# Patient Record
Sex: MALE | Race: WHITE | NOT HISPANIC OR LATINO
[De-identification: names, ages, dates, MRNs, and addresses within clinical notes are randomized per-mention and may not be internally consistent; named-entity substitution may affect disease eponyms.]

---

## 2017-03-16 ENCOUNTER — APPOINTMENT (OUTPATIENT)
Dept: CARDIOLOGY | Facility: CLINIC | Age: 77
End: 2017-03-16

## 2017-06-06 ENCOUNTER — OUTPATIENT (OUTPATIENT)
Dept: OUTPATIENT SERVICES | Facility: HOSPITAL | Age: 77
LOS: 1 days | Discharge: HOME | End: 2017-06-06

## 2017-06-08 ENCOUNTER — APPOINTMENT (OUTPATIENT)
Dept: CARDIOLOGY | Facility: CLINIC | Age: 77
End: 2017-06-08

## 2017-06-08 VITALS
SYSTOLIC BLOOD PRESSURE: 136 MMHG | HEIGHT: 74 IN | DIASTOLIC BLOOD PRESSURE: 60 MMHG | BODY MASS INDEX: 31.06 KG/M2 | WEIGHT: 242 LBS | HEART RATE: 65 BPM

## 2017-06-08 DIAGNOSIS — I11.9 HYPERTENSIVE HEART DISEASE W/OUT HEART FAILURE: ICD-10-CM

## 2017-06-28 DIAGNOSIS — E07.9 DISORDER OF THYROID, UNSPECIFIED: ICD-10-CM

## 2017-06-28 DIAGNOSIS — E78.5 HYPERLIPIDEMIA, UNSPECIFIED: ICD-10-CM

## 2017-06-28 DIAGNOSIS — I10 ESSENTIAL (PRIMARY) HYPERTENSION: ICD-10-CM

## 2017-06-28 DIAGNOSIS — E11.65 TYPE 2 DIABETES MELLITUS WITH HYPERGLYCEMIA: ICD-10-CM

## 2017-10-19 ENCOUNTER — APPOINTMENT (OUTPATIENT)
Dept: CARDIOLOGY | Facility: CLINIC | Age: 77
End: 2017-10-19

## 2017-10-19 VITALS
WEIGHT: 242 LBS | SYSTOLIC BLOOD PRESSURE: 142 MMHG | BODY MASS INDEX: 31.06 KG/M2 | HEART RATE: 67 BPM | HEIGHT: 74 IN | DIASTOLIC BLOOD PRESSURE: 60 MMHG

## 2018-02-22 ENCOUNTER — APPOINTMENT (OUTPATIENT)
Dept: CARDIOLOGY | Facility: CLINIC | Age: 78
End: 2018-02-22

## 2018-02-22 VITALS
HEIGHT: 74 IN | HEART RATE: 65 BPM | DIASTOLIC BLOOD PRESSURE: 78 MMHG | WEIGHT: 247 LBS | BODY MASS INDEX: 31.7 KG/M2 | SYSTOLIC BLOOD PRESSURE: 140 MMHG

## 2018-02-22 DIAGNOSIS — I20.9 ANGINA PECTORIS, UNSPECIFIED: ICD-10-CM

## 2018-02-22 DIAGNOSIS — I50.30 UNSPECIFIED DIASTOLIC (CONGESTIVE) HEART FAILURE: ICD-10-CM

## 2018-03-06 ENCOUNTER — APPOINTMENT (OUTPATIENT)
Dept: CARDIOLOGY | Facility: CLINIC | Age: 78
End: 2018-03-06

## 2018-03-14 ENCOUNTER — OUTPATIENT (OUTPATIENT)
Dept: OUTPATIENT SERVICES | Facility: HOSPITAL | Age: 78
LOS: 1 days | Discharge: HOME | End: 2018-03-14

## 2018-03-14 DIAGNOSIS — E11.9 TYPE 2 DIABETES MELLITUS WITHOUT COMPLICATIONS: ICD-10-CM

## 2018-03-14 DIAGNOSIS — E03.9 HYPOTHYROIDISM, UNSPECIFIED: ICD-10-CM

## 2018-07-25 ENCOUNTER — APPOINTMENT (OUTPATIENT)
Dept: CARDIOLOGY | Facility: CLINIC | Age: 78
End: 2018-07-25

## 2018-07-25 VITALS
HEIGHT: 74 IN | HEART RATE: 63 BPM | BODY MASS INDEX: 31.06 KG/M2 | DIASTOLIC BLOOD PRESSURE: 62 MMHG | SYSTOLIC BLOOD PRESSURE: 160 MMHG | WEIGHT: 242 LBS

## 2018-07-25 DIAGNOSIS — E78.5 HYPERLIPIDEMIA, UNSPECIFIED: ICD-10-CM

## 2018-07-25 DIAGNOSIS — I25.10 ATHEROSCLEROTIC HEART DISEASE OF NATIVE CORONARY ARTERY W/OUT ANGINA PECTORIS: ICD-10-CM

## 2018-12-19 ENCOUNTER — APPOINTMENT (OUTPATIENT)
Dept: CARDIOLOGY | Facility: CLINIC | Age: 78
End: 2018-12-19

## 2018-12-31 ENCOUNTER — EMERGENCY (EMERGENCY)
Facility: HOSPITAL | Age: 78
LOS: 0 days | Discharge: HOME | End: 2018-12-31
Attending: STUDENT IN AN ORGANIZED HEALTH CARE EDUCATION/TRAINING PROGRAM | Admitting: STUDENT IN AN ORGANIZED HEALTH CARE EDUCATION/TRAINING PROGRAM
Payer: MEDICARE

## 2018-12-31 ENCOUNTER — INBOUND DOCUMENT (OUTPATIENT)
Age: 78
End: 2018-12-31

## 2018-12-31 VITALS
DIASTOLIC BLOOD PRESSURE: 74 MMHG | RESPIRATION RATE: 18 BRPM | SYSTOLIC BLOOD PRESSURE: 178 MMHG | OXYGEN SATURATION: 95 % | TEMPERATURE: 97 F | HEART RATE: 87 BPM

## 2018-12-31 VITALS
OXYGEN SATURATION: 97 % | HEART RATE: 73 BPM | RESPIRATION RATE: 18 BRPM | TEMPERATURE: 97 F | DIASTOLIC BLOOD PRESSURE: 96 MMHG | WEIGHT: 251.99 LBS | SYSTOLIC BLOOD PRESSURE: 213 MMHG

## 2018-12-31 DIAGNOSIS — Z95.1 PRESENCE OF AORTOCORONARY BYPASS GRAFT: ICD-10-CM

## 2018-12-31 DIAGNOSIS — E11.9 TYPE 2 DIABETES MELLITUS WITHOUT COMPLICATIONS: ICD-10-CM

## 2018-12-31 DIAGNOSIS — Z95.1 PRESENCE OF AORTOCORONARY BYPASS GRAFT: Chronic | ICD-10-CM

## 2018-12-31 DIAGNOSIS — L03.115 CELLULITIS OF RIGHT LOWER LIMB: ICD-10-CM

## 2018-12-31 DIAGNOSIS — I10 ESSENTIAL (PRIMARY) HYPERTENSION: ICD-10-CM

## 2018-12-31 DIAGNOSIS — M79.89 OTHER SPECIFIED SOFT TISSUE DISORDERS: ICD-10-CM

## 2018-12-31 DIAGNOSIS — E03.9 HYPOTHYROIDISM, UNSPECIFIED: ICD-10-CM

## 2018-12-31 DIAGNOSIS — Z79.899 OTHER LONG TERM (CURRENT) DRUG THERAPY: ICD-10-CM

## 2018-12-31 DIAGNOSIS — I25.10 ATHEROSCLEROTIC HEART DISEASE OF NATIVE CORONARY ARTERY WITHOUT ANGINA PECTORIS: ICD-10-CM

## 2018-12-31 DIAGNOSIS — E78.00 PURE HYPERCHOLESTEROLEMIA, UNSPECIFIED: ICD-10-CM

## 2018-12-31 DIAGNOSIS — Z79.82 LONG TERM (CURRENT) USE OF ASPIRIN: ICD-10-CM

## 2018-12-31 LAB
ANION GAP SERPL CALC-SCNC: 12 MMOL/L — SIGNIFICANT CHANGE UP (ref 7–14)
BASOPHILS # BLD AUTO: 0.03 K/UL — SIGNIFICANT CHANGE UP (ref 0–0.2)
BASOPHILS NFR BLD AUTO: 0.4 % — SIGNIFICANT CHANGE UP (ref 0–1)
BUN SERPL-MCNC: 27 MG/DL — HIGH (ref 10–20)
CALCIUM SERPL-MCNC: 9.3 MG/DL — SIGNIFICANT CHANGE UP (ref 8.5–10.1)
CHLORIDE SERPL-SCNC: 103 MMOL/L — SIGNIFICANT CHANGE UP (ref 98–110)
CO2 SERPL-SCNC: 29 MMOL/L — SIGNIFICANT CHANGE UP (ref 17–32)
CREAT SERPL-MCNC: 1.3 MG/DL — SIGNIFICANT CHANGE UP (ref 0.7–1.5)
EOSINOPHIL # BLD AUTO: 0.33 K/UL — SIGNIFICANT CHANGE UP (ref 0–0.7)
EOSINOPHIL NFR BLD AUTO: 4.7 % — SIGNIFICANT CHANGE UP (ref 0–8)
GLUCOSE SERPL-MCNC: 163 MG/DL — HIGH (ref 70–99)
HCT VFR BLD CALC: 39.9 % — LOW (ref 42–52)
HGB BLD-MCNC: 12.8 G/DL — LOW (ref 14–18)
IMM GRANULOCYTES NFR BLD AUTO: 0.3 % — SIGNIFICANT CHANGE UP (ref 0.1–0.3)
LACTATE SERPL-SCNC: 1.3 MMOL/L — SIGNIFICANT CHANGE UP (ref 0.5–2.2)
LYMPHOCYTES # BLD AUTO: 0.77 K/UL — LOW (ref 1.2–3.4)
LYMPHOCYTES # BLD AUTO: 10.9 % — LOW (ref 20.5–51.1)
MCHC RBC-ENTMCNC: 28.3 PG — SIGNIFICANT CHANGE UP (ref 27–31)
MCHC RBC-ENTMCNC: 32.1 G/DL — SIGNIFICANT CHANGE UP (ref 32–37)
MCV RBC AUTO: 88.3 FL — SIGNIFICANT CHANGE UP (ref 80–94)
MONOCYTES # BLD AUTO: 0.6 K/UL — SIGNIFICANT CHANGE UP (ref 0.1–0.6)
MONOCYTES NFR BLD AUTO: 8.5 % — SIGNIFICANT CHANGE UP (ref 1.7–9.3)
NEUTROPHILS # BLD AUTO: 5.34 K/UL — SIGNIFICANT CHANGE UP (ref 1.4–6.5)
NEUTROPHILS NFR BLD AUTO: 75.2 % — SIGNIFICANT CHANGE UP (ref 42.2–75.2)
NRBC # BLD: 0 /100 WBCS — SIGNIFICANT CHANGE UP (ref 0–0)
PLATELET # BLD AUTO: 225 K/UL — SIGNIFICANT CHANGE UP (ref 130–400)
POTASSIUM SERPL-MCNC: 4.5 MMOL/L — SIGNIFICANT CHANGE UP (ref 3.5–5)
POTASSIUM SERPL-SCNC: 4.5 MMOL/L — SIGNIFICANT CHANGE UP (ref 3.5–5)
RBC # BLD: 4.52 M/UL — LOW (ref 4.7–6.1)
RBC # FLD: 14.2 % — SIGNIFICANT CHANGE UP (ref 11.5–14.5)
SODIUM SERPL-SCNC: 144 MMOL/L — SIGNIFICANT CHANGE UP (ref 135–146)
WBC # BLD: 7.09 K/UL — SIGNIFICANT CHANGE UP (ref 4.8–10.8)
WBC # FLD AUTO: 7.09 K/UL — SIGNIFICANT CHANGE UP (ref 4.8–10.8)

## 2018-12-31 PROCEDURE — 93970 EXTREMITY STUDY: CPT | Mod: 26

## 2018-12-31 RX ORDER — AMPICILLIN SODIUM AND SULBACTAM SODIUM 250; 125 MG/ML; MG/ML
3 INJECTION, POWDER, FOR SUSPENSION INTRAMUSCULAR; INTRAVENOUS ONCE
Qty: 0 | Refills: 0 | Status: COMPLETED | OUTPATIENT
Start: 2018-12-31 | End: 2018-12-31

## 2018-12-31 RX ORDER — CEPHALEXIN 500 MG
1 CAPSULE ORAL
Qty: 28 | Refills: 0 | OUTPATIENT
Start: 2018-12-31 | End: 2019-01-06

## 2018-12-31 RX ORDER — AZTREONAM 2 G
1 VIAL (EA) INJECTION
Qty: 14 | Refills: 0
Start: 2018-12-31 | End: 2019-01-06

## 2018-12-31 RX ORDER — AZTREONAM 2 G
1 VIAL (EA) INJECTION
Qty: 14 | Refills: 0 | OUTPATIENT
Start: 2018-12-31 | End: 2019-01-06

## 2018-12-31 RX ORDER — ACETAMINOPHEN 500 MG
975 TABLET ORAL ONCE
Qty: 0 | Refills: 0 | Status: COMPLETED | OUTPATIENT
Start: 2018-12-31 | End: 2018-12-31

## 2018-12-31 RX ORDER — CEPHALEXIN 500 MG
1 CAPSULE ORAL
Qty: 28 | Refills: 0
Start: 2018-12-31 | End: 2019-01-06

## 2018-12-31 RX ADMIN — AMPICILLIN SODIUM AND SULBACTAM SODIUM 200 GRAM(S): 250; 125 INJECTION, POWDER, FOR SUSPENSION INTRAMUSCULAR; INTRAVENOUS at 08:04

## 2018-12-31 RX ADMIN — Medication 975 MILLIGRAM(S): at 08:04

## 2018-12-31 RX ADMIN — Medication 1 TABLET(S): at 08:04

## 2018-12-31 NOTE — ED ADULT NURSE NOTE - OBJECTIVE STATEMENT
Partiernt c/o swelling of BLLE with redness x 4 days. Patient denies Fevers chills n/v CP SOB and any recent long travel. No open wounds noted to yhe lower extremities.

## 2018-12-31 NOTE — ED PROVIDER NOTE - PHYSICAL EXAMINATION
CONSTITUTIONAL: NAD  SKIN: Warm dry  HEAD: NCAT  EYES: NL inspection  ENT: MMM  NECK: Supple; non tender.  CARD: RRR  RESP: CTAB  ABD: S/NT no R/G  EXT: + erythema to R mid ant-medial tibia; + some swelling and mild ttp; pulses NL b/l  NEURO: Grossly unremarkable  PSYCH: Cooperative, appropriate.

## 2018-12-31 NOTE — ED ADULT NURSE NOTE - NSIMPLEMENTINTERV_GEN_ALL_ED
Implemented All Universal Safety Interventions:  [place] to call system. Call bell, personal items and telephone within reach. Instruct patient to call for assistance. Room bathroom lighting operational. Non-slip footwear when patient is off stretcher. Physically safe environment: no spills, clutter or unnecessary equipment. Stretcher in lowest position, wheels locked, appropriate side rails in place.

## 2018-12-31 NOTE — ED PROVIDER NOTE - OBJECTIVE STATEMENT
77 y/o M pmh DM, hnt, CAD s/p CABG, p/w R leg erythema and swelling x4d. saw PMD taking Zpack as rx'ed x 3d w/ marginal improvement. No fever, trauma, weakness or numbness. no hx VTE. No cp sob. Gradual onset, no clear relieving or exacerbating factors.  Sx are moderate.

## 2018-12-31 NOTE — ED PROVIDER NOTE - CARE PROVIDER_API CALL
Tariq Rivera), Vascular Surgery  [address] [place] [address]  Phone: [phone]  Fax: [phone]

## 2018-12-31 NOTE — ED PROVIDER NOTE - MEDICAL DECISION MAKING DETAILS
Pt presented /w RLE cellulitis. labs reassuring. DVT LE US neg. abx coverage broadened. Discussed all available results.  Pt and/ or family understand results, plan of care, outpt follow up as discussed, and signs and symptoms for ED return.  Pt/ family is comfortable with discharge.  Included number for vascular surgeon by pt request.

## 2018-12-31 NOTE — ED PROVIDER NOTE - NS ED ROS FT
Constitutional:  See HPI  Eyes:  No visual changes  ENMT: No neck pain or stiffness  Cardiac:  No chest pain  Respiratory:  No cough or respiratory distress.   GI:  No nausea, vomiting, diarrhea or abdominal pain.  :  No dysuria, frequency or burning.  MS:  See HPI  Neuro:  No headache   Skin:  No skin rash  Except as documented in the HPI,  all other systems are negative

## 2018-12-31 NOTE — ED ADULT TRIAGE NOTE - CHIEF COMPLAINT QUOTE
pt states that he developed a rash and swelling on his right leg that has gotten worse despite oral antibiotics

## 2018-12-31 NOTE — ED PROVIDER NOTE - NSFOLLOWUPINSTRUCTIONS_ED_ALL_ED_FT
Cellulitis    Cellulitis is a skin infection caused by bacteria. This condition occurs most often in the arms and lower legs but can occur anywhere over the body. Symptoms include redness, swelling, warm skin, tenderness, and chills/fever. If you were prescribed an antibiotic medicine, take it as told by your health care provider. Do not stop taking the antibiotic even if you start to feel better.    Take antibiotics as prescribed.  If you develop a fever, worsening pain or spreading redness despite taking antibiotics, return to the ER.  Otherwise, follow up with your doctor as planned this Thursday.    The number for a vascular surgeon was placed in the chart, by your request.  Speak to you pmd before making an appointment.    SEEK IMMEDIATE MEDICAL CARE IF YOU HAVE THE FOLLOWING SYMPTOMS: worsening fever, red streaks coming from affected area, vomiting or diarrhea, or dizziness/lightheadedness.

## 2019-01-23 ENCOUNTER — OUTPATIENT (OUTPATIENT)
Dept: OUTPATIENT SERVICES | Facility: HOSPITAL | Age: 79
LOS: 1 days | Discharge: HOME | End: 2019-01-23

## 2019-01-23 DIAGNOSIS — E78.5 HYPERLIPIDEMIA, UNSPECIFIED: ICD-10-CM

## 2019-01-23 DIAGNOSIS — E06.3 AUTOIMMUNE THYROIDITIS: ICD-10-CM

## 2019-01-23 DIAGNOSIS — Z95.1 PRESENCE OF AORTOCORONARY BYPASS GRAFT: Chronic | ICD-10-CM

## 2019-01-23 DIAGNOSIS — E11.65 TYPE 2 DIABETES MELLITUS WITH HYPERGLYCEMIA: ICD-10-CM

## 2019-01-23 PROBLEM — E78.00 PURE HYPERCHOLESTEROLEMIA, UNSPECIFIED: Chronic | Status: ACTIVE | Noted: 2018-12-31

## 2019-01-23 PROBLEM — I10 ESSENTIAL (PRIMARY) HYPERTENSION: Chronic | Status: ACTIVE | Noted: 2018-12-31

## 2019-01-23 PROBLEM — E03.9 HYPOTHYROIDISM, UNSPECIFIED: Chronic | Status: ACTIVE | Noted: 2018-12-31

## 2019-01-23 PROBLEM — E11.9 TYPE 2 DIABETES MELLITUS WITHOUT COMPLICATIONS: Chronic | Status: ACTIVE | Noted: 2018-12-31

## 2019-03-11 ENCOUNTER — APPOINTMENT (OUTPATIENT)
Dept: VASCULAR SURGERY | Facility: CLINIC | Age: 79
End: 2019-03-11
Payer: MEDICARE

## 2019-03-11 VITALS
WEIGHT: 250 LBS | HEIGHT: 73 IN | BODY MASS INDEX: 33.13 KG/M2 | DIASTOLIC BLOOD PRESSURE: 60 MMHG | SYSTOLIC BLOOD PRESSURE: 140 MMHG

## 2019-03-11 PROCEDURE — 99203 OFFICE O/P NEW LOW 30 MIN: CPT

## 2019-03-11 PROCEDURE — 93925 LOWER EXTREMITY STUDY: CPT

## 2019-03-11 NOTE — HISTORY OF PRESENT ILLNESS
[FreeTextEntry1] : The patient is a 79-year-old gentleman who was recently diagnosed with an abdominal aortic aneurysm on abdominal ultrasound. The patient's aorta measured 3.9 cm x 4.3 cm. Right common iliac artery 1.8 cm left common iliac artery 1.5 cm. The patient has also informed me of a recent history of cellulitis which he was treated with antibiotics. He also has some weeping edema and blisters to us tibial region bilaterally. He had a venous duplex performed in the hospital which was negative for DVT.

## 2019-03-11 NOTE — CONSULT LETTER
[Dear  ___] : Dear  [unfilled], [Courtesy Letter:] : I had the pleasure of seeing your patient, [unfilled], in my office today. [Please see my note below.] : Please see my note below. [FreeTextEntry2] : Dear Dr. Borden

## 2019-03-11 NOTE — PHYSICAL EXAM
[Normal Breath Sounds] : Normal breath sounds [Normal Heart Sounds] : normal heart sounds [Abdominal Aorta] : Normal abdominal aorta [2+] : left 2+ [Ankle Swelling (On Exam)] : present [Ankle Swelling Bilaterally] : bilaterally  [] : bilaterally [Ankle Swelling On The Left] : moderate [JVD] : no jugular venous distention  [Carotid Bruits] : no carotid bruits [Varicose Veins Of Lower Extremities] : not present [FreeTextEntry1] : bilateral stasis skin changes with weeping edema and blisters

## 2019-03-11 NOTE — ASSESSMENT
[FreeTextEntry1] : The patient is a 80 yo male with a 4.3 cm AAA and a 1.8 cm right and 1.5 left MAR aneurysm. The patient also as thoracic aortic ectasia. He also has cellulitis of his lower extremity with venous stasis ulcer. I preformed an arterial duplex for decrease pulses and the patient has significant PVD. Right popliteal artery occlusion and left SFA stenosis PSV 383cm/s. I will schedule him for 3/22/19 for a left leg angiogram. I recommend antibiotic and local wound care with ACE wrap compression. The patient will benefit from unna boots once cellulitis has resolved.

## 2019-03-12 ENCOUNTER — FORM ENCOUNTER (OUTPATIENT)
Age: 79
End: 2019-03-12

## 2019-03-13 ENCOUNTER — OUTPATIENT (OUTPATIENT)
Dept: OUTPATIENT SERVICES | Facility: HOSPITAL | Age: 79
LOS: 1 days | Discharge: HOME | End: 2019-03-13

## 2019-03-13 VITALS
SYSTOLIC BLOOD PRESSURE: 160 MMHG | HEART RATE: 72 BPM | OXYGEN SATURATION: 100 % | TEMPERATURE: 97 F | WEIGHT: 255.3 LBS | RESPIRATION RATE: 20 BRPM | HEIGHT: 73 IN | DIASTOLIC BLOOD PRESSURE: 80 MMHG

## 2019-03-13 DIAGNOSIS — Z90.49 ACQUIRED ABSENCE OF OTHER SPECIFIED PARTS OF DIGESTIVE TRACT: Chronic | ICD-10-CM

## 2019-03-13 DIAGNOSIS — I73.9 PERIPHERAL VASCULAR DISEASE, UNSPECIFIED: ICD-10-CM

## 2019-03-13 DIAGNOSIS — Z95.1 PRESENCE OF AORTOCORONARY BYPASS GRAFT: Chronic | ICD-10-CM

## 2019-03-13 DIAGNOSIS — Z01.818 ENCOUNTER FOR OTHER PREPROCEDURAL EXAMINATION: ICD-10-CM

## 2019-03-13 DIAGNOSIS — Z98.890 OTHER SPECIFIED POSTPROCEDURAL STATES: Chronic | ICD-10-CM

## 2019-03-13 LAB
ALBUMIN SERPL ELPH-MCNC: 3.5 G/DL — SIGNIFICANT CHANGE UP (ref 3.5–5.2)
ALP SERPL-CCNC: 48 U/L — SIGNIFICANT CHANGE UP (ref 30–115)
ALT FLD-CCNC: 31 U/L — SIGNIFICANT CHANGE UP (ref 0–41)
ANION GAP SERPL CALC-SCNC: 12 MMOL/L — SIGNIFICANT CHANGE UP (ref 7–14)
APTT BLD: 36.6 SEC — SIGNIFICANT CHANGE UP (ref 27–39.2)
AST SERPL-CCNC: 51 U/L — HIGH (ref 0–41)
BASOPHILS # BLD AUTO: 0.04 K/UL — SIGNIFICANT CHANGE UP (ref 0–0.2)
BASOPHILS NFR BLD AUTO: 0.5 % — SIGNIFICANT CHANGE UP (ref 0–1)
BILIRUB SERPL-MCNC: 0.3 MG/DL — SIGNIFICANT CHANGE UP (ref 0.2–1.2)
BUN SERPL-MCNC: 25 MG/DL — HIGH (ref 10–20)
CALCIUM SERPL-MCNC: 9.1 MG/DL — SIGNIFICANT CHANGE UP (ref 8.5–10.1)
CHLORIDE SERPL-SCNC: 99 MMOL/L — SIGNIFICANT CHANGE UP (ref 98–110)
CO2 SERPL-SCNC: 28 MMOL/L — SIGNIFICANT CHANGE UP (ref 17–32)
CREAT SERPL-MCNC: 1.3 MG/DL — SIGNIFICANT CHANGE UP (ref 0.7–1.5)
EOSINOPHIL # BLD AUTO: 0.11 K/UL — SIGNIFICANT CHANGE UP (ref 0–0.7)
EOSINOPHIL NFR BLD AUTO: 1.4 % — SIGNIFICANT CHANGE UP (ref 0–8)
GLUCOSE SERPL-MCNC: 86 MG/DL — SIGNIFICANT CHANGE UP (ref 70–99)
HCT VFR BLD CALC: 39.5 % — LOW (ref 42–52)
HGB BLD-MCNC: 12.4 G/DL — LOW (ref 14–18)
IMM GRANULOCYTES NFR BLD AUTO: 0.4 % — HIGH (ref 0.1–0.3)
INR BLD: 1.01 RATIO — SIGNIFICANT CHANGE UP (ref 0.65–1.3)
LYMPHOCYTES # BLD AUTO: 0.84 K/UL — LOW (ref 1.2–3.4)
LYMPHOCYTES # BLD AUTO: 10.4 % — LOW (ref 20.5–51.1)
MCHC RBC-ENTMCNC: 27.5 PG — SIGNIFICANT CHANGE UP (ref 27–31)
MCHC RBC-ENTMCNC: 31.4 G/DL — LOW (ref 32–37)
MCV RBC AUTO: 87.6 FL — SIGNIFICANT CHANGE UP (ref 80–94)
MONOCYTES # BLD AUTO: 0.69 K/UL — HIGH (ref 0.1–0.6)
MONOCYTES NFR BLD AUTO: 8.5 % — SIGNIFICANT CHANGE UP (ref 1.7–9.3)
NEUTROPHILS # BLD AUTO: 6.39 K/UL — SIGNIFICANT CHANGE UP (ref 1.4–6.5)
NEUTROPHILS NFR BLD AUTO: 78.8 % — HIGH (ref 42.2–75.2)
NRBC # BLD: 0 /100 WBCS — SIGNIFICANT CHANGE UP (ref 0–0)
PLATELET # BLD AUTO: 290 K/UL — SIGNIFICANT CHANGE UP (ref 130–400)
POTASSIUM SERPL-MCNC: 4.4 MMOL/L — SIGNIFICANT CHANGE UP (ref 3.5–5)
POTASSIUM SERPL-SCNC: 4.4 MMOL/L — SIGNIFICANT CHANGE UP (ref 3.5–5)
PROT SERPL-MCNC: 6.1 G/DL — SIGNIFICANT CHANGE UP (ref 6–8)
PROTHROM AB SERPL-ACNC: 11.6 SEC — SIGNIFICANT CHANGE UP (ref 9.95–12.87)
RBC # BLD: 4.51 M/UL — LOW (ref 4.7–6.1)
RBC # FLD: 15.6 % — HIGH (ref 11.5–14.5)
SODIUM SERPL-SCNC: 139 MMOL/L — SIGNIFICANT CHANGE UP (ref 135–146)
WBC # BLD: 8.1 K/UL — SIGNIFICANT CHANGE UP (ref 4.8–10.8)
WBC # FLD AUTO: 8.1 K/UL — SIGNIFICANT CHANGE UP (ref 4.8–10.8)

## 2019-03-13 RX ORDER — ASPIRIN/CALCIUM CARB/MAGNESIUM 324 MG
0 TABLET ORAL
Qty: 0 | Refills: 0 | COMMUNITY

## 2019-03-13 RX ORDER — INSULIN ASPART 100 [IU]/ML
0 INJECTION, SOLUTION SUBCUTANEOUS
Qty: 0 | Refills: 0 | COMMUNITY

## 2019-03-13 RX ORDER — VALSARTAN 80 MG/1
0 TABLET ORAL
Qty: 0 | Refills: 0 | COMMUNITY

## 2019-03-13 RX ORDER — FENOFIBRATE,MICRONIZED 130 MG
0 CAPSULE ORAL
Qty: 0 | Refills: 0 | COMMUNITY

## 2019-03-13 RX ORDER — LEVOTHYROXINE SODIUM 125 MCG
0 TABLET ORAL
Qty: 0 | Refills: 0 | COMMUNITY

## 2019-03-13 RX ORDER — INSULIN DEGLUDEC 100 U/ML
0 INJECTION, SOLUTION SUBCUTANEOUS
Qty: 0 | Refills: 0 | COMMUNITY

## 2019-03-13 RX ORDER — ATORVASTATIN CALCIUM 80 MG/1
0 TABLET, FILM COATED ORAL
Qty: 0 | Refills: 0 | COMMUNITY

## 2019-03-13 RX ORDER — EZETIMIBE 10 MG/1
0 TABLET ORAL
Qty: 0 | Refills: 0 | COMMUNITY

## 2019-03-13 NOTE — H&P PST ADULT - NSICDXPASTMEDICALHX_GEN_ALL_CORE_FT
PAST MEDICAL HISTORY:  DM (diabetes mellitus)     High cholesterol     HTN (hypertension)     Hypothyroid     Pneumonia

## 2019-03-13 NOTE — H&P PST ADULT - HISTORY OF PRESENT ILLNESS
78 y/o male scheduled for lle angiogram  pt with h/o pvd  pt reports h/o cabg 2001 reports that the vessels were harvested from b/l lower extremities now with decreased circulation  reports no c/o cp,sob,palpitations,cough or dysuria  1-2 fos without sob

## 2019-03-13 NOTE — H&P PST ADULT - NSICDXPASTSURGICALHX_GEN_ALL_CORE_FT
PAST SURGICAL HISTORY:  H/O coronary artery bypass surgery 2001    History of appendectomy     S/P inguinal hernia repair

## 2019-03-14 ENCOUNTER — APPOINTMENT (OUTPATIENT)
Dept: CARDIOLOGY | Facility: CLINIC | Age: 79
End: 2019-03-14

## 2019-03-14 VITALS
SYSTOLIC BLOOD PRESSURE: 154 MMHG | WEIGHT: 256 LBS | BODY MASS INDEX: 33.93 KG/M2 | DIASTOLIC BLOOD PRESSURE: 86 MMHG | HEIGHT: 73 IN | HEART RATE: 68 BPM

## 2019-03-14 DIAGNOSIS — I20.9 ANGINA PECTORIS, UNSPECIFIED: ICD-10-CM

## 2019-03-14 DIAGNOSIS — I25.10 ATHEROSCLEROTIC HEART DISEASE OF NATIVE CORONARY ARTERY W/OUT ANGINA PECTORIS: ICD-10-CM

## 2019-03-14 NOTE — ADDENDUM
[FreeTextEntry1] : This note was documented by Navjot Lindsey on 11/10/16 acting as scribe for Yoandy Borden M.D..\par \par I, Yoandy Borden M.D, certify that all of the documentation, medical decision making, and assessment within this note are true and correct.

## 2019-03-14 NOTE — HISTORY OF PRESENT ILLNESS
[FreeTextEntry1] : ANDRIA TAVAREZ is being seen for a clinic follow-up of chest pain, coronary artery disease, bypass graft and dyspnea.  \par Andria is a 76-year-old male with long-standing history of heart disease.He underwent coronary artery bypass surgery at [facility] approximately 20 years ago. \par He suffers from angina, class II with a New York Heart Association classification.\par  His last cardiac catheterization was in May of 2014. The bypass graft to the left anterior descending and right coronary artery was patent but the bypass graft to the circumflex system was occluded\par  \par  following his cardiac catheterization, medical management was recommended. \par \par Today patient reports he is doing well with no new complaints.\par  He denies any SOB, chest pain, claudication, palpitations, lightheadedness, or syncope. H\par e does note occasional leg cramps when he lays down to go to sleep. \par cardiac status is stable\par \par denies exertional chest pain or sob\par no symptoms of chf or edema\par class 1 according to the nyha classification\par \par no tia, cva or severe pvd\par no arrythmias, pre-syncope or syncope.

## 2019-03-14 NOTE — REASON FOR VISIT
[Follow-Up - Clinic] : a clinic follow-up of [Coronary Artery Disease] : coronary artery disease [FreeTextEntry2] : Also  the patient is pre-op for vascular surgery [FreeTextEntry1] : bypass graft

## 2019-03-14 NOTE — DISCUSSION/SUMMARY
[FreeTextEntry1] : patient is stable to undergo vascular surgery\par  moderate risk for surgery \par no further cardiaC WORK UP NEEDED

## 2019-03-14 NOTE — PHYSICAL EXAM
[General Appearance - Well Developed] : well developed [Normal Appearance] : normal appearance [Well Groomed] : well groomed [General Appearance - Well Nourished] : well nourished [No Deformities] : no deformities [General Appearance - In No Acute Distress] : no acute distress [Normal Conjunctiva] : the conjunctiva exhibited no abnormalities [Eyelids - No Xanthelasma] : the eyelids demonstrated no xanthelasmas [Normal Oral Mucosa] : normal oral mucosa [No Oral Pallor] : no oral pallor [No Oral Cyanosis] : no oral cyanosis [Normal Jugular Venous A Waves Present] : normal jugular venous A waves present [Normal Jugular Venous V Waves Present] : normal jugular venous V waves present [No Jugular Venous Wang A Waves] : no jugular venous wang A waves [Respiration, Rhythm And Depth] : normal respiratory rhythm and effort [Exaggerated Use Of Accessory Muscles For Inspiration] : no accessory muscle use [Auscultation Breath Sounds / Voice Sounds] : lungs were clear to auscultation bilaterally [Heart Rate And Rhythm] : heart rate and rhythm were normal [Heart Sounds] : normal S1 and S2 [Murmurs] : no murmurs present [Abdomen Soft] : soft [Abdomen Tenderness] : non-tender [Abdomen Mass (___ Cm)] : no abdominal mass palpated [Abnormal Walk] : normal gait [Gait - Sufficient For Exercise Testing] : the gait was sufficient for exercise testing [Nail Clubbing] : no clubbing of the fingernails [Cyanosis, Localized] : no localized cyanosis [Petechial Hemorrhages (___cm)] : no petechial hemorrhages [Skin Color & Pigmentation] : normal skin color and pigmentation [] : no rash [No Venous Stasis] : no venous stasis [Skin Lesions] : no skin lesions [No Skin Ulcers] : no skin ulcer [No Xanthoma] : no  xanthoma was observed [Oriented To Time, Place, And Person] : oriented to person, place, and time [FreeTextEntry1] : no pedal edema

## 2019-03-18 ENCOUNTER — APPOINTMENT (OUTPATIENT)
Dept: VASCULAR SURGERY | Facility: CLINIC | Age: 79
End: 2019-03-18
Payer: MEDICARE

## 2019-03-18 DIAGNOSIS — L97.909 NON-PRESSURE CHRONIC ULCER OF UNSPECIFIED PART OF UNSPECIFIED LOWER LEG WITH UNSPECIFIED SEVERITY: ICD-10-CM

## 2019-03-18 DIAGNOSIS — L03.115 CELLULITIS OF RIGHT LOWER LIMB: ICD-10-CM

## 2019-03-18 PROCEDURE — 99212 OFFICE O/P EST SF 10 MIN: CPT

## 2019-03-18 NOTE — ASSESSMENT
[FreeTextEntry1] : The patient is a 79-year-old gentleman with a history of peripheral vascular disease and venous insufficiency. The patient had bilateral lower extremity cellulitis last week. He was treated with oral antibiotics. His cellulitis has resolved his swelling is under control. I recommend local wound care and Ace wrap compression daily. And I will see him in the hospital on Friday for a left lower extremity injury and possible endovascular revascularization. Subsequently the patient will also require a right lower extremity endovascular revascularization.

## 2019-03-18 NOTE — HISTORY OF PRESENT ILLNESS
[FreeTextEntry1] : The patient is a 71-year-old gentleman who was seen in my office last week with bilateral lower extremity cellulitis. He was given oral antibiotics and recommended local wound care with Neosporin and an Ace wrap compression. He scheduled for a left lower extremity angiogram for SFA stenosis. Today he presents for followup of his lower extremity cellulitis. His cellulitis has resolved his leg swelling is greatly improved.

## 2019-03-18 NOTE — REASON FOR VISIT
[Follow-Up: _____] : a [unfilled] follow-up visit [FreeTextEntry1] : bilateral lower extremity cellulitis and PVD.

## 2019-03-22 ENCOUNTER — APPOINTMENT (OUTPATIENT)
Dept: VASCULAR SURGERY | Facility: HOSPITAL | Age: 79
End: 2019-03-22
Payer: MEDICARE

## 2019-03-22 ENCOUNTER — OUTPATIENT (OUTPATIENT)
Dept: OUTPATIENT SERVICES | Facility: HOSPITAL | Age: 79
LOS: 1 days | Discharge: HOME | End: 2019-03-22

## 2019-03-22 VITALS — HEART RATE: 59 BPM | DIASTOLIC BLOOD PRESSURE: 78 MMHG | SYSTOLIC BLOOD PRESSURE: 139 MMHG | RESPIRATION RATE: 16 BRPM

## 2019-03-22 VITALS
HEIGHT: 73 IN | RESPIRATION RATE: 20 BRPM | DIASTOLIC BLOOD PRESSURE: 87 MMHG | HEART RATE: 61 BPM | TEMPERATURE: 98 F | WEIGHT: 248.02 LBS | SYSTOLIC BLOOD PRESSURE: 180 MMHG

## 2019-03-22 DIAGNOSIS — Z98.890 OTHER SPECIFIED POSTPROCEDURAL STATES: Chronic | ICD-10-CM

## 2019-03-22 DIAGNOSIS — Z95.1 PRESENCE OF AORTOCORONARY BYPASS GRAFT: Chronic | ICD-10-CM

## 2019-03-22 DIAGNOSIS — Z90.49 ACQUIRED ABSENCE OF OTHER SPECIFIED PARTS OF DIGESTIVE TRACT: Chronic | ICD-10-CM

## 2019-03-22 LAB
BLD GP AB SCN SERPL QL: SIGNIFICANT CHANGE UP
GLUCOSE BLDC GLUCOMTR-MCNC: 89 MG/DL — SIGNIFICANT CHANGE UP (ref 70–99)
GLUCOSE BLDC GLUCOMTR-MCNC: 99 MG/DL — SIGNIFICANT CHANGE UP (ref 70–99)
TYPE + AB SCN PNL BLD: SIGNIFICANT CHANGE UP

## 2019-03-22 PROCEDURE — 75710 ARTERY X-RAYS ARM/LEG: CPT | Mod: 26,59

## 2019-03-22 PROCEDURE — 37226: CPT | Mod: LT

## 2019-03-22 PROCEDURE — 36247 INS CATH ABD/L-EXT ART 3RD: CPT | Mod: 59,LT

## 2019-03-22 PROCEDURE — 76937 US GUIDE VASCULAR ACCESS: CPT | Mod: 26

## 2019-03-22 RX ORDER — ONDANSETRON 8 MG/1
4 TABLET, FILM COATED ORAL ONCE
Qty: 0 | Refills: 0 | Status: COMPLETED | OUTPATIENT
Start: 2019-03-22 | End: 2019-03-22

## 2019-03-22 RX ORDER — OXYCODONE AND ACETAMINOPHEN 5; 325 MG/1; MG/1
1 TABLET ORAL EVERY 4 HOURS
Qty: 0 | Refills: 0 | Status: DISCONTINUED | OUTPATIENT
Start: 2019-03-22 | End: 2019-03-22

## 2019-03-22 RX ORDER — HYDROMORPHONE HYDROCHLORIDE 2 MG/ML
1 INJECTION INTRAMUSCULAR; INTRAVENOUS; SUBCUTANEOUS
Qty: 0 | Refills: 0 | Status: DISCONTINUED | OUTPATIENT
Start: 2019-03-22 | End: 2019-03-22

## 2019-03-22 RX ORDER — HYDRALAZINE HCL 50 MG
10 TABLET ORAL ONCE
Qty: 0 | Refills: 0 | Status: COMPLETED | OUTPATIENT
Start: 2019-03-22 | End: 2019-03-22

## 2019-03-22 RX ORDER — ASPIRIN/CALCIUM CARB/MAGNESIUM 324 MG
325 TABLET ORAL ONCE
Qty: 0 | Refills: 0 | Status: COMPLETED | OUTPATIENT
Start: 2019-03-22 | End: 2019-03-22

## 2019-03-22 RX ORDER — SODIUM CHLORIDE 9 MG/ML
1000 INJECTION, SOLUTION INTRAVENOUS
Qty: 0 | Refills: 0 | Status: DISCONTINUED | OUTPATIENT
Start: 2019-03-22 | End: 2019-04-06

## 2019-03-22 RX ORDER — HYDROMORPHONE HYDROCHLORIDE 2 MG/ML
0.5 INJECTION INTRAMUSCULAR; INTRAVENOUS; SUBCUTANEOUS
Qty: 0 | Refills: 0 | Status: DISCONTINUED | OUTPATIENT
Start: 2019-03-22 | End: 2019-03-22

## 2019-03-22 RX ADMIN — ONDANSETRON 4 MILLIGRAM(S): 8 TABLET, FILM COATED ORAL at 15:15

## 2019-03-22 RX ADMIN — Medication 325 MILLIGRAM(S): at 11:44

## 2019-03-22 RX ADMIN — HYDROMORPHONE HYDROCHLORIDE 0.5 MILLIGRAM(S): 2 INJECTION INTRAMUSCULAR; INTRAVENOUS; SUBCUTANEOUS at 11:14

## 2019-03-22 RX ADMIN — HYDROMORPHONE HYDROCHLORIDE 0.5 MILLIGRAM(S): 2 INJECTION INTRAMUSCULAR; INTRAVENOUS; SUBCUTANEOUS at 11:46

## 2019-03-22 RX ADMIN — Medication 10 MILLIGRAM(S): at 12:19

## 2019-03-22 RX ADMIN — HYDROMORPHONE HYDROCHLORIDE 0.5 MILLIGRAM(S): 2 INJECTION INTRAMUSCULAR; INTRAVENOUS; SUBCUTANEOUS at 13:18

## 2019-03-22 RX ADMIN — HYDROMORPHONE HYDROCHLORIDE 0.5 MILLIGRAM(S): 2 INJECTION INTRAMUSCULAR; INTRAVENOUS; SUBCUTANEOUS at 11:47

## 2019-03-22 RX ADMIN — SODIUM CHLORIDE 100 MILLILITER(S): 9 INJECTION, SOLUTION INTRAVENOUS at 11:14

## 2019-03-22 RX ADMIN — ONDANSETRON 4 MILLIGRAM(S): 8 TABLET, FILM COATED ORAL at 12:17

## 2019-03-22 NOTE — BRIEF OPERATIVE NOTE - NSICDXBRIEFPROCEDURE_GEN_ALL_CORE_FT
PROCEDURES:  Angiogram selective 22-Mar-2019 11:08:34 Left leg angiogram; SFA angioplasty and stenting Laura Chung

## 2019-03-22 NOTE — BRIEF OPERATIVE NOTE - OPERATION/FINDINGS
Aneurysmal infrarenal aorta and bilateral common iliac arteries. Arteriomegaly. Heavy calcifications in the mid-distal SFA and popliteal artery with associated stenoses. Three vessel tibial runoff to foot.

## 2019-03-22 NOTE — ASU DISCHARGE PLAN (ADULT/PEDIATRIC) - CALL YOUR DOCTOR IF YOU HAVE ANY OF THE FOLLOWING:
Numbness, tingling, color or temperature change to extremity/Swelling that gets worse/Wound/Surgical Site with redness, or foul smelling discharge or pus/Bleeding that does not stop/Pain not relieved by Medications

## 2019-03-22 NOTE — CHART NOTE - NSCHARTNOTEFT_GEN_A_CORE
PACU RN informed me that patient has  systolic. Preop BP was > 200 systolic. Pt is alert and otherwise stable. HR is 59. Ordered hydralazine 10mg IVP once. Will continue to monitor.

## 2019-03-22 NOTE — ASU DISCHARGE PLAN (ADULT/PEDIATRIC) - CARE PROVIDER_API CALL
Tariq Humphries)  Surgery; Vascular Surgery  [address] [place] [address]  Phone: [phone]  Fax: [phone]  Follow Up Time:

## 2019-03-25 PROBLEM — L97.909 LEG ULCER: Status: ACTIVE | Noted: 2019-03-25

## 2019-03-25 PROBLEM — J18.9 PNEUMONIA, UNSPECIFIED ORGANISM: Chronic | Status: ACTIVE | Noted: 2019-03-13

## 2019-03-25 RX ORDER — BISMUTH TRIBROMOPH/PETROLATUM 5"X9"
BANDAGE TOPICAL DAILY
Qty: 30 | Refills: 0 | Status: ACTIVE | COMMUNITY
Start: 2019-03-25 | End: 1900-01-01

## 2019-03-29 DIAGNOSIS — E11.51 TYPE 2 DIABETES MELLITUS WITH DIABETIC PERIPHERAL ANGIOPATHY WITHOUT GANGRENE: ICD-10-CM

## 2019-03-29 DIAGNOSIS — Z80.0 FAMILY HISTORY OF MALIGNANT NEOPLASM OF DIGESTIVE ORGANS: ICD-10-CM

## 2019-03-29 DIAGNOSIS — E03.9 HYPOTHYROIDISM, UNSPECIFIED: ICD-10-CM

## 2019-03-29 DIAGNOSIS — I10 ESSENTIAL (PRIMARY) HYPERTENSION: ICD-10-CM

## 2019-03-29 DIAGNOSIS — Z95.1 PRESENCE OF AORTOCORONARY BYPASS GRAFT: ICD-10-CM

## 2019-03-29 DIAGNOSIS — Z87.891 PERSONAL HISTORY OF NICOTINE DEPENDENCE: ICD-10-CM

## 2019-03-29 DIAGNOSIS — E78.00 PURE HYPERCHOLESTEROLEMIA, UNSPECIFIED: ICD-10-CM

## 2019-03-29 DIAGNOSIS — Z79.82 LONG TERM (CURRENT) USE OF ASPIRIN: ICD-10-CM

## 2019-04-01 ENCOUNTER — APPOINTMENT (OUTPATIENT)
Dept: VASCULAR SURGERY | Facility: CLINIC | Age: 79
End: 2019-04-01
Payer: MEDICARE

## 2019-04-01 DIAGNOSIS — L03.116 CELLULITIS OF LEFT LOWER LIMB: ICD-10-CM

## 2019-04-01 PROCEDURE — 99213 OFFICE O/P EST LOW 20 MIN: CPT

## 2019-04-01 RX ORDER — SILVER SULFADIAZINE 10 MG/G
1 CREAM TOPICAL DAILY
Qty: 1 | Refills: 1 | Status: ACTIVE | COMMUNITY
Start: 2019-04-01 | End: 1900-01-01

## 2019-04-15 ENCOUNTER — APPOINTMENT (OUTPATIENT)
Dept: VASCULAR SURGERY | Facility: CLINIC | Age: 79
End: 2019-04-15
Payer: MEDICARE

## 2019-04-15 PROCEDURE — 93926 LOWER EXTREMITY STUDY: CPT

## 2019-04-15 PROCEDURE — 29580 STRAPPING UNNA BOOT: CPT | Mod: 50

## 2019-04-22 ENCOUNTER — APPOINTMENT (OUTPATIENT)
Dept: VASCULAR SURGERY | Facility: CLINIC | Age: 79
End: 2019-04-22
Payer: MEDICARE

## 2019-04-22 DIAGNOSIS — I83.019 VARICOSE VEINS OF RIGHT LOWER EXTREMITY WITH ULCER OF UNSPECIFIED SITE: ICD-10-CM

## 2019-04-22 DIAGNOSIS — L97.919 VARICOSE VEINS OF RIGHT LOWER EXTREMITY WITH ULCER OF UNSPECIFIED SITE: ICD-10-CM

## 2019-04-22 PROCEDURE — 29580 STRAPPING UNNA BOOT: CPT | Mod: 50

## 2019-04-22 NOTE — DATA REVIEWED
[FreeTextEntry1] : I performed an arterial duplex which was medically necessary to evaluate for patency of the left SFA angioplasty site. It showed patent SFA with calcification and left CFA with flow disturbance.\par

## 2019-04-22 NOTE — HISTORY OF PRESENT ILLNESS
[FreeTextEntry1] : 80 y/o male with h/o AAA ( 3.9 x 4.3 cm on ultrasound in February 2019), PVD with left lower extremity ulceration, underwent left SFA angioplasty and stent on 3/22/19, presents for followup, reports redness to around the ulceration on the left lower extremity with some pain, has been applying ace bandage for compression. He still was weeping edema and redness, denies any leg pain, fever or chills, had unna boot placed to lower extremities bilaterally.\par He has severe disease of the right SFA, but is without any ulcerations or other symptoms currently.

## 2019-04-22 NOTE — ASSESSMENT
[FreeTextEntry1] : 80 y/o male with h/o AAA ( 3.9 x 4.3 cm on ultrasound in February 2019), PVD with left lower extremity ulceration, underwent left SFA angioplasty and stent on 3/22/19, presents for followup, reports redness and weepign to bilateral lower legs despite using ace bandage for compression. He denies any fever, chills or leg pain, just completed Keflex. I performed an arterial duplex which was medically necessary to evaluate for patency of the left SFA angioplasty site. It showed patent SFA with calcification and left CFA with flow disturbance.\par He has severe disease of the right SFA, but is without any ulcerations or other symptoms currently. \par He currently has an easily palpable DP pulse on the left lower extremity since the procedure. I am applying unna boot to lower extremities bilaterally to improve the edema and to treat the superficial wounds. He will follow up in one weeks time.

## 2019-04-22 NOTE — HISTORY OF PRESENT ILLNESS
[FreeTextEntry1] : 78 y/o male with h/o AAA ( 3.9 x 4.3 cm on ultrasound in February 2019), PVD with left lower extremity ulceration, underwent left SFA angioplasty and stent on 3/22/19, presents for followup, reports redness to around the ulceration on the left lower extremity with some pain, has been applying Neosporin to the wound.\par He has severe disease of the right SFA, but is without any ulcerations or other symptoms currently.

## 2019-04-22 NOTE — ASSESSMENT
[FreeTextEntry1] : 78 y/o male with h/o AAA ( 3.9 x 4.3 cm on ultrasound in February 2019), PVD with left lower extremity ulceration, underwent left SFA angioplasty and stent on 3/22/19, presents for followup, reports redness to around the ulceration on the left lower extremity with some pain, has been applying Neosporin to the wound.\par He has severe disease of the right SFA, but is without any ulcerations or other symptoms currently. \par He currently has an easily palpable DP pulse on the left lower extremity since the procedure. I am prescribing him Keflex for left leg cellulitis and advised him on daily wound care with Silvadene, Adaptic and ace bandage. I will see him back in 2 weeks and will obtain a left lower extremity arterial duplex at the time.

## 2019-04-22 NOTE — ASSESSMENT
[FreeTextEntry1] : 80 y/o male with h/o AAA ( 3.9 x 4.3 cm on ultrasound in February 2019), PVD with left lower extremity ulceration, underwent left SFA angioplasty and stent on 3/22/19, presents for followup, reports redness and weepign to bilateral lower legs despite using ace bandage for compression. He denies any fever, chills or leg pain, just completed Keflex. I performed an arterial duplex which was medically necessary to evaluate for patency of the left SFA angioplasty site. It showed patent SFA with calcification and left CFA with flow disturbance.\par He has severe disease of the right SFA, but is without any ulcerations or other symptoms currently. \par He currently has an easily palpable DP pulse on the left lower extremity since the procedure. I recommend one more week of unna boot to lower extremities bilaterally to improve the edema and to treat the superficial wounds. He will follow up in one weeks time.

## 2019-04-22 NOTE — HISTORY OF PRESENT ILLNESS
[FreeTextEntry1] : 78 y/o male with h/o AAA ( 3.9 x 4.3 cm on ultrasound in February 2019), PVD with left lower extremity ulceration, underwent left SFA angioplasty and stent on 3/22/19, presents for followup, reports redness to around the ulceration on the left lower extremity with some pain, has been applying ace bandage for compression. He still was weeping edema and redness, denies any leg pain, fever or chills.\par He has severe disease of the right SFA, but is without any ulcerations or other symptoms currently.

## 2019-04-29 ENCOUNTER — APPOINTMENT (OUTPATIENT)
Dept: VASCULAR SURGERY | Facility: CLINIC | Age: 79
End: 2019-04-29
Payer: MEDICARE

## 2019-04-29 DIAGNOSIS — I83.029 VARICOSE VEINS OF LEFT LOWER EXTREMITY WITH ULCER OF UNSPECIFIED SITE: ICD-10-CM

## 2019-04-29 DIAGNOSIS — L97.929 VARICOSE VEINS OF LEFT LOWER EXTREMITY WITH ULCER OF UNSPECIFIED SITE: ICD-10-CM

## 2019-04-29 PROCEDURE — 99212 OFFICE O/P EST SF 10 MIN: CPT

## 2019-04-29 NOTE — ASSESSMENT
[FreeTextEntry1] : 78 y/o male with h/o AAA ( 3.9 x 4.3 cm on ultrasound in February 2019), PVD with left lower extremity ulceration, underwent left SFA angioplasty and stent on 3/22/19, presents for followup, reports redness and weeping to bilateral lower legs despite using ace bandage for compression. He denies any fever, chills or leg pain, completed Keflex. He currently has an easily palpable DP pulse on the left lower extremity since the procedure. \par The leg swelling and wounds are now healed with unna boot therapy. He was advised on use of emollient daily and ace bandage for compression.\par He has diffuse scaly eczematous rash to hands, lower extremities and face for which he has an appointment for Dermatology consultation.\par He will follow up in three months time or sooner if he develops any new wounds.

## 2019-06-15 ENCOUNTER — OUTPATIENT (OUTPATIENT)
Dept: OUTPATIENT SERVICES | Facility: HOSPITAL | Age: 79
LOS: 1 days | Discharge: HOME | End: 2019-06-15

## 2019-06-15 DIAGNOSIS — Z90.49 ACQUIRED ABSENCE OF OTHER SPECIFIED PARTS OF DIGESTIVE TRACT: Chronic | ICD-10-CM

## 2019-06-15 DIAGNOSIS — Z95.1 PRESENCE OF AORTOCORONARY BYPASS GRAFT: Chronic | ICD-10-CM

## 2019-06-15 DIAGNOSIS — Z98.890 OTHER SPECIFIED POSTPROCEDURAL STATES: Chronic | ICD-10-CM

## 2019-06-17 DIAGNOSIS — I73.9 PERIPHERAL VASCULAR DISEASE, UNSPECIFIED: ICD-10-CM

## 2019-06-26 ENCOUNTER — APPOINTMENT (OUTPATIENT)
Dept: CARDIOLOGY | Facility: CLINIC | Age: 79
End: 2019-06-26

## 2019-07-29 ENCOUNTER — APPOINTMENT (OUTPATIENT)
Dept: VASCULAR SURGERY | Facility: CLINIC | Age: 79
End: 2019-07-29

## 2019-08-26 ENCOUNTER — APPOINTMENT (OUTPATIENT)
Dept: VASCULAR SURGERY | Facility: CLINIC | Age: 79
End: 2019-08-26
Payer: MEDICARE

## 2019-08-26 VITALS
DIASTOLIC BLOOD PRESSURE: 86 MMHG | WEIGHT: 228 LBS | SYSTOLIC BLOOD PRESSURE: 142 MMHG | BODY MASS INDEX: 30.22 KG/M2 | HEIGHT: 73 IN

## 2019-08-26 PROCEDURE — 93926 LOWER EXTREMITY STUDY: CPT

## 2019-08-26 PROCEDURE — 99213 OFFICE O/P EST LOW 20 MIN: CPT

## 2019-08-26 NOTE — ASSESSMENT
[FreeTextEntry1] : 80 y/o male with h/o AAA ( 3.9 x 4.3 cm on ultrasound in February 2019), PVD with left lower extremity ulceration, underwent left SFA angioplasty and stent on 3/22/19, The SFA stent is patent  however unable to visualize AAA on duplex.. He had a history of ESRF and sepsis in which he is off HD now. His legs are over all improved. He still has some edema and superficial wounds. I recommend he continue with Silvadene and ace wrap compression daily.\par He will follow up in 6 months time or sooner if he develops any new wounds. he will need an aortic duplex and arterial duplex at that time.

## 2019-08-26 NOTE — HISTORY OF PRESENT ILLNESS
[FreeTextEntry1] : 80 y/o male with h/o AAA ( 3.9 x 4.3 cm on ultrasound in February 2019), PVD with left lower extremity ulceration, underwent left SFA angioplasty and stent on 3/22/19, presents for followup, reports redness to around the ulceration on the left lower extremity with some pain, has been applying ace bandage for compression.\par He has severe disease of the right SFA, but is without any ulcerations or other symptoms currently.\par The patient had informed me of a history of temporary renal failure in which he was on hemodialysis for 3 months secondary to sepsis/pneumonia.

## 2019-09-09 ENCOUNTER — OUTPATIENT (OUTPATIENT)
Dept: OUTPATIENT SERVICES | Facility: HOSPITAL | Age: 79
LOS: 1 days | Discharge: HOME | End: 2019-09-09
Payer: MEDICARE

## 2019-09-09 DIAGNOSIS — Z98.890 OTHER SPECIFIED POSTPROCEDURAL STATES: Chronic | ICD-10-CM

## 2019-09-09 DIAGNOSIS — J84.112 IDIOPATHIC PULMONARY FIBROSIS: ICD-10-CM

## 2019-09-09 DIAGNOSIS — Z95.1 PRESENCE OF AORTOCORONARY BYPASS GRAFT: Chronic | ICD-10-CM

## 2019-09-09 DIAGNOSIS — Z90.49 ACQUIRED ABSENCE OF OTHER SPECIFIED PARTS OF DIGESTIVE TRACT: Chronic | ICD-10-CM

## 2019-09-09 PROCEDURE — 71250 CT THORAX DX C-: CPT | Mod: 26

## 2019-09-16 ENCOUNTER — APPOINTMENT (OUTPATIENT)
Dept: VASCULAR SURGERY | Facility: CLINIC | Age: 79
End: 2019-09-16
Payer: MEDICARE

## 2019-09-16 VITALS — DIASTOLIC BLOOD PRESSURE: 74 MMHG | SYSTOLIC BLOOD PRESSURE: 138 MMHG

## 2019-09-16 PROCEDURE — 93880 EXTRACRANIAL BILAT STUDY: CPT

## 2019-09-16 PROCEDURE — 99213 OFFICE O/P EST LOW 20 MIN: CPT

## 2019-09-16 NOTE — HISTORY OF PRESENT ILLNESS
[FreeTextEntry1] : 78 y/o male with h/o AAA ( 3.9 x 4.3 cm on ultrasound in February 2019), PVD with left lower extremity ulceration, underwent left SFA angioplasty and stent on 3/22/19, presents for followup, reports redness to around the ulceration on the left lower extremity with some pain, has been applying ace bandage for compression.\par He has severe disease of the right SFA, but is without any ulcerations or other symptoms currently.\par He had informed me of a history of temporary renal failure in which he was on hemodialysis for 3 months secondary to sepsis/pneumonia. He was diagnosed with atrial fibrillation recently. Today, he was sent in for evaluation of carotid artery stenosis. Denies any h/o CVA or TIA.

## 2019-09-16 NOTE — DATA REVIEWED
[FreeTextEntry1] : I performed a carotid duplex which was medically necessary to evaluate for high-grade carotid stenosis. It showed > 70% right ICA stenosis with PSV of 290, EDV of 89 and ICA CCA ratio of 5.8, and 50-69% left ICA stenosis ( 230/59, 3.7).\par

## 2019-09-16 NOTE — ASSESSMENT
[FreeTextEntry1] : 80 y/o male with h/o AAA ( 3.9 x 4.3 cm on ultrasound in February 2019), PVD with left lower extremity ulceration, underwent left SFA angioplasty and stent on 3/22/19, The SFA stent is patent  however unable to visualize AAA on duplex. He had a history of ESRF and sepsis in which he is off HD now. His legs are over all improved. He was sent in by Cardiologist for evaluation of carotid artery stenosis. He was recently diagnosed with atrial fibrillation.\par I performed a carotid duplex which was medically necessary to evaluate for high-grade carotid stenosis. It showed > 70% right ICA stenosis with PSV of 290, EDV of 89 and ICA CCA ratio of 5.8, and 50-69% left ICA stenosis ( 230/59, 3.7). He cannot undergo a CT angiogram and he has renal disease and just came off HD recently.\par I have offered him right carotid endarterectomy for  stroke prevention and discussed the surgical procedure with Mr. Wolf and his wife. He will require medical and cardiac clearance prior to surgery. He is scheduled for right carotid endarterectomy on 10/15/19.\par

## 2019-09-16 NOTE — CONSULT LETTER
[Dear  ___] : Dear  [unfilled], [Courtesy Letter:] : I had the pleasure of seeing your patient, [unfilled], in my office today. [Please see my note below.] : Please see my note below. [FreeTextEntry2] : Dear Dr. Mode Witt,\par Dear Dr. Masoud Daniel,

## 2019-09-18 ENCOUNTER — APPOINTMENT (OUTPATIENT)
Dept: CARDIOLOGY | Facility: CLINIC | Age: 79
End: 2019-09-18

## 2019-09-30 ENCOUNTER — FORM ENCOUNTER (OUTPATIENT)
Age: 79
End: 2019-09-30

## 2019-10-01 ENCOUNTER — OUTPATIENT (OUTPATIENT)
Dept: OUTPATIENT SERVICES | Facility: HOSPITAL | Age: 79
LOS: 1 days | Discharge: HOME | End: 2019-10-01
Payer: MEDICARE

## 2019-10-01 VITALS
HEART RATE: 66 BPM | WEIGHT: 235.01 LBS | TEMPERATURE: 96 F | OXYGEN SATURATION: 94 % | RESPIRATION RATE: 20 BRPM | SYSTOLIC BLOOD PRESSURE: 157 MMHG | HEIGHT: 74 IN | DIASTOLIC BLOOD PRESSURE: 69 MMHG

## 2019-10-01 DIAGNOSIS — Z95.1 PRESENCE OF AORTOCORONARY BYPASS GRAFT: Chronic | ICD-10-CM

## 2019-10-01 DIAGNOSIS — Z01.818 ENCOUNTER FOR OTHER PREPROCEDURAL EXAMINATION: ICD-10-CM

## 2019-10-01 DIAGNOSIS — I65.23 OCCLUSION AND STENOSIS OF BILATERAL CAROTID ARTERIES: ICD-10-CM

## 2019-10-01 DIAGNOSIS — Z90.49 ACQUIRED ABSENCE OF OTHER SPECIFIED PARTS OF DIGESTIVE TRACT: Chronic | ICD-10-CM

## 2019-10-01 DIAGNOSIS — Z98.890 OTHER SPECIFIED POSTPROCEDURAL STATES: Chronic | ICD-10-CM

## 2019-10-01 LAB
ALBUMIN SERPL ELPH-MCNC: 3.2 G/DL — LOW (ref 3.5–5.2)
ALP SERPL-CCNC: 66 U/L — SIGNIFICANT CHANGE UP (ref 30–115)
ALT FLD-CCNC: 25 U/L — SIGNIFICANT CHANGE UP (ref 0–41)
ANION GAP SERPL CALC-SCNC: 14 MMOL/L — SIGNIFICANT CHANGE UP (ref 7–14)
APTT BLD: 33.8 SEC — SIGNIFICANT CHANGE UP (ref 27–39.2)
AST SERPL-CCNC: 23 U/L — SIGNIFICANT CHANGE UP (ref 0–41)
BASOPHILS # BLD AUTO: 0.03 K/UL — SIGNIFICANT CHANGE UP (ref 0–0.2)
BASOPHILS NFR BLD AUTO: 0.3 % — SIGNIFICANT CHANGE UP (ref 0–1)
BILIRUB SERPL-MCNC: 0.4 MG/DL — SIGNIFICANT CHANGE UP (ref 0.2–1.2)
BLD GP AB SCN SERPL QL: SIGNIFICANT CHANGE UP
BUN SERPL-MCNC: 85 MG/DL — CRITICAL HIGH (ref 10–20)
CALCIUM SERPL-MCNC: 8.5 MG/DL — SIGNIFICANT CHANGE UP (ref 8.5–10.1)
CHLORIDE SERPL-SCNC: 95 MMOL/L — LOW (ref 98–110)
CO2 SERPL-SCNC: 28 MMOL/L — SIGNIFICANT CHANGE UP (ref 17–32)
CREAT SERPL-MCNC: 1.8 MG/DL — HIGH (ref 0.7–1.5)
EOSINOPHIL # BLD AUTO: 0.11 K/UL — SIGNIFICANT CHANGE UP (ref 0–0.7)
EOSINOPHIL NFR BLD AUTO: 1 % — SIGNIFICANT CHANGE UP (ref 0–8)
ESTIMATED AVERAGE GLUCOSE: 197 MG/DL — HIGH (ref 68–114)
GLUCOSE SERPL-MCNC: 120 MG/DL — HIGH (ref 70–99)
HBA1C BLD-MCNC: 8.5 % — HIGH (ref 4–5.6)
HCT VFR BLD CALC: 27.4 % — LOW (ref 42–52)
HGB BLD-MCNC: 8.9 G/DL — LOW (ref 14–18)
IMM GRANULOCYTES NFR BLD AUTO: 0.4 % — HIGH (ref 0.1–0.3)
INR BLD: 1.14 RATIO — SIGNIFICANT CHANGE UP (ref 0.65–1.3)
LYMPHOCYTES # BLD AUTO: 0.45 K/UL — LOW (ref 1.2–3.4)
LYMPHOCYTES # BLD AUTO: 4.2 % — LOW (ref 20.5–51.1)
MCHC RBC-ENTMCNC: 30.9 PG — SIGNIFICANT CHANGE UP (ref 27–31)
MCHC RBC-ENTMCNC: 32.5 G/DL — SIGNIFICANT CHANGE UP (ref 32–37)
MCV RBC AUTO: 95.1 FL — HIGH (ref 80–94)
MONOCYTES # BLD AUTO: 0.56 K/UL — SIGNIFICANT CHANGE UP (ref 0.1–0.6)
MONOCYTES NFR BLD AUTO: 5.2 % — SIGNIFICANT CHANGE UP (ref 1.7–9.3)
NEUTROPHILS # BLD AUTO: 9.63 K/UL — HIGH (ref 1.4–6.5)
NEUTROPHILS NFR BLD AUTO: 88.9 % — HIGH (ref 42.2–75.2)
NRBC # BLD: 0 /100 WBCS — SIGNIFICANT CHANGE UP (ref 0–0)
PLATELET # BLD AUTO: 212 K/UL — SIGNIFICANT CHANGE UP (ref 130–400)
POTASSIUM SERPL-MCNC: 4.4 MMOL/L — SIGNIFICANT CHANGE UP (ref 3.5–5)
POTASSIUM SERPL-SCNC: 4.4 MMOL/L — SIGNIFICANT CHANGE UP (ref 3.5–5)
PROT SERPL-MCNC: 4.9 G/DL — LOW (ref 6–8)
PROTHROM AB SERPL-ACNC: 13.1 SEC — HIGH (ref 9.95–12.87)
RBC # BLD: 2.88 M/UL — LOW (ref 4.7–6.1)
RBC # FLD: 14.9 % — HIGH (ref 11.5–14.5)
SODIUM SERPL-SCNC: 137 MMOL/L — SIGNIFICANT CHANGE UP (ref 135–146)
WBC # BLD: 10.82 K/UL — HIGH (ref 4.8–10.8)
WBC # FLD AUTO: 10.82 K/UL — HIGH (ref 4.8–10.8)

## 2019-10-01 PROCEDURE — 93010 ELECTROCARDIOGRAM REPORT: CPT

## 2019-10-01 PROCEDURE — 71046 X-RAY EXAM CHEST 2 VIEWS: CPT | Mod: 26

## 2019-10-01 RX ORDER — VALSARTAN 80 MG/1
0 TABLET ORAL
Qty: 0 | Refills: 0 | DISCHARGE

## 2019-10-01 NOTE — H&P PST ADULT - NSICDXPASTMEDICALHX_GEN_ALL_CORE_FT
PAST MEDICAL HISTORY:  AAA (abdominal aortic aneurysm) < 4 cm    CHF (congestive heart failure)     Chronic kidney disease (CKD) last dialysis end of 7/19    DM (diabetes mellitus)     High cholesterol     HTN (hypertension)     Hypothyroid     Pneumonia     PVD (peripheral vascular disease)

## 2019-10-01 NOTE — H&P PST ADULT - PRIMARY CARE PROVIDER
dr chayito almodovar- 2 weeks ago, dr herrera- 2 weeks ago dr rizzo next week, dr roxana vega tomorrow, dr izzy burgos

## 2019-12-19 NOTE — PACU DISCHARGE NOTE - NS MD DISCHARGE NOTE DISCHARGE
Call to pt-states that she does not take Praluent due to the cost. PA not needed.  Pt currently takes Atorvastatin 20mg po w/ Zetia 10mg po QD  States that she does have some muscle aches-not severe but- wondering if Livalo may be option for her.     All above to be reivewd with AB     Home

## 2019-12-31 PROBLEM — I73.9 PERIPHERAL VASCULAR DISEASE, UNSPECIFIED: Chronic | Status: ACTIVE | Noted: 2019-10-01

## 2019-12-31 PROBLEM — N18.9 CHRONIC KIDNEY DISEASE, UNSPECIFIED: Chronic | Status: ACTIVE | Noted: 2019-10-01

## 2019-12-31 PROBLEM — I50.9 HEART FAILURE, UNSPECIFIED: Chronic | Status: ACTIVE | Noted: 2019-10-01

## 2019-12-31 PROBLEM — I71.4 ABDOMINAL AORTIC ANEURYSM, WITHOUT RUPTURE: Chronic | Status: ACTIVE | Noted: 2019-10-01

## 2020-01-03 ENCOUNTER — OUTPATIENT (OUTPATIENT)
Dept: OUTPATIENT SERVICES | Facility: HOSPITAL | Age: 80
LOS: 1 days | Discharge: HOME | End: 2020-01-03
Payer: MEDICARE

## 2020-01-03 DIAGNOSIS — Z98.890 OTHER SPECIFIED POSTPROCEDURAL STATES: Chronic | ICD-10-CM

## 2020-01-03 DIAGNOSIS — Z90.49 ACQUIRED ABSENCE OF OTHER SPECIFIED PARTS OF DIGESTIVE TRACT: Chronic | ICD-10-CM

## 2020-01-03 DIAGNOSIS — Z95.1 PRESENCE OF AORTOCORONARY BYPASS GRAFT: Chronic | ICD-10-CM

## 2020-01-03 DIAGNOSIS — R06.00 DYSPNEA, UNSPECIFIED: ICD-10-CM

## 2020-01-03 PROCEDURE — 71250 CT THORAX DX C-: CPT | Mod: 26

## 2020-01-28 ENCOUNTER — OUTPATIENT (OUTPATIENT)
Dept: OUTPATIENT SERVICES | Facility: HOSPITAL | Age: 80
LOS: 1 days | Discharge: HOME | End: 2020-01-28
Payer: MEDICARE

## 2020-01-28 VITALS — HEIGHT: 66.93 IN | WEIGHT: 220.46 LBS

## 2020-01-28 DIAGNOSIS — Z99.2 DEPENDENCE ON RENAL DIALYSIS: ICD-10-CM

## 2020-01-28 DIAGNOSIS — Z79.01 LONG TERM (CURRENT) USE OF ANTICOAGULANTS: ICD-10-CM

## 2020-01-28 DIAGNOSIS — Z79.4 LONG TERM (CURRENT) USE OF INSULIN: ICD-10-CM

## 2020-01-28 DIAGNOSIS — Z90.49 ACQUIRED ABSENCE OF OTHER SPECIFIED PARTS OF DIGESTIVE TRACT: Chronic | ICD-10-CM

## 2020-01-28 DIAGNOSIS — J44.9 CHRONIC OBSTRUCTIVE PULMONARY DISEASE, UNSPECIFIED: ICD-10-CM

## 2020-01-28 DIAGNOSIS — I71.4 ABDOMINAL AORTIC ANEURYSM, WITHOUT RUPTURE: ICD-10-CM

## 2020-01-28 DIAGNOSIS — N18.6 END STAGE RENAL DISEASE: ICD-10-CM

## 2020-01-28 DIAGNOSIS — I50.9 HEART FAILURE, UNSPECIFIED: ICD-10-CM

## 2020-01-28 DIAGNOSIS — I12.0 HYPERTENSIVE CHRONIC KIDNEY DISEASE WITH STAGE 5 CHRONIC KIDNEY DISEASE OR END STAGE RENAL DISEASE: ICD-10-CM

## 2020-01-28 DIAGNOSIS — Z95.1 PRESENCE OF AORTOCORONARY BYPASS GRAFT: ICD-10-CM

## 2020-01-28 DIAGNOSIS — I25.10 ATHEROSCLEROTIC HEART DISEASE OF NATIVE CORONARY ARTERY WITHOUT ANGINA PECTORIS: ICD-10-CM

## 2020-01-28 DIAGNOSIS — E11.9 TYPE 2 DIABETES MELLITUS WITHOUT COMPLICATIONS: ICD-10-CM

## 2020-01-28 DIAGNOSIS — E03.9 HYPOTHYROIDISM, UNSPECIFIED: ICD-10-CM

## 2020-01-28 DIAGNOSIS — Z98.890 OTHER SPECIFIED POSTPROCEDURAL STATES: Chronic | ICD-10-CM

## 2020-01-28 DIAGNOSIS — Z95.1 PRESENCE OF AORTOCORONARY BYPASS GRAFT: Chronic | ICD-10-CM

## 2020-01-28 DIAGNOSIS — Z87.891 PERSONAL HISTORY OF NICOTINE DEPENDENCE: ICD-10-CM

## 2020-01-28 PROCEDURE — 93010 ELECTROCARDIOGRAM REPORT: CPT | Mod: 76

## 2020-01-28 RX ORDER — INSULIN ASPART 100 [IU]/ML
0 INJECTION, SOLUTION SUBCUTANEOUS
Qty: 0 | Refills: 0 | DISCHARGE

## 2020-01-28 RX ORDER — EZETIMIBE 10 MG/1
0 TABLET ORAL
Qty: 0 | Refills: 0 | DISCHARGE

## 2020-01-28 RX ORDER — INSULIN DEGLUDEC 100 U/ML
0 INJECTION, SOLUTION SUBCUTANEOUS
Qty: 0 | Refills: 0 | DISCHARGE

## 2020-01-28 RX ORDER — ASPIRIN/CALCIUM CARB/MAGNESIUM 324 MG
0 TABLET ORAL
Qty: 0 | Refills: 0 | DISCHARGE

## 2020-01-28 RX ORDER — AMLODIPINE BESYLATE 2.5 MG/1
0 TABLET ORAL
Qty: 0 | Refills: 0 | DISCHARGE

## 2020-01-28 RX ORDER — CANAGLIFLOZIN 100 MG/1
1 TABLET, FILM COATED ORAL
Qty: 0 | Refills: 0 | DISCHARGE

## 2020-01-28 RX ORDER — FENOFIBRATE,MICRONIZED 130 MG
0 CAPSULE ORAL
Qty: 0 | Refills: 0 | DISCHARGE

## 2020-01-28 NOTE — CHART NOTE - NSCHARTNOTEFT_GEN_A_CORE
NISA Procedure Note:     After risks, benefits and alternatives of the procedure were explained, consent was signed and placed in the medical record.  Procedural timeout was taken.  Sedation was administered by anesthesia.  NISA probe inserted without complication and NISA performed.  Patient tolerated the procedure well without complication.  Post-procedure vital signs were stable.    NISA findings discussed with patient.       NISA findings:  MV: normal leaflet , mild to mod MR , 2 jets  AV: trileaflets, trace AI  TV: mild TR  PV: mild PI  LA: no thrombus  ANJU: no thrombus  RA: normal  IAS: intact  LV: normal  RV: normal  Aorta:  + atheroma in desc/arch    See full report for findings.    Post procedure EKG :  NSR    Recommendations:  no ANJU thrombus , successful CV using 200J .  there is mild to moderate MR  c/w same medical therapy , including eliquis , metoprolol 25mg bid and statin NISA Procedure Note:     After risks, benefits and alternatives of the procedure were explained, consent was signed and placed in the medical record.  Procedural timeout was taken.  Sedation was administered by anesthesia.  NISA probe inserted without complication and NISA performed.  Patient tolerated the procedure well without complication.  Post-procedure vital signs were stable.    NISA findings discussed with patient.       NISA findings:  MV: normal leaflet , mild to mod MR , 2 jets  AV: trileaflets, trace AI  TV: mild TR  PV: mild PI  LA: no thrombus, mild dilatation  ANJU: no thrombus  RA: normal  IAS: intact  LV: mild decreased sys function  RV: normal  Aorta:  + atheroma in desc/arch    See full report for findings.    Post procedure EKG :  NSR    Recommendations:  no ANJU thrombus , successful CV using 200J . rhythm converted to sinus.  there is mild to moderate MR. + atheroma in Aorta  c/w same medical therapy , including eliquis , metoprolol 25mg bid and statin

## 2020-01-28 NOTE — H&P CARDIOLOGY - PMH
AAA (abdominal aortic aneurysm)  < 4 cm  CHF (congestive heart failure)    Chronic kidney disease (CKD)  last dialysis end of 7/19  DM (diabetes mellitus)    High cholesterol    HTN (hypertension)    Hypothyroid    Pneumonia    PVD (peripheral vascular disease)

## 2020-01-28 NOTE — PRE-ANESTHESIA EVALUATION ADULT - NSANTHADDINFOFT_GEN_ALL_CORE
81 y/o WM evaluated for NISA / DC cardioversion.  ASA 4.  Plan IV sedation / GA backup.  Plan, benefits, foreseeable risks, viable alternatives discussed with patient and all his pertinent questions answered and he understands and elects to proceed.

## 2020-01-28 NOTE — H&P CARDIOLOGY - HISTORY OF PRESENT ILLNESS
80 yrs old with Hx of CAD s/p CABG , CKD . Afib on eliquis is here for NISA and CV.  pt has been having MENDEZ.  he took his eliquis this am.

## 2020-01-28 NOTE — CHART NOTE - NSCHARTNOTESELECT_GEN_ALL_CORE
Patient Transferred to: [facility]  Handoff Report Given to: unit RN   Event Note/brief NISA report

## 2020-01-29 DIAGNOSIS — I48.91 UNSPECIFIED ATRIAL FIBRILLATION: ICD-10-CM

## 2020-02-10 ENCOUNTER — APPOINTMENT (OUTPATIENT)
Dept: VASCULAR SURGERY | Facility: CLINIC | Age: 80
End: 2020-02-10

## 2020-07-13 ENCOUNTER — APPOINTMENT (OUTPATIENT)
Dept: VASCULAR SURGERY | Facility: CLINIC | Age: 80
End: 2020-07-13
Payer: MEDICARE

## 2020-07-13 PROCEDURE — 99213 OFFICE O/P EST LOW 20 MIN: CPT

## 2020-07-13 PROCEDURE — 93926 LOWER EXTREMITY STUDY: CPT

## 2020-07-13 PROCEDURE — 93880 EXTRACRANIAL BILAT STUDY: CPT

## 2020-07-13 PROCEDURE — 93978 VASCULAR STUDY: CPT

## 2020-07-13 RX ORDER — TELMISARTAN 80 MG/1
80 TABLET ORAL
Qty: 90 | Refills: 0 | Status: ACTIVE | COMMUNITY
Start: 2020-01-14

## 2020-07-13 RX ORDER — PREDNISONE 2.5 MG/1
2.5 TABLET ORAL
Qty: 30 | Refills: 0 | Status: ACTIVE | COMMUNITY
Start: 2020-06-26

## 2020-07-13 RX ORDER — PEN NEEDLE, DIABETIC 29 G X1/2"
31G X 8 MM NEEDLE, DISPOSABLE MISCELLANEOUS
Qty: 400 | Refills: 0 | Status: ACTIVE | COMMUNITY
Start: 2020-02-04

## 2020-07-13 RX ORDER — DEXTROSE 4 G
4-6 TABLET,CHEWABLE ORAL
Qty: 100 | Refills: 0 | Status: ACTIVE | COMMUNITY
Start: 2020-06-02

## 2020-07-13 RX ORDER — APIXABAN 2.5 MG/1
2.5 TABLET, FILM COATED ORAL
Qty: 180 | Refills: 0 | Status: ACTIVE | COMMUNITY
Start: 2020-06-13

## 2020-07-13 RX ORDER — TACROLIMUS 1 MG/1
1 CAPSULE ORAL
Qty: 270 | Refills: 0 | Status: ACTIVE | COMMUNITY
Start: 2020-05-14

## 2020-07-13 RX ORDER — ISOSORBIDE MONONITRATE 30 MG/1
30 TABLET, EXTENDED RELEASE ORAL
Qty: 90 | Refills: 0 | Status: ACTIVE | COMMUNITY
Start: 2020-06-03

## 2020-07-13 RX ORDER — BETAMETHASONE DIPROPIONATE 0.5 MG/G
0.05 OINTMENT, AUGMENTED TOPICAL
Qty: 60 | Refills: 0 | Status: ACTIVE | COMMUNITY
Start: 2020-05-18

## 2020-07-13 RX ORDER — PREDNISONE 5 MG/1
5 TABLET ORAL
Qty: 270 | Refills: 0 | Status: ACTIVE | COMMUNITY
Start: 2020-01-14

## 2020-07-13 RX ORDER — ALBUTEROL SULFATE 90 UG/1
108 (90 BASE) AEROSOL, METERED RESPIRATORY (INHALATION)
Qty: 26 | Refills: 0 | Status: ACTIVE | COMMUNITY
Start: 2020-07-12

## 2020-07-13 RX ORDER — METOLAZONE 2.5 MG/1
2.5 TABLET ORAL
Qty: 30 | Refills: 0 | Status: ACTIVE | COMMUNITY
Start: 2020-04-01

## 2020-07-13 RX ORDER — PENTOXIFYLLINE 400 MG/1
400 TABLET, EXTENDED RELEASE ORAL
Qty: 180 | Refills: 0 | Status: ACTIVE | COMMUNITY
Start: 2020-06-01

## 2020-07-13 NOTE — DATA REVIEWED
[FreeTextEntry1] : I performed a carotid duplex which was medically necessary to evaluate for high-grade carotid stenosis. It showed Right ICA 56\par Stenosis with an ICA CCA ratio of 4 on the right. Left ICA 50-69% stenosis.\par \par  Aortic duplex shows a 4.4 cm infrarenal abdominal aortic aneurysm in the right common iliac artery measuring 1.8 cm\par \par  Arterial duplex on the left shows the left facial femoral arteries patent with no evidence of hemodynamically significant disease.\par

## 2020-07-13 NOTE — HISTORY OF PRESENT ILLNESS
[FreeTextEntry1] : 80 y/o male with h/o AAA ( 3.9 x 4.3 cm on ultrasound in February 2019), asymptomatic carotid stenosis and  PVD with left lower extremity ulceration, underwent left SFA angioplasty and stent on 3/22/19, for left leg ulver which has healed. \par He has severe disease of the right SFA, but is without any ulcerations or other symptoms currently.\par He had informed me of a history of temporary renal failure in which he was on hemodialysis for 3 months secondary to sepsis/pneumonia. He was diagnosed with atrial fibrillation recently. Today he presents for follow up of his AAA, PVD and carotid stenosis. The patient states he has been having some difficulty breathing and is following up with pulmonary Dr. Rodrigez.

## 2020-07-22 ENCOUNTER — OUTPATIENT (OUTPATIENT)
Dept: OUTPATIENT SERVICES | Facility: HOSPITAL | Age: 80
LOS: 1 days | Discharge: HOME | End: 2020-07-22
Payer: MEDICARE

## 2020-07-22 ENCOUNTER — RESULT REVIEW (OUTPATIENT)
Age: 80
End: 2020-07-22

## 2020-07-22 DIAGNOSIS — Z95.1 PRESENCE OF AORTOCORONARY BYPASS GRAFT: Chronic | ICD-10-CM

## 2020-07-22 DIAGNOSIS — Z90.49 ACQUIRED ABSENCE OF OTHER SPECIFIED PARTS OF DIGESTIVE TRACT: Chronic | ICD-10-CM

## 2020-07-22 DIAGNOSIS — Z98.890 OTHER SPECIFIED POSTPROCEDURAL STATES: Chronic | ICD-10-CM

## 2020-07-22 DIAGNOSIS — R06.00 DYSPNEA, UNSPECIFIED: ICD-10-CM

## 2020-07-22 PROCEDURE — 71250 CT THORAX DX C-: CPT | Mod: 26

## 2021-01-01 ENCOUNTER — TRANSCRIPTION ENCOUNTER (OUTPATIENT)
Age: 81
End: 2021-01-01

## 2021-01-01 ENCOUNTER — INPATIENT (INPATIENT)
Facility: HOSPITAL | Age: 81
LOS: 5 days | Discharge: HOME HEALTH | End: 2021-10-26
Attending: INTERNAL MEDICINE | Admitting: INTERNAL MEDICINE
Payer: MEDICARE

## 2021-01-01 ENCOUNTER — APPOINTMENT (OUTPATIENT)
Age: 81
End: 2021-01-01
Payer: MEDICARE

## 2021-01-01 ENCOUNTER — APPOINTMENT (OUTPATIENT)
Dept: CARE COORDINATION | Facility: HOME HEALTH | Age: 81
End: 2021-01-01
Payer: MEDICARE

## 2021-01-01 ENCOUNTER — NON-APPOINTMENT (OUTPATIENT)
Age: 81
End: 2021-01-01

## 2021-01-01 VITALS
OXYGEN SATURATION: 91 % | SYSTOLIC BLOOD PRESSURE: 132 MMHG | BODY MASS INDEX: 26.24 KG/M2 | RESPIRATION RATE: 14 BRPM | HEART RATE: 62 BPM | WEIGHT: 198 LBS | HEIGHT: 73 IN | DIASTOLIC BLOOD PRESSURE: 64 MMHG

## 2021-01-01 VITALS
HEIGHT: 73 IN | HEART RATE: 70 BPM | SYSTOLIC BLOOD PRESSURE: 108 MMHG | DIASTOLIC BLOOD PRESSURE: 70 MMHG | WEIGHT: 203 LBS | OXYGEN SATURATION: 90 % | BODY MASS INDEX: 26.9 KG/M2 | RESPIRATION RATE: 14 BRPM

## 2021-01-01 VITALS
TEMPERATURE: 97 F | DIASTOLIC BLOOD PRESSURE: 63 MMHG | SYSTOLIC BLOOD PRESSURE: 121 MMHG | HEART RATE: 82 BPM | RESPIRATION RATE: 18 BRPM

## 2021-01-01 VITALS
SYSTOLIC BLOOD PRESSURE: 120 MMHG | OXYGEN SATURATION: 92 % | WEIGHT: 218 LBS | RESPIRATION RATE: 16 BRPM | DIASTOLIC BLOOD PRESSURE: 70 MMHG | HEART RATE: 66 BPM | BODY MASS INDEX: 28.76 KG/M2

## 2021-01-01 VITALS
SYSTOLIC BLOOD PRESSURE: 186 MMHG | DIASTOLIC BLOOD PRESSURE: 87 MMHG | TEMPERATURE: 99 F | HEIGHT: 74 IN | HEART RATE: 89 BPM | RESPIRATION RATE: 20 BRPM | WEIGHT: 203.05 LBS | OXYGEN SATURATION: 85 %

## 2021-01-01 DIAGNOSIS — E11.22 TYPE 2 DIABETES MELLITUS WITH DIABETIC CHRONIC KIDNEY DISEASE: ICD-10-CM

## 2021-01-01 DIAGNOSIS — I11.0 HYPERTENSIVE HEART DISEASE WITH HEART FAILURE: ICD-10-CM

## 2021-01-01 DIAGNOSIS — J44.9 CHRONIC OBSTRUCTIVE PULMONARY DISEASE, UNSPECIFIED: ICD-10-CM

## 2021-01-01 DIAGNOSIS — J18.9 PNEUMONIA, UNSPECIFIED ORGANISM: ICD-10-CM

## 2021-01-01 DIAGNOSIS — Z88.1 ALLERGY STATUS TO OTHER ANTIBIOTIC AGENTS STATUS: ICD-10-CM

## 2021-01-01 DIAGNOSIS — J96.01 ACUTE RESPIRATORY FAILURE WITH HYPOXIA: ICD-10-CM

## 2021-01-01 DIAGNOSIS — K76.1 CHRONIC PASSIVE CONGESTION OF LIVER: ICD-10-CM

## 2021-01-01 DIAGNOSIS — I25.10 ATHEROSCLEROTIC HEART DISEASE OF NATIVE CORONARY ARTERY W/OUT ANGINA PECTORIS: ICD-10-CM

## 2021-01-01 DIAGNOSIS — E11.65 TYPE 2 DIABETES MELLITUS WITH HYPERGLYCEMIA: ICD-10-CM

## 2021-01-01 DIAGNOSIS — R41.82 ALTERED MENTAL STATUS, UNSPECIFIED: ICD-10-CM

## 2021-01-01 DIAGNOSIS — N05.A: ICD-10-CM

## 2021-01-01 DIAGNOSIS — Z95.1 PRESENCE OF AORTOCORONARY BYPASS GRAFT: ICD-10-CM

## 2021-01-01 DIAGNOSIS — I48.0 PAROXYSMAL ATRIAL FIBRILLATION: ICD-10-CM

## 2021-01-01 DIAGNOSIS — R74.01 ELEVATION OF LEVELS OF LIVER TRANSAMINASE LEVELS: ICD-10-CM

## 2021-01-01 DIAGNOSIS — I50.33 ACUTE ON CHRONIC DIASTOLIC (CONGESTIVE) HEART FAILURE: ICD-10-CM

## 2021-01-01 DIAGNOSIS — Z79.4 LONG TERM (CURRENT) USE OF INSULIN: ICD-10-CM

## 2021-01-01 DIAGNOSIS — N17.9 ACUTE KIDNEY FAILURE, UNSPECIFIED: ICD-10-CM

## 2021-01-01 DIAGNOSIS — N18.30 CHRONIC KIDNEY DISEASE, STAGE 3 UNSPECIFIED: ICD-10-CM

## 2021-01-01 DIAGNOSIS — I71.4 ABDOMINAL AORTIC ANEURYSM, WITHOUT RUPTURE: ICD-10-CM

## 2021-01-01 DIAGNOSIS — D64.9 ANEMIA, UNSPECIFIED: ICD-10-CM

## 2021-01-01 DIAGNOSIS — Z90.49 ACQUIRED ABSENCE OF OTHER SPECIFIED PARTS OF DIGESTIVE TRACT: Chronic | ICD-10-CM

## 2021-01-01 DIAGNOSIS — K72.00 ACUTE AND SUBACUTE HEPATIC FAILURE WITHOUT COMA: ICD-10-CM

## 2021-01-01 DIAGNOSIS — E03.9 HYPOTHYROIDISM, UNSPECIFIED: ICD-10-CM

## 2021-01-01 DIAGNOSIS — J44.0 CHRONIC OBSTRUCTIVE PULMONARY DISEASE WITH (ACUTE) LOWER RESPIRATORY INFECTION: ICD-10-CM

## 2021-01-01 DIAGNOSIS — R60.9 EDEMA, UNSPECIFIED: ICD-10-CM

## 2021-01-01 DIAGNOSIS — I10 ESSENTIAL (PRIMARY) HYPERTENSION: ICD-10-CM

## 2021-01-01 DIAGNOSIS — E87.6 HYPOKALEMIA: ICD-10-CM

## 2021-01-01 DIAGNOSIS — E11.51 TYPE 2 DIABETES MELLITUS WITH DIABETIC PERIPHERAL ANGIOPATHY WITHOUT GANGRENE: ICD-10-CM

## 2021-01-01 DIAGNOSIS — Z88.8 ALLERGY STATUS TO OTHER DRUGS, MEDICAMENTS AND BIOLOGICAL SUBSTANCES STATUS: ICD-10-CM

## 2021-01-01 DIAGNOSIS — Z79.890 HORMONE REPLACEMENT THERAPY: ICD-10-CM

## 2021-01-01 DIAGNOSIS — I65.21 OCCLUSION AND STENOSIS OF RIGHT CAROTID ARTERY: ICD-10-CM

## 2021-01-01 DIAGNOSIS — G93.41 METABOLIC ENCEPHALOPATHY: ICD-10-CM

## 2021-01-01 DIAGNOSIS — Z98.890 OTHER SPECIFIED POSTPROCEDURAL STATES: Chronic | ICD-10-CM

## 2021-01-01 DIAGNOSIS — I25.10 ATHEROSCLEROTIC HEART DISEASE OF NATIVE CORONARY ARTERY WITHOUT ANGINA PECTORIS: ICD-10-CM

## 2021-01-01 DIAGNOSIS — Z95.1 PRESENCE OF AORTOCORONARY BYPASS GRAFT: Chronic | ICD-10-CM

## 2021-01-01 DIAGNOSIS — I27.20 PULMONARY HYPERTENSION, UNSPECIFIED: ICD-10-CM

## 2021-01-01 DIAGNOSIS — M48.56XA COLLAPSED VERTEBRA, NOT ELSEWHERE CLASSIFIED, LUMBAR REGION, INITIAL ENCOUNTER FOR FRACTURE: ICD-10-CM

## 2021-01-01 DIAGNOSIS — R06.02 SHORTNESS OF BREATH: ICD-10-CM

## 2021-01-01 LAB
A1C WITH ESTIMATED AVERAGE GLUCOSE RESULT: 7.5 % — HIGH (ref 4–5.6)
ALBUMIN SERPL ELPH-MCNC: 3.7 G/DL — SIGNIFICANT CHANGE UP (ref 3.5–5.2)
ALBUMIN SERPL ELPH-MCNC: 3.8 G/DL — SIGNIFICANT CHANGE UP (ref 3.5–5.2)
ALBUMIN SERPL ELPH-MCNC: 3.9 G/DL — SIGNIFICANT CHANGE UP (ref 3.5–5.2)
ALBUMIN SERPL ELPH-MCNC: 4 G/DL — SIGNIFICANT CHANGE UP (ref 3.5–5.2)
ALBUMIN SERPL ELPH-MCNC: 4.1 G/DL — SIGNIFICANT CHANGE UP (ref 3.5–5.2)
ALBUMIN SERPL ELPH-MCNC: 4.2 G/DL — SIGNIFICANT CHANGE UP (ref 3.5–5.2)
ALP SERPL-CCNC: 142 U/L — HIGH (ref 30–115)
ALP SERPL-CCNC: 145 U/L — HIGH (ref 30–115)
ALP SERPL-CCNC: 151 U/L — HIGH (ref 30–115)
ALP SERPL-CCNC: 156 U/L — HIGH (ref 30–115)
ALP SERPL-CCNC: 173 U/L — HIGH (ref 30–115)
ALP SERPL-CCNC: 95 U/L — SIGNIFICANT CHANGE UP (ref 30–115)
ALT FLD-CCNC: 11 U/L — SIGNIFICANT CHANGE UP (ref 0–41)
ALT FLD-CCNC: 194 U/L — HIGH (ref 0–41)
ALT FLD-CCNC: 206 U/L — HIGH (ref 0–41)
ALT FLD-CCNC: 229 U/L — HIGH (ref 0–41)
ALT FLD-CCNC: 258 U/L — HIGH (ref 0–41)
ALT FLD-CCNC: 337 U/L — HIGH (ref 0–41)
ANION GAP SERPL CALC-SCNC: 14 MMOL/L — SIGNIFICANT CHANGE UP (ref 7–14)
ANION GAP SERPL CALC-SCNC: 16 MMOL/L — HIGH (ref 7–14)
ANION GAP SERPL CALC-SCNC: 17 MMOL/L — HIGH (ref 7–14)
ANION GAP SERPL CALC-SCNC: 18 MMOL/L — HIGH (ref 7–14)
ANION GAP SERPL CALC-SCNC: 18 MMOL/L — HIGH (ref 7–14)
ANION GAP SERPL CALC-SCNC: 19 MMOL/L — HIGH (ref 7–14)
ANION GAP SERPL CALC-SCNC: 22 MMOL/L — HIGH (ref 7–14)
APPEARANCE UR: CLEAR — SIGNIFICANT CHANGE UP
APPEARANCE UR: CLEAR — SIGNIFICANT CHANGE UP
AST SERPL-CCNC: 118 U/L — HIGH (ref 0–41)
AST SERPL-CCNC: 141 U/L — HIGH (ref 0–41)
AST SERPL-CCNC: 156 U/L — HIGH (ref 0–41)
AST SERPL-CCNC: 199 U/L — HIGH (ref 0–41)
AST SERPL-CCNC: 23 U/L — SIGNIFICANT CHANGE UP (ref 0–41)
AST SERPL-CCNC: 457 U/L — HIGH (ref 0–41)
BACTERIA # UR AUTO: NEGATIVE — SIGNIFICANT CHANGE UP
BACTERIA # UR AUTO: NEGATIVE — SIGNIFICANT CHANGE UP
BASOPHILS # BLD AUTO: 0.02 K/UL — SIGNIFICANT CHANGE UP (ref 0–0.2)
BASOPHILS # BLD AUTO: 0.03 K/UL — SIGNIFICANT CHANGE UP (ref 0–0.2)
BASOPHILS # BLD AUTO: 0.04 K/UL — SIGNIFICANT CHANGE UP (ref 0–0.2)
BASOPHILS NFR BLD AUTO: 0.2 % — SIGNIFICANT CHANGE UP (ref 0–1)
BASOPHILS NFR BLD AUTO: 0.3 % — SIGNIFICANT CHANGE UP (ref 0–1)
BASOPHILS NFR BLD AUTO: 0.4 % — SIGNIFICANT CHANGE UP (ref 0–1)
BILIRUB DIRECT SERPL-MCNC: 0.5 MG/DL — HIGH (ref 0–0.2)
BILIRUB INDIRECT FLD-MCNC: 0.4 MG/DL — SIGNIFICANT CHANGE UP (ref 0.2–1.2)
BILIRUB SERPL-MCNC: 0.9 MG/DL — SIGNIFICANT CHANGE UP (ref 0.2–1.2)
BILIRUB SERPL-MCNC: 0.9 MG/DL — SIGNIFICANT CHANGE UP (ref 0.2–1.2)
BILIRUB SERPL-MCNC: 1.1 MG/DL — SIGNIFICANT CHANGE UP (ref 0.2–1.2)
BILIRUB SERPL-MCNC: 1.2 MG/DL — SIGNIFICANT CHANGE UP (ref 0.2–1.2)
BILIRUB UR-MCNC: NEGATIVE — SIGNIFICANT CHANGE UP
BILIRUB UR-MCNC: NEGATIVE — SIGNIFICANT CHANGE UP
BUN SERPL-MCNC: 71 MG/DL — CRITICAL HIGH (ref 10–20)
BUN SERPL-MCNC: 71 MG/DL — CRITICAL HIGH (ref 10–20)
BUN SERPL-MCNC: 73 MG/DL — CRITICAL HIGH (ref 10–20)
BUN SERPL-MCNC: 74 MG/DL — CRITICAL HIGH (ref 10–20)
BUN SERPL-MCNC: 75 MG/DL — CRITICAL HIGH (ref 10–20)
BUN SERPL-MCNC: 75 MG/DL — CRITICAL HIGH (ref 10–20)
BUN SERPL-MCNC: 80 MG/DL — CRITICAL HIGH (ref 10–20)
C3 SERPL-MCNC: 84 MG/DL — SIGNIFICANT CHANGE UP (ref 81–157)
CA-I SERPL-SCNC: 1.16 MMOL/L — SIGNIFICANT CHANGE UP (ref 1.15–1.33)
CALCIUM SERPL-MCNC: 8.5 MG/DL — SIGNIFICANT CHANGE UP (ref 8.5–10.1)
CALCIUM SERPL-MCNC: 8.8 MG/DL — SIGNIFICANT CHANGE UP (ref 8.5–10.1)
CALCIUM SERPL-MCNC: 9 MG/DL — SIGNIFICANT CHANGE UP (ref 8.5–10.1)
CALCIUM SERPL-MCNC: 9.1 MG/DL — SIGNIFICANT CHANGE UP (ref 8.5–10.1)
CALCIUM SERPL-MCNC: 9.2 MG/DL — SIGNIFICANT CHANGE UP (ref 8.5–10.1)
CHLORIDE SERPL-SCNC: 100 MMOL/L — SIGNIFICANT CHANGE UP (ref 98–110)
CHLORIDE SERPL-SCNC: 100 MMOL/L — SIGNIFICANT CHANGE UP (ref 98–110)
CHLORIDE SERPL-SCNC: 95 MMOL/L — LOW (ref 98–110)
CHLORIDE SERPL-SCNC: 98 MMOL/L — SIGNIFICANT CHANGE UP (ref 98–110)
CHLORIDE SERPL-SCNC: 98 MMOL/L — SIGNIFICANT CHANGE UP (ref 98–110)
CHLORIDE SERPL-SCNC: 99 MMOL/L — SIGNIFICANT CHANGE UP (ref 98–110)
CHLORIDE SERPL-SCNC: 99 MMOL/L — SIGNIFICANT CHANGE UP (ref 98–110)
CHOLEST SERPL-MCNC: 85 MG/DL — SIGNIFICANT CHANGE UP
CK MB CFR SERPL CALC: 6.2 NG/ML — SIGNIFICANT CHANGE UP (ref 0.6–6.3)
CK MB CFR SERPL CALC: 7.6 NG/ML — HIGH (ref 0.6–6.3)
CK SERPL-CCNC: 316 U/L — HIGH (ref 0–225)
CK SERPL-CCNC: 435 U/L — HIGH (ref 0–225)
CO2 SERPL-SCNC: 19 MMOL/L — SIGNIFICANT CHANGE UP (ref 17–32)
CO2 SERPL-SCNC: 20 MMOL/L — SIGNIFICANT CHANGE UP (ref 17–32)
CO2 SERPL-SCNC: 21 MMOL/L — SIGNIFICANT CHANGE UP (ref 17–32)
CO2 SERPL-SCNC: 22 MMOL/L — SIGNIFICANT CHANGE UP (ref 17–32)
CO2 SERPL-SCNC: 26 MMOL/L — SIGNIFICANT CHANGE UP (ref 17–32)
COLOR SPEC: SIGNIFICANT CHANGE UP
COLOR SPEC: YELLOW — SIGNIFICANT CHANGE UP
CREAT ?TM UR-MCNC: 47 MG/DL — SIGNIFICANT CHANGE UP
CREAT SERPL-MCNC: 1.6 MG/DL — HIGH (ref 0.7–1.5)
CREAT SERPL-MCNC: 1.6 MG/DL — HIGH (ref 0.7–1.5)
CREAT SERPL-MCNC: 1.7 MG/DL — HIGH (ref 0.7–1.5)
CREAT SERPL-MCNC: 1.8 MG/DL — HIGH (ref 0.7–1.5)
CREAT SERPL-MCNC: 2 MG/DL — HIGH (ref 0.7–1.5)
CREAT SERPL-MCNC: 2.2 MG/DL — HIGH (ref 0.7–1.5)
CREAT SERPL-MCNC: 2.3 MG/DL — HIGH (ref 0.7–1.5)
CULTURE RESULTS: NO GROWTH — SIGNIFICANT CHANGE UP
CULTURE RESULTS: SIGNIFICANT CHANGE UP
CULTURE RESULTS: SIGNIFICANT CHANGE UP
DIFF PNL FLD: SIGNIFICANT CHANGE UP
DIFF PNL FLD: SIGNIFICANT CHANGE UP
EOSINOPHIL # BLD AUTO: 0.02 K/UL — SIGNIFICANT CHANGE UP (ref 0–0.7)
EOSINOPHIL # BLD AUTO: 0.05 K/UL — SIGNIFICANT CHANGE UP (ref 0–0.7)
EOSINOPHIL # BLD AUTO: 0.16 K/UL — SIGNIFICANT CHANGE UP (ref 0–0.7)
EOSINOPHIL NFR BLD AUTO: 0.2 % — SIGNIFICANT CHANGE UP (ref 0–8)
EOSINOPHIL NFR BLD AUTO: 0.6 % — SIGNIFICANT CHANGE UP (ref 0–8)
EOSINOPHIL NFR BLD AUTO: 1.3 % — SIGNIFICANT CHANGE UP (ref 0–8)
EPI CELLS # UR: 0 /HPF — SIGNIFICANT CHANGE UP (ref 0–5)
EPI CELLS # UR: 0 /HPF — SIGNIFICANT CHANGE UP (ref 0–5)
ESTIMATED AVERAGE GLUCOSE: 169 MG/DL — HIGH (ref 68–114)
FOLATE SERPL-MCNC: 11.6 NG/ML — SIGNIFICANT CHANGE UP
GAS PNL BLDV: 135 MMOL/L — LOW (ref 136–145)
GAS PNL BLDV: SIGNIFICANT CHANGE UP
GLUCOSE BLDC GLUCOMTR-MCNC: 113 MG/DL — HIGH (ref 70–99)
GLUCOSE BLDC GLUCOMTR-MCNC: 123 MG/DL — HIGH (ref 70–99)
GLUCOSE BLDC GLUCOMTR-MCNC: 138 MG/DL — HIGH (ref 70–99)
GLUCOSE BLDC GLUCOMTR-MCNC: 143 MG/DL — HIGH (ref 70–99)
GLUCOSE BLDC GLUCOMTR-MCNC: 160 MG/DL — HIGH (ref 70–99)
GLUCOSE BLDC GLUCOMTR-MCNC: 168 MG/DL — HIGH (ref 70–99)
GLUCOSE BLDC GLUCOMTR-MCNC: 171 MG/DL — HIGH (ref 70–99)
GLUCOSE BLDC GLUCOMTR-MCNC: 178 MG/DL — HIGH (ref 70–99)
GLUCOSE BLDC GLUCOMTR-MCNC: 181 MG/DL — HIGH (ref 70–99)
GLUCOSE BLDC GLUCOMTR-MCNC: 189 MG/DL — HIGH (ref 70–99)
GLUCOSE BLDC GLUCOMTR-MCNC: 192 MG/DL — HIGH (ref 70–99)
GLUCOSE BLDC GLUCOMTR-MCNC: 196 MG/DL — HIGH (ref 70–99)
GLUCOSE BLDC GLUCOMTR-MCNC: 202 MG/DL — HIGH (ref 70–99)
GLUCOSE BLDC GLUCOMTR-MCNC: 203 MG/DL — HIGH (ref 70–99)
GLUCOSE BLDC GLUCOMTR-MCNC: 212 MG/DL — HIGH (ref 70–99)
GLUCOSE BLDC GLUCOMTR-MCNC: 229 MG/DL — HIGH (ref 70–99)
GLUCOSE BLDC GLUCOMTR-MCNC: 248 MG/DL — HIGH (ref 70–99)
GLUCOSE BLDC GLUCOMTR-MCNC: 251 MG/DL — HIGH (ref 70–99)
GLUCOSE BLDC GLUCOMTR-MCNC: 269 MG/DL — HIGH (ref 70–99)
GLUCOSE BLDC GLUCOMTR-MCNC: 330 MG/DL — HIGH (ref 70–99)
GLUCOSE BLDC GLUCOMTR-MCNC: 75 MG/DL — SIGNIFICANT CHANGE UP (ref 70–99)
GLUCOSE BLDC GLUCOMTR-MCNC: 94 MG/DL — SIGNIFICANT CHANGE UP (ref 70–99)
GLUCOSE SERPL-MCNC: 128 MG/DL — HIGH (ref 70–99)
GLUCOSE SERPL-MCNC: 146 MG/DL — HIGH (ref 70–99)
GLUCOSE SERPL-MCNC: 147 MG/DL — HIGH (ref 70–99)
GLUCOSE SERPL-MCNC: 151 MG/DL — HIGH (ref 70–99)
GLUCOSE SERPL-MCNC: 185 MG/DL — HIGH (ref 70–99)
GLUCOSE SERPL-MCNC: 263 MG/DL — HIGH (ref 70–99)
GLUCOSE SERPL-MCNC: 64 MG/DL — LOW (ref 70–99)
GLUCOSE UR QL: NEGATIVE — SIGNIFICANT CHANGE UP
GLUCOSE UR QL: NEGATIVE — SIGNIFICANT CHANGE UP
HCO3 BLDV-SCNC: 25 MMOL/L — SIGNIFICANT CHANGE UP (ref 22–29)
HCT VFR BLD CALC: 28.3 % — LOW (ref 42–52)
HCT VFR BLD CALC: 28.7 % — LOW (ref 42–52)
HCT VFR BLD CALC: 29.6 % — LOW (ref 42–52)
HCT VFR BLD CALC: 30.7 % — LOW (ref 42–52)
HCT VFR BLD CALC: 31.9 % — LOW (ref 42–52)
HCT VFR BLD CALC: 32.1 % — LOW (ref 42–52)
HCT VFR BLD CALC: 32.6 % — LOW (ref 42–52)
HCT VFR BLDA CALC: 26 % — LOW (ref 39–51)
HDLC SERPL-MCNC: 47 MG/DL — SIGNIFICANT CHANGE UP
HGB BLD CALC-MCNC: 8.8 G/DL — LOW (ref 12.6–17.4)
HGB BLD-MCNC: 10 G/DL — LOW (ref 14–18)
HGB BLD-MCNC: 8.8 G/DL — LOW (ref 14–18)
HGB BLD-MCNC: 9 G/DL — LOW (ref 14–18)
HGB BLD-MCNC: 9.1 G/DL — LOW (ref 14–18)
HGB BLD-MCNC: 9.5 G/DL — LOW (ref 14–18)
HGB BLD-MCNC: 9.8 G/DL — LOW (ref 14–18)
HGB BLD-MCNC: 9.9 G/DL — LOW (ref 14–18)
HYALINE CASTS # UR AUTO: 3 /LPF — SIGNIFICANT CHANGE UP (ref 0–7)
HYALINE CASTS # UR AUTO: 5 /LPF — SIGNIFICANT CHANGE UP (ref 0–7)
IMM GRANULOCYTES NFR BLD AUTO: 0.4 % — HIGH (ref 0.1–0.3)
IMM GRANULOCYTES NFR BLD AUTO: 0.6 % — HIGH (ref 0.1–0.3)
IMM GRANULOCYTES NFR BLD AUTO: 0.7 % — HIGH (ref 0.1–0.3)
IRON SATN MFR SERPL: 23 % — SIGNIFICANT CHANGE UP (ref 15–50)
IRON SATN MFR SERPL: 70 UG/DL — SIGNIFICANT CHANGE UP (ref 35–150)
KETONES UR-MCNC: NEGATIVE — SIGNIFICANT CHANGE UP
KETONES UR-MCNC: NEGATIVE — SIGNIFICANT CHANGE UP
LACTATE BLDV-MCNC: 2.5 MMOL/L — HIGH (ref 0.5–2)
LACTATE SERPL-SCNC: 1.5 MMOL/L — SIGNIFICANT CHANGE UP (ref 0.7–2)
LACTATE SERPL-SCNC: 2.9 MMOL/L — HIGH (ref 0.7–2)
LEUKOCYTE ESTERASE UR-ACNC: NEGATIVE — SIGNIFICANT CHANGE UP
LEUKOCYTE ESTERASE UR-ACNC: NEGATIVE — SIGNIFICANT CHANGE UP
LIPID PNL WITH DIRECT LDL SERPL: 29 MG/DL — SIGNIFICANT CHANGE UP
LYMPHOCYTES # BLD AUTO: 0.26 K/UL — LOW (ref 1.2–3.4)
LYMPHOCYTES # BLD AUTO: 0.35 K/UL — LOW (ref 1.2–3.4)
LYMPHOCYTES # BLD AUTO: 0.42 K/UL — LOW (ref 1.2–3.4)
LYMPHOCYTES # BLD AUTO: 2.6 % — LOW (ref 20.5–51.1)
LYMPHOCYTES # BLD AUTO: 3.5 % — LOW (ref 20.5–51.1)
LYMPHOCYTES # BLD AUTO: 3.9 % — LOW (ref 20.5–51.1)
MAGNESIUM SERPL-MCNC: 1.8 MG/DL — SIGNIFICANT CHANGE UP (ref 1.8–2.4)
MAGNESIUM SERPL-MCNC: 1.9 MG/DL — SIGNIFICANT CHANGE UP (ref 1.8–2.4)
MAGNESIUM SERPL-MCNC: 1.9 MG/DL — SIGNIFICANT CHANGE UP (ref 1.8–2.4)
MAGNESIUM SERPL-MCNC: 2 MG/DL — SIGNIFICANT CHANGE UP (ref 1.8–2.4)
MCHC RBC-ENTMCNC: 27.7 PG — SIGNIFICANT CHANGE UP (ref 27–31)
MCHC RBC-ENTMCNC: 27.7 PG — SIGNIFICANT CHANGE UP (ref 27–31)
MCHC RBC-ENTMCNC: 28 PG — SIGNIFICANT CHANGE UP (ref 27–31)
MCHC RBC-ENTMCNC: 28 PG — SIGNIFICANT CHANGE UP (ref 27–31)
MCHC RBC-ENTMCNC: 28.1 PG — SIGNIFICANT CHANGE UP (ref 27–31)
MCHC RBC-ENTMCNC: 28.3 PG — SIGNIFICANT CHANGE UP (ref 27–31)
MCHC RBC-ENTMCNC: 28.4 PG — SIGNIFICANT CHANGE UP (ref 27–31)
MCHC RBC-ENTMCNC: 30.5 G/DL — LOW (ref 32–37)
MCHC RBC-ENTMCNC: 30.7 G/DL — LOW (ref 32–37)
MCHC RBC-ENTMCNC: 30.7 G/DL — LOW (ref 32–37)
MCHC RBC-ENTMCNC: 30.9 G/DL — LOW (ref 32–37)
MCHC RBC-ENTMCNC: 31 G/DL — LOW (ref 32–37)
MCHC RBC-ENTMCNC: 31.1 G/DL — LOW (ref 32–37)
MCHC RBC-ENTMCNC: 31.4 G/DL — LOW (ref 32–37)
MCV RBC AUTO: 89 FL — SIGNIFICANT CHANGE UP (ref 80–94)
MCV RBC AUTO: 89.1 FL — SIGNIFICANT CHANGE UP (ref 80–94)
MCV RBC AUTO: 90.2 FL — SIGNIFICANT CHANGE UP (ref 80–94)
MCV RBC AUTO: 90.6 FL — SIGNIFICANT CHANGE UP (ref 80–94)
MCV RBC AUTO: 91.4 FL — SIGNIFICANT CHANGE UP (ref 80–94)
MCV RBC AUTO: 91.6 FL — SIGNIFICANT CHANGE UP (ref 80–94)
MCV RBC AUTO: 92.8 FL — SIGNIFICANT CHANGE UP (ref 80–94)
MONOCYTES # BLD AUTO: 0.55 K/UL — SIGNIFICANT CHANGE UP (ref 0.1–0.6)
MONOCYTES # BLD AUTO: 0.65 K/UL — HIGH (ref 0.1–0.6)
MONOCYTES # BLD AUTO: 0.88 K/UL — HIGH (ref 0.1–0.6)
MONOCYTES NFR BLD AUTO: 5.4 % — SIGNIFICANT CHANGE UP (ref 1.7–9.3)
MONOCYTES NFR BLD AUTO: 7.3 % — SIGNIFICANT CHANGE UP (ref 1.7–9.3)
MONOCYTES NFR BLD AUTO: 7.4 % — SIGNIFICANT CHANGE UP (ref 1.7–9.3)
NEUTROPHILS # BLD AUTO: 10.4 K/UL — HIGH (ref 1.4–6.5)
NEUTROPHILS # BLD AUTO: 7.78 K/UL — HIGH (ref 1.4–6.5)
NEUTROPHILS # BLD AUTO: 9.22 K/UL — HIGH (ref 1.4–6.5)
NEUTROPHILS NFR BLD AUTO: 87 % — HIGH (ref 42.2–75.2)
NEUTROPHILS NFR BLD AUTO: 87.4 % — HIGH (ref 42.2–75.2)
NEUTROPHILS NFR BLD AUTO: 90.8 % — HIGH (ref 42.2–75.2)
NITRITE UR-MCNC: NEGATIVE — SIGNIFICANT CHANGE UP
NITRITE UR-MCNC: NEGATIVE — SIGNIFICANT CHANGE UP
NON HDL CHOLESTEROL: 38 MG/DL — SIGNIFICANT CHANGE UP
NRBC # BLD: 0 /100 WBCS — SIGNIFICANT CHANGE UP (ref 0–0)
NT-PROBNP SERPL-SCNC: HIGH PG/ML (ref 0–300)
OSMOLALITY UR: 369 MOS/KG — SIGNIFICANT CHANGE UP (ref 50–1200)
PCO2 BLDV: 44 MMHG — SIGNIFICANT CHANGE UP (ref 42–55)
PH BLDV: 7.37 — SIGNIFICANT CHANGE UP (ref 7.32–7.43)
PH UR: 6 — SIGNIFICANT CHANGE UP (ref 5–8)
PH UR: 6 — SIGNIFICANT CHANGE UP (ref 5–8)
PHOSPHATE SERPL-MCNC: 3.4 MG/DL — SIGNIFICANT CHANGE UP (ref 2.1–4.9)
PHOSPHATE SERPL-MCNC: 3.9 MG/DL — SIGNIFICANT CHANGE UP (ref 2.1–4.9)
PLATELET # BLD AUTO: 190 K/UL — SIGNIFICANT CHANGE UP (ref 130–400)
PLATELET # BLD AUTO: 202 K/UL — SIGNIFICANT CHANGE UP (ref 130–400)
PLATELET # BLD AUTO: 204 K/UL — SIGNIFICANT CHANGE UP (ref 130–400)
PLATELET # BLD AUTO: 212 K/UL — SIGNIFICANT CHANGE UP (ref 130–400)
PLATELET # BLD AUTO: 223 K/UL — SIGNIFICANT CHANGE UP (ref 130–400)
PLATELET # BLD AUTO: 226 K/UL — SIGNIFICANT CHANGE UP (ref 130–400)
PLATELET # BLD AUTO: 236 K/UL — SIGNIFICANT CHANGE UP (ref 130–400)
PO2 BLDV: 52 MMHG — SIGNIFICANT CHANGE UP
POTASSIUM BLDV-SCNC: 2.9 MMOL/L — CRITICAL LOW (ref 3.5–5.1)
POTASSIUM SERPL-MCNC: 3.4 MMOL/L — LOW (ref 3.5–5)
POTASSIUM SERPL-MCNC: 3.8 MMOL/L — SIGNIFICANT CHANGE UP (ref 3.5–5)
POTASSIUM SERPL-MCNC: 3.8 MMOL/L — SIGNIFICANT CHANGE UP (ref 3.5–5)
POTASSIUM SERPL-MCNC: 4 MMOL/L — SIGNIFICANT CHANGE UP (ref 3.5–5)
POTASSIUM SERPL-MCNC: 4.1 MMOL/L — SIGNIFICANT CHANGE UP (ref 3.5–5)
POTASSIUM SERPL-MCNC: 4.4 MMOL/L — SIGNIFICANT CHANGE UP (ref 3.5–5)
POTASSIUM SERPL-MCNC: 4.4 MMOL/L — SIGNIFICANT CHANGE UP (ref 3.5–5)
POTASSIUM SERPL-SCNC: 3.4 MMOL/L — LOW (ref 3.5–5)
POTASSIUM SERPL-SCNC: 3.8 MMOL/L — SIGNIFICANT CHANGE UP (ref 3.5–5)
POTASSIUM SERPL-SCNC: 3.8 MMOL/L — SIGNIFICANT CHANGE UP (ref 3.5–5)
POTASSIUM SERPL-SCNC: 4 MMOL/L — SIGNIFICANT CHANGE UP (ref 3.5–5)
POTASSIUM SERPL-SCNC: 4.1 MMOL/L — SIGNIFICANT CHANGE UP (ref 3.5–5)
POTASSIUM SERPL-SCNC: 4.4 MMOL/L — SIGNIFICANT CHANGE UP (ref 3.5–5)
POTASSIUM SERPL-SCNC: 4.4 MMOL/L — SIGNIFICANT CHANGE UP (ref 3.5–5)
PROCALCITONIN SERPL-MCNC: 0.08 NG/ML — SIGNIFICANT CHANGE UP (ref 0.02–0.1)
PROT ?TM UR-MCNC: 49 MG/DLG/24H — SIGNIFICANT CHANGE UP
PROT SERPL-MCNC: 5.6 G/DL — LOW (ref 6–8)
PROT SERPL-MCNC: 5.6 G/DL — LOW (ref 6–8)
PROT SERPL-MCNC: 5.7 G/DL — LOW (ref 6–8)
PROT SERPL-MCNC: 5.9 G/DL — LOW (ref 6–8)
PROT SERPL-MCNC: 6 G/DL — SIGNIFICANT CHANGE UP (ref 6–8)
PROT SERPL-MCNC: 6.4 G/DL — SIGNIFICANT CHANGE UP (ref 6–8)
PROT UR-MCNC: ABNORMAL
PROT UR-MCNC: ABNORMAL
PROT/CREAT UR-RTO: 1 RATIO — HIGH (ref 0–0.2)
RBC # BLD: 3.18 M/UL — LOW (ref 4.7–6.1)
RBC # BLD: 3.22 M/UL — LOW (ref 4.7–6.1)
RBC # BLD: 3.28 M/UL — LOW (ref 4.7–6.1)
RBC # BLD: 3.39 M/UL — LOW (ref 4.7–6.1)
RBC # BLD: 3.46 M/UL — LOW (ref 4.7–6.1)
RBC # BLD: 3.49 M/UL — LOW (ref 4.7–6.1)
RBC # BLD: 3.56 M/UL — LOW (ref 4.7–6.1)
RBC # FLD: 16.1 % — HIGH (ref 11.5–14.5)
RBC # FLD: 16.4 % — HIGH (ref 11.5–14.5)
RBC # FLD: 16.4 % — HIGH (ref 11.5–14.5)
RBC # FLD: 16.6 % — HIGH (ref 11.5–14.5)
RBC # FLD: 16.8 % — HIGH (ref 11.5–14.5)
RBC # FLD: 17.2 % — HIGH (ref 11.5–14.5)
RBC # FLD: 17.5 % — HIGH (ref 11.5–14.5)
RBC CASTS # UR COMP ASSIST: 1 /HPF — SIGNIFICANT CHANGE UP (ref 0–4)
RBC CASTS # UR COMP ASSIST: 3 /HPF — SIGNIFICANT CHANGE UP (ref 0–4)
SAO2 % BLDV: 82 % — SIGNIFICANT CHANGE UP
SARS-COV-2 RNA SPEC QL NAA+PROBE: SIGNIFICANT CHANGE UP
SODIUM SERPL-SCNC: 135 MMOL/L — SIGNIFICANT CHANGE UP (ref 135–146)
SODIUM SERPL-SCNC: 136 MMOL/L — SIGNIFICANT CHANGE UP (ref 135–146)
SODIUM SERPL-SCNC: 136 MMOL/L — SIGNIFICANT CHANGE UP (ref 135–146)
SODIUM SERPL-SCNC: 137 MMOL/L — SIGNIFICANT CHANGE UP (ref 135–146)
SODIUM SERPL-SCNC: 138 MMOL/L — SIGNIFICANT CHANGE UP (ref 135–146)
SODIUM SERPL-SCNC: 139 MMOL/L — SIGNIFICANT CHANGE UP (ref 135–146)
SODIUM SERPL-SCNC: 140 MMOL/L — SIGNIFICANT CHANGE UP (ref 135–146)
SODIUM UR-SCNC: 22 MMOL/L — SIGNIFICANT CHANGE UP
SP GR SPEC: 1.01 — SIGNIFICANT CHANGE UP (ref 1.01–1.03)
SP GR SPEC: 1.01 — SIGNIFICANT CHANGE UP (ref 1.01–1.03)
SPECIMEN SOURCE: SIGNIFICANT CHANGE UP
T PALLIDUM AB TITR SER: NEGATIVE — SIGNIFICANT CHANGE UP
TACROLIMUS SERPL-MCNC: 2.1 NG/ML — SIGNIFICANT CHANGE UP
TIBC SERPL-MCNC: 303 UG/DL — SIGNIFICANT CHANGE UP (ref 220–430)
TRIGL SERPL-MCNC: 45 MG/DL — SIGNIFICANT CHANGE UP
TROPONIN T SERPL-MCNC: 0.17 NG/ML — CRITICAL HIGH
TROPONIN T SERPL-MCNC: 0.21 NG/ML — CRITICAL HIGH
TROPONIN T SERPL-MCNC: 0.22 NG/ML — CRITICAL HIGH
TROPONIN T SERPL-MCNC: 0.23 NG/ML — CRITICAL HIGH
TROPONIN T SERPL-MCNC: 0.28 NG/ML — CRITICAL HIGH
TSH SERPL-MCNC: 3.49 UIU/ML — SIGNIFICANT CHANGE UP (ref 0.27–4.2)
UIBC SERPL-MCNC: 233 UG/DL — SIGNIFICANT CHANGE UP (ref 110–370)
UROBILINOGEN FLD QL: SIGNIFICANT CHANGE UP
UROBILINOGEN FLD QL: SIGNIFICANT CHANGE UP
UUN UR-MCNC: 637 MG/DL — SIGNIFICANT CHANGE UP
VIT B12 SERPL-MCNC: 1413 PG/ML — HIGH (ref 232–1245)
WBC # BLD: 10.15 K/UL — SIGNIFICANT CHANGE UP (ref 4.8–10.8)
WBC # BLD: 10.79 K/UL — SIGNIFICANT CHANGE UP (ref 4.8–10.8)
WBC # BLD: 11.08 K/UL — HIGH (ref 4.8–10.8)
WBC # BLD: 11.95 K/UL — HIGH (ref 4.8–10.8)
WBC # BLD: 8.91 K/UL — SIGNIFICANT CHANGE UP (ref 4.8–10.8)
WBC # BLD: 9.22 K/UL — SIGNIFICANT CHANGE UP (ref 4.8–10.8)
WBC # BLD: 9.47 K/UL — SIGNIFICANT CHANGE UP (ref 4.8–10.8)
WBC # FLD AUTO: 10.15 K/UL — SIGNIFICANT CHANGE UP (ref 4.8–10.8)
WBC # FLD AUTO: 10.79 K/UL — SIGNIFICANT CHANGE UP (ref 4.8–10.8)
WBC # FLD AUTO: 11.08 K/UL — HIGH (ref 4.8–10.8)
WBC # FLD AUTO: 11.95 K/UL — HIGH (ref 4.8–10.8)
WBC # FLD AUTO: 8.91 K/UL — SIGNIFICANT CHANGE UP (ref 4.8–10.8)
WBC # FLD AUTO: 9.22 K/UL — SIGNIFICANT CHANGE UP (ref 4.8–10.8)
WBC # FLD AUTO: 9.47 K/UL — SIGNIFICANT CHANGE UP (ref 4.8–10.8)
WBC UR QL: 1 /HPF — SIGNIFICANT CHANGE UP (ref 0–5)
WBC UR QL: 1 /HPF — SIGNIFICANT CHANGE UP (ref 0–5)

## 2021-01-01 PROCEDURE — 99239 HOSP IP/OBS DSCHRG MGMT >30: CPT

## 2021-01-01 PROCEDURE — 76770 US EXAM ABDO BACK WALL COMP: CPT | Mod: 26

## 2021-01-01 PROCEDURE — 76705 ECHO EXAM OF ABDOMEN: CPT | Mod: 26

## 2021-01-01 PROCEDURE — 70551 MRI BRAIN STEM W/O DYE: CPT | Mod: 26

## 2021-01-01 PROCEDURE — 99233 SBSQ HOSP IP/OBS HIGH 50: CPT

## 2021-01-01 PROCEDURE — 99285 EMERGENCY DEPT VISIT HI MDM: CPT | Mod: 25

## 2021-01-01 PROCEDURE — 93975 VASCULAR STUDY: CPT | Mod: 26

## 2021-01-01 PROCEDURE — 99497 ADVNCD CARE PLAN 30 MIN: CPT | Mod: 25

## 2021-01-01 PROCEDURE — 93308 TTE F-UP OR LMTD: CPT | Mod: 26

## 2021-01-01 PROCEDURE — 99222 1ST HOSP IP/OBS MODERATE 55: CPT

## 2021-01-01 PROCEDURE — 76604 US EXAM CHEST: CPT | Mod: 26

## 2021-01-01 PROCEDURE — 71045 X-RAY EXAM CHEST 1 VIEW: CPT | Mod: 26

## 2021-01-01 PROCEDURE — 93010 ELECTROCARDIOGRAM REPORT: CPT

## 2021-01-01 PROCEDURE — 93306 TTE W/DOPPLER COMPLETE: CPT | Mod: 26

## 2021-01-01 PROCEDURE — 99496 TRANSJ CARE MGMT HIGH F2F 7D: CPT

## 2021-01-01 PROCEDURE — 95819 EEG AWAKE AND ASLEEP: CPT | Mod: 26

## 2021-01-01 PROCEDURE — 99223 1ST HOSP IP/OBS HIGH 75: CPT

## 2021-01-01 PROCEDURE — 99213 OFFICE O/P EST LOW 20 MIN: CPT

## 2021-01-01 PROCEDURE — 72128 CT CHEST SPINE W/O DYE: CPT | Mod: 26

## 2021-01-01 PROCEDURE — 70450 CT HEAD/BRAIN W/O DYE: CPT | Mod: 26,MA

## 2021-01-01 PROCEDURE — 72131 CT LUMBAR SPINE W/O DYE: CPT | Mod: 26

## 2021-01-01 RX ORDER — FUROSEMIDE 40 MG/1
40 TABLET ORAL DAILY
Qty: 30 | Refills: 0 | Status: ACTIVE | COMMUNITY

## 2021-01-01 RX ORDER — INSULIN ASPART INJECTION 100 [IU]/ML
100 INJECTION, SOLUTION SUBCUTANEOUS
Qty: 60 | Refills: 0 | Status: DISCONTINUED | COMMUNITY
Start: 2020-05-01 | End: 2021-01-01

## 2021-01-01 RX ORDER — DARBEPOETIN ALFA 100 UG/.5ML
100 INJECTION, SOLUTION INTRAVENOUS; SUBCUTANEOUS
Qty: 3 | Refills: 0 | Status: DISCONTINUED | COMMUNITY
Start: 2020-01-29 | End: 2021-01-01

## 2021-01-01 RX ORDER — ASPIRIN/CALCIUM CARB/MAGNESIUM 324 MG
81 TABLET ORAL DAILY
Refills: 0 | Status: DISCONTINUED | OUTPATIENT
Start: 2021-01-01 | End: 2021-01-01

## 2021-01-01 RX ORDER — INSULIN GLARGINE 100 [IU]/ML
23 INJECTION, SOLUTION SUBCUTANEOUS AT BEDTIME
Refills: 0 | Status: DISCONTINUED | OUTPATIENT
Start: 2021-01-01 | End: 2021-01-01

## 2021-01-01 RX ORDER — PNV NO.95/FERROUS FUM/FOLIC AC 28MG-0.8MG
TABLET ORAL
Refills: 0 | Status: DISCONTINUED | COMMUNITY
End: 2021-01-01

## 2021-01-01 RX ORDER — CEPHALEXIN 500 MG/1
500 CAPSULE ORAL EVERY 8 HOURS
Qty: 30 | Refills: 0 | Status: DISCONTINUED | COMMUNITY
Start: 2019-04-01 | End: 2021-01-01

## 2021-01-01 RX ORDER — BUMETANIDE 0.25 MG/ML
1.5 INJECTION INTRAMUSCULAR; INTRAVENOUS
Refills: 0 | Status: DISCONTINUED | OUTPATIENT
Start: 2021-01-01 | End: 2021-01-01

## 2021-01-01 RX ORDER — APIXABAN 2.5 MG/1
2.5 TABLET, FILM COATED ORAL
Refills: 0 | Status: DISCONTINUED | OUTPATIENT
Start: 2021-01-01 | End: 2021-01-01

## 2021-01-01 RX ORDER — MULTIVIT-MIN/IRON/FOLIC ACID/K 18-600-40
CAPSULE ORAL
Refills: 0 | Status: DISCONTINUED | COMMUNITY
End: 2021-01-01

## 2021-01-01 RX ORDER — FUROSEMIDE 40 MG
1 TABLET ORAL
Qty: 30 | Refills: 0
Start: 2021-01-01 | End: 2021-01-01

## 2021-01-01 RX ORDER — PANTOPRAZOLE SODIUM 20 MG/1
40 TABLET, DELAYED RELEASE ORAL
Refills: 0 | Status: DISCONTINUED | OUTPATIENT
Start: 2021-01-01 | End: 2021-01-01

## 2021-01-01 RX ORDER — MAGNESIUM OXIDE 400 MG ORAL TABLET 241.3 MG
400 TABLET ORAL ONCE
Refills: 0 | Status: COMPLETED | OUTPATIENT
Start: 2021-01-01 | End: 2021-01-01

## 2021-01-01 RX ORDER — FUROSEMIDE 40 MG
1 TABLET ORAL
Qty: 0 | Refills: 0 | DISCHARGE

## 2021-01-01 RX ORDER — GLUCAGON 1 MG
1 KIT INJECTION
Qty: 2 | Refills: 0 | Status: DISCONTINUED | COMMUNITY
Start: 2020-06-02 | End: 2021-01-01

## 2021-01-01 RX ORDER — INSULIN LISPRO 100/ML
VIAL (ML) SUBCUTANEOUS AT BEDTIME
Refills: 0 | Status: DISCONTINUED | OUTPATIENT
Start: 2021-01-01 | End: 2021-01-01

## 2021-01-01 RX ORDER — UBIDECARENONE/VIT E ACET 100MG-5
CAPSULE ORAL
Refills: 0 | Status: DISCONTINUED | COMMUNITY
End: 2021-01-01

## 2021-01-01 RX ORDER — POTASSIUM CHLORIDE 20 MEQ
20 PACKET (EA) ORAL
Refills: 0 | Status: COMPLETED | OUTPATIENT
Start: 2021-01-01 | End: 2021-01-01

## 2021-01-01 RX ORDER — LEVOTHYROXINE SODIUM 125 MCG
112 TABLET ORAL DAILY
Refills: 0 | Status: DISCONTINUED | OUTPATIENT
Start: 2021-01-01 | End: 2021-01-01

## 2021-01-01 RX ORDER — ISOSORBIDE MONONITRATE 60 MG/1
30 TABLET, EXTENDED RELEASE ORAL DAILY
Refills: 0 | Status: DISCONTINUED | OUTPATIENT
Start: 2021-01-01 | End: 2021-01-01

## 2021-01-01 RX ORDER — CLOPIDOGREL BISULFATE 75 MG/1
75 TABLET, FILM COATED ORAL DAILY
Qty: 30 | Refills: 3 | Status: DISCONTINUED | COMMUNITY
Start: 2019-08-26 | End: 2021-01-01

## 2021-01-01 RX ORDER — SODIUM CHLORIDE 9 MG/ML
1000 INJECTION INTRAMUSCULAR; INTRAVENOUS; SUBCUTANEOUS ONCE
Refills: 0 | Status: COMPLETED | OUTPATIENT
Start: 2021-01-01 | End: 2021-01-01

## 2021-01-01 RX ORDER — SENNOSIDES 8.6 MG
TABLET ORAL
Refills: 0 | Status: DISCONTINUED | COMMUNITY
End: 2021-01-01

## 2021-01-01 RX ORDER — ACETAMINOPHEN 325 MG
TABLET ORAL
Refills: 0 | Status: DISCONTINUED | COMMUNITY
End: 2021-01-01

## 2021-01-01 RX ORDER — NIFEDIPINE 30 MG
30 TABLET, EXTENDED RELEASE 24 HR ORAL ONCE
Refills: 0 | Status: COMPLETED | OUTPATIENT
Start: 2021-01-01 | End: 2021-01-01

## 2021-01-01 RX ORDER — CEPHALEXIN 500 MG/1
500 CAPSULE ORAL 4 TIMES DAILY
Qty: 32 | Refills: 0 | Status: DISCONTINUED | COMMUNITY
Start: 2019-03-11 | End: 2021-01-01

## 2021-01-01 RX ORDER — FUROSEMIDE 40 MG
40 TABLET ORAL DAILY
Refills: 0 | Status: DISCONTINUED | OUTPATIENT
Start: 2021-01-01 | End: 2021-01-01

## 2021-01-01 RX ORDER — MYCOPHENOLATE MOFETIL 250 MG/1
500 CAPSULE ORAL
Refills: 0 | Status: DISCONTINUED | OUTPATIENT
Start: 2021-01-01 | End: 2021-01-01

## 2021-01-01 RX ORDER — FLUTICASONE PROPIONATE 50 UG/1
50 SPRAY, METERED NASAL
Qty: 16 | Refills: 0 | Status: DISCONTINUED | COMMUNITY
Start: 2020-05-27 | End: 2021-01-01

## 2021-01-01 RX ORDER — VITAMIN E ACID SUCCINATE 268 MG
TABLET ORAL
Refills: 0 | Status: DISCONTINUED | COMMUNITY
End: 2021-01-01

## 2021-01-01 RX ORDER — EZETIMIBE 10 MG/1
10 TABLET ORAL
Refills: 0 | Status: DISCONTINUED | COMMUNITY
End: 2021-01-01

## 2021-01-01 RX ORDER — INSULIN LISPRO 100/ML
8 VIAL (ML) SUBCUTANEOUS
Refills: 0 | Status: DISCONTINUED | OUTPATIENT
Start: 2021-01-01 | End: 2021-01-01

## 2021-01-01 RX ORDER — INSULIN LISPRO 100 [IU]/ML
INJECTION, SOLUTION INTRAVENOUS; SUBCUTANEOUS
Refills: 0 | Status: ACTIVE | COMMUNITY

## 2021-01-01 RX ORDER — METOPROLOL TARTRATE 25 MG/1
25 TABLET, FILM COATED ORAL
Qty: 60 | Refills: 0 | Status: ACTIVE | COMMUNITY

## 2021-01-01 RX ORDER — FUROSEMIDE 40 MG
40 TABLET ORAL ONCE
Refills: 0 | Status: COMPLETED | OUTPATIENT
Start: 2021-01-01 | End: 2021-01-01

## 2021-01-01 RX ORDER — TACROLIMUS 5 MG/1
1 CAPSULE ORAL EVERY 12 HOURS
Refills: 0 | Status: DISCONTINUED | OUTPATIENT
Start: 2021-01-01 | End: 2021-01-01

## 2021-01-01 RX ORDER — INSULIN LISPRO 100/ML
VIAL (ML) SUBCUTANEOUS
Refills: 0 | Status: DISCONTINUED | OUTPATIENT
Start: 2021-01-01 | End: 2021-01-01

## 2021-01-01 RX ORDER — GAUZE BANDAGE 4" X 4"
SPONGE TOPICAL
Qty: 30 | Refills: 2 | Status: DISCONTINUED | COMMUNITY
Start: 2019-04-01 | End: 2021-01-01

## 2021-01-01 RX ORDER — GUAIFENESIN/DEXTROMETHORPHAN 600MG-30MG
10 TABLET, EXTENDED RELEASE 12 HR ORAL EVERY 8 HOURS
Refills: 0 | Status: DISCONTINUED | OUTPATIENT
Start: 2021-01-01 | End: 2021-01-01

## 2021-01-01 RX ORDER — MYCOPHENOLATE MOFETIL 500 MG/1
500 TABLET, FILM COATED ORAL TWICE DAILY
Refills: 0 | Status: ACTIVE | COMMUNITY

## 2021-01-01 RX ORDER — METOPROLOL TARTRATE 50 MG
25 TABLET ORAL
Refills: 0 | Status: DISCONTINUED | OUTPATIENT
Start: 2021-01-01 | End: 2021-01-01

## 2021-01-01 RX ORDER — INSULIN DEGLUDEC INJECTION 100 U/ML
100 INJECTION, SOLUTION SUBCUTANEOUS
Qty: 15 | Refills: 0 | Status: DISCONTINUED | COMMUNITY
Start: 2020-06-02 | End: 2021-01-01

## 2021-01-01 RX ORDER — INSULIN HUMAN 100 [IU]/ML
10 INJECTION, SOLUTION SUBCUTANEOUS ONCE
Refills: 0 | Status: COMPLETED | OUTPATIENT
Start: 2021-01-01 | End: 2021-01-01

## 2021-01-01 RX ORDER — AZITHROMYCIN 500 MG/1
500 TABLET, FILM COATED ORAL ONCE
Refills: 0 | Status: COMPLETED | OUTPATIENT
Start: 2021-01-01 | End: 2021-01-01

## 2021-01-01 RX ORDER — CEFTRIAXONE 500 MG/1
1000 INJECTION, POWDER, FOR SOLUTION INTRAMUSCULAR; INTRAVENOUS ONCE
Refills: 0 | Status: COMPLETED | OUTPATIENT
Start: 2021-01-01 | End: 2021-01-01

## 2021-01-01 RX ORDER — COLD-HOT PACK
EACH MISCELLANEOUS
Refills: 0 | Status: DISCONTINUED | COMMUNITY
End: 2021-01-01

## 2021-01-01 RX ORDER — ATORVASTATIN CALCIUM 80 MG/1
80 TABLET, FILM COATED ORAL AT BEDTIME
Refills: 0 | Status: DISCONTINUED | OUTPATIENT
Start: 2021-01-01 | End: 2021-01-01

## 2021-01-01 RX ORDER — POTASSIUM CHLORIDE 20 MEQ
20 PACKET (EA) ORAL ONCE
Refills: 0 | Status: COMPLETED | OUTPATIENT
Start: 2021-01-01 | End: 2021-01-01

## 2021-01-01 RX ORDER — MAGNESIUM SULFATE 500 MG/ML
2 VIAL (ML) INJECTION ONCE
Refills: 0 | Status: COMPLETED | OUTPATIENT
Start: 2021-01-01 | End: 2021-01-01

## 2021-01-01 RX ADMIN — Medication 8 UNIT(S): at 17:07

## 2021-01-01 RX ADMIN — Medication 50 GRAM(S): at 14:18

## 2021-01-01 RX ADMIN — INSULIN GLARGINE 23 UNIT(S): 100 INJECTION, SOLUTION SUBCUTANEOUS at 22:38

## 2021-01-01 RX ADMIN — TACROLIMUS 1 MILLIGRAM(S): 5 CAPSULE ORAL at 06:06

## 2021-01-01 RX ADMIN — Medication 0: at 22:27

## 2021-01-01 RX ADMIN — Medication 4: at 21:58

## 2021-01-01 RX ADMIN — Medication 81 MILLIGRAM(S): at 11:59

## 2021-01-01 RX ADMIN — Medication 8 UNIT(S): at 11:56

## 2021-01-01 RX ADMIN — TACROLIMUS 1 MILLIGRAM(S): 5 CAPSULE ORAL at 05:51

## 2021-01-01 RX ADMIN — MYCOPHENOLATE MOFETIL 500 MILLIGRAM(S): 250 CAPSULE ORAL at 17:10

## 2021-01-01 RX ADMIN — Medication 0: at 12:01

## 2021-01-01 RX ADMIN — Medication 2: at 11:52

## 2021-01-01 RX ADMIN — Medication 2: at 08:55

## 2021-01-01 RX ADMIN — BUMETANIDE 1.5 MILLIGRAM(S): 0.25 INJECTION INTRAMUSCULAR; INTRAVENOUS at 06:14

## 2021-01-01 RX ADMIN — Medication 20 MILLIGRAM(S): at 07:00

## 2021-01-01 RX ADMIN — ISOSORBIDE MONONITRATE 30 MILLIGRAM(S): 60 TABLET, EXTENDED RELEASE ORAL at 12:01

## 2021-01-01 RX ADMIN — MYCOPHENOLATE MOFETIL 500 MILLIGRAM(S): 250 CAPSULE ORAL at 05:43

## 2021-01-01 RX ADMIN — Medication 25 MILLIGRAM(S): at 18:50

## 2021-01-01 RX ADMIN — Medication 112 MICROGRAM(S): at 07:00

## 2021-01-01 RX ADMIN — Medication 25 MILLIGRAM(S): at 17:41

## 2021-01-01 RX ADMIN — Medication 50 MILLIEQUIVALENT(S): at 16:18

## 2021-01-01 RX ADMIN — BUMETANIDE 1.5 MILLIGRAM(S): 0.25 INJECTION INTRAMUSCULAR; INTRAVENOUS at 05:43

## 2021-01-01 RX ADMIN — MYCOPHENOLATE MOFETIL 500 MILLIGRAM(S): 250 CAPSULE ORAL at 17:42

## 2021-01-01 RX ADMIN — Medication 8 UNIT(S): at 08:07

## 2021-01-01 RX ADMIN — Medication 25 MILLIGRAM(S): at 06:06

## 2021-01-01 RX ADMIN — BUMETANIDE 1.5 MILLIGRAM(S): 0.25 INJECTION INTRAMUSCULAR; INTRAVENOUS at 18:37

## 2021-01-01 RX ADMIN — MYCOPHENOLATE MOFETIL 500 MILLIGRAM(S): 250 CAPSULE ORAL at 06:06

## 2021-01-01 RX ADMIN — Medication 10 MILLILITER(S): at 03:48

## 2021-01-01 RX ADMIN — Medication 8 UNIT(S): at 17:09

## 2021-01-01 RX ADMIN — APIXABAN 2.5 MILLIGRAM(S): 2.5 TABLET, FILM COATED ORAL at 19:06

## 2021-01-01 RX ADMIN — PANTOPRAZOLE SODIUM 40 MILLIGRAM(S): 20 TABLET, DELAYED RELEASE ORAL at 05:52

## 2021-01-01 RX ADMIN — Medication 4: at 16:52

## 2021-01-01 RX ADMIN — Medication 81 MILLIGRAM(S): at 11:53

## 2021-01-01 RX ADMIN — MYCOPHENOLATE MOFETIL 500 MILLIGRAM(S): 250 CAPSULE ORAL at 06:17

## 2021-01-01 RX ADMIN — Medication 30 MILLIGRAM(S): at 16:48

## 2021-01-01 RX ADMIN — APIXABAN 2.5 MILLIGRAM(S): 2.5 TABLET, FILM COATED ORAL at 17:08

## 2021-01-01 RX ADMIN — Medication 0: at 22:00

## 2021-01-01 RX ADMIN — Medication 81 MILLIGRAM(S): at 11:39

## 2021-01-01 RX ADMIN — APIXABAN 2.5 MILLIGRAM(S): 2.5 TABLET, FILM COATED ORAL at 17:01

## 2021-01-01 RX ADMIN — Medication 112 MICROGRAM(S): at 06:16

## 2021-01-01 RX ADMIN — Medication 25 MILLIGRAM(S): at 06:13

## 2021-01-01 RX ADMIN — Medication 25 MILLIGRAM(S): at 17:10

## 2021-01-01 RX ADMIN — ISOSORBIDE MONONITRATE 30 MILLIGRAM(S): 60 TABLET, EXTENDED RELEASE ORAL at 11:53

## 2021-01-01 RX ADMIN — Medication 25 MILLIGRAM(S): at 05:52

## 2021-01-01 RX ADMIN — Medication 25 MILLIGRAM(S): at 07:00

## 2021-01-01 RX ADMIN — ISOSORBIDE MONONITRATE 30 MILLIGRAM(S): 60 TABLET, EXTENDED RELEASE ORAL at 11:27

## 2021-01-01 RX ADMIN — Medication 40 MILLIGRAM(S): at 11:59

## 2021-01-01 RX ADMIN — Medication 20 MILLIGRAM(S): at 05:42

## 2021-01-01 RX ADMIN — APIXABAN 2.5 MILLIGRAM(S): 2.5 TABLET, FILM COATED ORAL at 06:07

## 2021-01-01 RX ADMIN — Medication 40 MILLIGRAM(S): at 14:51

## 2021-01-01 RX ADMIN — INSULIN GLARGINE 23 UNIT(S): 100 INJECTION, SOLUTION SUBCUTANEOUS at 22:01

## 2021-01-01 RX ADMIN — INSULIN GLARGINE 23 UNIT(S): 100 INJECTION, SOLUTION SUBCUTANEOUS at 22:27

## 2021-01-01 RX ADMIN — MAGNESIUM OXIDE 400 MG ORAL TABLET 400 MILLIGRAM(S): 241.3 TABLET ORAL at 18:08

## 2021-01-01 RX ADMIN — Medication 81 MILLIGRAM(S): at 11:27

## 2021-01-01 RX ADMIN — Medication 8 UNIT(S): at 16:53

## 2021-01-01 RX ADMIN — PANTOPRAZOLE SODIUM 40 MILLIGRAM(S): 20 TABLET, DELAYED RELEASE ORAL at 05:45

## 2021-01-01 RX ADMIN — PANTOPRAZOLE SODIUM 40 MILLIGRAM(S): 20 TABLET, DELAYED RELEASE ORAL at 06:17

## 2021-01-01 RX ADMIN — PANTOPRAZOLE SODIUM 40 MILLIGRAM(S): 20 TABLET, DELAYED RELEASE ORAL at 06:13

## 2021-01-01 RX ADMIN — Medication 25 MILLIGRAM(S): at 17:09

## 2021-01-01 RX ADMIN — Medication 2: at 12:08

## 2021-01-01 RX ADMIN — Medication 20 MILLIGRAM(S): at 06:17

## 2021-01-01 RX ADMIN — Medication 10 MILLILITER(S): at 05:53

## 2021-01-01 RX ADMIN — BUMETANIDE 1.5 MILLIGRAM(S): 0.25 INJECTION INTRAMUSCULAR; INTRAVENOUS at 17:43

## 2021-01-01 RX ADMIN — MYCOPHENOLATE MOFETIL 500 MILLIGRAM(S): 250 CAPSULE ORAL at 06:14

## 2021-01-01 RX ADMIN — BUMETANIDE 1.5 MILLIGRAM(S): 0.25 INJECTION INTRAMUSCULAR; INTRAVENOUS at 07:01

## 2021-01-01 RX ADMIN — PANTOPRAZOLE SODIUM 40 MILLIGRAM(S): 20 TABLET, DELAYED RELEASE ORAL at 06:07

## 2021-01-01 RX ADMIN — ISOSORBIDE MONONITRATE 30 MILLIGRAM(S): 60 TABLET, EXTENDED RELEASE ORAL at 16:49

## 2021-01-01 RX ADMIN — MYCOPHENOLATE MOFETIL 500 MILLIGRAM(S): 250 CAPSULE ORAL at 17:08

## 2021-01-01 RX ADMIN — AZITHROMYCIN 255 MILLIGRAM(S): 500 TABLET, FILM COATED ORAL at 15:07

## 2021-01-01 RX ADMIN — TACROLIMUS 1 MILLIGRAM(S): 5 CAPSULE ORAL at 17:08

## 2021-01-01 RX ADMIN — APIXABAN 2.5 MILLIGRAM(S): 2.5 TABLET, FILM COATED ORAL at 05:52

## 2021-01-01 RX ADMIN — ATORVASTATIN CALCIUM 80 MILLIGRAM(S): 80 TABLET, FILM COATED ORAL at 21:56

## 2021-01-01 RX ADMIN — Medication 25 MILLIGRAM(S): at 17:08

## 2021-01-01 RX ADMIN — TACROLIMUS 1 MILLIGRAM(S): 5 CAPSULE ORAL at 17:09

## 2021-01-01 RX ADMIN — Medication 25 MILLIGRAM(S): at 06:16

## 2021-01-01 RX ADMIN — Medication 81 MILLIGRAM(S): at 17:01

## 2021-01-01 RX ADMIN — Medication 2: at 11:35

## 2021-01-01 RX ADMIN — APIXABAN 2.5 MILLIGRAM(S): 2.5 TABLET, FILM COATED ORAL at 17:09

## 2021-01-01 RX ADMIN — Medication 8 UNIT(S): at 11:35

## 2021-01-01 RX ADMIN — Medication 112 MICROGRAM(S): at 05:42

## 2021-01-01 RX ADMIN — BUMETANIDE 1.5 MILLIGRAM(S): 0.25 INJECTION INTRAMUSCULAR; INTRAVENOUS at 05:52

## 2021-01-01 RX ADMIN — TACROLIMUS 1 MILLIGRAM(S): 5 CAPSULE ORAL at 07:00

## 2021-01-01 RX ADMIN — ISOSORBIDE MONONITRATE 30 MILLIGRAM(S): 60 TABLET, EXTENDED RELEASE ORAL at 11:39

## 2021-01-01 RX ADMIN — Medication 50 MILLIEQUIVALENT(S): at 14:18

## 2021-01-01 RX ADMIN — Medication 40 MILLIGRAM(S): at 06:15

## 2021-01-01 RX ADMIN — Medication 2: at 17:41

## 2021-01-01 RX ADMIN — Medication 20 MILLIGRAM(S): at 06:06

## 2021-01-01 RX ADMIN — Medication 8 UNIT(S): at 08:35

## 2021-01-01 RX ADMIN — Medication 8 UNIT(S): at 13:44

## 2021-01-01 RX ADMIN — SODIUM CHLORIDE 1000 MILLILITER(S): 9 INJECTION INTRAMUSCULAR; INTRAVENOUS; SUBCUTANEOUS at 08:16

## 2021-01-01 RX ADMIN — TACROLIMUS 1 MILLIGRAM(S): 5 CAPSULE ORAL at 17:42

## 2021-01-01 RX ADMIN — Medication 20 MILLIGRAM(S): at 05:51

## 2021-01-01 RX ADMIN — MYCOPHENOLATE MOFETIL 500 MILLIGRAM(S): 250 CAPSULE ORAL at 17:09

## 2021-01-01 RX ADMIN — CEFTRIAXONE 100 MILLIGRAM(S): 500 INJECTION, POWDER, FOR SOLUTION INTRAMUSCULAR; INTRAVENOUS at 15:07

## 2021-01-01 RX ADMIN — APIXABAN 2.5 MILLIGRAM(S): 2.5 TABLET, FILM COATED ORAL at 17:11

## 2021-01-01 RX ADMIN — Medication 2: at 17:00

## 2021-01-01 RX ADMIN — SODIUM CHLORIDE 1000 MILLILITER(S): 9 INJECTION INTRAMUSCULAR; INTRAVENOUS; SUBCUTANEOUS at 09:55

## 2021-01-01 RX ADMIN — Medication 25 MILLIGRAM(S): at 17:01

## 2021-01-01 RX ADMIN — APIXABAN 2.5 MILLIGRAM(S): 2.5 TABLET, FILM COATED ORAL at 06:15

## 2021-01-01 RX ADMIN — Medication 112 MICROGRAM(S): at 06:06

## 2021-01-01 RX ADMIN — Medication 8 UNIT(S): at 11:59

## 2021-01-01 RX ADMIN — Medication 8 UNIT(S): at 11:53

## 2021-01-01 RX ADMIN — Medication 2: at 13:45

## 2021-01-01 RX ADMIN — Medication 8 UNIT(S): at 08:55

## 2021-01-01 RX ADMIN — BUMETANIDE 1.5 MILLIGRAM(S): 0.25 INJECTION INTRAMUSCULAR; INTRAVENOUS at 17:09

## 2021-01-01 RX ADMIN — Medication 2: at 17:08

## 2021-01-01 RX ADMIN — INSULIN GLARGINE 23 UNIT(S): 100 INJECTION, SOLUTION SUBCUTANEOUS at 21:56

## 2021-01-01 RX ADMIN — Medication 112 MICROGRAM(S): at 05:52

## 2021-01-01 RX ADMIN — Medication 50 MILLIEQUIVALENT(S): at 17:07

## 2021-01-01 RX ADMIN — Medication 40 MILLIGRAM(S): at 06:07

## 2021-01-01 RX ADMIN — INSULIN HUMAN 10 UNIT(S): 100 INJECTION, SOLUTION SUBCUTANEOUS at 13:31

## 2021-01-01 RX ADMIN — Medication 0: at 08:10

## 2021-01-01 RX ADMIN — INSULIN GLARGINE 23 UNIT(S): 100 INJECTION, SOLUTION SUBCUTANEOUS at 22:22

## 2021-01-01 RX ADMIN — Medication 50 MILLIEQUIVALENT(S): at 12:35

## 2021-01-01 RX ADMIN — TACROLIMUS 1 MILLIGRAM(S): 5 CAPSULE ORAL at 06:17

## 2021-01-01 RX ADMIN — Medication 25 MILLIGRAM(S): at 05:42

## 2021-01-01 RX ADMIN — MYCOPHENOLATE MOFETIL 500 MILLIGRAM(S): 250 CAPSULE ORAL at 17:01

## 2021-01-01 RX ADMIN — Medication 8 UNIT(S): at 08:09

## 2021-01-01 RX ADMIN — MYCOPHENOLATE MOFETIL 500 MILLIGRAM(S): 250 CAPSULE ORAL at 07:00

## 2021-01-01 RX ADMIN — PANTOPRAZOLE SODIUM 40 MILLIGRAM(S): 20 TABLET, DELAYED RELEASE ORAL at 07:01

## 2021-01-01 RX ADMIN — APIXABAN 2.5 MILLIGRAM(S): 2.5 TABLET, FILM COATED ORAL at 06:13

## 2021-01-01 RX ADMIN — ATORVASTATIN CALCIUM 80 MILLIGRAM(S): 80 TABLET, FILM COATED ORAL at 21:58

## 2021-01-01 RX ADMIN — TACROLIMUS 1 MILLIGRAM(S): 5 CAPSULE ORAL at 17:01

## 2021-01-01 RX ADMIN — Medication 8 UNIT(S): at 17:41

## 2021-01-01 RX ADMIN — Medication 8 UNIT(S): at 17:00

## 2021-01-01 RX ADMIN — APIXABAN 2.5 MILLIGRAM(S): 2.5 TABLET, FILM COATED ORAL at 17:41

## 2021-01-01 RX ADMIN — Medication 4: at 08:13

## 2021-01-01 RX ADMIN — ISOSORBIDE MONONITRATE 30 MILLIGRAM(S): 60 TABLET, EXTENDED RELEASE ORAL at 12:00

## 2021-01-01 RX ADMIN — MYCOPHENOLATE MOFETIL 500 MILLIGRAM(S): 250 CAPSULE ORAL at 05:52

## 2021-01-01 RX ADMIN — Medication 8 UNIT(S): at 08:13

## 2021-01-01 RX ADMIN — APIXABAN 2.5 MILLIGRAM(S): 2.5 TABLET, FILM COATED ORAL at 05:43

## 2021-01-01 RX ADMIN — Medication 20 MILLIGRAM(S): at 06:13

## 2021-01-01 RX ADMIN — INSULIN GLARGINE 23 UNIT(S): 100 INJECTION, SOLUTION SUBCUTANEOUS at 21:58

## 2021-01-01 RX ADMIN — Medication 112 MICROGRAM(S): at 06:18

## 2021-01-01 RX ADMIN — TACROLIMUS 1 MILLIGRAM(S): 5 CAPSULE ORAL at 06:13

## 2021-01-01 RX ADMIN — Medication 6: at 17:09

## 2021-01-01 RX ADMIN — TACROLIMUS 1 MILLIGRAM(S): 5 CAPSULE ORAL at 05:43

## 2021-01-01 RX ADMIN — TACROLIMUS 1 MILLIGRAM(S): 5 CAPSULE ORAL at 17:10

## 2021-01-01 RX ADMIN — APIXABAN 2.5 MILLIGRAM(S): 2.5 TABLET, FILM COATED ORAL at 07:00

## 2021-01-01 RX ADMIN — Medication 8 UNIT(S): at 12:07

## 2021-01-11 ENCOUNTER — APPOINTMENT (OUTPATIENT)
Dept: VASCULAR SURGERY | Facility: CLINIC | Age: 81
End: 2021-01-11
Payer: MEDICARE

## 2021-01-11 PROCEDURE — 93880 EXTRACRANIAL BILAT STUDY: CPT

## 2021-01-11 PROCEDURE — 93978 VASCULAR STUDY: CPT

## 2021-01-11 PROCEDURE — 99213 OFFICE O/P EST LOW 20 MIN: CPT

## 2021-01-11 RX ORDER — NIFEDIPINE 30 MG
30 TABLET, EXTENDED RELEASE ORAL
Refills: 0 | Status: ACTIVE | COMMUNITY

## 2021-01-11 NOTE — ASSESSMENT
[FreeTextEntry1] : 80 y/o male with h/o AAA  4.4 , PVD with a history of left lower extremity ulceration which has healed, underwent left SFA angioplasty and stent on 3/22/19. AAA on duplex 4 cm. He had a history of ESRF and sepsis in which he is off HD now. His legs are over all improved. He was sent in by Cardiologist for evaluation of carotid artery stenosis. He was recently diagnosed with atrial fibrillation.\par I performed a carotid duplex which was medically necessary to evaluate for high-grade carotid stenosis. It showed propagation of disease with high grade right ICA stenosis. He cannot undergo a CT angiogram and he has renal disease and just came off HD recently. I am sending him for an MRA non contrast. He will require a right CEA. I will see him back in my office after his MRA\par

## 2021-01-11 NOTE — HISTORY OF PRESENT ILLNESS
[FreeTextEntry1] : 78 y/o male with h/o AAA ( 3.9 x 4.3 cm on ultrasound in February 2019), asymptomatic carotid stenosis and  PVD with left lower extremity ulceration, underwent left SFA angioplasty and stent on 3/22/19, for left leg ulcer which has healed. \par He has severe disease of the right SFA, but is without any ulcerations or other symptoms currently.\par He had informed me of a history of temporary renal failure in which he was on hemodialysis for 3 months secondary to sepsis/pneumonia. He was diagnosed with atrial fibrillation recently. Today he presents for follow up of his AAA, and carotid stenosis. The patient states he has been having some difficulty breathing and is following up with pulmonary Dr. Rodrigez.

## 2021-01-25 ENCOUNTER — OUTPATIENT (OUTPATIENT)
Dept: OUTPATIENT SERVICES | Facility: HOSPITAL | Age: 81
LOS: 1 days | Discharge: HOME | End: 2021-01-25
Payer: MEDICARE

## 2021-01-25 ENCOUNTER — RESULT REVIEW (OUTPATIENT)
Age: 81
End: 2021-01-25

## 2021-01-25 DIAGNOSIS — J44.9 CHRONIC OBSTRUCTIVE PULMONARY DISEASE, UNSPECIFIED: ICD-10-CM

## 2021-01-25 DIAGNOSIS — Z95.1 PRESENCE OF AORTOCORONARY BYPASS GRAFT: Chronic | ICD-10-CM

## 2021-01-25 DIAGNOSIS — Z98.890 OTHER SPECIFIED POSTPROCEDURAL STATES: Chronic | ICD-10-CM

## 2021-01-25 DIAGNOSIS — Z90.49 ACQUIRED ABSENCE OF OTHER SPECIFIED PARTS OF DIGESTIVE TRACT: Chronic | ICD-10-CM

## 2021-01-25 PROCEDURE — 71250 CT THORAX DX C-: CPT | Mod: 26

## 2021-04-30 NOTE — H&P PST ADULT - GUM GEN PE MLT EXAM PC
Spoke with Shabbir, liaison with ARU at [facility]. Authorization has not been received from [facility] at this time. They checked on it just a little bit ago. It is likely they will not have a determination until Monday. If they hear before 5 pm today, she will notify this CM or SW. Transition of care plans remains to Acute Rehab when authorization received. Will continue to follow.      Carly Landrum.  P:  [phone] not examined

## 2021-07-28 ENCOUNTER — APPOINTMENT (OUTPATIENT)
Age: 81
End: 2021-07-28
Payer: MEDICARE

## 2021-07-28 VITALS
WEIGHT: 212 LBS | HEIGHT: 73 IN | RESPIRATION RATE: 14 BRPM | DIASTOLIC BLOOD PRESSURE: 68 MMHG | BODY MASS INDEX: 28.1 KG/M2 | SYSTOLIC BLOOD PRESSURE: 116 MMHG | HEART RATE: 76 BPM

## 2021-07-28 DIAGNOSIS — I49.3 VENTRICULAR PREMATURE DEPOLARIZATION: ICD-10-CM

## 2021-07-28 DIAGNOSIS — I25.2 OLD MYOCARDIAL INFARCTION: ICD-10-CM

## 2021-07-28 PROCEDURE — 99213 OFFICE O/P EST LOW 20 MIN: CPT | Mod: 25

## 2021-07-28 PROCEDURE — 71046 X-RAY EXAM CHEST 2 VIEWS: CPT

## 2021-07-28 NOTE — HISTORY OF PRESENT ILLNESS
[TextBox_4] : SOB LAST FEW WEEKS, ORTHOPNEA, INCREASE LEG SWELLING\par OFF METOLAZONE ON LASIX TWICE DAILY (80/40)\par GAINED WEIGHT

## 2021-07-28 NOTE — PHYSICAL EXAM
[No Acute Distress] : no acute distress [Normal Oropharynx] : normal oropharynx [Normal Appearance] : normal appearance [No Neck Mass] : no neck mass [Normal Rate/Rhythm] : normal rate/rhythm [Normal S1, S2] : normal s1, s2 [No Murmurs] : no murmurs [No Abnormalities] : no abnormalities [Benign] : benign [Normal Gait] : normal gait [No Cyanosis] : no cyanosis [FROM] : FROM [Normal Color/ Pigmentation] : normal color/ pigmentation [No Focal Deficits] : no focal deficits [Oriented x3] : oriented x3 [Normal Affect] : normal affect [TextBox_68] : dec reinier leos base [TextBox_105] : swellling+

## 2021-07-28 NOTE — DISCUSSION/SUMMARY
[FreeTextEntry1] : SOB./ ORTHOPNEA/ WEIGHT GAIN/ FLUID OVERLOAD\par RESTART METOLAZONE\par COUGH/ NEB AS NEEDED

## 2021-08-12 ENCOUNTER — APPOINTMENT (OUTPATIENT)
Dept: VASCULAR SURGERY | Facility: CLINIC | Age: 81
End: 2021-08-12
Payer: MEDICARE

## 2021-08-12 VITALS
HEIGHT: 73 IN | HEART RATE: 74 BPM | BODY MASS INDEX: 27.43 KG/M2 | TEMPERATURE: 97.6 F | DIASTOLIC BLOOD PRESSURE: 51 MMHG | WEIGHT: 207 LBS | SYSTOLIC BLOOD PRESSURE: 144 MMHG

## 2021-08-12 DIAGNOSIS — I65.23 OCCLUSION AND STENOSIS OF BILATERAL CAROTID ARTERIES: ICD-10-CM

## 2021-08-12 DIAGNOSIS — I70.235 ATHEROSCLEROSIS OF NATIVE ARTERIES OF RIGHT LEG WITH ULCERATION OF OTHER PART OF FOOT: ICD-10-CM

## 2021-08-12 DIAGNOSIS — I70.245 ATHEROSCLEROSIS OF NATIVE ARTERIES OF RIGHT LEG WITH ULCERATION OF OTHER PART OF FOOT: ICD-10-CM

## 2021-08-12 DIAGNOSIS — I72.3 ANEURYSM OF ILIAC ARTERY: ICD-10-CM

## 2021-08-12 DIAGNOSIS — I71.4 ABDOMINAL AORTIC ANEURYSM, W/OUT RUPTURE: ICD-10-CM

## 2021-08-12 PROCEDURE — 99214 OFFICE O/P EST MOD 30 MIN: CPT

## 2021-08-12 PROCEDURE — 93880 EXTRACRANIAL BILAT STUDY: CPT

## 2021-08-12 PROCEDURE — 93926 LOWER EXTREMITY STUDY: CPT

## 2021-08-12 PROCEDURE — 93978 VASCULAR STUDY: CPT

## 2021-08-12 NOTE — ASSESSMENT
[FreeTextEntry1] : 80 y/o male with h/o AAA, PVD with a history of left SFA angioplasty and stent on 3/22/19 and Carotid stenosis.\par \par Carotid duplex today showed >70% bilateral carotid stenosis.\par \par  Aortic duplex shows a 4.1 cm infrarenal abdominal aortic aneurysm, and the right common iliac artery measuring 2.1 and left iliac artery 2 cm.  Arterial duplex showed left SFA stent is patent.\par \par He cannot undergo a CT angiogram and he has renal disease requiring HD for 3 months and currently off HD. I am sending him for an MRA non contrast. He will require a right CEA. I will see him back in my office after his MRA of neck.\par

## 2021-08-12 NOTE — DATA REVIEWED
[FreeTextEntry1] : I performed a carotid duplex which was medically necessary to evaluate for high-grade carotid stenosis. It showed >70% bilateral carotid stenosis.\par \par  Aortic duplex shows a 4.1 cm infrarenal abdominal aortic aneurysm, and the right common iliac artery measuring 2.1 and left iliac artery 2 cm.\par \par  Arterial duplex showed left SFA stent is patent.\par

## 2021-08-12 NOTE — HISTORY OF PRESENT ILLNESS
[FreeTextEntry1] : 82 y/o male with h/o AAA ( 3.9 x 4.3 cm on ultrasound in February 2019), asymptomatic carotid stenosis and  PVD with left lower extremity ulceration, underwent left SFA angioplasty and stent on 3/22/19, for left leg ulcer which has healed. \par He has severe disease of the right SFA, but is without any ulcerations or other symptoms currently.\par \par He has h/o renal insufficiency and had been on HD for 3 months previously.\par \par Today he presents for follow up of his AAA, and carotid stenosis.

## 2021-08-25 ENCOUNTER — APPOINTMENT (OUTPATIENT)
Age: 81
End: 2021-08-25
Payer: MEDICARE

## 2021-08-25 VITALS
DIASTOLIC BLOOD PRESSURE: 64 MMHG | SYSTOLIC BLOOD PRESSURE: 116 MMHG | OXYGEN SATURATION: 93 % | HEIGHT: 73 IN | RESPIRATION RATE: 14 BRPM | WEIGHT: 209 LBS | HEART RATE: 50 BPM | BODY MASS INDEX: 27.7 KG/M2

## 2021-08-25 PROCEDURE — 99213 OFFICE O/P EST LOW 20 MIN: CPT | Mod: 25

## 2021-08-25 PROCEDURE — 71046 X-RAY EXAM CHEST 2 VIEWS: CPT

## 2021-08-25 NOTE — DISCUSSION/SUMMARY
[FreeTextEntry1] : SOB./ ORTHOPNEA/ WEIGHT GAIN/ FLUID OVERLOAD BETTER ON METOLAZONE\par COUGH/ NEB AS NEEDED\par VASCULAR REVIEWED

## 2021-08-25 NOTE — HISTORY OF PRESENT ILLNESS
[TextBox_4] : FEELS BETTER, LOST WEIGHT ON METOLAZONE TWICE WEEKLY\par SOB ON EXERTION\par COMPLIANT WITH INHALERS

## 2021-09-03 ENCOUNTER — RESULT REVIEW (OUTPATIENT)
Age: 81
End: 2021-09-03

## 2021-09-03 ENCOUNTER — OUTPATIENT (OUTPATIENT)
Dept: OUTPATIENT SERVICES | Facility: HOSPITAL | Age: 81
LOS: 1 days | Discharge: HOME | End: 2021-09-03
Payer: MEDICARE

## 2021-09-03 DIAGNOSIS — Z98.890 OTHER SPECIFIED POSTPROCEDURAL STATES: Chronic | ICD-10-CM

## 2021-09-03 DIAGNOSIS — I65.23 OCCLUSION AND STENOSIS OF BILATERAL CAROTID ARTERIES: ICD-10-CM

## 2021-09-03 DIAGNOSIS — Z95.1 PRESENCE OF AORTOCORONARY BYPASS GRAFT: Chronic | ICD-10-CM

## 2021-09-03 DIAGNOSIS — Z90.49 ACQUIRED ABSENCE OF OTHER SPECIFIED PARTS OF DIGESTIVE TRACT: Chronic | ICD-10-CM

## 2021-09-03 PROCEDURE — 70547 MR ANGIOGRAPHY NECK W/O DYE: CPT | Mod: 26,MH

## 2021-09-13 RX ORDER — ALBUTEROL SULFATE 90 UG/1
108 (90 BASE) AEROSOL, METERED RESPIRATORY (INHALATION)
Refills: 0 | Status: ACTIVE

## 2021-09-13 RX ORDER — ALBUTEROL SULFATE 90 UG/1
108 (90 BASE) INHALANT RESPIRATORY (INHALATION)
Qty: 1 | Refills: 3 | Status: ACTIVE | COMMUNITY
Start: 2021-09-13 | End: 1900-01-01

## 2021-10-13 NOTE — PHYSICAL EXAM
[No Acute Distress] : no acute distress [Normal Oropharynx] : normal oropharynx [Normal Appearance] : normal appearance [No Neck Mass] : no neck mass [Normal Rate/Rhythm] : normal rate/rhythm [Normal S1, S2] : normal s1, s2 [No Murmurs] : no murmurs [No Abnormalities] : no abnormalities [Benign] : benign [Normal Gait] : normal gait [No Cyanosis] : no cyanosis [FROM] : FROM [Normal Color/ Pigmentation] : normal color/ pigmentation [No Focal Deficits] : no focal deficits [Oriented x3] : oriented x3 [Normal Affect] : normal affect [TextBox_68] : R basilar crackles [TextBox_105] : swellling+

## 2021-10-13 NOTE — DISCUSSION/SUMMARY
[FreeTextEntry1] : SOB./ ORTHOPNEA/ WEIGHT GAIN/ FLUID OVERLOAD BETTER ON METOLAZONE TWICE WEEKLY\par COUGH/ NEB AS NEEDED\par VASCULAR REVIEWED\par CHEST CT 1/22

## 2021-10-20 NOTE — ED ADULT TRIAGE NOTE - CHIEF COMPLAINT QUOTE
His wife called because he was confused, he was standing in the bedroom and thought he was in the bathroom, forgot he went to the bathroom and said he had to go again - patient  Son states patient was confused and argumentative with family last night, missed his HTN and nighttime insulin

## 2021-10-20 NOTE — H&P ADULT - NSHPPHYSICALEXAM_GEN_ALL_CORE
GEN: NAD, well nourished, slightly restless  HEENT: NCAT, PERRL, EOMI, No Icterus, No Pallor, No nystagmus, Vision Intact, No JVD/TD  CV/CHEST: RRR, +S1/S2, no murmurs, no S3 heard  PULM: bilateral crackles to midlungs, good air entry bilat  ABD: SNTTP, ND x 4 Q's  EXT: Warm, Well Perfused x 4. No Edema  SKIN: No Rash  NEURO: AxOx3, + Normal Affect

## 2021-10-20 NOTE — H&P ADULT - HISTORY OF PRESENT ILLNESS
HPI: 80 YO M w/PMHx significant for CHFpEF, Angina Pectoris, severe RCA stenosis, CAD s/p CABG, HTN, HLD, AAA < 4.0cm, Severe PNA complicated by sepsis and renal failure, COPD not on home O2 presenting to the ED accompanied by his son with a chief complaint of AMS x last 2 days. According to the patient's son, he was acting strange at home, was confused stating he was in the bathroom when in reality he was in the bedroom. the son (and pt who is now AxOx3) endorse several episodes of watery diarrhea over the last 2 days. Pt denies fever, chills, chest pain, palpitations changes in vision, recent travel, sick contacts, dysuria, endorses worsening SOB on exertion especially when climbing stairs, denies any associated chest pain. Pt also endorses overall weight loss of late (per AEHR he is seen by pulmonary Dr. Rory Mars and was put on diuretic regimen)    ED COURSE: On his arrival to the ED, SPO2 85% on RA, /87, afebrile, HR 89BPM, CXR showing bilateral opacifications suspicious for volume overload vs. PNA. Serum diagnostics significant for: WBC ct within normal range, Lactate 2.5, Hgb 9.5 (MCV 90), elevated , BUN 73, Cr 2.3 (Baseline 1.8), Trop 0.17, pro-BNP 26377. Was given empiric abx for PNA and 2L of crystalloid infusion by ED.  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 According to the patient's son, he was acting strange at home, was confused stating he was in the bathroom when in reality he was in the bedroom. the son (and pt who is now AxOx3) endorse several episodes of watery diarrhea over the last 2 days. Pt denies fever, chills, chest pain, palpitations changes in vision, recent travel, sick contacts, dysuria, endorses worsening SOB on exertion especially when climbing stairs, denies any associated chest pain. Pt also endorses overall weight loss of late (per AEHR he is seen by pulmonary Dr. Rory Mars and was put on diuretic regimen)    ED COURSE: On his arrival to the ED, SPO2 85% on RA, /87, afebrile, HR 89BPM, CXR showing bilateral opacifications suspicious for volume overload vs. PNA. Serum diagnostics significant for: WBC ct within normal range, Lactate 2.5, Hgb 9.5 (MCV 90), elevated , BUN 73, Cr 2.3 (Baseline 1.8), Trop 0.17, pro-BNP 75701. Was given empiric abx for PNA and 2L of crystalloid infusion by ED. CTH: Chronic microvascular changes, parenchymal atrophy and ventricular prominence.

## 2021-10-20 NOTE — H&P ADULT - CONVERSATION DETAILS
The family would like to discuss overnight. I explained that the pt will be FULL CODE until we are told otherwise and they agree with this course of action.     The pt is to be FULL CODE with no limitations in medical interventions.

## 2021-10-20 NOTE — H&P ADULT - NSICDXPASTMEDICALHX_GEN_ALL_CORE_FT
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

## 2021-10-20 NOTE — ED PROVIDER NOTE - OBJECTIVE STATEMENT
81 y.. male pmh of HTN, DM, COPD not on home O2, CHF EF unknown presenting for 2 days of confusion as per family. pt was saying he was in the bathroom while in the bedroom.

## 2021-10-20 NOTE — ED PROVIDER NOTE - NS ED ROS FT
Constitutional: (-) fever (-) chills (-) (-) lightheadedness   Eyes/ENT: (-) blurry vision, (-) epistaxis (-) rhinorrhea (-) nasal congestion  Cardiovascular: (-) chest pain, (-) syncope (-) palpitations   Respiratory: (+) cough, (-) shortness of breath (-) pleurisy   Gastrointestinal: (-) vomiting, (-) diarrhea (-) abdominal pain (-) nausea (-) anorexia  Musculoskeletal: (-) neck pain, (-) back pain, (-) joint pain (-) joint swelling (-) painful ROM  Integumentary: (-) rash, (-) edema (-) lacerations (-) pruritis   Neurological: (-) headache, (+) altered mental status (-) LOC (-) dizziness (-) paresthesias (-) gait abnormalities   Psychiatric: (-) hallucinations (-) SI (-) HI

## 2021-10-20 NOTE — H&P ADULT - ATTENDING COMMENTS
**Pt is poor historian, supplemental information obtained from house staff, EMR, and family (Son/Wife; Ramiro Hugo) over the phone.     80 YO M w/ a PMH of C3 glomerulopathy (on prograft and cellcept), HFpEF, paroxysmal Afib s/p DCCV (Apixaban), CAD s/p CABG, HTN, HLD, AAA < 4.0cm, and COPD (not on home O2) who was BIBEMS when the family called for progressively worsening confusion for the past x 2 days. Associated with agitation. ROS is negative except as above.     In the ED, Chest X-Ray shows bibasilar opacities. BNP elevated, started on IV Lasix. CTH was negative for acute process. PT started on IV ABXs (Ceftr/Azithro). ED staff spoke with pt's Nephrologist (Dr. Daniel) over the phone and he asked for immunosuppressive drug levels, US of renal/bladder, and to restart home meds.     FMHx: Reviewed, not relevant    Physical exam shows pt in NAD. BP (182/90), afebrile, not hypoxic on 3L NC. A&Ox3. Neuro exam without deficits, motor/sensory intact, no dysarthria, no facial asymmetry. Muscle strength/sensation intact. Crackles noted in B/L lung fields. RRR, no M/G/R. ABD is soft and non-tender, normoactive BSs. LEs without swelling. No rashes. Labs and radiology as above.    Intermittent confusion, suspect metabolic encephalopathy vs medication adverse effect, doubt infectious process. Send immunosuppressive serum levels. Send procal/CRP. FU cultures. EEG. MRI. Repeat lactate in the AM. Hold IV ABXs for now. PT eval.   -Pt is A&Ox3 on my exam, but nursing staff ways he has periods of confusion    Acute on chronic HFpEF. IV Lasix and transition to PO. Echo. Monitor daily weights, Is&Os, and diet/fluid restriction. Restart home meds.    BRENDA on CKD3, likely pre-renal; Doubt ATN. Send UA, urine lytes, urine pr:cr ratio, and trend BMP. Monitor urinary out-put. Renal/bladder US. Hold nephrotoxic agents. Renal consult.     Diabetes mellitus with hyperglycemia. A1c. FSs. Insulin standing/correctional dosing    Normocytic anemia, at baseline. Pt denies bleeding symptoms. Send anemia work-up. Replace as necessary.     Hypokalemia. Replace. Repeat BMP in the AM.     HX of C3 glomerulopathy (on prograft and cellcept), paroxysmal Afib s/p DCCV (Apixaban), CAD s/p CABG, HTN, HLD, AAA < 4.0cm, and COPD (not on home O2). Restart home meds, except as stated above. DVT PPX. Inform PCP of pt's admission to hospital. My note supersedes the residents note.     Spoke with family: family (Son/Wife; Ramiro & Oanh) over the phone.     Date seen by Attending: 10/20/21

## 2021-10-20 NOTE — ED PROVIDER NOTE - CLINICAL SUMMARY MEDICAL DECISION MAKING FREE TEXT BOX
81yoM with h/o COPD on home O2, CHF, PVD, HTN, HLD, DM, presents with confusion/disorientation noted by family overnight and persisted this morning, some short term memory loss. On ROS notes he has some increased coughing from his baseline over the past few days. No significant change in his baseline SOB. Denies fall/head trauma, vision changes, weakness, CP, LE swelling, fever, dysuria, abd pain, and all other symptoms. On exam, afebrile, hemodynamically stable, saturating well on 2L NC O2, NAD, well appearing, sitting comfortably in bed, no WOB, speaking full sentences, head NCAT, EOMI grossly, anicteric, MMM, no JVD, RRR, nml S1/S2, no m/r/g, lungs CTAB, no w/r/r, abd soft, NT, ND, nml BS, no rebound or guarding, AAOx3, CN's 3-12 intact, motor 5/5 and sens symm in all extrems, NIH 0, SMALL spontaneously, no leg cyanosis or edema, skin warm, appearance of symmetric PVD. Character low suspicion for CVA and no focal or localizing findings. No change in baseline anemia. No significant arrhythmia. No WOB and no e/o DKA. Noted elevated trop, on CP/SOB and ECG low suspicion for ACS but will admit for further w/u. No e/o DVT or acute risk factors or symptoms to suggest PE. Concern for CHF exacerbation on CXR and elevated BNP, bedside US diffuse B lines c/w this. Also treated for concern for PNA though higher suspicion for CHF. No e/o COPD exac on exam, good air mvmt, no WOB. Patient well appearing, hemodynamically stable, no WOB. Admitted to internal medicine for further monitoring, w/u, and care.

## 2021-10-20 NOTE — ED PROVIDER NOTE - ATTENDING CONTRIBUTION TO CARE
81yoM with h/o COPD on home O2, CHF, PVD, HTN, HLD, DM, presents with confusion/disorientation noted by family overnight and persisted this morning, some short term memory loss. On ROS notes he has some increased coughing from his baseline over the past few days. No significant change in his baseline SOB. Denies fall/head trauma, vision changes, weakness, CP, LE swelling, fever, dysuria, abd pain, and all other symptoms. On exam, afebrile, hemodynamically stable, saturating well on 2L NC O2, NAD, well appearing, sitting comfortably in bed, no WOB, speaking full sentences, head NCAT, EOMI grossly, anicteric, MMM, no JVD, RRR, nml S1/S2, no m/r/g, lungs CTAB, no w/r/r, abd soft, NT, ND, nml BS, no rebound or guarding, AAOx3, CN's 3-12 intact, motor 5/5 and sens symm in all extrems, NIH 0, SMALL spontaneously, no leg cyanosis or edema, skin warm, appearance of symmetric PVD. Character low suspicion for CVA and no focal or localizing findings. No change in baseline anemia. No significant arrhythmia. No WOB and no e/o DKA. Noted elevated trop, on CP/SOB and ECG low suspicion for ACS but will admit for further w/u. No e/o DVT or acute risk factors or symptoms to suggest PE. Concern for CHF exacerbation on CXR and elevated BNP, bedside US ____________. Low suspicion for PNA 81yoM with h/o COPD on home O2, CHF, PVD, HTN, HLD, DM, presents with confusion/disorientation noted by family overnight and persisted this morning, some short term memory loss. On ROS notes he has some increased coughing from his baseline over the past few days. No significant change in his baseline SOB. Denies fall/head trauma, vision changes, weakness, CP, LE swelling, fever, dysuria, abd pain, and all other symptoms. On exam, afebrile, hemodynamically stable, saturating well on 2L NC O2, NAD, well appearing, sitting comfortably in bed, no WOB, speaking full sentences, head NCAT, EOMI grossly, anicteric, MMM, no JVD, RRR, nml S1/S2, no m/r/g, lungs CTAB, no w/r/r, abd soft, NT, ND, nml BS, no rebound or guarding, AAOx3, CN's 3-12 intact, motor 5/5 and sens symm in all extrems, NIH 0, SMALL spontaneously, no leg cyanosis or edema, skin warm, appearance of symmetric PVD. Character low suspicion for CVA and no focal or localizing findings. No change in baseline anemia. No significant arrhythmia. No WOB and no e/o DKA. Noted elevated trop, on CP/SOB and ECG low suspicion for ACS but will admit for further w/u. No e/o DVT or acute risk factors or symptoms to suggest PE. Concern for CHF exacerbation on CXR and elevated BNP, bedside US diffuse B lines c/w this. Also treated for concern for PNA though higher suspicion for CHF. No e/o COPD exac on exam, good air mvmt, no WOB. Patient well appearing, hemodynamically stable, no WOB. Admitted to internal medicine for further monitoring, w/u, and care.

## 2021-10-20 NOTE — H&P ADULT - NSHPLABSRESULTS_GEN_ALL_CORE
LABS/RADIOLOGY RESULTS:                          9.5    8.91  )-----------( 226      ( 20 Oct 2021 08:00 )             30.7   10-20    137  |  95<L>  |  73<HH>  ----------------------------<  263<H>  3.8   |  20  |  2.3<H>    Ca    9.2      20 Oct 2021 08:00    TPro  6.4  /  Alb  4.2  /  TBili  1.2  /  DBili  x   /  AST  23  /  ALT  11  /  AlkPhos  95  10-20  Blood Cultures    Urinalysis Basic - ( 20 Oct 2021 12:40 )    Color: Yellow / Appearance: Clear / S.015 / pH:   Gluc:  / Ketone: Negative  / Bili: Negative / Urobili: <2 mg/dL   Blood:  / Protein: 100 mg/dL / Nitrite: Negative   Leuk Esterase: Negative / RBC: 3 /HPF / WBC 1 /HPF   Sq Epi:  / Non Sq Epi: 0 /HPF / Bacteria: Negative    Serum Pro-Brain Natriuretic Peptide: 79339 pg/mL (10.20.21 @ 10:28)    Blood Gas Venous - Lactate: 2.50: Elevated lactate. Consider ordering follow-up lactate to trend. mmol/L (10.20.21 @ 14:15)

## 2021-10-20 NOTE — ED PROVIDER NOTE - PROGRESS NOTE DETAILS
dc: plan to admit to medicine for BRENDA, elevated troponin, and CXR findings. Patient came in for shortness of breath I intend to get an echo / lung US

## 2021-10-20 NOTE — ED PROVIDER NOTE - CARE PLAN
Principal Discharge DX:	Acute exacerbation of CHF (congestive heart failure)  Secondary Diagnosis:	Demand ischemia of myocardium  Secondary Diagnosis:	BRENDA (acute kidney injury)  Secondary Diagnosis:	Hyperglycemia   1

## 2021-10-20 NOTE — H&P ADULT - ASSESSMENT
ASSESSMENT  Hx of CAD s/p CABG , CKD . Afib on eliquis presenting w/AMS x last several days, CXR showing bilateral opacities, presumed HF    PLAN/ACTIVE PROBLEMS  # AMS - secondary to Occult CVA vs. Metabolic Encephalopathy in the setting of PNA vs. Hypothyroidism   - CT H unremarkable but limited by motion artifact  - REEG  - Avoid sedative meds  - MR Brain   - Blood Cx  - UA   - Lactate   - VBG not acutely hypercapneic  - If no clear etiology, LP then neurology con    # Acute on Chronic HFrEF  # BRENDA on CKD II - likely CRS (Baseline Cr 1.8)  # Elevated Troponin  - Telemetry Monitoring  - 2D Echo  - Trop x 3  - Strict I/O + Daily Weight  - 1.5L Fluid Restriction  - HF Consult   - Cardio Consult - Dr. Witt    # Chronic Essential HTN  -  -    # h/o AAA < 4 cm  # CAD s/p CABG  # HLD  # PAD/PVD  - c/w ASA + Statin    # DM (diabetes mellitus)   -  A1C    # h/o Hypothyroidism  - TSH  -  c/w Synthroid 112mcg QD          ASSESSMENT  Hx of CAD s/p CABG , CKD . Afib on eliquis presenting w/AMS x last several days, CXR showing bilateral opacities, presumed HF    PLAN/ACTIVE PROBLEMS  # AMS - secondary to Hypertensive Encephalopathy vs. Occult CVA vs. Metabolic Encephalopathy in the setting of PNA vs. Hypothyroidism   - CT H unremarkable but limited by motion artifact  - REEG  - Avoid sedative meds  - MR Brain   - Blood Cx + Procal  - UA   - Trend Lactate   - VBG not acutely hypercapneic  - Neurology Consult       # Acute on Chronic HFrEF - secondary to uncontrolled HTN  # BRENDA on CKD II - likely CRS vs. HTN induced (Baseline Cr 1.8)  # Hx of Autoimmune Nephritis?? ( On immunosuppressants, family unsure of exact dx)  # Elevated Troponin  - Telemetry Monitoring  - 2D Echo  - Trop x 3  - Strict I/O + Daily Weight  - 1.5L Fluid Restriction  - HF Consult   - Cardio Consult - Dr. Witt  - Nephrology Consult     # Chronic Essential HTN  - was 180's systolic on admission but resolved by time of admission  -    # h/o AAA < 4 cm  # CAD s/p CABG  # HLD  # PAD/PVD  - c/w ASA + Statin    # DM (diabetes mellitus)   -  A1C    # h/o Hypothyroidism  - TSH  -  c/w Synthroid 112mcg QD          ASSESSMENT  82 YO M w/extensive PMHx admitted to medicine for AMS now resolving and dyspnea secondary to acute volume overload in the setting of HFpEF and C3 Glomerulopathy.     PLAN/ACTIVE PROBLEMS  # AMS - secondary to Hypertensive Encephalopathy vs. Occult CVA vs. Metabolic Encephalopathy in the setting of PNA vs. Hypothyroidism   - CT H unremarkable but limited by motion artifact  - REEG  - Avoid sedative meds  - MR Brain   - Blood Cx + Procal  - Trend Lactate   - Neurology Consult     # Acute on Chronic HFpEF  # h/o C3 Glomerulopathy - likely culprit of volume overload is associated renal protein loss  # BRENDA on CKD II - (Baseline Cr 1.8)  # Elevated Troponin  - Telemetry Monitoring, 2D Echo, Trop x 3  - Strict I/O + Daily Weight + 1.5L Fluid Restriction  - Cardio Consult - Dr. Witt  - Nephrology Consult - case discussed with Dr. Trivedi and Dr. Daniel (outpt nephro)  - c/w Prednisone, c/w Mycophenolate Mofetil and Tacrolimus, Serum Tacrolimus Level ordered  - Renal/Bladder Sono  - f/u Urine Na/Osm/Urea Nitrogen    # h/o Paroxysmal A Fib s/p DCCV   - c/w Metoprolol  - c/w Eliquis renally dosed     # Chronic Essential HTN  - Monitor w/diuresis, if elevated again can start Procardia XL 30mg QD    # COPD not in exacerbation   - duoneb Q6hrs PRN    # h/o AAA < 4 cm  # CAD s/p CABG  # HLD  # PAD/PVD  # R Carotid Artery Stenosis  - c/w ASA + Statin  - cont workup outpt unless neurology sees an urgent need for a repeat duplex while inpatient    # DM (diabetes mellitus)   - A1C  - Lantus 24U QHS, Ademelog 8U QAC    # h/o Hypothyroidism  - f/u TSH  - c/w Synthroid 112mcg QD       CODE: FULL  DIET: DASH/TLC/CC  DVT PPX: Eliquis  GI PPX: Protonix 40  DISPO: Tele admit

## 2021-10-20 NOTE — ED PROVIDER NOTE - PHYSICAL EXAMINATION
Physical Exam    Vital Signs: I have reviewed the initial vital signs.  Constitutional: well-nourished, appears stated age, no acute distress  Eyes: Conjunctiva pink, Sclera clear, PERRLA, EOMI, no   Cardiovascular: S1 and S2, regular rate, regular rhythm, well-perfused extremities, radial pulses equal and 2+, calves nonttp, equal in size  Respiratory: unlabored respiratory effort, speaking in full sentences, handling oral secretions, clear to auscultation bilaterally no wheezing, rales and rhonchi  Gastrointestinal: soft, non-tender abdomen, no pulsatile mass, normal bowl sounds  Musculoskeletal: supple neck, no lower extremity edema, no midline tenderness, paraspinal tenderness, clavicular creptius, painful rom, moving all extreities appropriately, no gross bony deformities or swelling.  Integumentary: warm, dry, no rashes, lacerations,  Neurologic: awake, alert, cranial nerves II-XII grossly intact, extremities’ motor and sensory functions grossly intact, no nystagmus, tremors, fasiculationsm facial droop, no ataxia, no dysmetria

## 2021-10-21 NOTE — CONSULT NOTE ADULT - ATTENDING COMMENTS
I have personally seen and examined this patient.  I have fully participated in the care of this patient.  I have reviewed all pertinent clinical information, including history, physical exam, plan and note.   I have reviewed all pertinent clinical information and reviewed all relevant imaging and diagnostic studies personally.  Pt w/ transient AMS currently baseline but with asterixis in setting of uremia likely metabolic encephalopathy currently resolved.  Recommendations as above.  Agree with above assessment except as noted. gyn/done

## 2021-10-21 NOTE — CONSULT NOTE ADULT - SUBJECTIVE AND OBJECTIVE BOX
Date of Admission: 10/19/21    CHIEF COMPLAINT: altered mental status    HISTORY OF PRESENT ILLNESS: 81yMale with PMH below presented to the hospital for AMD and found to have metabolic encephalopathy that is now improving. no source of infection identified. Has a history of COPD, chronic AFIB and HFPEF. Cardiology called for evaluation of CHF, afib, and elevated cardiac biomarkers.     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  Mother:   Father:   Siblings:     SOCIAL HISTORY:    [ x] Non-smoker  [ ] Smoker  [ ] Alcohol    Allergies    Ozempic (0.25 mg or 0.5 mg dose) (Hives; Rash)  streptomycin (Unknown)  Trulicity Pen (Hives; Rash)    Intolerances    	    REVIEW OF SYSTEMS:  CONSTITUTIONAL: No fever, weight loss, or fatigue  CARDIOLOGY: See HPI  RESPIRATORY: see HPI  NEUROLOGICAL: NO weakness, no focal deficits to report.  ENDOCRINOLOGICAL: no recent change in diabetic medications.   GI: no BRBPR, no N,V,diarrhea.    PSYCHIATRY: normal mood and affect  HEENT: no nasal discharge, no ecchymosis  SKIN: no ecchymosis, no breakdown  MUSCULOSKELETAL: Full range of motion x4.     PHYSICAL EXAM:  T(C): 36.1 (10-21-21 @ 07:18), Max: 36.6 (10-20-21 @ 21:49)  HR: 79 (10-21-21 @ 14:42) (65 - 90)  BP: 125/80 (10-21-21 @ 07:18) (125/80 - 182/90)  RR: 18 (10-21-21 @ 07:18) (17 - 20)  SpO2: 96% (10-21-21 @ 14:42) (92% - 100%)  Wt(kg): --  I&O's Summary    20 Oct 2021 07:01  -  21 Oct 2021 07:00  --------------------------------------------------------  IN: 0 mL / OUT: 230 mL / NET: -230 mL    21 Oct 2021 07:01  -  21 Oct 2021 15:01  --------------------------------------------------------  IN: 0 mL / OUT: 300 mL / NET: -300 mL        General Appearance: well appearing, normal for age and gender. 	  Neck: normal JVP, no bruit.   Eyes: No xanthomalasia, Extra Ocular muscles intact.   Cardiovascular: irregular rate and rhythm S1 S2, No JVD, No murmurs, No edema  Respiratory: Lungs clear to auscultation	  Psychiatry: Alert and oriented x 3, Mood & affect appropriate  Gastrointestinal:  Soft, Non-tender  Skin/Integumen: No rashes, No ecchymoses, No cyanosis	  Neurologic: Non-focal  Musculoskeletal/ extremities: Normal range of motion, No clubbing, cyanosis or edema  Vascular: Peripheral pulses palpable 2+ bilaterally    LABS:	 	                          9.9    10.15 )-----------( 236      ( 21 Oct 2021 11:25 )             31.9     10-21    139  |  99  |  75<HH>  ----------------------------<  146<H>  4.1   |  22  |  2.2<H>    Ca    9.2      21 Oct 2021 11:25  Phos  3.9     10-20  Mg     1.8     10-20    TPro  6.4  /  Alb  4.2  /  TBili  1.2  /  DBili  x   /  AST  23  /  ALT  11  /  AlkPhos  95  10-20    CARDIAC MARKERS ( 21 Oct 2021 11:25 )  x     / 0.28 ng/mL / 435 U/L / x     / 7.6 ng/mL  CARDIAC MARKERS ( 20 Oct 2021 20:00 )  x     / 0.21 ng/mL / x     / x     / x      CARDIAC MARKERS ( 20 Oct 2021 08:00 )  x     / 0.17 ng/mL / x     / x     / x              CARDIAC MARKERS:            TELEMETRY EVENTS: 	    ECG:  < from: 12 Lead ECG (10.21.21 @ 08:15) >    Diagnosis Line Atrial fibrillation  Left axis deviation  Non-specific intra-ventricular conduction block  Cannot rule out Anterior infarct , age undetermined  Abnormal ECG    < end of copied text >  	  RADIOLOGY:  OTHER: 	    PREVIOUS DIAGNOSTIC TESTING:    [x ] Echocardiogram: pending  [ ]  Catheterization:  [ ] Stress Test:  	  	    Home Medications:  atorvastatin 80 mg oral tablet: orally once a day (at bedtime) (28 Jan 2020 10:00)  d-mycophenolate: 500 milligram(s) orally 2 times a day (28 Jan 2020 10:00)  Eliquis 2.5 mg oral tablet: 1 tab(s) orally 2 times a day (28 Jan 2020 10:00)  famotidine 20 mg oral tablet: 1 tab(s) orally once a day (28 Jan 2020 10:00)  furosemide 20 mg oral tablet: 1 tab(s) orally once a day (28 Jan 2020 10:00)  isosorbide mononitrate 30 mg oral tablet, extended release: 1 tab(s) orally once a day (in the morning) (28 Jan 2020 10:00)  Lantus 100 units/mL subcutaneous solution: 30 unit(s) subcutaneous once a day (at bedtime) (28 Jan 2020 10:00)  metOLazone 2.5 mg oral tablet: 1 tab(s) orally every 48 hours (28 Jan 2020 10:00)  metoprolol tartrate 25 mg oral tablet: 1 tab(s) orally 2 times a day (28 Jan 2020 10:00)  predniSONE 20 mg oral tablet: 1 tab(s) orally once a day (28 Jan 2020 10:00)  Synthroid 112 mcg (0.112 mg) oral tablet: 1 tab(s) orally once a day (28 Jan 2020 10:00)  tacrolimus 1 mg oral capsule: 1 cap(s) orally every 12 hours (28 Jan 2020 10:00)    MEDICATIONS  (STANDING):  apixaban 2.5 milliGRAM(s) Oral two times a day  atorvastatin 80 milliGRAM(s) Oral at bedtime  buMETAnide Injectable 1.5 milliGRAM(s) IV Push two times a day  insulin glargine Injectable (LANTUS) 23 Unit(s) SubCutaneous at bedtime  insulin lispro (ADMELOG) corrective regimen sliding scale   SubCutaneous three times a day before meals  insulin lispro (ADMELOG) corrective regimen sliding scale   SubCutaneous at bedtime  insulin lispro Injectable (ADMELOG) 8 Unit(s) SubCutaneous three times a day before meals  isosorbide   mononitrate ER Tablet (IMDUR) 30 milliGRAM(s) Oral daily  levothyroxine 112 MICROGram(s) Oral daily  metoprolol tartrate 25 milliGRAM(s) Oral two times a day  mycophenolate mofetil 500 milliGRAM(s) Oral two times a day  pantoprazole    Tablet 40 milliGRAM(s) Oral before breakfast  predniSONE   Tablet 20 milliGRAM(s) Oral daily  tacrolimus 1 milliGRAM(s) Oral every 12 hours    MEDICATIONS  (PRN):

## 2021-10-21 NOTE — CONSULT NOTE ADULT - ASSESSMENT
HFPEF  Afib, chronic  elevated troponin    - continue IV diuresis with bumex  - continue metoprolol, eliquis  - recommend 2d echocardiogram  - no symptoms of chest pain, doubt elevated cardiac biomarkers are ACS

## 2021-10-21 NOTE — PROGRESS NOTE ADULT - SUBJECTIVE AND OBJECTIVE BOX
ANDRIA TAVAREZ  81y  [facility] [id number]      Patient is a 81y old  Male who presents with a chief complaint of AMS, HFrEF (21 Oct 2021 09:43)      INTERVAL HPI/OVERNIGHT EVENTS:    no acute events overnight     REVIEW OF SYSTEMS:  CONSTITUTIONAL: No fever, weight loss, or fatigue  EYES: No eye pain, visual disturbances, or discharge  ENMT:  No difficulty hearing, tinnitus, vertigo; No sinus or throat pain  NECK: No pain or stiffness  BREASTS: No pain, masses, or nipple discharge  RESPIRATORY: No cough, wheezing, chills or hemoptysis; No shortness of breath  CARDIOVASCULAR: No chest pain, palpitations, dizziness, or leg swelling  GASTROINTESTINAL: No abdominal or epigastric pain. No nausea, vomiting, or hematemesis; No diarrhea or constipation. No melena or hematochezia.  GENITOURINARY: No dysuria, frequency, hematuria, or incontinence  NEUROLOGICAL: No headaches, memory loss, loss of strength, numbness, or tremors  SKIN: No itching, burning, rashes, or lesions   LYMPH NODES: No enlarged glands  ENDOCRINE: No heat or cold intolerance; No hair loss  MUSCULOSKELETAL: No joint pain or swelling; No muscle, back, or extremity pain  PSYCHIATRIC: No depression, anxiety, mood swings, or difficulty sleeping  HEME/LYMPH: No easy bruising, or bleeding gums  ALLERY AND IMMUNOLOGIC: No hives or eczema  FAMILY HISTORY:  FHx: colon cancer      T(C): 36.1 (10-21-21 @ 07:18), Max: 36.6 (10-20-21 @ 21:49)  HR: 85 (10-21-21 @ 07:18) (65 - 90)  BP: 125/80 (10-21-21 @ 07:18) (125/80 - 182/90)  RR: 18 (10-21-21 @ 07:18) (17 - 20)  SpO2: 92% (10-21-21 @ 07:18) (92% - 100%)  Wt(kg): --Vital Signs Last 24 Hrs  T(C): 36.1 (21 Oct 2021 07:18), Max: 36.6 (20 Oct 2021 21:49)  T(F): 97 (21 Oct 2021 07:18), Max: 97.8 (20 Oct 2021 21:49)  HR: 85 (21 Oct 2021 07:18) (65 - 90)  BP: 125/80 (21 Oct 2021 07:18) (125/80 - 182/90)  BP(mean): --  RR: 18 (21 Oct 2021 07:18) (17 - 20)  SpO2: 92% (21 Oct 2021 07:18) (92% - 100%)    PHYSICAL EXAM:  GENERAL: NAD, well-groomed, well-developed  HEAD:  Atraumatic, Normocephalic  EYES: EOMI, PERRLA, conjunctiva and sclera clear  ENMT: No tonsillar erythema, exudates, or enlargement; Moist mucous membranes, Good dentition, No lesions  NECK: Supple, No JVD, Normal thyroid  NERVOUS SYSTEM:  Alert & Oriented X3, Good concentration; Motor Strength 5/5 B/L upper and lower extremities; DTRs 2+ intact and symmetric  PULM: Clear to auscultation bilaterally  CARDIAC:irregular   GI: Soft, Nontender, Nondistended; Bowel sounds present  EXTREMITIES:  2+ Peripheral Pulses, No clubbing, cyanosis, or edema  LYMPH: No lymphadenopathy noted  SKIN: No rashes or lesions    Consultant(s) Notes Reviewed:  [x ] YES  [ ] NO  Care Discussed with Consultants/Other Providers [ x] YES  [ ] NO    LABS:                            9.5    8.91  )-----------( 226      ( 20 Oct 2021 08:00 )             30.7   10-20    137  |  95<L>  |  73<HH>  ----------------------------<  263<H>  3.8   |  20  |  2.3<H>    Ca    9.2      20 Oct 2021 08:00  Phos  3.9     10-20  Mg     1.8     10-20    TPro  6.4  /  Alb  4.2  /  TBili  1.2  /  DBili  x   /  AST  23  /  ALT  11  /  AlkPhos  95  10-20            apixaban 2.5 milliGRAM(s) Oral two times a day  atorvastatin 80 milliGRAM(s) Oral at bedtime  buMETAnide Injectable 1.5 milliGRAM(s) IV Push two times a day  insulin glargine Injectable (LANTUS) 23 Unit(s) SubCutaneous at bedtime  insulin lispro (ADMELOG) corrective regimen sliding scale   SubCutaneous three times a day before meals  insulin lispro (ADMELOG) corrective regimen sliding scale   SubCutaneous at bedtime  insulin lispro Injectable (ADMELOG) 8 Unit(s) SubCutaneous three times a day before meals  isosorbide   mononitrate ER Tablet (IMDUR) 30 milliGRAM(s) Oral daily  levothyroxine 112 MICROGram(s) Oral daily  metoprolol tartrate 25 milliGRAM(s) Oral two times a day  mycophenolate mofetil 500 milliGRAM(s) Oral two times a day  pantoprazole    Tablet 40 milliGRAM(s) Oral before breakfast  predniSONE   Tablet 20 milliGRAM(s) Oral daily  tacrolimus 1 milliGRAM(s) Oral every 12 hours      HEALTH ISSUES - PROBLEM Dx:          Case Discussed with House Staff   Spectra [phone]

## 2021-10-21 NOTE — CONSULT NOTE ADULT - ASSESSMENT
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 Associated with agitation.    In the ED, Chest X-Ray shows bibasilar opacities. BNP elevated, started on IV Lasix. CTH was negative for acute process. PT started on IV ABXs (Ceftr/Azithro).   # AMS - secondary to Hypertensive Encephalopathy vs. Occult CVA vs. Metabolic Encephalopathy in the setting of PNA   - CT H unremarkable but limited by motion artifact  - REEG  - r/o sepsis    CHF - agree with Bumex IV  1.5 q12   - on LAsix and Zaroxolyn at home    BRENDA on CKD - baseline creat 1.8  - creat is trending down on diuretics  -obtain kidney and bladder sono  -UA urin eprotein creat ratio  C3 glomerulopathy cont prograf and Cellcept  -check tactrolimus trough level  strict Is and Os

## 2021-10-21 NOTE — CONSULT NOTE ADULT - SUBJECTIVE AND OBJECTIVE BOX
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 According to the patient's son, he was acting strange at home, was confused stating he was in the bathroom when in reality he was in the bedroom. the son (and pt who is now AxOx3) endorse several episodes of watery diarrhea over the last 2 days. Pt denies fever, chills, chest pain, palpitations changes in vision, recent travel, sick contacts, dysuria, endorses worsening SOB on exertion especially when climbing stairs, denies any associated chest pain. Pt also endorses overall weight loss of late (per AEHR he is seen by pulmonary Dr. Rory Mars and was put on diuretic regimen).    ED COURSE: On his arrival to the ED, SPO2 85% on RA, /87, afebrile, HR 89BPM, CXR showing bilateral opacifications suspicious for volume overload vs. PNA. Serum diagnostics significant for: WBC ct within normal range, Lactate 2.5, Hgb 9.5 (MCV 90), elevated , BUN 73, Cr 2.3 (Baseline 1.8), Trop 0.17, pro-BNP 71298. Was given empiric abx for PNA and 2L of crystalloid infusion by ED. CTH: Chronic microvascular changes, parenchymal atrophy and ventricular prominence.    Interval History:  Patient states he is doing well and offers no complaints at this time.      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  AF (atrial fibrillation)  s/p DCCV  Carotid stenosis  COPD (chronic obstructive pulmonary disease)  H/O coronary artery bypass surgery    S/P inguinal hernia repair  History of appendectomy      Medications:  apixaban 2.5 milliGRAM(s) Oral two times a day  atorvastatin 80 milliGRAM(s) Oral at bedtime  buMETAnide Injectable 1.5 milliGRAM(s) IV Push two times a day  insulin glargine Injectable (LANTUS) 23 Unit(s) SubCutaneous at bedtime  insulin lispro (ADMELOG) corrective regimen sliding scale   SubCutaneous three times a day before meals  insulin lispro (ADMELOG) corrective regimen sliding scale   SubCutaneous at bedtime  insulin lispro Injectable (ADMELOG) 8 Unit(s) SubCutaneous three times a day before meals  isosorbide   mononitrate ER Tablet (IMDUR) 30 milliGRAM(s) Oral daily  levothyroxine 112 MICROGram(s) Oral daily  metoprolol tartrate 25 milliGRAM(s) Oral two times a day  mycophenolate mofetil 500 milliGRAM(s) Oral two times a day  pantoprazole    Tablet 40 milliGRAM(s) Oral before breakfast  predniSONE   Tablet 20 milliGRAM(s) Oral daily  tacrolimus 1 milliGRAM(s) Oral every 12 hours      Vital Signs Last 24 Hrs  T(C): 36.6 (20 Oct 2021 21:49), Max: 37.1 (20 Oct 2021 07:00)  T(F): 97.8 (20 Oct 2021 21:49), Max: 98.7 (20 Oct 2021 07:00)  HR: 65 (20 Oct 2021 21:49) (65 - 89)  BP: 182/90 (20 Oct 2021 21:49) (133/71 - 186/87)  RR: 20 (20 Oct 2021 21:49) (18 - 22)  SpO2: 93% (20 Oct 2021 21:49) (85% - 97%)      Neurological Exam:   Mental status: Awake, alert and oriented x3 to person, place and time. Recent and remote memory intact.  Naming, repetition and comprehension intact.  Attention/concentration intact.  No dysarthria, no aphasia.  Fund of knowledge appropriate. No deficits noted on exam.  Cranial nerves: Pupils equally round and reactive to light, visual fields full, no nystagmus, extraocular muscles intact, V1 through V3 intact bilaterally and symmetric, face symmetric, hearing intact to finger rub, palate elevation symmetric, tongue was midline.  Motor:  MRC grading 5/5 b/l UE/LE.   strength 5/5.  Normal tone and bulk.  No abnormal movements.    Sensation: Intact to light touch, proprioception, and pinprick.   Coordination: No dysmetria on finger-to-nose and heel-to-shin.  No dysdiadokinesia.  Reflexes: 2+ in bilateral UE/LE, downgoing toes bilaterally. (-) Yeh.      Labs:  CBC Full  -  ( 20 Oct 2021 08:00 )  WBC Count : 8.91 K/uL  RBC Count : 3.39 M/uL  Hemoglobin : 9.5 g/dL  Hematocrit : 30.7 %  Platelet Count - Automated : 226 K/uL  Mean Cell Volume : 90.6 fL  Mean Cell Hemoglobin : 28.0 pg  Mean Cell Hemoglobin Concentration : 30.9 g/dL  Auto Neutrophil # : 7.78 K/uL  Auto Lymphocyte # : 0.35 K/uL  Auto Monocyte # : 0.65 K/uL  Auto Eosinophil # : 0.05 K/uL  Auto Basophil # : 0.04 K/uL  Auto Neutrophil % : 87.4 %  Auto Lymphocyte % : 3.9 %  Auto Monocyte % : 7.3 %  Auto Eosinophil % : 0.6 %  Auto Basophil % : 0.4 %    10-20    137  |  95<L>  |  73<HH>  ----------------------------<  263<H>  3.8   |  20  |  2.3<H>    Ca    9.2      20 Oct 2021 08:00    TPro  6.4  /  Alb  4.2  /  TBili  1.2  /  DBili  x   /  AST  23  /  ALT  11  /  AlkPhos  95  10-20    LIVER FUNCTIONS - ( 20 Oct 2021 08:00 )  Alb: 4.2 g/dL / Pro: 6.4 g/dL / ALK PHOS: 95 U/L / ALT: 11 U/L / AST: 23 U/L / GGT: x             Urinalysis Basic - ( 20 Oct 2021 12:40 )    Color: Yellow / Appearance: Clear / S.015 / pH: x  Gluc: x / Ketone: Negative  / Bili: Negative / Urobili: <2 mg/dL   Blood: x / Protein: 100 mg/dL / Nitrite: Negative   Leuk Esterase: Negative / RBC: 3 /HPF / WBC 1 /HPF   Sq Epi: x / Non Sq Epi: 0 /HPF / Bacteria: Negative   Neurology Consult Note    HPI:  An 80 yo M w/PMHx significant for 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 According to the patient's son, he was acting strange at home, was confused stating he was in the bathroom when in reality he was in the bedroom. the son (and pt who is now AxOx3) endorse several episodes of watery diarrhea over the last 2 days. Pt denies fever, chills, chest pain, palpitations changes in vision, recent travel, sick contacts, dysuria, endorses worsening SOB on exertion especially when climbing stairs, denies any associated chest pain. Pt also endorses overall weight loss of late (per AEHR he is seen by pulmonary Dr. Rory Mars and was put on diuretic regimen).    ED COURSE: On his arrival to the ED, SPO2 85% on RA, /87, afebrile, HR 89BPM, CXR showing bilateral opacifications suspicious for volume overload vs. PNA. Serum diagnostics significant for: WBC ct within normal range, Lactate 2.5, Hgb 9.5 (MCV 90), elevated , BUN 73, Cr 2.3 (Baseline 1.8), Trop 0.17, pro-BNP 68315. Was given empiric abx for PNA and 2L of crystalloid infusion by ED. CTH: Chronic microvascular changes, parenchymal atrophy and ventricular prominence.    Interval History:  Patient states he is doing well and offers no complaints at this time.      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  AF (atrial fibrillation)  s/p DCCV  Carotid stenosis  COPD (chronic obstructive pulmonary disease)  H/O coronary artery bypass surgery    S/P inguinal hernia repair  History of appendectomy      Medications:  apixaban 2.5 milliGRAM(s) Oral two times a day  atorvastatin 80 milliGRAM(s) Oral at bedtime  buMETAnide Injectable 1.5 milliGRAM(s) IV Push two times a day  insulin glargine Injectable (LANTUS) 23 Unit(s) SubCutaneous at bedtime  insulin lispro (ADMELOG) corrective regimen sliding scale   SubCutaneous three times a day before meals  insulin lispro (ADMELOG) corrective regimen sliding scale   SubCutaneous at bedtime  insulin lispro Injectable (ADMELOG) 8 Unit(s) SubCutaneous three times a day before meals  isosorbide   mononitrate ER Tablet (IMDUR) 30 milliGRAM(s) Oral daily  levothyroxine 112 MICROGram(s) Oral daily  metoprolol tartrate 25 milliGRAM(s) Oral two times a day  mycophenolate mofetil 500 milliGRAM(s) Oral two times a day  pantoprazole    Tablet 40 milliGRAM(s) Oral before breakfast  predniSONE   Tablet 20 milliGRAM(s) Oral daily  tacrolimus 1 milliGRAM(s) Oral every 12 hours      Vital Signs Last 24 Hrs  T(C): 36.6 (20 Oct 2021 21:49), Max: 37.1 (20 Oct 2021 07:00)  T(F): 97.8 (20 Oct 2021 21:49), Max: 98.7 (20 Oct 2021 07:00)  HR: 65 (20 Oct 2021 21:49) (65 - 89)  BP: 182/90 (20 Oct 2021 21:49) (133/71 - 186/87)  RR: 20 (20 Oct 2021 21:49) (18 - 22)  SpO2: 93% (20 Oct 2021 21:49) (85% - 97%)      Neurological Exam:   Mental status: Awake, alert and oriented x3 to person, place and time. Recent and remote memory intact.  Naming, repetition and comprehension intact.  Attention/concentration intact.  No dysarthria, no aphasia.  Fund of knowledge appropriate. No deficits noted on exam.  Cranial nerves: Pupils equally round and reactive to light, visual fields full, no nystagmus, extraocular muscles intact, V1 through V3 intact bilaterally and symmetric, face symmetric, hearing intact to finger rub, palate elevation symmetric, tongue was midline.  Motor:  MRC grading 5/5 b/l UE/LE.   strength 5/5.  Normal tone and bulk.  Mild asterixis.    Sensation: Intact to light touch, proprioception, and pinprick.   Coordination: No dysmetria on finger-to-nose and heel-to-shin.  No dysdiadokinesia.  Reflexes: 2+ in bilateral UE/LE, downgoing toes bilaterally. (-) Yeh.       Labs:  CBC Full  -  ( 20 Oct 2021 08:00 )  WBC Count : 8.91 K/uL  RBC Count : 3.39 M/uL  Hemoglobin : 9.5 g/dL  Hematocrit : 30.7 %  Platelet Count - Automated : 226 K/uL  Mean Cell Volume : 90.6 fL  Mean Cell Hemoglobin : 28.0 pg  Mean Cell Hemoglobin Concentration : 30.9 g/dL  Auto Neutrophil # : 7.78 K/uL  Auto Lymphocyte # : 0.35 K/uL  Auto Monocyte # : 0.65 K/uL  Auto Eosinophil # : 0.05 K/uL  Auto Basophil # : 0.04 K/uL  Auto Neutrophil % : 87.4 %  Auto Lymphocyte % : 3.9 %  Auto Monocyte % : 7.3 %  Auto Eosinophil % : 0.6 %  Auto Basophil % : 0.4 %    10-20    137  |  95<L>  |  73<HH>  ----------------------------<  263<H>  3.8   |  20  |  2.3<H>    Ca    9.2      20 Oct 2021 08:00    TPro  6.4  /  Alb  4.2  /  TBili  1.2  /  DBili  x   /  AST  23  /  ALT  11  /  AlkPhos  95  10-20    LIVER FUNCTIONS - ( 20 Oct 2021 08:00 )  Alb: 4.2 g/dL / Pro: 6.4 g/dL / ALK PHOS: 95 U/L / ALT: 11 U/L / AST: 23 U/L / GGT: x             Urinalysis Basic - ( 20 Oct 2021 12:40 )    Color: Yellow / Appearance: Clear / S.015 / pH: x  Gluc: x / Ketone: Negative  / Bili: Negative / Urobili: <2 mg/dL   Blood: x / Protein: 100 mg/dL / Nitrite: Negative   Leuk Esterase: Negative / RBC: 3 /HPF / WBC 1 /HPF   Sq Epi: x / Non Sq Epi: 0 /HPF / Bacteria: Negative

## 2021-10-21 NOTE — PROGRESS NOTE ADULT - SUBJECTIVE AND OBJECTIVE BOX
----------Daily Progress Note----------    HISTORY OF PRESENT ILLNESS:  Patient is a 81y old Male who presents with a chief complaint of AMS, HFrEF (21 Oct 2021 00:24)    Currently admitted to medicine with the primary diagnosis of Acute exacerbation of CHF (congestive heart failure)       Today is hospital day 1d.     INTERVAL HOSPITAL COURSE / OVERNIGHT EVENTS:    Patient was examined and seen at bedside. This morning he is resting comfortably in bed and reports no new issues or overnight events.     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    AF (atrial fibrillation)  s/p DCCV    Carotid stenosis    COPD (chronic obstructive pulmonary disease)    H/O coronary artery bypass surgery      S/P inguinal hernia repair    History of appendectomy      ALLERGIES  Ozempic (0.25 mg or 0.5 mg dose) (Hives; Rash)  streptomycin (Unknown)  Trulicity Pen (Hives; Rash)      Home Medications:  atorvastatin 80 mg oral tablet: orally once a day (at bedtime) (2020 10:00)  d-mycophenolate: 500 milligram(s) orally 2 times a day (2020 10:00)  Eliquis 2.5 mg oral tablet: 1 tab(s) orally 2 times a day (2020 10:00)  famotidine 20 mg oral tablet: 1 tab(s) orally once a day (2020 10:00)  furosemide 20 mg oral tablet: 1 tab(s) orally once a day (2020 10:00)  isosorbide mononitrate 30 mg oral tablet, extended release: 1 tab(s) orally once a day (in the morning) (2020 10:00)  Lantus 100 units/mL subcutaneous solution: 30 unit(s) subcutaneous once a day (at bedtime) (2020 10:00)  metOLazone 2.5 mg oral tablet: 1 tab(s) orally every 48 hours (2020 10:00)  metoprolol tartrate 25 mg oral tablet: 1 tab(s) orally 2 times a day (2020 10:00)  predniSONE 20 mg oral tablet: 1 tab(s) orally once a day (2020 10:00)  Synthroid 112 mcg (0.112 mg) oral tablet: 1 tab(s) orally once a day (2020 10:00)  tacrolimus 1 mg oral capsule: 1 cap(s) orally every 12 hours (2020 10:00)        MEDICATIONS  STANDING MEDICATIONS  apixaban 2.5 milliGRAM(s) Oral two times a day  atorvastatin 80 milliGRAM(s) Oral at bedtime  buMETAnide Injectable 1.5 milliGRAM(s) IV Push two times a day  insulin glargine Injectable (LANTUS) 23 Unit(s) SubCutaneous at bedtime  insulin lispro (ADMELOG) corrective regimen sliding scale   SubCutaneous three times a day before meals  insulin lispro (ADMELOG) corrective regimen sliding scale   SubCutaneous at bedtime  insulin lispro Injectable (ADMELOG) 8 Unit(s) SubCutaneous three times a day before meals  isosorbide   mononitrate ER Tablet (IMDUR) 30 milliGRAM(s) Oral daily  levothyroxine 112 MICROGram(s) Oral daily  metoprolol tartrate 25 milliGRAM(s) Oral two times a day  mycophenolate mofetil 500 milliGRAM(s) Oral two times a day  pantoprazole    Tablet 40 milliGRAM(s) Oral before breakfast  predniSONE   Tablet 20 milliGRAM(s) Oral daily  tacrolimus 1 milliGRAM(s) Oral every 12 hours    PRN MEDICATIONS    VITALS:  T(F): 97.7  HR: 68  BP: 140/96  RR: 17  SpO2: 100%    <<<<<LABS>>>>>                        9.5    8.91  )-----------( 226      ( 20 Oct 2021 08:00 )             30.7     10-20    137  |  95<L>  |  73<HH>  ----------------------------<  263<H>  3.8   |  20  |  2.3<H>    Ca    9.2      20 Oct 2021 08:00  Phos  3.9     10-20  Mg     1.8     10-20    TPro  6.4  /  Alb  4.2  /  TBili  1.2  /  DBili  x   /  AST  23  /  ALT  11  /  AlkPhos  95  10-20      Urinalysis Basic - ( 20 Oct 2021 12:40 )    Color: Yellow / Appearance: Clear / S.015 / pH: x  Gluc: x / Ketone: Negative  / Bili: Negative / Urobili: <2 mg/dL   Blood: x / Protein: 100 mg/dL / Nitrite: Negative   Leuk Esterase: Negative / RBC: 3 /HPF / WBC 1 /HPF   Sq Epi: x / Non Sq Epi: 0 /HPF / Bacteria: Negative        Lactate, Blood: 2.9 mmol/L *H* (10-20-21 @ 20:00)  Troponin T, Serum: 0.21 ng/mL *HH* (10-20-21 @ 20:00)    [id number]  CARDIAC MARKERS ( 20 Oct 2021 20:00 )  x     / 0.21 ng/mL / x     / x     / x      CARDIAC MARKERS ( 20 Oct 2021 08:00 )  x     / 0.17 ng/mL / x     / x     / x            <<<<<RADIOLOGY>>>>>    < from: CT Head No Cont (10.20.21 @ 08:28) >  EXAM:  CT BRAIN            PROCEDURE DATE:  10/20/2021            INTERPRETATION:  Clinical History / Reason for exam: Confusion    Technique: Noncontrast head CT.  Contiguous unenhanced CT axial images of the head from the base to the vertex with coronal and sagittal reformats.    Comparison: None available    Findings:    Motion artifact is present on this exam.    There is moderate ventricular prominence superimposed upon a moderate degree of parenchymal volume loss.    There are patchy hypodensities throughout the hemispheric white matter without mass effect most compatible with chronic microvascular changes.    There is no gross evidence of acute intracranial hemorrhage, extra-axial fluid collection or midline shift.  Gray white matterdifferentiation is grossly maintained.    There is calcific atherosclerotic disease at the skull base.    The visualized paranasal sinuses and mastoids are grossly clear.    IMPRESSION:    1.  Motion limited study. No gross evidence of acute intracranial abnormality.    2.  Chronic microvascular changes, parenchymal atrophy and ventricular prominence.    < end of copied text >    < from: Xray Chest 1 View- PORTABLE-Routine (Xray Chest 1 View- PORTABLE-Routine in AM.) (10.21.21 @ 09:20) >    EXAM:  XR CHEST PORTABLE ROUTINE 1V            PROCEDURE DATE:  10/21/2021            INTERPRETATION:  Clinical History / Reason for exam: Shortness of breath.    Comparison : Chest radiograph prior day.    Technique/Positioning: Frontal portable.    Findings:    Support devices: None.    Cardiac/mediastinum/hilum: Heart is enlarged. The patient is status post median sternotomy. The aorta is atherosclerotic.    Lung parenchyma/Pleura: Bilateral opacities    Skeleton/soft tissues: Unremarkable.    Impression:    Cardiomegaly with bilateral opacifications, unchanged.    < end of copied text >      <<<<<PHYSICAL EXAM>>>>>  GENERAL: No acute distress. Resting comfortably in chair  PULMONARY: Clear to auscultation bilaterally. No rales, rhonchi, or wheezing.  CARDIOVASCULAR: Regular rate and rhythm, S1-S2, no murmurs  GASTROINTESTINAL: Soft, non-tender, non-distended, no guarding.  SKIN: No LE edema b/l; lower extremity dry skin flaking  NEUROLOGIC: AAOX3      -----------------------------------------------------------------------------------------------------------------------------------------------------------------------------------------------

## 2021-10-21 NOTE — CONSULT NOTE ADULT - SUBJECTIVE AND OBJECTIVE BOX
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 According to the patient's son, he was acting strange at home, was confused stating he was in the bathroom when in reality he was in the bedroom. the son (and pt who is now AxOx3) endorse several episodes of watery diarrhea over the last 2 days. Pt denies fever, chills, chest pain, palpitations changes in vision, recent travel, sick contacts, dysuria, endorses worsening SOB on exertion especially when climbing stairs, denies any associated chest pain. Pt also endorses overall weight loss of late (per AEHR he is seen by pulmonary Dr. Rory Mars and was put on diuretic regimen)    ED COURSE: On his arrival to the ED, SPO2 85% on RA, /87, afebrile, HR 89BPM, CXR showing bilateral opacifications suspicious for volume overload vs. PNA. Serum diagnostics significant for: WBC ct within normal range, Lactate 2.5, Hgb 9.5 (MCV 90), elevated , BUN 73, Cr 2.3 (Baseline 1.8), Trop 0.17, pro-BNP 69202. Was given empiric abx for PNA and 2L of crystalloid infusion by ED. CTH: Chronic microvascular changes, parenchymal atrophy and ventricular prominence.    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    AF (atrial fibrillation)  s/p DCCV    Carotid stenosis    COPD (chronic obstructive pulmonary disease)    H/O coronary artery bypass surgery      S/P inguinal hernia repair    History of appendectomy      Allergies:  Ozempic (0.25 mg or 0.5 mg dose) (Hives; Rash)  streptomycin (Unknown)  Trulicity Pen (Hives; Rash)    Home Medications Reviewed  Hospital Medications:   MEDICATIONS  (STANDING):  apixaban 2.5 milliGRAM(s) Oral two times a day  aspirin enteric coated 81 milliGRAM(s) Oral daily  atorvastatin 80 milliGRAM(s) Oral at bedtime  buMETAnide Injectable 1.5 milliGRAM(s) IV Push two times a day  insulin glargine Injectable (LANTUS) 23 Unit(s) SubCutaneous at bedtime  insulin lispro (ADMELOG) corrective regimen sliding scale   SubCutaneous three times a day before meals  insulin lispro (ADMELOG) corrective regimen sliding scale   SubCutaneous at bedtime  insulin lispro Injectable (ADMELOG) 8 Unit(s) SubCutaneous three times a day before meals  isosorbide   mononitrate ER Tablet (IMDUR) 30 milliGRAM(s) Oral daily  levothyroxine 112 MICROGram(s) Oral daily  metoprolol tartrate 25 milliGRAM(s) Oral two times a day  mycophenolate mofetil 500 milliGRAM(s) Oral two times a day  pantoprazole    Tablet 40 milliGRAM(s) Oral before breakfast  predniSONE   Tablet 20 milliGRAM(s) Oral daily  tacrolimus 1 milliGRAM(s) Oral every 12 hours      SOCIAL HISTORY:  Denies ETOH,Smoking,   FAMILY HISTORY:  FHx: colon cancer          REVIEW OF SYSTEMS:  CONSTITUTIONAL: No weakness, fevers or chills  EYES/ENT: No visual changes;  No vertigo or throat pain   NECK: No pain or stiffness  RESPIRATORY: No cough, wheezing, hemoptysis; No shortness of breath  CARDIOVASCULAR: No chest pain or palpitations.  GASTROINTESTINAL: No abdominal or epigastric pain. No nausea, vomiting, or hematemesis; No diarrhea or constipation. No melena or hematochezia.  GENITOURINARY: No dysuria, frequency, foamy urine, urinary urgency, incontinence or hematuria  NEUROLOGICAL: No numbness or weakness  SKIN: No itching, burning, rashes, or lesions   VASCULAR: No bilateral lower extremity edema.   All other review of systems is negative unless indicated above.    VITALS:  T(F): 96.1 (10-21-21 @ 15:45), Max: 97.8 (10-20-21 @ 21:49)  HR: 96 (10-21-21 @ 15:45)  BP: 182/97 (10-21-21 @ 15:45)  RR: 19 (10-21-21 @ 15:45)  SpO2: 90% (10-21-21 @ 15:45)    10-20 @ 07:01  -  10-21 @ 07:00  --------------------------------------------------------  IN: 0 mL / OUT: 230 mL / NET: -230 mL    10-21 @ 07:01  -  10-21 @ 18:37  --------------------------------------------------------  IN: 0 mL / OUT: 300 mL / NET: -300 mL        Weight (kg): 100.8 (10-21 @ 05:28)      I&O's Detail    20 Oct 2021 07:  -  21 Oct 2021 07:00  --------------------------------------------------------  IN:  Total IN: 0 mL    OUT:    Voided (mL): 230 mL  Total OUT: 230 mL    Total NET: -230 mL      21 Oct 2021 07:  -  21 Oct 2021 18:37  --------------------------------------------------------  IN:  Total IN: 0 mL    OUT:    Voided (mL): 300 mL  Total OUT: 300 mL    Total NET: -300 mL        Creatine Kinase, Serum: 435 U/L (10-21-21 @ 11:25)      PHYSICAL EXAM:  Constitutional: NAD  HEENT: anicteric sclera, oropharynx clear, MMM  Neck: No JVD  Respiratory: CTAB, no wheezes, rales or rhonchi  Cardiovascular: S1, S2, RRR  Gastrointestinal: BS+, soft, NT/ND  Extremities: No cyanosis or clubbing. No peripheral edema  Neurological: A/O x 3, no focal deficits  Psychiatric: Normal mood, normal affect  : No CVA tenderness. No davalos.   Skin: No rashes  Vascular Access:    LABS:  10-21    139  |  99  |  75<HH>  ----------------------------<  146<H>  4.1   |  22  |  2.2<H>    Ca    9.2      21 Oct 2021 11:25  Phos  3.9     10-  Mg     1.8     10-20    TPro  6.4  /  Alb  4.2  /  TBili  1.2  /  DBili      /  AST  23  /  ALT  11  /  AlkPhos  95  10-20    Creatinine Trend: 2.2 <--, 2.3 <--                        9.9    10.15 )-----------( 236      ( 21 Oct 2021 11:25 )             31.9     Urine Studies:  Urinalysis Basic - ( 20 Oct 2021 12:40 )    Color: Yellow / Appearance: Clear / S.015 / pH:   Gluc:  / Ketone: Negative  / Bili: Negative / Urobili: <2 mg/dL   Blood:  / Protein: 100 mg/dL / Nitrite: Negative   Leuk Esterase: Negative / RBC: 3 /HPF / WBC 1 /HPF   Sq Epi:  / Non Sq Epi: 0 /HPF / Bacteria: Negative      Osmolality, Random Urine: 369 mos/kg (10-21 @ 01:00)  Sodium, Random Urine: 22.0 mmoL/L (10-21 @ 01:00)            RADIOLOGY & ADDITIONAL STUDIES:    < from: Xray Chest 1 View- PORTABLE-Routine (Xray Chest 1 View- PORTABLE-Routine in AM.) (10.21.21 @ 09:20) >  Cardiomegaly with bilateral opacifications, unchanged.    < end of copied text >

## 2021-10-21 NOTE — CONSULT NOTE ADULT - ASSESSMENT
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 Patient is now AAOx3. Likely transient metabolic encephalopathy related to pna vs age related cognitive decline vs unlikely CVA vs unlikely HTN encephalopathy.    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 Patient is now AAOx3. 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 Patient is now AAOx3. Likely transient metabolic/uremic encephalopathy currently baseline and nonfocal    Plan:  - CT head reviewed, negative  - Can get routine EEG to rule out seizure activity although less likely seizures as cause of encephalopathy  - MRI Brain NC  - Avoid any sedation medications  - Continue to take measures to prevent hospital acquired delirium  - Continue rest of care as per primary team  - correct electrolytes/uremia  - if MRI/EEG (-), no further inpt neurologic w/u

## 2021-10-21 NOTE — CHART NOTE - NSCHARTNOTEFT_GEN_A_CORE
After discussion with attending Dr. Mata, pt will transferred to tele floor given history of Afib, HFpEF and elevated cardiaz CK, CKMB and trops which are consistently elevating. Pt not experiencing any chest pain and other than afib, no ST elevations seen on EKG. Will give pt one time dose of nifedipine 30 for elevated /97 and start pt on aspirin 81 given history of CAD s/p CABG.

## 2021-10-22 NOTE — PROGRESS NOTE ADULT - SUBJECTIVE AND OBJECTIVE BOX
ANDRIA TAVAREZ  81y  Male      Patient is a 81y old  Male who presents with a chief complaint of AMS, HFrEF (22 Oct 2021 12:28)      INTERVAL HPI/OVERNIGHT EVENTS:  He feels ok, SOB improved.   Vital Signs Last 24 Hrs  T(C): 36.3 (22 Oct 2021 16:14), Max: 36.5 (22 Oct 2021 07:24)  T(F): 97.4 (22 Oct 2021 16:14), Max: 97.7 (22 Oct 2021 07:24)  HR: 63 (22 Oct 2021 16:14) (52 - 72)  BP: 140/72 (22 Oct 2021 16:14) (121/77 - 140/91)  BP(mean): --  RR: 18 (22 Oct 2021 16:14) (18 - 18)  SpO2: 92% (22 Oct 2021 07:24) (92% - 94%)      10-21-21 @ 07:01  -  10-22-21 @ 07:00  --------------------------------------------------------  IN: 0 mL / OUT: 1600 mL / NET: -1600 mL            Consultant(s) Notes Reviewed:  [x ] YES  [ ] NO          MEDICATIONS  (STANDING):  apixaban 2.5 milliGRAM(s) Oral two times a day  aspirin enteric coated 81 milliGRAM(s) Oral daily  buMETAnide Injectable 1.5 milliGRAM(s) IV Push two times a day  insulin glargine Injectable (LANTUS) 23 Unit(s) SubCutaneous at bedtime  insulin lispro (ADMELOG) corrective regimen sliding scale   SubCutaneous three times a day before meals  insulin lispro (ADMELOG) corrective regimen sliding scale   SubCutaneous at bedtime  insulin lispro Injectable (ADMELOG) 8 Unit(s) SubCutaneous three times a day before meals  isosorbide   mononitrate ER Tablet (IMDUR) 30 milliGRAM(s) Oral daily  levothyroxine 112 MICROGram(s) Oral daily  metoprolol tartrate 25 milliGRAM(s) Oral two times a day  mycophenolate mofetil 500 milliGRAM(s) Oral two times a day  pantoprazole    Tablet 40 milliGRAM(s) Oral before breakfast  potassium chloride  20 mEq/100 mL IVPB 20 milliEquivalent(s) IV Intermittent every 2 hours  predniSONE   Tablet 20 milliGRAM(s) Oral daily  tacrolimus 1 milliGRAM(s) Oral every 12 hours    MEDICATIONS  (PRN):  guaifenesin/dextromethorphan Oral Liquid 10 milliLiter(s) Oral every 8 hours PRN Cough      LABS                          9.0    9.47  )-----------( 212      ( 22 Oct 2021 06:29 )             28.7     10-22    138  |  100  |  80<HH>  ----------------------------<  64<L>  3.4<L>   |  20  |  2.0<H>    Ca    9.0      22 Oct 2021 06:29  Phos  3.9     10-20  Mg     1.8     10-22    TPro  5.6<L>  /  Alb  3.7  /  TBili  1.1  /  DBili  x   /  AST  457<H>  /  ALT  337<H>  /  AlkPhos  173<H>  10-22          Lactate Trend  10-21 @ 11:25 Lactate:1.5   10-20 @ 20:00 Lactate:2.9     CARDIAC MARKERS ( 21 Oct 2021 20:00 )  x     / 0.22 ng/mL / 316 U/L / x     / 6.2 ng/mL  CARDIAC MARKERS ( 21 Oct 2021 11:25 )  x     / 0.28 ng/mL / 435 U/L / x     / 7.6 ng/mL  CARDIAC MARKERS ( 20 Oct 2021 20:00 )  x     / 0.21 ng/mL / x     / x     / x          CAPILLARY BLOOD GLUCOSE      POCT Blood Glucose.: 113 mg/dL (22 Oct 2021 11:10)      Culture - Blood (collected 10-20-21 @ 14:40)  Source: .Blood Blood  Preliminary Report (10-22-21 @ 01:02):    No growth to date.    Culture - Blood (collected 10-20-21 @ 14:30)  Source: .Blood Blood  Preliminary Report (10-22-21 @ 01:02):    No growth to date.    Culture - Urine (collected 10-20-21 @ 12:40)  Source: Clean Catch Clean Catch (Midstream)  Final Report (10-21-21 @ 22:01):    No growth        RADIOLOGY & ADDITIONAL TESTS:    Imaging Personally Reviewed:  [ ] YES  [ ] NO    HEALTH ISSUES - PROBLEM Dx:          PHYSICAL EXAM:  GENERAL: NAD, well-developed.  HEAD:  Atraumatic, Normocephalic.  EYES: EOMI, PERRLA, conjunctiva and sclera clear.  NECK: Supple, elevated JVP 9cm  CHEST/LUNG: Clear to auscultation bilaterally; No wheeze.  HEART: Regular rate and rhythm; S1 S2.   ABDOMEN: Soft, Nontender, Nondistended; Bowel sounds present.  EXTREMITIES:  2+ Peripheral Pulses, leg edema 2+  PSYCH: AAOx3.  NEUROLOGY: non-focal.  SKIN: No rashes or lesions.

## 2021-10-22 NOTE — PROGRESS NOTE ADULT - SUBJECTIVE AND OBJECTIVE BOX
Nephrology progress note    Patient is seen and examined, events over the last 24 h noted .  Feels better  Allergies:  Ozempic (0.25 mg or 0.5 mg dose) (Hives; Rash)  streptomycin (Unknown)  Trulicity Pen (Hives; Rash)    Hospital Medications:   MEDICATIONS  (STANDING):  apixaban 2.5 milliGRAM(s) Oral two times a day  aspirin enteric coated 81 milliGRAM(s) Oral daily  buMETAnide Injectable 1.5 milliGRAM(s) IV Push two times a day  insulin glargine Injectable (LANTUS) 23 Unit(s) SubCutaneous at bedtime  insulin lispro (ADMELOG) corrective regimen sliding scale   SubCutaneous three times a day before meals  insulin lispro (ADMELOG) corrective regimen sliding scale   SubCutaneous at bedtime  insulin lispro Injectable (ADMELOG) 8 Unit(s) SubCutaneous three times a day before meals  isosorbide   mononitrate ER Tablet (IMDUR) 30 milliGRAM(s) Oral daily  levothyroxine 112 MICROGram(s) Oral daily  metoprolol tartrate 25 milliGRAM(s) Oral two times a day  mycophenolate mofetil 500 milliGRAM(s) Oral two times a day  pantoprazole    Tablet 40 milliGRAM(s) Oral before breakfast  potassium chloride  20 mEq/100 mL IVPB 20 milliEquivalent(s) IV Intermittent every 2 hours  predniSONE   Tablet 20 milliGRAM(s) Oral daily  tacrolimus 1 milliGRAM(s) Oral every 12 hours        VITALS:  T(F): 97.4 (10-22-21 @ 16:14), Max: 97.7 (10-22-21 @ 07:24)  HR: 63 (10-22-21 @ 16:14)  BP: 140/72 (10-22-21 @ 16:14)  RR: 18 (10-22-21 @ 16:14)  SpO2: 92% (10-22-21 @ 07:24)  Wt(kg): --    10-20 @ 07:01  -  10-21 @ 07:00  --------------------------------------------------------  IN: 0 mL / OUT: 230 mL / NET: -230 mL    10-21 @ 07:01  -  10-22 @ 07:00  --------------------------------------------------------  IN: 0 mL / OUT: 1600 mL / NET: -1600 mL          PHYSICAL EXAM:  Constitutional: NAD  HEENT: anicteric sclera,   Neck: No JVD  Respiratory: CTA  Cardiovascular: S1, S2, RRR  Gastrointestinal: BS+, soft, NT/ND  Extremities:  No peripheral edema  Neurological: A/O x 3, no focal deficits  : No CVA tenderness. No davalos.   Skin: No rashes  Vascular Access:    LABS:  10-22    138  |  100  |  80<HH>  ----------------------------<  64<L>  3.4<L>   |  20  |  2.0<H>    Ca    9.0      22 Oct 2021 06:29  Phos  3.9     10-20  Mg     1.8     10-22    TPro  5.6<L>  /  Alb  3.7  /  TBili  1.1  /  DBili      /  AST  457<H>  /  ALT  337<H>  /  AlkPhos  173<H>  10-22                          9.0    9.47  )-----------( 212      ( 22 Oct 2021 06:29 )             28.7       Urine Studies:  Urinalysis Basic - ( 20 Oct 2021 12:40 )    Color: Yellow / Appearance: Clear / S.015 / pH:   Gluc:  / Ketone: Negative  / Bili: Negative / Urobili: <2 mg/dL   Blood:  / Protein: 100 mg/dL / Nitrite: Negative   Leuk Esterase: Negative / RBC: 3 /HPF / WBC 1 /HPF   Sq Epi:  / Non Sq Epi: 0 /HPF / Bacteria: Negative      Osmolality, Random Urine: 369 mos/kg (10-21 @ 01:00)  Sodium, Random Urine: 22.0 mmoL/L (10-21 @ 01:00)    RADIOLOGY & ADDITIONAL STUDIES:  < from: US Kidney and Bladder (10.21.21 @ 18:47) >  Right kidney: 11.0 cm. No hydronephrosis or calculi.    Left kidney:  10.5 cm. No hydronephrosis or calculi.    Urinary bladder: Urinary bladder is not distended.    IMPRESSION:    Normal renal ultrasound.    < end of copied text >

## 2021-10-22 NOTE — CONSULT NOTE ADULT - SUBJECTIVE AND OBJECTIVE BOX
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 According to the patient's son, he was acting strange at home, was confused stating he was in the bathroom when in reality he was in the bedroom. the son (and pt who is now AxOx3) endorse several episodes of watery diarrhea over the last 2 days. Pt denies fever, chills, chest pain, palpitations changes in vision, recent travel, sick contacts, dysuria, endorses worsening SOB on exertion especially when climbing stairs, denies any associated chest pain. Pt also endorses overall weight loss of late (per AEHR he is seen by pulmonary Dr. Rory Mars and was put on diuretic regimen)    ED COURSE: On his arrival to the ED, SPO2 85% on RA, /87, afebrile, HR 89BPM, CXR showing bilateral opacifications suspicious for volume overload vs. PNA. Serum diagnostics significant for: WBC ct within normal range, Lactate 2.5, Hgb 9.5 (MCV 90), elevated , BUN 73, Cr 2.3 (Baseline 1.8), Trop 0.17, pro-BNP 35393. Was given empiric abx for PNA and 2L of crystalloid infusion by ED. CTH: Chronic microvascular changes, parenchymal atrophy and ventricular prominence.     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    AF (atrial fibrillation)  s/p DCCV    Carotid stenosis    COPD (chronic obstructive pulmonary disease)    H/O coronary artery bypass surgery  2001    S/P inguinal hernia repair    History of appendectomy          FAMILY HISTORY:  FHx: colon cancer        Social History:  Tobacco: denies  Alcohol: denies   Drugs: denies    Home Medications:  atorvastatin 80 mg oral tablet: orally once a day (at bedtime) (28 Jan 2020 10:00)  d-mycophenolate: 500 milligram(s) orally 2 times a day (28 Jan 2020 10:00)  Eliquis 2.5 mg oral tablet: 1 tab(s) orally 2 times a day (28 Jan 2020 10:00)  famotidine 20 mg oral tablet: 1 tab(s) orally once a day (28 Jan 2020 10:00)  furosemide 20 mg oral tablet: 1 tab(s) orally once a day (28 Jan 2020 10:00)  isosorbide mononitrate 30 mg oral tablet, extended release: 1 tab(s) orally once a day (in the morning) (28 Jan 2020 10:00)  Lantus 100 units/mL subcutaneous solution: 30 unit(s) subcutaneous once a day (at bedtime) (28 Jan 2020 10:00)  metOLazone 2.5 mg oral tablet: 1 tab(s) orally every 48 hours (28 Jan 2020 10:00)  metoprolol tartrate 25 mg oral tablet: 1 tab(s) orally 2 times a day (28 Jan 2020 10:00)  predniSONE 20 mg oral tablet: 1 tab(s) orally once a day (28 Jan 2020 10:00)  Synthroid 112 mcg (0.112 mg) oral tablet: 1 tab(s) orally once a day (28 Jan 2020 10:00)  tacrolimus 1 mg oral capsule: 1 cap(s) orally every 12 hours (28 Jan 2020 10:00)        MEDICATIONS  (STANDING):  apixaban 2.5 milliGRAM(s) Oral two times a day  aspirin enteric coated 81 milliGRAM(s) Oral daily  buMETAnide Injectable 1.5 milliGRAM(s) IV Push two times a day  insulin glargine Injectable (LANTUS) 23 Unit(s) SubCutaneous at bedtime  insulin lispro (ADMELOG) corrective regimen sliding scale   SubCutaneous three times a day before meals  insulin lispro (ADMELOG) corrective regimen sliding scale   SubCutaneous at bedtime  insulin lispro Injectable (ADMELOG) 8 Unit(s) SubCutaneous three times a day before meals  isosorbide   mononitrate ER Tablet (IMDUR) 30 milliGRAM(s) Oral daily  levothyroxine 112 MICROGram(s) Oral daily  metoprolol tartrate 25 milliGRAM(s) Oral two times a day  mycophenolate mofetil 500 milliGRAM(s) Oral two times a day  pantoprazole    Tablet 40 milliGRAM(s) Oral before breakfast  predniSONE   Tablet 20 milliGRAM(s) Oral daily  tacrolimus 1 milliGRAM(s) Oral every 12 hours    MEDICATIONS  (PRN):  guaifenesin/dextromethorphan Oral Liquid 10 milliLiter(s) Oral every 8 hours PRN Cough      Allergies  Ozempic (0.25 mg or 0.5 mg dose) (Hives; Rash)  streptomycin (Unknown)  Trulicity Pen (Hives; Rash)      Review of Systems:   Constitutional:  No Fever, No Chills  ENT/Mouth:  No Hearing Changes,  No Difficulty Swallowing  Eyes:  No Eye Pain, No Vision Changes  Cardiovascular:  No Chest Pain, No Palpitations  Respiratory:  No Cough, No Dyspnea  Gastrointestinal:  As described in HPI  Musculoskeletal:  No Joint Swelling, No Back Pain  Skin:  No Skin Lesions, No Jaundice  Neuro:  No Syncope, No Dizziness  Heme/Lymph:  No Bruising, No Bleeding.          Physical Examination:  T(C): 36.3 (10-22-21 @ 16:14), Max: 36.5 (10-22-21 @ 07:24)  HR: 63 (10-22-21 @ 16:14) (52 - 72)  BP: 140/72 (10-22-21 @ 16:14) (121/77 - 140/91)  RR: 18 (10-22-21 @ 16:14) (18 - 18)  SpO2: 92% (10-22-21 @ 07:24) (92% - 94%)      10-20-21 @ 07:01  -  10-21-21 @ 07:00  --------------------------------------------------------  IN: 0 mL / OUT: 230 mL / NET: -230 mL    10-21-21 @ 07:01  -  10-22-21 @ 07:00  --------------------------------------------------------  IN: 0 mL / OUT: 1600 mL / NET: -1600 mL          GENERAL: AAOx3, no acute distress.  HEAD:  Atraumatic, Normocephalic  EYES: conjunctiva and sclera clear  NECK: Supple, +JVD or thyromegaly  CHEST/LUNG: reduced breath sounds b/l. bibasilar crackles  HEART: Regular rate and rhythm; normal S1, S2, No murmurs.  ABDOMEN: Soft, nontender, slightly distended; Bowel sounds present  NEUROLOGY: No asterixis or tremor.   SKIN: Intact, no jaundice        Data:                        9.0    9.47  )-----------( 212      ( 22 Oct 2021 06:29 )             28.7     Hgb Trend:  9.0  10-22-21 @ 06:29  9.9  10-21-21 @ 11:25  9.5  10-20-21 @ 08:00        10-22    138  |  100  |  80<HH>  ----------------------------<  64<L>  3.4<L>   |  20  |  2.0<H>    Ca    9.0      22 Oct 2021 06:29  Phos  3.9     10-20  Mg     1.8     10-22    TPro  5.6<L>  /  Alb  3.7  /  TBili  1.1  /  DBili  x   /  AST  457<H>  /  ALT  337<H>  /  AlkPhos  173<H>  10-22    Liver panel trend:  TBili 1.1   /      /      /   AlkP 173   /   Tptn 5.6   /   Alb 3.7    /   DBili --      10-22  TBili 1.2   /   AST 23   /   ALT 11   /   AlkP 95   /   Tptn 6.4   /   Alb 4.2    /   DBili --      10-20          Culture - Blood (collected 20 Oct 2021 14:40)  Source: .Blood Blood  Preliminary Report (22 Oct 2021 01:02):    No growth to date.    Culture - Blood (collected 20 Oct 2021 14:30)  Source: .Blood Blood  Preliminary Report (22 Oct 2021 01:02):    No growth to date.    Culture - Urine (collected 20 Oct 2021 12:40)  Source: Clean Catch Clean Catch (Midstream)  Final Report (21 Oct 2021 22:01):    No growth          Radiology:    US Abdomen Upper Quadrant Right:   EXAM:  US ABDOMEN RT UPR QUADRANT            PROCEDURE DATE:  10/22/2021            INTERPRETATION:  CLINICAL INFORMATION: Elevated liver function enzymes.    COMPARISON: None available.    TECHNIQUE: Sonography of the right upper quadrant.    FINDINGS:    Liver: Within normal limits.  Bile ducts: Normal caliber. Common bile duct measures 4 mm.  Gallbladder: Gallbladder is contracted.  Pancreas: Visualized portions are within normal limits.  Right kidney: 9.8 cm. No hydronephrosis.  Ascites: Minimal. There is a right-sided pleural effusion.  IVC: Visualized portions are within normal limits.    IMPRESSION:    Right-sided pleural effusion. Contracted gallbladder. Otherwise unremarkable.        --- End of Report ---              ZAFAR TRAN MD; Attending Interventional Radiologist  This document has been electronically signed. Oct 22 2021  1:58PM (10-22-21 @ 13:33)

## 2021-10-22 NOTE — CONSULT NOTE ADULT - ATTENDING COMMENTS
Patient has hx of CAD CABG HFrEF admitted with change in mental status and hx of worsening dyspnea, orthopnea and increase in mucoid sputum. No fever. Hx of CGN on immunosuppressive therapy HTN COPD afib on DOAC   No hypotensive episodes  JVD+  S 1 2 normal  Chest few b/l crackles  Abdomen soft non tender  mild pedal edema   Liver tests nor mal on admission with predominant elevation of transaminases 2 days later.  US abdomen no liver lesion  Received macrolide and cephalosporin for possible pneumonia.   The acute rise in transaminases with minimal elevation of ALP, AST > ALT point to acute hepatic congestion.  DILI possible but less likely.  Agree with acute hepatic panel and RUQ doppler US Patient has hx of CAD CABG HFrEF admitted with change in mental status and hx of worsening dyspnea, orthopnea and increase in mucoid sputum. No fever. Hx of CGN on immunosuppressive therapy HTN COPD afib on DOAC   No hypotensive episodes  JVD+  S 1 2 normal  Chest few b/l crackles  Abdomen soft non tender  mild pedal edema   Liver tests nor mal on admission with predominant elevation of transaminases 2 days later.  US abdomen no liver lesion  Echo show low EF and right heart failure  Received macrolide and cephalosporin for possible pneumonia.   The acute rise in transaminases with minimal elevation of ALP, AST > ALT point to acute hepatic congestion.  DILI possible but less likely.  Agree with acute hepatic panel and RUQ doppler US

## 2021-10-22 NOTE — PROGRESS NOTE ADULT - SUBJECTIVE AND OBJECTIVE BOX
----------Daily Progress Note----------    HISTORY OF PRESENT ILLNESS:  Patient is a 81y old Male who presents with a chief complaint of AMS, HFrEF (21 Oct 2021 18:36)    Currently admitted to medicine with the primary diagnosis of Acute exacerbation of CHF (congestive heart failure)       Today is hospital day 2d.     INTERVAL HOSPITAL COURSE / OVERNIGHT EVENTS:    Patient was examined and seen at bedside. This morning he is resting comfortably in bed and reports no new issues or overnight events.     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    AF (atrial fibrillation)  s/p DCCV    Carotid stenosis    COPD (chronic obstructive pulmonary disease)    H/O coronary artery bypass surgery      S/P inguinal hernia repair    History of appendectomy      ALLERGIES  Ozempic (0.25 mg or 0.5 mg dose) (Hives; Rash)  streptomycin (Unknown)  Trulicity Pen (Hives; Rash)      Home Medications:  atorvastatin 80 mg oral tablet: orally once a day (at bedtime) (2020 10:00)  d-mycophenolate: 500 milligram(s) orally 2 times a day (2020 10:00)  Eliquis 2.5 mg oral tablet: 1 tab(s) orally 2 times a day (2020 10:00)  famotidine 20 mg oral tablet: 1 tab(s) orally once a day (2020 10:00)  furosemide 20 mg oral tablet: 1 tab(s) orally once a day (2020 10:00)  isosorbide mononitrate 30 mg oral tablet, extended release: 1 tab(s) orally once a day (in the morning) (2020 10:00)  Lantus 100 units/mL subcutaneous solution: 30 unit(s) subcutaneous once a day (at bedtime) (2020 10:00)  metOLazone 2.5 mg oral tablet: 1 tab(s) orally every 48 hours (2020 10:00)  metoprolol tartrate 25 mg oral tablet: 1 tab(s) orally 2 times a day (2020 10:00)  predniSONE 20 mg oral tablet: 1 tab(s) orally once a day (2020 10:00)  Synthroid 112 mcg (0.112 mg) oral tablet: 1 tab(s) orally once a day (2020 10:00)  tacrolimus 1 mg oral capsule: 1 cap(s) orally every 12 hours (2020 10:00)        MEDICATIONS  STANDING MEDICATIONS  apixaban 2.5 milliGRAM(s) Oral two times a day  aspirin enteric coated 81 milliGRAM(s) Oral daily  buMETAnide Injectable 1.5 milliGRAM(s) IV Push two times a day  insulin glargine Injectable (LANTUS) 23 Unit(s) SubCutaneous at bedtime  insulin lispro (ADMELOG) corrective regimen sliding scale   SubCutaneous three times a day before meals  insulin lispro (ADMELOG) corrective regimen sliding scale   SubCutaneous at bedtime  insulin lispro Injectable (ADMELOG) 8 Unit(s) SubCutaneous three times a day before meals  isosorbide   mononitrate ER Tablet (IMDUR) 30 milliGRAM(s) Oral daily  levothyroxine 112 MICROGram(s) Oral daily  magnesium sulfate  IVPB 2 Gram(s) IV Intermittent once  metoprolol tartrate 25 milliGRAM(s) Oral two times a day  mycophenolate mofetil 500 milliGRAM(s) Oral two times a day  pantoprazole    Tablet 40 milliGRAM(s) Oral before breakfast  potassium chloride  20 mEq/100 mL IVPB 20 milliEquivalent(s) IV Intermittent every 2 hours  predniSONE   Tablet 20 milliGRAM(s) Oral daily  tacrolimus 1 milliGRAM(s) Oral every 12 hours    PRN MEDICATIONS  guaifenesin/dextromethorphan Oral Liquid 10 milliLiter(s) Oral every 8 hours PRN    VITALS:  T(F): 97.7  HR: 52  BP: 125/59  RR: 18  SpO2: 92%    <<<<<LABS>>>>>                        9.0    9.47  )-----------( 212      ( 22 Oct 2021 06:29 )             28.7     10-22    138  |  100  |  80<HH>  ----------------------------<  64<L>  3.4<L>   |  20  |  2.0<H>    Ca    9.0      22 Oct 2021 06:29  Phos  3.9     10-20  Mg     1.8     10-22    TPro  5.6<L>  /  Alb  3.7  /  TBili  1.1  /  DBili  x   /  AST  457<H>  /  ALT  337<H>  /  AlkPhos  173<H>  10-22      Urinalysis Basic - ( 20 Oct 2021 12:40 )    Color: Yellow / Appearance: Clear / S.015 / pH: x  Gluc: x / Ketone: Negative  / Bili: Negative / Urobili: <2 mg/dL   Blood: x / Protein: 100 mg/dL / Nitrite: Negative   Leuk Esterase: Negative / RBC: 3 /HPF / WBC 1 /HPF   Sq Epi: x / Non Sq Epi: 0 /HPF / Bacteria: Negative        Troponin T, Serum: 0.22 ng/mL *HH* (10-21-21 @ 20:00)  Creatine Kinase, Serum: 316 U/L *H* (10-21-21 @ 20:00)      Culture - Blood (collected 20 Oct 2021 14:40)  Source: .Blood Blood  Preliminary Report (22 Oct 2021 01:02):    No growth to date.    Culture - Blood (collected 20 Oct 2021 14:30)  Source: .Blood Blood  Preliminary Report (22 Oct 2021 01:02):    No growth to date.    Culture - Urine (collected 20 Oct 2021 12:40)  Source: Clean Catch Clean Catch (Midstream)  Final Report (21 Oct 2021 22:01):    No growth    [id number]  CARDIAC MARKERS ( 21 Oct 2021 20:00 )  x     / 0.22 ng/mL / 316 U/L / x     / 6.2 ng/mL  CARDIAC MARKERS ( 21 Oct 2021 11:25 )  x     / 0.28 ng/mL / 435 U/L / x     / 7.6 ng/mL  CARDIAC MARKERS ( 20 Oct 2021 20:00 )  x     / 0.21 ng/mL / x     / x     / x            <<<<<RADIOLOGY>>>>>    < from: US Kidney and Bladder (10.21.21 @ 18:47) >  EXAM:  US KIDNEYS AND BLADDER            PROCEDURE DATE:  10/21/2021            INTERPRETATION:  CLINICAL INFORMATION: Worsening renal function.    COMPARISON: None available.    TECHNIQUE: Sonography of the kidneys and bladder.    FINDINGS:    Right kidney: 11.0 cm. No hydronephrosis or calculi.    Left kidney:  10.5 cm. No hydronephrosis or calculi.    Urinary bladder: Urinary bladder is not distended.    IMPRESSION:    Normal renal ultrasound.    < end of copied text >    < from: CT Head No Cont (10.20.21 @ 08:28) >    EXAM:  CT BRAIN            PROCEDURE DATE:  10/20/2021            INTERPRETATION:  Clinical History / Reason for exam: Confusion    Technique: Noncontrast head CT.  Contiguous unenhanced CT axial images of the head from the base to the vertex with coronal and sagittal reformats.    Comparison: None available    Findings:    Motion artifact is present on this exam.    There is moderate ventricular prominence superimposed upon a moderate degree of parenchymal volume loss.    There are patchy hypodensities throughout the hemispheric white matter without mass effect most compatible with chronic microvascular changes.    There is no gross evidence of acute intracranial hemorrhage, extra-axial fluid collection or midline shift.  Gray white matterdifferentiation is grossly maintained.    There is calcific atherosclerotic disease at the skull base.    The visualized paranasal sinuses and mastoids are grossly clear.    IMPRESSION:    1.  Motion limited study. No gross evidence of acute intracranial abnormality.    2.  Chronic microvascular changes, parenchymal atrophy and ventricular prominence.    < end of copied text >      <<<<<PHYSICAL EXAM>>>>>  GENERAL: No acute distress. Resting comfortably in bed.  PULMONARY: Clear to auscultation bilaterally. No rales, rhonchi, or wheezing.  CARDIOVASCULAR: Regular rate and rhythm, S1-S2, no murmurs  GASTROINTESTINAL: Soft, non-tender, non-distended, no guarding.  SKIN: b/l LE dermatitis skin changes  NEUROLOGIC: AAOX3      -----------------------------------------------------------------------------------------------------------------------------------------------------------------------------------------------

## 2021-10-22 NOTE — CONSULT NOTE ADULT - ASSESSMENT
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 Pt was admitted for acute on chronic chf exacerabtion and gram negative pna and was started on bumex. Gi consulted for transaminits    #Acute Liver Injury - Hepatocellular likely Congestive hepatopathy vs DILI - r/o other etilogies  - LFTs wnl on 10/20  = hepatcellular pattern liver injury on 10/22  - RUQ sono: unremarkbale    Rec  - c/w iv diuresis  - please obtain RUQ sono w/ doppler  - Please avoid hepatotoxic medications  - Monitor LFTs daily

## 2021-10-23 NOTE — PROGRESS NOTE ADULT - SUBJECTIVE AND OBJECTIVE BOX
----------Daily Progress Note----------    HISTORY OF PRESENT ILLNESS:  Patient is a 81y old Male who presents with a chief complaint of AMS, HFrEF (23 Oct 2021 11:21)    Currently admitted to medicine with the primary diagnosis of Acute exacerbation of CHF (congestive heart failure)       Today is hospital day 3d.     INTERVAL HOSPITAL COURSE / OVERNIGHT EVENTS:    Patient was examined and seen at bedside. This morning he is resting comfortably in bed and reports no new issues or overnight events.     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    AF (atrial fibrillation)  s/p DCCV    Carotid stenosis    COPD (chronic obstructive pulmonary disease)    H/O coronary artery bypass surgery      S/P inguinal hernia repair    History of appendectomy      ALLERGIES  Ozempic (0.25 mg or 0.5 mg dose) (Hives; Rash)  streptomycin (Unknown)  Trulicity Pen (Hives; Rash)      Home Medications:  atorvastatin 80 mg oral tablet: orally once a day (at bedtime) (2020 10:00)  d-mycophenolate: 500 milligram(s) orally 2 times a day (2020 10:00)  Eliquis 2.5 mg oral tablet: 1 tab(s) orally 2 times a day (2020 10:00)  famotidine 20 mg oral tablet: 1 tab(s) orally once a day (2020 10:00)  furosemide 20 mg oral tablet: 1 tab(s) orally once a day (2020 10:00)  isosorbide mononitrate 30 mg oral tablet, extended release: 1 tab(s) orally once a day (in the morning) (2020 10:00)  Lantus 100 units/mL subcutaneous solution: 30 unit(s) subcutaneous once a day (at bedtime) (2020 10:00)  metOLazone 2.5 mg oral tablet: 1 tab(s) orally every 48 hours (2020 10:00)  metoprolol tartrate 25 mg oral tablet: 1 tab(s) orally 2 times a day (2020 10:00)  predniSONE 20 mg oral tablet: 1 tab(s) orally once a day (2020 10:00)  Synthroid 112 mcg (0.112 mg) oral tablet: 1 tab(s) orally once a day (2020 10:00)  tacrolimus 1 mg oral capsule: 1 cap(s) orally every 12 hours (2020 10:00)        MEDICATIONS  STANDING MEDICATIONS  apixaban 2.5 milliGRAM(s) Oral two times a day  aspirin enteric coated 81 milliGRAM(s) Oral daily  buMETAnide Injectable 1.5 milliGRAM(s) IV Push two times a day  insulin glargine Injectable (LANTUS) 23 Unit(s) SubCutaneous at bedtime  insulin lispro (ADMELOG) corrective regimen sliding scale   SubCutaneous three times a day before meals  insulin lispro (ADMELOG) corrective regimen sliding scale   SubCutaneous at bedtime  insulin lispro Injectable (ADMELOG) 8 Unit(s) SubCutaneous three times a day before meals  isosorbide   mononitrate ER Tablet (IMDUR) 30 milliGRAM(s) Oral daily  levothyroxine 112 MICROGram(s) Oral daily  metoprolol tartrate 25 milliGRAM(s) Oral two times a day  mycophenolate mofetil 500 milliGRAM(s) Oral two times a day  pantoprazole    Tablet 40 milliGRAM(s) Oral before breakfast  predniSONE   Tablet 20 milliGRAM(s) Oral daily  tacrolimus 1 milliGRAM(s) Oral every 12 hours    PRN MEDICATIONS  guaifenesin/dextromethorphan Oral Liquid 10 milliLiter(s) Oral every 8 hours PRN    VITALS:  T(F): 96.6  HR: 59  BP: 160/77  RR: 18  SpO2: 99%    <<<<<LABS>>>>>                        8.8    9.22  )-----------( 190      ( 23 Oct 2021 06:46 )             28.3     10-    136  |  99  |  75<HH>  ----------------------------<  128<H>  3.8   |  20  |  1.8<H>    Ca    8.5      23 Oct 2021 06:46  Mg     1.9     10-    TPro  5.6<L>  /  Alb  3.9  /  TBili  0.9  /  DBili  0.5<H>  /  AST  199<H>  /  ALT  258<H>  /  AlkPhos  156<H>  10-23      Urinalysis Basic - ( 22 Oct 2021 23:23 )    Color: Light Yellow / Appearance: Clear / S.012 / pH: x  Gluc: x / Ketone: Negative  / Bili: Negative / Urobili: <2 mg/dL   Blood: x / Protein: 30 mg/dL / Nitrite: Negative   Leuk Esterase: Negative / RBC: 1 /HPF / WBC 1 /HPF   Sq Epi: x / Non Sq Epi: 0 /HPF / Bacteria: Negative        Troponin T, Serum: 0.23 ng/mL *HH* (10-23-21 @ 06:46)      Culture - Blood (collected 20 Oct 2021 14:40)  Source: .Blood Blood  Preliminary Report (22 Oct 2021 01:02):    No growth to date.    Culture - Blood (collected 20 Oct 2021 14:30)  Source: .Blood Blood  Preliminary Report (22 Oct 2021 01:02):    No growth to date.    Culture - Urine (collected 20 Oct 2021 12:40)  Source: Clean Catch Clean Catch (Midstream)  Final Report (21 Oct 2021 22:01):    No growth    [id number]  CARDIAC MARKERS ( 23 Oct 2021 06:46 )  x     / 0.23 ng/mL / x     / x     / x      CARDIAC MARKERS ( 21 Oct 2021 20:00 )  x     / 0.22 ng/mL / 316 U/L / x     / 6.2 ng/mL        <<<<<RADIOLOGY>>>>>    < from: US Abdomen Upper Quadrant Right (10.22.21 @ 13:33) >  EXAM:  US ABDOMEN RT UPR QUADRANT            PROCEDURE DATE:  10/22/2021            INTERPRETATION:  CLINICAL INFORMATION: Elevated liver function enzymes.    COMPARISON: None available.    TECHNIQUE: Sonography of the right upper quadrant.    FINDINGS:    Liver: Within normal limits.  Bile ducts: Normal caliber. Common bile duct measures 4 mm.  Gallbladder: Gallbladder is contracted.  Pancreas: Visualized portions are within normal limits.  Right kidney: 9.8 cm. No hydronephrosis.  Ascites: Minimal. There is a right-sided pleural effusion.  IVC: Visualized portions are within normal limits.    IMPRESSION:    Right-sided pleural effusion. Contracted gallbladder. Otherwise unremarkable.    < end of copied text >    < from: US Kidney and Bladder (10.21.21 @ 18:47) >  EXAM:  US KIDNEYS AND BLADDER            PROCEDURE DATE:  10/21/2021            INTERPRETATION:  CLINICAL INFORMATION: Worsening renal function.    COMPARISON: None available.    TECHNIQUE: Sonography of the kidneys and bladder.    FINDINGS:    Right kidney: 11.0 cm. No hydronephrosis or calculi.    Left kidney:  10.5 cm. No hydronephrosis or calculi.    Urinary bladder: Urinary bladder is not distended.    IMPRESSION:    Normal renal ultrasound.    < end of copied text >      <<<<<PHYSICAL EXAM>>>>>  GENERAL: No acute distress. Resting comfortably in bed.  PULMONARY: Crackles heard on auscultation. No wheezing  CARDIOVASCULAR: Regular rate and rhythm, S1-S2, no murmurs  GASTROINTESTINAL: Soft, non-tender, non-distended, no guarding.  SKIN: b/l LE dermatitis skin changes  NEUROLOGIC: AAOX3      -----------------------------------------------------------------------------------------------------------------------------------------------------------------------------------------------

## 2021-10-23 NOTE — PROGRESS NOTE ADULT - SUBJECTIVE AND OBJECTIVE BOX
seen and examined   24 h events noted         PAST HISTORY  --------------------------------------------------------------------------------  No significant changes to PMH, PSH, FHx, SHx, unless otherwise noted    ALLERGIES & MEDICATIONS  --------------------------------------------------------------------------------  Allergies    Ozempic (0.25 mg or 0.5 mg dose) (Hives; Rash)  streptomycin (Unknown)  Trulicity Pen (Hives; Rash)    Intolerances      Standing Inpatient Medications  apixaban 2.5 milliGRAM(s) Oral two times a day  aspirin enteric coated 81 milliGRAM(s) Oral daily  buMETAnide Injectable 1.5 milliGRAM(s) IV Push two times a day  insulin glargine Injectable (LANTUS) 23 Unit(s) SubCutaneous at bedtime  insulin lispro (ADMELOG) corrective regimen sliding scale   SubCutaneous three times a day before meals  insulin lispro (ADMELOG) corrective regimen sliding scale   SubCutaneous at bedtime  insulin lispro Injectable (ADMELOG) 8 Unit(s) SubCutaneous three times a day before meals  isosorbide   mononitrate ER Tablet (IMDUR) 30 milliGRAM(s) Oral daily  levothyroxine 112 MICROGram(s) Oral daily  metoprolol tartrate 25 milliGRAM(s) Oral two times a day  mycophenolate mofetil 500 milliGRAM(s) Oral two times a day  pantoprazole    Tablet 40 milliGRAM(s) Oral before breakfast  predniSONE   Tablet 20 milliGRAM(s) Oral daily  tacrolimus 1 milliGRAM(s) Oral every 12 hours    PRN Inpatient Medications  guaifenesin/dextromethorphan Oral Liquid 10 milliLiter(s) Oral every 8 hours PRN        VITALS/PHYSICAL EXAM  --------------------------------------------------------------------------------  T(C): 35.7 (10-23-21 @ 00:00), Max: 36.5 (10-22-21 @ 07:24)  HR: 66 (10-23-21 @ 05:41) (52 - 70)  BP: 156/75 (10-23-21 @ 05:41) (124/81 - 156/75)  RR: 17 (10-23-21 @ 00:00) (17 - 18)  SpO2: 92% (10-22-21 @ 07:24) (92% - 92%)  Wt(kg): --    Weight (kg): 107.1 (10-23-21 @ 05:41)      10-22-21 @ 07:01  -  10-23-21 @ 07:00  --------------------------------------------------------  IN: 0 mL / OUT: 575 mL / NET: -575 mL      Physical Exam:  	Gen: NAD  	Pulm: decrease BS  B/L  	CV:  S1S2; no rub  	Abd: +distended  	LE:  edema  	    LABS/STUDIES  --------------------------------------------------------------------------------              9.0    9.47  >-----------<  212      [10-22-21 @ 06:29]              28.7     138  |  100  |  80  ----------------------------<  64      [10-22-21 @ 06:29]  3.4   |  20  |  2.0        Ca     9.0     [10-22-21 @ 06:29]      Mg     1.8     [10-22-21 @ 06:29]    TPro  5.6  /  Alb  3.7  /  TBili  1.1  /  DBili  x   /  AST  457  /  ALT  337  /  AlkPhos  173  [10-22-21 @ 06:29]      Troponin 0.22      [10-21-21 @ 20:00]        [10-21-21 @ 20:00]    Creatinine Trend:  SCr 2.0 [10-22 @ 06:29]  SCr 2.2 [10-21 @ 11:25]  SCr 2.3 [10-20 @ 08:00]    Urinalysis - [10-22-21 @ 23:23]      Color Light Yellow / Appearance Clear / SG 1.012 / pH 6.0      Gluc Negative / Ketone Negative  / Bili Negative / Urobili <2 mg/dL       Blood Trace / Protein 30 mg/dL / Leuk Est Negative / Nitrite Negative      RBC 1 / WBC 1 / Hyaline 5 / Gran  / Sq Epi  / Non Sq Epi 0 / Bacteria Negative    Urine Sodium 22.0      [10-21-21 @ 01:00]  Urine Urea Nitrogen 637      [10-21-21 @ 01:00]  Urine Osmolality 369      [10-21-21 @ 01:00]    Iron 70, TIBC 303, %sat 23      [10-21-21 @ 11:25]  HbA1c 8.5      [10-01-19 @ 09:54]  TSH 3.49      [10-21-21 @ 11:25]  Lipid: chol 85, TG 45, HDL 47, LDL --      [10-21-21 @ 11:25]      C3 Complement 84      [10-20-21 @ 20:00]  Syphilis Screen (Treponema Pallidum Ab) Negative      [10-21-21 @ 11:25]

## 2021-10-23 NOTE — PROGRESS NOTE ADULT - SUBJECTIVE AND OBJECTIVE BOX
ANDRIA TAVAREZ  81y  [facility] T6-3C [address]      Patient is a 81y old  Male who presents with a chief complaint of AMS, HFrEF (23 Oct 2021 07:15)      INTERVAL HPI/OVERNIGHT EVENTS:    no acute events overnight     REVIEW OF SYSTEMS:  CONSTITUTIONAL: No fever, weight loss, or fatigue  EYES: No eye pain, visual disturbances, or discharge  ENMT:  No difficulty hearing, tinnitus, vertigo; No sinus or throat pain  NECK: No pain or stiffness  BREASTS: No pain, masses, or nipple discharge  RESPIRATORY: No cough, wheezing, chills or hemoptysis; No shortness of breath  CARDIOVASCULAR: No chest pain, palpitations, dizziness, or leg swelling  GASTROINTESTINAL: No abdominal or epigastric pain. No nausea, vomiting, or hematemesis; No diarrhea or constipation. No melena or hematochezia.  GENITOURINARY: No dysuria, frequency, hematuria, or incontinence  NEUROLOGICAL: No headaches, memory loss, loss of strength, numbness, or tremors  SKIN: No itching, burning, rashes, or lesions   LYMPH NODES: No enlarged glands  ENDOCRINE: No heat or cold intolerance; No hair loss  MUSCULOSKELETAL: No joint pain or swelling; No muscle, back, or extremity pain  PSYCHIATRIC: No depression, anxiety, mood swings, or difficulty sleeping  HEME/LYMPH: No easy bruising, or bleeding gums  ALLERY AND IMMUNOLOGIC: No hives or eczema  FAMILY HISTORY:  FHx: colon cancer      T(C): 35.9 (10-23-21 @ 10:43), Max: 36.3 (10-22-21 @ 16:14)  HR: 59 (10-23-21 @ 10:43) (59 - 70)  BP: 160/77 (10-23-21 @ 10:43) (124/81 - 160/77)  RR: 18 (10-23-21 @ 07:14) (17 - 18)  SpO2: 99% (10-23-21 @ 10:43) (96% - 99%)  Wt(kg): --Vital Signs Last 24 Hrs  T(C): 35.9 (23 Oct 2021 10:43), Max: 36.3 (22 Oct 2021 16:14)  T(F): 96.6 (23 Oct 2021 10:43), Max: 97.4 (22 Oct 2021 16:14)  HR: 59 (23 Oct 2021 10:43) (59 - 70)  BP: 160/77 (23 Oct 2021 10:43) (124/81 - 160/77)  BP(mean): 108 (23 Oct 2021 05:41) (108 - 108)  RR: 18 (23 Oct 2021 07:14) (17 - 18)  SpO2: 99% (23 Oct 2021 10:43) (96% - 99%)    PHYSICAL EXAM:  GENERAL: NAD, well-groomed, well-developed  HEAD:  Atraumatic, Normocephalic  EYES: EOMI, PERRLA, conjunctiva and sclera clear  ENMT: No tonsillar erythema, exudates, or enlargement; Moist mucous membranes, Good dentition, No lesions  NECK: Supple, No JVD, Normal thyroid  NERVOUS SYSTEM:  Alert & Oriented X3, Good concentration; Motor Strength 5/5 B/L upper and lower extremities; DTRs 2+ intact and symmetric  PULM: Clear to auscultation bilaterally  CARDIAC: Regular rate and rhythm; No murmurs, rubs, or gallops  GI: Soft, Nontender, Nondistended; Bowel sounds present  EXTREMITIES:  trace lower extremity edema   LYMPH: No lymphadenopathy noted  SKIN: No rashes or lesions    Consultant(s) Notes Reviewed:  [x ] YES  [ ] NO  Care Discussed with Consultants/Other Providers [ x] YES  [ ] NO    LABS:                            8.8    9.22  )-----------( 190      ( 23 Oct 2021 06:46 )             28.3   10-23    136  |  99  |  75<HH>  ----------------------------<  128<H>  3.8   |  20  |  1.8<H>    Ca    8.5      23 Oct 2021 06:46  Mg     1.9     10-23    TPro  5.6<L>  /  Alb  3.9  /  TBili  0.9  /  DBili  0.5<H>  /  AST  199<H>  /  ALT  258<H>  /  AlkPhos  156<H>  10-23            Culture - Blood (collected 20 Oct 2021 14:40)  Source: .Blood Blood  Preliminary Report (22 Oct 2021 01:02):    No growth to date.    Culture - Blood (collected 20 Oct 2021 14:30)  Source: .Blood Blood  Preliminary Report (22 Oct 2021 01:02):    No growth to date.    Culture - Urine (collected 20 Oct 2021 12:40)  Source: Clean Catch Clean Catch (Midstream)  Final Report (21 Oct 2021 22:01):    No growth      apixaban 2.5 milliGRAM(s) Oral two times a day  aspirin enteric coated 81 milliGRAM(s) Oral daily  buMETAnide Injectable 1.5 milliGRAM(s) IV Push two times a day  guaifenesin/dextromethorphan Oral Liquid 10 milliLiter(s) Oral every 8 hours PRN  insulin glargine Injectable (LANTUS) 23 Unit(s) SubCutaneous at bedtime  insulin lispro (ADMELOG) corrective regimen sliding scale   SubCutaneous three times a day before meals  insulin lispro (ADMELOG) corrective regimen sliding scale   SubCutaneous at bedtime  insulin lispro Injectable (ADMELOG) 8 Unit(s) SubCutaneous three times a day before meals  isosorbide   mononitrate ER Tablet (IMDUR) 30 milliGRAM(s) Oral daily  levothyroxine 112 MICROGram(s) Oral daily  metoprolol tartrate 25 milliGRAM(s) Oral two times a day  mycophenolate mofetil 500 milliGRAM(s) Oral two times a day  pantoprazole    Tablet 40 milliGRAM(s) Oral before breakfast  predniSONE   Tablet 20 milliGRAM(s) Oral daily  tacrolimus 1 milliGRAM(s) Oral every 12 hours      HEALTH ISSUES - PROBLEM Dx:          Case Discussed with House Staff   Spectra [phone]

## 2021-10-24 NOTE — PROGRESS NOTE ADULT - SUBJECTIVE AND OBJECTIVE BOX
ANDRIA TAVAREZ  81y  [facility] T5-3C 031 A      Patient is a 81y old  Male who presents with a chief complaint of AMS, HFrEF (23 Oct 2021 11:43)      INTERVAL HPI/OVERNIGHT EVENTS:    no acute events overnight ,. no events on telemetry     REVIEW OF SYSTEMS:  CONSTITUTIONAL: No fever, weight loss, or fatigue  EYES: No eye pain, visual disturbances, or discharge  ENMT:  No difficulty hearing, tinnitus, vertigo; No sinus or throat pain  NECK: No pain or stiffness  BREASTS: No pain, masses, or nipple discharge  RESPIRATORY: No cough, wheezing, chills or hemoptysis; No shortness of breath  CARDIOVASCULAR: No chest pain, palpitations, dizziness, or leg swelling  GASTROINTESTINAL: No abdominal or epigastric pain. No nausea, vomiting, or hematemesis; No diarrhea or constipation. No melena or hematochezia.  GENITOURINARY: No dysuria, frequency, hematuria, or incontinence  NEUROLOGICAL: No headaches, memory loss, loss of strength, numbness, or tremors  SKIN: No itching, burning, rashes, or lesions   LYMPH NODES: No enlarged glands  ENDOCRINE: No heat or cold intolerance; No hair loss  MUSCULOSKELETAL: No joint pain or swelling; No muscle, back, or extremity pain  PSYCHIATRIC: No depression, anxiety, mood swings, or difficulty sleeping  HEME/LYMPH: No easy bruising, or bleeding gums  ALLERY AND IMMUNOLOGIC: No hives or eczema  FAMILY HISTORY:  FHx: colon cancer      T(C): 36 (10-24-21 @ 06:06), Max: 36.9 (10-23-21 @ 14:14)  HR: 64 (10-24-21 @ 06:06) (62 - 80)  BP: 166/79 (10-24-21 @ 06:06) (144/82 - 177/81)  RR: 18 (10-24-21 @ 06:06) (17 - 18)  SpO2: 97% (10-23-21 @ 20:29) (97% - 97%)  Wt(kg): --Vital Signs Last 24 Hrs  T(C): 36 (24 Oct 2021 06:06), Max: 36.9 (23 Oct 2021 14:14)  T(F): 96.8 (24 Oct 2021 06:06), Max: 98.5 (23 Oct 2021 14:14)  HR: 64 (24 Oct 2021 06:06) (62 - 80)  BP: 166/79 (24 Oct 2021 06:06) (144/82 - 177/81)  BP(mean): --  RR: 18 (24 Oct 2021 06:06) (17 - 18)  SpO2: 97% (23 Oct 2021 20:29) (97% - 97%)    PHYSICAL EXAM:  GENERAL: NAD, well-groomed, well-developed  HEAD:  Atraumatic, Normocephalic  EYES: EOMI, PERRLA, conjunctiva and sclera clear  ENMT: No tonsillar erythema, exudates, or enlargement; Moist mucous membranes, Good dentition, No lesions  NECK: Supple, No JVD, Normal thyroid  NERVOUS SYSTEM:  Alert & Oriented X3,   PULM: Clear to auscultation bilaterally  CARDIAC: S1s2  GI: Soft, Nontender, Nondistended; Bowel sounds present  EXTREMITIES:  2+ Peripheral Pulses, No clubbing, cyanosis, or edema  LYMPH: No lymphadenopathy noted  SKIN: No rashes or lesions    Consultant(s) Notes Reviewed:  [x ] YES  [ ] NO  Care Discussed with Consultants/Other Providers [ x] YES  [ ] NO    LABS:                            9.1    10.79 )-----------( 202      ( 24 Oct 2021 05:41 )             29.6   10-24    135  |  98  |  74<HH>  ----------------------------<  147<H>  4.0   |  21  |  1.6<H>    Ca    8.8      24 Oct 2021 05:41  Mg     1.8     10-24    TPro  5.7<L>  /  Alb  3.8  /  TBili  0.9  /  DBili  x   /  AST  156<H>  /  ALT  229<H>  /  AlkPhos  151<H>  10-24            apixaban 2.5 milliGRAM(s) Oral two times a day  aspirin enteric coated 81 milliGRAM(s) Oral daily  buMETAnide Injectable 1.5 milliGRAM(s) IV Push two times a day  guaifenesin/dextromethorphan Oral Liquid 10 milliLiter(s) Oral every 8 hours PRN  insulin glargine Injectable (LANTUS) 23 Unit(s) SubCutaneous at bedtime  insulin lispro (ADMELOG) corrective regimen sliding scale   SubCutaneous three times a day before meals  insulin lispro (ADMELOG) corrective regimen sliding scale   SubCutaneous at bedtime  insulin lispro Injectable (ADMELOG) 8 Unit(s) SubCutaneous three times a day before meals  isosorbide   mononitrate ER Tablet (IMDUR) 30 milliGRAM(s) Oral daily  levothyroxine 112 MICROGram(s) Oral daily  metoprolol tartrate 25 milliGRAM(s) Oral two times a day  mycophenolate mofetil 500 milliGRAM(s) Oral two times a day  pantoprazole    Tablet 40 milliGRAM(s) Oral before breakfast  predniSONE   Tablet 20 milliGRAM(s) Oral daily  tacrolimus 1 milliGRAM(s) Oral every 12 hours      HEALTH ISSUES - PROBLEM Dx:          Case Discussed with House Staff     Spectra [phone]

## 2021-10-24 NOTE — PROGRESS NOTE ADULT - SUBJECTIVE AND OBJECTIVE BOX
Nephrology Progress Note    ANDRIA TAVAREZ  [id number]  81y  Male    S:  Patient is seen and examined, events over the last 24h noted.    O:  Allergies:  Ozempic (0.25 mg or 0.5 mg dose) (Hives; Rash)  streptomycin (Unknown)  Trulicity Pen (Hives; Rash)    Hospital Medications:   MEDICATIONS  (STANDING):  apixaban 2.5 milliGRAM(s) Oral two times a day  aspirin enteric coated 81 milliGRAM(s) Oral daily  furosemide    Tablet 40 milliGRAM(s) Oral daily  insulin glargine Injectable (LANTUS) 23 Unit(s) SubCutaneous at bedtime  insulin lispro (ADMELOG) corrective regimen sliding scale   SubCutaneous three times a day before meals  insulin lispro (ADMELOG) corrective regimen sliding scale   SubCutaneous at bedtime  insulin lispro Injectable (ADMELOG) 8 Unit(s) SubCutaneous three times a day before meals  isosorbide   mononitrate ER Tablet (IMDUR) 30 milliGRAM(s) Oral daily  levothyroxine 112 MICROGram(s) Oral daily  metoprolol tartrate 25 milliGRAM(s) Oral two times a day  mycophenolate mofetil 500 milliGRAM(s) Oral two times a day  pantoprazole    Tablet 40 milliGRAM(s) Oral before breakfast  predniSONE   Tablet 20 milliGRAM(s) Oral daily  tacrolimus 1 milliGRAM(s) Oral every 12 hours    MEDICATIONS  (PRN):  guaifenesin/dextromethorphan Oral Liquid 10 milliLiter(s) Oral every 8 hours PRN Cough    Home Medications:  atorvastatin 80 mg oral tablet: orally once a day (at bedtime) (2020 10:00)  d-mycophenolate: 500 milligram(s) orally 2 times a day (2020 10:00)  Eliquis 2.5 mg oral tablet: 1 tab(s) orally 2 times a day (2020 10:00)  famotidine 20 mg oral tablet: 1 tab(s) orally once a day (2020 10:00)  furosemide 20 mg oral tablet: 1 tab(s) orally once a day (2020 10:00)  isosorbide mononitrate 30 mg oral tablet, extended release: 1 tab(s) orally once a day (in the morning) (2020 10:00)  Lantus 100 units/mL subcutaneous solution: 30 unit(s) subcutaneous once a day (at bedtime) (2020 10:00)  metOLazone 2.5 mg oral tablet: 1 tab(s) orally every 48 hours (2020 10:00)  metoprolol tartrate 25 mg oral tablet: 1 tab(s) orally 2 times a day (2020 10:00)  predniSONE 20 mg oral tablet: 1 tab(s) orally once a day (2020 10:00)  Synthroid 112 mcg (0.112 mg) oral tablet: 1 tab(s) orally once a day (2020 10:00)  tacrolimus 1 mg oral capsule: 1 cap(s) orally every 12 hours (2020 10:00)      VITALS:  Daily Weight in k.6 (24 Oct 2021 06:06)  T(F): 96.2 (10-24-21 @ 13:33), Max: 97 (10-23-21 @ 20:30)  HR: 69 (10-24-21 @ 13:33)  BP: 177/77 (10-24-21 @ 13:33)  RR: 18 (10-24-21 @ 13:33)  SpO2: 97% (10-23-21 @ 20:29)  Wt(kg): --  I&O's Detail    23 Oct 2021 07:  -  24 Oct 2021 07:00  --------------------------------------------------------  IN:    Oral Fluid: 1080 mL  Total IN: 1080 mL    OUT:    Voided (mL): 2815 mL  Total OUT: 2815 mL    Total NET: -1735 mL      24 Oct 2021 07:  -  24 Oct 2021 14:50  --------------------------------------------------------  IN:    Oral Fluid: 630 mL  Total IN: 630 mL    OUT:    Voided (mL): 300 mL  Total OUT: 300 mL    Total NET: 330 mL        I&O's Summary    23 Oct 2021 07:01  -  24 Oct 2021 07:00  --------------------------------------------------------  IN: 1080 mL / OUT: 2815 mL / NET: -1735 mL    24 Oct 2021 07:  -  24 Oct 2021 14:50  --------------------------------------------------------  IN: 630 mL / OUT: 300 mL / NET: 330 mL          PHYSICAL EXAM:  Gen: NAD  Resp: decreased bs at the bases   Card: S1/S2  Abd: soft      LABS:    10-24    135  |  98  |  74<HH>  ----------------------------<  147<H>  4.0   |  21  |  1.6<H>    Ca    8.8      24 Oct 2021 05:41  Mg     1.8     10-24    TPro  5.7<L>  /  Alb  3.8  /  TBili  0.9  /  DBili      /  AST  156<H>  /  ALT  229<H>  /  AlkPhos  151<H>  10-24    eGFR if Non African American: 40 mL/min/1.73M2 (10-24-21 @ 05:41)  eGFR if : 46 mL/min/1.73M2 (10-24-21 @ 05:41)    Phosphorus Level, Serum: 3.9 mg/dL (10-20-21 @ 20:00)                            9.1    10.79 )-----------( 202      ( 24 Oct 2021 05:41 )             29.6     Mean Cell Volume: 90.2 fL (10-24-21 @ 05:41)    % Saturation, Iron: 23 % (10-21-21 @ 11:25)      Urine Studies:  Urinalysis Basic - ( 22 Oct 2021 23:23 )    Color: Light Yellow / Appearance: Clear / S.012 / pH:   Gluc:  / Ketone: Negative  / Bili: Negative / Urobili: <2 mg/dL   Blood:  / Protein: 30 mg/dL / Nitrite: Negative   Leuk Esterase: Negative / RBC: 1 /HPF / WBC 1 /HPF   Sq Epi:  / Non Sq Epi: 0 /HPF / Bacteria: Negative        Urea Nitrogen,  Random Urine: 637 mg/dL (10-21-21 @ 01:00)    Creatinine, Random Urine: 47 mg/dL (10-22 @ 23:23)  Protein/Creatinine Ratio Calculation: 1.0 Ratio (10-22 @ 23:23)    Creatinine trend:  Creatinine, Serum: 1.6 mg/dL (10-24-21 @ 05:41)  Creatinine, Serum: 1.8 mg/dL (10-23-21 @ 06:46)  Creatinine, Serum: 2.0 mg/dL (10-22-21 @ 06:29)  Creatinine, Serum: 2.2 mg/dL (10-21-21 @ 11:25)  Creatinine, Serum: 2.3 mg/dL (10-20-21 @ 08:00)  Creatinine, Serum: 1.8 mg/dL (10-01-19 @ 09:54)  Creatinine, Serum: 1.3 mg/dL (19 @ 22:05)  Creatinine, Serum: 1.3 mg/dL (18 @ 07:22)    Hemoglobin A1C, Whole Blood: 8.5 % (10-01-19 @ 09:54)      US Kidney and Bladder:   EXAM:  US KIDNEYS AND BLADDER        PROCEDURE DATE:  10/21/2021      INTERPRETATION:  CLINICAL INFORMATION: Worsening renal function.    COMPARISON: None available.    TECHNIQUE: Sonography of the kidneys and bladder.    FINDINGS:    Right kidney: 11.0 cm. No hydronephrosis or calculi.    Left kidney:  10.5 cm. No hydronephrosis or calculi.    Urinary bladder: Urinary bladder is not distended.    IMPRESSION:    Normal renal ultrasound.

## 2021-10-24 NOTE — PROGRESS NOTE ADULT - SUBJECTIVE AND OBJECTIVE BOX
Gastroenterology progress note:     Patient is a 81y old  Male who presents with a chief complaint of AMS, HFrEF (24 Oct 2021 11:18)       Admitted on: 10-20-21    We are following the patient for:       Interval History:    No acute events overnight.   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    AF (atrial fibrillation)  s/p DCCV    Carotid stenosis    COPD (chronic obstructive pulmonary disease)    H/O coronary artery bypass surgery  2001    S/P inguinal hernia repair    History of appendectomy        MEDICATIONS  (STANDING):  apixaban 2.5 milliGRAM(s) Oral two times a day  aspirin enteric coated 81 milliGRAM(s) Oral daily  furosemide    Tablet 40 milliGRAM(s) Oral daily  insulin glargine Injectable (LANTUS) 23 Unit(s) SubCutaneous at bedtime  insulin lispro (ADMELOG) corrective regimen sliding scale   SubCutaneous at bedtime  insulin lispro (ADMELOG) corrective regimen sliding scale   SubCutaneous three times a day before meals  insulin lispro Injectable (ADMELOG) 8 Unit(s) SubCutaneous three times a day before meals  isosorbide   mononitrate ER Tablet (IMDUR) 30 milliGRAM(s) Oral daily  levothyroxine 112 MICROGram(s) Oral daily  metoprolol tartrate 25 milliGRAM(s) Oral two times a day  mycophenolate mofetil 500 milliGRAM(s) Oral two times a day  pantoprazole    Tablet 40 milliGRAM(s) Oral before breakfast  predniSONE   Tablet 20 milliGRAM(s) Oral daily  tacrolimus 1 milliGRAM(s) Oral every 12 hours    MEDICATIONS  (PRN):  guaifenesin/dextromethorphan Oral Liquid 10 milliLiter(s) Oral every 8 hours PRN Cough      Allergies  Ozempic (0.25 mg or 0.5 mg dose) (Hives; Rash)  streptomycin (Unknown)  Trulicity Pen (Hives; Rash)      Review of Systems:   Cardiovascular:  No Chest Pain, No Palpitations  Respiratory:  No Cough, No Dyspnea  Gastrointestinal:  As described in HPI  Skin:  No Skin Lesions, No Jaundice  Neuro:  No Syncope, No Dizziness    Physical Examination:  T(C): 36 (10-24-21 @ 06:06), Max: 36.9 (10-23-21 @ 14:14)  HR: 64 (10-24-21 @ 06:06) (62 - 80)  BP: 166/79 (10-24-21 @ 06:06) (144/82 - 177/81)  RR: 18 (10-24-21 @ 06:06) (17 - 18)  SpO2: 97% (10-23-21 @ 20:29) (97% - 97%)      10-23-21 @ 07:01  -  10-24-21 @ 07:00  --------------------------------------------------------  IN: 1080 mL / OUT: 2815 mL / NET: -1735 mL    10-24-21 @ 07:01  -  10-24-21 @ 13:34  --------------------------------------------------------  IN: 630 mL / OUT: 300 mL / NET: 330 mL        GENERAL: AAOx3, no acute distress.  HEAD:  Atraumatic, Normocephalic  EYES: conjunctiva and sclera clear  NECK: Supple, no JVD or thyromegaly  CHEST/LUNG: Clear to auscultation bilaterally; No wheeze, rhonchi, or rales  HEART: Regular rate and rhythm; normal S1, S2, No murmurs.  ABDOMEN: Soft, nontender, nondistended; Bowel sounds present  NEUROLOGY: No asterixis or tremor.   SKIN: Intact, no jaundice     Data:                        9.1    10.79 )-----------( 202      ( 24 Oct 2021 05:41 )             29.6     Hgb trend:  9.1  10-24-21 @ 05:41  8.8  10-23-21 @ 06:46  9.0  10-22-21 @ 06:29        10-24    135  |  98  |  74<HH>  ----------------------------<  147<H>  4.0   |  21  |  1.6<H>    Ca    8.8      24 Oct 2021 05:41  Mg     1.8     10-24    TPro  5.7<L>  /  Alb  3.8  /  TBili  0.9  /  DBili  x   /  AST  156<H>  /  ALT  229<H>  /  AlkPhos  151<H>  10-24    Liver panel trend:  TBili 0.9   /      /      /   AlkP 151   /   Tptn 5.7   /   Alb 3.8    /   DBili --      10-24  TBili 0.9   /      /      /   AlkP 156   /   Tptn 5.6   /   Alb 3.9    /   DBili 0.5      10-23  TBili 1.1   /      /      /   AlkP 173   /   Tptn 5.6   /   Alb 3.7    /   DBili --      10-22  TBili 1.2   /   AST 23   /   ALT 11   /   AlkP 95   /   Tptn 6.4   /   Alb 4.2    /   DBili --      10-20             Radiology:

## 2021-10-25 NOTE — PROGRESS NOTE ADULT - SUBJECTIVE AND OBJECTIVE BOX
seen and examined  no distress   lying comfortable         PAST HISTORY  --------------------------------------------------------------------------------  No significant changes to PMH, PSH, FHx, SHx, unless otherwise noted    ALLERGIES & MEDICATIONS  --------------------------------------------------------------------------------  Allergies    Ozempic (0.25 mg or 0.5 mg dose) (Hives; Rash)  streptomycin (Unknown)  Trulicity Pen (Hives; Rash)    Intolerances      Standing Inpatient Medications  apixaban 2.5 milliGRAM(s) Oral two times a day  aspirin enteric coated 81 milliGRAM(s) Oral daily  furosemide    Tablet 40 milliGRAM(s) Oral daily  insulin glargine Injectable (LANTUS) 23 Unit(s) SubCutaneous at bedtime  insulin lispro (ADMELOG) corrective regimen sliding scale   SubCutaneous three times a day before meals  insulin lispro (ADMELOG) corrective regimen sliding scale   SubCutaneous at bedtime  insulin lispro Injectable (ADMELOG) 8 Unit(s) SubCutaneous three times a day before meals  isosorbide   mononitrate ER Tablet (IMDUR) 30 milliGRAM(s) Oral daily  levothyroxine 112 MICROGram(s) Oral daily  metoprolol tartrate 25 milliGRAM(s) Oral two times a day  mycophenolate mofetil 500 milliGRAM(s) Oral two times a day  pantoprazole    Tablet 40 milliGRAM(s) Oral before breakfast  predniSONE   Tablet 20 milliGRAM(s) Oral daily  tacrolimus 1 milliGRAM(s) Oral every 12 hours    PRN Inpatient Medications  guaifenesin/dextromethorphan Oral Liquid 10 milliLiter(s) Oral every 8 hours PRN        VITALS/PHYSICAL EXAM  --------------------------------------------------------------------------------  T(C): 35.9 (10-24-21 @ 21:28), Max: 35.9 (10-24-21 @ 21:28)  HR: 80 (10-25-21 @ 05:14) (67 - 80)  BP: 173/86 (10-25-21 @ 05:14) (171/77 - 177/77)  RR: 18 (10-24-21 @ 21:28) (18 - 18)  SpO2: 96% (10-25-21 @ 08:21) (96% - 96%)  Wt(kg): --        10-24-21 @ 07:01  -  10-25-21 @ 07:00  --------------------------------------------------------  IN: 1750 mL / OUT: 1000 mL / NET: 750 mL      Physical Exam:  	Gen: NAD  	Pulm: CTA B/L  	CV:  S1S2; no rub  	Abd: soft, nontender/nondistended  	LE: dressing       LABS/STUDIES  --------------------------------------------------------------------------------              9.8    11.08 >-----------<  204      [10-25-21 @ 05:47]              32.1     135  |  98  |  74  ----------------------------<  147      [10-24-21 @ 05:41]  4.0   |  21  |  1.6        Ca     8.8     [10-24-21 @ 05:41]      Mg     1.8     [10-24-21 @ 05:41]    TPro  5.7  /  Alb  3.8  /  TBili  0.9  /  DBili  x   /  AST  156  /  ALT  229  /  AlkPhos  151  [10-24-21 @ 05:41]        Creatinine Trend:  SCr 1.6 [10-24 @ 05:41]  SCr 1.8 [10-23 @ 06:46]  SCr 2.0 [10-22 @ 06:29]  SCr 2.2 [10-21 @ 11:25]  SCr 2.3 [10-20 @ 08:00]    Urinalysis - [10-22-21 @ 23:23]      Color Light Yellow / Appearance Clear / SG 1.012 / pH 6.0      Gluc Negative / Ketone Negative  / Bili Negative / Urobili <2 mg/dL       Blood Trace / Protein 30 mg/dL / Leuk Est Negative / Nitrite Negative      RBC 1 / WBC 1 / Hyaline 5 / Gran  / Sq Epi  / Non Sq Epi 0 / Bacteria Negative    Urine Creatinine 47      [10-22-21 @ 23:23]  Urine Protein 49      [10-22-21 @ 23:23]  Urine Sodium 22.0      [10-21-21 @ 01:00]  Urine Urea Nitrogen 637      [10-21-21 @ 01:00]  Urine Osmolality 369      [10-21-21 @ 01:00]    Iron 70, TIBC 303, %sat 23      [10-21-21 @ 11:25]  HbA1c 8.5      [10-01-19 @ 09:54]  TSH 3.49      [10-21-21 @ 11:25]  Lipid: chol 85, TG 45, HDL 47, LDL --      [10-21-21 @ 11:25]      C3 Complement 84      [10-20-21 @ 20:00]  Syphilis Screen (Treponema Pallidum Ab) Negative      [10-21-21 @ 11:25]

## 2021-10-25 NOTE — CHART NOTE - NSCHARTNOTEFT_GEN_A_CORE
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 Patient is now AAOx3. Likely transient metabolic/uremic encephalopathy currently baseline and nonfocal.   CT head negative. EEG showing generalized slowing suggestive of metabolic encephalopathy. Brain MRI suggestive of amyloid angiopathy.   No further neurological workup for now  Please call as needed

## 2021-10-25 NOTE — PROGRESS NOTE ADULT - SUBJECTIVE AND OBJECTIVE BOX
ANDRIA TAVAREZ  81y  [facility] [id number]      Patient is a 81y old  Male who presents with a chief complaint of AMS, HFrEF (25 Oct 2021 08:24)      INTERVAL HPI/OVERNIGHT EVENTS:    overnight patient had fall, upon examination patient endorses not complaints    REVIEW OF SYSTEMS:  CONSTITUTIONAL: No fever, weight loss, or fatigue  EYES: No eye pain, visual disturbances, or discharge  ENMT:  No difficulty hearing, tinnitus, vertigo; No sinus or throat pain  NECK: No pain or stiffness  BREASTS: No pain, masses, or nipple discharge  RESPIRATORY: No cough, wheezing, chills or hemoptysis; No shortness of breath  CARDIOVASCULAR: No chest pain, palpitations, dizziness, or leg swelling  GASTROINTESTINAL: No abdominal or epigastric pain. No nausea, vomiting, or hematemesis; No diarrhea or constipation. No melena or hematochezia.  GENITOURINARY: No dysuria, frequency, hematuria, or incontinence  NEUROLOGICAL: No headaches, memory loss, loss of strength, numbness, or tremors  SKIN: No itching, burning, rashes, or lesions   LYMPH NODES: No enlarged glands  ENDOCRINE: No heat or cold intolerance; No hair loss  MUSCULOSKELETAL: No joint pain or swelling; No muscle, back, or extremity pain  PSYCHIATRIC: No depression, anxiety, mood swings, or difficulty sleeping  HEME/LYMPH: No easy bruising, or bleeding gums  ALLERY AND IMMUNOLOGIC: No hives or eczema  FAMILY HISTORY:  FHx: colon cancer      T(C): 35.9 (10-24-21 @ 21:28), Max: 35.9 (10-24-21 @ 21:28)  HR: 80 (10-25-21 @ 05:14) (67 - 80)  BP: 173/86 (10-25-21 @ 05:14) (171/77 - 177/77)  RR: 18 (10-24-21 @ 21:28) (18 - 18)  SpO2: 96% (10-25-21 @ 08:21) (96% - 96%)  Wt(kg): --Vital Signs Last 24 Hrs  T(C): 35.9 (24 Oct 2021 21:28), Max: 35.9 (24 Oct 2021 21:28)  T(F): 96.7 (24 Oct 2021 21:28), Max: 96.7 (24 Oct 2021 21:28)  HR: 80 (25 Oct 2021 05:14) (67 - 80)  BP: 173/86 (25 Oct 2021 05:14) (171/77 - 177/77)  BP(mean): --  RR: 18 (24 Oct 2021 21:28) (18 - 18)  SpO2: 96% (25 Oct 2021 08:21) (96% - 96%)    PHYSICAL EXAM:  GENERAL: NAD, well-groomed, well-developed  HEAD:  Atraumatic, Normocephalic  EYES: EOMI, PERRLA, conjunctiva and sclera clear  ENMT: No tonsillar erythema, exudates, or enlargement; Moist mucous membranes, Good dentition, No lesions  NECK: Supple, No JVD, Normal thyroid  NERVOUS SYSTEM:  Alert & Oriented X3, Good concentration; Motor Strength 5/5 B/L upper and lower extremities; DTRs 2+ intact and symmetric  PULM: Clear to auscultation bilaterally  CARDIAC: Regular rate and rhythm; No murmurs, rubs, or gallops  GI: Soft, Nontender, Nondistended; Bowel sounds present  EXTREMITIES:  trace lower extremity edema   LYMPH: No lymphadenopathy noted  SKIN: No rashes or lesions    Consultant(s) Notes Reviewed:  [x ] YES  [ ] NO  Care Discussed with Consultants/Other Providers [ x] YES  [ ] NO    LABS:                            9.8    11.08 )-----------( 204      ( 25 Oct 2021 05:47 )             32.1   10-25    136  |  98  |  71<HH>  ----------------------------<  185<H>  4.4   |  19  |  1.7<H>    Ca    9.1      25 Oct 2021 05:47  Mg     1.9     10-25    TPro  5.9<L>  /  Alb  4.0  /  TBili  1.1  /  DBili  x   /  AST  141<H>  /  ALT  206<H>  /  AlkPhos  145<H>  10-25            apixaban 2.5 milliGRAM(s) Oral two times a day  aspirin enteric coated 81 milliGRAM(s) Oral daily  furosemide    Tablet 40 milliGRAM(s) Oral daily  guaifenesin/dextromethorphan Oral Liquid 10 milliLiter(s) Oral every 8 hours PRN  insulin glargine Injectable (LANTUS) 23 Unit(s) SubCutaneous at bedtime  insulin lispro (ADMELOG) corrective regimen sliding scale   SubCutaneous three times a day before meals  insulin lispro (ADMELOG) corrective regimen sliding scale   SubCutaneous at bedtime  insulin lispro Injectable (ADMELOG) 8 Unit(s) SubCutaneous three times a day before meals  isosorbide   mononitrate ER Tablet (IMDUR) 30 milliGRAM(s) Oral daily  levothyroxine 112 MICROGram(s) Oral daily  metoprolol tartrate 25 milliGRAM(s) Oral two times a day  mycophenolate mofetil 500 milliGRAM(s) Oral two times a day  pantoprazole    Tablet 40 milliGRAM(s) Oral before breakfast  predniSONE   Tablet 20 milliGRAM(s) Oral daily  tacrolimus 1 milliGRAM(s) Oral every 12 hours      HEALTH ISSUES - PROBLEM Dx:          Case Discussed with House Staff   45 minutes spent on total encounter; more than 50% of the visit was spent counseling and/or coordinating care by the attending physician including reviewing labs and [facility]  [employer] [phone]

## 2021-10-25 NOTE — PROGRESS NOTE ADULT - SUBJECTIVE AND OBJECTIVE BOX
ANDRIA TAVAREZ 81y Male  MRN#: [id number]   CODE STATUS: Full  Hospital Day: 5d  Pt is currently admitted with the primary diagnosis of: AMS + Volume Overload secondary to HFpEF.    SUBJECTIVE  Overnight events: None reported.  Subjective complaints: Denies shortness of breath, chest pain, headache.                                          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    AF (atrial fibrillation)  s/p DCCV    Carotid stenosis    COPD (chronic obstructive pulmonary disease)    H/O coronary artery bypass surgery  2001    S/P inguinal hernia repair    History of appendectomy                                              -----------------------------------------------------------  ALLERGIES:  Ozempic (0.25 mg or 0.5 mg dose) (Hives; Rash)  streptomycin (Unknown)  Trulicity Pen (Hives; Rash)                                          ------------------------------------------------------------  HOME MEDICATIONS  Home Medications:  atorvastatin 80 mg oral tablet: orally once a day (at bedtime) (28 Jan 2020 10:00)  d-mycophenolate: 500 milligram(s) orally 2 times a day (28 Jan 2020 10:00)  Eliquis 2.5 mg oral tablet: 1 tab(s) orally 2 times a day (28 Jan 2020 10:00)  famotidine 20 mg oral tablet: 1 tab(s) orally once a day (28 Jan 2020 10:00)  furosemide 20 mg oral tablet: 1 tab(s) orally once a day (28 Jan 2020 10:00)  isosorbide mononitrate 30 mg oral tablet, extended release: 1 tab(s) orally once a day (in the morning) (28 Jan 2020 10:00)  Lantus 100 units/mL subcutaneous solution: 30 unit(s) subcutaneous once a day (at bedtime) (28 Jan 2020 10:00)  metOLazone 2.5 mg oral tablet: 1 tab(s) orally every 48 hours (28 Jan 2020 10:00)  metoprolol tartrate 25 mg oral tablet: 1 tab(s) orally 2 times a day (28 Jan 2020 10:00)  predniSONE 20 mg oral tablet: 1 tab(s) orally once a day (28 Jan 2020 10:00)  Synthroid 112 mcg (0.112 mg) oral tablet: 1 tab(s) orally once a day (28 Jan 2020 10:00)  tacrolimus 1 mg oral capsule: 1 cap(s) orally every 12 hours (28 Jan 2020 10:00)              MEDICATIONS:  STANDING MEDICATIONS  apixaban 2.5 milliGRAM(s) Oral two times a day  aspirin enteric coated 81 milliGRAM(s) Oral daily  furosemide    Tablet 40 milliGRAM(s) Oral daily  insulin glargine Injectable (LANTUS) 23 Unit(s) SubCutaneous at bedtime  insulin lispro (ADMELOG) corrective regimen sliding scale   SubCutaneous three times a day before meals  insulin lispro (ADMELOG) corrective regimen sliding scale   SubCutaneous at bedtime  insulin lispro Injectable (ADMELOG) 8 Unit(s) SubCutaneous three times a day before meals  isosorbide   mononitrate ER Tablet (IMDUR) 30 milliGRAM(s) Oral daily  levothyroxine 112 MICROGram(s) Oral daily  metoprolol tartrate 25 milliGRAM(s) Oral two times a day  mycophenolate mofetil 500 milliGRAM(s) Oral two times a day  pantoprazole    Tablet 40 milliGRAM(s) Oral before breakfast  predniSONE   Tablet 20 milliGRAM(s) Oral daily  tacrolimus 1 milliGRAM(s) Oral every 12 hours    PRN MEDICATIONS  guaifenesin/dextromethorphan Oral Liquid 10 milliLiter(s) Oral every 8 hours PRN                                            ------------------------------------------------------------  VITAL SIGNS: Last 24 Hours  T(C): 35.7 (25 Oct 2021 13:06), Max: 35.9 (24 Oct 2021 21:28)  T(F): 96.2 (25 Oct 2021 13:06), Max: 96.7 (24 Oct 2021 21:28)  HR: 66 (25 Oct 2021 13:06) (66 - 80)  BP: 164/72 (25 Oct 2021 13:06) (164/72 - 173/86)  BP(mean): --  RR: 18 (25 Oct 2021 13:06) (18 - 18)  SpO2: 96% (25 Oct 2021 08:21) (96% - 96%)    10-24-21 @ 07:01  -  10-25-21 @ 07:00  --------------------------------------------------------  IN: 1750 mL / OUT: 1000 mL / NET: 750 mL    10-25-21 @ 07:01  -  10-25-21 @ 14:58  --------------------------------------------------------  IN: 0 mL / OUT: 275 mL / NET: -275 mL                                         --------------------------------------------------------------  LABS:                        9.8    11.08 )-----------( 204      ( 25 Oct 2021 05:47 )             32.1     10-25    136  |  98  |  71<HH>  ----------------------------<  185<H>  4.4   |  19  |  1.7<H>    Ca    9.1      25 Oct 2021 05:47  Mg     1.9     10-25    TPro  5.9<L>  /  Alb  4.0  /  TBili  1.1  /  DBili  x   /  AST  141<H>  /  ALT  206<H>  /  AlkPhos  145<H>  10-25                                          -------------------------------------------------------------  RADIOLOGY:  < from: US Abdomen Doppler (10.23.21 @ 17:40) >  INTERPRETATION:  Clinical History / Reason for exam: Right upper quadrant pain.    Duplex imaging of the venous structures of the liver.    Findings/  impression:    Hepatic venous system is patent.    Portal venous system is also patent. The splenic vein is not well seen.    < end of copied text >  < from: TTE Echo Complete w/ Contrast w/ Doppler (10.22.21 @ 07:59) >  Summary:   1. Moderately to severely decreased global left ventricular systolic function.   2. Severely enlarged left atrium.   3. Severely enlarged right atrium.   4. Multiple left ventricular regional wall motion abnormalities exist. See wall motion findings.   5. Mild eccentric left ventricular hypertrophy.   6. The left ventricular diastolic function could not be assessed in this study.   7. Severely enlarged right ventricle.   8. Severely reduced RV systolic function.   9. Mild to moderate mitral valve regurgitation.  10. Moderate mitral annular calcification.  11. Mild tricuspid regurgitation.  12. Mild aortic regurgitation.  13. Sclerotic aortic valve with normal opening.  14. Complex atheroma is noted in the ascending aorta.  15. Estimated pulmonary artery systolic pressure is 64.8 mmHg assuming a right atrial pressure of 15 mmHg, which is consistent with severe pulmonary hypertension.  16. Pulmonary hypertension is present.  17. LA volume Index is 71.9 ml/m² ml/m2.  18. There is moderate aortic root calcification.    < end of copied text >                                            --------------------------------------------------------------  PHYSICAL EXAM:  General: alert, awake, no acute distress  HEENT: atraumatic  LUNGS: clear to auscultation bilaterally, no wheezes noted  HEART: regular rate/rhythm, no murmurs/gallops  ABDOMEN: soft, nontender, nondistended, normoactive bowel sounds  EXT: 2+ radial pulses, no edema noted  NEURO: aaox4, no focal deficits noted  SKIN: mild L upper thoracic hematoma noted, with parallel scratches and healing wound                                         --------------------------------------------------------------  ASSESSMENT & PLAN  Past medical history and hospital course:  82 YO M w/extensive PMHx admitted to medicine for AMS now resolving and dyspnea secondary to acute volume overload in the setting of HFpEF and C3 Glomerulopathy.     #Acute on Chronic HFpEF  #Elevated Troponin - trending down overall  - was on Bumex, now transitioned to Lasix 40mg PO BID  - TTE 10/22: LA/RA dilation, multiple LV wall motion abnormalities, moderate-severely decreased LV systolic function, severe pulmonary hypertension  - Strict Is/Os, Daily Weights, 1.5L Fluid Restriction  - Cardio consulted, no further recommendations at this time, added home metolazone dose today  - continuing with Aspirin 81mg PO daily    #AMS - possibly due to hypoxia in the setting of COPD, not on home oxygen  s/p #Fall - no LOC, head trauma, L upper thoracic hematoma noted as in physical exam above, stable, no tenderness to palpation  - 10/20 CT Head unremarkable but limited by motion artifact  - Neuro consulted, EEG diffuse cerebral electrophysiological dysfunction (non-specific)  - MRI brain when able, BCx negative on 10/20, Procalcitonin 10/20 0.08 WNL, lactate trending down (1.5 now)    #C3 glomerulopathy  - Nephro following, continuing with prednisone + cellcept + prograf; tacrolimus trough WNL  - U-Na/U-Osm WNL, Renal U/S no hydronephrosis, strict Is/Os, daily weights     #Paroxysmal atrial fibrillation s/p DCCV - on Eliquis + Metoprolol    Chronic Essential HTN  - Monitor w/diuresis, if elevated again can start Procardia XL 30mg QD    COPD  - duoneb Q6hrs PRN     DM (diabetes mellitus)   - A1C 7.5 ordered  - Lantus 24U QHS, Ademelog 8U QAC     Hypothyroidism  - TSH normal  - c/w Synthroid 112mcg QD     Transaminitis  -not present on admission  -RUQ US ordered--->shows right sided pleural effusion  -as per hepatology recs; RUQ with doppler ordered  -atorvastatin discontinued                                                                              ----------------------------------------------------  # DVT prophylaxis:  # GI prophylaxis:  # Diet:  # Activity:  # Code status:  # Disposition:                                                                           --------------------------------------------------------  # Handoff: ANDRIA TAVAREZ 81y Male  MRN#: [id number]   CODE STATUS: Full  Hospital Day: 5d  Pt is currently admitted with the primary diagnosis of: AMS + Volume Overload secondary to HFpEF.    SUBJECTIVE  Overnight events: None reported.  Subjective complaints: Denies shortness of breath, chest pain, headache.                                          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    AF (atrial fibrillation)  s/p DCCV    Carotid stenosis    COPD (chronic obstructive pulmonary disease)    H/O coronary artery bypass surgery  2001    S/P inguinal hernia repair    History of appendectomy                                              -----------------------------------------------------------  ALLERGIES:  Ozempic (0.25 mg or 0.5 mg dose) (Hives; Rash)  streptomycin (Unknown)  Trulicity Pen (Hives; Rash)                                          ------------------------------------------------------------  HOME MEDICATIONS  Home Medications:  atorvastatin 80 mg oral tablet: orally once a day (at bedtime) (28 Jan 2020 10:00)  d-mycophenolate: 500 milligram(s) orally 2 times a day (28 Jan 2020 10:00)  Eliquis 2.5 mg oral tablet: 1 tab(s) orally 2 times a day (28 Jan 2020 10:00)  famotidine 20 mg oral tablet: 1 tab(s) orally once a day (28 Jan 2020 10:00)  furosemide 20 mg oral tablet: 1 tab(s) orally once a day (28 Jan 2020 10:00)  isosorbide mononitrate 30 mg oral tablet, extended release: 1 tab(s) orally once a day (in the morning) (28 Jan 2020 10:00)  Lantus 100 units/mL subcutaneous solution: 30 unit(s) subcutaneous once a day (at bedtime) (28 Jan 2020 10:00)  metOLazone 2.5 mg oral tablet: 1 tab(s) orally every 48 hours (28 Jan 2020 10:00)  metoprolol tartrate 25 mg oral tablet: 1 tab(s) orally 2 times a day (28 Jan 2020 10:00)  predniSONE 20 mg oral tablet: 1 tab(s) orally once a day (28 Jan 2020 10:00)  Synthroid 112 mcg (0.112 mg) oral tablet: 1 tab(s) orally once a day (28 Jan 2020 10:00)  tacrolimus 1 mg oral capsule: 1 cap(s) orally every 12 hours (28 Jan 2020 10:00)              MEDICATIONS:  STANDING MEDICATIONS  apixaban 2.5 milliGRAM(s) Oral two times a day  aspirin enteric coated 81 milliGRAM(s) Oral daily  furosemide    Tablet 40 milliGRAM(s) Oral daily  insulin glargine Injectable (LANTUS) 23 Unit(s) SubCutaneous at bedtime  insulin lispro (ADMELOG) corrective regimen sliding scale   SubCutaneous three times a day before meals  insulin lispro (ADMELOG) corrective regimen sliding scale   SubCutaneous at bedtime  insulin lispro Injectable (ADMELOG) 8 Unit(s) SubCutaneous three times a day before meals  isosorbide   mononitrate ER Tablet (IMDUR) 30 milliGRAM(s) Oral daily  levothyroxine 112 MICROGram(s) Oral daily  metoprolol tartrate 25 milliGRAM(s) Oral two times a day  mycophenolate mofetil 500 milliGRAM(s) Oral two times a day  pantoprazole    Tablet 40 milliGRAM(s) Oral before breakfast  predniSONE   Tablet 20 milliGRAM(s) Oral daily  tacrolimus 1 milliGRAM(s) Oral every 12 hours    PRN MEDICATIONS  guaifenesin/dextromethorphan Oral Liquid 10 milliLiter(s) Oral every 8 hours PRN                                            ------------------------------------------------------------  VITAL SIGNS: Last 24 Hours  T(C): 35.7 (25 Oct 2021 13:06), Max: 35.9 (24 Oct 2021 21:28)  T(F): 96.2 (25 Oct 2021 13:06), Max: 96.7 (24 Oct 2021 21:28)  HR: 66 (25 Oct 2021 13:06) (66 - 80)  BP: 164/72 (25 Oct 2021 13:06) (164/72 - 173/86)  BP(mean): --  RR: 18 (25 Oct 2021 13:06) (18 - 18)  SpO2: 96% (25 Oct 2021 08:21) (96% - 96%)    10-24-21 @ 07:01  -  10-25-21 @ 07:00  --------------------------------------------------------  IN: 1750 mL / OUT: 1000 mL / NET: 750 mL    10-25-21 @ 07:01  -  10-25-21 @ 14:58  --------------------------------------------------------  IN: 0 mL / OUT: 275 mL / NET: -275 mL                                         --------------------------------------------------------------  LABS:                        9.8    11.08 )-----------( 204      ( 25 Oct 2021 05:47 )             32.1     10-25    136  |  98  |  71<HH>  ----------------------------<  185<H>  4.4   |  19  |  1.7<H>    Ca    9.1      25 Oct 2021 05:47  Mg     1.9     10-25    TPro  5.9<L>  /  Alb  4.0  /  TBili  1.1  /  DBili  x   /  AST  141<H>  /  ALT  206<H>  /  AlkPhos  145<H>  10-25                                          -------------------------------------------------------------  RADIOLOGY:  < from: US Abdomen Doppler (10.23.21 @ 17:40) >  INTERPRETATION:  Clinical History / Reason for exam: Right upper quadrant pain.    Duplex imaging of the venous structures of the liver.    Findings/  impression:    Hepatic venous system is patent.    Portal venous system is also patent. The splenic vein is not well seen.    < end of copied text >  < from: TTE Echo Complete w/ Contrast w/ Doppler (10.22.21 @ 07:59) >  Summary:   1. Moderately to severely decreased global left ventricular systolic function.   2. Severely enlarged left atrium.   3. Severely enlarged right atrium.   4. Multiple left ventricular regional wall motion abnormalities exist. See wall motion findings.   5. Mild eccentric left ventricular hypertrophy.   6. The left ventricular diastolic function could not be assessed in this study.   7. Severely enlarged right ventricle.   8. Severely reduced RV systolic function.   9. Mild to moderate mitral valve regurgitation.  10. Moderate mitral annular calcification.  11. Mild tricuspid regurgitation.  12. Mild aortic regurgitation.  13. Sclerotic aortic valve with normal opening.  14. Complex atheroma is noted in the ascending aorta.  15. Estimated pulmonary artery systolic pressure is 64.8 mmHg assuming a right atrial pressure of 15 mmHg, which is consistent with severe pulmonary hypertension.  16. Pulmonary hypertension is present.  17. LA volume Index is 71.9 ml/m² ml/m2.  18. There is moderate aortic root calcification.    < end of copied text >                                          --------------------------------------------------------------  PHYSICAL EXAM:  General: alert, awake, no acute distress  HEENT: atraumatic  LUNGS: clear to auscultation bilaterally, no wheezes noted  HEART: regular rate/rhythm, no murmurs/gallops  ABDOMEN: soft, nontender, nondistended, normoactive bowel sounds  EXT: 2+ radial pulses, no edema noted  NEURO: aaox4, no focal deficits noted  SKIN: mild L upper thoracic hematoma noted, with parallel scratches and healing wound                                         --------------------------------------------------------------  ASSESSMENT & PLAN  Past medical history and hospital course:  82 YO M w/extensive PMHx admitted to medicine for AMS now resolving and dyspnea secondary to acute volume overload in the setting of HFpEF and C3 Glomerulopathy.     #Acute on Chronic HFpEF  #Elevated Troponin - trending down overall  - was on Bumex, now transitioned to Lasix 40mg PO BID  - TTE 10/22: LA/RA dilation, multiple LV wall motion abnormalities, moderate-severely decreased LV systolic function, severe pulmonary hypertension  - Strict Is/Os, Daily Weights, 1.5L Fluid Restriction  - Cardio consulted, no further recommendations at this time, added home metolazone dose today  - continuing with Aspirin 81mg PO daily    #AMS - possibly due to hypoxia in the setting of COPD, not on home oxygen  s/p #Fall - no LOC, head trauma, L upper thoracic hematoma noted as in physical exam above, stable, no tenderness to palpation  - 10/20 CT Head unremarkable but limited by motion artifact  - Neuro consulted, EEG diffuse cerebral electrophysiological dysfunction (non-specific)  - MRI brain when able, BCx negative on 10/20, Procalcitonin 10/20 0.08 WNL, lactate trending down (1.5 now)    #C3 glomerulopathy  - Nephro following, continuing with prednisone + cellcept + prograf; tacrolimus trough WNL  - U-Na/U-Osm WNL, Renal U/S no hydronephrosis, strict Is/Os, daily weights     #Paroxysmal atrial fibrillation s/p DCCV - on Eliquis + Metoprolol    #Chronic Essential Hypertension   - has been elevated last 24h, can start Procardia XL 30mg PO daily, will reassess tomorrow    #COPD - continuing with Duoneb q6h PRN    #T2DM - 10/21 A1C 7.5, on Lantus 23U @night + Admelog 8U before meals + SSI    #Hypothyroidism - 10/21 TSH WNL, continuing with Synthroid 112ug PO daily    #Transaminitis - improving, new onset not present on admission  - 10/22 RUQ U/S: right sided pleural effusion  - 10/23 Abdominal U/S: patent portal/hepatic systems  - d/c atorvastatin                                                                            ----------------------------------------------------  # DVT prophylaxis: Eliquis 2.5mg PO BID  # GI prophylaxis: Protonix 40mg PO qAM  # Diet: DASH/TLC + Consistent Carbs + 1.5L Fluid Restriction  # Activity: Increase as Tolerated  # Code status: Full  # Disposition: Remain on 3C                                                                           --------------------------------------------------------  # Handoff: f/u with Dr. Witt

## 2021-10-25 NOTE — DISCHARGE NOTE NURSING/CASE MANAGEMENT/SOCIAL WORK - PATIENT PORTAL LINK FT
You can access the FollowMyHealth Patient Portal offered by [facility] by registering at the following website: [email or web address]. By joining [employer]’s FollowMyHealth portal, you will also be able to view your health information using other applications (apps) compatible with our system.

## 2021-10-25 NOTE — CHART NOTE - NSCHARTNOTEFT_GEN_A_CORE
Called by RN around 5am that pt fell. Went to assess pt. Pt was sleeping in his chair next to bed, woke up and wanted to get into bed, as he was getting out of chair he put too much weight on one side of the armrest causing the chair to flip to the side and causing him to fall. Pt did not hit his head, pt endorses that he feels fine. Pt did not pass out, no lightheadedness, no palpitations, no chest pain, no sob, no blurry vision or changes in vision, no headache. Exam unremarkable, AAO*3, full strength b/l in upper and lower extremities, no focal deficits. Hemodynamically stable and no acute events on telemetry. Discussed fall precautions with RN and pt on bed alarm.

## 2021-10-26 NOTE — DISCHARGE NOTE PROVIDER - HOSPITAL COURSE
Patient is an 81 year old male with PMHx significant for C3 glomerulopathy on prograft and cellcept, CHFpEF, Angina Pectoris, atrial fibrillation on eliquis s/p DCCV, severe RCA stenosis, CAD s/p CABG, HTN, HLD, AAA < 4.0cm, Severe PNA complicated by sepsis and renal failure, COPD not on home O2, that presented to the ED accompanied by his son with a chief complaint of AMS x last 2 days. According to the patient's son, he was acting strange at home, was confused stating he was in the bathroom when in reality he was in the bedroom. The son (and pt who is now AxOx3) endorsed several episodes of watery diarrhea over the last 2 days. Pt denies fever, chills, chest pain, palpitations changes in vision, recent travel, sick contacts, dysuria, endorses worsening SOB on exertion especially when climbing stairs, denies any associated chest pain. Pt also endorses overall weight loss of late (per AEHR he is seen by pulmonary Dr. Rory Mars and was put on diuretic regimen)    ED COURSE: On his arrival to the ED, SPO2 85% on RA, /87, afebrile, HR 89BPM, CXR showing bilateral opacifications suspicious for volume overload vs. PNA. Serum diagnostics significant for: WBC within normal range, Lactate 2.5, Hgb 9.5 (MCV 90), elevated , BUN 73, Cr 2.3 (Baseline 1.8), Trop 0.17, pro-BNP 03239. Was given empiric abx for PNA and 2L of crystalloid infusion by ED. CTH: Chronic microvascular changes, parenchymal atrophy and ventricular prominence.    Patient admitted to  for management of fluid overload vs. pneumonia. Troponins were elevated but trended down. Was on Bumex and transitioned to Lasix. new TTE 10/22 showed ecreased LV systolic function, LA/RA dilation, multiple LV wall motion abnormalities, severe pulmonary hypertension. Cardiology consulted, added home metolazone, no further intervention was recommended inpatient. Nephro followed for C3 glomerulopathy, home meds continued, no hydronephrosis. Patient had new onset transaminitis during hospital stay, RUQ U/S showed R-sided pleural effusion, unremarkable abdominal U/S, atorvastatin d/c'd, trended down during hospital stay. Patient follows with Dr. Witt. Stable for discharge.

## 2021-10-26 NOTE — PROGRESS NOTE ADULT - ASSESSMENT
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 Associated with agitation.  Chest X-Ray shows bibasilar opacities. BNP elevated, started on IV Lasix. CTH was negative for acute process. PT started on IV ABXs (Ceftr/Azithro).     # AMS - secondary to Hypertensive Encephalopathy vs. Occult CVA vs. Metabolic Encephalopathy in the setting of PNA   - CTH unremarkable but limited by motion artifact  # BRENDA on CKD 3 / C3 glomerulopathy on tacrolimus / MMF  /  CRS  # ADHF / HFpEF - iv bumex transitioned to po lasix, volume status improved  - monitor strict i/o, daily weights, resume bumex if volume status worsening  - non oliguric, creat down trending  - repeat troponin noted / cardio evaluation   - phos level noted, does not need phos binder  - renal ultrasound noted w/o hydronephrosis   - UA noted w/ proteinuria, urine pr/cr ratio 1g/g (known to have c3 glomerulopathy, on immunosuppression)  - hepatology f/u appreciate, hepatocellular injury like d/t congestion, improving  - tactrolimus trough level noted, cont current dose, needs outpatient nephrology f/u 
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 According to the patient's son, he was acting strange at home, was confused stating he was in the bathroom when in reality he was in the bedroom. the son (and pt who is now AxOx3) endorse several episodes of watery diarrhea over the last 2 days. Pt denies fever, chills, chest pain, palpitations changes in vision, recent travel, sick contacts, dysuria, endorses worsening SOB on exertion especially when climbing stairs, denies any associated chest pain. Pt also endorses overall weight loss of late (per AEHR he is seen by pulmonary Dr. Rory Mars and was put on diuretic regimen)      A/P:   #Acute toxic metabolic encephalopathy secondary to  Acute respiratory failure secondary Acute HFpEF with suspicion of microhemorrhages on MRI  follow up with Neurology regarding MRI images  cardio follow up   on 2L , on room air patient fluctuates from 88-93 at rest , will add metolazone and further attempt to titrate , if needed will need oxygen     #Mild tricuspid regurgitation.    #. Mild aortic regurgitation    #severe pulmonary hypertension    #Elevated LFt secondary to Hepatic congestion    #C3 Glomerulopathy   Continue tacrolimus and Mycophenolate.   creatinine stable , appreciate nephrology evaluation     #Hypothyroid on synthroid     #Chronic atrial fibrillation on eliquis , rate controlled    #severe RCA stenosis,    # CAD s/p CABG    # HTN,  BP: 173/86 (25 Oct 2021 05:14) (171/77 - 177/77)  elevated , if persistant will adjust medication     # HLD,     #AAA < 4.0cm,    #L1 vertebral compression deformity    #Anemia no indication for transfusion     PROGRESS NOTE HANDOFF    Pending: patient currently on 2L , adding diuretic metolazone , anticipate for dc in am if off of oxygen , cardiology and neurology follow up , dc tele     Family discussion: patient verbalized understanding and agreeable to plan of care     Disposition: Home  
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 According to the patient's son, he was acting strange at home, was confused stating he was in the bathroom when in reality he was in the bedroom. the son (and pt who is now AxOx3) endorse several episodes of watery diarrhea over the last 2 days. Pt denies fever, chills, chest pain, palpitations changes in vision, recent travel, sick contacts, dysuria, endorses worsening SOB on exertion especially when climbing stairs, denies any associated chest pain. Pt also endorses overall weight loss of late (per AEHR he is seen by pulmonary Dr. Rory Mars and was put on diuretic regimen)      A/P:   #Acute toxic metabolic encephalopathy secondary to  Acute respiratory failure secondary Acute HFpEF:   on bumex iv   echo shows diffuse global left ventricular function decrease   awaiting callback from cardio   will attempt to titrate down oxygen     #Mild tricuspid regurgitation.    #. Mild aortic regurgitation    #severe pulmonary hypertension    #Elevated LFt secondary to Hepatic congestion  LFTs are improving   appreciate hepatology evaluation  daily monitoring of lft  ruq with doppler pending    #C3 Glomerulopathy   Continue tacrolimus and Mycophenolate.   creatinine is improved today    #Elevated troponin with elevated ckmb- appreciate cardio eval, on aspirin , cardio callback due to abnormal echo, tele monitoring    #Hypothyroid on synthroid     #Chronic atrial fibrillation on eliquis , rate controlled    #severe RCA stenosis,    # CAD s/p CABG    # HTN,  BP: 125/80 (21 Oct 2021 07:18) (125/80 - 182/90)  controlled    # HLD,     #AAA < 4.0cm,    #L1 vertebral compression deformity    #Anemia no indication for transfusion     PROGRESS NOTE HANDOFF    Pending: continue with diuresis , cardio callback, tele monitoring x 24 hours or longer per cardio    Family discussion: patient verbalized understanding and agreeable to plan of care     Disposition: Home  
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 Associated with agitation.  Chest X-Ray shows bibasilar opacities. BNP elevated, started on IV Lasix. CTH was negative for acute process. PT started on IV ABXs (Ceftr/Azithro).   # AMS - secondary to Hypertensive Encephalopathy vs. Occult CVA vs. Metabolic Encephalopathy in the setting of PNA   - CTH unremarkable but limited by motion artifact  # BRENDA on CKD 3 / C3 glomerulopathy on tacrolimus / MMF  /  CRS  # ADHF / HFpEF   - monitor strict i/o, daily weights, on lasix   - non oliguric, creat improving   - phos level noted, does not need phos binder  - renal ultrasound noted w/o hydronephrosis   - UA noted w/ proteinuria, urine pr/cr ratio 1g/g (known to have c3 glomerulopathy, on immunosuppression)  - hepatology f/u appreciate, hepatocellular injury like d/t congestion, improving  - tacrolimus trough level noted, cont current dose  - will follow 
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 Associated with agitation.  Chest X-Ray shows bibasilar opacities. BNP elevated, started on IV Lasix. CTH was negative for acute process. PT started on IV ABXs (Ceftr/Azithro).   # AMS - secondary to Hypertensive Encephalopathy vs. Occult CVA vs. Metabolic Encephalopathy in the setting of PNA   - CTH unremarkable but limited by motion artifact  # BRENDA on CKD 3 / C3 glomerulopathy on tacrolimus / MMF  /  CRS - creat improved  cont LAsix 40 po qd  # ADHF / HFpEF   - non oliguric, creat improving   - renal ultrasound noted w/o hydronephrosis   - UA noted w/ proteinuria, urine pr/cr ratio 1g/g (known to have c3 glomerulopathy, on immunosuppression)  - hepatology f/u appreciate, hepatocellular injury like d/t congestion, improving  - tacrolimus trough level noted, cont current dose  f/u Dr Daniel OP
82 YO M w/extensive PMHx admitted to medicine for AMS now resolving and dyspnea secondary to acute volume overload in the setting of HFpEF and C3 Glomerulopathy.     AMS  possibly due to hypoxia in the setting of COPD, not on home oxygen  - CT H unremarkable but limited by motion artifact  -neuro consulted---> MR brain, REEG to rule out seizure all ordered   -f/u BCx/UCx  -f/u procal  - lactate 2.9, continue to trend      Acute on chronic HFpEF/ Elevated Troponin/C3 glomerulopathy  -on bumex 1.5mg BID; on lasix 20 at home  -echo pending, continue to trend trops  - Strict I/O + Daily Weight + 1.5L Fluid Restriction  - Cardio Consult - Dr. Witt  - Nephrology Consult   - c/w Prednisone, c/w Mycophenolate Mofetil and Tacrolimus, Serum Tacrolimus Level ordered  - Renal/Bladder Sono  - urine sodium/ urine osm wnl     Paroxysmal A Fib s/p DCCV   - continue metoprolol and eliquis     Chronic Essential HTN  - Monitor w/diuresis, if elevated again can start Procardia XL 30mg QD    COPD  - duoneb Q6hrs PRN     DM (diabetes mellitus)   - A1C ordered  - Lantus 24U QHS, Ademelog 8U QAC     Hypothyroidism  - f/u TSH  - c/w Synthroid 112mcg QD       CODE: FULL  DIET: DASH/TLC/CC  DVT PPX: Eliquis  GI PPX: Protonix 40    
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 According to the patient's son, he was acting strange at home, was confused stating he was in the bathroom when in reality he was in the bedroom. the son (and pt who is now AxOx3) endorse several episodes of watery diarrhea over the last 2 days. Pt denies fever, chills, chest pain, palpitations changes in vision, recent travel, sick contacts, dysuria, endorses worsening SOB on exertion especially when climbing stairs, denies any associated chest pain. Pt also endorses overall weight loss of late (per AEHR he is seen by pulmonary Dr. Rory Mars and was put on diuretic regimen)      A/P:   #Acute toxic metabolic encephalopathy secondary to  Acute respiratory failure secondary Acute HFpEF with suspicion of microhemorrhages on MRI  no further neuro workup   cardio to  follow up today before dc   off oxygen saturating 95 percent   cw lasix and metolazone     #Mild tricuspid regurgitation.    #. Mild aortic regurgitation    #severe pulmonary hypertension    #Fall no acute fractures noted on ct scan     #Elevated LFt secondary to Hepatic congestion    #C3 Glomerulopathy   Continue tacrolimus and Mycophenolate.   creatinine stable , appreciate nephrology evaluation     #Hypothyroid on synthroid     #Chronic atrial fibrillation on eliquis , rate controlled    #severe RCA stenosis,    # CAD s/p CABG    # HTN,  BP: 121/63 (26 Oct 2021 13:54) (121/63 - 170/85)  controlled     # HLD,     #AAA < 4.0cm,    #L1 vertebral compression deformity    #Anemia no indication for transfusion     PROGRESS NOTE HANDOFF    Pending: DC home today time spent 44 minutes reviewing med rec and dc planning     Family discussion: patient verbalized understanding and agreeable to plan of care     Disposition: Home  
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 Pt was admitted for acute on chronic chf exacerabtion and gram negative pna and was started on bumex. Gi consulted for transaminits    #Acute Liver Injury - Hepatocellular likely Congestive hepatopathy - resolving  - LFTs wnl on 10/20  = hepatcellular pattern liver injury on 10/22 - improving  - RUQ sono: unremarkbale    Rec  - c/w iv diuresis  - Please avoid hepatotoxic medications  - Monitor LFTs daily  - Follow up with our [facility] located at [address] [place] [address], [address]. Telephone No: [phone]  - recall prn  
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 Associated with agitation.    Chest X-Ray shows bibasilar opacities. BNP elevated, started on IV Lasix. CTH was negative for acute process. PT started on IV ABXs (Ceftr/Azithro).   # AMS - secondary to Hypertensive Encephalopathy vs. Occult CVA vs. Metabolic Encephalopathy in the setting of PNA   - CT H unremarkable but limited by motion artifact  - REEG  - r/o sepsis  CHF - agree with Bumex IV  1.5 q12   - on LAsix and Zaroxolyn at home    BRENDA on CKD - baseline creat 1.8  - creat is trending down on diuretics  - kidney and bladder sono - neg for obstruction  -UA urine protein creat ratio  C3 glomerulopathy cont prograf and Cellcept  - tactrolimus trough level 2.1   strict Is and Os
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 Associated with agitation.  Chest X-Ray shows bibasilar opacities. BNP elevated, started on IV Lasix. CTH was negative for acute process. PT started on IV ABXs (Ceftr/Azithro).   # AMS - secondary to Hypertensive Encephalopathy vs. Occult CVA vs. Metabolic Encephalopathy in the setting of PNA   - CT H unremarkable but limited by motion artifact  CHF - continue bumex   repeat troponin / cardio evaluation   document accurate UO   check ph   BRENDA on CKD - baseline creat 1.8  - creat is trending down   - kidney and bladder sono - neg for obstruction  -UA urine protein creat ratio  C3 glomerulopathy cont prograf and Cellcept  - tactrolimus trough level 2.1   strict Is and Os
82 YO M w/extensive PMHx admitted to medicine for AMS now resolving and dyspnea secondary to acute volume overload in the setting of HFpEF and C3 Glomerulopathy.     AMS  possibly due to hypoxia in the setting of COPD, not on home oxygen  - CT H unremarkable but limited by motion artifact  -neuro consulted---> MR brain, REEG to rule out seizure all ordered   -EEG shows diffuse cerebral electrophysiological dysfunction  -MR ordered  -NGTD on BCx  -procal normal  - lactate most recently 1.5      Acute on chronic HFpEF/ Elevated Troponin/C3 glomerulopathy  -on Bumex 1.5mg BID; on lasix 20 at home  -echo ordered pending read  -trending trops--->improving; CKMB and CK elevated yesterday, continuing to trend and improving  - Strict I/O + Daily Weight + 1.5L Fluid Restriction  - cardio consulted; echo completed pending reading, started pt on asa 81  - as per nephro, continue cellcept and prograft; tacrolimus level normal  - c/w Prednisone, c/w Mycophenolate Mofetil and Tacrolimus, Serum Tacrolimus Level ordered  - Renal/Bladder Sono  - urine sodium/ urine osm wnl     Paroxysmal A Fib s/p DCCV   - continue metoprolol and eliquis     Chronic Essential HTN  - Monitor w/diuresis, if elevated again can start Procardia XL 30mg QD    COPD  - duoneb Q6hrs PRN     DM (diabetes mellitus)   - A1C 7.5 ordered  - Lantus 24U QHS, Ademelog 8U QAC     Hypothyroidism  - TSH normal  - c/w Synthroid 112mcg QD     Transaminitis  -not present on admission  -RUQ US ordered--->shows right sided pleural effusion  -as per hepatology recs; RUQ with doppler ordered  -atorvastatin discontinued    Pending transfer to tele      Chart note from 10/21  *After discussion with attending Dr. Mata on 10/21, pt will be transferred to tele floor given history of Afib, HFpEF and elevated cardiac enzymes CK, CKMB and trops which are now improving. Pt not experiencing any chest pain and other than afib, no ST elevations seen on EKG. Will give pt one time dose of nifedipine 30 for elevated /97 and start pt on aspirin 81 given history of CAD s/p CABG.*      CODE: FULL  DIET: DASH/TLC/CC  DVT PPX: Eliquis  GI PPX: Protonix 40  
82 YO M w/extensive PMHx admitted to medicine for AMS now resolving and dyspnea secondary to acute volume overload in the setting of HFpEF and C3 Glomerulopathy.     AMS  possibly due to hypoxia in the setting of COPD, not on home oxygen  - CT H unremarkable but limited by motion artifact  -neuro consulted---> MR brain, REEG to rule out seizure all ordered   -NGTD on BCx  -procal normal  - lactate trending down, 1.5 today      Acute on chronic HFpEF/ Elevated Troponin/C3 glomerulopathy  -on Bumex 1.5mg BID; on lasix 20 at home  -echo ordered pending read  -trending trops--->improving; CKMB and CK elevated yesterday, continuing to trend and improving  - Strict I/O + Daily Weight + 1.5L Fluid Restriction  - cardio consulted; echo completed pending reading, started pt on asa 81  - as per nephro, continue cellcept and prograft; tacrolimus level normal  - c/w Prednisone, c/w Mycophenolate Mofetil and Tacrolimus, Serum Tacrolimus Level ordered  - Renal/Bladder Sono  - urine sodium/ urine osm wnl     Paroxysmal A Fib s/p DCCV   - continue metoprolol and eliquis     Chronic Essential HTN  - Monitor w/diuresis, if elevated again can start Procardia XL 30mg QD    COPD  - duoneb Q6hrs PRN     DM (diabetes mellitus)   - A1C 7.5 ordered  - Lantus 24U QHS, Ademelog 8U QAC     Hypothyroidism  - TSH normal  - c/w Synthroid 112mcg QD     Transaminitis  -not present on admission  -RUQ US ordered  -atorvastatin discontinued    Pending transfer to [facility]      CODE: FULL  DIET: DASH/TLC/CC  DVT PPX: Eliquis  GI PPX: Protonix 40  
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 According to the patient's son, he was acting strange at home, was confused stating he was in the bathroom when in reality he was in the bedroom. the son (and pt who is now AxOx3) endorse several episodes of watery diarrhea over the last 2 days. Pt denies fever, chills, chest pain, palpitations changes in vision, recent travel, sick contacts, dysuria, endorses worsening SOB on exertion especially when climbing stairs, denies any associated chest pain. Pt also endorses overall weight loss of late (per AEHR he is seen by pulmonary Dr. Rory Mars and was put on diuretic regimen)      A/P:   #Acute toxic metabolic encephalopathy secondary to  Acute respiratory failure secondary Acute HFpEF with suspicion of microhemorrhages on MRI  discussed with cardiology- > continue with medical therapy , no intervention   on bumex iv , saturating well on 2 L , will continue to titrate further , will transition to lasix later today   neurology follow up     #Mild tricuspid regurgitation.    #. Mild aortic regurgitation    #severe pulmonary hypertension    #Elevated LFt secondary to Hepatic congestion    #C3 Glomerulopathy   Continue tacrolimus and Mycophenolate.   creatinine improved    #Hypothyroid on synthroid     #Chronic atrial fibrillation on eliquis , rate controlled    #severe RCA stenosis,    # CAD s/p CABG    # HTN,  BP: 166/79 (24 Oct 2021 06:06) (144/82 - 177/81)  controlled    # HLD,     #AAA < 4.0cm,    #L1 vertebral compression deformity    #Anemia no indication for transfusion     PROGRESS NOTE HANDOFF    Pending: attempt to transition to PO Lasix , neurology follow up , continue to monitor on telemetry until tomorrow if no events dc tele in am     Family discussion: patient verbalized understanding and agreeable to plan of care     Disposition: Home  
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 According to the patient's son, he was acting strange at home, was confused stating he was in the bathroom when in reality he was in the bedroom. the son (and pt who is now AxOx3) endorse several episodes of watery diarrhea over the last 2 days. Pt denies fever, chills, chest pain, palpitations changes in vision, recent travel, sick contacts, dysuria, endorses worsening SOB on exertion especially when climbing stairs, denies any associated chest pain. Pt also endorses overall weight loss of late (per AEHR he is seen by pulmonary Dr. Rory Mars and was put on diuretic regimen)      A/P:   Acute toxic metabolic encephalopathy secondary to  hypoxia. Improved.   Acute respiratory failure  Acute HFpEF:   patient with leg edema, SOB.   continue Bumex IV.   Transfer to telemetry floor.     Elevated LFTs:   LFTs were normal 2 days ago  no abdominal pain, alkP mildly elevated, TB normal.   Discontinue Lipitor. Hepatology consult.     C3 Glomerulopathy   Continue tacrolimus and Mycophenolate.   Nephrology follow up, monitor renal function closely while on IV diuresis.       #Elevated troponin - patient does not endorse chest pain , check ck mb    #Hypothyroid on synthroid     #Chronic atrial fibrillation on eliquis , rate controlled    #severe RCA stenosis,    # CAD s/p CABG    # HTN,  BP: 125/80 (21 Oct 2021 07:18) (125/80 - 182/90)  controlled    # HLD,     #AAA < 4.0cm,    #L1 vertebral compression deformity    #Anemia no indication for transfusion     PROGRESS NOTE HANDOFF    Pending: MRI , cardio eval , ck mb , continue with diuresis     Family discussion: patient verbalized understanding and agreeable to plan of care     Disposition: Home  
HPI:  HPI: 80 YO M w/PMHx significant for 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 According to the patient's son, he was acting strange at home, was confused stating he was in the bathroom when in reality he was in the bedroom. the son (and pt who is now AxOx3) endorse several episodes of watery diarrhea over the last 2 days. Pt denies fever, chills, chest pain, palpitations changes in vision, recent travel, sick contacts, dysuria, endorses worsening SOB on exertion especially when climbing stairs, denies any associated chest pain. Pt also endorses overall weight loss of late (per AEHR he is seen by pulmonary Dr. Rory Mars and was put on diuretic regimen)      #Altered mental status secondary to metabolic encephalopathy secondary to  acute respiratory failure secondary to infectious etiology possible gram negative pneumonia versus acute on chronic HFpEF   cardiology evaluation   on iv diuresis   no evidence of infection but awaiting procalcitonin , cultures  hold abx unless develops fever  currently patient is comfortable on room air , indicating respiratory failure resolved  MRI pending     #C3 Glomerulopathy   nephrology dr rizzo evaluation     #Drug monitoring of high risk medication , potential for drug drug reaction , requiring close monitoring of tacrolimus     #Elevated troponins - patient does not endorse chest pain , check ck mb    #Hypothyroid on synthroid     #Chronic atrial fibrillation on eliquis , rate controlled    #severe RCA stenosis,    # CAD s/p CABG    # HTN,  BP: 125/80 (21 Oct 2021 07:18) (125/80 - 182/90)  controlled    # HLD,     #AAA < 4.0cm,    #L1 vertebral compression deformity    #Anemia no indication for transfusion     PROGRESS NOTE HANDOFF    Pending: MRI , cardio eval , ck mb , continue with diuresis     Family discussion: patient verbalized understanding and agreeable to plan of care     Disposition: Home

## 2021-10-26 NOTE — DISCHARGE NOTE PROVIDER - NSDCCPCAREPLAN_GEN_ALL_CORE_FT
PRINCIPAL DISCHARGE DIAGNOSIS  Diagnosis: Acute exacerbation of CHF (congestive heart failure)  Assessment and Plan of Treatment: Please follow-up with your cardiologist.  Congestive heart failure is a chronic condition in which the heart has trouble pumping blood. In some cases of heart failure, fluid may back up into your lungs or you may have swelling (edema) in your lower legs. There are many causes of heart failure including high blood pressure, coronary artery disease, abnormal heart valves, heart muscle disease, lung disease, diabetes, etc. Symptoms include shortness of breath with activity or when lying flat, cough, swelling of the legs, fatigue, or increased urination during the night.   Treatment is aimed at managing the symptoms of heart failure and may include lifestyle changes, medications, or surgical procedures. Take medicines only as directed by your health care provider and do not stop unless instructed to do so. Eat heart-healthy foods with low or no trans/saturated fats, cholesterol and salt. Weigh yourself every day for early recognition of fluid accumulation.  SEEK IMMEDIATE MEDICAL CARE IF YOU HAVE ANY OF THE FOLLOWING SYMPTOMS: shortness of breath, change in mental status, chest pain, lightheadedness/dizziness/fainting, or worsening of symptoms including not being able to conduct normal physical activity.        SECONDARY DISCHARGE DIAGNOSES  Diagnosis: Abnormal transaminases  Assessment and Plan of Treatment: Your liver enzymes were abnormally elevated during your hospital stay. You were seen by a hepatologist during your stay, and your medications were adjusted accordingly. Your liver enzymes have improved during your hospital stay. Please follow-up with hepatology outpatient.

## 2021-10-26 NOTE — PROGRESS NOTE ADULT - PROVIDER SPECIALTY LIST ADULT
Hospitalist
Internal Medicine
Internal Medicine
Nephrology
Hepatology
Hospitalist
Internal Medicine
Internal Medicine
Hospitalist
Nephrology
Nephrology

## 2021-10-26 NOTE — DISCHARGE NOTE PROVIDER - PROVIDER TOKENS
PROVIDER:[TOKEN:[[id number]],FOLLOWUP:[2 weeks]] PROVIDER:[TOKEN:[[id number]],FOLLOWUP:[2 weeks]],PROVIDER:[TOKEN:[[id number]],FOLLOWUP:[Routine]],PROVIDER:[TOKEN:[[id number]],FOLLOWUP:[2 weeks]],PROVIDER:[TOKEN:[[id number]],FOLLOWUP:[1 month]] PROVIDER:[TOKEN:[[id number]],FOLLOWUP:[2 weeks]],PROVIDER:[TOKEN:[[id number]],FOLLOWUP:[Routine]],PROVIDER:[TOKEN:[[id number]],FOLLOWUP:[2 weeks]],PROVIDER:[TOKEN:[[id number]],FOLLOWUP:[1 month]],PROVIDER:[TOKEN:[[id number]]]

## 2021-10-26 NOTE — DISCHARGE NOTE PROVIDER - CARE PROVIDERS DIRECT ADDRESSES
,DirectAddress_Unknown ,DirectAddress_Unknown,DirectAddress_Unknown,[email or web address],DirectAddress_Unknown ,DirectAddress_Unknown,DirectAddress_Unknown,[email or web address] [email or web address],DirectAddress_Unknown,[email or web address] [email or web address]

## 2021-10-26 NOTE — PROGRESS NOTE ADULT - SUBJECTIVE AND OBJECTIVE BOX
ANDRIA TAVAREZ  81y  [facility] T5-3C 031 A      Patient is a 81y old  Male who presents with a chief complaint of AMS, HFrEF (26 Oct 2021 10:19)      INTERVAL HPI/OVERNIGHT EVENTS:    no acute overnight events , patient wants to go home     REVIEW OF SYSTEMS:  CONSTITUTIONAL: No fever, weight loss, or fatigue  EYES: No eye pain, visual disturbances, or discharge  ENMT:  No difficulty hearing, tinnitus, vertigo; No sinus or throat pain  NECK: No pain or stiffness  BREASTS: No pain, masses, or nipple discharge  RESPIRATORY: No cough, wheezing, chills or hemoptysis; No shortness of breath  CARDIOVASCULAR: No chest pain, palpitations, dizziness, or leg swelling  GASTROINTESTINAL: No abdominal or epigastric pain. No nausea, vomiting, or hematemesis; No diarrhea or constipation. No melena or hematochezia.  GENITOURINARY: No dysuria, frequency, hematuria, or incontinence  NEUROLOGICAL: No headaches, memory loss, loss of strength, numbness, or tremors  SKIN: No itching, burning, rashes, or lesions   LYMPH NODES: No enlarged glands  ENDOCRINE: No heat or cold intolerance; No hair loss  MUSCULOSKELETAL: No joint pain or swelling; No muscle, back, or extremity pain  PSYCHIATRIC: No depression, anxiety, mood swings, or difficulty sleeping  HEME/LYMPH: No easy bruising, or bleeding gums  ALLERY AND IMMUNOLOGIC: No hives or eczema  FAMILY HISTORY:  FHx: colon cancer      T(C): 36.1 (10-26-21 @ 13:54), Max: 36.1 (10-26-21 @ 13:54)  HR: 82 (10-26-21 @ 13:54) (77 - 82)  BP: 121/63 (10-26-21 @ 13:54) (121/63 - 170/85)  RR: 18 (10-26-21 @ 13:54) (18 - 18)  SpO2: --  Wt(kg): --Vital Signs Last 24 Hrs  T(C): 36.1 (26 Oct 2021 13:54), Max: 36.1 (26 Oct 2021 13:54)  T(F): 97 (26 Oct 2021 13:54), Max: 97 (26 Oct 2021 13:54)  HR: 82 (26 Oct 2021 13:54) (77 - 82)  BP: 121/63 (26 Oct 2021 13:54) (121/63 - 170/85)  BP(mean): --  RR: 18 (26 Oct 2021 13:54) (18 - 18)  SpO2: --    PHYSICAL EXAM:  GENERAL: NAD, well-groomed, well-developed  HEAD:  Atraumatic, Normocephalic  EYES: EOMI, PERRLA, conjunctiva and sclera clear  ENMT: No tonsillar erythema, exudates, or enlargement; Moist mucous membranes, Good dentition, No lesions  NECK: Supple, No JVD, Normal thyroid  NERVOUS SYSTEM:  Alert & Oriented X3, Good concentration; Motor Strength 5/5 B/L upper and lower extremities; DTRs 2+ intact and symmetric  PULM: Clear to auscultation bilaterally  CARDIAC: Regular rate and rhythm; No murmurs, rubs, or gallops  GI: Soft, Nontender, Nondistended; Bowel sounds present  EXTREMITIES:  no edema noted   LYMPH: No lymphadenopathy noted  SKIN: No rashes or lesions    Consultant(s) Notes Reviewed:  [x ] YES  [ ] NO  Care Discussed with Consultants/Other Providers [ x] YES  [ ] NO    LABS:                            10.0   11.95 )-----------( 223      ( 26 Oct 2021 04:30 )             32.6   10-26    140  |  100  |  71<HH>  ----------------------------<  151<H>  4.4   |  26  |  1.6<H>    Ca    9.2      26 Oct 2021 04:30  Phos  3.4     10-26  Mg     2.0     10-26    TPro  6.0  /  Alb  4.1  /  TBili  1.1  /  DBili  x   /  AST  118<H>  /  ALT  194<H>  /  AlkPhos  142<H>  10-26            apixaban 2.5 milliGRAM(s) Oral two times a day  aspirin enteric coated 81 milliGRAM(s) Oral daily  furosemide    Tablet 40 milliGRAM(s) Oral daily  guaifenesin/dextromethorphan Oral Liquid 10 milliLiter(s) Oral every 8 hours PRN  insulin glargine Injectable (LANTUS) 23 Unit(s) SubCutaneous at bedtime  insulin lispro (ADMELOG) corrective regimen sliding scale   SubCutaneous three times a day before meals  insulin lispro (ADMELOG) corrective regimen sliding scale   SubCutaneous at bedtime  insulin lispro Injectable (ADMELOG) 8 Unit(s) SubCutaneous three times a day before meals  isosorbide   mononitrate ER Tablet (IMDUR) 30 milliGRAM(s) Oral daily  levothyroxine 112 MICROGram(s) Oral daily  metolazone 2.5 milliGRAM(s) Oral <User Schedule>  metoprolol tartrate 25 milliGRAM(s) Oral two times a day  mycophenolate mofetil 500 milliGRAM(s) Oral two times a day  pantoprazole    Tablet 40 milliGRAM(s) Oral before breakfast  predniSONE   Tablet 20 milliGRAM(s) Oral daily  tacrolimus 1 milliGRAM(s) Oral every 12 hours      HEALTH ISSUES - PROBLEM Dx:          Case Discussed with House Staff   45 minutes spent on total encounter; more than 50% of the visit was spent counseling and/or coordinating care by the attending physician.   [employer] [phone]

## 2021-10-26 NOTE — DISCHARGE NOTE PROVIDER - CARE PROVIDER_API CALL
Mode Witt  CARDIOVASCULAR DISEASE  [address]  [place] [address]  Phone: [phone]  Fax: [phone]  Follow Up Time: 2 weeks   Mode Witt  CARDIOVASCULAR DISEASE  [address]  [place] [address]  Phone: [phone]  Fax: [phone]  Follow Up Time: 2 weeks    Masoud Daniel  INTERNAL MEDICINE  [facility] [place] [address]  Phone: [phone]  Fax: [phone]  Follow Up Time: Routine    Bogdan Armenta (DO)  Medicine  Physicians  [address] [facility]st [place] [address]  Phone: [phone]  Fax: [phone]  Follow Up Time: 2 weeks    Manolo Estes)  Internal Medicine  [address] [place] [address]  Phone: [phone]  Fax: [phone]  Follow Up Time: 1 month   Mode Witt  CARDIOVASCULAR DISEASE  [address]  [place] [address]  Phone: [phone]  Fax: [phone]  Follow Up Time: 2 weeks    Masoud Daniel  INTERNAL MEDICINE  [address] [place] [address]  Phone: [phone]  Fax: [phone]  Follow Up Time: Routine    Bogdan Armenta (DO)  Medicine  Physicians  [address] [place] [address]  Phone: [phone]  Fax: [phone]  Follow Up Time: 2 weeks    Manolo Estes)  Internal Medicine  [address] [place] [address]  Phone: [phone]  Fax: [phone]  Follow Up Time: 1 month    Sandi Buck)  Internal Medicine  [address] [address]  Phone: [phone]  Fax: [phone]  Follow Up Time:

## 2021-10-26 NOTE — PROGRESS NOTE ADULT - REASON FOR ADMISSION
AMS, HFrEF

## 2021-10-26 NOTE — PROGRESS NOTE ADULT - SUBJECTIVE AND OBJECTIVE BOX
Nephrology progress note    Patient is seen and examined in the am, events over the last 24 h noted .    Allergies:  Ozempic (0.25 mg or 0.5 mg dose) (Hives; Rash)  streptomycin (Unknown)  Trulicity Pen (Hives; Rash)    Hospital Medications:   MEDICATIONS  (STANDING):  apixaban 2.5 milliGRAM(s) Oral two times a day  aspirin enteric coated 81 milliGRAM(s) Oral daily  furosemide    Tablet 40 milliGRAM(s) Oral daily  insulin glargine Injectable (LANTUS) 23 Unit(s) SubCutaneous at bedtime  insulin lispro (ADMELOG) corrective regimen sliding scale   SubCutaneous three times a day before meals  insulin lispro (ADMELOG) corrective regimen sliding scale   SubCutaneous at bedtime  insulin lispro Injectable (ADMELOG) 8 Unit(s) SubCutaneous three times a day before meals  isosorbide   mononitrate ER Tablet (IMDUR) 30 milliGRAM(s) Oral daily  levothyroxine 112 MICROGram(s) Oral daily  metolazone 2.5 milliGRAM(s) Oral <User Schedule>  metoprolol tartrate 25 milliGRAM(s) Oral two times a day  mycophenolate mofetil 500 milliGRAM(s) Oral two times a day  pantoprazole    Tablet 40 milliGRAM(s) Oral before breakfast  predniSONE   Tablet 20 milliGRAM(s) Oral daily  tacrolimus 1 milliGRAM(s) Oral every 12 hours        VITALS:  T(F): 97 (10-26-21 @ 13:54), Max: 97 (10-26-21 @ 13:54)  HR: 82 (10-26-21 @ 13:54)  BP: 121/63 (10-26-21 @ 13:54)  RR: 18 (10-26-21 @ 13:54)  SpO2: --  Wt(kg): --    10-24 @ 07:  -  10-25 @ 07:00  --------------------------------------------------------  IN: 1750 mL / OUT: 1000 mL / NET: 750 mL    10-25 @ 07:01  -  10-26 @ 07:00  --------------------------------------------------------  IN: 200 mL / OUT: 1625 mL / NET: -1425 mL          PHYSICAL EXAM:  Constitutional: NAD  HEENT: anicteric sclera, oropharynx clear, MMM  Neck: No JVD  Respiratory: CTAB, no wheezes, rales or rhonchi  Cardiovascular: S1, S2, RRR  Gastrointestinal: BS+, soft, NT/ND  Extremities: No cyanosis or clubbing. No peripheral edema  Neurological: A/O x 3, no focal deficits  : No CVA tenderness. No davalos.   Skin: No rashes  Vascular Access:    LABS:  10-26    140  |  100  |  71<HH>  ----------------------------<  151<H>  4.4   |  26  |  1.6<H>    Ca    9.2      26 Oct 2021 04:30  Phos  3.4     10-26  Mg     2.0     10-26    TPro  6.0  /  Alb  4.1  /  TBili  1.1  /  DBili      /  AST  118<H>  /  ALT  194<H>  /  AlkPhos  142<H>  10-26                          10.0   11.95 )-----------( 223      ( 26 Oct 2021 04:30 )             32.6       Urine Studies:  Urinalysis Basic - ( 22 Oct 2021 23:23 )    Color: Light Yellow / Appearance: Clear / S.012 / pH:   Gluc:  / Ketone: Negative  / Bili: Negative / Urobili: <2 mg/dL   Blood:  / Protein: 30 mg/dL / Nitrite: Negative   Leuk Esterase: Negative / RBC: 1 /HPF / WBC 1 /HPF   Sq Epi:  / Non Sq Epi: 0 /HPF / Bacteria: Negative      Creatinine, Random Urine: 47 mg/dL (10-22 @ 23:23)  Protein/Creatinine Ratio Calculation: 1.0 Ratio (10-22 @ 23:23)  Osmolality, Random Urine: 369 mos/kg (10-21 @ 01:00)  Sodium, Random Urine: 22.0 mmoL/L (10-21 @ 01:00)    RADIOLOGY & ADDITIONAL STUDIES:

## 2021-10-26 NOTE — PHYSICAL THERAPY INITIAL EVALUATION ADULT - LEVEL OF INDEPENDENCE: STAIR NEGOTIATION, REHAB EVAL
pt needed to use the bathroom, pt left on the toilet in NAD, PCA informed, pt knows to use call bell when done/unable to perform

## 2021-10-26 NOTE — DISCHARGE NOTE PROVIDER - NPI NUMBER (FOR SYSADMIN USE ONLY) :
[[id number]] [[id number]],[[id number]],[[id number]],[[id number]] [[id number]],[[id number]],[[id number]],[[id number]]

## 2021-10-26 NOTE — DISCHARGE NOTE PROVIDER - NSDCMRMEDTOKEN_GEN_ALL_CORE_FT
atorvastatin 80 mg oral tablet: orally once a day (at bedtime)  d-mycophenolate: 500 milligram(s) orally 2 times a day  Eliquis 2.5 mg oral tablet: 1 tab(s) orally 2 times a day  famotidine 20 mg oral tablet: 1 tab(s) orally once a day  furosemide 20 mg oral tablet: 1 tab(s) orally once a day  isosorbide mononitrate 30 mg oral tablet, extended release: 1 tab(s) orally once a day (in the morning)  Lantus 100 units/mL subcutaneous solution: 30 unit(s) subcutaneous once a day (at bedtime)  metOLazone 2.5 mg oral tablet: 1 tab(s) orally every 48 hours  metoprolol tartrate 25 mg oral tablet: 1 tab(s) orally 2 times a day  predniSONE 20 mg oral tablet: 1 tab(s) orally once a day  Synthroid 112 mcg (0.112 mg) oral tablet: 1 tab(s) orally once a day  tacrolimus 1 mg oral capsule: 1 cap(s) orally every 12 hours   atorvastatin 80 mg oral tablet: orally once a day (at bedtime)  d-mycophenolate: 500 milligram(s) orally 2 times a day  Eliquis 2.5 mg oral tablet: 1 tab(s) orally 2 times a day  famotidine 20 mg oral tablet: 1 tab(s) orally once a day  furosemide 40 mg oral tablet: 1 tab(s) orally once a day  isosorbide mononitrate 30 mg oral tablet, extended release: 1 tab(s) orally once a day (in the morning)  Lantus 100 units/mL subcutaneous solution: 30 unit(s) subcutaneous once a day (at bedtime)  metOLazone 2.5 mg oral tablet: 1 tab(s) orally every 48 hours  metoprolol tartrate 25 mg oral tablet: 1 tab(s) orally 2 times a day  predniSONE 20 mg oral tablet: 1 tab(s) orally once a day  Synthroid 112 mcg (0.112 mg) oral tablet: 1 tab(s) orally once a day  tacrolimus 1 mg oral capsule: 1 cap(s) orally every 12 hours

## 2021-10-27 PROBLEM — I48.91 UNSPECIFIED ATRIAL FIBRILLATION: Chronic | Status: ACTIVE | Noted: 2021-01-01

## 2021-10-27 PROBLEM — N05.8 UNSPECIFIED NEPHRITIC SYNDROME WITH OTHER MORPHOLOGIC CHANGES: Chronic | Status: ACTIVE | Noted: 2021-01-01

## 2021-10-27 PROBLEM — I65.29 OCCLUSION AND STENOSIS OF UNSPECIFIED CAROTID ARTERY: Chronic | Status: ACTIVE | Noted: 2021-01-01

## 2021-10-27 PROBLEM — J44.9 CHRONIC OBSTRUCTIVE PULMONARY DISEASE, UNSPECIFIED: Chronic | Status: ACTIVE | Noted: 2021-01-01

## 2021-10-28 PROBLEM — I25.10 CAD (CORONARY ARTERY DISEASE): Status: ACTIVE | Noted: 2021-01-01

## 2021-10-28 NOTE — HISTORY OF PRESENT ILLNESS
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 Patient was contacted by RN from TCM and medication reconciliation was done with in 48 hours of discharge. On arrival patient is alert in NAD denies c/p, sob, cough, palpitations, LE swelling and wt gain \par

## 2021-10-28 NOTE — PLAN
[FreeTextEntry1] : CHF- low salt diet , daily weights, c/w diuretics , ARB, cardio follow up \par HTN- c/w current medication regimen\par afib- c/w eliquFERNANDO hemphill cardio\par COPD- c/w albuterol , pulmonary follow up\par Follow up with DR rizzo scheduled 11/4

## 2021-10-28 NOTE — COUNSELING
[de-identified] : Pt was informed about CN’s role/ STARS program and overview of transitional care reviewed with patient. In addition, yellow contact card was provided. Pt educated on topics of importance such as compliance with all provider visits, prescribed medication regimen, and low salt diet. Pt encouraged calling CN with any issues, concerns or questions, also educated to notify CN if experiencing CP, SOB , cough, increased mucus/phlegm production, abdominal discomfort/swelling, difficulty sleeping or lying flat, fever, chills, fatigue, weight gain of 2-3lbs in 24 hours or 5lbs in one week,  dizziness, lightheadedness, n/v/d/c, swelling to extremities and/or any signs of CHF exacerbation as reviewed. Reassurance provided. Will continue to monitor\par \par

## 2021-10-28 NOTE — PHYSICAL EXAM
[No Acute Distress] : no acute distress [No Respiratory Distress] : no respiratory distress  [No Accessory Muscle Use] : no accessory muscle use [Clear to Auscultation] : lungs were clear to auscultation bilaterally [Clear Bilaterally] : the lungs were clear to auscultation bilaterally [Rales / Crackles Bilateral] : no rales or crackles were heard [Normal Rate] : normal rate  [Regular Rhythm] : with a regular rhythm [Normal S1, S2] : normal S1 and S2 [No Varicosities] : no varicosities [No Edema] : there was no peripheral edema [Soft] : abdomen soft [Non Tender] : non-tender [Non-distended] : non-distended [No Focal Deficits] : no focal deficits [Normal Affect] : the affect was normal [Alert and Oriented x3] : oriented to person, place, and time [Normal Insight/Judgement] : insight and judgment were intact [de-identified] : walker

## 2021-12-08 PROBLEM — J44.9 CHRONIC OBSTRUCTIVE PULMONARY DISEASE, UNSPECIFIED COPD TYPE: Status: ACTIVE | Noted: 2021-08-25

## 2021-12-08 PROBLEM — R06.02 EXERTIONAL SHORTNESS OF BREATH: Status: ACTIVE | Noted: 2021-07-28

## 2021-12-08 NOTE — DISCUSSION/SUMMARY
[FreeTextEntry1] : SOB./ ORTHOPNEA/ WEIGHT GAIN/ FLUID OVERLOAD BETTER SP RECENT ADMSSION FOR CHF EXACERBATION\par COPD STABLE\par COUGH/ NEB AS NEEDED\par VASCULAR REVIEWED\par CHEST CT 1/22\par HOSP RECORDS REVIEWED

## 2022-01-01 ENCOUNTER — TRANSCRIPTION ENCOUNTER (OUTPATIENT)
Age: 82
End: 2022-01-01

## 2022-01-01 ENCOUNTER — APPOINTMENT (OUTPATIENT)
Age: 82
End: 2022-01-01

## 2022-01-01 ENCOUNTER — RESULT REVIEW (OUTPATIENT)
Age: 82
End: 2022-01-01

## 2022-01-01 ENCOUNTER — INPATIENT (INPATIENT)
Facility: HOSPITAL | Age: 82
LOS: 34 days | End: 2022-09-24
Attending: STUDENT IN AN ORGANIZED HEALTH CARE EDUCATION/TRAINING PROGRAM | Admitting: STUDENT IN AN ORGANIZED HEALTH CARE EDUCATION/TRAINING PROGRAM

## 2022-01-01 ENCOUNTER — APPOINTMENT (OUTPATIENT)
Age: 82
End: 2022-01-01
Payer: MEDICARE

## 2022-01-01 ENCOUNTER — INPATIENT (INPATIENT)
Facility: HOSPITAL | Age: 82
LOS: 28 days | Discharge: NURSING, REHAB OR LONG-TERM CARE | End: 2022-08-03
Attending: INTERNAL MEDICINE | Admitting: INTERNAL MEDICINE

## 2022-01-01 ENCOUNTER — RX RENEWAL (OUTPATIENT)
Age: 82
End: 2022-01-01

## 2022-01-01 VITALS
HEART RATE: 72 BPM | DIASTOLIC BLOOD PRESSURE: 66 MMHG | RESPIRATION RATE: 20 BRPM | TEMPERATURE: 98 F | SYSTOLIC BLOOD PRESSURE: 154 MMHG

## 2022-01-01 VITALS
DIASTOLIC BLOOD PRESSURE: 60 MMHG | WEIGHT: 189 LBS | OXYGEN SATURATION: 93 % | HEART RATE: 59 BPM | BODY MASS INDEX: 25.05 KG/M2 | RESPIRATION RATE: 14 BRPM | SYSTOLIC BLOOD PRESSURE: 110 MMHG | HEIGHT: 73 IN

## 2022-01-01 VITALS
SYSTOLIC BLOOD PRESSURE: 100 MMHG | HEIGHT: 74 IN | RESPIRATION RATE: 18 BRPM | OXYGEN SATURATION: 96 % | HEART RATE: 57 BPM | TEMPERATURE: 98 F | DIASTOLIC BLOOD PRESSURE: 57 MMHG | WEIGHT: 220.46 LBS

## 2022-01-01 VITALS — HEART RATE: 88 BPM | SYSTOLIC BLOOD PRESSURE: 124 MMHG | DIASTOLIC BLOOD PRESSURE: 73 MMHG | TEMPERATURE: 99 F

## 2022-01-01 VITALS
RESPIRATION RATE: 24 BRPM | OXYGEN SATURATION: 91 % | SYSTOLIC BLOOD PRESSURE: 121 MMHG | HEIGHT: 74 IN | DIASTOLIC BLOOD PRESSURE: 58 MMHG | WEIGHT: 220.02 LBS | HEART RATE: 91 BPM

## 2022-01-01 DIAGNOSIS — N18.30 CHRONIC KIDNEY DISEASE, STAGE 3 UNSPECIFIED: ICD-10-CM

## 2022-01-01 DIAGNOSIS — Z95.1 PRESENCE OF AORTOCORONARY BYPASS GRAFT: Chronic | ICD-10-CM

## 2022-01-01 DIAGNOSIS — A08.39 OTHER VIRAL ENTERITIS: ICD-10-CM

## 2022-01-01 DIAGNOSIS — J44.1 CHRONIC OBSTRUCTIVE PULMONARY DISEASE WITH (ACUTE) EXACERBATION: ICD-10-CM

## 2022-01-01 DIAGNOSIS — Z98.890 OTHER SPECIFIED POSTPROCEDURAL STATES: Chronic | ICD-10-CM

## 2022-01-01 DIAGNOSIS — N17.9 ACUTE KIDNEY FAILURE, UNSPECIFIED: ICD-10-CM

## 2022-01-01 DIAGNOSIS — R62.7 ADULT FAILURE TO THRIVE: ICD-10-CM

## 2022-01-01 DIAGNOSIS — I44.0 ATRIOVENTRICULAR BLOCK, FIRST DEGREE: ICD-10-CM

## 2022-01-01 DIAGNOSIS — J96.21 ACUTE AND CHRONIC RESPIRATORY FAILURE WITH HYPOXIA: ICD-10-CM

## 2022-01-01 DIAGNOSIS — I50.9 HEART FAILURE, UNSPECIFIED: ICD-10-CM

## 2022-01-01 DIAGNOSIS — Z51.5 ENCOUNTER FOR PALLIATIVE CARE: ICD-10-CM

## 2022-01-01 DIAGNOSIS — E11.22 TYPE 2 DIABETES MELLITUS WITH DIABETIC CHRONIC KIDNEY DISEASE: ICD-10-CM

## 2022-01-01 DIAGNOSIS — E03.9 HYPOTHYROIDISM, UNSPECIFIED: ICD-10-CM

## 2022-01-01 DIAGNOSIS — E87.0 HYPEROSMOLALITY AND HYPERNATREMIA: ICD-10-CM

## 2022-01-01 DIAGNOSIS — N05.9 UNSPECIFIED NEPHRITIC SYNDROME WITH UNSPECIFIED MORPHOLOGIC CHANGES: ICD-10-CM

## 2022-01-01 DIAGNOSIS — I48.20 CHRONIC ATRIAL FIBRILLATION, UNSPECIFIED: ICD-10-CM

## 2022-01-01 DIAGNOSIS — Z71.89 OTHER SPECIFIED COUNSELING: ICD-10-CM

## 2022-01-01 DIAGNOSIS — G93.41 METABOLIC ENCEPHALOPATHY: ICD-10-CM

## 2022-01-01 DIAGNOSIS — J44.0 CHRONIC OBSTRUCTIVE PULMONARY DISEASE WITH (ACUTE) LOWER RESPIRATORY INFECTION: ICD-10-CM

## 2022-01-01 DIAGNOSIS — J12.82 PNEUMONIA DUE TO CORONAVIRUS DISEASE 2019: ICD-10-CM

## 2022-01-01 DIAGNOSIS — R06.00 DYSPNEA, UNSPECIFIED: ICD-10-CM

## 2022-01-01 DIAGNOSIS — Z90.49 ACQUIRED ABSENCE OF OTHER SPECIFIED PARTS OF DIGESTIVE TRACT: Chronic | ICD-10-CM
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
## 2022-01-01 DIAGNOSIS — U07.1 COVID-19: ICD-10-CM

## 2022-01-01 DIAGNOSIS — I13.0 HYPERTENSIVE HEART AND CHRONIC KIDNEY DISEASE WITH HEART FAILURE AND STAGE 1 THROUGH STAGE 4 CHRONIC KIDNEY DISEASE, OR UNSPECIFIED CHRONIC KIDNEY DISEASE: ICD-10-CM

## 2022-01-01 DIAGNOSIS — I71.4 ABDOMINAL AORTIC ANEURYSM, WITHOUT RUPTURE: ICD-10-CM

## 2022-01-01 DIAGNOSIS — J96.11 CHRONIC RESPIRATORY FAILURE WITH HYPOXIA: ICD-10-CM

## 2022-01-01 DIAGNOSIS — E44.0 MODERATE PROTEIN-CALORIE MALNUTRITION: ICD-10-CM

## 2022-01-01 DIAGNOSIS — I25.10 ATHEROSCLEROTIC HEART DISEASE OF NATIVE CORONARY ARTERY WITHOUT ANGINA PECTORIS: ICD-10-CM
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
## 2022-01-01 DIAGNOSIS — Z66 DO NOT RESUSCITATE: ICD-10-CM
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
## 2022-01-01 DIAGNOSIS — Z99.81 DEPENDENCE ON SUPPLEMENTAL OXYGEN: ICD-10-CM

## 2022-01-01 DIAGNOSIS — J44.9 CHRONIC OBSTRUCTIVE PULMONARY DISEASE, UNSPECIFIED: ICD-10-CM

## 2022-01-01 DIAGNOSIS — E11.51 TYPE 2 DIABETES MELLITUS WITH DIABETIC PERIPHERAL ANGIOPATHY WITHOUT GANGRENE: ICD-10-CM

## 2022-01-01 DIAGNOSIS — D64.9 ANEMIA, UNSPECIFIED: ICD-10-CM

## 2022-01-01 DIAGNOSIS — Z95.1 PRESENCE OF AORTOCORONARY BYPASS GRAFT: ICD-10-CM

## 2022-01-01 DIAGNOSIS — E87.1 HYPO-OSMOLALITY AND HYPONATREMIA: ICD-10-CM

## 2022-01-01 DIAGNOSIS — I50.22 CHRONIC SYSTOLIC (CONGESTIVE) HEART FAILURE: ICD-10-CM

## 2022-01-01 DIAGNOSIS — S51.011A LACERATION WITHOUT FOREIGN BODY OF RIGHT ELBOW, INITIAL ENCOUNTER: ICD-10-CM

## 2022-01-01 DIAGNOSIS — J96.90 RESPIRATORY FAILURE, UNSPECIFIED, UNSPECIFIED WHETHER WITH HYPOXIA OR HYPERCAPNIA: ICD-10-CM

## 2022-01-01 LAB
-  AMPICILLIN/SULBACTAM: SIGNIFICANT CHANGE UP
-  AMPICILLIN: SIGNIFICANT CHANGE UP
-  CEFAZOLIN: SIGNIFICANT CHANGE UP
-  CIPROFLOXACIN: SIGNIFICANT CHANGE UP
-  CLINDAMYCIN: SIGNIFICANT CHANGE UP
-  DAPTOMYCIN: SIGNIFICANT CHANGE UP
-  ERYTHROMYCIN: SIGNIFICANT CHANGE UP
-  GENTAMICIN: SIGNIFICANT CHANGE UP
-  LEVOFLOXACIN: SIGNIFICANT CHANGE UP
-  LINEZOLID: SIGNIFICANT CHANGE UP
-  NITROFURANTOIN: SIGNIFICANT CHANGE UP
-  OXACILLIN: SIGNIFICANT CHANGE UP
-  PENICILLIN: SIGNIFICANT CHANGE UP
-  RIFAMPIN: SIGNIFICANT CHANGE UP
-  TETRACYCLINE: SIGNIFICANT CHANGE UP
-  TETRACYCLINE: SIGNIFICANT CHANGE UP
-  TRIMETHOPRIM/SULFAMETHOXAZOLE: SIGNIFICANT CHANGE UP
-  VANCOMYCIN: SIGNIFICANT CHANGE UP
-  VANCOMYCIN: SIGNIFICANT CHANGE UP
24R-OH-CALCIDIOL SERPL-MCNC: 27 NG/ML — LOW (ref 30–80)
A1C WITH ESTIMATED AVERAGE GLUCOSE RESULT: 7.3 % — HIGH (ref 4–5.6)
A1C WITH ESTIMATED AVERAGE GLUCOSE RESULT: 8 % — HIGH (ref 4–5.6)
ACANTHOCYTES BLD QL SMEAR: SLIGHT — SIGNIFICANT CHANGE UP
ALBUMIN FLD-MCNC: 0.7 G/DL — SIGNIFICANT CHANGE UP
ALBUMIN FLD-MCNC: 0.8 G/DL — SIGNIFICANT CHANGE UP
ALBUMIN SERPL ELPH-MCNC: 2.6 G/DL — LOW (ref 3.5–5.2)
ALBUMIN SERPL ELPH-MCNC: 2.7 G/DL — LOW (ref 3.5–5.2)
ALBUMIN SERPL ELPH-MCNC: 2.8 G/DL — LOW (ref 3.5–5.2)
ALBUMIN SERPL ELPH-MCNC: 2.9 G/DL — LOW (ref 3.5–5.2)
ALBUMIN SERPL ELPH-MCNC: 3 G/DL — LOW (ref 3.5–5.2)
ALBUMIN SERPL ELPH-MCNC: 3.1 G/DL — LOW (ref 3.5–5.2)
ALBUMIN SERPL ELPH-MCNC: 3.2 G/DL — LOW (ref 3.5–5.2)
ALBUMIN SERPL ELPH-MCNC: 3.3 G/DL — LOW (ref 3.5–5.2)
ALBUMIN SERPL ELPH-MCNC: 3.4 G/DL — LOW (ref 3.5–5.2)
ALBUMIN SERPL ELPH-MCNC: 3.7 G/DL — SIGNIFICANT CHANGE UP (ref 3.5–5.2)
ALBUMIN SERPL ELPH-MCNC: 4.1 G/DL — SIGNIFICANT CHANGE UP (ref 3.5–5.2)
ALP SERPL-CCNC: 100 U/L — SIGNIFICANT CHANGE UP (ref 30–115)
ALP SERPL-CCNC: 112 U/L — SIGNIFICANT CHANGE UP (ref 30–115)
ALP SERPL-CCNC: 71 U/L — SIGNIFICANT CHANGE UP (ref 30–115)
ALP SERPL-CCNC: 71 U/L — SIGNIFICANT CHANGE UP (ref 30–115)
ALP SERPL-CCNC: 72 U/L — SIGNIFICANT CHANGE UP (ref 30–115)
ALP SERPL-CCNC: 73 U/L — SIGNIFICANT CHANGE UP (ref 30–115)
ALP SERPL-CCNC: 74 U/L — SIGNIFICANT CHANGE UP (ref 30–115)
ALP SERPL-CCNC: 76 U/L — SIGNIFICANT CHANGE UP (ref 30–115)
ALP SERPL-CCNC: 79 U/L — SIGNIFICANT CHANGE UP (ref 30–115)
ALP SERPL-CCNC: 80 U/L — SIGNIFICANT CHANGE UP (ref 30–115)
ALP SERPL-CCNC: 81 U/L — SIGNIFICANT CHANGE UP (ref 30–115)
ALP SERPL-CCNC: 81 U/L — SIGNIFICANT CHANGE UP (ref 30–115)
ALP SERPL-CCNC: 82 U/L — SIGNIFICANT CHANGE UP (ref 30–115)
ALP SERPL-CCNC: 83 U/L — SIGNIFICANT CHANGE UP (ref 30–115)
ALP SERPL-CCNC: 84 U/L — SIGNIFICANT CHANGE UP (ref 30–115)
ALP SERPL-CCNC: 85 U/L — SIGNIFICANT CHANGE UP (ref 30–115)
ALP SERPL-CCNC: 88 U/L — SIGNIFICANT CHANGE UP (ref 30–115)
ALP SERPL-CCNC: 89 U/L — SIGNIFICANT CHANGE UP (ref 30–115)
ALP SERPL-CCNC: 89 U/L — SIGNIFICANT CHANGE UP (ref 30–115)
ALP SERPL-CCNC: 90 U/L — SIGNIFICANT CHANGE UP (ref 30–115)
ALP SERPL-CCNC: 91 U/L — SIGNIFICANT CHANGE UP (ref 30–115)
ALP SERPL-CCNC: 92 U/L — SIGNIFICANT CHANGE UP (ref 30–115)
ALP SERPL-CCNC: 92 U/L — SIGNIFICANT CHANGE UP (ref 30–115)
ALP SERPL-CCNC: 93 U/L — SIGNIFICANT CHANGE UP (ref 30–115)
ALP SERPL-CCNC: 94 U/L — SIGNIFICANT CHANGE UP (ref 30–115)
ALP SERPL-CCNC: 95 U/L — SIGNIFICANT CHANGE UP (ref 30–115)
ALP SERPL-CCNC: 98 U/L — SIGNIFICANT CHANGE UP (ref 30–115)
ALP SERPL-CCNC: 98 U/L — SIGNIFICANT CHANGE UP (ref 30–115)
ALT FLD-CCNC: 10 U/L — SIGNIFICANT CHANGE UP (ref 0–41)
ALT FLD-CCNC: 11 U/L — SIGNIFICANT CHANGE UP (ref 0–41)
ALT FLD-CCNC: 12 U/L — SIGNIFICANT CHANGE UP (ref 0–41)
ALT FLD-CCNC: 13 U/L — SIGNIFICANT CHANGE UP (ref 0–41)
ALT FLD-CCNC: 14 U/L — SIGNIFICANT CHANGE UP (ref 0–41)
ALT FLD-CCNC: 14 U/L — SIGNIFICANT CHANGE UP (ref 0–41)
ALT FLD-CCNC: 17 U/L — SIGNIFICANT CHANGE UP (ref 0–41)
ALT FLD-CCNC: 17 U/L — SIGNIFICANT CHANGE UP (ref 0–41)
ALT FLD-CCNC: 18 U/L — SIGNIFICANT CHANGE UP (ref 0–41)
ALT FLD-CCNC: 5 U/L — SIGNIFICANT CHANGE UP (ref 0–41)
ALT FLD-CCNC: 7 U/L — SIGNIFICANT CHANGE UP (ref 0–41)
ALT FLD-CCNC: 8 U/L — SIGNIFICANT CHANGE UP (ref 0–41)
ALT FLD-CCNC: 9 U/L — SIGNIFICANT CHANGE UP (ref 0–41)
ALT FLD-CCNC: <5 U/L — SIGNIFICANT CHANGE UP (ref 0–41)
AMMONIA BLD-MCNC: 14 UMOL/L — SIGNIFICANT CHANGE UP (ref 11–55)
ANION GAP SERPL CALC-SCNC: 10 MMOL/L — SIGNIFICANT CHANGE UP (ref 7–14)
ANION GAP SERPL CALC-SCNC: 11 MMOL/L — SIGNIFICANT CHANGE UP (ref 7–14)
ANION GAP SERPL CALC-SCNC: 12 MMOL/L — SIGNIFICANT CHANGE UP (ref 7–14)
ANION GAP SERPL CALC-SCNC: 13 MMOL/L — SIGNIFICANT CHANGE UP (ref 7–14)
ANION GAP SERPL CALC-SCNC: 14 MMOL/L — SIGNIFICANT CHANGE UP (ref 7–14)
ANION GAP SERPL CALC-SCNC: 15 MMOL/L — HIGH (ref 7–14)
ANION GAP SERPL CALC-SCNC: 16 MMOL/L — HIGH (ref 7–14)
ANION GAP SERPL CALC-SCNC: 17 MMOL/L — HIGH (ref 7–14)
ANION GAP SERPL CALC-SCNC: 18 MMOL/L — HIGH (ref 7–14)
ANION GAP SERPL CALC-SCNC: 19 MMOL/L — HIGH (ref 7–14)
ANION GAP SERPL CALC-SCNC: 20 MMOL/L — HIGH (ref 7–14)
ANION GAP SERPL CALC-SCNC: 8 MMOL/L — SIGNIFICANT CHANGE UP (ref 7–14)
ANION GAP SERPL CALC-SCNC: 9 MMOL/L — SIGNIFICANT CHANGE UP (ref 7–14)
ANISOCYTOSIS BLD QL: SIGNIFICANT CHANGE UP
ANISOCYTOSIS BLD QL: SIGNIFICANT CHANGE UP
ANISOCYTOSIS BLD QL: SLIGHT — SIGNIFICANT CHANGE UP
ANISOCYTOSIS BLD QL: SLIGHT — SIGNIFICANT CHANGE UP
APPEARANCE UR: ABNORMAL
APPEARANCE UR: CLEAR — SIGNIFICANT CHANGE UP
APTT BLD: 124.8 SEC — CRITICAL HIGH (ref 27–39.2)
APTT BLD: 27.4 SEC — SIGNIFICANT CHANGE UP (ref 27–39.2)
APTT BLD: 31.1 SEC — SIGNIFICANT CHANGE UP (ref 27–39.2)
APTT BLD: 32.2 SEC — SIGNIFICANT CHANGE UP (ref 27–39.2)
APTT BLD: 33.6 SEC — SIGNIFICANT CHANGE UP (ref 27–39.2)
APTT BLD: 37.4 SEC — SIGNIFICANT CHANGE UP (ref 27–39.2)
APTT BLD: 46.6 SEC — HIGH (ref 27–39.2)
APTT BLD: 55.2 SEC — HIGH (ref 27–39.2)
APTT BLD: 57.7 SEC — HIGH (ref 27–39.2)
APTT BLD: 84.1 SEC — CRITICAL HIGH (ref 27–39.2)
APTT BLD: 87.9 SEC — CRITICAL HIGH (ref 27–39.2)
APTT BLD: 93.1 SEC — CRITICAL HIGH (ref 27–39.2)
APTT BLD: 95.8 SEC — CRITICAL HIGH (ref 27–39.2)
APTT BLD: 98.2 SEC — CRITICAL HIGH (ref 27–39.2)
APTT BLD: >200 SEC — CRITICAL HIGH (ref 27–39.2)
AST SERPL-CCNC: 10 U/L — SIGNIFICANT CHANGE UP (ref 0–41)
AST SERPL-CCNC: 11 U/L — SIGNIFICANT CHANGE UP (ref 0–41)
AST SERPL-CCNC: 12 U/L — SIGNIFICANT CHANGE UP (ref 0–41)
AST SERPL-CCNC: 13 U/L — SIGNIFICANT CHANGE UP (ref 0–41)
AST SERPL-CCNC: 14 U/L — SIGNIFICANT CHANGE UP (ref 0–41)
AST SERPL-CCNC: 15 U/L — SIGNIFICANT CHANGE UP (ref 0–41)
AST SERPL-CCNC: 16 U/L — SIGNIFICANT CHANGE UP (ref 0–41)
AST SERPL-CCNC: 17 U/L — SIGNIFICANT CHANGE UP (ref 0–41)
AST SERPL-CCNC: 18 U/L — SIGNIFICANT CHANGE UP (ref 0–41)
AST SERPL-CCNC: 18 U/L — SIGNIFICANT CHANGE UP (ref 0–41)
AST SERPL-CCNC: 19 U/L — SIGNIFICANT CHANGE UP (ref 0–41)
AST SERPL-CCNC: 26 U/L — SIGNIFICANT CHANGE UP (ref 0–41)
AST SERPL-CCNC: 29 U/L — SIGNIFICANT CHANGE UP (ref 0–41)
AST SERPL-CCNC: 32 U/L — SIGNIFICANT CHANGE UP (ref 0–41)
AST SERPL-CCNC: 36 U/L — SIGNIFICANT CHANGE UP (ref 0–41)
B PERT IGG+IGM PNL SER: CLEAR — SIGNIFICANT CHANGE UP
B PERT IGG+IGM PNL SER: CLEAR — SIGNIFICANT CHANGE UP
BACTERIA # UR AUTO: NEGATIVE — SIGNIFICANT CHANGE UP
BASE EXCESS BLDA CALC-SCNC: -1.5 MMOL/L — SIGNIFICANT CHANGE UP (ref -2–3)
BASE EXCESS BLDA CALC-SCNC: -4.4 MMOL/L — LOW (ref -2–3)
BASE EXCESS BLDA CALC-SCNC: 3 MMOL/L — SIGNIFICANT CHANGE UP (ref -2–3)
BASE EXCESS BLDV CALC-SCNC: 3.1 MMOL/L — HIGH (ref -2–3)
BASOPHILS # BLD AUTO: 0 K/UL — SIGNIFICANT CHANGE UP (ref 0–0.2)
BASOPHILS # BLD AUTO: 0.01 K/UL — SIGNIFICANT CHANGE UP (ref 0–0.2)
BASOPHILS # BLD AUTO: 0.02 K/UL — SIGNIFICANT CHANGE UP (ref 0–0.2)
BASOPHILS # BLD AUTO: 0.03 K/UL — SIGNIFICANT CHANGE UP (ref 0–0.2)
BASOPHILS # BLD AUTO: 0.03 K/UL — SIGNIFICANT CHANGE UP (ref 0–0.2)
BASOPHILS # BLD AUTO: 0.04 K/UL — SIGNIFICANT CHANGE UP (ref 0–0.2)
BASOPHILS # BLD AUTO: 0.04 K/UL — SIGNIFICANT CHANGE UP (ref 0–0.2)
BASOPHILS NFR BLD AUTO: 0 % — SIGNIFICANT CHANGE UP (ref 0–1)
BASOPHILS NFR BLD AUTO: 0.1 % — SIGNIFICANT CHANGE UP (ref 0–1)
BASOPHILS NFR BLD AUTO: 0.2 % — SIGNIFICANT CHANGE UP (ref 0–1)
BASOPHILS NFR BLD AUTO: 0.3 % — SIGNIFICANT CHANGE UP (ref 0–1)
BILIRUB DIRECT SERPL-MCNC: 0.4 MG/DL — HIGH (ref 0–0.3)
BILIRUB DIRECT SERPL-MCNC: 0.5 MG/DL — HIGH (ref 0–0.3)
BILIRUB DIRECT SERPL-MCNC: 0.6 MG/DL — HIGH (ref 0–0.3)
BILIRUB INDIRECT FLD-MCNC: 0.2 MG/DL — SIGNIFICANT CHANGE UP (ref 0.2–1.2)
BILIRUB INDIRECT FLD-MCNC: 0.3 MG/DL — SIGNIFICANT CHANGE UP (ref 0.2–1.2)
BILIRUB SERPL-MCNC: 0.6 MG/DL — SIGNIFICANT CHANGE UP (ref 0.2–1.2)
BILIRUB SERPL-MCNC: 0.7 MG/DL — SIGNIFICANT CHANGE UP (ref 0.2–1.2)
BILIRUB SERPL-MCNC: 0.8 MG/DL — SIGNIFICANT CHANGE UP (ref 0.2–1.2)
BILIRUB SERPL-MCNC: 0.9 MG/DL — SIGNIFICANT CHANGE UP (ref 0.2–1.2)
BILIRUB SERPL-MCNC: 1 MG/DL — SIGNIFICANT CHANGE UP (ref 0.2–1.2)
BILIRUB UR-MCNC: NEGATIVE — SIGNIFICANT CHANGE UP
BLD GP AB SCN SERPL QL: SIGNIFICANT CHANGE UP
BUN SERPL-MCNC: 100 MG/DL — CRITICAL HIGH (ref 10–20)
BUN SERPL-MCNC: 109 MG/DL — CRITICAL HIGH (ref 10–20)
BUN SERPL-MCNC: 109 MG/DL — CRITICAL HIGH (ref 10–20)
BUN SERPL-MCNC: 111 MG/DL — CRITICAL HIGH (ref 10–20)
BUN SERPL-MCNC: 122 MG/DL — CRITICAL HIGH (ref 10–20)
BUN SERPL-MCNC: 134 MG/DL — CRITICAL HIGH (ref 10–20)
BUN SERPL-MCNC: 155 MG/DL — CRITICAL HIGH (ref 10–20)
BUN SERPL-MCNC: 170 MG/DL — CRITICAL HIGH (ref 10–20)
BUN SERPL-MCNC: 172 MG/DL — CRITICAL HIGH (ref 10–20)
BUN SERPL-MCNC: 172 MG/DL — CRITICAL HIGH (ref 10–20)
BUN SERPL-MCNC: 181 MG/DL — CRITICAL HIGH (ref 10–20)
BUN SERPL-MCNC: 60 MG/DL — HIGH (ref 10–20)
BUN SERPL-MCNC: 63 MG/DL — CRITICAL HIGH (ref 10–20)
BUN SERPL-MCNC: 67 MG/DL — CRITICAL HIGH (ref 10–20)
BUN SERPL-MCNC: 68 MG/DL — CRITICAL HIGH (ref 10–20)
BUN SERPL-MCNC: 69 MG/DL — CRITICAL HIGH (ref 10–20)
BUN SERPL-MCNC: 69 MG/DL — CRITICAL HIGH (ref 10–20)
BUN SERPL-MCNC: 70 MG/DL — CRITICAL HIGH (ref 10–20)
BUN SERPL-MCNC: 70 MG/DL — CRITICAL HIGH (ref 10–20)
BUN SERPL-MCNC: 71 MG/DL — CRITICAL HIGH (ref 10–20)
BUN SERPL-MCNC: 72 MG/DL — CRITICAL HIGH (ref 10–20)
BUN SERPL-MCNC: 72 MG/DL — CRITICAL HIGH (ref 10–20)
BUN SERPL-MCNC: 73 MG/DL — CRITICAL HIGH (ref 10–20)
BUN SERPL-MCNC: 74 MG/DL — CRITICAL HIGH (ref 10–20)
BUN SERPL-MCNC: 75 MG/DL — CRITICAL HIGH (ref 10–20)
BUN SERPL-MCNC: 75 MG/DL — CRITICAL HIGH (ref 10–20)
BUN SERPL-MCNC: 76 MG/DL — CRITICAL HIGH (ref 10–20)
BUN SERPL-MCNC: 77 MG/DL — CRITICAL HIGH (ref 10–20)
BUN SERPL-MCNC: 78 MG/DL — CRITICAL HIGH (ref 10–20)
BUN SERPL-MCNC: 79 MG/DL — CRITICAL HIGH (ref 10–20)
BUN SERPL-MCNC: 80 MG/DL — CRITICAL HIGH (ref 10–20)
BUN SERPL-MCNC: 81 MG/DL — CRITICAL HIGH (ref 10–20)
BUN SERPL-MCNC: 82 MG/DL — CRITICAL HIGH (ref 10–20)
BUN SERPL-MCNC: 83 MG/DL — CRITICAL HIGH (ref 10–20)
BUN SERPL-MCNC: 84 MG/DL — CRITICAL HIGH (ref 10–20)
BUN SERPL-MCNC: 87 MG/DL — CRITICAL HIGH (ref 10–20)
BUN SERPL-MCNC: 87 MG/DL — CRITICAL HIGH (ref 10–20)
BUN SERPL-MCNC: 88 MG/DL — CRITICAL HIGH (ref 10–20)
BUN SERPL-MCNC: 89 MG/DL — CRITICAL HIGH (ref 10–20)
BUN SERPL-MCNC: 91 MG/DL — CRITICAL HIGH (ref 10–20)
BUN SERPL-MCNC: 93 MG/DL — CRITICAL HIGH (ref 10–20)
BUN SERPL-MCNC: 94 MG/DL — CRITICAL HIGH (ref 10–20)
BUN SERPL-MCNC: 95 MG/DL — CRITICAL HIGH (ref 10–20)
BUN SERPL-MCNC: 96 MG/DL — CRITICAL HIGH (ref 10–20)
BUN SERPL-MCNC: 97 MG/DL — CRITICAL HIGH (ref 10–20)
BURR CELLS BLD QL SMEAR: PRESENT — SIGNIFICANT CHANGE UP
BURR CELLS BLD QL SMEAR: SIGNIFICANT CHANGE UP
C DIFF BY PCR RESULT: NEGATIVE — SIGNIFICANT CHANGE UP
C DIFF BY PCR RESULT: NEGATIVE — SIGNIFICANT CHANGE UP
C DIFF TOX GENS STL QL NAA+PROBE: SIGNIFICANT CHANGE UP
C DIFF TOX GENS STL QL NAA+PROBE: SIGNIFICANT CHANGE UP
CA-I SERPL-SCNC: 1.18 MMOL/L — SIGNIFICANT CHANGE UP (ref 1.15–1.33)
CALCIUM SERPL-MCNC: 7.2 MG/DL — LOW (ref 8.5–10.1)
CALCIUM SERPL-MCNC: 7.6 MG/DL — LOW (ref 8.5–10.1)
CALCIUM SERPL-MCNC: 7.7 MG/DL — LOW (ref 8.5–10.1)
CALCIUM SERPL-MCNC: 7.8 MG/DL — LOW (ref 8.5–10.1)
CALCIUM SERPL-MCNC: 8 MG/DL — LOW (ref 8.5–10.1)
CALCIUM SERPL-MCNC: 8 MG/DL — LOW (ref 8.5–10.1)
CALCIUM SERPL-MCNC: 8.1 MG/DL — LOW (ref 8.4–10.5)
CALCIUM SERPL-MCNC: 8.1 MG/DL — LOW (ref 8.4–10.5)
CALCIUM SERPL-MCNC: 8.1 MG/DL — LOW (ref 8.5–10.1)
CALCIUM SERPL-MCNC: 8.2 MG/DL — LOW (ref 8.4–10.5)
CALCIUM SERPL-MCNC: 8.2 MG/DL — LOW (ref 8.4–10.5)
CALCIUM SERPL-MCNC: 8.2 MG/DL — LOW (ref 8.5–10.1)
CALCIUM SERPL-MCNC: 8.3 MG/DL — LOW (ref 8.4–10.5)
CALCIUM SERPL-MCNC: 8.3 MG/DL — LOW (ref 8.4–10.5)
CALCIUM SERPL-MCNC: 8.3 MG/DL — LOW (ref 8.5–10.1)
CALCIUM SERPL-MCNC: 8.4 MG/DL — LOW (ref 8.5–10.1)
CALCIUM SERPL-MCNC: 8.4 MG/DL — SIGNIFICANT CHANGE UP (ref 8.4–10.5)
CALCIUM SERPL-MCNC: 8.4 MG/DL — SIGNIFICANT CHANGE UP (ref 8.4–10.5)
CALCIUM SERPL-MCNC: 8.5 MG/DL — SIGNIFICANT CHANGE UP (ref 8.4–10.4)
CALCIUM SERPL-MCNC: 8.5 MG/DL — SIGNIFICANT CHANGE UP (ref 8.5–10.1)
CALCIUM SERPL-MCNC: 8.6 MG/DL — SIGNIFICANT CHANGE UP (ref 8.4–10.5)
CALCIUM SERPL-MCNC: 8.6 MG/DL — SIGNIFICANT CHANGE UP (ref 8.5–10.1)
CALCIUM SERPL-MCNC: 8.7 MG/DL — SIGNIFICANT CHANGE UP (ref 8.5–10.1)
CALCIUM SERPL-MCNC: 8.8 MG/DL — SIGNIFICANT CHANGE UP (ref 8.5–10.1)
CALCIUM SERPL-MCNC: 8.8 MG/DL — SIGNIFICANT CHANGE UP (ref 8.5–10.1)
CALCIUM SERPL-MCNC: 9 MG/DL — SIGNIFICANT CHANGE UP (ref 8.4–10.5)
CHLORIDE SERPL-SCNC: 100 MMOL/L — SIGNIFICANT CHANGE UP (ref 98–110)
CHLORIDE SERPL-SCNC: 101 MMOL/L — SIGNIFICANT CHANGE UP (ref 98–110)
CHLORIDE SERPL-SCNC: 102 MMOL/L — SIGNIFICANT CHANGE UP (ref 98–110)
CHLORIDE SERPL-SCNC: 102 MMOL/L — SIGNIFICANT CHANGE UP (ref 98–110)
CHLORIDE SERPL-SCNC: 103 MMOL/L — SIGNIFICANT CHANGE UP (ref 98–110)
CHLORIDE SERPL-SCNC: 104 MMOL/L — SIGNIFICANT CHANGE UP (ref 98–110)
CHLORIDE SERPL-SCNC: 104 MMOL/L — SIGNIFICANT CHANGE UP (ref 98–110)
CHLORIDE SERPL-SCNC: 106 MMOL/L — SIGNIFICANT CHANGE UP (ref 98–110)
CHLORIDE SERPL-SCNC: 107 MMOL/L — SIGNIFICANT CHANGE UP (ref 98–110)
CHLORIDE SERPL-SCNC: 111 MMOL/L — HIGH (ref 98–110)
CHLORIDE SERPL-SCNC: 111 MMOL/L — HIGH (ref 98–110)
CHLORIDE SERPL-SCNC: 115 MMOL/L — HIGH (ref 98–110)
CHLORIDE SERPL-SCNC: 115 MMOL/L — HIGH (ref 98–110)
CHLORIDE SERPL-SCNC: 116 MMOL/L — HIGH (ref 98–110)
CHLORIDE SERPL-SCNC: 117 MMOL/L — HIGH (ref 98–110)
CHLORIDE SERPL-SCNC: 119 MMOL/L — HIGH (ref 98–110)
CHLORIDE SERPL-SCNC: 89 MMOL/L — LOW (ref 98–110)
CHLORIDE SERPL-SCNC: 90 MMOL/L — LOW (ref 98–110)
CHLORIDE SERPL-SCNC: 91 MMOL/L — LOW (ref 98–110)
CHLORIDE SERPL-SCNC: 91 MMOL/L — LOW (ref 98–110)
CHLORIDE SERPL-SCNC: 92 MMOL/L — LOW (ref 98–110)
CHLORIDE SERPL-SCNC: 93 MMOL/L — LOW (ref 98–110)
CHLORIDE SERPL-SCNC: 94 MMOL/L — LOW (ref 98–110)
CHLORIDE SERPL-SCNC: 95 MMOL/L — LOW (ref 98–110)
CHLORIDE SERPL-SCNC: 96 MMOL/L — LOW (ref 98–110)
CHLORIDE SERPL-SCNC: 97 MMOL/L — LOW (ref 98–110)
CHLORIDE SERPL-SCNC: 98 MMOL/L — SIGNIFICANT CHANGE UP (ref 98–110)
CHLORIDE SERPL-SCNC: 99 MMOL/L — SIGNIFICANT CHANGE UP (ref 98–110)
CHOLEST SERPL-MCNC: 110 MG/DL — SIGNIFICANT CHANGE UP
CHOLEST SERPL-MCNC: 72 MG/DL — SIGNIFICANT CHANGE UP
CO2 SERPL-SCNC: 17 MMOL/L — SIGNIFICANT CHANGE UP (ref 17–32)
CO2 SERPL-SCNC: 17 MMOL/L — SIGNIFICANT CHANGE UP (ref 17–32)
CO2 SERPL-SCNC: 18 MMOL/L — SIGNIFICANT CHANGE UP (ref 17–32)
CO2 SERPL-SCNC: 19 MMOL/L — SIGNIFICANT CHANGE UP (ref 17–32)
CO2 SERPL-SCNC: 20 MMOL/L — SIGNIFICANT CHANGE UP (ref 17–32)
CO2 SERPL-SCNC: 21 MMOL/L — SIGNIFICANT CHANGE UP (ref 17–32)
CO2 SERPL-SCNC: 22 MMOL/L — SIGNIFICANT CHANGE UP (ref 17–32)
CO2 SERPL-SCNC: 22 MMOL/L — SIGNIFICANT CHANGE UP (ref 17–32)
CO2 SERPL-SCNC: 23 MMOL/L — SIGNIFICANT CHANGE UP (ref 17–32)
CO2 SERPL-SCNC: 24 MMOL/L — SIGNIFICANT CHANGE UP (ref 17–32)
CO2 SERPL-SCNC: 26 MMOL/L — SIGNIFICANT CHANGE UP (ref 17–32)
CO2 SERPL-SCNC: 27 MMOL/L — SIGNIFICANT CHANGE UP (ref 17–32)
CO2 SERPL-SCNC: 28 MMOL/L — SIGNIFICANT CHANGE UP (ref 17–32)
CO2 SERPL-SCNC: 29 MMOL/L — SIGNIFICANT CHANGE UP (ref 17–32)
CO2 SERPL-SCNC: 30 MMOL/L — SIGNIFICANT CHANGE UP (ref 17–32)
CO2 SERPL-SCNC: 31 MMOL/L — SIGNIFICANT CHANGE UP (ref 17–32)
CO2 SERPL-SCNC: 32 MMOL/L — SIGNIFICANT CHANGE UP (ref 17–32)
CO2 SERPL-SCNC: 33 MMOL/L — HIGH (ref 17–32)
CO2 SERPL-SCNC: 33 MMOL/L — HIGH (ref 17–32)
CO2 SERPL-SCNC: 35 MMOL/L — HIGH (ref 17–32)
COLOR FLD: SIGNIFICANT CHANGE UP
COLOR FLD: YELLOW — SIGNIFICANT CHANGE UP
COLOR SPEC: YELLOW — SIGNIFICANT CHANGE UP
COMMENT - URINE: SIGNIFICANT CHANGE UP
COMMENT - URINE: SIGNIFICANT CHANGE UP
CREAT ?TM UR-MCNC: 79 MG/DL — SIGNIFICANT CHANGE UP
CREAT ?TM UR-MCNC: 84 MG/DL — SIGNIFICANT CHANGE UP
CREAT SERPL-MCNC: 1 MG/DL — SIGNIFICANT CHANGE UP (ref 0.7–1.5)
CREAT SERPL-MCNC: 1.1 MG/DL — SIGNIFICANT CHANGE UP (ref 0.7–1.5)
CREAT SERPL-MCNC: 1.2 MG/DL — SIGNIFICANT CHANGE UP (ref 0.7–1.5)
CREAT SERPL-MCNC: 1.3 MG/DL — SIGNIFICANT CHANGE UP (ref 0.7–1.5)
CREAT SERPL-MCNC: 1.4 MG/DL — SIGNIFICANT CHANGE UP (ref 0.7–1.5)
CREAT SERPL-MCNC: 1.5 MG/DL — SIGNIFICANT CHANGE UP (ref 0.7–1.5)
CREAT SERPL-MCNC: 1.5 MG/DL — SIGNIFICANT CHANGE UP (ref 0.7–1.5)
CREAT SERPL-MCNC: 1.6 MG/DL — HIGH (ref 0.7–1.5)
CREAT SERPL-MCNC: 1.7 MG/DL — HIGH (ref 0.7–1.5)
CREAT SERPL-MCNC: 1.7 MG/DL — HIGH (ref 0.7–1.5)
CREAT SERPL-MCNC: 1.8 MG/DL — HIGH (ref 0.7–1.5)
CREAT SERPL-MCNC: 1.9 MG/DL — HIGH (ref 0.7–1.5)
CREAT SERPL-MCNC: 2 MG/DL — HIGH (ref 0.7–1.5)
CREAT SERPL-MCNC: 2.1 MG/DL — HIGH (ref 0.7–1.5)
CREAT SERPL-MCNC: 2.2 MG/DL — HIGH (ref 0.7–1.5)
CREAT SERPL-MCNC: 2.5 MG/DL — HIGH (ref 0.7–1.5)
CREAT SERPL-MCNC: 2.7 MG/DL — HIGH (ref 0.7–1.5)
CREAT SERPL-MCNC: 3.5 MG/DL — HIGH (ref 0.7–1.5)
CRP SERPL-MCNC: 25.9 MG/L — HIGH
CRP SERPL-MCNC: 69.1 MG/L — HIGH
CULTURE RESULTS: NO GROWTH — SIGNIFICANT CHANGE UP
CULTURE RESULTS: NO GROWTH — SIGNIFICANT CHANGE UP
CULTURE RESULTS: SIGNIFICANT CHANGE UP
D DIMER BLD IA.RAPID-MCNC: 1035 NG/ML DDU — HIGH (ref 0–230)
D DIMER BLD IA.RAPID-MCNC: 2238 NG/ML DDU — HIGH (ref 0–230)
D DIMER BLD IA.RAPID-MCNC: 454 NG/ML DDU — HIGH (ref 0–230)
D DIMER BLD IA.RAPID-MCNC: 628 NG/ML DDU — HIGH (ref 0–230)
DIFF PNL FLD: ABNORMAL
DIFF PNL FLD: ABNORMAL
DIFF PNL FLD: NEGATIVE — SIGNIFICANT CHANGE UP
DIFF PNL FLD: NEGATIVE — SIGNIFICANT CHANGE UP
EGFR: 17 ML/MIN/1.73M2 — LOW
EGFR: 23 ML/MIN/1.73M2 — LOW
EGFR: 25 ML/MIN/1.73M2 — LOW
EGFR: 29 ML/MIN/1.73M2 — LOW
EGFR: 31 ML/MIN/1.73M2 — LOW
EGFR: 33 ML/MIN/1.73M2 — LOW
EGFR: 35 ML/MIN/1.73M2 — LOW
EGFR: 37 ML/MIN/1.73M2 — LOW
EGFR: 40 ML/MIN/1.73M2 — LOW
EGFR: 40 ML/MIN/1.73M2 — LOW
EGFR: 43 ML/MIN/1.73M2 — LOW
EGFR: 46 ML/MIN/1.73M2 — LOW
EGFR: 46 ML/MIN/1.73M2 — LOW
EGFR: 50 ML/MIN/1.73M2 — LOW
EGFR: 55 ML/MIN/1.73M2 — LOW
EGFR: 61 ML/MIN/1.73M2 — SIGNIFICANT CHANGE UP
EGFR: 67 ML/MIN/1.73M2 — SIGNIFICANT CHANGE UP
EGFR: 76 ML/MIN/1.73M2 — SIGNIFICANT CHANGE UP
ELLIPTOCYTES BLD QL SMEAR: SLIGHT — SIGNIFICANT CHANGE UP
EOSINOPHIL # BLD AUTO: 0 K/UL — SIGNIFICANT CHANGE UP (ref 0–0.7)
EOSINOPHIL # BLD AUTO: 0.01 K/UL — SIGNIFICANT CHANGE UP (ref 0–0.7)
EOSINOPHIL # BLD AUTO: 0.02 K/UL — SIGNIFICANT CHANGE UP (ref 0–0.7)
EOSINOPHIL # BLD AUTO: 0.03 K/UL — SIGNIFICANT CHANGE UP (ref 0–0.7)
EOSINOPHIL # BLD AUTO: 0.04 K/UL — SIGNIFICANT CHANGE UP (ref 0–0.7)
EOSINOPHIL # BLD AUTO: 0.05 K/UL — SIGNIFICANT CHANGE UP (ref 0–0.7)
EOSINOPHIL # BLD AUTO: 0.06 K/UL — SIGNIFICANT CHANGE UP (ref 0–0.7)
EOSINOPHIL # BLD AUTO: 0.08 K/UL — SIGNIFICANT CHANGE UP (ref 0–0.7)
EOSINOPHIL # BLD AUTO: 0.1 K/UL — SIGNIFICANT CHANGE UP (ref 0–0.7)
EOSINOPHIL # BLD AUTO: 0.1 K/UL — SIGNIFICANT CHANGE UP (ref 0–0.7)
EOSINOPHIL # BLD AUTO: 0.11 K/UL — SIGNIFICANT CHANGE UP (ref 0–0.7)
EOSINOPHIL # BLD AUTO: 0.11 K/UL — SIGNIFICANT CHANGE UP (ref 0–0.7)
EOSINOPHIL # BLD AUTO: 0.12 K/UL — SIGNIFICANT CHANGE UP (ref 0–0.7)
EOSINOPHIL # BLD AUTO: 0.13 K/UL — SIGNIFICANT CHANGE UP (ref 0–0.7)
EOSINOPHIL # BLD AUTO: 0.14 K/UL — SIGNIFICANT CHANGE UP (ref 0–0.7)
EOSINOPHIL # BLD AUTO: 0.15 K/UL — SIGNIFICANT CHANGE UP (ref 0–0.7)
EOSINOPHIL # BLD AUTO: 0.16 K/UL — SIGNIFICANT CHANGE UP (ref 0–0.7)
EOSINOPHIL # BLD AUTO: 0.19 K/UL — SIGNIFICANT CHANGE UP (ref 0–0.7)
EOSINOPHIL # BLD AUTO: 0.21 K/UL — SIGNIFICANT CHANGE UP (ref 0–0.7)
EOSINOPHIL # BLD AUTO: 0.21 K/UL — SIGNIFICANT CHANGE UP (ref 0–0.7)
EOSINOPHIL # BLD AUTO: 0.26 K/UL — SIGNIFICANT CHANGE UP (ref 0–0.7)
EOSINOPHIL # BLD AUTO: 0.28 K/UL — SIGNIFICANT CHANGE UP (ref 0–0.7)
EOSINOPHIL # BLD AUTO: 0.33 K/UL — SIGNIFICANT CHANGE UP (ref 0–0.7)
EOSINOPHIL # BLD AUTO: 0.33 K/UL — SIGNIFICANT CHANGE UP (ref 0–0.7)
EOSINOPHIL # BLD AUTO: 0.34 K/UL — SIGNIFICANT CHANGE UP (ref 0–0.7)
EOSINOPHIL # BLD AUTO: 0.35 K/UL — SIGNIFICANT CHANGE UP (ref 0–0.7)
EOSINOPHIL # BLD AUTO: 0.49 K/UL — SIGNIFICANT CHANGE UP (ref 0–0.7)
EOSINOPHIL # BLD AUTO: 0.52 K/UL — SIGNIFICANT CHANGE UP (ref 0–0.7)
EOSINOPHIL NFR BLD AUTO: 0 % — SIGNIFICANT CHANGE UP (ref 0–8)
EOSINOPHIL NFR BLD AUTO: 0.1 % — SIGNIFICANT CHANGE UP (ref 0–8)
EOSINOPHIL NFR BLD AUTO: 0.2 % — SIGNIFICANT CHANGE UP (ref 0–8)
EOSINOPHIL NFR BLD AUTO: 0.3 % — SIGNIFICANT CHANGE UP (ref 0–8)
EOSINOPHIL NFR BLD AUTO: 0.3 % — SIGNIFICANT CHANGE UP (ref 0–8)
EOSINOPHIL NFR BLD AUTO: 0.4 % — SIGNIFICANT CHANGE UP (ref 0–8)
EOSINOPHIL NFR BLD AUTO: 0.6 % — SIGNIFICANT CHANGE UP (ref 0–8)
EOSINOPHIL NFR BLD AUTO: 0.8 % — SIGNIFICANT CHANGE UP (ref 0–8)
EOSINOPHIL NFR BLD AUTO: 0.9 % — SIGNIFICANT CHANGE UP (ref 0–8)
EOSINOPHIL NFR BLD AUTO: 1.1 % — SIGNIFICANT CHANGE UP (ref 0–8)
EOSINOPHIL NFR BLD AUTO: 1.1 % — SIGNIFICANT CHANGE UP (ref 0–8)
EOSINOPHIL NFR BLD AUTO: 1.2 % — SIGNIFICANT CHANGE UP (ref 0–8)
EOSINOPHIL NFR BLD AUTO: 1.3 % — SIGNIFICANT CHANGE UP (ref 0–8)
EOSINOPHIL NFR BLD AUTO: 1.3 % — SIGNIFICANT CHANGE UP (ref 0–8)
EOSINOPHIL NFR BLD AUTO: 1.5 % — SIGNIFICANT CHANGE UP (ref 0–8)
EOSINOPHIL NFR BLD AUTO: 1.7 % — SIGNIFICANT CHANGE UP (ref 0–8)
EOSINOPHIL NFR BLD AUTO: 1.7 % — SIGNIFICANT CHANGE UP (ref 0–8)
EOSINOPHIL NFR BLD AUTO: 1.8 % — SIGNIFICANT CHANGE UP (ref 0–8)
EOSINOPHIL NFR BLD AUTO: 1.9 % — SIGNIFICANT CHANGE UP (ref 0–8)
EOSINOPHIL NFR BLD AUTO: 2.4 % — SIGNIFICANT CHANGE UP (ref 0–8)
EOSINOPHIL NFR BLD AUTO: 2.6 % — SIGNIFICANT CHANGE UP (ref 0–8)
EOSINOPHIL NFR BLD AUTO: 2.8 % — SIGNIFICANT CHANGE UP (ref 0–8)
EOSINOPHIL NFR BLD AUTO: 3.3 % — SIGNIFICANT CHANGE UP (ref 0–8)
EOSINOPHIL NFR BLD AUTO: 3.7 % — SIGNIFICANT CHANGE UP (ref 0–8)
EOSINOPHIL NFR BLD AUTO: 3.7 % — SIGNIFICANT CHANGE UP (ref 0–8)
EPI CELLS # UR: 1 /HPF — SIGNIFICANT CHANGE UP (ref 0–5)
EPI CELLS # UR: 1 /HPF — SIGNIFICANT CHANGE UP (ref 0–5)
EPI CELLS # UR: 2 /HPF — SIGNIFICANT CHANGE UP (ref 0–5)
EPI CELLS # UR: 4 /HPF — SIGNIFICANT CHANGE UP (ref 0–5)
ERYTHROCYTE [SEDIMENTATION RATE] IN BLOOD: 38 MM/HR — HIGH (ref 0–10)
ESTIMATED AVERAGE GLUCOSE: 163 MG/DL — HIGH (ref 68–114)
ESTIMATED AVERAGE GLUCOSE: 183 MG/DL — HIGH (ref 68–114)
FERRITIN SERPL-MCNC: 1299 NG/ML — HIGH (ref 30–400)
FERRITIN SERPL-MCNC: 1415 NG/ML — HIGH (ref 30–400)
FERRITIN SERPL-MCNC: 343 NG/ML — SIGNIFICANT CHANGE UP (ref 30–400)
FERRITIN SERPL-MCNC: 355 NG/ML — SIGNIFICANT CHANGE UP (ref 30–400)
FERRITIN SERPL-MCNC: 377 NG/ML — SIGNIFICANT CHANGE UP (ref 30–400)
FERRITIN SERPL-MCNC: 957 NG/ML — HIGH (ref 30–400)
FLUID INTAKE SUBSTANCE CLASS: SIGNIFICANT CHANGE UP
FLUID INTAKE SUBSTANCE CLASS: SIGNIFICANT CHANGE UP
GAS PNL BLDA: SIGNIFICANT CHANGE UP
GAS PNL BLDA: SIGNIFICANT CHANGE UP
GAS PNL BLDV: 130 MMOL/L — LOW (ref 136–145)
GAS PNL BLDV: SIGNIFICANT CHANGE UP
GIANT PLATELETS BLD QL SMEAR: PRESENT — SIGNIFICANT CHANGE UP
GLUCOSE BLDC GLUCOMTR-MCNC: 102 MG/DL — HIGH (ref 70–99)
GLUCOSE BLDC GLUCOMTR-MCNC: 103 MG/DL — HIGH (ref 70–99)
GLUCOSE BLDC GLUCOMTR-MCNC: 105 MG/DL — HIGH (ref 70–99)
GLUCOSE BLDC GLUCOMTR-MCNC: 105 MG/DL — HIGH (ref 70–99)
GLUCOSE BLDC GLUCOMTR-MCNC: 107 MG/DL — HIGH (ref 70–99)
GLUCOSE BLDC GLUCOMTR-MCNC: 107 MG/DL — HIGH (ref 70–99)
GLUCOSE BLDC GLUCOMTR-MCNC: 108 MG/DL — HIGH (ref 70–99)
GLUCOSE BLDC GLUCOMTR-MCNC: 109 MG/DL — HIGH (ref 70–99)
GLUCOSE BLDC GLUCOMTR-MCNC: 109 MG/DL — HIGH (ref 70–99)
GLUCOSE BLDC GLUCOMTR-MCNC: 110 MG/DL — HIGH (ref 70–99)
GLUCOSE BLDC GLUCOMTR-MCNC: 112 MG/DL — HIGH (ref 70–99)
GLUCOSE BLDC GLUCOMTR-MCNC: 113 MG/DL — HIGH (ref 70–99)
GLUCOSE BLDC GLUCOMTR-MCNC: 114 MG/DL — HIGH (ref 70–99)
GLUCOSE BLDC GLUCOMTR-MCNC: 114 MG/DL — HIGH (ref 70–99)
GLUCOSE BLDC GLUCOMTR-MCNC: 116 MG/DL — HIGH (ref 70–99)
GLUCOSE BLDC GLUCOMTR-MCNC: 118 MG/DL — HIGH (ref 70–99)
GLUCOSE BLDC GLUCOMTR-MCNC: 119 MG/DL — HIGH (ref 70–99)
GLUCOSE BLDC GLUCOMTR-MCNC: 120 MG/DL — HIGH (ref 70–99)
GLUCOSE BLDC GLUCOMTR-MCNC: 120 MG/DL — HIGH (ref 70–99)
GLUCOSE BLDC GLUCOMTR-MCNC: 122 MG/DL — HIGH (ref 70–99)
GLUCOSE BLDC GLUCOMTR-MCNC: 122 MG/DL — HIGH (ref 70–99)
GLUCOSE BLDC GLUCOMTR-MCNC: 123 MG/DL — HIGH (ref 70–99)
GLUCOSE BLDC GLUCOMTR-MCNC: 124 MG/DL — HIGH (ref 70–99)
GLUCOSE BLDC GLUCOMTR-MCNC: 126 MG/DL — HIGH (ref 70–99)
GLUCOSE BLDC GLUCOMTR-MCNC: 128 MG/DL — HIGH (ref 70–99)
GLUCOSE BLDC GLUCOMTR-MCNC: 129 MG/DL — HIGH (ref 70–99)
GLUCOSE BLDC GLUCOMTR-MCNC: 130 MG/DL — HIGH (ref 70–99)
GLUCOSE BLDC GLUCOMTR-MCNC: 131 MG/DL — HIGH (ref 70–99)
GLUCOSE BLDC GLUCOMTR-MCNC: 132 MG/DL — HIGH (ref 70–99)
GLUCOSE BLDC GLUCOMTR-MCNC: 133 MG/DL — HIGH (ref 70–99)
GLUCOSE BLDC GLUCOMTR-MCNC: 134 MG/DL — HIGH (ref 70–99)
GLUCOSE BLDC GLUCOMTR-MCNC: 135 MG/DL — HIGH (ref 70–99)
GLUCOSE BLDC GLUCOMTR-MCNC: 135 MG/DL — HIGH (ref 70–99)
GLUCOSE BLDC GLUCOMTR-MCNC: 136 MG/DL — HIGH (ref 70–99)
GLUCOSE BLDC GLUCOMTR-MCNC: 137 MG/DL — HIGH (ref 70–99)
GLUCOSE BLDC GLUCOMTR-MCNC: 138 MG/DL — HIGH (ref 70–99)
GLUCOSE BLDC GLUCOMTR-MCNC: 139 MG/DL — HIGH (ref 70–99)
GLUCOSE BLDC GLUCOMTR-MCNC: 140 MG/DL — HIGH (ref 70–99)
GLUCOSE BLDC GLUCOMTR-MCNC: 142 MG/DL — HIGH (ref 70–99)
GLUCOSE BLDC GLUCOMTR-MCNC: 143 MG/DL — HIGH (ref 70–99)
GLUCOSE BLDC GLUCOMTR-MCNC: 144 MG/DL — HIGH (ref 70–99)
GLUCOSE BLDC GLUCOMTR-MCNC: 145 MG/DL — HIGH (ref 70–99)
GLUCOSE BLDC GLUCOMTR-MCNC: 146 MG/DL — HIGH (ref 70–99)
GLUCOSE BLDC GLUCOMTR-MCNC: 147 MG/DL — HIGH (ref 70–99)
GLUCOSE BLDC GLUCOMTR-MCNC: 147 MG/DL — HIGH (ref 70–99)
GLUCOSE BLDC GLUCOMTR-MCNC: 150 MG/DL — HIGH (ref 70–99)
GLUCOSE BLDC GLUCOMTR-MCNC: 152 MG/DL — HIGH (ref 70–99)
GLUCOSE BLDC GLUCOMTR-MCNC: 152 MG/DL — HIGH (ref 70–99)
GLUCOSE BLDC GLUCOMTR-MCNC: 153 MG/DL — HIGH (ref 70–99)
GLUCOSE BLDC GLUCOMTR-MCNC: 154 MG/DL — HIGH (ref 70–99)
GLUCOSE BLDC GLUCOMTR-MCNC: 155 MG/DL — HIGH (ref 70–99)
GLUCOSE BLDC GLUCOMTR-MCNC: 155 MG/DL — HIGH (ref 70–99)
GLUCOSE BLDC GLUCOMTR-MCNC: 156 MG/DL — HIGH (ref 70–99)
GLUCOSE BLDC GLUCOMTR-MCNC: 156 MG/DL — HIGH (ref 70–99)
GLUCOSE BLDC GLUCOMTR-MCNC: 160 MG/DL — HIGH (ref 70–99)
GLUCOSE BLDC GLUCOMTR-MCNC: 161 MG/DL — HIGH (ref 70–99)
GLUCOSE BLDC GLUCOMTR-MCNC: 161 MG/DL — HIGH (ref 70–99)
GLUCOSE BLDC GLUCOMTR-MCNC: 162 MG/DL — HIGH (ref 70–99)
GLUCOSE BLDC GLUCOMTR-MCNC: 162 MG/DL — HIGH (ref 70–99)
GLUCOSE BLDC GLUCOMTR-MCNC: 164 MG/DL — HIGH (ref 70–99)
GLUCOSE BLDC GLUCOMTR-MCNC: 165 MG/DL — HIGH (ref 70–99)
GLUCOSE BLDC GLUCOMTR-MCNC: 166 MG/DL — HIGH (ref 70–99)
GLUCOSE BLDC GLUCOMTR-MCNC: 167 MG/DL — HIGH (ref 70–99)
GLUCOSE BLDC GLUCOMTR-MCNC: 167 MG/DL — HIGH (ref 70–99)
GLUCOSE BLDC GLUCOMTR-MCNC: 168 MG/DL — HIGH (ref 70–99)
GLUCOSE BLDC GLUCOMTR-MCNC: 169 MG/DL — HIGH (ref 70–99)
GLUCOSE BLDC GLUCOMTR-MCNC: 169 MG/DL — HIGH (ref 70–99)
GLUCOSE BLDC GLUCOMTR-MCNC: 170 MG/DL — HIGH (ref 70–99)
GLUCOSE BLDC GLUCOMTR-MCNC: 170 MG/DL — HIGH (ref 70–99)
GLUCOSE BLDC GLUCOMTR-MCNC: 171 MG/DL — HIGH (ref 70–99)
GLUCOSE BLDC GLUCOMTR-MCNC: 172 MG/DL — HIGH (ref 70–99)
GLUCOSE BLDC GLUCOMTR-MCNC: 173 MG/DL — HIGH (ref 70–99)
GLUCOSE BLDC GLUCOMTR-MCNC: 174 MG/DL — HIGH (ref 70–99)
GLUCOSE BLDC GLUCOMTR-MCNC: 175 MG/DL — HIGH (ref 70–99)
GLUCOSE BLDC GLUCOMTR-MCNC: 177 MG/DL — HIGH (ref 70–99)
GLUCOSE BLDC GLUCOMTR-MCNC: 178 MG/DL — HIGH (ref 70–99)
GLUCOSE BLDC GLUCOMTR-MCNC: 178 MG/DL — HIGH (ref 70–99)
GLUCOSE BLDC GLUCOMTR-MCNC: 179 MG/DL — HIGH (ref 70–99)
GLUCOSE BLDC GLUCOMTR-MCNC: 181 MG/DL — HIGH (ref 70–99)
GLUCOSE BLDC GLUCOMTR-MCNC: 182 MG/DL — HIGH (ref 70–99)
GLUCOSE BLDC GLUCOMTR-MCNC: 183 MG/DL — HIGH (ref 70–99)
GLUCOSE BLDC GLUCOMTR-MCNC: 184 MG/DL — HIGH (ref 70–99)
GLUCOSE BLDC GLUCOMTR-MCNC: 185 MG/DL — HIGH (ref 70–99)
GLUCOSE BLDC GLUCOMTR-MCNC: 186 MG/DL — HIGH (ref 70–99)
GLUCOSE BLDC GLUCOMTR-MCNC: 186 MG/DL — HIGH (ref 70–99)
GLUCOSE BLDC GLUCOMTR-MCNC: 187 MG/DL — HIGH (ref 70–99)
GLUCOSE BLDC GLUCOMTR-MCNC: 188 MG/DL — HIGH (ref 70–99)
GLUCOSE BLDC GLUCOMTR-MCNC: 189 MG/DL — HIGH (ref 70–99)
GLUCOSE BLDC GLUCOMTR-MCNC: 190 MG/DL — HIGH (ref 70–99)
GLUCOSE BLDC GLUCOMTR-MCNC: 191 MG/DL — HIGH (ref 70–99)
GLUCOSE BLDC GLUCOMTR-MCNC: 193 MG/DL — HIGH (ref 70–99)
GLUCOSE BLDC GLUCOMTR-MCNC: 194 MG/DL — HIGH (ref 70–99)
GLUCOSE BLDC GLUCOMTR-MCNC: 194 MG/DL — HIGH (ref 70–99)
GLUCOSE BLDC GLUCOMTR-MCNC: 195 MG/DL — HIGH (ref 70–99)
GLUCOSE BLDC GLUCOMTR-MCNC: 197 MG/DL — HIGH (ref 70–99)
GLUCOSE BLDC GLUCOMTR-MCNC: 198 MG/DL — HIGH (ref 70–99)
GLUCOSE BLDC GLUCOMTR-MCNC: 200 MG/DL — HIGH (ref 70–99)
GLUCOSE BLDC GLUCOMTR-MCNC: 204 MG/DL — HIGH (ref 70–99)
GLUCOSE BLDC GLUCOMTR-MCNC: 205 MG/DL — HIGH (ref 70–99)
GLUCOSE BLDC GLUCOMTR-MCNC: 206 MG/DL — HIGH (ref 70–99)
GLUCOSE BLDC GLUCOMTR-MCNC: 206 MG/DL — HIGH (ref 70–99)
GLUCOSE BLDC GLUCOMTR-MCNC: 207 MG/DL — HIGH (ref 70–99)
GLUCOSE BLDC GLUCOMTR-MCNC: 208 MG/DL — HIGH (ref 70–99)
GLUCOSE BLDC GLUCOMTR-MCNC: 208 MG/DL — HIGH (ref 70–99)
GLUCOSE BLDC GLUCOMTR-MCNC: 211 MG/DL — HIGH (ref 70–99)
GLUCOSE BLDC GLUCOMTR-MCNC: 213 MG/DL — HIGH (ref 70–99)
GLUCOSE BLDC GLUCOMTR-MCNC: 213 MG/DL — HIGH (ref 70–99)
GLUCOSE BLDC GLUCOMTR-MCNC: 215 MG/DL — HIGH (ref 70–99)
GLUCOSE BLDC GLUCOMTR-MCNC: 216 MG/DL — HIGH (ref 70–99)
GLUCOSE BLDC GLUCOMTR-MCNC: 216 MG/DL — HIGH (ref 70–99)
GLUCOSE BLDC GLUCOMTR-MCNC: 218 MG/DL — HIGH (ref 70–99)
GLUCOSE BLDC GLUCOMTR-MCNC: 219 MG/DL — HIGH (ref 70–99)
GLUCOSE BLDC GLUCOMTR-MCNC: 221 MG/DL — HIGH (ref 70–99)
GLUCOSE BLDC GLUCOMTR-MCNC: 222 MG/DL — HIGH (ref 70–99)
GLUCOSE BLDC GLUCOMTR-MCNC: 225 MG/DL — HIGH (ref 70–99)
GLUCOSE BLDC GLUCOMTR-MCNC: 226 MG/DL — HIGH (ref 70–99)
GLUCOSE BLDC GLUCOMTR-MCNC: 227 MG/DL — HIGH (ref 70–99)
GLUCOSE BLDC GLUCOMTR-MCNC: 227 MG/DL — HIGH (ref 70–99)
GLUCOSE BLDC GLUCOMTR-MCNC: 228 MG/DL — HIGH (ref 70–99)
GLUCOSE BLDC GLUCOMTR-MCNC: 229 MG/DL — HIGH (ref 70–99)
GLUCOSE BLDC GLUCOMTR-MCNC: 229 MG/DL — HIGH (ref 70–99)
GLUCOSE BLDC GLUCOMTR-MCNC: 231 MG/DL — HIGH (ref 70–99)
GLUCOSE BLDC GLUCOMTR-MCNC: 231 MG/DL — HIGH (ref 70–99)
GLUCOSE BLDC GLUCOMTR-MCNC: 233 MG/DL — HIGH (ref 70–99)
GLUCOSE BLDC GLUCOMTR-MCNC: 234 MG/DL — HIGH (ref 70–99)
GLUCOSE BLDC GLUCOMTR-MCNC: 236 MG/DL — HIGH (ref 70–99)
GLUCOSE BLDC GLUCOMTR-MCNC: 236 MG/DL — HIGH (ref 70–99)
GLUCOSE BLDC GLUCOMTR-MCNC: 237 MG/DL — HIGH (ref 70–99)
GLUCOSE BLDC GLUCOMTR-MCNC: 238 MG/DL — HIGH (ref 70–99)
GLUCOSE BLDC GLUCOMTR-MCNC: 239 MG/DL — HIGH (ref 70–99)
GLUCOSE BLDC GLUCOMTR-MCNC: 242 MG/DL — HIGH (ref 70–99)
GLUCOSE BLDC GLUCOMTR-MCNC: 243 MG/DL — HIGH (ref 70–99)
GLUCOSE BLDC GLUCOMTR-MCNC: 244 MG/DL — HIGH (ref 70–99)
GLUCOSE BLDC GLUCOMTR-MCNC: 248 MG/DL — HIGH (ref 70–99)
GLUCOSE BLDC GLUCOMTR-MCNC: 249 MG/DL — HIGH (ref 70–99)
GLUCOSE BLDC GLUCOMTR-MCNC: 250 MG/DL — HIGH (ref 70–99)
GLUCOSE BLDC GLUCOMTR-MCNC: 251 MG/DL — HIGH (ref 70–99)
GLUCOSE BLDC GLUCOMTR-MCNC: 251 MG/DL — HIGH (ref 70–99)
GLUCOSE BLDC GLUCOMTR-MCNC: 253 MG/DL — HIGH (ref 70–99)
GLUCOSE BLDC GLUCOMTR-MCNC: 257 MG/DL — HIGH (ref 70–99)
GLUCOSE BLDC GLUCOMTR-MCNC: 257 MG/DL — HIGH (ref 70–99)
GLUCOSE BLDC GLUCOMTR-MCNC: 258 MG/DL — HIGH (ref 70–99)
GLUCOSE BLDC GLUCOMTR-MCNC: 259 MG/DL — HIGH (ref 70–99)
GLUCOSE BLDC GLUCOMTR-MCNC: 260 MG/DL — HIGH (ref 70–99)
GLUCOSE BLDC GLUCOMTR-MCNC: 262 MG/DL — HIGH (ref 70–99)
GLUCOSE BLDC GLUCOMTR-MCNC: 265 MG/DL — HIGH (ref 70–99)
GLUCOSE BLDC GLUCOMTR-MCNC: 266 MG/DL — HIGH (ref 70–99)
GLUCOSE BLDC GLUCOMTR-MCNC: 267 MG/DL — HIGH (ref 70–99)
GLUCOSE BLDC GLUCOMTR-MCNC: 268 MG/DL — HIGH (ref 70–99)
GLUCOSE BLDC GLUCOMTR-MCNC: 270 MG/DL — HIGH (ref 70–99)
GLUCOSE BLDC GLUCOMTR-MCNC: 270 MG/DL — HIGH (ref 70–99)
GLUCOSE BLDC GLUCOMTR-MCNC: 271 MG/DL — HIGH (ref 70–99)
GLUCOSE BLDC GLUCOMTR-MCNC: 277 MG/DL — HIGH (ref 70–99)
GLUCOSE BLDC GLUCOMTR-MCNC: 284 MG/DL — HIGH (ref 70–99)
GLUCOSE BLDC GLUCOMTR-MCNC: 288 MG/DL — HIGH (ref 70–99)
GLUCOSE BLDC GLUCOMTR-MCNC: 300 MG/DL — HIGH (ref 70–99)
GLUCOSE BLDC GLUCOMTR-MCNC: 306 MG/DL — HIGH (ref 70–99)
GLUCOSE BLDC GLUCOMTR-MCNC: 307 MG/DL — HIGH (ref 70–99)
GLUCOSE BLDC GLUCOMTR-MCNC: 317 MG/DL — HIGH (ref 70–99)
GLUCOSE BLDC GLUCOMTR-MCNC: 319 MG/DL — HIGH (ref 70–99)
GLUCOSE BLDC GLUCOMTR-MCNC: 322 MG/DL — HIGH (ref 70–99)
GLUCOSE BLDC GLUCOMTR-MCNC: 329 MG/DL — HIGH (ref 70–99)
GLUCOSE BLDC GLUCOMTR-MCNC: 336 MG/DL — HIGH (ref 70–99)
GLUCOSE BLDC GLUCOMTR-MCNC: 342 MG/DL — HIGH (ref 70–99)
GLUCOSE BLDC GLUCOMTR-MCNC: 342 MG/DL — HIGH (ref 70–99)
GLUCOSE BLDC GLUCOMTR-MCNC: 352 MG/DL — HIGH (ref 70–99)
GLUCOSE BLDC GLUCOMTR-MCNC: 356 MG/DL — HIGH (ref 70–99)
GLUCOSE BLDC GLUCOMTR-MCNC: 360 MG/DL — HIGH (ref 70–99)
GLUCOSE BLDC GLUCOMTR-MCNC: 362 MG/DL — HIGH (ref 70–99)
GLUCOSE BLDC GLUCOMTR-MCNC: 39 MG/DL — CRITICAL LOW (ref 70–99)
GLUCOSE BLDC GLUCOMTR-MCNC: 390 MG/DL — HIGH (ref 70–99)
GLUCOSE BLDC GLUCOMTR-MCNC: 395 MG/DL — HIGH (ref 70–99)
GLUCOSE BLDC GLUCOMTR-MCNC: 410 MG/DL — HIGH (ref 70–99)
GLUCOSE BLDC GLUCOMTR-MCNC: 43 MG/DL — CRITICAL LOW (ref 70–99)
GLUCOSE BLDC GLUCOMTR-MCNC: 433 MG/DL — HIGH (ref 70–99)
GLUCOSE BLDC GLUCOMTR-MCNC: 44 MG/DL — CRITICAL LOW (ref 70–99)
GLUCOSE BLDC GLUCOMTR-MCNC: 444 MG/DL — HIGH (ref 70–99)
GLUCOSE BLDC GLUCOMTR-MCNC: 46 MG/DL — CRITICAL LOW (ref 70–99)
GLUCOSE BLDC GLUCOMTR-MCNC: 472 MG/DL — CRITICAL HIGH (ref 70–99)
GLUCOSE BLDC GLUCOMTR-MCNC: 505 MG/DL — CRITICAL HIGH (ref 70–99)
GLUCOSE BLDC GLUCOMTR-MCNC: 59 MG/DL — LOW (ref 70–99)
GLUCOSE BLDC GLUCOMTR-MCNC: 68 MG/DL — LOW (ref 70–99)
GLUCOSE BLDC GLUCOMTR-MCNC: 71 MG/DL — SIGNIFICANT CHANGE UP (ref 70–99)
GLUCOSE BLDC GLUCOMTR-MCNC: 72 MG/DL — SIGNIFICANT CHANGE UP (ref 70–99)
GLUCOSE BLDC GLUCOMTR-MCNC: 73 MG/DL — SIGNIFICANT CHANGE UP (ref 70–99)
GLUCOSE BLDC GLUCOMTR-MCNC: 78 MG/DL — SIGNIFICANT CHANGE UP (ref 70–99)
GLUCOSE BLDC GLUCOMTR-MCNC: 80 MG/DL — SIGNIFICANT CHANGE UP (ref 70–99)
GLUCOSE BLDC GLUCOMTR-MCNC: 81 MG/DL — SIGNIFICANT CHANGE UP (ref 70–99)
GLUCOSE BLDC GLUCOMTR-MCNC: 86 MG/DL — SIGNIFICANT CHANGE UP (ref 70–99)
GLUCOSE BLDC GLUCOMTR-MCNC: 88 MG/DL — SIGNIFICANT CHANGE UP (ref 70–99)
GLUCOSE BLDC GLUCOMTR-MCNC: 89 MG/DL — SIGNIFICANT CHANGE UP (ref 70–99)
GLUCOSE BLDC GLUCOMTR-MCNC: 91 MG/DL — SIGNIFICANT CHANGE UP (ref 70–99)
GLUCOSE BLDC GLUCOMTR-MCNC: 92 MG/DL — SIGNIFICANT CHANGE UP (ref 70–99)
GLUCOSE BLDC GLUCOMTR-MCNC: 93 MG/DL — SIGNIFICANT CHANGE UP (ref 70–99)
GLUCOSE BLDC GLUCOMTR-MCNC: 96 MG/DL — SIGNIFICANT CHANGE UP (ref 70–99)
GLUCOSE BLDC GLUCOMTR-MCNC: 97 MG/DL — SIGNIFICANT CHANGE UP (ref 70–99)
GLUCOSE BLDC GLUCOMTR-MCNC: 97 MG/DL — SIGNIFICANT CHANGE UP (ref 70–99)
GLUCOSE BLDC GLUCOMTR-MCNC: 98 MG/DL — SIGNIFICANT CHANGE UP (ref 70–99)
GLUCOSE BLDC GLUCOMTR-MCNC: 98 MG/DL — SIGNIFICANT CHANGE UP (ref 70–99)
GLUCOSE FLD-MCNC: 184 MG/DL — SIGNIFICANT CHANGE UP
GLUCOSE FLD-MCNC: 185 MG/DL — SIGNIFICANT CHANGE UP
GLUCOSE SERPL-MCNC: 101 MG/DL — HIGH (ref 70–99)
GLUCOSE SERPL-MCNC: 101 MG/DL — HIGH (ref 70–99)
GLUCOSE SERPL-MCNC: 108 MG/DL — HIGH (ref 70–99)
GLUCOSE SERPL-MCNC: 108 MG/DL — HIGH (ref 70–99)
GLUCOSE SERPL-MCNC: 109 MG/DL — HIGH (ref 70–99)
GLUCOSE SERPL-MCNC: 112 MG/DL — HIGH (ref 70–99)
GLUCOSE SERPL-MCNC: 112 MG/DL — HIGH (ref 70–99)
GLUCOSE SERPL-MCNC: 113 MG/DL — HIGH (ref 70–99)
GLUCOSE SERPL-MCNC: 117 MG/DL — HIGH (ref 70–99)
GLUCOSE SERPL-MCNC: 119 MG/DL — HIGH (ref 70–99)
GLUCOSE SERPL-MCNC: 119 MG/DL — HIGH (ref 70–99)
GLUCOSE SERPL-MCNC: 120 MG/DL — HIGH (ref 70–99)
GLUCOSE SERPL-MCNC: 120 MG/DL — HIGH (ref 70–99)
GLUCOSE SERPL-MCNC: 123 MG/DL — HIGH (ref 70–99)
GLUCOSE SERPL-MCNC: 126 MG/DL — HIGH (ref 70–99)
GLUCOSE SERPL-MCNC: 127 MG/DL — HIGH (ref 70–99)
GLUCOSE SERPL-MCNC: 129 MG/DL — HIGH (ref 70–99)
GLUCOSE SERPL-MCNC: 133 MG/DL — HIGH (ref 70–99)
GLUCOSE SERPL-MCNC: 136 MG/DL — HIGH (ref 70–99)
GLUCOSE SERPL-MCNC: 140 MG/DL — HIGH (ref 70–99)
GLUCOSE SERPL-MCNC: 142 MG/DL — HIGH (ref 70–99)
GLUCOSE SERPL-MCNC: 142 MG/DL — HIGH (ref 70–99)
GLUCOSE SERPL-MCNC: 146 MG/DL — HIGH (ref 70–99)
GLUCOSE SERPL-MCNC: 152 MG/DL — HIGH (ref 70–99)
GLUCOSE SERPL-MCNC: 153 MG/DL — HIGH (ref 70–99)
GLUCOSE SERPL-MCNC: 153 MG/DL — HIGH (ref 70–99)
GLUCOSE SERPL-MCNC: 154 MG/DL — HIGH (ref 70–99)
GLUCOSE SERPL-MCNC: 156 MG/DL — HIGH (ref 70–99)
GLUCOSE SERPL-MCNC: 157 MG/DL — HIGH (ref 70–99)
GLUCOSE SERPL-MCNC: 159 MG/DL — HIGH (ref 70–99)
GLUCOSE SERPL-MCNC: 161 MG/DL — HIGH (ref 70–99)
GLUCOSE SERPL-MCNC: 161 MG/DL — HIGH (ref 70–99)
GLUCOSE SERPL-MCNC: 162 MG/DL — HIGH (ref 70–99)
GLUCOSE SERPL-MCNC: 175 MG/DL — HIGH (ref 70–99)
GLUCOSE SERPL-MCNC: 175 MG/DL — HIGH (ref 70–99)
GLUCOSE SERPL-MCNC: 176 MG/DL — HIGH (ref 70–99)
GLUCOSE SERPL-MCNC: 177 MG/DL — HIGH (ref 70–99)
GLUCOSE SERPL-MCNC: 186 MG/DL — HIGH (ref 70–99)
GLUCOSE SERPL-MCNC: 190 MG/DL — HIGH (ref 70–99)
GLUCOSE SERPL-MCNC: 192 MG/DL — HIGH (ref 70–99)
GLUCOSE SERPL-MCNC: 197 MG/DL — HIGH (ref 70–99)
GLUCOSE SERPL-MCNC: 199 MG/DL — HIGH (ref 70–99)
GLUCOSE SERPL-MCNC: 210 MG/DL — HIGH (ref 70–99)
GLUCOSE SERPL-MCNC: 216 MG/DL — HIGH (ref 70–99)
GLUCOSE SERPL-MCNC: 216 MG/DL — HIGH (ref 70–99)
GLUCOSE SERPL-MCNC: 218 MG/DL — HIGH (ref 70–99)
GLUCOSE SERPL-MCNC: 219 MG/DL — HIGH (ref 70–99)
GLUCOSE SERPL-MCNC: 219 MG/DL — HIGH (ref 70–99)
GLUCOSE SERPL-MCNC: 223 MG/DL — HIGH (ref 70–99)
GLUCOSE SERPL-MCNC: 224 MG/DL — HIGH (ref 70–99)
GLUCOSE SERPL-MCNC: 224 MG/DL — HIGH (ref 70–99)
GLUCOSE SERPL-MCNC: 226 MG/DL — HIGH (ref 70–99)
GLUCOSE SERPL-MCNC: 226 MG/DL — HIGH (ref 70–99)
GLUCOSE SERPL-MCNC: 249 MG/DL — HIGH (ref 70–99)
GLUCOSE SERPL-MCNC: 263 MG/DL — HIGH (ref 70–99)
GLUCOSE SERPL-MCNC: 264 MG/DL — HIGH (ref 70–99)
GLUCOSE SERPL-MCNC: 27 MG/DL — CRITICAL LOW (ref 70–99)
GLUCOSE SERPL-MCNC: 271 MG/DL — HIGH (ref 70–99)
GLUCOSE SERPL-MCNC: 275 MG/DL — HIGH (ref 70–99)
GLUCOSE SERPL-MCNC: 30 MG/DL — CRITICAL LOW (ref 70–99)
GLUCOSE SERPL-MCNC: 300 MG/DL — HIGH (ref 70–99)
GLUCOSE SERPL-MCNC: 318 MG/DL — HIGH (ref 70–99)
GLUCOSE SERPL-MCNC: 485 MG/DL — CRITICAL HIGH (ref 70–99)
GLUCOSE SERPL-MCNC: 54 MG/DL — CRITICAL LOW (ref 70–99)
GLUCOSE SERPL-MCNC: 57 MG/DL — LOW (ref 70–99)
GLUCOSE SERPL-MCNC: 64 MG/DL — LOW (ref 70–99)
GLUCOSE SERPL-MCNC: 64 MG/DL — LOW (ref 70–99)
GLUCOSE SERPL-MCNC: 76 MG/DL — SIGNIFICANT CHANGE UP (ref 70–99)
GLUCOSE SERPL-MCNC: 81 MG/DL — SIGNIFICANT CHANGE UP (ref 70–99)
GLUCOSE SERPL-MCNC: 83 MG/DL — SIGNIFICANT CHANGE UP (ref 70–99)
GLUCOSE SERPL-MCNC: 86 MG/DL — SIGNIFICANT CHANGE UP (ref 70–99)
GLUCOSE SERPL-MCNC: 88 MG/DL — SIGNIFICANT CHANGE UP (ref 70–99)
GLUCOSE SERPL-MCNC: 94 MG/DL — SIGNIFICANT CHANGE UP (ref 70–99)
GLUCOSE SERPL-MCNC: 95 MG/DL — SIGNIFICANT CHANGE UP (ref 70–99)
GLUCOSE SERPL-MCNC: 96 MG/DL — SIGNIFICANT CHANGE UP (ref 70–99)
GLUCOSE UR QL: NEGATIVE — SIGNIFICANT CHANGE UP
GLUCOSE UR QL: SIGNIFICANT CHANGE UP
GRAM STN FLD: SIGNIFICANT CHANGE UP
HCO3 BLDA-SCNC: 19 MMOL/L — LOW (ref 21–28)
HCO3 BLDA-SCNC: 24 MMOL/L — SIGNIFICANT CHANGE UP (ref 21–28)
HCO3 BLDA-SCNC: 25 MMOL/L — SIGNIFICANT CHANGE UP (ref 21–28)
HCO3 BLDV-SCNC: 28 MMOL/L — SIGNIFICANT CHANGE UP (ref 22–29)
HCT VFR BLD CALC: 21.6 % — LOW (ref 42–52)
HCT VFR BLD CALC: 21.8 % — LOW (ref 42–52)
HCT VFR BLD CALC: 22.2 % — LOW (ref 42–52)
HCT VFR BLD CALC: 22.5 % — LOW (ref 42–52)
HCT VFR BLD CALC: 22.6 % — LOW (ref 42–52)
HCT VFR BLD CALC: 22.8 % — LOW (ref 42–52)
HCT VFR BLD CALC: 23.3 % — LOW (ref 42–52)
HCT VFR BLD CALC: 23.6 % — LOW (ref 42–52)
HCT VFR BLD CALC: 23.7 % — LOW (ref 42–52)
HCT VFR BLD CALC: 23.8 % — LOW (ref 42–52)
HCT VFR BLD CALC: 24 % — LOW (ref 42–52)
HCT VFR BLD CALC: 24 % — LOW (ref 42–52)
HCT VFR BLD CALC: 24.2 % — LOW (ref 42–52)
HCT VFR BLD CALC: 24.3 % — LOW (ref 42–52)
HCT VFR BLD CALC: 24.6 % — LOW (ref 42–52)
HCT VFR BLD CALC: 24.8 % — LOW (ref 42–52)
HCT VFR BLD CALC: 24.9 % — LOW (ref 42–52)
HCT VFR BLD CALC: 24.9 % — LOW (ref 42–52)
HCT VFR BLD CALC: 25 % — LOW (ref 42–52)
HCT VFR BLD CALC: 25.2 % — LOW (ref 42–52)
HCT VFR BLD CALC: 25.2 % — LOW (ref 42–52)
HCT VFR BLD CALC: 25.3 % — LOW (ref 42–52)
HCT VFR BLD CALC: 25.4 % — LOW (ref 42–52)
HCT VFR BLD CALC: 25.5 % — LOW (ref 42–52)
HCT VFR BLD CALC: 25.6 % — LOW (ref 42–52)
HCT VFR BLD CALC: 25.8 % — LOW (ref 42–52)
HCT VFR BLD CALC: 25.8 % — LOW (ref 42–52)
HCT VFR BLD CALC: 25.9 % — LOW (ref 42–52)
HCT VFR BLD CALC: 26.2 % — LOW (ref 42–52)
HCT VFR BLD CALC: 26.3 % — LOW (ref 42–52)
HCT VFR BLD CALC: 26.5 % — LOW (ref 42–52)
HCT VFR BLD CALC: 26.6 % — LOW (ref 42–52)
HCT VFR BLD CALC: 26.6 % — LOW (ref 42–52)
HCT VFR BLD CALC: 26.7 % — LOW (ref 42–52)
HCT VFR BLD CALC: 26.8 % — LOW (ref 42–52)
HCT VFR BLD CALC: 26.9 % — LOW (ref 42–52)
HCT VFR BLD CALC: 27 % — LOW (ref 42–52)
HCT VFR BLD CALC: 27 % — LOW (ref 42–52)
HCT VFR BLD CALC: 27.1 % — LOW (ref 42–52)
HCT VFR BLD CALC: 27.1 % — LOW (ref 42–52)
HCT VFR BLD CALC: 27.3 % — LOW (ref 42–52)
HCT VFR BLD CALC: 27.4 % — LOW (ref 42–52)
HCT VFR BLD CALC: 27.4 % — LOW (ref 42–52)
HCT VFR BLD CALC: 27.5 % — LOW (ref 42–52)
HCT VFR BLD CALC: 27.6 % — LOW (ref 42–52)
HCT VFR BLD CALC: 27.8 % — LOW (ref 42–52)
HCT VFR BLD CALC: 27.8 % — LOW (ref 42–52)
HCT VFR BLD CALC: 27.9 % — LOW (ref 42–52)
HCT VFR BLD CALC: 28 % — LOW (ref 42–52)
HCT VFR BLD CALC: 28.2 % — LOW (ref 42–52)
HCT VFR BLD CALC: 28.3 % — LOW (ref 42–52)
HCT VFR BLD CALC: 28.5 % — LOW (ref 42–52)
HCT VFR BLD CALC: 28.9 % — LOW (ref 42–52)
HCT VFR BLD CALC: 29.2 % — LOW (ref 42–52)
HCT VFR BLD CALC: 29.5 % — LOW (ref 42–52)
HCT VFR BLD CALC: 29.6 % — LOW (ref 42–52)
HCT VFR BLD CALC: 29.8 % — LOW (ref 42–52)
HCT VFR BLD CALC: 29.8 % — LOW (ref 42–52)
HCT VFR BLD CALC: 30.3 % — LOW (ref 42–52)
HCT VFR BLD CALC: 30.3 % — LOW (ref 42–52)
HCT VFR BLD CALC: 32.2 % — LOW (ref 42–52)
HCT VFR BLD CALC: 32.3 % — LOW (ref 42–52)
HCT VFR BLD CALC: 32.4 % — LOW (ref 42–52)
HCT VFR BLD CALC: 32.8 % — LOW (ref 42–52)
HCT VFR BLD CALC: 33.4 % — LOW (ref 42–52)
HCT VFR BLD CALC: 33.7 % — LOW (ref 42–52)
HCT VFR BLD CALC: 34.5 % — LOW (ref 42–52)
HCT VFR BLDA CALC: 23 % — LOW (ref 39–51)
HDLC SERPL-MCNC: 31 MG/DL — LOW
HDLC SERPL-MCNC: 38 MG/DL — LOW
HGB BLD CALC-MCNC: 7.6 G/DL — LOW (ref 12.6–17.4)
HGB BLD-MCNC: 10 G/DL — LOW (ref 14–18)
HGB BLD-MCNC: 10 G/DL — LOW (ref 14–18)
HGB BLD-MCNC: 10.1 G/DL — LOW (ref 14–18)
HGB BLD-MCNC: 10.2 G/DL — LOW (ref 14–18)
HGB BLD-MCNC: 10.2 G/DL — LOW (ref 14–18)
HGB BLD-MCNC: 10.5 G/DL — LOW (ref 14–18)
HGB BLD-MCNC: 10.8 G/DL — LOW (ref 14–18)
HGB BLD-MCNC: 11 G/DL — LOW (ref 14–18)
HGB BLD-MCNC: 11.3 G/DL — LOW (ref 14–18)
HGB BLD-MCNC: 11.3 G/DL — LOW (ref 14–18)
HGB BLD-MCNC: 6.9 G/DL — CRITICAL LOW (ref 14–18)
HGB BLD-MCNC: 6.9 G/DL — CRITICAL LOW (ref 14–18)
HGB BLD-MCNC: 7.1 G/DL — LOW (ref 14–18)
HGB BLD-MCNC: 7.2 G/DL — LOW (ref 14–18)
HGB BLD-MCNC: 7.2 G/DL — LOW (ref 14–18)
HGB BLD-MCNC: 7.3 G/DL — LOW (ref 14–18)
HGB BLD-MCNC: 7.3 G/DL — LOW (ref 14–18)
HGB BLD-MCNC: 7.6 G/DL — LOW (ref 14–18)
HGB BLD-MCNC: 7.7 G/DL — LOW (ref 14–18)
HGB BLD-MCNC: 7.8 G/DL — LOW (ref 14–18)
HGB BLD-MCNC: 7.8 G/DL — LOW (ref 14–18)
HGB BLD-MCNC: 7.9 G/DL — LOW (ref 14–18)
HGB BLD-MCNC: 7.9 G/DL — LOW (ref 14–18)
HGB BLD-MCNC: 8 G/DL — LOW (ref 14–18)
HGB BLD-MCNC: 8.1 G/DL — LOW (ref 14–18)
HGB BLD-MCNC: 8.2 G/DL — LOW (ref 14–18)
HGB BLD-MCNC: 8.2 G/DL — LOW (ref 14–18)
HGB BLD-MCNC: 8.3 G/DL — LOW (ref 14–18)
HGB BLD-MCNC: 8.4 G/DL — LOW (ref 14–18)
HGB BLD-MCNC: 8.5 G/DL — LOW (ref 14–18)
HGB BLD-MCNC: 8.6 G/DL — LOW (ref 14–18)
HGB BLD-MCNC: 8.7 G/DL — LOW (ref 14–18)
HGB BLD-MCNC: 8.7 G/DL — LOW (ref 14–18)
HGB BLD-MCNC: 8.9 G/DL — LOW (ref 14–18)
HGB BLD-MCNC: 9 G/DL — LOW (ref 14–18)
HGB BLD-MCNC: 9.1 G/DL — LOW (ref 14–18)
HGB BLD-MCNC: 9.1 G/DL — LOW (ref 14–18)
HGB BLD-MCNC: 9.2 G/DL — LOW (ref 14–18)
HGB BLD-MCNC: 9.2 G/DL — LOW (ref 14–18)
HGB BLD-MCNC: 9.5 G/DL — LOW (ref 14–18)
HGB BLD-MCNC: 9.6 G/DL — LOW (ref 14–18)
HGB BLD-MCNC: 9.7 G/DL — LOW (ref 14–18)
HGB BLD-MCNC: 9.9 G/DL — LOW (ref 14–18)
HYALINE CASTS # UR AUTO: 13 /LPF — HIGH (ref 0–7)
HYALINE CASTS # UR AUTO: 14 /LPF — HIGH (ref 0–7)
HYALINE CASTS # UR AUTO: 2 /LPF — SIGNIFICANT CHANGE UP (ref 0–7)
HYALINE CASTS # UR AUTO: 7 /LPF — SIGNIFICANT CHANGE UP (ref 0–7)
IMM GRANULOCYTES NFR BLD AUTO: 0.3 % — SIGNIFICANT CHANGE UP (ref 0.1–0.3)
IMM GRANULOCYTES NFR BLD AUTO: 0.4 % — HIGH (ref 0.1–0.3)
IMM GRANULOCYTES NFR BLD AUTO: 0.5 % — HIGH (ref 0.1–0.3)
IMM GRANULOCYTES NFR BLD AUTO: 0.6 % — HIGH (ref 0.1–0.3)
IMM GRANULOCYTES NFR BLD AUTO: 0.7 % — HIGH (ref 0.1–0.3)
IMM GRANULOCYTES NFR BLD AUTO: 0.8 % — HIGH (ref 0.1–0.3)
IMM GRANULOCYTES NFR BLD AUTO: 0.9 % — HIGH (ref 0.1–0.3)
IMM GRANULOCYTES NFR BLD AUTO: 0.9 % — HIGH (ref 0.1–0.3)
IMM GRANULOCYTES NFR BLD AUTO: 1 % — HIGH (ref 0.1–0.3)
IMM GRANULOCYTES NFR BLD AUTO: 1.2 % — HIGH (ref 0.1–0.3)
IMM GRANULOCYTES NFR BLD AUTO: 1.3 % — HIGH (ref 0.1–0.3)
IMM GRANULOCYTES NFR BLD AUTO: 1.3 % — HIGH (ref 0.1–0.3)
IMM GRANULOCYTES NFR BLD AUTO: 1.4 % — HIGH (ref 0.1–0.3)
IMM GRANULOCYTES NFR BLD AUTO: 1.5 % — HIGH (ref 0.1–0.3)
INR BLD: 1.45 RATIO — HIGH (ref 0.65–1.3)
INR BLD: 1.66 RATIO — HIGH (ref 0.65–1.3)
IRON SATN MFR SERPL: 16 % — SIGNIFICANT CHANGE UP (ref 15–50)
IRON SATN MFR SERPL: 19 % — SIGNIFICANT CHANGE UP (ref 15–50)
IRON SATN MFR SERPL: 21 % — SIGNIFICANT CHANGE UP (ref 15–50)
IRON SATN MFR SERPL: 28 UG/DL — LOW (ref 35–150)
IRON SATN MFR SERPL: 35 UG/DL — SIGNIFICANT CHANGE UP (ref 35–150)
IRON SATN MFR SERPL: 38 UG/DL — SIGNIFICANT CHANGE UP (ref 35–150)
KETONES UR-MCNC: NEGATIVE — SIGNIFICANT CHANGE UP
LACTATE BLDV-MCNC: 2.2 MMOL/L — HIGH (ref 0.5–2)
LACTATE SERPL-SCNC: 0.8 MMOL/L — SIGNIFICANT CHANGE UP (ref 0.7–2)
LACTATE SERPL-SCNC: 1.6 MMOL/L — SIGNIFICANT CHANGE UP (ref 0.7–2)
LDH PNL SERPL: 263 IU/L — HIGH (ref 121–224)
LDH SERPL L TO P-CCNC: 245 U/L — HIGH (ref 50–242)
LDH SERPL L TO P-CCNC: 267 U/L — HIGH (ref 50–242)
LDH SERPL L TO P-CCNC: 92 U/L — SIGNIFICANT CHANGE UP
LDH SERPL L TO P-CCNC: 97 U/L — SIGNIFICANT CHANGE UP
LDH1 SERPL-CCNC: 20 % — SIGNIFICANT CHANGE UP (ref 17–32)
LDH3 SERPL-CCNC: 19 % — SIGNIFICANT CHANGE UP (ref 17–27)
LDH3 SERPL-CCNC: 33 % — SIGNIFICANT CHANGE UP (ref 25–40)
LDH4 SERPL-CCNC: 9 % — SIGNIFICANT CHANGE UP (ref 5–13)
LDH5 SERPL-CCNC: 19 % — SIGNIFICANT CHANGE UP (ref 4–20)
LEGIONELLA AG UR QL: NEGATIVE — SIGNIFICANT CHANGE UP
LEUKOCYTE ESTERASE UR-ACNC: ABNORMAL
LEUKOCYTE ESTERASE UR-ACNC: NEGATIVE — SIGNIFICANT CHANGE UP
LIDOCAIN IGE QN: 27 U/L — SIGNIFICANT CHANGE UP (ref 7–60)
LIPID PNL WITH DIRECT LDL SERPL: 21 MG/DL — SIGNIFICANT CHANGE UP
LIPID PNL WITH DIRECT LDL SERPL: 60 MG/DL — SIGNIFICANT CHANGE UP
LYMPHOCYTES # BLD AUTO: 0 % — LOW (ref 20.5–51.1)
LYMPHOCYTES # BLD AUTO: 0 % — LOW (ref 20.5–51.1)
LYMPHOCYTES # BLD AUTO: 0 K/UL — LOW (ref 1.2–3.4)
LYMPHOCYTES # BLD AUTO: 0 K/UL — LOW (ref 1.2–3.4)
LYMPHOCYTES # BLD AUTO: 0.05 K/UL — LOW (ref 1.2–3.4)
LYMPHOCYTES # BLD AUTO: 0.13 K/UL — LOW (ref 1.2–3.4)
LYMPHOCYTES # BLD AUTO: 0.14 K/UL — LOW (ref 1.2–3.4)
LYMPHOCYTES # BLD AUTO: 0.16 K/UL — LOW (ref 1.2–3.4)
LYMPHOCYTES # BLD AUTO: 0.16 K/UL — LOW (ref 1.2–3.4)
LYMPHOCYTES # BLD AUTO: 0.17 K/UL — LOW (ref 1.2–3.4)
LYMPHOCYTES # BLD AUTO: 0.19 K/UL — LOW (ref 1.2–3.4)
LYMPHOCYTES # BLD AUTO: 0.19 K/UL — LOW (ref 1.2–3.4)
LYMPHOCYTES # BLD AUTO: 0.2 K/UL — LOW (ref 1.2–3.4)
LYMPHOCYTES # BLD AUTO: 0.21 K/UL — LOW (ref 1.2–3.4)
LYMPHOCYTES # BLD AUTO: 0.23 K/UL — LOW (ref 1.2–3.4)
LYMPHOCYTES # BLD AUTO: 0.23 K/UL — LOW (ref 1.2–3.4)
LYMPHOCYTES # BLD AUTO: 0.26 K/UL — LOW (ref 1.2–3.4)
LYMPHOCYTES # BLD AUTO: 0.27 K/UL — LOW (ref 1.2–3.4)
LYMPHOCYTES # BLD AUTO: 0.29 K/UL — LOW (ref 1.2–3.4)
LYMPHOCYTES # BLD AUTO: 0.3 K/UL — LOW (ref 1.2–3.4)
LYMPHOCYTES # BLD AUTO: 0.31 K/UL — LOW (ref 1.2–3.4)
LYMPHOCYTES # BLD AUTO: 0.32 K/UL — LOW (ref 1.2–3.4)
LYMPHOCYTES # BLD AUTO: 0.33 K/UL — LOW (ref 1.2–3.4)
LYMPHOCYTES # BLD AUTO: 0.34 K/UL — LOW (ref 1.2–3.4)
LYMPHOCYTES # BLD AUTO: 0.36 K/UL — LOW (ref 1.2–3.4)
LYMPHOCYTES # BLD AUTO: 0.37 K/UL — LOW (ref 1.2–3.4)
LYMPHOCYTES # BLD AUTO: 0.4 K/UL — LOW (ref 1.2–3.4)
LYMPHOCYTES # BLD AUTO: 0.41 K/UL — LOW (ref 1.2–3.4)
LYMPHOCYTES # BLD AUTO: 0.43 K/UL — LOW (ref 1.2–3.4)
LYMPHOCYTES # BLD AUTO: 0.44 K/UL — LOW (ref 1.2–3.4)
LYMPHOCYTES # BLD AUTO: 0.46 K/UL — LOW (ref 1.2–3.4)
LYMPHOCYTES # BLD AUTO: 0.47 K/UL — LOW (ref 1.2–3.4)
LYMPHOCYTES # BLD AUTO: 0.48 K/UL — LOW (ref 1.2–3.4)
LYMPHOCYTES # BLD AUTO: 0.5 K/UL — LOW (ref 1.2–3.4)
LYMPHOCYTES # BLD AUTO: 0.51 K/UL — LOW (ref 1.2–3.4)
LYMPHOCYTES # BLD AUTO: 0.54 K/UL — LOW (ref 1.2–3.4)
LYMPHOCYTES # BLD AUTO: 0.55 K/UL — LOW (ref 1.2–3.4)
LYMPHOCYTES # BLD AUTO: 0.55 K/UL — LOW (ref 1.2–3.4)
LYMPHOCYTES # BLD AUTO: 0.56 K/UL — LOW (ref 1.2–3.4)
LYMPHOCYTES # BLD AUTO: 0.58 K/UL — LOW (ref 1.2–3.4)
LYMPHOCYTES # BLD AUTO: 0.58 K/UL — LOW (ref 1.2–3.4)
LYMPHOCYTES # BLD AUTO: 0.6 K/UL — LOW (ref 1.2–3.4)
LYMPHOCYTES # BLD AUTO: 0.65 K/UL — LOW (ref 1.2–3.4)
LYMPHOCYTES # BLD AUTO: 0.72 K/UL — LOW (ref 1.2–3.4)
LYMPHOCYTES # BLD AUTO: 0.83 K/UL — LOW (ref 1.2–3.4)
LYMPHOCYTES # BLD AUTO: 0.9 % — LOW (ref 20.5–51.1)
LYMPHOCYTES # BLD AUTO: 0.92 K/UL — LOW (ref 1.2–3.4)
LYMPHOCYTES # BLD AUTO: 1.49 K/UL — SIGNIFICANT CHANGE UP (ref 1.2–3.4)
LYMPHOCYTES # BLD AUTO: 1.6 % — LOW (ref 20.5–51.1)
LYMPHOCYTES # BLD AUTO: 1.7 % — LOW (ref 20.5–51.1)
LYMPHOCYTES # BLD AUTO: 1.82 K/UL — SIGNIFICANT CHANGE UP (ref 1.2–3.4)
LYMPHOCYTES # BLD AUTO: 1.9 % — LOW (ref 20.5–51.1)
LYMPHOCYTES # BLD AUTO: 10.1 % — LOW (ref 20.5–51.1)
LYMPHOCYTES # BLD AUTO: 13.3 % — LOW (ref 20.5–51.1)
LYMPHOCYTES # BLD AUTO: 2.1 % — LOW (ref 20.5–51.1)
LYMPHOCYTES # BLD AUTO: 2.2 % — LOW (ref 20.5–51.1)
LYMPHOCYTES # BLD AUTO: 2.2 % — LOW (ref 20.5–51.1)
LYMPHOCYTES # BLD AUTO: 2.3 % — LOW (ref 20.5–51.1)
LYMPHOCYTES # BLD AUTO: 2.6 % — LOW (ref 20.5–51.1)
LYMPHOCYTES # BLD AUTO: 2.6 % — LOW (ref 20.5–51.1)
LYMPHOCYTES # BLD AUTO: 2.7 % — LOW (ref 20.5–51.1)
LYMPHOCYTES # BLD AUTO: 2.7 % — LOW (ref 20.5–51.1)
LYMPHOCYTES # BLD AUTO: 2.8 % — LOW (ref 20.5–51.1)
LYMPHOCYTES # BLD AUTO: 2.8 % — LOW (ref 20.5–51.1)
LYMPHOCYTES # BLD AUTO: 3 % — LOW (ref 20.5–51.1)
LYMPHOCYTES # BLD AUTO: 3.1 % — LOW (ref 20.5–51.1)
LYMPHOCYTES # BLD AUTO: 3.1 % — LOW (ref 20.5–51.1)
LYMPHOCYTES # BLD AUTO: 3.6 % — LOW (ref 20.5–51.1)
LYMPHOCYTES # BLD AUTO: 3.9 % — LOW (ref 20.5–51.1)
LYMPHOCYTES # BLD AUTO: 4 % — LOW (ref 20.5–51.1)
LYMPHOCYTES # BLD AUTO: 4 % — LOW (ref 20.5–51.1)
LYMPHOCYTES # BLD AUTO: 4.1 % — LOW (ref 20.5–51.1)
LYMPHOCYTES # BLD AUTO: 4.2 % — LOW (ref 20.5–51.1)
LYMPHOCYTES # BLD AUTO: 4.3 % — LOW (ref 20.5–51.1)
LYMPHOCYTES # BLD AUTO: 4.3 % — LOW (ref 20.5–51.1)
LYMPHOCYTES # BLD AUTO: 4.4 % — LOW (ref 20.5–51.1)
LYMPHOCYTES # BLD AUTO: 4.4 % — LOW (ref 20.5–51.1)
LYMPHOCYTES # BLD AUTO: 4.5 % — LOW (ref 20.5–51.1)
LYMPHOCYTES # BLD AUTO: 4.8 % — LOW (ref 20.5–51.1)
LYMPHOCYTES # BLD AUTO: 5 % — LOW (ref 20.5–51.1)
LYMPHOCYTES # BLD AUTO: 5.4 % — LOW (ref 20.5–51.1)
LYMPHOCYTES # BLD AUTO: 5.4 % — LOW (ref 20.5–51.1)
LYMPHOCYTES # BLD AUTO: 5.8 % — LOW (ref 20.5–51.1)
LYMPHOCYTES # BLD AUTO: 5.8 % — LOW (ref 20.5–51.1)
LYMPHOCYTES # BLD AUTO: 6.4 % — LOW (ref 20.5–51.1)
LYMPHOCYTES # BLD AUTO: 6.9 % — LOW (ref 20.5–51.1)
LYMPHOCYTES # BLD AUTO: 9.8 % — LOW (ref 20.5–51.1)
LYMPHOCYTES # FLD: 38 — SIGNIFICANT CHANGE UP
LYMPHOCYTES # FLD: 39 — SIGNIFICANT CHANGE UP
MACROCYTES BLD QL: SLIGHT — SIGNIFICANT CHANGE UP
MACROCYTES BLD QL: SLIGHT — SIGNIFICANT CHANGE UP
MAGNESIUM SERPL-MCNC: 1.7 MG/DL — LOW (ref 1.8–2.4)
MAGNESIUM SERPL-MCNC: 1.8 MG/DL — SIGNIFICANT CHANGE UP (ref 1.8–2.4)
MAGNESIUM SERPL-MCNC: 1.9 MG/DL — SIGNIFICANT CHANGE UP (ref 1.8–2.4)
MAGNESIUM SERPL-MCNC: 2 MG/DL — SIGNIFICANT CHANGE UP (ref 1.8–2.4)
MAGNESIUM SERPL-MCNC: 2.1 MG/DL — SIGNIFICANT CHANGE UP (ref 1.8–2.4)
MAGNESIUM SERPL-MCNC: 2.2 MG/DL — SIGNIFICANT CHANGE UP (ref 1.8–2.4)
MAGNESIUM SERPL-MCNC: 2.3 MG/DL — SIGNIFICANT CHANGE UP (ref 1.8–2.4)
MAGNESIUM SERPL-MCNC: 2.4 MG/DL — SIGNIFICANT CHANGE UP (ref 1.8–2.4)
MAGNESIUM SERPL-MCNC: 2.5 MG/DL — HIGH (ref 1.8–2.4)
MAGNESIUM SERPL-MCNC: 2.6 MG/DL — HIGH (ref 1.8–2.4)
MAGNESIUM SERPL-MCNC: 2.7 MG/DL — HIGH (ref 1.8–2.4)
MAGNESIUM SERPL-MCNC: 2.8 MG/DL — HIGH (ref 1.8–2.4)
MAGNESIUM SERPL-MCNC: 2.8 MG/DL — HIGH (ref 1.8–2.4)
MANUAL SMEAR VERIFICATION: SIGNIFICANT CHANGE UP
MCHC RBC-ENTMCNC: 28.2 PG — SIGNIFICANT CHANGE UP (ref 27–31)
MCHC RBC-ENTMCNC: 28.3 PG — SIGNIFICANT CHANGE UP (ref 27–31)
MCHC RBC-ENTMCNC: 28.4 PG — SIGNIFICANT CHANGE UP (ref 27–31)
MCHC RBC-ENTMCNC: 28.4 PG — SIGNIFICANT CHANGE UP (ref 27–31)
MCHC RBC-ENTMCNC: 28.5 PG — SIGNIFICANT CHANGE UP (ref 27–31)
MCHC RBC-ENTMCNC: 28.6 PG — SIGNIFICANT CHANGE UP (ref 27–31)
MCHC RBC-ENTMCNC: 28.7 PG — SIGNIFICANT CHANGE UP (ref 27–31)
MCHC RBC-ENTMCNC: 28.8 PG — SIGNIFICANT CHANGE UP (ref 27–31)
MCHC RBC-ENTMCNC: 28.9 PG — SIGNIFICANT CHANGE UP (ref 27–31)
MCHC RBC-ENTMCNC: 29 PG — SIGNIFICANT CHANGE UP (ref 27–31)
MCHC RBC-ENTMCNC: 29.1 PG — SIGNIFICANT CHANGE UP (ref 27–31)
MCHC RBC-ENTMCNC: 29.2 PG — SIGNIFICANT CHANGE UP (ref 27–31)
MCHC RBC-ENTMCNC: 29.3 PG — SIGNIFICANT CHANGE UP (ref 27–31)
MCHC RBC-ENTMCNC: 29.4 PG — SIGNIFICANT CHANGE UP (ref 27–31)
MCHC RBC-ENTMCNC: 29.4 PG — SIGNIFICANT CHANGE UP (ref 27–31)
MCHC RBC-ENTMCNC: 29.5 PG — SIGNIFICANT CHANGE UP (ref 27–31)
MCHC RBC-ENTMCNC: 29.6 PG — SIGNIFICANT CHANGE UP (ref 27–31)
MCHC RBC-ENTMCNC: 29.6 PG — SIGNIFICANT CHANGE UP (ref 27–31)
MCHC RBC-ENTMCNC: 29.7 PG — SIGNIFICANT CHANGE UP (ref 27–31)
MCHC RBC-ENTMCNC: 29.7 PG — SIGNIFICANT CHANGE UP (ref 27–31)
MCHC RBC-ENTMCNC: 29.8 PG — SIGNIFICANT CHANGE UP (ref 27–31)
MCHC RBC-ENTMCNC: 29.8 PG — SIGNIFICANT CHANGE UP (ref 27–31)
MCHC RBC-ENTMCNC: 29.9 PG — SIGNIFICANT CHANGE UP (ref 27–31)
MCHC RBC-ENTMCNC: 30.1 PG — SIGNIFICANT CHANGE UP (ref 27–31)
MCHC RBC-ENTMCNC: 30.2 PG — SIGNIFICANT CHANGE UP (ref 27–31)
MCHC RBC-ENTMCNC: 30.4 PG — SIGNIFICANT CHANGE UP (ref 27–31)
MCHC RBC-ENTMCNC: 30.9 G/DL — LOW (ref 32–37)
MCHC RBC-ENTMCNC: 31.1 G/DL — LOW (ref 32–37)
MCHC RBC-ENTMCNC: 31.1 PG — HIGH (ref 27–31)
MCHC RBC-ENTMCNC: 31.2 G/DL — LOW (ref 32–37)
MCHC RBC-ENTMCNC: 31.3 G/DL — LOW (ref 32–37)
MCHC RBC-ENTMCNC: 31.4 G/DL — LOW (ref 32–37)
MCHC RBC-ENTMCNC: 31.5 G/DL — LOW (ref 32–37)
MCHC RBC-ENTMCNC: 31.6 G/DL — LOW (ref 32–37)
MCHC RBC-ENTMCNC: 31.7 G/DL — LOW (ref 32–37)
MCHC RBC-ENTMCNC: 31.8 G/DL — LOW (ref 32–37)
MCHC RBC-ENTMCNC: 31.8 G/DL — LOW (ref 32–37)
MCHC RBC-ENTMCNC: 31.9 G/DL — LOW (ref 32–37)
MCHC RBC-ENTMCNC: 31.9 G/DL — LOW (ref 32–37)
MCHC RBC-ENTMCNC: 32 G/DL — SIGNIFICANT CHANGE UP (ref 32–37)
MCHC RBC-ENTMCNC: 32.1 G/DL — SIGNIFICANT CHANGE UP (ref 32–37)
MCHC RBC-ENTMCNC: 32.2 G/DL — SIGNIFICANT CHANGE UP (ref 32–37)
MCHC RBC-ENTMCNC: 32.2 G/DL — SIGNIFICANT CHANGE UP (ref 32–37)
MCHC RBC-ENTMCNC: 32.3 G/DL — SIGNIFICANT CHANGE UP (ref 32–37)
MCHC RBC-ENTMCNC: 32.3 G/DL — SIGNIFICANT CHANGE UP (ref 32–37)
MCHC RBC-ENTMCNC: 32.4 G/DL — SIGNIFICANT CHANGE UP (ref 32–37)
MCHC RBC-ENTMCNC: 32.4 G/DL — SIGNIFICANT CHANGE UP (ref 32–37)
MCHC RBC-ENTMCNC: 32.5 G/DL — SIGNIFICANT CHANGE UP (ref 32–37)
MCHC RBC-ENTMCNC: 32.6 G/DL — SIGNIFICANT CHANGE UP (ref 32–37)
MCHC RBC-ENTMCNC: 32.7 G/DL — SIGNIFICANT CHANGE UP (ref 32–37)
MCHC RBC-ENTMCNC: 32.7 G/DL — SIGNIFICANT CHANGE UP (ref 32–37)
MCHC RBC-ENTMCNC: 32.8 G/DL — SIGNIFICANT CHANGE UP (ref 32–37)
MCHC RBC-ENTMCNC: 32.9 G/DL — SIGNIFICANT CHANGE UP (ref 32–37)
MCHC RBC-ENTMCNC: 33 G/DL — SIGNIFICANT CHANGE UP (ref 32–37)
MCHC RBC-ENTMCNC: 33.2 G/DL — SIGNIFICANT CHANGE UP (ref 32–37)
MCHC RBC-ENTMCNC: 33.3 G/DL — SIGNIFICANT CHANGE UP (ref 32–37)
MCHC RBC-ENTMCNC: 33.4 G/DL — SIGNIFICANT CHANGE UP (ref 32–37)
MCHC RBC-ENTMCNC: 33.4 G/DL — SIGNIFICANT CHANGE UP (ref 32–37)
MCHC RBC-ENTMCNC: 33.5 G/DL — SIGNIFICANT CHANGE UP (ref 32–37)
MCHC RBC-ENTMCNC: 33.5 G/DL — SIGNIFICANT CHANGE UP (ref 32–37)
MCHC RBC-ENTMCNC: 33.6 G/DL — SIGNIFICANT CHANGE UP (ref 32–37)
MCHC RBC-ENTMCNC: 33.7 G/DL — SIGNIFICANT CHANGE UP (ref 32–37)
MCHC RBC-ENTMCNC: 33.7 G/DL — SIGNIFICANT CHANGE UP (ref 32–37)
MCHC RBC-ENTMCNC: 34 G/DL — SIGNIFICANT CHANGE UP (ref 32–37)
MCHC RBC-ENTMCNC: 34.1 G/DL — SIGNIFICANT CHANGE UP (ref 32–37)
MCHC RBC-ENTMCNC: 34.2 G/DL — SIGNIFICANT CHANGE UP (ref 32–37)
MCV RBC AUTO: 84.2 FL — SIGNIFICANT CHANGE UP (ref 80–94)
MCV RBC AUTO: 84.7 FL — SIGNIFICANT CHANGE UP (ref 80–94)
MCV RBC AUTO: 85 FL — SIGNIFICANT CHANGE UP (ref 80–94)
MCV RBC AUTO: 85.5 FL — SIGNIFICANT CHANGE UP (ref 80–94)
MCV RBC AUTO: 85.9 FL — SIGNIFICANT CHANGE UP (ref 80–94)
MCV RBC AUTO: 86.1 FL — SIGNIFICANT CHANGE UP (ref 80–94)
MCV RBC AUTO: 86.2 FL — SIGNIFICANT CHANGE UP (ref 80–94)
MCV RBC AUTO: 86.3 FL — SIGNIFICANT CHANGE UP (ref 80–94)
MCV RBC AUTO: 86.3 FL — SIGNIFICANT CHANGE UP (ref 80–94)
MCV RBC AUTO: 86.4 FL — SIGNIFICANT CHANGE UP (ref 80–94)
MCV RBC AUTO: 86.5 FL — SIGNIFICANT CHANGE UP (ref 80–94)
MCV RBC AUTO: 86.6 FL — SIGNIFICANT CHANGE UP (ref 80–94)
MCV RBC AUTO: 87.1 FL — SIGNIFICANT CHANGE UP (ref 80–94)
MCV RBC AUTO: 87.1 FL — SIGNIFICANT CHANGE UP (ref 80–94)
MCV RBC AUTO: 87.4 FL — SIGNIFICANT CHANGE UP (ref 80–94)
MCV RBC AUTO: 87.4 FL — SIGNIFICANT CHANGE UP (ref 80–94)
MCV RBC AUTO: 87.9 FL — SIGNIFICANT CHANGE UP (ref 80–94)
MCV RBC AUTO: 88.2 FL — SIGNIFICANT CHANGE UP (ref 80–94)
MCV RBC AUTO: 88.3 FL — SIGNIFICANT CHANGE UP (ref 80–94)
MCV RBC AUTO: 88.9 FL — SIGNIFICANT CHANGE UP (ref 80–94)
MCV RBC AUTO: 88.9 FL — SIGNIFICANT CHANGE UP (ref 80–94)
MCV RBC AUTO: 89.3 FL — SIGNIFICANT CHANGE UP (ref 80–94)
MCV RBC AUTO: 89.4 FL — SIGNIFICANT CHANGE UP (ref 80–94)
MCV RBC AUTO: 89.5 FL — SIGNIFICANT CHANGE UP (ref 80–94)
MCV RBC AUTO: 89.6 FL — SIGNIFICANT CHANGE UP (ref 80–94)
MCV RBC AUTO: 89.8 FL — SIGNIFICANT CHANGE UP (ref 80–94)
MCV RBC AUTO: 89.9 FL — SIGNIFICANT CHANGE UP (ref 80–94)
MCV RBC AUTO: 90.1 FL — SIGNIFICANT CHANGE UP (ref 80–94)
MCV RBC AUTO: 90.1 FL — SIGNIFICANT CHANGE UP (ref 80–94)
MCV RBC AUTO: 90.3 FL — SIGNIFICANT CHANGE UP (ref 80–94)
MCV RBC AUTO: 90.4 FL — SIGNIFICANT CHANGE UP (ref 80–94)
MCV RBC AUTO: 90.5 FL — SIGNIFICANT CHANGE UP (ref 80–94)
MCV RBC AUTO: 90.6 FL — SIGNIFICANT CHANGE UP (ref 80–94)
MCV RBC AUTO: 90.7 FL — SIGNIFICANT CHANGE UP (ref 80–94)
MCV RBC AUTO: 90.8 FL — SIGNIFICANT CHANGE UP (ref 80–94)
MCV RBC AUTO: 90.9 FL — SIGNIFICANT CHANGE UP (ref 80–94)
MCV RBC AUTO: 90.9 FL — SIGNIFICANT CHANGE UP (ref 80–94)
MCV RBC AUTO: 91.1 FL — SIGNIFICANT CHANGE UP (ref 80–94)
MCV RBC AUTO: 91.1 FL — SIGNIFICANT CHANGE UP (ref 80–94)
MCV RBC AUTO: 91.2 FL — SIGNIFICANT CHANGE UP (ref 80–94)
MCV RBC AUTO: 91.3 FL — SIGNIFICANT CHANGE UP (ref 80–94)
MCV RBC AUTO: 91.4 FL — SIGNIFICANT CHANGE UP (ref 80–94)
MCV RBC AUTO: 91.5 FL — SIGNIFICANT CHANGE UP (ref 80–94)
MCV RBC AUTO: 91.5 FL — SIGNIFICANT CHANGE UP (ref 80–94)
MCV RBC AUTO: 91.6 FL — SIGNIFICANT CHANGE UP (ref 80–94)
MCV RBC AUTO: 91.7 FL — SIGNIFICANT CHANGE UP (ref 80–94)
MCV RBC AUTO: 91.8 FL — SIGNIFICANT CHANGE UP (ref 80–94)
MCV RBC AUTO: 92 FL — SIGNIFICANT CHANGE UP (ref 80–94)
MCV RBC AUTO: 92.1 FL — SIGNIFICANT CHANGE UP (ref 80–94)
MCV RBC AUTO: 92.1 FL — SIGNIFICANT CHANGE UP (ref 80–94)
MCV RBC AUTO: 92.2 FL — SIGNIFICANT CHANGE UP (ref 80–94)
MCV RBC AUTO: 92.2 FL — SIGNIFICANT CHANGE UP (ref 80–94)
MCV RBC AUTO: 92.4 FL — SIGNIFICANT CHANGE UP (ref 80–94)
MCV RBC AUTO: 92.6 FL — SIGNIFICANT CHANGE UP (ref 80–94)
MCV RBC AUTO: 92.9 FL — SIGNIFICANT CHANGE UP (ref 80–94)
MCV RBC AUTO: 93.8 FL — SIGNIFICANT CHANGE UP (ref 80–94)
MCV RBC AUTO: 93.8 FL — SIGNIFICANT CHANGE UP (ref 80–94)
MCV RBC AUTO: 94.4 FL — HIGH (ref 80–94)
MCV RBC AUTO: 94.6 FL — HIGH (ref 80–94)
MCV RBC AUTO: 94.6 FL — HIGH (ref 80–94)
MESOTHL CELL # FLD: 3 % — SIGNIFICANT CHANGE UP
METAMYELOCYTES # FLD: 0.9 % — HIGH (ref 0–0)
METHOD TYPE: SIGNIFICANT CHANGE UP
MICROCYTES BLD QL: SIGNIFICANT CHANGE UP
MICROCYTES BLD QL: SLIGHT — SIGNIFICANT CHANGE UP
MICROCYTES BLD QL: SLIGHT — SIGNIFICANT CHANGE UP
MONOCYTES # BLD AUTO: 0.14 K/UL — SIGNIFICANT CHANGE UP (ref 0.1–0.6)
MONOCYTES # BLD AUTO: 0.29 K/UL — SIGNIFICANT CHANGE UP (ref 0.1–0.6)
MONOCYTES # BLD AUTO: 0.3 K/UL — SIGNIFICANT CHANGE UP (ref 0.1–0.6)
MONOCYTES # BLD AUTO: 0.33 K/UL — SIGNIFICANT CHANGE UP (ref 0.1–0.6)
MONOCYTES # BLD AUTO: 0.38 K/UL — SIGNIFICANT CHANGE UP (ref 0.1–0.6)
MONOCYTES # BLD AUTO: 0.45 K/UL — SIGNIFICANT CHANGE UP (ref 0.1–0.6)
MONOCYTES # BLD AUTO: 0.46 K/UL — SIGNIFICANT CHANGE UP (ref 0.1–0.6)
MONOCYTES # BLD AUTO: 0.48 K/UL — SIGNIFICANT CHANGE UP (ref 0.1–0.6)
MONOCYTES # BLD AUTO: 0.49 K/UL — SIGNIFICANT CHANGE UP (ref 0.1–0.6)
MONOCYTES # BLD AUTO: 0.53 K/UL — SIGNIFICANT CHANGE UP (ref 0.1–0.6)
MONOCYTES # BLD AUTO: 0.53 K/UL — SIGNIFICANT CHANGE UP (ref 0.1–0.6)
MONOCYTES # BLD AUTO: 0.6 K/UL — SIGNIFICANT CHANGE UP (ref 0.1–0.6)
MONOCYTES # BLD AUTO: 0.6 K/UL — SIGNIFICANT CHANGE UP (ref 0.1–0.6)
MONOCYTES # BLD AUTO: 0.61 K/UL — HIGH (ref 0.1–0.6)
MONOCYTES # BLD AUTO: 0.61 K/UL — HIGH (ref 0.1–0.6)
MONOCYTES # BLD AUTO: 0.62 K/UL — HIGH (ref 0.1–0.6)
MONOCYTES # BLD AUTO: 0.63 K/UL — HIGH (ref 0.1–0.6)
MONOCYTES # BLD AUTO: 0.67 K/UL — HIGH (ref 0.1–0.6)
MONOCYTES # BLD AUTO: 0.67 K/UL — HIGH (ref 0.1–0.6)
MONOCYTES # BLD AUTO: 0.69 K/UL — HIGH (ref 0.1–0.6)
MONOCYTES # BLD AUTO: 0.69 K/UL — HIGH (ref 0.1–0.6)
MONOCYTES # BLD AUTO: 0.71 K/UL — HIGH (ref 0.1–0.6)
MONOCYTES # BLD AUTO: 0.72 K/UL — HIGH (ref 0.1–0.6)
MONOCYTES # BLD AUTO: 0.73 K/UL — HIGH (ref 0.1–0.6)
MONOCYTES # BLD AUTO: 0.75 K/UL — HIGH (ref 0.1–0.6)
MONOCYTES # BLD AUTO: 0.76 K/UL — HIGH (ref 0.1–0.6)
MONOCYTES # BLD AUTO: 0.79 K/UL — HIGH (ref 0.1–0.6)
MONOCYTES # BLD AUTO: 0.8 K/UL — HIGH (ref 0.1–0.6)
MONOCYTES # BLD AUTO: 0.83 K/UL — HIGH (ref 0.1–0.6)
MONOCYTES # BLD AUTO: 0.84 K/UL — HIGH (ref 0.1–0.6)
MONOCYTES # BLD AUTO: 0.89 K/UL — HIGH (ref 0.1–0.6)
MONOCYTES # BLD AUTO: 0.91 K/UL — HIGH (ref 0.1–0.6)
MONOCYTES # BLD AUTO: 0.91 K/UL — HIGH (ref 0.1–0.6)
MONOCYTES # BLD AUTO: 0.92 K/UL — HIGH (ref 0.1–0.6)
MONOCYTES # BLD AUTO: 0.98 K/UL — HIGH (ref 0.1–0.6)
MONOCYTES # BLD AUTO: 0.99 K/UL — HIGH (ref 0.1–0.6)
MONOCYTES # BLD AUTO: 1.02 K/UL — HIGH (ref 0.1–0.6)
MONOCYTES # BLD AUTO: 1.33 K/UL — HIGH (ref 0.1–0.6)
MONOCYTES # BLD AUTO: 1.45 K/UL — HIGH (ref 0.1–0.6)
MONOCYTES # BLD AUTO: 1.54 K/UL — HIGH (ref 0.1–0.6)
MONOCYTES NFR BLD AUTO: 0.9 % — LOW (ref 1.7–9.3)
MONOCYTES NFR BLD AUTO: 1.6 % — LOW (ref 1.7–9.3)
MONOCYTES NFR BLD AUTO: 10.3 % — HIGH (ref 1.7–9.3)
MONOCYTES NFR BLD AUTO: 10.4 % — HIGH (ref 1.7–9.3)
MONOCYTES NFR BLD AUTO: 11.3 % — HIGH (ref 1.7–9.3)
MONOCYTES NFR BLD AUTO: 12.1 % — HIGH (ref 1.7–9.3)
MONOCYTES NFR BLD AUTO: 14.4 % — HIGH (ref 1.7–9.3)
MONOCYTES NFR BLD AUTO: 14.9 % — HIGH (ref 1.7–9.3)
MONOCYTES NFR BLD AUTO: 15.5 % — HIGH (ref 1.7–9.3)
MONOCYTES NFR BLD AUTO: 3.2 % — SIGNIFICANT CHANGE UP (ref 1.7–9.3)
MONOCYTES NFR BLD AUTO: 3.9 % — SIGNIFICANT CHANGE UP (ref 1.7–9.3)
MONOCYTES NFR BLD AUTO: 4.3 % — SIGNIFICANT CHANGE UP (ref 1.7–9.3)
MONOCYTES NFR BLD AUTO: 4.7 % — SIGNIFICANT CHANGE UP (ref 1.7–9.3)
MONOCYTES NFR BLD AUTO: 4.8 % — SIGNIFICANT CHANGE UP (ref 1.7–9.3)
MONOCYTES NFR BLD AUTO: 4.9 % — SIGNIFICANT CHANGE UP (ref 1.7–9.3)
MONOCYTES NFR BLD AUTO: 5.1 % — SIGNIFICANT CHANGE UP (ref 1.7–9.3)
MONOCYTES NFR BLD AUTO: 5.2 % — SIGNIFICANT CHANGE UP (ref 1.7–9.3)
MONOCYTES NFR BLD AUTO: 5.2 % — SIGNIFICANT CHANGE UP (ref 1.7–9.3)
MONOCYTES NFR BLD AUTO: 5.3 % — SIGNIFICANT CHANGE UP (ref 1.7–9.3)
MONOCYTES NFR BLD AUTO: 5.4 % — SIGNIFICANT CHANGE UP (ref 1.7–9.3)
MONOCYTES NFR BLD AUTO: 5.5 % — SIGNIFICANT CHANGE UP (ref 1.7–9.3)
MONOCYTES NFR BLD AUTO: 5.6 % — SIGNIFICANT CHANGE UP (ref 1.7–9.3)
MONOCYTES NFR BLD AUTO: 5.9 % — SIGNIFICANT CHANGE UP (ref 1.7–9.3)
MONOCYTES NFR BLD AUTO: 5.9 % — SIGNIFICANT CHANGE UP (ref 1.7–9.3)
MONOCYTES NFR BLD AUTO: 6.1 % — SIGNIFICANT CHANGE UP (ref 1.7–9.3)
MONOCYTES NFR BLD AUTO: 6.2 % — SIGNIFICANT CHANGE UP (ref 1.7–9.3)
MONOCYTES NFR BLD AUTO: 6.5 % — SIGNIFICANT CHANGE UP (ref 1.7–9.3)
MONOCYTES NFR BLD AUTO: 6.6 % — SIGNIFICANT CHANGE UP (ref 1.7–9.3)
MONOCYTES NFR BLD AUTO: 6.8 % — SIGNIFICANT CHANGE UP (ref 1.7–9.3)
MONOCYTES NFR BLD AUTO: 6.9 % — SIGNIFICANT CHANGE UP (ref 1.7–9.3)
MONOCYTES NFR BLD AUTO: 7.1 % — SIGNIFICANT CHANGE UP (ref 1.7–9.3)
MONOCYTES NFR BLD AUTO: 7.1 % — SIGNIFICANT CHANGE UP (ref 1.7–9.3)
MONOCYTES NFR BLD AUTO: 7.2 % — SIGNIFICANT CHANGE UP (ref 1.7–9.3)
MONOCYTES NFR BLD AUTO: 7.5 % — SIGNIFICANT CHANGE UP (ref 1.7–9.3)
MONOCYTES NFR BLD AUTO: 7.9 % — SIGNIFICANT CHANGE UP (ref 1.7–9.3)
MONOCYTES NFR BLD AUTO: 8.2 % — SIGNIFICANT CHANGE UP (ref 1.7–9.3)
MONOCYTES NFR BLD AUTO: 8.2 % — SIGNIFICANT CHANGE UP (ref 1.7–9.3)
MONOCYTES NFR BLD AUTO: 8.4 % — SIGNIFICANT CHANGE UP (ref 1.7–9.3)
MONOCYTES NFR BLD AUTO: 8.4 % — SIGNIFICANT CHANGE UP (ref 1.7–9.3)
MONOCYTES NFR BLD AUTO: 9.7 % — HIGH (ref 1.7–9.3)
MONOCYTES NFR BLD AUTO: 9.8 % — HIGH (ref 1.7–9.3)
MONOS+MACROS # FLD: 46 % — SIGNIFICANT CHANGE UP
MONOS+MACROS # FLD: 53 % — SIGNIFICANT CHANGE UP
MRSA PCR RESULT.: NEGATIVE — SIGNIFICANT CHANGE UP
MRSA SPEC QL CULT: SIGNIFICANT CHANGE UP
MYCOPHENOLATE SERPL-MCNC: 0.8 UG/ML — LOW (ref 1–3.5)
MYCOPHENOLIC ACID GLUCURONIDE: 186 UG/ML — HIGH (ref 15–125)
NEUTROPHILS # BLD AUTO: 10.21 K/UL — HIGH (ref 1.4–6.5)
NEUTROPHILS # BLD AUTO: 10.32 K/UL — HIGH (ref 1.4–6.5)
NEUTROPHILS # BLD AUTO: 10.39 K/UL — HIGH (ref 1.4–6.5)
NEUTROPHILS # BLD AUTO: 10.65 K/UL — HIGH (ref 1.4–6.5)
NEUTROPHILS # BLD AUTO: 10.71 K/UL — HIGH (ref 1.4–6.5)
NEUTROPHILS # BLD AUTO: 10.78 K/UL — HIGH (ref 1.4–6.5)
NEUTROPHILS # BLD AUTO: 11.04 K/UL — HIGH (ref 1.4–6.5)
NEUTROPHILS # BLD AUTO: 11.16 K/UL — HIGH (ref 1.4–6.5)
NEUTROPHILS # BLD AUTO: 11.23 K/UL — HIGH (ref 1.4–6.5)
NEUTROPHILS # BLD AUTO: 11.27 K/UL — HIGH (ref 1.4–6.5)
NEUTROPHILS # BLD AUTO: 11.59 K/UL — HIGH (ref 1.4–6.5)
NEUTROPHILS # BLD AUTO: 11.83 K/UL — HIGH (ref 1.4–6.5)
NEUTROPHILS # BLD AUTO: 12.25 K/UL — HIGH (ref 1.4–6.5)
NEUTROPHILS # BLD AUTO: 12.3 K/UL — HIGH (ref 1.4–6.5)
NEUTROPHILS # BLD AUTO: 12.49 K/UL — HIGH (ref 1.4–6.5)
NEUTROPHILS # BLD AUTO: 12.67 K/UL — HIGH (ref 1.4–6.5)
NEUTROPHILS # BLD AUTO: 13.33 K/UL — HIGH (ref 1.4–6.5)
NEUTROPHILS # BLD AUTO: 13.55 K/UL — HIGH (ref 1.4–6.5)
NEUTROPHILS # BLD AUTO: 15.14 K/UL — HIGH (ref 1.4–6.5)
NEUTROPHILS # BLD AUTO: 15.5 K/UL — HIGH (ref 1.4–6.5)
NEUTROPHILS # BLD AUTO: 15.59 K/UL — HIGH (ref 1.4–6.5)
NEUTROPHILS # BLD AUTO: 16.12 K/UL — HIGH (ref 1.4–6.5)
NEUTROPHILS # BLD AUTO: 16.26 K/UL — HIGH (ref 1.4–6.5)
NEUTROPHILS # BLD AUTO: 16.76 K/UL — HIGH (ref 1.4–6.5)
NEUTROPHILS # BLD AUTO: 3.18 K/UL — SIGNIFICANT CHANGE UP (ref 1.4–6.5)
NEUTROPHILS # BLD AUTO: 3.37 K/UL — SIGNIFICANT CHANGE UP (ref 1.4–6.5)
NEUTROPHILS # BLD AUTO: 3.63 K/UL — SIGNIFICANT CHANGE UP (ref 1.4–6.5)
NEUTROPHILS # BLD AUTO: 3.97 K/UL — SIGNIFICANT CHANGE UP (ref 1.4–6.5)
NEUTROPHILS # BLD AUTO: 4.67 K/UL — SIGNIFICANT CHANGE UP (ref 1.4–6.5)
NEUTROPHILS # BLD AUTO: 41.76 K/UL — HIGH (ref 1.4–6.5)
NEUTROPHILS # BLD AUTO: 5 K/UL — SIGNIFICANT CHANGE UP (ref 1.4–6.5)
NEUTROPHILS # BLD AUTO: 5.01 K/UL — SIGNIFICANT CHANGE UP (ref 1.4–6.5)
NEUTROPHILS # BLD AUTO: 5.9 K/UL — SIGNIFICANT CHANGE UP (ref 1.4–6.5)
NEUTROPHILS # BLD AUTO: 6.4 K/UL — SIGNIFICANT CHANGE UP (ref 1.4–6.5)
NEUTROPHILS # BLD AUTO: 6.43 K/UL — SIGNIFICANT CHANGE UP (ref 1.4–6.5)
NEUTROPHILS # BLD AUTO: 6.5 K/UL — SIGNIFICANT CHANGE UP (ref 1.4–6.5)
NEUTROPHILS # BLD AUTO: 6.89 K/UL — HIGH (ref 1.4–6.5)
NEUTROPHILS # BLD AUTO: 7.33 K/UL — HIGH (ref 1.4–6.5)
NEUTROPHILS # BLD AUTO: 7.88 K/UL — HIGH (ref 1.4–6.5)
NEUTROPHILS # BLD AUTO: 8.42 K/UL — HIGH (ref 1.4–6.5)
NEUTROPHILS # BLD AUTO: 8.47 K/UL — HIGH (ref 1.4–6.5)
NEUTROPHILS # BLD AUTO: 8.52 K/UL — HIGH (ref 1.4–6.5)
NEUTROPHILS # BLD AUTO: 8.53 K/UL — HIGH (ref 1.4–6.5)
NEUTROPHILS # BLD AUTO: 8.66 K/UL — HIGH (ref 1.4–6.5)
NEUTROPHILS # BLD AUTO: 8.83 K/UL — HIGH (ref 1.4–6.5)
NEUTROPHILS # BLD AUTO: 9.38 K/UL — HIGH (ref 1.4–6.5)
NEUTROPHILS # BLD AUTO: 9.7 K/UL — HIGH (ref 1.4–6.5)
NEUTROPHILS # BLD AUTO: 9.86 K/UL — HIGH (ref 1.4–6.5)
NEUTROPHILS NFR BLD AUTO: 75.5 % — HIGH (ref 42.2–75.2)
NEUTROPHILS NFR BLD AUTO: 76.8 % — HIGH (ref 42.2–75.2)
NEUTROPHILS NFR BLD AUTO: 76.9 % — HIGH (ref 42.2–75.2)
NEUTROPHILS NFR BLD AUTO: 78.7 % — HIGH (ref 42.2–75.2)
NEUTROPHILS NFR BLD AUTO: 79.3 % — HIGH (ref 42.2–75.2)
NEUTROPHILS NFR BLD AUTO: 80.9 % — HIGH (ref 42.2–75.2)
NEUTROPHILS NFR BLD AUTO: 83.3 % — HIGH (ref 42.2–75.2)
NEUTROPHILS NFR BLD AUTO: 83.8 % — HIGH (ref 42.2–75.2)
NEUTROPHILS NFR BLD AUTO: 83.9 % — HIGH (ref 42.2–75.2)
NEUTROPHILS NFR BLD AUTO: 84.3 % — HIGH (ref 42.2–75.2)
NEUTROPHILS NFR BLD AUTO: 84.9 % — HIGH (ref 42.2–75.2)
NEUTROPHILS NFR BLD AUTO: 85.1 % — HIGH (ref 42.2–75.2)
NEUTROPHILS NFR BLD AUTO: 85.2 % — HIGH (ref 42.2–75.2)
NEUTROPHILS NFR BLD AUTO: 85.5 % — HIGH (ref 42.2–75.2)
NEUTROPHILS NFR BLD AUTO: 86.1 % — HIGH (ref 42.2–75.2)
NEUTROPHILS NFR BLD AUTO: 86.5 % — HIGH (ref 42.2–75.2)
NEUTROPHILS NFR BLD AUTO: 86.9 % — HIGH (ref 42.2–75.2)
NEUTROPHILS NFR BLD AUTO: 87.1 % — HIGH (ref 42.2–75.2)
NEUTROPHILS NFR BLD AUTO: 87.1 % — HIGH (ref 42.2–75.2)
NEUTROPHILS NFR BLD AUTO: 87.3 % — HIGH (ref 42.2–75.2)
NEUTROPHILS NFR BLD AUTO: 87.5 % — HIGH (ref 42.2–75.2)
NEUTROPHILS NFR BLD AUTO: 87.5 % — HIGH (ref 42.2–75.2)
NEUTROPHILS NFR BLD AUTO: 87.7 % — HIGH (ref 42.2–75.2)
NEUTROPHILS NFR BLD AUTO: 87.7 % — HIGH (ref 42.2–75.2)
NEUTROPHILS NFR BLD AUTO: 88 % — HIGH (ref 42.2–75.2)
NEUTROPHILS NFR BLD AUTO: 88.2 % — HIGH (ref 42.2–75.2)
NEUTROPHILS NFR BLD AUTO: 88.3 % — HIGH (ref 42.2–75.2)
NEUTROPHILS NFR BLD AUTO: 89.1 % — HIGH (ref 42.2–75.2)
NEUTROPHILS NFR BLD AUTO: 89.1 % — HIGH (ref 42.2–75.2)
NEUTROPHILS NFR BLD AUTO: 89.3 % — HIGH (ref 42.2–75.2)
NEUTROPHILS NFR BLD AUTO: 89.3 % — HIGH (ref 42.2–75.2)
NEUTROPHILS NFR BLD AUTO: 89.6 % — HIGH (ref 42.2–75.2)
NEUTROPHILS NFR BLD AUTO: 90 % — HIGH (ref 42.2–75.2)
NEUTROPHILS NFR BLD AUTO: 90.1 % — HIGH (ref 42.2–75.2)
NEUTROPHILS NFR BLD AUTO: 90.2 % — HIGH (ref 42.2–75.2)
NEUTROPHILS NFR BLD AUTO: 90.3 % — HIGH (ref 42.2–75.2)
NEUTROPHILS NFR BLD AUTO: 91.1 % — HIGH (ref 42.2–75.2)
NEUTROPHILS NFR BLD AUTO: 91.2 % — HIGH (ref 42.2–75.2)
NEUTROPHILS NFR BLD AUTO: 91.3 % — HIGH (ref 42.2–75.2)
NEUTROPHILS NFR BLD AUTO: 91.8 % — HIGH (ref 42.2–75.2)
NEUTROPHILS NFR BLD AUTO: 92 % — HIGH (ref 42.2–75.2)
NEUTROPHILS NFR BLD AUTO: 93.2 % — HIGH (ref 42.2–75.2)
NEUTROPHILS NFR BLD AUTO: 93.8 % — HIGH (ref 42.2–75.2)
NEUTROPHILS NFR BLD AUTO: 94.3 % — HIGH (ref 42.2–75.2)
NEUTROPHILS NFR BLD AUTO: 96.2 % — HIGH (ref 42.2–75.2)
NEUTROPHILS NFR BLD AUTO: 98.2 % — HIGH (ref 42.2–75.2)
NEUTROPHILS-BODY FLUID: 12 % — SIGNIFICANT CHANGE UP
NEUTROPHILS-BODY FLUID: 9 % — SIGNIFICANT CHANGE UP
NEUTS BAND # BLD: 0.9 % — SIGNIFICANT CHANGE UP (ref 0–6)
NEUTS BAND # BLD: 0.9 % — SIGNIFICANT CHANGE UP (ref 0–6)
NITRITE UR-MCNC: NEGATIVE — SIGNIFICANT CHANGE UP
NON HDL CHOLESTEROL: 34 MG/DL — SIGNIFICANT CHANGE UP
NON HDL CHOLESTEROL: 79 MG/DL — SIGNIFICANT CHANGE UP
NON-GYNECOLOGICAL CYTOLOGY STUDY: SIGNIFICANT CHANGE UP
NRBC # BLD: 0 /100 WBCS — SIGNIFICANT CHANGE UP (ref 0–0)
NT-PROBNP SERPL-SCNC: HIGH PG/ML (ref 0–300)
ORGANISM # SPEC MICROSCOPIC CNT: SIGNIFICANT CHANGE UP
OSMOLALITY UR: 414 MOS/KG — SIGNIFICANT CHANGE UP (ref 50–1200)
OVALOCYTES BLD QL SMEAR: SLIGHT — SIGNIFICANT CHANGE UP
PCO2 BLDA: 29 MMHG — LOW (ref 35–48)
PCO2 BLDA: 31 MMHG — LOW (ref 35–48)
PCO2 BLDA: 41 MMHG — SIGNIFICANT CHANGE UP (ref 35–48)
PCO2 BLDV: 47 MMHG — SIGNIFICANT CHANGE UP (ref 42–55)
PH BLDA: 7.37 — SIGNIFICANT CHANGE UP (ref 7.35–7.45)
PH BLDA: 7.42 — SIGNIFICANT CHANGE UP (ref 7.35–7.45)
PH BLDA: 7.52 — HIGH (ref 7.35–7.45)
PH BLDV: 7.39 — SIGNIFICANT CHANGE UP (ref 7.32–7.43)
PH FLD: 7.8 — SIGNIFICANT CHANGE UP
PH FLD: 7.9 — SIGNIFICANT CHANGE UP
PH UR: 5.5 — SIGNIFICANT CHANGE UP (ref 5–8)
PH UR: 6 — SIGNIFICANT CHANGE UP (ref 5–8)
PHOSPHATE SERPL-MCNC: 2 MG/DL — LOW (ref 2.1–4.9)
PHOSPHATE SERPL-MCNC: 2.2 MG/DL — SIGNIFICANT CHANGE UP (ref 2.1–4.9)
PHOSPHATE SERPL-MCNC: 2.4 MG/DL — SIGNIFICANT CHANGE UP (ref 2.1–4.9)
PHOSPHATE SERPL-MCNC: 2.4 MG/DL — SIGNIFICANT CHANGE UP (ref 2.1–4.9)
PHOSPHATE SERPL-MCNC: 2.5 MG/DL — SIGNIFICANT CHANGE UP (ref 2.1–4.9)
PHOSPHATE SERPL-MCNC: 2.6 MG/DL — SIGNIFICANT CHANGE UP (ref 2.1–4.9)
PHOSPHATE SERPL-MCNC: 2.7 MG/DL — SIGNIFICANT CHANGE UP (ref 2.1–4.9)
PHOSPHATE SERPL-MCNC: 2.8 MG/DL — SIGNIFICANT CHANGE UP (ref 2.1–4.9)
PHOSPHATE SERPL-MCNC: 2.9 MG/DL — SIGNIFICANT CHANGE UP (ref 2.1–4.9)
PHOSPHATE SERPL-MCNC: 2.9 MG/DL — SIGNIFICANT CHANGE UP (ref 2.1–4.9)
PHOSPHATE SERPL-MCNC: 3.1 MG/DL — SIGNIFICANT CHANGE UP (ref 2.1–4.9)
PHOSPHATE SERPL-MCNC: 3.2 MG/DL — SIGNIFICANT CHANGE UP (ref 2.1–4.9)
PHOSPHATE SERPL-MCNC: 3.4 MG/DL — SIGNIFICANT CHANGE UP (ref 2.1–4.9)
PHOSPHATE SERPL-MCNC: 3.4 MG/DL — SIGNIFICANT CHANGE UP (ref 2.1–4.9)
PHOSPHATE SERPL-MCNC: 3.5 MG/DL — SIGNIFICANT CHANGE UP (ref 2.1–4.9)
PHOSPHATE SERPL-MCNC: 3.5 MG/DL — SIGNIFICANT CHANGE UP (ref 2.1–4.9)
PHOSPHATE SERPL-MCNC: 3.8 MG/DL — SIGNIFICANT CHANGE UP (ref 2.1–4.9)
PHOSPHATE SERPL-MCNC: 3.9 MG/DL — SIGNIFICANT CHANGE UP (ref 2.1–4.9)
PLAT MORPH BLD: ABNORMAL
PLAT MORPH BLD: NORMAL — SIGNIFICANT CHANGE UP
PLATELET # BLD AUTO: 120 K/UL — LOW (ref 130–400)
PLATELET # BLD AUTO: 122 K/UL — LOW (ref 130–400)
PLATELET # BLD AUTO: 134 K/UL — SIGNIFICANT CHANGE UP (ref 130–400)
PLATELET # BLD AUTO: 139 K/UL — SIGNIFICANT CHANGE UP (ref 130–400)
PLATELET # BLD AUTO: 143 K/UL — SIGNIFICANT CHANGE UP (ref 130–400)
PLATELET # BLD AUTO: 144 K/UL — SIGNIFICANT CHANGE UP (ref 130–400)
PLATELET # BLD AUTO: 145 K/UL — SIGNIFICANT CHANGE UP (ref 130–400)
PLATELET # BLD AUTO: 146 K/UL — SIGNIFICANT CHANGE UP (ref 130–400)
PLATELET # BLD AUTO: 146 K/UL — SIGNIFICANT CHANGE UP (ref 130–400)
PLATELET # BLD AUTO: 147 K/UL — SIGNIFICANT CHANGE UP (ref 130–400)
PLATELET # BLD AUTO: 147 K/UL — SIGNIFICANT CHANGE UP (ref 130–400)
PLATELET # BLD AUTO: 149 K/UL — SIGNIFICANT CHANGE UP (ref 130–400)
PLATELET # BLD AUTO: 150 K/UL — SIGNIFICANT CHANGE UP (ref 130–400)
PLATELET # BLD AUTO: 152 K/UL — SIGNIFICANT CHANGE UP (ref 130–400)
PLATELET # BLD AUTO: 155 K/UL — SIGNIFICANT CHANGE UP (ref 130–400)
PLATELET # BLD AUTO: 156 K/UL — SIGNIFICANT CHANGE UP (ref 130–400)
PLATELET # BLD AUTO: 157 K/UL — SIGNIFICANT CHANGE UP (ref 130–400)
PLATELET # BLD AUTO: 159 K/UL — SIGNIFICANT CHANGE UP (ref 130–400)
PLATELET # BLD AUTO: 163 K/UL — SIGNIFICANT CHANGE UP (ref 130–400)
PLATELET # BLD AUTO: 165 K/UL — SIGNIFICANT CHANGE UP (ref 130–400)
PLATELET # BLD AUTO: 176 K/UL — SIGNIFICANT CHANGE UP (ref 130–400)
PLATELET # BLD AUTO: 187 K/UL — SIGNIFICANT CHANGE UP (ref 130–400)
PLATELET # BLD AUTO: 187 K/UL — SIGNIFICANT CHANGE UP (ref 130–400)
PLATELET # BLD AUTO: 194 K/UL — SIGNIFICANT CHANGE UP (ref 130–400)
PLATELET # BLD AUTO: 195 K/UL — SIGNIFICANT CHANGE UP (ref 130–400)
PLATELET # BLD AUTO: 195 K/UL — SIGNIFICANT CHANGE UP (ref 130–400)
PLATELET # BLD AUTO: 197 K/UL — SIGNIFICANT CHANGE UP (ref 130–400)
PLATELET # BLD AUTO: 199 K/UL — SIGNIFICANT CHANGE UP (ref 130–400)
PLATELET # BLD AUTO: 201 K/UL — SIGNIFICANT CHANGE UP (ref 130–400)
PLATELET # BLD AUTO: 203 K/UL — SIGNIFICANT CHANGE UP (ref 130–400)
PLATELET # BLD AUTO: 203 K/UL — SIGNIFICANT CHANGE UP (ref 130–400)
PLATELET # BLD AUTO: 206 K/UL — SIGNIFICANT CHANGE UP (ref 130–400)
PLATELET # BLD AUTO: 206 K/UL — SIGNIFICANT CHANGE UP (ref 130–400)
PLATELET # BLD AUTO: 207 K/UL — SIGNIFICANT CHANGE UP (ref 130–400)
PLATELET # BLD AUTO: 207 K/UL — SIGNIFICANT CHANGE UP (ref 130–400)
PLATELET # BLD AUTO: 208 K/UL — SIGNIFICANT CHANGE UP (ref 130–400)
PLATELET # BLD AUTO: 211 K/UL — SIGNIFICANT CHANGE UP (ref 130–400)
PLATELET # BLD AUTO: 215 K/UL — SIGNIFICANT CHANGE UP (ref 130–400)
PLATELET # BLD AUTO: 216 K/UL — SIGNIFICANT CHANGE UP (ref 130–400)
PLATELET # BLD AUTO: 218 K/UL — SIGNIFICANT CHANGE UP (ref 130–400)
PLATELET # BLD AUTO: 218 K/UL — SIGNIFICANT CHANGE UP (ref 130–400)
PLATELET # BLD AUTO: 219 K/UL — SIGNIFICANT CHANGE UP (ref 130–400)
PLATELET # BLD AUTO: 220 K/UL — SIGNIFICANT CHANGE UP (ref 130–400)
PLATELET # BLD AUTO: 220 K/UL — SIGNIFICANT CHANGE UP (ref 130–400)
PLATELET # BLD AUTO: 221 K/UL — SIGNIFICANT CHANGE UP (ref 130–400)
PLATELET # BLD AUTO: 222 K/UL — SIGNIFICANT CHANGE UP (ref 130–400)
PLATELET # BLD AUTO: 222 K/UL — SIGNIFICANT CHANGE UP (ref 130–400)
PLATELET # BLD AUTO: 224 K/UL — SIGNIFICANT CHANGE UP (ref 130–400)
PLATELET # BLD AUTO: 225 K/UL — SIGNIFICANT CHANGE UP (ref 130–400)
PLATELET # BLD AUTO: 226 K/UL — SIGNIFICANT CHANGE UP (ref 130–400)
PLATELET # BLD AUTO: 229 K/UL — SIGNIFICANT CHANGE UP (ref 130–400)
PLATELET # BLD AUTO: 232 K/UL — SIGNIFICANT CHANGE UP (ref 130–400)
PLATELET # BLD AUTO: 233 K/UL — SIGNIFICANT CHANGE UP (ref 130–400)
PLATELET # BLD AUTO: 234 K/UL — SIGNIFICANT CHANGE UP (ref 130–400)
PLATELET # BLD AUTO: 235 K/UL — SIGNIFICANT CHANGE UP (ref 130–400)
PLATELET # BLD AUTO: 240 K/UL — SIGNIFICANT CHANGE UP (ref 130–400)
PLATELET # BLD AUTO: 246 K/UL — SIGNIFICANT CHANGE UP (ref 130–400)
PLATELET # BLD AUTO: 247 K/UL — SIGNIFICANT CHANGE UP (ref 130–400)
PLATELET # BLD AUTO: 249 K/UL — SIGNIFICANT CHANGE UP (ref 130–400)
PLATELET # BLD AUTO: 251 K/UL — SIGNIFICANT CHANGE UP (ref 130–400)
PLATELET # BLD AUTO: 252 K/UL — SIGNIFICANT CHANGE UP (ref 130–400)
PLATELET # BLD AUTO: 253 K/UL — SIGNIFICANT CHANGE UP (ref 130–400)
PLATELET # BLD AUTO: 257 K/UL — SIGNIFICANT CHANGE UP (ref 130–400)
PLATELET # BLD AUTO: 260 K/UL — SIGNIFICANT CHANGE UP (ref 130–400)
PLATELET # BLD AUTO: 261 K/UL — SIGNIFICANT CHANGE UP (ref 130–400)
PLATELET # BLD AUTO: 266 K/UL — SIGNIFICANT CHANGE UP (ref 130–400)
PLATELET # BLD AUTO: 268 K/UL — SIGNIFICANT CHANGE UP (ref 130–400)
PLATELET # BLD AUTO: 271 K/UL — SIGNIFICANT CHANGE UP (ref 130–400)
PLATELET # BLD AUTO: 278 K/UL — SIGNIFICANT CHANGE UP (ref 130–400)
PLATELET # BLD AUTO: 286 K/UL — SIGNIFICANT CHANGE UP (ref 130–400)
PLATELET # BLD AUTO: 291 K/UL — SIGNIFICANT CHANGE UP (ref 130–400)
PLATELET # BLD AUTO: 299 K/UL — SIGNIFICANT CHANGE UP (ref 130–400)
PLATELET # BLD AUTO: 308 K/UL — SIGNIFICANT CHANGE UP (ref 130–400)
PLATELET # BLD AUTO: 351 K/UL — SIGNIFICANT CHANGE UP (ref 130–400)
PO2 BLDA: 173 MMHG — HIGH (ref 83–108)
PO2 BLDA: 61 MMHG — LOW (ref 83–108)
PO2 BLDA: 82 MMHG — LOW (ref 83–108)
PO2 BLDV: 26 MMHG — SIGNIFICANT CHANGE UP
POIKILOCYTOSIS BLD QL AUTO: SIGNIFICANT CHANGE UP
POLYCHROMASIA BLD QL SMEAR: SIGNIFICANT CHANGE UP
POLYCHROMASIA BLD QL SMEAR: SLIGHT — SIGNIFICANT CHANGE UP
POLYCHROMASIA BLD QL SMEAR: SLIGHT — SIGNIFICANT CHANGE UP
POTASSIUM BLDV-SCNC: 4.1 MMOL/L — SIGNIFICANT CHANGE UP (ref 3.5–5.1)
POTASSIUM SERPL-MCNC: 3.3 MMOL/L — LOW (ref 3.5–5)
POTASSIUM SERPL-MCNC: 3.5 MMOL/L — SIGNIFICANT CHANGE UP (ref 3.5–5)
POTASSIUM SERPL-MCNC: 3.6 MMOL/L — SIGNIFICANT CHANGE UP (ref 3.5–5)
POTASSIUM SERPL-MCNC: 3.7 MMOL/L — SIGNIFICANT CHANGE UP (ref 3.5–5)
POTASSIUM SERPL-MCNC: 3.8 MMOL/L — SIGNIFICANT CHANGE UP (ref 3.5–5)
POTASSIUM SERPL-MCNC: 3.9 MMOL/L — SIGNIFICANT CHANGE UP (ref 3.5–5)
POTASSIUM SERPL-MCNC: 4 MMOL/L — SIGNIFICANT CHANGE UP (ref 3.5–5)
POTASSIUM SERPL-MCNC: 4.1 MMOL/L — SIGNIFICANT CHANGE UP (ref 3.5–5)
POTASSIUM SERPL-MCNC: 4.2 MMOL/L — SIGNIFICANT CHANGE UP (ref 3.5–5)
POTASSIUM SERPL-MCNC: 4.3 MMOL/L — SIGNIFICANT CHANGE UP (ref 3.5–5)
POTASSIUM SERPL-MCNC: 4.4 MMOL/L — SIGNIFICANT CHANGE UP (ref 3.5–5)
POTASSIUM SERPL-MCNC: 4.5 MMOL/L — SIGNIFICANT CHANGE UP (ref 3.5–5)
POTASSIUM SERPL-MCNC: 4.7 MMOL/L — SIGNIFICANT CHANGE UP (ref 3.5–5)
POTASSIUM SERPL-MCNC: 4.7 MMOL/L — SIGNIFICANT CHANGE UP (ref 3.5–5)
POTASSIUM SERPL-MCNC: 4.8 MMOL/L — SIGNIFICANT CHANGE UP (ref 3.5–5)
POTASSIUM SERPL-MCNC: 4.9 MMOL/L — SIGNIFICANT CHANGE UP (ref 3.5–5)
POTASSIUM SERPL-MCNC: 4.9 MMOL/L — SIGNIFICANT CHANGE UP (ref 3.5–5)
POTASSIUM SERPL-MCNC: 5 MMOL/L — SIGNIFICANT CHANGE UP (ref 3.5–5)
POTASSIUM SERPL-SCNC: 3.3 MMOL/L — LOW (ref 3.5–5)
POTASSIUM SERPL-SCNC: 3.5 MMOL/L — SIGNIFICANT CHANGE UP (ref 3.5–5)
POTASSIUM SERPL-SCNC: 3.6 MMOL/L — SIGNIFICANT CHANGE UP (ref 3.5–5)
POTASSIUM SERPL-SCNC: 3.7 MMOL/L — SIGNIFICANT CHANGE UP (ref 3.5–5)
POTASSIUM SERPL-SCNC: 3.8 MMOL/L — SIGNIFICANT CHANGE UP (ref 3.5–5)
POTASSIUM SERPL-SCNC: 3.9 MMOL/L — SIGNIFICANT CHANGE UP (ref 3.5–5)
POTASSIUM SERPL-SCNC: 4 MMOL/L — SIGNIFICANT CHANGE UP (ref 3.5–5)
POTASSIUM SERPL-SCNC: 4.1 MMOL/L — SIGNIFICANT CHANGE UP (ref 3.5–5)
POTASSIUM SERPL-SCNC: 4.2 MMOL/L — SIGNIFICANT CHANGE UP (ref 3.5–5)
POTASSIUM SERPL-SCNC: 4.3 MMOL/L — SIGNIFICANT CHANGE UP (ref 3.5–5)
POTASSIUM SERPL-SCNC: 4.4 MMOL/L — SIGNIFICANT CHANGE UP (ref 3.5–5)
POTASSIUM SERPL-SCNC: 4.5 MMOL/L — SIGNIFICANT CHANGE UP (ref 3.5–5)
POTASSIUM SERPL-SCNC: 4.7 MMOL/L — SIGNIFICANT CHANGE UP (ref 3.5–5)
POTASSIUM SERPL-SCNC: 4.7 MMOL/L — SIGNIFICANT CHANGE UP (ref 3.5–5)
POTASSIUM SERPL-SCNC: 4.8 MMOL/L — SIGNIFICANT CHANGE UP (ref 3.5–5)
POTASSIUM SERPL-SCNC: 4.9 MMOL/L — SIGNIFICANT CHANGE UP (ref 3.5–5)
POTASSIUM SERPL-SCNC: 4.9 MMOL/L — SIGNIFICANT CHANGE UP (ref 3.5–5)
POTASSIUM SERPL-SCNC: 5 MMOL/L — SIGNIFICANT CHANGE UP (ref 3.5–5)
PROCALCITONIN SERPL-MCNC: 0.16 NG/ML — HIGH (ref 0.02–0.1)
PROCALCITONIN SERPL-MCNC: 0.18 NG/ML — HIGH (ref 0.02–0.1)
PROCALCITONIN SERPL-MCNC: 0.2 NG/ML — HIGH (ref 0.02–0.1)
PROCALCITONIN SERPL-MCNC: 0.21 NG/ML — HIGH (ref 0.02–0.1)
PROCALCITONIN SERPL-MCNC: 0.21 NG/ML — HIGH (ref 0.02–0.1)
PROCALCITONIN SERPL-MCNC: 0.29 NG/ML — HIGH (ref 0.02–0.1)
PROCALCITONIN SERPL-MCNC: 0.38 NG/ML — HIGH (ref 0.02–0.1)
PROCALCITONIN SERPL-MCNC: 0.39 NG/ML — HIGH (ref 0.02–0.1)
PROT ?TM UR-MCNC: 24 MG/DLG/24H — SIGNIFICANT CHANGE UP
PROT ?TM UR-MCNC: 87 MG/DLG/24H — SIGNIFICANT CHANGE UP
PROT FLD-MCNC: 1.6 G/DL — SIGNIFICANT CHANGE UP
PROT FLD-MCNC: 1.6 G/DL — SIGNIFICANT CHANGE UP
PROT SERPL-MCNC: 4.6 G/DL — LOW (ref 6–8)
PROT SERPL-MCNC: 4.8 G/DL — LOW (ref 6–8)
PROT SERPL-MCNC: 4.9 G/DL — LOW (ref 6–8)
PROT SERPL-MCNC: 5 G/DL — LOW (ref 6–8)
PROT SERPL-MCNC: 5.1 G/DL — LOW (ref 6–8)
PROT SERPL-MCNC: 5.2 G/DL — LOW (ref 6–8)
PROT SERPL-MCNC: 5.3 G/DL — LOW (ref 6–8)
PROT SERPL-MCNC: 5.4 G/DL — LOW (ref 6–8)
PROT SERPL-MCNC: 5.5 G/DL — LOW (ref 6–8)
PROT SERPL-MCNC: 5.6 G/DL — LOW (ref 6–8)
PROT SERPL-MCNC: 5.7 G/DL — LOW (ref 6–8)
PROT SERPL-MCNC: 5.8 G/DL — LOW (ref 6–8)
PROT SERPL-MCNC: 5.9 G/DL — LOW (ref 6–8)
PROT SERPL-MCNC: 5.9 G/DL — LOW (ref 6–8)
PROT SERPL-MCNC: 6 G/DL — SIGNIFICANT CHANGE UP (ref 6–8)
PROT SERPL-MCNC: 6.1 G/DL — SIGNIFICANT CHANGE UP (ref 6–8)
PROT SERPL-MCNC: 6.2 G/DL — SIGNIFICANT CHANGE UP (ref 6–8)
PROT SERPL-MCNC: 6.3 G/DL — SIGNIFICANT CHANGE UP (ref 6–8)
PROT SERPL-MCNC: 6.3 G/DL — SIGNIFICANT CHANGE UP (ref 6–8)
PROT SERPL-MCNC: 6.6 G/DL — SIGNIFICANT CHANGE UP (ref 6–8)
PROT UR-MCNC: ABNORMAL
PROT/CREAT UR-RTO: 0.3 RATIO — HIGH (ref 0–0.2)
PROT/CREAT UR-RTO: 1 RATIO — HIGH (ref 0–0.2)
PROTHROM AB SERPL-ACNC: 16.6 SEC — HIGH (ref 9.95–12.87)
PROTHROM AB SERPL-ACNC: 19 SEC — HIGH (ref 9.95–12.87)
PTH-INTACT FLD-MCNC: 93 PG/ML — HIGH (ref 15–65)
RBC # BLD: 2.36 M/UL — LOW (ref 4.7–6.1)
RBC # BLD: 2.39 M/UL — LOW (ref 4.7–6.1)
RBC # BLD: 2.39 M/UL — LOW (ref 4.7–6.1)
RBC # BLD: 2.49 M/UL — LOW (ref 4.7–6.1)
RBC # BLD: 2.51 M/UL — LOW (ref 4.7–6.1)
RBC # BLD: 2.51 M/UL — LOW (ref 4.7–6.1)
RBC # BLD: 2.54 M/UL — LOW (ref 4.7–6.1)
RBC # BLD: 2.57 M/UL — LOW (ref 4.7–6.1)
RBC # BLD: 2.57 M/UL — LOW (ref 4.7–6.1)
RBC # BLD: 2.58 M/UL — LOW (ref 4.7–6.1)
RBC # BLD: 2.6 M/UL — LOW (ref 4.7–6.1)
RBC # BLD: 2.62 M/UL — LOW (ref 4.7–6.1)
RBC # BLD: 2.64 M/UL — LOW (ref 4.7–6.1)
RBC # BLD: 2.65 M/UL — LOW (ref 4.7–6.1)
RBC # BLD: 2.66 M/UL — LOW (ref 4.7–6.1)
RBC # BLD: 2.69 M/UL — LOW (ref 4.7–6.1)
RBC # BLD: 2.7 M/UL — LOW (ref 4.7–6.1)
RBC # BLD: 2.72 M/UL — LOW (ref 4.7–6.1)
RBC # BLD: 2.73 M/UL — LOW (ref 4.7–6.1)
RBC # BLD: 2.73 M/UL — LOW (ref 4.7–6.1)
RBC # BLD: 2.74 M/UL — LOW (ref 4.7–6.1)
RBC # BLD: 2.76 M/UL — LOW (ref 4.7–6.1)
RBC # BLD: 2.78 M/UL — LOW (ref 4.7–6.1)
RBC # BLD: 2.79 M/UL — LOW (ref 4.7–6.1)
RBC # BLD: 2.8 M/UL — LOW (ref 4.7–6.1)
RBC # BLD: 2.81 M/UL — LOW (ref 4.7–6.1)
RBC # BLD: 2.82 M/UL — LOW (ref 4.7–6.1)
RBC # BLD: 2.82 M/UL — LOW (ref 4.7–6.1)
RBC # BLD: 2.84 M/UL — LOW (ref 4.7–6.1)
RBC # BLD: 2.87 M/UL — LOW (ref 4.7–6.1)
RBC # BLD: 2.87 M/UL — LOW (ref 4.7–6.1)
RBC # BLD: 2.91 M/UL — LOW (ref 4.7–6.1)
RBC # BLD: 2.94 M/UL — LOW (ref 4.7–6.1)
RBC # BLD: 2.94 M/UL — LOW (ref 4.7–6.1)
RBC # BLD: 2.97 M/UL — LOW (ref 4.7–6.1)
RBC # BLD: 2.98 M/UL — LOW (ref 4.7–6.1)
RBC # BLD: 2.98 M/UL — LOW (ref 4.7–6.1)
RBC # BLD: 3 M/UL — LOW (ref 4.7–6.1)
RBC # BLD: 3.01 M/UL — LOW (ref 4.7–6.1)
RBC # BLD: 3.01 M/UL — LOW (ref 4.7–6.1)
RBC # BLD: 3.02 M/UL — LOW (ref 4.7–6.1)
RBC # BLD: 3.02 M/UL — LOW (ref 4.7–6.1)
RBC # BLD: 3.04 M/UL — LOW (ref 4.7–6.1)
RBC # BLD: 3.04 M/UL — LOW (ref 4.7–6.1)
RBC # BLD: 3.05 M/UL — LOW (ref 4.7–6.1)
RBC # BLD: 3.05 M/UL — LOW (ref 4.7–6.1)
RBC # BLD: 3.06 M/UL — LOW (ref 4.7–6.1)
RBC # BLD: 3.08 M/UL — LOW (ref 4.7–6.1)
RBC # BLD: 3.09 M/UL — LOW (ref 4.7–6.1)
RBC # BLD: 3.09 M/UL — LOW (ref 4.7–6.1)
RBC # BLD: 3.12 M/UL — LOW (ref 4.7–6.1)
RBC # BLD: 3.19 M/UL — LOW (ref 4.7–6.1)
RBC # BLD: 3.27 M/UL — LOW (ref 4.7–6.1)
RBC # BLD: 3.34 M/UL — LOW (ref 4.7–6.1)
RBC # BLD: 3.35 M/UL — LOW (ref 4.7–6.1)
RBC # BLD: 3.4 M/UL — LOW (ref 4.7–6.1)
RBC # BLD: 3.42 M/UL — LOW (ref 4.7–6.1)
RBC # BLD: 3.46 M/UL — LOW (ref 4.7–6.1)
RBC # BLD: 3.51 M/UL — LOW (ref 4.7–6.1)
RBC # BLD: 3.51 M/UL — LOW (ref 4.7–6.1)
RBC # BLD: 3.52 M/UL — LOW (ref 4.7–6.1)
RBC # BLD: 3.72 M/UL — LOW (ref 4.7–6.1)
RBC # BLD: 3.75 M/UL — LOW (ref 4.7–6.1)
RBC # BLD: 3.75 M/UL — LOW (ref 4.7–6.1)
RBC # BLD: 3.79 M/UL — LOW (ref 4.7–6.1)
RBC # BLD: 3.82 M/UL — LOW (ref 4.7–6.1)
RBC # BLD: 3.91 M/UL — LOW (ref 4.7–6.1)
RBC # BLD: 3.96 M/UL — LOW (ref 4.7–6.1)
RBC # FLD: 13.6 % — SIGNIFICANT CHANGE UP (ref 11.5–14.5)
RBC # FLD: 13.8 % — SIGNIFICANT CHANGE UP (ref 11.5–14.5)
RBC # FLD: 13.9 % — SIGNIFICANT CHANGE UP (ref 11.5–14.5)
RBC # FLD: 13.9 % — SIGNIFICANT CHANGE UP (ref 11.5–14.5)
RBC # FLD: 14 % — SIGNIFICANT CHANGE UP (ref 11.5–14.5)
RBC # FLD: 14 % — SIGNIFICANT CHANGE UP (ref 11.5–14.5)
RBC # FLD: 14.1 % — SIGNIFICANT CHANGE UP (ref 11.5–14.5)
RBC # FLD: 14.2 % — SIGNIFICANT CHANGE UP (ref 11.5–14.5)
RBC # FLD: 14.2 % — SIGNIFICANT CHANGE UP (ref 11.5–14.5)
RBC # FLD: 14.3 % — SIGNIFICANT CHANGE UP (ref 11.5–14.5)
RBC # FLD: 14.6 % — HIGH (ref 11.5–14.5)
RBC # FLD: 14.7 % — HIGH (ref 11.5–14.5)
RBC # FLD: 14.7 % — HIGH (ref 11.5–14.5)
RBC # FLD: 14.8 % — HIGH (ref 11.5–14.5)
RBC # FLD: 15 % — HIGH (ref 11.5–14.5)
RBC # FLD: 15 % — HIGH (ref 11.5–14.5)
RBC # FLD: 15.1 % — HIGH (ref 11.5–14.5)
RBC # FLD: 15.2 % — HIGH (ref 11.5–14.5)
RBC # FLD: 15.7 % — HIGH (ref 11.5–14.5)
RBC # FLD: 16.2 % — HIGH (ref 11.5–14.5)
RBC # FLD: 16.2 % — HIGH (ref 11.5–14.5)
RBC # FLD: 16.3 % — HIGH (ref 11.5–14.5)
RBC # FLD: 16.4 % — HIGH (ref 11.5–14.5)
RBC # FLD: 16.5 % — HIGH (ref 11.5–14.5)
RBC # FLD: 17.8 % — HIGH (ref 11.5–14.5)
RBC # FLD: 17.9 % — HIGH (ref 11.5–14.5)
RBC # FLD: 17.9 % — HIGH (ref 11.5–14.5)
RBC # FLD: 18.1 % — HIGH (ref 11.5–14.5)
RBC # FLD: 18.3 % — HIGH (ref 11.5–14.5)
RBC # FLD: 18.4 % — HIGH (ref 11.5–14.5)
RBC # FLD: 18.5 % — HIGH (ref 11.5–14.5)
RBC # FLD: 18.6 % — HIGH (ref 11.5–14.5)
RBC # FLD: 18.7 % — HIGH (ref 11.5–14.5)
RBC # FLD: 18.8 % — HIGH (ref 11.5–14.5)
RBC # FLD: 18.8 % — HIGH (ref 11.5–14.5)
RBC # FLD: 18.9 % — HIGH (ref 11.5–14.5)
RBC # FLD: 19 % — HIGH (ref 11.5–14.5)
RBC # FLD: 19.2 % — HIGH (ref 11.5–14.5)
RBC # FLD: 19.2 % — HIGH (ref 11.5–14.5)
RBC # FLD: 19.3 % — HIGH (ref 11.5–14.5)
RBC # FLD: 19.4 % — HIGH (ref 11.5–14.5)
RBC # FLD: 19.5 % — HIGH (ref 11.5–14.5)
RBC # FLD: 19.6 % — HIGH (ref 11.5–14.5)
RBC # FLD: 19.7 % — HIGH (ref 11.5–14.5)
RBC # FLD: 19.7 % — HIGH (ref 11.5–14.5)
RBC # FLD: 19.8 % — HIGH (ref 11.5–14.5)
RBC # FLD: 19.9 % — HIGH (ref 11.5–14.5)
RBC BLD AUTO: ABNORMAL
RBC CASTS # UR COMP ASSIST: 1 /HPF — SIGNIFICANT CHANGE UP (ref 0–4)
RBC CASTS # UR COMP ASSIST: 4 /HPF — SIGNIFICANT CHANGE UP (ref 0–4)
RBC CASTS # UR COMP ASSIST: 51 /HPF — HIGH (ref 0–4)
RBC CASTS # UR COMP ASSIST: 8 /HPF — HIGH (ref 0–4)
RCV VOL RI: 3000 /UL — HIGH (ref 0–0)
RCV VOL RI: 5000 /UL — HIGH (ref 0–0)
S PNEUM AG UR QL: NEGATIVE — SIGNIFICANT CHANGE UP
SAO2 % BLDA: 95.6 % — SIGNIFICANT CHANGE UP (ref 94–98)
SAO2 % BLDA: 97.9 % — SIGNIFICANT CHANGE UP (ref 94–98)
SAO2 % BLDA: 99.8 % — HIGH (ref 94–98)
SAO2 % BLDV: 36.5 % — SIGNIFICANT CHANGE UP
SARS-COV-2 RNA SPEC QL NAA+PROBE: DETECTED
SARS-COV-2 RNA SPEC QL NAA+PROBE: SIGNIFICANT CHANGE UP
SCHISTOCYTES BLD QL AUTO: SIGNIFICANT CHANGE UP
SCHISTOCYTES BLD QL AUTO: SLIGHT — SIGNIFICANT CHANGE UP
SODIUM SERPL-SCNC: 127 MMOL/L — LOW (ref 135–146)
SODIUM SERPL-SCNC: 128 MMOL/L — LOW (ref 135–146)
SODIUM SERPL-SCNC: 130 MMOL/L — LOW (ref 135–146)
SODIUM SERPL-SCNC: 131 MMOL/L — LOW (ref 135–146)
SODIUM SERPL-SCNC: 132 MMOL/L — LOW (ref 135–146)
SODIUM SERPL-SCNC: 133 MMOL/L — LOW (ref 135–146)
SODIUM SERPL-SCNC: 134 MMOL/L — LOW (ref 135–146)
SODIUM SERPL-SCNC: 135 MMOL/L — SIGNIFICANT CHANGE UP (ref 135–146)
SODIUM SERPL-SCNC: 136 MMOL/L — SIGNIFICANT CHANGE UP (ref 135–146)
SODIUM SERPL-SCNC: 137 MMOL/L — SIGNIFICANT CHANGE UP (ref 135–146)
SODIUM SERPL-SCNC: 138 MMOL/L — SIGNIFICANT CHANGE UP (ref 135–146)
SODIUM SERPL-SCNC: 139 MMOL/L — SIGNIFICANT CHANGE UP (ref 135–146)
SODIUM SERPL-SCNC: 140 MMOL/L — SIGNIFICANT CHANGE UP (ref 135–146)
SODIUM SERPL-SCNC: 141 MMOL/L — SIGNIFICANT CHANGE UP (ref 135–146)
SODIUM SERPL-SCNC: 142 MMOL/L — SIGNIFICANT CHANGE UP (ref 135–146)
SODIUM SERPL-SCNC: 144 MMOL/L — SIGNIFICANT CHANGE UP (ref 135–146)
SODIUM SERPL-SCNC: 146 MMOL/L — SIGNIFICANT CHANGE UP (ref 135–146)
SODIUM SERPL-SCNC: 147 MMOL/L — HIGH (ref 135–146)
SODIUM SERPL-SCNC: 149 MMOL/L — HIGH (ref 135–146)
SODIUM SERPL-SCNC: 149 MMOL/L — HIGH (ref 135–146)
SODIUM SERPL-SCNC: 151 MMOL/L — HIGH (ref 135–146)
SODIUM SERPL-SCNC: 152 MMOL/L — HIGH (ref 135–146)
SODIUM UR-SCNC: 34 MMOL/L — SIGNIFICANT CHANGE UP
SODIUM UR-SCNC: <20 MMOL/L — SIGNIFICANT CHANGE UP
SP GR SPEC: 1.01 — SIGNIFICANT CHANGE UP (ref 1.01–1.03)
SP GR SPEC: 1.02 — SIGNIFICANT CHANGE UP (ref 1.01–1.03)
SPECIMEN SOURCE: SIGNIFICANT CHANGE UP
TACROLIMUS SERPL-MCNC: 10.2 NG/ML — SIGNIFICANT CHANGE UP
TACROLIMUS SERPL-MCNC: 10.8 NG/ML — SIGNIFICANT CHANGE UP
TACROLIMUS SERPL-MCNC: 14.4 NG/ML — SIGNIFICANT CHANGE UP
TACROLIMUS SERPL-MCNC: 15.2 NG/ML — SIGNIFICANT CHANGE UP
TACROLIMUS SERPL-MCNC: 16.2 NG/ML — SIGNIFICANT CHANGE UP
TACROLIMUS SERPL-MCNC: 16.6 NG/ML — SIGNIFICANT CHANGE UP
TACROLIMUS SERPL-MCNC: 3.7 NG/ML — SIGNIFICANT CHANGE UP
TACROLIMUS SERPL-MCNC: 5.2 NG/ML — SIGNIFICANT CHANGE UP
TACROLIMUS SERPL-MCNC: 8.4 NG/ML — SIGNIFICANT CHANGE UP
TACROLIMUS SERPL-MCNC: 9.2 NG/ML — SIGNIFICANT CHANGE UP
TACROLIMUS SERPL-MCNC: 9.5 NG/ML — SIGNIFICANT CHANGE UP
TACROLIMUS SERPL-MCNC: <2 NG/ML — SIGNIFICANT CHANGE UP
TIBC SERPL-MCNC: 135 UG/DL — LOW (ref 220–430)
TIBC SERPL-MCNC: 196 UG/DL — LOW (ref 220–430)
TIBC SERPL-MCNC: 220 UG/DL — SIGNIFICANT CHANGE UP (ref 220–430)
TOTAL NUCLEATED CELL COUNT, BODY FLUID: 79 /UL — SIGNIFICANT CHANGE UP
TOTAL NUCLEATED CELL COUNT, BODY FLUID: 88 /UL — SIGNIFICANT CHANGE UP
TRANSFERRIN SERPL-MCNC: 182 MG/DL — LOW (ref 200–360)
TRIGL SERPL-MCNC: 65 MG/DL — SIGNIFICANT CHANGE UP
TRIGL SERPL-MCNC: 94 MG/DL — SIGNIFICANT CHANGE UP
TROPONIN T SERPL-MCNC: 0.06 NG/ML — CRITICAL HIGH
TROPONIN T SERPL-MCNC: 0.06 NG/ML — CRITICAL HIGH
TROPONIN T SERPL-MCNC: 0.07 NG/ML — CRITICAL HIGH
TROPONIN T SERPL-MCNC: 0.22 NG/ML — CRITICAL HIGH
TROPONIN T SERPL-MCNC: 0.24 NG/ML — CRITICAL HIGH
TSH SERPL-MCNC: 0.39 UIU/ML — SIGNIFICANT CHANGE UP (ref 0.27–4.2)
TSH SERPL-MCNC: 1.55 UIU/ML — SIGNIFICANT CHANGE UP (ref 0.27–4.2)
TUBE TYPE: SIGNIFICANT CHANGE UP
TUBE TYPE: SIGNIFICANT CHANGE UP
UIBC SERPL-MCNC: 107 UG/DL — LOW (ref 110–370)
UIBC SERPL-MCNC: 158 UG/DL — SIGNIFICANT CHANGE UP (ref 110–370)
UIBC SERPL-MCNC: 185 UG/DL — SIGNIFICANT CHANGE UP (ref 110–370)
URATE SERPL-MCNC: 10.3 MG/DL — HIGH (ref 3.4–8.8)
URATE SERPL-MCNC: 9.5 MG/DL — HIGH (ref 3.4–8.8)
UROBILINOGEN FLD QL: SIGNIFICANT CHANGE UP
UUN UR-MCNC: 460 MG/DL — SIGNIFICANT CHANGE UP
WBC # BLD: 10.02 K/UL — SIGNIFICANT CHANGE UP (ref 4.8–10.8)
WBC # BLD: 10.35 K/UL — SIGNIFICANT CHANGE UP (ref 4.8–10.8)
WBC # BLD: 10.75 K/UL — SIGNIFICANT CHANGE UP (ref 4.8–10.8)
WBC # BLD: 11.06 K/UL — HIGH (ref 4.8–10.8)
WBC # BLD: 11.21 K/UL — HIGH (ref 4.8–10.8)
WBC # BLD: 11.4 K/UL — HIGH (ref 4.8–10.8)
WBC # BLD: 11.45 K/UL — HIGH (ref 4.8–10.8)
WBC # BLD: 11.52 K/UL — HIGH (ref 4.8–10.8)
WBC # BLD: 11.64 K/UL — HIGH (ref 4.8–10.8)
WBC # BLD: 11.94 K/UL — HIGH (ref 4.8–10.8)
WBC # BLD: 12.06 K/UL — HIGH (ref 4.8–10.8)
WBC # BLD: 12.12 K/UL — HIGH (ref 4.8–10.8)
WBC # BLD: 12.21 K/UL — HIGH (ref 4.8–10.8)
WBC # BLD: 12.28 K/UL — HIGH (ref 4.8–10.8)
WBC # BLD: 12.28 K/UL — HIGH (ref 4.8–10.8)
WBC # BLD: 12.34 K/UL — HIGH (ref 4.8–10.8)
WBC # BLD: 12.39 K/UL — HIGH (ref 4.8–10.8)
WBC # BLD: 12.4 K/UL — HIGH (ref 4.8–10.8)
WBC # BLD: 12.68 K/UL — HIGH (ref 4.8–10.8)
WBC # BLD: 12.75 K/UL — HIGH (ref 4.8–10.8)
WBC # BLD: 12.76 K/UL — HIGH (ref 4.8–10.8)
WBC # BLD: 12.87 K/UL — HIGH (ref 4.8–10.8)
WBC # BLD: 13.09 K/UL — HIGH (ref 4.8–10.8)
WBC # BLD: 13.49 K/UL — HIGH (ref 4.8–10.8)
WBC # BLD: 13.54 K/UL — HIGH (ref 4.8–10.8)
WBC # BLD: 13.67 K/UL — HIGH (ref 4.8–10.8)
WBC # BLD: 13.89 K/UL — HIGH (ref 4.8–10.8)
WBC # BLD: 13.89 K/UL — HIGH (ref 4.8–10.8)
WBC # BLD: 13.99 K/UL — HIGH (ref 4.8–10.8)
WBC # BLD: 14.02 K/UL — HIGH (ref 4.8–10.8)
WBC # BLD: 14.06 K/UL — HIGH (ref 4.8–10.8)
WBC # BLD: 14.3 K/UL — HIGH (ref 4.8–10.8)
WBC # BLD: 14.51 K/UL — HIGH (ref 4.8–10.8)
WBC # BLD: 14.73 K/UL — HIGH (ref 4.8–10.8)
WBC # BLD: 15 K/UL — HIGH (ref 4.8–10.8)
WBC # BLD: 15.09 K/UL — HIGH (ref 4.8–10.8)
WBC # BLD: 15.62 K/UL — HIGH (ref 4.8–10.8)
WBC # BLD: 15.75 K/UL — HIGH (ref 4.8–10.8)
WBC # BLD: 15.84 K/UL — HIGH (ref 4.8–10.8)
WBC # BLD: 16.57 K/UL — HIGH (ref 4.8–10.8)
WBC # BLD: 17.02 K/UL — HIGH (ref 4.8–10.8)
WBC # BLD: 17.05 K/UL — HIGH (ref 4.8–10.8)
WBC # BLD: 17.4 K/UL — HIGH (ref 4.8–10.8)
WBC # BLD: 17.8 K/UL — HIGH (ref 4.8–10.8)
WBC # BLD: 17.89 K/UL — HIGH (ref 4.8–10.8)
WBC # BLD: 18.24 K/UL — HIGH (ref 4.8–10.8)
WBC # BLD: 18.27 K/UL — HIGH (ref 4.8–10.8)
WBC # BLD: 19.41 K/UL — HIGH (ref 4.8–10.8)
WBC # BLD: 3.79 K/UL — LOW (ref 4.8–10.8)
WBC # BLD: 3.91 K/UL — LOW (ref 4.8–10.8)
WBC # BLD: 4.25 K/UL — LOW (ref 4.8–10.8)
WBC # BLD: 4.72 K/UL — LOW (ref 4.8–10.8)
WBC # BLD: 4.91 K/UL — SIGNIFICANT CHANGE UP (ref 4.8–10.8)
WBC # BLD: 42.14 K/UL — CRITICAL HIGH (ref 4.8–10.8)
WBC # BLD: 5.32 K/UL — SIGNIFICANT CHANGE UP (ref 4.8–10.8)
WBC # BLD: 5.86 K/UL — SIGNIFICANT CHANGE UP (ref 4.8–10.8)
WBC # BLD: 6.01 K/UL — SIGNIFICANT CHANGE UP (ref 4.8–10.8)
WBC # BLD: 6.37 K/UL — SIGNIFICANT CHANGE UP (ref 4.8–10.8)
WBC # BLD: 6.41 K/UL — SIGNIFICANT CHANGE UP (ref 4.8–10.8)
WBC # BLD: 7.11 K/UL — SIGNIFICANT CHANGE UP (ref 4.8–10.8)
WBC # BLD: 7.34 K/UL — SIGNIFICANT CHANGE UP (ref 4.8–10.8)
WBC # BLD: 7.57 K/UL — SIGNIFICANT CHANGE UP (ref 4.8–10.8)
WBC # BLD: 7.63 K/UL — SIGNIFICANT CHANGE UP (ref 4.8–10.8)
WBC # BLD: 8.08 K/UL — SIGNIFICANT CHANGE UP (ref 4.8–10.8)
WBC # BLD: 8.4 K/UL — SIGNIFICANT CHANGE UP (ref 4.8–10.8)
WBC # BLD: 8.46 K/UL — SIGNIFICANT CHANGE UP (ref 4.8–10.8)
WBC # BLD: 8.47 K/UL — SIGNIFICANT CHANGE UP (ref 4.8–10.8)
WBC # BLD: 8.61 K/UL — SIGNIFICANT CHANGE UP (ref 4.8–10.8)
WBC # BLD: 8.8 K/UL — SIGNIFICANT CHANGE UP (ref 4.8–10.8)
WBC # BLD: 9.03 K/UL — SIGNIFICANT CHANGE UP (ref 4.8–10.8)
WBC # BLD: 9.53 K/UL — SIGNIFICANT CHANGE UP (ref 4.8–10.8)
WBC # BLD: 9.62 K/UL — SIGNIFICANT CHANGE UP (ref 4.8–10.8)
WBC # BLD: 9.7 K/UL — SIGNIFICANT CHANGE UP (ref 4.8–10.8)
WBC # BLD: 9.92 K/UL — SIGNIFICANT CHANGE UP (ref 4.8–10.8)
WBC # FLD AUTO: 10.02 K/UL — SIGNIFICANT CHANGE UP (ref 4.8–10.8)
WBC # FLD AUTO: 10.35 K/UL — SIGNIFICANT CHANGE UP (ref 4.8–10.8)
WBC # FLD AUTO: 10.75 K/UL — SIGNIFICANT CHANGE UP (ref 4.8–10.8)
WBC # FLD AUTO: 11.06 K/UL — HIGH (ref 4.8–10.8)
WBC # FLD AUTO: 11.21 K/UL — HIGH (ref 4.8–10.8)
WBC # FLD AUTO: 11.4 K/UL — HIGH (ref 4.8–10.8)
WBC # FLD AUTO: 11.45 K/UL — HIGH (ref 4.8–10.8)
WBC # FLD AUTO: 11.52 K/UL — HIGH (ref 4.8–10.8)
WBC # FLD AUTO: 11.64 K/UL — HIGH (ref 4.8–10.8)
WBC # FLD AUTO: 11.94 K/UL — HIGH (ref 4.8–10.8)
WBC # FLD AUTO: 12.06 K/UL — HIGH (ref 4.8–10.8)
WBC # FLD AUTO: 12.12 K/UL — HIGH (ref 4.8–10.8)
WBC # FLD AUTO: 12.21 K/UL — HIGH (ref 4.8–10.8)
WBC # FLD AUTO: 12.28 K/UL — HIGH (ref 4.8–10.8)
WBC # FLD AUTO: 12.28 K/UL — HIGH (ref 4.8–10.8)
WBC # FLD AUTO: 12.34 K/UL — HIGH (ref 4.8–10.8)
WBC # FLD AUTO: 12.39 K/UL — HIGH (ref 4.8–10.8)
WBC # FLD AUTO: 12.4 K/UL — HIGH (ref 4.8–10.8)
WBC # FLD AUTO: 12.68 K/UL — HIGH (ref 4.8–10.8)
WBC # FLD AUTO: 12.75 K/UL — HIGH (ref 4.8–10.8)
WBC # FLD AUTO: 12.76 K/UL — HIGH (ref 4.8–10.8)
WBC # FLD AUTO: 12.87 K/UL — HIGH (ref 4.8–10.8)
WBC # FLD AUTO: 13.09 K/UL — HIGH (ref 4.8–10.8)
WBC # FLD AUTO: 13.49 K/UL — HIGH (ref 4.8–10.8)
WBC # FLD AUTO: 13.54 K/UL — HIGH (ref 4.8–10.8)
WBC # FLD AUTO: 13.67 K/UL — HIGH (ref 4.8–10.8)
WBC # FLD AUTO: 13.89 K/UL — HIGH (ref 4.8–10.8)
WBC # FLD AUTO: 13.89 K/UL — HIGH (ref 4.8–10.8)
WBC # FLD AUTO: 13.99 K/UL — HIGH (ref 4.8–10.8)
WBC # FLD AUTO: 14.02 K/UL — HIGH (ref 4.8–10.8)
WBC # FLD AUTO: 14.06 K/UL — HIGH (ref 4.8–10.8)
WBC # FLD AUTO: 14.3 K/UL — HIGH (ref 4.8–10.8)
WBC # FLD AUTO: 14.51 K/UL — HIGH (ref 4.8–10.8)
WBC # FLD AUTO: 14.73 K/UL — HIGH (ref 4.8–10.8)
WBC # FLD AUTO: 15 K/UL — HIGH (ref 4.8–10.8)
WBC # FLD AUTO: 15.09 K/UL — HIGH (ref 4.8–10.8)
WBC # FLD AUTO: 15.62 K/UL — HIGH (ref 4.8–10.8)
WBC # FLD AUTO: 15.75 K/UL — HIGH (ref 4.8–10.8)
WBC # FLD AUTO: 15.84 K/UL — HIGH (ref 4.8–10.8)
WBC # FLD AUTO: 16.57 K/UL — HIGH (ref 4.8–10.8)
WBC # FLD AUTO: 17.02 K/UL — HIGH (ref 4.8–10.8)
WBC # FLD AUTO: 17.05 K/UL — HIGH (ref 4.8–10.8)
WBC # FLD AUTO: 17.4 K/UL — HIGH (ref 4.8–10.8)
WBC # FLD AUTO: 17.8 K/UL — HIGH (ref 4.8–10.8)
WBC # FLD AUTO: 17.89 K/UL — HIGH (ref 4.8–10.8)
WBC # FLD AUTO: 18.24 K/UL — HIGH (ref 4.8–10.8)
WBC # FLD AUTO: 18.27 K/UL — HIGH (ref 4.8–10.8)
WBC # FLD AUTO: 19.41 K/UL — HIGH (ref 4.8–10.8)
WBC # FLD AUTO: 3.79 K/UL — LOW (ref 4.8–10.8)
WBC # FLD AUTO: 3.91 K/UL — LOW (ref 4.8–10.8)
WBC # FLD AUTO: 4.25 K/UL — LOW (ref 4.8–10.8)
WBC # FLD AUTO: 4.72 K/UL — LOW (ref 4.8–10.8)
WBC # FLD AUTO: 4.91 K/UL — SIGNIFICANT CHANGE UP (ref 4.8–10.8)
WBC # FLD AUTO: 42.14 K/UL — CRITICAL HIGH (ref 4.8–10.8)
WBC # FLD AUTO: 5.32 K/UL — SIGNIFICANT CHANGE UP (ref 4.8–10.8)
WBC # FLD AUTO: 5.86 K/UL — SIGNIFICANT CHANGE UP (ref 4.8–10.8)
WBC # FLD AUTO: 6.01 K/UL — SIGNIFICANT CHANGE UP (ref 4.8–10.8)
WBC # FLD AUTO: 6.37 K/UL — SIGNIFICANT CHANGE UP (ref 4.8–10.8)
WBC # FLD AUTO: 6.41 K/UL — SIGNIFICANT CHANGE UP (ref 4.8–10.8)
WBC # FLD AUTO: 7.11 K/UL — SIGNIFICANT CHANGE UP (ref 4.8–10.8)
WBC # FLD AUTO: 7.34 K/UL — SIGNIFICANT CHANGE UP (ref 4.8–10.8)
WBC # FLD AUTO: 7.57 K/UL — SIGNIFICANT CHANGE UP (ref 4.8–10.8)
WBC # FLD AUTO: 7.63 K/UL — SIGNIFICANT CHANGE UP (ref 4.8–10.8)
WBC # FLD AUTO: 8.08 K/UL — SIGNIFICANT CHANGE UP (ref 4.8–10.8)
WBC # FLD AUTO: 8.4 K/UL — SIGNIFICANT CHANGE UP (ref 4.8–10.8)
WBC # FLD AUTO: 8.46 K/UL — SIGNIFICANT CHANGE UP (ref 4.8–10.8)
WBC # FLD AUTO: 8.47 K/UL — SIGNIFICANT CHANGE UP (ref 4.8–10.8)
WBC # FLD AUTO: 8.61 K/UL — SIGNIFICANT CHANGE UP (ref 4.8–10.8)
WBC # FLD AUTO: 8.8 K/UL — SIGNIFICANT CHANGE UP (ref 4.8–10.8)
WBC # FLD AUTO: 9.03 K/UL — SIGNIFICANT CHANGE UP (ref 4.8–10.8)
WBC # FLD AUTO: 9.53 K/UL — SIGNIFICANT CHANGE UP (ref 4.8–10.8)
WBC # FLD AUTO: 9.62 K/UL — SIGNIFICANT CHANGE UP (ref 4.8–10.8)
WBC # FLD AUTO: 9.7 K/UL — SIGNIFICANT CHANGE UP (ref 4.8–10.8)
WBC # FLD AUTO: 9.92 K/UL — SIGNIFICANT CHANGE UP (ref 4.8–10.8)
WBC UR QL: 2 /HPF — SIGNIFICANT CHANGE UP (ref 0–5)
WBC UR QL: 6 /HPF — HIGH (ref 0–5)
WBC UR QL: 6 /HPF — HIGH (ref 0–5)
WBC UR QL: 9 /HPF — HIGH (ref 0–5)

## 2022-01-01 PROCEDURE — 99285 EMERGENCY DEPT VISIT HI MDM: CPT | Mod: GC

## 2022-01-01 PROCEDURE — 88305 TISSUE EXAM BY PATHOLOGIST: CPT | Mod: 26

## 2022-01-01 PROCEDURE — 88112 CYTOPATH CELL ENHANCE TECH: CPT | Mod: 26

## 2022-01-01 PROCEDURE — 99233 SBSQ HOSP IP/OBS HIGH 50: CPT

## 2022-01-01 PROCEDURE — 73560 X-RAY EXAM OF KNEE 1 OR 2: CPT | Mod: 26,RT

## 2022-01-01 PROCEDURE — 71045 X-RAY EXAM CHEST 1 VIEW: CPT | Mod: 26

## 2022-01-01 PROCEDURE — 73030 X-RAY EXAM OF SHOULDER: CPT | Mod: 26,50

## 2022-01-01 PROCEDURE — 99497 ADVNCD CARE PLAN 30 MIN: CPT

## 2022-01-01 PROCEDURE — 71045 X-RAY EXAM CHEST 1 VIEW: CPT | Mod: 26,77

## 2022-01-01 PROCEDURE — 99232 SBSQ HOSP IP/OBS MODERATE 35: CPT

## 2022-01-01 PROCEDURE — 32555 ASPIRATE PLEURA W/ IMAGING: CPT

## 2022-01-01 PROCEDURE — 72125 CT NECK SPINE W/O DYE: CPT | Mod: 26,MA

## 2022-01-01 PROCEDURE — 99239 HOSP IP/OBS DSCHRG MGMT >30: CPT

## 2022-01-01 PROCEDURE — 99213 OFFICE O/P EST LOW 20 MIN: CPT

## 2022-01-01 PROCEDURE — 99231 SBSQ HOSP IP/OBS SF/LOW 25: CPT

## 2022-01-01 PROCEDURE — 95816 EEG AWAKE AND DROWSY: CPT | Mod: 26

## 2022-01-01 PROCEDURE — 99291 CRITICAL CARE FIRST HOUR: CPT | Mod: 25

## 2022-01-01 PROCEDURE — 99291 CRITICAL CARE FIRST HOUR: CPT

## 2022-01-01 PROCEDURE — 99221 1ST HOSP IP/OBS SF/LOW 40: CPT

## 2022-01-01 PROCEDURE — 74230 X-RAY XM SWLNG FUNCJ C+: CPT | Mod: 26

## 2022-01-01 PROCEDURE — 93306 TTE W/DOPPLER COMPLETE: CPT | Mod: 26

## 2022-01-01 PROCEDURE — 32652 THORACOSCOPY REM TOTL CORTEX: CPT | Mod: AS

## 2022-01-01 PROCEDURE — 93970 EXTREMITY STUDY: CPT | Mod: 26

## 2022-01-01 PROCEDURE — 99285 EMERGENCY DEPT VISIT HI MDM: CPT | Mod: FS,CS

## 2022-01-01 PROCEDURE — 99223 1ST HOSP IP/OBS HIGH 75: CPT

## 2022-01-01 PROCEDURE — 99497 ADVNCD CARE PLAN 30 MIN: CPT | Mod: 25

## 2022-01-01 PROCEDURE — 73070 X-RAY EXAM OF ELBOW: CPT | Mod: 26,50

## 2022-01-01 PROCEDURE — 70450 CT HEAD/BRAIN W/O DYE: CPT | Mod: 26

## 2022-01-01 PROCEDURE — 93010 ELECTROCARDIOGRAM REPORT: CPT

## 2022-01-01 PROCEDURE — 93010 ELECTROCARDIOGRAM REPORT: CPT | Mod: 77

## 2022-01-01 PROCEDURE — 93010 ELECTROCARDIOGRAM REPORT: CPT | Mod: 76

## 2022-01-01 PROCEDURE — 71250 CT THORAX DX C-: CPT | Mod: 26,MA

## 2022-01-01 PROCEDURE — 71250 CT THORAX DX C-: CPT | Mod: 26

## 2022-01-01 PROCEDURE — 99233 SBSQ HOSP IP/OBS HIGH 50: CPT | Mod: GC

## 2022-01-01 PROCEDURE — 99222 1ST HOSP IP/OBS MODERATE 55: CPT

## 2022-01-01 PROCEDURE — 76770 US EXAM ABDO BACK WALL COMP: CPT | Mod: 26

## 2022-01-01 PROCEDURE — 70450 CT HEAD/BRAIN W/O DYE: CPT | Mod: 26,MA

## 2022-01-01 PROCEDURE — 74176 CT ABD & PELVIS W/O CONTRAST: CPT | Mod: 26,MA

## 2022-01-01 PROCEDURE — 99498 ADVNCD CARE PLAN ADDL 30 MIN: CPT

## 2022-01-01 PROCEDURE — 73100 X-RAY EXAM OF WRIST: CPT | Mod: 26,50

## 2022-01-01 RX ORDER — PIPERACILLIN AND TAZOBACTAM 4; .5 G/20ML; G/20ML
3.38 INJECTION, POWDER, LYOPHILIZED, FOR SOLUTION INTRAVENOUS ONCE
Refills: 0 | Status: COMPLETED | OUTPATIENT
Start: 2022-01-01 | End: 2022-01-01

## 2022-01-01 RX ORDER — INSULIN GLARGINE 100 [IU]/ML
17 INJECTION, SOLUTION SUBCUTANEOUS AT BEDTIME
Refills: 0 | Status: DISCONTINUED | OUTPATIENT
Start: 2022-01-01 | End: 2022-01-01

## 2022-01-01 RX ORDER — TACROLIMUS 5 MG/1
1 CAPSULE ORAL
Refills: 0 | Status: DISCONTINUED | OUTPATIENT
Start: 2022-01-01 | End: 2022-01-01

## 2022-01-01 RX ORDER — DEXAMETHASONE 0.5 MG/5ML
6 ELIXIR ORAL DAILY
Refills: 0 | Status: DISCONTINUED | OUTPATIENT
Start: 2022-01-01 | End: 2022-01-01

## 2022-01-01 RX ORDER — MAGNESIUM SULFATE 500 MG/ML
2 VIAL (ML) INJECTION ONCE
Refills: 0 | Status: COMPLETED | OUTPATIENT
Start: 2022-01-01 | End: 2022-01-01

## 2022-01-01 RX ORDER — SODIUM CHLORIDE 9 MG/ML
500 INJECTION, SOLUTION INTRAVENOUS ONCE
Refills: 0 | Status: COMPLETED | OUTPATIENT
Start: 2022-01-01 | End: 2022-01-01

## 2022-01-01 RX ORDER — APIXABAN 2.5 MG/1
2.5 TABLET, FILM COATED ORAL
Refills: 0 | Status: DISCONTINUED | OUTPATIENT
Start: 2022-01-01 | End: 2022-01-01

## 2022-01-01 RX ORDER — HYDROMORPHONE HYDROCHLORIDE 2 MG/ML
0.25 INJECTION INTRAMUSCULAR; INTRAVENOUS; SUBCUTANEOUS EVERY 4 HOURS
Refills: 0 | Status: DISCONTINUED | OUTPATIENT
Start: 2022-01-01 | End: 2022-01-01

## 2022-01-01 RX ORDER — INSULIN LISPRO 100/ML
VIAL (ML) SUBCUTANEOUS
Refills: 0 | Status: DISCONTINUED | OUTPATIENT
Start: 2022-01-01 | End: 2022-01-01

## 2022-01-01 RX ORDER — ENOXAPARIN SODIUM 100 MG/ML
80 INJECTION SUBCUTANEOUS ONCE
Refills: 0 | Status: COMPLETED | OUTPATIENT
Start: 2022-01-01 | End: 2022-01-01

## 2022-01-01 RX ORDER — FUROSEMIDE 40 MG
40 TABLET ORAL DAILY
Refills: 0 | Status: DISCONTINUED | OUTPATIENT
Start: 2022-01-01 | End: 2022-01-01

## 2022-01-01 RX ORDER — SODIUM CHLORIDE 5 G/100ML
150 INJECTION, SOLUTION INTRAVENOUS
Refills: 0 | Status: COMPLETED | OUTPATIENT
Start: 2022-01-01 | End: 2022-01-01

## 2022-01-01 RX ORDER — INSULIN GLARGINE 100 [IU]/ML
5 INJECTION, SOLUTION SUBCUTANEOUS
Qty: 0 | Refills: 0 | DISCHARGE
Start: 2022-01-01

## 2022-01-01 RX ORDER — INSULIN GLARGINE 100 [IU]/ML
15 INJECTION, SOLUTION SUBCUTANEOUS EVERY MORNING
Refills: 0 | Status: DISCONTINUED | OUTPATIENT
Start: 2022-01-01 | End: 2022-01-01

## 2022-01-01 RX ORDER — DEXTROSE 50 % IN WATER 50 %
25 SYRINGE (ML) INTRAVENOUS ONCE
Refills: 0 | Status: DISCONTINUED | OUTPATIENT
Start: 2022-01-01 | End: 2022-01-01

## 2022-01-01 RX ORDER — MAGNESIUM SULFATE 500 MG/ML
1 VIAL (ML) INJECTION ONCE
Refills: 0 | Status: COMPLETED | OUTPATIENT
Start: 2022-01-01 | End: 2022-01-01

## 2022-01-01 RX ORDER — HYDROMORPHONE HYDROCHLORIDE 2 MG/ML
0.5 INJECTION INTRAMUSCULAR; INTRAVENOUS; SUBCUTANEOUS
Refills: 0 | Status: DISCONTINUED | OUTPATIENT
Start: 2022-01-01 | End: 2022-01-01

## 2022-01-01 RX ORDER — SODIUM CHLORIDE 9 MG/ML
1000 INJECTION, SOLUTION INTRAVENOUS
Refills: 0 | Status: DISCONTINUED | OUTPATIENT
Start: 2022-01-01 | End: 2022-01-01

## 2022-01-01 RX ORDER — SODIUM CHLORIDE 5 G/100ML
150 INJECTION, SOLUTION INTRAVENOUS
Refills: 0 | Status: DISCONTINUED | OUTPATIENT
Start: 2022-01-01 | End: 2022-01-01

## 2022-01-01 RX ORDER — OLMESARTAN MEDOXOMIL 5 MG/1
2 TABLET, FILM COATED ORAL
Qty: 0 | Refills: 0 | DISCHARGE

## 2022-01-01 RX ORDER — GUAIFENESIN/DEXTROMETHORPHAN 600MG-30MG
10 TABLET, EXTENDED RELEASE 12 HR ORAL ONCE
Refills: 0 | Status: DISCONTINUED | OUTPATIENT
Start: 2022-01-01 | End: 2022-01-01

## 2022-01-01 RX ORDER — POTASSIUM CHLORIDE 20 MEQ
40 PACKET (EA) ORAL ONCE
Refills: 0 | Status: COMPLETED | OUTPATIENT
Start: 2022-01-01 | End: 2022-01-01

## 2022-01-01 RX ORDER — HEPARIN SODIUM 5000 [USP'U]/ML
INJECTION INTRAVENOUS; SUBCUTANEOUS
Qty: 25000 | Refills: 0 | Status: DISCONTINUED | OUTPATIENT
Start: 2022-01-01 | End: 2022-01-01

## 2022-01-01 RX ORDER — INSULIN LISPRO 100/ML
6 VIAL (ML) SUBCUTANEOUS
Refills: 0 | Status: DISCONTINUED | OUTPATIENT
Start: 2022-01-01 | End: 2022-01-01

## 2022-01-01 RX ORDER — METOPROLOL TARTRATE 50 MG
150 TABLET ORAL DAILY
Refills: 0 | Status: DISCONTINUED | OUTPATIENT
Start: 2022-01-01 | End: 2022-01-01

## 2022-01-01 RX ORDER — INSULIN GLARGINE 100 [IU]/ML
18 INJECTION, SOLUTION SUBCUTANEOUS AT BEDTIME
Refills: 0 | Status: DISCONTINUED | OUTPATIENT
Start: 2022-01-01 | End: 2022-01-01

## 2022-01-01 RX ORDER — HEPARIN SODIUM 5000 [USP'U]/ML
8000 INJECTION INTRAVENOUS; SUBCUTANEOUS EVERY 6 HOURS
Refills: 0 | Status: DISCONTINUED | OUTPATIENT
Start: 2022-01-01 | End: 2022-01-01

## 2022-01-01 RX ORDER — INSULIN LISPRO 100/ML
7 VIAL (ML) SUBCUTANEOUS
Refills: 0 | Status: DISCONTINUED | OUTPATIENT
Start: 2022-01-01 | End: 2022-01-01

## 2022-01-01 RX ORDER — SODIUM CHLORIDE 9 MG/ML
1000 INJECTION INTRAMUSCULAR; INTRAVENOUS; SUBCUTANEOUS
Refills: 0 | Status: DISCONTINUED | OUTPATIENT
Start: 2022-01-01 | End: 2022-01-01

## 2022-01-01 RX ORDER — BACITRACIN AND POLYMYXIN B SULFATE 500; 10000 [USP'U]/G; [USP'U]/G
1 OINTMENT TOPICAL
Refills: 0 | Status: DISCONTINUED | OUTPATIENT
Start: 2022-01-01 | End: 2022-01-01

## 2022-01-01 RX ORDER — CEFEPIME 1 G/1
2000 INJECTION, POWDER, FOR SOLUTION INTRAMUSCULAR; INTRAVENOUS EVERY 12 HOURS
Refills: 0 | Status: DISCONTINUED | OUTPATIENT
Start: 2022-01-01 | End: 2022-01-01

## 2022-01-01 RX ORDER — SODIUM,POTASSIUM PHOSPHATES 278-250MG
1 POWDER IN PACKET (EA) ORAL ONCE
Refills: 0 | Status: COMPLETED | OUTPATIENT
Start: 2022-01-01 | End: 2022-01-01

## 2022-01-01 RX ORDER — INSULIN LISPRO 100/ML
2 VIAL (ML) SUBCUTANEOUS
Refills: 0 | Status: DISCONTINUED | OUTPATIENT
Start: 2022-01-01 | End: 2022-01-01

## 2022-01-01 RX ORDER — ACETAZOLAMIDE 250 MG/1
250 TABLET ORAL ONCE
Refills: 0 | Status: DISCONTINUED | OUTPATIENT
Start: 2022-01-01 | End: 2022-01-01

## 2022-01-01 RX ORDER — CALCITRIOL 0.5 UG/1
0.25 CAPSULE ORAL
Refills: 0 | Status: DISCONTINUED | OUTPATIENT
Start: 2022-01-01 | End: 2022-01-01

## 2022-01-01 RX ORDER — DEXAMETHASONE 0.5 MG/5ML
6 ELIXIR ORAL EVERY 24 HOURS
Refills: 0 | Status: DISCONTINUED | OUTPATIENT
Start: 2022-01-01 | End: 2022-01-01

## 2022-01-01 RX ORDER — FUROSEMIDE 40 MG
40 TABLET ORAL ONCE
Refills: 0 | Status: COMPLETED | OUTPATIENT
Start: 2022-01-01 | End: 2022-01-01

## 2022-01-01 RX ORDER — ACETAZOLAMIDE 250 MG/1
250 TABLET ORAL ONCE
Refills: 0 | Status: COMPLETED | OUTPATIENT
Start: 2022-01-01 | End: 2022-01-01

## 2022-01-01 RX ORDER — IPRATROPIUM/ALBUTEROL SULFATE 18-103MCG
3 AEROSOL WITH ADAPTER (GRAM) INHALATION EVERY 6 HOURS
Refills: 0 | Status: DISCONTINUED | OUTPATIENT
Start: 2022-01-01 | End: 2022-01-01

## 2022-01-01 RX ORDER — TELMISARTAN 20 MG/1
1 TABLET ORAL
Qty: 0 | Refills: 0 | DISCHARGE

## 2022-01-01 RX ORDER — ACETAMINOPHEN 500 MG
650 TABLET ORAL EVERY 6 HOURS
Refills: 0 | Status: DISCONTINUED | OUTPATIENT
Start: 2022-01-01 | End: 2022-01-01

## 2022-01-01 RX ORDER — MULTIVIT-MIN/FERROUS GLUCONATE 9 MG/15 ML
1 LIQUID (ML) ORAL DAILY
Refills: 0 | Status: DISCONTINUED | OUTPATIENT
Start: 2022-01-01 | End: 2022-01-01

## 2022-01-01 RX ORDER — AMPICILLIN TRIHYDRATE 250 MG
1 CAPSULE ORAL EVERY 8 HOURS
Refills: 0 | Status: DISCONTINUED | OUTPATIENT
Start: 2022-01-01 | End: 2022-01-01

## 2022-01-01 RX ORDER — INSULIN GLARGINE 100 [IU]/ML
6 INJECTION, SOLUTION SUBCUTANEOUS AT BEDTIME
Refills: 0 | Status: DISCONTINUED | OUTPATIENT
Start: 2022-01-01 | End: 2022-01-01

## 2022-01-01 RX ORDER — SODIUM BICARBONATE 1 MEQ/ML
650 SYRINGE (ML) INTRAVENOUS EVERY 12 HOURS
Refills: 0 | Status: DISCONTINUED | OUTPATIENT
Start: 2022-01-01 | End: 2022-01-01

## 2022-01-01 RX ORDER — FUROSEMIDE 40 MG
80 TABLET ORAL ONCE
Refills: 0 | Status: COMPLETED | OUTPATIENT
Start: 2022-01-01 | End: 2022-01-01

## 2022-01-01 RX ORDER — CEFEPIME 1 G/1
1000 INJECTION, POWDER, FOR SOLUTION INTRAMUSCULAR; INTRAVENOUS EVERY 8 HOURS
Refills: 0 | Status: DISCONTINUED | OUTPATIENT
Start: 2022-01-01 | End: 2022-01-01

## 2022-01-01 RX ORDER — POTASSIUM CHLORIDE 20 MEQ
20 PACKET (EA) ORAL ONCE
Refills: 0 | Status: COMPLETED | OUTPATIENT
Start: 2022-01-01 | End: 2022-01-01

## 2022-01-01 RX ORDER — BACITRACIN ZINC 500 UNIT/G
1 OINTMENT IN PACKET (EA) TOPICAL
Refills: 0 | Status: DISCONTINUED | OUTPATIENT
Start: 2022-01-01 | End: 2022-01-01

## 2022-01-01 RX ORDER — LIDOCAINE HCL 20 MG/ML
10 VIAL (ML) INJECTION ONCE
Refills: 0 | Status: DISCONTINUED | OUTPATIENT
Start: 2022-01-01 | End: 2022-01-01

## 2022-01-01 RX ORDER — DARBEPOETIN ALFA IN POLYSORBAT 200MCG/0.4
1 PEN INJECTOR (ML) SUBCUTANEOUS
Qty: 0 | Refills: 0 | DISCHARGE

## 2022-01-01 RX ORDER — APIXABAN 2.5 MG/1
1 TABLET, FILM COATED ORAL
Qty: 0 | Refills: 0 | DISCHARGE

## 2022-01-01 RX ORDER — IRON SUCROSE 20 MG/ML
200 INJECTION, SOLUTION INTRAVENOUS EVERY 24 HOURS
Refills: 0 | Status: COMPLETED | OUTPATIENT
Start: 2022-01-01 | End: 2022-01-01

## 2022-01-01 RX ORDER — DEXTROSE 50 % IN WATER 50 %
12.5 SYRINGE (ML) INTRAVENOUS ONCE
Refills: 0 | Status: DISCONTINUED | OUTPATIENT
Start: 2022-01-01 | End: 2022-01-01

## 2022-01-01 RX ORDER — INSULIN GLARGINE 100 [IU]/ML
21 INJECTION, SOLUTION SUBCUTANEOUS EVERY MORNING
Refills: 0 | Status: DISCONTINUED | OUTPATIENT
Start: 2022-01-01 | End: 2022-01-01

## 2022-01-01 RX ORDER — BUMETANIDE 0.25 MG/ML
2 INJECTION INTRAMUSCULAR; INTRAVENOUS EVERY 12 HOURS
Refills: 0 | Status: DISCONTINUED | OUTPATIENT
Start: 2022-01-01 | End: 2022-01-01

## 2022-01-01 RX ORDER — TACROLIMUS 5 MG/1
0.5 CAPSULE ORAL
Refills: 0 | Status: DISCONTINUED | OUTPATIENT
Start: 2022-01-01 | End: 2022-01-01

## 2022-01-01 RX ORDER — PENTOXIFYLLINE 400 MG
400 TABLET, EXTENDED RELEASE ORAL
Refills: 0 | Status: DISCONTINUED | OUTPATIENT
Start: 2022-01-01 | End: 2022-01-01

## 2022-01-01 RX ORDER — DEXTROSE 50 % IN WATER 50 %
15 SYRINGE (ML) INTRAVENOUS ONCE
Refills: 0 | Status: DISCONTINUED | OUTPATIENT
Start: 2022-01-01 | End: 2022-01-01

## 2022-01-01 RX ORDER — BENZOCAINE 10 %
1 GEL (GRAM) MUCOUS MEMBRANE ONCE
Refills: 0 | Status: COMPLETED | OUTPATIENT
Start: 2022-01-01 | End: 2022-01-01

## 2022-01-01 RX ORDER — ATORVASTATIN CALCIUM 80 MG/1
0 TABLET, FILM COATED ORAL
Qty: 0 | Refills: 0 | DISCHARGE

## 2022-01-01 RX ORDER — IRON SUCROSE 20 MG/ML
200 INJECTION, SOLUTION INTRAVENOUS EVERY 24 HOURS
Refills: 0 | Status: DISCONTINUED | OUTPATIENT
Start: 2022-01-01 | End: 2022-01-01

## 2022-01-01 RX ORDER — REMDESIVIR 5 MG/ML
100 INJECTION INTRAVENOUS EVERY 24 HOURS
Refills: 0 | Status: COMPLETED | OUTPATIENT
Start: 2022-01-01 | End: 2022-01-01

## 2022-01-01 RX ORDER — TACROLIMUS 5 MG/1
1 CAPSULE ORAL EVERY 12 HOURS
Refills: 0 | Status: DISCONTINUED | OUTPATIENT
Start: 2022-01-01 | End: 2022-01-01

## 2022-01-01 RX ORDER — NIFEDIPINE 30 MG
30 TABLET, EXTENDED RELEASE 24 HR ORAL DAILY
Refills: 0 | Status: DISCONTINUED | OUTPATIENT
Start: 2022-01-01 | End: 2022-01-01

## 2022-01-01 RX ORDER — GLUCAGON INJECTION, SOLUTION 0.5 MG/.1ML
1 INJECTION, SOLUTION SUBCUTANEOUS ONCE
Refills: 0 | Status: DISCONTINUED | OUTPATIENT
Start: 2022-01-01 | End: 2022-01-01

## 2022-01-01 RX ORDER — FUROSEMIDE 40 MG
20 TABLET ORAL ONCE
Refills: 0 | Status: COMPLETED | OUTPATIENT
Start: 2022-01-01 | End: 2022-01-01

## 2022-01-01 RX ORDER — SACUBITRIL AND VALSARTAN 24; 26 MG/1; MG/1
1 TABLET, FILM COATED ORAL
Refills: 0 | Status: DISCONTINUED | OUTPATIENT
Start: 2022-01-01 | End: 2022-01-01

## 2022-01-01 RX ORDER — LANOLIN ALCOHOL/MO/W.PET/CERES
5 CREAM (GRAM) TOPICAL ONCE
Refills: 0 | Status: COMPLETED | OUTPATIENT
Start: 2022-01-01 | End: 2022-01-01

## 2022-01-01 RX ORDER — APIXABAN 2.5 MG/1
2.5 TABLET, FILM COATED ORAL EVERY 12 HOURS
Refills: 0 | Status: DISCONTINUED | OUTPATIENT
Start: 2022-01-01 | End: 2022-01-01

## 2022-01-01 RX ORDER — METOPROLOL TARTRATE 50 MG
25 TABLET ORAL DAILY
Refills: 0 | Status: DISCONTINUED | OUTPATIENT
Start: 2022-01-01 | End: 2022-01-01

## 2022-01-01 RX ORDER — TACROLIMUS 5 MG/1
1 CAPSULE ORAL
Qty: 0 | Refills: 0 | DISCHARGE

## 2022-01-01 RX ORDER — LEVOTHYROXINE SODIUM 125 MCG
1 TABLET ORAL
Qty: 0 | Refills: 0 | DISCHARGE

## 2022-01-01 RX ORDER — MYCOPHENOLATE MOFETIL 250 MG/1
500 CAPSULE ORAL
Refills: 0 | Status: DISCONTINUED | OUTPATIENT
Start: 2022-01-01 | End: 2022-01-01

## 2022-01-01 RX ORDER — INSULIN GLARGINE 100 [IU]/ML
30 INJECTION, SOLUTION SUBCUTANEOUS
Qty: 0 | Refills: 0 | DISCHARGE

## 2022-01-01 RX ORDER — CHLORHEXIDINE GLUCONATE 213 G/1000ML
1 SOLUTION TOPICAL
Refills: 0 | Status: DISCONTINUED | OUTPATIENT
Start: 2022-01-01 | End: 2022-01-01

## 2022-01-01 RX ORDER — REMDESIVIR 5 MG/ML
200 INJECTION INTRAVENOUS EVERY 24 HOURS
Refills: 0 | Status: COMPLETED | OUTPATIENT
Start: 2022-01-01 | End: 2022-01-01

## 2022-01-01 RX ORDER — SODIUM BICARBONATE 1 MEQ/ML
1 SYRINGE (ML) INTRAVENOUS
Qty: 0 | Refills: 0 | DISCHARGE
Start: 2022-01-01

## 2022-01-01 RX ORDER — ATORVASTATIN CALCIUM 80 MG/1
40 TABLET, FILM COATED ORAL AT BEDTIME
Refills: 0 | Status: DISCONTINUED | OUTPATIENT
Start: 2022-01-01 | End: 2022-01-01

## 2022-01-01 RX ORDER — LANOLIN ALCOHOL/MO/W.PET/CERES
5 CREAM (GRAM) TOPICAL AT BEDTIME
Refills: 0 | Status: DISCONTINUED | OUTPATIENT
Start: 2022-01-01 | End: 2022-01-01

## 2022-01-01 RX ORDER — FLUTICASONE PROPIONATE 50 UG/1
50 SPRAY, METERED NASAL TWICE DAILY
Qty: 16 | Refills: 2 | Status: ACTIVE | COMMUNITY
Start: 2021-09-17 | End: 1900-01-01

## 2022-01-01 RX ORDER — INSULIN LISPRO 100/ML
2 VIAL (ML) SUBCUTANEOUS ONCE
Refills: 0 | Status: COMPLETED | OUTPATIENT
Start: 2022-01-01 | End: 2022-01-01

## 2022-01-01 RX ORDER — METOPROLOL TARTRATE 50 MG
50 TABLET ORAL DAILY
Refills: 0 | Status: DISCONTINUED | OUTPATIENT
Start: 2022-01-01 | End: 2022-01-01

## 2022-01-01 RX ORDER — INSULIN GLARGINE 100 [IU]/ML
22 INJECTION, SOLUTION SUBCUTANEOUS AT BEDTIME
Refills: 0 | Status: DISCONTINUED | OUTPATIENT
Start: 2022-01-01 | End: 2022-01-01

## 2022-01-01 RX ORDER — INSULIN GLARGINE 100 [IU]/ML
5 INJECTION, SOLUTION SUBCUTANEOUS ONCE
Refills: 0 | Status: COMPLETED | OUTPATIENT
Start: 2022-01-01 | End: 2022-01-01

## 2022-01-01 RX ORDER — INSULIN LISPRO 100/ML
5 VIAL (ML) SUBCUTANEOUS
Refills: 0 | Status: DISCONTINUED | OUTPATIENT
Start: 2022-01-01 | End: 2022-01-01

## 2022-01-01 RX ORDER — INSULIN LISPRO 100/ML
1 VIAL (ML) SUBCUTANEOUS
Qty: 0 | Refills: 0 | DISCHARGE
Start: 2022-01-01

## 2022-01-01 RX ORDER — SODIUM CHLORIDE 9 MG/ML
1 INJECTION INTRAMUSCULAR; INTRAVENOUS; SUBCUTANEOUS
Refills: 0 | Status: DISCONTINUED | OUTPATIENT
Start: 2022-01-01 | End: 2022-01-01

## 2022-01-01 RX ORDER — ALBUTEROL 90 UG/1
1 AEROSOL, METERED ORAL EVERY 6 HOURS
Refills: 0 | Status: DISCONTINUED | OUTPATIENT
Start: 2022-01-01 | End: 2022-01-01

## 2022-01-01 RX ORDER — CEFEPIME 1 G/1
2000 INJECTION, POWDER, FOR SOLUTION INTRAMUSCULAR; INTRAVENOUS ONCE
Refills: 0 | Status: COMPLETED | OUTPATIENT
Start: 2022-01-01 | End: 2022-01-01

## 2022-01-01 RX ORDER — PIPERACILLIN AND TAZOBACTAM 4; .5 G/20ML; G/20ML
3.38 INJECTION, POWDER, LYOPHILIZED, FOR SOLUTION INTRAVENOUS EVERY 8 HOURS
Refills: 0 | Status: DISCONTINUED | OUTPATIENT
Start: 2022-01-01 | End: 2022-01-01

## 2022-01-01 RX ORDER — NIFEDIPINE 30 MG
1 TABLET, EXTENDED RELEASE 24 HR ORAL
Qty: 0 | Refills: 0 | DISCHARGE

## 2022-01-01 RX ORDER — LEVOTHYROXINE SODIUM 125 MCG
150 TABLET ORAL DAILY
Refills: 0 | Status: DISCONTINUED | OUTPATIENT
Start: 2022-01-01 | End: 2022-01-01

## 2022-01-01 RX ORDER — POTASSIUM CHLORIDE 20 MEQ
40 PACKET (EA) ORAL ONCE
Refills: 0 | Status: DISCONTINUED | OUTPATIENT
Start: 2022-01-01 | End: 2022-01-01

## 2022-01-01 RX ORDER — AMPICILLIN TRIHYDRATE 250 MG
CAPSULE ORAL
Refills: 0 | Status: DISCONTINUED | OUTPATIENT
Start: 2022-01-01 | End: 2022-01-01

## 2022-01-01 RX ORDER — ISOSORBIDE MONONITRATE 60 MG/1
30 TABLET, EXTENDED RELEASE ORAL DAILY
Refills: 0 | Status: DISCONTINUED | OUTPATIENT
Start: 2022-01-01 | End: 2022-01-01

## 2022-01-01 RX ORDER — ENOXAPARIN SODIUM 100 MG/ML
80 INJECTION SUBCUTANEOUS EVERY 12 HOURS
Refills: 0 | Status: DISCONTINUED | OUTPATIENT
Start: 2022-01-01 | End: 2022-01-01

## 2022-01-01 RX ORDER — ENOXAPARIN SODIUM 100 MG/ML
85 INJECTION SUBCUTANEOUS EVERY 12 HOURS
Refills: 0 | Status: DISCONTINUED | OUTPATIENT
Start: 2022-01-01 | End: 2022-01-01

## 2022-01-01 RX ORDER — AMPICILLIN TRIHYDRATE 250 MG
1 CAPSULE ORAL ONCE
Refills: 0 | Status: COMPLETED | OUTPATIENT
Start: 2022-01-01 | End: 2022-01-01

## 2022-01-01 RX ORDER — INSULIN GLARGINE 100 [IU]/ML
21 INJECTION, SOLUTION SUBCUTANEOUS AT BEDTIME
Refills: 0 | Status: DISCONTINUED | OUTPATIENT
Start: 2022-01-01 | End: 2022-01-01

## 2022-01-01 RX ORDER — SODIUM BICARBONATE 1 MEQ/ML
325 SYRINGE (ML) INTRAVENOUS EVERY 12 HOURS
Refills: 0 | Status: DISCONTINUED | OUTPATIENT
Start: 2022-01-01 | End: 2022-01-01

## 2022-01-01 RX ORDER — INSULIN LISPRO 100/ML
3 VIAL (ML) SUBCUTANEOUS
Refills: 0 | Status: DISCONTINUED | OUTPATIENT
Start: 2022-01-01 | End: 2022-01-01

## 2022-01-01 RX ORDER — TACROLIMUS 5 MG/1
1 CAPSULE ORAL
Qty: 0 | Refills: 0 | DISCHARGE
Start: 2022-01-01

## 2022-01-01 RX ORDER — ATORVASTATIN CALCIUM 80 MG/1
1 TABLET, FILM COATED ORAL
Qty: 0 | Refills: 0 | DISCHARGE

## 2022-01-01 RX ORDER — INSULIN GLARGINE 100 [IU]/ML
15 INJECTION, SOLUTION SUBCUTANEOUS AT BEDTIME
Refills: 0 | Status: DISCONTINUED | OUTPATIENT
Start: 2022-01-01 | End: 2022-01-01

## 2022-01-01 RX ORDER — MULTIVIT-MIN/FERROUS GLUCONATE 9 MG/15 ML
1 LIQUID (ML) ORAL
Qty: 0 | Refills: 0 | DISCHARGE
Start: 2022-01-01

## 2022-01-01 RX ORDER — ISOSORBIDE MONONITRATE 60 MG/1
1 TABLET, EXTENDED RELEASE ORAL
Qty: 0 | Refills: 0 | DISCHARGE

## 2022-01-01 RX ORDER — FUROSEMIDE 40 MG
80 TABLET ORAL DAILY
Refills: 0 | Status: DISCONTINUED | OUTPATIENT
Start: 2022-01-01 | End: 2022-01-01

## 2022-01-01 RX ORDER — FAMOTIDINE 10 MG/ML
20 INJECTION INTRAVENOUS DAILY
Refills: 0 | Status: DISCONTINUED | OUTPATIENT
Start: 2022-01-01 | End: 2022-01-01

## 2022-01-01 RX ORDER — MAGNESIUM SULFATE 500 MG/ML
2 VIAL (ML) INJECTION
Refills: 0 | Status: COMPLETED | OUTPATIENT
Start: 2022-01-01 | End: 2022-01-01

## 2022-01-01 RX ORDER — HEPARIN SODIUM 5000 [USP'U]/ML
4000 INJECTION INTRAVENOUS; SUBCUTANEOUS EVERY 6 HOURS
Refills: 0 | Status: DISCONTINUED | OUTPATIENT
Start: 2022-01-01 | End: 2022-01-01

## 2022-01-01 RX ORDER — HEPARIN SODIUM 5000 [USP'U]/ML
8000 INJECTION INTRAVENOUS; SUBCUTANEOUS ONCE
Refills: 0 | Status: COMPLETED | OUTPATIENT
Start: 2022-01-01 | End: 2022-01-01

## 2022-01-01 RX ORDER — INSULIN GLARGINE 100 [IU]/ML
10 INJECTION, SOLUTION SUBCUTANEOUS AT BEDTIME
Refills: 0 | Status: DISCONTINUED | OUTPATIENT
Start: 2022-01-01 | End: 2022-01-01

## 2022-01-01 RX ORDER — VANCOMYCIN HCL 1 G
1500 VIAL (EA) INTRAVENOUS ONCE
Refills: 0 | Status: DISCONTINUED | OUTPATIENT
Start: 2022-01-01 | End: 2022-01-01

## 2022-01-01 RX ORDER — INSULIN GLARGINE 100 [IU]/ML
20 INJECTION, SOLUTION SUBCUTANEOUS AT BEDTIME
Refills: 0 | Status: DISCONTINUED | OUTPATIENT
Start: 2022-01-01 | End: 2022-01-01

## 2022-01-01 RX ORDER — SODIUM BICARBONATE 1 MEQ/ML
650 SYRINGE (ML) INTRAVENOUS THREE TIMES A DAY
Refills: 0 | Status: DISCONTINUED | OUTPATIENT
Start: 2022-01-01 | End: 2022-01-01

## 2022-01-01 RX ORDER — HEPARIN SODIUM 5000 [USP'U]/ML
5000 INJECTION INTRAVENOUS; SUBCUTANEOUS EVERY 12 HOURS
Refills: 0 | Status: DISCONTINUED | OUTPATIENT
Start: 2022-01-01 | End: 2022-01-01

## 2022-01-01 RX ORDER — INSULIN LISPRO 100/ML
4 VIAL (ML) SUBCUTANEOUS
Refills: 0 | Status: DISCONTINUED | OUTPATIENT
Start: 2022-01-01 | End: 2022-01-01

## 2022-01-01 RX ORDER — BACITRACIN ZINC 500 UNIT/G
1 OINTMENT IN PACKET (EA) TOPICAL EVERY 12 HOURS
Refills: 0 | Status: DISCONTINUED | OUTPATIENT
Start: 2022-01-01 | End: 2022-01-01

## 2022-01-01 RX ORDER — ACETAMINOPHEN 500 MG
650 TABLET ORAL ONCE
Refills: 0 | Status: COMPLETED | OUTPATIENT
Start: 2022-01-01 | End: 2022-01-01

## 2022-01-01 RX ORDER — METOPROLOL TARTRATE 50 MG
1 TABLET ORAL
Qty: 0 | Refills: 0 | DISCHARGE

## 2022-01-01 RX ORDER — SODIUM BICARBONATE 1 MEQ/ML
650 SYRINGE (ML) INTRAVENOUS EVERY 8 HOURS
Refills: 0 | Status: DISCONTINUED | OUTPATIENT
Start: 2022-01-01 | End: 2022-01-01

## 2022-01-01 RX ORDER — DEXTROSE 50 % IN WATER 50 %
50 SYRINGE (ML) INTRAVENOUS
Refills: 0 | Status: COMPLETED | OUTPATIENT
Start: 2022-01-01 | End: 2022-01-01

## 2022-01-01 RX ORDER — LOPERAMIDE HCL 2 MG
2 TABLET ORAL EVERY 4 HOURS
Refills: 0 | Status: DISCONTINUED | OUTPATIENT
Start: 2022-01-01 | End: 2022-01-01

## 2022-01-01 RX ORDER — MIDODRINE HYDROCHLORIDE 2.5 MG/1
10 TABLET ORAL EVERY 8 HOURS
Refills: 0 | Status: DISCONTINUED | OUTPATIENT
Start: 2022-01-01 | End: 2022-01-01

## 2022-01-01 RX ORDER — FAMOTIDINE 10 MG/ML
1 INJECTION INTRAVENOUS
Qty: 0 | Refills: 0 | DISCHARGE

## 2022-01-01 RX ORDER — CALCITRIOL 0.5 UG/1
1 CAPSULE ORAL
Qty: 0 | Refills: 0 | DISCHARGE

## 2022-01-01 RX ORDER — HEPARIN SODIUM 5000 [USP'U]/ML
1600 INJECTION INTRAVENOUS; SUBCUTANEOUS
Qty: 25000 | Refills: 0 | Status: DISCONTINUED | OUTPATIENT
Start: 2022-01-01 | End: 2022-01-01

## 2022-01-01 RX ORDER — MAGNESIUM OXIDE 400 MG ORAL TABLET 241.3 MG
400 TABLET ORAL ONCE
Refills: 0 | Status: DISCONTINUED | OUTPATIENT
Start: 2022-01-01 | End: 2022-01-01

## 2022-01-01 RX ORDER — INSULIN GLARGINE 100 [IU]/ML
12 INJECTION, SOLUTION SUBCUTANEOUS AT BEDTIME
Refills: 0 | Status: DISCONTINUED | OUTPATIENT
Start: 2022-01-01 | End: 2022-01-01

## 2022-01-01 RX ORDER — METOPROLOL TARTRATE 50 MG
100 TABLET ORAL DAILY
Refills: 0 | Status: DISCONTINUED | OUTPATIENT
Start: 2022-01-01 | End: 2022-01-01

## 2022-01-01 RX ORDER — BACITRACIN ZINC 500 UNIT/G
1 OINTMENT IN PACKET (EA) TOPICAL ONCE
Refills: 0 | Status: COMPLETED | OUTPATIENT
Start: 2022-01-01 | End: 2022-01-01

## 2022-01-01 RX ORDER — MAGNESIUM SULFATE 500 MG/ML
1 VIAL (ML) INJECTION
Refills: 0 | Status: COMPLETED | OUTPATIENT
Start: 2022-01-01 | End: 2022-01-01

## 2022-01-01 RX ORDER — REMDESIVIR 5 MG/ML
INJECTION INTRAVENOUS
Refills: 0 | Status: COMPLETED | OUTPATIENT
Start: 2022-01-01 | End: 2022-01-01

## 2022-01-01 RX ORDER — INSULIN GLARGINE 100 [IU]/ML
8 INJECTION, SOLUTION SUBCUTANEOUS AT BEDTIME
Refills: 0 | Status: DISCONTINUED | OUTPATIENT
Start: 2022-01-01 | End: 2022-01-01

## 2022-01-01 RX ORDER — INSULIN GLARGINE 100 [IU]/ML
5 INJECTION, SOLUTION SUBCUTANEOUS AT BEDTIME
Refills: 0 | Status: DISCONTINUED | OUTPATIENT
Start: 2022-01-01 | End: 2022-01-01

## 2022-01-01 RX ORDER — FUROSEMIDE 40 MG
20 TABLET ORAL DAILY
Refills: 0 | Status: DISCONTINUED | OUTPATIENT
Start: 2022-01-01 | End: 2022-01-01

## 2022-01-01 RX ORDER — INSULIN GLARGINE 100 [IU]/ML
10 INJECTION, SOLUTION SUBCUTANEOUS EVERY MORNING
Refills: 0 | Status: DISCONTINUED | OUTPATIENT
Start: 2022-01-01 | End: 2022-01-01

## 2022-01-01 RX ORDER — LOSARTAN POTASSIUM 100 MG/1
100 TABLET, FILM COATED ORAL DAILY
Refills: 0 | Status: COMPLETED | OUTPATIENT
Start: 2022-01-01 | End: 2022-01-01

## 2022-01-01 RX ORDER — INSULIN LISPRO 100/ML
8 VIAL (ML) SUBCUTANEOUS ONCE
Refills: 0 | Status: COMPLETED | OUTPATIENT
Start: 2022-01-01 | End: 2022-01-01

## 2022-01-01 RX ORDER — TRAMADOL HYDROCHLORIDE 50 MG/1
50 TABLET ORAL EVERY 6 HOURS
Refills: 0 | Status: DISCONTINUED | OUTPATIENT
Start: 2022-01-01 | End: 2022-01-01

## 2022-01-01 RX ORDER — PIPERACILLIN AND TAZOBACTAM 4; .5 G/20ML; G/20ML
2.25 INJECTION, POWDER, LYOPHILIZED, FOR SOLUTION INTRAVENOUS EVERY 8 HOURS
Refills: 0 | Status: DISCONTINUED | OUTPATIENT
Start: 2022-01-01 | End: 2022-01-01

## 2022-01-01 RX ORDER — FUROSEMIDE 40 MG
1 TABLET ORAL
Qty: 0 | Refills: 0 | DISCHARGE
Start: 2022-01-01

## 2022-01-01 RX ORDER — DARBEPOETIN ALFA IN POLYSORBAT 200MCG/0.4
20 PEN INJECTOR (ML) SUBCUTANEOUS
Refills: 0 | Status: DISCONTINUED | OUTPATIENT
Start: 2022-01-01 | End: 2022-01-01

## 2022-01-01 RX ORDER — PENTOXIFYLLINE 400 MG
1 TABLET, EXTENDED RELEASE ORAL
Qty: 0 | Refills: 0 | DISCHARGE

## 2022-01-01 RX ORDER — BUMETANIDE 0.25 MG/ML
1 INJECTION INTRAMUSCULAR; INTRAVENOUS DAILY
Refills: 0 | Status: DISCONTINUED | OUTPATIENT
Start: 2022-01-01 | End: 2022-01-01

## 2022-01-01 RX ORDER — SODIUM CHLORIDE 9 MG/ML
1000 INJECTION, SOLUTION INTRAVENOUS ONCE
Refills: 0 | Status: COMPLETED | OUTPATIENT
Start: 2022-01-01 | End: 2022-01-01

## 2022-01-01 RX ORDER — METOPROLOL TARTRATE 50 MG
1 TABLET ORAL
Qty: 0 | Refills: 0 | DISCHARGE
Start: 2022-01-01

## 2022-01-01 RX ORDER — BACITRACIN ZINC 500 UNIT/G
1 OINTMENT IN PACKET (EA) TOPICAL DAILY
Refills: 0 | Status: DISCONTINUED | OUTPATIENT
Start: 2022-01-01 | End: 2022-01-01

## 2022-01-01 RX ORDER — BUMETANIDE 0.25 MG/ML
1 INJECTION INTRAMUSCULAR; INTRAVENOUS
Qty: 20 | Refills: 0 | Status: DISCONTINUED | OUTPATIENT
Start: 2022-01-01 | End: 2022-01-01

## 2022-01-01 RX ORDER — ROBINUL 0.2 MG/ML
0.2 INJECTION INTRAMUSCULAR; INTRAVENOUS EVERY 6 HOURS
Refills: 0 | Status: DISCONTINUED | OUTPATIENT
Start: 2022-01-01 | End: 2022-01-01

## 2022-01-01 RX ORDER — METOPROLOL TARTRATE 50 MG
50 TABLET ORAL ONCE
Refills: 0 | Status: COMPLETED | OUTPATIENT
Start: 2022-01-01 | End: 2022-01-01

## 2022-01-01 RX ORDER — BUMETANIDE 0.25 MG/ML
2 INJECTION INTRAMUSCULAR; INTRAVENOUS ONCE
Refills: 0 | Status: COMPLETED | OUTPATIENT
Start: 2022-01-01 | End: 2022-01-01

## 2022-01-01 RX ORDER — LIDOCAINE HCL 20 MG/ML
30 VIAL (ML) INJECTION ONCE
Refills: 0 | Status: DISCONTINUED | OUTPATIENT
Start: 2022-01-01 | End: 2022-01-01

## 2022-01-01 RX ORDER — ALBUTEROL 90 UG/1
2 AEROSOL, METERED ORAL EVERY 6 HOURS
Refills: 0 | Status: DISCONTINUED | OUTPATIENT
Start: 2022-01-01 | End: 2022-01-01

## 2022-01-01 RX ORDER — AMPICILLIN SODIUM AND SULBACTAM SODIUM 250; 125 MG/ML; MG/ML
3 INJECTION, POWDER, FOR SUSPENSION INTRAMUSCULAR; INTRAVENOUS EVERY 6 HOURS
Refills: 0 | Status: DISCONTINUED | OUTPATIENT
Start: 2022-01-01 | End: 2022-01-01

## 2022-01-01 RX ORDER — NOREPINEPHRINE BITARTRATE/D5W 8 MG/250ML
0.05 PLASTIC BAG, INJECTION (ML) INTRAVENOUS
Qty: 8 | Refills: 0 | Status: DISCONTINUED | OUTPATIENT
Start: 2022-01-01 | End: 2022-01-01

## 2022-01-01 RX ORDER — INSULIN GLARGINE 100 [IU]/ML
16 INJECTION, SOLUTION SUBCUTANEOUS AT BEDTIME
Refills: 0 | Status: DISCONTINUED | OUTPATIENT
Start: 2022-01-01 | End: 2022-01-01

## 2022-01-01 RX ADMIN — Medication 150 MICROGRAM(S): at 05:51

## 2022-01-01 RX ADMIN — Medication 50 MILLIGRAM(S): at 06:25

## 2022-01-01 RX ADMIN — TACROLIMUS 0.5 MILLIGRAM(S): 5 CAPSULE ORAL at 17:17

## 2022-01-01 RX ADMIN — APIXABAN 2.5 MILLIGRAM(S): 2.5 TABLET, FILM COATED ORAL at 05:28

## 2022-01-01 RX ADMIN — Medication 650 MILLIGRAM(S): at 05:57

## 2022-01-01 RX ADMIN — MYCOPHENOLATE MOFETIL 500 MILLIGRAM(S): 250 CAPSULE ORAL at 00:45

## 2022-01-01 RX ADMIN — Medication 150 MICROGRAM(S): at 05:20

## 2022-01-01 RX ADMIN — Medication 6 UNIT(S): at 17:17

## 2022-01-01 RX ADMIN — Medication 5 MILLIGRAM(S): at 21:59

## 2022-01-01 RX ADMIN — Medication 6 MILLIGRAM(S): at 06:11

## 2022-01-01 RX ADMIN — PIPERACILLIN AND TAZOBACTAM 200 GRAM(S): 4; .5 INJECTION, POWDER, LYOPHILIZED, FOR SOLUTION INTRAVENOUS at 12:07

## 2022-01-01 RX ADMIN — Medication 5 MILLIGRAM(S): at 02:02

## 2022-01-01 RX ADMIN — Medication 7 UNIT(S): at 16:40

## 2022-01-01 RX ADMIN — Medication 650 MILLIGRAM(S): at 21:35

## 2022-01-01 RX ADMIN — Medication 5 MILLIGRAM(S): at 22:39

## 2022-01-01 RX ADMIN — MYCOPHENOLATE MOFETIL 500 MILLIGRAM(S): 250 CAPSULE ORAL at 12:26

## 2022-01-01 RX ADMIN — INSULIN GLARGINE 5 UNIT(S): 100 INJECTION, SOLUTION SUBCUTANEOUS at 22:08

## 2022-01-01 RX ADMIN — Medication 1: at 07:20

## 2022-01-01 RX ADMIN — CHLORHEXIDINE GLUCONATE 1 APPLICATION(S): 213 SOLUTION TOPICAL at 05:41

## 2022-01-01 RX ADMIN — APIXABAN 2.5 MILLIGRAM(S): 2.5 TABLET, FILM COATED ORAL at 06:25

## 2022-01-01 RX ADMIN — Medication 400 MILLIGRAM(S): at 05:12

## 2022-01-01 RX ADMIN — Medication 1 TABLET(S): at 12:26

## 2022-01-01 RX ADMIN — PIPERACILLIN AND TAZOBACTAM 200 GRAM(S): 4; .5 INJECTION, POWDER, LYOPHILIZED, FOR SOLUTION INTRAVENOUS at 05:59

## 2022-01-01 RX ADMIN — Medication 650 MILLIGRAM(S): at 14:14

## 2022-01-01 RX ADMIN — MYCOPHENOLATE MOFETIL 500 MILLIGRAM(S): 250 CAPSULE ORAL at 06:01

## 2022-01-01 RX ADMIN — APIXABAN 2.5 MILLIGRAM(S): 2.5 TABLET, FILM COATED ORAL at 17:20

## 2022-01-01 RX ADMIN — Medication 650 MILLIGRAM(S): at 01:47

## 2022-01-01 RX ADMIN — ISOSORBIDE MONONITRATE 30 MILLIGRAM(S): 60 TABLET, EXTENDED RELEASE ORAL at 11:45

## 2022-01-01 RX ADMIN — FAMOTIDINE 20 MILLIGRAM(S): 10 INJECTION INTRAVENOUS at 13:19

## 2022-01-01 RX ADMIN — INSULIN GLARGINE 22 UNIT(S): 100 INJECTION, SOLUTION SUBCUTANEOUS at 22:49

## 2022-01-01 RX ADMIN — Medication 3: at 08:21

## 2022-01-01 RX ADMIN — Medication 150 MICROGRAM(S): at 06:48

## 2022-01-01 RX ADMIN — Medication 325 MILLIGRAM(S): at 17:53

## 2022-01-01 RX ADMIN — INSULIN GLARGINE 17 UNIT(S): 100 INJECTION, SOLUTION SUBCUTANEOUS at 23:18

## 2022-01-01 RX ADMIN — PIPERACILLIN AND TAZOBACTAM 25 GRAM(S): 4; .5 INJECTION, POWDER, LYOPHILIZED, FOR SOLUTION INTRAVENOUS at 05:31

## 2022-01-01 RX ADMIN — Medication 4: at 12:09

## 2022-01-01 RX ADMIN — TACROLIMUS 1 MILLIGRAM(S): 5 CAPSULE ORAL at 08:02

## 2022-01-01 RX ADMIN — BUMETANIDE 5 MG/HR: 0.25 INJECTION INTRAMUSCULAR; INTRAVENOUS at 04:57

## 2022-01-01 RX ADMIN — MYCOPHENOLATE MOFETIL 500 MILLIGRAM(S): 250 CAPSULE ORAL at 17:34

## 2022-01-01 RX ADMIN — MYCOPHENOLATE MOFETIL 500 MILLIGRAM(S): 250 CAPSULE ORAL at 17:40

## 2022-01-01 RX ADMIN — Medication 2: at 08:39

## 2022-01-01 RX ADMIN — MYCOPHENOLATE MOFETIL 500 MILLIGRAM(S): 250 CAPSULE ORAL at 00:00

## 2022-01-01 RX ADMIN — Medication 150 MICROGRAM(S): at 06:18

## 2022-01-01 RX ADMIN — Medication 1: at 18:11

## 2022-01-01 RX ADMIN — HEPARIN SODIUM 1800 UNIT(S)/HR: 5000 INJECTION INTRAVENOUS; SUBCUTANEOUS at 10:15

## 2022-01-01 RX ADMIN — Medication 4: at 08:18

## 2022-01-01 RX ADMIN — Medication 400 MILLIGRAM(S): at 05:15

## 2022-01-01 RX ADMIN — SACUBITRIL AND VALSARTAN 1 TABLET(S): 24; 26 TABLET, FILM COATED ORAL at 17:08

## 2022-01-01 RX ADMIN — SODIUM CHLORIDE 50 MILLILITER(S): 5 INJECTION, SOLUTION INTRAVENOUS at 18:56

## 2022-01-01 RX ADMIN — ATORVASTATIN CALCIUM 40 MILLIGRAM(S): 80 TABLET, FILM COATED ORAL at 22:12

## 2022-01-01 RX ADMIN — BUMETANIDE 5 MG/HR: 0.25 INJECTION INTRAMUSCULAR; INTRAVENOUS at 17:30

## 2022-01-01 RX ADMIN — APIXABAN 2.5 MILLIGRAM(S): 2.5 TABLET, FILM COATED ORAL at 17:12

## 2022-01-01 RX ADMIN — INSULIN GLARGINE 16 UNIT(S): 100 INJECTION, SOLUTION SUBCUTANEOUS at 21:13

## 2022-01-01 RX ADMIN — MYCOPHENOLATE MOFETIL 500 MILLIGRAM(S): 250 CAPSULE ORAL at 05:45

## 2022-01-01 RX ADMIN — MYCOPHENOLATE MOFETIL 500 MILLIGRAM(S): 250 CAPSULE ORAL at 00:33

## 2022-01-01 RX ADMIN — ACETAZOLAMIDE 105 MILLIGRAM(S): 250 TABLET ORAL at 18:46

## 2022-01-01 RX ADMIN — Medication 650 MILLIGRAM(S): at 23:23

## 2022-01-01 RX ADMIN — APIXABAN 2.5 MILLIGRAM(S): 2.5 TABLET, FILM COATED ORAL at 05:35

## 2022-01-01 RX ADMIN — APIXABAN 2.5 MILLIGRAM(S): 2.5 TABLET, FILM COATED ORAL at 05:02

## 2022-01-01 RX ADMIN — Medication 6 MILLIGRAM(S): at 05:13

## 2022-01-01 RX ADMIN — ISOSORBIDE MONONITRATE 30 MILLIGRAM(S): 60 TABLET, EXTENDED RELEASE ORAL at 13:12

## 2022-01-01 RX ADMIN — REMDESIVIR 500 MILLIGRAM(S): 5 INJECTION INTRAVENOUS at 19:42

## 2022-01-01 RX ADMIN — CEFEPIME 100 MILLIGRAM(S): 1 INJECTION, POWDER, FOR SOLUTION INTRAMUSCULAR; INTRAVENOUS at 06:31

## 2022-01-01 RX ADMIN — Medication 20 MILLIGRAM(S): at 06:24

## 2022-01-01 RX ADMIN — Medication 1 APPLICATION(S): at 18:37

## 2022-01-01 RX ADMIN — Medication 4 UNIT(S): at 11:55

## 2022-01-01 RX ADMIN — Medication 100 MILLIGRAM(S): at 04:08

## 2022-01-01 RX ADMIN — MYCOPHENOLATE MOFETIL 500 MILLIGRAM(S): 250 CAPSULE ORAL at 18:52

## 2022-01-01 RX ADMIN — BUMETANIDE 5 MG/HR: 0.25 INJECTION INTRAMUSCULAR; INTRAVENOUS at 05:42

## 2022-01-01 RX ADMIN — TACROLIMUS 1 MILLIGRAM(S): 5 CAPSULE ORAL at 05:16

## 2022-01-01 RX ADMIN — Medication 1: at 12:04

## 2022-01-01 RX ADMIN — Medication 1 APPLICATION(S): at 18:24

## 2022-01-01 RX ADMIN — Medication 150 MICROGRAM(S): at 05:28

## 2022-01-01 RX ADMIN — MYCOPHENOLATE MOFETIL 500 MILLIGRAM(S): 250 CAPSULE ORAL at 00:02

## 2022-01-01 RX ADMIN — Medication 150 MICROGRAM(S): at 06:11

## 2022-01-01 RX ADMIN — Medication 100 MILLIGRAM(S): at 06:40

## 2022-01-01 RX ADMIN — Medication 2: at 18:00

## 2022-01-01 RX ADMIN — ATORVASTATIN CALCIUM 40 MILLIGRAM(S): 80 TABLET, FILM COATED ORAL at 22:36

## 2022-01-01 RX ADMIN — Medication 5 MILLIGRAM(S): at 06:41

## 2022-01-01 RX ADMIN — INSULIN GLARGINE 18 UNIT(S): 100 INJECTION, SOLUTION SUBCUTANEOUS at 21:36

## 2022-01-01 RX ADMIN — Medication 1 APPLICATION(S): at 18:16

## 2022-01-01 RX ADMIN — PIPERACILLIN AND TAZOBACTAM 25 GRAM(S): 4; .5 INJECTION, POWDER, LYOPHILIZED, FOR SOLUTION INTRAVENOUS at 21:29

## 2022-01-01 RX ADMIN — Medication 5: at 17:41

## 2022-01-01 RX ADMIN — Medication 650 MILLIGRAM(S): at 13:16

## 2022-01-01 RX ADMIN — Medication 1: at 08:25

## 2022-01-01 RX ADMIN — Medication 80 MILLIGRAM(S): at 15:07

## 2022-01-01 RX ADMIN — Medication 5 MILLIGRAM(S): at 22:23

## 2022-01-01 RX ADMIN — SODIUM CHLORIDE 50 MILLILITER(S): 5 INJECTION, SOLUTION INTRAVENOUS at 08:37

## 2022-01-01 RX ADMIN — Medication 2: at 08:23

## 2022-01-01 RX ADMIN — Medication 150 MICROGRAM(S): at 06:26

## 2022-01-01 RX ADMIN — CALCITRIOL 0.25 MICROGRAM(S): 0.5 CAPSULE ORAL at 06:14

## 2022-01-01 RX ADMIN — Medication 3 MILLILITER(S): at 09:24

## 2022-01-01 RX ADMIN — Medication 1 TABLET(S): at 11:33

## 2022-01-01 RX ADMIN — Medication 650 MILLIGRAM(S): at 23:10

## 2022-01-01 RX ADMIN — SODIUM CHLORIDE 50 MILLILITER(S): 5 INJECTION, SOLUTION INTRAVENOUS at 23:34

## 2022-01-01 RX ADMIN — MYCOPHENOLATE MOFETIL 500 MILLIGRAM(S): 250 CAPSULE ORAL at 12:32

## 2022-01-01 RX ADMIN — MYCOPHENOLATE MOFETIL 500 MILLIGRAM(S): 250 CAPSULE ORAL at 23:40

## 2022-01-01 RX ADMIN — MYCOPHENOLATE MOFETIL 500 MILLIGRAM(S): 250 CAPSULE ORAL at 18:39

## 2022-01-01 RX ADMIN — Medication 6 UNIT(S): at 08:18

## 2022-01-01 RX ADMIN — Medication 650 MILLIGRAM(S): at 06:13

## 2022-01-01 RX ADMIN — SODIUM CHLORIDE 50 MILLILITER(S): 9 INJECTION INTRAMUSCULAR; INTRAVENOUS; SUBCUTANEOUS at 10:12

## 2022-01-01 RX ADMIN — APIXABAN 2.5 MILLIGRAM(S): 2.5 TABLET, FILM COATED ORAL at 06:07

## 2022-01-01 RX ADMIN — Medication 1 TABLET(S): at 12:59

## 2022-01-01 RX ADMIN — Medication 400 MILLIGRAM(S): at 18:17

## 2022-01-01 RX ADMIN — APIXABAN 2.5 MILLIGRAM(S): 2.5 TABLET, FILM COATED ORAL at 17:07

## 2022-01-01 RX ADMIN — Medication 650 MILLIGRAM(S): at 06:18

## 2022-01-01 RX ADMIN — Medication 7 UNIT(S): at 12:34

## 2022-01-01 RX ADMIN — Medication 1: at 13:03

## 2022-01-01 RX ADMIN — Medication 1: at 17:16

## 2022-01-01 RX ADMIN — HEPARIN SODIUM 1800 UNIT(S)/HR: 5000 INJECTION INTRAVENOUS; SUBCUTANEOUS at 03:22

## 2022-01-01 RX ADMIN — BUMETANIDE 5 MG/HR: 0.25 INJECTION INTRAMUSCULAR; INTRAVENOUS at 01:43

## 2022-01-01 RX ADMIN — Medication 1 TABLET(S): at 11:45

## 2022-01-01 RX ADMIN — Medication 30 MILLIGRAM(S): at 05:43

## 2022-01-01 RX ADMIN — Medication 100 MILLIGRAM(S): at 05:45

## 2022-01-01 RX ADMIN — Medication 2: at 08:05

## 2022-01-01 RX ADMIN — Medication 100 MILLIGRAM(S): at 06:13

## 2022-01-01 RX ADMIN — Medication 100 MILLIGRAM(S): at 05:12

## 2022-01-01 RX ADMIN — Medication 6 MILLIGRAM(S): at 05:34

## 2022-01-01 RX ADMIN — Medication 25 MILLIGRAM(S): at 05:24

## 2022-01-01 RX ADMIN — INSULIN GLARGINE 18 UNIT(S): 100 INJECTION, SOLUTION SUBCUTANEOUS at 22:28

## 2022-01-01 RX ADMIN — Medication 400 MILLIGRAM(S): at 05:28

## 2022-01-01 RX ADMIN — Medication 400 MILLIGRAM(S): at 09:04

## 2022-01-01 RX ADMIN — Medication 150 MICROGRAM(S): at 05:35

## 2022-01-01 RX ADMIN — Medication 2: at 12:36

## 2022-01-01 RX ADMIN — Medication 2: at 11:54

## 2022-01-01 RX ADMIN — Medication 1 APPLICATION(S): at 05:14

## 2022-01-01 RX ADMIN — Medication 5 MILLIGRAM(S): at 06:45

## 2022-01-01 RX ADMIN — Medication 650 MILLIGRAM(S): at 15:55

## 2022-01-01 RX ADMIN — Medication 1 TABLET(S): at 11:44

## 2022-01-01 RX ADMIN — MYCOPHENOLATE MOFETIL 500 MILLIGRAM(S): 250 CAPSULE ORAL at 12:36

## 2022-01-01 RX ADMIN — SODIUM CHLORIDE 50 MILLILITER(S): 5 INJECTION, SOLUTION INTRAVENOUS at 18:13

## 2022-01-01 RX ADMIN — Medication 4 UNIT(S): at 12:09

## 2022-01-01 RX ADMIN — Medication 50 MILLIGRAM(S): at 06:06

## 2022-01-01 RX ADMIN — SACUBITRIL AND VALSARTAN 1 TABLET(S): 24; 26 TABLET, FILM COATED ORAL at 06:06

## 2022-01-01 RX ADMIN — TACROLIMUS 1 MILLIGRAM(S): 5 CAPSULE ORAL at 17:25

## 2022-01-01 RX ADMIN — Medication 400 MILLIGRAM(S): at 05:17

## 2022-01-01 RX ADMIN — PIPERACILLIN AND TAZOBACTAM 200 GRAM(S): 4; .5 INJECTION, POWDER, LYOPHILIZED, FOR SOLUTION INTRAVENOUS at 12:47

## 2022-01-01 RX ADMIN — Medication 150 MICROGRAM(S): at 06:32

## 2022-01-01 RX ADMIN — CHLORHEXIDINE GLUCONATE 1 APPLICATION(S): 213 SOLUTION TOPICAL at 06:39

## 2022-01-01 RX ADMIN — APIXABAN 2.5 MILLIGRAM(S): 2.5 TABLET, FILM COATED ORAL at 18:23

## 2022-01-01 RX ADMIN — Medication 400 MILLIGRAM(S): at 06:26

## 2022-01-01 RX ADMIN — PIPERACILLIN AND TAZOBACTAM 25 GRAM(S): 4; .5 INJECTION, POWDER, LYOPHILIZED, FOR SOLUTION INTRAVENOUS at 21:36

## 2022-01-01 RX ADMIN — TACROLIMUS 0.5 MILLIGRAM(S): 5 CAPSULE ORAL at 17:34

## 2022-01-01 RX ADMIN — APIXABAN 2.5 MILLIGRAM(S): 2.5 TABLET, FILM COATED ORAL at 17:43

## 2022-01-01 RX ADMIN — PIPERACILLIN AND TAZOBACTAM 25 GRAM(S): 4; .5 INJECTION, POWDER, LYOPHILIZED, FOR SOLUTION INTRAVENOUS at 14:54

## 2022-01-01 RX ADMIN — APIXABAN 2.5 MILLIGRAM(S): 2.5 TABLET, FILM COATED ORAL at 17:30

## 2022-01-01 RX ADMIN — Medication 100 MILLIGRAM(S): at 05:43

## 2022-01-01 RX ADMIN — TACROLIMUS 0.5 MILLIGRAM(S): 5 CAPSULE ORAL at 05:12

## 2022-01-01 RX ADMIN — CHLORHEXIDINE GLUCONATE 1 APPLICATION(S): 213 SOLUTION TOPICAL at 06:42

## 2022-01-01 RX ADMIN — INSULIN GLARGINE 10 UNIT(S): 100 INJECTION, SOLUTION SUBCUTANEOUS at 22:23

## 2022-01-01 RX ADMIN — Medication 150 MICROGRAM(S): at 05:47

## 2022-01-01 RX ADMIN — APIXABAN 2.5 MILLIGRAM(S): 2.5 TABLET, FILM COATED ORAL at 06:47

## 2022-01-01 RX ADMIN — Medication 400 MILLIGRAM(S): at 17:01

## 2022-01-01 RX ADMIN — APIXABAN 2.5 MILLIGRAM(S): 2.5 TABLET, FILM COATED ORAL at 05:25

## 2022-01-01 RX ADMIN — MYCOPHENOLATE MOFETIL 500 MILLIGRAM(S): 250 CAPSULE ORAL at 05:55

## 2022-01-01 RX ADMIN — ENOXAPARIN SODIUM 85 MILLIGRAM(S): 100 INJECTION SUBCUTANEOUS at 05:11

## 2022-01-01 RX ADMIN — MYCOPHENOLATE MOFETIL 500 MILLIGRAM(S): 250 CAPSULE ORAL at 23:37

## 2022-01-01 RX ADMIN — Medication 1 TABLET(S): at 12:33

## 2022-01-01 RX ADMIN — APIXABAN 2.5 MILLIGRAM(S): 2.5 TABLET, FILM COATED ORAL at 05:46

## 2022-01-01 RX ADMIN — APIXABAN 2.5 MILLIGRAM(S): 2.5 TABLET, FILM COATED ORAL at 18:03

## 2022-01-01 RX ADMIN — Medication 1 TABLET(S): at 11:41

## 2022-01-01 RX ADMIN — Medication 650 MILLIGRAM(S): at 14:23

## 2022-01-01 RX ADMIN — Medication 40 MILLIGRAM(S): at 14:07

## 2022-01-01 RX ADMIN — INSULIN GLARGINE 22 UNIT(S): 100 INJECTION, SOLUTION SUBCUTANEOUS at 22:35

## 2022-01-01 RX ADMIN — HEPARIN SODIUM 5000 UNIT(S): 5000 INJECTION INTRAVENOUS; SUBCUTANEOUS at 05:28

## 2022-01-01 RX ADMIN — ATORVASTATIN CALCIUM 40 MILLIGRAM(S): 80 TABLET, FILM COATED ORAL at 22:20

## 2022-01-01 RX ADMIN — Medication 400 MILLIGRAM(S): at 17:23

## 2022-01-01 RX ADMIN — Medication 1 APPLICATION(S): at 06:42

## 2022-01-01 RX ADMIN — Medication 3 MILLILITER(S): at 14:02

## 2022-01-01 RX ADMIN — Medication 5 MILLIGRAM(S): at 23:51

## 2022-01-01 RX ADMIN — Medication 400 MILLIGRAM(S): at 17:53

## 2022-01-01 RX ADMIN — CHLORHEXIDINE GLUCONATE 1 APPLICATION(S): 213 SOLUTION TOPICAL at 06:49

## 2022-01-01 RX ADMIN — Medication 150 MICROGRAM(S): at 06:25

## 2022-01-01 RX ADMIN — Medication 7 UNIT(S): at 12:28

## 2022-01-01 RX ADMIN — Medication 2: at 17:18

## 2022-01-01 RX ADMIN — ATORVASTATIN CALCIUM 40 MILLIGRAM(S): 80 TABLET, FILM COATED ORAL at 22:48

## 2022-01-01 RX ADMIN — MYCOPHENOLATE MOFETIL 500 MILLIGRAM(S): 250 CAPSULE ORAL at 17:30

## 2022-01-01 RX ADMIN — Medication 150 MILLIGRAM(S): at 06:17

## 2022-01-01 RX ADMIN — Medication 5 MILLIGRAM(S): at 23:40

## 2022-01-01 RX ADMIN — Medication 5 MILLIGRAM(S): at 22:01

## 2022-01-01 RX ADMIN — Medication 40 MILLIEQUIVALENT(S): at 12:29

## 2022-01-01 RX ADMIN — Medication 3 UNIT(S): at 12:00

## 2022-01-01 RX ADMIN — Medication 400 MILLIGRAM(S): at 18:02

## 2022-01-01 RX ADMIN — TACROLIMUS 0.5 MILLIGRAM(S): 5 CAPSULE ORAL at 05:57

## 2022-01-01 RX ADMIN — SODIUM CHLORIDE 50 MILLILITER(S): 5 INJECTION, SOLUTION INTRAVENOUS at 06:26

## 2022-01-01 RX ADMIN — Medication 1: at 08:29

## 2022-01-01 RX ADMIN — Medication 3 MILLILITER(S): at 19:49

## 2022-01-01 RX ADMIN — Medication 400 MILLIGRAM(S): at 19:21

## 2022-01-01 RX ADMIN — INSULIN GLARGINE 17 UNIT(S): 100 INJECTION, SOLUTION SUBCUTANEOUS at 21:11

## 2022-01-01 RX ADMIN — MYCOPHENOLATE MOFETIL 500 MILLIGRAM(S): 250 CAPSULE ORAL at 01:51

## 2022-01-01 RX ADMIN — Medication 650 MILLIGRAM(S): at 21:04

## 2022-01-01 RX ADMIN — Medication 400 MILLIGRAM(S): at 05:55

## 2022-01-01 RX ADMIN — INSULIN GLARGINE 22 UNIT(S): 100 INJECTION, SOLUTION SUBCUTANEOUS at 22:23

## 2022-01-01 RX ADMIN — Medication 30 MILLIGRAM(S): at 06:41

## 2022-01-01 RX ADMIN — APIXABAN 2.5 MILLIGRAM(S): 2.5 TABLET, FILM COATED ORAL at 05:40

## 2022-01-01 RX ADMIN — Medication 6 UNIT(S): at 17:35

## 2022-01-01 RX ADMIN — Medication 150 MICROGRAM(S): at 07:02

## 2022-01-01 RX ADMIN — HYDROMORPHONE HYDROCHLORIDE 0.5 MILLIGRAM(S): 2 INJECTION INTRAMUSCULAR; INTRAVENOUS; SUBCUTANEOUS at 20:17

## 2022-01-01 RX ADMIN — TACROLIMUS 0.5 MILLIGRAM(S): 5 CAPSULE ORAL at 05:44

## 2022-01-01 RX ADMIN — MYCOPHENOLATE MOFETIL 500 MILLIGRAM(S): 250 CAPSULE ORAL at 23:16

## 2022-01-01 RX ADMIN — CHLORHEXIDINE GLUCONATE 1 APPLICATION(S): 213 SOLUTION TOPICAL at 05:42

## 2022-01-01 RX ADMIN — Medication 6 UNIT(S): at 12:25

## 2022-01-01 RX ADMIN — Medication 650 MILLIGRAM(S): at 14:01

## 2022-01-01 RX ADMIN — Medication 650 MILLIGRAM(S): at 13:50

## 2022-01-01 RX ADMIN — Medication 6 UNIT(S): at 17:09

## 2022-01-01 RX ADMIN — Medication 2: at 08:20

## 2022-01-01 RX ADMIN — Medication 4: at 17:08

## 2022-01-01 RX ADMIN — Medication 1 APPLICATION(S): at 17:22

## 2022-01-01 RX ADMIN — APIXABAN 2.5 MILLIGRAM(S): 2.5 TABLET, FILM COATED ORAL at 17:34

## 2022-01-01 RX ADMIN — Medication 1 APPLICATION(S): at 17:06

## 2022-01-01 RX ADMIN — Medication 1 APPLICATION(S): at 17:35

## 2022-01-01 RX ADMIN — APIXABAN 2.5 MILLIGRAM(S): 2.5 TABLET, FILM COATED ORAL at 06:11

## 2022-01-01 RX ADMIN — CALCITRIOL 0.25 MICROGRAM(S): 0.5 CAPSULE ORAL at 06:31

## 2022-01-01 RX ADMIN — APIXABAN 2.5 MILLIGRAM(S): 2.5 TABLET, FILM COATED ORAL at 05:57

## 2022-01-01 RX ADMIN — Medication 650 MILLIGRAM(S): at 05:31

## 2022-01-01 RX ADMIN — TACROLIMUS 0.5 MILLIGRAM(S): 5 CAPSULE ORAL at 06:07

## 2022-01-01 RX ADMIN — CHLORHEXIDINE GLUCONATE 1 APPLICATION(S): 213 SOLUTION TOPICAL at 06:11

## 2022-01-01 RX ADMIN — Medication 4: at 08:28

## 2022-01-01 RX ADMIN — MYCOPHENOLATE MOFETIL 500 MILLIGRAM(S): 250 CAPSULE ORAL at 05:40

## 2022-01-01 RX ADMIN — HYDROMORPHONE HYDROCHLORIDE 0.5 MILLIGRAM(S): 2 INJECTION INTRAMUSCULAR; INTRAVENOUS; SUBCUTANEOUS at 22:30

## 2022-01-01 RX ADMIN — MYCOPHENOLATE MOFETIL 500 MILLIGRAM(S): 250 CAPSULE ORAL at 17:15

## 2022-01-01 RX ADMIN — TACROLIMUS 1 MILLIGRAM(S): 5 CAPSULE ORAL at 05:30

## 2022-01-01 RX ADMIN — SODIUM CHLORIDE 100 MILLILITER(S): 9 INJECTION, SOLUTION INTRAVENOUS at 13:06

## 2022-01-01 RX ADMIN — TACROLIMUS 0.5 MILLIGRAM(S): 5 CAPSULE ORAL at 09:19

## 2022-01-01 RX ADMIN — Medication 650 MILLIGRAM(S): at 13:20

## 2022-01-01 RX ADMIN — Medication 6 UNIT(S): at 12:47

## 2022-01-01 RX ADMIN — Medication 7 UNIT(S): at 08:36

## 2022-01-01 RX ADMIN — CHLORHEXIDINE GLUCONATE 1 APPLICATION(S): 213 SOLUTION TOPICAL at 06:41

## 2022-01-01 RX ADMIN — Medication 7 UNIT(S): at 08:17

## 2022-01-01 RX ADMIN — Medication 2: at 12:26

## 2022-01-01 RX ADMIN — Medication 20 MILLIGRAM(S): at 05:25

## 2022-01-01 RX ADMIN — Medication 3 MILLILITER(S): at 14:39

## 2022-01-01 RX ADMIN — MYCOPHENOLATE MOFETIL 500 MILLIGRAM(S): 250 CAPSULE ORAL at 17:35

## 2022-01-01 RX ADMIN — Medication 6 UNIT(S): at 17:22

## 2022-01-01 RX ADMIN — INSULIN GLARGINE 15 UNIT(S): 100 INJECTION, SOLUTION SUBCUTANEOUS at 21:38

## 2022-01-01 RX ADMIN — FAMOTIDINE 20 MILLIGRAM(S): 10 INJECTION INTRAVENOUS at 11:32

## 2022-01-01 RX ADMIN — Medication 1 TABLET(S): at 11:19

## 2022-01-01 RX ADMIN — Medication 3 MILLILITER(S): at 20:26

## 2022-01-01 RX ADMIN — Medication 150 MICROGRAM(S): at 05:40

## 2022-01-01 RX ADMIN — Medication 100 MILLIGRAM(S): at 06:42

## 2022-01-01 RX ADMIN — TACROLIMUS 0.5 MILLIGRAM(S): 5 CAPSULE ORAL at 17:00

## 2022-01-01 RX ADMIN — Medication 150 MICROGRAM(S): at 06:45

## 2022-01-01 RX ADMIN — AMPICILLIN SODIUM AND SULBACTAM SODIUM 200 GRAM(S): 250; 125 INJECTION, POWDER, FOR SUSPENSION INTRAMUSCULAR; INTRAVENOUS at 23:52

## 2022-01-01 RX ADMIN — FAMOTIDINE 20 MILLIGRAM(S): 10 INJECTION INTRAVENOUS at 11:33

## 2022-01-01 RX ADMIN — CHLORHEXIDINE GLUCONATE 1 APPLICATION(S): 213 SOLUTION TOPICAL at 06:10

## 2022-01-01 RX ADMIN — CHLORHEXIDINE GLUCONATE 1 APPLICATION(S): 213 SOLUTION TOPICAL at 06:30

## 2022-01-01 RX ADMIN — Medication 40 MILLIGRAM(S): at 15:55

## 2022-01-01 RX ADMIN — ISOSORBIDE MONONITRATE 30 MILLIGRAM(S): 60 TABLET, EXTENDED RELEASE ORAL at 11:48

## 2022-01-01 RX ADMIN — Medication 150 MICROGRAM(S): at 05:33

## 2022-01-01 RX ADMIN — INSULIN GLARGINE 15 UNIT(S): 100 INJECTION, SOLUTION SUBCUTANEOUS at 00:12

## 2022-01-01 RX ADMIN — Medication 7 UNIT(S): at 08:00

## 2022-01-01 RX ADMIN — Medication 1: at 17:02

## 2022-01-01 RX ADMIN — MYCOPHENOLATE MOFETIL 500 MILLIGRAM(S): 250 CAPSULE ORAL at 23:05

## 2022-01-01 RX ADMIN — FAMOTIDINE 20 MILLIGRAM(S): 10 INJECTION INTRAVENOUS at 11:05

## 2022-01-01 RX ADMIN — Medication 100 MILLIGRAM(S): at 05:50

## 2022-01-01 RX ADMIN — CEFEPIME 100 MILLIGRAM(S): 1 INJECTION, POWDER, FOR SOLUTION INTRAMUSCULAR; INTRAVENOUS at 05:53

## 2022-01-01 RX ADMIN — Medication 30 MILLIGRAM(S): at 05:39

## 2022-01-01 RX ADMIN — CHLORHEXIDINE GLUCONATE 1 APPLICATION(S): 213 SOLUTION TOPICAL at 05:22

## 2022-01-01 RX ADMIN — Medication 150 MICROGRAM(S): at 06:05

## 2022-01-01 RX ADMIN — Medication 1 APPLICATION(S): at 17:18

## 2022-01-01 RX ADMIN — CEFEPIME 100 MILLIGRAM(S): 1 INJECTION, POWDER, FOR SOLUTION INTRAMUSCULAR; INTRAVENOUS at 17:28

## 2022-01-01 RX ADMIN — REMDESIVIR 500 MILLIGRAM(S): 5 INJECTION INTRAVENOUS at 18:53

## 2022-01-01 RX ADMIN — Medication 1 APPLICATION(S): at 20:31

## 2022-01-01 RX ADMIN — Medication 1: at 12:28

## 2022-01-01 RX ADMIN — SODIUM CHLORIDE 50 MILLILITER(S): 5 INJECTION, SOLUTION INTRAVENOUS at 17:41

## 2022-01-01 RX ADMIN — MYCOPHENOLATE MOFETIL 500 MILLIGRAM(S): 250 CAPSULE ORAL at 00:32

## 2022-01-01 RX ADMIN — Medication 650 MILLIGRAM(S): at 22:17

## 2022-01-01 RX ADMIN — Medication 650 MILLIGRAM(S): at 22:05

## 2022-01-01 RX ADMIN — Medication 400 MILLIGRAM(S): at 05:44

## 2022-01-01 RX ADMIN — SODIUM CHLORIDE 500 MILLILITER(S): 9 INJECTION, SOLUTION INTRAVENOUS at 14:29

## 2022-01-01 RX ADMIN — Medication 1 APPLICATION(S): at 06:08

## 2022-01-01 RX ADMIN — Medication 20 MILLIGRAM(S): at 05:35

## 2022-01-01 RX ADMIN — Medication 400 MILLIGRAM(S): at 06:06

## 2022-01-01 RX ADMIN — Medication 3 MILLILITER(S): at 08:27

## 2022-01-01 RX ADMIN — Medication 1 APPLICATION(S): at 17:02

## 2022-01-01 RX ADMIN — FAMOTIDINE 20 MILLIGRAM(S): 10 INJECTION INTRAVENOUS at 12:14

## 2022-01-01 RX ADMIN — ISOSORBIDE MONONITRATE 30 MILLIGRAM(S): 60 TABLET, EXTENDED RELEASE ORAL at 12:49

## 2022-01-01 RX ADMIN — Medication 100 MILLIGRAM(S): at 06:04

## 2022-01-01 RX ADMIN — Medication 5 MILLIGRAM(S): at 22:33

## 2022-01-01 RX ADMIN — Medication 6 UNIT(S): at 16:40

## 2022-01-01 RX ADMIN — APIXABAN 2.5 MILLIGRAM(S): 2.5 TABLET, FILM COATED ORAL at 17:58

## 2022-01-01 RX ADMIN — Medication 25 GRAM(S): at 12:28

## 2022-01-01 RX ADMIN — Medication 400 MILLIGRAM(S): at 17:34

## 2022-01-01 RX ADMIN — Medication 5 MILLIGRAM(S): at 05:28

## 2022-01-01 RX ADMIN — Medication 3 MILLILITER(S): at 08:13

## 2022-01-01 RX ADMIN — APIXABAN 2.5 MILLIGRAM(S): 2.5 TABLET, FILM COATED ORAL at 18:56

## 2022-01-01 RX ADMIN — Medication 150 MICROGRAM(S): at 05:31

## 2022-01-01 RX ADMIN — TACROLIMUS 0.5 MILLIGRAM(S): 5 CAPSULE ORAL at 05:41

## 2022-01-01 RX ADMIN — Medication 3: at 17:35

## 2022-01-01 RX ADMIN — ATORVASTATIN CALCIUM 40 MILLIGRAM(S): 80 TABLET, FILM COATED ORAL at 22:59

## 2022-01-01 RX ADMIN — Medication 1 TABLET(S): at 13:05

## 2022-01-01 RX ADMIN — SODIUM CHLORIDE 50 MILLILITER(S): 5 INJECTION, SOLUTION INTRAVENOUS at 05:42

## 2022-01-01 RX ADMIN — HEPARIN SODIUM 1600 UNIT(S)/HR: 5000 INJECTION INTRAVENOUS; SUBCUTANEOUS at 13:27

## 2022-01-01 RX ADMIN — Medication 25 MILLIGRAM(S): at 06:52

## 2022-01-01 RX ADMIN — Medication 650 MILLIGRAM(S): at 05:25

## 2022-01-01 RX ADMIN — Medication 650 MILLIGRAM(S): at 22:25

## 2022-01-01 RX ADMIN — Medication 40 MILLIGRAM(S): at 05:27

## 2022-01-01 RX ADMIN — ATORVASTATIN CALCIUM 40 MILLIGRAM(S): 80 TABLET, FILM COATED ORAL at 22:01

## 2022-01-01 RX ADMIN — TACROLIMUS 1 MILLIGRAM(S): 5 CAPSULE ORAL at 18:40

## 2022-01-01 RX ADMIN — TACROLIMUS 0.5 MILLIGRAM(S): 5 CAPSULE ORAL at 05:34

## 2022-01-01 RX ADMIN — Medication 5 MILLIGRAM(S): at 22:50

## 2022-01-01 RX ADMIN — ENOXAPARIN SODIUM 85 MILLIGRAM(S): 100 INJECTION SUBCUTANEOUS at 18:47

## 2022-01-01 RX ADMIN — MYCOPHENOLATE MOFETIL 500 MILLIGRAM(S): 250 CAPSULE ORAL at 17:39

## 2022-01-01 RX ADMIN — SODIUM CHLORIDE 50 MILLILITER(S): 5 INJECTION, SOLUTION INTRAVENOUS at 18:27

## 2022-01-01 RX ADMIN — MYCOPHENOLATE MOFETIL 500 MILLIGRAM(S): 250 CAPSULE ORAL at 23:51

## 2022-01-01 RX ADMIN — Medication 1 TABLET(S): at 12:14

## 2022-01-01 RX ADMIN — Medication 3: at 17:32

## 2022-01-01 RX ADMIN — MYCOPHENOLATE MOFETIL 500 MILLIGRAM(S): 250 CAPSULE ORAL at 18:40

## 2022-01-01 RX ADMIN — Medication 650 MILLIGRAM(S): at 13:39

## 2022-01-01 RX ADMIN — Medication 1 TABLET(S): at 12:37

## 2022-01-01 RX ADMIN — MYCOPHENOLATE MOFETIL 500 MILLIGRAM(S): 250 CAPSULE ORAL at 17:37

## 2022-01-01 RX ADMIN — Medication 1: at 08:46

## 2022-01-01 RX ADMIN — BUMETANIDE 2 MILLIGRAM(S): 0.25 INJECTION INTRAMUSCULAR; INTRAVENOUS at 15:51

## 2022-01-01 RX ADMIN — Medication 650 MILLIGRAM(S): at 17:20

## 2022-01-01 RX ADMIN — MYCOPHENOLATE MOFETIL 500 MILLIGRAM(S): 250 CAPSULE ORAL at 05:29

## 2022-01-01 RX ADMIN — Medication 650 MILLIGRAM(S): at 15:21

## 2022-01-01 RX ADMIN — Medication 6 MILLIGRAM(S): at 05:31

## 2022-01-01 RX ADMIN — INSULIN GLARGINE 17 UNIT(S): 100 INJECTION, SOLUTION SUBCUTANEOUS at 22:01

## 2022-01-01 RX ADMIN — TACROLIMUS 1 MILLIGRAM(S): 5 CAPSULE ORAL at 17:00

## 2022-01-01 RX ADMIN — INSULIN GLARGINE 18 UNIT(S): 100 INJECTION, SOLUTION SUBCUTANEOUS at 21:28

## 2022-01-01 RX ADMIN — Medication 1 TABLET(S): at 12:41

## 2022-01-01 RX ADMIN — MYCOPHENOLATE MOFETIL 500 MILLIGRAM(S): 250 CAPSULE ORAL at 14:13

## 2022-01-01 RX ADMIN — Medication 1: at 08:20

## 2022-01-01 RX ADMIN — Medication 7 UNIT(S): at 12:42

## 2022-01-01 RX ADMIN — SODIUM CHLORIDE 500 MILLILITER(S): 9 INJECTION, SOLUTION INTRAVENOUS at 18:31

## 2022-01-01 RX ADMIN — ATORVASTATIN CALCIUM 40 MILLIGRAM(S): 80 TABLET, FILM COATED ORAL at 22:29

## 2022-01-01 RX ADMIN — ATORVASTATIN CALCIUM 40 MILLIGRAM(S): 80 TABLET, FILM COATED ORAL at 22:45

## 2022-01-01 RX ADMIN — Medication 150 MICROGRAM(S): at 06:13

## 2022-01-01 RX ADMIN — Medication 100 MILLIGRAM(S): at 05:33

## 2022-01-01 RX ADMIN — MYCOPHENOLATE MOFETIL 500 MILLIGRAM(S): 250 CAPSULE ORAL at 23:34

## 2022-01-01 RX ADMIN — Medication 1 APPLICATION(S): at 18:20

## 2022-01-01 RX ADMIN — SODIUM CHLORIDE 50 MILLILITER(S): 5 INJECTION, SOLUTION INTRAVENOUS at 05:28

## 2022-01-01 RX ADMIN — Medication 2 UNIT(S): at 11:51

## 2022-01-01 RX ADMIN — Medication 1 TABLET(S): at 14:03

## 2022-01-01 RX ADMIN — ATORVASTATIN CALCIUM 40 MILLIGRAM(S): 80 TABLET, FILM COATED ORAL at 22:35

## 2022-01-01 RX ADMIN — Medication 108 GRAM(S): at 21:41

## 2022-01-01 RX ADMIN — MYCOPHENOLATE MOFETIL 500 MILLIGRAM(S): 250 CAPSULE ORAL at 19:10

## 2022-01-01 RX ADMIN — Medication 2 UNIT(S): at 00:30

## 2022-01-01 RX ADMIN — Medication 5 MILLIGRAM(S): at 22:15

## 2022-01-01 RX ADMIN — MYCOPHENOLATE MOFETIL 500 MILLIGRAM(S): 250 CAPSULE ORAL at 23:15

## 2022-01-01 RX ADMIN — Medication 400 MILLIGRAM(S): at 05:25

## 2022-01-01 RX ADMIN — Medication 650 MILLIGRAM(S): at 05:22

## 2022-01-01 RX ADMIN — Medication 1: at 07:54

## 2022-01-01 RX ADMIN — Medication 400 MILLIGRAM(S): at 06:48

## 2022-01-01 RX ADMIN — Medication 3 MILLILITER(S): at 13:31

## 2022-01-01 RX ADMIN — Medication 50 MILLIGRAM(S): at 06:05

## 2022-01-01 RX ADMIN — CHLORHEXIDINE GLUCONATE 1 APPLICATION(S): 213 SOLUTION TOPICAL at 05:13

## 2022-01-01 RX ADMIN — FAMOTIDINE 20 MILLIGRAM(S): 10 INJECTION INTRAVENOUS at 13:28

## 2022-01-01 RX ADMIN — INSULIN GLARGINE 22 UNIT(S): 100 INJECTION, SOLUTION SUBCUTANEOUS at 22:46

## 2022-01-01 RX ADMIN — Medication 6: at 17:38

## 2022-01-01 RX ADMIN — Medication 3 MILLILITER(S): at 07:52

## 2022-01-01 RX ADMIN — MYCOPHENOLATE MOFETIL 500 MILLIGRAM(S): 250 CAPSULE ORAL at 11:20

## 2022-01-01 RX ADMIN — MYCOPHENOLATE MOFETIL 500 MILLIGRAM(S): 250 CAPSULE ORAL at 23:30

## 2022-01-01 RX ADMIN — Medication 400 MILLIGRAM(S): at 06:02

## 2022-01-01 RX ADMIN — ATORVASTATIN CALCIUM 40 MILLIGRAM(S): 80 TABLET, FILM COATED ORAL at 21:41

## 2022-01-01 RX ADMIN — MYCOPHENOLATE MOFETIL 500 MILLIGRAM(S): 250 CAPSULE ORAL at 23:54

## 2022-01-01 RX ADMIN — APIXABAN 2.5 MILLIGRAM(S): 2.5 TABLET, FILM COATED ORAL at 17:53

## 2022-01-01 RX ADMIN — Medication 400 MILLIGRAM(S): at 17:57

## 2022-01-01 RX ADMIN — TACROLIMUS 1 MILLIGRAM(S): 5 CAPSULE ORAL at 08:16

## 2022-01-01 RX ADMIN — Medication 1: at 08:30

## 2022-01-01 RX ADMIN — Medication 6 UNIT(S): at 11:41

## 2022-01-01 RX ADMIN — Medication 6 UNIT(S): at 08:15

## 2022-01-01 RX ADMIN — BUMETANIDE 5 MG/HR: 0.25 INJECTION INTRAMUSCULAR; INTRAVENOUS at 11:21

## 2022-01-01 RX ADMIN — MYCOPHENOLATE MOFETIL 500 MILLIGRAM(S): 250 CAPSULE ORAL at 21:19

## 2022-01-01 RX ADMIN — Medication 1 APPLICATION(S): at 06:02

## 2022-01-01 RX ADMIN — APIXABAN 2.5 MILLIGRAM(S): 2.5 TABLET, FILM COATED ORAL at 05:30

## 2022-01-01 RX ADMIN — Medication 150 MICROGRAM(S): at 06:09

## 2022-01-01 RX ADMIN — MYCOPHENOLATE MOFETIL 500 MILLIGRAM(S): 250 CAPSULE ORAL at 13:28

## 2022-01-01 RX ADMIN — Medication 650 MILLIGRAM(S): at 13:09

## 2022-01-01 RX ADMIN — Medication 50 MILLIGRAM(S): at 05:22

## 2022-01-01 RX ADMIN — TACROLIMUS 0.5 MILLIGRAM(S): 5 CAPSULE ORAL at 18:11

## 2022-01-01 RX ADMIN — TACROLIMUS 0.5 MILLIGRAM(S): 5 CAPSULE ORAL at 17:53

## 2022-01-01 RX ADMIN — Medication 1 TABLET(S): at 12:30

## 2022-01-01 RX ADMIN — SODIUM CHLORIDE 50 MILLILITER(S): 5 INJECTION, SOLUTION INTRAVENOUS at 20:48

## 2022-01-01 RX ADMIN — Medication 1: at 12:17

## 2022-01-01 RX ADMIN — Medication 150 MICROGRAM(S): at 05:57

## 2022-01-01 RX ADMIN — Medication 400 MILLIGRAM(S): at 18:04

## 2022-01-01 RX ADMIN — Medication 1 APPLICATION(S): at 06:49

## 2022-01-01 RX ADMIN — Medication 4 UNIT(S): at 08:31

## 2022-01-01 RX ADMIN — Medication 30 MILLIGRAM(S): at 05:09

## 2022-01-01 RX ADMIN — MYCOPHENOLATE MOFETIL 500 MILLIGRAM(S): 250 CAPSULE ORAL at 12:01

## 2022-01-01 RX ADMIN — Medication 25 GRAM(S): at 13:12

## 2022-01-01 RX ADMIN — SACUBITRIL AND VALSARTAN 1 TABLET(S): 24; 26 TABLET, FILM COATED ORAL at 06:13

## 2022-01-01 RX ADMIN — BUMETANIDE 5 MG/HR: 0.25 INJECTION INTRAMUSCULAR; INTRAVENOUS at 21:32

## 2022-01-01 RX ADMIN — Medication 1 TABLET(S): at 12:23

## 2022-01-01 RX ADMIN — Medication 1 APPLICATION(S): at 17:15

## 2022-01-01 RX ADMIN — PIPERACILLIN AND TAZOBACTAM 25 GRAM(S): 4; .5 INJECTION, POWDER, LYOPHILIZED, FOR SOLUTION INTRAVENOUS at 06:19

## 2022-01-01 RX ADMIN — Medication 4 UNIT(S): at 17:39

## 2022-01-01 RX ADMIN — Medication 8: at 08:28

## 2022-01-01 RX ADMIN — Medication 1 APPLICATION(S): at 06:25

## 2022-01-01 RX ADMIN — Medication 30 MILLIGRAM(S): at 05:34

## 2022-01-01 RX ADMIN — HEPARIN SODIUM 1600 UNIT(S)/HR: 5000 INJECTION INTRAVENOUS; SUBCUTANEOUS at 20:19

## 2022-01-01 RX ADMIN — APIXABAN 2.5 MILLIGRAM(S): 2.5 TABLET, FILM COATED ORAL at 17:54

## 2022-01-01 RX ADMIN — Medication 6 UNIT(S): at 08:22

## 2022-01-01 RX ADMIN — FAMOTIDINE 20 MILLIGRAM(S): 10 INJECTION INTRAVENOUS at 12:56

## 2022-01-01 RX ADMIN — Medication 650 MILLIGRAM(S): at 22:29

## 2022-01-01 RX ADMIN — INSULIN GLARGINE 10 UNIT(S): 100 INJECTION, SOLUTION SUBCUTANEOUS at 13:34

## 2022-01-01 RX ADMIN — TACROLIMUS 0.5 MILLIGRAM(S): 5 CAPSULE ORAL at 05:43

## 2022-01-01 RX ADMIN — Medication 1 TABLET(S): at 11:36

## 2022-01-01 RX ADMIN — Medication 1 TABLET(S): at 14:14

## 2022-01-01 RX ADMIN — Medication 400 MILLIGRAM(S): at 06:44

## 2022-01-01 RX ADMIN — APIXABAN 2.5 MILLIGRAM(S): 2.5 TABLET, FILM COATED ORAL at 05:59

## 2022-01-01 RX ADMIN — FAMOTIDINE 20 MILLIGRAM(S): 10 INJECTION INTRAVENOUS at 12:33

## 2022-01-01 RX ADMIN — Medication 50 MILLIGRAM(S): at 05:56

## 2022-01-01 RX ADMIN — Medication 400 MILLIGRAM(S): at 05:32

## 2022-01-01 RX ADMIN — CHLORHEXIDINE GLUCONATE 1 APPLICATION(S): 213 SOLUTION TOPICAL at 05:24

## 2022-01-01 RX ADMIN — INSULIN GLARGINE 17 UNIT(S): 100 INJECTION, SOLUTION SUBCUTANEOUS at 22:58

## 2022-01-01 RX ADMIN — Medication 50 MILLIGRAM(S): at 05:45

## 2022-01-01 RX ADMIN — Medication 650 MILLIGRAM(S): at 14:12

## 2022-01-01 RX ADMIN — APIXABAN 2.5 MILLIGRAM(S): 2.5 TABLET, FILM COATED ORAL at 05:09

## 2022-01-01 RX ADMIN — TACROLIMUS 1 MILLIGRAM(S): 5 CAPSULE ORAL at 21:39

## 2022-01-01 RX ADMIN — Medication 150 MICROGRAM(S): at 06:03

## 2022-01-01 RX ADMIN — BACITRACIN AND POLYMYXIN B SULFATE 1 APPLICATION(S): 500; 10000 OINTMENT TOPICAL at 17:43

## 2022-01-01 RX ADMIN — MYCOPHENOLATE MOFETIL 500 MILLIGRAM(S): 250 CAPSULE ORAL at 05:39

## 2022-01-01 RX ADMIN — HYDROMORPHONE HYDROCHLORIDE 0.5 MILLIGRAM(S): 2 INJECTION INTRAMUSCULAR; INTRAVENOUS; SUBCUTANEOUS at 17:59

## 2022-01-01 RX ADMIN — Medication 100 GRAM(S): at 05:23

## 2022-01-01 RX ADMIN — APIXABAN 2.5 MILLIGRAM(S): 2.5 TABLET, FILM COATED ORAL at 06:45

## 2022-01-01 RX ADMIN — TACROLIMUS 0.5 MILLIGRAM(S): 5 CAPSULE ORAL at 19:28

## 2022-01-01 RX ADMIN — Medication 400 MILLIGRAM(S): at 05:34

## 2022-01-01 RX ADMIN — Medication 1 TABLET(S): at 11:21

## 2022-01-01 RX ADMIN — Medication 7 UNIT(S): at 17:40

## 2022-01-01 RX ADMIN — Medication 325 MILLIGRAM(S): at 06:10

## 2022-01-01 RX ADMIN — SODIUM CHLORIDE 50 MILLILITER(S): 9 INJECTION, SOLUTION INTRAVENOUS at 18:07

## 2022-01-01 RX ADMIN — Medication 20 MILLIGRAM(S): at 12:33

## 2022-01-01 RX ADMIN — APIXABAN 2.5 MILLIGRAM(S): 2.5 TABLET, FILM COATED ORAL at 05:47

## 2022-01-01 RX ADMIN — Medication 50 MILLIGRAM(S): at 05:09

## 2022-01-01 RX ADMIN — ATORVASTATIN CALCIUM 40 MILLIGRAM(S): 80 TABLET, FILM COATED ORAL at 22:22

## 2022-01-01 RX ADMIN — Medication 3 MILLILITER(S): at 13:11

## 2022-01-01 RX ADMIN — Medication 6 UNIT(S): at 17:04

## 2022-01-01 RX ADMIN — Medication 650 MILLIGRAM(S): at 06:11

## 2022-01-01 RX ADMIN — Medication 400 MILLIGRAM(S): at 06:46

## 2022-01-01 RX ADMIN — Medication 1 APPLICATION(S): at 19:10

## 2022-01-01 RX ADMIN — MYCOPHENOLATE MOFETIL 500 MILLIGRAM(S): 250 CAPSULE ORAL at 23:28

## 2022-01-01 RX ADMIN — MYCOPHENOLATE MOFETIL 500 MILLIGRAM(S): 250 CAPSULE ORAL at 06:03

## 2022-01-01 RX ADMIN — Medication 2 UNIT(S): at 08:39

## 2022-01-01 RX ADMIN — TACROLIMUS 1 MILLIGRAM(S): 5 CAPSULE ORAL at 06:52

## 2022-01-01 RX ADMIN — TACROLIMUS 0.5 MILLIGRAM(S): 5 CAPSULE ORAL at 06:38

## 2022-01-01 RX ADMIN — Medication 650 MILLIGRAM(S): at 06:05

## 2022-01-01 RX ADMIN — SODIUM CHLORIDE 50 MILLILITER(S): 5 INJECTION, SOLUTION INTRAVENOUS at 06:09

## 2022-01-01 RX ADMIN — Medication 3 MILLILITER(S): at 13:40

## 2022-01-01 RX ADMIN — BUMETANIDE 2 MILLIGRAM(S): 0.25 INJECTION INTRAMUSCULAR; INTRAVENOUS at 05:26

## 2022-01-01 RX ADMIN — Medication 400 MILLIGRAM(S): at 17:39

## 2022-01-01 RX ADMIN — Medication 1 TABLET(S): at 12:36

## 2022-01-01 RX ADMIN — Medication 6 UNIT(S): at 17:14

## 2022-01-01 RX ADMIN — HYDROMORPHONE HYDROCHLORIDE 0.25 MILLIGRAM(S): 2 INJECTION INTRAMUSCULAR; INTRAVENOUS; SUBCUTANEOUS at 17:46

## 2022-01-01 RX ADMIN — HYDROMORPHONE HYDROCHLORIDE 0.5 MILLIGRAM(S): 2 INJECTION INTRAMUSCULAR; INTRAVENOUS; SUBCUTANEOUS at 16:49

## 2022-01-01 RX ADMIN — Medication 40 MILLIEQUIVALENT(S): at 12:57

## 2022-01-01 RX ADMIN — Medication 6 MILLIGRAM(S): at 06:31

## 2022-01-01 RX ADMIN — CHLORHEXIDINE GLUCONATE 1 APPLICATION(S): 213 SOLUTION TOPICAL at 06:45

## 2022-01-01 RX ADMIN — Medication 1 APPLICATION(S): at 06:27

## 2022-01-01 RX ADMIN — ATORVASTATIN CALCIUM 40 MILLIGRAM(S): 80 TABLET, FILM COATED ORAL at 21:29

## 2022-01-01 RX ADMIN — APIXABAN 2.5 MILLIGRAM(S): 2.5 TABLET, FILM COATED ORAL at 15:54

## 2022-01-01 RX ADMIN — Medication 6 MILLIGRAM(S): at 15:15

## 2022-01-01 RX ADMIN — Medication 7 UNIT(S): at 12:20

## 2022-01-01 RX ADMIN — ENOXAPARIN SODIUM 85 MILLIGRAM(S): 100 INJECTION SUBCUTANEOUS at 05:13

## 2022-01-01 RX ADMIN — SODIUM CHLORIDE 75 MILLILITER(S): 9 INJECTION, SOLUTION INTRAVENOUS at 05:31

## 2022-01-01 RX ADMIN — INSULIN GLARGINE 22 UNIT(S): 100 INJECTION, SOLUTION SUBCUTANEOUS at 22:39

## 2022-01-01 RX ADMIN — Medication 650 MILLIGRAM(S): at 06:52

## 2022-01-01 RX ADMIN — MYCOPHENOLATE MOFETIL 500 MILLIGRAM(S): 250 CAPSULE ORAL at 13:11

## 2022-01-01 RX ADMIN — PIPERACILLIN AND TAZOBACTAM 200 GRAM(S): 4; .5 INJECTION, POWDER, LYOPHILIZED, FOR SOLUTION INTRAVENOUS at 05:27

## 2022-01-01 RX ADMIN — Medication 3 UNIT(S): at 17:32

## 2022-01-01 RX ADMIN — Medication 1 APPLICATION(S): at 17:36

## 2022-01-01 RX ADMIN — Medication 650 MILLIGRAM(S): at 05:15

## 2022-01-01 RX ADMIN — PIPERACILLIN AND TAZOBACTAM 25 GRAM(S): 4; .5 INJECTION, POWDER, LYOPHILIZED, FOR SOLUTION INTRAVENOUS at 22:32

## 2022-01-01 RX ADMIN — MYCOPHENOLATE MOFETIL 500 MILLIGRAM(S): 250 CAPSULE ORAL at 05:34

## 2022-01-01 RX ADMIN — HYDROMORPHONE HYDROCHLORIDE 0.5 MILLIGRAM(S): 2 INJECTION INTRAMUSCULAR; INTRAVENOUS; SUBCUTANEOUS at 06:08

## 2022-01-01 RX ADMIN — ENOXAPARIN SODIUM 85 MILLIGRAM(S): 100 INJECTION SUBCUTANEOUS at 05:44

## 2022-01-01 RX ADMIN — AMPICILLIN SODIUM AND SULBACTAM SODIUM 200 GRAM(S): 250; 125 INJECTION, POWDER, FOR SUSPENSION INTRAMUSCULAR; INTRAVENOUS at 18:48

## 2022-01-01 RX ADMIN — Medication 150 MICROGRAM(S): at 05:13

## 2022-01-01 RX ADMIN — TACROLIMUS 1 MILLIGRAM(S): 5 CAPSULE ORAL at 18:14

## 2022-01-01 RX ADMIN — Medication 650 MILLIGRAM(S): at 18:56

## 2022-01-01 RX ADMIN — APIXABAN 2.5 MILLIGRAM(S): 2.5 TABLET, FILM COATED ORAL at 05:14

## 2022-01-01 RX ADMIN — SODIUM CHLORIDE 50 MILLILITER(S): 9 INJECTION, SOLUTION INTRAVENOUS at 22:05

## 2022-01-01 RX ADMIN — TACROLIMUS 0.5 MILLIGRAM(S): 5 CAPSULE ORAL at 05:19

## 2022-01-01 RX ADMIN — Medication 400 MILLIGRAM(S): at 06:18

## 2022-01-01 RX ADMIN — ATORVASTATIN CALCIUM 40 MILLIGRAM(S): 80 TABLET, FILM COATED ORAL at 22:33

## 2022-01-01 RX ADMIN — TACROLIMUS 0.5 MILLIGRAM(S): 5 CAPSULE ORAL at 18:50

## 2022-01-01 RX ADMIN — Medication 3 MILLILITER(S): at 20:42

## 2022-01-01 RX ADMIN — Medication 1: at 17:44

## 2022-01-01 RX ADMIN — Medication 6 UNIT(S): at 08:01

## 2022-01-01 RX ADMIN — Medication 50 MILLIGRAM(S): at 06:09

## 2022-01-01 RX ADMIN — Medication 2: at 12:13

## 2022-01-01 RX ADMIN — TACROLIMUS 0.5 MILLIGRAM(S): 5 CAPSULE ORAL at 05:40

## 2022-01-01 RX ADMIN — Medication 650 MILLIGRAM(S): at 12:46

## 2022-01-01 RX ADMIN — TACROLIMUS 0.5 MILLIGRAM(S): 5 CAPSULE ORAL at 05:38

## 2022-01-01 RX ADMIN — Medication 25 GRAM(S): at 11:52

## 2022-01-01 RX ADMIN — Medication 20 MILLIEQUIVALENT(S): at 10:29

## 2022-01-01 RX ADMIN — ATORVASTATIN CALCIUM 40 MILLIGRAM(S): 80 TABLET, FILM COATED ORAL at 23:56

## 2022-01-01 RX ADMIN — Medication 150 MICROGRAM(S): at 06:12

## 2022-01-01 RX ADMIN — MYCOPHENOLATE MOFETIL 500 MILLIGRAM(S): 250 CAPSULE ORAL at 05:51

## 2022-01-01 RX ADMIN — Medication 8 UNIT(S): at 20:02

## 2022-01-01 RX ADMIN — Medication 1 APPLICATION(S): at 05:36

## 2022-01-01 RX ADMIN — Medication 6 UNIT(S): at 12:10

## 2022-01-01 RX ADMIN — Medication 30 MILLIGRAM(S): at 05:19

## 2022-01-01 RX ADMIN — Medication 2: at 17:24

## 2022-01-01 RX ADMIN — Medication 650 MILLIGRAM(S): at 01:18

## 2022-01-01 RX ADMIN — SACUBITRIL AND VALSARTAN 1 TABLET(S): 24; 26 TABLET, FILM COATED ORAL at 17:34

## 2022-01-01 RX ADMIN — Medication 5 MILLIGRAM(S): at 21:29

## 2022-01-01 RX ADMIN — ATORVASTATIN CALCIUM 40 MILLIGRAM(S): 80 TABLET, FILM COATED ORAL at 21:44

## 2022-01-01 RX ADMIN — Medication 650 MILLIGRAM(S): at 15:15

## 2022-01-01 RX ADMIN — CEFEPIME 100 MILLIGRAM(S): 1 INJECTION, POWDER, FOR SOLUTION INTRAMUSCULAR; INTRAVENOUS at 06:56

## 2022-01-01 RX ADMIN — Medication 4: at 16:40

## 2022-01-01 RX ADMIN — Medication 150 MICROGRAM(S): at 05:55

## 2022-01-01 RX ADMIN — Medication 10 MILLIGRAM(S): at 18:11

## 2022-01-01 RX ADMIN — Medication 5 MILLIGRAM(S): at 22:20

## 2022-01-01 RX ADMIN — Medication 650 MILLIGRAM(S): at 21:36

## 2022-01-01 RX ADMIN — TACROLIMUS 0.5 MILLIGRAM(S): 5 CAPSULE ORAL at 17:59

## 2022-01-01 RX ADMIN — Medication 1 APPLICATION(S): at 19:27

## 2022-01-01 RX ADMIN — TACROLIMUS 0.5 MILLIGRAM(S): 5 CAPSULE ORAL at 17:24

## 2022-01-01 RX ADMIN — MYCOPHENOLATE MOFETIL 500 MILLIGRAM(S): 250 CAPSULE ORAL at 11:45

## 2022-01-01 RX ADMIN — Medication 1: at 12:15

## 2022-01-01 RX ADMIN — SODIUM CHLORIDE 60 MILLILITER(S): 9 INJECTION INTRAMUSCULAR; INTRAVENOUS; SUBCUTANEOUS at 15:49

## 2022-01-01 RX ADMIN — TACROLIMUS 1 MILLIGRAM(S): 5 CAPSULE ORAL at 18:02

## 2022-01-01 RX ADMIN — TACROLIMUS 0.5 MILLIGRAM(S): 5 CAPSULE ORAL at 06:27

## 2022-01-01 RX ADMIN — APIXABAN 2.5 MILLIGRAM(S): 2.5 TABLET, FILM COATED ORAL at 06:05

## 2022-01-01 RX ADMIN — Medication 1: at 17:57

## 2022-01-01 RX ADMIN — Medication 5 MILLIGRAM(S): at 21:22

## 2022-01-01 RX ADMIN — APIXABAN 2.5 MILLIGRAM(S): 2.5 TABLET, FILM COATED ORAL at 17:28

## 2022-01-01 RX ADMIN — TACROLIMUS 0.5 MILLIGRAM(S): 5 CAPSULE ORAL at 05:09

## 2022-01-01 RX ADMIN — TACROLIMUS 0.5 MILLIGRAM(S): 5 CAPSULE ORAL at 17:05

## 2022-01-01 RX ADMIN — LOSARTAN POTASSIUM 100 MILLIGRAM(S): 100 TABLET, FILM COATED ORAL at 07:59

## 2022-01-01 RX ADMIN — FAMOTIDINE 20 MILLIGRAM(S): 10 INJECTION INTRAVENOUS at 11:45

## 2022-01-01 RX ADMIN — Medication 1 APPLICATION(S): at 06:06

## 2022-01-01 RX ADMIN — Medication 150 MICROGRAM(S): at 05:25

## 2022-01-01 RX ADMIN — Medication 5 MILLIGRAM(S): at 22:48

## 2022-01-01 RX ADMIN — Medication 650 MILLIGRAM(S): at 13:46

## 2022-01-01 RX ADMIN — CALCITRIOL 0.25 MICROGRAM(S): 0.5 CAPSULE ORAL at 06:07

## 2022-01-01 RX ADMIN — Medication 650 MILLIGRAM(S): at 07:45

## 2022-01-01 RX ADMIN — Medication 400 MILLIGRAM(S): at 18:16

## 2022-01-01 RX ADMIN — SODIUM CHLORIDE 50 MILLILITER(S): 5 INJECTION, SOLUTION INTRAVENOUS at 19:43

## 2022-01-01 RX ADMIN — Medication 1 APPLICATION(S): at 18:40

## 2022-01-01 RX ADMIN — Medication 1 TABLET(S): at 12:44

## 2022-01-01 RX ADMIN — Medication 100 GRAM(S): at 15:45

## 2022-01-01 RX ADMIN — Medication 5 UNIT(S): at 17:58

## 2022-01-01 RX ADMIN — Medication 1 APPLICATION(S): at 23:52

## 2022-01-01 RX ADMIN — CALCITRIOL 0.25 MICROGRAM(S): 0.5 CAPSULE ORAL at 06:24

## 2022-01-01 RX ADMIN — Medication 100 MILLIGRAM(S): at 05:35

## 2022-01-01 RX ADMIN — Medication 400 MILLIGRAM(S): at 17:31

## 2022-01-01 RX ADMIN — MYCOPHENOLATE MOFETIL 500 MILLIGRAM(S): 250 CAPSULE ORAL at 11:46

## 2022-01-01 RX ADMIN — Medication 20 MILLIGRAM(S): at 12:32

## 2022-01-01 RX ADMIN — MYCOPHENOLATE MOFETIL 500 MILLIGRAM(S): 250 CAPSULE ORAL at 05:41

## 2022-01-01 RX ADMIN — APIXABAN 2.5 MILLIGRAM(S): 2.5 TABLET, FILM COATED ORAL at 06:31

## 2022-01-01 RX ADMIN — Medication 400 MILLIGRAM(S): at 17:07

## 2022-01-01 RX ADMIN — Medication 1 TABLET(S): at 12:54

## 2022-01-01 RX ADMIN — Medication 400 MILLIGRAM(S): at 17:18

## 2022-01-01 RX ADMIN — ATORVASTATIN CALCIUM 40 MILLIGRAM(S): 80 TABLET, FILM COATED ORAL at 21:13

## 2022-01-01 RX ADMIN — APIXABAN 2.5 MILLIGRAM(S): 2.5 TABLET, FILM COATED ORAL at 06:42

## 2022-01-01 RX ADMIN — Medication 30 MILLIGRAM(S): at 05:45

## 2022-01-01 RX ADMIN — Medication 150 MILLIGRAM(S): at 18:12

## 2022-01-01 RX ADMIN — Medication 40 MILLIGRAM(S): at 18:02

## 2022-01-01 RX ADMIN — Medication 650 MILLIGRAM(S): at 05:28

## 2022-01-01 RX ADMIN — Medication 30 MILLIGRAM(S): at 06:45

## 2022-01-01 RX ADMIN — Medication 50 MILLIGRAM(S): at 05:24

## 2022-01-01 RX ADMIN — INSULIN GLARGINE 17 UNIT(S): 100 INJECTION, SOLUTION SUBCUTANEOUS at 22:12

## 2022-01-01 RX ADMIN — Medication 40 MILLIEQUIVALENT(S): at 10:11

## 2022-01-01 RX ADMIN — Medication 1 APPLICATION(S): at 05:29

## 2022-01-01 RX ADMIN — ATORVASTATIN CALCIUM 40 MILLIGRAM(S): 80 TABLET, FILM COATED ORAL at 21:09

## 2022-01-01 RX ADMIN — Medication 400 MILLIGRAM(S): at 17:32

## 2022-01-01 RX ADMIN — Medication 1 APPLICATION(S): at 06:12

## 2022-01-01 RX ADMIN — BUMETANIDE 5 MG/HR: 0.25 INJECTION INTRAMUSCULAR; INTRAVENOUS at 11:46

## 2022-01-01 RX ADMIN — Medication 1 APPLICATION(S): at 18:12

## 2022-01-01 RX ADMIN — Medication 6 UNIT(S): at 12:59

## 2022-01-01 RX ADMIN — FAMOTIDINE 20 MILLIGRAM(S): 10 INJECTION INTRAVENOUS at 14:14

## 2022-01-01 RX ADMIN — Medication 2: at 11:29

## 2022-01-01 RX ADMIN — REMDESIVIR 500 MILLIGRAM(S): 5 INJECTION INTRAVENOUS at 17:20

## 2022-01-01 RX ADMIN — Medication 20 MICROGRAM(S): at 22:27

## 2022-01-01 RX ADMIN — Medication 7 UNIT(S): at 18:43

## 2022-01-01 RX ADMIN — Medication 1 TABLET(S): at 22:57

## 2022-01-01 RX ADMIN — Medication 1 APPLICATION(S): at 06:39

## 2022-01-01 RX ADMIN — TACROLIMUS 1 MILLIGRAM(S): 5 CAPSULE ORAL at 22:25

## 2022-01-01 RX ADMIN — Medication 50 MILLIGRAM(S): at 06:18

## 2022-01-01 RX ADMIN — Medication 400 MILLIGRAM(S): at 05:27

## 2022-01-01 RX ADMIN — ATORVASTATIN CALCIUM 40 MILLIGRAM(S): 80 TABLET, FILM COATED ORAL at 21:36

## 2022-01-01 RX ADMIN — Medication 108 GRAM(S): at 06:45

## 2022-01-01 RX ADMIN — Medication 400 MILLIGRAM(S): at 05:41

## 2022-01-01 RX ADMIN — Medication 650 MILLIGRAM(S): at 21:54

## 2022-01-01 RX ADMIN — ISOSORBIDE MONONITRATE 30 MILLIGRAM(S): 60 TABLET, EXTENDED RELEASE ORAL at 13:07

## 2022-01-01 RX ADMIN — CALCITRIOL 0.25 MICROGRAM(S): 0.5 CAPSULE ORAL at 06:11

## 2022-01-01 RX ADMIN — INSULIN GLARGINE 18 UNIT(S): 100 INJECTION, SOLUTION SUBCUTANEOUS at 21:49

## 2022-01-01 RX ADMIN — TACROLIMUS 0.5 MILLIGRAM(S): 5 CAPSULE ORAL at 18:45

## 2022-01-01 RX ADMIN — Medication 1 APPLICATION(S): at 05:35

## 2022-01-01 RX ADMIN — Medication 1 APPLICATION(S): at 05:33

## 2022-01-01 RX ADMIN — SODIUM CHLORIDE 50 MILLILITER(S): 5 INJECTION, SOLUTION INTRAVENOUS at 08:50

## 2022-01-01 RX ADMIN — MYCOPHENOLATE MOFETIL 500 MILLIGRAM(S): 250 CAPSULE ORAL at 11:21

## 2022-01-01 RX ADMIN — Medication 3 MILLILITER(S): at 19:53

## 2022-01-01 RX ADMIN — Medication 1 APPLICATION(S): at 18:51

## 2022-01-01 RX ADMIN — APIXABAN 2.5 MILLIGRAM(S): 2.5 TABLET, FILM COATED ORAL at 05:34

## 2022-01-01 RX ADMIN — SODIUM CHLORIDE 50 MILLILITER(S): 5 INJECTION, SOLUTION INTRAVENOUS at 06:06

## 2022-01-01 RX ADMIN — Medication 20 MILLIEQUIVALENT(S): at 12:57

## 2022-01-01 RX ADMIN — SODIUM CHLORIDE 50 MILLILITER(S): 5 INJECTION, SOLUTION INTRAVENOUS at 06:45

## 2022-01-01 RX ADMIN — Medication 150 MICROGRAM(S): at 05:32

## 2022-01-01 RX ADMIN — Medication 3 UNIT(S): at 08:10

## 2022-01-01 RX ADMIN — BUMETANIDE 2 MILLIGRAM(S): 0.25 INJECTION INTRAMUSCULAR; INTRAVENOUS at 06:32

## 2022-01-01 RX ADMIN — SACUBITRIL AND VALSARTAN 1 TABLET(S): 24; 26 TABLET, FILM COATED ORAL at 18:01

## 2022-01-01 RX ADMIN — TACROLIMUS 0.5 MILLIGRAM(S): 5 CAPSULE ORAL at 21:40

## 2022-01-01 RX ADMIN — Medication 400 MILLIGRAM(S): at 17:54

## 2022-01-01 RX ADMIN — Medication 30 MILLIGRAM(S): at 05:51

## 2022-01-01 RX ADMIN — Medication 6 UNIT(S): at 12:43

## 2022-01-01 RX ADMIN — Medication 650 MILLIGRAM(S): at 14:08

## 2022-01-01 RX ADMIN — Medication 20 MILLIGRAM(S): at 10:24

## 2022-01-01 RX ADMIN — TACROLIMUS 1 MILLIGRAM(S): 5 CAPSULE ORAL at 21:35

## 2022-01-01 RX ADMIN — Medication 150 MICROGRAM(S): at 05:15

## 2022-01-01 RX ADMIN — Medication 400 MILLIGRAM(S): at 07:37

## 2022-01-01 RX ADMIN — Medication 40 MILLIGRAM(S): at 06:31

## 2022-01-01 RX ADMIN — Medication 400 MILLIGRAM(S): at 06:54

## 2022-01-01 RX ADMIN — Medication 650 MILLIGRAM(S): at 12:36

## 2022-01-01 RX ADMIN — SODIUM CHLORIDE 50 MILLILITER(S): 5 INJECTION, SOLUTION INTRAVENOUS at 06:27

## 2022-01-01 RX ADMIN — SACUBITRIL AND VALSARTAN 1 TABLET(S): 24; 26 TABLET, FILM COATED ORAL at 17:00

## 2022-01-01 RX ADMIN — Medication 6 UNIT(S): at 08:34

## 2022-01-01 RX ADMIN — Medication 1: at 17:43

## 2022-01-01 RX ADMIN — Medication 30 MILLIGRAM(S): at 05:41

## 2022-01-01 RX ADMIN — Medication 1 TABLET(S): at 12:31

## 2022-01-01 RX ADMIN — Medication 50 MILLIGRAM(S): at 05:26

## 2022-01-01 RX ADMIN — APIXABAN 2.5 MILLIGRAM(S): 2.5 TABLET, FILM COATED ORAL at 06:52

## 2022-01-01 RX ADMIN — Medication 400 MILLIGRAM(S): at 17:22

## 2022-01-01 RX ADMIN — APIXABAN 2.5 MILLIGRAM(S): 2.5 TABLET, FILM COATED ORAL at 17:25

## 2022-01-01 RX ADMIN — INSULIN GLARGINE 16 UNIT(S): 100 INJECTION, SOLUTION SUBCUTANEOUS at 22:31

## 2022-01-01 RX ADMIN — Medication 400 MILLIGRAM(S): at 17:09

## 2022-01-01 RX ADMIN — APIXABAN 2.5 MILLIGRAM(S): 2.5 TABLET, FILM COATED ORAL at 18:48

## 2022-01-01 RX ADMIN — Medication 1 APPLICATION(S): at 17:23

## 2022-01-01 RX ADMIN — Medication 400 MILLIGRAM(S): at 17:37

## 2022-01-01 RX ADMIN — Medication 100 MILLIGRAM(S): at 06:45

## 2022-01-01 RX ADMIN — Medication 108 GRAM(S): at 22:27

## 2022-01-01 RX ADMIN — Medication 5 MILLIGRAM(S): at 22:38

## 2022-01-01 RX ADMIN — Medication 100 MILLIGRAM(S): at 06:28

## 2022-01-01 RX ADMIN — Medication 6 MILLIGRAM(S): at 18:07

## 2022-01-01 RX ADMIN — MYCOPHENOLATE MOFETIL 500 MILLIGRAM(S): 250 CAPSULE ORAL at 23:11

## 2022-01-01 RX ADMIN — Medication 6 MILLIGRAM(S): at 05:35

## 2022-01-01 RX ADMIN — Medication 7 UNIT(S): at 12:03

## 2022-01-01 RX ADMIN — Medication 150 MICROGRAM(S): at 05:42

## 2022-01-01 RX ADMIN — Medication 1 APPLICATION(S): at 05:40

## 2022-01-01 RX ADMIN — Medication 1 TABLET(S): at 13:02

## 2022-01-01 RX ADMIN — INSULIN GLARGINE 6 UNIT(S): 100 INJECTION, SOLUTION SUBCUTANEOUS at 22:29

## 2022-01-01 RX ADMIN — FAMOTIDINE 20 MILLIGRAM(S): 10 INJECTION INTRAVENOUS at 11:48

## 2022-01-01 RX ADMIN — Medication 150 MICROGRAM(S): at 06:42

## 2022-01-01 RX ADMIN — Medication 3: at 12:30

## 2022-01-01 RX ADMIN — TACROLIMUS 1 MILLIGRAM(S): 5 CAPSULE ORAL at 08:20

## 2022-01-01 RX ADMIN — Medication 400 MILLIGRAM(S): at 05:51

## 2022-01-01 RX ADMIN — CEFEPIME 100 MILLIGRAM(S): 1 INJECTION, POWDER, FOR SOLUTION INTRAMUSCULAR; INTRAVENOUS at 13:40

## 2022-01-01 RX ADMIN — MYCOPHENOLATE MOFETIL 500 MILLIGRAM(S): 250 CAPSULE ORAL at 11:35

## 2022-01-01 RX ADMIN — Medication 150 MICROGRAM(S): at 05:22

## 2022-01-01 RX ADMIN — ENOXAPARIN SODIUM 85 MILLIGRAM(S): 100 INJECTION SUBCUTANEOUS at 05:43

## 2022-01-01 RX ADMIN — INSULIN GLARGINE 16 UNIT(S): 100 INJECTION, SOLUTION SUBCUTANEOUS at 21:48

## 2022-01-01 RX ADMIN — Medication 7 UNIT(S): at 12:21

## 2022-01-01 RX ADMIN — Medication 10 MILLIGRAM(S): at 06:17

## 2022-01-01 RX ADMIN — TACROLIMUS 1 MILLIGRAM(S): 5 CAPSULE ORAL at 05:25

## 2022-01-01 RX ADMIN — TACROLIMUS 1 MILLIGRAM(S): 5 CAPSULE ORAL at 23:56

## 2022-01-01 RX ADMIN — Medication 400 MILLIGRAM(S): at 18:54

## 2022-01-01 RX ADMIN — Medication 40 MILLIEQUIVALENT(S): at 09:42

## 2022-01-01 RX ADMIN — TACROLIMUS 0.5 MILLIGRAM(S): 5 CAPSULE ORAL at 17:10

## 2022-01-01 RX ADMIN — Medication 5 MILLIGRAM(S): at 21:13

## 2022-01-01 RX ADMIN — Medication 100 GRAM(S): at 06:34

## 2022-01-01 RX ADMIN — SODIUM CHLORIDE 50 MILLILITER(S): 5 INJECTION, SOLUTION INTRAVENOUS at 18:05

## 2022-01-01 RX ADMIN — Medication 400 MILLIGRAM(S): at 17:14

## 2022-01-01 RX ADMIN — Medication 40 MILLIGRAM(S): at 09:45

## 2022-01-01 RX ADMIN — Medication 10 MILLIGRAM(S): at 05:45

## 2022-01-01 RX ADMIN — INSULIN GLARGINE 17 UNIT(S): 100 INJECTION, SOLUTION SUBCUTANEOUS at 21:54

## 2022-01-01 RX ADMIN — Medication 7 UNIT(S): at 08:11

## 2022-01-01 RX ADMIN — Medication 40 MILLIGRAM(S): at 06:05

## 2022-01-01 RX ADMIN — Medication 12: at 17:11

## 2022-01-01 RX ADMIN — Medication 1: at 12:30

## 2022-01-01 RX ADMIN — Medication 6 UNIT(S): at 12:29

## 2022-01-01 RX ADMIN — ISOSORBIDE MONONITRATE 30 MILLIGRAM(S): 60 TABLET, EXTENDED RELEASE ORAL at 12:52

## 2022-01-01 RX ADMIN — ISOSORBIDE MONONITRATE 30 MILLIGRAM(S): 60 TABLET, EXTENDED RELEASE ORAL at 11:05

## 2022-01-01 RX ADMIN — APIXABAN 2.5 MILLIGRAM(S): 2.5 TABLET, FILM COATED ORAL at 05:26

## 2022-01-01 RX ADMIN — HEPARIN SODIUM 1800 UNIT(S)/HR: 5000 INJECTION INTRAVENOUS; SUBCUTANEOUS at 22:28

## 2022-01-01 RX ADMIN — HYDROMORPHONE HYDROCHLORIDE 0.5 MILLIGRAM(S): 2 INJECTION INTRAMUSCULAR; INTRAVENOUS; SUBCUTANEOUS at 08:35

## 2022-01-01 RX ADMIN — Medication 400 MILLIGRAM(S): at 06:42

## 2022-01-01 RX ADMIN — Medication 400 MILLIGRAM(S): at 18:56

## 2022-01-01 RX ADMIN — Medication 3: at 12:20

## 2022-01-01 RX ADMIN — MYCOPHENOLATE MOFETIL 500 MILLIGRAM(S): 250 CAPSULE ORAL at 11:19

## 2022-01-01 RX ADMIN — IRON SUCROSE 110 MILLIGRAM(S): 20 INJECTION, SOLUTION INTRAVENOUS at 12:53

## 2022-01-01 RX ADMIN — APIXABAN 2.5 MILLIGRAM(S): 2.5 TABLET, FILM COATED ORAL at 05:41

## 2022-01-01 RX ADMIN — Medication 5 MILLIGRAM(S): at 06:48

## 2022-01-01 RX ADMIN — Medication 1: at 17:32

## 2022-01-01 RX ADMIN — MYCOPHENOLATE MOFETIL 500 MILLIGRAM(S): 250 CAPSULE ORAL at 11:42

## 2022-01-01 RX ADMIN — MYCOPHENOLATE MOFETIL 500 MILLIGRAM(S): 250 CAPSULE ORAL at 18:16

## 2022-01-01 RX ADMIN — Medication 7 UNIT(S): at 17:29

## 2022-01-01 RX ADMIN — SACUBITRIL AND VALSARTAN 1 TABLET(S): 24; 26 TABLET, FILM COATED ORAL at 05:55

## 2022-01-01 RX ADMIN — Medication 1: at 08:33

## 2022-01-01 RX ADMIN — Medication 150 MICROGRAM(S): at 05:26

## 2022-01-01 RX ADMIN — Medication 2: at 07:55

## 2022-01-01 RX ADMIN — SACUBITRIL AND VALSARTAN 1 TABLET(S): 24; 26 TABLET, FILM COATED ORAL at 05:36

## 2022-01-01 RX ADMIN — Medication 3: at 11:51

## 2022-01-01 RX ADMIN — PIPERACILLIN AND TAZOBACTAM 25 GRAM(S): 4; .5 INJECTION, POWDER, LYOPHILIZED, FOR SOLUTION INTRAVENOUS at 21:39

## 2022-01-01 RX ADMIN — Medication 5 MILLIGRAM(S): at 05:55

## 2022-01-01 RX ADMIN — ATORVASTATIN CALCIUM 40 MILLIGRAM(S): 80 TABLET, FILM COATED ORAL at 21:30

## 2022-01-01 RX ADMIN — Medication 6 UNIT(S): at 11:56

## 2022-01-01 RX ADMIN — Medication 1 TABLET(S): at 13:03

## 2022-01-01 RX ADMIN — MYCOPHENOLATE MOFETIL 500 MILLIGRAM(S): 250 CAPSULE ORAL at 22:13

## 2022-01-01 RX ADMIN — Medication 650 MILLIGRAM(S): at 05:48

## 2022-01-01 RX ADMIN — Medication 6 UNIT(S): at 18:03

## 2022-01-01 RX ADMIN — Medication 3: at 12:51

## 2022-01-01 RX ADMIN — Medication 400 MILLIGRAM(S): at 17:44

## 2022-01-01 RX ADMIN — Medication 6 MILLIGRAM(S): at 18:03

## 2022-01-01 RX ADMIN — Medication 400 MILLIGRAM(S): at 19:28

## 2022-01-01 RX ADMIN — TACROLIMUS 0.5 MILLIGRAM(S): 5 CAPSULE ORAL at 21:05

## 2022-01-01 RX ADMIN — ROBINUL 0.2 MILLIGRAM(S): 0.2 INJECTION INTRAMUSCULAR; INTRAVENOUS at 08:50

## 2022-01-01 RX ADMIN — ISOSORBIDE MONONITRATE 30 MILLIGRAM(S): 60 TABLET, EXTENDED RELEASE ORAL at 12:36

## 2022-01-01 RX ADMIN — Medication 5 MILLIGRAM(S): at 23:45

## 2022-01-01 RX ADMIN — Medication 400 MILLIGRAM(S): at 22:57

## 2022-01-01 RX ADMIN — Medication 5 MILLIGRAM(S): at 01:18

## 2022-01-01 RX ADMIN — Medication 100 MILLIGRAM(S): at 06:46

## 2022-01-01 RX ADMIN — TACROLIMUS 0.5 MILLIGRAM(S): 5 CAPSULE ORAL at 05:29

## 2022-01-01 RX ADMIN — SODIUM CHLORIDE 50 MILLILITER(S): 5 INJECTION, SOLUTION INTRAVENOUS at 06:43

## 2022-01-01 RX ADMIN — INSULIN GLARGINE 18 UNIT(S): 100 INJECTION, SOLUTION SUBCUTANEOUS at 22:19

## 2022-01-01 RX ADMIN — SODIUM CHLORIDE 150 MILLILITER(S): 9 INJECTION, SOLUTION INTRAVENOUS at 09:14

## 2022-01-01 RX ADMIN — Medication 1 APPLICATION(S): at 05:34

## 2022-01-01 RX ADMIN — TACROLIMUS 0.5 MILLIGRAM(S): 5 CAPSULE ORAL at 17:21

## 2022-01-01 RX ADMIN — MYCOPHENOLATE MOFETIL 500 MILLIGRAM(S): 250 CAPSULE ORAL at 18:10

## 2022-01-01 RX ADMIN — Medication 650 MILLIGRAM(S): at 06:48

## 2022-01-01 RX ADMIN — ISOSORBIDE MONONITRATE 30 MILLIGRAM(S): 60 TABLET, EXTENDED RELEASE ORAL at 11:35

## 2022-01-01 RX ADMIN — CEFEPIME 100 MILLIGRAM(S): 1 INJECTION, POWDER, FOR SOLUTION INTRAMUSCULAR; INTRAVENOUS at 18:04

## 2022-01-01 RX ADMIN — Medication 0.5 MILLIGRAM(S): at 22:25

## 2022-01-01 RX ADMIN — FAMOTIDINE 20 MILLIGRAM(S): 10 INJECTION INTRAVENOUS at 11:20

## 2022-01-01 RX ADMIN — Medication 25 MILLIGRAM(S): at 05:31

## 2022-01-01 RX ADMIN — Medication 7 UNIT(S): at 13:04

## 2022-01-01 RX ADMIN — Medication 400 MILLIGRAM(S): at 05:33

## 2022-01-01 RX ADMIN — Medication 400 MILLIGRAM(S): at 17:35

## 2022-01-01 RX ADMIN — Medication 3 MILLILITER(S): at 19:47

## 2022-01-01 RX ADMIN — TACROLIMUS 1 MILLIGRAM(S): 5 CAPSULE ORAL at 06:03

## 2022-01-01 RX ADMIN — Medication 30 MILLIGRAM(S): at 06:38

## 2022-01-01 RX ADMIN — MYCOPHENOLATE MOFETIL 500 MILLIGRAM(S): 250 CAPSULE ORAL at 12:52

## 2022-01-01 RX ADMIN — APIXABAN 2.5 MILLIGRAM(S): 2.5 TABLET, FILM COATED ORAL at 17:06

## 2022-01-01 RX ADMIN — TACROLIMUS 1 MILLIGRAM(S): 5 CAPSULE ORAL at 18:03

## 2022-01-01 RX ADMIN — Medication 40 MILLIGRAM(S): at 05:43

## 2022-01-01 RX ADMIN — FAMOTIDINE 20 MILLIGRAM(S): 10 INJECTION INTRAVENOUS at 12:52

## 2022-01-01 RX ADMIN — ENOXAPARIN SODIUM 85 MILLIGRAM(S): 100 INJECTION SUBCUTANEOUS at 17:00

## 2022-01-01 RX ADMIN — BUMETANIDE 5 MG/HR: 0.25 INJECTION INTRAMUSCULAR; INTRAVENOUS at 11:36

## 2022-01-01 RX ADMIN — Medication 400 MILLIGRAM(S): at 06:59

## 2022-01-01 RX ADMIN — Medication 400 MILLIGRAM(S): at 18:13

## 2022-01-01 RX ADMIN — ATORVASTATIN CALCIUM 40 MILLIGRAM(S): 80 TABLET, FILM COATED ORAL at 21:48

## 2022-01-01 RX ADMIN — ACETAZOLAMIDE 105 MILLIGRAM(S): 250 TABLET ORAL at 12:25

## 2022-01-01 RX ADMIN — ATORVASTATIN CALCIUM 40 MILLIGRAM(S): 80 TABLET, FILM COATED ORAL at 21:35

## 2022-01-01 RX ADMIN — Medication 1 PACKET(S): at 17:37

## 2022-01-01 RX ADMIN — Medication 50 MILLIGRAM(S): at 05:47

## 2022-01-01 RX ADMIN — MYCOPHENOLATE MOFETIL 500 MILLIGRAM(S): 250 CAPSULE ORAL at 12:14

## 2022-01-01 RX ADMIN — Medication 6 UNIT(S): at 12:52

## 2022-01-01 RX ADMIN — Medication 25 GRAM(S): at 12:49

## 2022-01-01 RX ADMIN — CHLORHEXIDINE GLUCONATE 1 APPLICATION(S): 213 SOLUTION TOPICAL at 05:36

## 2022-01-01 RX ADMIN — Medication 400 MILLIGRAM(S): at 06:15

## 2022-01-01 RX ADMIN — Medication 5 MILLIGRAM(S): at 21:36

## 2022-01-01 RX ADMIN — Medication 5 MILLIGRAM(S): at 06:40

## 2022-01-01 RX ADMIN — Medication 6 UNIT(S): at 08:39

## 2022-01-01 RX ADMIN — ISOSORBIDE MONONITRATE 30 MILLIGRAM(S): 60 TABLET, EXTENDED RELEASE ORAL at 11:37

## 2022-01-01 RX ADMIN — SACUBITRIL AND VALSARTAN 1 TABLET(S): 24; 26 TABLET, FILM COATED ORAL at 18:48

## 2022-01-01 RX ADMIN — TACROLIMUS 0.5 MILLIGRAM(S): 5 CAPSULE ORAL at 06:44

## 2022-01-01 RX ADMIN — Medication 1 TABLET(S): at 13:12

## 2022-01-01 RX ADMIN — Medication 400 MILLIGRAM(S): at 18:28

## 2022-01-01 RX ADMIN — TACROLIMUS 0.5 MILLIGRAM(S): 5 CAPSULE ORAL at 18:48

## 2022-01-01 RX ADMIN — Medication 2: at 12:47

## 2022-01-01 RX ADMIN — Medication 400 MILLIGRAM(S): at 05:38

## 2022-01-01 RX ADMIN — Medication 650 MILLIGRAM(S): at 21:44

## 2022-01-01 RX ADMIN — Medication 12: at 22:19

## 2022-01-01 RX ADMIN — Medication 10 MILLIGRAM(S): at 05:41

## 2022-01-01 RX ADMIN — ATORVASTATIN CALCIUM 40 MILLIGRAM(S): 80 TABLET, FILM COATED ORAL at 21:37

## 2022-01-01 RX ADMIN — TACROLIMUS 0.5 MILLIGRAM(S): 5 CAPSULE ORAL at 17:37

## 2022-01-01 RX ADMIN — MYCOPHENOLATE MOFETIL 500 MILLIGRAM(S): 250 CAPSULE ORAL at 05:15

## 2022-01-01 RX ADMIN — Medication 650 MILLIGRAM(S): at 05:33

## 2022-01-01 RX ADMIN — Medication 1: at 12:43

## 2022-01-01 RX ADMIN — Medication 50 MILLIGRAM(S): at 22:54

## 2022-01-01 RX ADMIN — MYCOPHENOLATE MOFETIL 500 MILLIGRAM(S): 250 CAPSULE ORAL at 11:36

## 2022-01-01 RX ADMIN — Medication 150 MICROGRAM(S): at 05:12

## 2022-01-01 RX ADMIN — TACROLIMUS 0.5 MILLIGRAM(S): 5 CAPSULE ORAL at 06:47

## 2022-01-01 RX ADMIN — Medication 400 MILLIGRAM(S): at 15:55

## 2022-01-01 RX ADMIN — APIXABAN 2.5 MILLIGRAM(S): 2.5 TABLET, FILM COATED ORAL at 05:38

## 2022-01-01 RX ADMIN — Medication 5 MILLIGRAM(S): at 01:42

## 2022-01-01 RX ADMIN — MYCOPHENOLATE MOFETIL 500 MILLIGRAM(S): 250 CAPSULE ORAL at 15:54

## 2022-01-01 RX ADMIN — Medication 150 MICROGRAM(S): at 05:45

## 2022-01-01 RX ADMIN — Medication 650 MILLIGRAM(S): at 00:00

## 2022-01-01 RX ADMIN — Medication 1 APPLICATION(S): at 05:26

## 2022-01-01 RX ADMIN — Medication 2: at 08:56

## 2022-01-01 RX ADMIN — Medication 1 APPLICATION(S): at 05:16

## 2022-01-01 RX ADMIN — ATORVASTATIN CALCIUM 40 MILLIGRAM(S): 80 TABLET, FILM COATED ORAL at 21:22

## 2022-01-01 RX ADMIN — Medication 400 MILLIGRAM(S): at 18:49

## 2022-01-01 RX ADMIN — Medication 6 MILLIGRAM(S): at 17:35

## 2022-01-01 RX ADMIN — MYCOPHENOLATE MOFETIL 500 MILLIGRAM(S): 250 CAPSULE ORAL at 05:26

## 2022-01-01 RX ADMIN — CEFEPIME 100 MILLIGRAM(S): 1 INJECTION, POWDER, FOR SOLUTION INTRAMUSCULAR; INTRAVENOUS at 17:44

## 2022-01-01 RX ADMIN — Medication 1 APPLICATION(S): at 06:05

## 2022-01-01 RX ADMIN — SODIUM CHLORIDE 50 MILLILITER(S): 5 INJECTION, SOLUTION INTRAVENOUS at 19:12

## 2022-01-01 RX ADMIN — Medication 5 MILLIGRAM(S): at 21:10

## 2022-01-01 RX ADMIN — Medication 6: at 18:42

## 2022-01-01 RX ADMIN — Medication 1 TABLET(S): at 11:46

## 2022-01-01 RX ADMIN — APIXABAN 2.5 MILLIGRAM(S): 2.5 TABLET, FILM COATED ORAL at 17:31

## 2022-01-01 RX ADMIN — Medication 5 MILLIGRAM(S): at 21:27

## 2022-01-01 RX ADMIN — Medication 650 MILLIGRAM(S): at 22:26

## 2022-01-01 RX ADMIN — Medication 25 MILLIGRAM(S): at 06:13

## 2022-01-01 RX ADMIN — Medication 2 UNIT(S): at 08:50

## 2022-01-01 RX ADMIN — Medication 4 UNIT(S): at 08:20

## 2022-01-01 RX ADMIN — Medication 1 APPLICATION(S): at 17:30

## 2022-01-01 RX ADMIN — Medication 1 APPLICATION(S): at 05:19

## 2022-01-01 RX ADMIN — Medication 100 MILLIGRAM(S): at 05:41

## 2022-01-01 RX ADMIN — Medication 1: at 11:37

## 2022-01-01 RX ADMIN — INSULIN GLARGINE 18 UNIT(S): 100 INJECTION, SOLUTION SUBCUTANEOUS at 21:43

## 2022-01-01 RX ADMIN — Medication 50 MILLIGRAM(S): at 05:33

## 2022-01-01 RX ADMIN — Medication 4 UNIT(S): at 11:52

## 2022-01-01 RX ADMIN — Medication 3: at 12:00

## 2022-01-01 RX ADMIN — APIXABAN 2.5 MILLIGRAM(S): 2.5 TABLET, FILM COATED ORAL at 05:22

## 2022-01-01 RX ADMIN — SACUBITRIL AND VALSARTAN 1 TABLET(S): 24; 26 TABLET, FILM COATED ORAL at 05:11

## 2022-01-01 RX ADMIN — TACROLIMUS 0.5 MILLIGRAM(S): 5 CAPSULE ORAL at 18:43

## 2022-01-01 RX ADMIN — SACUBITRIL AND VALSARTAN 1 TABLET(S): 24; 26 TABLET, FILM COATED ORAL at 06:15

## 2022-01-01 RX ADMIN — Medication 3 MILLILITER(S): at 08:19

## 2022-01-01 RX ADMIN — MYCOPHENOLATE MOFETIL 500 MILLIGRAM(S): 250 CAPSULE ORAL at 17:17

## 2022-01-01 RX ADMIN — Medication 1 APPLICATION(S): at 17:39

## 2022-01-01 RX ADMIN — INSULIN GLARGINE 22 UNIT(S): 100 INJECTION, SOLUTION SUBCUTANEOUS at 22:22

## 2022-01-01 RX ADMIN — Medication 4: at 12:34

## 2022-01-01 RX ADMIN — Medication 650 MILLIGRAM(S): at 13:38

## 2022-01-01 RX ADMIN — MYCOPHENOLATE MOFETIL 500 MILLIGRAM(S): 250 CAPSULE ORAL at 13:19

## 2022-01-01 RX ADMIN — AMPICILLIN SODIUM AND SULBACTAM SODIUM 200 GRAM(S): 250; 125 INJECTION, POWDER, FOR SUSPENSION INTRAMUSCULAR; INTRAVENOUS at 05:11

## 2022-01-01 RX ADMIN — Medication 400 MILLIGRAM(S): at 06:49

## 2022-01-01 RX ADMIN — BUMETANIDE 2 MILLIGRAM(S): 0.25 INJECTION INTRAMUSCULAR; INTRAVENOUS at 18:48

## 2022-01-01 RX ADMIN — Medication 5 MILLIGRAM(S): at 23:37

## 2022-01-01 RX ADMIN — Medication 5 MILLIGRAM(S): at 21:47

## 2022-01-01 RX ADMIN — Medication 3 MILLILITER(S): at 09:14

## 2022-01-01 RX ADMIN — Medication 6 UNIT(S): at 08:19

## 2022-01-01 RX ADMIN — Medication 1: at 17:22

## 2022-01-01 RX ADMIN — AMPICILLIN SODIUM AND SULBACTAM SODIUM 200 GRAM(S): 250; 125 INJECTION, POWDER, FOR SUSPENSION INTRAMUSCULAR; INTRAVENOUS at 15:55

## 2022-01-01 RX ADMIN — Medication 1 APPLICATION(S): at 19:05

## 2022-01-01 RX ADMIN — MYCOPHENOLATE MOFETIL 500 MILLIGRAM(S): 250 CAPSULE ORAL at 05:43

## 2022-01-01 RX ADMIN — MYCOPHENOLATE MOFETIL 500 MILLIGRAM(S): 250 CAPSULE ORAL at 00:29

## 2022-01-01 RX ADMIN — Medication 2: at 09:20

## 2022-01-01 RX ADMIN — Medication 1 APPLICATION(S): at 05:45

## 2022-01-01 RX ADMIN — MYCOPHENOLATE MOFETIL 500 MILLIGRAM(S): 250 CAPSULE ORAL at 00:13

## 2022-01-01 RX ADMIN — MYCOPHENOLATE MOFETIL 500 MILLIGRAM(S): 250 CAPSULE ORAL at 05:57

## 2022-01-01 RX ADMIN — Medication 650 MILLIGRAM(S): at 13:04

## 2022-01-01 RX ADMIN — Medication 3 MILLILITER(S): at 13:42

## 2022-01-01 RX ADMIN — MYCOPHENOLATE MOFETIL 500 MILLIGRAM(S): 250 CAPSULE ORAL at 06:40

## 2022-01-01 RX ADMIN — Medication 1 TABLET(S): at 12:34

## 2022-01-01 RX ADMIN — Medication 650 MILLIGRAM(S): at 22:18

## 2022-01-01 RX ADMIN — Medication 1 TABLET(S): at 13:28

## 2022-01-01 RX ADMIN — MYCOPHENOLATE MOFETIL 500 MILLIGRAM(S): 250 CAPSULE ORAL at 11:06

## 2022-01-01 RX ADMIN — SODIUM CHLORIDE 75 MILLILITER(S): 9 INJECTION INTRAMUSCULAR; INTRAVENOUS; SUBCUTANEOUS at 21:34

## 2022-01-01 RX ADMIN — SODIUM CHLORIDE 75 MILLILITER(S): 9 INJECTION, SOLUTION INTRAVENOUS at 21:35

## 2022-01-01 RX ADMIN — Medication 25 GRAM(S): at 13:48

## 2022-01-01 RX ADMIN — TACROLIMUS 1 MILLIGRAM(S): 5 CAPSULE ORAL at 06:46

## 2022-01-01 RX ADMIN — Medication 1 PACKET(S): at 11:47

## 2022-01-01 RX ADMIN — Medication 108 GRAM(S): at 16:59

## 2022-01-01 RX ADMIN — Medication 400 MILLIGRAM(S): at 06:25

## 2022-01-01 RX ADMIN — Medication 3 MILLILITER(S): at 09:06

## 2022-01-01 RX ADMIN — Medication 10 MILLIGRAM(S): at 05:19

## 2022-01-01 RX ADMIN — Medication 7 UNIT(S): at 17:05

## 2022-01-01 RX ADMIN — ATORVASTATIN CALCIUM 40 MILLIGRAM(S): 80 TABLET, FILM COATED ORAL at 22:27

## 2022-01-01 RX ADMIN — APIXABAN 2.5 MILLIGRAM(S): 2.5 TABLET, FILM COATED ORAL at 17:16

## 2022-01-01 RX ADMIN — Medication 400 MILLIGRAM(S): at 05:31

## 2022-01-01 RX ADMIN — ATORVASTATIN CALCIUM 40 MILLIGRAM(S): 80 TABLET, FILM COATED ORAL at 22:17

## 2022-01-01 RX ADMIN — Medication 7 UNIT(S): at 08:21

## 2022-01-01 RX ADMIN — Medication 400 MILLIGRAM(S): at 06:10

## 2022-01-01 RX ADMIN — ISOSORBIDE MONONITRATE 30 MILLIGRAM(S): 60 TABLET, EXTENDED RELEASE ORAL at 12:55

## 2022-01-01 RX ADMIN — INSULIN GLARGINE 16 UNIT(S): 100 INJECTION, SOLUTION SUBCUTANEOUS at 21:29

## 2022-01-01 RX ADMIN — Medication 1 APPLICATION(S): at 06:45

## 2022-01-01 RX ADMIN — TACROLIMUS 0.5 MILLIGRAM(S): 5 CAPSULE ORAL at 05:51

## 2022-01-01 RX ADMIN — Medication 650 MILLIGRAM(S): at 22:28

## 2022-01-01 RX ADMIN — TACROLIMUS 1 MILLIGRAM(S): 5 CAPSULE ORAL at 21:28

## 2022-01-01 RX ADMIN — Medication 650 MILLIGRAM(S): at 05:40

## 2022-01-01 RX ADMIN — Medication 3 MILLILITER(S): at 20:02

## 2022-01-01 RX ADMIN — SODIUM CHLORIDE 50 MILLILITER(S): 9 INJECTION, SOLUTION INTRAVENOUS at 06:52

## 2022-01-01 RX ADMIN — SODIUM CHLORIDE 60 MILLILITER(S): 9 INJECTION INTRAMUSCULAR; INTRAVENOUS; SUBCUTANEOUS at 00:31

## 2022-01-01 RX ADMIN — Medication 650 MILLIGRAM(S): at 13:12

## 2022-01-01 RX ADMIN — Medication 400 MILLIGRAM(S): at 05:23

## 2022-01-01 RX ADMIN — Medication 40 MILLIGRAM(S): at 05:24

## 2022-01-01 RX ADMIN — CHLORHEXIDINE GLUCONATE 1 APPLICATION(S): 213 SOLUTION TOPICAL at 06:17

## 2022-01-01 RX ADMIN — Medication 1 APPLICATION(S): at 06:07

## 2022-01-01 RX ADMIN — ATORVASTATIN CALCIUM 40 MILLIGRAM(S): 80 TABLET, FILM COATED ORAL at 21:11

## 2022-01-01 RX ADMIN — Medication 1: at 17:35

## 2022-01-01 RX ADMIN — Medication 1 APPLICATION(S): at 17:34

## 2022-01-01 RX ADMIN — MYCOPHENOLATE MOFETIL 500 MILLIGRAM(S): 250 CAPSULE ORAL at 23:00

## 2022-01-01 RX ADMIN — MYCOPHENOLATE MOFETIL 500 MILLIGRAM(S): 250 CAPSULE ORAL at 06:44

## 2022-01-01 RX ADMIN — ISOSORBIDE MONONITRATE 30 MILLIGRAM(S): 60 TABLET, EXTENDED RELEASE ORAL at 11:53

## 2022-01-01 RX ADMIN — MYCOPHENOLATE MOFETIL 500 MILLIGRAM(S): 250 CAPSULE ORAL at 05:12

## 2022-01-01 RX ADMIN — SODIUM CHLORIDE 50 MILLILITER(S): 5 INJECTION, SOLUTION INTRAVENOUS at 06:22

## 2022-01-01 RX ADMIN — Medication 400 MILLIGRAM(S): at 18:45

## 2022-01-01 RX ADMIN — Medication 4 UNIT(S): at 08:25

## 2022-01-01 RX ADMIN — TACROLIMUS 0.5 MILLIGRAM(S): 5 CAPSULE ORAL at 15:55

## 2022-01-01 RX ADMIN — Medication 7 UNIT(S): at 17:58

## 2022-01-01 RX ADMIN — Medication 30 MILLIGRAM(S): at 05:33

## 2022-01-01 RX ADMIN — ATORVASTATIN CALCIUM 40 MILLIGRAM(S): 80 TABLET, FILM COATED ORAL at 22:16

## 2022-01-01 RX ADMIN — Medication 10 MILLIGRAM(S): at 05:34

## 2022-01-01 RX ADMIN — Medication 6 UNIT(S): at 12:30

## 2022-01-01 RX ADMIN — APIXABAN 2.5 MILLIGRAM(S): 2.5 TABLET, FILM COATED ORAL at 17:38

## 2022-01-01 RX ADMIN — CHLORHEXIDINE GLUCONATE 1 APPLICATION(S): 213 SOLUTION TOPICAL at 05:14

## 2022-01-01 RX ADMIN — Medication 400 MILLIGRAM(S): at 17:20

## 2022-01-01 RX ADMIN — SACUBITRIL AND VALSARTAN 1 TABLET(S): 24; 26 TABLET, FILM COATED ORAL at 06:25

## 2022-01-01 RX ADMIN — APIXABAN 2.5 MILLIGRAM(S): 2.5 TABLET, FILM COATED ORAL at 19:05

## 2022-01-01 RX ADMIN — MYCOPHENOLATE MOFETIL 500 MILLIGRAM(S): 250 CAPSULE ORAL at 11:04

## 2022-01-01 RX ADMIN — MYCOPHENOLATE MOFETIL 500 MILLIGRAM(S): 250 CAPSULE ORAL at 02:00

## 2022-01-01 RX ADMIN — Medication 7 UNIT(S): at 12:15

## 2022-01-01 RX ADMIN — Medication 5 MILLIGRAM(S): at 21:14

## 2022-01-01 RX ADMIN — Medication 650 MILLIGRAM(S): at 22:34

## 2022-01-01 RX ADMIN — HEPARIN SODIUM 8000 UNIT(S): 5000 INJECTION INTRAVENOUS; SUBCUTANEOUS at 03:20

## 2022-01-01 RX ADMIN — Medication 650 MILLIGRAM(S): at 06:12

## 2022-01-01 RX ADMIN — SACUBITRIL AND VALSARTAN 1 TABLET(S): 24; 26 TABLET, FILM COATED ORAL at 18:50

## 2022-01-01 RX ADMIN — ATORVASTATIN CALCIUM 40 MILLIGRAM(S): 80 TABLET, FILM COATED ORAL at 22:23

## 2022-01-01 RX ADMIN — Medication 20 MILLIGRAM(S): at 05:47

## 2022-01-01 RX ADMIN — BUMETANIDE 2 MILLIGRAM(S): 0.25 INJECTION INTRAMUSCULAR; INTRAVENOUS at 17:33

## 2022-01-01 RX ADMIN — ISOSORBIDE MONONITRATE 30 MILLIGRAM(S): 60 TABLET, EXTENDED RELEASE ORAL at 11:42

## 2022-01-01 RX ADMIN — MYCOPHENOLATE MOFETIL 500 MILLIGRAM(S): 250 CAPSULE ORAL at 17:20

## 2022-01-01 RX ADMIN — SACUBITRIL AND VALSARTAN 1 TABLET(S): 24; 26 TABLET, FILM COATED ORAL at 05:58

## 2022-01-01 RX ADMIN — Medication 1 SPRAY(S): at 22:33

## 2022-01-01 RX ADMIN — APIXABAN 2.5 MILLIGRAM(S): 2.5 TABLET, FILM COATED ORAL at 05:33

## 2022-01-01 RX ADMIN — MYCOPHENOLATE MOFETIL 500 MILLIGRAM(S): 250 CAPSULE ORAL at 05:13

## 2022-01-01 RX ADMIN — Medication 1 APPLICATION(S): at 05:57

## 2022-01-01 RX ADMIN — SODIUM CHLORIDE 50 MILLILITER(S): 9 INJECTION INTRAMUSCULAR; INTRAVENOUS; SUBCUTANEOUS at 05:01

## 2022-01-01 RX ADMIN — MYCOPHENOLATE MOFETIL 500 MILLIGRAM(S): 250 CAPSULE ORAL at 06:42

## 2022-01-01 RX ADMIN — Medication 6 UNIT(S): at 08:05

## 2022-01-01 RX ADMIN — Medication 4 UNIT(S): at 18:41

## 2022-01-01 RX ADMIN — Medication 40 MILLIEQUIVALENT(S): at 17:42

## 2022-01-01 RX ADMIN — SODIUM CHLORIDE 50 MILLILITER(S): 5 INJECTION, SOLUTION INTRAVENOUS at 17:33

## 2022-01-01 RX ADMIN — ATORVASTATIN CALCIUM 40 MILLIGRAM(S): 80 TABLET, FILM COATED ORAL at 22:38

## 2022-01-01 RX ADMIN — Medication 1 TABLET(S): at 12:01

## 2022-01-01 RX ADMIN — Medication 150 MICROGRAM(S): at 05:24

## 2022-01-01 RX ADMIN — INSULIN GLARGINE 12 UNIT(S): 100 INJECTION, SOLUTION SUBCUTANEOUS at 22:17

## 2022-01-01 RX ADMIN — APIXABAN 2.5 MILLIGRAM(S): 2.5 TABLET, FILM COATED ORAL at 17:14

## 2022-01-01 RX ADMIN — PIPERACILLIN AND TAZOBACTAM 200 GRAM(S): 4; .5 INJECTION, POWDER, LYOPHILIZED, FOR SOLUTION INTRAVENOUS at 17:41

## 2022-01-01 RX ADMIN — TACROLIMUS 0.5 MILLIGRAM(S): 5 CAPSULE ORAL at 17:30

## 2022-01-01 RX ADMIN — INSULIN GLARGINE 16 UNIT(S): 100 INJECTION, SOLUTION SUBCUTANEOUS at 22:30

## 2022-01-01 RX ADMIN — Medication 1 APPLICATION(S): at 06:38

## 2022-01-01 RX ADMIN — PIPERACILLIN AND TAZOBACTAM 25 GRAM(S): 4; .5 INJECTION, POWDER, LYOPHILIZED, FOR SOLUTION INTRAVENOUS at 06:06

## 2022-01-01 RX ADMIN — Medication 3 MILLILITER(S): at 20:35

## 2022-01-01 RX ADMIN — Medication 2: at 17:40

## 2022-01-01 RX ADMIN — TACROLIMUS 0.5 MILLIGRAM(S): 5 CAPSULE ORAL at 17:40

## 2022-01-01 RX ADMIN — HEPARIN SODIUM 1600 UNIT(S)/HR: 5000 INJECTION INTRAVENOUS; SUBCUTANEOUS at 10:48

## 2022-01-01 RX ADMIN — TACROLIMUS 0.5 MILLIGRAM(S): 5 CAPSULE ORAL at 17:39

## 2022-01-01 RX ADMIN — AMPICILLIN SODIUM AND SULBACTAM SODIUM 200 GRAM(S): 250; 125 INJECTION, POWDER, FOR SUSPENSION INTRAMUSCULAR; INTRAVENOUS at 05:26

## 2022-01-01 RX ADMIN — Medication 108 GRAM(S): at 13:17

## 2022-01-01 RX ADMIN — Medication 1: at 18:03

## 2022-01-01 RX ADMIN — INSULIN GLARGINE 18 UNIT(S): 100 INJECTION, SOLUTION SUBCUTANEOUS at 23:58

## 2022-01-01 RX ADMIN — INSULIN GLARGINE 22 UNIT(S): 100 INJECTION, SOLUTION SUBCUTANEOUS at 22:33

## 2022-01-01 RX ADMIN — MYCOPHENOLATE MOFETIL 500 MILLIGRAM(S): 250 CAPSULE ORAL at 06:46

## 2022-01-01 RX ADMIN — Medication 650 MILLIGRAM(S): at 14:11

## 2022-01-01 RX ADMIN — AMPICILLIN SODIUM AND SULBACTAM SODIUM 200 GRAM(S): 250; 125 INJECTION, POWDER, FOR SUSPENSION INTRAMUSCULAR; INTRAVENOUS at 06:27

## 2022-01-01 RX ADMIN — APIXABAN 2.5 MILLIGRAM(S): 2.5 TABLET, FILM COATED ORAL at 06:14

## 2022-01-01 RX ADMIN — Medication 10 MILLIGRAM(S): at 06:24

## 2022-01-01 RX ADMIN — BUMETANIDE 5 MG/HR: 0.25 INJECTION INTRAMUSCULAR; INTRAVENOUS at 08:49

## 2022-01-01 RX ADMIN — Medication 1 APPLICATION(S): at 17:43

## 2022-01-01 RX ADMIN — Medication 25 GRAM(S): at 23:39

## 2022-01-01 RX ADMIN — Medication 150 MICROGRAM(S): at 06:52

## 2022-01-01 RX ADMIN — Medication 1 APPLICATION(S): at 17:40

## 2022-01-01 RX ADMIN — FAMOTIDINE 20 MILLIGRAM(S): 10 INJECTION INTRAVENOUS at 11:42

## 2022-01-01 RX ADMIN — Medication 2: at 11:40

## 2022-01-01 RX ADMIN — Medication 30 MILLIGRAM(S): at 05:26

## 2022-01-01 RX ADMIN — BUMETANIDE 5 MG/HR: 0.25 INJECTION INTRAMUSCULAR; INTRAVENOUS at 23:33

## 2022-01-01 RX ADMIN — Medication 650 MILLIGRAM(S): at 21:48

## 2022-01-01 RX ADMIN — Medication 400 MILLIGRAM(S): at 06:09

## 2022-01-01 RX ADMIN — APIXABAN 2.5 MILLIGRAM(S): 2.5 TABLET, FILM COATED ORAL at 18:18

## 2022-01-01 RX ADMIN — SODIUM CHLORIDE 50 MILLILITER(S): 5 INJECTION, SOLUTION INTRAVENOUS at 18:17

## 2022-01-01 RX ADMIN — Medication 30 MILLIGRAM(S): at 06:25

## 2022-01-01 RX ADMIN — TACROLIMUS 0.5 MILLIGRAM(S): 5 CAPSULE ORAL at 05:55

## 2022-01-01 RX ADMIN — Medication 20 MICROGRAM(S): at 12:31

## 2022-01-01 RX ADMIN — MYCOPHENOLATE MOFETIL 500 MILLIGRAM(S): 250 CAPSULE ORAL at 12:41

## 2022-01-01 RX ADMIN — Medication 50 MILLIGRAM(S): at 06:12

## 2022-01-01 RX ADMIN — SODIUM CHLORIDE 50 MILLILITER(S): 5 INJECTION, SOLUTION INTRAVENOUS at 06:39

## 2022-01-01 RX ADMIN — Medication 5 MILLIGRAM(S): at 21:37

## 2022-01-01 RX ADMIN — INSULIN GLARGINE 15 UNIT(S): 100 INJECTION, SOLUTION SUBCUTANEOUS at 22:02

## 2022-01-01 RX ADMIN — APIXABAN 2.5 MILLIGRAM(S): 2.5 TABLET, FILM COATED ORAL at 07:02

## 2022-01-01 RX ADMIN — MYCOPHENOLATE MOFETIL 500 MILLIGRAM(S): 250 CAPSULE ORAL at 18:00

## 2022-01-01 RX ADMIN — Medication 1: at 12:53

## 2022-01-01 RX ADMIN — Medication 400 MILLIGRAM(S): at 18:14

## 2022-01-01 RX ADMIN — TACROLIMUS 0.5 MILLIGRAM(S): 5 CAPSULE ORAL at 06:02

## 2022-01-01 RX ADMIN — ATORVASTATIN CALCIUM 40 MILLIGRAM(S): 80 TABLET, FILM COATED ORAL at 22:26

## 2022-01-01 RX ADMIN — Medication 400 MILLIGRAM(S): at 08:31

## 2022-01-01 RX ADMIN — APIXABAN 2.5 MILLIGRAM(S): 2.5 TABLET, FILM COATED ORAL at 18:44

## 2022-01-01 RX ADMIN — Medication 1 TABLET(S): at 11:35

## 2022-01-01 RX ADMIN — FAMOTIDINE 20 MILLIGRAM(S): 10 INJECTION INTRAVENOUS at 12:49

## 2022-01-01 RX ADMIN — Medication 5 MILLIGRAM(S): at 23:06

## 2022-01-01 RX ADMIN — Medication 650 MILLIGRAM(S): at 21:41

## 2022-01-01 RX ADMIN — MYCOPHENOLATE MOFETIL 500 MILLIGRAM(S): 250 CAPSULE ORAL at 17:06

## 2022-01-01 RX ADMIN — IRON SUCROSE 110 MILLIGRAM(S): 20 INJECTION, SOLUTION INTRAVENOUS at 13:19

## 2022-01-01 RX ADMIN — Medication 20 MILLIEQUIVALENT(S): at 22:43

## 2022-01-01 RX ADMIN — Medication 5 MILLIGRAM(S): at 05:35

## 2022-01-01 RX ADMIN — Medication 2: at 17:29

## 2022-01-01 RX ADMIN — INSULIN GLARGINE 21 UNIT(S): 100 INJECTION, SOLUTION SUBCUTANEOUS at 22:15

## 2022-01-01 RX ADMIN — PIPERACILLIN AND TAZOBACTAM 25 GRAM(S): 4; .5 INJECTION, POWDER, LYOPHILIZED, FOR SOLUTION INTRAVENOUS at 14:03

## 2022-01-01 RX ADMIN — BUMETANIDE 5 MG/HR: 0.25 INJECTION INTRAMUSCULAR; INTRAVENOUS at 07:00

## 2022-01-01 RX ADMIN — Medication 20 MILLIGRAM(S): at 06:07

## 2022-01-01 RX ADMIN — APIXABAN 2.5 MILLIGRAM(S): 2.5 TABLET, FILM COATED ORAL at 18:16

## 2022-01-01 RX ADMIN — Medication 6 UNIT(S): at 17:44

## 2022-01-01 RX ADMIN — BUMETANIDE 2 MILLIGRAM(S): 0.25 INJECTION INTRAMUSCULAR; INTRAVENOUS at 10:23

## 2022-01-01 RX ADMIN — BUMETANIDE 5 MG/HR: 0.25 INJECTION INTRAMUSCULAR; INTRAVENOUS at 16:23

## 2022-01-01 RX ADMIN — MYCOPHENOLATE MOFETIL 500 MILLIGRAM(S): 250 CAPSULE ORAL at 00:27

## 2022-01-01 RX ADMIN — INSULIN GLARGINE 22 UNIT(S): 100 INJECTION, SOLUTION SUBCUTANEOUS at 22:47

## 2022-01-01 RX ADMIN — HEPARIN SODIUM 5000 UNIT(S): 5000 INJECTION INTRAVENOUS; SUBCUTANEOUS at 18:40

## 2022-01-01 RX ADMIN — Medication 50 MILLIGRAM(S): at 06:46

## 2022-01-01 RX ADMIN — TACROLIMUS 1 MILLIGRAM(S): 5 CAPSULE ORAL at 17:09

## 2022-01-01 RX ADMIN — Medication 150 MICROGRAM(S): at 05:14

## 2022-01-01 RX ADMIN — CHLORHEXIDINE GLUCONATE 1 APPLICATION(S): 213 SOLUTION TOPICAL at 05:47

## 2022-01-01 RX ADMIN — Medication 25 MILLIGRAM(S): at 06:32

## 2022-01-01 RX ADMIN — ATORVASTATIN CALCIUM 40 MILLIGRAM(S): 80 TABLET, FILM COATED ORAL at 21:23

## 2022-01-01 RX ADMIN — MYCOPHENOLATE MOFETIL 500 MILLIGRAM(S): 250 CAPSULE ORAL at 17:10

## 2022-01-01 RX ADMIN — Medication 6 UNIT(S): at 17:25

## 2022-01-01 RX ADMIN — Medication 3: at 17:45

## 2022-01-01 RX ADMIN — ATORVASTATIN CALCIUM 40 MILLIGRAM(S): 80 TABLET, FILM COATED ORAL at 22:00

## 2022-01-01 RX ADMIN — SODIUM CHLORIDE 75 MILLILITER(S): 9 INJECTION, SOLUTION INTRAVENOUS at 21:58

## 2022-01-01 RX ADMIN — CHLORHEXIDINE GLUCONATE 1 APPLICATION(S): 213 SOLUTION TOPICAL at 05:52

## 2022-01-01 RX ADMIN — SODIUM CHLORIDE 60 MILLILITER(S): 9 INJECTION INTRAMUSCULAR; INTRAVENOUS; SUBCUTANEOUS at 10:16

## 2022-01-01 RX ADMIN — Medication 1 APPLICATION(S): at 05:20

## 2022-01-01 RX ADMIN — FAMOTIDINE 20 MILLIGRAM(S): 10 INJECTION INTRAVENOUS at 13:11

## 2022-01-01 RX ADMIN — FAMOTIDINE 20 MILLIGRAM(S): 10 INJECTION INTRAVENOUS at 12:41

## 2022-01-01 RX ADMIN — MYCOPHENOLATE MOFETIL 500 MILLIGRAM(S): 250 CAPSULE ORAL at 12:49

## 2022-01-01 RX ADMIN — Medication 3: at 08:07

## 2022-01-01 RX ADMIN — ENOXAPARIN SODIUM 85 MILLIGRAM(S): 100 INJECTION SUBCUTANEOUS at 17:06

## 2022-01-01 RX ADMIN — Medication 1 TABLET(S): at 12:52

## 2022-01-01 RX ADMIN — INSULIN GLARGINE 15 UNIT(S): 100 INJECTION, SOLUTION SUBCUTANEOUS at 22:25

## 2022-01-01 RX ADMIN — Medication 400 MILLIGRAM(S): at 17:06

## 2022-01-01 RX ADMIN — SODIUM CHLORIDE 50 MILLILITER(S): 5 INJECTION, SOLUTION INTRAVENOUS at 06:10

## 2022-01-01 RX ADMIN — ROBINUL 0.2 MILLIGRAM(S): 0.2 INJECTION INTRAMUSCULAR; INTRAVENOUS at 03:13

## 2022-01-01 RX ADMIN — MYCOPHENOLATE MOFETIL 500 MILLIGRAM(S): 250 CAPSULE ORAL at 05:35

## 2022-01-01 RX ADMIN — Medication 80 MILLIGRAM(S): at 05:10

## 2022-01-01 RX ADMIN — TACROLIMUS 0.5 MILLIGRAM(S): 5 CAPSULE ORAL at 17:15

## 2022-01-01 RX ADMIN — FAMOTIDINE 20 MILLIGRAM(S): 10 INJECTION INTRAVENOUS at 13:00

## 2022-01-01 RX ADMIN — Medication 2: at 08:40

## 2022-01-01 RX ADMIN — Medication 3 MILLILITER(S): at 20:00

## 2022-01-01 RX ADMIN — SODIUM CHLORIDE 50 MILLILITER(S): 5 INJECTION, SOLUTION INTRAVENOUS at 06:03

## 2022-01-01 RX ADMIN — Medication 400 MILLIGRAM(S): at 17:12

## 2022-01-01 RX ADMIN — Medication 30 MILLIGRAM(S): at 06:06

## 2022-01-01 RX ADMIN — Medication 6 MILLIGRAM(S): at 06:44

## 2022-01-01 RX ADMIN — Medication 400 MILLIGRAM(S): at 17:38

## 2022-01-01 RX ADMIN — Medication 50 MILLIGRAM(S): at 05:16

## 2022-01-01 RX ADMIN — ATORVASTATIN CALCIUM 40 MILLIGRAM(S): 80 TABLET, FILM COATED ORAL at 22:51

## 2022-01-01 RX ADMIN — Medication 50 MILLIGRAM(S): at 05:35

## 2022-01-01 RX ADMIN — Medication 150 MICROGRAM(S): at 05:02

## 2022-01-01 RX ADMIN — ATORVASTATIN CALCIUM 40 MILLIGRAM(S): 80 TABLET, FILM COATED ORAL at 22:50

## 2022-01-01 RX ADMIN — MYCOPHENOLATE MOFETIL 500 MILLIGRAM(S): 250 CAPSULE ORAL at 00:58

## 2022-01-01 RX ADMIN — Medication 150 MICROGRAM(S): at 06:27

## 2022-01-01 RX ADMIN — TACROLIMUS 0.5 MILLIGRAM(S): 5 CAPSULE ORAL at 17:36

## 2022-01-01 RX ADMIN — FAMOTIDINE 20 MILLIGRAM(S): 10 INJECTION INTRAVENOUS at 11:36

## 2022-01-01 RX ADMIN — SACUBITRIL AND VALSARTAN 1 TABLET(S): 24; 26 TABLET, FILM COATED ORAL at 05:15

## 2022-01-01 RX ADMIN — PIPERACILLIN AND TAZOBACTAM 25 GRAM(S): 4; .5 INJECTION, POWDER, LYOPHILIZED, FOR SOLUTION INTRAVENOUS at 06:16

## 2022-01-01 RX ADMIN — Medication 1 TABLET(S): at 13:09

## 2022-01-01 RX ADMIN — Medication 5 MILLIGRAM(S): at 22:46

## 2022-01-01 RX ADMIN — FAMOTIDINE 20 MILLIGRAM(S): 10 INJECTION INTRAVENOUS at 12:36

## 2022-01-01 RX ADMIN — Medication 50 MILLIGRAM(S): at 05:28

## 2022-01-01 RX ADMIN — Medication 2 UNIT(S): at 17:08

## 2022-01-01 RX ADMIN — HYDROMORPHONE HYDROCHLORIDE 0.5 MILLIGRAM(S): 2 INJECTION INTRAMUSCULAR; INTRAVENOUS; SUBCUTANEOUS at 08:50

## 2022-01-01 RX ADMIN — FAMOTIDINE 20 MILLIGRAM(S): 10 INJECTION INTRAVENOUS at 11:43

## 2022-01-01 RX ADMIN — Medication 150 MICROGRAM(S): at 05:19

## 2022-01-01 RX ADMIN — MYCOPHENOLATE MOFETIL 500 MILLIGRAM(S): 250 CAPSULE ORAL at 18:47

## 2022-01-01 RX ADMIN — APIXABAN 2.5 MILLIGRAM(S): 2.5 TABLET, FILM COATED ORAL at 05:24

## 2022-01-01 RX ADMIN — Medication 400 MILLIGRAM(S): at 18:40

## 2022-01-01 RX ADMIN — Medication 5 MILLIGRAM(S): at 05:41

## 2022-01-01 RX ADMIN — Medication 650 MILLIGRAM(S): at 21:22

## 2022-01-01 RX ADMIN — Medication 6 UNIT(S): at 07:56

## 2022-01-01 RX ADMIN — Medication 1 TABLET(S): at 11:47

## 2022-01-01 RX ADMIN — Medication 6 MILLIGRAM(S): at 11:37

## 2022-01-01 RX ADMIN — ATORVASTATIN CALCIUM 40 MILLIGRAM(S): 80 TABLET, FILM COATED ORAL at 22:46

## 2022-01-01 RX ADMIN — CEFEPIME 100 MILLIGRAM(S): 1 INJECTION, POWDER, FOR SOLUTION INTRAMUSCULAR; INTRAVENOUS at 17:01

## 2022-01-01 RX ADMIN — MYCOPHENOLATE MOFETIL 500 MILLIGRAM(S): 250 CAPSULE ORAL at 11:53

## 2022-01-01 RX ADMIN — Medication 7 UNIT(S): at 17:01

## 2022-01-01 RX ADMIN — Medication 1 APPLICATION(S): at 05:46

## 2022-01-01 RX ADMIN — Medication 1: at 08:23

## 2022-01-01 RX ADMIN — Medication 5 MILLIGRAM(S): at 22:30

## 2022-01-01 RX ADMIN — Medication 1 TABLET(S): at 12:13

## 2022-01-01 RX ADMIN — TACROLIMUS 1 MILLIGRAM(S): 5 CAPSULE ORAL at 05:40

## 2022-01-01 RX ADMIN — Medication 20 MILLIGRAM(S): at 19:49

## 2022-01-01 RX ADMIN — INSULIN GLARGINE 16 UNIT(S): 100 INJECTION, SOLUTION SUBCUTANEOUS at 22:36

## 2022-01-01 RX ADMIN — Medication 650 MILLIGRAM(S): at 06:46

## 2022-01-01 RX ADMIN — Medication 30 MILLIGRAM(S): at 06:44

## 2022-01-01 RX ADMIN — Medication 400 MILLIGRAM(S): at 18:10

## 2022-01-01 RX ADMIN — Medication 4: at 12:08

## 2022-01-01 RX ADMIN — Medication 400 MILLIGRAM(S): at 18:03

## 2022-01-01 RX ADMIN — ATORVASTATIN CALCIUM 40 MILLIGRAM(S): 80 TABLET, FILM COATED ORAL at 21:14

## 2022-01-01 RX ADMIN — Medication 50 MILLIGRAM(S): at 05:43

## 2022-01-01 RX ADMIN — Medication 650 MILLIGRAM(S): at 13:34

## 2022-01-01 RX ADMIN — Medication 25 GRAM(S): at 09:05

## 2022-01-01 RX ADMIN — Medication 108 GRAM(S): at 06:17

## 2022-01-01 RX ADMIN — Medication 1: at 08:39

## 2022-01-01 RX ADMIN — MYCOPHENOLATE MOFETIL 500 MILLIGRAM(S): 250 CAPSULE ORAL at 06:45

## 2022-01-01 RX ADMIN — Medication 1 APPLICATION(S): at 17:32

## 2022-01-01 RX ADMIN — MYCOPHENOLATE MOFETIL 500 MILLIGRAM(S): 250 CAPSULE ORAL at 18:48

## 2022-01-01 RX ADMIN — Medication 1 TABLET(S): at 11:55

## 2022-01-01 RX ADMIN — Medication 7 UNIT(S): at 17:15

## 2022-01-01 RX ADMIN — ATORVASTATIN CALCIUM 40 MILLIGRAM(S): 80 TABLET, FILM COATED ORAL at 22:08

## 2022-01-01 RX ADMIN — ROBINUL 0.2 MILLIGRAM(S): 0.2 INJECTION INTRAMUSCULAR; INTRAVENOUS at 18:42

## 2022-01-01 RX ADMIN — Medication 4 UNIT(S): at 17:00

## 2022-01-01 RX ADMIN — BUMETANIDE 5 MG/HR: 0.25 INJECTION INTRAMUSCULAR; INTRAVENOUS at 08:14

## 2022-01-01 RX ADMIN — Medication 108 GRAM(S): at 13:06

## 2022-01-01 RX ADMIN — Medication 150 MICROGRAM(S): at 05:38

## 2022-01-01 RX ADMIN — Medication 5 MILLIGRAM(S): at 22:21

## 2022-01-01 RX ADMIN — Medication 2: at 12:25

## 2022-01-01 RX ADMIN — Medication 1 APPLICATION(S): at 17:38

## 2022-01-01 RX ADMIN — Medication 4 UNIT(S): at 17:28

## 2022-01-01 RX ADMIN — Medication 6 UNIT(S): at 17:31

## 2022-01-01 RX ADMIN — Medication 1 TABLET(S): at 12:18

## 2022-01-01 RX ADMIN — INSULIN GLARGINE 21 UNIT(S): 100 INJECTION, SOLUTION SUBCUTANEOUS at 22:17

## 2022-01-01 RX ADMIN — Medication 1: at 13:13

## 2022-01-01 RX ADMIN — Medication 1 APPLICATION(S): at 05:42

## 2022-01-01 RX ADMIN — MYCOPHENOLATE MOFETIL 500 MILLIGRAM(S): 250 CAPSULE ORAL at 00:36

## 2022-01-01 RX ADMIN — Medication 1 APPLICATION(S): at 17:41

## 2022-01-01 RX ADMIN — MYCOPHENOLATE MOFETIL 500 MILLIGRAM(S): 250 CAPSULE ORAL at 12:23

## 2022-01-01 RX ADMIN — Medication 100 MILLIGRAM(S): at 05:15

## 2022-01-01 RX ADMIN — BUMETANIDE 2 MILLIGRAM(S): 0.25 INJECTION INTRAMUSCULAR; INTRAVENOUS at 18:41

## 2022-01-01 RX ADMIN — HYDROMORPHONE HYDROCHLORIDE 0.5 MILLIGRAM(S): 2 INJECTION INTRAMUSCULAR; INTRAVENOUS; SUBCUTANEOUS at 18:15

## 2022-01-01 RX ADMIN — Medication 20 MILLIEQUIVALENT(S): at 12:53

## 2022-01-01 RX ADMIN — ATORVASTATIN CALCIUM 40 MILLIGRAM(S): 80 TABLET, FILM COATED ORAL at 22:40

## 2022-01-01 RX ADMIN — Medication 1 APPLICATION(S): at 05:48

## 2022-01-01 RX ADMIN — Medication 400 MILLIGRAM(S): at 05:45

## 2022-01-01 RX ADMIN — ISOSORBIDE MONONITRATE 30 MILLIGRAM(S): 60 TABLET, EXTENDED RELEASE ORAL at 13:19

## 2022-01-01 RX ADMIN — INSULIN GLARGINE 10 UNIT(S): 100 INJECTION, SOLUTION SUBCUTANEOUS at 08:29

## 2022-01-01 RX ADMIN — Medication 150 MICROGRAM(S): at 05:34

## 2022-01-01 RX ADMIN — Medication 100 GRAM(S): at 04:25

## 2022-01-01 RX ADMIN — TACROLIMUS 0.5 MILLIGRAM(S): 5 CAPSULE ORAL at 12:36

## 2022-01-01 RX ADMIN — Medication 1 TABLET(S): at 11:48

## 2022-01-01 RX ADMIN — Medication 650 MILLIGRAM(S): at 06:32

## 2022-01-01 RX ADMIN — ATORVASTATIN CALCIUM 40 MILLIGRAM(S): 80 TABLET, FILM COATED ORAL at 21:43

## 2022-01-01 RX ADMIN — Medication 20 MILLIGRAM(S): at 05:56

## 2022-01-01 RX ADMIN — Medication 400 MILLIGRAM(S): at 17:15

## 2022-01-01 RX ADMIN — Medication 3 MILLILITER(S): at 05:02

## 2022-01-01 RX ADMIN — CHLORHEXIDINE GLUCONATE 1 APPLICATION(S): 213 SOLUTION TOPICAL at 06:08

## 2022-01-01 RX ADMIN — Medication 400 MILLIGRAM(S): at 05:14

## 2022-01-01 RX ADMIN — Medication 400 MILLIGRAM(S): at 20:12

## 2022-01-01 RX ADMIN — Medication 1 TABLET(S): at 12:21

## 2022-01-01 RX ADMIN — Medication 400 MILLIGRAM(S): at 17:55

## 2022-01-01 RX ADMIN — TACROLIMUS 1 MILLIGRAM(S): 5 CAPSULE ORAL at 17:43

## 2022-01-01 RX ADMIN — CALCITRIOL 0.25 MICROGRAM(S): 0.5 CAPSULE ORAL at 06:45

## 2022-01-01 RX ADMIN — HYDROMORPHONE HYDROCHLORIDE 0.5 MILLIGRAM(S): 2 INJECTION INTRAMUSCULAR; INTRAVENOUS; SUBCUTANEOUS at 15:18

## 2022-01-01 RX ADMIN — HYDROMORPHONE HYDROCHLORIDE 0.5 MILLIGRAM(S): 2 INJECTION INTRAMUSCULAR; INTRAVENOUS; SUBCUTANEOUS at 17:44

## 2022-01-01 RX ADMIN — Medication 20 MILLIGRAM(S): at 11:32

## 2022-01-01 RX ADMIN — FAMOTIDINE 20 MILLIGRAM(S): 10 INJECTION INTRAVENOUS at 12:30

## 2022-01-01 RX ADMIN — CALCITRIOL 0.25 MICROGRAM(S): 0.5 CAPSULE ORAL at 05:47

## 2022-01-01 RX ADMIN — Medication 30 MILLIGRAM(S): at 05:12

## 2022-01-01 RX ADMIN — Medication 650 MILLIGRAM(S): at 13:02

## 2022-01-01 RX ADMIN — APIXABAN 2.5 MILLIGRAM(S): 2.5 TABLET, FILM COATED ORAL at 17:00

## 2022-01-01 RX ADMIN — TACROLIMUS 1 MILLIGRAM(S): 5 CAPSULE ORAL at 21:45

## 2022-01-01 RX ADMIN — Medication 25 GRAM(S): at 16:44

## 2022-01-01 RX ADMIN — ATORVASTATIN CALCIUM 40 MILLIGRAM(S): 80 TABLET, FILM COATED ORAL at 21:42

## 2022-01-01 RX ADMIN — Medication 6 MILLIGRAM(S): at 05:11

## 2022-01-01 RX ADMIN — HYDROMORPHONE HYDROCHLORIDE 0.5 MILLIGRAM(S): 2 INJECTION INTRAMUSCULAR; INTRAVENOUS; SUBCUTANEOUS at 11:10

## 2022-01-01 RX ADMIN — MYCOPHENOLATE MOFETIL 500 MILLIGRAM(S): 250 CAPSULE ORAL at 00:31

## 2022-01-01 RX ADMIN — APIXABAN 2.5 MILLIGRAM(S): 2.5 TABLET, FILM COATED ORAL at 18:28

## 2022-01-01 RX ADMIN — MYCOPHENOLATE MOFETIL 500 MILLIGRAM(S): 250 CAPSULE ORAL at 23:02

## 2022-01-01 RX ADMIN — Medication 650 MILLIGRAM(S): at 21:42

## 2022-01-01 RX ADMIN — INSULIN GLARGINE 16 UNIT(S): 100 INJECTION, SOLUTION SUBCUTANEOUS at 22:49

## 2022-01-01 RX ADMIN — Medication 40 MILLIGRAM(S): at 05:14

## 2022-01-01 RX ADMIN — IRON SUCROSE 110 MILLIGRAM(S): 20 INJECTION, SOLUTION INTRAVENOUS at 13:48

## 2022-01-01 RX ADMIN — Medication 1 APPLICATION(S): at 05:24

## 2022-01-01 RX ADMIN — SODIUM CHLORIDE 60 MILLILITER(S): 9 INJECTION, SOLUTION INTRAVENOUS at 18:27

## 2022-01-01 RX ADMIN — ENOXAPARIN SODIUM 85 MILLIGRAM(S): 100 INJECTION SUBCUTANEOUS at 05:35

## 2022-01-01 RX ADMIN — Medication 400 MILLIGRAM(S): at 07:02

## 2022-01-01 RX ADMIN — SODIUM CHLORIDE 75 MILLILITER(S): 9 INJECTION, SOLUTION INTRAVENOUS at 23:10

## 2022-01-01 RX ADMIN — INSULIN GLARGINE 17 UNIT(S): 100 INJECTION, SOLUTION SUBCUTANEOUS at 23:07

## 2022-01-01 RX ADMIN — Medication 1 TABLET(S): at 13:19

## 2022-01-01 RX ADMIN — CEFEPIME 100 MILLIGRAM(S): 1 INJECTION, POWDER, FOR SOLUTION INTRAMUSCULAR; INTRAVENOUS at 05:27

## 2022-01-01 RX ADMIN — INSULIN GLARGINE 5 UNIT(S): 100 INJECTION, SOLUTION SUBCUTANEOUS at 09:52

## 2022-01-01 RX ADMIN — Medication 650 MILLIGRAM(S): at 23:17

## 2022-01-01 RX ADMIN — Medication 5 MILLIGRAM(S): at 21:25

## 2022-01-01 RX ADMIN — Medication 1 TABLET(S): at 12:10

## 2022-01-01 RX ADMIN — Medication 400 MILLIGRAM(S): at 18:37

## 2022-01-01 RX ADMIN — MYCOPHENOLATE MOFETIL 500 MILLIGRAM(S): 250 CAPSULE ORAL at 17:00

## 2022-01-01 RX ADMIN — Medication 150 MICROGRAM(S): at 05:09

## 2022-01-01 RX ADMIN — Medication 400 MILLIGRAM(S): at 05:19

## 2022-01-01 RX ADMIN — Medication 5 MILLIGRAM(S): at 06:03

## 2022-01-01 RX ADMIN — Medication 7 UNIT(S): at 08:30

## 2022-01-01 RX ADMIN — ATORVASTATIN CALCIUM 40 MILLIGRAM(S): 80 TABLET, FILM COATED ORAL at 22:15

## 2022-01-01 RX ADMIN — ISOSORBIDE MONONITRATE 30 MILLIGRAM(S): 60 TABLET, EXTENDED RELEASE ORAL at 12:23

## 2022-01-01 RX ADMIN — Medication 100 MILLIGRAM(S): at 05:39

## 2022-01-01 RX ADMIN — Medication 4 UNIT(S): at 08:47

## 2022-01-01 RX ADMIN — TACROLIMUS 1 MILLIGRAM(S): 5 CAPSULE ORAL at 06:32

## 2022-01-01 RX ADMIN — Medication 20 MILLIEQUIVALENT(S): at 01:43

## 2022-01-01 RX ADMIN — MYCOPHENOLATE MOFETIL 500 MILLIGRAM(S): 250 CAPSULE ORAL at 12:33

## 2022-01-01 RX ADMIN — CEFEPIME 100 MILLIGRAM(S): 1 INJECTION, POWDER, FOR SOLUTION INTRAMUSCULAR; INTRAVENOUS at 18:01

## 2022-01-01 RX ADMIN — INSULIN GLARGINE 18 UNIT(S): 100 INJECTION, SOLUTION SUBCUTANEOUS at 22:26

## 2022-01-01 RX ADMIN — TACROLIMUS 0.5 MILLIGRAM(S): 5 CAPSULE ORAL at 05:26

## 2022-01-01 RX ADMIN — Medication 1: at 08:00

## 2022-01-01 RX ADMIN — BUMETANIDE 5 MG/HR: 0.25 INJECTION INTRAMUSCULAR; INTRAVENOUS at 15:05

## 2022-01-01 RX ADMIN — Medication 650 MILLIGRAM(S): at 21:45

## 2022-01-01 RX ADMIN — Medication 1: at 12:29

## 2022-01-01 RX ADMIN — MYCOPHENOLATE MOFETIL 500 MILLIGRAM(S): 250 CAPSULE ORAL at 17:23

## 2022-01-01 RX ADMIN — TACROLIMUS 0.5 MILLIGRAM(S): 5 CAPSULE ORAL at 05:15

## 2022-01-01 RX ADMIN — Medication 1: at 08:10

## 2022-01-01 RX ADMIN — ATORVASTATIN CALCIUM 40 MILLIGRAM(S): 80 TABLET, FILM COATED ORAL at 23:06

## 2022-01-01 RX ADMIN — ISOSORBIDE MONONITRATE 30 MILLIGRAM(S): 60 TABLET, EXTENDED RELEASE ORAL at 13:27

## 2022-01-01 RX ADMIN — Medication 2: at 17:58

## 2022-01-01 RX ADMIN — APIXABAN 2.5 MILLIGRAM(S): 2.5 TABLET, FILM COATED ORAL at 06:39

## 2022-01-01 RX ADMIN — Medication 1 APPLICATION(S): at 05:47

## 2022-01-01 RX ADMIN — Medication 400 MILLIGRAM(S): at 05:37

## 2022-01-01 RX ADMIN — Medication 5: at 12:21

## 2022-01-01 RX ADMIN — Medication 650 MILLIGRAM(S): at 14:03

## 2022-01-01 RX ADMIN — INSULIN GLARGINE 21 UNIT(S): 100 INJECTION, SOLUTION SUBCUTANEOUS at 22:11

## 2022-01-01 RX ADMIN — ENOXAPARIN SODIUM 85 MILLIGRAM(S): 100 INJECTION SUBCUTANEOUS at 06:30

## 2022-01-01 RX ADMIN — MYCOPHENOLATE MOFETIL 500 MILLIGRAM(S): 250 CAPSULE ORAL at 06:05

## 2022-01-01 RX ADMIN — Medication 4 UNIT(S): at 12:46

## 2022-01-01 RX ADMIN — Medication 20 MILLIEQUIVALENT(S): at 20:11

## 2022-01-01 RX ADMIN — APIXABAN 2.5 MILLIGRAM(S): 2.5 TABLET, FILM COATED ORAL at 18:02

## 2022-01-01 RX ADMIN — Medication 150 MICROGRAM(S): at 05:43

## 2022-01-01 RX ADMIN — Medication 6 UNIT(S): at 12:21

## 2022-01-01 RX ADMIN — Medication 650 MILLIGRAM(S): at 21:40

## 2022-01-01 RX ADMIN — Medication 400 MILLIGRAM(S): at 05:08

## 2022-01-01 RX ADMIN — ATORVASTATIN CALCIUM 40 MILLIGRAM(S): 80 TABLET, FILM COATED ORAL at 22:05

## 2022-01-01 RX ADMIN — Medication 6 MILLIGRAM(S): at 05:57

## 2022-01-01 RX ADMIN — MYCOPHENOLATE MOFETIL 500 MILLIGRAM(S): 250 CAPSULE ORAL at 06:26

## 2022-01-01 RX ADMIN — Medication 400 MILLIGRAM(S): at 05:10

## 2022-01-01 RX ADMIN — ATORVASTATIN CALCIUM 40 MILLIGRAM(S): 80 TABLET, FILM COATED ORAL at 21:04

## 2022-01-01 RX ADMIN — Medication 7 UNIT(S): at 13:15

## 2022-01-01 RX ADMIN — Medication 7 UNIT(S): at 17:49

## 2022-01-01 RX ADMIN — ENOXAPARIN SODIUM 85 MILLIGRAM(S): 100 INJECTION SUBCUTANEOUS at 17:18

## 2022-01-01 RX ADMIN — PIPERACILLIN AND TAZOBACTAM 25 GRAM(S): 4; .5 INJECTION, POWDER, LYOPHILIZED, FOR SOLUTION INTRAVENOUS at 14:41

## 2022-01-01 RX ADMIN — HYDROMORPHONE HYDROCHLORIDE 0.5 MILLIGRAM(S): 2 INJECTION INTRAMUSCULAR; INTRAVENOUS; SUBCUTANEOUS at 03:14

## 2022-01-01 RX ADMIN — PIPERACILLIN AND TAZOBACTAM 200 GRAM(S): 4; .5 INJECTION, POWDER, LYOPHILIZED, FOR SOLUTION INTRAVENOUS at 21:41

## 2022-01-01 RX ADMIN — Medication 6 UNIT(S): at 11:52

## 2022-01-01 RX ADMIN — Medication 1 TABLET(S): at 11:07

## 2022-01-01 RX ADMIN — FAMOTIDINE 20 MILLIGRAM(S): 10 INJECTION INTRAVENOUS at 11:21

## 2022-01-01 RX ADMIN — Medication 100 MILLIGRAM(S): at 06:49

## 2022-01-01 RX ADMIN — INSULIN GLARGINE 22 UNIT(S): 100 INJECTION, SOLUTION SUBCUTANEOUS at 21:26

## 2022-01-01 RX ADMIN — ATORVASTATIN CALCIUM 40 MILLIGRAM(S): 80 TABLET, FILM COATED ORAL at 21:25

## 2022-01-01 RX ADMIN — Medication 1 APPLICATION(S): at 18:48

## 2022-01-01 RX ADMIN — Medication 400 MILLIGRAM(S): at 07:04

## 2022-01-01 RX ADMIN — TACROLIMUS 0.5 MILLIGRAM(S): 5 CAPSULE ORAL at 06:41

## 2022-01-01 RX ADMIN — APIXABAN 2.5 MILLIGRAM(S): 2.5 TABLET, FILM COATED ORAL at 17:03

## 2022-01-01 RX ADMIN — MYCOPHENOLATE MOFETIL 500 MILLIGRAM(S): 250 CAPSULE ORAL at 18:45

## 2022-01-01 RX ADMIN — Medication 400 MILLIGRAM(S): at 07:41

## 2022-01-01 RX ADMIN — Medication 1 TABLET(S): at 11:05

## 2022-01-01 RX ADMIN — Medication 7 UNIT(S): at 17:41

## 2022-01-01 RX ADMIN — MYCOPHENOLATE MOFETIL 500 MILLIGRAM(S): 250 CAPSULE ORAL at 11:48

## 2022-01-01 RX ADMIN — Medication 650 MILLIGRAM(S): at 22:59

## 2022-01-01 RX ADMIN — Medication 25 GRAM(S): at 10:19

## 2022-01-01 RX ADMIN — Medication 1 TABLET(S): at 11:32

## 2022-01-01 RX ADMIN — Medication 5 MILLIGRAM(S): at 22:22

## 2022-01-01 RX ADMIN — MYCOPHENOLATE MOFETIL 500 MILLIGRAM(S): 250 CAPSULE ORAL at 12:54

## 2022-01-01 RX ADMIN — CALCITRIOL 0.25 MICROGRAM(S): 0.5 CAPSULE ORAL at 05:59

## 2022-01-01 RX ADMIN — Medication 5 UNIT(S): at 18:13

## 2022-01-01 RX ADMIN — Medication 150 MICROGRAM(S): at 05:41

## 2022-01-01 RX ADMIN — TACROLIMUS 1 MILLIGRAM(S): 5 CAPSULE ORAL at 09:37

## 2022-01-01 RX ADMIN — Medication 100 MILLIGRAM(S): at 05:20

## 2022-01-01 RX ADMIN — Medication 100 MILLIGRAM(S): at 06:37

## 2022-01-01 RX ADMIN — Medication 2: at 12:58

## 2022-01-01 RX ADMIN — Medication 400 MILLIGRAM(S): at 17:36

## 2022-01-01 RX ADMIN — Medication 2: at 12:45

## 2022-01-01 RX ADMIN — Medication 30 MILLIGRAM(S): at 05:36

## 2022-01-01 RX ADMIN — Medication 50 MILLIGRAM(S): at 06:17

## 2022-01-01 RX ADMIN — Medication 50 MILLILITER(S): at 07:37

## 2022-01-01 RX ADMIN — Medication 30 MILLIGRAM(S): at 05:29

## 2022-01-01 RX ADMIN — SODIUM CHLORIDE 50 MILLILITER(S): 5 INJECTION, SOLUTION INTRAVENOUS at 18:39

## 2022-01-01 RX ADMIN — APIXABAN 2.5 MILLIGRAM(S): 2.5 TABLET, FILM COATED ORAL at 19:10

## 2022-01-01 RX ADMIN — Medication 1 APPLICATION(S): at 18:18

## 2022-01-01 RX ADMIN — MYCOPHENOLATE MOFETIL 500 MILLIGRAM(S): 250 CAPSULE ORAL at 05:14

## 2022-01-01 RX ADMIN — TACROLIMUS 0.5 MILLIGRAM(S): 5 CAPSULE ORAL at 06:06

## 2022-01-01 RX ADMIN — MYCOPHENOLATE MOFETIL 500 MILLIGRAM(S): 250 CAPSULE ORAL at 05:19

## 2022-01-01 RX ADMIN — Medication 3: at 08:09

## 2022-01-01 RX ADMIN — Medication 400 MILLIGRAM(S): at 17:03

## 2022-01-01 RX ADMIN — Medication 400 MILLIGRAM(S): at 18:48

## 2022-01-01 RX ADMIN — INSULIN GLARGINE 17 UNIT(S): 100 INJECTION, SOLUTION SUBCUTANEOUS at 21:27

## 2022-01-01 RX ADMIN — MYCOPHENOLATE MOFETIL 500 MILLIGRAM(S): 250 CAPSULE ORAL at 19:27

## 2022-01-01 RX ADMIN — Medication 2: at 11:47

## 2022-01-01 RX ADMIN — SODIUM CHLORIDE 50 MILLILITER(S): 5 INJECTION, SOLUTION INTRAVENOUS at 18:44

## 2022-01-01 RX ADMIN — REMDESIVIR 500 MILLIGRAM(S): 5 INJECTION INTRAVENOUS at 17:38

## 2022-01-01 RX ADMIN — Medication 400 MILLIGRAM(S): at 06:12

## 2022-01-01 RX ADMIN — Medication 3 MILLILITER(S): at 20:59

## 2022-01-01 RX ADMIN — Medication 5 MILLIGRAM(S): at 22:45

## 2022-01-01 RX ADMIN — INSULIN GLARGINE 22 UNIT(S): 100 INJECTION, SOLUTION SUBCUTANEOUS at 22:14

## 2022-01-01 RX ADMIN — Medication 650 MILLIGRAM(S): at 05:02

## 2022-01-01 RX ADMIN — ATORVASTATIN CALCIUM 40 MILLIGRAM(S): 80 TABLET, FILM COATED ORAL at 21:34

## 2022-01-01 RX ADMIN — Medication 650 MILLIGRAM(S): at 21:28

## 2022-01-01 RX ADMIN — ISOSORBIDE MONONITRATE 30 MILLIGRAM(S): 60 TABLET, EXTENDED RELEASE ORAL at 12:41

## 2022-01-01 RX ADMIN — Medication 40 MILLIGRAM(S): at 08:14

## 2022-01-01 RX ADMIN — FAMOTIDINE 20 MILLIGRAM(S): 10 INJECTION INTRAVENOUS at 12:26

## 2022-01-01 RX ADMIN — CEFEPIME 100 MILLIGRAM(S): 1 INJECTION, POWDER, FOR SOLUTION INTRAMUSCULAR; INTRAVENOUS at 06:13

## 2022-01-01 RX ADMIN — Medication 650 MILLIGRAM(S): at 14:41

## 2022-01-01 RX ADMIN — APIXABAN 2.5 MILLIGRAM(S): 2.5 TABLET, FILM COATED ORAL at 05:51

## 2022-01-01 RX ADMIN — INSULIN GLARGINE 20 UNIT(S): 100 INJECTION, SOLUTION SUBCUTANEOUS at 21:19

## 2022-01-01 RX ADMIN — ATORVASTATIN CALCIUM 40 MILLIGRAM(S): 80 TABLET, FILM COATED ORAL at 21:53

## 2022-01-01 RX ADMIN — HYDROMORPHONE HYDROCHLORIDE 0.5 MILLIGRAM(S): 2 INJECTION INTRAMUSCULAR; INTRAVENOUS; SUBCUTANEOUS at 11:55

## 2022-01-01 RX ADMIN — CHLORHEXIDINE GLUCONATE 1 APPLICATION(S): 213 SOLUTION TOPICAL at 05:39

## 2022-01-01 RX ADMIN — ENOXAPARIN SODIUM 85 MILLIGRAM(S): 100 INJECTION SUBCUTANEOUS at 06:12

## 2022-01-01 RX ADMIN — TACROLIMUS 0.5 MILLIGRAM(S): 5 CAPSULE ORAL at 18:16

## 2022-01-01 RX ADMIN — SODIUM CHLORIDE 150 MILLILITER(S): 9 INJECTION, SOLUTION INTRAVENOUS at 06:18

## 2022-01-01 RX ADMIN — TACROLIMUS 0.5 MILLIGRAM(S): 5 CAPSULE ORAL at 19:09

## 2022-01-01 RX ADMIN — APIXABAN 2.5 MILLIGRAM(S): 2.5 TABLET, FILM COATED ORAL at 06:18

## 2022-01-01 RX ADMIN — INSULIN GLARGINE 17 UNIT(S): 100 INJECTION, SOLUTION SUBCUTANEOUS at 21:44

## 2022-01-01 RX ADMIN — TACROLIMUS 0.5 MILLIGRAM(S): 5 CAPSULE ORAL at 06:24

## 2022-01-01 RX ADMIN — TACROLIMUS 1 MILLIGRAM(S): 5 CAPSULE ORAL at 08:54

## 2022-01-01 RX ADMIN — MYCOPHENOLATE MOFETIL 500 MILLIGRAM(S): 250 CAPSULE ORAL at 11:41

## 2022-01-01 RX ADMIN — Medication 4: at 13:06

## 2022-01-01 RX ADMIN — Medication 1 TABLET(S): at 12:35

## 2022-01-01 RX ADMIN — Medication 3 MILLILITER(S): at 19:23

## 2022-01-01 RX ADMIN — Medication 650 MILLIGRAM(S): at 05:43

## 2022-01-01 RX ADMIN — AMPICILLIN SODIUM AND SULBACTAM SODIUM 200 GRAM(S): 250; 125 INJECTION, POWDER, FOR SUSPENSION INTRAMUSCULAR; INTRAVENOUS at 12:02

## 2022-01-01 RX ADMIN — MYCOPHENOLATE MOFETIL 500 MILLIGRAM(S): 250 CAPSULE ORAL at 11:32

## 2022-01-01 RX ADMIN — Medication 150 MICROGRAM(S): at 05:56

## 2022-01-01 RX ADMIN — Medication 150 MICROGRAM(S): at 06:37

## 2022-01-01 RX ADMIN — Medication 1 APPLICATION(S): at 17:59

## 2022-01-01 RX ADMIN — CHLORHEXIDINE GLUCONATE 1 APPLICATION(S): 213 SOLUTION TOPICAL at 05:17

## 2022-01-01 RX ADMIN — Medication 4 UNIT(S): at 18:04

## 2022-01-01 RX ADMIN — APIXABAN 2.5 MILLIGRAM(S): 2.5 TABLET, FILM COATED ORAL at 19:27

## 2022-01-01 RX ADMIN — Medication 5 UNIT(S): at 11:47

## 2022-01-01 RX ADMIN — PIPERACILLIN AND TAZOBACTAM 200 GRAM(S): 4; .5 INJECTION, POWDER, LYOPHILIZED, FOR SOLUTION INTRAVENOUS at 22:33

## 2022-01-01 RX ADMIN — Medication 6 UNIT(S): at 08:24

## 2022-01-01 RX ADMIN — MYCOPHENOLATE MOFETIL 500 MILLIGRAM(S): 250 CAPSULE ORAL at 05:11

## 2022-01-01 RX ADMIN — Medication 1: at 11:51

## 2022-01-01 RX ADMIN — SACUBITRIL AND VALSARTAN 1 TABLET(S): 24; 26 TABLET, FILM COATED ORAL at 18:40

## 2022-01-01 RX ADMIN — Medication 20 MILLIEQUIVALENT(S): at 19:27

## 2022-01-01 RX ADMIN — Medication 50 MILLIGRAM(S): at 17:42

## 2022-01-01 RX ADMIN — Medication 650 MILLIGRAM(S): at 06:40

## 2022-01-01 RX ADMIN — Medication 1 TABLET(S): at 12:50

## 2022-01-01 RX ADMIN — Medication 25 GRAM(S): at 22:39

## 2022-01-01 RX ADMIN — PIPERACILLIN AND TAZOBACTAM 25 GRAM(S): 4; .5 INJECTION, POWDER, LYOPHILIZED, FOR SOLUTION INTRAVENOUS at 13:39

## 2022-01-01 RX ADMIN — Medication 100 MILLIGRAM(S): at 05:58

## 2022-01-01 RX ADMIN — Medication 3 MILLILITER(S): at 08:46

## 2022-01-01 RX ADMIN — ENOXAPARIN SODIUM 85 MILLIGRAM(S): 100 INJECTION SUBCUTANEOUS at 17:21

## 2022-01-01 RX ADMIN — REMDESIVIR 500 MILLIGRAM(S): 5 INJECTION INTRAVENOUS at 17:34

## 2022-01-01 RX ADMIN — AMPICILLIN SODIUM AND SULBACTAM SODIUM 200 GRAM(S): 250; 125 INJECTION, POWDER, FOR SUSPENSION INTRAMUSCULAR; INTRAVENOUS at 00:29

## 2022-01-01 RX ADMIN — Medication 5 MILLIGRAM(S): at 22:35

## 2022-01-01 RX ADMIN — PIPERACILLIN AND TAZOBACTAM 200 GRAM(S): 4; .5 INJECTION, POWDER, LYOPHILIZED, FOR SOLUTION INTRAVENOUS at 13:08

## 2022-01-01 RX ADMIN — Medication 2 UNIT(S): at 17:45

## 2022-01-01 RX ADMIN — MYCOPHENOLATE MOFETIL 500 MILLIGRAM(S): 250 CAPSULE ORAL at 01:39

## 2022-01-01 RX ADMIN — BUMETANIDE 5 MG/HR: 0.25 INJECTION INTRAMUSCULAR; INTRAVENOUS at 17:42

## 2022-01-01 RX ADMIN — Medication 4: at 08:10

## 2022-01-01 RX ADMIN — Medication 1: at 11:41

## 2022-01-01 RX ADMIN — Medication 5 MILLIGRAM(S): at 22:05

## 2022-01-01 RX ADMIN — SODIUM CHLORIDE 50 MILLILITER(S): 5 INJECTION, SOLUTION INTRAVENOUS at 20:39

## 2022-01-01 RX ADMIN — Medication 1 APPLICATION(S): at 12:38

## 2022-01-01 RX ADMIN — ISOSORBIDE MONONITRATE 30 MILLIGRAM(S): 60 TABLET, EXTENDED RELEASE ORAL at 11:21

## 2022-01-01 RX ADMIN — TACROLIMUS 0.5 MILLIGRAM(S): 5 CAPSULE ORAL at 08:50

## 2022-01-01 RX ADMIN — TACROLIMUS 1 MILLIGRAM(S): 5 CAPSULE ORAL at 18:28

## 2022-01-01 RX ADMIN — SODIUM CHLORIDE 50 MILLILITER(S): 5 INJECTION, SOLUTION INTRAVENOUS at 19:50

## 2022-01-01 RX ADMIN — Medication 5 MILLIGRAM(S): at 05:51

## 2022-01-01 RX ADMIN — Medication 150 MICROGRAM(S): at 05:59

## 2022-01-01 RX ADMIN — ATORVASTATIN CALCIUM 40 MILLIGRAM(S): 80 TABLET, FILM COATED ORAL at 21:47

## 2022-01-01 RX ADMIN — MYCOPHENOLATE MOFETIL 500 MILLIGRAM(S): 250 CAPSULE ORAL at 00:56

## 2022-01-01 RX ADMIN — Medication 3 MILLILITER(S): at 09:02

## 2022-01-01 RX ADMIN — MYCOPHENOLATE MOFETIL 500 MILLIGRAM(S): 250 CAPSULE ORAL at 11:33

## 2022-01-01 RX ADMIN — Medication 2: at 12:34

## 2022-01-01 RX ADMIN — Medication 3 MILLILITER(S): at 07:48

## 2022-01-01 RX ADMIN — Medication 650 MILLIGRAM(S): at 06:25

## 2022-01-01 RX ADMIN — Medication 4 UNIT(S): at 18:12

## 2022-01-01 RX ADMIN — MYCOPHENOLATE MOFETIL 500 MILLIGRAM(S): 250 CAPSULE ORAL at 06:04

## 2022-01-01 RX ADMIN — Medication 4 UNIT(S): at 08:07

## 2022-01-01 RX ADMIN — SACUBITRIL AND VALSARTAN 1 TABLET(S): 24; 26 TABLET, FILM COATED ORAL at 18:28

## 2022-01-01 RX ADMIN — Medication 650 MILLIGRAM(S): at 12:44

## 2022-01-01 RX ADMIN — MYCOPHENOLATE MOFETIL 500 MILLIGRAM(S): 250 CAPSULE ORAL at 12:59

## 2022-01-01 RX ADMIN — Medication 400 MILLIGRAM(S): at 06:32

## 2022-01-01 RX ADMIN — AMPICILLIN SODIUM AND SULBACTAM SODIUM 200 GRAM(S): 250; 125 INJECTION, POWDER, FOR SUSPENSION INTRAMUSCULAR; INTRAVENOUS at 11:43

## 2022-01-01 RX ADMIN — Medication 400 MILLIGRAM(S): at 17:24

## 2022-01-01 RX ADMIN — Medication 20 MILLIGRAM(S): at 06:12

## 2022-01-01 RX ADMIN — Medication 400 MILLIGRAM(S): at 05:57

## 2022-01-01 RX ADMIN — INSULIN GLARGINE 18 UNIT(S): 100 INJECTION, SOLUTION SUBCUTANEOUS at 21:40

## 2022-01-01 RX ADMIN — SACUBITRIL AND VALSARTAN 1 TABLET(S): 24; 26 TABLET, FILM COATED ORAL at 05:45

## 2022-01-01 RX ADMIN — Medication 6 UNIT(S): at 11:54

## 2022-01-01 RX ADMIN — Medication 3 MILLILITER(S): at 08:10

## 2022-01-01 RX ADMIN — Medication 3 MILLILITER(S): at 16:58

## 2022-01-01 RX ADMIN — BUMETANIDE 5 MG/HR: 0.25 INJECTION INTRAMUSCULAR; INTRAVENOUS at 22:57

## 2022-01-01 RX ADMIN — FAMOTIDINE 20 MILLIGRAM(S): 10 INJECTION INTRAVENOUS at 11:52

## 2022-01-01 RX ADMIN — ENOXAPARIN SODIUM 85 MILLIGRAM(S): 100 INJECTION SUBCUTANEOUS at 17:36

## 2022-01-01 RX ADMIN — ISOSORBIDE MONONITRATE 30 MILLIGRAM(S): 60 TABLET, EXTENDED RELEASE ORAL at 12:02

## 2022-01-01 RX ADMIN — Medication 5 MILLIGRAM(S): at 22:36

## 2022-01-01 RX ADMIN — FAMOTIDINE 20 MILLIGRAM(S): 10 INJECTION INTRAVENOUS at 12:02

## 2022-01-01 RX ADMIN — Medication 400 MILLIGRAM(S): at 05:47

## 2022-01-01 RX ADMIN — Medication 100 MILLIGRAM(S): at 05:34

## 2022-01-01 RX ADMIN — Medication 4 UNIT(S): at 08:59

## 2022-01-01 RX ADMIN — INSULIN GLARGINE 22 UNIT(S): 100 INJECTION, SOLUTION SUBCUTANEOUS at 21:58

## 2022-01-01 RX ADMIN — Medication 1 TABLET(S): at 12:49

## 2022-01-01 RX ADMIN — PIPERACILLIN AND TAZOBACTAM 25 GRAM(S): 4; .5 INJECTION, POWDER, LYOPHILIZED, FOR SOLUTION INTRAVENOUS at 17:18

## 2022-01-01 RX ADMIN — TACROLIMUS 0.5 MILLIGRAM(S): 5 CAPSULE ORAL at 18:47

## 2022-01-01 RX ADMIN — Medication 20 MILLIGRAM(S): at 05:43

## 2022-01-01 RX ADMIN — Medication 400 MILLIGRAM(S): at 18:59

## 2022-01-01 RX ADMIN — Medication 1 TABLET(S): at 15:15

## 2022-01-01 RX ADMIN — INSULIN GLARGINE 17 UNIT(S): 100 INJECTION, SOLUTION SUBCUTANEOUS at 23:37

## 2022-01-01 RX ADMIN — Medication 6 UNIT(S): at 13:02

## 2022-01-01 RX ADMIN — APIXABAN 2.5 MILLIGRAM(S): 2.5 TABLET, FILM COATED ORAL at 05:20

## 2022-01-01 RX ADMIN — Medication 1 APPLICATION(S): at 05:44

## 2022-01-01 RX ADMIN — MYCOPHENOLATE MOFETIL 500 MILLIGRAM(S): 250 CAPSULE ORAL at 05:28

## 2022-01-01 RX ADMIN — CHLORHEXIDINE GLUCONATE 1 APPLICATION(S): 213 SOLUTION TOPICAL at 05:33

## 2022-01-01 RX ADMIN — Medication 100 GRAM(S): at 20:31

## 2022-01-01 RX ADMIN — ISOSORBIDE MONONITRATE 30 MILLIGRAM(S): 60 TABLET, EXTENDED RELEASE ORAL at 11:41

## 2022-01-01 RX ADMIN — Medication 7 UNIT(S): at 12:36

## 2022-01-01 RX ADMIN — Medication 400 MILLIGRAM(S): at 06:19

## 2022-01-01 RX ADMIN — Medication 400 MILLIGRAM(S): at 06:39

## 2022-01-01 RX ADMIN — SACUBITRIL AND VALSARTAN 1 TABLET(S): 24; 26 TABLET, FILM COATED ORAL at 17:17

## 2022-01-01 RX ADMIN — Medication 400 MILLIGRAM(S): at 07:27

## 2022-01-01 RX ADMIN — MYCOPHENOLATE MOFETIL 500 MILLIGRAM(S): 250 CAPSULE ORAL at 17:53

## 2022-01-01 RX ADMIN — HEPARIN SODIUM 1600 UNIT(S)/HR: 5000 INJECTION INTRAVENOUS; SUBCUTANEOUS at 00:29

## 2022-01-01 RX ADMIN — Medication 6 MILLIGRAM(S): at 05:42

## 2022-01-01 RX ADMIN — FAMOTIDINE 20 MILLIGRAM(S): 10 INJECTION INTRAVENOUS at 12:23

## 2022-01-01 RX ADMIN — MYCOPHENOLATE MOFETIL 500 MILLIGRAM(S): 250 CAPSULE ORAL at 23:14

## 2022-01-01 RX ADMIN — MYCOPHENOLATE MOFETIL 500 MILLIGRAM(S): 250 CAPSULE ORAL at 12:30

## 2022-01-01 RX ADMIN — Medication 150 MICROGRAM(S): at 06:46

## 2022-01-01 RX ADMIN — ENOXAPARIN SODIUM 85 MILLIGRAM(S): 100 INJECTION SUBCUTANEOUS at 18:37

## 2022-01-01 RX ADMIN — Medication 5 MILLIGRAM(S): at 06:05

## 2022-01-01 RX ADMIN — Medication 3 MILLILITER(S): at 14:06

## 2022-01-01 RX ADMIN — APIXABAN 2.5 MILLIGRAM(S): 2.5 TABLET, FILM COATED ORAL at 18:17

## 2022-01-01 RX ADMIN — Medication 40 MILLIGRAM(S): at 21:44

## 2022-01-01 RX ADMIN — APIXABAN 2.5 MILLIGRAM(S): 2.5 TABLET, FILM COATED ORAL at 05:45

## 2022-01-01 RX ADMIN — Medication 25 MILLIGRAM(S): at 05:40

## 2022-01-01 RX ADMIN — MYCOPHENOLATE MOFETIL 500 MILLIGRAM(S): 250 CAPSULE ORAL at 23:38

## 2022-01-01 RX ADMIN — Medication 3 MILLILITER(S): at 09:09

## 2022-01-01 RX ADMIN — Medication 40 MILLIGRAM(S): at 06:18

## 2022-01-01 RX ADMIN — Medication 6 MILLIGRAM(S): at 06:22

## 2022-01-01 RX ADMIN — Medication 3: at 18:13

## 2022-01-01 RX ADMIN — SODIUM CHLORIDE 50 MILLILITER(S): 5 INJECTION, SOLUTION INTRAVENOUS at 18:01

## 2022-01-01 RX ADMIN — AMPICILLIN SODIUM AND SULBACTAM SODIUM 200 GRAM(S): 250; 125 INJECTION, POWDER, FOR SUSPENSION INTRAMUSCULAR; INTRAVENOUS at 23:27

## 2022-01-01 RX ADMIN — Medication 2: at 17:01

## 2022-01-01 RX ADMIN — Medication 650 MILLIGRAM(S): at 06:57

## 2022-01-01 RX ADMIN — MYCOPHENOLATE MOFETIL 500 MILLIGRAM(S): 250 CAPSULE ORAL at 06:37

## 2022-01-01 RX ADMIN — INSULIN GLARGINE 17 UNIT(S): 100 INJECTION, SOLUTION SUBCUTANEOUS at 21:22

## 2022-01-01 RX ADMIN — FAMOTIDINE 20 MILLIGRAM(S): 10 INJECTION INTRAVENOUS at 11:35

## 2022-01-01 RX ADMIN — Medication 40 MILLIEQUIVALENT(S): at 10:30

## 2022-01-01 RX ADMIN — Medication 1 TABLET(S): at 11:53

## 2022-01-01 RX ADMIN — APIXABAN 2.5 MILLIGRAM(S): 2.5 TABLET, FILM COATED ORAL at 17:22

## 2022-01-01 RX ADMIN — Medication 7 UNIT(S): at 12:54

## 2022-01-01 RX ADMIN — Medication 650 MILLIGRAM(S): at 14:34

## 2022-01-01 RX ADMIN — TACROLIMUS 0.5 MILLIGRAM(S): 5 CAPSULE ORAL at 18:38

## 2022-01-01 RX ADMIN — ENOXAPARIN SODIUM 85 MILLIGRAM(S): 100 INJECTION SUBCUTANEOUS at 18:51

## 2022-01-01 RX ADMIN — Medication 6 UNIT(S): at 08:29

## 2022-02-17 NOTE — ED ADULT NURSE NOTE - TEMPLATE
Xochitl [facility], also mentioned a UTI.    Carmen MONTALVO MA 3:02 PM 2/17/2022     General

## 2022-03-16 PROBLEM — I50.9 CONGESTIVE HEART FAILURE, UNSPECIFIED HF CHRONICITY, UNSPECIFIED HEART FAILURE TYPE: Status: ACTIVE | Noted: 2021-01-01

## 2022-03-16 NOTE — PHYSICAL EXAM
[No Acute Distress] : no acute distress [Normal Oropharynx] : normal oropharynx [Normal Appearance] : normal appearance [No Neck Mass] : no neck mass [Normal Rate/Rhythm] : normal rate/rhythm [Normal S1, S2] : normal s1, s2 [No Murmurs] : no murmurs [No Abnormalities] : no abnormalities [Benign] : benign [Normal Gait] : normal gait [FROM] : FROM [No Cyanosis] : no cyanosis [Normal Color/ Pigmentation] : normal color/ pigmentation [No Focal Deficits] : no focal deficits [Oriented x3] : oriented x3 [Normal Affect] : normal affect [TextBox_68] : R basilar crackles [TextBox_105] : swellling+

## 2022-03-16 NOTE — DISCUSSION/SUMMARY
[FreeTextEntry1] : SOB./ ORTHOPNEA/ WEIGHT GAIN/ FLUID OVERLOAD BETTER SP RECENT ADMSSION FOR CH EXACERBATION\par COPD STABLE\par COUGH/ NEB AS NEEDED\par HOSP RECORDS REVIEWED\par NEB AS NEEDED

## 2022-07-05 NOTE — ED ADULT NURSE NOTE - NSFALLRSKASSESSTYPE_ED_ALL_ED
Initial (On Arrival) Clofazimine Counseling:  I discussed with the patient the risks of clofazimine including but not limited to skin and eye pigmentation, liver damage, nausea/vomiting, gastrointestinal bleeding and allergy.

## 2022-07-05 NOTE — H&P ADULT - NSHPPHYSICALEXAM_GEN_ALL_CORE
General: NAD, AOx3  HEENT: Normocephalic, atraumatic  Lungs: Decreased bilateral breath sounds, no wheezes/crackles  Heart: S1s2, no mrg  Abdomen: Soft, NT/ND. No rigidity/guarding  Extremities: Peripheral pulses +1, no cyanosis/edema  Neuro: Grossly normal motor exam, no focal deficits  Skin: Scattered ecchymoses

## 2022-07-05 NOTE — ED ADULT NURSE NOTE - OBJECTIVE STATEMENT
82 yr M c/o abd pain, decreased PO intake for 1 month. Pt denies taking any insulin. Pt appears well. Pt also c/o diarrhea for 1 month. Denies bleeding.

## 2022-07-05 NOTE — ED PROVIDER NOTE - ATTENDING APP SHARED VISIT CONTRIBUTION OF CARE
Unable to refill meds for 90 days-patient needs appt 82-year-old man, history of hypertension, hyperlipidemia, A. fib on Eliquis, CKD previously on HD, COPD on home O2, brought in by family 2/2 generalized weakness, likely result of persistent diarrhea over the last 2 weeks, several episodes daily.  Additionally, patient had a fall about 1 week ago due to this weakness, did not come to the ED at the time.  This morning, he was unable to get up to go to his nephrology appointment.  On exam he is frail, pale, looks very fatigued, but can answer questions.  NCAT, neck supple, lungs clear, CV S1-S2, abdomen soft, mild diffuse tenderness without peritoneal signs.  Concern for dehydration, doubt surgical etiology for diarrhea.  Labs, imaging, hydration, reassess.

## 2022-07-05 NOTE — H&P ADULT - HISTORY OF PRESENT ILLNESS
82M with PMHx of HFrEF, Afib on eliquis, CAD s/p CABG, COPD (on rare home O2 PRN), CKD4, HTN, HLD presents for 2 weeks of watery diarrhea. Pt reports that 2 weeks ago he went to a doctor's appointment with his wife, and the next day both of them started experiencing non-bloody watery diarrhea, x3-4/day, and then started having decreased PO intake and felt weak. Of note, he also had a mechanical fall 1 week ago on his right side with no injuries sustained (but he didn't visit the ED). Pt denies any other sicks contacts, recent travel, vomiting, chest pain, SOB, palpitations, light-headedness, or any  symptoms.     In the ED: /57, HR 57, Temp 97.6F, satting 96% on RA. Labs notable for Cr of 3.6 (baseline ~2.0), , HCO3 19, AG 20. Trops 0.07 (likely 2/2 BRENDA), no chest pain, EKG 1st-degree AV block. CTH -ve, CTAP + chest -ve for acute pathology.     Admitted to medicine 82M with PMHx of HFrEF, Afib on eliquis, CAD s/p CABG, COPD (on rare home O2 PRN), CKD3, HTN, HLD presents for 2 weeks of watery diarrhea. Pt reports that 2 weeks ago he went to a doctor's appointment with his wife, and the next day both of them started experiencing non-bloody watery diarrhea, x3-4/day, and then started having decreased PO intake and felt weak. Of note, he also had a mechanical fall 1 week ago on his right side with no injuries sustained (but he didn't visit the ED). Pt denies any other sicks contacts, recent travel, vomiting, chest pain, SOB, palpitations, light-headedness, or any  symptoms.     In the ED: /57, HR 57, Temp 97.6F, satting 96% on RA. Labs notable for Cr of 3.6 (baseline ~2.0), , HCO3 19, AG 20. Trops 0.07 (likely 2/2 BRENDA), no chest pain, EKG 1st-degree AV block. CTH -ve, CTAP + chest -ve for acute pathology.     Admitted to medicine

## 2022-07-05 NOTE — ED ADULT NURSE NOTE - INTERVENTIONS DEFINITIONS
Call bell, personal items and telephone within reach/Physically safe environment: no spills, clutter or unnecessary equipment/Stretcher in lowest position, wheels locked, appropriate side rails in place/Provide visual cue, wrist band, yellow gown, etc./Monitor gait and stability

## 2022-07-05 NOTE — H&P ADULT - NSHPLABSRESULTS_GEN_ALL_CORE
< from: CT Chest No Cont (07.05.22 @ 17:14) >    IMPRESSION:    No evidence of intra-abdominal or pelvic mass or acute inflammatory   process    --- End of Report ---      < end of copied text >    < from: CT Cervical Spine No Cont (07.05.22 @ 16:22) >    IMPRESSION:    Degenerative changes as described above. No evidence of fracture or facet   subluxation.    --- End of Report ---    < end of copied text >    < from: CT Head No Cont (07.05.22 @ 16:22) >    IMPRESSION:    1.  No evidence of acute intracranial pathology. Stable exam since   10/20/2021.    2.  Stable mild chronic microvascular changes, ventricular prominence and   parenchymal atrophy.    --- End of Report ---    < end of copied text >    < from: 12 Lead ECG (07.05.22 @ 14:30) >    Diagnosis Line Sinus bradycardia with 1st degree A-V block  Non-specific intra-ventricular conduction block  Inferior infarct , age undetermined  Abnormal ECG    Confirmed by Martell Joseph ([id number]) on 7/5/2022 9:01:46 PM    < end of copied text >    < from: TTE Echo Complete w/ Contrast w/ Doppler (10.22.21 @ 07:59) >    Summary:   1. Moderately to severely decreased global left ventricular systolic function.   2. Severely enlarged left atrium.   3. Severely enlarged right atrium.   4. Multiple left ventricular regional wall motion abnormalities exist. See wall motion findings.   5. Mild eccentric left ventricular hypertrophy.   6. The left ventricular diastolic function could not be assessed in this study.   7. Severely enlarged right ventricle.   8. Severely reduced RV systolic function.   9. Mild to moderate mitral valve regurgitation.  10. Moderate mitral annular calcification.  11. Mild tricuspid regurgitation.  12. Mild aortic regurgitation.  13. Sclerotic aortic valve with normal opening.  14. Complex atheroma is noted in the ascending aorta.  15. Estimated pulmonary artery systolic pressure is 64.8 mmHg assuming a right atrial pressure of 15 mmHg, which is consistent with severe pulmonary hypertension.  16. Pulmonary hypertension is present.  17. LA volume Index is 71.9 ml/m² ml/m2.  18. There is moderate aortic root calcification.      < end of copied text >

## 2022-07-05 NOTE — ED PROVIDER NOTE - PHYSICAL EXAMINATION
CONSTITUTIONAL: pale appearing elderly male, no acute distress   SKIN: healing abrasion to right forearm, no lacerations  HEAD: Normocephalic; atraumatic.  EYES: PERRL, pale conjunctiva  ENT: Dry MM   NECK: ROM intact.  CARD: S1, S2 normal, no murmur  RESP:  Good air movement bilaterally. No increased WOB.   ABD: soft; non-distended; non-tender.   EXT: Normal ROM. No edema. No midline tenderness.   NEURO: awake, alert, following commands, oriented, grossly unremarkable. No Focal deficits. GCS 15.   PSYCH: Cooperative, appropriate.

## 2022-07-05 NOTE — ED PROVIDER NOTE - NS_EDPROVIDERDISPOUSERTYPE_ED_A_ED
Phone: Tameka Ambriz      Fax: [phone]                            Outpatient Physical Therapy                                                                            Daily Note    Date: 2021  Patient Name: Dom Grover        MRN: [id number]   ACCT#:  [de-identified]  : 1954  (73 y.o.)    Referring Practitioner: Dr. Dia Abraham    Referral Date : 21    Diagnosis: S/P L TKR  Treatment Diagnosis: Gait ataxia due to L knee pain s/p TKR    Onset Date: 21  PT Insurance Information: [facility]  Total # of Visits Approved: 12 Per Physician Order  Total # of Visits to Date: 9  No Show: 0  Canceled Appointment: 0  Plan of Care/Certification Expiration Date: 21    Pre-Treatment Pain:  3-4/10     Assessment  Assessment: Pt reports she is achey, pain 2-3/10. She took  pain meds prior to therapy. She takes pain meds  before therapy and bed. Performed ex as outlined , progressed with march on aeromat to improve balance/gait. Pt reports she has been using furniture in her home to amb with no device, advised using her cane would be preferred to decrease gait deviations. She had good understanding. PROM improved today to 117 deg flexion. Possible DC at end of next week. Chart Reviewed: Yes    Plan  Plan: Continue with current plan    Exercises/Modalities/Manual:  See DocFlow Sheet         Barriers to Learning: None    Goals  (Total # of Visits to Date: 5)   Short Term Goals - Time Frame for Short term goals: 6 visits  Short term goal 1: Pt to be independent and compliant with HEP for ROM -MET  Short term goal 2: Pt to have PROM 100deg flexion to improve car transfers. -MET  Short term goal 3: Pt to amb with SC x100ft with min deviations          Long Term Goals - Time Frame for Long term goals : 12 visits(POC Exp 21)  Long term goal 1: Pt to score >29/80 on LEFS to improve ADL lamar.   Long term goal 2: Pt to have 4/5 knee ext strength to improve stair management. Long term goal 3: Pt to have PROM 115deg to improve stair lamar and transfers. Long term goal 4: Pt to amb 75ft without AD min deviations to improve household amb.        Post Treatment Pain:  4/10    Time In: 0815    Time Out : 0900        Timed Code Treatment Minutes: 40 Minutes  Total Treatment Time: 39 (Pt required a restroom break)) Minutes    Baljit Braga  PTA   Date: 9/17/2021 Attending Attestation (For Attendings USE Only)...

## 2022-07-05 NOTE — H&P ADULT - ASSESSMENT
82M with PMHx of HFrEF, Afib on eliquis, CAD s/p CABG, COPD (on rare home O2 PRN), CKD4, HTN, HLD presents for 2 weeks of watery diarrhea.      82M with PMHx of HFrEF, Afib on eliquis, CAD s/p CABG, COPD (on rare home O2 PRN), CKD3, AAA, HTN, HLD presents for 2 weeks of watery diarrhea.     #Watery diarrhea x2 weeks  #BRENDA on CKD3  - Pt presented with 2 weeks of diarrhea after a crowded clinic appointment; wife experienced same symptoms  - 3-4 episodes of non-bloody, watery diarrhea for the past 2 weeks  - Baseline 2 BMs per day  -      82M with PMHx of HFrEF, Afib on eliquis, CAD s/p CABG, COPD (on rare home O2 PRN), CKD3, AAA, HTN, HLD, hypothyroidism, PVD presents for 2 weeks of watery diarrhea.     #Watery diarrhea x2 weeks  - Pt presented with 2 weeks of diarrhea after a crowded clinic appointment; wife experienced same symptoms  - 3-4 episodes of non-bloody, watery diarrhea for the past 2 weeks; Baseline 2 BMs per day  - Episodes occur even during sleep  - Associated with decreased PO intake and weakness; mechanical fall 1 week ago with no injuries  - CTAP -ve for acute pathology  - F/u GI PCR, C diff, lactoferrin, calprotectin, stool osmolar gap, stool culture  - Gentle IV hydration  - PT eval    #BRENDA on CKD3  #Troponinemia  - Baseline Cr ~2.0, on admission 3.5,   - Trops 0.07 (likely 2/2 BRENDA on CKD), Trend  - No signs of bleeding, chest pain, or encephalopathy  - Likely pre-renal in setting of diarrhea and elevated BUN  - Start on gentle IVF  - send urine lytes if Cr does not improve  - F/u Phos, PTH, 25-OH Vit D  - Of note, pt had prior admission in the past with sepsis and pna and ended up needing HD transiently, now off HD    #Afib on Eliquis  - On home Eliquis 2.5 bid  - Start hep drip until kidney function improves then switch to lovenox/DOAC  - HR 57 in ED, EKG 1st-degree AV block  - Rate controlled on metop tart 25 bid, hold for now    #HFrEF  #CAD s/p CABG  #HTN  - TTE 2021: Mod-severe decreased LV systolic function, severe pulm HTN, severe bi-atrial enlargement  - On home imdur 30 XL qd, Nifedipine 30 XL qd, Telmisartan 80 qd  - Hold BP meds for now until BP improves  - Daily weights, strict intake/output  - Avoid overload  - F/u TTE    #COPD  - On home O2 PRN (uses it very rarely)  - Not on nebs/inhalers  - Duonebs q6h prn    #DM  - On home lantus 30u qhs  - Goal -180  - Start lantus 15 and sliding scale and monitor PO intake and FS  - Adjust insulin as needed, F/u A1c    #HLD - c/w atorvastatin 40mg qhs, f/u lipid panel  #PVD - c/w pentoxyfilline 400mg bid    DVT ppx: Hep drip  GI ppx: Protonix  Diet: DASH/TLC/CC low K  Activity: IAT  Dispo: Acute   82M with PMHx of HFrEF, Afib on eliquis, CAD s/p CABG, COPD (on rare home O2 PRN), CKD3, AAA, HTN, HLD, hypothyroidism, PVD presents for 2 weeks of watery diarrhea.     #Watery diarrhea x2 weeks  - Pt presented with 2 weeks of diarrhea after a crowded clinic appointment; wife experienced same symptoms  - 3-4 episodes of non-bloody, watery diarrhea for the past 2 weeks; Baseline 2 BMs per day  - Episodes occur even during sleep  - Associated with decreased PO intake and weakness; mechanical fall 1 week ago with no injuries  - CTAP -ve for acute pathology  - F/u GI PCR, C diff, lactoferrin, calprotectin, stool osmolar gap, stool culture  - Gentle IV hydration  - PT eval    #BRENDA on CKD3  #Troponinemia  - Baseline Cr ~2.0, on admission 3.5,   - Trops 0.07 (likely 2/2 BRENDA on CKD), Trend  - No signs of bleeding, chest pain, or encephalopathy  - Likely pre-renal in setting of diarrhea and elevated BUN  - Start on gentle IVF  - send urine lytes if Cr does not improve  - F/u Phos, PTH, 25-OH Vit D  - Of note, pt had prior admission in the past with sepsis and pna and ended up needing HD transiently, now off HD    #chronic Afib on Eliquis  - On home Eliquis 2.5 bid  - Start hep drip until kidney function improves then switch to lovenox/DOAC  - HR 57 in ED, EKG 1st-degree AV block  - Rate controlled on metop tart 25 bid, hold for now    #HFrEF  #CAD s/p CABG  #HTN  - TTE 2021: Mod-severe decreased LV systolic function, severe pulm HTN, severe bi-atrial enlargement  - On home imdur 30 XL qd, Nifedipine 30 XL qd, Telmisartan 80 qd  - Hold BP meds for now until BP improves  - Daily weights, strict intake/output  - Avoid overload  - F/u TTE    #COPD  - On home O2 PRN (uses it very rarely)  - Not on nebs/inhalers  - Duonebs q6h prn    #DM  - On home lantus 30u qhs  - Goal -180  - Start lantus 15 and sliding scale and monitor PO intake and FS  - Adjust insulin as needed, F/u A1c    #HLD - c/w atorvastatin 40mg qhs, f/u lipid panel  #PVD - c/w pentoxyfilline 400mg bid    DVT ppx: Hep drip  GI ppx: Protonix  Diet: DASH/TLC/CC low K  Activity: IAT  Dispo: Acute

## 2022-07-05 NOTE — ED PROVIDER NOTE - NS ED ROS FT
Review of Systems:  	•	CONSTITUTIONAL: no fever  	•	SKIN: no rash  	•	ENT: no difficulty swallowing   	•	RESPIRATORY: no shortness of breath, no cough  	•	CARDIAC: no chest pain, no palpitations  	•	GI: no abd pain, no nausea, no vomiting, +diarrhea   	•	GENITO-URINARY: no discharge, no dysuria; no hematuria, no increased urinary frequency  	•	MUSCULOSKELETAL: no joint paint, no swelling, no redness  	•	NEUROLOGIC: +weakness, +lightheadedness, no syncope   	•	PSYCH: no anxiety, non suicidal, non homicidal, no hallucination, no depression

## 2022-07-05 NOTE — ED PROVIDER NOTE - OBJECTIVE STATEMENT
82 year old male, past medical history chf, htn, hld, AAA, afib on eliquis, ckd last dialysis 2019, copd on home o2 PRN, bib family for weakness/diarrhea. patient with persistent non-bloody diarrhea that began x2 weeks ago, several episodes/day. patient with mechanical trip and fall onto right side in bathroom x1 week, refused to present to ED, was able to stand and ambulate with assistance. patient had scheduled appt with nephro, dr rizzo, today, however, due to weakness, was unable to attend visit. denies fever, chills, chest pain, hemoptysis, abd pain, back pain, vomiting, syncope.

## 2022-07-05 NOTE — H&P ADULT - ATTENDING COMMENTS
#BRENDA, presumed pre-renal  cont ns 50 cc/hr  start bicarb 650 tid  trend  #Diarrhea, in setting of covid  now resolved  if recurs check gi pcr  ct chest/ abd neg  no hypoxia, satting well on ra  ivf as above  #Fall, mechanical  cth neg  trauma w/u neg  PT    #Progress Note Handoff:  Pending (specify):  Consults_________, Tests________, Test Results_______, Other____aki_____  Family discussion: d/w pt at bedside re: treatment plan, primary dx  Disposition: Home___/SNF___/Other________/Unknown at this time_____x___

## 2022-07-05 NOTE — ED PROVIDER NOTE - NS ED ATTENDING STATEMENT MOD
This was a shared visit with the LISANDRA. I reviewed and verified the documentation and independently performed the documented:

## 2022-07-05 NOTE — ED PROVIDER NOTE - CARE PLAN
1 Principal Discharge DX:	Acute kidney injury superimposed on CKD  Secondary Diagnosis:	Diarrhea  Secondary Diagnosis:	Fall

## 2022-07-06 NOTE — PHYSICAL THERAPY INITIAL EVALUATION ADULT - GAIT DEVIATIONS NOTED, PT EVAL
decreased sergey/increased time in double stance/decreased step length/decreased stride length/increased stride width

## 2022-07-06 NOTE — PHYSICAL THERAPY INITIAL EVALUATION ADULT - GENERAL OBSERVATIONS, REHAB EVAL
PT eval complete. Patient encountered sitting in bedside chair, (+) tele, (+)4L O2 NC, NAD, agreeable to PT evaluation. Assessed transfers and ambulation. Patient left supine in bed per patient request. RASHMI Dyer made aware. Time in: 2:00 Time out: 2:30

## 2022-07-06 NOTE — PHYSICAL THERAPY INITIAL EVALUATION ADULT - PERTINENT HX OF CURRENT PROBLEM, REHAB EVAL
82M with PMHx of HFrEF, Afib on eliquis, CAD s/p CABG, COPD (on rare home O2 PRN), CKD3, HTN, HLD presents for 2 weeks of watery diarrhea.

## 2022-07-06 NOTE — PHYSICAL THERAPY INITIAL EVALUATION ADULT - ADDITIONAL COMMENTS
Patient lives with wife in a private home, [address]. Patient used a walker prior to admission for all ambulation. Patient states that he was independent with ADL's prior to admission.

## 2022-07-07 NOTE — PROGRESS NOTE ADULT - SUBJECTIVE AND OBJECTIVE BOX
INTERVAL HPI/OVERNIGHT EVENTS:    SUBJECTIVE: Patient seen and examined at bedside.     no cp, sob, abd pain, fever  no sob, orthopnea, pnd, cough    OBJECTIVE:    VITAL SIGNS:  Vital Signs Last 24 Hrs  T(C): 36.8 (07 Jul 2022 08:12), Max: 37.1 (07 Jul 2022 02:03)  T(F): 98.2 (07 Jul 2022 08:12), Max: 98.7 (07 Jul 2022 02:03)  HR: 77 (07 Jul 2022 08:12) (72 - 77)  BP: 103/58 (07 Jul 2022 08:12) (103/58 - 189/82)  BP(mean): --  RR: 18 (07 Jul 2022 08:12) (18 - 18)  SpO2: 95% (07 Jul 2022 08:12) (95% - 99%)      PHYSICAL EXAM:    General: NAD  HEENT: NC/AT; PERRL, clear conjunctiva  Neck: supple  Respiratory: CTA b/l  Cardiovascular: +S1/S2; RRR  Abdomen: soft, NT/ND; +BS x4  Extremities: WWP, 2+ peripheral pulses b/l; no LE edema  Skin: normal color and turgor; no rash  Neurological:    MEDICATIONS:  MEDICATIONS  (STANDING):  apixaban 2.5 milliGRAM(s) Oral two times a day  atorvastatin 40 milliGRAM(s) Oral at bedtime  calcitriol   Capsule 0.25 MICROGram(s) Oral <User Schedule>  dextrose 5%. 1000 milliLiter(s) (50 mL/Hr) IV Continuous <Continuous>  dextrose 5%. 1000 milliLiter(s) (100 mL/Hr) IV Continuous <Continuous>  dextrose 50% Injectable 25 Gram(s) IV Push once  dextrose 50% Injectable 12.5 Gram(s) IV Push once  dextrose 50% Injectable 25 Gram(s) IV Push once  glucagon  Injectable 1 milliGRAM(s) IntraMuscular once  insulin glargine Injectable (LANTUS) 17 Unit(s) SubCutaneous at bedtime  insulin lispro (ADMELOG) corrective regimen sliding scale   SubCutaneous three times a day before meals  insulin lispro Injectable (ADMELOG) 2 Unit(s) SubCutaneous three times a day before meals  levothyroxine 150 MICROGram(s) Oral daily  pentoxifylline 400 milliGRAM(s) Oral two times a day  sodium bicarbonate 650 milliGRAM(s) Oral three times a day  sodium chloride 0.9%. 1000 milliLiter(s) (50 mL/Hr) IV Continuous <Continuous>    MEDICATIONS  (PRN):  acetaminophen     Tablet .. 650 milliGRAM(s) Oral every 6 hours PRN Temp greater or equal to 38C (100.4F), Mild Pain (1 - 3)  ALBUTerol    90 MICROgram(s) HFA Inhaler 1 Puff(s) Inhalation every 6 hours PRN Bronchospasm  dextrose Oral Gel 15 Gram(s) Oral once PRN Blood Glucose LESS THAN 70 milliGRAM(s)/deciliter  melatonin 5 milliGRAM(s) Oral at bedtime PRN Insomnia      ALLERGIES:  Allergies    Ozempic (0.25 mg or 0.5 mg dose) (Hives; Rash)  streptomycin (Unknown)  Trulicity Pen (Hives; Rash)    Intolerances        LABS:                        11.3   4.91  )-----------( 145      ( 07 Jul 2022 09:15 )             34.5     Hemoglobin: 11.3 g/dL (07-07 @ 09:15)  Hemoglobin: 10.8 g/dL (07-06 @ 07:39)  Hemoglobin: 9.2 g/dL (07-06 @ 02:46)  Hemoglobin: 11.3 g/dL (07-05 @ 14:54)    CBC Full  -  ( 07 Jul 2022 09:15 )  WBC Count : 4.91 K/uL  RBC Count : 3.96 M/uL  Hemoglobin : 11.3 g/dL  Hematocrit : 34.5 %  Platelet Count - Automated : 145 K/uL  Mean Cell Volume : 87.1 fL  Mean Cell Hemoglobin : 28.5 pg  Mean Cell Hemoglobin Concentration : 32.8 g/dL  Auto Neutrophil # : 3.97 K/uL  Auto Lymphocyte # : 0.13 K/uL  Auto Monocyte # : 0.73 K/uL  Auto Eosinophil # : 0.00 K/uL  Auto Basophil # : 0.01 K/uL  Auto Neutrophil % : 80.9 %  Auto Lymphocyte % : 2.6 %  Auto Monocyte % : 14.9 %  Auto Eosinophil % : 0.0 %  Auto Basophil % : 0.2 %    07-07    135  |  99  |  100<HH>  ----------------------------<  218<H>  4.0   |  21  |  2.1<H>    Ca    8.8      07 Jul 2022 09:15  Phos  3.2     07-07  Mg     2.2     07-07    TPro  6.2  /  Alb  3.7  /  TBili  0.6  /  DBili  x   /  AST  15  /  ALT  5   /  AlkPhos  92  07-06    Creatinine Trend: 2.1<--, 2.7<--, 3.5<--  LIVER FUNCTIONS - ( 06 Jul 2022 07:39 )  Alb: 3.7 g/dL / Pro: 6.2 g/dL / ALK PHOS: 92 U/L / ALT: 5 U/L / AST: 15 U/L / GGT: x           PTT - ( 07 Jul 2022 09:15 )  PTT:32.2 sec    hs Troponin:              CSF:                      EKG:   MICROBIOLOGY:    IMAGING:      Labs, imaging, EKG personally reviewed    RADIOLOGY & ADDITIONAL TESTS: Reviewed.

## 2022-07-07 NOTE — PROGRESS NOTE ADULT - TIME BILLING
82M PMHx HFrEF, AFib on eliquis, CAD s/p CABG, DM2, COPD on home o2 prn, CKD III, AAA, HTN, hypothyroidism, PVD here with diarrhea, found to have covid. BRENDA.    #BRENDA, presumed pre-renal  resolving  cont ns 50 cc/hr  bicarb 650 tid  trend  #Diarrhea, in setting of covid  resolving, one episode today  check gi pcr  ct chest/ abd neg  no hypoxia, satting well on ra  ivf as above  #Fall, mechanical  cth neg  trauma w/u neg  PT  #HFrEF, chronic  euvolemic on exam  outpt f/u  #AFib, chronic  eliquis  #CAD  lipitor 40  outpt f/u for asa  #COPD  proventil prn  outpt f/u  #DM2  lantus 17  humalog 2 tid  ssi  #HTN  controlled off medications  #Hypothyroidism  synthroid 150  #PVD  lipitor 40  outpt f/u for asa  #DVT ppx  eliquis    #Progress Note Handoff:  Pending (specify):  Consults_________, Tests________, Test Results_______, Other____aki_____  Family discussion: d/w pt at bedside re: treatment plan, primary dx  Disposition: Home___/SNF___/Other________/Unknown at this time_____x___. 82M PMHx HFrEF, AFib on eliquis, CAD s/p CABG, DM2, COPD on home o2 prn, CKD III, AAA, HTN, hypothyroidism, PVD here with diarrhea, found to have covid. BRENDA.    #BRENDA, presumed pre-renal  resolving  cont ns 50 cc/hr  bicarb 650 tid  trend  #Diarrhea, in setting of covid  resolving, one episode today  check gi pcr  ct chest/ abd neg  no hypoxia, satting well on ra  ivf as above  #Fall, mechanical  cth neg  trauma w/u neg  PT  #Elevated cardiac enyzmes  in setting of recent fall, brenda  improved from prior Oct 2021 levels  ekg noted  no cp  outpt f/u cards  #HFrEF, chronic  euvolemic on exam  outpt f/u  #AFib, chronic  eliquis  #CAD  lipitor 40  outpt f/u for asa  #COPD  proventil prn  outpt f/u  #DM2  lantus 17  humalog 2 tid  ssi  #HTN  controlled off medications  #Hypothyroidism  synthroid 150  #PVD  lipitor 40  outpt f/u for asa  #DVT ppx  eliquis    #Progress Note Handoff:  Pending (specify):  Consults_________, Tests________, Test Results_______, Other____aki_____  Family discussion: d/w pt at bedside re: treatment plan, primary dx  Disposition: Home___/SNF___/Other________/Unknown at this time_____x___.

## 2022-07-08 NOTE — DISCHARGE NOTE PROVIDER - NSDCCPCAREPLAN_GEN_ALL_CORE_FT
PRINCIPAL DISCHARGE DIAGNOSIS  Diagnosis: Acute kidney injury superimposed on CKD  Assessment and Plan of Treatment: Acute kidney failure occurs when your kidneys suddenly become unable to filter waste products from your blood. When your kidneys lose their filtering ability, dangerous levels of wastes may accumulate, and your blood's chemical makeup may get out of balance.  Acute kidney failure — also called acute renal failure or acute kidney injury — develops rapidly, usually in less than a few days. Acute kidney failure is most common in people who are already hospitalized, particularly in critically ill people who need intensive care.  Here you received fluids with improvement of your creatinine to 1.6  We held your Telmisartan due to the kidney injury. Please follow up with primary care provider before resuming this medication      SECONDARY DISCHARGE DIAGNOSES  Diagnosis: Diarrhea  Assessment and Plan of Treatment: Everyone occasionally has diarrhea — loose, watery and more-frequent bowel movements. You might also have abdominal cramps and produce a greater volume of stool. The duration of diarrhea symptoms can provide a clue to the underlying cause.  Acute diarrhea lasts from 2 days to 2 weeks.   Your diarrhea was improved on its own, most probably from COVID infection

## 2022-07-08 NOTE — DISCHARGE NOTE PROVIDER - HOSPITAL COURSE
82M PMHx HFrEF, AFib on eliquis, CAD s/p CABG, DM2, COPD on home o2 prn, CKD III, AAA, HTN, hypothyroidism, PVD here with diarrhea, found to have covid. BRENDA.    Patient was admitted to ED# for 2 days fue to BRENDA which was prerenal (secondary to fluid loses in diarrhea). Patient was replenished with IVF with improvement of CR 1.6 (baseline Cr1.7).   Diarrhea improved spontaneously, cause is COVID infection with spontaneous resolution in hospital. Mild covid infection with no oxygen requirements 82M PMHx HFrEF, AFib on eliquis, CAD s/p CABG, DM2, COPD on home o2 prn, CKD III, AAA, HTN, hypothyroidism, PVD here with diarrhea, found to have covid. BRENDA.    Patient was admitted to ED# for 2 days fue to BRENDA which was prerenal (secondary to fluid loses in diarrhea). Patient was replenished with IVF with improvement of CR 1.6 (baseline Cr1.7).   Diarrhea improved spontaneously, cause is COVID infection with spontaneous resolution in hospital. Mild covid infection with no oxygen requirements.     Patient was admitted for management of the BRENDA. Patient's creatinine began trending back down to normal. However, the patient then began to become altered. CT head  was done which showed no intracranial pathology. Any medications that could have been contributing to the altered mental status were discontinued. Delirium precautions were started and the patient began to regain his mental status.    However the patient then subsequently had increased work of breathing, increase need for oxygenation. CT scan was done which showed new increasing lobar pneumonia. Patient was started on IV antibiotics and patient began to get better.    As the patients pneumonia got better the patient began eating less and less. He was seen by speech and swallow who ordered a modified barium, which showed that he was microaspirating with thin liquids. Patient and family had a family meeting regarding long term goals. It was decided that patient was going to go to short term rehab to see if he could get stronger and possibly go home.

## 2022-07-08 NOTE — PROGRESS NOTE ADULT - TIME BILLING
82M PMHx HFrEF, AFib on eliquis, CAD s/p CABG, DM2, COPD on home o2 prn, CKD III, AAA, HTN, hypothyroidism, PVD here with diarrhea, found to have covid. BRENDA.    #BRENDA on CKD III, presumed pre-renal  resolved  d/c ivf  d/c bicarb  stable for d/c, needs outpt pmd, renal f/u one week  #Diarrhea, in setting of covid  resolved, no further episodes  ct chest/ abd neg  no hypoxia, satting well on ra  #Fall, mechanical  no syncope per pt  cth neg  trauma w/u neg  PT  #Elevated cardiac enzymes  in setting of recent fall, brenda  improved from prior Oct 2021 levels  ekg noted  no cp  outpt f/u cards  #HFrEF, chronic  euvolemic on exam  outpt f/u  #AFib, chronic  eliquis  #CAD  lipitor 40  outpt f/u for asa  #COPD  proventil prn  outpt f/u  #DM2  lantus 17  humalog 2 tid  ssi  #HTN  controlled off medications  #Hypothyroidism  synthroid 150  #PVD  lipitor 40  outpt f/u for asa  #DVT ppx  eliquis

## 2022-07-08 NOTE — PROGRESS NOTE ADULT - SUBJECTIVE AND OBJECTIVE BOX
INTERVAL HPI/OVERNIGHT EVENTS:    SUBJECTIVE: Patient seen and examined at bedside.     no cp, sob, abd pain, fever  no abd pain, nausea, vomiting, diarrhea    OBJECTIVE:    VITAL SIGNS:  Vital Signs Last 24 Hrs  T(C): 36.6 (08 Jul 2022 08:01), Max: 37 (08 Jul 2022 00:13)  T(F): 97.9 (08 Jul 2022 08:01), Max: 98.6 (08 Jul 2022 00:13)  HR: 81 (08 Jul 2022 08:01) (70 - 81)  BP: 167/80 (08 Jul 2022 08:01) (157/75 - 193/81)  BP(mean): --  RR: 18 (08 Jul 2022 08:01) (18 - 18)  SpO2: 95% (08 Jul 2022 08:01) (95% - 98%)    Parameters below as of 08 Jul 2022 08:01  Patient On (Oxygen Delivery Method): nasal cannula  O2 Flow (L/min): 4        PHYSICAL EXAM:    General: NAD  HEENT: NC/AT; PERRL, clear conjunctiva  Neck: supple  Respiratory: CTA b/l  Cardiovascular: +S1/S2; RRR  Abdomen: soft, NT/ND; +BS x4  Extremities: WWP, 2+ peripheral pulses b/l; no LE edema  Skin: normal color and turgor; no rash  Neurological:    MEDICATIONS:  MEDICATIONS  (STANDING):  apixaban 2.5 milliGRAM(s) Oral two times a day  atorvastatin 40 milliGRAM(s) Oral at bedtime  calcitriol   Capsule 0.25 MICROGram(s) Oral <User Schedule>  dextrose 5%. 1000 milliLiter(s) (50 mL/Hr) IV Continuous <Continuous>  dextrose 5%. 1000 milliLiter(s) (100 mL/Hr) IV Continuous <Continuous>  dextrose 50% Injectable 25 Gram(s) IV Push once  dextrose 50% Injectable 12.5 Gram(s) IV Push once  dextrose 50% Injectable 25 Gram(s) IV Push once  glucagon  Injectable 1 milliGRAM(s) IntraMuscular once  insulin glargine Injectable (LANTUS) 17 Unit(s) SubCutaneous at bedtime  insulin lispro (ADMELOG) corrective regimen sliding scale   SubCutaneous three times a day before meals  insulin lispro Injectable (ADMELOG) 4 Unit(s) SubCutaneous three times a day before meals  levothyroxine 150 MICROGram(s) Oral daily  pentoxifylline 400 milliGRAM(s) Oral two times a day    MEDICATIONS  (PRN):  acetaminophen     Tablet .. 650 milliGRAM(s) Oral every 6 hours PRN Temp greater or equal to 38C (100.4F), Mild Pain (1 - 3)  ALBUTerol    90 MICROgram(s) HFA Inhaler 1 Puff(s) Inhalation every 6 hours PRN Bronchospasm  dextrose Oral Gel 15 Gram(s) Oral once PRN Blood Glucose LESS THAN 70 milliGRAM(s)/deciliter  melatonin 5 milliGRAM(s) Oral at bedtime PRN Insomnia      ALLERGIES:  Allergies    Ozempic (0.25 mg or 0.5 mg dose) (Hives; Rash)  streptomycin (Unknown)  Trulicity Pen (Hives; Rash)    Intolerances        LABS:                        11.0   4.25  )-----------( 134      ( 08 Jul 2022 07:41 )             33.4     Hemoglobin: 11.0 g/dL (07-08 @ 07:41)  Hemoglobin: 11.3 g/dL (07-07 @ 09:15)  Hemoglobin: 10.8 g/dL (07-06 @ 07:39)  Hemoglobin: 9.2 g/dL (07-06 @ 02:46)  Hemoglobin: 11.3 g/dL (07-05 @ 14:54)    CBC Full  -  ( 08 Jul 2022 07:41 )  WBC Count : 4.25 K/uL  RBC Count : 3.82 M/uL  Hemoglobin : 11.0 g/dL  Hematocrit : 33.4 %  Platelet Count - Automated : 134 K/uL  Mean Cell Volume : 87.4 fL  Mean Cell Hemoglobin : 28.8 pg  Mean Cell Hemoglobin Concentration : 32.9 g/dL  Auto Neutrophil # : 3.37 K/uL  Auto Lymphocyte # : 0.19 K/uL  Auto Monocyte # : 0.61 K/uL  Auto Eosinophil # : 0.01 K/uL  Auto Basophil # : 0.01 K/uL  Auto Neutrophil % : 79.3 %  Auto Lymphocyte % : 4.5 %  Auto Monocyte % : 14.4 %  Auto Eosinophil % : 0.2 %  Auto Basophil % : 0.2 %    07-08    135  |  101  |  80<HH>  ----------------------------<  162<H>  4.4   |  20  |  1.6<H>    Ca    8.5      08 Jul 2022 07:41  Phos  2.5     07-08  Mg     2.2     07-08      Creatinine Trend: 1.6<--, 2.1<--, 2.7<--, 3.5<--    PTT - ( 07 Jul 2022 09:15 )  PTT:32.2 sec    hs Troponin:              CSF:                      EKG:   MICROBIOLOGY:    IMAGING:      Labs, imaging, EKG personally reviewed    RADIOLOGY & ADDITIONAL TESTS: Reviewed.

## 2022-07-08 NOTE — DISCHARGE NOTE PROVIDER - NSDCMRMEDTOKEN_GEN_ALL_CORE_FT
Aranesp 100 mcg/0.5 mL injectable solution: 1 dose(s) injectable every 3 to 4 weeks  atorvastatin 40 mg oral tablet: 1 tab(s) orally once a day (at bedtime)  calcitriol 0.25 mcg oral capsule: 1 cap(s) orally 2 times a week  d-mycophenolate: 500 milligram(s) orally 4 times a day  Eliquis 2.5 mg oral tablet: 1 tab(s) orally 2 times a day  famotidine 20 mg oral tablet: 1 tab(s) orally once a day  isosorbide mononitrate 30 mg oral tablet, extended release: 1 tab(s) orally once a day (in the morning)  Lantus 100 units/mL subcutaneous solution: 30 unit(s) subcutaneous once a day (at bedtime)  levothyroxine 150 mcg (0.15 mg) oral tablet: 1 tab(s) orally once a day  metoprolol tartrate 25 mg oral tablet: 1 tab(s) orally 2 times a day  NIFEdipine 30 mg oral tablet, extended release: 1 tab(s) orally once a day  pentoxifylline 400 mg oral tablet, extended release: 1 tab(s) orally 2 times a day  tacrolimus 1 mg oral capsule: 1 cap(s) orally every 12 hours   Aranesp 100 mcg/0.5 mL injectable solution: 1 dose(s) injectable every 3 to 4 weeks  atorvastatin 40 mg oral tablet: 1 tab(s) orally once a day (at bedtime)  calcitriol 0.25 mcg oral capsule: 1 cap(s) orally 2 times a week  d-mycophenolate: 500 milligram(s) orally 4 times a day  Eliquis 2.5 mg oral tablet: 1 tab(s) orally 2 times a day  famotidine 20 mg oral tablet: 1 tab(s) orally once a day  furosemide 20 mg oral tablet: 1 tab(s) orally once a day  insulin glargine 100 units/mL subcutaneous solution: 5 unit(s) subcutaneous once a day (at bedtime)  isosorbide mononitrate 30 mg oral tablet, extended release: 1 tab(s) orally once a day (in the morning)  levothyroxine 150 mcg (0.15 mg) oral tablet: 1 tab(s) orally once a day  metoprolol succinate 50 mg oral tablet, extended release: 1 tab(s) orally once a day  Multiple Vitamins with Minerals oral tablet: 1 tab(s) orally once a day  NIFEdipine 30 mg oral tablet, extended release: 1 tab(s) orally once a day  pentoxifylline 400 mg oral tablet, extended release: 1 tab(s) orally 2 times a day  sodium bicarbonate 650 mg oral tablet: 1 tab(s) orally 3 times a day  tacrolimus 0.5 mg oral capsule: 1 cap(s) orally    Aranesp 100 mcg/0.5 mL injectable solution: 1 dose(s) injectable every 3 to 4 weeks  atorvastatin 40 mg oral tablet: 1 tab(s) orally once a day (at bedtime)  calcitriol 0.25 mcg oral capsule: 1 cap(s) orally 2 times a week  d-mycophenolate: 500 milligram(s) orally 4 times a day  Eliquis 2.5 mg oral tablet: 1 tab(s) orally 2 times a day  famotidine 20 mg oral tablet: 1 tab(s) orally once a day  furosemide 20 mg oral tablet: 1 tab(s) orally once a day  insulin glargine 100 units/mL subcutaneous solution: 5 unit(s) subcutaneous once a day (at bedtime)  insulin lispro 100 units/mL injectable solution: 1 Unit(s) if Glucose 151 - 200  2 Unit(s) if Glucose 201 - 250  3 Unit(s) if Glucose 251 - 300  4 Unit(s) if Glucose 301 - 350  5 Unit(s) if Glucose 351 - 400  6 Unit(s) if Glucose Greater Than 400  isosorbide mononitrate 30 mg oral tablet, extended release: 1 tab(s) orally once a day (in the morning)  levothyroxine 150 mcg (0.15 mg) oral tablet: 1 tab(s) orally once a day  metoprolol succinate 50 mg oral tablet, extended release: 1 tab(s) orally once a day  Multiple Vitamins with Minerals oral tablet: 1 tab(s) orally once a day  NIFEdipine 30 mg oral tablet, extended release: 1 tab(s) orally once a day  pentoxifylline 400 mg oral tablet, extended release: 1 tab(s) orally 2 times a day  sodium bicarbonate 650 mg oral tablet: 1 tab(s) orally 3 times a day  tacrolimus 0.5 mg oral capsule: 1 cap(s) orally 2 times a day

## 2022-07-08 NOTE — DISCHARGE NOTE NURSING/CASE MANAGEMENT/SOCIAL WORK - NSDCPEFALRISK_GEN_ALL_CORE
For information on Fall & Injury Prevention, visit: [email or web address] OR  [email or web address] OR  https://www.cdc.gov/steadi/patient.html

## 2022-07-08 NOTE — DISCHARGE NOTE PROVIDER - PROVIDER TOKENS
PROVIDER:[TOKEN:[[id number]],FOLLOWUP:[1 month]],PROVIDER:[TOKEN:[[id number]],FOLLOWUP:[1 month]]

## 2022-07-08 NOTE — DISCHARGE NOTE NURSING/CASE MANAGEMENT/SOCIAL WORK - PATIENT PORTAL LINK FT
You can access the FollowMyHealth Patient Portal offered by [facility] by registering at the following website: [email or web address]. By joining [employer]’s FollowMyHealth portal, you will also be able to view your health information using other applications (apps) compatible with our system.

## 2022-07-08 NOTE — PATIENT PROFILE ADULT - FALL HARM RISK - HARM RISK INTERVENTIONS

## 2022-07-08 NOTE — DISCHARGE NOTE PROVIDER - CARE PROVIDER_API CALL
Name band; Zo Brown)  Internal Medicine; Nephrology  [address] [place] [address]  Phone: [phone]  Fax: [phone]  Follow Up Time: 1 month    Rajesh Brown)  Critical Care Medicine; Internal Medicine; Pulmonary Disease; Sleep Medicine  [address] [place] [address]  Phone: [phone]  Fax: [phone]  Follow Up Time: 1 month

## 2022-07-08 NOTE — DISCHARGE NOTE PROVIDER - DISCHARGE DIET
Regular Diet - No restrictions/Consistent Carbohydrate Diabetic Diets Minced and Moist Diet/Moderately Thick Liquids

## 2022-07-08 NOTE — DISCHARGE NOTE PROVIDER - NSDCFUSCHEDAPPT_GEN_ALL_CORE_FT
Rajesh Pope  [facility]  PULMMED [address] [place] [address] Av  Scheduled Appointment: 08/05/2022

## 2022-07-09 NOTE — PROGRESS NOTE ADULT - SUBJECTIVE AND OBJECTIVE BOX
INTERVAL HPI/OVERNIGHT EVENTS:    SUBJECTIVE: Patient seen and examined at bedside.     no cp, sob, abd pain, fever  no sob, orthopnea, pnd, cough  OBJECTIVE:    VITAL SIGNS:  Vital Signs Last 24 Hrs  T(C): 37.2 (09 Jul 2022 09:45), Max: 37.2 (08 Jul 2022 23:37)  T(F): 99 (09 Jul 2022 09:45), Max: 99 (09 Jul 2022 08:14)  HR: 68 (09 Jul 2022 09:45) (68 - 96)  BP: 150/70 (09 Jul 2022 09:45) (150/70 - 169/82)  BP(mean): --  RR: 20 (09 Jul 2022 09:45) (18 - 20)  SpO2: 97% (09 Jul 2022 09:45) (95% - 97%)    Parameters below as of 09 Jul 2022 09:45  Patient On (Oxygen Delivery Method): nasal cannula  O2 Flow (L/min): 2        PHYSICAL EXAM:    General: NAD  HEENT: NC/AT; PERRL, clear conjunctiva  Neck: supple  Respiratory: CTA b/l  Cardiovascular: +S1/S2; RRR  Abdomen: soft, NT/ND; +BS x4  Extremities: WWP, 2+ peripheral pulses b/l; no LE edema  Skin: normal color and turgor; no rash  Neurological:    MEDICATIONS:  MEDICATIONS  (STANDING):  apixaban 2.5 milliGRAM(s) Oral two times a day  atorvastatin 40 milliGRAM(s) Oral at bedtime  calcitriol   Capsule 0.25 MICROGram(s) Oral <User Schedule>  dextrose 5%. 1000 milliLiter(s) (100 mL/Hr) IV Continuous <Continuous>  dextrose 5%. 1000 milliLiter(s) (50 mL/Hr) IV Continuous <Continuous>  dextrose 50% Injectable 25 Gram(s) IV Push once  dextrose 50% Injectable 12.5 Gram(s) IV Push once  dextrose 50% Injectable 25 Gram(s) IV Push once  glucagon  Injectable 1 milliGRAM(s) IntraMuscular once  insulin glargine Injectable (LANTUS) 17 Unit(s) SubCutaneous at bedtime  insulin lispro (ADMELOG) corrective regimen sliding scale   SubCutaneous three times a day before meals  insulin lispro Injectable (ADMELOG) 4 Unit(s) SubCutaneous three times a day before meals  levothyroxine 150 MICROGram(s) Oral daily  pentoxifylline 400 milliGRAM(s) Oral two times a day  potassium phosphate / sodium phosphate Powder (PHOS-NaK) 1 Packet(s) Oral once    MEDICATIONS  (PRN):  acetaminophen     Tablet .. 650 milliGRAM(s) Oral every 6 hours PRN Temp greater or equal to 38C (100.4F), Mild Pain (1 - 3)  ALBUTerol    90 MICROgram(s) HFA Inhaler 1 Puff(s) Inhalation every 6 hours PRN Bronchospasm  dextrose Oral Gel 15 Gram(s) Oral once PRN Blood Glucose LESS THAN 70 milliGRAM(s)/deciliter  melatonin 5 milliGRAM(s) Oral at bedtime PRN Insomnia      ALLERGIES:  Allergies    Ozempic (0.25 mg or 0.5 mg dose) (Hives; Rash)  streptomycin (Unknown)  Trulicity Pen (Hives; Rash)    Intolerances        LABS:                        10.8   5.86  )-----------( 147      ( 09 Jul 2022 06:00 )             32.3     Hemoglobin: 10.8 g/dL (07-09 @ 06:00)  Hemoglobin: 11.0 g/dL (07-08 @ 07:41)  Hemoglobin: 11.3 g/dL (07-07 @ 09:15)  Hemoglobin: 10.8 g/dL (07-06 @ 07:39)  Hemoglobin: 9.2 g/dL (07-06 @ 02:46)    CBC Full  -  ( 09 Jul 2022 06:00 )  WBC Count : 5.86 K/uL  RBC Count : 3.75 M/uL  Hemoglobin : 10.8 g/dL  Hematocrit : 32.3 %  Platelet Count - Automated : 147 K/uL  Mean Cell Volume : 86.1 fL  Mean Cell Hemoglobin : 28.8 pg  Mean Cell Hemoglobin Concentration : 33.4 g/dL  Auto Neutrophil # : 5.01 K/uL  Auto Lymphocyte # : 0.16 K/uL  Auto Monocyte # : 0.61 K/uL  Auto Eosinophil # : 0.00 K/uL  Auto Basophil # : 0.01 K/uL  Auto Neutrophil % : 85.5 %  Auto Lymphocyte % : 2.7 %  Auto Monocyte % : 10.4 %  Auto Eosinophil % : 0.0 %  Auto Basophil % : 0.2 %    07-09    134<L>  |  102  |  67<HH>  ----------------------------<  175<H>  4.5   |  21  |  1.4    Ca    8.2<L>      09 Jul 2022 06:00  Phos  2.0     07-09  Mg     2.0     07-09      Creatinine Trend: 1.4<--, 1.6<--, 2.1<--, 2.7<--, 3.5<--        hs Troponin:              CSF:                      EKG:   MICROBIOLOGY:    IMAGING:      Labs, imaging, EKG personally reviewed    RADIOLOGY & ADDITIONAL TESTS: Reviewed.

## 2022-07-09 NOTE — PROGRESS NOTE ADULT - TIME BILLING
82M PMHx HFrEF, AFib on eliquis, CAD s/p CABG, DM2, COPD on home o2 prn, CKD III, AAA, HTN, hypothyroidism, PVD here with diarrhea, found to have covid. BRENDA.    #BRENDA on CKD III, presumed pre-renal  resolved  s/p ivf  discharge planning to snf on monday post quarantine  #Diarrhea, in setting of covid  resolved, no further episodes  ct chest/ abd neg  no hypoxia, satting well on ra  monitor off steroids  #Fall, mechanical  no syncope per pt  cth neg  trauma w/u neg  PT  #Elevated cardiac enzymes  in setting of recent fall, brenda  improved from prior Oct 2021 levels  ekg noted  no cp  outpt f/u cards  #HFrEF, chronic  euvolemic on exam  outpt f/u  #AFib, chronic  eliquis  #CAD  lipitor 40  outpt f/u for asa  #COPD  proventil prn  outpt f/u  #DM2  lantus 17  humalog 2 tid  ssi  #HTN  controlled off medications  #Hypothyroidism  synthroid 150  #PVD  lipitor 40  outpt f/u for asa  #DVT ppx  eliquis    #Progress Note Handoff:  Pending (specify):  Consults_________, Tests________, Test Results_______, Other____quarantine until monday; repeat covid sunday_____  Family discussion: d/w pt at bedside re: treatment plan, primary dx  Disposition: Home___/SNF_x__/Other________/Unknown at this time________

## 2022-07-10 NOTE — PROGRESS NOTE ADULT - ASSESSMENT
#BRENDA on CKD III, presumed pre-renal  improved with IVF  Cr slightly uptrending today 1.7 from 1.4  Repeat BMP  if repeat levels worsening, may reconsider IVF    #Anemia; no signs of acute blood loss  - monitor cbc  - will continue Eliquis for now  - If repeat hemoglobin still dropping, would hold Eliquis    #Diarrhea, in setting of covid  improved  ct chest/ abd neg  no hypoxia, satting well on ra  monitor off steroids    #Fall, mechanical  no syncope per pt  cth neg  trauma w/u neg  PT    #Elevated cardiac enzymes  in setting of recent fall, brenda  improved from prior Oct 2021 levels  ekg noted  no cp  outpt f/u cards    #HFrEF, chronic  euvolemic on exam  outpt f/u    #AFib, chronic  rate controlled  on eliquis for AC    #CAD  lipitor 40  outpt f/u for asa    #COPD  proventil prn  outpt f/u    #DM2  lantus 17  humalog 2 tid  ssi    #HTN  controlled off medications  #Hypothyroidism  synthroid 150    #PVD  lipitor 40  outpt f/u for asa    #DVT ppx  eliquis    #Progress Note Handoff:  Pending (specify):  repeat labs, quarantine until monday; repeat covid pcr today  Family discussion: d/w pt at bedside re: treatment plan, primary dx  Disposition: [facility]

## 2022-07-10 NOTE — PROGRESS NOTE ADULT - SUBJECTIVE AND OBJECTIVE BOX
ANDRIA TAVAREZ    Patient is a 82y old  Male who presents with a chief complaint of Diarrhea (09 Jul 2022 10:46)    HPI:  82M with PMHx of HFrEF, Afib on eliquis, CAD s/p CABG, COPD (on rare home O2 PRN), CKD3, HTN, HLD presents for 2 weeks of watery diarrhea. Pt reports that 2 weeks ago he went to a doctor's appointment with his wife, and the next day both of them started experiencing non-bloody watery diarrhea, x3-4/day, and then started having decreased PO intake and felt weak. Of note, he also had a mechanical fall 1 week ago on his right side with no injuries sustained (but he didn't visit the ED). Pt denies any other sicks contacts, recent travel, vomiting, chest pain, SOB, palpitations, light-headedness, or any  symptoms.     In the ED: /57, HR 57, Temp 97.6F, satting 96% on RA. Labs notable for Cr of 3.6 (baseline ~2.0), , HCO3 19, AG 20. Trops 0.07 (likely 2/2 BRENDA), no chest pain, EKG 1st-degree AV block. CTH -ve, CTAP + chest -ve for acute pathology.     Admitted to medicine (05 Jul 2022 21:23)    INTERVAL HPI/OVERNIGHT EVENTS:    reported one episode of diarrhea overnight with no repeat episodes as of yet. low grade 100.2 temp overnight    ROS: All ROS negative except    PHYSICAL EXAM:  T(C): 37.7, Max: 38 (07-10-22 @ 05:14)  HR: 77 (65 - 92)  BP: 154/67 (123/65 - 154/90)  RR: 18 (18 - 18)  SpO2: 94% (94% - 96%)    GENERAL: NAD  NECK: Supple, No JVD  PULMONARY/CHEST: No rales, rhonchi, wheezing  CARDIOVASC: Regular rate and rhythm; No murmurs  GI/ABDOMEN: Soft, Nontender, Nondistended; Bowel sounds present  EXTREMITIES:  No clubbing, cyanosis, or edema, no deformity. No calf tenderness b/l.  SKIN: No rashes or lesions  NERVOUS SYSTEM:  Alert & Oriented X3, no gross neurological  deficit     Consultant(s) Notes Reviewed by me.   Care Discussed with Consultants/Other Providers     LABS:                        9.7    6.01  )-----------( 143      ( 10 Jul 2022 04:30 )             29.8     07-10    131<L>  |  99  |  63<HH>  ----------------------------<  224<H>  3.9   |  17  |  1.7<H>    Ca    7.7<L>      10 Jul 2022 04:30  Phos  2.0     07-10  Mg     1.9     07-10          CAPILLARY BLOOD GLUCOSE      POCT Blood Glucose.: 129 mg/dL (10 Jul 2022 12:29)  POCT Blood Glucose.: 259 mg/dL (10 Jul 2022 07:52)  POCT Blood Glucose.: 160 mg/dL (09 Jul 2022 21:42)  POCT Blood Glucose.: 169 mg/dL (09 Jul 2022 17:03)            RADIOLOGY & ADDITIONAL TESTS:      MEDICATIONS  (STANDING):  apixaban 2.5 milliGRAM(s) Oral two times a day  atorvastatin 40 milliGRAM(s) Oral at bedtime  calcitriol   Capsule 0.25 MICROGram(s) Oral <User Schedule>  dextrose 5%. 1000 milliLiter(s) (100 mL/Hr) IV Continuous <Continuous>  dextrose 5%. 1000 milliLiter(s) (50 mL/Hr) IV Continuous <Continuous>  dextrose 50% Injectable 25 Gram(s) IV Push once  dextrose 50% Injectable 12.5 Gram(s) IV Push once  dextrose 50% Injectable 25 Gram(s) IV Push once  glucagon  Injectable 1 milliGRAM(s) IntraMuscular once  insulin glargine Injectable (LANTUS) 17 Unit(s) SubCutaneous at bedtime  insulin lispro (ADMELOG) corrective regimen sliding scale   SubCutaneous three times a day before meals  insulin lispro Injectable (ADMELOG) 4 Unit(s) SubCutaneous three times a day before meals  levothyroxine 150 MICROGram(s) Oral daily  pentoxifylline 400 milliGRAM(s) Oral two times a day    MEDICATIONS  (PRN):  acetaminophen     Tablet .. 650 milliGRAM(s) Oral every 6 hours PRN Temp greater or equal to 38C (100.4F), Mild Pain (1 - 3)  ALBUTerol    90 MICROgram(s) HFA Inhaler 1 Puff(s) Inhalation every 6 hours PRN Bronchospasm  dextrose Oral Gel 15 Gram(s) Oral once PRN Blood Glucose LESS THAN 70 milliGRAM(s)/deciliter  melatonin 5 milliGRAM(s) Oral at bedtime PRN Insomnia

## 2022-07-11 NOTE — PROGRESS NOTE ADULT - ASSESSMENT
· Assessment	  # BRENDA on CKD III, presumed pre-renal; hx C3 glomerulopathy  base Cr 2.1  improved with IVF  will continue to monitor  Further evaluation for immunosupression     # COVID +  On supportive treatment   To Follow up on pulm recs      # COPD; severe pulm HTN; fibrotic changes LLL; + fever  Worsening LLL infiltrates compared to 7/5  Follow up procal, MRSA nares  F/u pulm recs  Started on cefepime 2gm Q12hr renal dose      # encephalopathy likely metab 2/2 COVID 19 and/or PNA  CTH: mild chr micro changes; mild atrophy and ventric prom    # had 1 loose BM  Will continue to monitor, if recurs will send for Cdiff testing    # normo Anemia; no signs of acute blood loss  Patient on eliquis  Will Follow up renal for evaluation  current Hb 10.5(7/11)      # Fall, mechanical  no syncope per pt  cth neg  trauma w/u neg  PT eval for SNF    # Elevated cardiac enzymes  improved from prior Oct 2021 levels  ekg noted  no cp  outpt f/u cards    # HFrEF, chronic; sev dilated CM; RV dysfxn; mild TR/AR; HTN  euvolemic on exam  not on ANY goal directed tx - cardio eval Dr Haley  Patient started on metoprolol 25mg q 24 PO      # AFib, chronic  on eliquis  rate control with metoprolol 25mg q24    # CAD  agree w/ no asa    # DLD  on statin    # DM2  diabetic diet  FS qac/hs  lantus 17 HS  humalog 4 tid w/ +1 scale    # Hypothyroidism  synthroid 150    # PVD  c/w pentoxifylline     # DVT ppx:  on eliquis    # GI ppx: none    # Actviity: need PT eval    # updated wife on phone    Dispo: Follow up renal eval for anemia and ckd, Follow up cardiology for HFrEF management, Follow up pulm for COVID and new infiltrates, labs - procal, MRSA

## 2022-07-11 NOTE — PROGRESS NOTE ADULT - SUBJECTIVE AND OBJECTIVE BOX
ANDRIA TAVAREZ 82y Male  MRN#: [id number]   CODE STATUS:________    Hospital Day: 6d    Pt is currently admitted with the primary diagnosis of Diarrhea - COVID    SUBJECTIVE  Hospital Course  82M with PMHx of HFrEF, Afib on eliquis, CAD s/p CABG, COPD (on rare home O2 PRN), CKD3, HTN, HLD presents for 2 weeks of watery diarrhea. Pt reports that 2 weeks ago he went to a doctor's appointment with his wife, and the next day both of them started experiencing non-bloody watery diarrhea, x3-4/day, and then started having decreased PO intake and felt weak. Of note, he also had a mechanical fall 1 week ago on his right side with no injuries sustained (but he didn't visit the ED). Pt denies any other sicks contacts, recent travel, vomiting, chest pain, SOB, palpitations, light-headedness, or any  symptoms.     In the ED: /57, HR 57, Temp 97.6F, satting 96% on RA. Labs notable for Cr of 3.6 (baseline ~2.0), , HCO3 19, AG 20. Trops 0.07 (likely 2/2 BRENDA), no chest pain, EKG 1st-degree AV block. CTH -ve, CTAP + chest -ve for acute pathology.     Admitted to medicine (05 Jul 2022 21:23)    Overnight events   Patient spiked fever of 101.2 in the morning.    Subjective complaints   No complaints. Patient wants to know when he can go home.  Present Today:   - Gloria:  No [  ], Yes [   ] : Indication:     - Type of IV Access:       .. CVC/Piccline:  No [  ], Yes [   ] : Indication:       .. 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    AF (atrial fibrillation)  s/p DCCV    Carotid stenosis    COPD (chronic obstructive pulmonary disease)    H/O coronary artery bypass surgery  2001    S/P inguinal hernia repair    History of appendectomy                                              -----------------------------------------------------------  ALLERGIES:  Ozempic (0.25 mg or 0.5 mg dose) (Hives; Rash)  streptomycin (Unknown)  Trulicity Pen (Hives; Rash)                                            ------------------------------------------------------------    HOME MEDICATIONS  Home Medications:  Aranesp 100 mcg/0.5 mL injectable solution: 1 dose(s) injectable every 3 to 4 weeks (05 Jul 2022 21:22)  atorvastatin 40 mg oral tablet: 1 tab(s) orally once a day (at bedtime) (05 Jul 2022 21:22)  calcitriol 0.25 mcg oral capsule: 1 cap(s) orally 2 times a week (05 Jul 2022 21:22)  d-mycophenolate: 500 milligram(s) orally 4 times a day (05 Jul 2022 21:22)  Eliquis 2.5 mg oral tablet: 1 tab(s) orally 2 times a day (05 Jul 2022 21:22)  famotidine 20 mg oral tablet: 1 tab(s) orally once a day (05 Jul 2022 21:22)  isosorbide mononitrate 30 mg oral tablet, extended release: 1 tab(s) orally once a day (in the morning) (05 Jul 2022 21:22)  Lantus 100 units/mL subcutaneous solution: 30 unit(s) subcutaneous once a day (at bedtime) (05 Jul 2022 21:22)  levothyroxine 150 mcg (0.15 mg) oral tablet: 1 tab(s) orally once a day (05 Jul 2022 21:22)  metoprolol tartrate 25 mg oral tablet: 1 tab(s) orally 2 times a day (05 Jul 2022 21:22)  NIFEdipine 30 mg oral tablet, extended release: 1 tab(s) orally once a day (05 Jul 2022 21:22)  pentoxifylline 400 mg oral tablet, extended release: 1 tab(s) orally 2 times a day (05 Jul 2022 21:22)  tacrolimus 1 mg oral capsule: 1 cap(s) orally every 12 hours (05 Jul 2022 21:22)                           MEDICATIONS:  STANDING MEDICATIONS  apixaban 2.5 milliGRAM(s) Oral two times a day  atorvastatin 40 milliGRAM(s) Oral at bedtime  calcitriol   Capsule 0.25 MICROGram(s) Oral <User Schedule>  cefepime   IVPB 2000 milliGRAM(s) IV Intermittent every 12 hours  dextrose 5%. 1000 milliLiter(s) IV Continuous <Continuous>  dextrose 5%. 1000 milliLiter(s) IV Continuous <Continuous>  dextrose 50% Injectable 25 Gram(s) IV Push once  dextrose 50% Injectable 12.5 Gram(s) IV Push once  dextrose 50% Injectable 25 Gram(s) IV Push once  glucagon  Injectable 1 milliGRAM(s) IntraMuscular once  insulin glargine Injectable (LANTUS) 17 Unit(s) SubCutaneous at bedtime  insulin lispro (ADMELOG) corrective regimen sliding scale   SubCutaneous three times a day before meals  insulin lispro Injectable (ADMELOG) 4 Unit(s) SubCutaneous three times a day before meals  levothyroxine 150 MICROGram(s) Oral daily  metoprolol succinate ER 25 milliGRAM(s) Oral daily  pentoxifylline 400 milliGRAM(s) Oral two times a day  sodium bicarbonate 650 milliGRAM(s) Oral every 8 hours    PRN MEDICATIONS  acetaminophen     Tablet .. 650 milliGRAM(s) Oral every 6 hours PRN  ALBUTerol    90 MICROgram(s) HFA Inhaler 1 Puff(s) Inhalation every 6 hours PRN  dextrose Oral Gel 15 Gram(s) Oral once PRN  melatonin 5 milliGRAM(s) Oral at bedtime PRN                                            ------------------------------------------------------------  VITAL SIGNS: Last 24 Hours  T(C): 37.6 (11 Jul 2022 14:55), Max: 38.4 (11 Jul 2022 07:47)  T(F): 99.6 (11 Jul 2022 14:55), Max: 101.2 (11 Jul 2022 07:47)  HR: 80 (11 Jul 2022 14:55) (80 - 103)  BP: 116/84 (11 Jul 2022 14:55) (116/84 - 167/62)  BP(mean): --  RR: 18 (11 Jul 2022 14:55) (18 - 18)  SpO2: 96% (11 Jul 2022 12:01) (96% - 96%)                                             --------------------------------------------------------------  LABS:                        10.5   7.34  )-----------( 156      ( 11 Jul 2022 07:51 )             32.2     07-11    135  |  101  |  71<HH>  ----------------------------<  175<H>  4.2   |  18  |  1.7<H>    Ca    8.2<L>      11 Jul 2022 07:51  Phos  2.0     07-11  Mg     1.9     07-11                                                                -------------------------------------------------------------  RADIOLOGY:                                            --------------------------------------------------------------    PHYSICAL EXAM:  GENERAL: NAD  NECK: Supple, No JVD  PULMONARY/CHEST: No rales, rhonchi, wheezing  CARDIOVASC: Regular rate and rhythm; No murmurs  GI/ABDOMEN: Soft, Nontender, Nondistended; Bowel sounds present  EXTREMITIES:  No clubbing, cyanosis, or edema, no deformity. No calf tenderness b/l.  SKIN: No rashes or lesions  NERVOUS SYSTEM:  Alert & Oriented X3, no gross neurological  deficit

## 2022-07-11 NOTE — PROGRESS NOTE ADULT - ASSESSMENT
# BREDNA on CKD III, presumed pre-renal; hx C3 glomerulopathy  base Cr 2.1  improved with IVF  renal eval re immunosupp (should pt be back on tacrolimus?)  c/w calcitriol     # COVID +  covid +: 7/5; 7/10 - no further testing needed  pt was NOT tx'd  beyond tx window at this point for MABs  RDV relative CI at this GFR anyway  could add dexameth if O2 worsens  pt on home O2  pulm eval    # COPD; severe pulm HTN; fibrotic changes LLL; + fever  not sure if fever residual for COVID or developing PNA  CXR: LLL infil looks worse to me  pulm eval  cont NC O2  abx: cefepime 2gm iv q12 (renal dose)  check procal  check MRSA nares  c/w proventil prn    # encephalopathy likely metab 2/2 COVID 19 and/or PNA  CTH: mild chr micro changes; mild atrophy and ventric prom    # had 1 loose BM  if recurs - send for c diff testing  not on laxatives    # normo Anemia; no signs of acute blood loss  will continue Eliquis for now  anemia of chr dz (CKD)  keep hgb >8  on aranesp w/ renal - f/u renal eval    # Fall, mechanical  no syncope per pt  cth neg  trauma w/u neg  PT eval for SNF    # Elevated cardiac enzymes  improved from prior Oct 2021 levels  ekg noted  no cp  outpt f/u cards    # HFrEF, chronic; sev dilated CM; RV dysfxn; mild TR/AR; HTN  euvolemic on exam  not on ANY goal directed tx - cardio eval Dr Haley  was on BB - restart toprol xl 25mg po q24 w/ holding parameters  not on ACEI/ARB 2/2 CKD/BRENDA (could consider hydral + nitrates)  not on diuretics - agree    # AFib, chronic  rate controlled - restart BB  on eliquis for AC    # CAD  agree w/ no asa    # DLD  on statin    # DM2  diabetic diet  FS qac/hs  lantus 17 HS  humalog 4 tid w/ +1 scale    # Hypothyroidism  synthroid 150    # PVD  c/w pentoxifylline     # DVT ppx:  on eliquis    # GI ppx: none    # Actviity: need PT eval    # updated wife on phone    Dispo: renal eval; cardio eval; tx DM; CVOID quarantine; pulm eval; iv abx; check procal; MRSA nares;   eventually, pt will need STR @ SNF - f/u CM - not ready for d/c    Prog remains guarded.

## 2022-07-11 NOTE — PROGRESS NOTE ADULT - SUBJECTIVE AND OBJECTIVE BOX
KANGANDRIA MCELROY  82y  Male  ***My note supersedes ALL resident notes that I sign.  My corrections for their notes are in my note.***    I can be reached directly on [employer]. My office number is [phone]. My personal cell number is [phone].    INTERVAL EVENTS: Here for f/u of COVID. Pt having fever. Pt looks tired. Mostly oriented. Able to speak to wife on phone briefly. Pt says he is OK. Denies pain. Still on O2 (has home O2). Not much appetite. In bed a lot.     T(F): 99.6 (07-11-22 @ 14:55), Max: 101.2 (07-11-22 @ 07:47)  HR: 80 (07-11-22 @ 14:55) (80 - 103)  BP: 116/84 (07-11-22 @ 14:55) (116/84 - 167/62)  RR: 18 (07-11-22 @ 14:55) (18 - 18)  SpO2: 96% (07-11-22 @ 12:01) (96% - 96%)    Gen: NAD  HEENT: PERRL, EOMI, mouth clr, nose clr; + NC O2  Neck: no nodes, no JVD, thyroid nl  lungs: decr BS lt base; no wheeze  hrt: s1 s2 rrr no murmur  abd: soft, NT/ND, no HS megaly  : condom cath  ext: no edema, no c/c  neuro: aa ox3 (mostly), cn intact, can move all 4 ext  got most ques right - did not know current month (but once told July he remembered it was his wife's b-day mo)    LABS:                      10.5    (    86.6   7.34  )-----------( ---------      156      ( 11 Jul 2022 07:51 )             32.2    (    13.9     Hemoglobin: 10.5 g/dL (07-11 @ 07:51)  Hemoglobin: 10.8 g/dL (07-10 @ 21:03)  Hemoglobin: 9.7 g/dL (07-10 @ 04:30)  Hemoglobin: 10.8 g/dL (07-09 @ 06:00)  Hemoglobin: 11.0 g/dL (07-08 @ 07:41)  Hemoglobin: 11.3 g/dL (07-07 @ 09:15)    135   (   101   (   175      07-11-22 @ 07:51  ----------------------               4.2   (   18   (   71     AG = 16                        -----                        1.7  Ca  8.2   Mg  1.9    P   2.0     Creatinine:   1.7 (07-11 @ 07:51)  eGFR:  40    Creatinine:   1.7 (07-10 @ 04:30)  eGFR:  40    Creatinine:   1.4 (07-09 @ 06:00)  eGFR:  50    Creatinine:   1.6 (07-08 @ 07:41)  eGFR:  43    Creatinine:   2.1 (07-07 @ 09:15)  eGFR:  31      CAPILLARY BLOOD GLUCOSE  POCT Blood Glucose.: 124 (07-11-22 @ 17:03)  POCT Blood Glucose.: 145 (07-11-22 @ 11:38)  POCT Blood Glucose.: 186 (07-11-22 @ 07:45)  POCT Blood Glucose.: 165 (07-10-22 @ 22:07)  POCT Blood Glucose.: 146 (07-10-22 @ 16:54)  POCT Blood Glucose.: 129 (07-10-22 @ 12:29)  POCT Blood Glucose.: 259 (07-10-22 @ 07:52)  POCT Blood Glucose.: 160 (07-09-22 @ 21:42)    RADIOLOGY & ADDITIONAL TESTS:  < from: Xray Chest 1 View-PORTABLE IMMEDIATE (Xray Chest 1 View-PORTABLE IMMEDIATE .) (07.11.22 @ 09:55) >  IMPRESSION:    1. Increased small bibasilar opacities.    < end of copied text >    < from: CT Chest/Abd/Pelv No Cont (07.05.22 @ 17:14) >  IMPRESSION:    No evidence of intra-abdominal or pelvic mass or acute inflammatory   process    < end of copied text >    < from: CT Cervical Spine No Cont (07.05.22 @ 16:22) >  IMPRESSION:    Degenerative changes as described above. No evidence of fracture or facet   subluxation.    < end of copied text >    < from: CT Head No Cont (07.05.22 @ 16:22) >  IMPRESSION:    1.  No evidence of acute intracranial pathology. Stable exam since   10/20/2021.    2.  Stable mild chronic microvascular changes, ventricular prominence and   parenchymal atrophy.    < end of copied text >  < from: TTE Echo Complete w/ Contrast w/ Doppler (10.22.21 @ 07:59) >  Summary:   1. Moderately to severely decreased global left ventricular systolic function.   2. Severely enlarged left atrium.   3. Severely enlarged right atrium.   4. Multiple left ventricular regional wall motion abnormalities exist. See wall motion findings.   5. Mild eccentric left ventricular hypertrophy.   6. The left ventricular diastolic function could not be assessed in this study.   7. Severely enlarged right ventricle.   8. Severely reduced RV systolic function.   9. Mild to moderate mitral valve regurgitation.  10. Moderate mitral annular calcification.  11. Mild tricuspid regurgitation.  12. Mild aortic regurgitation.  13. Sclerotic aortic valve with normal opening.  14. Complex atheroma is noted in the ascending aorta.  15. Estimated pulmonary artery systolic pressure is 64.8 mmHg assuming a right atrial pressure of 15 mmHg, which is consistent with severe pulmonary hypertension.  16. Pulmonary hypertension is present.  17. LA volume Index is 71.9 ml/m² ml/m2.  18. There is moderate aortic root calcification.    < end of copied text >      MEDICATIONS:  cefepime   IVPB 1000 milliGRAM(s) IV Intermittent every 8 hours    acetaminophen     Tablet .. 650 milliGRAM(s) Oral every 6 hours PRN  ALBUTerol    90 MICROgram(s) HFA Inhaler 1 Puff(s) Inhalation every 6 hours PRN  apixaban 2.5 milliGRAM(s) Oral two times a day  atorvastatin 40 milliGRAM(s) Oral at bedtime  calcitriol   Capsule 0.25 MICROGram(s) Oral <User Schedule>  insulin glargine Injectable (LANTUS) 17 Unit(s) SubCutaneous at bedtime  insulin lispro (ADMELOG) corrective regimen sliding scale   SubCutaneous three times a day before meals  insulin lispro Injectable (ADMELOG) 4 Unit(s) SubCutaneous three times a day before meals  levothyroxine 150 MICROGram(s) Oral daily  melatonin 5 milliGRAM(s) Oral at bedtime PRN  pentoxifylline 400 milliGRAM(s) Oral two times a day     KANGANDRIA MCELROY  82y  Male  ***My note supersedes ALL resident notes that I sign.  My corrections for their notes are in my note.***    I can be reached directly on [employer]. My office number is [phone]. My personal cell number is [phone].    INTERVAL EVENTS: Here for f/u of COVID. Pt having fever. Pt looks tired. Mostly oriented. Able to speak to wife on phone briefly. Pt says he is OK. Denies pain. Still on O2 (has home O2). Not much appetite. In bed a lot.     T(F): 99.6 (07-11-22 @ 14:55), Max: 101.2 (07-11-22 @ 07:47)  HR: 80 (07-11-22 @ 14:55) (80 - 103)  BP: 116/84 (07-11-22 @ 14:55) (116/84 - 167/62)  RR: 18 (07-11-22 @ 14:55) (18 - 18)  SpO2: 96% (07-11-22 @ 12:01) (96% - 96%)    Gen: NAD  HEENT: PERRL, EOMI, mouth clr, nose clr; + NC O2  Neck: no nodes, no JVD, thyroid nl  lungs: decr BS lt base; no wheeze  hrt: s1 s2 rrr no murmur  abd: soft, NT/ND, no HS megaly  : condom cath  ext: no edema, no c/c  neuro: aa ox3 (mostly), cn intact, can move all 4 ext  got most ques right - did not know current month (but once told July he remembered it was his wife's b-day mo)    LABS:                      10.5    (    86.6   7.34  )-----------( ---------      156      ( 11 Jul 2022 07:51 )             32.2    (    13.9     Hemoglobin: 10.5 g/dL (07-11 @ 07:51)  Hemoglobin: 10.8 g/dL (07-10 @ 21:03)  Hemoglobin: 9.7 g/dL (07-10 @ 04:30)  Hemoglobin: 10.8 g/dL (07-09 @ 06:00)  Hemoglobin: 11.0 g/dL (07-08 @ 07:41)  Hemoglobin: 11.3 g/dL (07-07 @ 09:15)    135   (   101   (   175      07-11-22 @ 07:51  ----------------------               4.2   (   18   (   71     AG = 16                        -----                        1.7  Ca  8.2   Mg  1.9    P   2.0     Creatinine:   1.7 (07-11 @ 07:51)  eGFR:  40    Creatinine:   1.7 (07-10 @ 04:30)  eGFR:  40    Creatinine:   1.4 (07-09 @ 06:00)  eGFR:  50    Creatinine:   1.6 (07-08 @ 07:41)  eGFR:  43    Creatinine:   2.1 (07-07 @ 09:15)  eGFR:  31      CAPILLARY BLOOD GLUCOSE  POCT Blood Glucose.: 124 (07-11-22 @ 17:03)  POCT Blood Glucose.: 145 (07-11-22 @ 11:38)  POCT Blood Glucose.: 186 (07-11-22 @ 07:45)  POCT Blood Glucose.: 165 (07-10-22 @ 22:07)  POCT Blood Glucose.: 146 (07-10-22 @ 16:54)  POCT Blood Glucose.: 129 (07-10-22 @ 12:29)  POCT Blood Glucose.: 259 (07-10-22 @ 07:52)  POCT Blood Glucose.: 160 (07-09-22 @ 21:42)    RADIOLOGY & ADDITIONAL TESTS:  < from: Xray Chest 1 View-PORTABLE IMMEDIATE (Xray Chest 1 View-PORTABLE IMMEDIATE .) (07.11.22 @ 09:55) >  IMPRESSION:    1. Increased small bibasilar opacities.    < end of copied text >    < from: CT Chest/Abd/Pelv No Cont (07.05.22 @ 17:14) >  IMPRESSION:    No evidence of intra-abdominal or pelvic mass or acute inflammatory   process    < end of copied text >    < from: CT Cervical Spine No Cont (07.05.22 @ 16:22) >  IMPRESSION:    Degenerative changes as described above. No evidence of fracture or facet   subluxation.    < end of copied text >    < from: CT Head No Cont (07.05.22 @ 16:22) >  IMPRESSION:    1.  No evidence of acute intracranial pathology. Stable exam since   10/20/2021.    2.  Stable mild chronic microvascular changes, ventricular prominence and   parenchymal atrophy.    < end of copied text >    < from: TTE Echo Complete w/ Contrast w/ Doppler (10.22.21 @ 07:59) >  Summary:   1. Moderately to severely decreased global left ventricular systolic function.   2. Severely enlarged left atrium.   3. Severely enlarged right atrium.   4. Multiple left ventricular regional wall motion abnormalities exist. See wall motion findings.   5. Mild eccentric left ventricular hypertrophy.   6. The left ventricular diastolic function could not be assessed in this study.   7. Severely enlarged right ventricle.   8. Severely reduced RV systolic function.   9. Mild to moderate mitral valve regurgitation.  10. Moderate mitral annular calcification.  11. Mild tricuspid regurgitation.  12. Mild aortic regurgitation.  13. Sclerotic aortic valve with normal opening.  14. Complex atheroma is noted in the ascending aorta.  15. Estimated pulmonary artery systolic pressure is 64.8 mmHg assuming a right atrial pressure of 15 mmHg, which is consistent with severe pulmonary hypertension.  16. Pulmonary hypertension is present.  17. LA volume Index is 71.9 ml/m² ml/m2.  18. There is moderate aortic root calcification.    < end of copied text >    MEDICATIONS:  cefepime   IVPB 1000 milliGRAM(s) IV Intermittent every 8 hours    acetaminophen     Tablet .. 650 milliGRAM(s) Oral every 6 hours PRN  ALBUTerol    90 MICROgram(s) HFA Inhaler 1 Puff(s) Inhalation every 6 hours PRN  apixaban 2.5 milliGRAM(s) Oral two times a day  atorvastatin 40 milliGRAM(s) Oral at bedtime  calcitriol   Capsule 0.25 MICROGram(s) Oral <User Schedule>  insulin glargine Injectable (LANTUS) 17 Unit(s) SubCutaneous at bedtime  insulin lispro (ADMELOG) corrective regimen sliding scale   SubCutaneous three times a day before meals  insulin lispro Injectable (ADMELOG) 4 Unit(s) SubCutaneous three times a day before meals  levothyroxine 150 MICROGram(s) Oral daily  melatonin 5 milliGRAM(s) Oral at bedtime PRN  pentoxifylline 400 milliGRAM(s) Oral two times a day

## 2022-07-12 NOTE — CONSULT NOTE ADULT - ATTENDING COMMENTS
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

## 2022-07-12 NOTE — CONSULT NOTE ADULT - ASSESSMENT
IMPRESSION:  COVID pna with Suspected superimposed bacterial LLL PNA  Pre-renal BRENDA on CKD III - improving   C3 glomerulopathy - on prograf and cellcept at home  COPD - home o2 PRN  HFrEF - chronic; Mod-severe reduced EF, sev dilated CM; RV dysfxn; severe pulm HTN  AFib, chronic - on eliquis   CAD s/p CABG  H/O DLD, HTN, DM2, Hypothyroidism     PLAN:  - send baseline COVID inflammatory markers (d-dimer, ferratin, CRP, LDH, Procal pending)   - f/u pan cx, Sputum cx, MRSA (-), send urine legionaire/strep  - V. Duplex LE BL, repeat cxr in the AM   - ABG  - start on dexa 6mg   - C/w cefepime, add levaquin 750mg q48 hrs (renally dose abx)  - consult ID  - aspiration precautions    NOTE IS INCOMPLETE AND RECOMMENDATIONS ARE NOT FINALIZED. COMPLETED NOTE TO FOLLOW.      IMPRESSION:  COVID 19 infection with Suspected superimposed bacterial infection   HO severe COPD with mild exacerbation   Pre-renal BRENDA on CKD III - improving   C3 glomerulopathy - on prograf and cellcept at home  HFrEF - chronic; Mod-severe reduced EF, sev dilated CM; RV dysfxn; severe pulm HTN  AFib, chronic - on eliquis   CAD s/p CABG  H/O DLD, HTN, DM2, Hypothyroidism     PLAN:  - FU procal and cultures    - ABG for PcO2 level   - start on dexa 6mg Q 12   - Nebs Q 4 and PRN   - C/w cefepime, add Levaquin renally dosed   - Aspiration precautions  - Wean O2 as tolerated  - GOC  - Overall prognosis poor

## 2022-07-12 NOTE — CONSULT NOTE ADULT - SUBJECTIVE AND OBJECTIVE BOX
NEPHROLOGY CONSULTATION NOTE    Patient is a 82y Male with PMHx of HFrEF, Afib on eliquis, CAD s/p CABG, COPD (on rare home O2 PRN), CKD3 (Hx of C3 glomerulopathy), HTN, HLD whom presented for 2 weeks of watery diarrhea. Patient reports that 2 weeks ago he went to a doctor's appointment with his wife, and the next day both of them started experiencing non-bloody watery diarrhea, x3-4/day, and then started having decreased PO intake and felt weak. Of note, he also had a mechanical fall 1 week ago on his right side with no injuries sustained (but he didn't visit the ED). Patientt denies any other sicks contacts, recent travel, vomiting, chest pain, SOB, palpitations, light-headedness, or any  symptoms.     In the ED: /57, HR 57, Temp 97.6F, PaO2 96% on RA. Labs notable for Cr of 3.6 (baseline ~2.0), , HCO3 19, AG 20. Trops 0.07 (likely 2/2 BRENDA), no chest pain, EKG 1st-degree AV block. CTH -ve, CTAP + chest -ve for acute pathology. Admitted to medicine for BRENDA on CKD. Patient subsequently tested positive for COVID.         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    AF (atrial fibrillation)s/p DCCV    Carotid stenosis    COPD (chronic obstructive pulmonary disease)    H/O coronary artery bypass surgery 2001    S/P inguinal hernia repair    History of appendectomy        Allergies:  Ozempic (0.25 mg or 0.5 mg dose) (Hives; Rash)  streptomycin (Unknown)  Trulicity Pen (Hives; Rash)    Home Medications Reviewed  Hospital Medications:   MEDICATIONS  (STANDING):  apixaban 2.5 milliGRAM(s) Oral two times a day  atorvastatin 40 milliGRAM(s) Oral at bedtime  calcitriol   Capsule 0.25 MICROGram(s) Oral <User Schedule>  cefepime   IVPB 2000 milliGRAM(s) IV Intermittent every 12 hours  dextrose 5%. 1000 milliLiter(s) (50 mL/Hr) IV Continuous <Continuous>  dextrose 5%. 1000 milliLiter(s) (100 mL/Hr) IV Continuous <Continuous>  dextrose 50% Injectable 25 Gram(s) IV Push once  dextrose 50% Injectable 12.5 Gram(s) IV Push once  dextrose 50% Injectable 25 Gram(s) IV Push once  glucagon  Injectable 1 milliGRAM(s) IntraMuscular once  insulin glargine Injectable (LANTUS) 17 Unit(s) SubCutaneous at bedtime  insulin lispro (ADMELOG) corrective regimen sliding scale   SubCutaneous three times a day before meals  insulin lispro Injectable (ADMELOG) 4 Unit(s) SubCutaneous three times a day before meals  levothyroxine 150 MICROGram(s) Oral daily  metoprolol succinate ER 25 milliGRAM(s) Oral daily  pentoxifylline 400 milliGRAM(s) Oral two times a day  sodium bicarbonate 650 milliGRAM(s) Oral every 8 hours    SOCIAL HISTORY:  Denies ETOH,Smoking,   FAMILY HISTORY:  FHx: colon cancer        REVIEW OF SYSTEMS:  CONSTITUTIONAL: No weakness, fevers or chills  EYES/ENT: No visual changes;  No vertigo or throat pain   NECK: No pain or stiffness  RESPIRATORY: No cough, wheezing, hemoptysis; No shortness of breath  CARDIOVASCULAR: No chest pain or palpitations.  GASTROINTESTINAL: No abdominal or epigastric pain. No nausea, vomiting, or hematemesis; No diarrhea or constipation. No melena or hematochezia.  GENITOURINARY: No dysuria, frequency, foamy urine, urinary urgency, incontinence or hematuria  NEUROLOGICAL: No numbness or weakness  SKIN: No itching, burning, rashes, or lesions   VASCULAR: No bilateral lower extremity edema.   All other review of systems is negative unless indicated above.    VITALS:  Vital Signs Last 24 Hrs  T(C): 37.1 (12 Jul 2022 04:44), Max: 37.6 (11 Jul 2022 14:55)  T(F): 98.7 (12 Jul 2022 04:44), Max: 99.6 (11 Jul 2022 14:55)  HR: 80 (12 Jul 2022 12:30) (80 - 100)  BP: 128/74 (12 Jul 2022 04:44) (112/60 - 128/74)  BP(mean): --  RR: 20 (12 Jul 2022 12:30) (18 - 20)  SpO2: 95% (12 Jul 2022 12:30) (75% - 95%)    Parameters below as of 12 Jul 2022 12:30  Patient On (Oxygen Delivery Method): nasal cannula  O2 Flow (L/min): 6        PHYSICAL EXAM:  Constitutional: NAD  HEENT: anicteric sclera, oropharynx clear, MMM  Neck: No JVD  Respiratory: Coughing, mild SOB, no wheezes, rales or rhonchi  Cardiovascular: S1, S2, RRR  Gastrointestinal: BS+, soft, NT/ND  Extremities: No cyanosis or clubbing. No peripheral edema  Neurological: A/O x 3, no focal deficits  Psychiatric: Normal mood, normal affect  : No CVA tenderness. No davalos.   Skin: No rashes  Vascular Access:    LABS:  07-12    133<L>  |  97<L>  |  79<HH>  ----------------------------<  154<H>  3.8   |  20  |  1.9<H>    Ca    8.1<L>      12 Jul 2022 07:31  Phos  2.0     07-11  Mg     2.0     07-12      Creatinine Trend: 1.9 <--, 1.7 <--, 1.7 <--, 1.4 <--, 1.6 <--, 2.1 <--, 2.7 <--, 3.5 <--                        10.2   7.63  )-----------( 157      ( 12 Jul 2022 07:31 )             30.3         RADIOLOGY & ADDITIONAL STUDIES:    < from: Xray Chest 1 View-PORTABLE IMMEDIATE (Xray Chest 1 View-PORTABLE IMMEDIATE .) (07.11.22 @ 09:55) >  ACC: [id number] EXAM:  XR CHEST PORTABLE IMMED 1V                          PROCEDURE DATE:  07/11/2022          INTERPRETATION:  CLINICAL HISTORY / REASON FOR EXAM: Pneumonia.    COMPARISON : Chest radiograph 7/5/2022.    TECHNIQUE: Portable frontal.    FINDINGS:    Support devices: None.    Cardiac/mediastinum/hilum: Stable.    Lung parenchyma/Pleura: Increased small bibasilar opacities. No   pneumothorax.    Skeleton/soft tissues: Stable.      IMPRESSION:    1. Increased small bibasilar opacities.    < end of copied text >          < from: CT Abdomen and Pelvis No Cont (07.05.22 @ 17:14) >  ACC: [id number] EXAM:  CT CHEST                        ACC: [id number] EXAM:  CT ABDOMEN AND PELVIS                          PROCEDURE DATE:  07/05/2022          INTERPRETATION:  REASON FOR EXAM / CLINICAL STATEMENT:  weakness x 2   weeks and short of breathPersistent diarrhea    WBC 5.32    PMHx of AAA,   AF, S/P CABG, Carotid stenosis, CHF, CKD, COPD, DM, HLD, HTN, PVD,   appendectomy,    TECHNIQUE:  Contiguous axial CT images were obtained from the thoracic   inlet to the pubic symphysis withoutIV contrast.  Reformatted images in   the coronal and sagittal planes were acquired.      COMPARISON CT: CT scan of the chest dated 1/25/2021    OTHER STUDIES USED FOR CORRELATION: None.    FINDINGS:    TUBES AND LINES: None.    HEART AND VESSELS: S/P sternotomy and CABG.  The heart is normal in size.   Coronary artery calcifications are noted.  There is no pericardial   effusion.    Normal caliber aorta and pulmonary artery. Aortic calcifications are   noted. There is no evidence of thoracic aortic aneurysm.    MEDIASTINUM: There are no enlarged mediastinal, hilar or axillary lymph   nodes.    AIRWAYS, LUNGS AND PLEURA: The central tracheobronchial tree is patent.   There are reticular opacities at the left lung base likely fibrotic in   nature. There is no pleural effusion. Left sided pleural calcifications   are noted. There is no pneumothorax.        FINDINGS ABDOMEN:    HEPATIC: The liver is normal in size with no evidence of solid mass or   bile duct dilatation. Small focal calcification is noted within the left   hepatic lobe.    BILIARY: Cholelithiasis.    SPLEEN: Unremarkable.    PANCREAS: The pancreas is normal in size and configuration. No evidence   of mass or pancreatitis.    ADRENAL GLANDS: Unremarkable.    KIDNEYS: No evidence of hydronephrosis. Vascular calcifications are   noted. A small nonobstructing calyceal stone is noted in the lower pole   of the right kidney.    ABDOMINOPELVIC NODES: Unremarkable.    PELVIC ORGANS: No evidence of pelvic mass, lymphadenopathy, or fluid   collection.    PERITONEUM/MESENTERY/BOWEL: Sigmoid diverticula noted with no evidence of   diverticulitis. No evidence of bowel obstruction, colitis, inflammatory   process, or ascites. No pneumoperitoneum.  The appendix is normal in   appearance.    BONES/SOFT TISSUES: Degenerative changes of the spine are noted. There is   partial sacralization of L5 on the left. There is a compression fracture   of vertebral body of T12 of indeterminate age.    OTHER: Aortoiliac calcifications arenoted with extensive visceral artery   and femoral artery calcifications. Aneurysmal dilatation of the abdominal   aorta is noted with dilatation of 3.6 cm the level of the renal arteries   and a secondary area of dilatation of 3.6 cm involving the infrarenal   abdominal aorta.      IMPRESSION:    No evidence of intra-abdominal or pelvic mass or acute inflammatory   process    < end of copied text >               NEPHROLOGY CONSULTATION NOTE    Patient is a 82y Male with PMHx of HFrEF, Afib on eliquis, CAD s/p CABG, COPD (on rare home O2 PRN), CKD3 (Hx of C3 glomerulopathy), HTN, HLD whom presented for 2 weeks of watery diarrhea. Patient reports that 2 weeks ago he went to a doctor's appointment with his wife, and the next day both of them started experiencing non-bloody watery diarrhea, x3-4/day, and then started having decreased PO intake and felt weak. Of note, he also had a mechanical fall 1 week ago on his right side with no injuries sustained (but he didn't visit the ED). Patientt denies any other sicks contacts, recent travel, vomiting, chest pain, SOB, palpitations, light-headedness, or any  symptoms.     In the ED: /57, HR 57, Temp 97.6F, PaO2 96% on RA. Labs notable for Cr of 3.6 (baseline ~2.0), , HCO3 19, AG 20. Trops 0.07 (likely 2/2 BRENDA), no chest pain, EKG 1st-degree AV block. CTH -ve, CTAP + chest -ve for acute pathology. Admitted to medicine for BRENDA on CKD. Patient subsequently tested positive for COVID.         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    AF (atrial fibrillation)s/p DCCV    Carotid stenosis    COPD (chronic obstructive pulmonary disease)    H/O coronary artery bypass surgery 2001    S/P inguinal hernia repair    History of appendectomy        Allergies:  Ozempic (0.25 mg or 0.5 mg dose) (Hives; Rash)  streptomycin (Unknown)  Trulicity Pen (Hives; Rash)    Home Medications Reviewed  Hospital Medications:   MEDICATIONS  (STANDING):  apixaban 2.5 milliGRAM(s) Oral two times a day  atorvastatin 40 milliGRAM(s) Oral at bedtime  calcitriol   Capsule 0.25 MICROGram(s) Oral <User Schedule>  cefepime   IVPB 2000 milliGRAM(s) IV Intermittent every 12 hours  dextrose 5%. 1000 milliLiter(s) (50 mL/Hr) IV Continuous <Continuous>  dextrose 5%. 1000 milliLiter(s) (100 mL/Hr) IV Continuous <Continuous>  dextrose 50% Injectable 25 Gram(s) IV Push once  dextrose 50% Injectable 12.5 Gram(s) IV Push once  dextrose 50% Injectable 25 Gram(s) IV Push once  glucagon  Injectable 1 milliGRAM(s) IntraMuscular once  insulin glargine Injectable (LANTUS) 17 Unit(s) SubCutaneous at bedtime  insulin lispro (ADMELOG) corrective regimen sliding scale   SubCutaneous three times a day before meals  insulin lispro Injectable (ADMELOG) 4 Unit(s) SubCutaneous three times a day before meals  levothyroxine 150 MICROGram(s) Oral daily  metoprolol succinate ER 25 milliGRAM(s) Oral daily  pentoxifylline 400 milliGRAM(s) Oral two times a day  sodium bicarbonate 650 milliGRAM(s) Oral every 8 hours    SOCIAL HISTORY:  Denies ETOH,Smoking,   FAMILY HISTORY:  FHx: colon cancer        REVIEW OF SYSTEMS:  CONSTITUTIONAL: No weakness, fevers or chills  EYES/ENT: No visual changes;  No vertigo or throat pain   NECK: No pain or stiffness  RESPIRATORY: No cough, wheezing, hemoptysis; No shortness of breath  CARDIOVASCULAR: No chest pain or palpitations.  GASTROINTESTINAL: No abdominal or epigastric pain. No nausea, vomiting, or hematemesis; No diarrhea or constipation. No melena or hematochezia.  GENITOURINARY: No dysuria, frequency, foamy urine, urinary urgency, incontinence or hematuria  NEUROLOGICAL: No numbness or weakness  SKIN: No itching, burning, rashes, or lesions   VASCULAR: No bilateral lower extremity edema.   All other review of systems is negative unless indicated above.    VITALS:  Vital Signs Last 24 Hrs  T(C): 37.1 (12 Jul 2022 04:44), Max: 37.6 (11 Jul 2022 14:55)  T(F): 98.7 (12 Jul 2022 04:44), Max: 99.6 (11 Jul 2022 14:55)  HR: 80 (12 Jul 2022 12:30) (80 - 100)  BP: 128/74 (12 Jul 2022 04:44) (112/60 - 128/74)  BP(mean): --  RR: 20 (12 Jul 2022 12:30) (18 - 20)  SpO2: 95% (12 Jul 2022 12:30) (75% - 95%)    Parameters below as of 12 Jul 2022 12:30  Patient On (Oxygen Delivery Method): nasal cannula  O2 Flow (L/min): 6        PHYSICAL EXAM:  Constitutional: NAD  HEENT: anicteric sclera, oropharynx clear, MMM  Neck: No JVD  Respiratory: Coughing, mild SOB, no wheezes, rales or rhonchi  Cardiovascular: S1, S2, RRR  Gastrointestinal: BS+, soft, NT/ND  Extremities: No cyanosis or clubbing. No peripheral edema  Neurological: A/O x 3  Psychiatric: Normal mood, normal affect  : No CVA tenderness. No davalos.   Skin: No rashes  Vascular Access:    LABS:  07-12    133<L>  |  97<L>  |  79<HH>  ----------------------------<  154<H>  3.8   |  20  |  1.9<H>    Ca    8.1<L>      12 Jul 2022 07:31  Phos  2.0     07-11  Mg     2.0     07-12      Creatinine Trend: 1.9 <--, 1.7 <--, 1.7 <--, 1.4 <--, 1.6 <--, 2.1 <--, 2.7 <--, 3.5 <--                        10.2   7.63  )-----------( 157      ( 12 Jul 2022 07:31 )             30.3         RADIOLOGY & ADDITIONAL STUDIES:    < from: Xray Chest 1 View-PORTABLE IMMEDIATE (Xray Chest 1 View-PORTABLE IMMEDIATE .) (07.11.22 @ 09:55) >  ACC: [id number] EXAM:  XR CHEST PORTABLE IMMED 1V                          PROCEDURE DATE:  07/11/2022          INTERPRETATION:  CLINICAL HISTORY / REASON FOR EXAM: Pneumonia.    COMPARISON : Chest radiograph 7/5/2022.    TECHNIQUE: Portable frontal.    FINDINGS:    Support devices: None.    Cardiac/mediastinum/hilum: Stable.    Lung parenchyma/Pleura: Increased small bibasilar opacities. No   pneumothorax.    Skeleton/soft tissues: Stable.      IMPRESSION:    1. Increased small bibasilar opacities.    < end of copied text >          < from: CT Abdomen and Pelvis No Cont (07.05.22 @ 17:14) >  ACC: [id number] EXAM:  CT CHEST                        ACC: [id number] EXAM:  CT ABDOMEN AND PELVIS                          PROCEDURE DATE:  07/05/2022          INTERPRETATION:  REASON FOR EXAM / CLINICAL STATEMENT:  weakness x 2   weeks and short of breathPersistent diarrhea    WBC 5.32    PMHx of AAA,   AF, S/P CABG, Carotid stenosis, CHF, CKD, COPD, DM, HLD, HTN, PVD,   appendectomy,    TECHNIQUE:  Contiguous axial CT images were obtained from the thoracic   inlet to the pubic symphysis withoutIV contrast.  Reformatted images in   the coronal and sagittal planes were acquired.      COMPARISON CT: CT scan of the chest dated 1/25/2021    OTHER STUDIES USED FOR CORRELATION: None.    FINDINGS:    TUBES AND LINES: None.    HEART AND VESSELS: S/P sternotomy and CABG.  The heart is normal in size.   Coronary artery calcifications are noted.  There is no pericardial   effusion.    Normal caliber aorta and pulmonary artery. Aortic calcifications are   noted. There is no evidence of thoracic aortic aneurysm.    MEDIASTINUM: There are no enlarged mediastinal, hilar or axillary lymph   nodes.    AIRWAYS, LUNGS AND PLEURA: The central tracheobronchial tree is patent.   There are reticular opacities at the left lung base likely fibrotic in   nature. There is no pleural effusion. Left sided pleural calcifications   are noted. There is no pneumothorax.        FINDINGS ABDOMEN:    HEPATIC: The liver is normal in size with no evidence of solid mass or   bile duct dilatation. Small focal calcification is noted within the left   hepatic lobe.    BILIARY: Cholelithiasis.    SPLEEN: Unremarkable.    PANCREAS: The pancreas is normal in size and configuration. No evidence   of mass or pancreatitis.    ADRENAL GLANDS: Unremarkable.    KIDNEYS: No evidence of hydronephrosis. Vascular calcifications are   noted. A small nonobstructing calyceal stone is noted in the lower pole   of the right kidney.    ABDOMINOPELVIC NODES: Unremarkable.    PELVIC ORGANS: No evidence of pelvic mass, lymphadenopathy, or fluid   collection.    PERITONEUM/MESENTERY/BOWEL: Sigmoid diverticula noted with no evidence of   diverticulitis. No evidence of bowel obstruction, colitis, inflammatory   process, or ascites. No pneumoperitoneum.  The appendix is normal in   appearance.    BONES/SOFT TISSUES: Degenerative changes of the spine are noted. There is   partial sacralization of L5 on the left. There is a compression fracture   of vertebral body of T12 of indeterminate age.    OTHER: Aortoiliac calcifications arenoted with extensive visceral artery   and femoral artery calcifications. Aneurysmal dilatation of the abdominal   aorta is noted with dilatation of 3.6 cm the level of the renal arteries   and a secondary area of dilatation of 3.6 cm involving the infrarenal   abdominal aorta.      IMPRESSION:    No evidence of intra-abdominal or pelvic mass or acute inflammatory   process    < end of copied text >

## 2022-07-12 NOTE — CONSULT NOTE ADULT - ASSESSMENT
Patient is a 82y Male with PMHx of HFrEF, Afib on eliquis, CAD s/p CABG, COPD (on rare home O2 PRN), CKD3 (Hx of C3 glomerulopathy), HTN, HLD whom is consulted for immunosuppression recommendations for CKD.    - CKD3 (C3 glomerulopathy). Hold cellcept. Continue tacrolimus 1 mg BID  - obtain urine/creatinine ratio  - BRENDA secondary to diarrhea. creatinine improving. 3.6 -> 1.7 -> 1.9 Patient is a 82y Male with PMHx of HFrEF, Afib on eliquis, CAD s/p CABG, COPD (on rare home O2 PRN), CKD3 (Hx of C3 glomerulopathy), HTN, HLD whom is consulted for immunosuppression recommendations for CKD.    - CKD3 (C3 glomerulopathy). Hold cellcept. Continue tacrolimus 1 mg BID  - obtain urine/creatinine ratio  - Suspected BRENDA secondary to diarrhea. Creatinine trend: 3.6 -> 1.7 -> 1.9  - Continue with maintenance fluids. Monitor creatinine  - Continue management of COVID per primary team

## 2022-07-12 NOTE — PROGRESS NOTE ADULT - ASSESSMENT
# BRENDA on CKD III, presumed pre-renal; hx C3 glomerulopathy w/ proteinuria (up to 8.4 gm/day in past - responded well to immunosupp); metab acidosis  base Cr 2.1  improved with IVF - will restart if Cr incr beyond 2  renal eval: I spoke w/ Dr Simmons  c/w calcitriol   no lasix  U/A w/ 3+ protein -> send U TP:Cr ratio  restart tacro 1mg po q9AM and q9PM  c/w NaHCO3 650mg po q8    # normo Anemia; no signs of acute blood loss  will continue Eliquis for now  anemia of chr dz (CKD)  keep hgb >8  on aranesp w/ renal - f/u renal eval    # COVID + - seems like pt is now in inflam phase; prob has some element of neural and GI COVID as well  acute on chr hypoxic resp failure (pt on home O2 prn)  covid +: 7/5; 7/10 - no further testing needed  pt was NOT tx'd initially (before I took over case)  beyond tx window at this point for MABs  RDV relative CI at this GFR anyway  add dexameth 6mg iv q24   incr O2 as needed, was up to 6L today, now down to 3L  pulm eval  obtain inflam markers: d-dimer, ESR, ferr, LDH  consider tocilizumab     # COPD; severe pulm HTN; fibrotic changes LLL; + fever  not sure if fever residual for COVID or developing LLL PNA  CXR: LLL infil looks worse to me  pulm eval  cont NC O2  abx: cefepime 2gm iv q12 (renal dose) + levo 750mg iv q48  check procal  check U Strp/Leg Ag  MRSA nares: neg  c/w proventil prn    # encephalopathy likely metab 2/2 COVID 19 and/or PNA w/ hypoxia  CTH: mild chr micro changes; mild atrophy and ventric prom    # diarrhea  c diff testing: neg  can use imodium 2mg po q4 prn  not on laxatives    # Nutrition  if pt cont to not eat well; consider NGT for feeding and Nutr eval  add MVI w/ min q24    # HFrEF, chronic; sev dilated CM; RV dysfxn; mild TR/AR; HTN  euvolemic on exam  not on ANY goal directed tx - cardio eval Dr Witt - we spoke today (EF closer to 45%)  was on BB - restart toprol xl 25mg po q24 w/ holding parameters  not on ACEI/ARB 2/2 CKD/BRENDA (could consider hydral + nitrates, but only if BP good enough)  not on diuretics - agree    # AFib, chronic  rate controlled - restart BB  on eliquis for AC    # CAD  agree w/ no asa    # Elevated cardiac enzymes  improved from prior Oct 2021 levels  ekg noted  no cp  outpt f/u cards    # PVD  c/w pentoxifylline     # DLD  on statin    # DM2  diabetic diet  FS qac/hs - might worsen w/ steroids  lantus 17 HS  humalog 4 tid w/ +1 scale    # Fall, mechanical  no syncope per pt  cth neg  trauma w/u neg  PT eval for SNF    # Hypothyroidism  synthroid 150    # DVT ppx:  on eliquis    # GI ppx: none    # Actviity: need PT eval    # updated son on phone - he is aware of current poor condition    Dispo: renal eval; cardio eval; tx DM; COVID quarantine; pulm eval; iv abx; dexameth; check procal; U TP:Cr; U Strp/Leg Ag; tx DM; restart tacro; c/w oral bicarb;   eventually, pt will need STR @ SNF - f/u CM - not ready for d/c    Prog remains very guarded (likely poor). Consider upgrade to SDU or MICU.

## 2022-07-12 NOTE — CONSULT NOTE ADULT - ASSESSMENT
Since admission has been treated for Covid pneumonia and called to evaluate for persistant SOB noted CAD remote CABG with stable CAD. Hehad more recent echo in office in 3/22 with EF 45-50% LVH and mild to moderate MR and TR.  RVSP 45 plus rap.   He has been stable form a cardiac standpoint with noted renal insufficiency.     1. Apparent nosocomial pneumonia on top of recent COVID pneumonia. FU as per pulmonary and medicine.     2. AFib rate controlled on anticoagulation. BB added .  Meds had been held due to hypotension.     3.  Pt with hx of CAD and it is stable.  We noted his meds will need to be adjusted.     the most part he is euvolemic and perhaps re EF on echo in office is more LVEF with EF of 45-50%    holding lasix for now.  will not add any other new meds in setting of active secondary infection.

## 2022-07-12 NOTE — CONSULT NOTE ADULT - SUBJECTIVE AND OBJECTIVE BOX
Patient is a 82y old  Male who presents with a chief complaint of Diarrhea (11 Jul 2022 20:33)      HPI:  82M with PMHx of HFrEF, Afib on eliquis, CAD s/p CABG, COPD (on rare home O2 PRN), CKD3, HTN, HLD presents for 2 weeks of watery diarrhea. Pt reports that 2 weeks ago he went to a doctor's appointment with his wife, and the next day both of them started experiencing non-bloody watery diarrhea, x3-4/day, and then started having decreased PO intake and felt weak. Of note, he also had a mechanical fall 1 week ago on his right side with no injuries sustained (but he didn't visit the ED). Pt denies any other sicks contacts, recent travel, vomiting, chest pain, SOB, palpitations, light-headedness, or any  symptoms.     In the ED: /57, HR 57, Temp 97.6F, satting 96% on RA. Labs notable for Cr of 3.6 (baseline ~2.0), , HCO3 19, AG 20. Trops 0.07 (likely 2/2 BRENDA), no chest pain, EKG 1st-degree AV block. CTH -ve, CTAP + chest -ve for acute pathology.     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  No cardiovascular or pulmonary family history     REVIEW OF SYSTEMS:    All ROS are negative except per HPI     Allergies    Ozempic (0.25 mg or 0.5 mg dose) (Hives; Rash)  streptomycin (Unknown)  Trulicity Pen (Hives; Rash)    Intolerances          PHYSICAL EXAM  Vital Signs Last 24 Hrs  T(C): 37.1 (12 Jul 2022 04:44), Max: 37.6 (11 Jul 2022 14:55)  T(F): 98.7 (12 Jul 2022 04:44), Max: 99.6 (11 Jul 2022 14:55)  HR: 88 (12 Jul 2022 08:32) (80 - 100)  BP: 128/74 (12 Jul 2022 04:44) (112/60 - 128/74)  BP(mean): --  RR: 19 (12 Jul 2022 08:32) (18 - 19)  SpO2: 94% (12 Jul 2022 08:32) (94% - 96%)    Parameters below as of 12 Jul 2022 08:32  Patient On (Oxygen Delivery Method): nasal cannula  O2 Flow (L/min): 2      CONSTITUTIONAL:  Well nourished, dyspneic lethargic     ENT:   Airway patent    EYES:   Clear bilaterally    CARDIAC:   Normal rate,   regular rhythm.    no edema    RESPIRATORY:   faint wheezing   Tachypneic,  Diminished bibasilar lung sounds   poor inspiratory effort   crackles on the LLL    GASTROINTESTINAL:  Abdomen soft,   non-tender,   no guarding,     MUSCULOSKELETAL:   range of motion is not limited,  no clubbing, cyanosis    NEUROLOGICAL:   Alert and oriented x3  Lethargic  no motor deficits.  Follows commands     SKIN:   Skin normal color for race,   No evidence of rash.    PSYCHIATRIC:   normal mood and affect.   no apparent risk to self or others.        LABS:                          10.2   7.63  )-----------( 157      ( 12 Jul 2022 07:31 )             30.3                                               07-12    133<L>  |  97<L>  |  79<HH>  ----------------------------<  154<H>  3.8   |  20  |  1.9<H>    Ca    8.1<L>      12 Jul 2022 07:31  Phos  2.0     07-11  Mg     2.0     07-12                                                                                                                                                                                    MEDICATIONS  (STANDING):  apixaban 2.5 milliGRAM(s) Oral two times a day  atorvastatin 40 milliGRAM(s) Oral at bedtime  calcitriol   Capsule 0.25 MICROGram(s) Oral <User Schedule>  cefepime   IVPB 2000 milliGRAM(s) IV Intermittent every 12 hours  dextrose 5%. 1000 milliLiter(s) (50 mL/Hr) IV Continuous <Continuous>  dextrose 5%. 1000 milliLiter(s) (100 mL/Hr) IV Continuous <Continuous>  dextrose 50% Injectable 25 Gram(s) IV Push once  dextrose 50% Injectable 12.5 Gram(s) IV Push once  dextrose 50% Injectable 25 Gram(s) IV Push once  glucagon  Injectable 1 milliGRAM(s) IntraMuscular once  insulin glargine Injectable (LANTUS) 17 Unit(s) SubCutaneous at bedtime  insulin lispro (ADMELOG) corrective regimen sliding scale   SubCutaneous three times a day before meals  insulin lispro Injectable (ADMELOG) 4 Unit(s) SubCutaneous three times a day before meals  levothyroxine 150 MICROGram(s) Oral daily  metoprolol succinate ER 25 milliGRAM(s) Oral daily  pentoxifylline 400 milliGRAM(s) Oral two times a day  sodium bicarbonate 650 milliGRAM(s) Oral every 8 hours    MEDICATIONS  (PRN):  acetaminophen     Tablet .. 650 milliGRAM(s) Oral every 6 hours PRN Temp greater or equal to 38C (100.4F), Mild Pain (1 - 3)  ALBUTerol    90 MICROgram(s) HFA Inhaler 1 Puff(s) Inhalation every 6 hours PRN Bronchospasm  dextrose Oral Gel 15 Gram(s) Oral once PRN Blood Glucose LESS THAN 70 milliGRAM(s)/deciliter  melatonin 5 milliGRAM(s) Oral at bedtime PRN Insomnia      X-Rays reviewed:    CXR interpreted by me: New LLL opacity  Patient is a 82y old  Male who presents with a chief complaint of Diarrhea (11 Jul 2022 20:33)      HPI:  82M with PMHx of HFrEF, Afib on eliquis, CAD s/p CABG, COPD (on rare home O2 PRN), CKD3, HTN, HLD presents for 2 weeks of watery diarrhea. Pt reports that 2 weeks ago he went to a doctor's appointment with his wife, and the next day both of them started experiencing non-bloody watery diarrhea, x3-4/day, and then started having decreased PO intake and felt weak. Of note, he also had a mechanical fall 1 week ago on his right side with no injuries sustained (but he didn't visit the ED). Pt denies any other sicks contacts, recent travel, vomiting, chest pain, SOB, palpitations, light-headedness, or any  symptoms.     In the ED: /57, HR 57, Temp 97.6F, satting 96% on RA. Labs notable for Cr of 3.6 (baseline ~2.0), , HCO3 19, AG 20. Trops 0.07 (likely 2/2 BRENDA), no chest pain, EKG 1st-degree AV block. CTH -ve, CTAP + chest -ve for acute pathology.     Admitted to medicine (05 Jul 2022 21:23)    PULM Consult: Pt seen 07/12, is AOx3 but is lethargic. He claims his SOB is baseline but was unable to finish his sentence. He was delirious momentarily while repositioning in the bed. NC increased to 4L, delirium resolved after several minutes. Per medical team his baseline is normally fully awake aox2-3 and this is a new change for him. Pt unable to give full interview secondary to dyspnea.     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  No cardiovascular or pulmonary family history     REVIEW OF SYSTEMS:    All ROS are negative except per HPI     Allergies    Ozempic (0.25 mg or 0.5 mg dose) (Hives; Rash)  streptomycin (Unknown)  Trulicity Pen (Hives; Rash)    Intolerances          PHYSICAL EXAM  Vital Signs Last 24 Hrs  T(C): 37.1 (12 Jul 2022 04:44), Max: 37.6 (11 Jul 2022 14:55)  T(F): 98.7 (12 Jul 2022 04:44), Max: 99.6 (11 Jul 2022 14:55)  HR: 88 (12 Jul 2022 08:32) (80 - 100)  BP: 128/74 (12 Jul 2022 04:44) (112/60 - 128/74)  BP(mean): --  RR: 19 (12 Jul 2022 08:32) (18 - 19)  SpO2: 94% (12 Jul 2022 08:32) (94% - 96%)    Parameters below as of 12 Jul 2022 08:32  Patient On (Oxygen Delivery Method): nasal cannula  O2 Flow (L/min): 2      CONSTITUTIONAL:  Well nourished, dyspneic lethargic     ENT:   Airway patent    EYES:   Clear bilaterally    CARDIAC:   Normal rate,   regular rhythm.    no edema    RESPIRATORY:   faint wheezing   Tachypneic,  Diminished bibasilar lung sounds   poor inspiratory effort   crackles on the LLL    GASTROINTESTINAL:  Abdomen soft,   non-tender,   no guarding,     MUSCULOSKELETAL:   range of motion is not limited,  no clubbing, cyanosis    NEUROLOGICAL:   Alert and oriented x3  Lethargic  no motor deficits.  Follows commands     SKIN:   Skin normal color for race,   No evidence of rash.    PSYCHIATRIC:   normal mood and affect.   no apparent risk to self or others.        LABS:                          10.2   7.63  )-----------( 157      ( 12 Jul 2022 07:31 )             30.3                                               07-12    133<L>  |  97<L>  |  79<HH>  ----------------------------<  154<H>  3.8   |  20  |  1.9<H>    Ca    8.1<L>      12 Jul 2022 07:31  Phos  2.0     07-11  Mg     2.0     07-12                                                                                                                                                                                    MEDICATIONS  (STANDING):  apixaban 2.5 milliGRAM(s) Oral two times a day  atorvastatin 40 milliGRAM(s) Oral at bedtime  calcitriol   Capsule 0.25 MICROGram(s) Oral <User Schedule>  cefepime   IVPB 2000 milliGRAM(s) IV Intermittent every 12 hours  dextrose 5%. 1000 milliLiter(s) (50 mL/Hr) IV Continuous <Continuous>  dextrose 5%. 1000 milliLiter(s) (100 mL/Hr) IV Continuous <Continuous>  dextrose 50% Injectable 25 Gram(s) IV Push once  dextrose 50% Injectable 12.5 Gram(s) IV Push once  dextrose 50% Injectable 25 Gram(s) IV Push once  glucagon  Injectable 1 milliGRAM(s) IntraMuscular once  insulin glargine Injectable (LANTUS) 17 Unit(s) SubCutaneous at bedtime  insulin lispro (ADMELOG) corrective regimen sliding scale   SubCutaneous three times a day before meals  insulin lispro Injectable (ADMELOG) 4 Unit(s) SubCutaneous three times a day before meals  levothyroxine 150 MICROGram(s) Oral daily  metoprolol succinate ER 25 milliGRAM(s) Oral daily  pentoxifylline 400 milliGRAM(s) Oral two times a day  sodium bicarbonate 650 milliGRAM(s) Oral every 8 hours    MEDICATIONS  (PRN):  acetaminophen     Tablet .. 650 milliGRAM(s) Oral every 6 hours PRN Temp greater or equal to 38C (100.4F), Mild Pain (1 - 3)  ALBUTerol    90 MICROgram(s) HFA Inhaler 1 Puff(s) Inhalation every 6 hours PRN Bronchospasm  dextrose Oral Gel 15 Gram(s) Oral once PRN Blood Glucose LESS THAN 70 milliGRAM(s)/deciliter  melatonin 5 milliGRAM(s) Oral at bedtime PRN Insomnia      X-Rays reviewed:    CXR interpreted by me: New LLL opacity

## 2022-07-12 NOTE — CONSULT NOTE ADULT - SUBJECTIVE AND OBJECTIVE BOX
CHIEF COMPLAINT:Patient is a 82y old  Male who presents with a chief complaint of Diarrhea (12 Jul 2022 11:13)      HISTORY OF PRESENT ILLNESS:   HPI:  82M with PMHx of HFrEF, Afib on eliquis, CAD s/p CABG, COPD (on rare home O2 PRN), CKD3, HTN, HLD presents for 2 weeks of watery diarrhea. Pt reports that 2 weeks ago he went to a doctor's appointment with his wife, and the next day both of them started experiencing non-bloody watery diarrhea, x3-4/day, and then started having decreased PO intake and felt weak. Of note, he also had a mechanical fall 1 week ago on his right side with no injuries sustained (but he didn't visit the ED). Pt denies any other sicks contacts, recent travel, vomiting, chest pain, SOB, palpitations, light-headedness, or any  symptoms.     In the ED: /57, HR 57, Temp 97.6F, satting 96% on RA. Labs notable for Cr of 3.6 (baseline ~2.0), , HCO3 19, AG 20. Trops 0.07 (likely 2/2 BRENDA), no chest pain, EKG 1st-degree AV block. CTH -ve, CTAP + chest -ve for acute pathology.     Since admission has been treated for Covid pneumonia and called to evaluate for persistant SOB noted CAD remote CABG with stable CAD. Hehad more recent echo in office in 3/22 with EF 45-50% LVH and mild to moderate MR and TR.  RVSP 45 plus rap.   He has been stable form a cardiac standpoint with noted renal insufficiency.     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      AF (atrial fibrillation)  s/p DCCV      Carotid stenosis      COPD (chronic obstructive pulmonary disease)      H/O coronary artery bypass surgery  2001      S/P inguinal hernia repair      History of appendectomy        FAMILY HISTORY:  FHx: colon cancer      Allergies    Ozempic (0.25 mg or 0.5 mg dose) (Hives; Rash)  streptomycin (Unknown)  Trulicity Pen (Hives; Rash)    Intolerances    	  Home Medications:  Aranesp 100 mcg/0.5 mL injectable solution: 1 dose(s) injectable every 3 to 4 weeks (05 Jul 2022 21:22)  atorvastatin 40 mg oral tablet: 1 tab(s) orally once a day (at bedtime) (05 Jul 2022 21:22)  calcitriol 0.25 mcg oral capsule: 1 cap(s) orally 2 times a week (05 Jul 2022 21:22)  d-mycophenolate: 500 milligram(s) orally 4 times a day (05 Jul 2022 21:22)  Eliquis 2.5 mg oral tablet: 1 tab(s) orally 2 times a day (05 Jul 2022 21:22)  famotidine 20 mg oral tablet: 1 tab(s) orally once a day (05 Jul 2022 21:22)  isosorbide mononitrate 30 mg oral tablet, extended release: 1 tab(s) orally once a day (in the morning) (05 Jul 2022 21:22)  Lantus 100 units/mL subcutaneous solution: 30 unit(s) subcutaneous once a day (at bedtime) (05 Jul 2022 21:22)  levothyroxine 150 mcg (0.15 mg) oral tablet: 1 tab(s) orally once a day (05 Jul 2022 21:22)  metoprolol tartrate 25 mg oral tablet: 1 tab(s) orally 2 times a day (05 Jul 2022 21:22)  NIFEdipine 30 mg oral tablet, extended release: 1 tab(s) orally once a day (05 Jul 2022 21:22)  pentoxifylline 400 mg oral tablet, extended release: 1 tab(s) orally 2 times a day (05 Jul 2022 21:22)  tacrolimus 1 mg oral capsule: 1 cap(s) orally every 12 hours (05 Jul 2022 21:22)    MEDICATIONS  (STANDING):  apixaban 2.5 milliGRAM(s) Oral two times a day  atorvastatin 40 milliGRAM(s) Oral at bedtime  calcitriol   Capsule 0.25 MICROGram(s) Oral <User Schedule>  cefepime   IVPB 2000 milliGRAM(s) IV Intermittent every 12 hours  dextrose 5%. 1000 milliLiter(s) (50 mL/Hr) IV Continuous <Continuous>  dextrose 5%. 1000 milliLiter(s) (100 mL/Hr) IV Continuous <Continuous>  dextrose 50% Injectable 25 Gram(s) IV Push once  dextrose 50% Injectable 12.5 Gram(s) IV Push once  dextrose 50% Injectable 25 Gram(s) IV Push once  glucagon  Injectable 1 milliGRAM(s) IntraMuscular once  insulin glargine Injectable (LANTUS) 17 Unit(s) SubCutaneous at bedtime  insulin lispro (ADMELOG) corrective regimen sliding scale   SubCutaneous three times a day before meals  insulin lispro Injectable (ADMELOG) 4 Unit(s) SubCutaneous three times a day before meals  levothyroxine 150 MICROGram(s) Oral daily  metoprolol succinate ER 25 milliGRAM(s) Oral daily  pentoxifylline 400 milliGRAM(s) Oral two times a day  sodium bicarbonate 650 milliGRAM(s) Oral every 8 hours    MEDICATIONS  (PRN):  acetaminophen     Tablet .. 650 milliGRAM(s) Oral every 6 hours PRN Temp greater or equal to 38C (100.4F), Mild Pain (1 - 3)  ALBUTerol    90 MICROgram(s) HFA Inhaler 1 Puff(s) Inhalation every 6 hours PRN Bronchospasm  dextrose Oral Gel 15 Gram(s) Oral once PRN Blood Glucose LESS THAN 70 milliGRAM(s)/deciliter  melatonin 5 milliGRAM(s) Oral at bedtime PRN Insomnia        SOCIAL HISTORY:    [ ] Non-smoker  [ ] Smoker  [ ] Alcohol      REVIEW OF SYSTEMS:    PHYSICAL EXAM:  T(C): 37.1 (07-12-22 @ 04:44), Max: 37.6 (07-11-22 @ 14:55)  HR: 88 (07-12-22 @ 08:32) (80 - 100)  BP: 128/74 (07-12-22 @ 04:44) (112/60 - 128/74)  RR: 19 (07-12-22 @ 08:32) (18 - 19)  SpO2: 94% (07-12-22 @ 08:32) (94% - 96%)  Wt(kg): --  I&O's Summary    Daily     Daily     General Appearance: Normal	  Cardiovascular: Normal S1 S2, No JVD, No murmurs, No edema  Respiratory: Lungs clear to auscultation	  Psychiatry: A & O x 3, Mood & affect appropriate  Gastrointestinal:  Soft, Non-tender  Skin: No rashes, No ecchymoses, No cyanosis	  Neurologic: Non-focal  Extremities: Normal range of motion, No clubbing, cyanosis or edema  Vascular: Peripheral pulses palpable 2+ bilaterally        LABS:	 	                        10.2   7.63  )-----------( 157      ( 12 Jul 2022 07:31 )             30.3     07-12    133<L>  |  97<L>  |  79<HH>  ----------------------------<  154<H>  3.8   |  20  |  1.9<H>  07-11    135  |  101  |  71<HH>  ----------------------------<  175<H>  4.2   |  18  |  1.7<H>    Ca    8.1<L>      12 Jul 2022 07:31  Ca    8.2<L>      11 Jul 2022 07:51  Phos  2.0     07-11  Mg     2.0     07-12  Mg     1.9     07-11        proBNP:   Lipid Profile:   HgA1c:   TSH:       CARDIAC MARKERS:            TELEMETRY EVENTS: 	    ECG:  	  RADIOLOGY:  	    PREVIOUS DIAGNOSTIC TESTING:    [ ] Echocardiogram:  [ ]  Catheterization:  [ ] Stress Test:

## 2022-07-12 NOTE — CONSULT NOTE ADULT - ATTENDING COMMENTS
Patient is a 82y Male with PMHx of HFrEF, Afib on eliquis, CAD s/p CABG, COPD (on rare home O2 PRN), CKD3 (Hx of C3 glomerulopathy), HTN, HLD whom is consulted for PAUELTTE and maintenance tx of C3 GN.    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

## 2022-07-12 NOTE — PROGRESS NOTE ADULT - SUBJECTIVE AND OBJECTIVE BOX
DAYSI ANDRIA  82y  Male  ***My note supersedes ALL resident notes that I sign.  My corrections for their notes are in my note.***    I can be reached directly on Monitor0. My office number is [phone]. My personal cell number is [phone].    INTERVAL EVENTS: Here for f/u of resp failure. Pt had desat today and temp needed more O2, now a little better. MS is still oriented, but pt looks worried. He denies any pain or SOB and he says he is OK. He had several loose BMs today. Pt is only eating and drinking a little bit.    T(F): 98.8 (22 @ 16:11), Max: 100.4 (22 @ 14:29)  HR: 77 (22 @ 15:35) (77 - 100)  BP: 121/90 (22 @ 14:29) (112/60 - 138/63)  RR: 19 (22 @ 14:29) (18 - 20)  SpO2: 98% (22 @ 18:30) (75% - 98%)    Gen: NAD  HEENT: PERRL, EOMI, mouth clr, nose clr; + NC O2  Neck: no nodes, no JVD, thyroid nl  lungs: decr BS lt base; no wheeze  hrt: s1 s2 rrr no murmur  abd: soft, NT/ND, no HS megaly  ext: no edema, no c/c  neuro: aa ox3 (mostly), cn intact, can move all 4 ext  got most ques right - again    LABS:                      10.2    (    86.3   7.63  )-----------( ---------      157      ( 2022 07:31 )             30.3    (    14.2     Hemoglobin: 10.2 g/dL ( @ 07:31)  Hemoglobin: 10.5 g/dL ( @ 07:51)  Hemoglobin: 10.8 g/dL (07-10 @ 21:03)  Hemoglobin: 9.7 g/dL (07-10 @ 04:30)  Hemoglobin: 10.8 g/dL ( @ 06:00)  Hemoglobin: 11.0 g/dL ( @ 07:41)    133   (   97   (   154      22 @ 07:31  ----------------------               3.8   (   20   (   79                             -----                        1.9  Ca  8.1   Mg  2.0    P   --     Creatinine:   1.9 ( @ 07:31)  eGFR:  35    Creatinine:   1.7 ( @ 07:51)  eGFR:  40    Creatinine:   1.7 (07-10 @ 04:30)  eGFR:  40    Creatinine:   1.4 ( @ 06:00)  eGFR:  50    Creatinine:   1.6 ( @ 07:41)  eGFR:  43      Urinalysis Basic - ( 2022 11:47 )    Color: Yellow / Appearance: Clear / S.018 / pH: x  Gluc: x / Ketone: Negative  / Bili: Negative / Urobili: <2 mg/dL   Blood: x / Protein: 300 mg/dL / Nitrite: Negative   Leuk Esterase: Moderate / RBC: 8 /HPF / WBC 6 /HPF   Sq Epi: x / Non Sq Epi: 2 /HPF / Bacteria: Negative    CAPILLARY BLOOD GLUCOSE  POCT Blood Glucose.: 181 (22 @ 17:51)  POCT Blood Glucose.: 122 (22 @ 11:36)  POCT Blood Glucose.: 162 (22 @ 07:51)  POCT Blood Glucose.: 144 (22 @ 20:42)  POCT Blood Glucose.: 124 (22 @ 17:03)  POCT Blood Glucose.: 145 (22 @ 11:38)  POCT Blood Glucose.: 186 (22 @ 07:45)  POCT Blood Glucose.: 165 (07-10-22 @ 22:07)    RADIOLOGY & ADDITIONAL TESTS:  < from: [facility] Duplex Upper Ext Vein Scan, Bilat (22 @ 17:29) >    Impression:    No evidence of deep or superficial thrombosis in the bilateral upper   extremities.    < end of copied text >      MEDICATIONS:  cefepime   IVPB 2000 milliGRAM(s) IV Intermittent every 12 hours  levoFLOXacin IVPB 750 milliGRAM(s) IV Intermittent every 48 hours    acetaminophen     Tablet .. 650 milliGRAM(s) Oral every 6 hours PRN  ALBUTerol    90 MICROgram(s) HFA Inhaler 1 Puff(s) Inhalation every 6 hours PRN  apixaban 2.5 milliGRAM(s) Oral two times a day  atorvastatin 40 milliGRAM(s) Oral at bedtime  calcitriol   Capsule 0.25 MICROGram(s) Oral <User Schedule>  dexAMETHasone  Injectable 6 milliGRAM(s) IV Push every 24 hours  insulin glargine Injectable (LANTUS) 17 Unit(s) SubCutaneous at bedtime  insulin lispro (ADMELOG) corrective regimen sliding scale   SubCutaneous three times a day before meals  insulin lispro Injectable (ADMELOG) 4 Unit(s) SubCutaneous three times a day before meals  levothyroxine 150 MICROGram(s) Oral daily  melatonin 5 milliGRAM(s) Oral at bedtime PRN  metoprolol succinate ER 25 milliGRAM(s) Oral daily  pentoxifylline 400 milliGRAM(s) Oral two times a day  sodium bicarbonate 650 milliGRAM(s) Oral every 8 hours  tacrolimus 1 milliGRAM(s) Oral every 12 hours

## 2022-07-13 NOTE — PROGRESS NOTE ADULT - ASSESSMENT
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)  needs to be corrected for glucose  renal noted  will try LR 50/hr  does NOT seem vol overloaded  SIADH w/u: Uosm; Yunior; Uric Acid serum  oral fluid restriction to 1L    # normo anemia; no signs of acute blood loss  will continue Eliquis for now  anemia of chr dz (CKD)  keep hgb >8  on aranesp w/ renal - f/u renal eval    # COVID + - seems like pt is now in inflam phase; might have some element of neural and GI COVID as well  acute on chr hypoxic resp failure (pt on home O2 prn)  covid +: 7/5; 7/10 - no further testing needed  pt was NOT tx'd initially for COVID (before I took over case)  was beyond tx window for MABs  RDV relative CI at this GFR anyway  c/w dexameth 6mg iv q24   c/w NC O2 - need is stable (better than yesterday)  pulm eval  d-dimer 454, ESR 38, CRP 25.9  f/u ferr, LDH    # COPD; severe pulm HTN; fibrotic changes LLL; + fever  not sure if fever residual for COVID or developing LLL PNA  CXR: LLL infil looked worse to me  rpt CXR: bilat infil L>R  pulm eval  cont NC O2  abx: cefepime 2gm iv q12 (renal dose) + levo 750mg iv q48  procal 0.38  check U Strp/Leg Ag - spoke w/ RN  MRSA nares: neg  c/w proventil prn    # encephalopathy likely metab 2/2 COVID 19 and/or PNA w/ hypoxia  CTH: mild chr micro changes; mild atrophy and ventric prom    # diarrhea  c diff testing: neg  can use imodium 2mg po q4 prn  not on laxatives    # Nutrition  if pt does to not eat well: consider NGT for feeding and Nutr eval (seems to be eating better today)  add MVI w/ min q24    # HFrEF, chronic; sev dilated CM; RV dysfxn; mild TR/AR; HTN  euvolemic on exam  not on ANY goal directed tx - cardio eval Dr Witt - (per cardio, EF closer to 45%)  was on BB - restart toprol xl 25mg po q24 w/ holding parameters  not on ACEI/ARB 2/2 CKD/BRENDA (could consider hydral + nitrates, but only if BP good enough)  not on diuretics - agree    # AFib, chronic  rate controlled - on BB  on eliquis for AC    # CAD  agree w/ no asa    # Elevated cardiac enzymes  improved from prior Oct 2021 levels  ekg noted  no cp  outpt f/u cards    # PVD  c/w pentoxifylline     # DLD  on statin    # DM 2  diabetic diet  FS qac/hs - worse w/ steroids  incr lantus 21 HS  incr humalog 7 tid w/ +1 scale    # Fall, mechanical  no syncope per pt  CTH neg  trauma w/u neg  PT eval for SNF    # Hypothyroidism  synthroid 150mg q24    # DVT ppx: on eliquis    # GI ppx: none    # Activity: need PT eval    # updated son on phone today    Dispo: pulm eval; renal eval; cardio eval; tx DM; COVID quarantine; iv abx; dexameth; U TP:Cr, Yunior, Uosm; U Strp/Leg Ag; c/w oral bicarb; LR 50/hr; encourage oral intake  eventually, pt will need STR @ SNF - f/u CM - not ready for d/c    Prog remains very guarded (likely poor). Consider upgrade to SDU or MICU if worsens.

## 2022-07-13 NOTE — PROGRESS NOTE ADULT - SUBJECTIVE AND OBJECTIVE BOX
DAYSI ANDRIA  82y  Male  ***My note supersedes ALL resident notes that I sign.  My corrections for their notes are in my note.***    I can be reached directly on [email or web address]. My office number is [phone]. My personal cell number is [phone].    INTERVAL EVENTS: Here for f/u of COVID. Pt looks much better today. He looks more awake and alert. No diarrhea today. Pt is eating/drinking to small extent. He wants to sit up in chair.    T(F): 94.8 (22 @ 13:50), Max: 98.8 (22 @ 16:11)  HR: 85 (22 @ 13:50) (72 - 101)  BP: 103/63 (22 @ 13:50) (103/63 - 160/58)  RR: 17 (22 @ 13:50) (17 - 18)  SpO2: 99% (22 @ 01:30) (93% - 99%)    Gen: NAD  HEENT: PERRL, EOMI, mouth clr, nose clr; + NC O2  Neck: no nodes, no JVD, thyroid nl  lungs: decr BS lt base; no wheeze  hrt: s1 s2 rrr no murmur  abd: soft, NT/ND, no HS megaly  : condom cath - yel urine  ext: no edema, no c/c  neuro: aa ox3 (mostly), cn intact, can move all 4 ext  got all ques right - again    LABS:                      9.5     (    85.0   3.79  )-----------( ---------      155      ( 2022 08:58 )             27.8    (    14.2     Hemoglobin: 9.5 g/dL ( @ 08:58)  Hemoglobin: 10.2 g/dL ( @ 07:31)  Hemoglobin: 10.5 g/dL ( @ 07:51)  Hemoglobin: 10.8 g/dL (07-10 @ 21:03)  Hemoglobin: 9.7 g/dL (07-10 @ 04:30)  Hemoglobin: 10.8 g/dL ( @ 06:00)    128   (   94   (   264      22 @ 08:58  ----------------------               3.9   (   21   (   87                             -----                        1.9  Ca  8.4   Mg  2.2    P   --     Sodium, Serum: 128 mmol/L (22 @ 08:58) - 131 if correct for Na  Sodium, Serum: 133 mmol/L (22 @ 07:31)  Sodium, Serum: 135 mmol/L (22 @ 07:51)  Sodium, Serum: 131 mmol/L (07-10-22 @ 04:30)  Sodium, Serum: 134 mmol/L (22 @ 06:00)    Urinalysis Basic - ( 2022 11:47 )    Color: Yellow / Appearance: Clear / S.018 / pH: x  Gluc: x / Ketone: Negative  / Bili: Negative / Urobili: <2 mg/dL   Blood: x / Protein: 300 mg/dL / Nitrite: Negative   Leuk Esterase: Moderate / RBC: 8 /HPF / WBC 6 /HPF   Sq Epi: x / Non Sq Epi: 2 /HPF / Bacteria: Negative    CAPILLARY BLOOD GLUCOSE  POCT Blood Glucose.: 258 (22 @ 12:10)  POCT Blood Glucose.: 265 (22 @ 08:11)  POCT Blood Glucose.: 329 (22 @ 01:16)  POCT Blood Glucose.: 322 (22 @ 23:39)  POCT Blood Glucose.: 319 (22 @ 22:01)  POCT Blood Glucose.: 181 (22 @ 17:51)  POCT Blood Glucose.: 122 (22 @ 11:36)  POCT Blood Glucose.: 162 (22 @ 07:51)    Culture - Blood (collected 22 @ 11:41)  Source: .Blood None  Preliminary Report (22 @ 22:02):    No growth to date.    RADIOLOGY & ADDITIONAL TESTS:  < from: Xray Chest 1 View AP/PA (22 @ 05:55) >  Impression:    Bilateral opacifications, left greater than right, without difference.    < end of copied text >    MEDICATIONS:  cefepime   IVPB 2000 milliGRAM(s) IV Intermittent every 12 hours  levoFLOXacin IVPB 750 milliGRAM(s) IV Intermittent every 48 hours    acetaminophen     Tablet .. 650 milliGRAM(s) Oral every 6 hours PRN  ALBUTerol    90 MICROgram(s) HFA Inhaler 1 Puff(s) Inhalation every 6 hours PRN  apixaban 2.5 milliGRAM(s) Oral two times a day  atorvastatin 40 milliGRAM(s) Oral at bedtime  calcitriol   Capsule 0.25 MICROGram(s) Oral <User Schedule>  dexAMETHasone  Injectable 6 milliGRAM(s) IV Push every 24 hours  insulin glargine Injectable (LANTUS) 21 Unit(s) SubCutaneous at bedtime  insulin lispro (ADMELOG) corrective regimen sliding scale   SubCutaneous three times a day before meals  insulin lispro Injectable (ADMELOG) 7 Unit(s) SubCutaneous three times a day before meals  levothyroxine 150 MICROGram(s) Oral daily  loperamide 2 milliGRAM(s) Oral every 4 hours PRN  melatonin 5 milliGRAM(s) Oral at bedtime PRN  metoprolol succinate ER 25 milliGRAM(s) Oral daily  multivitamin/minerals 1 Tablet(s) Oral daily  pentoxifylline 400 milliGRAM(s) Oral two times a day  sodium bicarbonate 650 milliGRAM(s) Oral every 8 hours  tacrolimus 1 milliGRAM(s) Oral every 12 hours

## 2022-07-13 NOTE — PROGRESS NOTE ADULT - SUBJECTIVE AND OBJECTIVE BOX
Nephrology Progress Note    ANDRIA TAVAREZ  MRN-[id number]  82y  Male    S:  Patient is seen and examined, events over the last 24h noted.    O:  Allergies:  Ozempic (0.25 mg or 0.5 mg dose) (Hives; Rash)  streptomycin (Unknown)  Trulicity Pen (Hives; Rash)    Hospital Medications:   MEDICATIONS  (STANDING):  apixaban 2.5 milliGRAM(s) Oral two times a day  atorvastatin 40 milliGRAM(s) Oral at bedtime  calcitriol   Capsule 0.25 MICROGram(s) Oral <User Schedule>  cefepime   IVPB 2000 milliGRAM(s) IV Intermittent every 12 hours  dexAMETHasone  Injectable 6 milliGRAM(s) IV Push every 24 hours  insulin glargine Injectable (LANTUS) 21 Unit(s) SubCutaneous at bedtime  insulin lispro (ADMELOG) corrective regimen sliding scale   SubCutaneous three times a day before meals  insulin lispro Injectable (ADMELOG) 7 Unit(s) SubCutaneous three times a day before meals  levoFLOXacin IVPB 750 milliGRAM(s) IV Intermittent every 48 hours  levothyroxine 150 MICROGram(s) Oral daily  metoprolol succinate ER 25 milliGRAM(s) Oral daily  multivitamin/minerals 1 Tablet(s) Oral daily  pentoxifylline 400 milliGRAM(s) Oral two times a day  sodium bicarbonate 650 milliGRAM(s) Oral every 8 hours  tacrolimus 1 milliGRAM(s) Oral every 12 hours    MEDICATIONS  (PRN):  acetaminophen     Tablet .. 650 milliGRAM(s) Oral every 6 hours PRN Temp greater or equal to 38C (100.4F), Mild Pain (1 - 3)  ALBUTerol    90 MICROgram(s) HFA Inhaler 1 Puff(s) Inhalation every 6 hours PRN Bronchospasm  dextrose Oral Gel 15 Gram(s) Oral once PRN Blood Glucose LESS THAN 70 milliGRAM(s)/deciliter  loperamide 2 milliGRAM(s) Oral every 4 hours PRN Diarrhea  melatonin 5 milliGRAM(s) Oral at bedtime PRN Insomnia    Home Medications:  Aranesp 100 mcg/0.5 mL injectable solution: 1 dose(s) injectable every 3 to 4 weeks (2022 21:22)  atorvastatin 40 mg oral tablet: 1 tab(s) orally once a day (at bedtime) (2022 21:22)  calcitriol 0.25 mcg oral capsule: 1 cap(s) orally 2 times a week (2022 21:22)  d-mycophenolate: 500 milligram(s) orally 4 times a day (:)  Eliquis 2.5 mg oral tablet: 1 tab(s) orally 2 times a day (:22)  famotidine 20 mg oral tablet: 1 tab(s) orally once a day (:)  isosorbide mononitrate 30 mg oral tablet, extended release: 1 tab(s) orally once a day (in the morning) (:)  Lantus 100 units/mL subcutaneous solution: 30 unit(s) subcutaneous once a day (at bedtime) (:)  levothyroxine 150 mcg (0.15 mg) oral tablet: 1 tab(s) orally once a day (:)  metoprolol tartrate 25 mg oral tablet: 1 tab(s) orally 2 times a day (:)  NIFEdipine 30 mg oral tablet, extended release: 1 tab(s) orally once a day (:)  pentoxifylline 400 mg oral tablet, extended release: 1 tab(s) orally 2 times a day (:)  tacrolimus 1 mg oral capsule: 1 cap(s) orally every 12 hours (:)      VITALS:  T(F): 94.8 (22 @ 13:50), Max: 98.8 (22 @ 16:11)  HR: 85 (22 @ 13:50)  BP: 103/63 (22 @ 13:50)  RR: 17 (22 @ 13:50)  SpO2: 99% (22 @ 01:30)  Wt(kg): --  I&O's Detail    2022 07:01  -  2022 14:32  --------------------------------------------------------  IN:  Total IN: 0 mL    OUT:    Voided (mL): 380 mL  Total OUT: 380 mL    Total NET: -380 mL        I&O's Summary    2022 07:01  -  2022 14:32  --------------------------------------------------------  IN: 0 mL / OUT: 380 mL / NET: -380 mL          PHYSICAL EXAM:  Gen: NAD on NC, ill appearing, +UE tremor  Resp: bibasilar rales  Card: S1/S2  Abd: soft  Extremities: no edema      LABS:          128<L>  |  94<L>  |  87<HH>  ----------------------------<  264<H>  3.9   |  21  |  1.9<H>    Ca    8.4<L>      2022 08:58  Mg     2.2           Phosphorus Level, Serum: 2.0 mg/dL (22 @ 07:51)  Phosphorus Level, Serum: 2.0 mg/dL (07-10-22 @ 04:30)    Vitamin D, 25-Hydroxy: 27 ng/mL (22 @ 07:39)  Intact PTH: 93 pg/mL (22 @ 07:39)                          9.5    3.79  )-----------( 155      ( 2022 08:58 )             27.8     Mean Cell Volume: 85.0 fL (22 @ 08:58)    Iron Total, Serum: 70 ug/dL (10-21-21 @ 11:25)  % Saturation, Iron: 23 % (10-21-21 @ 11:25)      Urine Studies:  Urinalysis Basic - ( 2022 11:47 )    Color: Yellow / Appearance: Clear / S.018 / pH:   Gluc:  / Ketone: Negative  / Bili: Negative / Urobili: <2 mg/dL   Blood:  / Protein: 300 mg/dL / Nitrite: Negative   Leuk Esterase: Moderate / RBC: 8 /HPF / WBC 6 /HPF   Sq Epi:  / Non Sq Epi: 2 /HPF / Bacteria: Negative        Urea Nitrogen,  Random Urine: 637 mg/dL (10-21-21 @ 01:00)    Culture Results:   No growth to date. ( 11:41)  Creatinine, Random Urine: 47 mg/dL (10-22 @ 23:23)    Creatinine trend:  Creatinine, Serum: 1.9 mg/dL (22 @ 08:58)  Creatinine, Serum: 1.9 mg/dL (22 @ 07:31)  Creatinine, Serum: 1.7 mg/dL (22 @ 07:51)  Creatinine, Serum: 1.7 mg/dL (07-10-22 @ 04:30)  Creatinine, Serum: 1.4 mg/dL (22 @ 06:00)  Creatinine, Serum: 1.6 mg/dL (22 @ 07:41)  Creatinine, Serum: 2.1 mg/dL (22 @ 09:15)  Creatinine, Serum: 2.7 mg/dL (22 @ 07:39)  Creatinine, Serum: 3.5 mg/dL (22 @ 14:54)  Creatinine, Serum: 1.6 mg/dL (10-26-21 @ 04:30)      Sodium, Serum: 128 mmol/L (22 @ 08:58)  Sodium, Serum: 133 mmol/L (22 @ 07:31)  Sodium, Serum: 135 mmol/L (22 @ 07:51)  Sodium, Serum: 131 mmol/L (07-10-22 @ 04:30)  Sodium, Serum: 134 mmol/L (22 @ 06:00)  Sodium, Serum: 135 mmol/L (22 @ 07:41)  Sodium, Serum: 135 mmol/L (22 @ 09:15)  Sodium, Serum: 131 mmol/L (22 @ 07:39)  Sodium, Serum: 131 mmol/L (22 @ 14:54)  Sodium, Serum: 140 mmol/L (10-26-21 @ 04:30)    Osmolality, Random Urine: 369 mos/kg [50 - 1200] (10-21-21 @ 01:00)  Sodium, Random Urine: 22.0 mmoL/L (10-21-21 @ 01:00)  Thyroid Stimulating Hormone, Serum: 3.49 uIU/mL [0.27 - 4.20] (10-21-21 @ 11:25)          < from: CT Chest No Cont (22 @ 17:14) >    ACC: [id number] EXAM:  CT CHEST                        ACC: [id number] EXAM:  CT ABDOMEN AND PELVIS                          PROCEDURE DATE:  2022          INTERPRETATION:  REASON FOR EXAM / CLINICAL STATEMENT:  weakness x 2   weeks and short of breathPersistent diarrhea    WBC 5.32    PMHx of AAA,   AF, S/P CABG, Carotid stenosis, CHF, CKD, COPD, DM, HLD, HTN, PVD,   appendectomy,    TECHNIQUE:  Contiguous axial CT images were obtained from the thoracic   inlet to the pubic symphysis withoutIV contrast.  Reformatted images in   the coronal and sagittal planes were acquired.      COMPARISON CT: CT scan of the chest dated 2021    OTHER STUDIES USED FOR CORRELATION: None.    FINDINGS:    TUBES AND LINES: None.    HEART AND VESSELS: S/P sternotomy and CABG.  The heart is normal in size.   Coronary artery calcifications are noted.  There is no pericardial   effusion.    Normal caliber aorta and pulmonary artery. Aortic calcifications are   noted. There is no evidence of thoracic aortic aneurysm.    MEDIASTINUM: There are no enlarged mediastinal, hilar or axillary lymph   nodes.    AIRWAYS, LUNGS AND PLEURA: The central tracheobronchial tree is patent.   There are reticular opacities at the left lung base likely fibrotic in   nature. There is no pleural effusion. Left sided pleural calcifications   are noted. There is no pneumothorax.        FINDINGS ABDOMEN:    HEPATIC: The liver is normal in size with no evidence of solid mass or   bile duct dilatation. Small focal calcification is noted within the left   hepatic lobe.    BILIARY: Cholelithiasis.    SPLEEN: Unremarkable.    PANCREAS: The pancreas is normal in size and configuration. No evidence   of mass or pancreatitis.    ADRENAL GLANDS: Unremarkable.    KIDNEYS: No evidence of hydronephrosis. Vascular calcifications are   noted. A small nonobstructing calyceal stone is noted in the lower pole   of the right kidney.    ABDOMINOPELVIC NODES: Unremarkable.    PELVIC ORGANS: No evidence of pelvic mass, lymphadenopathy, or fluid   collection.    PERITONEUM/MESENTERY/BOWEL: Sigmoid diverticula noted with no evidence of   diverticulitis. No evidence of bowel obstruction, colitis, inflammatory   process, or ascites. No pneumoperitoneum.  The appendix is normal in   appearance.    BONES/SOFT TISSUES: Degenerative changes of the spine are noted. There is   partial sacralization of L5 on the left. There is a compression fracture   of vertebral body of T12 of indeterminate age.    OTHER: Aortoiliac calcifications arenoted with extensive visceral artery   and femoral artery calcifications. Aneurysmal dilatation of the abdominal   aorta is noted with dilatation of 3.6 cm the level of the renal arteries   and a secondary area of dilatation of 3.6 cm involving the infrarenal   abdominal aorta.      IMPRESSION:    No evidence of intra-abdominal or pelvic mass or acute inflammatory   process    --- End of Report ---            RACHEL ARIAS MD; Attending Interventional Radiologist  This document has been electronically signed. 2022  6:10PM    < end of copied text >      < from: [facility] Duplex Upper Ext Vein Scan, [place] (22 @ 17:29) >    ACC: [id number] EXAM:  DUPLEX SCAN EXT VEINS UPPER BI                          PROCEDURE DATE:  2022          INTERPRETATION:  Clinical History / Reason for exam: The patient is a 82   years old male with arm swelling.  The study was performed to evaluate   for deep venous thrombosis.    The bilateral internal jugular, subclavian, axillary, brachial, radial   and ulnar veins were visualized and were free of thrombus.  The veins   were compressible with presence of spontaneous flow, augmentation with   distal compression and phasicity.    The cephalic vein is patent.  The basilic vein is patent.    Impression:    No evidence of deep or superficial thrombosis in the bilateral upper   extremities.    --- End of Report ---            NOLA MCCABE MD; Attending Vascular Surgeon  This document has been electronically signed. 2022  6:22PM    < end of copied text >

## 2022-07-14 NOTE — PROGRESS NOTE ADULT - SUBJECTIVE AND OBJECTIVE BOX
ANDRIA TAVAREZ 82y Male  MRN#: [id number]   CODE STATUS:________    Hospital Day: 9d    Pt is currently admitted with the primary diagnosis of COVID 19 - BRENDA on CKD stage 3    SUBJECTIVE  Hospital Course  82M with PMHx of HFrEF, Afib on eliquis, CAD s/p CABG, COPD (on rare home O2 PRN), CKD3, HTN, HLD presents for 2 weeks of watery diarrhea. Pt reports that 2 weeks ago he went to a doctor's appointment with his wife, and the next day both of them started experiencing non-bloody watery diarrhea, x3-4/day, and then started having decreased PO intake and felt weak. Of note, he also had a mechanical fall 1 week ago on his right side with no injuries sustained (but he didn't visit the ED). Pt denies any other sicks contacts, recent travel, vomiting, chest pain, SOB, palpitations, light-headedness, or any  symptoms.     In the ED: /57, HR 57, Temp 97.6F, satting 96% on RA. Labs notable for Cr of 3.6 (baseline ~2.0), , HCO3 19, AG 20. Trops 0.07 (likely 2/2 BRENDA), no chest pain, EKG 1st-degree AV block. CTH -ve, CTAP + chest -ve for acute pathology.     Admitted to medicine (05 Jul 2022 21:23)    Overnight events   None significant    Subjective complaints   No complaints  Present Today:   - Gloria:  No [  ], Yes [   ] : Indication:     - Type of IV Access:       .. CVC/Piccline:  No [  ], Yes [   ] : Indication:       .. 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    AF (atrial fibrillation)  s/p DCCV    Carotid stenosis    COPD (chronic obstructive pulmonary disease)    H/O coronary artery bypass surgery  2001    S/P inguinal hernia repair    History of appendectomy                                              -----------------------------------------------------------  ALLERGIES:  Ozempic (0.25 mg or 0.5 mg dose) (Hives; Rash)  streptomycin (Unknown)  Trulicity Pen (Hives; Rash)                                            ------------------------------------------------------------    HOME MEDICATIONS  Home Medications:  Aranesp 100 mcg/0.5 mL injectable solution: 1 dose(s) injectable every 3 to 4 weeks (05 Jul 2022 21:22)  atorvastatin 40 mg oral tablet: 1 tab(s) orally once a day (at bedtime) (05 Jul 2022 21:22)  calcitriol 0.25 mcg oral capsule: 1 cap(s) orally 2 times a week (05 Jul 2022 21:22)  d-mycophenolate: 500 milligram(s) orally 4 times a day (05 Jul 2022 21:22)  Eliquis 2.5 mg oral tablet: 1 tab(s) orally 2 times a day (05 Jul 2022 21:22)  famotidine 20 mg oral tablet: 1 tab(s) orally once a day (05 Jul 2022 21:22)  isosorbide mononitrate 30 mg oral tablet, extended release: 1 tab(s) orally once a day (in the morning) (05 Jul 2022 21:22)  Lantus 100 units/mL subcutaneous solution: 30 unit(s) subcutaneous once a day (at bedtime) (05 Jul 2022 21:22)  levothyroxine 150 mcg (0.15 mg) oral tablet: 1 tab(s) orally once a day (05 Jul 2022 21:22)  metoprolol tartrate 25 mg oral tablet: 1 tab(s) orally 2 times a day (05 Jul 2022 21:22)  NIFEdipine 30 mg oral tablet, extended release: 1 tab(s) orally once a day (05 Jul 2022 21:22)  pentoxifylline 400 mg oral tablet, extended release: 1 tab(s) orally 2 times a day (05 Jul 2022 21:22)  tacrolimus 1 mg oral capsule: 1 cap(s) orally every 12 hours (05 Jul 2022 21:22)                           MEDICATIONS:  STANDING MEDICATIONS  apixaban 2.5 milliGRAM(s) Oral two times a day  atorvastatin 40 milliGRAM(s) Oral at bedtime  calcitriol   Capsule 0.25 MICROGram(s) Oral <User Schedule>  cefepime   IVPB 2000 milliGRAM(s) IV Intermittent every 12 hours  dexAMETHasone     Tablet 6 milliGRAM(s) Oral daily  dextrose 5%. 1000 milliLiter(s) IV Continuous <Continuous>  dextrose 5%. 1000 milliLiter(s) IV Continuous <Continuous>  dextrose 50% Injectable 25 Gram(s) IV Push once  dextrose 50% Injectable 12.5 Gram(s) IV Push once  dextrose 50% Injectable 25 Gram(s) IV Push once  glucagon  Injectable 1 milliGRAM(s) IntraMuscular once  insulin glargine Injectable (LANTUS) 21 Unit(s) SubCutaneous at bedtime  insulin lispro (ADMELOG) corrective regimen sliding scale   SubCutaneous three times a day before meals  insulin lispro Injectable (ADMELOG) 7 Unit(s) SubCutaneous three times a day before meals  levoFLOXacin IVPB 750 milliGRAM(s) IV Intermittent every 48 hours  levothyroxine 150 MICROGram(s) Oral daily  metoprolol succinate ER 25 milliGRAM(s) Oral daily  multivitamin 1 Tablet(s) Oral daily  multivitamin/minerals 1 Tablet(s) Oral daily  pentoxifylline 400 milliGRAM(s) Oral two times a day  sodium bicarbonate 650 milliGRAM(s) Oral every 8 hours  tacrolimus 1 milliGRAM(s) Oral every 12 hours    PRN MEDICATIONS  acetaminophen     Tablet .. 650 milliGRAM(s) Oral every 6 hours PRN  ALBUTerol    90 MICROgram(s) HFA Inhaler 1 Puff(s) Inhalation every 6 hours PRN  dextrose Oral Gel 15 Gram(s) Oral once PRN  loperamide 2 milliGRAM(s) Oral every 4 hours PRN  melatonin 5 milliGRAM(s) Oral at bedtime PRN                                            ------------------------------------------------------------  VITAL SIGNS: Last 24 Hours  T(C): 34.3 (14 Jul 2022 20:56), Max: 36 (14 Jul 2022 05:21)  T(F): 93.8 (14 Jul 2022 20:56), Max: 96.8 (14 Jul 2022 05:21)  HR: 73 (14 Jul 2022 20:56) (62 - 77)  BP: 150/65 (14 Jul 2022 20:56) (129/82 - 150/65)  BP(mean): --  RR: 18 (14 Jul 2022 20:56) (17 - 18)  SpO2: 92% (14 Jul 2022 09:09) (92% - 92%)      07-13-22 @ 07:01  -  07-14-22 @ 07:00  --------------------------------------------------------  IN: 0 mL / OUT: 380 mL / NET: -380 mL    07-14-22 @ 07:01  -  07-14-22 @ 22:42  --------------------------------------------------------  IN: 0 mL / OUT: 50 mL / NET: -50 mL                                             --------------------------------------------------------------  LABS:                        10.0   6.41  )-----------( 194      ( 14 Jul 2022 09:01 )             29.8     07-14    134<L>  |  99  |  96<HH>  ----------------------------<  192<H>  4.4   |  20  |  1.8<H>    Ca    8.4<L>      14 Jul 2022 09:01  Mg     2.3     07-14    TPro  5.1<L>  /  Alb  3.2<L>  /  TBili  0.9  /  DBili  x   /  AST  36  /  ALT  17  /  AlkPhos  71  07-14                Culture - Urine (collected 12 Jul 2022 11:47)  Source: Clean Catch Clean Catch (Midstream)  Preliminary Report (14 Jul 2022 08:17):    10,000 - 49,000 CFU/mL Gram positive organisms    Culture - Blood (collected 12 Jul 2022 07:31)  Source: .Blood None  Preliminary Report (13 Jul 2022 20:01):    No growth to date.                                                    -------------------------------------------------------------  RADIOLOGY:                                            --------------------------------------------------------------    PHYSICAL EXAM:  Gen: NAD  HEENT: PERRL, EOMI, mouth clr, nose clr; + NC O2  Neck: no nodes, no JVD, thyroid nl  lungs: decr BS lt base; no wheeze  hrt: s1 s2 rrr no murmur  abd: soft, NT/ND, no HS megaly  : condom cath - yel urine  ext: no edema, no c/c  neuro: aa ox3 (mostly), cn intact, can move all 4 ext

## 2022-07-14 NOTE — PROGRESS NOTE ADULT - ASSESSMENT
· Assessment	  # BRENDA on CKD III, presumed pre-renal; hx C3 glomerulopathy  base Cr 2.1  improved with IVF  Creatinine 1.8 7/14  Pr:Cr ratio 1.0  continue with NHCO3 PO  Follow up renal eval     # COVID +  On supportive treatment   on Dexa 6mg PO q6  d-dimer 454, ESR 38, CRP 25.9, ferr 1415 will Follow up LDH  Will complete quarantine 7/15    # hyponatremia - likely 2/2 salt depletion  Na 134 on 7/14  resolving  Yunior <20, U osm 414, Uric acid 10.3  Stop RL now  Will continue to monitor      # COPD; severe pulm HTN; fibrotic changes LLL; + fever  Worsening LLL infiltrates compared to 7/5  Follow up procal, MRSA nares  F/u pulm recs  Started on cefepime + levoflox   Will plan d/c on oral Ab      # encephalopathy likely metab 2/2 COVID 19 and/or PNA  CTH: mild chr micro changes; mild atrophy and ventric prom    # had 1 loose BM  Will continue to monitor, if recurs will send for Cdiff testing    # normo Anemia; no signs of acute blood loss  Patient on eliquis  Will Follow up renal for evaluation  current Hb 10.5(7/11)      # Fall, mechanical  no syncope per pt  cth neg  trauma w/u neg  PT eval for SNF    # Elevated cardiac enzymes  improved from prior Oct 2021 levels  ekg noted  no cp  outpt f/u cards    # HFrEF, chronic; sev dilated CM; RV dysfxn; mild TR/AR; HTN  euvolemic on exam  not on ANY goal directed tx - cardio eval Dr Haley  Patient started on metoprolol 25mg q 24 PO      # AFib, chronic  on eliquis  rate control with metoprolol 25mg q24    # CAD  agree w/ no asa    # DLD  on statin    # DM2  diabetic diet  FS qac/hs  lantus 21 HS  humalog 7 tid w/ +1 scale    # Hypothyroidism  synthroid 150    # PVD  c/w pentoxifylline     # DVT ppx:  on eliquis    # GI ppx: none    # Actviity: need PT eval    # updated wife on phone    Dispo: will Follow up renal, pulm and cardio, anticipated for saturday

## 2022-07-14 NOTE — PROGRESS NOTE ADULT - ASSESSMENT
IMPRESSION:  COVID 19 pneumonia  HO severe COPD with mild exacerbation   Pre-renal BRENDA on CKD III - improving   C3 glomerulopathy - on prograf and cellcept at home  HFrEF - chronic; Mod-severe reduced EF, sev dilated CM; RV dysfxn; severe pulm HTN  AFib, chronic - on eliquis   CAD s/p CABG  H/O DLD, HTN, DM2, Hypothyroidism     PLAN:    - dexa 6mg Q 24  - renal eval  - dc abx  - Nebs Q 4 and PRN   - trend markers  - Aspiration precautions  - Wean O2 as tolerated  - GOC  - Overall prognosis poor

## 2022-07-14 NOTE — PROGRESS NOTE ADULT - SUBJECTIVE AND OBJECTIVE BOX
Over Night Events: events noted, afebrile, on NC, confused,    PHYSICAL EXAM    ICU Vital Signs Last 24 Hrs  T(C): 35.8 (14 Jul 2022 13:08), Max: 36 (14 Jul 2022 05:21)  T(F): 96.4 (14 Jul 2022 13:08), Max: 96.8 (14 Jul 2022 05:21)  HR: 62 (14 Jul 2022 13:08) (62 - 77)  BP: 140/92 (14 Jul 2022 13:08) (114/73 - 140/92)  RR: 18 (14 Jul 2022 13:08) (17 - 18)  SpO2: 92% (14 Jul 2022 09:09) (92% - 92%)    O2 Parameters below as of 14 Jul 2022 09:09  Patient On (Oxygen Delivery Method): nasal cannula  O2 Flow (L/min): 2          General: ill looking  HEENT: KELLY             Lungs: Bilateral rhonchi  Cardiovascular: Regular   Abdomen: Soft, Positive BS  Extremities: No clubbing   Neurological: Non focal ,   follows simple commands      07-13-22 @ 07:01  -  07-14-22 @ 07:00  --------------------------------------------------------  IN:  Total IN: 0 mL    OUT:    Voided (mL): 380 mL  Total OUT: 380 mL    Total NET: -380 mL          LABS:                          10.0   6.41  )-----------( 194      ( 14 Jul 2022 09:01 )             29.8                                               07-14    134<L>  |  99  |  96<HH>  ----------------------------<  192<H>  4.4   |  20  |  1.8<H>    Ca    8.4<L>      14 Jul 2022 09:01  Mg     2.3     07-14    TPro  5.1<L>  /  Alb  3.2<L>  /  TBili  0.9  /  DBili  x   /  AST  36  /  ALT  17  /  AlkPhos  71  07-14                                                                                           LIVER FUNCTIONS - ( 14 Jul 2022 09:01 )  Alb: 3.2 g/dL / Pro: 5.1 g/dL / ALK PHOS: 71 U/L / ALT: 17 U/L / AST: 36 U/L / GGT: x                                                  Culture - Urine (collected 12 Jul 2022 11:47)  Source: Clean Catch Clean Catch (Midstream)  Preliminary Report (14 Jul 2022 08:17):    10,000 - 49,000 CFU/mL Gram positive organisms    Culture - Blood (collected 12 Jul 2022 07:31)  Source: .Blood None  Preliminary Report (13 Jul 2022 20:01):    No growth to date.                                                                                           MEDICATIONS  (STANDING):  apixaban 2.5 milliGRAM(s) Oral two times a day  atorvastatin 40 milliGRAM(s) Oral at bedtime  calcitriol   Capsule 0.25 MICROGram(s) Oral <User Schedule>  cefepime   IVPB 2000 milliGRAM(s) IV Intermittent every 12 hours  dexAMETHasone     Tablet 6 milliGRAM(s) Oral daily  dextrose 5%. 1000 milliLiter(s) (50 mL/Hr) IV Continuous <Continuous>  dextrose 5%. 1000 milliLiter(s) (100 mL/Hr) IV Continuous <Continuous>  dextrose 50% Injectable 25 Gram(s) IV Push once  dextrose 50% Injectable 12.5 Gram(s) IV Push once  dextrose 50% Injectable 25 Gram(s) IV Push once  glucagon  Injectable 1 milliGRAM(s) IntraMuscular once  insulin glargine Injectable (LANTUS) 21 Unit(s) SubCutaneous at bedtime  insulin lispro (ADMELOG) corrective regimen sliding scale   SubCutaneous three times a day before meals  insulin lispro Injectable (ADMELOG) 7 Unit(s) SubCutaneous three times a day before meals  lactated ringers. 1000 milliLiter(s) (50 mL/Hr) IV Continuous <Continuous>  levoFLOXacin IVPB 750 milliGRAM(s) IV Intermittent every 48 hours  levothyroxine 150 MICROGram(s) Oral daily  metoprolol succinate ER 25 milliGRAM(s) Oral daily  multivitamin 1 Tablet(s) Oral daily  multivitamin/minerals 1 Tablet(s) Oral daily  pentoxifylline 400 milliGRAM(s) Oral two times a day  sodium bicarbonate 650 milliGRAM(s) Oral every 8 hours  tacrolimus 1 milliGRAM(s) Oral every 12 hours    MEDICATIONS  (PRN):  acetaminophen     Tablet .. 650 milliGRAM(s) Oral every 6 hours PRN Temp greater or equal to 38C (100.4F), Mild Pain (1 - 3)  ALBUTerol    90 MICROgram(s) HFA Inhaler 1 Puff(s) Inhalation every 6 hours PRN Bronchospasm  dextrose Oral Gel 15 Gram(s) Oral once PRN Blood Glucose LESS THAN 70 milliGRAM(s)/deciliter  loperamide 2 milliGRAM(s) Oral every 4 hours PRN Diarrhea  melatonin 5 milliGRAM(s) Oral at bedtime PRN Insomnic    CXR reviewed

## 2022-07-14 NOTE — PROGRESS NOTE ADULT - ASSESSMENT
# BRENDA on CKD III, presumed pre-renal; hx C3 glomerulopathy w/ proteinuria (up to 8.4 gm/day in past - responded well to immunosupp); metab acidosis  base Cr 2.1  improved with IVF  rising BUN could be steroid effect (on dexameth)  renal eval: need f/u  c/w calcitriol   no lasix for now  U/A w/ 3+ protein -> U TP:Cr ratio 1gm  c/w tacro 1mg po q9AM and q9PM  c/w NaHCO3 650mg po q8 (acidosis improving)    # hyponatremia - likely 2/2 salt depletion (was on lasix, had diarrhea, poor oral intake recently) and less likely lung issues (SIADH)  needs to be corrected for glucose - Na now nl  renal noted  LR did help Na - will d/c IVFs, since Na nl  does NOT seem vol overloaded  Uosm 414; Yunior <20; Uric Acid serum 10.3  oral fluid restriction to 1 - 1.5 L/day    # normo anemia; no signs of acute blood loss  will continue Eliquis for now  anemia of chr dz (CKD)  keep hgb >8  on aranesp w/ renal - f/u renal eval    # COVID + - seems like pt is now in inflam phase; might have some element of neural and GI COVID as well  acute on chr hypoxic resp failure (pt on home O2 prn)  covid +: 7/5; 7/10 - no further testing needed  quarantine 7/5-7/15, then d/c precautions  pt was NOT tx'd initially for COVID   was beyond tx window for MABs  RDV relative CI at this GFR anyway  make dexameth 6mg po q24 and tx for total of 10 days of steroids  c/w NC O2 - stable  pulm eval  d-dimer 454, ESR 38, CRP 25.9, ferr 1415  f/u LDH    # COPD; severe pulm HTN; fibrotic changes LLL; + fever  not sure if fever residual for COVID or developing LLL PNA  CXR: LLL infil looked worse to me  rpt CXR: bilat infil L>R  pulm eval  cont NC O2  abx: cefepime 2gm iv q12 (renal dose) + levo 750mg iv q48 -> upon d/c will use levo 750mg PO q48 + vantin 200mg po q24 for 7 days of abx (7/11-7/18)  procal 0.38  U Strp/Leg Ag - neg  MRSA nares: neg  c/w proventil prn    # encephalopathy likely metab 2/2 COVID 19 and/or PNA w/ hypoxia  CTH: mild chr micro changes; mild atrophy and ventric prom  improving; getting closer to usual baseline    # diarrhea - resolved  c diff testing: neg  can use imodium 2mg po q4 prn  not on laxatives    # Nutrition  MVI w/ min q24    # HFrEF, chronic; sev dilated CM; RV dysfxn; mild TR/AR; HTN  euvolemic on exam  not on ANY goal directed tx - cardio eval Dr Witt - (per cardio, EF closer to 45%)  was on BB - restart toprol xl 25mg po q24 w/ holding parameters  not on ACEI/ARB 2/2 CKD/BRENDA (could consider hydral + nitrates, but only if BP good enough)  not on diuretics - agree    # AFib, chronic  rate controlled - on BB  on eliquis for AC    # CAD  agree w/ no asa    # Elevated cardiac enzymes  improved from prior Oct 2021 levels  ekg noted  no cp  outpt f/u cards    # DM 2  diabetic diet  FS qac/hs - worse w/ steroids  lantus 21 HS  humalog 7 tid w/ +1 scale    # PVD  c/w pentoxifylline     # DLD  on statin    # Fall, mechanical  no syncope per pt  CTH neg  trauma w/u neg  PT eval for SNF    # Hypothyroidism  synthroid 150mg q24    # DVT ppx: on eliquis    # GI ppx: none    # Activity: need PT eval    Dispo: pulm eval; renal eval; cardio eval; tx DM; COVID quarantine to 7/15; iv abx; dexameth; c/w oral bicarb; d/c LR; encourage oral intake  eventually, pt will need STR @ SNF ([facility]) - f/u CM - anticipate d/c on Saturday    Prog remains guarded, but is improving. Long term prog is likely poor.

## 2022-07-14 NOTE — PROGRESS NOTE ADULT - SUBJECTIVE AND OBJECTIVE BOX
KANGANDRIA MCELROY  82y  Male  ***My note supersedes ALL resident notes that I sign.  My corrections for their notes are in my note.***    I can be reached directly on [facility] [employer]. My office number is [phone]. My personal cell number is [phone].    INTERVAL EVENTS: Here for f/u of COVID. Pt cont to improve. Pt answering ques. Eating/drinking better. Put his own glasses on well. No complaints.    T(F): 96.4 (07-14-22 @ 13:08), Max: 96.8 (07-14-22 @ 05:21)  HR: 62 (07-14-22 @ 13:08) (62 - 77)  BP: 140/92 (07-14-22 @ 13:08) (114/73 - 140/92)  RR: 18 (07-14-22 @ 13:08) (17 - 18)  SpO2: 92% (07-14-22 @ 09:09) (92% - 92%)    Gen: NAD  HEENT: PERRL, EOMI, mouth clr, nose clr; + NC O2  Neck: no nodes, no JVD, thyroid nl  lungs: decr BS lt base; no wheeze  hrt: s1 s2 rrr no murmur  abd: soft, NT/ND, no HS megaly  : condom cath - yel urine  ext: no edema, no c/c  neuro: aa ox3 (mostly), cn intact, can move all 4 ext  got all ques right - again    LABS:                      10.0    (    84.7   6.41  )-----------( ---------      194      ( 14 Jul 2022 09:01 )             29.8    (    14.1     Hemoglobin: 10.0 g/dL (07-14 @ 09:01)  Hemoglobin: 9.5 g/dL (07-13 @ 08:58)  Hemoglobin: 10.2 g/dL (07-12 @ 07:31)  Hemoglobin: 10.5 g/dL (07-11 @ 07:51)  Hemoglobin: 10.8 g/dL (07-10 @ 21:03)  Hemoglobin: 9.7 g/dL (07-10 @ 04:30)    134   (   99   (   192      07-14-22 @ 09:01  ----------------------               4.4   (   20   (   96                             -----                        1.8  Ca  8.4   Mg  2.3    P   --     Sodium, Serum: 134 mmol/L (07-14-22 @ 09:01) - better  Sodium, Serum: 128 mmol/L (07-13-22 @ 08:58)  Sodium, Serum: 133 mmol/L (07-12-22 @ 07:31)  Sodium, Serum: 135 mmol/L (07-11-22 @ 07:51)  Sodium, Serum: 131 mmol/L (07-10-22 @ 04:30)    Creatinine:   1.8 (07-14 @ 09:01)  eGFR:  37    Creatinine:   1.9 (07-13 @ 08:58)  eGFR:  35    Creatinine:   1.9 (07-12 @ 07:31)  eGFR:  35    Creatinine:   1.7 (07-11 @ 07:51)  eGFR:  40    Creatinine:   1.7 (07-10 @ 04:30)  eGFR:  40      LFT  5.1  (  0.9  (  36       07-14-22 @ 09:01  -------------------------  3.2  (  71  (  17    CAPILLARY BLOOD GLUCOSE  POCT Blood Glucose.: 178 (07-14-22 @ 17:17)  POCT Blood Glucose.: 173 (07-14-22 @ 12:02)  POCT Blood Glucose.: 185 (07-14-22 @ 07:41)  POCT Blood Glucose.: 135 (07-13-22 @ 21:53)  POCT Blood Glucose.: 120 (07-13-22 @ 16:42)  POCT Blood Glucose.: 258 (07-13-22 @ 12:10)  POCT Blood Glucose.: 265 (07-13-22 @ 08:11)  POCT Blood Glucose.: 329 (07-13-22 @ 01:16)    Culture - Urine (collected 07-12-22 @ 11:47)  Source: Clean Catch Clean Catch (Midstream)  Preliminary Report (07-14-22 @ 08:17):    10,000 - 49,000 CFU/mL Gram positive organisms    Culture - Blood (collected 07-12-22 @ 07:31)  Source: .Blood None  Preliminary Report (07-13-22 @ 20:01):    No growth to date.    Culture - Blood (collected 07-11-22 @ 11:41)  Source: .Blood None  Preliminary Report (07-12-22 @ 22:02):    No growth to date.    RADIOLOGY & ADDITIONAL TESTS:    MEDICATIONS:  cefepime   IVPB 2000 milliGRAM(s) IV Intermittent every 12 hours  levoFLOXacin IVPB 750 milliGRAM(s) IV Intermittent every 48 hours    acetaminophen     Tablet .. 650 milliGRAM(s) Oral every 6 hours PRN  ALBUTerol    90 MICROgram(s) HFA Inhaler 1 Puff(s) Inhalation every 6 hours PRN  apixaban 2.5 milliGRAM(s) Oral two times a day  atorvastatin 40 milliGRAM(s) Oral at bedtime  calcitriol   Capsule 0.25 MICROGram(s) Oral <User Schedule>  dexAMETHasone     Tablet 6 milliGRAM(s) Oral daily  insulin glargine Injectable (LANTUS) 21 Unit(s) SubCutaneous at bedtime  insulin lispro (ADMELOG) corrective regimen sliding scale   SubCutaneous three times a day before meals  insulin lispro Injectable (ADMELOG) 7 Unit(s) SubCutaneous three times a day before meals  lactated ringers. 1000 milliLiter(s) IV Continuous <Continuous>  levothyroxine 150 MICROGram(s) Oral daily  loperamide 2 milliGRAM(s) Oral every 4 hours PRN  melatonin 5 milliGRAM(s) Oral at bedtime PRN  metoprolol succinate ER 25 milliGRAM(s) Oral daily  multivitamin 1 Tablet(s) Oral daily  multivitamin/minerals 1 Tablet(s) Oral daily  pentoxifylline 400 milliGRAM(s) Oral two times a day  sodium bicarbonate 650 milliGRAM(s) Oral every 8 hours  tacrolimus 1 milliGRAM(s) Oral every 12 hours

## 2022-07-15 NOTE — PROGRESS NOTE ADULT - SUBJECTIVE AND OBJECTIVE BOX
ANDRIA TAVAREZ 82y Male  MRN#: [id number]   CODE STATUS:________    Hospital Day: 10d    Pt is currently admitted with the primary diagnosis of COVID 19 - BRENDA on CKD 3    SUBJECTIVE  Hospital Course  82M with PMHx of HFrEF, Afib on eliquis, CAD s/p CABG, COPD (on rare home O2 PRN), CKD3, HTN, HLD presents for 2 weeks of watery diarrhea. Pt reports that 2 weeks ago he went to a doctor's appointment with his wife, and the next day both of them started experiencing non-bloody watery diarrhea, x3-4/day, and then started having decreased PO intake and felt weak. Of note, he also had a mechanical fall 1 week ago on his right side with no injuries sustained (but he didn't visit the ED). Pt denies any other sicks contacts, recent travel, vomiting, chest pain, SOB, palpitations, light-headedness, or any  symptoms.     In the ED: /57, HR 57, Temp 97.6F, satting 96% on RA. Labs notable for Cr of 3.6 (baseline ~2.0), , HCO3 19, AG 20. Trops 0.07 (likely 2/2 BRENDA), no chest pain, EKG 1st-degree AV block. CTH -ve, CTAP + chest -ve for acute pathology.     Admitted to medicine (05 Jul 2022 21:23)    Overnight events   None significant    Subjective complaints   No complaints    Present Today:   - Gloria:  No [  ], Yes [   ] : Indication:     - Type of IV Access:       .. CVC/Piccline:  No [  ], Yes [   ] : Indication:       .. 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    AF (atrial fibrillation)  s/p DCCV    Carotid stenosis    COPD (chronic obstructive pulmonary disease)    H/O coronary artery bypass surgery  2001    S/P inguinal hernia repair    History of appendectomy                                              -----------------------------------------------------------  ALLERGIES:  Ozempic (0.25 mg or 0.5 mg dose) (Hives; Rash)  streptomycin (Unknown)  Trulicity Pen (Hives; Rash)                                            ------------------------------------------------------------    HOME MEDICATIONS  Home Medications:  Aranesp 100 mcg/0.5 mL injectable solution: 1 dose(s) injectable every 3 to 4 weeks (05 Jul 2022 21:22)  atorvastatin 40 mg oral tablet: 1 tab(s) orally once a day (at bedtime) (05 Jul 2022 21:22)  calcitriol 0.25 mcg oral capsule: 1 cap(s) orally 2 times a week (05 Jul 2022 21:22)  d-mycophenolate: 500 milligram(s) orally 4 times a day (05 Jul 2022 21:22)  Eliquis 2.5 mg oral tablet: 1 tab(s) orally 2 times a day (05 Jul 2022 21:22)  famotidine 20 mg oral tablet: 1 tab(s) orally once a day (05 Jul 2022 21:22)  isosorbide mononitrate 30 mg oral tablet, extended release: 1 tab(s) orally once a day (in the morning) (05 Jul 2022 21:22)  Lantus 100 units/mL subcutaneous solution: 30 unit(s) subcutaneous once a day (at bedtime) (05 Jul 2022 21:22)  levothyroxine 150 mcg (0.15 mg) oral tablet: 1 tab(s) orally once a day (05 Jul 2022 21:22)  metoprolol tartrate 25 mg oral tablet: 1 tab(s) orally 2 times a day (05 Jul 2022 21:22)  NIFEdipine 30 mg oral tablet, extended release: 1 tab(s) orally once a day (05 Jul 2022 21:22)  pentoxifylline 400 mg oral tablet, extended release: 1 tab(s) orally 2 times a day (05 Jul 2022 21:22)  tacrolimus 1 mg oral capsule: 1 cap(s) orally every 12 hours (05 Jul 2022 21:22)                           MEDICATIONS:  STANDING MEDICATIONS  apixaban 2.5 milliGRAM(s) Oral two times a day  atorvastatin 40 milliGRAM(s) Oral at bedtime  calcitriol   Capsule 0.25 MICROGram(s) Oral <User Schedule>  cefepime   IVPB 2000 milliGRAM(s) IV Intermittent every 12 hours  dexAMETHasone     Tablet 6 milliGRAM(s) Oral daily  dextrose 5%. 1000 milliLiter(s) IV Continuous <Continuous>  dextrose 5%. 1000 milliLiter(s) IV Continuous <Continuous>  dextrose 50% Injectable 25 Gram(s) IV Push once  dextrose 50% Injectable 12.5 Gram(s) IV Push once  dextrose 50% Injectable 25 Gram(s) IV Push once  furosemide    Tablet 40 milliGRAM(s) Oral daily  glucagon  Injectable 1 milliGRAM(s) IntraMuscular once  insulin glargine Injectable (LANTUS) 21 Unit(s) SubCutaneous at bedtime  insulin lispro (ADMELOG) corrective regimen sliding scale   SubCutaneous three times a day before meals  insulin lispro Injectable (ADMELOG) 7 Unit(s) SubCutaneous three times a day before meals  levoFLOXacin IVPB 750 milliGRAM(s) IV Intermittent every 48 hours  levothyroxine 150 MICROGram(s) Oral daily  metoprolol succinate ER 25 milliGRAM(s) Oral daily  multivitamin/minerals 1 Tablet(s) Oral daily  pentoxifylline 400 milliGRAM(s) Oral two times a day  sodium bicarbonate 650 milliGRAM(s) Oral every 8 hours  tacrolimus 1 milliGRAM(s) Oral every 12 hours    PRN MEDICATIONS  acetaminophen     Tablet .. 650 milliGRAM(s) Oral every 6 hours PRN  ALBUTerol    90 MICROgram(s) HFA Inhaler 1 Puff(s) Inhalation every 6 hours PRN  dextrose Oral Gel 15 Gram(s) Oral once PRN  loperamide 2 milliGRAM(s) Oral every 4 hours PRN  melatonin 5 milliGRAM(s) Oral at bedtime PRN                                            ------------------------------------------------------------  VITAL SIGNS: Last 24 Hours  T(C): 36.7 (15 Jul 2022 20:20), Max: 36.7 (15 Jul 2022 20:20)  T(F): 98.1 (15 Jul 2022 20:20), Max: 98.1 (15 Jul 2022 20:20)  HR: 67 (15 Jul 2022 20:20) (60 - 80)  BP: 152/83 (15 Jul 2022 20:20) (146/69 - 152/83)  BP(mean): --  RR: 18 (15 Jul 2022 20:20) (18 - 18)  SpO2: 94% (15 Jul 2022 08:00) (94% - 94%)      07-14-22 @ 07:01  -  07-15-22 @ 07:00  --------------------------------------------------------  IN: 0 mL / OUT: 50 mL / NET: -50 mL                                             --------------------------------------------------------------  LABS:                        9.6    8.46  )-----------( 195      ( 15 Jul 2022 09:26 )             28.2     07-15    127<L>  |  96<L>  |  94<HH>  ----------------------------<  177<H>  4.2   |  19  |  1.8<H>    Ca    8.5      15 Jul 2022 09:26  Mg     2.5     07-15    TPro  5.1<L>  /  Alb  3.2<L>  /  TBili  0.9  /  DBili  x   /  AST  36  /  ALT  17  /  AlkPhos  71  07-14                                                              -------------------------------------------------------------  RADIOLOGY:                                            --------------------------------------------------------------    PHYSICAL EXAM:  Gen: NAD  HEENT: PERRL, EOMI, mouth clr, nose clr; + NC O2  Neck: no nodes, no JVD, thyroid nl  lungs: decr BS lt base; no wheeze  hrt: s1 s2 rrr no murmur  abd: soft, NT/ND, no HS megaly  : condom cath - yel urine  ext: no edema, no c/c  neuro: aa ox3 (mostly), cn intact, can move all 4 ext

## 2022-07-15 NOTE — PROGRESS NOTE ADULT - SUBJECTIVE AND OBJECTIVE BOX
KANGANDRIA MCELROY  82y  Male  ***My note supersedes ALL resident notes that I sign.  My corrections for their notes are in my note.***    I can be reached directly on [employer]. My office number is [phone]. My personal cell number is [phone].    INTERVAL EVENTS: Here for f/u of COVID. Pt awake and mostly alert. Does not talk too much, but does answer all questions w/ brief responses. Pt was feeding himself, but has a lot of trouble w/ tremors and shakiness of both arms/hands. Pt does not like to ask for help and usually refuses it (in terms of meal asst).    T(F): 97.1 (07-15-22 @ 13:32), Max: 97.1 (07-15-22 @ 13:32)  HR: 60 (07-15-22 @ 13:32) (60 - 80)  BP: 149/64 (07-15-22 @ 13:32) (146/69 - 150/65)  RR: 18 (07-15-22 @ 13:32) (18 - 18)  SpO2: 94% (07-15-22 @ 08:00) (94% - 94%)    07-14-22 @ 07:01  -  07-15-22 @ 07:00  --------------------------------------------------------  IN: 0 mL / OUT: 50 mL / NET: -50 mL    Gen: NAD  HEENT: PERRL, EOMI, mouth clr, nose clr; + NC O2  Neck: no nodes, no JVD, thyroid nl  lungs: decr BS lt base; no wheeze  hrt: s1 s2 rrr no murmur  abd: soft, NT/ND, no HS megaly  : condom cath - yel urine  ext: no edema, no c/c  neuro: aa ox3 (mostly), cn intact, can move all 4 ext; b/l intention tremors noted    LABS:                      9.6     (    84.2   8.46  )-----------( ---------      195      ( 15 Jul 2022 09:26 )             28.2    (    14.0     127   (   96   (   177      07-15-22 @ 09:26  ----------------------               4.2   (   19   (   94                             -----                        1.8  Ca  8.5   Mg  2.5    P   --     Sodium, Serum: 127 mmol/L (07-15-22 @ 09:26)  Sodium, Serum: 134 mmol/L (07-14-22 @ 09:01)  Sodium, Serum: 128 mmol/L (07-13-22 @ 08:58)  Sodium, Serum: 133 mmol/L (07-12-22 @ 07:31)  Sodium, Serum: 135 mmol/L (07-11-22 @ 07:51)    Creatinine:   1.8 (07-15 @ 09:26)  eGFR:  37    Creatinine:   1.8 (07-14 @ 09:01)  eGFR:  37    Creatinine:   1.9 (07-13 @ 08:58)  eGFR:  35    Creatinine:   1.9 (07-12 @ 07:31)  eGFR:  35    Creatinine:   1.7 (07-11 @ 07:51)  eGFR:  40      CAPILLARY BLOOD GLUCOSE  POCT Blood Glucose.: 110 (07-15-22 @ 16:38)  POCT Blood Glucose.: 167 (07-15-22 @ 11:29)  POCT Blood Glucose.: 184 (07-15-22 @ 07:35)  POCT Blood Glucose.: 213 (07-14-22 @ 22:08)  POCT Blood Glucose.: 129 (07-14-22 @ 21:02)  POCT Blood Glucose.: 178 (07-14-22 @ 17:17)  POCT Blood Glucose.: 173 (07-14-22 @ 12:02)  POCT Blood Glucose.: 185 (07-14-22 @ 07:41)    Culture - Urine (collected 07-12-22 @ 11:47)  Source: Clean Catch Clean Catch (Midstream)  Preliminary Report (07-14-22 @ 08:17):    10,000 - 49,000 CFU/mL Gram positive organisms    Culture - Blood (collected 07-12-22 @ 07:31)  Source: .Blood None  Preliminary Report (07-13-22 @ 20:01):    No growth to date.    Culture - Blood (collected 07-11-22 @ 11:41)  Source: .Blood None  Preliminary Report (07-12-22 @ 22:02):    No growth to date.    RADIOLOGY & ADDITIONAL TESTS:    MEDICATIONS:  cefepime   IVPB 2000 milliGRAM(s) IV Intermittent every 12 hours  levoFLOXacin IVPB 750 milliGRAM(s) IV Intermittent every 48 hours    acetaminophen     Tablet .. 650 milliGRAM(s) Oral every 6 hours PRN  ALBUTerol    90 MICROgram(s) HFA Inhaler 1 Puff(s) Inhalation every 6 hours PRN  apixaban 2.5 milliGRAM(s) Oral two times a day  atorvastatin 40 milliGRAM(s) Oral at bedtime  calcitriol   Capsule 0.25 MICROGram(s) Oral <User Schedule>  dexAMETHasone     Tablet 6 milliGRAM(s) Oral daily  furosemide    Tablet 40 milliGRAM(s) Oral daily  insulin glargine Injectable (LANTUS) 21 Unit(s) SubCutaneous at bedtime  insulin lispro (ADMELOG) corrective regimen sliding scale   SubCutaneous three times a day before meals  insulin lispro Injectable (ADMELOG) 7 Unit(s) SubCutaneous three times a day before meals  levothyroxine 150 MICROGram(s) Oral daily  loperamide 2 milliGRAM(s) Oral every 4 hours PRN  melatonin 5 milliGRAM(s) Oral at bedtime PRN  metoprolol succinate ER 25 milliGRAM(s) Oral daily  multivitamin/minerals 1 Tablet(s) Oral daily  pentoxifylline 400 milliGRAM(s) Oral two times a day  sodium bicarbonate 650 milliGRAM(s) Oral every 8 hours  tacrolimus 1 milliGRAM(s) Oral every 12 hours

## 2022-07-15 NOTE — PROGRESS NOTE ADULT - ASSESSMENT
# BRENDA on CKD III, presumed pre-renal; hx C3 glomerulopathy w/ proteinuria (up to 8.4 gm/day in past - responded well to immunosupp); metab acidosis  base Cr 2.1  improved with IVF  rising BUN could be steroid effect (on dexameth)  renal eval: f/u noted  c/w calcitriol   U/A w/ 3+ protein -> U TP:Cr ratio 1gm  c/w tacro 1mg po q9AM and q9PM  c/w NaHCO3 650mg po q8 (acidosis improving)    # hyponatremia - likely 2/2 salt depletion (was on lasix, had diarrhea, poor oral intake recently) and less likely lung issues (SIADH)  needs to be corrected for glucose  renal noted  LR did help Na   d/c NaCl  lasix 40mg po q24 per renal  Uosm 414; Yunior <20; Uric Acid serum 10.3  oral fluid restriction to 1 L/day    # normo anemia; no signs of acute blood loss  will continue Eliquis for now  anemia of chr dz (CKD)  keep hgb >8  on aranesp w/ renal - f/u renal eval    # COVID + -> seems like pt is now in inflam phase; might have some element of neural and GI COVID as well  acute on chr hypoxic resp failure (pt on home O2 prn)  covid +: 7/5; 7/10 - no further testing needed  quarantine 7/5-7/15, then d/c precautions (tomorrow)  pt was NOT tx'd initially for COVID   was beyond tx window for MABs  RDV relative CI at this GFR anyway  make dexameth 6mg po q24 and tx for total of 10 days of steroids (7/12-7/22)  c/w NC O2 - stable  pulm eval  d-dimer 454, ESR 38, CRP 25.9, ferr 1415    # COPD; severe pulm HTN; fibrotic changes LLL; + fever  not sure if fever residual for COVID or developing LLL PNA  CXR: LLL infil looked worse to me  rpt CXR: bilat infil L>R  pulm eval  cont NC O2  abx: cefepime 2gm iv q12 (renal dose) + levo 750mg iv q48 -> upon d/c will use levo 750mg PO q48 + vantin 200mg po q24 for 7 days of abx (7/11-7/18)  procal 0.38  U Strp/Leg Ag - neg  MRSA nares: neg  c/w proventil prn    # encephalopathy likely metab 2/2 COVID 19 and/or PNA w/ hypoxia  CTH: mild chr micro changes; mild atrophy and ventric prom  improving; getting closer to usual baseline    # diarrhea - resolved  c diff testing: neg  can use imodium 2mg po q4 prn  not on laxatives    # Nutrition  MVI w/ min q24    # HFrEF, chronic; sev dilated CM; RV dysfxn; mild TR/AR; HTN  not on ANY goal directed tx - cardio eval Dr Witt - (per cardio, EF closer to 45%)  was on BB - restart toprol xl 25mg po q24 w/ holding parameters  not on ACEI/ARB 2/2 CKD/BRENDA (could consider hydral + nitrates, but only if BP good enough)  diuretics - restart lasix 40mg po q24    # AFib, chronic  rate controlled - on BB  on eliquis for AC    # CAD  agree w/ no asa    # Elevated cardiac enzymes  improved from prior Oct 2021 levels  ekg noted  no cp  outpt f/u cards    # DM 2  diabetic diet  FS qac/hs - worse w/ steroids  lantus 21 HS  humalog 7 tid w/ +1 scale    # PVD  c/w pentoxifylline     # DLD  on statin    # Fall, mechanical  no syncope per pt  CTH neg  trauma w/u neg  PT eval for SNF    # Hypothyroidism  synthroid 150mg q24    # DVT ppx: on eliquis    # GI ppx: none    # Activity: need PT eval    # updated dtr, Kaylan Murguia and pt's wife via phone    Dispo: pulm eval; renal eval; restart lasix po; tx DM; COVID quarantine to 7/15; iv abx; dexameth to 7/22; c/w oral bicarb; encourage oral intake  eventually, pt will need STR @ SNF ([facility]) - f/u CM - anticipate d/c on Saturday    Prog remains guarded, but is improving. Long term prog is likely poor.

## 2022-07-15 NOTE — PROGRESS NOTE ADULT - ASSESSMENT
· Assessment	  # BRENDA on CKD III, presumed pre-renal; hx C3 glomerulopathy  base Cr 2.1  improved with IVF  Creatinine 1.8 7/14  Pr:Cr ratio 1.0  continue with NHCO3 PO  Renal - continue tacrolomus, obtain trough levels in am    # COVID +  On supportive treatment   on Dexa 6mg PO q6  d-dimer 454, ESR 38, CRP 25.9, ferr 1415 will Follow up [facility]  Will complete quarantine 7/15 - d/c precautions tomorrow 7/16    # hyponatremia - likely 2/2 salt depletion  Na 134 on 7/14  resolving  Yunior <20, U osm 414, Uric acid 10.3  Stop RL now  Will continue to monitor  - Started on Lasix 40mg PO once daily      # COPD; severe pulm HTN; fibrotic changes LLL; + fever  Worsening LLL infiltrates compared to 7/5  Follow up procal, MRSA nares  F/u pulm recs  Started on cefepime + levoflox   Will plan d/c on oral Ab levo + vantin      # encephalopathy likely metab 2/2 COVID 19 and/or PNA  CTH: mild chr micro changes; mild atrophy and ventric prom    # had 1 loose BM  Will continue to monitor, if recurs will send for Cdiff testing    # normo Anemia; no signs of acute blood loss  Patient on eliquis  Will Follow up renal for evaluation  current Hb 10.5(7/11)      # Fall, mechanical  no syncope per pt  cth neg  trauma w/u neg  PT eval for SNF    # Elevated cardiac enzymes  improved from prior Oct 2021 levels  ekg noted  no cp  outpt f/u cards    # HFrEF, chronic; sev dilated CM; RV dysfxn; mild TR/AR; HTN  euvolemic on exam  not on ANY goal directed tx - cardio eval Dr Haley  Patient started on metoprolol 25mg q 24 PO      # AFib, chronic  on eliquis  rate control with metoprolol 25mg q24    # CAD  agree w/ no asa    # DLD  on statin    # DM2  diabetic diet  FS qac/hs  lantus 21 HS  humalog 7 tid w/ +1 scale    # Hypothyroidism  synthroid 150    # PVD  c/w pentoxifylline     # DVT ppx:  on eliquis    # GI ppx: none    # Actviity: need PT eval    # updated wife on phone    Dispo: will Follow up renal, pulm and cardio, anticipated for saturday, d/c precautions tomorrow

## 2022-07-15 NOTE — PROGRESS NOTE ADULT - SUBJECTIVE AND OBJECTIVE BOX
Nephrology progress note    Patient is seen and examined, events over the last 24 h noted .  Plenty of drinks on the table noted  Allergies:  Ozempic (0.25 mg or 0.5 mg dose) (Hives; Rash)  streptomycin (Unknown)  Trulicity Pen (Hives; Rash)    Hospital Medications:   MEDICATIONS  (STANDING):  apixaban 2.5 milliGRAM(s) Oral two times a day  atorvastatin 40 milliGRAM(s) Oral at bedtime  calcitriol   Capsule 0.25 MICROGram(s) Oral <User Schedule>  cefepime   IVPB 2000 milliGRAM(s) IV Intermittent every 12 hours  dexAMETHasone     Tablet 6 milliGRAM(s) Oral daily  dextrose 5%. 1000 milliLiter(s) (50 mL/Hr) IV Continuous <Continuous>  dextrose 5%. 1000 milliLiter(s) (100 mL/Hr) IV Continuous <Continuous>  dextrose 50% Injectable 25 Gram(s) IV Push once  dextrose 50% Injectable 12.5 Gram(s) IV Push once  dextrose 50% Injectable 25 Gram(s) IV Push once  glucagon  Injectable 1 milliGRAM(s) IntraMuscular once  insulin glargine Injectable (LANTUS) 21 Unit(s) SubCutaneous at bedtime  insulin lispro (ADMELOG) corrective regimen sliding scale   SubCutaneous three times a day before meals  insulin lispro Injectable (ADMELOG) 7 Unit(s) SubCutaneous three times a day before meals  levoFLOXacin IVPB 750 milliGRAM(s) IV Intermittent every 48 hours  levothyroxine 150 MICROGram(s) Oral daily  metoprolol succinate ER 25 milliGRAM(s) Oral daily  multivitamin/minerals 1 Tablet(s) Oral daily  pentoxifylline 400 milliGRAM(s) Oral two times a day  sodium bicarbonate 650 milliGRAM(s) Oral every 8 hours  sodium chloride 1 Gram(s) Oral two times a day  tacrolimus 1 milliGRAM(s) Oral every 12 hours        VITALS:  T(F): 97.1 (07-15-22 @ 13:32), Max: 97.1 (07-15-22 @ 13:32)  HR: 60 (07-15-22 @ 13:32)  BP: 149/64 (07-15-22 @ 13:32)  RR: 18 (07-15-22 @ 13:32)  SpO2: 94% (07-15-22 @ 08:00)  Wt(kg): --     @ 07:01  -   @ 07:00  --------------------------------------------------------  IN: 0 mL / OUT: 380 mL / NET: -380 mL     @ 07:01  -  07-15 @ 07:00  --------------------------------------------------------  IN: 0 mL / OUT: 50 mL / NET: -50 mL          PHYSICAL EXAM:  Constitutional: NAD  HEENT: anicteric sclera  Neck: No JVD  Respiratory: CTA ant  Cardiovascular: S1, S2, RRR  Gastrointestinal: BS+, soft, NT/ND  Extremities: No peripheral edema  Neurological: A/O x 3  Skin: No rashes  Vascular Access:    LABS:  07-15    127<L>  |  96<L>  |  94<HH>  ----------------------------<  177<H>  4.2   |  19  |  1.8<H>  SODIUM TREND:  Sodium 127 [07-15 @ 09:26]  Sodium 134 [ @ 09:01]  Sodium 128 [ @ 08:58]  Sodium 133 [ @ 07:31]  Sodium 135 [ @ 07:51]  Sodium 131 [07-10 @ 04:30]  Sodium 134 [ @ 06:00]  Sodium 135 [ @ 07:41]  Sodium 135 [ @ 09:15]  Sodium 131 [ @ 07:39]    Ca    8.5      15 Jul 2022 09:26  Mg     2.5     07-15    TPro  5.1<L>  /  Alb  3.2<L>  /  TBili  0.9  /  DBili      /  AST  36  /  ALT  17  /  AlkPhos  71                            9.6    8.46  )-----------( 195      ( 15 Jul 2022 09:26 )             28.2       Urine Studies:  Urinalysis Basic - ( 2022 11:47 )    Color: Yellow / Appearance: Clear / S.018 / pH:   Gluc:  / Ketone: Negative  / Bili: Negative / Urobili: <2 mg/dL   Blood:  / Protein: 300 mg/dL / Nitrite: Negative   Leuk Esterase: Moderate / RBC: 8 /HPF / WBC 6 /HPF   Sq Epi:  / Non Sq Epi: 2 /HPF / Bacteria: Negative      Creatinine, Random Urine: 84 mg/dL ( @ 18:33)  Protein/Creatinine Ratio Calculation: 1.0 Ratio ( @ 18:33)  Osmolality, Random Urine: 414 mos/kg ( @ 18:33)  Sodium, Random Urine: <20.0 mmoL/L ( @ 18:33)    RADIOLOGY & ADDITIONAL STUDIES:  < from: Xray Chest 1 View AP/PA (22 @ 05:55) >  Impression:    Bilateral opacifications, left greater than right, without difference.    < end of copied text >

## 2022-07-15 NOTE — PROGRESS NOTE ADULT - ASSESSMENT
Patient is a 82y Male with PMHx of HFrEF, Afib on eliquis, CAD s/p CABG, COPD (on rare home O2 PRN), CKD3 (Hx of C3 glomerulopathy), HTN, HLD whom is consulted for immunosuppression recommendations for CKD.  Patient is a 82y Male with PMHx of HFrEF, Afib on eliquis, CAD s/p CABG, COPD (on rare home O2 PRN), CKD3 (Hx of C3 glomerulopathy), HTN, HLD whom is consulted for BRENDA and maintenance tx of C3 GN.    # BRENDA / likely prerenal (due to poor po intake, diarrhea, lasix)  - Lasix HELD but Na is down to 127  - serum creatinine level stable,  - no hydronephrosis on CTAP  # Hyponatremia-- due to fluid overload  LAst 2D Echo 10/2021 - severe LV and RV systolic dysfunction and PAH  - RESTART LASIX 40 IV or po  qd  -d/c Salt tabs  fluid restrict 1 L daily  # C3 glomerulopathy - on Prograf 1 mg q12h- to be cont and Cellcept - on HOLD   # Proteinuria 8.4 g initially responded well to Prograf with reduction of proteinuria  to<1 g/g as per office records  please check TAcrolimus trough level 30 min prior to the am dose  # CAD/CHF - seen by Dr Cosme   # COVID19 illness on NC

## 2022-07-16 NOTE — PROGRESS NOTE ADULT - ASSESSMENT
· Assessment	  # BRENDA on CKD III, presumed pre-renal; hx C3 glomerulopathy  base Cr 2.1  improved with IVF  Creatinine 1.8 7/14  Pr:Cr ratio 1.0  continue with NHCO3 PO  Renal - continue tacrolomus ordered  Cr. at 1.8 7/16    # COVID +  On supportive treatment   on Dexa 6mg PO q6  d-dimer 454, ESR 38, CRP 25.9, ferr 1415 will Follow up [facility]  Will complete quarantine 7/15 - d/bg precautions 7/16    # hyponatremia - likely 2/2 salt depletion  Na 134 on 7/14   resolving  Yunior <20, U osm 414, Uric acid 10.3  Stop RL now  Will continue to monitor  - Started on Lasix 40mg PO once daily      # COPD; severe pulm HTN; fibrotic changes LLL; + fever  Worsening LLL infiltrates compared to 7/5  Follow up procal, MRSA nares  F/u pulm recs  Started on cefepime + levoflox   Will plan d/c on oral Ab levo + vantin      # encephalopathy likely metab 2/2 COVID 19 and/or PNA  CTH: mild chr micro changes; mild atrophy and ventric prom  Patient delirious on 7/16  D/bg cefepime, dexamethasone i/v/o drug indused delirium  EEG and CT head w/o contrast done  CT head - no acute pathology    # had 1 loose BM  Will continue to monitor, if recurs will send for Cdiff testing    # normo Anemia; no signs of acute blood loss  Patient on eliquis  Will Follow up renal for evaluation  current Hb 10.5(7/11)      # Fall, mechanical  no syncope per pt  cth neg  trauma w/u neg  PT eval for SNF    # Elevated cardiac enzymes  improved from prior Oct 2021 levels  ekg noted  no cp  outpt f/u cards    # HFrEF, chronic; sev dilated CM; RV dysfxn; mild TR/AR; HTN  euvolemic on exam  not on ANY goal directed tx - cardio eval Dr Haley  Patient started on metoprolol 25mg q 24 PO      # AFib, chronic  on eliquis  rate control with metoprolol 25mg q24    # CAD  agree w/ no asa    # DLD  on statin    # DM2  diabetic diet  FS qac/hs  lantus 21 HS  humalog 7 tid w/ +1 scale    # Hypothyroidism  synthroid 150    # PVD  c/w pentoxifylline     # DVT ppx:  on eliquis    # GI ppx: none    # Actviity: need PT eval    # updated wife on phone    Dispo: will consider neurology consult once eeg and ct head read done, monitor ams, will hold discharge for now

## 2022-07-16 NOTE — PROGRESS NOTE ADULT - ASSESSMENT
# encephalopathy waxing and waning; worse today  thought initial MS change at start of admit was metab: COVID 19 + PNA + hypoxia  initial CTH: mild chr micro changes; mild atrophy and ventric prom  MS was better 2 days, slight worse yesterday and way off today  now suspect toxic encephalopathy from meds (cefepime + levo and/or steroid psychosis)  might still have element of neural COVID 19  d/c dexameth  d/c cefepime; cont levo  WBC rise is likely steroids  check EEG  rpt CT H NC  consider neuro eval  no bad hypoglycemia  if pt alert enough can feed and take meds - otherwise HOLD (if any doubt, HOLD)  consider 1:1 sit as needed    # BRENDA on CKD III, presumed pre-renal; hx C3 glomerulopathy w/ proteinuria (up to 8.4 gm/day in past - responded well to immunosupp); metab acidosis  base Cr 2.1  improved with IVF  rising BUN could be steroid effect (on dexameth)  renal eval: f/u noted  c/w calcitriol   U/A w/ 3+ protein -> U TP:Cr ratio 1gm  c/w tacro 1mg po q9AM and q9PM  c/w NaHCO3 650mg po q8 (acidosis improving)    # hyponatremia - likely 2/2 salt depletion (was on lasix, had diarrhea, poor oral intake recently) and less likely lung issues (SIADH)  needs to be corrected for glucose  renal noted  LR did help Na   d/c NaCl tabs  lasix 40mg po q24 per renal (Na better after diuretic)  Uosm 414; Yunior <20; Uric Acid serum 10.3  oral fluid restriction to 1 L/day    # normo anemia; no signs of acute blood loss  will continue Eliquis for now  anemia of chr dz (CKD)  keep hgb >8  on aranesp w/ renal - f/u renal eval    # COVID + -> seems like pt is now in inflam phase; might have some element of neural and GI COVID as well  acute on chr hypoxic resp failure (pt on home O2 prn)  covid +: 7/5; 7/10 - no further testing needed  completed quarantine 7/5-7/15, d/c precautions   pt was NOT tx'd initially for COVID   was beyond tx window for MABs  RDV relative CI at this GFR anyway  d/c dexameth   c/w NC O2 - stable  pulm eval  d-dimer 454, ESR 38, CRP 25.9, ferr 1415    # COPD; severe pulm HTN; fibrotic changes LLL; + fever  not sure if fever residual for COVID or developing LLL PNA  CXR: LLL infil looked worse to me  rpt CXR: bilat infil L>R  pulm eval  cont NC O2  abx: cefepime 2gm iv q12 (renal dose) + levo 750mg iv q48 -> upon d/c will use levo 750mg PO q48 + vantin 200mg po q24 for 7 days of abx (7/11-7/18)  procal 0.38  U Strp/Leg Ag - neg  MRSA nares: neg  c/w proventil prn    # diarrhea - resolved  c diff testing: neg  can use imodium 2mg po q4 prn  not on laxatives    # Nutrition  MVI w/ min q24    # HFrEF, chronic; sev dilated CM; RV dysfxn; mild TR/AR; HTN  not on ANY goal directed tx - cardio eval Dr Witt - (per cardio, EF closer to 45%)  was on BB - restart toprol xl 25mg po q24 w/ holding parameters  not on ACEI/ARB 2/2 CKD/BRENDA (could consider hydral + nitrates, but only if BP good enough)  diuretics - restart lasix 40mg po q24    # AFib, chronic  rate controlled - on BB  on eliquis for AC    # CAD  agree w/ no asa    # Elevated cardiac enzymes  improved from prior Oct 2021 levels  ekg noted  no cp  outpt f/u cards    # DM 2  diabetic diet  FS qac/hs - might need to lower insulin as steroids come off and pt not eating as well  decr lantus 15 HS  decr humalog 5 tid w/ +1 scale    # PVD  c/w pentoxifylline     # DLD  on statin    # Fall, mechanical  no syncope per pt  CTH neg  trauma w/u neg  PT eval for SNF    # Hypothyroidism  synthroid 150mg q24    # DVT ppx: on eliquis    # GI ppx: none    # Activity: need PT eval    # updated son via phone today; told him of change in MS    Dispo: d/c cefepime and steroids; adj insulin; renal eval; restart lasix po; d/c COVID precautions; iv abx; c/w oral bicarb; EEG, CT H NC;   eventually, pt will need STR @ SNF ([facility]) - f/u CM - HOLD d/c for enceph    Prog remains very guarded. Long term prog is likely poor.

## 2022-07-16 NOTE — PROGRESS NOTE ADULT - SUBJECTIVE AND OBJECTIVE BOX
Nephrology Progress Note    ANDRIA TAVAREZ  MRN-[id number]  82y  Male    S:  Patient is seen and examined, events over the last 24h noted.    O:  Allergies:  Ozempic (0.25 mg or 0.5 mg dose) (Hives; Rash)  streptomycin (Unknown)  Trulicity Pen (Hives; Rash)    Hospital Medications:   MEDICATIONS  (STANDING):  apixaban 2.5 milliGRAM(s) Oral two times a day  atorvastatin 40 milliGRAM(s) Oral at bedtime  calcitriol   Capsule 0.25 MICROGram(s) Oral <User Schedule>  furosemide    Tablet 40 milliGRAM(s) Oral daily  glucagon  Injectable 1 milliGRAM(s) IntraMuscular once  insulin glargine Injectable (LANTUS) 15 Unit(s) SubCutaneous at bedtime  insulin lispro (ADMELOG) corrective regimen sliding scale   SubCutaneous three times a day before meals  insulin lispro Injectable (ADMELOG) 5 Unit(s) SubCutaneous three times a day before meals  levoFLOXacin IVPB 750 milliGRAM(s) IV Intermittent every 48 hours  levothyroxine 150 MICROGram(s) Oral daily  metoprolol succinate ER 25 milliGRAM(s) Oral daily  multivitamin/minerals 1 Tablet(s) Oral daily  pentoxifylline 400 milliGRAM(s) Oral two times a day  sodium bicarbonate 650 milliGRAM(s) Oral every 8 hours  tacrolimus 1 milliGRAM(s) Oral every 12 hours    MEDICATIONS  (PRN):  acetaminophen     Tablet .. 650 milliGRAM(s) Oral every 6 hours PRN Temp greater or equal to 38C (100.4F), Mild Pain (1 - 3)  ALBUTerol    90 MICROgram(s) HFA Inhaler 1 Puff(s) Inhalation every 6 hours PRN Bronchospasm  dextrose Oral Gel 15 Gram(s) Oral once PRN Blood Glucose LESS THAN 70 milliGRAM(s)/deciliter  loperamide 2 milliGRAM(s) Oral every 4 hours PRN Diarrhea  melatonin 5 milliGRAM(s) Oral at bedtime PRN Insomnia    Home Medications:  Aranesp 100 mcg/0.5 mL injectable solution: 1 dose(s) injectable every 3 to 4 weeks (2022 21:22)  atorvastatin 40 mg oral tablet: 1 tab(s) orally once a day (at bedtime) (2022 21:22)  calcitriol 0.25 mcg oral capsule: 1 cap(s) orally 2 times a week (:22)  d-mycophenolate: 500 milligram(s) orally 4 times a day (:)  Eliquis 2.5 mg oral tablet: 1 tab(s) orally 2 times a day (:)  famotidine 20 mg oral tablet: 1 tab(s) orally once a day (:)  isosorbide mononitrate 30 mg oral tablet, extended release: 1 tab(s) orally once a day (in the morning) (:)  Lantus 100 units/mL subcutaneous solution: 30 unit(s) subcutaneous once a day (at bedtime) ()  levothyroxine 150 mcg (0.15 mg) oral tablet: 1 tab(s) orally once a day (:)  metoprolol tartrate 25 mg oral tablet: 1 tab(s) orally 2 times a day ()  NIFEdipine 30 mg oral tablet, extended release: 1 tab(s) orally once a day (:)  pentoxifylline 400 mg oral tablet, extended release: 1 tab(s) orally 2 times a day (:)  tacrolimus 1 mg oral capsule: 1 cap(s) orally every 12 hours (:)      VITALS:  T(F): 97.2 (22 @ 13:42), Max: 97.2 (22 @ 13:42)  HR: 84 (22 @ 13:42)  BP: 148/82 (22 @ 13:42)  RR: 18 (22 @ 13:42)  SpO2: --  Wt(kg): --  I&O's Detail    I&O's Summary        PHYSICAL EXAM:  Gen: disoriented, b/l UE tremor  Resp: CTAB  Card: S1/S2  Abd: soft  Extremities: no edema      LABS:          132<L>  |  97<L>  |  95<HH>  ----------------------------<  101<H>  4.0   |  21  |  1.8<H>    Ca    8.7      2022 08:09  Mg     2.6         TPro  5.5<L>  /  Alb  3.3<L>  /  TBili  0.7  /  DBili      /  AST  32  /  ALT  18  /  AlkPhos  71      eGFR: 37 mL/min/1.73m2 (22 @ 08:09)  eGFR: 37 mL/min/1.73m2 (07-15-22 @ 09:26)    Phosphorus Level, Serum: 2.0 mg/dL (22 @ 07:51)  Phosphorus Level, Serum: 2.0 mg/dL (07-10-22 @ 04:30)    Vitamin D, 25-Hydroxy: 27 ng/mL (22 @ 07:39)  Intact PTH: 93 pg/mL (22 @ 07:39)                          10.2   13.67 )-----------( 240      ( 2022 08:09 )             30.3     Mean Cell Volume: 86.3 fL (22 @ 08:09)    Ferritin, Serum: 1415 ng/mL (22 @ 08:58)      Creatinine trend:  Creatinine, Serum: 1.8 mg/dL (22 @ 08:09)  Creatinine, Serum: 1.8 mg/dL (07-15-22 @ 09:26)  Creatinine, Serum: 1.8 mg/dL (22 @ 09:01)  Creatinine, Serum: 1.9 mg/dL (22 @ 08:58)  Creatinine, Serum: 1.9 mg/dL (22 @ 07:31)  Creatinine, Serum: 1.7 mg/dL (22 @ 07:51)    Sodium, Serum: 132 mmol/L (22 @ 08:09)  Sodium, Serum: 127 mmol/L (07-15-22 @ 09:26)  Sodium, Serum: 134 mmol/L (22 @ 09:01)  Sodium, Serum: 128 mmol/L (22 @ 08:58)  Sodium, Serum: 133 mmol/L (22 @ 07:31)  Sodium, Serum: 135 mmol/L (22 @ 07:51)    Osmolality, Random Urine: 414 mos/kg [50 - 1200] (22 @ 18:33)  Osmolality, Random Urine: 369 mos/kg [50 - 1200] (10-21-21 @ 01:00)    Sodium, Random Urine: <20.0 mmoL/L (22 @ 18:33)  Sodium, Random Urine: 22.0 mmoL/L (10-21-21 @ 01:00)    Thyroid Stimulating Hormone, Serum: 3.49 uIU/mL [0.27 - 4.20] (10-21-21 @ 11:25)        Urine Studies:  Urinalysis Basic - ( 2022 11:47 )    Color: Yellow / Appearance: Clear / S.018 / pH:   Gluc:  / Ketone: Negative  / Bili: Negative / Urobili: <2 mg/dL   Blood:  / Protein: 300 mg/dL / Nitrite: Negative   Leuk Esterase: Moderate / RBC: 8 /HPF / WBC 6 /HPF   Sq Epi:  / Non Sq Epi: 2 /HPF / Bacteria: Negative    Protein/Creatinine Ratio Calculation: 1.0 Ratio ( @ 18:33)

## 2022-07-16 NOTE — PROGRESS NOTE ADULT - SUBJECTIVE AND OBJECTIVE BOX
ANDRIA TAVAREZ 82y Male  MRN#: [id number]   CODE STATUS:________    Hospital Day: 11d    Pt is currently admitted with the primary diagnosis of     SUBJECTIVE  Hospital Course  82M with PMHx of HFrEF, Afib on eliquis, CAD s/p CABG, COPD (on rare home O2 PRN), CKD3, HTN, HLD presents for 2 weeks of watery diarrhea. Pt reports that 2 weeks ago he went to a doctor's appointment with his wife, and the next day both of them started experiencing non-bloody watery diarrhea, x3-4/day, and then started having decreased PO intake and felt weak. Of note, he also had a mechanical fall 1 week ago on his right side with no injuries sustained (but he didn't visit the ED). Pt denies any other sicks contacts, recent travel, vomiting, chest pain, SOB, palpitations, light-headedness, or any  symptoms.     In the ED: /57, HR 57, Temp 97.6F, satting 96% on RA. Labs notable for Cr of 3.6 (baseline ~2.0), , HCO3 19, AG 20. Trops 0.07 (likely 2/2 BRENDA), no chest pain, EKG 1st-degree AV block. CTH -ve, CTAP + chest -ve for acute pathology.     Admitted to medicine (05 Jul 2022 21:23)    Overnight events   None significant    Subjective complaints   Patient was AA0 X1, seemed delirious, moving all limbs. Bladder scan showed 430 ml Post void residual, foleys placed.    Present Today:   - Gloria:  No [  ], Yes [   ] : Indication:     - Type of IV Access:       .. CVC/Piccline:  No [  ], Yes [   ] : Indication:       .. 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    AF (atrial fibrillation)  s/p DCCV    Carotid stenosis    COPD (chronic obstructive pulmonary disease)    H/O coronary artery bypass surgery  2001    S/P inguinal hernia repair    History of appendectomy                                              -----------------------------------------------------------  ALLERGIES:  Ozempic (0.25 mg or 0.5 mg dose) (Hives; Rash)  streptomycin (Unknown)  Trulicity Pen (Hives; Rash)                                            ------------------------------------------------------------    HOME MEDICATIONS  Home Medications:  Aranesp 100 mcg/0.5 mL injectable solution: 1 dose(s) injectable every 3 to 4 weeks (05 Jul 2022 21:22)  atorvastatin 40 mg oral tablet: 1 tab(s) orally once a day (at bedtime) (05 Jul 2022 21:22)  calcitriol 0.25 mcg oral capsule: 1 cap(s) orally 2 times a week (05 Jul 2022 21:22)  d-mycophenolate: 500 milligram(s) orally 4 times a day (05 Jul 2022 21:22)  Eliquis 2.5 mg oral tablet: 1 tab(s) orally 2 times a day (05 Jul 2022 21:22)  famotidine 20 mg oral tablet: 1 tab(s) orally once a day (05 Jul 2022 21:22)  isosorbide mononitrate 30 mg oral tablet, extended release: 1 tab(s) orally once a day (in the morning) (05 Jul 2022 21:22)  Lantus 100 units/mL subcutaneous solution: 30 unit(s) subcutaneous once a day (at bedtime) (05 Jul 2022 21:22)  levothyroxine 150 mcg (0.15 mg) oral tablet: 1 tab(s) orally once a day (05 Jul 2022 21:22)  metoprolol tartrate 25 mg oral tablet: 1 tab(s) orally 2 times a day (05 Jul 2022 21:22)  NIFEdipine 30 mg oral tablet, extended release: 1 tab(s) orally once a day (05 Jul 2022 21:22)  pentoxifylline 400 mg oral tablet, extended release: 1 tab(s) orally 2 times a day (05 Jul 2022 21:22)  tacrolimus 1 mg oral capsule: 1 cap(s) orally every 12 hours (05 Jul 2022 21:22)                           MEDICATIONS:  STANDING MEDICATIONS  apixaban 2.5 milliGRAM(s) Oral two times a day  atorvastatin 40 milliGRAM(s) Oral at bedtime  calcitriol   Capsule 0.25 MICROGram(s) Oral <User Schedule>  dextrose 5%. 1000 milliLiter(s) IV Continuous <Continuous>  dextrose 5%. 1000 milliLiter(s) IV Continuous <Continuous>  dextrose 50% Injectable 25 Gram(s) IV Push once  dextrose 50% Injectable 12.5 Gram(s) IV Push once  dextrose 50% Injectable 25 Gram(s) IV Push once  furosemide    Tablet 40 milliGRAM(s) Oral daily  glucagon  Injectable 1 milliGRAM(s) IntraMuscular once  insulin glargine Injectable (LANTUS) 15 Unit(s) SubCutaneous at bedtime  insulin lispro (ADMELOG) corrective regimen sliding scale   SubCutaneous three times a day before meals  insulin lispro Injectable (ADMELOG) 5 Unit(s) SubCutaneous three times a day before meals  levoFLOXacin IVPB 750 milliGRAM(s) IV Intermittent every 48 hours  levothyroxine 150 MICROGram(s) Oral daily  metoprolol succinate ER 25 milliGRAM(s) Oral daily  multivitamin/minerals 1 Tablet(s) Oral daily  pentoxifylline 400 milliGRAM(s) Oral two times a day  sodium bicarbonate 650 milliGRAM(s) Oral every 8 hours  tacrolimus 1 milliGRAM(s) Oral every 12 hours    PRN MEDICATIONS  acetaminophen     Tablet .. 650 milliGRAM(s) Oral every 6 hours PRN  ALBUTerol    90 MICROgram(s) HFA Inhaler 1 Puff(s) Inhalation every 6 hours PRN  dextrose Oral Gel 15 Gram(s) Oral once PRN  loperamide 2 milliGRAM(s) Oral every 4 hours PRN  melatonin 5 milliGRAM(s) Oral at bedtime PRN                                            ------------------------------------------------------------  VITAL SIGNS: Last 24 Hours  T(C): 36.2 (16 Jul 2022 13:42), Max: 36.7 (15 Jul 2022 20:20)  T(F): 97.2 (16 Jul 2022 13:42), Max: 98.1 (15 Jul 2022 20:20)  HR: 84 (16 Jul 2022 13:42) (67 - 84)  BP: 148/82 (16 Jul 2022 13:42) (143/90 - 152/83)  BP(mean): --  RR: 18 (16 Jul 2022 13:42) (18 - 18)  SpO2: --                                             --------------------------------------------------------------  LABS:                        10.2   13.67 )-----------( 240      ( 16 Jul 2022 08:09 )             30.3     07-16    132<L>  |  97<L>  |  95<HH>  ----------------------------<  101<H>  4.0   |  21  |  1.8<H>    Ca    8.7      16 Jul 2022 08:09  Mg     2.6     07-16    TPro  5.5<L>  /  Alb  3.3<L>  /  TBili  0.7  /  DBili  x   /  AST  32  /  ALT  18  /  AlkPhos  71  07-16                                                              -------------------------------------------------------------  RADIOLOGY:                                            --------------------------------------------------------------    PHYSICAL EXAM:  Gen: NAD  HEENT: PERRL, EOMI, mouth clr, nose clr; + NC O2  Neck: no nodes, no JVD, thyroid nl  lungs: decr BS lt base; no wheeze  hrt: s1 s2 rrr no murmur  abd: soft, NT/ND, no HS megaly  : condom cath - yel urine  ext: no edema, no c/c  neuro: aa ox3 (mostly), cn intact, can move all 4 ext

## 2022-07-16 NOTE — PROGRESS NOTE ADULT - ASSESSMENT
Patient is a 82y Male with PMHx of HFrEF, Afib on eliquis, CAD s/p CABG, COPD (on rare home O2 PRN), CKD3 (Hx of C3 glomerulopathy), HTN, HLD whom is consulted for immunosuppression recommendations for CKD.  Patient is a 82y Male with PMHx of HFrEF, Afib on eliquis, CAD s/p CABG, COPD (on rare home O2 PRN), CKD3 (Hx of C3 glomerulopathy), HTN, HLD whom is consulted for BRENDA and maintenance tx of C3 GN.    # BRENDA on admission, likely pre-renal (poor po intake, diarrhea, diuretic)  # Hyponatremia improving on lasix (initially hypovolemic improved with crystalloids)  # C3 glomerulopathy - on Prograf 1 mg q12h- to be cont and Cellcept - on hold d/t COVID19 illness  # HFrEF 10/2021 TTE - severe LV and RV systolic dysfunction and PAH / seen by Dr. De La Fuente, EF ~45%  # Proteinuria 8.4 g initially responded well to Prograf with reduction of proteinuria  to<1 g/g as per office records  # COVID19 illness s/p course of glucocorticoids    - serum creatinine level stable  - Na level up-trending on po lasix, cont lasix 40mg po daily  - cont fluid restriction 1 liter daily (doubt pt will reach 1 liter with current mental status)  - cont po sodium bicarb  - no hydronephrosis on CTAP  - please check tacrolimus trough level 30 min before am dose (was ordered for today but says 'pending collection'??).  Pt is on low dose tacrolimus, but if level is elevated can be contributing to pt's tremor/encephalopathy

## 2022-07-16 NOTE — PROGRESS NOTE ADULT - SUBJECTIVE AND OBJECTIVE BOX
ANDRIA TAVAREZ  82y  Male  ***My note supersedes ALL resident notes that I sign.  My corrections for their notes are in my note.***    I can be reached directly on [employer]. My office number is [phone]. My personal cell number is [phone].    INTERVAL EVENTS: Here for f/u of COVID. Pt looks delirious today, not at baseline. Pt answering w/ one word for some questions. Mostly just picking at bedsheets. He is moving all 4 extremities. Looks confused. He had a lot of tremulousness yesterday, which looked like weakness to me yesterday. Pt not eating too well today. Pt did not follow PT commands well today (but walked w/ PT a couple of days ago). No diarrhea. O2 status the same.    T(F): 97.2 (07-16-22 @ 13:42), Max: 98.1 (07-15-22 @ 20:20)  HR: 84 (07-16-22 @ 13:42) (67 - 84)  BP: 148/82 (07-16-22 @ 13:42) (143/90 - 152/83)  RR: 18 (07-16-22 @ 13:42) (18 - 18)  SpO2: --    Gen: NAD  HEENT: PERRL, EOMI, mouth clr, nose clr; + NC O2  Neck: no nodes, no JVD, thyroid nl  lungs: decr BS lt base; no wheeze  hrt: s1 s2 rrr no murmur  abd: soft, NT/ND, no HS megaly  : condom cath - yel urine  ext: no edema, no c/c  neuro: aa ox3 (mostly), cn intact, can move all 4 ext; b/l intention tremors noted    LABS:                      10.2    (    86.3   13.67 )-----------( ---------      240      ( 16 Jul 2022 08:09 )             30.3    (    14.1     WBC Count: 13.67 K/uL (07-16-22 @ 08:09)  WBC Count: 8.46 K/uL (07-15-22 @ 09:26)  WBC Count: 6.41 K/uL (07-14-22 @ 09:01)  WBC Count: 3.79 K/uL (07-13-22 @ 08:58)  WBC Count: 7.63 K/uL (07-12-22 @ 07:31)    Hemoglobin: 10.2 g/dL (07-16 @ 08:09)  Hemoglobin: 9.6 g/dL (07-15 @ 09:26)  Hemoglobin: 10.0 g/dL (07-14 @ 09:01)  Hemoglobin: 9.5 g/dL (07-13 @ 08:58)  Hemoglobin: 10.2 g/dL (07-12 @ 07:31)    132   (   97   (   101      07-16-22 @ 08:09  ----------------------               4.0   (   21   (   95                             -----                        1.8  Ca  8.7   Mg  2.6    P   --     LFT  5.5  (  0.7  (  32       07-16-22 @ 08:09  -------------------------  3.3  (  71  (  18    CAPILLARY BLOOD GLUCOSE  POCT Blood Glucose.: 139 (07-16-22 @ 15:05)  POCT Blood Glucose.: 93 (07-16-22 @ 11:15)  POCT Blood Glucose.: 119 (07-16-22 @ 07:42)  POCT Blood Glucose.: 97 (07-15-22 @ 21:24)  POCT Blood Glucose.: 110 (07-15-22 @ 16:38)  POCT Blood Glucose.: 167 (07-15-22 @ 11:29)  POCT Blood Glucose.: 184 (07-15-22 @ 07:35)  POCT Blood Glucose.: 213 (07-14-22 @ 22:08)    Culture - Urine (collected 07-12-22 @ 11:47)  Source: Clean Catch Clean Catch (Midstream)  Final Report (07-15-22 @ 22:34):    10,000 - 49,000 CFU/mL Enterococcus faecalis  Organism: Enterococcus faecalis (07-15-22 @ 22:34)  Organism: Enterococcus faecalis (07-15-22 @ 22:34)      -  Ampicillin: S <=2 Predicts results to ampicillin/sulbactam, amoxacillin-clavulanate and  piperacillin-tazobactam.      -  Ciprofloxacin: S <=1      -  Levofloxacin: S <=1      -  Nitrofurantoin: S <=32 Should not be used to treat pyelonephritis.      -  Tetra/Doxy: R >8      -  Vancomycin: S 2      Method Type: EVA    Culture - Blood (collected 07-12-22 @ 07:31)  Source: .Blood None  Preliminary Report (07-13-22 @ 20:01):    No growth to date.    Culture - Blood (collected 07-11-22 @ 11:41)  Source: .Blood None  Preliminary Report (07-12-22 @ 22:02):    No growth to date.    RADIOLOGY & ADDITIONAL TESTS:      MEDICATIONS:  levoFLOXacin IVPB 750 milliGRAM(s) IV Intermittent every 48 hours    acetaminophen     Tablet .. 650 milliGRAM(s) Oral every 6 hours PRN  ALBUTerol    90 MICROgram(s) HFA Inhaler 1 Puff(s) Inhalation every 6 hours PRN  apixaban 2.5 milliGRAM(s) Oral two times a day  atorvastatin 40 milliGRAM(s) Oral at bedtime  calcitriol   Capsule 0.25 MICROGram(s) Oral <User Schedule>  furosemide    Tablet 40 milliGRAM(s) Oral daily  insulin glargine Injectable (LANTUS) 21 Unit(s) SubCutaneous at bedtime  insulin lispro (ADMELOG) corrective regimen sliding scale   SubCutaneous three times a day before meals  insulin lispro Injectable (ADMELOG) 7 Unit(s) SubCutaneous three times a day before meals  levothyroxine 150 MICROGram(s) Oral daily  loperamide 2 milliGRAM(s) Oral every 4 hours PRN  melatonin 5 milliGRAM(s) Oral at bedtime PRN  metoprolol succinate ER 25 milliGRAM(s) Oral daily  multivitamin/minerals 1 Tablet(s) Oral daily  pentoxifylline 400 milliGRAM(s) Oral two times a day  sodium bicarbonate 650 milliGRAM(s) Oral every 8 hours  tacrolimus 1 milliGRAM(s) Oral every 12 hours

## 2022-07-17 NOTE — PROGRESS NOTE ADULT - SUBJECTIVE AND OBJECTIVE BOX
ANDRIA TAVAREZ  82y  Male  ***My note supersedes ALL resident notes that I sign.  My corrections for their notes are in my note.***    I can be reached directly on [email or web address]. My office number is [phone]. My personal cell number is [phone].    INTERVAL EVENTS: Here for f/u of AMS. RN says pt is eating and drinking very well w/ asst. Says he is taking all of his meds. However, he is still not talking at baseline. Pt did say good morning to RN. Pt did not say anything w/ me. He does occ look at me if I tap him, but then he quickly reverts back to looking at sheets on bed.    T(F): 97 (07-17-22 @ 05:10), Max: 97 (07-17-22 @ 05:10)  HR: 90 (07-17-22 @ 05:10) (90 - 94)  BP: 148/87 (07-17-22 @ 05:10) (142/101 - 148/87)  RR: 18 (07-17-22 @ 05:10) (18 - 18)  SpO2: --    Gen: no resp distress, but obviously delirious; not talking at all; poor eye contact; not following commands  HEENT: PERRL, EOMI, mouth clr, nose clr; + NC O2  Neck: no nodes, no JVD, thyroid nl  lungs: decr BS lt base; no wheeze  hrt: s1 s2 rrr no murmur  abd: soft, NT/ND, no HS megaly  : + davalos - yel urine  ext: no edema, no c/c  neuro: aa (mostly), cn seem intact, can move all 4 ext (perhaps lt leg a little weak); b/l tremors noted    LABS:                      10.0    (    85.9   14.73 )-----------( ---------      253      ( 17 Jul 2022 09:38 )             29.2    (    14.1     WBC Count: 14.73 K/uL (07-17-22 @ 09:38) - worse (and now holding dexameth)  WBC Count: 13.67 K/uL (07-16-22 @ 08:09)  WBC Count: 8.46 K/uL (07-15-22 @ 09:26)  WBC Count: 6.41 K/uL (07-14-22 @ 09:01)  WBC Count: 3.79 K/uL (07-13-22 @ 08:58)    136   (   102   (   146      07-17-22 @ 09:38  ----------------------               3.9   (   23   (   93                             -----                        1.9  Ca  8.7   Mg  2.7    P   --     LFT  5.6  (  0.7  (  29       07-17-22 @ 09:38  -------------------------  3.4  (  76  (  17    CAPILLARY BLOOD GLUCOSE  POCT Blood Glucose.: 216 (07-17-22 @ 11:23)  POCT Blood Glucose.: 188 (07-16-22 @ 21:53)  POCT Blood Glucose.: 164 (07-16-22 @ 16:41)  POCT Blood Glucose.: 139 (07-16-22 @ 15:05)  POCT Blood Glucose.: 93 (07-16-22 @ 11:15)  POCT Blood Glucose.: 119 (07-16-22 @ 07:42)  POCT Blood Glucose.: 97 (07-15-22 @ 21:24)  POCT Blood Glucose.: 110 (07-15-22 @ 16:38)    Culture - Urine (collected 07-12-22 @ 11:47)  Source: Clean Catch Clean Catch (Midstream)  Final Report (07-15-22 @ 22:34):    10,000 - 49,000 CFU/mL Enterococcus faecalis  Organism: Enterococcus faecalis (07-15-22 @ 22:34)  Organism: Enterococcus faecalis (07-15-22 @ 22:34)      -  Ampicillin: S <=2 Predicts results to ampicillin/sulbactam, amoxacillin-clavulanate and  piperacillin-tazobactam.      -  Ciprofloxacin: S <=1      -  Levofloxacin: S <=1      -  Nitrofurantoin: S <=32 Should not be used to treat pyelonephritis.      -  Tetra/Doxy: R >8      -  Vancomycin: S 2      Method Type: EVA    Culture - Blood (collected 07-12-22 @ 07:31)  Source: .Blood None  Preliminary Report (07-13-22 @ 20:01):    No growth to date.    Culture - Blood (collected 07-11-22 @ 11:41)  Source: .Blood None  Final Report (07-16-22 @ 22:00):    No Growth Final    RADIOLOGY & ADDITIONAL TESTS:  < from: CT Head No Cont (07.16.22 @ 14:33) >    IMPRESSION:    No acute intracranial pathology, stable exam since 7/5/2022.    Stable mild chronic microvascular ischemic changes.    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    < end of copied text >    MEDICATIONS:  levoFLOXacin IVPB 750 milliGRAM(s) IV Intermittent every 48 hours    acetaminophen     Tablet .. 650 milliGRAM(s) Oral every 6 hours PRN  ALBUTerol    90 MICROgram(s) HFA Inhaler 1 Puff(s) Inhalation every 6 hours PRN  apixaban 2.5 milliGRAM(s) Oral two times a day  atorvastatin 40 milliGRAM(s) Oral at bedtime  calcitriol   Capsule 0.25 MICROGram(s) Oral <User Schedule>  furosemide    Tablet 40 milliGRAM(s) Oral daily  insulin glargine Injectable (LANTUS) 15 Unit(s) SubCutaneous at bedtime  insulin lispro (ADMELOG) corrective regimen sliding scale   SubCutaneous three times a day before meals  insulin lispro Injectable (ADMELOG) 5 Unit(s) SubCutaneous three times a day before meals  levothyroxine 150 MICROGram(s) Oral daily  loperamide 2 milliGRAM(s) Oral every 4 hours PRN  melatonin 5 milliGRAM(s) Oral at bedtime PRN  metoprolol succinate ER 25 milliGRAM(s) Oral daily  multivitamin/minerals 1 Tablet(s) Oral daily  pentoxifylline 400 milliGRAM(s) Oral two times a day  sodium bicarbonate 650 milliGRAM(s) Oral every 8 hours  tacrolimus 1 milliGRAM(s) Oral every 12 hours

## 2022-07-17 NOTE — PROGRESS NOTE ADULT - ASSESSMENT
# encephalopathy waxing and waning; remains worse x2 days  thought initial MS change at start of admit was metab: COVID 19 + PNA + hypoxia  initial CTH: mild chr micro changes; mild atrophy and ventric prom  MS was better 7/14, slight worse 7/15 and way off 7/16, 7/17  now suspect toxic encephalopathy from meds (cefepime + levo or tacrolimus and/or steroid psychosis)  might still have element of neural COVID 19  tacro lvl ordered yesterday not done -> will order for 8pm today and in AM matias  d/c dexameth  d/c cefepime; cont levo  WBC rising -> rpt bld cx at 8pm today and in AM matias  EEG: triphasic waves; mild-mod gen slowing  rpt CT H NC: no acute path  LFTs are nl  neuro eval - Dr Novak (I placed consult)  no bad hypoglycemia  if pt alert enough can still feed and take po meds - otherwise HOLD (if any doubt, HOLD)  consider 1:1 sit as needed  cbc q24    # BRENDA on CKD III, presumed pre-renal; hx C3 glomerulopathy w/ proteinuria (up to 8.4 gm/day in past - responded well to immunosupp); metab acidosis  base Cr 2.1  improved with IVF  rising BUN could be steroid effect (on dexameth)  renal eval: f/u noted - check tacro lvl  c/w calcitriol   U/A w/ 3+ protein -> U TP:Cr ratio 1gm  c/w tacro 1mg po q9AM and q9PM  c/w NaHCO3 650mg po q8 (acidosis improving)    # hyponatremia - likely 2/2 salt depletion (was on lasix, had diarrhea, poor oral intake recently) and less likely lung issues (SIADH)  renal noted  LR did help Na   lasix 40mg po q24 per renal (Na better after diuretic)  Uosm 414; Yunior <20; Uric Acid serum 10.3  oral fluid restriction to 1 L/day  BMP q24    # normo anemia; no signs of acute blood loss  will continue Eliquis for now  anemia of chr dz (CKD)  keep hgb >8  on aranesp w/ renal - f/u renal eval    # COVID + -> seems like pt is now in inflam phase; might have some element of neural and GI COVID as well  acute on chr hypoxic resp failure (pt on home O2 prn)  covid +: 7/5; 7/10 - no further testing needed  completed quarantine 7/5-7/15, d/c precautions   pt was NOT tx'd initially for COVID   was beyond tx window for MABs  RDV relative CI at this GFR anyway  d/c dexameth   c/w NC O2 - stable  pulm eval  d-dimer 454, ESR 38, CRP 25.9, ferr 1415    # COPD; severe pulm HTN; fibrotic changes LLL; + fever  not sure if fever residual for COVID or developing LLL PNA  CXR: LLL infil looked worse to me  rpt CXR: bilat infil L>R  pulm eval  cont NC O2  abx: levo 750mg iv q48 -> tx for 7 days of abx (7/11-7/18)  procal 0.38  U Strp/Leg Ag: neg  MRSA nares: neg  c/w proventil prn    # diarrhea - resolved  c diff testing: neg  can use imodium 2mg po q4 prn  not on laxatives    # Nutrition  MVI w/ min q24    # HFrEF, chronic; sev dilated CM; RV dysfxn; mild TR/AR; HTN  not on ANY goal directed tx - cardio eval Dr Witt - (per cardio, EF closer to 45%)  BB - incr toprol xl 50mg po q24   not on ACEI/ARB 2/2 CKD/BRENDA (could consider hydral + nitrates, but only if BP good enough)  diuretics - lasix 40mg po q24    # AFib, chronic  rate controlled - on BB  on eliquis for AC    # CAD  agree w/ no asa    # Elevated cardiac enzymes  improved from prior Oct 2021 levels  ekg noted  no cp  outpt f/u cards    # DM 2  diabetic diet  FS qac/hs - might need to lower insulin as steroids come off and if pt not eating as well  lantus 15 HS  humalog 5 tid w/ +1 scale    # PVD  c/w pentoxifylline     # DLD  on statin    # Fall, mechanical  no syncope per pt  CTH neg  trauma w/u neg  PT eval for SNF    # Hypothyroidism  synthroid 150mg q24    # DVT ppx: on eliquis    # GI ppx: none    # Activity: need PT eval    Dispo: renal eval; check tacro lvl; c/w lasix po; d/c COVID precautions; iv abx; c/w oral bicarb; neuro eval; check bld cx's; daily labs; incr BB  eventually, pt will need STR @ SNF ([facility]) - f/u CM - HOLD d/c for enceph    Prog remains very guarded. Long term prog is likely poor.

## 2022-07-17 NOTE — PROGRESS NOTE ADULT - ASSESSMENT
82y Male with PMHx of HFrEF, Afib on eliquis, CAD s/p CABG, COPD (on rare home O2 PRN), CKD3 (Hx of C3 glomerulopathy), HTN, HLD      # BRENDA on admission, likely pre-renal (poor po intake, diarrhea, diuretic)  # Hyponatremia improving on lasix (initially hypovolemic improved with crystalloids)  # C3 glomerulopathy - on Prograf 1 mg q12h- to be cont and Cellcept - on hold d/t COVID19 illness  # HFrEF 10/2021 TTE - severe LV and RV systolic dysfunction and PAH / seen by Dr. De La Fuente, EF ~45%  # Proteinuria 8.4 g initially responded well to Prograf with reduction of proteinuria  to<1 g/g as per office records  # COVID19 illness s/p course of glucocorticoids    - serum creatinine level stable  -Hypona improved on po lasix, cont lasix 40mg po daily  - cont fluid restriction 1 liter daily (doubt pt will reach 1 liter with current mental status)  - cont po sodium bicarb  - no hydronephrosis on CTAP  - please check tacrolimus trough level   Pt is on low dose tacrolimus, but if level is elevated can be contributing to pt's tremor/encephalopathy

## 2022-07-17 NOTE — PROGRESS NOTE ADULT - SUBJECTIVE AND OBJECTIVE BOX
Nephrology progress note    Patient was seen and examined, events over the last 24 h noted .  Cr stable    Allergies:  Ozempic (0.25 mg or 0.5 mg dose) (Hives; Rash)  streptomycin (Unknown)  Trulicity Pen (Hives; Rash)    Hospital Medications:   MEDICATIONS  (STANDING):  apixaban 2.5 milliGRAM(s) Oral two times a day  atorvastatin 40 milliGRAM(s) Oral at bedtime  calcitriol   Capsule 0.25 MICROGram(s) Oral <User Schedule>  dextrose 5%. 1000 milliLiter(s) (50 mL/Hr) IV Continuous <Continuous>  dextrose 5%. 1000 milliLiter(s) (100 mL/Hr) IV Continuous <Continuous>  dextrose 50% Injectable 25 Gram(s) IV Push once  dextrose 50% Injectable 12.5 Gram(s) IV Push once  dextrose 50% Injectable 25 Gram(s) IV Push once  furosemide    Tablet 40 milliGRAM(s) Oral daily  glucagon  Injectable 1 milliGRAM(s) IntraMuscular once  insulin glargine Injectable (LANTUS) 15 Unit(s) SubCutaneous at bedtime  insulin lispro (ADMELOG) corrective regimen sliding scale   SubCutaneous three times a day before meals  insulin lispro Injectable (ADMELOG) 5 Unit(s) SubCutaneous three times a day before meals  levoFLOXacin IVPB 750 milliGRAM(s) IV Intermittent every 48 hours  levothyroxine 150 MICROGram(s) Oral daily  metoprolol succinate ER 25 milliGRAM(s) Oral daily  multivitamin/minerals 1 Tablet(s) Oral daily  pentoxifylline 400 milliGRAM(s) Oral two times a day  sodium bicarbonate 650 milliGRAM(s) Oral every 8 hours  tacrolimus 1 milliGRAM(s) Oral every 12 hours        VITALS:  T(F): 97 (22 @ 05:10), Max: 97.2 (22 @ 13:42)  HR: 90 (22 @ 05:10)  BP: 148/87 (22 @ 05:10)  RR: 18 (22 @ 05:10)  SpO2: --  Wt(kg): --        PHYSICAL EXAM:  Constitutional: NAD  HEENT: anicteric sclera, oropharynx clear, MMM  Neck: No JVD  Respiratory: CTAB, no wheezes, rales or rhonchi  Cardiovascular: S1, S2, RRR  Gastrointestinal: BS+, soft, NT/ND  Extremities: No cyanosis or clubbing. No peripheral edema  :  No davalos.   Skin: No rashes    LABS:      136  |  102  |  93<HH>  ----------------------------<  146<H>  3.9   |  23  |  1.9<H>    Ca    8.7      2022 09:38  Mg     2.7         TPro  5.6<L>  /  Alb  3.4<L>  /  TBili  0.7  /  DBili      /  AST  29  /  ALT  17  /  AlkPhos  76                            10.0   14.73 )-----------( 253      ( 2022 09:38 )             29.2       Urine Studies:  Urinalysis Basic - ( 2022 11:47 )    Color: Yellow / Appearance: Clear / S.018 / pH:   Gluc:  / Ketone: Negative  / Bili: Negative / Urobili: <2 mg/dL   Blood:  / Protein: 300 mg/dL / Nitrite: Negative   Leuk Esterase: Moderate / RBC: 8 /HPF / WBC 6 /HPF   Sq Epi:  / Non Sq Epi: 2 /HPF / Bacteria: Negative      Creatinine, Random Urine: 84 mg/dL ( @ 18:33)  Protein/Creatinine Ratio Calculation: 1.0 Ratio ( @ 18:33)  Osmolality, Random Urine: 414 mos/kg ( @ 18:33)  Sodium, Random Urine: <20.0 mmoL/L ( @ 18:33)    RADIOLOGY & ADDITIONAL STUDIES:

## 2022-07-18 NOTE — PROGRESS NOTE ADULT - SUBJECTIVE AND OBJECTIVE BOX
KANGANDRIA MCELROY  82y  Male  ***My note supersedes ALL resident notes that I sign.  My corrections for their notes are in my note.***    I can be reached directly on [employer]. My office number is [phone]. My personal cell number is [phone].    INTERVAL EVENTS: Here for f/u of COVID. MS is still off. Pt not talking. RN says pt not eating now.    T(F): 96.7 (07-18-22 @ 05:27), Max: 97.8 (07-17-22 @ 14:33)  HR: 81 (07-18-22 @ 13:13) (69 - 81)  BP: 158/72 (07-18-22 @ 13:13) (116/76 - 164/67)  RR: 18 (07-18-22 @ 05:27) (18 - 18)  SpO2: 96% (07-18-22 @ 14:08) (96% - 96%)    07-17-22 @ 07:01  -  07-18-22 @ 07:00  --------------------------------------------------------  IN: 0 mL / OUT: 1600 mL / NET: -1600 mL          LABS:    RADIOLOGY & ADDITIONAL TESTS:      MEDICATIONS:  levoFLOXacin IVPB 750 milliGRAM(s) IV Intermittent every 48 hours    acetaminophen     Tablet .. 650 milliGRAM(s) Oral every 6 hours PRN  ALBUTerol    90 MICROgram(s) HFA Inhaler 1 Puff(s) Inhalation every 6 hours PRN  apixaban 2.5 milliGRAM(s) Oral two times a day  atorvastatin 40 milliGRAM(s) Oral at bedtime  calcitriol   Capsule 0.25 MICROGram(s) Oral <User Schedule>  furosemide    Tablet 40 milliGRAM(s) Oral daily  insulin glargine Injectable (LANTUS) 18 Unit(s) SubCutaneous at bedtime  insulin lispro (ADMELOG) corrective regimen sliding scale   SubCutaneous three times a day before meals  insulin lispro Injectable (ADMELOG) 6 Unit(s) SubCutaneous three times a day before meals  levothyroxine 150 MICROGram(s) Oral daily  loperamide 2 milliGRAM(s) Oral every 4 hours PRN  melatonin 5 milliGRAM(s) Oral at bedtime PRN  metoprolol succinate ER 50 milliGRAM(s) Oral daily  multivitamin/minerals 1 Tablet(s) Oral daily  pentoxifylline 400 milliGRAM(s) Oral two times a day  sodium bicarbonate 650 milliGRAM(s) Oral every 8 hours  tacrolimus 1 milliGRAM(s) Oral every 12 hours     ANDRIA TAVAREZ  82y  Male  ***My note supersedes ALL resident notes that I sign.  My corrections for their notes are in my note.***    I can be reached directly on [email or web address]. My office number is [phone]. My personal cell number is [phone].    INTERVAL EVENTS: Here for f/u of COVID. MS is still off. Pt not talking. RN says pt not eating now. Pt still has tremulousness, arjun in hands. Still moves legs, but not as much as hands. When awake, has look of fear in eyes, but he cannot express anything. Still looks like neurotoxicity to me.    T(F): 96.7 (07-18-22 @ 05:27), Max: 97.8 (07-17-22 @ 14:33)  HR: 81 (07-18-22 @ 13:13) (69 - 81)  BP: 158/72 (07-18-22 @ 13:13) (116/76 - 164/67)  RR: 18 (07-18-22 @ 05:27) (18 - 18)  SpO2: 96% (07-18-22 @ 14:08) (96% - 96%)    07-17-22 @ 07:01  -  07-18-22 @ 07:00  --------------------------------------------------------  IN: 0 mL / OUT: 1600 mL / NET: -1600 mL    Gen: no resp distress, but obviously delirious; not talking at all; poor eye contact; not following commands  HEENT: PERRL, EOMI, mouth clr, nose clr; + NC O2  Neck: no nodes, no JVD, thyroid nl  lungs: decr BS lt base; no wheeze  hrt: s1 s2 rrr no murmur  abd: soft, NT/ND, no HS megaly  : + davalos - yel urine  ext: no edema, no c/c  neuro: sometimes wide awake, sometimes lethargic, cn seem intact, I do not see facial droop; can move all 4 ext (perhaps legs are a little weak); b/l tremors (asterixis) noted    LABS:                      9.9     (    85.5   15.75 )-----------( ---------      268      ( 18 Jul 2022 09:47 )             29.6    (    14.3     WBC Count: 15.75 K/uL (07-18-22 @ 09:47)  WBC Count: 14.73 K/uL (07-17-22 @ 09:38)  WBC Count: 13.67 K/uL (07-16-22 @ 08:09)  WBC Count: 8.46 K/uL (07-15-22 @ 09:26)  WBC Count: 6.41 K/uL (07-14-22 @ 09:01)    Hemoglobin: 9.9 g/dL (07-18 @ 09:47)  Hemoglobin: 10.0 g/dL (07-17 @ 09:38)  Hemoglobin: 10.2 g/dL (07-16 @ 08:09)  Hemoglobin: 9.6 g/dL (07-15 @ 09:26)  Hemoglobin: 10.0 g/dL (07-14 @ 09:01)    CAPILLARY BLOOD GLUCOSE  POCT Blood Glucose.: 228 (07-18-22 @ 12:04)  POCT Blood Glucose.: 172 (07-18-22 @ 08:04)  POCT Blood Glucose.: 251 (07-17-22 @ 21:27)  POCT Blood Glucose.: 288 (07-17-22 @ 17:48)  POCT Blood Glucose.: 216 (07-17-22 @ 11:23)  POCT Blood Glucose.: 188 (07-16-22 @ 21:53)  POCT Blood Glucose.: 164 (07-16-22 @ 16:41)  POCT Blood Glucose.: 139 (07-16-22 @ 15:05)    Culture - Urine (collected 07-12-22 @ 11:47)  Source: Clean Catch Clean Catch (Midstream)  Final Report (07-15-22 @ 22:34):    10,000 - 49,000 CFU/mL Enterococcus faecalis  Organism: Enterococcus faecalis (07-15-22 @ 22:34)  Organism: Enterococcus faecalis (07-15-22 @ 22:34)      -  Ampicillin: S <=2 Predicts results to ampicillin/sulbactam, amoxacillin-clavulanate and  piperacillin-tazobactam.      -  Ciprofloxacin: S <=1      -  Levofloxacin: S <=1      -  Nitrofurantoin: S <=32 Should not be used to treat pyelonephritis.      -  Tetra/Doxy: R >8      -  Vancomycin: S 2      Method Type: EVA    Culture - Blood (collected 07-12-22 @ 07:31)  Source: .Blood None  Final Report (07-17-22 @ 20:00):    No Growth Final    RADIOLOGY & ADDITIONAL TESTS:      MEDICATIONS:  levoFLOXacin IVPB 750 milliGRAM(s) IV Intermittent every 48 hours    acetaminophen     Tablet .. 650 milliGRAM(s) Oral every 6 hours PRN  ALBUTerol    90 MICROgram(s) HFA Inhaler 1 Puff(s) Inhalation every 6 hours PRN  apixaban 2.5 milliGRAM(s) Oral two times a day  atorvastatin 40 milliGRAM(s) Oral at bedtime  calcitriol   Capsule 0.25 MICROGram(s) Oral <User Schedule>  furosemide    Tablet 40 milliGRAM(s) Oral daily  insulin glargine Injectable (LANTUS) 18 Unit(s) SubCutaneous at bedtime  insulin lispro (ADMELOG) corrective regimen sliding scale   SubCutaneous three times a day before meals  insulin lispro Injectable (ADMELOG) 6 Unit(s) SubCutaneous three times a day before meals  levothyroxine 150 MICROGram(s) Oral daily  loperamide 2 milliGRAM(s) Oral every 4 hours PRN  melatonin 5 milliGRAM(s) Oral at bedtime PRN  metoprolol succinate ER 50 milliGRAM(s) Oral daily  multivitamin/minerals 1 Tablet(s) Oral daily  pentoxifylline 400 milliGRAM(s) Oral two times a day  sodium bicarbonate 650 milliGRAM(s) Oral every 8 hours  tacrolimus 1 milliGRAM(s) Oral every 12 hours

## 2022-07-18 NOTE — PROGRESS NOTE ADULT - SUBJECTIVE AND OBJECTIVE BOX
Nephrology progress note    Patient is seen and examined, events over the last 24 h noted .  MS change for a couple of days, non verbal  W/u in progress    Allergies:  Ozempic (0.25 mg or 0.5 mg dose) (Hives; Rash)  streptomycin (Unknown)  Trulicity Pen (Hives; Rash)    Hospital Medications:   MEDICATIONS  (STANDING):  ampicillin  IVPB      ampicillin  IVPB 1 Gram(s) IV Intermittent every 8 hours  atorvastatin 40 milliGRAM(s) Oral at bedtime  calcitriol   Capsule 0.25 MICROGram(s) Oral <User Schedule>  dextrose 5%. 1000 milliLiter(s) (100 mL/Hr) IV Continuous <Continuous>  dextrose 5%. 1000 milliLiter(s) (50 mL/Hr) IV Continuous <Continuous>  dextrose 50% Injectable 25 Gram(s) IV Push once  dextrose 50% Injectable 12.5 Gram(s) IV Push once  dextrose 50% Injectable 25 Gram(s) IV Push once  glucagon  Injectable 1 milliGRAM(s) IntraMuscular once  heparin  Infusion.  Unit(s)/Hr (18 mL/Hr) IV Continuous <Continuous>  insulin glargine Injectable (LANTUS) 18 Unit(s) SubCutaneous at bedtime  insulin lispro (ADMELOG) corrective regimen sliding scale   SubCutaneous three times a day before meals  insulin lispro Injectable (ADMELOG) 6 Unit(s) SubCutaneous three times a day before meals  levothyroxine 150 MICROGram(s) Oral daily  metoprolol succinate ER 50 milliGRAM(s) Oral daily  multivitamin/minerals 1 Tablet(s) Oral daily  pentoxifylline 400 milliGRAM(s) Oral two times a day  sodium bicarbonate 650 milliGRAM(s) Oral every 8 hours  sodium chloride 0.9%. 1000 milliLiter(s) (60 mL/Hr) IV Continuous <Continuous>  tacrolimus 1 milliGRAM(s) Oral every 12 hours        VITALS:  T(F): 96.8 (22 @ 20:06), Max: 97.9 (22 @ 14:57)  HR: 76 (22 @ 20:06)  BP: 181/91 (22 @ 20:06)  RR: 20 (22 @ 20:06)  SpO2: 96% (22 @ 14:08)  Wt(kg): --     @ 07: @ 07:00  --------------------------------------------------------  IN: 0 mL / OUT: 1600 mL / NET: -1600 mL     @ 07:01  -   @ 22:04  --------------------------------------------------------  IN: 0 mL / OUT: 600 mL / NET: -600 mL          PHYSICAL EXAM:  Constitutional: NAD  HEENT: anicteric sclera  Neck: No JVD  Respiratory: CTAB, no wheezes, rales or rhonchi  Cardiovascular: S1, S2, RRR  Gastrointestinal: BS+, soft, NT/ND  Extremities: No peripheral edema  Neurological: Awake  Skin: No rashes  Vascular Access:    LABS:      136  |  102  |  93<HH>  ----------------------------<  146<H>  3.9   |  23  |  1.9<H>    Ca    8.7      2022 09:38  Mg     2.7         TPro  5.6<L>  /  Alb  3.4<L>  /  TBili  0.7  /  DBili      /  AST  29  /  ALT  17  /  AlkPhos  76                            9.9    15.75 )-----------( 268      ( 2022 09:47 )             29.6       Urine Studies:  Urinalysis Basic - ( 2022 16:20 )    Color: Yellow / Appearance: Clear / S.015 / pH:   Gluc:  / Ketone: Negative  / Bili: Negative / Urobili: <2 mg/dL   Blood:  / Protein: 100 mg/dL / Nitrite: Negative   Leuk Esterase: Negative / RBC: 51 /HPF / WBC 6 /HPF   Sq Epi:  / Non Sq Epi: 1 /HPF / Bacteria: Negative      Creatinine, Random Urine: 84 mg/dL ( @ 18:33)  Protein/Creatinine Ratio Calculation: 1.0 Ratio ( @ 18:33)  Osmolality, Random Urine: 414 mos/kg ( @ 18:33)  Sodium, Random Urine: <20.0 mmoL/L ( @ 18:33)    RADIOLOGY & ADDITIONAL STUDIES:

## 2022-07-18 NOTE — CONSULT NOTE ADULT - SUBJECTIVE AND OBJECTIVE BOX
CC: worsening confusion      HPI:  82M with PMHx of HFrEF, Afib on eliquis, CAD s/p CABG, COPD (on rare home O2 PRN), CKD3, HTN, HLD initially presented for 2 weeks of watery diarrhea. Patient also positive for covid and is currently managed on the medical floor.  Called to evaluate patient for waxing and waning encephalopathy which is now persistent since past 48 hrs.  Initial CTH negative for acute findings, revealed mild chronic microvascular changes and Stable moderate ventriculomegaly which may reflect underlying communicating hydrocephalus. EEG: triphasic waves; mild-mod gen slowing, Rpt CTH NC: no acute pathology. LFTs are nl      Home Medications:  Aranesp 100 mcg/0.5 mL injectable solution: 1 dose(s) injectable every 3 to 4 weeks (05 Jul 2022 21:22)  Atorvastatin 40 mg oral tablet: 1 tab(s) orally once a day (at bedtime) (05 Jul 2022 21:22)  Calcitriol 0.25 mcg oral capsule: 1 cap(s) orally 2 times a week (05 Jul 2022 21:22)  D-mycophenolate: 500 milligram(s) orally 4 times a day (05 Jul 2022 21:22)  Eliquis 2.5 mg oral tablet: 1 tab(s) orally 2 times a day (05 Jul 2022 21:22)  Famotidine 20 mg oral tablet: 1 tab(s) orally once a day (05 Jul 2022 21:22)  Isosorbide mononitrate 30 mg oral tablet, extended release: 1 tab(s) orally once a day (in the morning) (05 Jul 2022 21:22)  Lantus 100 units/mL subcutaneous solution: 30 unit(s) subcutaneous once a day (at bedtime) (05 Jul 2022 21:22)  Levothyroxine 150 mcg (0.15 mg) oral tablet: 1 tab(s) orally once a day (05 Jul 2022 21:22)  Metoprolol tartrate 25 mg oral tablet: 1 tab(s) orally 2 times a day (05 Jul 2022 21:22)  NIFEdipine 30 mg oral tablet, extended release: 1 tab(s) orally once a day (05 Jul 2022 21:22)  Pentoxifylline 400 mg oral tablet, extended release: 1 tab(s) orally 2 times a day (05 Jul 2022 21:22)  Tacrolimus 1 mg oral capsule: 1 cap(s) orally every 12 hours (05 Jul 2022 21:22)      Social History  Unable to obtain info      Neuro Exam:  Orientation: Patient is awake , confused . Not following commands. Not verbal.   Fund of knowledge: Unable to give history  Cranial Nerves: Eyes midline at primary position, responds to visual threat, not verbal , has a right facial asymmetry.   Motor: Moving UE/LE minimally             UE asterixis and twitching of UE at rest  Sensory exam: Responding to pain in all 4 exts.   Coordination:. NIKOLAY  Gait: unable.          Allergies  Ozempic (0.25 mg or 0.5 mg dose) (Hives; Rash)  streptomycin (Unknown)  Trulicity Pen (Hives; Rash)    Intolerances      MEDICATIONS  (STANDING):  Apixaban 2.5 milliGRAM(s) Oral two times a day  Atorvastatin 40 milliGRAM(s) Oral at bedtime  Calcitriol   Capsule 0.25 MICROGram(s) Oral <User Schedule>  Dextrose 5%. 1000 milliLiter(s) (50 mL/Hr) IV Continuous <Continuous>  Dextrose 5%. 1000 milliLiter(s) (100 mL/Hr) IV Continuous <Continuous>  Dextrose 50% Injectable 25 Gram(s) IV Push once  Dextrose 50% Injectable 12.5 Gram(s) IV Push once  Dextrose 50% Injectable 25 Gram(s) IV Push once  Furosemide    Tablet 40 milliGRAM(s) Oral daily  Glucagon  Injectable 1 milliGRAM(s) IntraMuscular once  Insulin glargine Injectable (LANTUS) 15 Unit(s) SubCutaneous at bedtime  Insulin lispro (ADMELOG) corrective regimen sliding scale   SubCutaneous three times a day before meals  Insulin lispro Injectable (ADMELOG) 5 Unit(s) SubCutaneous three times a day before meals  LevoFLOXacin IVPB 750 milliGRAM(s) IV Intermittent every 48 hours  Levothyroxine 150 MICROGram(s) Oral daily  Metoprolol succinate ER 50 milliGRAM(s) Oral daily  Multivitamin/minerals 1 Tablet(s) Oral daily  Pentoxifylline 400 milliGRAM(s) Oral two times a day  Sodium bicarbonate 650 milliGRAM(s) Oral every 8 hours  Tacrolimus 1 milliGRAM(s) Oral every 12 hours    MEDICATIONS  (PRN):  Acetaminophen     Tablet .. 650 milliGRAM(s) Oral every 6 hours PRN Temp greater or equal to 38C (100.4F), Mild Pain (1 - 3)  ALBUTerol    90 MICROgram(s) HFA Inhaler 1 Puff(s) Inhalation every 6 hours PRN Bronchospasm  Dextrose Oral Gel 15 Gram(s) Oral once PRN Blood Glucose LESS THAN 70 milliGRAM(s)/deciliter  Loperamide 2 milliGRAM(s) Oral every 4 hours PRN Diarrhea  Melatonin 5 milliGRAM(s) Oral at bedtime PRN Insomnia      LABS:                        10.0   14.73 )-----------( 253      ( 17 Jul 2022 09:38 )             29.2     07-17    136  |  102  |  93<HH>  ----------------------------<  146<H>  3.9   |  23  |  1.9<H>    Ca    8.7      17 Jul 2022 09:38  Mg     2.7     07-17    TPro  5.6<L>  /  Alb  3.4<L>  /  TBili  0.7  /  DBili  x   /  AST  29  /  ALT  17  /  AlkPhos  76  07-17    COVID-19 PCR . (07.11.22 @ 20:41)   COVID-19 PCR: Detected        Urine Microscopic-Add On (NC) (07.12.22 @ 11:47)   Epithelial Cells: 2 /HPF   Bacteria: Negative   Hyaline Casts: 7 /LPF   Red Blood Cell - Urine: 8 /HPF   White Blood Cell - Urine: 6 /HPF     Urinalysis (07.12.22 @ 11:47)   Glucose Qualitative, Urine: Negative   Blood, Urine: Negative   pH Urine: 6.0   Color: Yellow   Urine Appearance: Clear   Bilirubin: Negative   Ketone - Urine: Negative   Specific Gravity: 1.018   Protein, Urine: 300 mg/dL   Urobilinogen: <2 mg/dL   Nitrite: Negative   Leukocyte Esterase Concentration: Moderate         Neuro Imaging:  < from: CT Head No Cont (07.16.22 @ 14:33) >  FINDINGS:    There is stable prominent of the cerebral sulci and fissural spaces   reflecting mild diffuse parenchymal volume loss. There is stable moderate   ventriculomegaly which may be exaggerated by the parenchymal volume loss.    There are scattered patchy low attenuations in the bilateral   periventricular cerebral white matter consistent with stable mild chronic   microvascular ischemic changes.    There is no evidence of acute territorial infarct or intracranial   hemorrhage. There is no space-occupying lesion or midline shift.    There are no extra-axial fluid collections.    Status post bilateral cataract surgery. The imaged portions of the   paranasal sinuses are aerated. The mastoid air cells are aerated. The   visualized soft tissues and osseous structures appear normal.      IMPRESSION:    No acute intracranial pathology, stable exam since 7/5/2022.    Stable mild chronic microvascular ischemic changes.    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    --- End of Report ---    LIAM OLIVARES MD; Attending Radiologist  This document has been electronically signed. Jul 16 2022  5:14PM    < end of copied text >        EEG:   < from: EEG (07.16.22 @ 18:00) >  State  Awake and Drowsy    Symmetry  Symmetric    Organization  Less than optimally organized    PDR  Continuous  Background: 5-6 hz    Generalized Slowing  Yes  mild - moderate    Focal Slowing  No    Breach Artifact:  No    Activation Procedure  Hyper Ventilation  No    Photic Stimulation  No    Epileptiform Activity  No  There are triphasic waves    Events:  No    Impression:  Abnormal due to the presence of: generalized slowing as above      Clinical Correlation & Recommendations  Consistent with diffuse cerebral electrophysiological dysfunction.    Secondary to toxic metabolic cause.      Reviewed by:  Olaf Hodge    Signed by:    < end of copied text >

## 2022-07-18 NOTE — PROGRESS NOTE ADULT - ASSESSMENT
# encephalopathy waxing and waning; remains worse x2 days  saw pt today w/ neuro attd and family  I thought initial MS change at start of admit was metab: COVID 19 + PNA + hypoxia  initial CTH: mild chr micro changes; mild atrophy and ventric prom  MS was better 7/14, slight worse 7/15 and way off 7/16, 7/17, 7/18  now suspect toxic encephalopathy from meds (cefepime + levo or tacrolimus and/or steroid psychosis)  might still have element of neural COVID 19, but pt was improving at some point  pt might have infectious cause of MS change w/ rising WBC off steroids -> hold eliquis in case LP is needed  WBC rising -> f/u rpt bld cx x2: pending  send rpt U/A and UCx (recent UCx grew E Fecalis - VSE; was suscept to levo)  abx: ampicillin 1gm iv q8 (renal dose) for E Fecalis in urine  consider ID eval  f/u tacro lvl: pending (though, wife said pt has tolerated tacro 1mg po q12 for 3 yrs w/out issue)  d/c dexameth  d/c cefepime; d/c levo  EEG: triphasic waves; mild-mod gen slowing  rpt CT H NC: no acute path  obtain MRI B NC  if MRI neg, consider VEEG (f/u w/ neuro)  LFTs are nl  neuro eval - Dr Andrews   no bad hypoglycemia  consider 1:1 sit as needed  CBC q24    # BRENDA on CKD III, presumed pre-renal; hx C3 glomerulopathy w/ proteinuria (up to 8.4 gm/day in past - responded well to immunosupp); metab acidosis  base Cr 2.1  improved with IVF  rising BUN could be steroid effect (on dexameth)  renal eval: f/u noted - check tacro lvl  c/w calcitriol   U/A w/ 3+ protein -> U TP:Cr ratio 1gm  c/w tacro 1mg po q9AM and q9PM  c/w NaHCO3 650mg po q8 (acidosis improving)  BMP q24    # hyponatremia - likely 2/2 salt depletion (was on lasix, had diarrhea, poor oral intake recently) and less likely lung issues (SIADH)  renal noted  LR did help Na   c/w lasix 40mg po q24 per renal (Na better after diuretic) and use IVFs  Uosm 414; Yunior <20; Uric Acid serum 10.3  BMP q24    # normo anemia; no signs of acute blood loss  anemia of chr dz (CKD)  keep hgb >8  on aranesp w/ renal - f/u renal eval    # COVID + -> seems like pt passed thru inflam phase; might have some element of neural COVID as well (hard to prove)  acute on chr hypoxic resp failure (pt on home O2 prn)  covid +: 7/5; 7/10 - no further testing needed  completed quarantine 7/5-7/15, d/c precautions   pt was NOT tx'd initially for COVID   was beyond tx window for MABs  RDV relative CI at this GFR anyway  d/c dexameth   c/w NC O2 - stable  pulm eval  d-dimer 454, ESR 38, CRP 25.9, ferr 1415    # COPD; severe pulm HTN; fibrotic changes LLL; + fever  was not sure if prior fever was residual for COVID or developing LLL PNA  CXR: LLL infil looked worse to me  rpt CXR: bilat infil L>R  pulm eval  cont NC O2  abx: completed 7 days of abx (7/11-7/18)  procal 0.38  U Strp/Leg Ag: neg  MRSA nares: neg  c/w proventil prn    # diarrhea - resolved  c diff testing: neg  not on laxatives    # Nutrition  MVI w/ min q24 - hold while NPO  now NPO for MS change  IVFs: NS 60/hr  will have to consider NGT for Nutr/meds soon    # HFrEF, chronic; sev dilated CM; RV dysfxn; mild TR/AR; HTN  not on all goal directed tx - cardio eval Dr Witt - (per cardio, EF closer to 45%)  BB - incr toprol xl 50mg po q24 (consider metoprolol 5mg iv q6 if cannot take meds)  not on ACEI/ARB 2/2 CKD/BRENDA (could consider hydral + nitrates, but only if BP good enough)  diuretics - lasix 40mg po q24    # AFib, chronic  rate controlled - on BB  HOLD eliquis (in case LP needed)  could consider bridging w/ hep gtt in 24-48 hrs (as Eliquis wears off)    # CAD  agree w/ no asa    # Elevated cardiac enzymes  improved from prior Oct 2021 levels  ekg noted  no cp  outpt f/u cards    # DM 2  diabetic diet  FS qac/hs (or q6 if not eating)  incr lantus 18 HS  incr ademelog 6 tid w/ +1 scale - HOLD if not eating    # PVD  c/w pentoxifylline     # DLD  on statin    # Fall, mechanical  no syncope per pt  CTH neg  trauma w/u neg  PT eval for SNF    # Hypothyroidism  synthroid 150mg q24    # DVT ppx: on eliquis    # GI ppx: none    # Activity: need PT eval    # full update given to wife (Oanh) and dtr (Casi) at bedside; answered questions    Dispo: renal eval; check tacro lvl; c/w lasix po; IVFs; iv abx; c/w oral bicarb; neuro eval; check bld/urine cx's; daily labs; convert oral meds to IV as needed;  eventually, pt will need STR @ SNF ([facility]) - f/u CM - HOLD d/c for enceph    Prog remains very guarded. Long term prog is likely poor. Consider upgrade to SDU.

## 2022-07-18 NOTE — CONSULT NOTE ADULT - ASSESSMENT
82M w/ PMHx of HFrEF, Afib on eliquis, CAD s/p CABG, COPD (on rare home O2 PRN), CKD3, HTN, HLD initially presented for 2 weeks of watery diarrhea. Patient also positive for covid and is currently managed on the medical floor.  Called to evaluate patient for waxing and waning encephalopathy which is now persistent since past 48 hrs.  On exam patient is awake nonverbal, confused not following any commands, noted a right facial asymmetry and UE twitching and asterixis      ·	Initial CTH negative for acute findings, revealed mild chronic microvascular changes and Stable moderate ventriculomegaly which may reflect underlying communicating hydrocephalus.   ·	EEG 7/16: triphasic waves; mild-mod gen slowing  ·	Rpt CTH NC: no acute pathology.   ·	LFTs are wnl  ·	(+) COVID 19  ·	U Strep /Leg Ag: neg  ·	MRSA nares: neg      #AMS likely toxic metabolic encephalopathy, seizure in the setting of infection, or CVA      Plan  Check  Ammonia , tsh, level  N/c MRI brain if no contraindication  Possible VEEG monitoring  Management of covid and other underlying medical conditions as per primary team  Neuro attending note will follow

## 2022-07-18 NOTE — PROGRESS NOTE ADULT - ASSESSMENT
82y Male with PMHx of HFrEF, Afib on eliquis, CAD s/p CABG, COPD (on rare home O2 PRN), CKD3 (Hx of C3 glomerulopathy), HTN, HLD      # RBENDA on admission, likely pre-renal (poor po intake, diarrhea, diuretic)  # Hyponatremia improving on lasix (initially hypovolemic improved with crystalloids)  # C3 glomerulopathy - on Prograf 1 mg q12h- to be cont and Cellcept - on hold d/t COVID19 illness  # HFrEF 10/2021 TTE - severe LV and RV systolic dysfunction and PAH / seen by Dr. De La Fuente, EF ~45%  # Proteinuria 8.4 g initially responded well to Prograf with reduction of proteinuria  to<1 g/g as per office records  # COVID19 illness s/p course of glucocorticoids  - NO LABS TODAY  - serum creatinine level stable as of yesterday  - UA with granular casts - likely dehydration vs ATN, Please repeat LABS and HOLD LASIX if creat is rising  -Hypona improved on po lasix,  - cont fluid restriction 1 liter daily (doubt pt will reach 1 liter with current mental status)  - cont po sodium bicarb  - no hydronephrosis on CTAP  -  tacrolimus level 8.4 at 11 00 ppm unlikely trough, but it is close to goal of 3-6 , cannot  explain the change in MS  plan for Brain MRI

## 2022-07-19 NOTE — PROGRESS NOTE ADULT - ASSESSMENT
82y Male with PMHx of HFrEF, Afib on eliquis, CAD s/p CABG, COPD (on rare home O2 PRN), CKD3 (Hx of C3 glomerulopathy), HTN, HLD      # BRENDA on admission, resolved  CKD 3 - creat around 1.8 at baseline    # Hyponatremia - now hypernatremia - poor po intake, on mech. soft or puree diet  switch NS to 1/2 NS at 60 cc/hr  # C3 glomerulopathy - on Prograf 1 mg q12h- to be cont and Cellcept - on hold d/t COVID19 illness    Tacrolimus level repeat is 14 at 9:40 am which is high and probably NOT accurate  - PLEASE HOLD PROGRAF AND REPEAT LEVEL IN THE MORNING - IF <8, may resume 1 mg q12  # HFrEF 10/2021 TTE - severe LV and RV systolic dysfunction and PAH / seen by Dr. De La Fuente, EF ~45%  # Proteinuria 8.4 g initially responded well to Prograf with reduction of proteinuria  to<1 g/g as per office records  - cont po sodium bicarb  - no hydronephrosis on CTAP  plan for Brain MRI

## 2022-07-19 NOTE — CHART NOTE - NSCHARTNOTEFT_GEN_A_CORE
Lab called resident on call for PTT value 87.9. Per heparin drip order value was within target range, so will continue at this rate.

## 2022-07-19 NOTE — PROGRESS NOTE ADULT - SUBJECTIVE AND OBJECTIVE BOX
ANDRIA TAVAREZ 82y Male  MRN#: [id number]   Hospital Day: 14d    HPI:  82M with PMHx of HFrEF, Afib on eliquis, CAD s/p CABG, COPD (on rare home O2 PRN), CKD3, HTN, HLD presents for 2 weeks of watery diarrhea. Pt reports that 2 weeks ago he went to a doctor's appointment with his wife, and the next day both of them started experiencing non-bloody watery diarrhea, x3-4/day, and then started having decreased PO intake and felt weak. Of note, he also had a mechanical fall 1 week ago on his right side with no injuries sustained (but he didn't visit the ED). Pt denies any other sicks contacts, recent travel, vomiting, chest pain, SOB, palpitations, light-headedness, or any  symptoms.     In the ED: /57, HR 57, Temp 97.6F, satting 96% on RA. Labs notable for Cr of 3.6 (baseline ~2.0), , HCO3 19, AG 20. Trops 0.07 (likely 2/2 BRENDA), no chest pain, EKG 1st-degree AV block. CTH -ve, CTAP + chest -ve for acute pathology.     Admitted to medicine (2022 21:23)      SUBJECTIVE  Patient is a 82y old Male who presents with a chief complaint of Diarrhea (2022 22:03)  Currently admitted to medicine with the primary diagnosis of Acute kidney injury superimposed on CKD      INTERVAL HPI AND OVERNIGHT EVENTS:  Patient was examined and seen at bedside. This morning he is again not responding verbally. He tracks with his eyes slightly but not fully. He is shaking less than yesterday.     REVIEW OF SYMPTOMS:  CONSTITUTIONAL: No weakness, fevers or chills; No headaches  EYES: No visual changes, eye pain, or discharge  ENT: No vertigo; No ear pain or change in hearing; No sore throat or difficulty swallowing  NECK: No pain or stiffness  RESPIRATORY: No cough, wheezing, or hemoptysis; No shortness of breath  CARDIOVASCULAR: No chest pain or palpitations  GASTROINTESTINAL: No abdominal or epigastric pain; No nausea, vomiting, or hematemesis; No diarrhea or constipation; No melena or hematochezia  GENITOURINARY: No dysuria, frequency or hematuria  MUSCULOSKELETAL: No joint pain, no muscle pain, no weakness  NEUROLOGICAL: No numbness or weakness  SKIN: No itching or rashes    OBJECTIVE  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 1000 milliLiter(s) IV Continuous <Continuous>  dextrose 5%. 1000 milliLiter(s) IV Continuous <Continuous>  dextrose 50% Injectable 25 Gram(s) IV Push once  dextrose 50% Injectable 12.5 Gram(s) IV Push once  dextrose 50% Injectable 25 Gram(s) IV Push once  glucagon  Injectable 1 milliGRAM(s) IntraMuscular once  heparin  Infusion.  Unit(s)/Hr IV Continuous <Continuous>  insulin glargine Injectable (LANTUS) 18 Unit(s) SubCutaneous at bedtime  insulin lispro (ADMELOG) corrective regimen sliding scale   SubCutaneous three times a day before meals  insulin lispro Injectable (ADMELOG) 6 Unit(s) SubCutaneous three times a day before meals  levothyroxine 150 MICROGram(s) Oral daily  LORazepam     Tablet 0.5 milliGRAM(s) Oral once  metoprolol succinate ER 50 milliGRAM(s) Oral daily  multivitamin/minerals 1 Tablet(s) Oral daily  pentoxifylline 400 milliGRAM(s) Oral two times a day  sodium bicarbonate 650 milliGRAM(s) Oral every 8 hours  sodium chloride 0.9%. 1000 milliLiter(s) IV Continuous <Continuous>  tacrolimus 1 milliGRAM(s) Oral every 12 hours    PRN MEDICATIONS  acetaminophen     Tablet .. 650 milliGRAM(s) Oral every 6 hours PRN  ALBUTerol    90 MICROgram(s) HFA Inhaler 1 Puff(s) Inhalation every 6 hours PRN  dextrose Oral Gel 15 Gram(s) Oral once PRN  melatonin 5 milliGRAM(s) Oral at bedtime PRN      VITAL SIGNS: Last 24 Hours  T(C): 35.9 (2022 04:56), Max: 36.6 (2022 14:57)  T(F): 96.6 (2022 04:56), Max: 97.9 (2022 14:57)  HR: 86 (2022 04:56) (76 - 107)  BP: 136/89 (2022 04:56) (136/89 - 181/91)  BP(mean): --  RR: 20 (2022 04:56) (19 - 20)  SpO2: 96% (2022 14:08) (96% - 96%)    LABS:                        10.1   17.05 )-----------( 235      ( 2022 09:00 )             29.5     07-19    146  |  111<H>  |  80<HH>  ----------------------------<  219<H>  4.4   |  19  |  1.6<H>    Ca    8.7      2022 09:00  Mg     2.8     07-19      PTT - ( 2022 09:00 )  PTT:124.8 sec  Urinalysis Basic - ( 2022 16:20 )    Color: Yellow / Appearance: Clear / S.015 / pH: x  Gluc: x / Ketone: Negative  / Bili: Negative / Urobili: <2 mg/dL   Blood: x / Protein: 100 mg/dL / Nitrite: Negative   Leuk Esterase: Negative / RBC: 51 /HPF / WBC 6 /HPF   Sq Epi: x / Non Sq Epi: 1 /HPF / Bacteria: Negative            Culture - Blood (collected 2022 22:53)  Source: .Blood None  Preliminary Report (2022 07:01):    No growth to date.          RADIOLOGY:      PHYSICAL EXAM:  CONSTITUTIONAL: No acute distress, AAOx0  HEAD: Atraumatic, normocephalic  EYES: EOM intact, PERRLA, conjunctiva and sclera clear  ENT: Supple, no masses, no thyromegaly, no bruits, no JVD; moist mucous membranes  PULMONARY: Clear to auscultation bilaterally; no wheezes, rales, or rhonchi  CARDIOVASCULAR: Regular rate and rhythm; no murmurs, rubs, or gallops  GASTROINTESTINAL: Soft, non-tender, non-distended; bowel sounds present  MUSCULOSKELETAL: 2+ peripheral pulses; no clubbing, no cyanosis, no edema  NEUROLOGY: non-focal  SKIN: No rashes or lesions; warm and dry    ASSESSMENT & PLAN  # encephalopathy likely metab 2/2 COVID 19 and/or PNA  Patient delirious on   EEG and CT head w/o contrast done  CT head - no acute pathology  EEG- Consistent with diffuse cerebral electrophysiological dysfunction, Secondary to toxic metabolic cause.  Tacrolimus level appreicated- nephrology suggests tacro not the cause of AMS  MRI Head w/o IV contrast ordered  Video EEG if negative    # BRENDA on CKD III, presumed pre-renal; hx C3 glomerulopathy  base Cr 2.1  improved with IVF  Pr:Cr ratio 1.0  continue with NHCO3 PO  Renal - continue tacrolimus ordered    # COVID +  On supportive treatment   on Dexa 6mg PO q6  d-dimer 454, ESR 38, CRP 25.9, ferr 1415  completed quarantine 7/15 - d/bg precautions     # hyponatremia - likely 2/2 salt depletion  Na 134 on    resolving  Yunior <20, U osm 414, Uric acid 10.3  Will continue to monitor  - continue Lasix 40mg PO once daily      # COPD; severe pulm HTN; fibrotic changes LLL; + fever  -resolved    # normo Anemia; no signs of acute blood loss  Patient on eliquis    # Fall, mechanical  no syncope per pt  cth neg  trauma w/u neg  PT eval for SNF    # Elevated cardiac enzymes  improved from prior Oct 2021 levels  ekg noted  no cp  outpt f/u cards    # HFrEF, chronic; sev dilated CM; RV dysfxn; mild TR/AR; HTN  euvolemic on exam  not on ANY goal directed tx - cardio eval Dr Haley  Patient started on metoprolol 25mg q 24 PO    # AFib, chronic  -switched to heparin drip in case of LP  rate control with metoprolol 25mg q24    # CAD  agree w/ no asa    # DLD  on statin    # DM2  diabetic diet  FS qac/hs  lantus 21 HS  humalog 7 tid w/ +1 scale    # Hypothyroidism  synthroid 150    # PVD  c/w pentoxifylline   # DVT ppx:  on heparin  # Activity: need PT eval  pending- MRI head, cause of AMS

## 2022-07-19 NOTE — PROGRESS NOTE ADULT - SUBJECTIVE AND OBJECTIVE BOX
Nephrology progress note    Patient is seen and examined, events over the last 24 h noted .  MS remains altered  Twitching   on NS 60 cc/hr  Allergies:  Ozempic (0.25 mg or 0.5 mg dose) (Hives; Rash)  streptomycin (Unknown)  Trulicity Pen (Hives; Rash)    Hospital Medications:   MEDICATIONS  (STANDING):  ampicillin  IVPB      ampicillin  IVPB 1 Gram(s) IV Intermittent every 8 hours  atorvastatin 40 milliGRAM(s) Oral at bedtime  calcitriol   Capsule 0.25 MICROGram(s) Oral <User Schedule>  dextrose 5%. 1000 milliLiter(s) (100 mL/Hr) IV Continuous <Continuous>  dextrose 5%. 1000 milliLiter(s) (50 mL/Hr) IV Continuous <Continuous>  dextrose 50% Injectable 25 Gram(s) IV Push once  dextrose 50% Injectable 12.5 Gram(s) IV Push once  dextrose 50% Injectable 25 Gram(s) IV Push once  glucagon  Injectable 1 milliGRAM(s) IntraMuscular once  heparin  Infusion.  Unit(s)/Hr (18 mL/Hr) IV Continuous <Continuous>  insulin glargine Injectable (LANTUS) 18 Unit(s) SubCutaneous at bedtime  insulin lispro (ADMELOG) corrective regimen sliding scale   SubCutaneous three times a day before meals  insulin lispro Injectable (ADMELOG) 6 Unit(s) SubCutaneous three times a day before meals  levothyroxine 150 MICROGram(s) Oral daily  LORazepam     Tablet 0.5 milliGRAM(s) Oral once  metoprolol succinate ER 50 milliGRAM(s) Oral daily  multivitamin/minerals 1 Tablet(s) Oral daily  pentoxifylline 400 milliGRAM(s) Oral two times a day  sodium bicarbonate 650 milliGRAM(s) Oral every 8 hours  sodium chloride 0.9%. 1000 milliLiter(s) (60 mL/Hr) IV Continuous <Continuous>  tacrolimus 1 milliGRAM(s) Oral every 12 hours        VITALS:  T(F): 96.9 (22 @ 14:42), Max: 96.9 (22 @ 14:42)  HR: 90 (22 @ 14:42)  BP: 139/69 (22 @ 14:42)  RR: 18 (22 @ 14:42)  SpO2: --  Wt(kg): --    07-17 @ 07:01  -   @ 07:00  --------------------------------------------------------  IN: 0 mL / OUT: 1600 mL / NET: -1600 mL     @ 07:  -   @ 07:00  --------------------------------------------------------  IN: 0 mL / OUT: 900 mL / NET: -900 mL          PHYSICAL EXAM:  Constitutional: NAD  HEENT: anicteric sclera, dry mucosa  Neck: No JVD  Respiratory: CTA  Cardiovascular: S1, S2, RRR  Gastrointestinal: BS+, soft, NT/ND  Extremities:  No peripheral edema  Neurological: Awake alert twitching  : No CVA tenderness. No davalos.   Skin: No rashes  Vascular Access:    LABS:      146  |  111<H>  |  80<HH>  ----------------------------<  219<H>  4.4   |  19  |  1.6<H>    Ca    8.7      2022 09:00  Mg     2.8                                 10.1   17.05 )-----------( 235      ( 2022 09:00 )             29.5       Urine Studies:  Urinalysis Basic - ( 2022 16:20 )    Color: Yellow / Appearance: Clear / S.015 / pH:   Gluc:  / Ketone: Negative  / Bili: Negative / Urobili: <2 mg/dL   Blood:  / Protein: 100 mg/dL / Nitrite: Negative   Leuk Esterase: Negative / RBC: 51 /HPF / WBC 6 /HPF   Sq Epi:  / Non Sq Epi: 1 /HPF / Bacteria: Negative      Creatinine, Random Urine: 84 mg/dL ( @ 18:33)  Protein/Creatinine Ratio Calculation: 1.0 Ratio ( @ 18:33)  Osmolality, Random Urine: 414 mos/kg ( @ 18:33)  Sodium, Random Urine: <20.0 mmoL/L ( @ 18:33)    RADIOLOGY & ADDITIONAL STUDIES:

## 2022-07-19 NOTE — PROGRESS NOTE ADULT - ASSESSMENT
# encephalopathy waxing and waning; remains worse x2 days  initial CTH: mild chr micro changes; mild atrophy and ventricular  prom  MS was better 7/14 and now worsening  now suspect toxic encephalopathy from meds (cefepime + levo or tacrolimus and/or steroid psychosis)  might still have element of neural COVID 19, but pt was improving at some point  pt might have infectious cause of MS change w/ rising WBC off steroids -> hold eliquis in case LP is needed  WBC rising - bld cx x2: negative  send rpt U/A and UCx (recent UCx grew E Fecalis - VSE; was suscept to levo)  abx: ampicillin 1gm iv q8 (renal dose) for E Fecalis in urine   ID eval pending  f/u tacro lvl: pending (though, wife said pt has tolerated tacro 1mg po q12 for 3 yrs w/out issue)  d/c dexameth  d/c cefepime; d/c levo  EEG: triphasic waves; mild-mod gen slowing  rpt CT H NC: no acute path  obtain MRI B NC  if MRI neg, consider VEEG (f/u w/ neuro)  LFTs are nl  neuro eval - Dr Andrews     # BRENDA on CKD III, presumed pre-renal; hx C3 glomerulopathy w/ proteinuria (up to 8.4 gm/day in past - responded well to immunosupp); metab acidosis  base Cr 2.1  improved with IVF  rising BUN could be steroid effect (on dexameth)  renal eval: f/u noted -  tacro lvl is normal at 14.4  c/w calcitriol   c/w tacro 1mg po q9AM and q9PM  c/w NaHCO3 650mg po q8 (acidosis improving)    # hyponatremia - likely 2/2 salt depletion (was on lasix, had diarrhea, poor oral intake recently) and less likely lung issues (SIADH)  renal noted  on fluids    # normo anemia; no signs of acute blood loss  anemia of chr dz (CKD)  keep hgb >8  on aranesp w/ renal - f/u renal eval    # COVID + ?some element of neural COVID   acute on chr hypoxic resp failure (pt on home O2 prn)  covid +: 7/5; 7/10 - no further testing needed  completed quarantine 7/5-7/15, d/c precautions   pt was NOT tx'd initially for COVID   was beyond tx window for MABs  RDV relative CI at this GFR anyway  d/c dexameth   c/w NC O2 - stable  pulm eval  d-dimer 454, ESR 38, CRP 25.9, ferr 1415    # COPD; severe pulm HTN; fibrotic changes LLL; + fever  was not sure if prior fever was residual for COVID or developing LLL PNA  CXR: LLL infil looked worse to me  rpt CXR: bilat infil L>R  pulm eval  cont NC O2  abx: completed 7 days of abx (7/11-7/18)  procal 0.38  U Strp/Leg Ag: neg  MRSA nares: neg  c/w proventil prn    # diarrhea - resolved  c diff testing: neg  not on laxatives    # Nutrition--on puree diet  # HFrEF, chronic; sev dilated CM; RV dysfxn; mild TR/AR; HTN  not on all goal directed tx - cardio eval Dr Witt - (per cardio, EF closer to 45%)  BB - incr toprol xl 50mg po q24 (consider metoprolol 5mg iv q6 if cannot take meds)  not on ACEI/ARB 2/2 CKD/BRENDA (could consider hydral + nitrates, but only if BP good enough)  diuretics - lasix 40mg po q24on hold    # AFib, chronic  rate controlled - on BB  HOLD eliquis (in case LP needed)  bridging w/ hep gtt in 24-48 hrs (as Eliquis wears off)    # CAD  agree w/ no asa    # Elevated cardiac enzymes  improved from prior Oct 2021 levels  ekg noted  no cp  outpt f/u cards    # DM 2  diabetic diet  FS qac/hs (or q6 if not eating)   lantus 18 HS   ademelog 6 tid w/ +1 scale - HOLD if not eating    # PVD  c/w pentoxifylline     # DLD  on statin    # Fall, mechanical  no syncope per pt  CTH neg  trauma w/u neg  PT eval for SNF    # Hypothyroidism  synthroid 150mg q24    # DVT ppx: on heparin iv    # GI ppx: none    # Activity: need PT eval    pending MRI head and LP-- repeat covid as may be negative to facilitate process-- called MRI-- likely to be done AM # encephalopathy waxing and waning; remains worse x2 days  initial CTH: mild chr micro changes; mild atrophy and ventricular  prom  MS was better 7/14 and now worsening  now suspect toxic encephalopathy from meds (cefepime + levo or tacrolimus and/or steroid psychosis)  might still have element of neural COVID 19, but pt was improving at some point  pt might have infectious cause of MS change w/ rising WBC off steroids -> hold eliquis in case LP is needed  WBC rising - bld cx x2: negative  send rpt U/A and UCx (recent UCx grew E Fecalis - VSE; was suscept to levo)  abx: ampicillin 1gm iv q8 (renal dose) for E Fecalis in urine   ID eval pending  f/u tacro lvl: pending (though, wife said pt has tolerated tacro 1mg po q12 for 3 yrs w/out issue)  d/c dexameth  d/c cefepime; d/c levo  EEG: triphasic waves; mild-mod gen slowing  rpt CT H NC: no acute path  obtain MRI B NC  if MRI neg, consider VEEG (f/u w/ neuro)  LFTs are nl  neuro eval - Dr Andrews     # BRENDA on CKD III, presumed pre-renal; hx C3 glomerulopathy w/ proteinuria (up to 8.4 gm/day in past - responded well to immunosupp); metab acidosis  base Cr 2.1  improved with IVF  rising BUN could be steroid effect (on dexameth)  renal eval: f/u noted -  tacro lvl is 14.4-- hold tacrolimus at repeat level;AM  c/w calcitriol   c/w tacro 1mg po q9AM and q9PM  c/w NaHCO3 650mg po q8 (acidosis improving)    # hyponatremia now hypernatremic-- fluid changed to 1/2 NS  renal noted  on fluids    # normo anemia; no signs of acute blood loss  anemia of chr dz (CKD)  keep hgb >8  on aranesp w/ renal - f/u renal eval    # COVID + ?some element of neural COVID   acute on chr hypoxic resp failure (pt on home O2 prn)  covid +: 7/5; 7/10 - no further testing needed  completed quarantine 7/5-7/15, d/c precautions   pt was NOT tx'd initially for COVID   was beyond tx window for MABs  RDV relative CI at this GFR anyway  d/c dexameth   c/w NC O2 - stable  pulm eval  d-dimer 454, ESR 38, CRP 25.9, ferr 1415    # COPD; severe pulm HTN; fibrotic changes LLL; + fever  was not sure if prior fever was residual for COVID or developing LLL PNA  CXR: LLL infil looked worse to me  rpt CXR: bilat infil L>R  pulm eval  cont NC O2  abx: completed 7 days of abx (7/11-7/18)  procal 0.38  U Strp/Leg Ag: neg  MRSA nares: neg  c/w proventil prn    # diarrhea - resolved  c diff testing: neg  not on laxatives    # Nutrition--on puree diet  # HFrEF, chronic; sev dilated CM; RV dysfxn; mild TR/AR; HTN  not on all goal directed tx - cardio eval Dr Witt - (per cardio, EF closer to 45%)  BB - incr toprol xl 50mg po q24 (consider metoprolol 5mg iv q6 if cannot take meds)  not on ACEI/ARB 2/2 CKD/BRENDA (could consider hydral + nitrates, but only if BP good enough)  diuretics - lasix 40mg po q24on hold    # AFib, chronic  rate controlled - on BB  HOLD eliquis (in case LP needed)  bridging w/ hep gtt in 24-48 hrs (as Eliquis wears off)    # CAD  agree w/ no asa    # Elevated cardiac enzymes  improved from prior Oct 2021 levels  ekg noted  no cp  outpt f/u cards    # DM 2  diabetic diet  FS qac/hs (or q6 if not eating)   lantus 18 HS   ademelog 6 tid w/ +1 scale - HOLD if not eating    # PVD  c/w pentoxifylline     # DLD  on statin    # Fall, mechanical  no syncope per pt  CTH neg  trauma w/u neg  PT eval for SNF    # Hypothyroidism  synthroid 150mg q24    # DVT ppx: on heparin iv    # GI ppx: none    # Activity: need PT eval    pending MRI head and LP-- repeat covid as may be negative to facilitate process-- called MRI-- likely to be done AM

## 2022-07-19 NOTE — PROGRESS NOTE ADULT - SUBJECTIVE AND OBJECTIVE BOX
SUBJECTIVE:    Patient is a 82y old Male who presents with a chief complaint of Diarrhea (2022 11:31)    Currently admitted to medicine with the primary diagnosis of Acute kidney injury superimposed on CKD       Today is hospital day 14d.     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 1000 milliLiter(s) IV Continuous <Continuous>  dextrose 5%. 1000 milliLiter(s) IV Continuous <Continuous>  dextrose 50% Injectable 25 Gram(s) IV Push once  dextrose 50% Injectable 12.5 Gram(s) IV Push once  dextrose 50% Injectable 25 Gram(s) IV Push once  glucagon  Injectable 1 milliGRAM(s) IntraMuscular once  heparin  Infusion.  Unit(s)/Hr IV Continuous <Continuous>  insulin glargine Injectable (LANTUS) 18 Unit(s) SubCutaneous at bedtime  insulin lispro (ADMELOG) corrective regimen sliding scale   SubCutaneous three times a day before meals  insulin lispro Injectable (ADMELOG) 6 Unit(s) SubCutaneous three times a day before meals  levothyroxine 150 MICROGram(s) Oral daily  LORazepam     Tablet 0.5 milliGRAM(s) Oral once  metoprolol succinate ER 50 milliGRAM(s) Oral daily  multivitamin/minerals 1 Tablet(s) Oral daily  pentoxifylline 400 milliGRAM(s) Oral two times a day  sodium bicarbonate 650 milliGRAM(s) Oral every 8 hours  sodium chloride 0.9%. 1000 milliLiter(s) IV Continuous <Continuous>  tacrolimus 1 milliGRAM(s) Oral every 12 hours    PRN MEDICATIONS  acetaminophen     Tablet .. 650 milliGRAM(s) Oral every 6 hours PRN  ALBUTerol    90 MICROgram(s) HFA Inhaler 1 Puff(s) Inhalation every 6 hours PRN  dextrose Oral Gel 15 Gram(s) Oral once PRN  melatonin 5 milliGRAM(s) Oral at bedtime PRN    VITALS:   T(F): 96.9  HR: 90  BP: 139/69  RR: 18  SpO2: --    LABS:                        10.1   17.05 )-----------( 235      ( 2022 09:00 )             29.5     07-19    146  |  111<H>  |  80<HH>  ----------------------------<  219<H>  4.4   |  19  |  1.6<H>    Ca    8.7      2022 09:00  Mg     2.8     07-19      PTT - ( 2022 12:22 )  PTT:98.2 sec  Urinalysis Basic - ( 2022 16:20 )    Color: Yellow / Appearance: Clear / S.015 / pH: x  Gluc: x / Ketone: Negative  / Bili: Negative / Urobili: <2 mg/dL   Blood: x / Protein: 100 mg/dL / Nitrite: Negative   Leuk Esterase: Negative / RBC: 51 /HPF / WBC 6 /HPF   Sq Epi: x / Non Sq Epi: 1 /HPF / Bacteria: Negative            Culture - Blood (collected 2022 22:53)  Source: .Blood None  Preliminary Report (2022 07:01):    No growth to date.          RADIOLOGY:    PHYSICAL EXAM:  GEN: No acute distress  LUNGS: Clear to auscultation bilaterally   HEART: S1/S2 present. RRR.   ABD/ GI: Soft, non-tender, non-distended. Bowel sounds present  EXT: NC/NC/NE/2+PP/SMALL  NEURO: Awake follows commands but unable to raise limbs against gravity

## 2022-07-20 NOTE — PROGRESS NOTE ADULT - SUBJECTIVE AND OBJECTIVE BOX
Nephrology Progress Note    ANDRIA TAVAREZ  MRN-[id number]  82y  Male    S:  Patient is seen and examined, events over the last 24h noted.    O:  Allergies:  Ozempic (0.25 mg or 0.5 mg dose) (Hives; Rash)  streptomycin (Unknown)  Trulicity Pen (Hives; Rash)    Hospital Medications:   MEDICATIONS  (STANDING):  ampicillin  IVPB 1 Gram(s) IV Intermittent every 8 hours  atorvastatin 40 milliGRAM(s) Oral at bedtime  calcitriol   Capsule 0.25 MICROGram(s) Oral <User Schedule>  heparin  Infusion. 1600 Unit(s)/Hr (16 mL/Hr) IV Continuous <Continuous>  insulin glargine Injectable (LANTUS) 18 Unit(s) SubCutaneous at bedtime  insulin lispro (ADMELOG) corrective regimen sliding scale   SubCutaneous three times a day before meals  insulin lispro Injectable (ADMELOG) 6 Unit(s) SubCutaneous three times a day before meals  levothyroxine 150 MICROGram(s) Oral daily  LORazepam     Tablet 0.5 milliGRAM(s) Oral once  metoprolol succinate ER 50 milliGRAM(s) Oral daily  multivitamin/minerals 1 Tablet(s) Oral daily  pentoxifylline 400 milliGRAM(s) Oral two times a day  sodium bicarbonate 650 milliGRAM(s) Oral every 8 hours  sodium chloride 0.45%. 1000 milliLiter(s) (60 mL/Hr) IV Continuous <Continuous>    MEDICATIONS  (PRN):  acetaminophen     Tablet .. 650 milliGRAM(s) Oral every 6 hours PRN Temp greater or equal to 38C (100.4F), Mild Pain (1 - 3)  ALBUTerol    90 MICROgram(s) HFA Inhaler 1 Puff(s) Inhalation every 6 hours PRN Bronchospasm  dextrose Oral Gel 15 Gram(s) Oral once PRN Blood Glucose LESS THAN 70 milliGRAM(s)/deciliter  melatonin 5 milliGRAM(s) Oral at bedtime PRN Insomnia    Home Medications:  Aranesp 100 mcg/0.5 mL injectable solution: 1 dose(s) injectable every 3 to 4 weeks (2022 21:22)  atorvastatin 40 mg oral tablet: 1 tab(s) orally once a day (at bedtime) (2022 21:22)  calcitriol 0.25 mcg oral capsule: 1 cap(s) orally 2 times a week (:)  d-mycophenolate: 500 milligram(s) orally 4 times a day (:)  Eliquis 2.5 mg oral tablet: 1 tab(s) orally 2 times a day (:)  famotidine 20 mg oral tablet: 1 tab(s) orally once a day (:)  isosorbide mononitrate 30 mg oral tablet, extended release: 1 tab(s) orally once a day (in the morning) (:)  Lantus 100 units/mL subcutaneous solution: 30 unit(s) subcutaneous once a day (at bedtime) (:)  levothyroxine 150 mcg (0.15 mg) oral tablet: 1 tab(s) orally once a day (:)  metoprolol tartrate 25 mg oral tablet: 1 tab(s) orally 2 times a day (:)  NIFEdipine 30 mg oral tablet, extended release: 1 tab(s) orally once a day (:)  pentoxifylline 400 mg oral tablet, extended release: 1 tab(s) orally 2 times a day (:)  tacrolimus 1 mg oral capsule: 1 cap(s) orally every 12 hours (:)      VITALS:  T(F): 97.8 (22 @ 04:53), Max: 97.8 (22 @ 04:53)  HR: 82 (22 @ 04:53)  BP: 145/67 (22 @ 04:53)  RR: 18 (22 @ 04:53)  SpO2: 95% (22 @ 19:51)  Wt(kg): --  I&O's Detail    2022 07:01  -  2022 07:00  --------------------------------------------------------  IN:  Total IN: 0 mL    OUT:    Incontinent per Condom Catheter (mL): 600 mL  Total OUT: 600 mL    Total NET: -600 mL        I&O's Summary    2022 07:01  -  2022 07:00  --------------------------------------------------------  IN: 0 mL / OUT: 600 mL / NET: -600 mL          PHYSICAL EXAM:  Gen: NAD  Resp: b/l breath sounds  Card: S1/S2  Abd: soft  Extremities: no edema      LABS:          146  |  111<H>  |  80<HH>  ----------------------------<  219<H>  4.4   |  19  |  1.6<H>    Ca    8.7      2022 09:00  Mg     2.8           eGFR: 43 mL/min/1.73m2 (22 @ 09:00)  eGFR: 35 mL/min/1.73m2 (22 @ 09:38)    Phosphorus Level, Serum: 2.0 mg/dL (22 @ 07:51)  Phosphorus Level, Serum: 2.0 mg/dL (07-10-22 @ 04:30)    Vitamin D, 25-Hydroxy: 27 ng/mL (22 @ 07:39)  Intact PTH: 93 pg/mL (22 @ 07:39)                          9.0    15.62 )-----------( 261      ( 2022 04:30 )             26.9     Mean Cell Volume: 88.2 fL (22 @ 04:30)    Ferritin, Serum: 1415 ng/mL (22 @ 08:58)  Iron Total, Serum: 70 ug/dL (10-21-21 @ 11:25)      Urine Studies:  Urinalysis Basic - ( 2022 16:20 )    Color: Yellow / Appearance: Clear / S.015 / pH:   Gluc:  / Ketone: Negative  / Bili: Negative / Urobili: <2 mg/dL   Blood:  / Protein: 100 mg/dL / Nitrite: Negative   Leuk Esterase: Negative / RBC: 51 /HPF / WBC 6 /HPF   Sq Epi:  / Non Sq Epi: 1 /HPF / Bacteria: Negative      Urea Nitrogen,  Random Urine: 637 mg/dL (10-21-21 @ 01:00)    Culture Results:   No growth ( @ 16:20)  Culture Results:   No growth to date. ( @ 09:47)    Creatinine trend:  Creatinine, Serum: 1.6 mg/dL (22 @ 09:00)  Creatinine, Serum: 1.9 mg/dL (22 @ 09:38)  Creatinine, Serum: 1.8 mg/dL (22 @ 08:09)  Creatinine, Serum: 1.8 mg/dL (07-15-22 @ 09:26)  Creatinine, Serum: 1.8 mg/dL (22 @ 09:01)  Creatinine, Serum: 1.9 mg/dL (22 @ 08:58)  Creatinine, Serum: 1.9 mg/dL (22 @ 07:31)    Sodium, Serum: 146 mmol/L (22 @ 09:00)  Sodium, Serum: 136 mmol/L (22 @ 09:38)  Sodium, Serum: 132 mmol/L (22 @ 08:09)  Sodium, Serum: 127 mmol/L (07-15-22 @ 09:26)  Sodium, Serum: 134 mmol/L (22 @ 09:01)  Sodium, Serum: 128 mmol/L (22 @ 08:58)  Sodium, Serum: 133 mmol/L (22 @ 07:31)    Osmolality, Random Urine: 414 mos/kg [50 - 1200] (22 @ 18:33)  Sodium, Random Urine: <20.0 mmoL/L (22 @ 18:33)  Thyroid Stimulating Hormone, Serum: 0.39 uIU/mL [0.27 - 4.20] (22 @ 17:42)

## 2022-07-20 NOTE — PROGRESS NOTE ADULT - SUBJECTIVE AND OBJECTIVE BOX
Neurology Progress Note    Interval History:    Patient seen and examined at bedside. There were no acute events overnight. Patient remains minimally verbal.      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 650 milliGRAM(s) Oral every 6 hours PRN  ALBUTerol    90 MICROgram(s) HFA Inhaler 1 Puff(s) Inhalation every 6 hours PRN  atorvastatin 40 milliGRAM(s) Oral at bedtime  calcitriol   Capsule 0.25 MICROGram(s) Oral <User Schedule>  dextrose 5%. 1000 milliLiter(s) IV Continuous <Continuous>  dextrose 5%. 1000 milliLiter(s) IV Continuous <Continuous>  dextrose 50% Injectable 25 Gram(s) IV Push once  dextrose 50% Injectable 12.5 Gram(s) IV Push once  dextrose 50% Injectable 25 Gram(s) IV Push once  dextrose Oral Gel 15 Gram(s) Oral once PRN  glucagon  Injectable 1 milliGRAM(s) IntraMuscular once  heparin  Infusion. 1600 Unit(s)/Hr IV Continuous <Continuous>  insulin glargine Injectable (LANTUS) 18 Unit(s) SubCutaneous at bedtime  insulin lispro (ADMELOG) corrective regimen sliding scale   SubCutaneous three times a day before meals  insulin lispro Injectable (ADMELOG) 6 Unit(s) SubCutaneous three times a day before meals  levothyroxine 150 MICROGram(s) Oral daily  LORazepam     Tablet 0.5 milliGRAM(s) Oral once  melatonin 5 milliGRAM(s) Oral at bedtime PRN  metoprolol succinate ER 50 milliGRAM(s) Oral daily  multivitamin/minerals 1 Tablet(s) Oral daily  pentoxifylline 400 milliGRAM(s) Oral two times a day  sodium bicarbonate 650 milliGRAM(s) Oral every 8 hours      Vital Signs Last 24 Hrs  T(C): 36.2 (20 Jul 2022 14:26), Max: 36.6 (20 Jul 2022 04:53)  T(F): 97.1 (20 Jul 2022 14:26), Max: 97.8 (20 Jul 2022 04:53)  HR: 88 (20 Jul 2022 14:26) (82 - 98)  BP: 138/71 (20 Jul 2022 14:26) (138/71 - 152/67)  RR: 18 (20 Jul 2022 14:26) (18 - 18)  SpO2: 95% (19 Jul 2022 19:51) (95% - 95%)    Parameters below as of 19 Jul 2022 19:51  Patient On (Oxygen Delivery Method): room air      Neurologic Exam:  Mental Status: Patient is awake, confused. Oriented to self only, AAOx1. Not following commands. Minimally verbal, stating his name is Jose.  Cranial Nerves: Eyes midline at primary position, responds to visual threat, not verbal , has a right facial asymmetry.   Motor: Moving UE/LE minimally, UE asterixis and twitching of UE at rest.  Sensory exam: Responding to pain in all 4 extremities.   Coordination: Unable to assess.  Gait: Deferred.      Labs:  CBC Full  -  ( 20 Jul 2022 04:30 )  WBC Count : 15.62 K/uL  RBC Count : 3.05 M/uL  Hemoglobin : 9.0 g/dL  Hematocrit : 26.9 %  Platelet Count - Automated : 261 K/uL  Mean Cell Volume : 88.2 fL  Mean Cell Hemoglobin : 29.5 pg  Mean Cell Hemoglobin Concentration : 33.5 g/dL      07-20    147<H>  |  115<H>  |  77<HH>  ----------------------------<  219<H>  4.1   |  22  |  1.8<H>    Ca    8.2<L>      20 Jul 2022 04:30  Mg     2.6     07-20    PTT - ( 20 Jul 2022 04:30 )  PTT:55.2 sec    < from: CT Head No Cont (07.16.22 @ 14:33) >  PROCEDURE DATE:  07/16/2022          INTERPRETATION:  CLINICAL INDICATION: Altered mental status    TECHNIQUE: CT of the head was performed without the administration of   intravenous contrast.    COMPARISON: CT head dated 7/5/2022    FINDINGS:    There is stable prominent of the cerebral sulci and fissural spaces   reflecting mild diffuse parenchymal volume loss. There is stable moderate   ventriculomegaly which may be exaggerated by the parenchymal volume loss.    There are scattered patchy low attenuations in the bilateral   periventricular cerebral white matter consistent with stable mild chronic   microvascular ischemic changes.    There is no evidence of acute territorial infarct or intracranial   hemorrhage. There is no space-occupying lesion or midline shift.    There are no extra-axial fluid collections.    Status post bilateral cataract surgery. The imaged portions of the   paranasal sinuses are aerated. The mastoid air cells are aerated. The   visualized soft tissues and osseous structures appear normal.      IMPRESSION:    No acute intracranial pathology, stable exam since 7/5/2022.    Stable mild chronic microvascular ischemic changes.    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    --- End of Report ---    < end of copied text >    < from: EEG (07.16.22 @ 18:00) >  PROCEDURE DATE:  07/16/2022          INTERPRETATION:  Exam Performed on: 16 Channel Digital Routine EEG done   on [employer] recording system.    History  Possible Seizures  delirium, copd, pneumonia, mitch, afib, pvd, aaa, chf, dm, htn    Medications    Medication  Date  eliquis  2022/07/16  lipitor  2022/07/16  rocaltrol  2022/07/16  lasix  2022/07/16  admelog  2022/07/16  levaquin   2022/07/16  synthroid  2022/07/16  toprol  2022/07/16  trental  2022/07/16  prograf  2022/07/16    State  Awake and Drowsy    Symmetry  Symmetric    Organization  Less than optimally organized    PDR  Continuous  Background: 5-6 hz    Generalized Slowing  Yes  mild - moderate    Focal Slowing  No    Breach Artifact:  No    Activation Procedure  Hyper Ventilation  No    Photic Stimulation  No    Epileptiform Activity  No  There are triphasic waves    Events:  No    Impression:  Abnormal due to the presence of: generalized slowing as above      Clinical Correlation & Recommendations  Consistent with diffuse cerebral electrophysiological dysfunction.    Secondary to toxic metabolic cause.      Reviewed by:  Olaf Hodge    Signed by:  Olaf Hodge    --- End of Report ---    < end of copied text >

## 2022-07-20 NOTE — PROGRESS NOTE ADULT - SUBJECTIVE AND OBJECTIVE BOX
82y old Male who presents with a chief complaint of Diarrhea,  admitted to medicine with the primary diagnosis of Acute kidney injury superimposed on CKD and AMS. Pt was consulted by nephrology, neurology and ID. Today VEEG recommended, will hold off with LP ( no LP recommended by ID), pt has no clinical sings of active infection, no Abx recommended, will resume Eliquis. Un fortunately he tested positive for COVID  Today pt is awake, minimally verbal, denies pain, falls asleep during conversation.        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    AF (atrial fibrillation)  s/p DCCV    Carotid stenosis    COPD (chronic obstructive pulmonary disease)    H/O coronary artery bypass surgery      S/P inguinal hernia repair    History of appendectomy      ALLERGIES:  Ozempic (0.25 mg or 0.5 mg dose) (Hives; Rash)  streptomycin (Unknown)  Trulicity Pen (Hives; Rash)    VITALS:   T(C): 36.2 (2022 14:26), Max: 36.6 (2022 04:53)  T(F): 97.1 (2022 14:26), Max: 97.8 (2022 04:53)  HR: 88 (2022 14:26) (82 - 98)  BP: 138/71 (2022 14:26) (138/71 - 152/67)  BP(mean): --  RR: 18 (2022 19:33) (18 - 20)  SpO2: 94% (2022 19:33) (89% - 94%)    Parameters below as of 2022 19:33  Patient On (Oxygen Delivery Method): nasal cannula  O2 Flow (L/min): 3    PHYSICAL EXAM:  GEN: No acute distress, awake, answering simple questions   LUNGS: decreased BS at bases   HEART: S1/S2 present. RRR.   ABD/ GI: Soft, non-tender, non-distended. Bowel sounds present, Gloria cath noted   EXT: NC/NC/NE/2+PP/SMALL  NEURO: Awake follows commands but unable to lift  limbs against gravity, weak     LABS:                                   9.0    15.62 )-----------( 261      ( 2022 04:30 )             26.9   07-20    147<H>  |  115<H>  |  77<HH>  ----------------------------<  219<H>  4.1   |  22  |  1.8<H>    Ca    8.2<L>      2022 04:30  Mg     2.6     07-20  PTT - ( 2022 12:22 )  PTT:98.2 sec  Urinalysis Basic - ( 2022 16:20 )    Color: Yellow / Appearance: Clear / S.015 / pH: x  Gluc: x / Ketone: Negative  / Bili: Negative / Urobili: <2 mg/dL   Blood: x / Protein: 100 mg/dL / Nitrite: Negative   Leuk Esterase: Negative / RBC: 51 /HPF / WBC 6 /HPF   Sq Epi: x / Non Sq Epi: 1 /HPF / Bacteria: Negative            Culture - Blood (collected 2022 22:53)  Source: .Blood None  Preliminary Report (2022 07:01):    No growth to date.    Culture - Urine (22 @ 16:20)   Specimen Source: Catheterized Catheterized   Culture Results:   No growth Culture - Blood in AM (22 @ 09:47)   Specimen Source: .Blood None   Culture Results:   No growth to date. < from: CT Head No Cont (22 @ 14:33) >    IMPRESSION:    No acute intracranial pathology, stable exam since 2022.    Stable mild chronic microvascular ischemic changes.    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    < end of copied text >  < from: Xray Chest 1 View AP/PA (22 @ 05:55) >  Impression:    Bilateral opacifications, left greater than right, without difference.    < end of copied text >  < from: [facility] Duplex Upper Ext Vein Scan, Bilat (22 @ 17:29) >  Impression:    No evidence of deep or superficial thrombosis in the bilateral upper   extremities.    < end of copied text >  < from: TTE Echo Complete w/ Contrast w/ Doppler (10.22.21 @ 07:59) >  Summary:   1. Moderately to severely decreased global left ventricular systolic function.   2. Severely enlarged left atrium.   3. Severely enlarged right atrium.   4. Multiple left ventricular regional wall motion abnormalities exist. See wall motion findings.   5. Mild eccentric left ventricular hypertrophy.   6. The left ventricular diastolic function could not be assessed in this study.   7. Severely enlarged right ventricle.   8. Severely reduced RV systolic function.   9. Mild to moderate mitral valve regurgitation.  10. Moderate mitral annular calcification.  11. Mild tricuspid regurgitation.  12. Mild aortic regurgitation.  13. Sclerotic aortic valve with normal opening.  14. Complex atheroma is noted in the ascending aorta.  15. Estimated pulmonary artery systolic pressure is 64.8 mmHg assuming a right atrial pressure of 15 mmHg, which is consistent with severe pulmonary hypertension.  16. Pulmonary hypertension is present.  17. LA volume Index is 71.9 ml/m² ml/m2.  18. There is moderate aortic root calcification.    < end of copied text >  < from: EEG (22 @ 18:00) >  Impression:  Abnormal due to the presence of: generalized slowing as above      Clinical Correlation & Recommendations  Consistent with diffuse cerebral electrophysiological dysfunction.    Secondary to toxic metabolic cause.    MEDICATIONS  (STANDING):  atorvastatin 40 milliGRAM(s) Oral at bedtime  calcitriol   Capsule 0.25 MICROGram(s) Oral <User Schedule>  dextrose 5%. 1000 milliLiter(s) (50 mL/Hr) IV Continuous <Continuous>  dextrose 5%. 1000 milliLiter(s) (100 mL/Hr) IV Continuous <Continuous>  dextrose 50% Injectable 25 Gram(s) IV Push once  dextrose 50% Injectable 12.5 Gram(s) IV Push once  dextrose 50% Injectable 25 Gram(s) IV Push once  glucagon  Injectable 1 milliGRAM(s) IntraMuscular once  insulin glargine Injectable (LANTUS) 18 Unit(s) SubCutaneous at bedtime  insulin lispro (ADMELOG) corrective regimen sliding scale   SubCutaneous three times a day before meals  insulin lispro Injectable (ADMELOG) 6 Unit(s) SubCutaneous three times a day before meals  levothyroxine 150 MICROGram(s) Oral daily  LORazepam     Tablet 0.5 milliGRAM(s) Oral once  metoprolol succinate ER 50 milliGRAM(s) Oral daily  multivitamin/minerals 1 Tablet(s) Oral daily  pentoxifylline 400 milliGRAM(s) Oral two times a day  sodium bicarbonate 650 milliGRAM(s) Oral every 8 hours  sodium chloride 0.9%. 1000 milliLiter(s) (75 mL/Hr) IV Continuous <Continuous>    MEDICATIONS  (PRN):  acetaminophen     Tablet .. 650 milliGRAM(s) Oral every 6 hours PRN Temp greater or equal to 38C (100.4F), Mild Pain (1 - 3)  ALBUTerol    90 MICROgram(s) HFA Inhaler 1 Puff(s) Inhalation every 6 hours PRN Bronchospasm  dextrose Oral Gel 15 Gram(s) Oral once PRN Blood Glucose LESS THAN 70 milliGRAM(s)/deciliter  melatonin 5 milliGRAM(s) Oral at bedtime PRN Insomnia                RADIOLOGY:

## 2022-07-20 NOTE — CONSULT NOTE ADULT - ASSESSMENT
ASSESSMENT  82M with PMHx of HFrEF, Afib on eliquis, CAD s/p CABG, COPD (on rare home O2 PRN), CKD3, HTN, HLD presents for 2 weeks of watery diarrhea.   ABX  levofloxacin 7/12-18  cefepime 7/11-16  ampicillin 7/18-  Dexamethasone 7/12-16    IMPRESSION  #Leukocytosis   suspect secondary to recent steroid use , no evidence of active infection    7/18 BCX NG    7/18 UCX NG    UA unremarkable Blood: x / Protein: 100 mg/dL / Nitrite: Negative Leuk Esterase: Negative / RBC: 51 /HPF / WBC 6 /HPF Sq Epi: x / Non Sq Epi: 1 /HPF / Bacteria: Negative    7/12 UA unremarkable WBC 6; UCX   10,000 - 49,000 CFU/mL Enterococcus faecalis  #Metabolic encephalopathy  #Diarrhea    Cdiff NEGATIVE   #COVID    COVID-19 PCR: Detected (07-11-22 @ 20:41)    COVID-19 PCR: Detected (07-10-22 @ 20:15)    COVID-19 PCR: Detected (07-05-22 @ 23:05)  #CKD  Creatinine, Serum: 1.8 (07-20-22 @ 04:30)    Weight (kg): 100 (07-05-22 @ 13:03)    RECOMMENDATIONS  This is an incomplete consult note. All final recommendations to follow after interview and examination of the patient. Please follow recommendations noted below.  - Suspect asymptomatic bacteriuria as UA x2 without pyuria  - D10 of antimicrobials- Recommend D/C    If any questions, please call or send a message on [employer] Teams  Please continue to update ID with any pertinent new laboratory or radiographic findings  Spectra [phone]   ASSESSMENT  82M with PMHx of HFrEF, Afib on eliquis, CAD s/p CABG, COPD (on rare home O2 PRN), CKD3, HTN, HLD presents for 2 weeks of watery diarrhea.   ABX  levofloxacin 7/12-18  cefepime 7/11-16  ampicillin 7/18-  Dexamethasone 7/12-16    IMPRESSION  #Leukocytosis   suspect secondary to recent steroid use , no evidence of active infection    7/18 BCX NG    7/18 UCX NG    UA unremarkable Blood: x / Protein: 100 mg/dL / Nitrite: Negative Leuk Esterase: Negative / RBC: 51 /HPF / WBC 6 /HPF Sq Epi: x / Non Sq Epi: 1 /HPF / Bacteria: Negative    7/12 UA unremarkable WBC 6; UCX   10,000 - 49,000 CFU/mL Enterococcus faecalis  #Metabolic encephalopathy     doubt hospital acquired meningitis/encephalitis- no meningeal signs on exam  #Diarrhea    Cdiff NEGATIVE   #COVID    COVID-19 PCR: Detected (07-11-22 @ 20:41)    COVID-19 PCR: Detected (07-10-22 @ 20:15)    COVID-19 PCR: Detected (07-05-22 @ 23:05)  #CKD  Creatinine, Serum: 1.8 (07-20-22 @ 04:30)    Weight (kg): 100 (07-05-22 @ 13:03)    RECOMMENDATIONS  - Suspect asymptomatic bacteriuria as UA x2 without pyuria  - D10 of antimicrobials- Recommend D/C  - Trend WBC    If any questions, please call or send a message on [employer] Teams  Please continue to update ID with any pertinent new laboratory or radiographic findings  Spectra [phone]

## 2022-07-20 NOTE — CONSULT NOTE ADULT - SUBJECTIVE AND OBJECTIVE BOX
ANDRIA TAVAREZ  82y, Male  Allergy: Ozempic (0.25 mg or 0.5 mg dose) (Hives; Rash)  streptomycin (Unknown)  Trulicity Pen (Hives; Rash)      CHIEF COMPLAINT:   Diarrhea (2022 11:31)      LOS  15d    HPI  HPI:  82M with PMHx of HFrEF, Afib on eliquis, CAD s/p CABG, COPD (on rare home O2 PRN), CKD3, HTN, HLD presents for 2 weeks of watery diarrhea. Pt reports that 2 weeks ago he went to a doctor's appointment with his wife, and the next day both of them started experiencing non-bloody watery diarrhea, x3-4/day, and then started having decreased PO intake and felt weak. Of note, he also had a mechanical fall 1 week ago on his right side with no injuries sustained (but he didn't visit the ED). Pt denies any other sicks contacts, recent travel, vomiting, chest pain, SOB, palpitations, light-headedness, or any  symptoms.     In the ED: /57, HR 57, Temp 97.6F, satting 96% on RA. Labs notable for Cr of 3.6 (baseline ~2.0), , HCO3 19, AG 20. Trops 0.07 (likely 2/2 BRENDA), no chest pain, EKG 1st-degree AV block. CTH -ve, CTAP + chest -ve for acute pathology.     Admitted to medicine (2022 21:23)      INFECTIOUS DISEASE HISTORY:  ID consulted for UTI and COVID  Presented with diarrhea, wife also had diarrhea. 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      AF (atrial fibrillation)  s/p DCCV      Carotid stenosis      COPD (chronic obstructive pulmonary disease)      H/O coronary artery bypass surgery        S/P inguinal hernia repair      History of appendectomy          FAMILY HISTORY  FHx: colon cancer        SOCIAL HISTORY  Social History:  Past smoker  No alcohol  No drugs (2022 21:23)        ROS  ***    VITALS:  T(F): 97.8, Max: 97.8 (22 @ 04:53)  HR: 82  BP: 145/67  RR: 18Vital Signs Last 24 Hrs  T(C): 36.6 (2022 04:53), Max: 36.6 (2022 04:53)  T(F): 97.8 (2022 04:53), Max: 97.8 (2022 04:53)  HR: 82 (2022 04:53) (82 - 98)  BP: 145/67 (2022 04:53) (139/69 - 152/67)  BP(mean): --  RR: 18 (2022 04:53) (18 - 18)  SpO2: 95% (2022 19:51) (95% - 95%)    Parameters below as of 2022 19:51  Patient On (Oxygen Delivery Method): room air        PHYSICAL EXAM:  ***    TESTS & MEASUREMENTS:                        9.0    15.62 )-----------( 261      ( 2022 04:30 )             26.9     07-20    147<H>  |  115<H>  |  77<HH>  ----------------------------<  219<H>  4.1   |  22  |  1.8<H>    Ca    8.2<L>      2022 04:30  Mg     2.6     0720          Urinalysis Basic - ( 2022 16:20 )    Color: Yellow / Appearance: Clear / S.015 / pH: x  Gluc: x / Ketone: Negative  / Bili: Negative / Urobili: <2 mg/dL   Blood: x / Protein: 100 mg/dL / Nitrite: Negative   Leuk Esterase: Negative / RBC: 51 /HPF / WBC 6 /HPF   Sq Epi: x / Non Sq Epi: 1 /HPF / Bacteria: Negative        Culture - Urine (collected 22 @ 16:20)  Source: Catheterized Catheterized  Final Report (22 @ 17:31):    No growth    Culture - Blood (collected 22 @ 09:47)  Source: .Blood None  Preliminary Report (22 @ 23:01):    No growth to date.    Culture - Blood (collected 22 @ 22:53)  Source: .Blood None  Preliminary Report (22 @ 07:01):    No growth to date.    Culture - Urine (collected 22 @ 11:47)  Source: Clean Catch Clean Catch (Midstream)  Final Report (07-15-22 @ 22:34):    10,000 - 49,000 CFU/mL Enterococcus faecalis  Organism: Enterococcus faecalis (07-15-22 @ 22:34)  Organism: Enterococcus faecalis (07-15-22 @ 22:34)      -  Ampicillin: S <=2 Predicts results to ampicillin/sulbactam, amoxacillin-clavulanate and  piperacillin-tazobactam.      -  Ciprofloxacin: S <=1      -  Levofloxacin: S <=1      -  Nitrofurantoin: S <=32 Should not be used to treat pyelonephritis.      -  Tetra/Doxy: R >8      -  Vancomycin: S 2      Method Type: EVA    Culture - Blood (collected 22 @ 07:31)  Source: .Blood None  Final Report (22 @ 20:00):    No Growth Final    Culture - Blood (collected 22 @ 11:41)  Source: .Blood None  Final Report (22 @ 22:00):    No Growth Final    Culture - Blood (collected 10-20-21 @ 14:40)  Source: .Blood Blood  Final Report (10-26-21 @ 01:00):    No Growth Final    Culture - Blood (collected 10-20-21 @ 14:30)  Source: .Blood Blood  Final Report (10-26-21 @ 01:00):    No Growth Final    Culture - Urine (collected 10-20-21 @ 12:40)  Source: Clean Catch Clean Catch (Midstream)  Final Report (10-21-21 @ 22:01):    No growth            INFECTIOUS DISEASES TESTING  Legionella Antigen, Urine: Negative (22 @ 18:33)  Streptococcus pneumoniae Ag, Ur Result: Negative (22 @ 18:33)  Clostridium difficile Toxin by PCR: RESULT INTERPRETATION:    Not detected - No Clostridium difficile toxins detected by amplified DNA  PCR.    C. difficile PCR test results should be interpreted only with  consideration of the patient's clinical situation and history.  This test  willdetect the presence of toxigenic C. difficile.  However it cannot be  used as the sole criteria for the diagnosis of antibiotic associated  diarrhea, antibiotic associated colitis, or pseudomembranous colitis.  Colonization with C. difficile may exceed 20% in hospital patients, the  majority of whom are without Toxigenic Clostridium Difficile disease.  Testing is generally not recommended in children below the age of 1 year,  as up to half of healthy infants are asymptomatically colonized with C.  difficile.  In addition, C. difficile PCR testing cannot be used as a  "test of cure" as dead organism nucleic acids will persist and be  detected after treatment.  Successful treatment of C. difficile disease  is determined by resolution of clinical symptoms. Method: CDPCR  TOXIGENIC CLOST.DIFFICILE NEGATIVE;  027-NAP1-B1 PRESUMPTIVE NEGATIVE.  By: Real-Time PCR (Polymerase Chain Reaction)  NOTE: This method detects the Toxin B gene (tCdB), the  binary toxin gene (CDT), and the single-base-pair  deletion at nucleotide 117 within the gene encoding  a negative regulator of toxin production (tcdC 117).  The combined presence of genes encoding Toxin B and  binary toxin and the tcdC 117 deletion has been  associated with hypervirulent C. difficile strain  known as 027/NAP1/B1, which has been associated with  severe disease outbreaks in healthcare facilities  worldwide. (22 @ 11:47)  COVID-19 PCR: Detected (22 @ 20:41)  MRSA PCR Result.: Negative (22 @ 19:50)  Procalcitonin, Serum: 0.38 ng/mL (22 @ 11:41)  COVID-19 PCR: Detected (07-10-22 @ 20:15)  COVID-19 PCR: Detected (22 @ 23:05)  Procalcitonin, Serum: 0.08 ng/mL (10-20-21 @ 20:00)  COVID-19 PCR: NotDetec (10-20-21 @ 07:48)      INFLAMMATORY MARKERS      RADIOLOGY & ADDITIONAL TESTS:  I have personally reviewed the last Chest xray  CXR      CT      CARDIOLOGY TESTING  12 Lead ECG:   Ventricular Rate 75 BPM    Atrial Rate 84 BPM    QRS Duration 124 ms    Q-T Interval 432 ms    QTC Calculation(Bazett) 482 ms    R Axis -18 degrees    T Axis 139 degrees    Diagnosis Line Atrial fibrillation with premature ventricular or aberrantlyconducted  complexes  Possible Inferior infarct , age undetermined  T wave abnormality, consider lateral ischemia  Abnormal ECG    Confirmed by Sarah Borden MD ([id number]) on 2022 12:13:01 PM (22 @ 15:08)      MEDICATIONS  ampicillin  IVPB     ampicillin  IVPB 1 IV Intermittent every 8 hours  atorvastatin 40 Oral at bedtime  calcitriol   Capsule 0.25 Oral <User Schedule>  dextrose 5%. 1000 IV Continuous <Continuous>  dextrose 5%. 1000 IV Continuous <Continuous>  dextrose 50% Injectable 25 IV Push once  dextrose 50% Injectable 12.5 IV Push once  dextrose 50% Injectable 25 IV Push once  glucagon  Injectable 1 IntraMuscular once  heparin  Infusion. 1600 IV Continuous <Continuous>  insulin glargine Injectable (LANTUS) 18 SubCutaneous at bedtime  insulin lispro (ADMELOG) corrective regimen sliding scale  SubCutaneous three times a day before meals  insulin lispro Injectable (ADMELOG) 6 SubCutaneous three times a day before meals  levothyroxine 150 Oral daily  LORazepam     Tablet 0.5 Oral once  metoprolol succinate ER 50 Oral daily  multivitamin/minerals 1 Oral daily  pentoxifylline 400 Oral two times a day  sodium bicarbonate 650 Oral every 8 hours        ANTIBIOTICS:  ampicillin  IVPB      ampicillin  IVPB 1 Gram(s) IV Intermittent every 8 hours      ALLERGIES:  Ozempic (0.25 mg or 0.5 mg dose) (Hives; Rash)  streptomycin (Unknown)  Trulicity Pen (Hives; Rash)         ANDRIA TAVAREZ  82y, Male  Allergy: Ozempic (0.25 mg or 0.5 mg dose) (Hives; Rash)  streptomycin (Unknown)  Trulicity Pen (Hives; Rash)      CHIEF COMPLAINT:   Diarrhea (2022 11:31)      LOS  15d    HPI  HPI:  82M with PMHx of HFrEF, Afib on eliquis, CAD s/p CABG, COPD (on rare home O2 PRN), CKD3, HTN, HLD presents for 2 weeks of watery diarrhea. Pt reports that 2 weeks ago he went to a doctor's appointment with his wife, and the next day both of them started experiencing non-bloody watery diarrhea, x3-4/day, and then started having decreased PO intake and felt weak. Of note, he also had a mechanical fall 1 week ago on his right side with no injuries sustained (but he didn't visit the ED). Pt denies any other sicks contacts, recent travel, vomiting, chest pain, SOB, palpitations, light-headedness, or any  symptoms.     In the ED: /57, HR 57, Temp 97.6F, satting 96% on RA. Labs notable for Cr of 3.6 (baseline ~2.0), , HCO3 19, AG 20. Trops 0.07 (likely 2/2 BRENDA), no chest pain, EKG 1st-degree AV block. CTH -ve, CTAP + chest -ve for acute pathology.     Admitted to medicine (2022 21:23)      INFECTIOUS DISEASE HISTORY:  ID consulted for UTI and COVID  Presented with diarrhea, wife also had diarrhea. 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      AF (atrial fibrillation)  s/p DCCV      Carotid stenosis      COPD (chronic obstructive pulmonary disease)      H/O coronary artery bypass surgery        S/P inguinal hernia repair      History of appendectomy          FAMILY HISTORY  FHx: colon cancer        SOCIAL HISTORY  Social History:  Past smoker  No alcohol  No drugs (2022 21:23)        ROS  unable to obtain history secondary to patient's mental status and/or sedation     VITALS:  T(F): 97.8, Max: 97.8 (22 @ 04:53)  HR: 82  BP: 145/67  RR: 18Vital Signs Last 24 Hrs  T(C): 36.6 (2022 04:53), Max: 36.6 (2022 04:53)  T(F): 97.8 (2022 04:53), Max: 97.8 (2022 04:53)  HR: 82 (2022 04:53) (82 - 98)  BP: 145/67 (2022 04:53) (139/69 - 152/67)  BP(mean): --  RR: 18 (2022 04:53) (18 - 18)  SpO2: 95% (2022 19:51) (95% - 95%)    Parameters below as of 2022 19:51  Patient On (Oxygen Delivery Method): room air        PHYSICAL EXAM:  Gen: Elderly M chronically ill appearing, not responding to questions  HEENT: Normocephalic, atraumatic  Neck: supple, no lymphadenopathy- no nuchal rigidity  CV: Regular rate & regular rhythm  Lungs: decreased BS at bases, no fremitus  Abdomen: Soft, BS present  Ext: Warm, well perfused  Neuro: non focal, awake  Skin: no rash, no erythema +ecchymoses  Lines: no phlebitis   davalos    TESTS & MEASUREMENTS:                        9.0    15.62 )-----------( 261      ( 2022 04:30 )             26.9     07-20    147<H>  |  115<H>  |  77<HH>  ----------------------------<  219<H>  4.1   |  22  |  1.8<H>    Ca    8.2<L>      2022 04:30  Mg     2.6     07-20          Urinalysis Basic - ( 2022 16:20 )    Color: Yellow / Appearance: Clear / S.015 / pH: x  Gluc: x / Ketone: Negative  / Bili: Negative / Urobili: <2 mg/dL   Blood: x / Protein: 100 mg/dL / Nitrite: Negative   Leuk Esterase: Negative / RBC: 51 /HPF / WBC 6 /HPF   Sq Epi: x / Non Sq Epi: 1 /HPF / Bacteria: Negative        Culture - Urine (collected 22 @ 16:20)  Source: Catheterized Catheterized  Final Report (22 @ 17:31):    No growth    Culture - Blood (collected 22 @ 09:47)  Source: .Blood None  Preliminary Report (22 @ 23:01):    No growth to date.    Culture - Blood (collected 22 @ 22:53)  Source: .Blood None  Preliminary Report (22 @ 07:01):    No growth to date.    Culture - Urine (collected 22 @ 11:47)  Source: Clean Catch Clean Catch (Midstream)  Final Report (07-15-22 @ 22:34):    10,000 - 49,000 CFU/mL Enterococcus faecalis  Organism: Enterococcus faecalis (07-15-22 @ 22:34)  Organism: Enterococcus faecalis (07-15-22 @ 22:34)      -  Ampicillin: S <=2 Predicts results to ampicillin/sulbactam, amoxacillin-clavulanate and  piperacillin-tazobactam.      -  Ciprofloxacin: S <=1      -  Levofloxacin: S <=1      -  Nitrofurantoin: S <=32 Should not be used to treat pyelonephritis.      -  Tetra/Doxy: R >8      -  Vancomycin: S 2      Method Type: EVA    Culture - Blood (collected 22 @ 07:31)  Source: .Blood None  Final Report (22 @ 20:00):    No Growth Final    Culture - Blood (collected 22 @ 11:41)  Source: .Blood None  Final Report (22 @ 22:00):    No Growth Final    Culture - Blood (collected 10-20-21 @ 14:40)  Source: .Blood Blood  Final Report (10-26-21 @ 01:00):    No Growth Final    Culture - Blood (collected 10-20-21 @ 14:30)  Source: .Blood Blood  Final Report (10-26-21 @ 01:00):    No Growth Final    Culture - Urine (collected 10-20-21 @ 12:40)  Source: Clean Catch Clean Catch (Midstream)  Final Report (10-21-21 @ 22:01):    No growth            INFECTIOUS DISEASES TESTING  Legionella Antigen, Urine: Negative (22 @ 18:33)  Streptococcus pneumoniae Ag, Ur Result: Negative (22 @ 18:33)  Clostridium difficile Toxin by PCR: RESULT INTERPRETATION:    Not detected - No Clostridium difficile toxins detected by amplified DNA  PCR.    C. difficile PCR test results should be interpreted only with  consideration of the patient's clinical situation and history.  This test  willdetect the presence of toxigenic C. difficile.  However it cannot be  used as the sole criteria for the diagnosis of antibiotic associated  diarrhea, antibiotic associated colitis, or pseudomembranous colitis.  Colonization with C. difficile may exceed 20% in hospital patients, the  majority of whom are without Toxigenic Clostridium Difficile disease.  Testing is generally not recommended in children below the age of 1 year,  as up to half of healthy infants are asymptomatically colonized with C.  difficile.  In addition, C. difficile PCR testing cannot be used as a  "test of cure" as dead organism nucleic acids will persist and be  detected after treatment.  Successful treatment of C. difficile disease  is determined by resolution of clinical symptoms. Method: [facility]  TOXIGENIC CLOST.DIFFICILE NEGATIVE;  027-NAP1-B1 PRESUMPTIVE NEGATIVE.  By: Real-Time PCR (Polymerase Chain Reaction)  NOTE: This method detects the Toxin B gene (tCdB), the  binary toxin gene (CDT), and the single-base-pair  deletion at nucleotide 117 within the gene encoding  a negative regulator of toxin production (tcdC 117).  The combined presence of genes encoding Toxin B and  binary toxin and the tcdC 117 deletion has been  associated with hypervirulent C. difficile strain  known as 027/NAP1/B1, which has been associated with  severe disease outbreaks in healthcare facilities  worldwide. (22 @ 11:47)  COVID-19 PCR: Detected (22 @ 20:41)  MRSA PCR Result.: Negative (22 @ 19:50)  Procalcitonin, Serum: 0.38 ng/mL (22 @ 11:41)  COVID-19 PCR: Detected (07-10-22 @ 20:15)  COVID-19 PCR: Detected (22 @ 23:05)  Procalcitonin, Serum: 0.08 ng/mL (10-20-21 @ 20:00)  COVID-19 PCR: NotDetec (10-20-21 @ 07:48)      INFLAMMATORY MARKERS      RADIOLOGY & ADDITIONAL TESTS:  I have personally reviewed the last Chest xray  CXR      CT      CARDIOLOGY TESTING  12 Lead ECG:   Ventricular Rate 75 BPM    Atrial Rate 84 BPM    QRS Duration 124 ms    Q-T Interval 432 ms    QTC Calculation(Bazett) 482 ms    R Axis -18 degrees    T Axis 139 degrees    Diagnosis Line Atrial fibrillation with premature ventricular or aberrantlyconducted  complexes  Possible Inferior infarct , age undetermined  T wave abnormality, consider lateral ischemia  Abnormal ECG    Confirmed by Sarah Borden MD ([id number]) on 2022 12:13:01 PM (22 @ 15:08)      MEDICATIONS  ampicillin  IVPB     ampicillin  IVPB 1 IV Intermittent every 8 hours  atorvastatin 40 Oral at bedtime  calcitriol   Capsule 0.25 Oral <User Schedule>  dextrose 5%. 1000 IV Continuous <Continuous>  dextrose 5%. 1000 IV Continuous <Continuous>  dextrose 50% Injectable 25 IV Push once  dextrose 50% Injectable 12.5 IV Push once  dextrose 50% Injectable 25 IV Push once  glucagon  Injectable 1 IntraMuscular once  heparin  Infusion. 1600 IV Continuous <Continuous>  insulin glargine Injectable (LANTUS) 18 SubCutaneous at bedtime  insulin lispro (ADMELOG) corrective regimen sliding scale  SubCutaneous three times a day before meals  insulin lispro Injectable (ADMELOG) 6 SubCutaneous three times a day before meals  levothyroxine 150 Oral daily  LORazepam     Tablet 0.5 Oral once  metoprolol succinate ER 50 Oral daily  multivitamin/minerals 1 Oral daily  pentoxifylline 400 Oral two times a day  sodium bicarbonate 650 Oral every 8 hours        ANTIBIOTICS:  ampicillin  IVPB      ampicillin  IVPB 1 Gram(s) IV Intermittent every 8 hours      ALLERGIES:  Ozempic (0.25 mg or 0.5 mg dose) (Hives; Rash)  streptomycin (Unknown)  Trulicity Pen (Hives; Rash)

## 2022-07-20 NOTE — PROGRESS NOTE ADULT - SUBJECTIVE AND OBJECTIVE BOX
ANDRIA TAVAREZ 82y Male  MRN#: [id number]   Hospital Day: 15d    HPI:  82M with PMHx of HFrEF, Afib on eliquis, CAD s/p CABG, COPD (on rare home O2 PRN), CKD3, HTN, HLD presents for 2 weeks of watery diarrhea. Pt reports that 2 weeks ago he went to a doctor's appointment with his wife, and the next day both of them started experiencing non-bloody watery diarrhea, x3-4/day, and then started having decreased PO intake and felt weak. Of note, he also had a mechanical fall 1 week ago on his right side with no injuries sustained (but he didn't visit the ED). Pt denies any other sicks contacts, recent travel, vomiting, chest pain, SOB, palpitations, light-headedness, or any  symptoms.     In the ED: /57, HR 57, Temp 97.6F, satting 96% on RA. Labs notable for Cr of 3.6 (baseline ~2.0), , HCO3 19, AG 20. Trops 0.07 (likely 2/2 BRENDA), no chest pain, EKG 1st-degree AV block. CTH -ve, CTAP + chest -ve for acute pathology.     Admitted to medicine (2022 21:23)      SUBJECTIVE  Patient is a 82y old Male who presents with a chief complaint of Diarrhea (2022 17:14)  Currently admitted to medicine with the primary diagnosis of Acute kidney injury superimposed on CKD      INTERVAL HPI AND OVERNIGHT EVENTS:  Patient was examined and seen at bedside. This morning he is verbal but not very comprehensible.     REVIEW OF SYMPTOMS:  CONSTITUTIONAL: No weakness, fevers or chills; No headaches  EYES: No visual changes, eye pain, or discharge  ENT: No vertigo; No ear pain or change in hearing; No sore throat or difficulty swallowing  NECK: No pain or stiffness  RESPIRATORY: No cough, wheezing, or hemoptysis; No shortness of breath  CARDIOVASCULAR: No chest pain or palpitations  GASTROINTESTINAL: No abdominal or epigastric pain; No nausea, vomiting, or hematemesis; No diarrhea or constipation; No melena or hematochezia  GENITOURINARY: No dysuria, frequency or hematuria  MUSCULOSKELETAL: No joint pain, no muscle pain, no weakness  NEUROLOGICAL: No numbness or weakness  SKIN: No itching or rashes    OBJECTIVE  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 1000 milliLiter(s) IV Continuous <Continuous>  dextrose 5%. 1000 milliLiter(s) IV Continuous <Continuous>  dextrose 50% Injectable 25 Gram(s) IV Push once  dextrose 50% Injectable 12.5 Gram(s) IV Push once  dextrose 50% Injectable 25 Gram(s) IV Push once  glucagon  Injectable 1 milliGRAM(s) IntraMuscular once  heparin  Infusion. 1600 Unit(s)/Hr IV Continuous <Continuous>  insulin glargine Injectable (LANTUS) 18 Unit(s) SubCutaneous at bedtime  insulin lispro (ADMELOG) corrective regimen sliding scale   SubCutaneous three times a day before meals  insulin lispro Injectable (ADMELOG) 6 Unit(s) SubCutaneous three times a day before meals  levothyroxine 150 MICROGram(s) Oral daily  LORazepam     Tablet 0.5 milliGRAM(s) Oral once  metoprolol succinate ER 50 milliGRAM(s) Oral daily  multivitamin/minerals 1 Tablet(s) Oral daily  pentoxifylline 400 milliGRAM(s) Oral two times a day  sodium bicarbonate 650 milliGRAM(s) Oral every 8 hours  sodium chloride 0.45%. 1000 milliLiter(s) IV Continuous <Continuous>    PRN MEDICATIONS  acetaminophen     Tablet .. 650 milliGRAM(s) Oral every 6 hours PRN  ALBUTerol    90 MICROgram(s) HFA Inhaler 1 Puff(s) Inhalation every 6 hours PRN  dextrose Oral Gel 15 Gram(s) Oral once PRN  melatonin 5 milliGRAM(s) Oral at bedtime PRN      VITAL SIGNS: Last 24 Hours  T(C): 36.6 (2022 04:53), Max: 36.6 (2022 04:53)  T(F): 97.8 (2022 04:53), Max: 97.8 (2022 04:53)  HR: 82 (2022 04:53) (82 - 98)  BP: 145/67 (2022 04:53) (139/69 - 152/67)  BP(mean): --  RR: 18 (2022 04:53) (18 - 18)  SpO2: 95% (2022 19:51) (95% - 95%)    LABS:                        10.1   17.05 )-----------( 235      ( 2022 09:00 )             29.5     07-19    146  |  111<H>  |  80<HH>  ----------------------------<  219<H>  4.4   |  19  |  1.6<H>    Ca    8.7      2022 09:00  Mg     2.8     07-19      PTT - ( 2022 00:13 )  PTT:95.8 sec  Urinalysis Basic - ( 2022 16:20 )    Color: Yellow / Appearance: Clear / S.015 / pH: x  Gluc: x / Ketone: Negative  / Bili: Negative / Urobili: <2 mg/dL   Blood: x / Protein: 100 mg/dL / Nitrite: Negative   Leuk Esterase: Negative / RBC: 51 /HPF / WBC 6 /HPF   Sq Epi: x / Non Sq Epi: 1 /HPF / Bacteria: Negative            Culture - Urine (collected 2022 16:20)  Source: Catheterized Catheterized  Final Report (2022 17:31):    No growth    Culture - Blood (collected 2022 09:47)  Source: .Blood None  Preliminary Report (2022 23:01):    No growth to date.    Culture - Blood (collected 2022 22:53)  Source: .Blood None  Preliminary Report (2022 07:01):    No growth to date.          RADIOLOGY:      PHYSICAL EXAM:  CONSTITUTIONAL: No acute distress, well-developed, well-groomed, AAOx3  HEAD: Atraumatic, normocephalic  EYES: EOM intact, PERRLA, conjunctiva and sclera clear  ENT: Supple, no masses, no thyromegaly, no bruits, no JVD; moist mucous membranes  PULMONARY: Clear to auscultation bilaterally; no wheezes, rales, or rhonchi  CARDIOVASCULAR: Regular rate and rhythm; no murmurs, rubs, or gallops  GASTROINTESTINAL: Soft, non-tender, non-distended; bowel sounds present  MUSCULOSKELETAL: 2+ peripheral pulses; no clubbing, no cyanosis, no edema  NEUROLOGY: non-focal  SKIN: No rashes or lesions; warm and dry    ASSESSMENT & PLAN  PHYSICAL EXAM:  CONSTITUTIONAL: No acute distress, AAOx0  HEAD: Atraumatic, normocephalic  EYES: EOM intact, PERRLA, conjunctiva and sclera clear  ENT: Supple, no masses, no thyromegaly, no bruits, no JVD; moist mucous membranes  PULMONARY: Clear to auscultation bilaterally; no wheezes, rales, or rhonchi  CARDIOVASCULAR: Regular rate and rhythm; no murmurs, rubs, or gallops  GASTROINTESTINAL: Soft, non-tender, non-distended; bowel sounds present  MUSCULOSKELETAL: 2+ peripheral pulses; no clubbing, no cyanosis, no edema  NEUROLOGY: non-focal  SKIN: No rashes or lesions; warm and dry    ASSESSMENT & PLAN  # encephalopathy likely metab 2/2 COVID 19 and/or PNA  Patient delirious on   EEG and CT head w/o contrast- no acute pathology  EEG- Consistent with diffuse cerebral electrophysiological dysfunction, Secondary to toxic metabolic cause.  Tacrolimus level appreciated nephrology suggests tacro not the cause of AMS  MRI Head w/o IV contrast ordered  Video EEG if negative    # BRENDA on CKD III, presumed pre-renal; hx C3 glomerulopathy  base Cr 2.1  improved with IVF  Pr:Cr ratio 1.0  continue with NHCO3 PO  Renal - continue tacrolimus ordered    # COVID +  On supportive treatment   on Dexa 6mg PO q6  d-dimer 454, ESR 38, CRP 25.9, ferr 1415  completed quarantine 7/15 - d/bg precautions     # hyponatremia - likely 2/2 salt depletion  Na 134 on    resolving  Yunior <20, U osm 414, Uric acid 10.3  Will continue to monitor  - continue Lasix 40mg PO once daily    # COPD; severe pulm HTN; fibrotic changes LLL; + fever  -resolved    # normo Anemia; no signs of acute blood loss  Patient on eliquis    # Fall, mechanical  no syncope per pt  cth neg  trauma w/u neg  PT eval for SNF    # Elevated cardiac enzymes  improved from prior Oct 2021 levels  ekg noted  no cp  outpt f/u cards    # HFrEF, chronic; sev dilated CM; RV dysfxn; mild TR/AR; HTN  euvolemic on exam  not on ANY goal directed tx - cardio eval Dr Haley  Patient started on metoprolol 25mg q 24 PO    # AFib, chronic  -switched to heparin drip in case of LP  rate control with metoprolol 25mg q24    # CAD  agree w/ no asa    # DLD  on statin    # DM2  diabetic diet  FS qac/hs  lantus 21 HS  humalog 7 tid w/ +1 scale    # Hypothyroidism  synthroid 150    # PVD  c/w pentoxifylline   # DVT ppx:  on heparin  # Activity: need PT eval  pending- MRI head, cause of AMS  ANDRIA TAVAREZ 82y Male  MRN#: [id number]   Hospital Day: 15d    HPI:  82M with PMHx of HFrEF, Afib on eliquis, CAD s/p CABG, COPD (on rare home O2 PRN), CKD3, HTN, HLD presents for 2 weeks of watery diarrhea. Pt reports that 2 weeks ago he went to a doctor's appointment with his wife, and the next day both of them started experiencing non-bloody watery diarrhea, x3-4/day, and then started having decreased PO intake and felt weak. Of note, he also had a mechanical fall 1 week ago on his right side with no injuries sustained (but he didn't visit the ED). Pt denies any other sicks contacts, recent travel, vomiting, chest pain, SOB, palpitations, light-headedness, or any  symptoms.     In the ED: /57, HR 57, Temp 97.6F, satting 96% on RA. Labs notable for Cr of 3.6 (baseline ~2.0), , HCO3 19, AG 20. Trops 0.07 (likely 2/2 BRENDA), no chest pain, EKG 1st-degree AV block. CTH -ve, CTAP + chest -ve for acute pathology.     Admitted to medicine (2022 21:23)      SUBJECTIVE  Patient is a 82y old Male who presents with a chief complaint of Diarrhea (2022 17:14)  Currently admitted to medicine with the primary diagnosis of Acute kidney injury superimposed on CKD      INTERVAL HPI AND OVERNIGHT EVENTS:  Patient was examined and seen at bedside. This morning he is verbal but not very comprehensible.     REVIEW OF SYMPTOMS:  CONSTITUTIONAL: No weakness, fevers or chills; No headaches  EYES: No visual changes, eye pain, or discharge  ENT: No vertigo; No ear pain or change in hearing; No sore throat or difficulty swallowing  NECK: No pain or stiffness  RESPIRATORY: No cough, wheezing, or hemoptysis; No shortness of breath  CARDIOVASCULAR: No chest pain or palpitations  GASTROINTESTINAL: No abdominal or epigastric pain; No nausea, vomiting, or hematemesis; No diarrhea or constipation; No melena or hematochezia  GENITOURINARY: No dysuria, frequency or hematuria  MUSCULOSKELETAL: No joint pain, no muscle pain, no weakness  NEUROLOGICAL: No numbness or weakness  SKIN: No itching or rashes    OBJECTIVE  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 1000 milliLiter(s) IV Continuous <Continuous>  dextrose 5%. 1000 milliLiter(s) IV Continuous <Continuous>  dextrose 50% Injectable 25 Gram(s) IV Push once  dextrose 50% Injectable 12.5 Gram(s) IV Push once  dextrose 50% Injectable 25 Gram(s) IV Push once  glucagon  Injectable 1 milliGRAM(s) IntraMuscular once  heparin  Infusion. 1600 Unit(s)/Hr IV Continuous <Continuous>  insulin glargine Injectable (LANTUS) 18 Unit(s) SubCutaneous at bedtime  insulin lispro (ADMELOG) corrective regimen sliding scale   SubCutaneous three times a day before meals  insulin lispro Injectable (ADMELOG) 6 Unit(s) SubCutaneous three times a day before meals  levothyroxine 150 MICROGram(s) Oral daily  LORazepam     Tablet 0.5 milliGRAM(s) Oral once  metoprolol succinate ER 50 milliGRAM(s) Oral daily  multivitamin/minerals 1 Tablet(s) Oral daily  pentoxifylline 400 milliGRAM(s) Oral two times a day  sodium bicarbonate 650 milliGRAM(s) Oral every 8 hours  sodium chloride 0.45%. 1000 milliLiter(s) IV Continuous <Continuous>    PRN MEDICATIONS  acetaminophen     Tablet .. 650 milliGRAM(s) Oral every 6 hours PRN  ALBUTerol    90 MICROgram(s) HFA Inhaler 1 Puff(s) Inhalation every 6 hours PRN  dextrose Oral Gel 15 Gram(s) Oral once PRN  melatonin 5 milliGRAM(s) Oral at bedtime PRN      VITAL SIGNS: Last 24 Hours  T(C): 36.6 (2022 04:53), Max: 36.6 (2022 04:53)  T(F): 97.8 (2022 04:53), Max: 97.8 (2022 04:53)  HR: 82 (2022 04:53) (82 - 98)  BP: 145/67 (2022 04:53) (139/69 - 152/67)  BP(mean): --  RR: 18 (2022 04:53) (18 - 18)  SpO2: 95% (2022 19:51) (95% - 95%)    LABS:                        10.1   17.05 )-----------( 235      ( 2022 09:00 )             29.5     07-19    146  |  111<H>  |  80<HH>  ----------------------------<  219<H>  4.4   |  19  |  1.6<H>    Ca    8.7      2022 09:00  Mg     2.8     07-19      PTT - ( 2022 00:13 )  PTT:95.8 sec  Urinalysis Basic - ( 2022 16:20 )    Color: Yellow / Appearance: Clear / S.015 / pH: x  Gluc: x / Ketone: Negative  / Bili: Negative / Urobili: <2 mg/dL   Blood: x / Protein: 100 mg/dL / Nitrite: Negative   Leuk Esterase: Negative / RBC: 51 /HPF / WBC 6 /HPF   Sq Epi: x / Non Sq Epi: 1 /HPF / Bacteria: Negative            Culture - Urine (collected 2022 16:20)  Source: Catheterized Catheterized  Final Report (2022 17:31):    No growth    Culture - Blood (collected 2022 09:47)  Source: .Blood None  Preliminary Report (2022 23:01):    No growth to date.    Culture - Blood (collected 2022 22:53)  Source: .Blood None  Preliminary Report (2022 07:01):    No growth to date.          RADIOLOGY:      PHYSICAL EXAM:  CONSTITUTIONAL: No acute distress, well-developed, well-groomed, AAOx3  HEAD: Atraumatic, normocephalic  EYES: EOM intact, PERRLA, conjunctiva and sclera clear  ENT: Supple, no masses, no thyromegaly, no bruits, no JVD; moist mucous membranes  PULMONARY: Clear to auscultation bilaterally; no wheezes, rales, or rhonchi  CARDIOVASCULAR: Regular rate and rhythm; no murmurs, rubs, or gallops  GASTROINTESTINAL: Soft, non-tender, non-distended; bowel sounds present  MUSCULOSKELETAL: 2+ peripheral pulses; no clubbing, no cyanosis, no edema  NEUROLOGY: non-focal  SKIN: No rashes or lesions; warm and dry    ASSESSMENT & PLAN  PHYSICAL EXAM:  CONSTITUTIONAL: No acute distress, AAOx0  HEAD: Atraumatic, normocephalic  EYES: EOM intact, PERRLA, conjunctiva and sclera clear  ENT: Supple, no masses, no thyromegaly, no bruits, no JVD; moist mucous membranes  PULMONARY: Clear to auscultation bilaterally; no wheezes, rales, or rhonchi  CARDIOVASCULAR: Regular rate and rhythm; no murmurs, rubs, or gallops  GASTROINTESTINAL: Soft, non-tender, non-distended; bowel sounds present  MUSCULOSKELETAL: 2+ peripheral pulses; no clubbing, no cyanosis, no edema  NEUROLOGY: non-focal  SKIN: No rashes or lesions; warm and dry    ASSESSMENT & PLAN  # encephalopathy unclear etiology- possibly due to hospital acquired delirium  Patient delirious on   EEG and CT head w/o contrast- no acute pathology  EEG- Consistent with diffuse cerebral electrophysiological dysfunction, Secondary to toxic metabolic cause.  Tacrolimus level appreciated nephrology suggests tacro not the cause of AMS  MRI Head w/o IV contrast ordered  Video EEG if negative    # BRENDA on CKD III, presumed pre-renal; hx C3 glomerulopathy  base Cr 2.1  improved with IVF  Pr:Cr ratio 1.0  continue with NHCO3 PO  Renal - continue tacrolimus ordered    # COVID +  On supportive treatment   on Dexa 6mg PO q6  d-dimer 454, ESR 38, CRP 25.9, ferr 1415  completed quarantine 7/15 - d/bg precautions     # hyponatremia - likely 2/2 salt depletion  Na 134 on    resolving  Yunior <20, U osm 414, Uric acid 10.3  Will continue to monitor  - continue Lasix 40mg PO once daily    # COPD; severe pulm HTN; fibrotic changes LLL; + fever  -resolved    # normo Anemia; no signs of acute blood loss  Patient on eliquis    # Fall, mechanical  no syncope per pt  cth neg  trauma w/u neg  PT eval for SNF    # Elevated cardiac enzymes  improved from prior Oct 2021 levels  ekg noted  no cp  outpt f/u cards    # HFrEF, chronic; sev dilated CM; RV dysfxn; mild TR/AR; HTN  euvolemic on exam  not on ANY goal directed tx - cardio eval Dr Haley  Patient started on metoprolol 25mg q 24 PO    # AFib, chronic  -switched to heparin drip in case of LP  rate control with metoprolol 25mg q24    # CAD  agree w/ no asa    # DLD  on statin    # DM2  diabetic diet  FS qac/hs  lantus 21 HS  humalog 7 tid w/ +1 scale    # Hypothyroidism  synthroid 150    # PVD  c/w pentoxifylline   # DVT ppx:  on heparin  # Activity: need PT eval  pending- MRI head, cause of AMS  ANDRIA TAVAREZ 82y Male  MRN#: [id number]   Hospital Day: 15d    HPI:  82M with PMHx of HFrEF, Afib on eliquis, CAD s/p CABG, COPD (on rare home O2 PRN), CKD3, HTN, HLD presents for 2 weeks of watery diarrhea. Pt reports that 2 weeks ago he went to a doctor's appointment with his wife, and the next day both of them started experiencing non-bloody watery diarrhea, x3-4/day, and then started having decreased PO intake and felt weak. Of note, he also had a mechanical fall 1 week ago on his right side with no injuries sustained (but he didn't visit the ED). Pt denies any other sicks contacts, recent travel, vomiting, chest pain, SOB, palpitations, light-headedness, or any  symptoms.     In the ED: /57, HR 57, Temp 97.6F, satting 96% on RA. Labs notable for Cr of 3.6 (baseline ~2.0), , HCO3 19, AG 20. Trops 0.07 (likely 2/2 BRENDA), no chest pain, EKG 1st-degree AV block. CTH -ve, CTAP + chest -ve for acute pathology.     Admitted to medicine (2022 21:23)      SUBJECTIVE  Patient is a 82y old Male who presents with a chief complaint of Diarrhea (2022 17:14)  Currently admitted to medicine with the primary diagnosis of Acute kidney injury superimposed on CKD      INTERVAL HPI AND OVERNIGHT EVENTS:  Patient was examined and seen at bedside. This morning he is verbal but not very comprehensible.     REVIEW OF SYMPTOMS:  CONSTITUTIONAL: No weakness, fevers or chills; No headaches  EYES: No visual changes, eye pain, or discharge  ENT: No vertigo; No ear pain or change in hearing; No sore throat or difficulty swallowing  NECK: No pain or stiffness  RESPIRATORY: No cough, wheezing, or hemoptysis; No shortness of breath  CARDIOVASCULAR: No chest pain or palpitations  GASTROINTESTINAL: No abdominal or epigastric pain; No nausea, vomiting, or hematemesis; No diarrhea or constipation; No melena or hematochezia  GENITOURINARY: No dysuria, frequency or hematuria  MUSCULOSKELETAL: No joint pain, no muscle pain, no weakness  NEUROLOGICAL: No numbness or weakness  SKIN: No itching or rashes    OBJECTIVE  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 1000 milliLiter(s) IV Continuous <Continuous>  dextrose 5%. 1000 milliLiter(s) IV Continuous <Continuous>  dextrose 50% Injectable 25 Gram(s) IV Push once  dextrose 50% Injectable 12.5 Gram(s) IV Push once  dextrose 50% Injectable 25 Gram(s) IV Push once  glucagon  Injectable 1 milliGRAM(s) IntraMuscular once  heparin  Infusion. 1600 Unit(s)/Hr IV Continuous <Continuous>  insulin glargine Injectable (LANTUS) 18 Unit(s) SubCutaneous at bedtime  insulin lispro (ADMELOG) corrective regimen sliding scale   SubCutaneous three times a day before meals  insulin lispro Injectable (ADMELOG) 6 Unit(s) SubCutaneous three times a day before meals  levothyroxine 150 MICROGram(s) Oral daily  LORazepam     Tablet 0.5 milliGRAM(s) Oral once  metoprolol succinate ER 50 milliGRAM(s) Oral daily  multivitamin/minerals 1 Tablet(s) Oral daily  pentoxifylline 400 milliGRAM(s) Oral two times a day  sodium bicarbonate 650 milliGRAM(s) Oral every 8 hours  sodium chloride 0.45%. 1000 milliLiter(s) IV Continuous <Continuous>    PRN MEDICATIONS  acetaminophen     Tablet .. 650 milliGRAM(s) Oral every 6 hours PRN  ALBUTerol    90 MICROgram(s) HFA Inhaler 1 Puff(s) Inhalation every 6 hours PRN  dextrose Oral Gel 15 Gram(s) Oral once PRN  melatonin 5 milliGRAM(s) Oral at bedtime PRN      VITAL SIGNS: Last 24 Hours  T(C): 36.6 (2022 04:53), Max: 36.6 (2022 04:53)  T(F): 97.8 (2022 04:53), Max: 97.8 (2022 04:53)  HR: 82 (2022 04:53) (82 - 98)  BP: 145/67 (2022 04:53) (139/69 - 152/67)  BP(mean): --  RR: 18 (2022 04:53) (18 - 18)  SpO2: 95% (2022 19:51) (95% - 95%)    LABS:                        10.1   17.05 )-----------( 235      ( 2022 09:00 )             29.5     07-19    146  |  111<H>  |  80<HH>  ----------------------------<  219<H>  4.4   |  19  |  1.6<H>    Ca    8.7      2022 09:00  Mg     2.8     07-19      PTT - ( 2022 00:13 )  PTT:95.8 sec  Urinalysis Basic - ( 2022 16:20 )    Color: Yellow / Appearance: Clear / S.015 / pH: x  Gluc: x / Ketone: Negative  / Bili: Negative / Urobili: <2 mg/dL   Blood: x / Protein: 100 mg/dL / Nitrite: Negative   Leuk Esterase: Negative / RBC: 51 /HPF / WBC 6 /HPF   Sq Epi: x / Non Sq Epi: 1 /HPF / Bacteria: Negative            Culture - Urine (collected 2022 16:20)  Source: Catheterized Catheterized  Final Report (2022 17:31):    No growth    Culture - Blood (collected 2022 09:47)  Source: .Blood None  Preliminary Report (2022 23:01):    No growth to date.    Culture - Blood (collected 2022 22:53)  Source: .Blood None  Preliminary Report (2022 07:01):    No growth to date.          RADIOLOGY:      PHYSICAL EXAM:  CONSTITUTIONAL: No acute distress, well-developed, well-groomed, AAOx3  HEAD: Atraumatic, normocephalic  EYES: EOM intact, PERRLA, conjunctiva and sclera clear  ENT: Supple, no masses, no thyromegaly, no bruits, no JVD; moist mucous membranes  PULMONARY: Clear to auscultation bilaterally; no wheezes, rales, or rhonchi  CARDIOVASCULAR: Regular rate and rhythm; no murmurs, rubs, or gallops  GASTROINTESTINAL: Soft, non-tender, non-distended; bowel sounds present  MUSCULOSKELETAL: 2+ peripheral pulses; no clubbing, no cyanosis, no edema  NEUROLOGY: non-focal  SKIN: No rashes or lesions; warm and dry    ASSESSMENT & PLAN  PHYSICAL EXAM:  CONSTITUTIONAL: No acute distress, AAOx0  HEAD: Atraumatic, normocephalic  EYES: EOM intact, PERRLA, conjunctiva and sclera clear  ENT: Supple, no masses, no thyromegaly, no bruits, no JVD; moist mucous membranes  PULMONARY: Clear to auscultation bilaterally; no wheezes, rales, or rhonchi  CARDIOVASCULAR: Regular rate and rhythm; no murmurs, rubs, or gallops  GASTROINTESTINAL: Soft, non-tender, non-distended; bowel sounds present  MUSCULOSKELETAL: 2+ peripheral pulses; no clubbing, no cyanosis, no edema  NEUROLOGY: non-focal  SKIN: No rashes or lesions; warm and dry    ASSESSMENT & PLAN  # encephalopathy unclear etiology- possibly due to hospital acquired delirium  Patient delirious on   EEG and CT head w/o contrast- no acute pathology  EEG- Consistent with diffuse cerebral electrophysiological dysfunction, Secondary to toxic metabolic cause.  Tacrolimus level appreciated nephrology suggests tacro not the cause of AMS  MRI Head w/o IV contrast ordered  Video EEG if negative    # BRENDA on CKD III, presumed pre-renal; hx C3 glomerulopathy  base Cr 2.1  improved with IVF  Pr:Cr ratio 1.0  continue with NHCO3 PO  Renal - continue tacrolimus ordered    # COVID +  On supportive treatment   s/p Dexa 6mg PO q6  d-dimer 454, ESR 38, CRP 25.9, ferr 1415  completed quarantine 7/15 - d/bg precautions     # hyponatremia - likely 2/2 salt depletion  Na 134 on    resolving  Yunior <20, U osm 414, Uric acid 10.3  Will continue to monitor  - continue Lasix 40mg PO once daily    # COPD; severe pulm HTN; fibrotic changes LLL; + fever  -resolved    # normo Anemia; no signs of acute blood loss  Patient on eliquis    # Fall, mechanical  no syncope per pt  cth neg  trauma w/u neg  PT eval for SNF    # Elevated cardiac enzymes  improved from prior Oct 2021 levels  ekg noted  no cp  outpt f/u cards    # HFrEF, chronic; sev dilated CM; RV dysfxn; mild TR/AR; HTN  euvolemic on exam  not on ANY goal directed tx - cardio eval Dr Haley  Patient started on metoprolol 25mg q 24 PO    # AFib, chronic  -switched to heparin drip in case of LP  rate control with metoprolol 25mg q24    # CAD  agree w/ no asa    # DLD  on statin    # DM2  diabetic diet  FS qac/hs  lantus 21 HS  humalog 7 tid w/ +1 scale    # Hypothyroidism  synthroid 150    # PVD  c/w pentoxifylline   # DVT ppx:  on heparin  # Activity: need PT eval  pending- MRI head, cause of AMS

## 2022-07-20 NOTE — PROGRESS NOTE ADULT - ASSESSMENT
81 yo M w/ PMHx of HFrEF, Afib on Eliquis, CAD s/p CABG, COPD (on rare home O2 PRN), CKD3, HTN, HLD initially presented for 2 weeks of watery diarrhea. Patient also positive for COVID and is currently managed on the medical floor. Neurology was consulted to evaluate patient for waxing and waning encephalopathy .On exam patient is awake and minimally verbal, confused not following any commands, noted a right facial asymmetry and UE twitching and asterixis.    A/P   # encephalopathy waxing and waning mental status / underlying dementia   - HCT findings consistent with cortical volume loss   - supportive care, prevent fall and aspiration   - neurology is following, recommendations noted   - start VEEG , seizure precautions   - keep Mg above 2.0   - ID followed today, will monitor off Abx, no LP recommended   - may resume Eliquis   - monitor WBC   - start IV hydration for 24 hours ( pt has minimal po intake, urine is very concentrated )   - obtain MRI B NC      # BRENDA on CKD III, presumed pre-renal; hx C3 glomerulopathy   - pt has  proteinuria (up to 8.4 gm/day in past - responded well to immunosupp)  -  metab acidosis  - kidney function is slowly improving  - start IV hydration, NS at 75 cc/h     - nephrology is following, recommendations noted    - on Prograf 1 mg q12h- to be cont and Cellcept - on hold d/t COVID19 illness  # Proteinuria 8.4 g initially responded well to Prograf with reduction of proteinuria  to<1 g/g according to outpatient records  # HFrEF 10/2021 TTE - severe LV and RV systolic dysfunction and PAH / seen by Dr. De La Fuente, EF ~45%  - 7/19 FK level (drawn after tacrolimus was administered) high at 15.2  - hold evening dose of tacrolimus and repeat level in AM prior to continuing 1mg q12h  - increase sodium bicarb to 1300mg q8h if pt can tolerate      # normo anemia  - anemia of chr dz (CKD)  - keep hgb >8  - on aranesp w/ renal     # COVID +   - c/w Isolation   - completed quarantine 7/5-7/15, d/c precautions   - supportive care for now   - ID is following     # COPD; severe pulm HTN; fibrotic changes LLL  - stable now   - nebs RPN  - supplement oxygen , monitor pulse Ox     # HFrEF, chronic/ CAD   - fluid restriction 1200 ml in 24 hours  - intake and output monitoring, daily weight, maintain fluid balance    - cardio eval Dr Witt - (per cardio, EF closer to 45%)  -  toprol xl 50mg po q24 (consider metoprolol 5mg iv q6 if cannot take meds)  - not on ACEI/ARB 2/2 CKD/BRENDA   - diuretics held     # AFib, chronic  - rate controlled - on BB  - resume Eliquis     # DM 2  - diabetic diet  - FS qac/hs (or q6 if not eating)   -lantus 18 HS  -  ademelog 6 tid w/ +1 scale    # PVD  c/w pentoxifylline     # DLD  on statin    # Hypothyroidism  synthroid 150mg q24    # DVT ppx: on heparin iv    # GI ppx: none    # Activity: need PT eval      #Progress Note Handoff  Pending (specify):  IV hydration, monitor BUN/cr and urine output, f/u brain MRI, start VEEG, may d/c Abx and resume Eliquis ( no LP recommended by ID), supportive care, c/w isolation   Family discussion: will contact family   Disposition: Home___/SNF___/Other________/Unknown at this time_____x ___

## 2022-07-20 NOTE — PROGRESS NOTE ADULT - ASSESSMENT
82y Male with PMHx of HFrEF, Afib on eliquis, CAD s/p CABG, COPD (on rare home O2 PRN), CKD3 (Hx of C3 glomerulopathy), HTN, HLD      # BRENDA on admission, resolved  # CKD 3b - creat baseline ~1.8  # Hyponatremia, improved - poor po intake, on mech. soft or puree diet  # C3 glomerulopathy - on Prograf 1 mg q12h- to be cont and Cellcept - on hold d/t COVID19 illness  # Proteinuria 8.4 g initially responded well to Prograf with reduction of proteinuria  to<1 g/g according to outpatient records  # HFrEF 10/2021 TTE - severe LV and RV systolic dysfunction and PAH / seen by Dr. De La Fuente, EF ~45%    - 7/19 FK level (drawn after tacrolimus was administered) high at 15.2  - hold evening dose of tacrolimus and repeat level in AM prior to continuing 1mg q12h  - increase sodium bicarb to 1300mg q8h if pt can tolerate  - cont 1/2NS  - no hydronephrosis on CTAP  - appreciate neuro eval, pending MR brain, possible VEEG

## 2022-07-21 NOTE — PROGRESS NOTE ADULT - SUBJECTIVE AND OBJECTIVE BOX
DAYSI ANDRIA  82y  Male  ***My note supersedes ALL resident notes that I sign.  My corrections for their notes are in my note.***    I can be reached directly on [id number]. My office number is [phone]. My personal cell number is [phone].    INTERVAL EVENTS: Here for f/u of altered MS. Pt's MS is back to nl and he looks much better and he feels much better. Though I was off from work the last 2 days, he immediately remembered me today. He is eating and drinking again. He is taking his meds. He is oriented again and has no complaints. He is very weak in bed.    T(F): 99.6 (07-21-22 @ 04:26), Max: 99.6 (07-21-22 @ 04:26)  HR: 97 (07-21-22 @ 04:26) (88 - 97)  BP: 131/72 (07-21-22 @ 04:26) (128/65 - 138/71)  RR: 20 (07-21-22 @ 04:26) (18 - 20)  SpO2: 94% (07-21-22 @ 09:23) (87% - 97%)    07-20-22 @ 07:01  -  07-21-22 @ 07:00  --------------------------------------------------------  IN: 0 mL / OUT: 850 mL / NET: -850 mL    Gen: NAD  HEENT: PERRL, EOMI, mouth clr, nose clr  Neck: no nodes, no JVD, thyroid nl  lungs: clr  hrt: s1 s2 rrr no murmur  abd: soft, NT/ND, no HS megaly  ext: no edema, no c/c  neuro: aa ox3, cn intact, can move all 4 ext    LABS:                      8.1     (    90.7   15.09 )-----------( ---------      234      ( 21 Jul 2022 08:09 )             25.3    (    14.7     WBC Count: 15.09 K/uL (07-21-22 @ 08:09)  WBC Count: 15.62 K/uL (07-20-22 @ 04:30)  WBC Count: 17.05 K/uL (07-19-22 @ 09:00)  WBC Count: 15.75 K/uL (07-18-22 @ 09:47)  WBC Count: 14.73 K/uL (07-17-22 @ 09:38)    151   (   117   (   210      07-21-22 @ 08:09  ----------------------               3.9   (   22   (   78                             -----                        1.9    Ca  8.3   Mg  2.7    P   --     PTT - ( 20 Jul 2022 04:30 )  PTT: 55.2 sec    CAPILLARY BLOOD GLUCOSE  POCT Blood Glucose.: 236 (07-21-22 @ 12:12)  POCT Blood Glucose.: 197 (07-21-22 @ 08:16)  POCT Blood Glucose.: 175 (07-20-22 @ 20:52)  POCT Blood Glucose.: 183 (07-20-22 @ 16:48)  POCT Blood Glucose.: 200 (07-20-22 @ 12:37)  POCT Blood Glucose.: 207 (07-20-22 @ 08:17)  POCT Blood Glucose.: 152 (07-19-22 @ 21:30)  POCT Blood Glucose.: 114 (07-19-22 @ 16:45)    Culture - Urine (collected 07-18-22 @ 16:20)  Source: Catheterized Catheterized  Final Report (07-19-22 @ 17:31):    No growth    Culture - Blood (collected 07-18-22 @ 09:47)  Source: .Blood None  Preliminary Report (07-19-22 @ 23:01):    No growth to date.    Culture - Blood (collected 07-17-22 @ 22:53)  Source: .Blood None  Preliminary Report (07-19-22 @ 07:01):    No growth to date.    RADIOLOGY & ADDITIONAL TESTS:    MEDICATIONS:    acetaminophen     Tablet .. 650 milliGRAM(s) Oral every 6 hours PRN  ALBUTerol    90 MICROgram(s) HFA Inhaler 1 Puff(s) Inhalation every 6 hours PRN  albuterol/ipratropium for Nebulization 3 milliLiter(s) Nebulizer every 6 hours  apixaban 2.5 milliGRAM(s) Oral every 12 hours  atorvastatin 40 milliGRAM(s) Oral at bedtime  calcitriol   Capsule 0.25 MICROGram(s) Oral <User Schedule>  insulin glargine Injectable (LANTUS) 18 Unit(s) SubCutaneous at bedtime  insulin lispro (ADMELOG) corrective regimen sliding scale   SubCutaneous three times a day before meals  insulin lispro Injectable (ADMELOG) 6 Unit(s) SubCutaneous three times a day before meals  levothyroxine 150 MICROGram(s) Oral daily  LORazepam     Tablet 0.5 milliGRAM(s) Oral once  melatonin 5 milliGRAM(s) Oral at bedtime PRN  metoprolol succinate ER 50 milliGRAM(s) Oral daily  multivitamin/minerals 1 Tablet(s) Oral daily  pentoxifylline 400 milliGRAM(s) Oral two times a day  sodium bicarbonate 650 milliGRAM(s) Oral every 12 hours  sodium chloride 0.45%. 1000 milliLiter(s) IV Continuous <Continuous>

## 2022-07-21 NOTE — PROGRESS NOTE ADULT - SUBJECTIVE AND OBJECTIVE BOX
Nephrology progress note    Patient is seen and examined in the am, events over the last 24 h noted .  Lethargic, on IVF  Allergies:  Ozempic (0.25 mg or 0.5 mg dose) (Hives; Rash)  streptomycin (Unknown)  Trulicity Pen (Hives; Rash)    Hospital Medications:   MEDICATIONS  (STANDING):  albuterol/ipratropium for Nebulization 3 milliLiter(s) Nebulizer every 6 hours  apixaban 2.5 milliGRAM(s) Oral every 12 hours  atorvastatin 40 milliGRAM(s) Oral at bedtime  calcitriol   Capsule 0.25 MICROGram(s) Oral <User Schedule>  dextrose 5%. 1000 milliLiter(s) (50 mL/Hr) IV Continuous <Continuous>  dextrose 5%. 1000 milliLiter(s) (100 mL/Hr) IV Continuous <Continuous>  dextrose 50% Injectable 25 Gram(s) IV Push once  dextrose 50% Injectable 12.5 Gram(s) IV Push once  dextrose 50% Injectable 25 Gram(s) IV Push once  glucagon  Injectable 1 milliGRAM(s) IntraMuscular once  insulin glargine Injectable (LANTUS) 21 Unit(s) SubCutaneous at bedtime  insulin lispro (ADMELOG) corrective regimen sliding scale   SubCutaneous three times a day before meals  insulin lispro Injectable (ADMELOG) 7 Unit(s) SubCutaneous three times a day before meals  levothyroxine 150 MICROGram(s) Oral daily  LORazepam     Tablet 0.5 milliGRAM(s) Oral once  metoprolol succinate ER 50 milliGRAM(s) Oral daily  multivitamin/minerals 1 Tablet(s) Oral daily  pentoxifylline 400 milliGRAM(s) Oral two times a day  sodium bicarbonate 650 milliGRAM(s) Oral every 12 hours  sodium chloride 0.45%. 1000 milliLiter(s) (100 mL/Hr) IV Continuous <Continuous>        VITALS:  T(F): 98.7 (22 @ 19:59), Max: 99.6 (22 @ 04:26)  HR: 91 (22 @ 19:59)  BP: 137/64 (22 @ 19:59)  RR: 18 (22 @ 19:59)  SpO2: 94% (22 @ 09:23)  Wt(kg): --     @ 07: @ 07:00  --------------------------------------------------------  IN: 0 mL / OUT: 600 mL / NET: -600 mL     @ 07: @ 07:00  --------------------------------------------------------  IN: 0 mL / OUT: 850 mL / NET: -850 mL          PHYSICAL EXAM:  Constitutional: NAD  HEENT: anicteric sclera  Neck: No JVD  Respiratory: CTA  Cardiovascular: S1, S2, RRR  Gastrointestinal: BS+, soft, NT/ND  Extremities:  No peripheral edema  Neurological: lethargic  : + davalos.   Skin: No rashes  Vascular Access:    LABS:      151<H>  |  117<H>  |  78<HH>  ----------------------------<  210<H>  3.9   |  22  |  1.9<H>    Ca    8.3<L>      2022 08:09  Mg     2.7                                 8.1    15.09 )-----------( 234      ( 2022 08:09 )             25.3       Urine Studies:  Urinalysis Basic - ( 2022 16:20 )    Color: Yellow / Appearance: Clear / S.015 / pH:   Gluc:  / Ketone: Negative  / Bili: Negative / Urobili: <2 mg/dL   Blood:  / Protein: 100 mg/dL / Nitrite: Negative   Leuk Esterase: Negative / RBC: 51 /HPF / WBC 6 /HPF   Sq Epi:  / Non Sq Epi: 1 /HPF / Bacteria: Negative        RADIOLOGY & ADDITIONAL STUDIES:

## 2022-07-21 NOTE — PROGRESS NOTE ADULT - SUBJECTIVE AND OBJECTIVE BOX
ANDRIA TAVAREZ 82y Male  MRN#: [id number]   Hospital Day: 16d    HPI:  82M with PMHx of HFrEF, Afib on eliquis, CAD s/p CABG, COPD (on rare home O2 PRN), CKD3, HTN, HLD presents for 2 weeks of watery diarrhea. Pt reports that 2 weeks ago he went to a doctor's appointment with his wife, and the next day both of them started experiencing non-bloody watery diarrhea, x3-4/day, and then started having decreased PO intake and felt weak. Of note, he also had a mechanical fall 1 week ago on his right side with no injuries sustained (but he didn't visit the ED). Pt denies any other sicks contacts, recent travel, vomiting, chest pain, SOB, palpitations, light-headedness, or any  symptoms.     In the ED: /57, HR 57, Temp 97.6F, satting 96% on RA. Labs notable for Cr of 3.6 (baseline ~2.0), , HCO3 19, AG 20. Trops 0.07 (likely 2/2 BRENDA), no chest pain, EKG 1st-degree AV block. CTH -ve, CTAP + chest -ve for acute pathology.     Admitted to medicine (05 Jul 2022 21:23)      SUBJECTIVE  Patient is a 82y old Male who presents with a chief complaint of Diarrhea (20 Jul 2022 19:51)  Currently admitted to medicine with the primary diagnosis of Acute kidney injury superimposed on CKD      INTERVAL HPI AND OVERNIGHT EVENTS:  Patient was examined and seen at bedside. This morning he is resting comfortably in bed and reports no issues or overnight events. He is AOX3.     REVIEW OF SYMPTOMS:  CONSTITUTIONAL: No weakness, fevers or chills; No headaches  EYES: No visual changes, eye pain, or discharge  ENT: No vertigo; No ear pain or change in hearing; No sore throat or difficulty swallowing  NECK: No pain or stiffness  RESPIRATORY: No cough, wheezing, or hemoptysis; No shortness of breath  CARDIOVASCULAR: No chest pain or palpitations  GASTROINTESTINAL: No abdominal or epigastric pain; No nausea, vomiting, or hematemesis; No diarrhea or constipation; No melena or hematochezia  GENITOURINARY: No dysuria, frequency or hematuria  MUSCULOSKELETAL: No joint pain, no muscle pain, no weakness  NEUROLOGICAL: No numbness or weakness  SKIN: No itching or rashes    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    AF (atrial fibrillation)  s/p DCCV    Carotid stenosis    COPD (chronic obstructive pulmonary disease)    H/O coronary artery bypass surgery  2001    S/P inguinal hernia repair    History of appendectomy      ALLERGIES:  Ozempic (0.25 mg or 0.5 mg dose) (Hives; Rash)  streptomycin (Unknown)  Trulicity Pen (Hives; Rash)    MEDICATIONS:  STANDING MEDICATIONS  albuterol/ipratropium for Nebulization 3 milliLiter(s) Nebulizer every 6 hours  apixaban 2.5 milliGRAM(s) Oral every 12 hours  atorvastatin 40 milliGRAM(s) Oral at bedtime  calcitriol   Capsule 0.25 MICROGram(s) Oral <User Schedule>  dextrose 5%. 1000 milliLiter(s) IV Continuous <Continuous>  dextrose 5%. 1000 milliLiter(s) IV Continuous <Continuous>  dextrose 50% Injectable 25 Gram(s) IV Push once  dextrose 50% Injectable 12.5 Gram(s) IV Push once  dextrose 50% Injectable 25 Gram(s) IV Push once  glucagon  Injectable 1 milliGRAM(s) IntraMuscular once  insulin glargine Injectable (LANTUS) 18 Unit(s) SubCutaneous at bedtime  insulin lispro (ADMELOG) corrective regimen sliding scale   SubCutaneous three times a day before meals  insulin lispro Injectable (ADMELOG) 6 Unit(s) SubCutaneous three times a day before meals  levothyroxine 150 MICROGram(s) Oral daily  LORazepam     Tablet 0.5 milliGRAM(s) Oral once  metoprolol succinate ER 50 milliGRAM(s) Oral daily  multivitamin/minerals 1 Tablet(s) Oral daily  pentoxifylline 400 milliGRAM(s) Oral two times a day  sodium bicarbonate 650 milliGRAM(s) Oral every 12 hours  sodium chloride 0.45%. 1000 milliLiter(s) IV Continuous <Continuous>    PRN MEDICATIONS  acetaminophen     Tablet .. 650 milliGRAM(s) Oral every 6 hours PRN  ALBUTerol    90 MICROgram(s) HFA Inhaler 1 Puff(s) Inhalation every 6 hours PRN  dextrose Oral Gel 15 Gram(s) Oral once PRN  melatonin 5 milliGRAM(s) Oral at bedtime PRN      VITAL SIGNS: Last 24 Hours  T(C): 37.6 (21 Jul 2022 04:26), Max: 37.6 (21 Jul 2022 04:26)  T(F): 99.6 (21 Jul 2022 04:26), Max: 99.6 (21 Jul 2022 04:26)  HR: 97 (21 Jul 2022 04:26) (88 - 97)  BP: 131/72 (21 Jul 2022 04:26) (128/65 - 138/71)  BP(mean): --  RR: 20 (21 Jul 2022 04:26) (18 - 20)  SpO2: 94% (21 Jul 2022 09:23) (87% - 97%)    LABS:                        8.1    15.09 )-----------( 234      ( 21 Jul 2022 08:09 )             25.3     07-21    151<H>  |  117<H>  |  78<HH>  ----------------------------<  210<H>  3.9   |  22  |  1.9<H>    Ca    8.3<L>      21 Jul 2022 08:09  Mg     2.7     07-21      PTT - ( 20 Jul 2022 04:30 )  PTT:55.2 sec          Culture - Urine (collected 18 Jul 2022 16:20)  Source: Catheterized Catheterized  Final Report (19 Jul 2022 17:31):    No growth          RADIOLOGY:      PHYSICAL EXAM:  CONSTITUTIONAL: No acute distress, well-developed, well-groomed, AAOx3  HEAD: Atraumatic, normocephalic  EYES: EOM intact, PERRLA, conjunctiva and sclera clear  ENT: Supple, no masses, no thyromegaly, no bruits, no JVD; moist mucous membranes  PULMONARY: Clear to auscultation bilaterally; no wheezes, rales, or rhonchi  CARDIOVASCULAR: Regular rate and rhythm; no murmurs, rubs, or gallops  GASTROINTESTINAL: Soft, non-tender, non-distended; bowel sounds present  MUSCULOSKELETAL: 2+ peripheral pulses; no clubbing, no cyanosis, no edema  NEUROLOGY: non-focal  SKIN: No rashes or lesions; warm and dry    ASSESSMENT & PLAN  # encephalopathy unclear etiology- possibly due to hospital acquired delirium- 7/21 patient much more alert and oriented  Patient delirious on 7/16  EEG and CT head w/o contrast- no acute pathology  EEG- Consistent with diffuse cerebral electrophysiological dysfunction, Secondary to toxic metabolic cause.  Tacrolimus level appreciated nephrology suggests tacro not the cause of AMS  MRI Head w/o IV contrast ordered  Video EEG if negative    # BRENDA on CKD III, presumed pre-renal; hx C3 glomerulopathy  base Cr 2.1  improved with IVF  Pr:Cr ratio 1.0  continue with NHCO3 PO  Renal - continue tacrolimus ordered    # COVID +  On supportive treatment   s/p Dexa 6mg PO q6  d-dimer 454, ESR 38, CRP 25.9, ferr 1415  completed quarantine 7/15 - d/bg precautions 7/16    # hyponatremia - likely 2/2 salt depletion  Na 134 on 7/14   resolving  Yunior <20, U osm 414, Uric acid 10.3  Will continue to monitor  - continue Lasix 40mg PO once daily    # COPD; severe pulm HTN; fibrotic changes LLL; + fever  -resolved    # normo Anemia; no signs of acute blood loss  Patient on eliquis    # Fall, mechanical  no syncope per pt  cth neg  trauma w/u neg  PT eval for SNF    # Elevated cardiac enzymes  improved from prior Oct 2021 levels  ekg noted  no cp  outpt f/u cards    # HFrEF, chronic; sev dilated CM; RV dysfxn; mild TR/AR; HTN  euvolemic on exam  not on ANY goal directed tx - cardio eval Dr Haley  Patient started on metoprolol 25mg q 24 PO    # AFib, chronic  -eliquis  rate control with metoprolol 25mg q24    # CAD  agree w/ no asa    # DLD  on statin    # DM2  diabetic diet  FS qac/hs  lantus 21 HS  humalog 7 tid w/ +1 scale    # Hypothyroidism  synthroid 150    # PVD  c/w pentoxifylline   # DVT ppx:  on eliquis  # Activity: need PT eval  pending- MRI head, cause of AMS

## 2022-07-21 NOTE — PROGRESS NOTE ADULT - ASSESSMENT
82y Male with PMHx of HFrEF, Afib on eliquis, CAD s/p CABG, COPD (on rare home O2 PRN), CKD3 (Hx of C3 glomerulopathy), HTN, HLD      # BRENDA on admission, worsening   # CKD 3b - creat baseline ~1.8  #Hypernatremia worsening, IVF changed to 1/2 NS 75 hr  # C3 glomerulopathy - on Prograf 1 mg q12h- to be cont and Cellcept - on hold d/t COVID19 illness  # Proteinuria 8.4 g initially responded well to Prograf with reduction of proteinuria  to<1 g/g according to outpatient records  # HFrEF 10/2021 TTE - severe LV and RV systolic dysfunction and PAH / seen by Dr. De La Fuente, EF ~45%    - 7/19 FK level (drawn after tacrolimus 9.2 closer to goal 3-7, cont same dose  - no hydronephrosis on CTAP  - appreciate neuro eval, pending MR brain, possible VEEG  -repeat BMP q12 and change to D5W if Na cont to rise

## 2022-07-21 NOTE — PROGRESS NOTE ADULT - ASSESSMENT
# encephalopathy waxing and waning; remains worse x2 days  I thought initial MS change at start of admit was metab: COVID 19 + PNA + hypoxia  I think the 2nd MS change was toxic encephalopathy from cefepime and dexa (doubt COVID is doing anything now; also do not believe he has stroke)  pt is back to his usual baseline today  initial CTH: mild chr micro changes; mild atrophy and ventric prom  MS was better 7/14, slight worse 7/15 and way off 7/16, 7/17, 7/18, 7/19; little better 7/20 and back to nl 7/21  WBC was rising -> rpt bld cx x2: neg; UCx rpt: also neg  recent UCx grew E Fecalis - VSE; was suscept to levo  abx: off now  ID eval: noted  f/u tacro lvl: pending (though, wife said pt has tolerated tacro 1mg po q12 for 3 yrs w/out issue)  d/c dexameth  d/c cefepime; d/c levo  EEG: triphasic waves; mild-mod gen slowing  rpt CT H NC: no acute path  d/c MRI B NC  d/c VEEG   LFTs are nl  neuro eval - Dr Andrews   CBC q24 til WBC <12    # hypernatremia prob 2/2 dehydration  IVFs: 1/2 /hr  HOLD lasix  decr NaHCO3 650mg po q12    # prior hyponatremia - likely 2/2 salt depletion (was on lasix, had diarrhea, poor oral intake recently) and less likely lung issues (SIADH)  renal noted  LR and lasix did help Na   now Na is high    # BRENDA on CKD III, presumed pre-renal; hx C3 glomerulopathy w/ proteinuria (up to 8.4 gm/day in past - responded well to immunosupp); metab acidosis  base Cr 2.1  rising BUN could have been steroid effect (on dexameth)  renal eval: f/u noted  tacro lvl 9.2: would make tacro 1mg 9AM and 0.5mg 9PM and recheck 8AM trough lvl in 2 days  c/w calcitriol   U/A w/ 3+ protein -> U TP:Cr ratio 1gm  BMP q24    # normo anemia; no signs of acute blood loss  anemia of chr dz (CKD)  keep hgb >8  on aranesp w/ renal - f/u renal eval for next dose    # COVID + -> seems like pt passed thru inflam phase;   acute on chr hypoxic resp failure (pt on home O2 prn)  can stay on 2L NC O2  covid +: 7/5; 7/10 - no further testing needed  d-dimer 454, ESR 38, CRP 25.9, ferr 1415  completed quarantine 7/5-7/15, d/c precautions   pt was NOT tx'd initially for COVID   was beyond tx window for MABs  RDV relative CI at this GFR anyway  completed most of dexameth course  pt was exposed now to another COVID + pt, but I spoke w/ ID and this pt does NOT need quarantine again, since he is just getting over acute COVID - d/c isolation and tx to another room; I also placed call to infection control    # COPD; severe pulm HTN; fibrotic changes LLL; + fever  was not sure if prior fever was residual for COVID or developing LLL PNA  CXR: LLL infil looked worse to me  rpt CXR: bilat infil L>R  cont NC O2  abx: completed 7 days of abx (7/11-7/18)  procal 0.38  U Strp/Leg Ag: neg  MRSA nares: neg  c/w proventil prn  outpt pulm f/u    # diarrhea - resolved  c diff testing: neg  not on laxatives    # Nutrition  resume MVI w/ min q24   resume diet and supplements  IVFs: as above    # HFrEF, chronic; sev dilated CM; RV dysfxn; mild TR/AR; HTN  not on all goal directed tx - cardio eval Dr Witt - (per cardio, EF closer to 45%)  BB - c/w toprol xl 50mg po q24   not on ACEI/ARB 2/2 CKD/BRENDA (could consider hydral + nitrates, but only if BP good enough)  diuretics - HOLD lasix (40mg po q24) - til Na better    # AFib, chronic  rate controlled - on BB  resume eliquis     # CAD  agree w/ no asa    # DM 2  diabetic diet  FS qac/hs   incr lantus 21 HS  incr ademelog 7 tid w/ +1 scale     # PVD  c/w pentoxifylline     # DLD  on statin    # Fall, mechanical; diff walking; weakness  no syncope per pt  CTH neg  trauma w/u neg  PT eval for SNF    # Hypothyroidism  synthroid 150mg q24    # DVT ppx: on eliquis    # GI ppx: none    # Activity: need PT eval    Dispo: renal f/u re tacro and aranesp; adj tacro and check lvl; hold lasix; c/w IVFs; d/c abx; decr oral bicarb; daily labs; tx DM;   Eventually, pt will need STR @ SNF ([facility]) - f/u CM - anticipate matias, if Na better    Prog remains very guarded. Long term prog is likely poor.

## 2022-07-21 NOTE — CHART NOTE - NSCHARTNOTEFT_GEN_A_CORE
Patient was desatting on room air to 89%, nurse put him on nasal cannula. I assessed the patient. He denied any chest pain or shortness of breath. We were unable to wean patient back to room air until 3 hours later. He is now back on room air, sat: 97%. Not in distress

## 2022-07-22 NOTE — PROGRESS NOTE ADULT - ASSESSMENT
# toxic and metab encephalopathies waxing and waning;  I thought initial MS change at start of admit was metab: COVID 19 + PNA + hypoxia  I think the 2nd MS change was toxic encephalopathy from cefepime and dexa (doubt COVID is doing anything now; also do not believe he has stroke)  I think his current prob is hyperNa (dehydration) + whatever may be causing rise in WBC + hosp delirium + debility + lack of eating   initial CTH: mild chr micro changes; mild atrophy and ventric prom  MS was better 7/14, slight worse 7/15 and way off 7/16, 7/17, 7/18, 7/19; little better 7/20 and back to nl 7/21 and little worse 7/22  d/c dexameth; d/c cefepime; d/c levo  EEG: triphasic waves; mild-mod gen slowing  rpt CT H NC: no acute path  d/c MRI B NC (might need to reconsider again)  d/c VEEG   LFTs are nl  neuro eval - Dr Andrews     # leukocytosis  case d/w ID  WBC still in mid-teens  recent UCx grew E Fecalis - VSE; was suscept to levo  rpt bld cx x2: neg; UCx rpt: also neg  abx: off now  send procal  rpt CXR - to eval for PNA  do rt knee xray r/o chondrocalcinosis as clue to pseudogout (pt has no prior hx of gout)  check CBC w/ Diff  check uric acid lvl  rpt u/a today is neg (there was minor ppt in davalos)  resend UCx and BCx  CBC q24 til WBC <12    # hypernatremia prob 2/2 dehydration  IVFs: 1/2 /hr (avoiding D5 because of blood sugars, but might need to use D5) - given CHF hx, cannot stay at this rate  HOLD lasix  decr NaHCO3 325mg po q12  check BMP tonight and q24    # prior hyponatremia - likely 2/2 salt depletion (was on lasix, had diarrhea, poor oral intake recently) and less likely lung issues (SIADH)  renal noted  LR and lasix did help Na   now Na is high    # BRENDA on CKD III, presumed pre-renal; hx C3 glomerulopathy w/ proteinuria (up to 8.4 gm/day in past - responded well to immunosupp); metab acidosis  base Cr 2.1  rising BUN could have been steroid effect (on dexameth)  renal eval: f/u noted  tacro lvl 9.2 (goal 3-7): c/w tacro 1mg 9AM and 1mg 9PM and recheck 8AM trough lvl this weekend  c/w calcitriol   U/A w/ 3+ protein -> U TP:Cr ratio 1gm  BMP q24    # normo anemia; no signs of acute blood loss  anemia of chr dz (CKD)  keep hgb >8 - might need tx soon  on aranesp w/ renal - f/u renal eval for next dose    # COVID + -> seems like pt passed thru inflam phase;   acute on chr hypoxic resp failure (pt on home O2 prn)  can stay on 2L NC O2  covid +: 7/5; 7/10 - no further testing needed  d-dimer 454, ESR 38, CRP 25.9, ferr 1415  pt was NOT tx'd initially for COVID   was beyond tx window for MABs  RDV relative CI at this GFR anyway  completed most of dexameth course  completed quarantine 7/5-7/15  pt was exposed now to another COVID + pt, but I spoke w/ Inf Cont and ID: pt would NOT be consider COVID exposed for the next 90 days starting 7/5 - 1st day COVID +  so, this pt does NOT need quarantine again, since he is just getting over acute COVID -> d/c isolation and tx to another room;     # COPD; severe pulm HTN; fibrotic changes LLL; + prior fever  was not sure if prior fever was residual for COVID or developing LLL PNA  CXR: LLL infil looked worse to me  rpt CXR: bilat infil L>R  cont NC O2  abx: completed 7 days of abx (7/11-7/18)  procal 0.38  U Strp/Leg Ag: neg  MRSA nares: neg  c/w proventil prn  outpt pulm f/u    # diarrhea - resolved  c diff testing: neg  not on laxatives    # Nutrition  resume MVI w/ min q24   resume diet and supplements  IVFs: as above    # HFrEF, chronic; sev dilated CM; RV dysfxn; mild TR/AR; HTN  not on all goal directed tx - cardio eval Dr Witt - (per cardio, EF closer to 45%)  BB - c/w toprol xl 50mg po q24   not on ACEI/ARB 2/2 CKD/BRENDA (could consider hydral + nitrates, but only if BP good enough)  diuretics - HOLD lasix (40mg po q24) - til Na better    # AFib, chronic  rate controlled - on BB  c/w eliquis     # CAD  agree w/ no asa    # DM 2  diabetic diet  FS qac/hs   decr lantus 18 HS  decr ademelog 6 tid w/ +1 scale     # PVD  c/w pentoxifylline     # DLD  on statin    # Fall, mechanical; diff walking; weakness  no syncope per pt  CTH neg  trauma w/u neg  PT eval for SNF (during this admit, previously, pt was OOBTC and did walk a little w/ walker and asst)    # Hypothyroidism  synthroid 150mg q24    # DVT ppx: on eliquis    # GI ppx: none    # Activity: need PT eval    # updated son at bedside    Dispo: renal f/u; f/u tacro lvl; f/u next aranesp; hold lasix; c/w IVFs; decr oral bicarb; daily labs; adj insulin;   Eventually, pt will need STR @ SNF ([facility]) - f/u CM - might be ready early next week    Prog remains very guarded. Long term prog is likely poor.

## 2022-07-22 NOTE — CHART NOTE - NSCHARTNOTEFT_GEN_A_CORE
Patient was seen on the floor, he fell down from his bed while trying to reach the buzzer to call the nurses. He fell down face first, unwitnessed fall so we are unsure if patient hit his head, but he has redness on his forehead. When assessed, patient was conscious and talkative, he was shaking in bed. He had right skin tear on his elbow, bleeding, as well as left wrist pain (new?) No other new bruises or pain. He denied headache/ dyspnea/ other complaints.   CT head w/o contrast urgent will be done + skeletal assessment Patient was seen on the floor, he fell down from his bed while trying to reach the buzzer to call the nurses. He fell down face first, unwitnessed fall so we are unsure if patient hit his head, but he has redness on his forehead. When assessed, patient was conscious and talkative, he was shaking in bed. He had right skin tear on his elbow, bleeding, as well as left wrist pain (new?) No other new bruises or pain. He denied headache/ dyspnea/ other complaints.   CT head w/o contrast urgent will be done + skeletal assessment   Eliquis held Patient was seen on the floor, he fell down from his bed. He fell down face first, unwitnessed fall so we are unsure if patient hit his head, but he has redness on his forehead. When assessed, patient was conscious and talkative, he was shaking in bed. He had right skin tear on his elbow, bleeding, as well as left wrist pain (new?) No other new bruises or pain. He denied headache/ dyspnea/ other complaints.   Eliquis held    Update: 3 hours after the fall, patient was found to be desatting with laboured breathing, put on non-rebreather mask and ABGs taken. Urgent CXR was done to rule out pneumothorax/hemothorax (no new changes found on CXR). Patient improved and is now back on nasal cannula.     CT head no contrast was done: no bleed  Xrays wrist, elbow, shoulder were done --> to be followed up Patient was seen on the floor, he fell down from his bed. He fell down face first, unwitnessed fall so we are unsure if patient hit his head, but he has redness on his forehead. When assessed, patient was conscious and talkative, he was shaking in bed. He had right skin tear on his elbow, bleeding, as well as left wrist pain (new?) No other new bruises or pain. He denied headache/ dyspnea/ other complaints.   Eliquis held    Update: 3 hours after the fall, patient was found to be desatting with laboured breathing, put on non-rebreather mask and ABGs taken. Urgent CXR was done to rule out pneumothorax/hemothorax (no new changes found on CXR). Patient improved and is now back on nasal cannula.     CT head no contrast was done: no bleed --> eliquis restarted  Xrays wrist, elbow, shoulder were done --> to be followed up    Since the patient had sudden onset laboured breathing & desatting, d dimer & duplex U/S will be ordered to rule out DVT/PE

## 2022-07-22 NOTE — PROGRESS NOTE ADULT - ASSESSMENT
82y Male with PMHx of HFrEF, Afib on eliquis, CAD s/p CABG, COPD (on rare home O2 PRN), CKD3 (Hx of C3 glomerulopathy), HTN, HLD      # BRENDA worsening Hypernatremia  due to dehydration, started on 1/2 NS at 150 c/hr  # CKD 3b - creat baseline ~1.8    # C3 glomerulopathy - on Prograf 1 mg q12h- to be cont and Cellcept - on hold d/t COVID19 illness  # Proteinuria 8.4 g initially responded well to Prograf with reduction of proteinuria  to<1 g/g according to outpatient records  # HFrEF 10/2021 TTE - severe LV and RV systolic dysfunction and PAH / seen by Dr. De La Fuente, EF ~45%    - 7/19 FK level (drawn after tacrolimus 9.2 closer to goal 3-7, cont same dose  - no hydronephrosis on CTAP  - appreciate neuro eval, pending MR brain, possible VEEG  -repeat BMP q12 and adjut hypotonic fluids to avoid drop in NA >8 mE /day

## 2022-07-22 NOTE — PROGRESS NOTE ADULT - SUBJECTIVE AND OBJECTIVE BOX
ANDRIA TAVAREZ  82y, Male  Allergy: Ozempic (0.25 mg or 0.5 mg dose) (Hives; Rash)  streptomycin (Unknown)  Trulicity Pen (Hives; Rash)      LOS  17d    CHIEF COMPLAINT: Diarrhea (2022 08:50)      INTERVAL EVENTS/HPI  - No acute events overnight  - T(F): , Max: 99.4 (22 @ 14:03)  - Denies any worsening symptoms  - Tolerating medication  - WBC Count: 17.02 (22 @ 06:05) uptrending   WBC Count: 15.09 (22 @ 08:09)     - Creatinine, Serum: 2.0 (22 @ 06:05)  Creatinine, Serum: 1.9 (22 @ 08:09)       ROS  General: Denies rigors, nightsweats  HEENT: Denies headache, rhinorrhea, sore throat, eye pain  CV: Denies CP, palpitations  PULM: Denies wheezing, hemoptysis  GI: Denies hematemesis, hematochezia, melena  : Denies discharge, hematuria  MSK: Denies arthralgias, myalgias  SKIN: Denies rash, lesions  NEURO: Denies paresthesias, weakness  PSYCH: Denies depression, anxiety    VITALS:  T(F): 99.4, Max: 99.4 (22 @ 14:03)  HR: 85  BP: 127/64  RR: 20Vital Signs Last 24 Hrs  T(C): 37.4 (2022 14:03), Max: 37.4 (2022 14:03)  T(F): 99.4 (2022 14:03), Max: 99.4 (2022 14:03)  HR: 85 (2022 14:03) (79 - 91)  BP: 127/64 (2022 14:03) (119/76 - 137/64)  BP(mean): --  RR: 20 (2022 14:03) (18 - 20)  SpO2: --        PHYSICAL EXAM:  Gen: NAD, resting in bed, Elderly M  HEENT: Normocephalic, atraumatic  Neck: supple, no lymphadenopathy  CV: Regular rate & regular rhythm  Lungs: decreased BS at bases, no fremitus  Abdomen: Soft, BS present  Ext: Warm, well perfused, some knee pain , LUE pain  Neuro: non focal, awake  Skin: no rash, no erythema  Lines: no phlebitis  davalos    FH: Non-contributory  Social Hx: Non-contributory    TESTS & MEASUREMENTS:                        8.0    17.02 )-----------( 232      ( 2022 06:05 )             24.3         152<H>  |  119<H>  |  77<HH>  ----------------------------<  83  4.1   |  23  |  2.0<H>    Ca    8.3<L>      2022 06:05  Mg     2.7               Urinalysis Basic - ( 2022 13:10 )    Color: Yellow / Appearance: Slightly Turbid / S.018 / pH: x  Gluc: x / Ketone: Negative  / Bili: Negative / Urobili: <2 mg/dL   Blood: x / Protein: 300 mg/dL / Nitrite: Negative   Leuk Esterase: Negative / RBC: 1 /HPF / WBC 9 /HPF   Sq Epi: x / Non Sq Epi: 4 /HPF / Bacteria: Negative        Culture - Urine (collected 22 @ 16:20)  Source: Catheterized Catheterized  Final Report (22 @ 17:31):    No growth    Culture - Blood (collected 22 @ 09:47)  Source: .Blood None  Preliminary Report (22 @ 23:01):    No growth to date.    Culture - Blood (collected 22 @ 22:53)  Source: .Blood None  Preliminary Report (22 @ 07:01):    No growth to date.    Culture - Urine (collected 22 @ 11:47)  Source: Clean Catch Clean Catch (Midstream)  Final Report (07-15-22 @ 22:34):    10,000 - 49,000 CFU/mL Enterococcus faecalis  Organism: Enterococcus faecalis (07-15-22 @ 22:34)  Organism: Enterococcus faecalis (07-15-22 @ 22:34)      -  Ampicillin: S <=2 Predicts results to ampicillin/sulbactam, amoxacillin-clavulanate and  piperacillin-tazobactam.      -  Ciprofloxacin: S <=1      -  Levofloxacin: S <=1      -  Nitrofurantoin: S <=32 Should not be used to treat pyelonephritis.      -  Tetra/Doxy: R >8      -  Vancomycin: S 2      Method Type: EVA    Culture - Blood (collected 22 @ 07:31)  Source: .Blood None  Final Report (22 @ 20:00):    No Growth Final    Culture - Blood (collected 22 @ 11:41)  Source: .Blood None  Final Report (22 @ 22:00):    No Growth Final            INFECTIOUS DISEASES TESTING  Legionella Antigen, Urine: Negative (22 @ 18:33)  Streptococcus pneumoniae Ag, Ur Result: Negative (22 @ 18:33)  COVID-19 PCR: Detected (22 @ 20:41)  MRSA PCR Result.: Negative (22 @ 19:50)  Procalcitonin, Serum: 0.38 (22 @ 11:41)  COVID-19 PCR: Detected (07-10-22 @ 20:15)  COVID-19 PCR: Detected (22 @ 23:05)  Procalcitonin, Serum: 0.08 (10-20-21 @ 20:00)  COVID-19 PCR: NotDetec (10-20-21 @ 07:48)      INFLAMMATORY MARKERS  C-Reactive Protein, Serum: 25.9 mg/L (22 @ 08:58)  Sedimentation Rate, Erythrocyte: 38 mm/Hr (22 @ 08:58)      RADIOLOGY & ADDITIONAL TESTS:  I have personally reviewed the last available Chest xray  CXR      CT      CARDIOLOGY TESTING  12 Lead ECG:   Ventricular Rate 75 BPM    Atrial Rate 84 BPM    QRS Duration 124 ms    Q-T Interval 432 ms    QTC Calculation(Bazett) 482 ms    R Axis -18 degrees    T Axis 139 degrees    Diagnosis Line Atrial fibrillation with premature ventricular or aberrantlyconducted  complexes  Possible Inferior infarct , age undetermined  T wave abnormality, consider lateral ischemia  Abnormal ECG    Confirmed by Sarah Borden MD ([id number]) on 2022 12:13:01 PM (22 @ 15:08)      MEDICATIONS  albuterol/ipratropium for Nebulization 3 Nebulizer every 6 hours  apixaban 2.5 Oral every 12 hours  atorvastatin 40 Oral at bedtime  benzocaine 20% Spray 1 Topical once  calcitriol   Capsule 0.25 Oral <User Schedule>  dextrose 5%. 1000 IV Continuous <Continuous>  dextrose 5%. 1000 IV Continuous <Continuous>  dextrose 50% Injectable 25 IV Push once  dextrose 50% Injectable 12.5 IV Push once  dextrose 50% Injectable 25 IV Push once  glucagon  Injectable 1 IntraMuscular once  insulin glargine Injectable (LANTUS) 18 SubCutaneous at bedtime  insulin lispro (ADMELOG) corrective regimen sliding scale  SubCutaneous three times a day before meals  insulin lispro Injectable (ADMELOG) 6 SubCutaneous three times a day before meals  levothyroxine 150 Oral daily  LORazepam     Tablet 0.5 Oral once  metoprolol succinate ER 50 Oral daily  multivitamin/minerals 1 Oral daily  pentoxifylline 400 Oral two times a day  sodium bicarbonate 650 Oral every 12 hours  sodium chloride 0.45%. 1000 IV Continuous <Continuous>  tacrolimus 1 Oral <User Schedule>      WEIGHT  Weight (kg): 100 (22 @ 13:03)  Creatinine, Serum: 2.0 mg/dL (22 @ 06:05)      ANTIBIOTICS:      All available historical records have been reviewed

## 2022-07-22 NOTE — PROGRESS NOTE ADULT - SUBJECTIVE AND OBJECTIVE BOX
ANDRIA TAVAREZ 82y Male  MRN#: [id number]   Hospital Day: 17d    HPI:  82M with PMHx of HFrEF, Afib on eliquis, CAD s/p CABG, COPD (on rare home O2 PRN), CKD3, HTN, HLD presents for 2 weeks of watery diarrhea. Pt reports that 2 weeks ago he went to a doctor's appointment with his wife, and the next day both of them started experiencing non-bloody watery diarrhea, x3-4/day, and then started having decreased PO intake and felt weak. Of note, he also had a mechanical fall 1 week ago on his right side with no injuries sustained (but he didn't visit the ED). Pt denies any other sicks contacts, recent travel, vomiting, chest pain, SOB, palpitations, light-headedness, or any  symptoms.     In the ED: /57, HR 57, Temp 97.6F, satting 96% on RA. Labs notable for Cr of 3.6 (baseline ~2.0), , HCO3 19, AG 20. Trops 0.07 (likely 2/2 BRENDA), no chest pain, EKG 1st-degree AV block. CTH -ve, CTAP + chest -ve for acute pathology.     Admitted to medicine (05 Jul 2022 21:23)      SUBJECTIVE  Patient is a 82y old Male who presents with a chief complaint of Diarrhea (21 Jul 2022 23:39)  Currently admitted to medicine with the primary diagnosis of Acute kidney injury superimposed on CKD      INTERVAL HPI AND OVERNIGHT EVENTS:  Patient was examined and seen at bedside. This morning he is resting comfortably in bed and reports no issues or overnight events. He looks dry and is thirsty    REVIEW OF SYMPTOMS:  CONSTITUTIONAL: No weakness, fevers or chills; No headaches  EYES: No visual changes, eye pain, or discharge  ENT: No vertigo; No ear pain or change in hearing; No sore throat or difficulty swallowing  NECK: No pain or stiffness  RESPIRATORY: No cough, wheezing, or hemoptysis; No shortness of breath  CARDIOVASCULAR: No chest pain or palpitations  GASTROINTESTINAL: No abdominal or epigastric pain; No nausea, vomiting, or hematemesis; No diarrhea or constipation; No melena or hematochezia  GENITOURINARY: No dysuria, frequency or hematuria  MUSCULOSKELETAL: No joint pain, no muscle pain, no weakness  NEUROLOGICAL: No numbness or weakness  SKIN: No itching or rashes    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    AF (atrial fibrillation)  s/p DCCV    Carotid stenosis    COPD (chronic obstructive pulmonary disease)    H/O coronary artery bypass surgery  2001    S/P inguinal hernia repair    History of appendectomy      ALLERGIES:  Ozempic (0.25 mg or 0.5 mg dose) (Hives; Rash)  streptomycin (Unknown)  Trulicity Pen (Hives; Rash)    MEDICATIONS:  STANDING MEDICATIONS  albuterol/ipratropium for Nebulization 3 milliLiter(s) Nebulizer every 6 hours  apixaban 2.5 milliGRAM(s) Oral every 12 hours  atorvastatin 40 milliGRAM(s) Oral at bedtime  calcitriol   Capsule 0.25 MICROGram(s) Oral <User Schedule>  dextrose 5%. 1000 milliLiter(s) IV Continuous <Continuous>  dextrose 5%. 1000 milliLiter(s) IV Continuous <Continuous>  dextrose 50% Injectable 25 Gram(s) IV Push once  dextrose 50% Injectable 12.5 Gram(s) IV Push once  dextrose 50% Injectable 25 Gram(s) IV Push once  glucagon  Injectable 1 milliGRAM(s) IntraMuscular once  insulin glargine Injectable (LANTUS) 21 Unit(s) SubCutaneous at bedtime  insulin lispro (ADMELOG) corrective regimen sliding scale   SubCutaneous three times a day before meals  insulin lispro Injectable (ADMELOG) 7 Unit(s) SubCutaneous three times a day before meals  levothyroxine 150 MICROGram(s) Oral daily  LORazepam     Tablet 0.5 milliGRAM(s) Oral once  metoprolol succinate ER 50 milliGRAM(s) Oral daily  multivitamin/minerals 1 Tablet(s) Oral daily  pentoxifylline 400 milliGRAM(s) Oral two times a day  sodium bicarbonate 650 milliGRAM(s) Oral every 12 hours  sodium chloride 0.45%. 1000 milliLiter(s) IV Continuous <Continuous>    PRN MEDICATIONS  acetaminophen     Tablet .. 650 milliGRAM(s) Oral every 6 hours PRN  ALBUTerol    90 MICROgram(s) HFA Inhaler 1 Puff(s) Inhalation every 6 hours PRN  dextrose Oral Gel 15 Gram(s) Oral once PRN  melatonin 5 milliGRAM(s) Oral at bedtime PRN      VITAL SIGNS: Last 24 Hours  T(C): 37.1 (22 Jul 2022 04:46), Max: 37.1 (21 Jul 2022 19:59)  T(F): 98.7 (22 Jul 2022 04:46), Max: 98.7 (21 Jul 2022 19:59)  HR: 79 (22 Jul 2022 04:46) (79 - 98)  BP: 119/76 (22 Jul 2022 04:46) (119/76 - 137/64)  BP(mean): --  RR: 18 (22 Jul 2022 04:46) (18 - 19)  SpO2: 94% (21 Jul 2022 09:23) (87% - 94%)    LABS:                        8.0    17.02 )-----------( 232      ( 22 Jul 2022 06:05 )             24.3     07-22    152<H>  |  119<H>  |  77<HH>  ----------------------------<  83  4.1   |  23  |  2.0<H>    Ca    8.3<L>      22 Jul 2022 06:05  Mg     2.7     07-22                    RADIOLOGY:      PHYSICAL EXAM:  CONSTITUTIONAL: No acute distress, well-developed, well-groomed, AAOx3  HEAD: Atraumatic, normocephalic  EYES: EOM intact, PERRLA, conjunctiva and sclera clear  ENT: Supple, no masses, no thyromegaly, no bruits, no JVD; moist mucous membranes  PULMONARY: Clear to auscultation bilaterally; no wheezes, rales, or rhonchi  CARDIOVASCULAR: Regular rate and rhythm; no murmurs, rubs, or gallops  GASTROINTESTINAL: Soft, non-tender, non-distended; bowel sounds present  MUSCULOSKELETAL: 2+ peripheral pulses; no clubbing, no cyanosis, no edema  NEUROLOGY: non-focal  SKIN: No rashes or lesions; warm and dry    ASSESSMENT & PLAN  # encephalopathy unclear etiology- possibly due to hospital acquired delirium- 7/21 patient much more alert and oriented  Patient delirious on 7/16  EEG and CT head w/o contrast- no acute pathology  EEG- Consistent with diffuse cerebral electrophysiological dysfunction, Secondary to toxic metabolic cause.  Tacrolimus level appreciated nephrology suggests tacro not the cause of AMS  7/22- pt alert and oriented, no further workup indicated    # BRENDA on CKD III, presumed pre-renal; hx C3 glomerulopathy  base Cr 2.1  improved with IVF  Pr:Cr ratio 1.0  continue with NHCO3 PO  Renal - continue tacrolimus ordered    # COVID +  On supportive treatment   s/p Dexa 6mg PO q6  d-dimer 454, ESR 38, CRP 25.9, ferr 1415  completed quarantine 7/15 - d/bg precautions 7/16    #hypernatremia  - Pt dehydrated, will give more water to drink    # COPD; severe pulm HTN; fibrotic changes LLL; + fever  -resolved    # normo Anemia; no signs of acute blood loss  Patient on eliquis    # Fall, mechanical  no syncope per pt  cth neg  trauma w/u neg  PT eval for SNF    # Elevated cardiac enzymes  improved from prior Oct 2021 levels  ekg noted  no cp  outpt f/u cards    # HFrEF, chronic; sev dilated CM; RV dysfxn; mild TR/AR; HTN  euvolemic on exam  not on ANY goal directed tx - cardio eval Dr Haley  Patient started on metoprolol 25mg q 24 PO    # AFib, chronic  -eliquis  rate control with metoprolol 25mg q24    # CAD   no asa    # DLD  on statin    # DM2  diabetic diet  FS qac/hs  lantus 21 HS  humalog 7 tid w/ +1 scale    # Hypothyroidism  synthroid 150    # PVD  c/w pentoxifylline   # DVT ppx:  on eliquis  # Activity: need PT eval  pending- resolution hypernatremia ANDRIA TAVAREZ 82y Male  MRN#: [id number]   Hospital Day: 17d    HPI:  82M with PMHx of HFrEF, Afib on eliquis, CAD s/p CABG, COPD (on rare home O2 PRN), CKD3, HTN, HLD presents for 2 weeks of watery diarrhea. Pt reports that 2 weeks ago he went to a doctor's appointment with his wife, and the next day both of them started experiencing non-bloody watery diarrhea, x3-4/day, and then started having decreased PO intake and felt weak. Of note, he also had a mechanical fall 1 week ago on his right side with no injuries sustained (but he didn't visit the ED). Pt denies any other sicks contacts, recent travel, vomiting, chest pain, SOB, palpitations, light-headedness, or any  symptoms.     In the ED: /57, HR 57, Temp 97.6F, satting 96% on RA. Labs notable for Cr of 3.6 (baseline ~2.0), , HCO3 19, AG 20. Trops 0.07 (likely 2/2 BRENDA), no chest pain, EKG 1st-degree AV block. CTH -ve, CTAP + chest -ve for acute pathology.     Admitted to medicine (05 Jul 2022 21:23)      SUBJECTIVE  Patient is a 82y old Male who presents with a chief complaint of Diarrhea (21 Jul 2022 23:39)  Currently admitted to medicine with the primary diagnosis of Acute kidney injury superimposed on CKD      INTERVAL HPI AND OVERNIGHT EVENTS:  Patient was examined and seen at bedside. This morning he is resting comfortably in bed and reports no issues or overnight events. He looks dry and is thirsty    REVIEW OF SYMPTOMS:  CONSTITUTIONAL: No weakness, fevers or chills; No headaches  EYES: No visual changes, eye pain, or discharge  ENT: No vertigo; No ear pain or change in hearing; No sore throat or difficulty swallowing  NECK: No pain or stiffness  RESPIRATORY: No cough, wheezing, or hemoptysis; No shortness of breath  CARDIOVASCULAR: No chest pain or palpitations  GASTROINTESTINAL: No abdominal or epigastric pain; No nausea, vomiting, or hematemesis; No diarrhea or constipation; No melena or hematochezia  GENITOURINARY: No dysuria, frequency or hematuria  MUSCULOSKELETAL: No joint pain, no muscle pain, no weakness  NEUROLOGICAL: No numbness or weakness  SKIN: No itching or rashes    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    AF (atrial fibrillation)  s/p DCCV    Carotid stenosis    COPD (chronic obstructive pulmonary disease)    H/O coronary artery bypass surgery  2001    S/P inguinal hernia repair    History of appendectomy      ALLERGIES:  Ozempic (0.25 mg or 0.5 mg dose) (Hives; Rash)  streptomycin (Unknown)  Trulicity Pen (Hives; Rash)    MEDICATIONS:  STANDING MEDICATIONS  albuterol/ipratropium for Nebulization 3 milliLiter(s) Nebulizer every 6 hours  apixaban 2.5 milliGRAM(s) Oral every 12 hours  atorvastatin 40 milliGRAM(s) Oral at bedtime  calcitriol   Capsule 0.25 MICROGram(s) Oral <User Schedule>  dextrose 5%. 1000 milliLiter(s) IV Continuous <Continuous>  dextrose 5%. 1000 milliLiter(s) IV Continuous <Continuous>  dextrose 50% Injectable 25 Gram(s) IV Push once  dextrose 50% Injectable 12.5 Gram(s) IV Push once  dextrose 50% Injectable 25 Gram(s) IV Push once  glucagon  Injectable 1 milliGRAM(s) IntraMuscular once  insulin glargine Injectable (LANTUS) 21 Unit(s) SubCutaneous at bedtime  insulin lispro (ADMELOG) corrective regimen sliding scale   SubCutaneous three times a day before meals  insulin lispro Injectable (ADMELOG) 7 Unit(s) SubCutaneous three times a day before meals  levothyroxine 150 MICROGram(s) Oral daily  LORazepam     Tablet 0.5 milliGRAM(s) Oral once  metoprolol succinate ER 50 milliGRAM(s) Oral daily  multivitamin/minerals 1 Tablet(s) Oral daily  pentoxifylline 400 milliGRAM(s) Oral two times a day  sodium bicarbonate 650 milliGRAM(s) Oral every 12 hours  sodium chloride 0.45%. 1000 milliLiter(s) IV Continuous <Continuous>    PRN MEDICATIONS  acetaminophen     Tablet .. 650 milliGRAM(s) Oral every 6 hours PRN  ALBUTerol    90 MICROgram(s) HFA Inhaler 1 Puff(s) Inhalation every 6 hours PRN  dextrose Oral Gel 15 Gram(s) Oral once PRN  melatonin 5 milliGRAM(s) Oral at bedtime PRN      VITAL SIGNS: Last 24 Hours  T(C): 37.1 (22 Jul 2022 04:46), Max: 37.1 (21 Jul 2022 19:59)  T(F): 98.7 (22 Jul 2022 04:46), Max: 98.7 (21 Jul 2022 19:59)  HR: 79 (22 Jul 2022 04:46) (79 - 98)  BP: 119/76 (22 Jul 2022 04:46) (119/76 - 137/64)  BP(mean): --  RR: 18 (22 Jul 2022 04:46) (18 - 19)  SpO2: 94% (21 Jul 2022 09:23) (87% - 94%)    LABS:                        8.0    17.02 )-----------( 232      ( 22 Jul 2022 06:05 )             24.3     07-22    152<H>  |  119<H>  |  77<HH>  ----------------------------<  83  4.1   |  23  |  2.0<H>    Ca    8.3<L>      22 Jul 2022 06:05  Mg     2.7     07-22                    RADIOLOGY:      PHYSICAL EXAM:  CONSTITUTIONAL: No acute distress, well-developed, well-groomed, AAOx3  HEAD: Atraumatic, normocephalic  EYES: EOM intact, PERRLA, conjunctiva and sclera clear  ENT: Supple, no masses, no thyromegaly, no bruits, no JVD; moist mucous membranes  PULMONARY: Clear to auscultation bilaterally; no wheezes, rales, or rhonchi  CARDIOVASCULAR: Regular rate and rhythm; no murmurs, rubs, or gallops  GASTROINTESTINAL: Soft, non-tender, non-distended; bowel sounds present  MUSCULOSKELETAL: 2+ peripheral pulses; no clubbing, no cyanosis, no edema  NEUROLOGY: non-focal  SKIN: No rashes or lesions; warm and dry    ASSESSMENT & PLAN  # encephalopathy unclear etiology- possibly due to hospital acquired delirium possibly toxic metabolic enceph- 7/21 patient much more alert and oriented  Patient delirious on 7/16  EEG and CT head w/o contrast- no acute pathology  EEG- Consistent with diffuse cerebral electrophysiological dysfunction, Secondary to toxic metabolic cause.  Tacrolimus level appreciated nephrology suggests tacro not the cause of AMS  7/22- pt alert and oriented, no further workup indicated    #Infection of unknown origin  - WBC elevated: UA, urine culture, blood culture, hold off ABx for now    # BRENDA on CKD III, presumed pre-renal; hx C3 glomerulopathy  base Cr 2.1  improved with IVF  Pr:Cr ratio 1.0  continue with NHCO3 PO  Renal - continue tacrolimus ordered    # COVID +  On supportive treatment   s/p Dexa 6mg PO q6  d-dimer 454, ESR 38, CRP 25.9, ferr 1415  completed quarantine 7/15 - d/bg precautions 7/16    #hypernatremia  - Pt dehydrated, will give more water to drink and started IV fluids    # COPD; severe pulm HTN; fibrotic changes LLL; + fever  -resolved    # normo Anemia; no signs of acute blood loss  Patient on eliquis    # Fall, mechanical  no syncope per pt  cth neg  trauma w/u neg  PT eval for SNF    # Elevated cardiac enzymes  improved from prior Oct 2021 levels  ekg noted  no cp  outpt f/u cards    # HFrEF, chronic; sev dilated CM; RV dysfxn; mild TR/AR; HTN  euvolemic on exam  not on ANY goal directed tx - cardio eval Dr Haley  Patient started on metoprolol 25mg q 24 PO    # AFib, chronic  -eliquis  rate control with metoprolol 25mg q24    # CAD   no asa    # DLD  on statin    # DM2  diabetic diet  FS qac/hs  lantus 18 HS  humalog 6 tid w/ +1 scale    # Hypothyroidism  synthroid 150    # PVD  c/w pentoxifylline   # DVT ppx:  on eliquis  # Activity: need PT eval  pending- resolution WBC count, hypernatremia

## 2022-07-22 NOTE — PROVIDER CONTACT NOTE (FALL NOTIFICATION) - SITUATION
Bed alarm on, heard alarm sounding, pt covid position- after dawning proper PPE, entered the room and observed patient facedown on the floor at the bedside. On assessment pt AxOx2.

## 2022-07-22 NOTE — CHART NOTE - NSCHARTNOTEFT_GEN_A_CORE
Per report patient is now alert and oriented suggestive of long standing toxic metabolic encephalopathy which is resolved now.   No further work up per neuro standpoint   Neurology will sign off.   Please call for any question.

## 2022-07-22 NOTE — PROGRESS NOTE ADULT - ASSESSMENT
ASSESSMENT  82M with PMHx of HFrEF, Afib on eliquis, CAD s/p CABG, COPD (on rare home O2 PRN), CKD3, HTN, HLD presents for 2 weeks of watery diarrhea.   ABX  levofloxacin 7/12-18  cefepime 7/11-16  ampicillin 7/18-  Dexamethasone 7/12-16    IMPRESSION  #Leukocytosis    7/22 UA unremarkable     7/18 Blood & Urine cxs NGTD     UA unremarkable Blood: x / Protein: 100 mg/dL / Nitrite: Negative Leuk Esterase: Negative / RBC: 51 /HPF / WBC 6 /HPF Sq Epi: x / Non Sq Epi: 1 /HPF / Bacteria: Negative    7/12 UA unremarkable WBC 6; UCX   10,000 - 49,000 CFU/mL Enterococcus faecalis  #Metabolic encephalopathy     doubt hospital acquired meningitis/encephalitis- no meningeal signs on exam  #Diarrhea    Cdiff NEGATIVE   #COVID    COVID-19 PCR: Detected (07-11-22 @ 20:41)    COVID-19 PCR: Detected (07-10-22 @ 20:15)    COVID-19 PCR: Detected (07-05-22 @ 23:05)  #CKD  Creatinine, Serum: 1.8 (07-20-22 @ 04:30)    Weight (kg): 100 (07-05-22 @ 13:03)    RECOMMENDATIONS  - trend WBC, add diff to CBC  - monitor off antibiotics  - agree with non-infectious workup- uric acid, xray   - s/p D10 of antimicrobials  - if hemodynamic compromise, start zosyn IV     If any questions, please call or send a message on [facility] [employer] Teams  Please continue to update ID with any pertinent new laboratory or radiographic findings  Spectra [id number]

## 2022-07-22 NOTE — PROGRESS NOTE ADULT - SUBJECTIVE AND OBJECTIVE BOX
KANGANDRIA MCELROY  82y  Male  ***My note supersedes ALL resident notes that I sign.  My corrections for their notes are in my note.***    I can be reached directly on [id number]. My office number is [phone]. My personal cell number is [phone].    INTERVAL EVENTS: Here for f/u of altered MS. Pt is awake and talking, but looks less sharp today than yesterday. Pt has body aches when being moved around. He is not eating that great, but is trying. Drinking is also suboptimal as pt is on thickened liquids per Sp/Sw. PT remains weak and almost entirely in bed (ie, not getting OOBTC like last week).    T(F): 99.4 (22 @ 14:03), Max: 99.4 (22 @ 14:03)  HR: 85 (22 @ 14:03) (79 - 91)  BP: 127/64 (22 @ 14:03) (119/76 - 137/64)  RR: 20 (22 @ 14:03) (18 - 20)  SpO2: --    22 @ 07:01  -  22 @ 07:00  --------------------------------------------------------  IN: 0 mL / OUT: 350 mL / NET: -350 mL    22 @ 07:  -  22 @ 15:44  --------------------------------------------------------  IN: 0 mL / OUT: 400 mL / NET: -400 mL    Gen: no resp distress; little lethargic; + NC O2; follows commands  HEENT: PERRL, EOMI, mouth clr, nose clr  Neck: no nodes, no JVD, thyroid nl  lungs: clr  hrt: s1 s2 rrr no murmur  abd: soft, NT/ND, no HS megaly  : + davalos; dark yel/brn urine; some ppt in tubing; no blood  ext: no edema, no c/c; has pain w/ moving limbs  neuro: little tired; little confused; ox2 (not quite baseline), cn intact, can move all 4 ext (but moves rt side better, I think)    LABS:                      8.0     (    94.6   17.02 )-----------( ---------      232      ( 2022 06:05 )             24.3    (    15.1     WBC Count: 17.02 K/uL (22 @ 06:05)  WBC Count: 15.09 K/uL (22 @ 08:09)  WBC Count: 15.62 K/uL (22 @ 04:30)  WBC Count: 17.05 K/uL (22 @ 09:00)  WBC Count: 15.75 K/uL (22 @ 09:47)    Hemoglobin: 8.0 g/dL ( @ 06:05)  Hemoglobin: 8.1 g/dL ( @ 08:09)  Hemoglobin: 9.0 g/dL ( @ 04:30)  Hemoglobin: 10.1 g/dL ( @ 09:00)  Hemoglobin: 9.9 g/dL ( @ 09:47)    152   (   119   (   83      22 @ 06:05  ----------------------               4.1   (   23   (   77                             -----                        2.0  Ca  8.3   Mg  2.7    P   --     Sodium, Serum: 152 mmol/L (22 @ 06:05)  Sodium, Serum: 151 mmol/L (22 @ 08:09)  Sodium, Serum: 147 mmol/L (22 @ 04:30)  Sodium, Serum: 146 mmol/L (22 @ 09:00)    Urinalysis Basic - ( 2022 13:10 )    Color: Yellow / Appearance: Slightly Turbid / S.018 / pH: x  Gluc: x / Ketone: Negative  / Bili: Negative / Urobili: <2 mg/dL   Blood: x / Protein: 300 mg/dL / Nitrite: Negative   Leuk Esterase: Negative / RBC: 1 /HPF / WBC 9 /HPF   Sq Epi: x / Non Sq Epi: 4 /HPF / Bacteria: Negative    Culture - Urine (collected 22 @ 16:20)  Source: Catheterized Catheterized  Final Report (22 @ 17:31):    No growth    Culture - Blood (collected 22 @ 09:47)  Source: .Blood None  Preliminary Report (22 @ 23:01):    No growth to date.    Culture - Blood (collected 22 @ 22:53)  Source: .Blood None  Preliminary Report (22 @ 07:01):    No growth to date.    RADIOLOGY & ADDITIONAL TESTS:    MEDICATIONS:    acetaminophen     Tablet .. 650 milliGRAM(s) Oral every 6 hours PRN  ALBUTerol    90 MICROgram(s) HFA Inhaler 1 Puff(s) Inhalation every 6 hours PRN  albuterol/ipratropium for Nebulization 3 milliLiter(s) Nebulizer every 6 hours  apixaban 2.5 milliGRAM(s) Oral every 12 hours  atorvastatin 40 milliGRAM(s) Oral at bedtime  benzocaine 20% Spray 1 Spray(s) Topical once  calcitriol   Capsule 0.25 MICROGram(s) Oral <User Schedule>  insulin glargine Injectable (LANTUS) 18 Unit(s) SubCutaneous at bedtime  insulin lispro (ADMELOG) corrective regimen sliding scale   SubCutaneous three times a day before meals  insulin lispro Injectable (ADMELOG) 6 Unit(s) SubCutaneous three times a day before meals  levothyroxine 150 MICROGram(s) Oral daily  LORazepam     Tablet 0.5 milliGRAM(s) Oral once  melatonin 5 milliGRAM(s) Oral at bedtime PRN  metoprolol succinate ER 50 milliGRAM(s) Oral daily  multivitamin/minerals 1 Tablet(s) Oral daily  pentoxifylline 400 milliGRAM(s) Oral two times a day  sodium bicarbonate 650 milliGRAM(s) Oral every 12 hours  sodium chloride 0.45%. 1000 milliLiter(s) IV Continuous <Continuous>  tacrolimus 1 milliGRAM(s) Oral <User Schedule>

## 2022-07-22 NOTE — PROGRESS NOTE ADULT - SUBJECTIVE AND OBJECTIVE BOX
Nephrology progress note    Patient is seen and examined, events over the last 24 h noted .  On   cc/hr    Allergies:  Ozempic (0.25 mg or 0.5 mg dose) (Hives; Rash)  streptomycin (Unknown)  Trulicity Pen (Hives; Rash)    Hospital Medications:   MEDICATIONS  (STANDING):  albuterol/ipratropium for Nebulization 3 milliLiter(s) Nebulizer every 6 hours  apixaban 2.5 milliGRAM(s) Oral every 12 hours  atorvastatin 40 milliGRAM(s) Oral at bedtime  benzocaine 20% Spray 1 Spray(s) Topical once  calcitriol   Capsule 0.25 MICROGram(s) Oral <User Schedule>  dextrose 5%. 1000 milliLiter(s) (100 mL/Hr) IV Continuous <Continuous>  dextrose 5%. 1000 milliLiter(s) (50 mL/Hr) IV Continuous <Continuous>  dextrose 50% Injectable 25 Gram(s) IV Push once  dextrose 50% Injectable 12.5 Gram(s) IV Push once  dextrose 50% Injectable 25 Gram(s) IV Push once  glucagon  Injectable 1 milliGRAM(s) IntraMuscular once  insulin glargine Injectable (LANTUS) 18 Unit(s) SubCutaneous at bedtime  insulin lispro (ADMELOG) corrective regimen sliding scale   SubCutaneous three times a day before meals  insulin lispro Injectable (ADMELOG) 6 Unit(s) SubCutaneous three times a day before meals  levothyroxine 150 MICROGram(s) Oral daily  LORazepam     Tablet 0.5 milliGRAM(s) Oral once  metoprolol succinate ER 50 milliGRAM(s) Oral daily  multivitamin/minerals 1 Tablet(s) Oral daily  pentoxifylline 400 milliGRAM(s) Oral two times a day  sodium bicarbonate 325 milliGRAM(s) Oral every 12 hours  sodium chloride 0.45%. 1000 milliLiter(s) (150 mL/Hr) IV Continuous <Continuous>  tacrolimus 1 milliGRAM(s) Oral <User Schedule>        VITALS:  T(F): 99.4 (22 @ 14:03), Max: 99.4 (22 @ 14:03)  HR: 85 (22 @ 14:03)  BP: 127/64 (22 @ 14:03)  RR: 20 (22 @ 14:03)  SpO2: --  Wt(kg): --     @ 07:01  -   @ 07:00  --------------------------------------------------------  IN: 0 mL / OUT: 850 mL / NET: -850 mL     @ 07:  -   @ 07:00  --------------------------------------------------------  IN: 0 mL / OUT: 350 mL / NET: -350 mL     @ 07:  -   @ 17:42  --------------------------------------------------------  IN: 0 mL / OUT: 400 mL / NET: -400 mL          PHYSICAL EXAM:  Constitutional: NAD  HEENT: anicteric sclera  Neck: No JVD  Respiratory: CTA  Cardiovascular: S1, S2, RRR  Gastrointestinal: BS+, soft, NT/ND  Extremities: No peripheral edema  Neurological: A/O x 3  : +oley.   Skin: No rashes  Vascular Access:    LABS:      152<H>  |  119<H>  |  77<HH>  ----------------------------<  83  4.1   |  23  |  2.0<H>    Ca    8.3<L>      2022 06:05  Mg     2.7                                 8.0    17.02 )-----------( 232      ( 2022 06:05 )             24.3       Urine Studies:  Urinalysis Basic - ( 2022 13:10 )    Color: Yellow / Appearance: Slightly Turbid / S.018 / pH:   Gluc:  / Ketone: Negative  / Bili: Negative / Urobili: <2 mg/dL   Blood:  / Protein: 300 mg/dL / Nitrite: Negative   Leuk Esterase: Negative / RBC: 1 /HPF / WBC 9 /HPF   Sq Epi:  / Non Sq Epi: 4 /HPF / Bacteria: Negative        RADIOLOGY & ADDITIONAL STUDIES:

## 2022-07-23 NOTE — PROGRESS NOTE ADULT - ASSESSMENT
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 De La Fuente, EF ~45%  # s/p fall  ct head no bleed follow xray   # d/c iv fluids   #continue sodium bicarbonate / increase to 650  # check chest xray / venous duplex  #on calcitriol / last pth at goal   # check iron stores   # will follow

## 2022-07-23 NOTE — PROGRESS NOTE ADULT - ASSESSMENT
# Metabolic Encephalopathy - multifactorial in etiology, improving   EEG and CT head w/o contrast- no acute pathology  EEG- Consistent with diffuse cerebral electrophysiological dysfunction, Secondary to toxic metabolic cause.  c/w IVF, give D5w with Lasix for hypervolemic hypernatremia   Neuro f/u     # Leukocytosis   - no clear source of infection, w/u negative  - s/p course of abx (Cefepime, Levoquin)   - monitor off abx for now     # BRENDA on CKD III, presumed pre-renal; hx C3 glomerulopathy  continue with NHCO3 PO  Renal - continue tacrolimus ordered    # COVID +  s/p Dexa 6mg PO q6  completed quarantine 7/15 - d/bg precautions 7/16    #Left sided pulmonary opacity   - check CT Chest     # COPD; severe pulm HTN  - Nebs PRN     # normo Anemia;  - stable     # Fall, mechanical  no syncope per pt  cth neg  trauma w/u neg  PT eval for SNF    # HFrEF, chronic; sev dilated CM; RV dysfxn; mild TR/AR; HTN  Lasix once today, reassess tomorrow   not on ANY goal directed tx - cardio eval Dr Haley  Patient started on metoprolol 25mg q 24 PO    # AFib, chronic  Eliquis  rate control with metoprolol 25mg q24    # CAD  no asa, c/w statin     # DLD  on statin    # DM2  diabetic diet  c/w insulin     # Hypothyroidism  synthroid 150    # PVD  c/w pentoxifylline     # DVT ppx:  on Eliquis      pending- clinical improvement

## 2022-07-23 NOTE — PROGRESS NOTE ADULT - SUBJECTIVE AND OBJECTIVE BOX
seen and examined  no distress   lying comfortable         PAST HISTORY  --------------------------------------------------------------------------------  No significant changes to PMH, PSH, FHx, SHx, unless otherwise noted    ALLERGIES & MEDICATIONS  --------------------------------------------------------------------------------  Allergies    Ozempic (0.25 mg or 0.5 mg dose) (Hives; Rash)  streptomycin (Unknown)  Trulicity Pen (Hives; Rash)    Intolerances      Standing Inpatient Medications  albuterol/ipratropium for Nebulization 3 milliLiter(s) Nebulizer every 6 hours  apixaban 2.5 milliGRAM(s) Oral every 12 hours  atorvastatin 40 milliGRAM(s) Oral at bedtime  benzocaine 20% Spray 1 Spray(s) Topical once  calcitriol   Capsule 0.25 MICROGram(s) Oral <User Schedule>  dextrose 5%. 1000 milliLiter(s) IV Continuous <Continuous>  dextrose 5%. 1000 milliLiter(s) IV Continuous <Continuous>  dextrose 50% Injectable 25 Gram(s) IV Push once  dextrose 50% Injectable 12.5 Gram(s) IV Push once  dextrose 50% Injectable 25 Gram(s) IV Push once  glucagon  Injectable 1 milliGRAM(s) IntraMuscular once  insulin glargine Injectable (LANTUS) 18 Unit(s) SubCutaneous at bedtime  insulin lispro (ADMELOG) corrective regimen sliding scale   SubCutaneous three times a day before meals  insulin lispro Injectable (ADMELOG) 6 Unit(s) SubCutaneous three times a day before meals  levothyroxine 150 MICROGram(s) Oral daily  LORazepam     Tablet 0.5 milliGRAM(s) Oral once  metoprolol succinate ER 50 milliGRAM(s) Oral daily  multivitamin/minerals 1 Tablet(s) Oral daily  pentoxifylline 400 milliGRAM(s) Oral two times a day  sodium bicarbonate 325 milliGRAM(s) Oral every 12 hours  sodium chloride 0.45%. 1000 milliLiter(s) IV Continuous <Continuous>  tacrolimus 1 milliGRAM(s) Oral <User Schedule>    PRN Inpatient Medications  acetaminophen     Tablet .. 650 milliGRAM(s) Oral every 6 hours PRN  ALBUTerol    90 MICROgram(s) HFA Inhaler 1 Puff(s) Inhalation every 6 hours PRN  dextrose Oral Gel 15 Gram(s) Oral once PRN  melatonin 5 milliGRAM(s) Oral at bedtime PRN          VITALS/PHYSICAL EXAM  --------------------------------------------------------------------------------  T(C): 36.9 (07-23-22 @ 05:37), Max: 37.4 (07-22-22 @ 14:03)  HR: 88 (07-23-22 @ 06:31) (83 - 114)  BP: 145/53 (07-23-22 @ 05:37) (97/52 - 149/63)  RR: 18 (07-23-22 @ 05:37) (18 - 21)  SpO2: 95% (07-23-22 @ 07:45) (95% - 96%)  Wt(kg): --        07-22-22 @ 07:01  -  07-23-22 @ 07:00  --------------------------------------------------------  IN: 0 mL / OUT: 400 mL / NET: -400 mL      Physical Exam:  	Gen: NAD  	Pulm:   B/L ronal  	CV: S1S2; no rub  	Abd: +distended  	    LABS/STUDIES  --------------------------------------------------------------------------------              7.8    18.27 >-----------<  216      [07-22-22 @ 19:00]              24.3     149  |  116  |  73  ----------------------------<  64      [07-22-22 @ 19:00]  4.4   |  20  |  1.8        Ca     8.1     [07-22-22 @ 19:00]      Mg     2.7     [07-22-22 @ 06:05]      Uric acid 9.5      [07-22-22 @ 19:00]    Creatinine Trend:  SCr 1.8 [07-22 @ 19:00]  SCr 2.0 [07-22 @ 06:05]  SCr 1.9 [07-21 @ 08:09]  SCr 1.8 [07-20 @ 04:30]  SCr 1.6 [07-19 @ 09:00]    Urinalysis - [07-22-22 @ 13:10]      Color Yellow / Appearance Slightly Turbid / SG 1.018 / pH 6.0      Gluc Negative / Ketone Negative  / Bili Negative / Urobili <2 mg/dL       Blood Moderate / Protein 300 mg/dL / Leuk Est Negative / Nitrite Negative      RBC 1 / WBC 9 / Hyaline 13 / Gran  / Sq Epi  / Non Sq Epi 4 / Bacteria Negative      Iron 70, TIBC 303, %sat 23      [10-21-21 @ 11:25]  Ferritin 1415      [07-13-22 @ 08:58]  PTH -- (Ca 9.0)      [07-06-22 @ 07:39]   93  Vitamin D (25OH) 27      [07-06-22 @ 07:39]  HbA1c 8.5      [10-01-19 @ 09:54]  TSH 0.39      [07-18-22 @ 17:42]  Lipid: chol 110, TG 94, HDL 31, LDL --      [07-06-22 @ 07:39]

## 2022-07-23 NOTE — PROGRESS NOTE ADULT - SUBJECTIVE AND OBJECTIVE BOX
Pt seen and examined at bedside. Fell overnight. This morning feels fine, denies SOB, CP.     VITAL SIGNS (Last 24 hrs):  T(C): 35.8 (07-23-22 @ 12:43), Max: 37.1 (07-22-22 @ 22:55)  HR: 67 (07-23-22 @ 12:43) (67 - 114)  BP: 133/64 (07-23-22 @ 12:43) (97/52 - 149/63)  RR: 18 (07-23-22 @ 12:43) (18 - 21)  SpO2: 99% (07-23-22 @ 10:21) (95% - 99%)  Wt(kg): --  Daily     Daily     I&O's Summary    22 Jul 2022 07:01  -  23 Jul 2022 07:00  --------------------------------------------------------  IN: 0 mL / OUT: 400 mL / NET: -400 mL        PHYSICAL EXAM:  GENERAL: NAD, awake, alert   HEAD:  Atraumatic, Normocephalic  EYES:  conjunctiva and sclera clear  NECK: Supple, No JVD  CHEST/LUNG: bibasilar rales   HEART: Regular rate and rhythm; No murmurs, rubs, or gallops  ABDOMEN: Soft, Nontender, Nondistended; Bowel sounds present  EXTREMITIES:  2+ Peripheral Pulses, No clubbing, cyanosis, or edema  SKIN: No rashes or lesions    Labs Reviewed       CBC Full  -  ( 23 Jul 2022 07:52 )  WBC Count : 19.41 K/uL  Hemoglobin : 8.1 g/dL  Hematocrit : 25.4 %  Platelet Count - Automated : 211 K/uL  Mean Cell Volume : 94.4 fL  Mean Cell Hemoglobin : 30.1 pg  Mean Cell Hemoglobin Concentration : 31.9 g/dL  Auto Neutrophil # : x  Auto Lymphocyte # : x  Auto Monocyte # : x  Auto Eosinophil # : x  Auto Basophil # : x  Auto Neutrophil % : x  Auto Lymphocyte % : x  Auto Monocyte % : x  Auto Eosinophil % : x  Auto Basophil % : x    BMP:    07-23 @ 07:52    Blood Urea Nitrogen - 81  Calcium - 8.0  Carbond Dioxide - 20  Chloride - 115  Creatinine - 2.1  Glucose - 101  Potassium - 4.8  Sodium - 149      Hemoglobin A1c -   PTT - ( 20 Jul 2022 04:30 )  PTT:55.2 sec  Urine Culture:  07-18 @ 16:20 Urine culture: --    Culture Results:   No growth  Method Type: --  Organism: --  Organism Identification: --  Specimen Source: Catheterized Catheterized         MEDICATIONS  (STANDING):  albuterol/ipratropium for Nebulization 3 milliLiter(s) Nebulizer every 6 hours  apixaban 2.5 milliGRAM(s) Oral every 12 hours  atorvastatin 40 milliGRAM(s) Oral at bedtime  benzocaine 20% Spray 1 Spray(s) Topical once  calcitriol   Capsule 0.25 MICROGram(s) Oral <User Schedule>  dextrose 5%. 1000 milliLiter(s) (50 mL/Hr) IV Continuous <Continuous>  dextrose 5%. 1000 milliLiter(s) (100 mL/Hr) IV Continuous <Continuous>  dextrose 5%. 1000 milliLiter(s) (75 mL/Hr) IV Continuous <Continuous>  dextrose 50% Injectable 25 Gram(s) IV Push once  dextrose 50% Injectable 12.5 Gram(s) IV Push once  dextrose 50% Injectable 25 Gram(s) IV Push once  furosemide   Injectable 40 milliGRAM(s) IV Push once  glucagon  Injectable 1 milliGRAM(s) IntraMuscular once  insulin glargine Injectable (LANTUS) 18 Unit(s) SubCutaneous at bedtime  insulin lispro (ADMELOG) corrective regimen sliding scale   SubCutaneous three times a day before meals  insulin lispro Injectable (ADMELOG) 6 Unit(s) SubCutaneous three times a day before meals  levothyroxine 150 MICROGram(s) Oral daily  LORazepam     Tablet 0.5 milliGRAM(s) Oral once  metoprolol succinate ER 50 milliGRAM(s) Oral daily  multivitamin/minerals 1 Tablet(s) Oral daily  pentoxifylline 400 milliGRAM(s) Oral two times a day  sodium bicarbonate 650 milliGRAM(s) Oral three times a day  tacrolimus 1 milliGRAM(s) Oral <User Schedule>    MEDICATIONS  (PRN):  acetaminophen     Tablet .. 650 milliGRAM(s) Oral every 6 hours PRN Temp greater or equal to 38C (100.4F), Mild Pain (1 - 3)  ALBUTerol    90 MICROgram(s) HFA Inhaler 1 Puff(s) Inhalation every 6 hours PRN Bronchospasm  dextrose Oral Gel 15 Gram(s) Oral once PRN Blood Glucose LESS THAN 70 milliGRAM(s)/deciliter  melatonin 5 milliGRAM(s) Oral at bedtime PRN Insomnia

## 2022-07-24 NOTE — PROGRESS NOTE ADULT - ASSESSMENT
ASSESSMENT & PLAN:  82M with PMHx of HFrEF, Afib on eliquis, CAD s/p CABG, COPD (on rare home O2 PRN), CKD3, HTN, HLD who presented for 2 weeks of watery diarrhea found to have Covid-19, with hospital stay c/b altered mental status.      # Metabolic Encephalopathy - multifactorial in etiology, improving   EEG and CT head w/o contrast- no acute pathology  EEG- Consistent with diffuse cerebral electrophysiological dysfunction, Secondary to toxic metabolic cause.    Plan:  - c/w IVF, give D5w with Lasix for hypervolemic hypernatremia   - Neuro f/u     # Leukocytosis   - no clear source of infection, w/u negative  - s/p course of abx (Cefepime, Levoquin)     Plan:  - monitor off abx for now     # BRENDA on CKD III,   - presumed pre-renal   - hx C3 glomerulopathy    Plan:  - continue with NHCO3 PO  - Renal - continue tacrolimus ordered    # COVID +  - s/p Dexa 6mg PO q6  - completed quarantine 7/15 - d/bg precautions 7/16    #Left sided pulmonary opacity   - check CT Chest     # COPD; severe pulm HTN  - Nebs PRN     # normo Anemia;  - stable     # Fall, mechanical  - no syncope per pt  - cth neg  - trauma w/u neg    Plan:  - PT eval for SNF    # HFrEF, chronic;   - severely dilated cardio myopathy  - Right ventrical dysfunction   - Mild tricuspid valve/ aortic valve regurg    Plan:  - Lasix once yesterday          - not on ANY goal directed tx - cardio eval Dr Haely  - Patient started on metoprolol 25mg q 24 PO    # AFib, chronic    Plan:  - Eliquis  - rate control with metoprolol 25mg q24    # CAD    Plan:  - No aspirin  - c/w statin     # DLD    Plan:  - on statin    # DM2  - Sugars currently well controlled    Plan:  - diabetic diet  - c/w insulin     # Hypothyroidism    Plan:  - synthroid 150    # PVD  c/w pentoxifylline     # DVT ppx:  on Eliquis

## 2022-07-24 NOTE — PROGRESS NOTE ADULT - ASSESSMENT
Patient with C3 glomerulopathy on Prograf monitor levels ,creatinine 2.1 , treating with fluids and bicarb , anemia noted hemoglobin 7.6 , sat 23 ,ferritin 415  , will treat with SRIRAM and Venofer , cardiac and COPD history noted

## 2022-07-24 NOTE — PROGRESS NOTE ADULT - SUBJECTIVE AND OBJECTIVE BOX
LESA FOLLOW UP NOTE  --------------------------------------------------------------------------------  Chief Complaint:    24 hour events/subjective:        PAST HISTORY  --------------------------------------------------------------------------------  No significant changes to PMH, PSH, FHx, SHx, unless otherwise noted    ALLERGIES & MEDICATIONS  --------------------------------------------------------------------------------  Allergies    Ozempic (0.25 mg or 0.5 mg dose) (Hives; Rash)  streptomycin (Unknown)  Trulicity Pen (Hives; Rash)    Intolerances      Standing Inpatient Medications  apixaban 2.5 milliGRAM(s) Oral every 12 hours  atorvastatin 40 milliGRAM(s) Oral at bedtime  benzocaine 20% Spray 1 Spray(s) Topical once  calcitriol   Capsule 0.25 MICROGram(s) Oral <User Schedule>  dextrose 5%. 1000 milliLiter(s) IV Continuous <Continuous>  dextrose 5%. 1000 milliLiter(s) IV Continuous <Continuous>  dextrose 5%. 1000 milliLiter(s) IV Continuous <Continuous>  dextrose 50% Injectable 25 Gram(s) IV Push once  dextrose 50% Injectable 12.5 Gram(s) IV Push once  dextrose 50% Injectable 25 Gram(s) IV Push once  glucagon  Injectable 1 milliGRAM(s) IntraMuscular once  insulin glargine Injectable (LANTUS) 18 Unit(s) SubCutaneous at bedtime  insulin lispro (ADMELOG) corrective regimen sliding scale   SubCutaneous three times a day before meals  insulin lispro Injectable (ADMELOG) 6 Unit(s) SubCutaneous three times a day before meals  levothyroxine 150 MICROGram(s) Oral daily  LORazepam     Tablet 0.5 milliGRAM(s) Oral once  metoprolol succinate ER 50 milliGRAM(s) Oral daily  multivitamin/minerals 1 Tablet(s) Oral daily  pentoxifylline 400 milliGRAM(s) Oral two times a day  piperacillin/tazobactam IVPB. 3.375 Gram(s) IV Intermittent once  piperacillin/tazobactam IVPB.. 3.375 Gram(s) IV Intermittent every 8 hours  sodium bicarbonate 650 milliGRAM(s) Oral three times a day  tacrolimus 1 milliGRAM(s) Oral <User Schedule>    PRN Inpatient Medications  acetaminophen     Tablet .. 650 milliGRAM(s) Oral every 6 hours PRN  ALBUTerol    90 MICROgram(s) HFA Inhaler 1 Puff(s) Inhalation every 6 hours PRN  dextrose Oral Gel 15 Gram(s) Oral once PRN  melatonin 5 milliGRAM(s) Oral at bedtime PRN      REVIEW OF SYSTEMS  --------------------------------------------------------------------------------  Gen: No weight changes, fatigue, fevers/chills, weakness  Skin: No rashes  Head/Eyes/Ears/Mouth: No headache; Normal hearing; Normal vision w/o blurriness; No sinus pain/discomfort, sore throat  Respiratory: No dyspnea, cough, wheezing, hemoptysis  CV: No chest pain, PND, orthopnea  GI: No abdominal pain, diarrhea, constipation, nausea, vomiting, melena, hematochezia  : No increased frequency, dysuria, hematuria, nocturia  MSK: No joint pain/swelling; no back pain; no edema  Neuro: No dizziness/lightheadedness, weakness, seizures, numbness, tingling  Heme: No easy bruising or bleeding  Endo: No heat/cold intolerance  Psych: No significant nervousness, anxiety, stress, depression    All other systems were reviewed and are negative, except as noted.    VITALS/PHYSICAL EXAM  --------------------------------------------------------------------------------  T(C): 36.2 (07-23-22 @ 21:00), Max: 36.2 (07-23-22 @ 21:00)  HR: 81 (07-24-22 @ 05:01) (67 - 98)  BP: 107/58 (07-24-22 @ 05:01) (107/58 - 133/64)  RR: 17 (07-24-22 @ 05:01) (17 - 18)  SpO2: 98% (07-24-22 @ 05:01) (98% - 98%)  Wt(kg): --        07-23-22 @ 07:01  -  07-24-22 @ 07:00  --------------------------------------------------------  IN: 0 mL / OUT: 1200 mL / NET: -1200 mL      Physical Exam:  	Gen: NAD, well-appearing  	HEENT: PERRL, supple neck, clear oropharynx  	Pulm: CTA B/L  	CV: RRR, S1S2; no rub  	Back: No spinal or CVA tenderness; no sacral edema  	Abd: +BS, soft, nontender/nondistended  	: No suprapubic tenderness  	UE: Warm, FROM, no clubbing, intact strength; no edema; no asterixis  	LE: Warm, FROM, no clubbing, intact strength; no edema  	Neuro: No focal deficits, intact gait  	Psych: Normal affect and mood  	Skin: Warm, without rashes  	Vascular access:    LABS/STUDIES  --------------------------------------------------------------------------------              7.6    15.84 >-----------<  197      [07-24-22 @ 04:30]              24.2     144  |  111  |  81  ----------------------------<  190      [07-24-22 @ 04:30]  4.7   |  21  |  2.1        Ca     7.8     [07-24-22 @ 04:30]      Mg     2.7     [07-24-22 @ 04:30]          Uric acid 9.5      [07-22-22 @ 19:00]    Creatinine Trend:  SCr 2.1 [07-24 @ 04:30]  SCr 2.1 [07-23 @ 07:52]  SCr 1.8 [07-22 @ 19:00]  SCr 2.0 [07-22 @ 06:05]  SCr 1.9 [07-21 @ 08:09]    Urinalysis - [07-22-22 @ 13:10]      Color Yellow / Appearance Slightly Turbid / SG 1.018 / pH 6.0      Gluc Negative / Ketone Negative  / Bili Negative / Urobili <2 mg/dL       Blood Moderate / Protein 300 mg/dL / Leuk Est Negative / Nitrite Negative      RBC 1 / WBC 9 / Hyaline 13 / Gran  / Sq Epi  / Non Sq Epi 4 / Bacteria Negative      Iron 70, TIBC 303, %sat 23      [10-21-21 @ 11:25]  Ferritin 1415      [07-13-22 @ 08:58]  PTH -- (Ca 9.0)      [07-06-22 @ 07:39]   93  Vitamin D (25OH) 27      [07-06-22 @ 07:39]  HbA1c 8.5      [10-01-19 @ 09:54]  TSH 0.39      [07-18-22 @ 17:42]  Lipid: chol 110, TG 94, HDL 31, LDL --      [07-06-22 @ 07:39]

## 2022-07-24 NOTE — PROGRESS NOTE ADULT - SUBJECTIVE AND OBJECTIVE BOX
ANDRIA TAVAREZ 82y Male  MRN#: [id number]   Hospital Day:     SUBJECTIVE  Patient is a 82y old Male who presents with a chief complaint of Diarrhea (2022 14:41)  Currently admitted to medicine with the primary diagnosis of Acute kidney injury superimposed on CKD      INTERVAL HPI AND OVERNIGHT EVENTS:  Patient was examined and seen at bedside. This morning he is resting comfortably in bed and reports no issues or overnight events.    REVIEW OF SYMPTOMS:  CONSTITUTIONAL: No weakness, fevers or chills; No headaches  EYES: No visual changes, eye pain, or discharge  ENT: No vertigo; No ear pain or change in hearing; No sore throat or difficulty swallowing  NECK: No pain or stiffness  RESPIRATORY: No cough, wheezing, or hemoptysis; No shortness of breath  CARDIOVASCULAR: No chest pain or palpitations  GASTROINTESTINAL: No abdominal or epigastric pain; No nausea, vomiting, or hematemesis; No diarrhea or constipation; No melena or hematochezia  GENITOURINARY: No dysuria, frequency or hematuria  MUSCULOSKELETAL: No joint pain, no muscle pain, no weakness  NEUROLOGICAL: No numbness or weakness  SKIN: No itching or rashes    OBJECTIVE  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 1000 milliLiter(s) IV Continuous <Continuous>  dextrose 5%. 1000 milliLiter(s) IV Continuous <Continuous>  dextrose 5%. 1000 milliLiter(s) IV Continuous <Continuous>  dextrose 50% Injectable 25 Gram(s) IV Push once  dextrose 50% Injectable 12.5 Gram(s) IV Push once  dextrose 50% Injectable 25 Gram(s) IV Push once  glucagon  Injectable 1 milliGRAM(s) IntraMuscular once  insulin glargine Injectable (LANTUS) 18 Unit(s) SubCutaneous at bedtime  insulin lispro (ADMELOG) corrective regimen sliding scale   SubCutaneous three times a day before meals  insulin lispro Injectable (ADMELOG) 6 Unit(s) SubCutaneous three times a day before meals  levothyroxine 150 MICROGram(s) Oral daily  LORazepam     Tablet 0.5 milliGRAM(s) Oral once  metoprolol succinate ER 50 milliGRAM(s) Oral daily  multivitamin/minerals 1 Tablet(s) Oral daily  pentoxifylline 400 milliGRAM(s) Oral two times a day  sodium bicarbonate 650 milliGRAM(s) Oral three times a day  tacrolimus 1 milliGRAM(s) Oral <User Schedule>    PRN MEDICATIONS  acetaminophen     Tablet .. 650 milliGRAM(s) Oral every 6 hours PRN  ALBUTerol    90 MICROgram(s) HFA Inhaler 1 Puff(s) Inhalation every 6 hours PRN  dextrose Oral Gel 15 Gram(s) Oral once PRN  melatonin 5 milliGRAM(s) Oral at bedtime PRN      VITAL SIGNS: Last 24 Hours  T(C): 36.2 (2022 21:00), Max: 36.9 (2022 05:37)  T(F): 97.1 (2022 21:00), Max: 98.5 (2022 05:37)  HR: 98 (2022 21:00) (67 - 98)  BP: 129/65 (2022 21:00) (129/65 - 145/53)  BP(mean): --  RR: 18 (2022 21:00) (18 - 18)  SpO2: 99% (2022 10:21) (95% - 99%)    LABS:                        8.1    19.41 )-----------( 211      ( 2022 07:52 )             25.4     07-23    149<H>  |  115<H>  |  81<HH>  ----------------------------<  101<H>  4.8   |  20  |  2.1<H>    Ca    8.0<L>      2022 07:52  Mg     2.8     07-23        Urinalysis Basic - ( 2022 13:10 )    Color: Yellow / Appearance: Slightly Turbid / S.018 / pH: x  Gluc: x / Ketone: Negative  / Bili: Negative / Urobili: <2 mg/dL   Blood: x / Protein: 300 mg/dL / Nitrite: Negative   Leuk Esterase: Negative / RBC: 1 /HPF / WBC 9 /HPF   Sq Epi: x / Non Sq Epi: 4 /HPF / Bacteria: Negative      ABG - ( 2022 11:00 )  pH, Arterial: 7.42  pH, Blood: x     /  pCO2: 29    /  pO2: 82    / HCO3: 19    / Base Excess: -4.4  /  SaO2: 97.9                  Culture - Blood (collected 2022 19:00)  Source: .Blood Blood  Preliminary Report (2022 23:01):    No growth to date.    Culture - Urine (collected 2022 13:10)  Source: Catheterized Catheterized  Final Report (2022 00:44):    No growth          RADIOLOGY:      PHYSICAL EXAM:  CONSTITUTIONAL: No acute distress, well-developed, well-groomed, AAOx3  HEAD: Atraumatic, normocephalic  EYES: EOM intact, PERRLA, conjunctiva and sclera clear  ENT: Supple, no masses, no thyromegaly, no bruits, no JVD; moist mucous membranes  PULMONARY: Clear to auscultation bilaterally; no wheezes, rales, or rhonchi  CARDIOVASCULAR: Regular rate and rhythm; no murmurs, rubs, or gallops  GASTROINTESTINAL: Soft, non-tender, non-distended; bowel sounds present  MUSCULOSKELETAL: 2+ peripheral pulses; no clubbing, no cyanosis, no edema  NEUROLOGY: non-focal  SKIN: No rashes or lesions; warm and dry

## 2022-07-24 NOTE — PROGRESS NOTE ADULT - ATTENDING COMMENTS
Patient seen and examined. Patient feels better. Looks better.     # Metabolic Encephalopathy - multifactorial in etiology, improving   EEG and CT head w/o contrast- no acute pathology  EEG- Consistent with diffuse cerebral electrophysiological dysfunction, Secondary to toxic metabolic cause.  c/w D5w x 24 hours   start Zosyn for multifocal PNA     # Leukocytosis 2/2 Multifocal PNA   - unclear if this is residual Covid PNA, given persistent leukocytosis and worsening CXR will treat with Zosyn   - ID f/u     # BRENDA on CKD III, presumed pre-renal; hx C3 glomerulopathy  continue with NHCO3 PO  Renal - continue tacrolimus      # COVID +  s/p Dexa 6mg PO q6  completed quarantine 7/15 - d/bg precautions 7/16    # COPD; severe pulm HTN  - Nebs PRN     # normo Anemia;  - stable     # Fall, mechanical  no syncope per pt  cth neg  trauma w/u neg  PT eval for SNF    # HFrEF, chronic; sev dilated CM; RV dysfxn; mild TR/AR; HTN  not on ANY goal directed tx - cardio eval Dr. Haley  Patient started on metoprolol 25mg q 24 PO    # AFib, chronic  Eliquis  rate control with metoprolol 25mg q24    # CAD  no asa, c/w statin     # DLD  on statin    # DM2  diabetic diet  c/w insulin     # Hypothyroidism  synthroid 150    # PVD  c/w pentoxifylline     # DVT ppx:  on Eliquis      pending- clinical improvement   called son Ramiro and spouse Oanh, no answer   Dispo: SNF
I have personally seen and examined this patient.  I have fully participated in the care of this patient.  I have reviewed all pertinent clinical information, including history, physical exam, plan and note. Patient continues to be encephalopathic. Brain MRI and VEEG are pending. Could consider CSF study to r/o CNS infection and inflammation.   I have reviewed all pertinent clinical information and reviewed all relevant imaging and diagnostic studies personally.  Recommendations as above.  Agree with above assessment except as noted.

## 2022-07-25 NOTE — PROGRESS NOTE ADULT - ASSESSMENT
ASSESSMENT & PLAN:  82M with PMHx of HFrEF, Afib on eliquis, CAD s/p CABG, COPD (on rare home O2 PRN), CKD3, HTN, HLD who presented for 2 weeks of watery diarrhea found to have Covid-19, with hospital stay c/b altered mental status.      # Metabolic Encephalopathy - multifactorial in etiology, improving   EEG and CT head w/o contrast- no acute pathology  EEG- Consistent with diffuse cerebral electrophysiological dysfunction, Secondary to toxic metabolic cause.    Plan:  - c/w IVF, give D5w with Lasix for hypervolemic hypernatremia -  Resolved       # Leukocytosis   - no clear source of infection, w/u negative  - s/p course of abx (Cefepime, Levoquin)     Plan:  - monitor off abx for now   - ID consult     # BRENDA on CKD III,   - presumed pre-renal   - hx C3 glomerulopathy    Plan:  - continue with NHCO3 PO  - Renal - continue tacrolimus ordered  - Gloria in- possible TOV when patient is ambulating    # COVID +  - s/p Dexa 6mg PO q6  - completed quarantine 7/15 - d/bg precautions 7/16    #Left sided pulmonary opacity   - check CT Chest     # COPD; severe pulm HTN  - Nebs PRN     # normo Anemia;  - stable     # Fall, mechanical  - no syncope per pt  - cth neg  - trauma w/u neg    Plan:  - PT eval for SNF    # HFrEF, chronic;   - severely dilated cardio myopathy  - Right ventrical dysfunction   - Mild tricuspid valve/ aortic valve regurg    Plan:  - Lasix once yesterday          - not on ANY goal directed tx - cardio eval Dr Haley  - Patient started on metoprolol 25mg q 24 PO    # AFib, chronic    Plan:  - Eliquis  - rate control with metoprolol 25mg q24    # CAD    Plan:  - No aspirin  - c/w statin     # DLD    Plan:  - on statin    # DM2  - Sugars currently well controlled    Plan:  - diabetic diet  - c/w insulin     # Hypothyroidism    Plan:  - synthroid 150    # PVD  c/w pentoxifylline     # DVT ppx:  on Eliquis    #Discharge to rehab   -PT consult

## 2022-07-25 NOTE — PROGRESS NOTE ADULT - SUBJECTIVE AND OBJECTIVE BOX
Over Night Events: events noted, chest ct reviewed, on NC 3L, afebrile    PHYSICAL EXAM    ICU Vital Signs Last 24 Hrs  T(C): 36.1 (25 Jul 2022 13:30), Max: 36.1 (25 Jul 2022 13:30)  T(F): 97 (25 Jul 2022 13:30), Max: 97 (25 Jul 2022 13:30)  HR: 80 (25 Jul 2022 13:30) (75 - 115)  BP: 135/56 (25 Jul 2022 13:30) (115/56 - 135/56)  RR: 18 (25 Jul 2022 13:30) (17 - 19)  SpO2: 93% (25 Jul 2022 07:58) (93% - 97%)    O2 Parameters below as of 25 Jul 2022 07:58  Patient On (Oxygen Delivery Method): nasal cannula  O2 Flow (L/min): 3          General: chronically ill looking  Lungs: Bilateral crackles  Cardiovascular: MEKHI 2.6  Abdomen: Soft, Positive BS  Extremities: No clubbing   Skin: Warm  Neurological: Non focal       07-24-22 @ 07:01  -  07-25-22 @ 07:00  --------------------------------------------------------  IN:  Total IN: 0 mL    OUT:    Indwelling Catheter - Urethral (mL): 700 mL  Total OUT: 700 mL    Total NET: -700 mL      07-25-22 @ 07:01  -  07-25-22 @ 18:19  --------------------------------------------------------  IN:  Total IN: 0 mL    OUT:    Indwelling Catheter - Urethral (mL): 250 mL  Total OUT: 250 mL    Total NET: -250 mL          LABS:                          7.6    12.68 )-----------( 195      ( 25 Jul 2022 06:48 )             24.0                                               07-25    138  |  104  |  80<HH>  ----------------------------<  117<H>  4.2   |  20  |  2.1<H>    Ca    7.8<L>      25 Jul 2022 06:48  Mg     2.6     07-25                                                                                                                                      Culture - Blood (collected 22 Jul 2022 19:00)  Source: .Blood Blood  Preliminary Report (23 Jul 2022 23:01):    No growth to date.                                                                                           MEDICATIONS  (STANDING):  apixaban 2.5 milliGRAM(s) Oral every 12 hours  atorvastatin 40 milliGRAM(s) Oral at bedtime  benzocaine 20% Spray 1 Spray(s) Topical once  calcitriol   Capsule 0.25 MICROGram(s) Oral <User Schedule>  dextrose 5%. 1000 milliLiter(s) (75 mL/Hr) IV Continuous <Continuous>  dextrose 5%. 1000 milliLiter(s) (50 mL/Hr) IV Continuous <Continuous>  dextrose 5%. 1000 milliLiter(s) (100 mL/Hr) IV Continuous <Continuous>  dextrose 50% Injectable 25 Gram(s) IV Push once  dextrose 50% Injectable 12.5 Gram(s) IV Push once  dextrose 50% Injectable 25 Gram(s) IV Push once  glucagon  Injectable 1 milliGRAM(s) IntraMuscular once  insulin glargine Injectable (LANTUS) 18 Unit(s) SubCutaneous at bedtime  insulin lispro (ADMELOG) corrective regimen sliding scale   SubCutaneous three times a day before meals  insulin lispro Injectable (ADMELOG) 6 Unit(s) SubCutaneous three times a day before meals  levothyroxine 150 MICROGram(s) Oral daily  LORazepam     Tablet 0.5 milliGRAM(s) Oral once  metoprolol succinate ER 50 milliGRAM(s) Oral daily  multivitamin/minerals 1 Tablet(s) Oral daily  pentoxifylline 400 milliGRAM(s) Oral two times a day  piperacillin/tazobactam IVPB.. 3.375 Gram(s) IV Intermittent every 8 hours  sodium bicarbonate 650 milliGRAM(s) Oral three times a day  tacrolimus 1 milliGRAM(s) Oral <User Schedule>    MEDICATIONS  (PRN):  acetaminophen     Tablet .. 650 milliGRAM(s) Oral every 6 hours PRN Temp greater or equal to 38C (100.4F), Mild Pain (1 - 3)  ALBUTerol    90 MICROgram(s) HFA Inhaler 1 Puff(s) Inhalation every 6 hours PRN Bronchospasm  dextrose Oral Gel 15 Gram(s) Oral once PRN Blood Glucose LESS THAN 70 milliGRAM(s)/deciliter  melatonin 5 milliGRAM(s) Oral at bedtime PRN Insomnia      Xrays:                                                                                     ECHO

## 2022-07-25 NOTE — PROGRESS NOTE ADULT - ASSESSMENT
82y Male with PMHx of HFrEF, Afib on eliquis, CAD s/p CABG, COPD (on rare home O2 PRN), CKD3 (Hx of C3 glomerulopathy), HTN, HLD    # BRENDA worsening Hypernatremia  due to dehydration, started on 1/2 NS at 150 c/hr  # CKD 3b - creat baseline ~1.8  # cr stable   # C3 glomerulopathy - on Prograf 1 mg q12h- repeat tarcolimus level 30 min before next dose today  / last level on the high side  and Cellcept - on hold d/t COVID19 illness  # Proteinuria 8.4 g initially responded well to Prograf with reduction of proteinuria  to<1 g/g according to outpatient records  # HFrEF 10/2021 TTE - severe LV and RV systolic dysfunction and PAH / seen by Dr. De La Fuente, EF ~45%  #continue sodium bicarbonate  to 650 q 8   #on calcitriol / last pth at goal   # check iron stores   # will follow

## 2022-07-25 NOTE — PROGRESS NOTE ADULT - ASSESSMENT
ASSESSMENT  82M with PMHx of HFrEF, Afib on eliquis, CAD s/p CABG, COPD (on rare home O2 PRN), CKD3, HTN, HLD presents for 2 weeks of watery diarrhea.   ABX  levofloxacin 7/12-18  cefepime 7/11-16  ampicillin 7/18-  Dexamethasone 7/12-16    IMPRESSION  #Leukocytosis, suspected PNA/aspiration    Procalcitonin, Serum: 0.39 (07-22-22 @ 19:00)    7/22 UA unremarkable   CT CHest 1.  Bilateral lower lobe consolidations with small amount consolidation also in the lingular segment of the left upper lobe suspicious for   multifocal pneumonia.  2.  Otherwise unchanged exam    7/18 Blood & Urine cxs NGTD     UA unremarkable Blood: x / Protein: 100 mg/dL / Nitrite: Negative Leuk Esterase: Negative / RBC: 51 /HPF / WBC 6 /HPF Sq Epi: x / Non Sq Epi: 1 /HPF / Bacteria: Negative    7/12 UA unremarkable WBC 6; UCX   10,000 - 49,000 CFU/mL Enterococcus faecalis  #Metabolic encephalopathy     doubt hospital acquired meningitis/encephalitis- no meningeal signs on exam  #Diarrhea    Cdiff NEGATIVE   #COVID    COVID-19 PCR: Detected (07-11-22 @ 20:41)    COVID-19 PCR: Detected (07-10-22 @ 20:15)    COVID-19 PCR: Detected (07-05-22 @ 23:05)  #CKD  Creatinine, Serum: 1.8 (07-20-22 @ 04:30)  crcl 39  Weight (kg): 100 (07-05-22 @ 13:03)    RECOMMENDATIONS  - piperacillin/tazobactam IVPB.. 3.375 Gram(s) IV Intermittent every 8 hours, empiric 7 days, if D/C prior PO augmentin 875mg BID   - s/p D10 of antimicrobials      If any questions, please call or send a message on [employer]  Please continue to update ID with any pertinent new laboratory or radiographic findings  Spectra [phone]

## 2022-07-25 NOTE — SWALLOW BEDSIDE ASSESSMENT ADULT - SWALLOW EVAL: RECOMMENDED FEEDING/EATING TECHNIQUES
check mouth frequently for oral residue/pocketing/oral hygiene/position upright (90 degrees)/small sips/bites
check mouth frequently for oral residue/pocketing/oral hygiene/position upright (90 degrees)/small sips/bites
allow for swallow between intakes/check mouth frequently for oral residue/pocketing/no straws/oral hygiene/small sips/bites

## 2022-07-25 NOTE — PROGRESS NOTE ADULT - ASSESSMENT
IMPRESSION:  COVID 19 pneumonia  HO severe COPD with mild exacerbation  possible aspiration   Pre-renal BRENDA on CKD III - improving   C3 glomerulopathy - on prograf and cellcept at home  HFrEF - chronic; Mod-severe reduced EF, sev dilated CM; RV dysfxn; severe pulm HTN  AFib, chronic - on eliquis   CAD s/p CABG  H/O DLD, HTN, DM2, Hypothyroidism     PLAN:    - anx x 7 days  - renal fup  - Nebs Q 4 and PRN   - trend markers  - Aspiration precautions  - Wean O2 as tolerated  - GOC  - Overall prognosis poor  OOBTC

## 2022-07-25 NOTE — PROGRESS NOTE ADULT - SUBJECTIVE AND OBJECTIVE BOX
seen and examined  no distress   lying comfortable         PAST HISTORY  --------------------------------------------------------------------------------  No significant changes to PMH, PSH, FHx, SHx, unless otherwise noted    ALLERGIES & MEDICATIONS  --------------------------------------------------------------------------------  Allergies    Ozempic (0.25 mg or 0.5 mg dose) (Hives; Rash)  streptomycin (Unknown)  Trulicity Pen (Hives; Rash)    Intolerances      Standing Inpatient Medications  apixaban 2.5 milliGRAM(s) Oral every 12 hours  atorvastatin 40 milliGRAM(s) Oral at bedtime  benzocaine 20% Spray 1 Spray(s) Topical once  calcitriol   Capsule 0.25 MICROGram(s) Oral <User Schedule>  dextrose 5%. 1000 milliLiter(s) IV Continuous <Continuous>  dextrose 5%. 1000 milliLiter(s) IV Continuous <Continuous>  dextrose 5%. 1000 milliLiter(s) IV Continuous <Continuous>  dextrose 50% Injectable 25 Gram(s) IV Push once  dextrose 50% Injectable 12.5 Gram(s) IV Push once  dextrose 50% Injectable 25 Gram(s) IV Push once  glucagon  Injectable 1 milliGRAM(s) IntraMuscular once  insulin glargine Injectable (LANTUS) 18 Unit(s) SubCutaneous at bedtime  insulin lispro (ADMELOG) corrective regimen sliding scale   SubCutaneous three times a day before meals  insulin lispro Injectable (ADMELOG) 6 Unit(s) SubCutaneous three times a day before meals  levothyroxine 150 MICROGram(s) Oral daily  LORazepam     Tablet 0.5 milliGRAM(s) Oral once  metoprolol succinate ER 50 milliGRAM(s) Oral daily  multivitamin/minerals 1 Tablet(s) Oral daily  pentoxifylline 400 milliGRAM(s) Oral two times a day  piperacillin/tazobactam IVPB.. 3.375 Gram(s) IV Intermittent every 8 hours  sodium bicarbonate 650 milliGRAM(s) Oral three times a day  tacrolimus 1 milliGRAM(s) Oral <User Schedule>    PRN Inpatient Medications  acetaminophen     Tablet .. 650 milliGRAM(s) Oral every 6 hours PRN  ALBUTerol    90 MICROgram(s) HFA Inhaler 1 Puff(s) Inhalation every 6 hours PRN  dextrose Oral Gel 15 Gram(s) Oral once PRN  melatonin 5 milliGRAM(s) Oral at bedtime PRN        VITALS/PHYSICAL EXAM  --------------------------------------------------------------------------------  T(C): 36 (07-25-22 @ 05:11), Max: 36 (07-25-22 @ 05:11)  HR: 115 (07-25-22 @ 05:11) (75 - 115)  BP: 115/56 (07-25-22 @ 05:11) (99/57 - 125/56)  RR: 19 (07-25-22 @ 05:11) (17 - 19)  SpO2: 97% (07-25-22 @ 06:18) (97% - 97%)  Wt(kg): --        07-23-22 @ 07:01  -  07-24-22 @ 07:00  --------------------------------------------------------  IN: 0 mL / OUT: 1200 mL / NET: -1200 mL    07-24-22 @ 07:01  -  07-25-22 @ 06:48  --------------------------------------------------------  IN: 0 mL / OUT: 700 mL / NET: -700 mL      Physical Exam:  	Gen: NAD,   	Pulm: decrease BS B/L  	CV:  S1S2; no rub  	Abd: +distended  	LE: no edema      LABS/STUDIES  --------------------------------------------------------------------------------              7.6    15.84 >-----------<  197      [07-24-22 @ 04:30]              24.2     144  |  111  |  81  ----------------------------<  190      [07-24-22 @ 04:30]  4.7   |  21  |  2.1        Ca     7.8     [07-24-22 @ 04:30]      Mg     2.7     [07-24-22 @ 04:30]        Creatinine Trend:  SCr 2.1 [07-24 @ 04:30]  SCr 2.1 [07-23 @ 07:52]  SCr 1.8 [07-22 @ 19:00]  SCr 2.0 [07-22 @ 06:05]  SCr 1.9 [07-21 @ 08:09]    Urinalysis - [07-22-22 @ 13:10]      Color Yellow / Appearance Slightly Turbid / SG 1.018 / pH 6.0      Gluc Negative / Ketone Negative  / Bili Negative / Urobili <2 mg/dL       Blood Moderate / Protein 300 mg/dL / Leuk Est Negative / Nitrite Negative      RBC 1 / WBC 9 / Hyaline 13 / Gran  / Sq Epi  / Non Sq Epi 4 / Bacteria Negative      Iron 70, TIBC 303, %sat 23      [10-21-21 @ 11:25]  Ferritin 1415      [07-13-22 @ 08:58]  PTH -- (Ca 9.0)      [07-06-22 @ 07:39]   93  Vitamin D (25OH) 27      [07-06-22 @ 07:39]  HbA1c 8.5      [10-01-19 @ 09:54]  TSH 0.39      [07-18-22 @ 17:42]  Lipid: chol 110, TG 94, HDL 31, LDL --      [07-06-22 @ 07:39]

## 2022-07-25 NOTE — PROGRESS NOTE ADULT - SUBJECTIVE AND OBJECTIVE BOX
ANDRIA TAVAREZ  82y, Male  Allergy: Ozempic (0.25 mg or 0.5 mg dose) (Hives; Rash)  streptomycin (Unknown)  Trulicity Pen (Hives; Rash)      LOS  20d    CHIEF COMPLAINT: Diarrhea (25 Jul 2022 06:48)      INTERVAL EVENTS/HPI  - No acute events overnight  - T(F): , Max: 96.8 (07-25-22 @ 05:11)  - Tolerating medication  - WBC Count: 12.68 (07-25-22 @ 06:48)  WBC Count: 15.84 (07-24-22 @ 04:30)     - Creatinine, Serum: 2.1 (07-25-22 @ 06:48)  Creatinine, Serum: 2.1 (07-24-22 @ 04:30)       ROS  ***    VITALS:  T(F): 96.8, Max: 96.8 (07-25-22 @ 05:11)  HR: 115  BP: 115/56  RR: 19Vital Signs Last 24 Hrs  T(C): 36 (25 Jul 2022 05:11), Max: 36 (25 Jul 2022 05:11)  T(F): 96.8 (25 Jul 2022 05:11), Max: 96.8 (25 Jul 2022 05:11)  HR: 115 (25 Jul 2022 05:11) (75 - 115)  BP: 115/56 (25 Jul 2022 05:11) (99/57 - 125/56)  BP(mean): --  RR: 19 (25 Jul 2022 05:11) (17 - 19)  SpO2: 93% (25 Jul 2022 07:58) (93% - 97%)    Parameters below as of 25 Jul 2022 07:58  Patient On (Oxygen Delivery Method): nasal cannula  O2 Flow (L/min): 3      PHYSICAL EXAM:  ***    FH: Non-contributory  Social Hx: Non-contributory    TESTS & MEASUREMENTS:                        7.6    12.68 )-----------( 195      ( 25 Jul 2022 06:48 )             24.0     07-25    138  |  104  |  80<HH>  ----------------------------<  117<H>  4.2   |  20  |  2.1<H>    Ca    7.8<L>      25 Jul 2022 06:48  Mg     2.6     07-25              Culture - Blood (collected 07-22-22 @ 19:00)  Source: .Blood Blood  Preliminary Report (07-23-22 @ 23:01):    No growth to date.    Culture - Urine (collected 07-22-22 @ 13:10)  Source: Catheterized Catheterized  Final Report (07-24-22 @ 00:44):    No growth    Culture - Urine (collected 07-18-22 @ 16:20)  Source: Catheterized Catheterized  Final Report (07-19-22 @ 17:31):    No growth    Culture - Blood (collected 07-18-22 @ 09:47)  Source: .Blood None  Final Report (07-23-22 @ 23:00):    No Growth Final    Culture - Blood (collected 07-17-22 @ 22:53)  Source: .Blood None  Final Report (07-23-22 @ 07:01):    No Growth Final    Culture - Urine (collected 07-12-22 @ 11:47)  Source: Clean Catch Clean Catch (Midstream)  Final Report (07-15-22 @ 22:34):    10,000 - 49,000 CFU/mL Enterococcus faecalis  Organism: Enterococcus faecalis (07-15-22 @ 22:34)  Organism: Enterococcus faecalis (07-15-22 @ 22:34)      -  Ampicillin: S <=2 Predicts results to ampicillin/sulbactam, amoxacillin-clavulanate and  piperacillin-tazobactam.      -  Ciprofloxacin: S <=1      -  Levofloxacin: S <=1      -  Nitrofurantoin: S <=32 Should not be used to treat pyelonephritis.      -  Tetra/Doxy: R >8      -  Vancomycin: S 2      Method Type: EVA    Culture - Blood (collected 07-12-22 @ 07:31)  Source: .Blood None  Final Report (07-17-22 @ 20:00):    No Growth Final    Culture - Blood (collected 07-11-22 @ 11:41)  Source: .Blood None  Final Report (07-16-22 @ 22:00):    No Growth Final            INFECTIOUS DISEASES TESTING  Procalcitonin, Serum: 0.39 (07-22-22 @ 19:00)  Legionella Antigen, Urine: Negative (07-13-22 @ 18:33)  Streptococcus pneumoniae Ag, Ur Result: Negative (07-13-22 @ 18:33)  COVID-19 PCR: Detected (07-11-22 @ 20:41)  MRSA PCR Result.: Negative (07-11-22 @ 19:50)  Procalcitonin, Serum: 0.38 (07-11-22 @ 11:41)  COVID-19 PCR: Detected (07-10-22 @ 20:15)  COVID-19 PCR: Detected (07-05-22 @ 23:05)  Procalcitonin, Serum: 0.08 (10-20-21 @ 20:00)  COVID-19 PCR: NotDetec (10-20-21 @ 07:48)  strept    INFLAMMATORY MARKERS  C-Reactive Protein, Serum: 25.9 mg/L (07-13-22 @ 08:58)  Sedimentation Rate, Erythrocyte: 38 mm/Hr (07-13-22 @ 08:58)      RADIOLOGY & ADDITIONAL TESTS:  I have personally reviewed the last available Chest xray  CXR      CT  CT Chest No Cont:   ACC: [id number] EXAM:  CT CHEST                          PROCEDURE DATE:  07/23/2022          INTERPRETATION:  CLINICAL HISTORY / REASON FOR EXAM: Opacities.   Leukocytosis.    TECHNIQUE: CT chest without contrast. Contiguous axial CT images were   obtained from the thoracic inlet to the base of the diaphragms. Coronal,   sagittal and maximal intensity projection reformatted images were   obtained.  No intravenous contrast was administered.    COMPARISON: 7/5/2022    FINDINGS:  Lungs/Airways/Pleura: Bilateral lower lobe consolidations with small   amount of consolidation also seen within the lingular segment of the left   upper lobe suspicious for underlying multifocal pneumonia. Evaluation   limited due to respiratory motion. No definite pleural effusion or thorax    Heart/Vascular: Unchanged.    Mediastinum/Lymph nodes: Unchanged.    Visualized upper abdomen: Unchanged.    Bones/soft tissues: Unchanged.    IMPRESSION:  1.  Bilateral lower lobe consolidations with small amount consolidation   also in the lingular segment of the left upper lobe suspicious for   multifocal pneumonia.  2.  Otherwise unchanged exam    --- End of Report ---            LEN FROST MD; Attending Radiologist  This document has been electronically signed. Jul 23 2022  4:57PM (07-23-22 @ 16:22)      CARDIOLOGY TESTING  12 Lead ECG:   Ventricular Rate 75 BPM    Atrial Rate 84 BPM    QRS Duration 124 ms    Q-T Interval 432 ms    QTC Calculation(Bazett) 482 ms    R Axis -18 degrees    T Axis 139 degrees    Diagnosis Line Atrial fibrillation with premature ventricular or aberrantlyconducted  complexes  Possible Inferior infarct , age undetermined  T wave abnormality, consider lateral ischemia  Abnormal ECG    Confirmed by Sarah Borden MD ([id number]) on 7/17/2022 12:13:01 PM (07-16-22 @ 15:08)      MEDICATIONS  apixaban 2.5 Oral every 12 hours  atorvastatin 40 Oral at bedtime  benzocaine 20% Spray 1 Topical once  calcitriol   Capsule 0.25 Oral <User Schedule>  dextrose 5%. 1000 IV Continuous <Continuous>  dextrose 5%. 1000 IV Continuous <Continuous>  dextrose 5%. 1000 IV Continuous <Continuous>  dextrose 50% Injectable 25 IV Push once  dextrose 50% Injectable 12.5 IV Push once  dextrose 50% Injectable 25 IV Push once  glucagon  Injectable 1 IntraMuscular once  insulin glargine Injectable (LANTUS) 18 SubCutaneous at bedtime  insulin lispro (ADMELOG) corrective regimen sliding scale  SubCutaneous three times a day before meals  insulin lispro Injectable (ADMELOG) 6 SubCutaneous three times a day before meals  levothyroxine 150 Oral daily  LORazepam     Tablet 0.5 Oral once  metoprolol succinate ER 50 Oral daily  multivitamin/minerals 1 Oral daily  pentoxifylline 400 Oral two times a day  piperacillin/tazobactam IVPB.. 3.375 IV Intermittent every 8 hours  sodium bicarbonate 650 Oral three times a day  tacrolimus 1 Oral <User Schedule>      WEIGHT  Weight (kg): 100 (07-05-22 @ 13:03)  Creatinine, Serum: 2.1 mg/dL (07-25-22 @ 06:48)      ANTIBIOTICS:  piperacillin/tazobactam IVPB.. 3.375 Gram(s) IV Intermittent every 8 hours      All available historical records have been reviewed       ANDRIA TAVAREZ  82y, Male  Allergy: Ozempic (0.25 mg or 0.5 mg dose) (Hives; Rash)  streptomycin (Unknown)  Trulicity Pen (Hives; Rash)      LOS  20d    CHIEF COMPLAINT: Diarrhea (25 Jul 2022 06:48)      INTERVAL EVENTS/HPI  - No acute events overnight  - T(F): , Max: 96.8 (07-25-22 @ 05:11)  - Tolerating medication  - WBC Count: 12.68 (07-25-22 @ 06:48)  WBC Count: 15.84 (07-24-22 @ 04:30)     - Creatinine, Serum: 2.1 (07-25-22 @ 06:48)  Creatinine, Serum: 2.1 (07-24-22 @ 04:30)       ROS  General: Denies rigors, nightsweats  HEENT: Denies headache, rhinorrhea, sore throat, eye pain  CV: Denies CP, palpitations  PULM: Denies wheezing, hemoptysis  GI: Denies hematemesis, hematochezia, melena  : Denies discharge, hematuria  MSK: Denies arthralgias, myalgias  SKIN: Denies rash, lesions  NEURO: Denies paresthesias, weakness  PSYCH: Denies depression, anxiety     VITALS:  T(F): 96.8, Max: 96.8 (07-25-22 @ 05:11)  HR: 115  BP: 115/56  RR: 19Vital Signs Last 24 Hrs  T(C): 36 (25 Jul 2022 05:11), Max: 36 (25 Jul 2022 05:11)  T(F): 96.8 (25 Jul 2022 05:11), Max: 96.8 (25 Jul 2022 05:11)  HR: 115 (25 Jul 2022 05:11) (75 - 115)  BP: 115/56 (25 Jul 2022 05:11) (99/57 - 125/56)  BP(mean): --  RR: 19 (25 Jul 2022 05:11) (17 - 19)  SpO2: 93% (25 Jul 2022 07:58) (93% - 97%)    Parameters below as of 25 Jul 2022 07:58  Patient On (Oxygen Delivery Method): nasal cannula  O2 Flow (L/min): 3      PHYSICAL EXAM:  Gen: NAD, resting in bed Elderly M confused  HEENT: Normocephalic, atraumatic  Neck: supple, no lymphadenopathy  CV: Regular rate & regular rhythm  Lungs: decreased BS at bases, no fremitus  Abdomen: Soft, BS present  Ext: Warm, well perfused  Neuro: non focal, awake  Skin: no rash, no erythema  Lines: no phlebitis     FH: Non-contributory  Social Hx: Non-contributory    TESTS & MEASUREMENTS:                        7.6    12.68 )-----------( 195      ( 25 Jul 2022 06:48 )             24.0     07-25    138  |  104  |  80<HH>  ----------------------------<  117<H>  4.2   |  20  |  2.1<H>    Ca    7.8<L>      25 Jul 2022 06:48  Mg     2.6     07-25              Culture - Blood (collected 07-22-22 @ 19:00)  Source: .Blood Blood  Preliminary Report (07-23-22 @ 23:01):    No growth to date.    Culture - Urine (collected 07-22-22 @ 13:10)  Source: Catheterized Catheterized  Final Report (07-24-22 @ 00:44):    No growth    Culture - Urine (collected 07-18-22 @ 16:20)  Source: Catheterized Catheterized  Final Report (07-19-22 @ 17:31):    No growth    Culture - Blood (collected 07-18-22 @ 09:47)  Source: .Blood None  Final Report (07-23-22 @ 23:00):    No Growth Final    Culture - Blood (collected 07-17-22 @ 22:53)  Source: .Blood None  Final Report (07-23-22 @ 07:01):    No Growth Final    Culture - Urine (collected 07-12-22 @ 11:47)  Source: Clean Catch Clean Catch (Midstream)  Final Report (07-15-22 @ 22:34):    10,000 - 49,000 CFU/mL Enterococcus faecalis  Organism: Enterococcus faecalis (07-15-22 @ 22:34)  Organism: Enterococcus faecalis (07-15-22 @ 22:34)      -  Ampicillin: S <=2 Predicts results to ampicillin/sulbactam, amoxacillin-clavulanate and  piperacillin-tazobactam.      -  Ciprofloxacin: S <=1      -  Levofloxacin: S <=1      -  Nitrofurantoin: S <=32 Should not be used to treat pyelonephritis.      -  Tetra/Doxy: R >8      -  Vancomycin: S 2      Method Type: EVA    Culture - Blood (collected 07-12-22 @ 07:31)  Source: .Blood None  Final Report (07-17-22 @ 20:00):    No Growth Final    Culture - Blood (collected 07-11-22 @ 11:41)  Source: .Blood None  Final Report (07-16-22 @ 22:00):    No Growth Final            INFECTIOUS DISEASES TESTING  Procalcitonin, Serum: 0.39 (07-22-22 @ 19:00)  Legionella Antigen, Urine: Negative (07-13-22 @ 18:33)  Streptococcus pneumoniae Ag, Ur Result: Negative (07-13-22 @ 18:33)  COVID-19 PCR: Detected (07-11-22 @ 20:41)  MRSA PCR Result.: Negative (07-11-22 @ 19:50)  Procalcitonin, Serum: 0.38 (07-11-22 @ 11:41)  COVID-19 PCR: Detected (07-10-22 @ 20:15)  COVID-19 PCR: Detected (07-05-22 @ 23:05)  Procalcitonin, Serum: 0.08 (10-20-21 @ 20:00)  COVID-19 PCR: NotDetec (10-20-21 @ 07:48)  strept    INFLAMMATORY MARKERS  C-Reactive Protein, Serum: 25.9 mg/L (07-13-22 @ 08:58)  Sedimentation Rate, Erythrocyte: 38 mm/Hr (07-13-22 @ 08:58)      RADIOLOGY & ADDITIONAL TESTS:  I have personally reviewed the last available Chest xray  CXR      CT  CT Chest No Cont:   ACC: [id number] EXAM:  CT CHEST                          PROCEDURE DATE:  07/23/2022          INTERPRETATION:  CLINICAL HISTORY / REASON FOR EXAM: Opacities.   Leukocytosis.    TECHNIQUE: CT chest without contrast. Contiguous axial CT images were   obtained from the thoracic inlet to the base of the diaphragms. Coronal,   sagittal and maximal intensity projection reformatted images were   obtained.  No intravenous contrast was administered.    COMPARISON: 7/5/2022    FINDINGS:  Lungs/Airways/Pleura: Bilateral lower lobe consolidations with small   amount of consolidation also seen within the lingular segment of the left   upper lobe suspicious for underlying multifocal pneumonia. Evaluation   limited due to respiratory motion. No definite pleural effusion or thorax    Heart/Vascular: Unchanged.    Mediastinum/Lymph nodes: Unchanged.    Visualized upper abdomen: Unchanged.    Bones/soft tissues: Unchanged.    IMPRESSION:  1.  Bilateral lower lobe consolidations with small amount consolidation   also in the lingular segment of the left upper lobe suspicious for   multifocal pneumonia.  2.  Otherwise unchanged exam    --- End of Report ---            LEN FROST MD; Attending Radiologist  This document has been electronically signed. Jul 23 2022  4:57PM (07-23-22 @ 16:22)      CARDIOLOGY TESTING  12 Lead ECG:   Ventricular Rate 75 BPM    Atrial Rate 84 BPM    QRS Duration 124 ms    Q-T Interval 432 ms    QTC Calculation(Bazett) 482 ms    R Axis -18 degrees    T Axis 139 degrees    Diagnosis Line Atrial fibrillation with premature ventricular or aberrantlyconducted  complexes  Possible Inferior infarct , age undetermined  T wave abnormality, consider lateral ischemia  Abnormal ECG    Confirmed by Sarah Borden MD ([id number]) on 7/17/2022 12:13:01 PM (07-16-22 @ 15:08)      MEDICATIONS  apixaban 2.5 Oral every 12 hours  atorvastatin 40 Oral at bedtime  benzocaine 20% Spray 1 Topical once  calcitriol   Capsule 0.25 Oral <User Schedule>  dextrose 5%. 1000 IV Continuous <Continuous>  dextrose 5%. 1000 IV Continuous <Continuous>  dextrose 5%. 1000 IV Continuous <Continuous>  dextrose 50% Injectable 25 IV Push once  dextrose 50% Injectable 12.5 IV Push once  dextrose 50% Injectable 25 IV Push once  glucagon  Injectable 1 IntraMuscular once  insulin glargine Injectable (LANTUS) 18 SubCutaneous at bedtime  insulin lispro (ADMELOG) corrective regimen sliding scale  SubCutaneous three times a day before meals  insulin lispro Injectable (ADMELOG) 6 SubCutaneous three times a day before meals  levothyroxine 150 Oral daily  LORazepam     Tablet 0.5 Oral once  metoprolol succinate ER 50 Oral daily  multivitamin/minerals 1 Oral daily  pentoxifylline 400 Oral two times a day  piperacillin/tazobactam IVPB.. 3.375 IV Intermittent every 8 hours  sodium bicarbonate 650 Oral three times a day  tacrolimus 1 Oral <User Schedule>      WEIGHT  Weight (kg): 100 (07-05-22 @ 13:03)  Creatinine, Serum: 2.1 mg/dL (07-25-22 @ 06:48)      ANTIBIOTICS:  piperacillin/tazobactam IVPB.. 3.375 Gram(s) IV Intermittent every 8 hours      All available historical records have been reviewed

## 2022-07-25 NOTE — PROGRESS NOTE ADULT - ASSESSMENT
1. Metabolic Encephalopathy - multifactorial in etiology, improving   EEG and CT head w/o contrast- no acute pathology  EEG- Consistent with diffuse cerebral electrophysiological dysfunction, Secondary to toxic metabolic cause.  treat pneumonia  reorient as needed  PT f/u    2. Multifocal pneumonia   # Leukocytosis   - no clear source of infection, w/u negative  - s/p course of abx (Cefepime, Levoquin)   - monitor off abx for now     # BRENDA on CKD III, presumed pre-renal; hx C3 glomerulopathy  continue with NHCO3 PO  Renal - continue tacrolimus ordered    # COVID +  s/p Dexa 6mg PO q6  completed quarantine 7/15 - d/bg precautions 7/16    #Left sided pulmonary opacity   - check CT Chest     # COPD; severe pulm HTN  - Nebs PRN     # normo Anemia;  - stable     # Fall, mechanical  no syncope per pt  cth neg  trauma w/u neg  PT eval for SNF    # HFrEF, chronic; sev dilated CM; RV dysfxn; mild TR/AR; HTN  Lasix once today, reassess tomorrow   not on ANY goal directed tx - cardio eval Dr Haley  Patient started on metoprolol 25mg q 24 PO    # AFib, chronic  Eliquis  rate control with metoprolol 25mg q24    # CAD  no asa, c/w statin     # DLD  on statin    # DM2  diabetic diet  c/w insulin     # Hypothyroidism  synthroid 150    # PVD  c/w pentoxifylline     # DVT ppx:  on Eliquis      pending- clinical improvement    1. Metabolic Encephalopathy - multifactorial in etiology, improving   EEG and CT head w/o contrast- no acute pathology  EEG- Consistent with diffuse cerebral electrophysiological dysfunction, Secondary to toxic metabolic cause.  treat pneumonia  reorient as needed  PT f/u    2. Suspicion for Multifocal pneumonia on CT scan  Leukocytosis now improving  continue Zosyn x 7 days per ID  aspiration precautions  diet per swallow eval  recent blood and urine cultures negative    3. BRENDA on CKD III, presumed prerenal  hx C3 glomerulopathy on tacrolimus and check level per renal note  BMP in AM  Cr now stable at 2.1  baseline 1.8 per renal  continue sodium bicarbonate  hypernatremia improved on D5W - stop IVF if pt with good PO intake    4. COVID positive  s/p Decadron   completed quarantine 7/15   off isolation    5. COPD; severe pulm HTN  - Nebs PRN   - chronic hypoxemic respiratory failure on home O2    6. Normocytic anemia  Hgb dropped from 9->7.6 over 5 days  no signs of bleeding   ?due to phlebotomy and monitor H/H    7. s/p fall on 6/22 PM  pt found on the floor by staff  trauma workup negative  fall precautions  bed alarm    8. Chronic HFrEF   ECHO reviewed from 10/21  severely dilated CM; RV dysfxn; mild TR/AR; HTN  s/p lasix  seen by cardiology Dr. Witt earlier this admission : more recent echo in office in 3/22 with EF 45-50% LVH and mild to moderate MR and TR.  RVSP 45 plus rap.   Patient on metoprolol 50mg q 24 PO and monitor    9. AFib, chronic  on Eliquis  rate control with metoprolol 50mg q24    10. CAD  no asa  c/w statin     11. DM2 - controlled  diabetic diet  c/w insulin     12. Hypothyroidism  on synthroid 150mcg daily    13. PVD  c/w pentoxifylline     14. DVT ppx: on Eliquis    guarded prognosis  full code status      PROGRESS NOTE HANDOFF    Pending: labs in AM, improvement in mental status and respiratory status, continue PT, ?TOV    Family discussion: updated son Ramiro today  medical resident updated daughter and wife at bedside    Disposition: [facility] 1. Metabolic Encephalopathy - multifactorial in etiology, improving   EEG and CT head w/o contrast- no acute pathology  EEG- Consistent with diffuse cerebral electrophysiological dysfunction, Secondary to toxic metabolic cause.  treat suspected pneumonia  reorient as needed  PT f/u    2. Suspicion for Multifocal pneumonia on CT scan  Leukocytosis now improving  continue Zosyn x 7 days per ID  aspiration precautions  diet per swallow eval  recent blood and urine cultures negative    3. BRENDA on CKD III, presumed prerenal  hx C3 glomerulopathy on tacrolimus and check level per renal note  BMP in AM  Cr now stable at 2.1  baseline 1.8 per renal  continue sodium bicarbonate  hypernatremia improved on D5W - stop IVF if pt with good PO intake    4. COVID positive  s/p Decadron   completed quarantine 7/15   off isolation    5. COPD; severe pulm HTN  - Nebs PRN   - used home O2 rarely per chart    6. Normocytic anemia  Hgb dropped from 9->7.6 over 5 days  no signs of bleeding   ?due to phlebotomy and monitor H/H    7. s/p fall on 6/22 PM  pt found on the floor by staff  trauma workup negative  fall precautions  bed alarm    8. Chronic HFrEF   ECHO reviewed from 10/21  severely dilated CM; RV dysfxn; mild TR/AR; HTN  s/p lasix  seen by cardiology Dr. Witt earlier this admission : more recent echo in office in 3/22 with EF 45-50% LVH and mild to moderate MR and TR.  RVSP 45 plus rap.   Patient on metoprolol 50mg q 24 PO and monitor    9. AFib, chronic  on Eliquis  rate control with metoprolol 50mg q24    10. CAD  no asa  c/w statin     11. DM2 - controlled  diabetic diet  c/w insulin     12. Hypothyroidism  on synthroid 150mcg daily    13. PVD  c/w pentoxifylline     14. DVT ppx: on Eliquis    guarded prognosis  full code status      PROGRESS NOTE HANDOFF    Pending: labs in AM, improvement in mental status and respiratory status, continue PT, ?TOV    Family discussion: updated son Ramiro today  medical resident updated daughter and wife at bedside    Disposition: [facility]

## 2022-07-25 NOTE — PROGRESS NOTE ADULT - SUBJECTIVE AND OBJECTIVE BOX
ANDRIA TAVAREZ 82y Male  MRN#: [id number]   Hospital Day: 20d    SUBJECTIVE  Patient is a 82y old Male who presents with a chief complaint of Diarrhea 2/2 Covid 19 infection  Currently admitted to medicine with the primary diagnosis of Acute kidney injury superimposed on CKD      INTERVAL HPI AND OVERNIGHT EVENTS:  Patient was examined and seen at bedside. This morning he is resting comfortably in bed and reports no issues or overnight events.    REVIEW OF SYMPTOMS:  CONSTITUTIONAL: No weakness, fevers or chills; No headaches  ENT: No sore throat or difficulty swallowing  NECK: No pain or stiffness  RESPIRATORY: No cough, wheezing, or hemoptysis; No shortness of breath  CARDIOVASCULAR: No chest pain or palpitations  GASTROINTESTINAL: No abdominal or epigastric pain; No nausea, vomiting, or hematemesis; No diarrhea or constipation  GENITOURINARY: No dysuria  MUSCULOSKELETAL: No joint pain, no muscle pain, no weakness  NEUROLOGICAL: No numbness or weakness  SKIN: No itching or rashes    OBJECTIVE  PAST MEDICAL & SURGICAL HISTORY  HTN (hypertension)  DM (diabetes mellitus)  High cholesterol  Hypothyroid  Pneumonia  CHF (congestive heart failure)  Chronic kidney disease (CKD) last dialysis end of 7/19  PVD (peripheral vascular disease)  AAA (abdominal aortic aneurysm) &lt; 4 cm  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 1000 milliLiter(s) IV Continuous <Continuous>  dextrose 5%. 1000 milliLiter(s) IV Continuous <Continuous>  dextrose 5%. 1000 milliLiter(s) IV Continuous <Continuous>  dextrose 50% Injectable 25 Gram(s) IV Push once  dextrose 50% Injectable 12.5 Gram(s) IV Push once  dextrose 50% Injectable 25 Gram(s) IV Push once  glucagon  Injectable 1 milliGRAM(s) IntraMuscular once  insulin glargine Injectable (LANTUS) 18 Unit(s) SubCutaneous at bedtime  insulin lispro (ADMELOG) corrective regimen sliding scale   SubCutaneous three times a day before meals  insulin lispro Injectable (ADMELOG) 6 Unit(s) SubCutaneous three times a day before meals  levothyroxine 150 MICROGram(s) Oral daily  LORazepam     Tablet 0.5 milliGRAM(s) Oral once  metoprolol succinate ER 50 milliGRAM(s) Oral daily  multivitamin/minerals 1 Tablet(s) Oral daily  pentoxifylline 400 milliGRAM(s) Oral two times a day  piperacillin/tazobactam IVPB.. 3.375 Gram(s) IV Intermittent every 8 hours  sodium bicarbonate 650 milliGRAM(s) Oral three times a day  tacrolimus 1 milliGRAM(s) Oral <User Schedule>    PRN MEDICATIONS  acetaminophen     Tablet .. 650 milliGRAM(s) Oral every 6 hours PRN  ALBUTerol    90 MICROgram(s) HFA Inhaler 1 Puff(s) Inhalation every 6 hours PRN  dextrose Oral Gel 15 Gram(s) Oral once PRN  melatonin 5 milliGRAM(s) Oral at bedtime PRN      VITAL SIGNS: Last 24 Hours  T(C): 36 (25 Jul 2022 05:11), Max: 36 (25 Jul 2022 05:11)  T(F): 96.8 (25 Jul 2022 05:11), Max: 96.8 (25 Jul 2022 05:11)  HR: 115 (25 Jul 2022 05:11) (75 - 115)  BP: 115/56 (25 Jul 2022 05:11) (99/57 - 125/56)  BP(mean): --  RR: 19 (25 Jul 2022 05:11) (17 - 19)  SpO2: 93% (25 Jul 2022 07:58) (93% - 97%)    LABS:                        7.6    12.68 )-----------( 195      ( 25 Jul 2022 06:48 )             24.0     07-25    138  |  104  |  80<HH>  ----------------------------<  117<H>  4.2   |  20  |  2.1<H>    Ca    7.8<L>      25 Jul 2022 06:48  Mg     2.6     07-25    Culture - Blood (collected 22 Jul 2022 19:00)  Source: .Blood Blood  Preliminary Report (23 Jul 2022 23:01):    No growth to date.    Culture - Urine (collected 22 Jul 2022 13:10)  Source: Catheterized Catheterized  Final Report (24 Jul 2022 00:44):    No growth    PHYSICAL EXAM:  CONSTITUTIONAL: No acute distress, well-developed, well-groomed  HEAD: Atraumatic, normocephalic  EYES: EOM intact, PERRLA, conjunctiva and sclera clear  ENT: Moist mucous membranes  PULMONARY: Clear to auscultation bilaterally  CARDIOVASCULAR: Regular rate and rhythm  GASTROINTESTINAL: Soft, non-tender, non-distended; bowel sounds present  MUSCULOSKELETAL: 2+ peripheral pulses; no clubbing, no cyanosis, no edema  SKIN: No rashes or lesions; warm and dry

## 2022-07-25 NOTE — SWALLOW BEDSIDE ASSESSMENT ADULT - SWALLOW EVAL: FEEDING ASSISTANCE
1:1 feed/frequent cues/help required

## 2022-07-25 NOTE — PROGRESS NOTE ADULT - SUBJECTIVE AND OBJECTIVE BOX
ANDRIA TAVAREZ  82y Male    INTERVAL HPI/OVERNIGHT EVENTS:    pt lying in bed - denies pain, SOB, cough, N/V  seems mildly dyspneic  no fever  ROS negative  mental status improved per staff  ROS negative    T(F): 97 (07-25-22 @ 13:30), Max: 97 (07-25-22 @ 13:30)  HR: 80 (07-25-22 @ 13:30) (75 - 115)  BP: 135/56 (07-25-22 @ 13:30) (115/56 - 135/56)  RR: 18 (07-25-22 @ 13:30) (17 - 19)  SpO2: 93% (07-25-22 @ 07:58) (93% - 97%) on O2 NC 3L      I&O's Summary    24 Jul 2022 07:01  -  25 Jul 2022 07:00  --------------------------------------------------------  IN: 0 mL / OUT: 700 mL / NET: -700 mL    25 Jul 2022 07:01  -  25 Jul 2022 17:04  --------------------------------------------------------  IN: 0 mL / OUT: 250 mL / NET: -250 mL      CAPILLARY BLOOD GLUCOSE      POCT Blood Glucose.: 153 mg/dL (25 Jul 2022 11:11)  POCT Blood Glucose.: 139 mg/dL (25 Jul 2022 07:44)  POCT Blood Glucose.: 150 mg/dL (24 Jul 2022 21:27)        PHYSICAL EXAM:  GENERAL: NAD, weak  HEAD:  Normocephalic  EYES:  conjunctiva and sclera clear  ENMT: Moist mucous membranes  NERVOUS SYSTEM:  Alert, awake, Good concentration, oriented x 3  CHEST/LUNG: crackles at bases b/l  HEART: Regular rate and rhythm  ABDOMEN: Soft, Nontender, Nondistended; Bowel sounds present  EXTREMITIES:   No edema  SKIN: warm, dry  : davalos    Consultant(s) Notes Reviewed:  [x ] YES  [ ] NO  Care Discussed with Consultants/Other Providers [ x] YES  [ ] NO    MEDICATIONS  (STANDING):  apixaban 2.5 milliGRAM(s) Oral every 12 hours  atorvastatin 40 milliGRAM(s) Oral at bedtime  benzocaine 20% Spray 1 Spray(s) Topical once  calcitriol   Capsule 0.25 MICROGram(s) Oral <User Schedule>  dextrose 5%. 1000 milliLiter(s) (50 mL/Hr) IV Continuous <Continuous>  dextrose 5%. 1000 milliLiter(s) (100 mL/Hr) IV Continuous <Continuous>  dextrose 5%. 1000 milliLiter(s) (75 mL/Hr) IV Continuous <Continuous>  dextrose 50% Injectable 25 Gram(s) IV Push once  dextrose 50% Injectable 12.5 Gram(s) IV Push once  dextrose 50% Injectable 25 Gram(s) IV Push once  glucagon  Injectable 1 milliGRAM(s) IntraMuscular once  insulin glargine Injectable (LANTUS) 18 Unit(s) SubCutaneous at bedtime  insulin lispro (ADMELOG) corrective regimen sliding scale   SubCutaneous three times a day before meals  insulin lispro Injectable (ADMELOG) 6 Unit(s) SubCutaneous three times a day before meals  levothyroxine 150 MICROGram(s) Oral daily  LORazepam     Tablet 0.5 milliGRAM(s) Oral once  metoprolol succinate ER 50 milliGRAM(s) Oral daily  multivitamin/minerals 1 Tablet(s) Oral daily  pentoxifylline 400 milliGRAM(s) Oral two times a day  piperacillin/tazobactam IVPB.. 3.375 Gram(s) IV Intermittent every 8 hours  sodium bicarbonate 650 milliGRAM(s) Oral three times a day  tacrolimus 1 milliGRAM(s) Oral <User Schedule>    MEDICATIONS  (PRN):  acetaminophen     Tablet .. 650 milliGRAM(s) Oral every 6 hours PRN Temp greater or equal to 38C (100.4F), Mild Pain (1 - 3)  ALBUTerol    90 MICROgram(s) HFA Inhaler 1 Puff(s) Inhalation every 6 hours PRN Bronchospasm  dextrose Oral Gel 15 Gram(s) Oral once PRN Blood Glucose LESS THAN 70 milliGRAM(s)/deciliter  melatonin 5 milliGRAM(s) Oral at bedtime PRN Insomnia      LABS:                        7.6    12.68 )-----------( 195      ( 25 Jul 2022 06:48 )             24.0     07-25    138  |  104  |  80<HH>  ----------------------------<  117<H>  4.2   |  20  |  2.1<H>    Ca    7.8<L>      25 Jul 2022 06:48  Mg     2.6     07-25          Culture - Blood (collected 22 Jul 2022 19:00)  Source: .Blood Blood  Preliminary Report (23 Jul 2022 23:01):    No growth to date.        RADIOLOGY & ADDITIONAL TESTS:    Imaging or report Personally Reviewed:  [x ] YES  [ ] NO    < from: CT Chest No Cont (07.23.22 @ 16:22) >  IMPRESSION:  1.  Bilateral lower lobe consolidations with small amount consolidation   also in the lingular segment of the left upper lobe suspicious for   multifocal pneumonia.  2.  Otherwise unchanged exam    < end of copied text >      < from: [facility] Duplex Lower Ext Vein Scan, Bilat (07.23.22 @ 11:45) >  IMPRESSION:  No evidence of deep venous thrombosis in either lower extremity.      < end of copied text >      < from: Xray Wrist 2 Views, Bilateral (07.23.22 @ 00:56) >  Findings/  impression:    Right: Diffuse osteopenia. No acute displaced fracture or dislocation.   Mild osteoarthritis of the basal, triscaphe, distal radioulnar and first   MCP joints. There are vascular calcifications.    Left: Diffuse osteopenia. No acute displaced fracture or dislocation.   Mild osteoarthritis of the basal, triscaphe, distal radioulnar and first   MCP joints. There are vascular calcifications.    < end of copied text >      < from: Xray Shoulder 2 Views, Bilateral (07.23.22 @ 00:55) >  Findings/  impression:    Diffuse osteopenia. Limited 2 views of each shoulders demonstrate no   acute displaced fracture or dislocation. Mild degenerative changes of the   bilateral acromioclavicular and glenohumeral joints. Partially imaged are   bibasilar opacity and perihilar opacity.    < end of copied text >      < from: CT Head No Cont (07.23.22 @ 00:32) >  IMPRESSION:    Since 7/16/2022:    No evidence of acute intracranial hemorrhage or large territorial infarct.    Stable moderate ventriculomegaly.    < end of copied text >      < from: Xray Knee 1 or 2 Views, Right (07.22.22 @ 16:05) >  IMPRESSION:  1.  No chondrocalcinosis is seen, as clinically questioned.  2.  Probable small joint effusion.    < end of copied text >      < from: TTE Echo Complete w/ Contrast w/ Doppler (10.22.21 @ 07:59) >  Summary:   1. Moderately to severely decreased global left ventricular systolic function.   2. Severely enlarged left atrium.   3. Severely enlarged right atrium.   4. Multiple left ventricular regional wall motion abnormalities exist. See wall motion findings.   5. Mild eccentric left ventricular hypertrophy.   6. The left ventricular diastolic function could not be assessed in this study.   7. Severely enlarged right ventricle.   8. Severely reduced RV systolic function.   9. Mild to moderate mitral valve regurgitation.  10. Moderate mitral annular calcification.  11. Mild tricuspid regurgitation.  12. Mild aortic regurgitation.  13. Sclerotic aortic valve with normal opening.  14. Complex atheroma is noted in the ascending aorta.  15. Estimated pulmonary artery systolic pressure is 64.8 mmHg assuming a right atrial pressure of 15 mmHg, which is consistent with severe pulmonary hypertension.  16. Pulmonary hypertension is present.  17. LA volume Index is 71.9 ml/m² ml/m2.  18. There is moderate aortic root calcification.    < end of copied text >      Case discussed with residents and RN on rounds today    Care discussed with pt           ANDRIA TAVAREZ  82y Male    INTERVAL HPI/OVERNIGHT EVENTS:    pt lying in bed - denies pain, SOB, cough, N/V  seems mildly dyspneic  no fever  ROS negative  mental status improved per staff  ROS negative    T(F): 97 (07-25-22 @ 13:30), Max: 97 (07-25-22 @ 13:30)  HR: 80 (07-25-22 @ 13:30) (75 - 115)  BP: 135/56 (07-25-22 @ 13:30) (115/56 - 135/56)  RR: 18 (07-25-22 @ 13:30) (17 - 19)  SpO2: 93% (07-25-22 @ 07:58) (93% - 97%) on O2 NC 3L      I&O's Summary    24 Jul 2022 07:01  -  25 Jul 2022 07:00  --------------------------------------------------------  IN: 0 mL / OUT: 700 mL / NET: -700 mL    25 Jul 2022 07:01  -  25 Jul 2022 17:04  --------------------------------------------------------  IN: 0 mL / OUT: 250 mL / NET: -250 mL      CAPILLARY BLOOD GLUCOSE      POCT Blood Glucose.: 153 mg/dL (25 Jul 2022 11:11)  POCT Blood Glucose.: 139 mg/dL (25 Jul 2022 07:44)  POCT Blood Glucose.: 150 mg/dL (24 Jul 2022 21:27)        PHYSICAL EXAM:  GENERAL: NAD, weak  HEAD:  Normocephalic  EYES:  conjunctiva and sclera clear  ENMT: Moist mucous membranes  NERVOUS SYSTEM:  Alert, awake, Good concentration, oriented x 3  CHEST/LUNG: crackles at bases b/l  HEART: IRR  ABDOMEN: Soft, Nontender, Nondistended; Bowel sounds present  EXTREMITIES:   No edema  SKIN: warm, dry  : davalos    Consultant(s) Notes Reviewed:  [x ] YES  [ ] NO  Care Discussed with Consultants/Other Providers [ x] YES  [ ] NO    MEDICATIONS  (STANDING):  apixaban 2.5 milliGRAM(s) Oral every 12 hours  atorvastatin 40 milliGRAM(s) Oral at bedtime  benzocaine 20% Spray 1 Spray(s) Topical once  calcitriol   Capsule 0.25 MICROGram(s) Oral <User Schedule>  dextrose 5%. 1000 milliLiter(s) (50 mL/Hr) IV Continuous <Continuous>  dextrose 5%. 1000 milliLiter(s) (100 mL/Hr) IV Continuous <Continuous>  dextrose 5%. 1000 milliLiter(s) (75 mL/Hr) IV Continuous <Continuous>  dextrose 50% Injectable 25 Gram(s) IV Push once  dextrose 50% Injectable 12.5 Gram(s) IV Push once  dextrose 50% Injectable 25 Gram(s) IV Push once  glucagon  Injectable 1 milliGRAM(s) IntraMuscular once  insulin glargine Injectable (LANTUS) 18 Unit(s) SubCutaneous at bedtime  insulin lispro (ADMELOG) corrective regimen sliding scale   SubCutaneous three times a day before meals  insulin lispro Injectable (ADMELOG) 6 Unit(s) SubCutaneous three times a day before meals  levothyroxine 150 MICROGram(s) Oral daily  LORazepam     Tablet 0.5 milliGRAM(s) Oral once  metoprolol succinate ER 50 milliGRAM(s) Oral daily  multivitamin/minerals 1 Tablet(s) Oral daily  pentoxifylline 400 milliGRAM(s) Oral two times a day  piperacillin/tazobactam IVPB.. 3.375 Gram(s) IV Intermittent every 8 hours  sodium bicarbonate 650 milliGRAM(s) Oral three times a day  tacrolimus 1 milliGRAM(s) Oral <User Schedule>    MEDICATIONS  (PRN):  acetaminophen     Tablet .. 650 milliGRAM(s) Oral every 6 hours PRN Temp greater or equal to 38C (100.4F), Mild Pain (1 - 3)  ALBUTerol    90 MICROgram(s) HFA Inhaler 1 Puff(s) Inhalation every 6 hours PRN Bronchospasm  dextrose Oral Gel 15 Gram(s) Oral once PRN Blood Glucose LESS THAN 70 milliGRAM(s)/deciliter  melatonin 5 milliGRAM(s) Oral at bedtime PRN Insomnia      LABS:                        7.6    12.68 )-----------( 195      ( 25 Jul 2022 06:48 )             24.0     07-25    138  |  104  |  80<HH>  ----------------------------<  117<H>  4.2   |  20  |  2.1<H>    Ca    7.8<L>      25 Jul 2022 06:48  Mg     2.6     07-25          Culture - Blood (collected 22 Jul 2022 19:00)  Source: .Blood Blood  Preliminary Report (23 Jul 2022 23:01):    No growth to date.        RADIOLOGY & ADDITIONAL TESTS:    Imaging or report Personally Reviewed:  [x ] YES  [ ] NO    < from: CT Chest No Cont (07.23.22 @ 16:22) >  IMPRESSION:  1.  Bilateral lower lobe consolidations with small amount consolidation   also in the lingular segment of the left upper lobe suspicious for   multifocal pneumonia.  2.  Otherwise unchanged exam    < end of copied text >      < from: [facility] Duplex Lower Ext Vein Scan, Bilat (07.23.22 @ 11:45) >  IMPRESSION:  No evidence of deep venous thrombosis in either lower extremity.      < end of copied text >      < from: Xray Wrist 2 Views, Bilateral (07.23.22 @ 00:56) >  Findings/  impression:    Right: Diffuse osteopenia. No acute displaced fracture or dislocation.   Mild osteoarthritis of the basal, triscaphe, distal radioulnar and first   MCP joints. There are vascular calcifications.    Left: Diffuse osteopenia. No acute displaced fracture or dislocation.   Mild osteoarthritis of the basal, triscaphe, distal radioulnar and first   MCP joints. There are vascular calcifications.    < end of copied text >      < from: Xray Shoulder 2 Views, Bilateral (07.23.22 @ 00:55) >  Findings/  impression:    Diffuse osteopenia. Limited 2 views of each shoulders demonstrate no   acute displaced fracture or dislocation. Mild degenerative changes of the   bilateral acromioclavicular and glenohumeral joints. Partially imaged are   bibasilar opacity and perihilar opacity.    < end of copied text >      < from: CT Head No Cont (07.23.22 @ 00:32) >  IMPRESSION:    Since 7/16/2022:    No evidence of acute intracranial hemorrhage or large territorial infarct.    Stable moderate ventriculomegaly.    < end of copied text >      < from: Xray Knee 1 or 2 Views, Right (07.22.22 @ 16:05) >  IMPRESSION:  1.  No chondrocalcinosis is seen, as clinically questioned.  2.  Probable small joint effusion.    < end of copied text >      < from: TTE Echo Complete w/ Contrast w/ Doppler (10.22.21 @ 07:59) >  Summary:   1. Moderately to severely decreased global left ventricular systolic function.   2. Severely enlarged left atrium.   3. Severely enlarged right atrium.   4. Multiple left ventricular regional wall motion abnormalities exist. See wall motion findings.   5. Mild eccentric left ventricular hypertrophy.   6. The left ventricular diastolic function could not be assessed in this study.   7. Severely enlarged right ventricle.   8. Severely reduced RV systolic function.   9. Mild to moderate mitral valve regurgitation.  10. Moderate mitral annular calcification.  11. Mild tricuspid regurgitation.  12. Mild aortic regurgitation.  13. Sclerotic aortic valve with normal opening.  14. Complex atheroma is noted in the ascending aorta.  15. Estimated pulmonary artery systolic pressure is 64.8 mmHg assuming a right atrial pressure of 15 mmHg, which is consistent with severe pulmonary hypertension.  16. Pulmonary hypertension is present.  17. LA volume Index is 71.9 ml/m² ml/m2.  18. There is moderate aortic root calcification.    < end of copied text >      Case discussed with residents and RN on rounds today    Care discussed with pt

## 2022-07-26 NOTE — PROGRESS NOTE ADULT - SUBJECTIVE AND OBJECTIVE BOX
seen and examined  24 h events noted   no distress         PAST HISTORY  --------------------------------------------------------------------------------  No significant changes to PMH, PSH, FHx, SHx, unless otherwise noted    ALLERGIES & MEDICATIONS  --------------------------------------------------------------------------------  Allergies    Ozempic (0.25 mg or 0.5 mg dose) (Hives; Rash)  streptomycin (Unknown)  Trulicity Pen (Hives; Rash)    Intolerances      Standing Inpatient Medications  apixaban 2.5 milliGRAM(s) Oral every 12 hours  atorvastatin 40 milliGRAM(s) Oral at bedtime  benzocaine 20% Spray 1 Spray(s) Topical once  calcitriol   Capsule 0.25 MICROGram(s) Oral <User Schedule>  dextrose 5%. 1000 milliLiter(s) IV Continuous <Continuous>  dextrose 5%. 1000 milliLiter(s) IV Continuous <Continuous>  dextrose 5%. 1000 milliLiter(s) IV Continuous <Continuous>  dextrose 50% Injectable 25 Gram(s) IV Push once  dextrose 50% Injectable 12.5 Gram(s) IV Push once  dextrose 50% Injectable 25 Gram(s) IV Push once  glucagon  Injectable 1 milliGRAM(s) IntraMuscular once  insulin glargine Injectable (LANTUS) 18 Unit(s) SubCutaneous at bedtime  insulin lispro (ADMELOG) corrective regimen sliding scale   SubCutaneous three times a day before meals  insulin lispro Injectable (ADMELOG) 6 Unit(s) SubCutaneous three times a day before meals  levothyroxine 150 MICROGram(s) Oral daily  LORazepam     Tablet 0.5 milliGRAM(s) Oral once  metoprolol succinate ER 50 milliGRAM(s) Oral daily  multivitamin/minerals 1 Tablet(s) Oral daily  pentoxifylline 400 milliGRAM(s) Oral two times a day  piperacillin/tazobactam IVPB.. 3.375 Gram(s) IV Intermittent every 8 hours  sodium bicarbonate 650 milliGRAM(s) Oral three times a day  tacrolimus 1 milliGRAM(s) Oral <User Schedule>    PRN Inpatient Medications  acetaminophen     Tablet .. 650 milliGRAM(s) Oral every 6 hours PRN  ALBUTerol    90 MICROgram(s) HFA Inhaler 1 Puff(s) Inhalation every 6 hours PRN  dextrose Oral Gel 15 Gram(s) Oral once PRN  melatonin 5 milliGRAM(s) Oral at bedtime PRN          VITALS/PHYSICAL EXAM  --------------------------------------------------------------------------------  T(C): 37 (07-25-22 @ 19:55), Max: 37 (07-25-22 @ 19:55)  HR: 86 (07-25-22 @ 19:55) (80 - 86)  BP: 140/61 (07-25-22 @ 19:55) (135/56 - 140/61)  RR: 18 (07-25-22 @ 19:55) (18 - 18)  SpO2: 95% (07-25-22 @ 19:55) (93% - 95%)  Wt(kg): --        07-24-22 @ 07:01  -  07-25-22 @ 07:00  --------------------------------------------------------  IN: 0 mL / OUT: 700 mL / NET: -700 mL    07-25-22 @ 07:01  -  07-26-22 @ 06:32  --------------------------------------------------------  IN: 0 mL / OUT: 250 mL / NET: -250 mL      Physical Exam:  	Gen: NAD,   	Pulm: decrease BS  B/L  	CV:  S1S2; no rub  	Abd: +distended  	    LABS/STUDIES  --------------------------------------------------------------------------------              7.6    12.68 >-----------<  195      [07-25-22 @ 06:48]              24.0     138  |  104  |  80  ----------------------------<  117      [07-25-22 @ 06:48]  4.2   |  20  |  2.1        Ca     7.8     [07-25-22 @ 06:48]      Mg     2.6     [07-25-22 @ 06:48]      Creatinine Trend:  SCr 2.1 [07-25 @ 06:48]  SCr 2.1 [07-24 @ 04:30]  SCr 2.1 [07-23 @ 07:52]  SCr 1.8 [07-22 @ 19:00]  SCr 2.0 [07-22 @ 06:05]    Urinalysis - [07-22-22 @ 13:10]      Color Yellow / Appearance Slightly Turbid / SG 1.018 / pH 6.0      Gluc Negative / Ketone Negative  / Bili Negative / Urobili <2 mg/dL       Blood Moderate / Protein 300 mg/dL / Leuk Est Negative / Nitrite Negative      RBC 1 / WBC 9 / Hyaline 13 / Gran  / Sq Epi  / Non Sq Epi 4 / Bacteria Negative      Iron 70, TIBC 303, %sat 23      [10-21-21 @ 11:25]  Ferritin 1415      [07-13-22 @ 08:58]  PTH -- (Ca 9.0)      [07-06-22 @ 07:39]   93  Vitamin D (25OH) 27      [07-06-22 @ 07:39]  HbA1c 8.5      [10-01-19 @ 09:54]  TSH 0.39      [07-18-22 @ 17:42]  Lipid: chol 110, TG 94, HDL 31, LDL --      [07-06-22 @ 07:39]

## 2022-07-26 NOTE — PROGRESS NOTE ADULT - SUBJECTIVE AND OBJECTIVE BOX
ANDRIA TAVAREZ  82y  Male  ***My note supersedes ALL resident notes that I sign.  My corrections for their notes are in my note.***    I can be reached directly on [id number]. My office number is [phone]. My personal cell number is [phone].    INTERVAL EVENTS: Here for f/u of PNA. Pt remains fairly awake and alert. Talking. However, still very weak and prone to wanting to sleep. He fell on Fri (unwitnessed fall, found on floor). 3 of 4 guardrails were up and it seems he slipped past the 4th guardrail that was down. Pulm feels pt might have aspiration and asked for Sp/Sw. Sp/Sw will arrange for FEES. Pt wants to go to [facility], but he is too weak and was told to use bedpan. Also, pt not eating great (does not like food), which is leading to further deterioration via malnutrition.  I am hoping to avoid NGT or PEG tube. Pt told family and me that he will try to eat more, but "it's not that easy."    T(F): 97.6 (07-26-22 @ 12:48), Max: 98.6 (07-25-22 @ 19:55)  HR: 66 (07-26-22 @ 12:48) (66 - 86)  BP: 128/58 (07-26-22 @ 12:48) (128/58 - 140/61)  RR: 18 (07-26-22 @ 12:48) (18 - 18)  SpO2: 95% (07-25-22 @ 19:55) (95% - 95%)    07-25-22 @ 07:01  -  07-26-22 @ 07:00  --------------------------------------------------------  IN: 0 mL / OUT: 250 mL / NET: -250 mL    Gen: no resp distress; little lethargic; + NC O2; follows commands  HEENT: PERRL, EOMI, mouth clr, nose clr  Neck: no nodes, no JVD, thyroid nl  lungs: clr  hrt: s1 s2 rrr no murmur  abd: soft, NT/ND, no HS megaly  ext: no edema, no c/c; has pain w/ moving limbs  neuro: little tired; ox3 (mostly back to baseline), cn intact, can move all 4 ext, but very weak, arjun both legs    LABS:                      7.2     (    89.6   12.28 )-----------( ---------      163      ( 26 Jul 2022 10:29 )             22.5    (    15.0     131   (   100   (   153      07-26-22 @ 09:35  ----------------------               4.7   (   20   (   77                             -----                        2.2  Ca  7.2   Mg  2.5    P   --     CAPILLARY BLOOD GLUCOSE  POCT Blood Glucose.: 233 (07-26-22 @ 11:48)  POCT Blood Glucose.: 171 (07-26-22 @ 08:00)  POCT Blood Glucose.: 93 (07-25-22 @ 21:18)  POCT Blood Glucose.: 109 (07-25-22 @ 17:10)  POCT Blood Glucose.: 153 (07-25-22 @ 11:11)  POCT Blood Glucose.: 139 (07-25-22 @ 07:44)  POCT Blood Glucose.: 150 (07-24-22 @ 21:27)  POCT Blood Glucose.: 136 (07-24-22 @ 16:45)    Culture - Blood (collected 07-22-22 @ 19:00)  Source: .Blood Blood  Preliminary Report (07-23-22 @ 23:01):    No growth to date.    Culture - Urine (collected 07-22-22 @ 13:10)  Source: Catheterized Catheterized  Final Report (07-24-22 @ 00:44):    No growth    RADIOLOGY & ADDITIONAL TESTS:    MEDICATIONS:  piperacillin/tazobactam IVPB.. 3.375 Gram(s) IV Intermittent every 8 hours    acetaminophen     Tablet .. 650 milliGRAM(s) Oral every 6 hours PRN  ALBUTerol    90 MICROgram(s) HFA Inhaler 1 Puff(s) Inhalation every 6 hours PRN  atorvastatin 40 milliGRAM(s) Oral at bedtime  benzocaine 20% Spray 1 Spray(s) Topical once  calcitriol   Capsule 0.25 MICROGram(s) Oral <User Schedule>  insulin glargine Injectable (LANTUS) 18 Unit(s) SubCutaneous at bedtime  insulin lispro (ADMELOG) corrective regimen sliding scale   SubCutaneous three times a day before meals  insulin lispro Injectable (ADMELOG) 6 Unit(s) SubCutaneous three times a day before meals  levothyroxine 150 MICROGram(s) Oral daily  LORazepam     Tablet 0.5 milliGRAM(s) Oral once  melatonin 5 milliGRAM(s) Oral at bedtime PRN  metoprolol succinate ER 50 milliGRAM(s) Oral daily  multivitamin/minerals 1 Tablet(s) Oral daily  pentoxifylline 400 milliGRAM(s) Oral two times a day  sodium bicarbonate 650 milliGRAM(s) Oral three times a day  tacrolimus 1 milliGRAM(s) Oral <User Schedule>

## 2022-07-26 NOTE — PROGRESS NOTE ADULT - ASSESSMENT
# toxic and metab encephalopathies - seem improving now  I thought initial MS change at start of admit was metab: COVID 19 + PNA + hypoxia  I think the 2nd MS change was toxic encephalopathy from cefepime and dexa (doubt COVID is doing anything now; also do not believe he has stroke)  I think his current prob is Na up and down + aspiration PNA/pneumonitis + hosp delirium + debility/lack of exercise + lack of eating (malnutrition)  initial CTH: mild chr micro changes; mild atrophy and ventric prom  rpt CT H NC: no acute path  d/c MRI B NC (might need to reconsider again)  d/c VEEG   EEG: triphasic waves; mild-mod gen slowing  LFTs are nl  need to manage to above problems (see below)    # leukocytosis seems like was aspiration PNA/pneumonitis  ID and Pulm noted  CT C 7/5 and 7/23 reviewed: bilateral bases, arjun Lt, are worse on current exam  WBC better after zosyn started  rpt bld cx/UCx: neg  abx: zosyn 3.375 iv q8 (consider lower renal dose) for 7 days (augmentin if need PO)  skel xrays do not show chondrocalcinosis: doubt pseudogout   uric acid lvl 9.5 (pt has no prior hx of gout)  SP/Sw will arrange for FEES  diet: minced/moist + thins  CBC q24 til WBC <12    # hypernatremia prob 2/2 dehydration, now back to hyponatremia  d/c IVFs  restart lasix 40mg po q24  c/w NaHCO3 650mg po q8  BMP q24    # BRENDA on CKD III, presumed pre-renal; hx C3 glomerulopathy w/ proteinuria (up to 8.4 gm/day in past - responded well to immunosupp); metab acidosis  base Cr 2.1  rising BUN could have been steroid effect (on dexameth)  renal eval: f/u noted  tacro lvl 9.2 (goal 3-7): c/w tacro 1mg 9AM and 1mg 9PM and recheck trough lvl   c/w calcitriol   U/A w/ 3+ protein -> U TP:Cr ratio 1gm  BMP q24    # normo anemia of chr kidney dz; no signs of acute blood loss  keep hgb >8 - tx today  on aranesp w/ renal - f/u renal eval for next dose    # COVID + -> seems like pt passed thru inflam phase;   acute on chr hypoxic resp failure (pt on home O2 prn)  can stay on 2-3L NC O2  covid +: 7/5; 7/10 - no further testing needed  d-dimer 454, ESR 38, CRP 25.9, ferr 1415  pt was NOT tx'd initially for COVID - RDV relative CI at this GFR anyway  was beyond tx window for MABs  completed most of dexameth course  completed quarantine 7/5-7/15 -> d/c isolation     # Malnutrition  c/w MVI w/ min q24   c/w diet  add supplements: Glucerna 3x/day  Nutrition eval: Dr Elizabeth    # COPD; severe pulm HTN; fibrotic changes LLL; PNA (see above)  cont NC O2  c/w proventil prn  incent nara  pulm f/u    # HFrEF, chronic; sev dilated CM; RV dysfxn; mild TR/AR; HTN  not on all goal directed tx - cardio eval Dr Witt - (per cardio, EF closer to 45%)  BB - c/w toprol xl 50mg po q24   not on ACEI/ARB 2/2 CKD/BRENDA (could consider hydral + nitrates, but only if BP good enough)  diuretics - resume lasix 40mg po q24    # AFib, chronic  rate controlled - on BB  c/w eliquis 2.5mg po q12    # CAD  agree w/ no asa    # DM 2  diabetic diet  FS qac/hs   lantus 18 HS  ademelog 6 tid w/ +1 scale     # PVD  c/w pentoxifylline     # DLD  on statin    # Falll; diff walking; weakness/debility/deconditioning  no syncope per pt  CTH neg  trauma w/u neg  PT eval for SNF (during this admit, previously, pt was OOBTC and did walk a little w/ walker and asst)    # Hypothyroidism  synthroid 150mg q24  TSH 0.39    # DVT ppx: on eliquis    # GI ppx: none    # Activity: need PT eval    # updated wife/dtr at bedside    Dispo: renal f/u; f/u tacro lvl; f/u next aranesp; resume lasix; d/c IVFs; c/w oral bicarb; daily labs; Sp/Sw f/u; Nutr eval - add glucerna; incent nara  Eventually, pt will need STR @ SNF ([facility]) - f/u CM - not ready for d/c    Prog remains very guarded. Long term prog is likely poor. # toxic and metab encephalopathies - seem improving now  I thought initial MS change at start of admit was metab: COVID 19 + PNA + hypoxia  I think the 2nd MS change was toxic encephalopathy from cefepime and dexa (doubt COVID is doing anything now; also do not believe he has stroke)  I think his current prob is Na up and down + aspiration PNA/pneumonitis + hosp delirium + debility/lack of exercise + lack of eating (malnutrition)  initial CTH: mild chr micro changes; mild atrophy and ventric prom  rpt CT H NC: no acute path  d/c MRI B NC (might need to reconsider again)  d/c VEEG   EEG: triphasic waves; mild-mod gen slowing  LFTs are nl  need to manage to above problems (see below)    # leukocytosis seems like was aspiration PNA/pneumonitis  ID and Pulm noted  CT C 7/5 and 7/23 reviewed: bilateral bases, arjun Lt, are worse on current exam  WBC better after zosyn started  rpt bld cx/UCx: neg  abx: zosyn 3.375 iv q8 (consider lower renal dose) for 7 days (augmentin if need PO)  skel xrays do not show chondrocalcinosis: doubt pseudogout   uric acid lvl 9.5 (pt has no prior hx of gout)  SP/Sw will arrange for FEES  diet: minced/moist + thins  CBC q24 til WBC <12    # hypernatremia prob 2/2 dehydration, now back to hyponatremia  d/c IVFs  restart lasix 40mg po q24  c/w NaHCO3 650mg po q8  BMP q24    # BRENDA on CKD III, presumed pre-renal; hx C3 glomerulopathy w/ proteinuria (up to 8.4 gm/day in past - responded well to immunosupp); metab acidosis  base Cr 2.1  rising BUN could have been steroid effect (on dexameth)  renal eval: f/u noted  tacro lvl 9.2 (goal 3-7): c/w tacro 1mg 9AM and 1mg 9PM and recheck trough lvl   c/w calcitriol   U/A w/ 3+ protein -> U TP:Cr ratio 1gm  BMP q24    # normo anemia of chr kidney dz; no signs of acute blood loss  keep hgb >8 - tx today  aranesp 20mg sc q wk (Tu) start today per renal    # COVID + -> seems like pt passed thru inflam phase;   acute on chr hypoxic resp failure (pt on home O2 prn)  can stay on 2-3L NC O2  covid +: 7/5; 7/10 - no further testing needed  d-dimer 454, ESR 38, CRP 25.9, ferr 1415  pt was NOT tx'd initially for COVID - RDV relative CI at this GFR anyway  was beyond tx window for MABs  completed most of dexameth course  completed quarantine 7/5-7/15 -> d/c isolation     # Malnutrition  c/w MVI w/ min q24   c/w diet  add supplements: Glucerna 3x/day  Nutrition eval: Dr Elizabeth    # COPD; severe pulm HTN; fibrotic changes LLL; PNA (see above)  cont NC O2  c/w proventil prn  incent nara  pulm f/u    # HFrEF, chronic; sev dilated CM; RV dysfxn; mild TR/AR; HTN  not on all goal directed tx - cardio eval Dr Witt - (per cardio, EF closer to 45%)  BB - c/w toprol xl 50mg po q24   not on ACEI/ARB 2/2 CKD/BRENDA (could consider hydral + nitrates, but only if BP good enough)  diuretics - resume lasix 40mg po q24    # AFib, chronic  rate controlled - on BB  c/w eliquis 2.5mg po q12    # CAD  agree w/ no asa    # DM 2  diabetic diet  FS qac/hs   lantus 18 HS  ademelog 6 tid w/ +1 scale     # PVD  c/w pentoxifylline     # DLD  on statin    # Falll; diff walking; weakness/debility/deconditioning  no syncope per pt  CTH neg  trauma w/u neg  PT eval for SNF (during this admit, previously, pt was OOBTC and did walk a little w/ walker and asst)    # Hypothyroidism  synthroid 150mg q24  TSH 0.39    # DVT ppx: on eliquis    # GI ppx: none    # Activity: need PT eval    # updated wife/dtr at bedside    Dispo: renal f/u; f/u tacro lvl; aranesp 20 today; resume lasix; d/c IVFs; c/w oral bicarb; daily labs; Sp/Sw f/u; Nutr eval - add glucerna; incent nara  Eventually, pt will need STR @  ([facility]) - f/u CM - not ready for d/c    Prog remains very guarded. Long term prog is likely poor.

## 2022-07-26 NOTE — PROGRESS NOTE ADULT - SUBJECTIVE AND OBJECTIVE BOX
ANDRIA TAVAREZ 82y Male  MRN#: [id number]   Hospital Day: 21d    SUBJECTIVE  Patient is a 82y old Male who presents with a chief complaint of Diarrhea   Currently admitted to medicine with the primary diagnosis of acute kidney injury superimposed on CKD 2/2 covid infection      INTERVAL HPI AND OVERNIGHT EVENTS:  Patient was examined and seen at bedside. This morning he is resting comfortably in bed and reports no issues or overnight events.    REVIEW OF SYMPTOMS:  Denies any issues.    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 1000 milliLiter(s) IV Continuous <Continuous>  dextrose 5%. 1000 milliLiter(s) IV Continuous <Continuous>  dextrose 50% Injectable 25 Gram(s) IV Push once  dextrose 50% Injectable 12.5 Gram(s) IV Push once  dextrose 50% Injectable 25 Gram(s) IV Push once  glucagon  Injectable 1 milliGRAM(s) IntraMuscular once  insulin glargine Injectable (LANTUS) 18 Unit(s) SubCutaneous at bedtime  insulin lispro (ADMELOG) corrective regimen sliding scale   SubCutaneous three times a day before meals  insulin lispro Injectable (ADMELOG) 6 Unit(s) SubCutaneous three times a day before meals  levothyroxine 150 MICROGram(s) Oral daily  LORazepam     Tablet 0.5 milliGRAM(s) Oral once  metoprolol succinate ER 50 milliGRAM(s) Oral daily  multivitamin/minerals 1 Tablet(s) Oral daily  pentoxifylline 400 milliGRAM(s) Oral two times a day  piperacillin/tazobactam IVPB.. 3.375 Gram(s) IV Intermittent every 8 hours  sodium bicarbonate 650 milliGRAM(s) Oral three times a day  tacrolimus 1 milliGRAM(s) Oral <User Schedule>    PRN MEDICATIONS  acetaminophen     Tablet .. 650 milliGRAM(s) Oral every 6 hours PRN  ALBUTerol    90 MICROgram(s) HFA Inhaler 1 Puff(s) Inhalation every 6 hours PRN  dextrose Oral Gel 15 Gram(s) Oral once PRN  melatonin 5 milliGRAM(s) Oral at bedtime PRN      VITAL SIGNS: Last 24 Hours  T(C): 37 (25 Jul 2022 19:55), Max: 37 (25 Jul 2022 19:55)  T(F): 98.6 (25 Jul 2022 19:55), Max: 98.6 (25 Jul 2022 19:55)  HR: 86 (25 Jul 2022 19:55) (80 - 86)  BP: 140/61 (25 Jul 2022 19:55) (135/56 - 140/61)  BP(mean): --  RR: 18 (25 Jul 2022 19:55) (18 - 18)  SpO2: 95% (25 Jul 2022 19:55) (95% - 95%)    LABS:                        7.6    12.68 )-----------( 195      ( 25 Jul 2022 06:48 )             24.0     07-25    138  |  104  |  80<HH>  ----------------------------<  117<H>  4.2   |  20  |  2.1<H>    Ca    7.8<L>      25 Jul 2022 06:48  Mg     2.6     07-25        PHYSICAL EXAM:  CONSTITUTIONAL: No acute distress, well-developed, well-groomed, AAOx3  HEAD: Atraumatic, normocephalic  EYES: EOM intact, PERRLA, conjunctiva and sclera clear  ENT: Moist mucous membranes  PULMONARY: Clear to auscultation bilaterally  CARDIOVASCULAR: Regular rate and rhythm  GASTROINTESTINAL: Soft, non-tender, non-distended; bowel sounds present  MUSCULOSKELETAL: 2+ peripheral pulses; no clubbing, no cyanosis, no edema  SKIN: No rashes or lesions; warm and dry     ANDRIA TAVAREZ 82y Male  MRN#: [id number]   Hospital Day: 21d    SUBJECTIVE  Patient is a 82y old Male who presents with a chief complaint of Diarrhea   Currently admitted to medicine with the primary diagnosis of acute kidney injury superimposed on CKD 2/2 covid infection      INTERVAL HPI AND OVERNIGHT EVENTS:  Patient was examined and seen at bedside. This morning he is resting comfortably in bed and reports no issues or overnight events.    REVIEW OF SYMPTOMS:  Denies any issues.    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 1000 milliLiter(s) IV Continuous <Continuous>  dextrose 5%. 1000 milliLiter(s) IV Continuous <Continuous>  dextrose 50% Injectable 25 Gram(s) IV Push once  dextrose 50% Injectable 12.5 Gram(s) IV Push once  dextrose 50% Injectable 25 Gram(s) IV Push once  glucagon  Injectable 1 milliGRAM(s) IntraMuscular once  insulin glargine Injectable (LANTUS) 18 Unit(s) SubCutaneous at bedtime  insulin lispro (ADMELOG) corrective regimen sliding scale   SubCutaneous three times a day before meals  insulin lispro Injectable (ADMELOG) 6 Unit(s) SubCutaneous three times a day before meals  levothyroxine 150 MICROGram(s) Oral daily  LORazepam     Tablet 0.5 milliGRAM(s) Oral once  metoprolol succinate ER 50 milliGRAM(s) Oral daily  multivitamin/minerals 1 Tablet(s) Oral daily  pentoxifylline 400 milliGRAM(s) Oral two times a day  piperacillin/tazobactam IVPB.. 3.375 Gram(s) IV Intermittent every 8 hours  sodium bicarbonate 650 milliGRAM(s) Oral three times a day  tacrolimus 1 milliGRAM(s) Oral <User Schedule>    PRN MEDICATIONS  acetaminophen     Tablet .. 650 milliGRAM(s) Oral every 6 hours PRN  ALBUTerol    90 MICROgram(s) HFA Inhaler 1 Puff(s) Inhalation every 6 hours PRN  dextrose Oral Gel 15 Gram(s) Oral once PRN  melatonin 5 milliGRAM(s) Oral at bedtime PRN      VITAL SIGNS: Last 24 Hours  T(C): 37 (25 Jul 2022 19:55), Max: 37 (25 Jul 2022 19:55)  T(F): 98.6 (25 Jul 2022 19:55), Max: 98.6 (25 Jul 2022 19:55)  HR: 86 (25 Jul 2022 19:55) (80 - 86)  BP: 140/61 (25 Jul 2022 19:55) (135/56 - 140/61)  BP(mean): --  RR: 18 (25 Jul 2022 19:55) (18 - 18)  SpO2: 95% (25 Jul 2022 19:55) (95% - 95%)    LABS:                        7.6    12.68 )-----------( 195      ( 25 Jul 2022 06:48 )             24.0     07-25    138  |  104  |  80<HH>  ----------------------------<  117<H>  4.2   |  20  |  2.1<H>    Ca    7.8<L>      25 Jul 2022 06:48  Mg     2.6     07-25        PHYSICAL EXAM:  CONSTITUTIONAL: No acute distress, well-developed, well-groomed, AAOx3, PALE  HEAD: Atraumatic, normocephalic  EYES: EOM intact, PERRLA, conjunctiva and sclera clear  ENT: Moist mucous membranes  PULMONARY: Clear to auscultation bilaterally  CARDIOVASCULAR: Regular rate and rhythm  GASTROINTESTINAL: Soft, non-tender, non-distended; bowel sounds present  MUSCULOSKELETAL: 2+ peripheral pulses; no clubbing, no cyanosis, no edema  SKIN: No rashes or lesions; warm and dry

## 2022-07-26 NOTE — PROGRESS NOTE ADULT - ASSESSMENT
ASSESSMENT & PLAN:  82M with PMHx of HFrEF, Afib on eliquis, CAD s/p CABG, COPD (on rare home O2 PRN), CKD3, HTN, HLD who presented for 2 weeks of watery diarrhea found to have Covid-19, with hospital stay c/b altered mental status.      # Metabolic Encephalopathy - multifactorial in etiology, improving   EEG and CT head w/o contrast- no acute pathology  EEG- Consistent with diffuse cerebral electrophysiological dysfunction, Secondary to toxic metabolic cause.    Plan:  - c/w IVF, give D5w with Lasix for hypervolemic hypernatremia -  Resolved       # Leukocytosis   - no clear source of infection, w/u negative  - s/p course of abx (Cefepime, Levoquin)     Plan:  - monitor off abx for now   - ID consult- - trend WBC, add diff to CBC, monitor off antibiotics, agree with non-infectious workup- uric acid, x-ray, s/p D10 of antimicrobials, if hemodynamic compromise, start zosyn IV     # BRENDA on CKD III,   - presumed pre-renal   - hx C3 glomerulopathy    Plan:  - continue with NHCO3 PO  - Renal - continue tacrolimus ordered  - Gloria in- possible TOV when patient is ambulating    # COVID +  - s/p Dexa 6mg PO q6  - completed quarantine 7/15 - d/bg precautions 7/16    #Left sided pulmonary opacity   - check CT Chest     # COPD; severe pulm HTN  - Nebs PRN     # normo Anemia;  - stable     # Fall, mechanical  - no syncope per pt  - cth neg  - trauma w/u neg    Plan:  - PT eval for SNF    # HFrEF, chronic;   - severely dilated cardio myopathy  - Right ventrical dysfunction   - Mild tricuspid valve/ aortic valve regurg    Plan:  - Lasix once yesterday          - not on ANY goal directed tx - cardio eval Dr Haley  - Patient started on metoprolol 25mg q 24 PO    # AFib, chronic    Plan:  - Eliquis  - rate control with metoprolol 25mg q24    # CAD    Plan:  - No aspirin  - c/w statin     # DLD    Plan:  - on statin    # DM2  - Sugars currently well controlled    Plan:  - diabetic diet  - c/w insulin     # Hypothyroidism    Plan:  - synthroid 150    # PVD  c/w pentoxifylline     # DVT ppx:  on Eliquis    #Discharge to rehab   -PT consult ASSESSMENT & PLAN:  82M with PMHx of HFrEF, Afib on eliquis, CAD s/p CABG, COPD (on rare home O2 PRN), CKD3, HTN, HLD who presented for 2 weeks of watery diarrhea found to have Covid-19, with hospital stay c/b altered mental status.    #Anemia  - Hgb 07/26 6.9    Plan:   -Type and screen  - 1 Unit PRBC      # Metabolic Encephalopathy - multifactorial in etiology, improving   EEG and CT head w/o contrast- no acute pathology  EEG- Consistent with diffuse cerebral electrophysiological dysfunction, Secondary to toxic metabolic cause.    Plan:  - c/w IVF, give D5w with Lasix for hypervolemic hypernatremia -  Resolved       # Leukocytosis   - no clear source of infection, w/u negative  - s/p course of abx (Cefepime, Levoquin)     Plan:  - monitor off abx for now   - ID consult- - trend WBC, add diff to CBC, monitor off antibiotics, agree with non-infectious workup- uric acid, x-ray, s/p D10 of antimicrobials, if hemodynamic compromise, start zosyn IV     # BRENDA on CKD III,   - presumed pre-renal   - hx C3 glomerulopathy    Plan:  - continue with NHCO3 PO  - Renal - continue tacrolimus ordered  - Gloria in- possible TOV when patient is ambulating    # COVID +  - s/p Dexa 6mg PO q6  - completed quarantine 7/15 - d/bg precautions 7/16    #Left sided pulmonary opacity   - check CT Chest     # COPD; severe pulm HTN  - Nebs PRN     # normo Anemia;  - stable     # Fall, mechanical  - no syncope per pt  - cth neg  - trauma w/u neg    Plan:  - PT eval for SNF    # HFrEF, chronic;   - severely dilated cardio myopathy  - Right ventrical dysfunction   - Mild tricuspid valve/ aortic valve regurg    Plan:  - Lasix once yesterday          - not on ANY goal directed tx - cardio eval Dr Haley  - Patient started on metoprolol 25mg q 24 PO    # AFib, chronic    Plan:  - Eliquis  - rate control with metoprolol 25mg q24    # CAD    Plan:  - No aspirin  - c/w statin     # DLD    Plan:  - on statin    # DM2  - Sugars currently well controlled    Plan:  - diabetic diet  - c/w insulin     # Hypothyroidism    Plan:  - synthroid 150    # PVD  c/w pentoxifylline     # DVT ppx:  on Eliquis    #Discharge to rehab   -PT consult ASSESSMENT & PLAN:  82M with PMHx of HFrEF, Afib on eliquis, CAD s/p CABG, COPD (on rare home O2 PRN), CKD3, HTN, HLD who presented for 2 weeks of watery diarrhea found to have Covid-19, with hospital stay c/b altered mental status.    #Anemia  - Hgb 07/26 6.9    Plan:   - STAT repeat CBC  - STAT Type and screen  - 1 Unit PRBC      # Metabolic Encephalopathy - multifactorial in etiology, improving   EEG and CT head w/o contrast- no acute pathology  EEG- Consistent with diffuse cerebral electrophysiological dysfunction, Secondary to toxic metabolic cause.    Plan:  - c/w IVF, give D5w with Lasix for hypervolemic hypernatremia -  Resolved       # Leukocytosis   - no clear source of infection, w/u negative  - s/p course of abx (Cefepime, Levoquin)     Plan:  - monitor off abx for now   - ID consult- - trend WBC, add diff to CBC, monitor off antibiotics, agree with non-infectious workup- uric acid, x-ray, s/p D10 of antimicrobials, if hemodynamic compromise, start zosyn IV     # BRENDA on CKD III,   - presumed pre-renal   - hx C3 glomerulopathy    Plan:  - continue with NHCO3 PO  - Renal - continue tacrolimus ordered  - Gloria in- possible TOV when patient is ambulating    # COVID +  - s/p Dexa 6mg PO q6  - completed quarantine 7/15 - d/bg precautions 7/16    #Left sided pulmonary opacity   - check CT Chest     # COPD; severe pulm HTN  - Nebs PRN     # normo Anemia;  - stable     # Fall, mechanical  - no syncope per pt  - cth neg  - trauma w/u neg    Plan:  - PT eval for SNF    # HFrEF, chronic;   - severely dilated cardio myopathy  - Right ventrical dysfunction   - Mild tricuspid valve/ aortic valve regurg    Plan:  - Lasix once yesterday          - not on ANY goal directed tx - cardio eval Dr Haley  - Patient started on metoprolol 25mg q 24 PO    # AFib, chronic    Plan:  - Eliquis  - rate control with metoprolol 25mg q24    # CAD    Plan:  - No aspirin  - c/w statin     # DLD    Plan:  - on statin    # DM2  - Sugars currently well controlled    Plan:  - diabetic diet  - c/w insulin     # Hypothyroidism    Plan:  - synthroid 150    # PVD  c/w pentoxifylline     # DVT ppx:  on Eliquis    #Discharge to rehab   -PT consult ASSESSMENT & PLAN:  82M with PMHx of HFrEF, Afib on eliquis, CAD s/p CABG, COPD (on rare home O2 PRN), CKD3, HTN, HLD who presented for 2 weeks of watery diarrhea found to have Covid-19, with hospital stay c/b altered mental status.    #Anemia  - Hgb 07/26 6.9  - Bowel movements look normal not black/ would be concerning for bleeding     Plan:   - STAT repeat CBC  - STAT Type and screen  - 1 Unit PRBC      # Metabolic Encephalopathy - multifactorial in etiology, improving   EEG and CT head w/o contrast- no acute pathology  EEG- Consistent with diffuse cerebral electrophysiological dysfunction, Secondary to toxic metabolic cause.    Plan:  - c/w IVF, give D5w with Lasix for hypervolemic hypernatremia -  Resolved       # Leukocytosis   - no clear source of infection, w/u negative  - s/p course of abx (Cefepime, Levoquin)     Plan:  - monitor off abx for now   - ID consult- - trend WBC, add diff to CBC, monitor off antibiotics, agree with non-infectious workup- uric acid, x-ray, s/p D10 of antimicrobials, if hemodynamic compromise, start zosyn IV     # BRENDA on CKD III,   - presumed pre-renal   - hx C3 glomerulopathy    Plan:  - continue with NHCO3 PO  - Renal - continue tacrolimus ordered  - Gloria in- possible TOV when patient is ambulating    # COVID +  - s/p Dexa 6mg PO q6  - completed quarantine 7/15 - d/bg precautions 7/16    #Left sided pulmonary opacity   - check CT Chest     # COPD; severe pulm HTN  - Nebs PRN     # normo Anemia;  - stable     # Fall, mechanical  - no syncope per pt  - cth neg  - trauma w/u neg    Plan:  - PT eval for SNF    # HFrEF, chronic;   - severely dilated cardio myopathy  - Right ventrical dysfunction   - Mild tricuspid valve/ aortic valve regurg    Plan:  - Lasix once yesterday          - not on ANY goal directed tx - cardio eval Dr Haley  - Patient started on metoprolol 25mg q 24 PO    # AFib, chronic    Plan:  - Eliquis  - rate control with metoprolol 25mg q24    # CAD    Plan:  - No aspirin  - c/w statin     # DLD    Plan:  - on statin    # DM2  - Sugars currently well controlled    Plan:  - diabetic diet  - c/w insulin     # Hypothyroidism    Plan:  - synthroid 150    # PVD  c/w pentoxifylline     # DVT ppx:  on Eliquis    #Discharge to rehab   -PT consult

## 2022-07-27 NOTE — DIETITIAN INITIAL EVALUATION ADULT - PERTINENT LABORATORY DATA
07-27    136  |  100  |  81<HH>  ----------------------------<  126<H>  4.2   |  23  |  2.2<H>    Ca    7.7<L>      27 Jul 2022 09:02  Mg     2.4     07-27    POCT Blood Glucose.: 136 mg/dL (07-27-22 @ 11:31)  A1C with Estimated Average Glucose Result: 8.0 % (07-06-22 @ 07:39)  A1C with Estimated Average Glucose Result: 7.5 % (10-21-21 @ 11:25)

## 2022-07-27 NOTE — DIETITIAN INITIAL EVALUATION ADULT - ORAL NUTRITION SUPPLEMENTS
Currently ordered Glucerna Shake 3x/day (220 kcal, 10 gm Protein each)  Recommend to add Prosource Gelatein Plus (150 kcal, 20 gm Protein each)    To aid in meeting estimated nutrient needs Currently ordered Glucerna Shake 3x/day (220 kcal, 10 gm Protein each)  Recommend to add Prosource Gelatein 20 SF (90 kcal, 20 gm Protein each)    To aid in meeting estimated nutrient needs

## 2022-07-27 NOTE — PROGRESS NOTE ADULT - SUBJECTIVE AND OBJECTIVE BOX
KANGANDRIA MCELROY  82y  Male  ***My note supersedes ALL resident notes that I sign.  My corrections for their notes are in my note.***    I can be reached directly on [employer] [phone]. My office number is [phone]. My personal cell number is [phone].    INTERVAL EVENTS: Here for f/u of     T(F): 96.5 (07-27-22 @ 05:00), Max: 97.6 (07-26-22 @ 12:48)  HR: 77 (07-27-22 @ 05:00) (66 - 83)  BP: 162/65 (07-27-22 @ 05:00) (122/60 - 162/65)  RR: 18 (07-27-22 @ 05:00) (17 - 18)  SpO2: 97% (07-27-22 @ 05:00) (97% - 98%)          LABS:    RADIOLOGY & ADDITIONAL TESTS:      MEDICATIONS:  piperacillin/tazobactam IVPB.. 3.375 Gram(s) IV Intermittent every 8 hours    acetaminophen     Tablet .. 650 milliGRAM(s) Oral every 6 hours PRN  ALBUTerol    90 MICROgram(s) HFA Inhaler 1 Puff(s) Inhalation every 6 hours PRN  apixaban 2.5 milliGRAM(s) Oral two times a day  atorvastatin 40 milliGRAM(s) Oral at bedtime  BACItracin   Ointment 1 Application(s) Topical every 12 hours  benzocaine 20% Spray 1 Spray(s) Topical once  calcitriol   Capsule 0.25 MICROGram(s) Oral <User Schedule>  darbepoetin Injectable ViaL 20 MICROGram(s) SubCutaneous every 7 days  furosemide    Tablet 40 milliGRAM(s) Oral daily  insulin glargine Injectable (LANTUS) 18 Unit(s) SubCutaneous at bedtime  insulin lispro (ADMELOG) corrective regimen sliding scale   SubCutaneous three times a day before meals  insulin lispro Injectable (ADMELOG) 6 Unit(s) SubCutaneous three times a day before meals  levothyroxine 150 MICROGram(s) Oral daily  LORazepam     Tablet 0.5 milliGRAM(s) Oral once  melatonin 5 milliGRAM(s) Oral at bedtime PRN  metoprolol succinate ER 50 milliGRAM(s) Oral daily  multivitamin/minerals 1 Tablet(s) Oral daily  pentoxifylline 400 milliGRAM(s) Oral two times a day  sodium bicarbonate 650 milliGRAM(s) Oral three times a day  tacrolimus 1 milliGRAM(s) Oral <User Schedule>     KANGANDRIA MCELROY  82y  Male  ***My note supersedes ALL resident notes that I sign.  My corrections for their notes are in my note.***    I can be reached directly on [employer] [phone]. My office number is [phone]. My personal cell number is [phone].    INTERVAL EVENTS: Here for f/u of PNA. Pt looks fairly good today. He is awake and oriented. Talking well. Still not eating or drinking that well, but understands the importance of trying (he really does not like the diet, he says). No events, except for minor nosebleed from NC O2. Had BM yest.    T(F): 96.5 (07-27-22 @ 05:00), Max: 97.6 (07-26-22 @ 12:48)  HR: 77 (07-27-22 @ 05:00) (66 - 83)  BP: 162/65 (07-27-22 @ 05:00) (122/60 - 162/65)  RR: 18 (07-27-22 @ 05:00) (17 - 18)  SpO2: 97% (07-27-22 @ 05:00) (97% - 98%)    Gen: no resp distress; speaking better; + NC O2; follows commands  HEENT: PERRL, EOMI, mouth clr, nose clr  Neck: no nodes, no JVD, thyroid nl  lungs: clr  hrt: s1 s2 rrr no murmur  abd: soft, NT/ND, no HS megaly  ext: no edema, no c/c; has pain w/ moving limbs  neuro: alert; ox3 (basically back to baseline MS), cn intact, can move all 4 ext, but very weak, arjun both legs    LABS:                      7.7     (    89.8   11.45 )-----------( ---------      147      ( 27 Jul 2022 09:02 )             23.8    (    14.7     WBC Count: 11.45 K/uL (07-27-22 @ 09:02) - better  WBC Count: 12.28 K/uL (07-26-22 @ 10:29)  WBC Count: 11.52 K/uL (07-26-22 @ 09:35)  WBC Count: 12.68 K/uL (07-25-22 @ 06:48)  WBC Count: 15.84 K/uL (07-24-22 @ 04:30)  WBC Count: 19.41 K/uL (07-23-22 @ 07:52)  WBC Count: 18.27 K/uL (07-22-22 @ 19:00)    Hemoglobin: 7.7 g/dL (07-27 @ 09:02) - would give another unit today  Hemoglobin: 7.2 g/dL (07-26 @ 10:29) - 1u PRBC  Hemoglobin: 6.9 g/dL (07-26 @ 09:35)  Hemoglobin: 7.6 g/dL (07-25 @ 06:48)  Hemoglobin: 7.6 g/dL (07-24 @ 04:30)  Hemoglobin: 8.1 g/dL (07-23 @ 07:52)  Hemoglobin: 7.8 g/dL (07-22 @ 19:00)    136   (   100   (   126      07-27-22 @ 09:02  ----------------------               4.2   (   23   (   81                             -----                        2.2  Ca  7.7   Mg  2.4    P   --     CAPILLARY BLOOD GLUCOSE  POCT Blood Glucose.: 136 (07-27-22 @ 11:31)  POCT Blood Glucose.: 120 (07-27-22 @ 07:47)  POCT Blood Glucose.: 171 (07-26-22 @ 20:45)  POCT Blood Glucose.: 198 (07-26-22 @ 16:57)  POCT Blood Glucose.: 233 (07-26-22 @ 11:48)  POCT Blood Glucose.: 171 (07-26-22 @ 08:00)  POCT Blood Glucose.: 93 (07-25-22 @ 21:18)  POCT Blood Glucose.: 109 (07-25-22 @ 17:10)    Culture - Blood (collected 07-22-22 @ 19:00)  Source: .Blood Blood  Preliminary Report (07-23-22 @ 23:01):    No growth to date.    Culture - Urine (collected 07-22-22 @ 13:10)  Source: Catheterized Catheterized  Final Report (07-24-22 @ 00:44):    No growth    RADIOLOGY & ADDITIONAL TESTS:      MEDICATIONS:  piperacillin/tazobactam IVPB.. 3.375 Gram(s) IV Intermittent every 8 hours    acetaminophen     Tablet .. 650 milliGRAM(s) Oral every 6 hours PRN  ALBUTerol    90 MICROgram(s) HFA Inhaler 1 Puff(s) Inhalation every 6 hours PRN  apixaban 2.5 milliGRAM(s) Oral two times a day  atorvastatin 40 milliGRAM(s) Oral at bedtime  BACItracin   Ointment 1 Application(s) Topical every 12 hours  benzocaine 20% Spray 1 Spray(s) Topical once  calcitriol   Capsule 0.25 MICROGram(s) Oral <User Schedule>  darbepoetin Injectable ViaL 20 MICROGram(s) SubCutaneous every 7 days  furosemide    Tablet 40 milliGRAM(s) Oral daily  insulin glargine Injectable (LANTUS) 18 Unit(s) SubCutaneous at bedtime  insulin lispro (ADMELOG) corrective regimen sliding scale   SubCutaneous three times a day before meals  insulin lispro Injectable (ADMELOG) 6 Unit(s) SubCutaneous three times a day before meals  levothyroxine 150 MICROGram(s) Oral daily  LORazepam     Tablet 0.5 milliGRAM(s) Oral once  melatonin 5 milliGRAM(s) Oral at bedtime PRN  metoprolol succinate ER 50 milliGRAM(s) Oral daily  multivitamin/minerals 1 Tablet(s) Oral daily  pentoxifylline 400 milliGRAM(s) Oral two times a day  sodium bicarbonate 650 milliGRAM(s) Oral three times a day  tacrolimus 1 milliGRAM(s) Oral <User Schedule>

## 2022-07-27 NOTE — SWALLOW VFSS/MBS ASSESSMENT ADULT - SLP PERTINENT HISTORY OF CURRENT PROBLEM
pt is an 83 y/o M w/ PMHx: HFrEF, Afib, CAD s/p CABG, COPD (on rare home O2 PRN), CKD3, HTN, HLD presents for 2 weeks of watery diarrhea. Pt admitted to medicine with the primary diagnosis of acute kidney injury superimposed on CKD. Course c/b encephalopathy, unclear etiology- possibly d/t hospital acquired delirium. CTH (-); CT chest 7/23-> Bilateral lower lobe consolidations with small amount consolidation also in the lingular segment of the left upper lobe suspicious for multifocal PNA.

## 2022-07-27 NOTE — DIETITIAN INITIAL EVALUATION ADULT - PHYSCIAL ASSESSMENT
BMI WNL - 23.5 based on RD bedscale 83.1 kg  Patient with moderate clavicle and temple muscle loss and noted with moderate subcutaneous fat loss (orbital) other (specify)

## 2022-07-27 NOTE — PROGRESS NOTE ADULT - ASSESSMENT
# toxic and metab encephalopathies - seem improving now  I thought initial MS change at start of admit was metab: COVID 19 + PNA + hypoxia  I think the 2nd MS change was toxic encephalopathy from cefepime and dexa (doubt COVID is doing anything now; also do not believe he has stroke)  I think his current prob is Na up and down + aspiration PNA/pneumonitis + mild hosp delirium + debility/lack of exercise + lack of eating (malnutrition)  initial CTH: mild chr micro changes; mild atrophy and ventric prom  rpt CT H NC: no acute path  EEG: triphasic waves; mild-mod gen slowing  LFTs are nl  need to manage above problems (see below)    # leukocytosis seems like it was aspiration PNA/pneumonitis  ID and Pulm noted  CT C 7/5 and 7/23 reviewed: bilateral bases, arjun Lt, are worse on current exam  WBC better after zosyn started  rpt bld cx/UCx: neg  abx: zosyn 3.375 iv q8 (consider lower renal dose) for 7 days (augmentin if need PO)  skel xrays do not show chondrocalcinosis: doubt pseudogout   uric acid lvl 9.5 (pt has no prior hx of gout)  SP/Sw will arrange for FEES today (to clarify aspiration risk)  diet: minced/moist + thins  CBC q24 til WBC <12    # hypernatremia/hyponatremia  back to nl today  c/w lasix 40mg po q24 (might need to make 20mg q24 when Na closer to 145)  c/w NaHCO3 650mg po q8  BMP q24    # BRENDA on CKD III, presumed pre-renal; hx C3 glomerulopathy w/ proteinuria (up to 8.4 gm/day in past - responded well to immunosupp); metab acidosis  base Cr 2.1  rising BUN could have been steroid effect (on dexameth)  renal eval: f/u noted  tacro lvl 9.2 (goal 3-7): c/w tacro 1mg 9AM and 1mg 9PM  recheck tacro lvl: caution, 7/26 lvl is NOT a trough (asked residents to obtain true trough today)  c/w calcitriol   U/A w/ 3+ protein -> U TP:Cr ratio 1gm  BMP q24    # normo anemia of chr kidney dz; no signs of acute blood loss  keep hgb >8 - tx 1u today  aranesp 20mg sc q wk (Wed) start today, per renal    # COVID + -> seems like pt passed thru inflam phase;   acute on chr hypoxic resp failure (pt on home O2 prn)  can stay on 2-3L NC O2  covid +: 7/5; 7/10; 7/11; 7/26 - no further testing needed  d-dimer 454, ESR 38, CRP 25.9, ferr 1415  pt was NOT tx'd initially for COVID - RDV relative CI at this GFR anyway  was beyond tx window for MABs  completed most of dexameth course  completed quarantine 7/5-7/15 -> d/c isolation     # Malnutrition  c/w MVI w/ min q24   c/w diet  supplements: Glucerna 3x/day  Nutrition eval: Dr Elizabeth    # COPD; severe pulm HTN; fibrotic changes LLL; PNA (see above)  cont NC O2  c/w proventil prn  incent nara  pulm f/u    # HFrEF, chronic; sev dilated CM; RV dysfxn; mild TR/AR; HTN  not on all goal directed tx - cardio eval Dr Witt - (per cardio, EF closer to 45%)  BB - c/w toprol xl 50mg po q24   not on ACEI/ARB 2/2 CKD/BRENDA (could consider hydral + nitrates, but only if BP good enough)  diuretics - resume lasix 40mg po q24    # AFib, chronic  rate controlled - on BB  c/w eliquis 2.5mg po q12    # CAD  agree w/ no asa    # DM 2  diabetic diet  FS qac/hs   c/w lantus 18 HS  d/c ademelog - pt not eating that well  can cont SSI coverage    # PVD  c/w pentoxifylline     # DLD  on statin    # Fall; diff walking; weakness/debility/deconditioning  no syncope per pt  CTH neg  trauma w/u neg  PT eval for SNF (during this admit, previously, pt was OOBTC and did walk a little w/ walker and asst)    # Hypothyroidism  synthroid 150mg q24  TSH 0.39    # DVT ppx: on eliquis    # GI ppx: none    # Activity: need PT eval    # updated wife/dtr at bedside    Dispo: f/u tacro lvl; aranesp 20mg today; 1u PRBC today; c/w lasix; c/w oral bicarb; daily labs; Sp/Sw f/u re FEES; Nutr eval; incent nara  Eventually, pt will need STR @ SNF ([facility]) - f/u CM - anticipate d/c in 24-48 hrs    Prog remains very guarded. Long term prog is likely poor. # toxic and metab encephalopathies - seem improving now  I thought initial MS change at start of admit was metab: COVID 19 + PNA + hypoxia  I think the 2nd MS change was toxic encephalopathy from cefepime and dexa (doubt COVID is doing anything now; also do not believe he has stroke)  I think his current prob is Na up and down + aspiration PNA/pneumonitis + mild hosp delirium + debility/lack of exercise + lack of eating (malnutrition)  initial CTH: mild chr micro changes; mild atrophy and ventric prom  rpt CT H NC: no acute path  EEG: triphasic waves; mild-mod gen slowing  LFTs are nl  need to manage above problems (see below)    # leukocytosis seems like it was aspiration PNA/pneumonitis  ID and Pulm noted  CT C 7/5 and 7/23 reviewed: bilateral bases, arjun Lt, are worse on current exam  WBC better after zosyn started  rpt bld cx/UCx: neg  abx: zosyn 3.375 iv q8 (consider lower renal dose) for 7 days (augmentin if need PO)  skel xrays do not show chondrocalcinosis: doubt pseudogout   uric acid lvl 9.5 (pt has no prior hx of gout)  SP/Sw did FEES 7/27: there is some aspiration w/ thin liquids; staff to asst pt w/ feeds  diet: minced/moist + mildly thick liquids (NOT thins)  CBC q24 til WBC <12    # hypernatremia/hyponatremia  back to nl today  c/w lasix 40mg po q24 (might need to make 20mg q24 when Na closer to 145)  c/w NaHCO3 650mg po q8  BMP q24    # BRENDA on CKD III, presumed pre-renal; hx C3 glomerulopathy w/ proteinuria (up to 8.4 gm/day in past - responded well to immunosupp); metab acidosis  base Cr 2.1  rising BUN could have been steroid effect (on dexameth)  renal eval: f/u noted  tacro lvl 9.2 (goal 3-7): c/w tacro 1mg 9AM and 1mg 9PM  recheck tacro lvl: caution, 7/26 lvl is NOT a trough (asked residents to obtain true trough today)  c/w calcitriol   U/A w/ 3+ protein -> U TP:Cr ratio 1gm  BMP q24    # normo anemia of chr kidney dz; no signs of acute blood loss  keep hgb >8 - tx 1u today  aranesp 20mg sc q wk (Wed) start today, per renal    # COVID + -> seems like pt passed thru inflam phase;   acute on chr hypoxic resp failure (pt on home O2 prn)  can stay on 2-3L NC O2  covid +: 7/5; 7/10; 7/11; 7/26 - no further testing needed  d-dimer 454, ESR 38, CRP 25.9, ferr 1415  pt was NOT tx'd initially for COVID - RDV relative CI at this GFR anyway  was beyond tx window for MABs  completed most of dexameth course  completed quarantine 7/5-7/15 -> d/c isolation     # Malnutrition  c/w MVI w/ min q24   c/w diet  supplements: Glucerna 3x/day  Nutrition eval: Dr Elizabeth  will have to talk w/ pt and family about NGT trial matias    # COPD; severe pulm HTN; fibrotic changes LLL; PNA (see above)  cont NC O2  c/w proventil prn  incent nara  pulm f/u    # HFrEF, chronic; sev dilated CM; RV dysfxn; mild TR/AR; HTN  not on all goal directed tx - cardio eval Dr Witt - (per cardio, EF closer to 45%)  BB - c/w toprol xl 50mg po q24   not on ACEI/ARB 2/2 CKD/BRENDA (could consider hydral + nitrates, but only if BP good enough)  diuretics - resume lasix 40mg po q24    # AFib, chronic  rate controlled - on BB  c/w eliquis 2.5mg po q12    # CAD  agree w/ no asa    # DM 2  diabetic diet  FS qac/hs   c/w lantus 18 HS  d/c ademelog - pt not eating that well  can cont SSI coverage    # PVD  c/w pentoxifylline     # DLD  on statin    # Fall; diff walking; weakness/debility/deconditioning  no syncope per pt  CTH neg  trauma w/u neg  PT eval for SNF (during this admit, previously, pt was OOBTC and did walk a little w/ walker and asst)    # Hypothyroidism  synthroid 150mg q24  TSH 0.39    # DVT ppx: on eliquis    # GI ppx: none    # Activity: need PT eval    # updated wife/dtr at bedside    Dispo: f/u tacro lvl; aranesp 20mg today; 1u PRBC today; c/w lasix; c/w oral bicarb; daily labs; adj diet; Nutr eval; incent nara  Eventually, pt will need STR @ SNF ([facility]) - f/u CM - anticipate d/c in 24-48 hrs    Prog remains very guarded. Long term prog is likely poor.

## 2022-07-27 NOTE — PROGRESS NOTE ADULT - ASSESSMENT
ASSESSMENT & PLAN:  82M with PMHx of HFrEF, Afib on eliquis, CAD s/p CABG, COPD (on rare home O2 PRN), CKD3, HTN, HLD who presented for 2 weeks of watery diarrhea found to have Covid-19, with hospital stay c/b altered mental status.    #Anemia  - Hgb 07/26 6.9  - Bowel movements look normal not black/ would be concerning for bleeding   - Most likely chronic 2/2 ESRD    Plan:   - Restart Aranesp 20mcg sub-q weekly  - s/p 1 Unit PRBC (07/27)        # Metabolic Encephalopathy - multifactorial in etiology, improving   EEG and CT head w/o contrast- no acute pathology  EEG- Consistent with diffuse cerebral electrophysiological dysfunction, Secondary to toxic metabolic cause.    Plan:  - c/w IVF, give D5w with Lasix for hypervolemic hypernatremia -  Resolved       # Pnuemonia  - Repeat chest ct over the weekend shows bilateral lower lobe consolidations with small amount consolidation also in the lingular segment of the left upper lobe suspicious for multifocal pneumonia.    Plan:  - Started on Zosyn 3.375g q8 hours      # BRENDA on CKD III,   - presumed pre-renal   - hx C3 glomerulopathy    Plan:  - continue with NHCO3 PO  - Renal - continue tacrolimus ordered  - Gloria in- possible TOV when patient is ambulating    # COVID +  - s/p Dexa 6mg PO q6  - completed quarantine 7/15 - d/bg precautions 7/16    #Left sided pulmonary opacity   - check CT Chest     # COPD; severe pulm HTN  - Nebs PRN     # normo Anemia;  - stable     # Fall, mechanical  - no syncope per pt  - cth neg  - trauma w/u neg    Plan:  - PT eval for SNF    # HFrEF, chronic;   - severely dilated cardio myopathy  - Right ventrical dysfunction   - Mild tricuspid valve/ aortic valve regurg    Plan:  - Lasix once yesterday          - not on ANY goal directed tx - cardio eval Dr Haley  - Patient started on metoprolol 25mg q 24 PO    # AFib, chronic    Plan:  - Eliquis  - rate control with metoprolol 25mg q24    # CAD    Plan:  - No aspirin  - c/w statin     # DLD    Plan:  - on statin    # DM2  - Sugars currently well controlled    Plan:  - diabetic diet  - c/w insulin     # Hypothyroidism    Plan:  - synthroid 150    # PVD  c/w pentoxifylline     # DVT ppx:  on Eliquis    #Discharge to rehab  ASSESSMENT & PLAN:  82M with PMHx of HFrEF, Afib on eliquis, CAD s/p CABG, COPD (on rare home O2 PRN), CKD3, HTN, HLD who presented for 2 weeks of watery diarrhea found to have Covid-19, with hospital stay c/b altered mental status.    #Anemia  - Hgb 07/26 6.9  - Bowel movements look normal not black/ would be concerning for bleeding   - Most likely chronic 2/2 ESRD    Plan:   - Restart Aranesp 20mcg sub-q weekly  - s/p 1 Unit PRBC (07/27)        # Metabolic Encephalopathy - multifactorial in etiology, improving   EEG and CT head w/o contrast- no acute pathology  EEG- Consistent with diffuse cerebral electrophysiological dysfunction, Secondary to toxic metabolic cause.    Plan:  - c/w IVF, give D5w with Lasix for hypervolemic hypernatremia -  Resolved       # Pnuemonia  - Repeat chest ct over the weekend shows bilateral lower lobe consolidations with small amount consolidation also in the lingular segment of the left upper lobe suspicious for multifocal pneumonia.    Plan:  - Started on Zosyn 3.375g q8 hours     # BRENDA on CKD III,   - presumed pre-renal   - hx C3 glomerulopathy    Plan:  - continue with NHCO3 PO  - Renal - continue tacrolimus ordered  - Tacrolimus trough drawn 07/27 @ 9am before next dose  - Gloria removed    # COVID +  - s/p Dexa 6mg PO q6  - completed quarantine 7/15 - d/bg precautions 7/16  - Was unnecessarily retested, patient still positive but no symptoms. NOT INFECTIOUS, COULD BE POSITIVE UP TO 90 DAYS AFTER INFECTION      # COPD; severe pulm HTN  - Nebs PRN Q4    # Fall, mechanical  - no syncope per pt  - cth neg  - trauma w/u neg    Plan:  - PT eval for SNF    # HFrEF, chronic;   - severely dilated cardio myopathy  - Right ventrical dysfunction   - Mild tricuspid valve/ aortic valve regurg    Plan:  - LASIX 40 MG PO RESTARTED 07/26         - not on ANY goal directed tx - cardio eval Dr Haley  - Patient started on metoprolol 25mg q 24 PO    # AFib, chronic    Plan:  - Eliquis  - rate control with metoprolol 25mg q24    # CAD    Plan:  - No aspirin  - c/w statin     # DLD    Plan:  - on statin    # DM2  - Sugars currently on the higher side 2/2 patient not wanting to eat yesterday and holding the insulin     Plan:  - diabetic diet  - c/w insulin     # Hypothyroidism    Plan:  - synthroid 150    # PVD  c/w pentoxifylline     # DVT ppx:  on Eliquis    #Discharge to rehab

## 2022-07-27 NOTE — DIETITIAN INITIAL EVALUATION ADULT - ORAL INTAKE PTA/DIET HISTORY
Patient reported eating 3 meals/day PTA. He was  Patient reported eating 3 meals/day PTA. He was taking calcitriol for supplementation. He reports good appetite and PO intake PTA.  lbs (x2 months ago). NKFA/intolerances. RD Bedscale weight 83.1 kg. RD observed that he had eaten <25% of breakfast meal. He does not like current diet, and therefore is not eating. RD encouraged patient to try to eat more at meals, and encouraged patient to have Glucerna Shake when received.  Patient seemed tired and drowsy during RD visit. He reported eating 3 meals/day PTA. Per EMR, He was taking calcitriol for supplementation. He reports good appetite and PO intake PTA (RD suspects that's not true).  lbs (x2 months ago). NKFA/intolerances. RD Bedscale weight 83.1 kg. RD observed that he had eaten <25% of breakfast meal. He does not like current diet, and therefore is not eating. RD encouraged patient to try to eat more at meals, and encouraged patient to have Glucerna Shake when received.

## 2022-07-27 NOTE — DIETITIAN INITIAL EVALUATION ADULT - PERTINENT MEDS FT
MEDICATIONS  (STANDING):  apixaban 2.5 milliGRAM(s) Oral two times a day  atorvastatin 40 milliGRAM(s) Oral at bedtime  BACItracin   Ointment 1 Application(s) Topical every 12 hours  benzocaine 20% Spray 1 Spray(s) Topical once  calcitriol   Capsule 0.25 MICROGram(s) Oral <User Schedule>  darbepoetin Injectable ViaL 20 MICROGram(s) SubCutaneous every 7 days  dextrose 5%. 1000 milliLiter(s) (100 mL/Hr) IV Continuous <Continuous>  dextrose 5%. 1000 milliLiter(s) (50 mL/Hr) IV Continuous <Continuous>  dextrose 50% Injectable 25 Gram(s) IV Push once  dextrose 50% Injectable 12.5 Gram(s) IV Push once  dextrose 50% Injectable 25 Gram(s) IV Push once  furosemide    Tablet 40 milliGRAM(s) Oral daily  glucagon  Injectable 1 milliGRAM(s) IntraMuscular once  insulin glargine Injectable (LANTUS) 18 Unit(s) SubCutaneous at bedtime  insulin lispro (ADMELOG) corrective regimen sliding scale   SubCutaneous three times a day before meals  insulin lispro Injectable (ADMELOG) 6 Unit(s) SubCutaneous three times a day before meals  levothyroxine 150 MICROGram(s) Oral daily  LORazepam     Tablet 0.5 milliGRAM(s) Oral once  metoprolol succinate ER 50 milliGRAM(s) Oral daily  multivitamin/minerals 1 Tablet(s) Oral daily  pentoxifylline 400 milliGRAM(s) Oral two times a day  piperacillin/tazobactam IVPB.. 3.375 Gram(s) IV Intermittent every 8 hours  sodium bicarbonate 650 milliGRAM(s) Oral three times a day  tacrolimus 1 milliGRAM(s) Oral <User Schedule>    MEDICATIONS  (PRN):  acetaminophen     Tablet .. 650 milliGRAM(s) Oral every 6 hours PRN Temp greater or equal to 38C (100.4F), Mild Pain (1 - 3)  ALBUTerol    90 MICROgram(s) HFA Inhaler 1 Puff(s) Inhalation every 6 hours PRN Bronchospasm  dextrose Oral Gel 15 Gram(s) Oral once PRN Blood Glucose LESS THAN 70 milliGRAM(s)/deciliter  melatonin 5 milliGRAM(s) Oral at bedtime PRN Insomnia

## 2022-07-27 NOTE — PROGRESS NOTE ADULT - SUBJECTIVE AND OBJECTIVE BOX
seen and examined  no distress   had some nose bleed from o2         PAST HISTORY  --------------------------------------------------------------------------------  No significant changes to PMH, PSH, FHx, SHx, unless otherwise noted    ALLERGIES & MEDICATIONS  --------------------------------------------------------------------------------  Allergies    Ozempic (0.25 mg or 0.5 mg dose) (Hives; Rash)  streptomycin (Unknown)  Trulicity Pen (Hives; Rash)    Intolerances      Standing Inpatient Medications  apixaban 2.5 milliGRAM(s) Oral two times a day  atorvastatin 40 milliGRAM(s) Oral at bedtime  benzocaine 20% Spray 1 Spray(s) Topical once  calcitriol   Capsule 0.25 MICROGram(s) Oral <User Schedule>  darbepoetin Injectable ViaL 20 MICROGram(s) SubCutaneous every 7 days  dextrose 5%. 1000 milliLiter(s) IV Continuous <Continuous>  dextrose 5%. 1000 milliLiter(s) IV Continuous <Continuous>  dextrose 50% Injectable 25 Gram(s) IV Push once  dextrose 50% Injectable 12.5 Gram(s) IV Push once  dextrose 50% Injectable 25 Gram(s) IV Push once  furosemide    Tablet 40 milliGRAM(s) Oral daily  glucagon  Injectable 1 milliGRAM(s) IntraMuscular once  insulin glargine Injectable (LANTUS) 18 Unit(s) SubCutaneous at bedtime  insulin lispro (ADMELOG) corrective regimen sliding scale   SubCutaneous three times a day before meals  insulin lispro Injectable (ADMELOG) 6 Unit(s) SubCutaneous three times a day before meals  levothyroxine 150 MICROGram(s) Oral daily  LORazepam     Tablet 0.5 milliGRAM(s) Oral once  metoprolol succinate ER 50 milliGRAM(s) Oral daily  multivitamin/minerals 1 Tablet(s) Oral daily  pentoxifylline 400 milliGRAM(s) Oral two times a day  piperacillin/tazobactam IVPB.. 3.375 Gram(s) IV Intermittent every 8 hours  sodium bicarbonate 650 milliGRAM(s) Oral three times a day  tacrolimus 1 milliGRAM(s) Oral <User Schedule>    PRN Inpatient Medications  acetaminophen     Tablet .. 650 milliGRAM(s) Oral every 6 hours PRN  ALBUTerol    90 MICROgram(s) HFA Inhaler 1 Puff(s) Inhalation every 6 hours PRN  dextrose Oral Gel 15 Gram(s) Oral once PRN  melatonin 5 milliGRAM(s) Oral at bedtime PRN          VITALS/PHYSICAL EXAM  --------------------------------------------------------------------------------  T(C): 35.8 (07-27-22 @ 05:00), Max: 36.4 (07-26-22 @ 12:48)  HR: 77 (07-27-22 @ 05:00) (66 - 83)  BP: 162/65 (07-27-22 @ 05:00) (122/60 - 162/65)  RR: 18 (07-27-22 @ 05:00) (17 - 18)  SpO2: 97% (07-27-22 @ 05:00) (95% - 98%)  Wt(kg): --        Physical Exam:  	Gen: NAD,  	Pulm:  B/L ronal at bases   	CV: S1S2; no rub  	Abd:  soft, nontender/nondistended    	    LABS/STUDIES  --------------------------------------------------------------------------------              7.2    12.28 >-----------<  163      [07-26-22 @ 10:29]              22.5     131  |  100  |  77  ----------------------------<  153      [07-26-22 @ 09:35]  4.7   |  20  |  2.2        Ca     7.2     [07-26-22 @ 09:35]      Mg     2.5     [07-26-22 @ 09:35]        Creatinine Trend:  SCr 2.2 [07-26 @ 09:35]  SCr 2.1 [07-25 @ 06:48]  SCr 2.1 [07-24 @ 04:30]  SCr 2.1 [07-23 @ 07:52]  SCr 1.8 [07-22 @ 19:00]    Urinalysis - [07-22-22 @ 13:10]      Color Yellow / Appearance Slightly Turbid / SG 1.018 / pH 6.0      Gluc Negative / Ketone Negative  / Bili Negative / Urobili <2 mg/dL       Blood Moderate / Protein 300 mg/dL / Leuk Est Negative / Nitrite Negative      RBC 1 / WBC 9 / Hyaline 13 / Gran  / Sq Epi  / Non Sq Epi 4 / Bacteria Negative      Iron 28, TIBC 135, %sat 21      [07-26-22 @ 13:55]  Ferritin 1299      [07-26-22 @ 09:35]  PTH -- (Ca 9.0)      [07-06-22 @ 07:39]   93  Vitamin D (25OH) 27      [07-06-22 @ 07:39]  HbA1c 8.5      [10-01-19 @ 09:54]  TSH 0.39      [07-18-22 @ 17:42]  Lipid: chol 110, TG 94, HDL 31, LDL --      [07-06-22 @ 07:39]

## 2022-07-27 NOTE — DIETITIAN INITIAL EVALUATION ADULT - EDUCATION DIETARY MODIFICATIONS
Discussed importance of adequate protein-energy intake; importance/benefits of oral nutrition supplements/(1) partially meets; needs review/practice/verbalization

## 2022-07-27 NOTE — DIETITIAN NUTRITION RISK NOTIFICATION - TREATMENT: THE FOLLOWING DIET HAS BEEN RECOMMENDED
Interval History: Extubated yesterday (11/6) to NIV and has done well overnight. She is awake and alert this am and her only complaint is that she is hungry and thirsty.     Objective:     Vital Signs (Most Recent):  Temp: 98.4 °F (36.9 °C) (11/07/20 0800)  Pulse: 80 (11/07/20 0906)  Resp: (!) 32 (11/07/20 0845)  BP: 123/60 (11/07/20 0800)  SpO2: (!) 92 % (11/07/20 0845) Vital Signs (24h Range):  Temp:  [97.9 °F (36.6 °C)-98.4 °F (36.9 °C)] 98.4 °F (36.9 °C)  Pulse:  [] 80  Resp:  [11-37] 32  SpO2:  [72 %-99 %] 92 %  BP: ()/(46-72) 123/60  Arterial Line BP: ()/(42-60) 114/60     Weight: 97.6 kg (215 lb 2.7 oz)  Body mass index is 38.12 kg/m².      Intake/Output Summary (Last 24 hours) at 11/7/2020 0939  Last data filed at 11/7/2020 0547  Gross per 24 hour   Intake 430 ml   Output 1200 ml   Net -770 ml       Physical Exam  Vitals signs and nursing note reviewed.   Constitutional:       General: She is not in acute distress.     Comments: Awake interactive, following all commands   HENT:      Head: Normocephalic and atraumatic.   Eyes:      Conjunctiva/sclera: Conjunctivae normal.      Pupils: Pupils are equal, round, and reactive to light.   Neck:      Musculoskeletal: Normal range of motion.      Comments: Thick neck    Cardiovascular:      Rate and Rhythm: Normal rate. Rhythm irregular.      Pulses: Normal pulses.      Heart sounds: Normal heart sounds.   Pulmonary:      Comments: On 2 L NC doing well, minimal crackles with R chest tube in place   Abdominal:      General: Bowel sounds are normal. There is no distension.      Palpations: Abdomen is soft.      Tenderness: There is no abdominal tenderness.   Musculoskeletal:         General: Swelling (mild bilateral lower extremities) present.   Skin:     General: Skin is warm and dry.   Neurological:      Mental Status: She is alert.      Comments: Awake, alert, oriented x 4         Vents:  Vent Mode: Spont (11/06/20 0952)  Ventilator Initiated: Yes  (11/01/20 2338)  Set Rate: 0 BPM (11/06/20 0952)  Vt Set: 390 mL (11/06/20 0952)  Pressure Support: 12 cmH20 (11/06/20 0952)  PEEP/CPAP: 5 cmH20 (11/06/20 0952)  Oxygen Concentration (%): 30 (11/07/20 0812)  Peak Airway Pressure: 17 cmH2O (11/06/20 0952)  Plateau Pressure: 0 cmH20 (11/06/20 0952)  Total Ve: 6.15 mL (11/06/20 0952)  F/VT Ratio<105 (RSBI): (!) 49.3 (11/06/20 0755)    Lines/Drains/Airways     Drain                 Urethral Catheter 11/02/20 0100 5 days         Chest Tube 11/04/20 1132 1 Right Fourth intercostal space 2 days          Arterial Line            Arterial Line 11/01/20 Right Radial 6 days          Peripheral Intravenous Line                 Midline Catheter Insertion/Assessment  - Double Lumen 10/20/20 1220 Right median cubital vein (antecubital fossa)  17 days         Peripheral IV - Single Lumen 11/05/20 0630 20 G Anterior;Left Forearm 2 days                Significant Labs:    CBC/Anemia Profile:  No results for input(s): WBC, HGB, HCT, PLT, MCV, RDW, IRON, FERRITIN, RETIC, FOLATE, [id number], OCCULTBLOOD in the last 48 hours.     Chemistries:  Recent Labs   Lab 11/06/20  0355 11/07/20  0405    142   K 3.0* 3.5    102   CO2 29 30*   BUN 47* 47*   CREATININE 2.0* 1.9*   CALCIUM 8.4* 8.5*   MG 2.3 2.5   PHOS 3.6 4.8*       All pertinent labs within the past 24 hours have been reviewed.    Significant Imaging:  I have reviewed and interpreted all pertinent imaging results/findings within the past 24 hours.   Diet, Minced and Moist:   Prosource Gelatein 20 Sugar Free     Qty per Day:  3  Supplement Feeding Modality:  Oral  Glucerna Shake Cans or Servings Per Day:  1       Frequency:  Three Times a day (07-27-22 @ 14:33) [Pending Verification By Attending]  Diet, Minced and Moist:   Supplement Feeding Modality:  Oral  Glucerna Shake Cans or Servings Per Day:  1       Frequency:  Three Times a day (07-26-22 @ 16:45) [Active]    Patient with moderate malnutrition in the context of acute illnesss as evidenced by patient with moderate muscle loss (clavicles, temples) and subcutaneous fat loss (orbital)       Diet, Minced and Moist:   Prosource Gelatein 20 Sugar Free     Qty per Day:  3  Supplement Feeding Modality:  Oral  Glucerna Shake Cans or Servings Per Day:  1       Frequency:  Three Times a day (07-27-22 @ 14:33) [Pending Verification By Attending]  Diet, Minced and Moist:   Supplement Feeding Modality:  Oral  Glucerna Shake Cans or Servings Per Day:  1       Frequency:  Three Times a day (07-26-22 @ 16:45) [Active]    Patient with moderate malnutrition in the context of acute illness as evidenced by patient with moderate muscle loss (clavicles, temples) and subcutaneous fat loss (orbital)    Patient also noted with 30% weight loss x 2 months

## 2022-07-27 NOTE — PROGRESS NOTE ADULT - ASSESSMENT
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

## 2022-07-27 NOTE — DIETITIAN INITIAL EVALUATION ADULT - COLLABORATION WITH OTHER PROVIDERS
Interventions: medical food supplements, coordination of care  Monitoring/Evaluation: diet order, energy intake, weight, labs, skin status, NFPF

## 2022-07-27 NOTE — SWALLOW VFSS/MBS ASSESSMENT ADULT - DIAGNOSTIC IMPRESSIONS
moderate-severe oropharyngeal dysphagia for thin liquids; mild-moderate oropharyngeal dysphagia for mildly thick liquids, puree, and minced+moist consistencies moderate-severe pharyngeal dysphagia for thin liquids; mild-moderate pharyngeal dysphagia for mildly thick liquids, puree, and minced+moist consistencies

## 2022-07-27 NOTE — DIETITIAN INITIAL EVALUATION ADULT - NS FNS DIET ORDER
Diet, Minced and Moist:   Supplement Feeding Modality:  Oral  Glucerna Shake Cans or Servings Per Day:  1       Frequency:  Three Times a day (07-26-22 @ 16:45)

## 2022-07-27 NOTE — PROGRESS NOTE ADULT - ASSESSMENT
82y Male with PMHx of HFrEF, Afib on eliquis, CAD s/p CABG, COPD (on rare home O2 PRN), CKD3 (Hx of C3 glomerulopathy), HTN, HLD    # BRENDA worsening Hypernatremia  due to dehydration, started on 1/2 NS at 150 c/hr  # CKD 3b - creat baseline ~1.8  # cr stable   # please document UO   # C3 glomerulopathy - on Prograf 1 mg q12h- repeat tarcolimus level 30 min before next dose today  / last level on the high side  and Cellcept - on hold d/t COVID19 illness  # Proteinuria 8.4 g initially responded well to Prograf with reduction of proteinuria  to<1 g/g according to outpatient records  # HFrEF 10/2021 TTE - severe LV and RV systolic dysfunction and PAH / seen by Dr. De La Fuente, EF ~45%  #continue sodium bicarbonate  650 q 8   #on calcitriol / last pth at goal / check ph level   # started on SRIRAM / ferritin elevated   # continue lasix 40  # hyponatremia/ check repeat today with plasma and u osm /  last TSH ok  will improve with lasix   # will follow

## 2022-07-27 NOTE — DIETITIAN INITIAL EVALUATION ADULT - ADD RECOMMEND
1) Continue current diet as ordered - SLP planned to have FEES for today to clarify aspiration risk  2) Continue Glucerna Shake 3x/day  3) Recommend Prosource Gelatein Plus 3x/day  4) Consider Appetite supplement if not contraindicated as patient with poor PO intake  5) Recommend 3 Day Calorie Count to quantify and assess PO intake   6) Continue to provide encouragement and assistance at all meals   7) Obtain new weight when possible    Patient with moderate malnutrition; RD to f/u in 4 days or PRN. 1) Continue current diet as ordered - SLP planned to have FEES for today to clarify aspiration risk  2) Continue Glucerna Shake 3x/day  3) Recommend Prosource Gelatein Plus 3x/day  4) Consider Appetite supplement if not contraindicated as patient with poor PO intake  5) Recommend 3 Day Calorie Count to quantify and assess PO intake   6) Continue to provide encouragement and assistance at all meals   7) Continue multivitamin with minerals   8) Obtain new weight when possible        Patient with moderate malnutrition; RD to f/u in 4 days or PRN. 1) Continue current diet as ordered - SLP planned to have FEES for today to clarify aspiration risk  2) Continue Glucerna Shake 3x/day  3) Recommend Prosource Gelatein 20 SF 3x/day  4) Consider Appetite supplement if not contraindicated as patient with poor PO intake  5) Recommend 3 Day Calorie Count to quantify and assess PO intake   6) Continue to provide encouragement and assistance at all meals   7) Continue multivitamin with minerals   8) Obtain new weight when possible        Patient with moderate malnutrition; RD to f/u in 4 days or PRN.

## 2022-07-27 NOTE — PROGRESS NOTE ADULT - SUBJECTIVE AND OBJECTIVE BOX
ANDRIA TAVAREZ 82y Male  MRN#: [id number]   Hospital Day: 22d    SUBJECTIVE  Patient is a 82y old Male who presents with a chief complaint of Diarrhea (27 Jul 2022 09:43)  Currently admitted to medicine with the primary diagnosis of Acute kidney injury superimposed on CKD 2/2 COVID       INTERVAL HPI AND OVERNIGHT EVENTS:  Patient was examined and seen at bedside. This morning he is resting comfortably in bed and reports no issues or overnight events.    REVIEW OF SYMPTOMS:  CONSTITUTIONAL: No weakness, fevers or chills; No headaches  ENT: No sore throat or difficulty swallowing  NECK: No pain or stiffness  RESPIRATORY: No cough; No shortness of breath  CARDIOVASCULAR: No chest pain or palpitations  GASTROINTESTINAL: No abdominal or epigastric pain; No nausea, vomiting, or hematemesis; No diarrhea or constipation  GENITOURINARY: No dysuria, frequency or hematuria  MUSCULOSKELETAL: No joint pain, no muscle pain, global weakness  SKIN: No itching or rashes    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    AF (atrial fibrillation)  s/p DCCV    Carotid stenosis    COPD (chronic obstructive pulmonary disease)    H/O coronary artery bypass surgery  2001    S/P inguinal hernia repair    History of appendectomy      ALLERGIES:  Ozempic (0.25 mg or 0.5 mg dose) (Hives; Rash)  streptomycin (Unknown)  Trulicity Pen (Hives; Rash)    MEDICATIONS:  STANDING MEDICATIONS  apixaban 2.5 milliGRAM(s) Oral two times a day  atorvastatin 40 milliGRAM(s) Oral at bedtime  BACItracin   Ointment 1 Application(s) Topical every 12 hours  benzocaine 20% Spray 1 Spray(s) Topical once  calcitriol   Capsule 0.25 MICROGram(s) Oral <User Schedule>  darbepoetin Injectable ViaL 20 MICROGram(s) SubCutaneous every 7 days  dextrose 5%. 1000 milliLiter(s) IV Continuous <Continuous>  dextrose 5%. 1000 milliLiter(s) IV Continuous <Continuous>  dextrose 50% Injectable 25 Gram(s) IV Push once  dextrose 50% Injectable 12.5 Gram(s) IV Push once  dextrose 50% Injectable 25 Gram(s) IV Push once  furosemide    Tablet 40 milliGRAM(s) Oral daily  glucagon  Injectable 1 milliGRAM(s) IntraMuscular once  insulin glargine Injectable (LANTUS) 18 Unit(s) SubCutaneous at bedtime  insulin lispro (ADMELOG) corrective regimen sliding scale   SubCutaneous three times a day before meals  insulin lispro Injectable (ADMELOG) 6 Unit(s) SubCutaneous three times a day before meals  levothyroxine 150 MICROGram(s) Oral daily  LORazepam     Tablet 0.5 milliGRAM(s) Oral once  metoprolol succinate ER 50 milliGRAM(s) Oral daily  multivitamin/minerals 1 Tablet(s) Oral daily  pentoxifylline 400 milliGRAM(s) Oral two times a day  piperacillin/tazobactam IVPB.. 3.375 Gram(s) IV Intermittent every 8 hours  sodium bicarbonate 650 milliGRAM(s) Oral three times a day  tacrolimus 1 milliGRAM(s) Oral <User Schedule>    PRN MEDICATIONS  acetaminophen     Tablet .. 650 milliGRAM(s) Oral every 6 hours PRN  ALBUTerol    90 MICROgram(s) HFA Inhaler 1 Puff(s) Inhalation every 6 hours PRN  dextrose Oral Gel 15 Gram(s) Oral once PRN  melatonin 5 milliGRAM(s) Oral at bedtime PRN      VITAL SIGNS: Last 24 Hours  T(C): 35.8 (27 Jul 2022 05:00), Max: 36.4 (26 Jul 2022 12:48)  T(F): 96.5 (27 Jul 2022 05:00), Max: 97.6 (26 Jul 2022 12:48)  HR: 77 (27 Jul 2022 05:00) (66 - 83)  BP: 162/65 (27 Jul 2022 05:00) (122/60 - 162/65)  BP(mean): --  RR: 18 (27 Jul 2022 05:00) (17 - 18)  SpO2: 97% (27 Jul 2022 05:00) (97% - 98%)    LABS:                        7.7    11.45 )-----------( 147      ( 27 Jul 2022 09:02 )             23.8     07-26    131<L>  |  100  |  77<HH>  ----------------------------<  153<H>  4.7   |  20  |  2.2<H>    Ca    7.2<L>      26 Jul 2022 09:35  Mg     2.4     07-27      PHYSICAL EXAM:  CONSTITUTIONAL: No acute distress, well-developed, well-groomed, AAOx3  HEAD: Atraumatic, normocephalic  EYES: EOM intact, PERRLA, conjunctiva and sclera clear  ENT: Moist mucous membranes  PULMONARY: Clear to auscultation bilaterally  CARDIOVASCULAR: Regular rate and rhythm  GASTROINTESTINAL: Soft, non-tender, non-distended; bowel sounds present  MUSCULOSKELETAL: 2+ peripheral pulses; no clubbing, no cyanosis, no edema  NEUROLOGY: non-focal  SKIN: No rashes or lesions; warm and dry

## 2022-07-27 NOTE — DIETITIAN INITIAL EVALUATION ADULT - OTHER INFO
Patient is an 81 yo male presented for diarrhea and Currently admitted to medicine with the primary diagnosis of Acute kidney injury superimposed on CKD 2/2 COVID.   #toxic and metabolic encephalopathies - seem improving now  Na up and down +aspiration PNA/pneumonitis +mild hosp+ debility / lack of exercise + lack of eating (malnutrition)    -SLP to arrange for FEES today (to clarify aspiration risk)  diet: minced /moist + thins    #hypernatremia/hyponatremia  -nl today    #BRENDA on CKD III  -BMP q24    #COVID _ seems like pt passed thru inflammatory phase  acute on chronic hypoxic resp failure  completed quarantine 7/5-7/15 -> d/c isolation     #Malnutrition  c/w MVI w/ min q24hrs  c/w diet  supplements: Glucerna 3x/day    #DM2   diabetic diet   FS qac/hs  c/w lantus 18 HS  d/c admelog - pt not eating that well  can cont SSI coverage

## 2022-07-27 NOTE — DIETITIAN INITIAL EVALUATION ADULT - ETIOLOGY
decreased PO intake related to recent diarrhea and patient not liking current diet order decreased PO intake related to recent diarrhea (x2 weeks PTA) and patient not liking current diet order

## 2022-07-27 NOTE — PROGRESS NOTE ADULT - SUBJECTIVE AND OBJECTIVE BOX
Over Night Events: events noted, on NC, feels better, occ cough, afebrile    PHYSICAL EXAM    ICU Vital Signs Last 24 Hrs  T(C): 35.8 (27 Jul 2022 05:00), Max: 36.4 (26 Jul 2022 12:48)  T(F): 96.5 (27 Jul 2022 05:00), Max: 97.6 (26 Jul 2022 12:48)  HR: 77 (27 Jul 2022 05:00) (66 - 83)  BP: 162/65 (27 Jul 2022 05:00) (122/60 - 162/65)  RR: 18 (27 Jul 2022 05:00) (17 - 18)  SpO2: 97% (27 Jul 2022 05:00) (97% - 98%)    O2 Parameters below as of 27 Jul 2022 05:00  Patient On (Oxygen Delivery Method): nasal cannula            General: ill looking   Lungs: Bilateral crackles  Cardiovascular: MEKHI 2.6  Abdomen: Soft, Positive BS  Extremities: No clubbing   Skin: Warm  Neurological: Non focal         LABS:                          7.2    12.28 )-----------( 163      ( 26 Jul 2022 10:29 )             22.5                                               07-26    131<L>  |  100  |  77<HH>  ----------------------------<  153<H>  4.7   |  20  |  2.2<H>    Ca    7.2<L>      26 Jul 2022 09:35  Mg     2.5     07-26                                                                                                                                                                                                                           MEDICATIONS  (STANDING):  apixaban 2.5 milliGRAM(s) Oral two times a day  atorvastatin 40 milliGRAM(s) Oral at bedtime  BACItracin   Ointment 1 Application(s) Topical every 12 hours  benzocaine 20% Spray 1 Spray(s) Topical once  calcitriol   Capsule 0.25 MICROGram(s) Oral <User Schedule>  darbepoetin Injectable ViaL 20 MICROGram(s) SubCutaneous every 7 days  dextrose 5%. 1000 milliLiter(s) (100 mL/Hr) IV Continuous <Continuous>  dextrose 5%. 1000 milliLiter(s) (50 mL/Hr) IV Continuous <Continuous>  dextrose 50% Injectable 25 Gram(s) IV Push once  dextrose 50% Injectable 12.5 Gram(s) IV Push once  dextrose 50% Injectable 25 Gram(s) IV Push once  furosemide    Tablet 40 milliGRAM(s) Oral daily  glucagon  Injectable 1 milliGRAM(s) IntraMuscular once  insulin glargine Injectable (LANTUS) 18 Unit(s) SubCutaneous at bedtime  insulin lispro (ADMELOG) corrective regimen sliding scale   SubCutaneous three times a day before meals  insulin lispro Injectable (ADMELOG) 6 Unit(s) SubCutaneous three times a day before meals  levothyroxine 150 MICROGram(s) Oral daily  LORazepam     Tablet 0.5 milliGRAM(s) Oral once  metoprolol succinate ER 50 milliGRAM(s) Oral daily  multivitamin/minerals 1 Tablet(s) Oral daily  pentoxifylline 400 milliGRAM(s) Oral two times a day  piperacillin/tazobactam IVPB.. 3.375 Gram(s) IV Intermittent every 8 hours  sodium bicarbonate 650 milliGRAM(s) Oral three times a day  tacrolimus 1 milliGRAM(s) Oral <User Schedule>    MEDICATIONS  (PRN):  acetaminophen     Tablet .. 650 milliGRAM(s) Oral every 6 hours PRN Temp greater or equal to 38C (100.4F), Mild Pain (1 - 3)  ALBUTerol    90 MICROgram(s) HFA Inhaler 1 Puff(s) Inhalation every 6 hours PRN Bronchospasm  dextrose Oral Gel 15 Gram(s) Oral once PRN Blood Glucose LESS THAN 70 milliGRAM(s)/deciliter  melatonin 5 milliGRAM(s) Oral at bedtime PRN Insomnia      Xrays:                                                                                     ECHO

## 2022-07-27 NOTE — CHART NOTE - NSCHARTNOTEFT_GEN_A_CORE
Resident was notified earlier in the day that the pharmacy refused to dispense Aranesp b/c medication was reserved only for patients receiving hemodialysis. Resident called and spoke with clinical pharmacist about medication. She restated the restriction on the medication. Resident made the pharmacist aware that nephrology had recommended this medication for the patient. Clinical pharmacist said she was going to call nephrologist to confirm. At this time patient still has not received Aranesp. Resident will follow up in the morning.

## 2022-07-27 NOTE — SWALLOW VFSS/MBS ASSESSMENT ADULT - ORAL PHASE
Reduced anterior - posterior transport/Residue in oral cavity/Uncontrolled bolus / spillover in taryn-pharynx/Uncontrolled bolus / spillover in hypopharynx penetration before the swallow w/ larger bolus size/Residue in oral cavity/Uncontrolled bolus / spillover in taryn-pharynx/Uncontrolled bolus / spillover in hypopharynx/Laryngeal penetration before swallow - silent Residue in oral cavity/Uncontrolled bolus / spillover in taryn-pharynx/Uncontrolled bolus / spillover in hypopharynx/Laryngeal penetration before swallow - silent

## 2022-07-27 NOTE — SWALLOW VFSS/MBS ASSESSMENT ADULT - ROSENBEK'S PENETRATION ASPIRATION SCALE
(3) contrast remains above the vocal cords, visible residue remains (penetration) (5) contrast contacts vocal cords, visible residue remains (penetration) (1) no aspiration, contrast does not enter airway

## 2022-07-27 NOTE — SWALLOW VFSS/MBS ASSESSMENT ADULT - RECOMMENDED FEEDING/EATING TECHNIQUES
consecutive swallows, bolus hold w/ liquids/allow for swallow between intakes/crush medication (when feasible)/no straws/oral hygiene/position upright (90 degrees)/small sips/bites

## 2022-07-28 NOTE — PROGRESS NOTE ADULT - ASSESSMENT
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

## 2022-07-28 NOTE — PROGRESS NOTE ADULT - SUBJECTIVE AND OBJECTIVE BOX
Nephrology progress note    Patient is seen and examined, events over the last 24 h noted .  Poor po intake  Allergies:  Ozempic (0.25 mg or 0.5 mg dose) (Hives; Rash)  streptomycin (Unknown)  Trulicity Pen (Hives; Rash)    Hospital Medications:   MEDICATIONS  (STANDING):  apixaban 2.5 milliGRAM(s) Oral two times a day  atorvastatin 40 milliGRAM(s) Oral at bedtime  BACItracin   Ointment 1 Application(s) Topical every 12 hours  benzocaine 20% Spray 1 Spray(s) Topical once  calcitriol   Capsule 0.25 MICROGram(s) Oral <User Schedule>  darbepoetin Injectable ViaL 20 MICROGram(s) SubCutaneous every 7 days  dextrose 5%. 1000 milliLiter(s) (100 mL/Hr) IV Continuous <Continuous>  dextrose 5%. 1000 milliLiter(s) (50 mL/Hr) IV Continuous <Continuous>  dextrose 50% Injectable 25 Gram(s) IV Push once  dextrose 50% Injectable 12.5 Gram(s) IV Push once  dextrose 50% Injectable 25 Gram(s) IV Push once  furosemide    Tablet 40 milliGRAM(s) Oral daily  glucagon  Injectable 1 milliGRAM(s) IntraMuscular once  insulin glargine Injectable (LANTUS) 12 Unit(s) SubCutaneous at bedtime  insulin lispro (ADMELOG) corrective regimen sliding scale   SubCutaneous three times a day before meals  levothyroxine 150 MICROGram(s) Oral daily  metoprolol succinate ER 50 milliGRAM(s) Oral daily  multivitamin/minerals 1 Tablet(s) Oral daily  pentoxifylline 400 milliGRAM(s) Oral two times a day  piperacillin/tazobactam IVPB.. 3.375 Gram(s) IV Intermittent every 8 hours  sodium bicarbonate 650 milliGRAM(s) Oral three times a day        VITALS:  T(F): 97.7 (22 @ 04:59), Max: 98 (22 @ 19:04)  HR: 83 (22 @ 04:59)  BP: 140/72 (22 @ 04:59)  RR: 18 (22 @ 04:59)  SpO2: 95% (22 @ 20:41)  Wt(kg): --     @ 07:01  -   @ 11:44  --------------------------------------------------------  IN: 0 mL / OUT: 250 mL / NET: -250 mL      Height (cm): 193 ( @ 14:25)    PHYSICAL EXAM:  Constitutional: NAD  HEENT: anicteric sclera, oropharynx clear, MMM  Neck: No JVD  Respiratory: CTAB, no wheezes, rales or rhonchi  Cardiovascular: S1, S2, RRR  Gastrointestinal: BS+, soft, NT/ND  Extremities: No cyanosis or clubbing. No peripheral edema  Neurological: A/wake alert  : No CVA tenderness. No davalos.   Skin: No rashes  Vascular Access:    LABS:      136  |  100  |  81<HH>  ----------------------------<  126<H>  4.2   |  23  |  2.2<H>  Creatinine Trend: 2.2<--, 2.2<--, 2.1<--, 2.1<--, 2.1<--, 1.8<--    Ca    7.7<L>      2022 09:02  Mg     2.4                                 9.1    12.06 )-----------( 152      ( 2022 09:18 )             27.4       Urine Studies:  Urinalysis Basic - ( 2022 13:10 )    Color: Yellow / Appearance: Slightly Turbid / S.018 / pH:   Gluc:  / Ketone: Negative  / Bili: Negative / Urobili: <2 mg/dL   Blood:  / Protein: 300 mg/dL / Nitrite: Negative   Leuk Esterase: Negative / RBC: 1 /HPF / WBC 9 /HPF   Sq Epi:  / Non Sq Epi: 4 /HPF / Bacteria: Negative        RADIOLOGY & ADDITIONAL STUDIES:  < from: Xray Chest 1 View-PORTABLE IMMEDIATE (Xray Chest 1 View-PORTABLE IMMEDIATE .) (22 @ 23:43) >    Unchanged bibasilar opacities/effusions. No pneumothorax.    < end of copied text >

## 2022-07-28 NOTE — CHART NOTE - NSCHARTNOTEFT_GEN_A_CORE
NUTRITION SUPPORT CONSULTATION    HPI:  82M with PMHx of HFrEF, Afib on eliquis, CAD s/p CABG, COPD (on rare home O2 PRN), CKD3, HTN, HLD presents for 2 weeks of watery diarrhea. Pt reports that 2 weeks ago he went to a doctor's appointment with his wife, and the next day both of them started experiencing non-bloody watery diarrhea, x3-4/day, and then started having decreased PO intake and felt weak. Of note, he also had a mechanical fall 1 week ago on his right side with no injuries sustained (but he didn't visit the ED). Pt denies any other sicks contacts, recent travel, vomiting, chest pain, SOB, palpitations, light-headedness, or any  symptoms.     In the ED: /57, HR 57, Temp 97.6F, satting 96% on RA. Labs notable for Cr of 3.6 (baseline ~2.0), , HCO3 19, AG 20. Trops 0.07 (likely 2/2 BRENDA), no chest pain, EKG 1st-degree AV block. CTH -ve, CTAP + chest -ve for acute pathology.     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  AF (atrial fibrillation)  s/p DCCV  Carotid stenosis  COPD (chronic obstructive pulmonary disease)  H/O coronary artery bypass surgery  2001  S/P inguinal hernia repair  History of appendectomy    Allergies  Ozempic (0.25 mg or 0.5 mg dose) (Hives; Rash)  streptomycin (Unknown)  Trulicity Pen (Hives; Rash)    MEDICATIONS  (STANDING):  apixaban 2.5 milliGRAM(s) Oral two times a day  atorvastatin 40 milliGRAM(s) Oral at bedtime  BACItracin   Ointment 1 Application(s) Topical every 12 hours  benzocaine 20% Spray 1 Spray(s) Topical once  calcitriol   Capsule 0.25 MICROGram(s) Oral <User Schedule>  darbepoetin Injectable ViaL 20 MICROGram(s) SubCutaneous every 7 days  dextrose 5%. 1000 milliLiter(s) (50 mL/Hr) IV Continuous <Continuous>  dextrose 5%. 1000 milliLiter(s) (100 mL/Hr) IV Continuous <Continuous>  dextrose 50% Injectable 25 Gram(s) IV Push once  dextrose 50% Injectable 12.5 Gram(s) IV Push once  dextrose 50% Injectable 25 Gram(s) IV Push once  furosemide    Tablet 40 milliGRAM(s) Oral daily  glucagon  Injectable 1 milliGRAM(s) IntraMuscular once  insulin glargine Injectable (LANTUS) 12 Unit(s) SubCutaneous at bedtime  insulin lispro (ADMELOG) corrective regimen sliding scale   SubCutaneous three times a day before meals  levothyroxine 150 MICROGram(s) Oral daily  metoprolol succinate ER 50 milliGRAM(s) Oral daily  multivitamin/minerals 1 Tablet(s) Oral daily  pentoxifylline 400 milliGRAM(s) Oral two times a day  piperacillin/tazobactam IVPB.. 3.375 Gram(s) IV Intermittent every 8 hours  sodium bicarbonate 650 milliGRAM(s) Oral three times a day    MEDICATIONS  (PRN):  acetaminophen     Tablet .. 650 milliGRAM(s) Oral every 6 hours PRN Temp greater or equal to 38C (100.4F), Mild Pain (1 - 3)  ALBUTerol    90 MICROgram(s) HFA Inhaler 1 Puff(s) Inhalation every 6 hours PRN Bronchospasm  dextrose Oral Gel 15 Gram(s) Oral once PRN Blood Glucose LESS THAN 70 milliGRAM(s)/deciliter  melatonin 5 milliGRAM(s) Oral at bedtime PRN Insomnia    ICU Vital Signs Last 24 Hrs  T(C): 36.2 (28 Jul 2022 12:52), Max: 36.7 (27 Jul 2022 19:04)  T(F): 97.1 (28 Jul 2022 12:52), Max: 98 (27 Jul 2022 19:04)  HR: 81 (28 Jul 2022 12:52) (69 - 83)  BP: 128/58 (28 Jul 2022 12:52) (128/58 - 141/67)  RR: 19 (28 Jul 2022 12:52) (18 - 19)  SpO2: 95% (27 Jul 2022 20:41) (95% - 95%)    O2 Parameters below as of 27 Jul 2022 20:41  Patient On (Oxygen Delivery Method): nasal cannula    Drug Dosing Weight  Height (cm): 193 (27 Jul 2022 14:25)  Weight (kg): 100 (05 Jul 2022 13:03)  BMI (kg/m2): 26.8 (27 Jul 2022 14:25)  BSA (m2): 2.31 (27 Jul 2022 14:25)    EXAM     Abd:     LE:   Enteral access:  IV access:    LABS  07-28    137  |  103  |  78<HH>  ----------------------------<  88  4.2   |  21  |  2.1<H>    Ca    7.7<L>      28 Jul 2022 09:18  Mg     2.3     07-28  Phosphorus Level, Serum: 2.0 mg/dL (07.11.22 @ 07:51)  Triglycerides, Serum: 94 mg/dL (07.06.22 @ 07:39)    Vitamin D, 25-Hydroxy: 27: 30 - 80 ng/mL                                        Optimum Levels  (Reference range)  > 80 ng/mL                                             Toxicity possible  20 - 29 ng/mL                                         Insufficiency  10 - 19 ng/mL                                 Mild to Moderate  Deficiency  < 10 ng/mL                                             Severe Deficiency               9.1    12.06 )-----------( 152      ( 28 Jul 2022 09:18 )             27.4     CAPILLARY BLOOD GLUCOSE  POCT Blood Glucose.: 102 mg/dL (28 Jul 2022 11:39)  POCT Blood Glucose.: 71 mg/dL (28 Jul 2022 07:55)  POCT Blood Glucose.: 119 mg/dL (27 Jul 2022 21:00)  POCT Blood Glucose.: 143 mg/dL (27 Jul 2022 16:44)    ASSESSMENT  82M with PMHx of HFrEF, Afib on eliquis, CAD s/p CABG, COPD (on rare home O2 PRN), CKD3, HTN, HLD who presented for 2 weeks of watery diarrhea found to have Covid-19, with hospital stay c/b altered mental status.     - toxic and metabolic encephalopathies, improving  - COVID +  - pharyngeal dysphagia  - BRENDA on CKD III        PLAN NUTRITION SUPPORT CONSULTATION    HPI:  82M with PMHx of HFrEF, Afib on eliquis, CAD s/p CABG, COPD (on rare home O2 PRN), CKD3, HTN, HLD presents for 2 weeks of watery diarrhea. Pt reports that 2 weeks ago he went to a doctor's appointment with his wife, and the next day both of them started experiencing non-bloody watery diarrhea, x3-4/day, and then started having decreased PO intake and felt weak. Of note, he also had a mechanical fall 1 week ago on his right side with no injuries sustained (but he didn't visit the ED). Pt denies any other sicks contacts, recent travel, vomiting, chest pain, SOB, palpitations, light-headedness, or any  symptoms.     In the ED: /57, HR 57, Temp 97.6F, satting 96% on RA. Labs notable for Cr of 3.6 (baseline ~2.0), , HCO3 19, AG 20. Trops 0.07 (likely 2/2 BRENDA), no chest pain, EKG 1st-degree AV block. CTH -ve, CTAP + chest -ve for acute pathology.     Admitted to medicine (05 Jul 2022 21:23)    Video swallow done 7/27, results noted. PO intake has been negligible since admission. Lunch untouched at beside today. d/w Dr. 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  AF (atrial fibrillation)  s/p DCCV  Carotid stenosis  COPD (chronic obstructive pulmonary disease)  H/O coronary artery bypass surgery  2001  S/P inguinal hernia repair  History of appendectomy    Allergies  Ozempic (0.25 mg or 0.5 mg dose) (Hives; Rash)  streptomycin (Unknown)  Trulicity Pen (Hives; Rash)    MEDICATIONS  (STANDING):  apixaban 2.5 milliGRAM(s) Oral two times a day  atorvastatin 40 milliGRAM(s) Oral at bedtime  BACItracin   Ointment 1 Application(s) Topical every 12 hours  benzocaine 20% Spray 1 Spray(s) Topical once  calcitriol   Capsule 0.25 MICROGram(s) Oral <User Schedule>  darbepoetin Injectable ViaL 20 MICROGram(s) SubCutaneous every 7 days  dextrose 5%. 1000 milliLiter(s) (50 mL/Hr) IV Continuous <Continuous>  dextrose 5%. 1000 milliLiter(s) (100 mL/Hr) IV Continuous <Continuous>  dextrose 50% Injectable 25 Gram(s) IV Push once  dextrose 50% Injectable 12.5 Gram(s) IV Push once  dextrose 50% Injectable 25 Gram(s) IV Push once  furosemide    Tablet 40 milliGRAM(s) Oral daily  glucagon  Injectable 1 milliGRAM(s) IntraMuscular once  insulin glargine Injectable (LANTUS) 12 Unit(s) SubCutaneous at bedtime  insulin lispro (ADMELOG) corrective regimen sliding scale   SubCutaneous three times a day before meals  levothyroxine 150 MICROGram(s) Oral daily  metoprolol succinate ER 50 milliGRAM(s) Oral daily  multivitamin/minerals 1 Tablet(s) Oral daily  pentoxifylline 400 milliGRAM(s) Oral two times a day  piperacillin/tazobactam IVPB.. 3.375 Gram(s) IV Intermittent every 8 hours  sodium bicarbonate 650 milliGRAM(s) Oral three times a day    MEDICATIONS  (PRN):  acetaminophen     Tablet .. 650 milliGRAM(s) Oral every 6 hours PRN Temp greater or equal to 38C (100.4F), Mild Pain (1 - 3)  ALBUTerol    90 MICROgram(s) HFA Inhaler 1 Puff(s) Inhalation every 6 hours PRN Bronchospasm  dextrose Oral Gel 15 Gram(s) Oral once PRN Blood Glucose LESS THAN 70 milliGRAM(s)/deciliter  melatonin 5 milliGRAM(s) Oral at bedtime PRN Insomnia    ICU Vital Signs Last 24 Hrs  T(C): 36.2 (28 Jul 2022 12:52), Max: 36.7 (27 Jul 2022 19:04)  T(F): 97.1 (28 Jul 2022 12:52), Max: 98 (27 Jul 2022 19:04)  HR: 81 (28 Jul 2022 12:52) (69 - 83)  BP: 128/58 (28 Jul 2022 12:52) (128/58 - 141/67)  RR: 19 (28 Jul 2022 12:52) (18 - 19)  SpO2: 95% (27 Jul 2022 20:41) (95% - 95%)    O2 Parameters below as of 27 Jul 2022 20:41  Patient On (Oxygen Delivery Method): nasal cannula    Drug Dosing Weight  Height (cm): 193 (27 Jul 2022 14:25)  Weight (kg): 100 (05 Jul 2022 13:03)  BMI (kg/m2): 26.8 (27 Jul 2022 14:25)  BSA (m2): 2.31 (27 Jul 2022 14:25)    EXAM  Awake, alert but lethargic   Appears weak, debilitated  +muscle wasting- clavicular, temporal, orbital   Abd: soft, ND  Skin tear Rt elbow  Enteral access: none  IV access: peripheral Rt wrist    LABS  07-28    137  |  103  |  78<HH>  ----------------------------<  88  4.2   |  21  |  2.1<H>    Ca    7.7<L>      28 Jul 2022 09:18  Mg     2.3     07-28  Phosphorus Level, Serum: 2.0 mg/dL (07.11.22 @ 07:51)  Triglycerides, Serum: 94 mg/dL (07.06.22 @ 07:39)    Vitamin D, 25-Hydroxy: 27: 30 - 80 ng/mL                                        Optimum Levels  (Reference range)  > 80 ng/mL                                             Toxicity possible  20 - 29 ng/mL                                         Insufficiency  10 - 19 ng/mL                                 Mild to Moderate  Deficiency  < 10 ng/mL                                             Severe Deficiency               9.1    12.06 )-----------( 152      ( 28 Jul 2022 09:18 )             27.4     CAPILLARY BLOOD GLUCOSE  POCT Blood Glucose.: 102 mg/dL (28 Jul 2022 11:39)  POCT Blood Glucose.: 71 mg/dL (28 Jul 2022 07:55)  POCT Blood Glucose.: 119 mg/dL (27 Jul 2022 21:00)  POCT Blood Glucose.: 143 mg/dL (27 Jul 2022 16:44)    ASSESSMENT  82M with PMHx of HFrEF, Afib on eliquis, CAD s/p CABG, COPD (on rare home O2 PRN), CKD3, HTN, HLD who presented for 2 weeks of watery diarrhea found to have Covid-19, with hospital stay c/b altered mental status.     - toxic and metabolic encephalopathies, improving  - COVID +  - pharyngeal dysphagia  - BRENDA on CKD III  - vitamin D insufficiency  - moderate protein calorie malnutrition    PLAN  - PO diet as tolerated per speech  - when NGT in place and ok to use start Jevity 1.2 240ml X 4 feeds/day to start  - give TF's 1 hour after each attempted meal and qhs, not on q6hr schedule  - repeat in phos with next blood draw  - will follow NUTRITION SUPPORT CONSULTATION    82M with PMHx of HFrEF, Afib on eliquis, CAD s/p CABG, COPD (on rare home O2 PRN), CKD3, HTN, HLD presents for 2 weeks of watery diarrhea. Pt reports that 2 weeks ago he went to a doctor's appointment with his wife, and the next day both of them started experiencing non-bloody watery diarrhea, x3-4/day, and then started having decreased PO intake and felt weak. Of note, he also had a mechanical fall 1 week ago on his right side with no injuries sustained (but he didn't visit the ED). Pt denies any other sicks contacts, recent travel, vomiting, chest pain, SOB, palpitations, light-headedness, or any  symptoms.     In the ED: /57, HR 57, Temp 97.6F, satting 96% on RA. Labs notable for Cr of 3.6 (baseline ~2.0), , HCO3 19, AG 20. Trops 0.07 (likely 2/2 BRENDA), no chest pain, EKG 1st-degree AV block. CTH -ve, CTAP + chest -ve for acute pathology.     Admitted to medicine (05 Jul 2022 21:23)    Video swallow done 7/27, results noted. PO intake has been negligible since admission. Lunch untouched at beside today.   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  AF (atrial fibrillation)  s/p DCCV  Carotid stenosis  COPD (chronic obstructive pulmonary disease)  H/O coronary artery bypass surgery 2001  S/P inguinal hernia repair  History of appendectomy    Allergies  Ozempic (0.25 mg or 0.5 mg dose) (Hives; Rash)  streptomycin (Unknown)  Trulicity Pen (Hives; Rash)    MEDICATIONS  (STANDING):  apixaban 2.5 milliGRAM(s) Oral two times a day  atorvastatin 40 milliGRAM(s) Oral at bedtime  BACItracin   Ointment 1 Application(s) Topical every 12 hours  benzocaine 20% Spray 1 Spray(s) Topical once  calcitriol   Capsule 0.25 MICROGram(s) Oral <User Schedule>  darbepoetin Injectable ViaL 20 MICROGram(s) SubCutaneous every 7 days  furosemide    Tablet 40 milliGRAM(s) Oral daily  insulin glargine Injectable (LANTUS) 12 Unit(s) SubCutaneous at bedtime  insulin lispro (ADMELOG) corrective regimen sliding scale   SubCutaneous three times a day before meals  levothyroxine 150 MICROGram(s) Oral daily  metoprolol succinate ER 50 milliGRAM(s) Oral daily  multivitamin/minerals 1 Tablet(s) Oral daily  pentoxifylline 400 milliGRAM(s) Oral two times a day  piperacillin/tazobactam IVPB.. 3.375 Gram(s) IV Intermittent every 8 hours  sodium bicarbonate 650 milliGRAM(s) Oral three times a day    MEDICATIONS  (PRN):  acetaminophen     Tablet .. 650 milliGRAM(s) Oral every 6 hours PRN Temp greater or equal to 38C (100.4F), Mild Pain (1 - 3)  ALBUTerol    90 MICROgram(s) HFA Inhaler 1 Puff(s) Inhalation every 6 hours PRN Bronchospasm  dextrose Oral Gel 15 Gram(s) Oral once PRN Blood Glucose LESS THAN 70 milliGRAM(s)/deciliter  melatonin 5 milliGRAM(s) Oral at bedtime PRN Insomnia    ICU Vital Signs Last 24 Hrs  T(C): 36.2 (28 Jul 2022 12:52), Max: 36.7 (27 Jul 2022 19:04)  T(F): 97.1 (28 Jul 2022 12:52), Max: 98 (27 Jul 2022 19:04)  HR: 81 (28 Jul 2022 12:52) (69 - 83)  BP: 128/58 (28 Jul 2022 12:52) (128/58 - 141/67)  RR: 19 (28 Jul 2022 12:52) (18 - 19)  SpO2: 95% (27 Jul 2022 20:41) (95% - 95%)    O2 Parameters below as of 27 Jul 2022 20:41  Patient On (Oxygen Delivery Method): nasal cannula    Drug Dosing Weight  Height (cm): 193 (27 Jul 2022 14:25)  Weight (kg): 100 (05 Jul 2022 13:03)  BMI (kg/m2): 26.8 (27 Jul 2022 14:25)  BSA (m2): 2.31 (27 Jul 2022 14:25)    EXAM  Awake, alert but lethargic   Appears weak, debilitated  +muscle wasting- clavicular, temporal, orbital   Abd: soft, ND  Skin tear Rt elbow  Enteral access: none  IV access: peripheral Rt wrist    LABS  07-28    137  |  103  |  78<HH>  ----------------------------<  88  4.2   |  21  |  2.1<H>    Ca    7.7<L>      28 Jul 2022 09:18  Mg     2.3     07-28  Phosphorus Level, Serum: 2.0 mg/dL (07.11.22 @ 07:51)  Triglycerides, Serum: 94 mg/dL (07.06.22 @ 07:39)    Vitamin D, 25-Hydroxy: 27: 30 - 80 ng/mL   Optimum Levels  (Reference range)                 9.1    12.06 )-----------( 152      ( 28 Jul 2022 09:18 )             27.4     CAPILLARY BLOOD GLUCOSE  POCT Blood Glucose.: 102 mg/dL (28 Jul 2022 11:39)  POCT Blood Glucose.: 71 mg/dL (28 Jul 2022 07:55)  POCT Blood Glucose.: 119 mg/dL (27 Jul 2022 21:00)  POCT Blood Glucose.: 143 mg/dL (27 Jul 2022 16:44)    ASSESSMENT  82M with PMHx of HFrEF, Afib on eliquis, CAD s/p CABG, COPD (on rare home O2 PRN), CKD3, HTN, HLD who presented for 2 weeks of watery diarrhea found to have Covid-19, with hospital stay c/b altered mental status.     - toxic and metabolic encephalopathies, improving  - COVID +  - pharyngeal dysphagia  - BRENDA on CKD III  - vitamin D insufficiency  - moderate protein calorie malnutrition    PLAN  - PO diet as tolerated per speech  - when NGT in place and ok to use start Jevity 1.2 240ml X 4 feeds/day to start  - give TF's 1 hour after each attempted meal and qhs, not on q6hr schedule  - repeat in phos with next blood draw  - will follow NUTRITION SUPPORT CONSULTATION    82M with PMHx of HFrEF, Afib on eliquis, CAD s/p CABG, COPD (on rare home O2 PRN), CKD3, HTN, HLD presents for 2 weeks of watery diarrhea. Pt reports that 2 weeks ago he went to a doctor's appointment with his wife, and the next day both of them started experiencing non-bloody watery diarrhea, x3-4/day, and then started having decreased PO intake and felt weak. Of note, he also had a mechanical fall 1 week ago on his right side with no injuries sustained (but he didn't visit the ED). Pt denies any other sicks contacts, recent travel, vomiting, chest pain, SOB, palpitations, light-headedness, or any  symptoms.     In the ED: /57, HR 57, Temp 97.6F, satting 96% on RA. Labs notable for Cr of 3.6 (baseline ~2.0), , HCO3 19, AG 20. Trops 0.07 (likely 2/2 BRENDA), no chest pain, EKG 1st-degree AV block. CTH -ve, CTAP + chest -ve for acute pathology.     Admitted to medicine (05 Jul 2022 21:23)    Video swallow done 7/27, results noted. PO intake has been negligible since admission. Lunch untouched at beside today.   NST consult called today - d/w Dr. 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  AF (atrial fibrillation)  s/p DCCV  Carotid stenosis  COPD (chronic obstructive pulmonary disease)  H/O coronary artery bypass surgery 2001  S/P inguinal hernia repair  History of appendectomy    Allergies  Ozempic (0.25 mg or 0.5 mg dose) (Hives; Rash)  streptomycin (Unknown)  Trulicity Pen (Hives; Rash)    MEDICATIONS  (STANDING):  apixaban 2.5 milliGRAM(s) Oral two times a day  atorvastatin 40 milliGRAM(s) Oral at bedtime  BACItracin   Ointment 1 Application(s) Topical every 12 hours  benzocaine 20% Spray 1 Spray(s) Topical once  calcitriol   Capsule 0.25 MICROGram(s) Oral <User Schedule>  darbepoetin Injectable ViaL 20 MICROGram(s) SubCutaneous every 7 days  furosemide    Tablet 40 milliGRAM(s) Oral daily  insulin glargine Injectable (LANTUS) 12 Unit(s) SubCutaneous at bedtime  insulin lispro (ADMELOG) corrective regimen sliding scale   SubCutaneous three times a day before meals  levothyroxine 150 MICROGram(s) Oral daily  metoprolol succinate ER 50 milliGRAM(s) Oral daily  multivitamin/minerals 1 Tablet(s) Oral daily  pentoxifylline 400 milliGRAM(s) Oral two times a day  piperacillin/tazobactam IVPB.. 3.375 Gram(s) IV Intermittent every 8 hours  sodium bicarbonate 650 milliGRAM(s) Oral three times a day    MEDICATIONS  (PRN):  acetaminophen     Tablet .. 650 milliGRAM(s) Oral every 6 hours PRN Temp greater or equal to 38C (100.4F), Mild Pain (1 - 3)  ALBUTerol    90 MICROgram(s) HFA Inhaler 1 Puff(s) Inhalation every 6 hours PRN Bronchospasm  dextrose Oral Gel 15 Gram(s) Oral once PRN Blood Glucose LESS THAN 70 milliGRAM(s)/deciliter  melatonin 5 milliGRAM(s) Oral at bedtime PRN Insomnia    ICU Vital Signs Last 24 Hrs  T(C): 36.2 (28 Jul 2022 12:52), Max: 36.7 (27 Jul 2022 19:04)  T(F): 97.1 (28 Jul 2022 12:52), Max: 98 (27 Jul 2022 19:04)  HR: 81 (28 Jul 2022 12:52) (69 - 83)  BP: 128/58 (28 Jul 2022 12:52) (128/58 - 141/67)  RR: 19 (28 Jul 2022 12:52) (18 - 19)  SpO2: 95% (27 Jul 2022 20:41) (95% - 95%)    O2 Parameters below as of 27 Jul 2022 20:41  Patient On (Oxygen Delivery Method): nasal cannula    Drug Dosing Weight  Height (cm): 193 (27 Jul 2022 14:25)  Weight (kg): 100 (05 Jul 2022 13:03)  BMI (kg/m2): 26.8 (27 Jul 2022 14:25)  BSA (m2): 2.31 (27 Jul 2022 14:25)    EXAM  Awake, alert but lethargic   Appears weak, debilitated  +muscle wasting- clavicular, temporal, orbital   Abd: soft, ND  Skin tear Rt elbow  Enteral access: none  IV access: peripheral Rt wrist    LABS  07-28    137  |  103  |  78<HH>  ----------------------------<  88  4.2   |  21  |  2.1<H>    Ca    7.7<L>      28 Jul 2022 09:18  Mg     2.3     07-28  Phosphorus Level, Serum: 2.0 mg/dL (07.11.22 @ 07:51)  Triglycerides, Serum: 94 mg/dL (07.06.22 @ 07:39)    Vitamin D, 25-Hydroxy: 27: 30 - 80 ng/mL   Optimum Levels  (Reference range)                 9.1    12.06 )-----------( 152      ( 28 Jul 2022 09:18 )             27.4     CAPILLARY BLOOD GLUCOSE  POCT Blood Glucose.: 102 mg/dL (28 Jul 2022 11:39)  POCT Blood Glucose.: 71 mg/dL (28 Jul 2022 07:55)  POCT Blood Glucose.: 119 mg/dL (27 Jul 2022 21:00)  POCT Blood Glucose.: 143 mg/dL (27 Jul 2022 16:44)    ASSESSMENT  82M with PMHx of HFrEF, Afib on eliquis, CAD s/p CABG, COPD (on rare home O2 PRN), CKD3, HTN, HLD who presented for 2 weeks of watery diarrhea found to have Covid-19, with hospital stay c/b altered mental status.     - toxic and metabolic encephalopathies, improving  - COVID +  - pharyngeal dysphagia  - BRENDA on CKD III  - vitamin D insufficiency  - moderate protein calorie malnutrition    PLAN  - PO diet as tolerated per speech  - when NGT in place and ok to use start Jevity 1.2 240ml X 4 feeds/day to start  - give TF's 1 hour after each attempted meal and qhs, not on q6hr schedule  - repeat in phos with next blood draw  - will follow

## 2022-07-28 NOTE — PROGRESS NOTE ADULT - SUBJECTIVE AND OBJECTIVE BOX
ANDRIA TAVAREZ 82y Male  MRN#: [id number]   Hospital Day: 23d    SUBJECTIVE  Patient is a 82y old Male who presents with a chief complaint of diarrhea  Currently admitted to medicine with the primary diagnosis of Acute kidney injury superimposed on CKD 2/2 COVID 19 infection       INTERVAL HPI AND OVERNIGHT EVENTS:  Patient was examined and seen at bedside. This morning he is resting comfortably in bed and reports no issues or overnight events.    REVIEW OF SYMPTOMS:  Denies all.     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    AF (atrial fibrillation)  s/p DCCV    Carotid stenosis    COPD (chronic obstructive pulmonary disease)    H/O coronary artery bypass surgery  2001    S/P inguinal hernia repair    History of appendectomy      ALLERGIES:  Ozempic (0.25 mg or 0.5 mg dose) (Hives; Rash)  streptomycin (Unknown)  Trulicity Pen (Hives; Rash)    MEDICATIONS:  STANDING MEDICATIONS  apixaban 2.5 milliGRAM(s) Oral two times a day  atorvastatin 40 milliGRAM(s) Oral at bedtime  BACItracin   Ointment 1 Application(s) Topical every 12 hours  benzocaine 20% Spray 1 Spray(s) Topical once  calcitriol   Capsule 0.25 MICROGram(s) Oral <User Schedule>  darbepoetin Injectable ViaL 20 MICROGram(s) SubCutaneous every 7 days  dextrose 5%. 1000 milliLiter(s) IV Continuous <Continuous>  dextrose 5%. 1000 milliLiter(s) IV Continuous <Continuous>  dextrose 50% Injectable 25 Gram(s) IV Push once  dextrose 50% Injectable 12.5 Gram(s) IV Push once  dextrose 50% Injectable 25 Gram(s) IV Push once  furosemide    Tablet 40 milliGRAM(s) Oral daily  glucagon  Injectable 1 milliGRAM(s) IntraMuscular once  insulin glargine Injectable (LANTUS) 12 Unit(s) SubCutaneous at bedtime  insulin lispro (ADMELOG) corrective regimen sliding scale   SubCutaneous three times a day before meals  levothyroxine 150 MICROGram(s) Oral daily  metoprolol succinate ER 50 milliGRAM(s) Oral daily  multivitamin/minerals 1 Tablet(s) Oral daily  pentoxifylline 400 milliGRAM(s) Oral two times a day  piperacillin/tazobactam IVPB.. 3.375 Gram(s) IV Intermittent every 8 hours  sodium bicarbonate 650 milliGRAM(s) Oral three times a day    PRN MEDICATIONS  acetaminophen     Tablet .. 650 milliGRAM(s) Oral every 6 hours PRN  ALBUTerol    90 MICROgram(s) HFA Inhaler 1 Puff(s) Inhalation every 6 hours PRN  dextrose Oral Gel 15 Gram(s) Oral once PRN  melatonin 5 milliGRAM(s) Oral at bedtime PRN      VITAL SIGNS: Last 24 Hours  T(C): 36.5 (28 Jul 2022 04:59), Max: 36.7 (27 Jul 2022 19:04)  T(F): 97.7 (28 Jul 2022 04:59), Max: 98 (27 Jul 2022 19:04)  HR: 83 (28 Jul 2022 04:59) (69 - 83)  BP: 140/72 (28 Jul 2022 04:59) (129/60 - 141/67)  BP(mean): --  RR: 18 (28 Jul 2022 04:59) (18 - 18)  SpO2: 95% (27 Jul 2022 20:41) (95% - 95%)    LABS:                        9.0    11.64 )-----------( 139      ( 28 Jul 2022 00:45 )             27.3     07-27    136  |  100  |  81<HH>  ----------------------------<  126<H>  4.2   |  23  |  2.2<H>    Ca    7.7<L>      27 Jul 2022 09:02  Mg     2.4     07-27        PHYSICAL EXAM:  CONSTITUTIONAL: No acute distress, well-developed, well-groomed, AAOx3  HEAD: Atraumatic, normocephalic  EYES: EOM intact, PERRLA, conjunctiva and sclera clear  ENT: Moist mucous membranes  PULMONARY: Clear to auscultation bilaterally  CARDIOVASCULAR: Regular rate and rhythm  GASTROINTESTINAL: Soft, non-tender, non-distended; bowel sounds present  MUSCULOSKELETAL: 2+ peripheral pulses; no clubbing, no cyanosis, no edema  SKIN: No rashes or lesions; warm and dry

## 2022-07-28 NOTE — PROGRESS NOTE ADULT - SUBJECTIVE AND OBJECTIVE BOX
Over Night Events: events noted, feels better, on NC, afebrile    PHYSICAL EXAM    ICU Vital Signs Last 24 Hrs  T(C): 36.2 (28 Jul 2022 12:52), Max: 36.7 (27 Jul 2022 19:04)  T(F): 97.1 (28 Jul 2022 12:52), Max: 98 (27 Jul 2022 19:04)  HR: 81 (28 Jul 2022 12:52) (69 - 83)  BP: 128/58 (28 Jul 2022 12:52) (128/58 - 141/67)  RR: 19 (28 Jul 2022 12:52) (18 - 19)  SpO2: 95% (27 Jul 2022 20:41) (95% - 95%)    O2 Parameters below as of 27 Jul 2022 20:41  Patient On (Oxygen Delivery Method): nasal cannula            General: ill looking  Lungs: Bilateral rhonchi  Cardiovascular: MEKHI 2.6  Abdomen: Soft, Positive BS  Extremities: No clubbing   Neurological: Non focal       07-28-22 @ 07:01  -  07-28-22 @ 15:30  --------------------------------------------------------  IN:  Total IN: 0 mL    OUT:    Voided (mL): 250 mL  Total OUT: 250 mL    Total NET: -250 mL          LABS:                          9.1    12.06 )-----------( 152      ( 28 Jul 2022 09:18 )             27.4                                               07-28    137  |  103  |  78<HH>  ----------------------------<  88  4.2   |  21  |  2.1<H>    Ca    7.7<L>      28 Jul 2022 09:18  Mg     2.3     07-28                                                                                                                                                                                                                           MEDICATIONS  (STANDING):  apixaban 2.5 milliGRAM(s) Oral two times a day  atorvastatin 40 milliGRAM(s) Oral at bedtime  BACItracin   Ointment 1 Application(s) Topical every 12 hours  benzocaine 20% Spray 1 Spray(s) Topical once  calcitriol   Capsule 0.25 MICROGram(s) Oral <User Schedule>  darbepoetin Injectable ViaL 20 MICROGram(s) SubCutaneous every 7 days  dextrose 5%. 1000 milliLiter(s) (100 mL/Hr) IV Continuous <Continuous>  dextrose 5%. 1000 milliLiter(s) (50 mL/Hr) IV Continuous <Continuous>  dextrose 50% Injectable 25 Gram(s) IV Push once  dextrose 50% Injectable 12.5 Gram(s) IV Push once  dextrose 50% Injectable 25 Gram(s) IV Push once  furosemide    Tablet 40 milliGRAM(s) Oral daily  glucagon  Injectable 1 milliGRAM(s) IntraMuscular once  insulin glargine Injectable (LANTUS) 12 Unit(s) SubCutaneous at bedtime  insulin lispro (ADMELOG) corrective regimen sliding scale   SubCutaneous three times a day before meals  levothyroxine 150 MICROGram(s) Oral daily  metoprolol succinate ER 50 milliGRAM(s) Oral daily  multivitamin/minerals 1 Tablet(s) Oral daily  pentoxifylline 400 milliGRAM(s) Oral two times a day  sodium bicarbonate 650 milliGRAM(s) Oral three times a day    MEDICATIONS  (PRN):  acetaminophen     Tablet .. 650 milliGRAM(s) Oral every 6 hours PRN Temp greater or equal to 38C (100.4F), Mild Pain (1 - 3)  ALBUTerol    90 MICROgram(s) HFA Inhaler 1 Puff(s) Inhalation every 6 hours PRN Bronchospasm  dextrose Oral Gel 15 Gram(s) Oral once PRN Blood Glucose LESS THAN 70 milliGRAM(s)/deciliter  melatonin 5 milliGRAM(s) Oral at bedtime PRN Insomnia

## 2022-07-28 NOTE — PROGRESS NOTE ADULT - SUBJECTIVE AND OBJECTIVE BOX
KANGANDRIA MCELROY  82y  Male  ***My note supersedes ALL resident notes that I sign.  My corrections for their notes are in my note.***    I can be reached directly on [id number]. My office number is [phone]. My personal cell number is [phone].    INTERVAL EVENTS: Here for f/u of PNA. Pt still not eating well. Pt looks a little tired. Pt is speaking a bit. Says he is agreeable to NGT for feeds. Pt remains weak.    T(F): 97.1 (07-28-22 @ 12:52), Max: 98 (07-27-22 @ 19:04)  HR: 81 (07-28-22 @ 12:52) (69 - 83)  BP: 128/58 (07-28-22 @ 12:52) (128/58 - 141/67)  RR: 19 (07-28-22 @ 12:52) (18 - 19)  SpO2: 95% (07-27-22 @ 20:41) (95% - 95%)    Gen: no resp distress; speaking better; + NC O2; follows commands  HEENT: PERRL, EOMI, mouth clr, nose clr  Neck: no nodes, no JVD, thyroid nl  lungs: clr  hrt: s1 s2 rrr no murmur  abd: soft, NT/ND, no HS megaly  ext: no edema, no c/c; has pain w/ moving limbs  neuro: fairly alert, little sleepy; ox2-3 (close, but not at baseline MS), cn intact, can move all 4 ext, but very weak, arjun both legs    LABS:                      9.1     (    89.5   12.06 )-----------( ---------      152      ( 28 Jul 2022 09:18 )             27.4    (    15.1     WBC Count: 12.06 K/uL (07-28-22 @ 09:18) - stable  WBC Count: 11.64 K/uL (07-28-22 @ 00:45)  WBC Count: 11.45 K/uL (07-27-22 @ 09:02)  WBC Count: 12.28 K/uL (07-26-22 @ 10:29)  WBC Count: 11.52 K/uL (07-26-22 @ 09:35)  WBC Count: 12.68 K/uL (07-25-22 @ 06:48)  WBC Count: 15.84 K/uL (07-24-22 @ 04:30)    Hemoglobin: 9.1 g/dL (07-28 @ 09:18) - better after transfusions  Hemoglobin: 9.0 g/dL (07-28 @ 00:45)  Hemoglobin: 7.7 g/dL (07-27 @ 09:02)  Hemoglobin: 7.2 g/dL (07-26 @ 10:29)  Hemoglobin: 6.9 g/dL (07-26 @ 09:35)  Hemoglobin: 7.6 g/dL (07-25 @ 06:48)  Hemoglobin: 7.6 g/dL (07-24 @ 04:30)    137   (   103   (   88      07-28-22 @ 09:18  ----------------------               4.2   (   21   (   78                             -----                        2.1  Ca  7.7   Mg  2.3    P   --     CAPILLARY BLOOD GLUCOSE  POCT Blood Glucose.: 102 (07-28-22 @ 11:39)  POCT Blood Glucose.: 71 (07-28-22 @ 07:55)  POCT Blood Glucose.: 119 (07-27-22 @ 21:00)  POCT Blood Glucose.: 143 (07-27-22 @ 16:44)  POCT Blood Glucose.: 136 (07-27-22 @ 11:31)  POCT Blood Glucose.: 120 (07-27-22 @ 07:47)  POCT Blood Glucose.: 171 (07-26-22 @ 20:45)  POCT Blood Glucose.: 198 (07-26-22 @ 16:57)    Culture - Blood (collected 07-22-22 @ 19:00)  Source: .Blood Blood  Final Report (07-27-22 @ 23:01):    No Growth Final    Culture - Urine (collected 07-22-22 @ 13:10)  Source: Catheterized Catheterized  Final Report (07-24-22 @ 00:44):    No growth    RADIOLOGY & ADDITIONAL TESTS:    MEDICATIONS:    acetaminophen     Tablet .. 650 milliGRAM(s) Oral every 6 hours PRN  ALBUTerol    90 MICROgram(s) HFA Inhaler 1 Puff(s) Inhalation every 6 hours PRN  apixaban 2.5 milliGRAM(s) Oral two times a day  atorvastatin 40 milliGRAM(s) Oral at bedtime  BACItracin   Ointment 1 Application(s) Topical every 12 hours  benzocaine 20% Spray 1 Spray(s) Topical once  calcitriol   Capsule 0.25 MICROGram(s) Oral <User Schedule>  darbepoetin Injectable ViaL 20 MICROGram(s) SubCutaneous every 7 days  furosemide    Tablet 40 milliGRAM(s) Oral daily  insulin glargine Injectable (LANTUS) 12 Unit(s) SubCutaneous at bedtime  insulin lispro (ADMELOG) corrective regimen sliding scale   SubCutaneous three times a day before meals  levothyroxine 150 MICROGram(s) Oral daily  melatonin 5 milliGRAM(s) Oral at bedtime PRN  metoprolol succinate ER 50 milliGRAM(s) Oral daily  multivitamin/minerals 1 Tablet(s) Oral daily  pentoxifylline 400 milliGRAM(s) Oral two times a day  sodium bicarbonate 650 milliGRAM(s) Oral three times a day

## 2022-07-28 NOTE — PROGRESS NOTE ADULT - ASSESSMENT
# toxic and metab encephalopathies - improving a little, but sluggishly  I thought initial MS change at start of admit was metab: COVID 19 + PNA + hypoxia  I think the 2nd MS change was toxic encephalopathy from cefepime and dexa (doubt COVID is doing anything now; also do not believe he has stroke)  I think his current prob is aspiration PNA/pneumonitis + lack of eating (malnutrition) + mild hosp delirium + debility/lack of exercise   initial CTH: mild chr micro changes; mild atrophy and ventric prom  rpt CT H NC: no acute path  EEG: triphasic waves; mild-mod gen slowing  LFTs are nl  need to manage above problems (see below)    # severe Malnutrition  c/w MVI w/ min q24   c/w oral diet (minced/moist) as renay  supplements: Glucerna 3x/day  Nutrition eval: Dr Elizabeth  place NGT and start Jevity 1.2 NGT q6 250cc w/ flushes (then d/c glucerna oral supplements)  pt might need PEG next week    # leukocytosis seems like it was aspiration PNA/pneumonitis  ID and Pulm noted  CT C 7/5 and 7/23 reviewed: bilateral bases, arjun Lt, are worse on current exam  WBC better, but not nl, after zosyn started  rpt bld cx/UCx: neg  abx: decr zosyn 2.25gm iv q8 for 7 days (7/24-7/31) (augmentin if need PO)  skel xrays do not show chondrocalcinosis: doubt pseudogout   uric acid lvl 9.5 (pt has no prior hx of gout)  SP/Sw did FEES 7/27: there is some aspiration w/ thin liquids; staff to asst pt w/ feeds  diet: minced/moist + mildly thick liquids (NOT thins)  CBC q24 til WBC <12    # hypernatremia/hyponatremia  back to nl today  c/w lasix 40mg po q24 (might need to make 20mg q24 when Na closer to 145)  BMP q24    # BRENDA on CKD III, presumed pre-renal; hx C3 glomerulopathy w/ proteinuria (up to 8.4 gm/day in past - responded well to immunosupp); metab acidosis  base Cr 2.1  renal eval: f/u noted  tacro lvl 16 (goal 3-7): HOLD tacro (1mg 9AM and 1mg 9PM) today -> f/u w/ renal for next dose  will need f/u tacro lvl about 2 days AFTER starting NEW dose  c/w calcitriol   c/w NaHCO3 650mg po q8 (HCO3 goal = 21-27)  U/A w/ 3+ protein -> U TP:Cr ratio 1gm  BMP q24    # normo anemia of chr kidney dz; no signs of acute blood loss  keep hgb >8  aranesp 20mg sc q wk (Wed), per renal    # COVID + -> seems like pt passed thru inflam phase;   acute on chr hypoxic resp failure (pt on home O2 prn)  can stay on 2-3L NC O2  covid +: 7/5; 7/10; 7/11; 7/26 - no further testing needed  d-dimer 454, ESR 38, CRP 25.9, ferr 1415  pt was NOT tx'd initially for COVID - RDV relative CI at this GFR anyway  was beyond tx window for MABs  completed most of dexameth course  completed quarantine 7/5-7/15 -> d/c isolation     # COPD; severe pulm HTN; fibrotic changes LLL; PNA (see above)  cont NC O2  c/w proventil prn  incent nara  pulm f/u    # HFrEF, chronic; sev dilated CM; RV dysfxn; mild TR/AR; HTN  not on all goal directed tx - cardio eval Dr Witt - (per cardio, EF closer to 45%)  BB - c/w toprol xl 50mg po q24   not on ACEI/ARB 2/2 CKD/BRENDA (could consider hydral + nitrates, but only if BP good enough)  diuretics - resume lasix 40mg po q24    # AFib, chronic  rate controlled - on BB  c/w eliquis 2.5mg po q12 - IF NEEDS PEG, WILL NEED TO HOLD THIS    # CAD  agree w/ no asa    # DM 2  diabetic diet  FS qac/hs   decr lantus 12 HS  d/c ademelog - pt not eating that well  can cont SSI coverage    # PVD  c/w pentoxifylline     # DLD  on statin    # Fall; diff walking; weakness/debility/deconditioning  no syncope per pt  CTH neg  trauma w/u neg  PT eval for SNF (during this admit, previously, pt was OOBTC and did walk a little w/ walker and asst)    # Hypothyroidism  synthroid 150mg q24  TSH 0.39    # DVT ppx: on eliquis    # GI ppx: none    # Activity: need PT eval    # updated wife/dtr at bedside    Dispo: place NGT; Nutr eval; cont diet; HOLD tacro today f/u w/ renal; c/w lasix; c/w oral bicarb; daily labs; incent nara  Eventually, pt will need STR @ SNF ([facility]) - f/u CM - not ready for d/c  might need PEG next week    Prog remains very guarded. Long term prog is likely poor. # toxic and metab encephalopathies - improving a little, but sluggishly  I thought initial MS change at start of admit was metab: COVID 19 + PNA + hypoxia  I think the 2nd MS change was toxic encephalopathy from cefepime and dexa (doubt COVID is doing anything now; also do not believe he has stroke)  I think his current prob is aspiration PNA/pneumonitis + lack of eating (malnutrition) + mild hosp delirium + debility/lack of exercise   initial CTH: mild chr micro changes; mild atrophy and ventric prom  rpt CT H NC: no acute path  EEG: triphasic waves; mild-mod gen slowing  LFTs are nl  need to manage above problems (see below)    # severe Malnutrition  c/w MVI w/ min q24   c/w oral diet (minced/moist) as renay  supplements: Glucerna 3x/day  Nutrition eval: Dr Elizabeth  place NGT and start Jevity 1.2 NGT q6 250cc w/ flushes (then d/c glucerna oral supplements)  pt might need PEG next week    # leukocytosis seems like it was aspiration PNA/pneumonitis  ID and Pulm noted  CT C 7/5 and 7/23 reviewed: bilateral bases, arjun Lt, are worse on current exam  WBC better, but not nl, after zosyn started  rpt bld cx/UCx: neg  abx: decr zosyn 2.25gm iv q8 for 7 days (7/24-7/31) (augmentin if need PO)  skel xrays do not show chondrocalcinosis: doubt pseudogout   uric acid lvl 9.5 (pt has no prior hx of gout)  SP/Sw did FEES 7/27: there is some aspiration w/ thin liquids; staff to asst pt w/ feeds  diet: minced/moist + mildly thick liquids (NOT thins)  CBC q24 til WBC <12    # hypernatremia/hyponatremia  back to nl today  c/w lasix 40mg po q24 (might need to make 20mg q24 when Na closer to 145)  BMP q24    # BRENDA on CKD III, presumed pre-renal; hx C3 glomerulopathy w/ proteinuria (up to 8.4 gm/day in past - responded well to immunosupp); metab acidosis  base Cr 2.1  renal eval: f/u noted  tacro lvl 16 (goal 3-7): HOLD tacro (1mg 9AM and 1mg 9PM) today -> f/u w/ renal for next dose  will need f/u tacro lvl about 2 days AFTER starting NEW dose  c/w calcitriol   c/w NaHCO3 650mg po q8 (HCO3 goal = 21-27)  U/A w/ 3+ protein -> U TP:Cr ratio 1gm  BMP q24    # normo anemia of chr kidney dz; no signs of acute blood loss  keep hgb >8  aranesp 20mg sc q wk (Wed), per renal    # COVID + -> seems like pt passed thru inflam phase;   acute on chr hypoxic resp failure (pt on home O2 prn)  can stay on 2-3L NC O2  covid +: 7/5; 7/10; 7/11; 7/26 - no further testing needed  d-dimer 454, ESR 38, CRP 25.9, ferr 1415  pt was NOT tx'd initially for COVID - RDV relative CI at this GFR anyway  was beyond tx window for MABs  completed most of dexameth course  completed quarantine 7/5-7/15 -> d/c isolation     # COPD; severe pulm HTN; fibrotic changes LLL; PNA (see above)  cont NC O2  c/w proventil prn  incent nara  pulm f/u    # minor nosebleed 2/2 dry nasal passage  humidify O2  bacitracin oint to nares top q12    # HFrEF, chronic; sev dilated CM; RV dysfxn; mild TR/AR; HTN  not on all goal directed tx - cardio eval Dr Witt - (per cardio, EF closer to 45%)  BB - c/w toprol xl 50mg po q24   not on ACEI/ARB 2/2 CKD/BRENDA (could consider hydral + nitrates, but only if BP good enough)  diuretics - resume lasix 40mg po q24    # AFib, chronic  rate controlled - on BB  c/w eliquis 2.5mg po q12 - IF NEEDS PEG, WILL NEED TO HOLD THIS    # CAD  agree w/ no asa    # DM 2  diabetic diet  FS qac/hs   decr lantus 12 HS  d/c ademelog - pt not eating that well  can cont SSI coverage    # PVD  c/w pentoxifylline     # DLD  on statin    # Fall; diff walking; weakness/debility/deconditioning  no syncope per pt  CTH neg  trauma w/u neg  PT eval for SNF (during this admit, previously, pt was OOBTC and did walk a little w/ walker and asst)    # Hypothyroidism  synthroid 150mg q24  TSH 0.39    # DVT ppx: on eliquis    # GI ppx: none    # Activity: need PT eval    # updated wife/dtr at bedside    Dispo: place NGT; Nutr eval; cont diet; HOLD tacro today f/u w/ renal; c/w lasix; c/w oral bicarb; daily labs; incent nara; decr zosyn dose  Eventually, pt will need STR @ SNF ([facility]) - f/u CM - not ready for d/c  might need PEG next week    Prog remains very guarded. Long term prog is likely poor.

## 2022-07-28 NOTE — PROGRESS NOTE ADULT - ASSESSMENT
ASSESSMENT & PLAN:  82M with PMHx of HFrEF, Afib on eliquis, CAD s/p CABG, COPD (on rare home O2 PRN), CKD3, HTN, HLD who presented for 2 weeks of watery diarrhea found to have Covid-19, with hospital stay c/b altered mental status.    #Decreased PO intake  - Patient refusing meals  - May be due to elevated Tacro level  - Re-Evaluated by speech and swallow -> modified barium swallow study. Diet minced and moist w/ mildly thickened liquids.    Plan:  - Discuss with family about temporary feeding tube for nutrition    #Anemia  - Hgb 07/26 6.9  - Bowel movements look normal not black/ would be concerning for bleeding   - Most likely chronic 2/2 ESRD    Plan:   - Restart Aranesp 20mcg sub-q weekly (given Wednesday 07/27)  - s/p 1 Unit PRBC (07/27)      # Metabolic Encephalopathy - multifactorial in etiology, improving   EEG and CT head w/o contrast- no acute pathology  EEG- Consistent with diffuse cerebral electrophysiological dysfunction, Secondary to toxic metabolic cause.    Plan:  - c/w IVF, give D5w with Lasix for hypervolemic hypernatremia -  Resolved       # Pnuemonia  - Repeat chest ct over the weekend shows bilateral lower lobe consolidations with small amount consolidation also in the lingular segment of the left upper lobe suspicious for multifocal pneumonia.    Plan:  - Started on Zosyn 3.375g q8 hours     # BRENDA on CKD III,   - presumed pre-renal   - hx C3 glomerulopathy    Plan:  - continue with NHCO3 PO  - Renal - continue tacrolimus ordered  - Tacrolimus trough- Elevated at 16. Will hold Tacrolimus today, and decrease dosage.   - Gloria removed    # COVID +  - s/p Dexa 6mg PO q6  - completed quarantine 7/15 - d/bg precautions 7/16  - Was unnecessarily retested, patient still positive but no symptoms. NOT INFECTIOUS, COULD BE POSITIVE UP TO 90 DAYS AFTER INFECTION      # COPD; severe pulm HTN  - Nebs PRN Q4    # Fall, mechanical  - no syncope per pt  - cth neg  - trauma w/u neg    Plan:  - PT eval for SNF    # HFrEF, chronic;   - severely dilated cardio myopathy  - Right ventrical dysfunction   - Mild tricuspid valve/ aortic valve regurg    Plan:  - LASIX 40 MG PO RESTARTED 07/26         - not on ANY goal directed tx - cardio eval Dr Haley  - Patient started on metoprolol 25mg q 24 PO    # AFib, chronic    Plan:  - Eliquis  - rate control with metoprolol 25mg q24    # CAD    Plan:  - No aspirin  - c/w statin     # DLD    Plan:  - on statin    # DM2  - Sugars currently on the higher side 2/2 patient not wanting to eat yesterday and holding the insulin     Plan:  - diabetic diet  - c/w insulin     # Hypothyroidism    Plan:  - synthroid 150    # PVD  c/w pentoxifylline     # DVT ppx:  on Eliquis    #Discharge to rehab

## 2022-07-28 NOTE — PROGRESS NOTE ADULT - ASSESSMENT
82y Male with PMHx of HFrEF, Afib on eliquis, CAD s/p CABG, COPD (on rare home O2 PRN), CKD3 (Hx of C3 glomerulopathy), HTN, HLD    # BRENDA on CKD 3b   - creat rising  likely due to dehydration  not eating  restart NS 50 cc/hr  CHECK BLADDER SCAN FOR PVR   # CKD 3b - creat baseline ~1.8  # cr stable   # please document UO   # C3 glomerulopathy - on Prograf 1 mg q12h- LAST TAC LEVEL was 16 at around 7 pm  Please repeat tarcolimus level 30 min before the AM   / [employer] - on hold d/t COVID19 illness  # Proteinuria 8.4 g initially responded well to Prograf with reduction of proteinuria  to<1 g/g according to outpatient records  # HFrEF 10/2021 TTE - severe LV and RV systolic dysfunction and PAH / seen by Dr. De La Fuente, EF ~45%  #continue sodium bicarbonate  650 q 8   #on calcitriol / last pth at goal / check ph level   # started on SRIRAM / ferritin elevated   # continue lasix 40  # hyponatremia/ check repeat today with plasma and u osm /  last TSH ok  will improve with lasix   # will follow

## 2022-07-29 NOTE — PROGRESS NOTE ADULT - SUBJECTIVE AND OBJECTIVE BOX
ANDRIA TAVAREZ 82y Male  MRN#: [id number]   Hospital Day: 24d    SUBJECTIVE  Patient is a 82y old Male who presents with a chief complaint of Diarrhea (29 Jul 2022 08:52)  Currently admitted to medicine with the primary diagnosis of Acute kidney injury superimposed on CKD      INTERVAL HPI AND OVERNIGHT EVENTS:  Patient was examined and seen at bedside. This morning he is resting comfortably in bed and reports no issues or overnight events.    REVIEW OF SYMPTOMS:  CONSTITUTIONAL: No weakness, fevers or chills; No headaches  EYES: No visual changes, eye pain, or discharge  ENT: No vertigo; No ear pain or change in hearing; No sore throat or difficulty swallowing  NECK: No pain or stiffness  RESPIRATORY: No cough, wheezing, or hemoptysis; No shortness of breath  CARDIOVASCULAR: No chest pain or palpitations  GASTROINTESTINAL: No abdominal or epigastric pain; No nausea, vomiting, or hematemesis; No diarrhea or constipation; No melena or hematochezia  GENITOURINARY: No dysuria, frequency or hematuria  MUSCULOSKELETAL: No joint pain, no muscle pain, no weakness  NEUROLOGICAL: No numbness or weakness  SKIN: No itching or rashes    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    AF (atrial fibrillation)  s/p DCCV    Carotid stenosis    COPD (chronic obstructive pulmonary disease)    H/O coronary artery bypass surgery  2001    S/P inguinal hernia repair    History of appendectomy      ALLERGIES:  Ozempic (0.25 mg or 0.5 mg dose) (Hives; Rash)  streptomycin (Unknown)  Trulicity Pen (Hives; Rash)    MEDICATIONS:  STANDING MEDICATIONS  apixaban 2.5 milliGRAM(s) Oral two times a day  atorvastatin 40 milliGRAM(s) Oral at bedtime  BACItracin   Ointment 1 Application(s) Topical every 12 hours  benzocaine 20% Spray 1 Spray(s) Topical once  calcitriol   Capsule 0.25 MICROGram(s) Oral <User Schedule>  darbepoetin Injectable ViaL 20 MICROGram(s) SubCutaneous every 7 days  dextrose 5%. 1000 milliLiter(s) IV Continuous <Continuous>  dextrose 5%. 1000 milliLiter(s) IV Continuous <Continuous>  dextrose 50% Injectable 25 Gram(s) IV Push once  dextrose 50% Injectable 12.5 Gram(s) IV Push once  dextrose 50% Injectable 25 Gram(s) IV Push once  furosemide    Tablet 40 milliGRAM(s) Oral daily  glucagon  Injectable 1 milliGRAM(s) IntraMuscular once  insulin glargine Injectable (LANTUS) 12 Unit(s) SubCutaneous at bedtime  insulin lispro (ADMELOG) corrective regimen sliding scale   SubCutaneous three times a day before meals  levothyroxine 150 MICROGram(s) Oral daily  metoprolol succinate ER 50 milliGRAM(s) Oral daily  multivitamin/minerals 1 Tablet(s) Oral daily  pentoxifylline 400 milliGRAM(s) Oral two times a day  sodium bicarbonate 650 milliGRAM(s) Oral three times a day    PRN MEDICATIONS  acetaminophen     Tablet .. 650 milliGRAM(s) Oral every 6 hours PRN  ALBUTerol    90 MICROgram(s) HFA Inhaler 1 Puff(s) Inhalation every 6 hours PRN  dextrose Oral Gel 15 Gram(s) Oral once PRN  melatonin 5 milliGRAM(s) Oral at bedtime PRN      VITAL SIGNS: Last 24 Hours  T(C): 36.2 (29 Jul 2022 05:37), Max: 36.2 (28 Jul 2022 12:52)  T(F): 97.1 (29 Jul 2022 05:37), Max: 97.1 (28 Jul 2022 12:52)  HR: 83 (29 Jul 2022 05:37) (81 - 83)  BP: 136/64 (29 Jul 2022 05:37) (128/58 - 141/66)  BP(mean): --  RR: 18 (29 Jul 2022 05:37) (18 - 19)  SpO2: --    LABS:                        8.9    12.28 )-----------( 144      ( 29 Jul 2022 06:13 )             27.4     07-29    136  |  103  |  74<HH>  ----------------------------<  54<LL>  4.0   |  24  |  2.1<H>    Ca    7.6<L>      29 Jul 2022 06:13  Mg     2.2     07-29                    RADIOLOGY:      PHYSICAL EXAM:  CONSTITUTIONAL: No acute distress, well-developed, well-groomed, AAOx3  HEAD: Atraumatic, normocephalic  EYES: EOM intact, PERRLA, conjunctiva and sclera clear  ENT: Supple, no masses, no thyromegaly, no bruits, no JVD; moist mucous membranes  PULMONARY: Clear to auscultation bilaterally; no wheezes, rales, or rhonchi  CARDIOVASCULAR: Regular rate and rhythm; no murmurs, rubs, or gallops  GASTROINTESTINAL: Soft, non-tender, non-distended; bowel sounds present  MUSCULOSKELETAL: 2+ peripheral pulses; no clubbing, no cyanosis, no edema  NEUROLOGY: non-focal  SKIN: No rashes or lesions; warm and dry

## 2022-07-29 NOTE — SWALLOW BEDSIDE ASSESSMENT ADULT - SWALLOW EVAL: CURRENT DIET
minced & moist, mildly thick
puree w/ mildly thick liquids
NPO

## 2022-07-29 NOTE — SWALLOW BEDSIDE ASSESSMENT ADULT - COMMENTS
pt received alert, on RA. pt w/min po acceptance, despite max encouragement by SLP.
pt known to acute service w/recs: soft & bite sized, thins. pt now received awake, on RA. +globally weak. non verbal at the time of assessment.
Overnight, pt w/ episode of desaturation while on room air.
pt known to SLP service from current admission w/ recs for puree w/ mildly thick liquids.
pt not appropriate for po upgrade at this time.

## 2022-07-29 NOTE — PROGRESS NOTE ADULT - ASSESSMENT
# toxic and metab encephalopathies - improving a little, but sluggishly  aspiration PNA/pneumonitis + lack of eating (malnutrition) + mild hosp delirium + debility/lack of exercise   initial CTH: mild chr micro changes; mild atrophy and ventric prom  rpt CT H NC: no acute path  EEG: triphasic waves; mild-mod gen slowing  LFTs are nl  need to manage above problems (see below)    # severe Malnutrition  c/w MVI w/ min q24   c/w oral diet (minced/moist) as renay  supplements: Glucerna 3x/day  Nutrition eval: Dr Elizabeth  patient drank ensure AM today    # leukocytosis seems like it was aspiration PNA/pneumonitis  ID and Pulm noted  CT C 7/5 and 7/23 reviewed: bilateral bases, arjun Lt, are worse on current exam  WBC better, but not nl, after zosyn started  rpt bld cx/UCx: neg  abx: decr zosyn 2.25gm iv q8 for 7 days (7/24-7/31) (augmentin if need PO)  skel xrays do not show chondrocalcinosis: doubt pseudogout   uric acid lvl 9.5 (pt has no prior hx of gout)  SP/Sw did FEES 7/27: there is some aspiration w/ thin liquids; staff to asst pt w/ feeds    # hypernatremia/hyponatremia   lasix 20mg po q24     # BRENDA on CKD III, presumed pre-renal; hx C3 glomerulopathy w/ proteinuria (up to 8.4 gm/day in past - responded well to immunosupp); metab acidosis  base Cr 2.1  renal eval: f/u noted  tacro lvl 16 (goal 3-7): HOLD tacro (1mg 9AM and 1mg 9PM) today -> f/u w/ renal for next dose  c/w calcitriol   c/w NaHCO3 650mg po q8 (HCO3 goal = 21-27)  U/A w/ 3+ protein -> U TP:Cr ratio 1gm  BMP q24    # normo anemia of chr kidney dz; no signs of acute blood loss  keep hgb >8  aranesp 20mg sc q wk (Wed), per renal    # COVID + -> seems like pt passed thru inflam phase;   acute on chr hypoxic resp failure (pt on home O2 prn)  can stay on 2-3L NC O2  covid +: 7/5; 7/10; 7/11; 7/26 - no further testing needed  d-dimer 454, ESR 38, CRP 25.9, ferr 1415  pt was NOT tx'd initially for COVID - RDV relative C/N at this GFR anyway  was beyond tx window   completed most of dexameth course  completed quarantine 7/5-7/15 -> d/c isolation     # COPD; severe pulm HTN; fibrotic changes LLL; PNA (see above)  cont NC O2  c/w proventil prn  incent nara  pulm f/u    # minor nosebleed 2/2 dry nasal passage  humidify O2  bacitracin oint to nares top q12    # HFrEF, chronic; sev dilated CM; RV dysfxn; mild TR/AR; HTN  not on all goal directed tx - cardio eval Dr Witt - (per cardio, EF closer to 45%)  BB - c/w toprol xl 50mg po q24   not on ACEI/ARB 2/2 CKD/BRENDA )  diuretics - lasix 20mg po q24    # AFib, chronic  rate controlled - on BB  c/w eliquis 2.5mg po q12 - IF NEEDS PEG, WILL NEED TO HOLD THIS    # CAD  agree w/ no asa    # DM 2  diabetic diet  FS qac/hs   Dc lantus ,d/c ademelog - pt not eating that well  can cont SSI coverage    # PVD  c/w pentoxifylline     # DLD  on statin    # Fall; diff walking; weakness/debility/deconditioning  no syncope per pt  CTH neg  trauma w/u neg    # Hypothyroidism  synthroid 150mg q24  TSH 0.39    # DVT ppx: on eliquis    # GI ppx: none  PT eval for SNF (during this admit, previously, pt was OOBTC and did walk a little w/ walker and asst)

## 2022-07-29 NOTE — PROGRESS NOTE ADULT - ASSESSMENT
ASSESSMENT & PLAN:  82M with PMHx of HFrEF, Afib on eliquis, CAD s/p CABG, COPD (on rare home O2 PRN), CKD3, HTN, HLD who presented for 2 weeks of watery diarrhea found to have Covid-19, with hospital stay c/b altered mental status.    #Decreased PO intake  - Patient refusing meals  - May be due to elevated Tacro level  - Re-Evaluated by speech and swallow -> modified barium swallow study. Diet minced and moist w/ mildly thickened liquids.    Plan:  - NG tube successfully placed yesterday, however half an hour later patient pulled it out.  - Will continue to encourage PO intake (minced and moist, mildly thick)     #Anemia  - Hgb 07/26 6.9  - Bowel movements look normal not black/ would be concerning for bleeding   - Most likely chronic 2/2 ESRD    Plan:   - Restart Aranesp 20mcg sub-q weekly (given Wednesday 07/27)  - s/p 1 Unit PRBC (07/27)      # Metabolic Encephalopathy - multifactorial in etiology, improving   EEG and CT head w/o contrast- no acute pathology  EEG- Consistent with diffuse cerebral electrophysiological dysfunction, Secondary to toxic metabolic cause.    Plan:  - c/w IVF, give D5w with Lasix for hypervolemic hypernatremia -  Resolved       # Pnuemonia  - Repeat chest ct over the weekend shows bilateral lower lobe consolidations with small amount consolidation also in the lingular segment of the left upper lobe suspicious for multifocal pneumonia.    Plan:  - Started on Zosyn 3.375g q8 hours     # BRENDA on CKD III,   - presumed pre-renal   - hx C3 glomerulopathy    Plan:  - continue with NHCO3 PO  - Renal - continue tacrolimus ordered  - Tacrolimus trough- Elevated at 16. Will hold Tacrolimus today, and decrease dosage.   - Gloria removed  -F/U with renal about tacrolimus level    # COVID +  - s/p Dexa 6mg PO q6  - completed quarantine 7/15 - d/bg precautions 7/16  - Was unnecessarily retested, patient still positive but no symptoms. NOT INFECTIOUS, COULD BE POSITIVE UP TO 90 DAYS AFTER INFECTION      # COPD; severe pulm HTN  - Nebs PRN Q4    # Fall, mechanical  - no syncope per pt  - cth neg  - trauma w/u neg    Plan:  - PT eval for SNF    # HFrEF, chronic;   - severely dilated cardio myopathy  - Right ventrical dysfunction   - Mild tricuspid valve/ aortic valve regurg    Plan:  - LASIX 40 MG PO RESTARTED 07/26         - not on ANY goal directed tx - cardio eval Dr Haley  - Patient started on metoprolol 25mg q 24 PO    # AFib, chronic    Plan:  - Eliquis  - rate control with metoprolol 25mg q24    # CAD    Plan:  - No aspirin  - c/w statin     # DLD    Plan:  - on statin    # DM2  - Sugars currently on the higher side 2/2 patient not wanting to eat yesterday and holding the insulin     Plan:  - diabetic diet  - c/w insulin     # Hypothyroidism    Plan:  - synthroid 150    # PVD  c/w pentoxifylline     # DVT ppx:  on Eliquis    #Discharge to rehab

## 2022-07-29 NOTE — PROGRESS NOTE ADULT - SUBJECTIVE AND OBJECTIVE BOX
Nephrology progress note    THIS IS AN INCOMPLETE NOTE . FULL NOTE TO FOLLOW SHORTLY    Patient is seen and examined, events over the last 24 h noted .    Allergies:  Ozempic (0.25 mg or 0.5 mg dose) (Hives; Rash)  streptomycin (Unknown)  Trulicity Pen (Hives; Rash)    Hospital Medications:   MEDICATIONS  (STANDING):  apixaban 2.5 milliGRAM(s) Oral two times a day  atorvastatin 40 milliGRAM(s) Oral at bedtime  BACItracin   Ointment 1 Application(s) Topical every 12 hours  benzocaine 20% Spray 1 Spray(s) Topical once  calcitriol   Capsule 0.25 MICROGram(s) Oral <User Schedule>  darbepoetin Injectable ViaL 20 MICROGram(s) SubCutaneous every 7 days  dextrose 5%. 1000 milliLiter(s) (100 mL/Hr) IV Continuous <Continuous>  dextrose 5%. 1000 milliLiter(s) (50 mL/Hr) IV Continuous <Continuous>  dextrose 50% Injectable 25 Gram(s) IV Push once  dextrose 50% Injectable 12.5 Gram(s) IV Push once  dextrose 50% Injectable 25 Gram(s) IV Push once  furosemide    Tablet 40 milliGRAM(s) Oral daily  glucagon  Injectable 1 milliGRAM(s) IntraMuscular once  insulin glargine Injectable (LANTUS) 12 Unit(s) SubCutaneous at bedtime  insulin lispro (ADMELOG) corrective regimen sliding scale   SubCutaneous three times a day before meals  levothyroxine 150 MICROGram(s) Oral daily  metoprolol succinate ER 50 milliGRAM(s) Oral daily  multivitamin/minerals 1 Tablet(s) Oral daily  pentoxifylline 400 milliGRAM(s) Oral two times a day  sodium bicarbonate 650 milliGRAM(s) Oral three times a day        VITALS:  T(F): 97.1 (22 @ 05:37), Max: 97.1 (22 @ 12:52)  HR: 83 (22 @ 05:37)  BP: 136/64 (22 @ 05:37)  RR: 18 (22 @ 05:37)  SpO2: --  Wt(kg): --     @ 07:01  -   @ 07:00  --------------------------------------------------------  IN: 0 mL / OUT: 250 mL / NET: -250 mL          PHYSICAL EXAM:  Constitutional: NAD  HEENT: anicteric sclera, oropharynx clear, MMM  Neck: No JVD  Respiratory: CTAB, no wheezes, rales or rhonchi  Cardiovascular: S1, S2, RRR  Gastrointestinal: BS+, soft, NT/ND  Extremities: No cyanosis or clubbing. No peripheral edema  :  No davalos.   Skin: No rashes    LABS:      137  |  103  |  78<HH>  ----------------------------<  88  4.2   |  21  |  2.1<H>  Creatinine Trend: 2.1<--, 2.2<--, 2.2<--, 2.1<--, 2.1<--, 2.1<--  Ca    7.7<L>      2022 09:18  Mg     2.3                                 8.9    12.28 )-----------( 144      ( 2022 06:13 )             27.4       Urine Studies:  Urinalysis Basic - ( 2022 13:10 )    Color: Yellow / Appearance: Slightly Turbid / S.018 / pH:   Gluc:  / Ketone: Negative  / Bili: Negative / Urobili: <2 mg/dL   Blood:  / Protein: 300 mg/dL / Nitrite: Negative   Leuk Esterase: Negative / RBC: 1 /HPF / WBC 9 /HPF   Sq Epi:  / Non Sq Epi: 4 /HPF / Bacteria: Negative          Iron 28, TIBC 135, %sat 21      [22 @ 13:55]  Ferritin 1299      [22 @ 09:35]  PTH -- (Ca 9.0)      [22 @ 07:39]   93  Vitamin D (25OH) 27      [22 @ 07:39]  HbA1c 8.5      [10-01-19 @ 09:54]  TSH 0.39      [22 @ 17:42]  Lipid: chol 110, TG 94, HDL 31, LDL --      [22 @ 07:39]      C3 Complement 84      [10-20-21 @ 20:00]  Syphilis Screen (Treponema Pallidum Ab) Negative      [10-21-21 @ 11:25]      RADIOLOGY & ADDITIONAL STUDIES:   Nephrology progress note  Patient is seen and examined, events over the last 24 h noted .  Lying in bed lethargic     Allergies:  Ozempic (0.25 mg or 0.5 mg dose) (Hives; Rash)  streptomycin (Unknown)  Trulicity Pen (Hives; Rash)    Hospital Medications:     MEDICATIONS  (STANDING):    apixaban 2.5 milliGRAM(s) Oral two times a day  atorvastatin 40 milliGRAM(s) Oral at bedtime  BACItracin   Ointment 1 Application(s) Topical every 12 hours  benzocaine 20% Spray 1 Spray(s) Topical once  calcitriol   Capsule 0.25 MICROGram(s) Oral <User Schedule>  darbepoetin Injectable ViaL 20 MICROGram(s) SubCutaneous every 7 days  furosemide    Tablet 40 milliGRAM(s) Oral daily  glucagon  Injectable 1 milliGRAM(s) IntraMuscular once  insulin glargine Injectable (LANTUS) 12 Unit(s) SubCutaneous at bedtime  insulin lispro (ADMELOG) corrective regimen sliding scale   SubCutaneous three times a day before meals  levothyroxine 150 MICROGram(s) Oral daily  metoprolol succinate ER 50 milliGRAM(s) Oral daily  multivitamin/minerals 1 Tablet(s) Oral daily  pentoxifylline 400 milliGRAM(s) Oral two times a day  sodium bicarbonate 650 milliGRAM(s) Oral three times a day        VITALS:  T(F): 97.1 (22 @ 05:37), Max: 97.1 (22 @ 12:52)  HR: 83 (22 @ 05:37)  BP: 136/64 (22 @ 05:37)  RR: 18 (22 @ 05:37)       @ 07:01  -   @ 07:00  --------------------------------------------------------  IN: 0 mL / OUT: 250 mL / NET: -250 mL          PHYSICAL EXAM:  Constitutional: lethargic lying in bed   Neck: No JVD  Respiratory: CTAB,  Cardiovascular: S1, S2, RRR  Gastrointestinal: BS+, soft, NT/ND  Extremities: No cyanosis or clubbing. No peripheral edema  :  No davalos.   Skin: No rashes    LABS:      136  |  103  |  74<HH>  ----------------------------<  54<LL>  4.0   |  24  |  2.1<H>    Ca    7.6<L>      2022 06:13  Mg     2.2         137  |  103  |  78<HH>  ----------------------------<  88  4.2   |  21  |  2.1<H>    Creatinine Trend: 2.1<--, 2.2<--, 2.2<--, 2.1<--, 2.1<--, 2.1<--    Ca    7.7<L>      2022 09:18  Mg     2.3                                 8.9    12.28 )-----------( 144      ( 2022 06:13 )             27.4       Urine Studies:  Urinalysis Basic - ( 2022 13:10 )    Color: Yellow / Appearance: Slightly Turbid / S.018 / pH:   Gluc:  / Ketone: Negative  / Bili: Negative / Urobili: <2 mg/dL   Blood:  / Protein: 300 mg/dL / Nitrite: Negative   Leuk Esterase: Negative / RBC: 1 /HPF / WBC 9 /HPF   Sq Epi:  / Non Sq Epi: 4 /HPF / Bacteria: Negative      Tacrolimus (), Serum: 16.2: Tacrolimus testing is performed on the [employer]  by  chemiluminescent microparticle immunoassay. The therapeutic range of  tacrolimus is not clearly defined but target 12-hour trough whole blood  concentrations are 5.0 - 20.0 ng/mL early post transplant. Twenty-four  hour  trough concentrations are 33-50% less than the corresponding 12-hour  trough. ng/mL (22 @ 09:02)        Iron 28, TIBC 135, %sat 21      [22 @ 13:55]  Ferritin 1299      [22 @ 09:35]  PTH -- (Ca 9.0)      [22 @ 07:39]   93  Vitamin D (25OH) 27      [22 @ 07:39]  HbA1c 8.5      [10-01-19 @ 09:54]  TSH 0.39      [22 @ 17:42]  Lipid: chol 110, TG 94, HDL 31, LDL --      [22 @ 07:39]      C3 Complement 84      [10-20-21 @ 20:00]  Syphilis Screen (Treponema Pallidum Ab) Negative      [10-21-21 @ 11:25]      RADIOLOGY & ADDITIONAL STUDIES:

## 2022-07-29 NOTE — PROGRESS NOTE ADULT - SUBJECTIVE AND OBJECTIVE BOX
SUBJECTIVE:    Patient is a 82y old Male who presents with a chief complaint of Diarrhea (29 Jul 2022 10:12)    Currently admitted to medicine with the primary diagnosis of Acute kidney injury superimposed on CKD       Today is hospital day 24d.     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    AF (atrial fibrillation)  s/p DCCV    Carotid stenosis    COPD (chronic obstructive pulmonary disease)    H/O coronary artery bypass surgery  2001    S/P inguinal hernia repair    History of appendectomy      ALLERGIES:  Ozempic (0.25 mg or 0.5 mg dose) (Hives; Rash)  streptomycin (Unknown)  Trulicity Pen (Hives; Rash)    MEDICATIONS:  STANDING MEDICATIONS  apixaban 2.5 milliGRAM(s) Oral two times a day  atorvastatin 40 milliGRAM(s) Oral at bedtime  BACItracin   Ointment 1 Application(s) Topical every 12 hours  benzocaine 20% Spray 1 Spray(s) Topical once  calcitriol   Capsule 0.25 MICROGram(s) Oral <User Schedule>  darbepoetin Injectable ViaL 20 MICROGram(s) SubCutaneous every 7 days  dextrose 5%. 1000 milliLiter(s) IV Continuous <Continuous>  dextrose 5%. 1000 milliLiter(s) IV Continuous <Continuous>  dextrose 50% Injectable 25 Gram(s) IV Push once  dextrose 50% Injectable 12.5 Gram(s) IV Push once  dextrose 50% Injectable 25 Gram(s) IV Push once  furosemide    Tablet 40 milliGRAM(s) Oral daily  glucagon  Injectable 1 milliGRAM(s) IntraMuscular once  insulin glargine Injectable (LANTUS) 12 Unit(s) SubCutaneous at bedtime  insulin lispro (ADMELOG) corrective regimen sliding scale   SubCutaneous three times a day before meals  levothyroxine 150 MICROGram(s) Oral daily  metoprolol succinate ER 50 milliGRAM(s) Oral daily  multivitamin/minerals 1 Tablet(s) Oral daily  pentoxifylline 400 milliGRAM(s) Oral two times a day  sodium bicarbonate 650 milliGRAM(s) Oral three times a day    PRN MEDICATIONS  acetaminophen     Tablet .. 650 milliGRAM(s) Oral every 6 hours PRN  ALBUTerol    90 MICROgram(s) HFA Inhaler 1 Puff(s) Inhalation every 6 hours PRN  dextrose Oral Gel 15 Gram(s) Oral once PRN  melatonin 5 milliGRAM(s) Oral at bedtime PRN    VITALS:   T(F): 97.1  HR: 82  BP: 136/69  RR: 20  SpO2: --    LABS:                        8.9    12.28 )-----------( 144      ( 29 Jul 2022 06:13 )             27.4     07-29    136  |  103  |  74<HH>  ----------------------------<  54<LL>  4.0   |  24  |  2.1<H>    Ca    7.6<L>      29 Jul 2022 06:13  Mg     2.2     07-29                    RADIOLOGY:    PHYSICAL EXAM:  GEN: No acute distress  LUNGS: Clear to auscultation bilaterally   HEART: S1/S2 present. RRR.   ABD/ GI: Soft, non-tender, non-distended. Bowel sounds present  EXT: NC/NC/NE/2+PP/SMALL  NEURO: AAOX3

## 2022-07-29 NOTE — SWALLOW BEDSIDE ASSESSMENT ADULT - SWALLOW EVAL: DIAGNOSIS
+toleration of minced & moist, mildly thick w/no overt s/s of aspiration/penetration. pt w/min po acceptance of harder chewable
mild oral dysphagia for puree and mildly thick liquids w/o overt s/s aspiration/penetration; +suspected pharyngeal dysphagia for thin liquids, mod oral dysphagia for minced+moist chewables
mild oral dysphagia for puree and mildly thick liquids w/o overt s/s aspiration/penetration
mild oral dysphagia for puree, mildly thick w/no overt s/s of aspiration/penetration. moderate oral dysphagia for thins. not a candidate for chewables 2' global weakness and inadequate dentition
+min overt s/s aspiration/penetration for large consecutive straw sips of thin liquids, +improved toleration for small controlled cup sips of thin liquids, puree, and minced+moist consistencies w/o overt s/s aspiration/penetration; +mod oral dysphagia for easy to chew

## 2022-07-29 NOTE — SWALLOW BEDSIDE ASSESSMENT ADULT - SWALLOW EVAL: RECOMMENDED DIET
continue puree w/ mildly thick liquids
minced & moist, mildly thick 1:1
puree, mildly thick 1:1
minced+moist w/ thin liquids
continue puree w/ mildly thick liquids

## 2022-07-29 NOTE — SWALLOW BEDSIDE ASSESSMENT ADULT - SLP GENERAL OBSERVATIONS
pt received in bed awake +generalized weakness in no apparent pain. +min verbalizations +room air
pt known to acute service w/recs: soft & bite sized, thins. pt now received awake, on RA. +globally weak. non verbal at the time of assessment.
pt received in bed awake +generalized weakness w/o c/o pain. +2L NC
pt received alert, on RA. pt w/min po acceptance, despite max encouragement by SLP.
pt received in bed awake more alert w/o c/o pain.

## 2022-07-29 NOTE — PROGRESS NOTE ADULT - ASSESSMENT
82y Male with PMHx of HFrEF, Afib on eliquis, CAD s/p CABG, COPD (on rare home O2 PRN), CKD3 (Hx of C3 glomerulopathy), HTN, HLD    # BRENDA on CKD 3b / prerenal? baseline 1.8   - creat stable now   not eating  restart LR at 1 cc per kg/ hour if no PO intake   # CKD 3b - creat baseline ~1.8  # please document UO   # C3 glomerulopathy - on Prograf 1 mg q12h- LAST TAC LEVEL was 16 at around 7 pm  Please repeat tarcolimus level 30 min before the AM   / Cellcept - on hold following COVID19 illness  # Proteinuria 8.4 g initially responded well to Prograf with reduction of proteinuria  to<1 g/g according to outpatient records  # HFrEF 10/2021 TTE - severe LV and RV systolic dysfunction and PAH / seen by Dr. De La Fuente, EF ~45%  #continue sodium bicarbonate  650 q 8   #on calcitriol / last pth at goal / check phosphorus  level   # started on SRIRAM / ferritin elevated   # continue lasix 40  # will follow

## 2022-07-29 NOTE — SWALLOW BEDSIDE ASSESSMENT ADULT - SLP PERTINENT HISTORY OF CURRENT PROBLEM
pt is an 81 y/o M w/ PMHx: HFrEF, Afib, CAD s/p CABG, COPD (on rare home O2 PRN), CKD3, HTN, HLD presents for 2 weeks of watery diarrhea. Pt admitted to medicine with the primary diagnosis of acute kidney injury superimposed on CKD. Course c/b encephalopathy, unclear etiology- possibly d/t hospital acquired delirium. CTH (-), MRI ordered. Pt COVID+.
82M with PMHx of HFrEF, Afib on eliquis, CAD s/p CABG, COPD (on rare home O2 PRN), CKD3, HTN, HLD presents for 2 weeks of watery diarrhea. Pt reports that 2 weeks ago he went to a doctor's appointment with his wife, and the next day both of them started experiencing non-bloody watery diarrhea, x3-4/day, and then started having decreased PO intake and felt weak.
pt is an 81 y/o M w/ PMHx: HFrEF, Afib, CAD s/p CABG, COPD (on rare home O2 PRN), CKD3, HTN, HLD presents for 2 weeks of watery diarrhea. Pt admitted to medicine with the primary diagnosis of acute kidney injury superimposed on CKD. Course c/b encephalopathy, unclear etiology- possibly d/t hospital acquired delirium. CTH (-), MRI ordered. Pt COVID+.
pt is an 81 y/o M w/ PMHx: HFrEF, Afib, CAD s/p CABG, COPD (on rare home O2 PRN), CKD3, HTN, HLD presents for 2 weeks of watery diarrhea. Pt admitted to medicine with the primary diagnosis of acute kidney injury superimposed on CKD. Course c/b encephalopathy, unclear etiology- possibly d/t hospital acquired delirium. CTH (-); CT chest 7/23-> Bilateral lower lobe consolidations with small amount consolidation also in the lingular segment of the left upper lobe suspicious for multifocal PNA.
pt is an 83 y/o M w/ PMHx: HFrEF, Afib, CAD s/p CABG, COPD (on rare home O2 PRN), CKD3, HTN, HLD presents for 2 weeks of watery diarrhea. Pt admitted to medicine with the primary diagnosis of acute kidney injury superimposed on CKD. Course c/b encephalopathy, unclear etiology- possibly d/t hospital acquired delirium. CTH (-).

## 2022-07-29 NOTE — SWALLOW BEDSIDE ASSESSMENT ADULT - NS ASR SWALLOW FINDINGS DISCUS
Physician/Nursing/Patient
Nursing/Patient
Physician
Physician/Nursing/Patient
MD/Physician/Nursing/Patient

## 2022-07-30 NOTE — PROGRESS NOTE ADULT - ASSESSMENT
82y Male with PMHx of HFrEF, Afib on eliquis, CAD s/p CABG, COPD (on rare home O2 PRN), CKD3 (Hx of C3 glomerulopathy), HTN, HLD    # BRENDA on CKD 3b / prerenal? 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 De La Fuente, EF ~45%  #continue sodium bicarbonate  650 q 8   #on calcitriol / last pth at goal / check phosphorus  level   # started on SRIRAM / ferritin elevated   # will follow

## 2022-07-30 NOTE — PROGRESS NOTE ADULT - SUBJECTIVE AND OBJECTIVE BOX
ANDRIA TAVAREZ  82y  Male      Patient is a 82y old  Male who presents with a chief complaint of Diarrhea (30 Jul 2022 09:06)      INTERVAL HPI/OVERNIGHT EVENTS:  Patient was seen at bedside during round then later after noon, he was breathing heavily even though he states he is breathing well, still with no appetite and is refusing to eat, not tolerating face mask and keep taking it off, also he removed his NGT tube before.   Vital Signs Last 24 Hrs  T(C): 36.4 (30 Jul 2022 13:56), Max: 36.6 (29 Jul 2022 19:21)  T(F): 97.5 (30 Jul 2022 13:56), Max: 97.8 (29 Jul 2022 19:21)  HR: 87 (30 Jul 2022 13:56) (87 - 97)  BP: 127/62 (30 Jul 2022 13:56) (127/62 - 152/61)  BP(mean): --  RR: 18 (30 Jul 2022 13:56) (18 - 20)  SpO2: 97% (30 Jul 2022 08:22) (97% - 97%)    Parameters below as of 30 Jul 2022 08:22  Patient On (Oxygen Delivery Method): mask, nonrebreather                Consultant(s) Notes Reviewed:  [x ] YES  [ ] NO          MEDICATIONS  (STANDING):  apixaban 2.5 milliGRAM(s) Oral two times a day  atorvastatin 40 milliGRAM(s) Oral at bedtime  BACItracin   Ointment 1 Application(s) Topical every 12 hours  benzocaine 20% Spray 1 Spray(s) Topical once  calcitriol   Capsule 0.25 MICROGram(s) Oral <User Schedule>  darbepoetin Injectable ViaL 20 MICROGram(s) SubCutaneous every 7 days  dextrose 5%. 1000 milliLiter(s) (50 mL/Hr) IV Continuous <Continuous>  dextrose 5%. 1000 milliLiter(s) (100 mL/Hr) IV Continuous <Continuous>  dextrose 50% Injectable 25 Gram(s) IV Push once  dextrose 50% Injectable 12.5 Gram(s) IV Push once  dextrose 50% Injectable 25 Gram(s) IV Push once  furosemide    Tablet 20 milliGRAM(s) Oral daily  glucagon  Injectable 1 milliGRAM(s) IntraMuscular once  insulin lispro (ADMELOG) corrective regimen sliding scale   SubCutaneous three times a day before meals  levothyroxine 150 MICROGram(s) Oral daily  metoprolol succinate ER 50 milliGRAM(s) Oral daily  multivitamin/minerals 1 Tablet(s) Oral daily  pentoxifylline 400 milliGRAM(s) Oral two times a day  piperacillin/tazobactam IVPB.. 2.25 Gram(s) IV Intermittent every 8 hours  sodium bicarbonate 650 milliGRAM(s) Oral three times a day    MEDICATIONS  (PRN):  acetaminophen     Tablet .. 650 milliGRAM(s) Oral every 6 hours PRN Temp greater or equal to 38C (100.4F), Mild Pain (1 - 3)  ALBUTerol    90 MICROgram(s) HFA Inhaler 1 Puff(s) Inhalation every 6 hours PRN Bronchospasm  dextrose Oral Gel 15 Gram(s) Oral once PRN Blood Glucose LESS THAN 70 milliGRAM(s)/deciliter  HYDROmorphone  Injectable 0.25 milliGRAM(s) IV Push every 4 hours PRN dyspnea  melatonin 5 milliGRAM(s) Oral at bedtime PRN Insomnia      LABS                          9.2    11.40 )-----------( 159      ( 30 Jul 2022 07:31 )             28.9     07-30    140  |  106  |  68<HH>  ----------------------------<  119<H>  4.2   |  24  |  1.8<H>    Ca    7.8<L>      30 Jul 2022 07:31  Mg     2.0     07-30      ABG - ( 30 Jul 2022 10:31 )  pH, Arterial: 7.52  pH, Blood: x     /  pCO2: 31    /  pO2: 173   / HCO3: 25    / Base Excess: 3.0   /  SaO2: 99.8                  Lactate Trend        CAPILLARY BLOOD GLUCOSE      POCT Blood Glucose.: 128 mg/dL (30 Jul 2022 12:16)        RADIOLOGY & ADDITIONAL TESTS:    Imaging Personally Reviewed:  [ ] YES  [ ] NO    HEALTH ISSUES - PROBLEM Dx:          PHYSICAL EXAM:  GENERAL: moderate respiratory distress.  HEAD:  Atraumatic, Normocephalic.  EYES: EOMI, PERRLA, conjunctiva and sclera clear.  NECK: Supple, No JVD.  CHEST/LUNG: Bilateral lower lung rales.   HEART: irregular rate and rhythm; S1 S2.   ABDOMEN: Soft, Nontender, Nondistended; Bowel sounds present.  EXTREMITIES:  2+ Peripheral Pulses, No clubbing, cyanosis, or edema.  PSYCH: AAOx3.  NEUROLOGY: non-focal.  SKIN: No rashes or lesions.

## 2022-07-30 NOTE — GOALS OF CARE CONVERSATION - ADVANCED CARE PLANNING - CONVERSATION DETAILS
Discussed goals of care with patient and family at bedside. They are requesting palliative care and .   Patient does not want cpr or be placed on a ventilator. Also refusing artificial feeds.   Continue with  full medical management

## 2022-07-30 NOTE — PROGRESS NOTE ADULT - SUBJECTIVE AND OBJECTIVE BOX
Nephrology progress note    THIS IS AN INCOMPLETE NOTE . FULL NOTE TO FOLLOW SHORTLY    Patient is seen and examined, events over the last 24 h noted .    Allergies:  Ozempic (0.25 mg or 0.5 mg dose) (Hives; Rash)  streptomycin (Unknown)  Trulicity Pen (Hives; Rash)    Hospital Medications:   MEDICATIONS  (STANDING):  apixaban 2.5 milliGRAM(s) Oral two times a day  atorvastatin 40 milliGRAM(s) Oral at bedtime  BACItracin   Ointment 1 Application(s) Topical every 12 hours  benzocaine 20% Spray 1 Spray(s) Topical once  calcitriol   Capsule 0.25 MICROGram(s) Oral <User Schedule>  darbepoetin Injectable ViaL 20 MICROGram(s) SubCutaneous every 7 days  dextrose 5%. 1000 milliLiter(s) (50 mL/Hr) IV Continuous <Continuous>  dextrose 5%. 1000 milliLiter(s) (100 mL/Hr) IV Continuous <Continuous>  dextrose 50% Injectable 25 Gram(s) IV Push once  dextrose 50% Injectable 25 Gram(s) IV Push once  dextrose 50% Injectable 12.5 Gram(s) IV Push once  furosemide    Tablet 20 milliGRAM(s) Oral daily  glucagon  Injectable 1 milliGRAM(s) IntraMuscular once  insulin lispro (ADMELOG) corrective regimen sliding scale   SubCutaneous three times a day before meals  levothyroxine 150 MICROGram(s) Oral daily  metoprolol succinate ER 50 milliGRAM(s) Oral daily  multivitamin/minerals 1 Tablet(s) Oral daily  pentoxifylline 400 milliGRAM(s) Oral two times a day  piperacillin/tazobactam IVPB.. 2.25 Gram(s) IV Intermittent every 8 hours  sodium bicarbonate 650 milliGRAM(s) Oral three times a day        VITALS:  T(F): 96.6 (07-30-22 @ 05:25), Max: 97.8 (07-29-22 @ 19:21)  HR: 97 (07-30-22 @ 05:25)  BP: 144/63 (07-30-22 @ 05:25)  RR: 20 (07-30-22 @ 05:25)  SpO2: --  Wt(kg): --    07-28 @ 07:01  -  07-29 @ 07:00  --------------------------------------------------------  IN: 0 mL / OUT: 250 mL / NET: -250 mL          PHYSICAL EXAM:  Constitutional: NAD  HEENT: anicteric sclera, oropharynx clear, MMM  Neck: No JVD  Respiratory: CTAB, no wheezes, rales or rhonchi  Cardiovascular: S1, S2, RRR  Gastrointestinal: BS+, soft, NT/ND  Extremities: No cyanosis or clubbing. No peripheral edema  :  No davalos.   Skin: No rashes    LABS:  07-29    136  |  103  |  74<HH>  ----------------------------<  54<LL>  4.0   |  24  |  2.1<H>    Ca    7.6<L>      29 Jul 2022 06:13  Mg     2.2     07-29                            9.2    11.40 )-----------( 159      ( 30 Jul 2022 07:31 )             28.9       Urine Studies:        Iron 28, TIBC 135, %sat 21      [07-26-22 @ 13:55]  Ferritin 1299      [07-26-22 @ 09:35]  PTH -- (Ca 9.0)      [07-06-22 @ 07:39]   93  Vitamin D (25OH) 27      [07-06-22 @ 07:39]  HbA1c 8.5      [10-01-19 @ 09:54]  TSH 0.39      [07-18-22 @ 17:42]  Lipid: chol 110, TG 94, HDL 31, LDL --      [07-06-22 @ 07:39]      C3 Complement 84      [10-20-21 @ 20:00]  Syphilis Screen (Treponema Pallidum Ab) Negative      [10-21-21 @ 11:25]      RADIOLOGY & ADDITIONAL STUDIES:   Nephrology progress note  Patient is seen and examined, events over the last 24 h noted .  lethargic this morning  on oxygen face mask   on restraints   Allergies:  Ozempic (0.25 mg or 0.5 mg dose) (Hives; Rash)  streptomycin (Unknown)  Trulicity Pen (Hives; Rash)    Hospital Medications:   MEDICATIONS  (STANDING):  apixaban 2.5 milliGRAM(s) Oral two times a day  atorvastatin 40 milliGRAM(s) Oral at bedtime  BACItracin   Ointment 1 Application(s) Topical every 12 hours  benzocaine 20% Spray 1 Spray(s) Topical once  calcitriol   Capsule 0.25 MICROGram(s) Oral <User Schedule>  darbepoetin Injectable ViaL 20 MICROGram(s) SubCutaneous every 7 days  dextrose 5%. 1000 milliLiter(s) (50 mL/Hr) IV Continuous <Continuous>  furosemide    Tablet 20 milliGRAM(s) Oral daily  glucagon  Injectable 1 milliGRAM(s) IntraMuscular once  insulin lispro (ADMELOG) corrective regimen sliding scale   SubCutaneous three times a day before meals  levothyroxine 150 MICROGram(s) Oral daily  metoprolol succinate ER 50 milliGRAM(s) Oral daily  multivitamin/minerals 1 Tablet(s) Oral daily  pentoxifylline 400 milliGRAM(s) Oral two times a day  piperacillin/tazobactam IVPB.. 2.25 Gram(s) IV Intermittent every 8 hours  sodium bicarbonate 650 milliGRAM(s) Oral three times a day        VITALS:  T(F): 96.6 (07-30-22 @ 05:25), Max: 97.8 (07-29-22 @ 19:21)  HR: 97 (07-30-22 @ 05:25)  BP: 144/63 (07-30-22 @ 05:25)  RR: 20 (07-30-22 @ 05:25)      07-28 @ 07:01  -  07-29 @ 07:00  --------------------------------------------------------  IN: 0 mL / OUT: 250 mL / NET: -250 mL          PHYSICAL EXAM:  Constitutional: lethargic   Respiratory: CTAB,  Cardiovascular: S1, S2, RRR  Gastrointestinal: BS+, soft, NT/ND  Extremities: No cyanosis or clubbing. No peripheral edema  :  No davalos.   Skin: No rashes    LABS:  07-30    140  |  106  |  68<HH>  ----------------------------<  119<H>  4.2   |  24  |  1.8<H>    Ca    7.8<L>      30 Jul 2022 07:31  Mg     2.0     07-30 07-29    136  |  103  |  74<HH>  ----------------------------<  54<LL>  4.0   |  24  |  2.1<H>    Ca    7.6<L>      29 Jul 2022 06:13      Creatinine Trend: 1.8<--, 2.1<--, 2.1<--, 2.2<--, 2.2<--, 2.1<--    Mg     2.2     07-29                            9.2    11.40 )-----------( 159      ( 30 Jul 2022 07:31 )             28.9       Urine Studies:    Tacrolimus (), Serum: 10.8: Tacrolimus testing is performed on the [employer]  by  chemiluminescent microparticle immunoassay. The therapeutic range of  tacrolimus is not clearly defined but target 12-hour trough whole blood  concentrations are 5.0 - 20.0 ng/mL early post transplant. Twenty-four  hour  trough concentrations are 33-50% less than the corresponding 12-hour  trough. ng/mL (07.29.22 @ 19:44)        Iron 28, TIBC 135, %sat 21      [07-26-22 @ 13:55]  Ferritin 1299      [07-26-22 @ 09:35]  PTH -- (Ca 9.0)      [07-06-22 @ 07:39]   93  Vitamin D (25OH) 27      [07-06-22 @ 07:39]  HbA1c 8.5      [10-01-19 @ 09:54]  TSH 0.39      [07-18-22 @ 17:42]  Lipid: chol 110, TG 94, HDL 31, LDL --      [07-06-22 @ 07:39]      C3 Complement 84      [10-20-21 @ 20:00]  Syphilis Screen (Treponema Pallidum Ab) Negative      [10-21-21 @ 11:25]      RADIOLOGY & ADDITIONAL STUDIES:  < from: Xray Chest 1 View- PORTABLE-Urgent (Xray Chest 1 View- PORTABLE-Urgent .) (07.28.22 @ 16:21) >  Impression:    Patchy opacifications. Support devices as described    < end of copied text >

## 2022-07-30 NOTE — CHART NOTE - NSCHARTNOTEFT_GEN_A_CORE
Consult received chart reviewed. No pain or dyspnea documented.     Recommendations  - Full code  - NGT feeding in progress  - Palliative will follow up with full consult, call [phone] for any urgent needs from palliative care team

## 2022-07-31 NOTE — PROGRESS NOTE ADULT - ASSESSMENT
ASSESSMENT & PLAN:  82M with PMHx of HFrEF, Afib on eliquis, CAD s/p CABG, COPD (on rare home O2 PRN), CKD3, HTN, HLD who presented for 2 weeks of watery diarrhea found to have Covid-19, with hospital stay c/b altered mental status.    #Decreased PO intake  - Patient refusing meals  - May be due to elevated Tacro level  - Re-Evaluated by speech and swallow -> modified barium swallow study. Diet minced and moist w/ mildly thickened liquids.    Plan:  -Goals of care conversation was had with the palliative team. Patient and patient's family do not want aritificial feeding at this time (no NG/PEG). Patients family wishes to transition to palliative approach, and would like a  come to the room.   -Patient is DNR/DNI    #Anemia  - Hgb 07/26 6.9  - Bowel movements look normal not black/ would be concerning for bleeding   - Most likely chronic 2/2 ESRD    Plan:   - Restart Aranesp 20mcg sub-q weekly (given Wednesday 07/27)  - s/p 1 Unit PRBC (07/27)  - q24 hour cbc      # Metabolic Encephalopathy - multifactorial in etiology, improving   EEG and CT head w/o contrast- no acute pathology  EEG- Consistent with diffuse cerebral electrophysiological dysfunction, Secondary to toxic metabolic cause.    Plan:  - c/w IVF, give D5w with Lasix for hypervolemic hypernatremia -  Resolved   - q24 bmp      # Pnuemonia  - Repeat chest ct over the weekend shows bilateral lower lobe consolidations with small amount consolidation also in the lingular segment of the left upper lobe suspicious for multifocal pneumonia.    Plan:  - Started on Zosyn 3.375g q8 hours     # BRENDA on CKD III,   - presumed pre-renal   - hx C3 glomerulopathy    Plan:  - continue with NHCO3 PO  - Renal - continue tacrolimus ordered  - Tacrolimus trough- repeat trough after holding tacro was elevated still at >10.   - Gloria removed  -F/U with renal about tacrolimus level    # COVID +  - s/p Dexa 6mg PO q6  - completed quarantine 7/15 - d/bg precautions 7/16  - Was unnecessarily retested, patient still positive but no symptoms. NOT INFECTIOUS, COULD BE POSITIVE UP TO 90 DAYS AFTER INFECTION      # COPD; severe pulm HTN  - Nebs PRN Q4    # Fall, mechanical  - no syncope per pt  - cth neg  - trauma w/u neg    Plan:  - PT eval for SNF    # HFrEF, chronic;   - severely dilated cardio myopathy  - Right ventrical dysfunction   - Mild tricuspid valve/ aortic valve regurg    Plan:  - LASIX 40 MG PO RESTARTED 07/26         - not on ANY goal directed tx - cardio eval Dr Haley  - Patient started on metoprolol 25mg q 24 PO

## 2022-07-31 NOTE — PROGRESS NOTE ADULT - SUBJECTIVE AND OBJECTIVE BOX
ANDRIA TAVAREZ 82y Male  MRN#: [id number]   Hospital Day: 26d    SUBJECTIVE  Patient is a 82y old Male who presents with a chief complaint of Diarrhea (30 Jul 2022 15:36)  Currently admitted to medicine with the primary diagnosis of Acute kidney injury superimposed on CKD complicated by a prolonged hospital stay      INTERVAL HPI AND OVERNIGHT EVENTS:  Patient was examined and seen at bedside. This morning he is resting comfortably in bed and reports no issues or overnight events.    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    AF (atrial fibrillation)  s/p DCCV    Carotid stenosis    COPD (chronic obstructive pulmonary disease)    H/O coronary artery bypass surgery  2001    S/P inguinal hernia repair    History of appendectomy      ALLERGIES:  Ozempic (0.25 mg or 0.5 mg dose) (Hives; Rash)  streptomycin (Unknown)  Trulicity Pen (Hives; Rash)    MEDICATIONS:  STANDING MEDICATIONS  apixaban 2.5 milliGRAM(s) Oral two times a day  atorvastatin 40 milliGRAM(s) Oral at bedtime  BACItracin   Ointment 1 Application(s) Topical every 12 hours  calcitriol   Capsule 0.25 MICROGram(s) Oral <User Schedule>  darbepoetin Injectable ViaL 20 MICROGram(s) SubCutaneous every 7 days  dextrose 5%. 1000 milliLiter(s) IV Continuous <Continuous>  dextrose 5%. 1000 milliLiter(s) IV Continuous <Continuous>  dextrose 50% Injectable 25 Gram(s) IV Push once  dextrose 50% Injectable 12.5 Gram(s) IV Push once  dextrose 50% Injectable 25 Gram(s) IV Push once  furosemide    Tablet 20 milliGRAM(s) Oral daily  glucagon  Injectable 1 milliGRAM(s) IntraMuscular once  insulin lispro (ADMELOG) corrective regimen sliding scale   SubCutaneous three times a day before meals  levothyroxine 150 MICROGram(s) Oral daily  metoprolol succinate ER 50 milliGRAM(s) Oral daily  multivitamin/minerals 1 Tablet(s) Oral daily  pentoxifylline 400 milliGRAM(s) Oral two times a day  piperacillin/tazobactam IVPB.. 2.25 Gram(s) IV Intermittent every 8 hours  sodium bicarbonate 650 milliGRAM(s) Oral three times a day    PRN MEDICATIONS  acetaminophen     Tablet .. 650 milliGRAM(s) Oral every 6 hours PRN  ALBUTerol    90 MICROgram(s) HFA Inhaler 1 Puff(s) Inhalation every 6 hours PRN  dextrose Oral Gel 15 Gram(s) Oral once PRN  HYDROmorphone  Injectable 0.25 milliGRAM(s) IV Push every 4 hours PRN  melatonin 5 milliGRAM(s) Oral at bedtime PRN      VITAL SIGNS: Last 24 Hours  T(C): 36.4 (30 Jul 2022 20:29), Max: 36.4 (30 Jul 2022 13:56)  T(F): 97.5 (30 Jul 2022 20:29), Max: 97.5 (30 Jul 2022 13:56)  HR: 84 (30 Jul 2022 20:29) (84 - 97)  BP: 136/62 (30 Jul 2022 20:29) (127/62 - 144/63)  BP(mean): --  RR: 18 (30 Jul 2022 20:29) (18 - 20)  SpO2: 97% (30 Jul 2022 08:22) (97% - 97%)    LABS:                        9.2    11.40 )-----------( 159      ( 30 Jul 2022 07:31 )             28.9     07-30    140  |  106  |  68<HH>  ----------------------------<  119<H>  4.2   |  24  |  1.8<H>    Ca    7.8<L>      30 Jul 2022 07:31  Mg     2.0     07-30          ABG - ( 30 Jul 2022 10:31 )  pH, Arterial: 7.52  pH, Blood: x     /  pCO2: 31    /  pO2: 173   / HCO3: 25    / Base Excess: 3.0   /  SaO2: 99.8        PHYSICAL EXAM:  CONSTITUTIONAL: No acute distress, well-developed, well-groomed, AAOx3  HEAD: Atraumatic, normocephalic  EYES: EOM intact, PERRLA, conjunctiva and sclera clear  ENT: Dry mucous membranes  PULMONARY: Clear to auscultation bilaterally  CARDIOVASCULAR: Regular rate and rhythm  GASTROINTESTINAL: Soft, non-tender, non-distended; bowel sounds present  MUSCULOSKELETAL: 2+ peripheral pulses; no clubbing, no cyanosis, +2 pitting edema in BLE   SKIN: No rashes or lesions; warm and dry

## 2022-07-31 NOTE — PROGRESS NOTE ADULT - SUBJECTIVE AND OBJECTIVE BOX
Nephrology progress note    THIS IS AN INCOMPLETE NOTE . FULL NOTE TO FOLLOW SHORTLY    Patient is seen and examined, events over the last 24 h noted .    Allergies:  Ozempic (0.25 mg or 0.5 mg dose) (Hives; Rash)  streptomycin (Unknown)  Trulicity Pen (Hives; Rash)    Hospital Medications:   MEDICATIONS  (STANDING):  apixaban 2.5 milliGRAM(s) Oral two times a day  atorvastatin 40 milliGRAM(s) Oral at bedtime  BACItracin   Ointment 1 Application(s) Topical every 12 hours  calcitriol   Capsule 0.25 MICROGram(s) Oral <User Schedule>  darbepoetin Injectable ViaL 20 MICROGram(s) SubCutaneous every 7 days  dextrose 5%. 1000 milliLiter(s) (100 mL/Hr) IV Continuous <Continuous>  dextrose 5%. 1000 milliLiter(s) (50 mL/Hr) IV Continuous <Continuous>  dextrose 50% Injectable 25 Gram(s) IV Push once  dextrose 50% Injectable 12.5 Gram(s) IV Push once  dextrose 50% Injectable 25 Gram(s) IV Push once  furosemide    Tablet 20 milliGRAM(s) Oral daily  glucagon  Injectable 1 milliGRAM(s) IntraMuscular once  insulin lispro (ADMELOG) corrective regimen sliding scale   SubCutaneous three times a day before meals  levothyroxine 150 MICROGram(s) Oral daily  metoprolol succinate ER 50 milliGRAM(s) Oral daily  multivitamin/minerals 1 Tablet(s) Oral daily  pentoxifylline 400 milliGRAM(s) Oral two times a day  piperacillin/tazobactam IVPB.. 2.25 Gram(s) IV Intermittent every 8 hours  sodium bicarbonate 650 milliGRAM(s) Oral three times a day        VITALS:  T(F): 97 (07-31-22 @ 04:50), Max: 97.5 (07-30-22 @ 13:56)  HR: 83 (07-31-22 @ 04:50)  BP: 155/103 (07-31-22 @ 04:50)  RR: 18 (07-31-22 @ 04:50)  SpO2: --  Wt(kg): --        PHYSICAL EXAM:  Constitutional: NAD  HEENT: anicteric sclera, oropharynx clear, MMM  Neck: No JVD  Respiratory: CTAB, no wheezes, rales or rhonchi  Cardiovascular: S1, S2, RRR  Gastrointestinal: BS+, soft, NT/ND  Extremities: No cyanosis or clubbing. No peripheral edema  :  No davalos.   Skin: No rashes    LABS:  07-30    140  |  106  |  68<HH>  ----------------------------<  119<H>  4.2   |  24  |  1.8<H>    Ca    7.8<L>      30 Jul 2022 07:31  Mg     2.0     07-30                            9.2    11.40 )-----------( 159      ( 30 Jul 2022 07:31 )             28.9       Urine Studies:        Iron 28, TIBC 135, %sat 21      [07-26-22 @ 13:55]  Ferritin 1299      [07-26-22 @ 09:35]  PTH -- (Ca 9.0)      [07-06-22 @ 07:39]   93  Vitamin D (25OH) 27      [07-06-22 @ 07:39]  HbA1c 8.5      [10-01-19 @ 09:54]  TSH 0.39      [07-18-22 @ 17:42]  Lipid: chol 110, TG 94, HDL 31, LDL --      [07-06-22 @ 07:39]      C3 Complement 84      [10-20-21 @ 20:00]  Syphilis Screen (Treponema Pallidum Ab) Negative      [10-21-21 @ 11:25]      RADIOLOGY & ADDITIONAL STUDIES:   Nephrology progress note  Patient is seen and examined, events over the last 24 h noted .  Lying in bed comfortable     Allergies:  Ozempic (0.25 mg or 0.5 mg dose) (Hives; Rash)  streptomycin (Unknown)  Trulicity Pen (Hives; Rash)    Hospital Medications:   MEDICATIONS  (STANDING):  apixaban 2.5 milliGRAM(s) Oral two times a day  atorvastatin 40 milliGRAM(s) Oral at bedtime  BACItracin   Ointment 1 Application(s) Topical every 12 hours  calcitriol   Capsule 0.25 MICROGram(s) Oral <User Schedule>  darbepoetin Injectable ViaL 20 MICROGram(s) SubCutaneous every 7 days  dextrose 5%. 1000 milliLiter(s) (100 mL/Hr) IV Continuous <Continuous>  dextrose 5%. 1000 milliLiter(s) (50 mL/Hr) IV Continuous <Continuous>  furosemide    Tablet 20 milliGRAM(s) Oral daily  glucagon  Injectable 1 milliGRAM(s) IntraMuscular once  insulin lispro (ADMELOG) corrective regimen sliding scale   SubCutaneous three times a day before meals  levothyroxine 150 MICROGram(s) Oral daily  metoprolol succinate ER 50 milliGRAM(s) Oral daily  multivitamin/minerals 1 Tablet(s) Oral daily  pentoxifylline 400 milliGRAM(s) Oral two times a day  piperacillin/tazobactam IVPB.. 2.25 Gram(s) IV Intermittent every 8 hours  sodium bicarbonate 650 milliGRAM(s) Oral three times a day        VITALS:  T(F): 97 (07-31-22 @ 04:50), Max: 97.5 (07-30-22 @ 13:56)  HR: 83 (07-31-22 @ 04:50)  BP: 155/103 (07-31-22 @ 04:50)  RR: 18 (07-31-22 @ 04:50)          PHYSICAL EXAM:  Constitutional: NAD  Respiratory: CTAB,   Cardiovascular: S1, S2, RRR  Gastrointestinal: BS+, soft, NT/ND  Extremities: No cyanosis or clubbing. No peripheral edema  :  No davalos.   Skin: No rashes    LABS:  07-31    142  |  107  |  68<HH>  ----------------------------<  216<H>  4.4   |  24  |  1.8<H>    Ca    7.7<L>      31 Jul 2022 08:41  Mg     2.1     07-31 07-30    140  |  106  |  68<HH>  ----------------------------<  119<H>  4.2   |  24  |  1.8<H>    Ca    7.8<L>      30 Jul 2022 07:31  Mg     2.0     07-30                            9.2    11.40 )-----------( 159      ( 30 Jul 2022 07:31 )             28.9       Tacrolimus (), Serum: 10.2: Tacrolimus testing is performed on the [employer]  by  chemiluminescent microparticle immunoassay. The therapeutic range of  tacrolimus is not clearly defined but target 12-hour trough whole blood  concentrations are 5.0 - 20.0 ng/mL early post transplant. Twenty-four  hour  trough concentrations are 33-50% less than the corresponding 12-hour  trough. ng/mL (07.30.22 @ 00:07)        Urine Studies:        Iron 28, TIBC 135, %sat 21      [07-26-22 @ 13:55]  Ferritin 1299      [07-26-22 @ 09:35]  PTH -- (Ca 9.0)      [07-06-22 @ 07:39]   93  Vitamin D (25OH) 27      [07-06-22 @ 07:39]  HbA1c 8.5      [10-01-19 @ 09:54]  TSH 0.39      [07-18-22 @ 17:42]  Lipid: chol 110, TG 94, HDL 31, LDL --      [07-06-22 @ 07:39]      C3 Complement 84      [10-20-21 @ 20:00]  Syphilis Screen (Treponema Pallidum Ab) Negative      [10-21-21 @ 11:25]      RADIOLOGY & ADDITIONAL STUDIES:

## 2022-07-31 NOTE — PROGRESS NOTE ADULT - SUBJECTIVE AND OBJECTIVE BOX
ANDRIA TAVAREZ  82y  Male      Patient is a 82y old  Male who presents with a chief complaint of Diarrhea (31 Jul 2022 08:30)      INTERVAL HPI/OVERNIGHT EVENTS:  He is still with SOB, poor oral intake, no fever.   Vital Signs Last 24 Hrs  T(C): 36.9 (31 Jul 2022 14:27), Max: 36.9 (31 Jul 2022 14:27)  T(F): 98.5 (31 Jul 2022 14:27), Max: 98.5 (31 Jul 2022 14:27)  HR: 69 (31 Jul 2022 14:27) (69 - 84)  BP: 135/70 (31 Jul 2022 14:27) (117/57 - 155/103)  BP(mean): --  RR: 18 (31 Jul 2022 14:27) (18 - 18)  SpO2: --                Consultant(s) Notes Reviewed:  [x ] YES  [ ] NO          MEDICATIONS  (STANDING):  apixaban 2.5 milliGRAM(s) Oral two times a day  atorvastatin 40 milliGRAM(s) Oral at bedtime  BACItracin   Ointment 1 Application(s) Topical every 12 hours  calcitriol   Capsule 0.25 MICROGram(s) Oral <User Schedule>  darbepoetin Injectable ViaL 20 MICROGram(s) SubCutaneous every 7 days  dextrose 5%. 1000 milliLiter(s) (100 mL/Hr) IV Continuous <Continuous>  dextrose 5%. 1000 milliLiter(s) (50 mL/Hr) IV Continuous <Continuous>  dextrose 50% Injectable 25 Gram(s) IV Push once  dextrose 50% Injectable 12.5 Gram(s) IV Push once  dextrose 50% Injectable 25 Gram(s) IV Push once  furosemide    Tablet 20 milliGRAM(s) Oral daily  glucagon  Injectable 1 milliGRAM(s) IntraMuscular once  insulin lispro (ADMELOG) corrective regimen sliding scale   SubCutaneous three times a day before meals  levothyroxine 150 MICROGram(s) Oral daily  metoprolol succinate ER 50 milliGRAM(s) Oral daily  multivitamin/minerals 1 Tablet(s) Oral daily  pentoxifylline 400 milliGRAM(s) Oral two times a day  piperacillin/tazobactam IVPB.. 2.25 Gram(s) IV Intermittent every 8 hours  sodium bicarbonate 650 milliGRAM(s) Oral three times a day    MEDICATIONS  (PRN):  acetaminophen     Tablet .. 650 milliGRAM(s) Oral every 6 hours PRN Temp greater or equal to 38C (100.4F), Mild Pain (1 - 3)  ALBUTerol    90 MICROgram(s) HFA Inhaler 1 Puff(s) Inhalation every 6 hours PRN Bronchospasm  dextrose Oral Gel 15 Gram(s) Oral once PRN Blood Glucose LESS THAN 70 milliGRAM(s)/deciliter  HYDROmorphone  Injectable 0.25 milliGRAM(s) IV Push every 4 hours PRN dyspnea  melatonin 5 milliGRAM(s) Oral at bedtime PRN Insomnia      LABS                          8.6    10.35 )-----------( 146      ( 31 Jul 2022 08:41 )             26.8     07-31    142  |  107  |  68<HH>  ----------------------------<  216<H>  4.4   |  24  |  1.8<H>    Ca    7.7<L>      31 Jul 2022 08:41  Mg     2.1     07-31      ABG - ( 30 Jul 2022 10:31 )  pH, Arterial: 7.52  pH, Blood: x     /  pCO2: 31    /  pO2: 173   / HCO3: 25    / Base Excess: 3.0   /  SaO2: 99.8                  Lactate Trend        CAPILLARY BLOOD GLUCOSE      POCT Blood Glucose.: 206 mg/dL (31 Jul 2022 12:20)        RADIOLOGY & ADDITIONAL TESTS:    Imaging Personally Reviewed:  [ ] YES  [ ] NO    HEALTH ISSUES - PROBLEM Dx:          PHYSICAL EXAM:  GENERAL: moderate respiratory distress.  HEAD:  Atraumatic, Normocephalic.  EYES: EOMI, PERRLA, conjunctiva and sclera clear.  NECK: Supple, No JVD.  CHEST/LUNG: Bilateral lower lung rales.   HEART: irregular rate and rhythm; S1 S2.   ABDOMEN: Soft, Nontender, Nondistended; Bowel sounds present.  EXTREMITIES:  2+ Peripheral Pulses, No clubbing, cyanosis, or edema.  PSYCH: AAOx3.  NEUROLOGY: non-focal.  SKIN: No rashes or lesions.

## 2022-07-31 NOTE — PROGRESS NOTE ADULT - ASSESSMENT
82M with PMHx of HFrEF, Afib on eliquis, CAD s/p CABG, COPD (on rare home O2 PRN), CKD3, HTN, HLD who presented for 2 weeks of watery diarrhea found to have Covid-19, with hospital stay c/b altered mental status. Course complicated with bacterial pneumonia and hypoxia    A/P:   Acute hypoxic Respiratory Failure: improving.   Multifocal pneumonia: likely gram Negative pneumonia  Chest Ct showed bilateral consolidative changes compatible with pneumonia  Zosyn discontinue yesterday resumed with renally adjusted dose today 7/30, may need to treat for 10 days (4 more days)  Nasal canula is causing dry nose and bleeding, placed on mask but he is refusing to put, O2sat around 93% on room air. ABG done today showed respiratory alkalosis.     Acute HFmrEF:   CXR is showing developing pleural effusion and fluid in the lung fissure, with pulmonary congestion compatible with CHF but could be from pneumonia, however patient with poor oral intake, he drinks some liquid but doubt that will give him fluid overload.   s/p Lasix IV yesterday, continue Lasix 20mg PO.   continue Metoprolol.     Acute Metabolic encephalopathy;   Improving, patient is alert, oriented to people and place, partially to time, no focal weakness or deficit.   head Ct showed no acute infarction or bleeding, stable moderate ventriculomegaly.  EEG: triphasic waves; mild-mod gen slowing    Acute Kidney Injury on CKD 3:   C3 Glomerulopathy:   cr 1.8 stable, baseline   Can resume  Tacrolimus 0.5mg BID today per nephrology, check trough after 48hrs.   PTH level stable, Phosphorus normal, continue Calcitrol and Sodium bicarb.     Severe Malnutrition  Continue minced and moist diet.   mckenna poor appetite, he is refusing to eat.   Discussed with patient and his family, no NGT or PEG tube.   Aspiration precaution.     chronic Anemia likely form CKD  started on Aranesp weekly.     Chronic Atrial Fibrillation:   HR is stable, continue Metoprolol and Eliquis.   Recent COVID-19 infection  s/p isolation , completed dexamethasone.     COPD;     minor nosebleed 2/2 dry nasal passage  humidify O2  bacitracin oint to nares top q12    DM 2  diabetic diet  FS qac/hs     PVD:  pentoxifylline     Hypothyroidism  synthroid 150mg q24  TSH 0.39    DVT ppx: on eliquis    #Progress Note Handoff:  Pending (specify):  improving SOB, pneumonia,   Family discussion: with his wife and daughter, goals of care discussed, no plan for PEG tube or NGT, also patient wants to be DNR/DNI, MOLST form signed.   Disposition: unknown.

## 2022-07-31 NOTE — PROGRESS NOTE ADULT - ASSESSMENT
82y Male with PMHx of HFrEF, Afib on eliquis, CAD s/p CABG, COPD (on rare home O2 PRN), CKD3 (Hx of C3 glomerulopathy), HTN, HLD    # BRENDA on CKD 3b / prerenal? baseline 1.8   - creat at baseline now   not eating  # please document UO   # C3 glomerulopathy - on Prograf 1 mg q12h- LAST TAC LEVEL was 10 from 16 at around 7 pm/ sp TAc one dose this morning hold night dose   resume tacrolimus today  at 0.5 mg q12h and will need repeat trough in AM ( 30 minutes before dose intake) [facility] - on hold following COVID19 illness  # Proteinuria 8.4 g initially responded well to Prograf with reduction of proteinuria  to<1 g/g according to outpatient records  # HFrEF 10/2021 TTE - severe LV and RV systolic dysfunction and PAH / seen by Dr. De La Fuente, EF ~45%  #continue sodium bicarbonate  650 q 8   #on calcitriol / last pth at goal / check phosphorus  level   # started on SRIRAM / ferritin elevated   # no need for RRT   # will follow

## 2022-08-01 NOTE — CONSULT NOTE ADULT - PROBLEM SELECTOR RECOMMENDATION 4
Pt wit h/o COPD. Now bed bound and oxygen dependent 24hrs a day. Considering hospice. Pt wit h/o COPD. Now bed bound and oxygen dependent 24hrs a day. Considering hospice.  -dilaudid 0.25mg IV q4h PRN For dyspnea

## 2022-08-01 NOTE — PROGRESS NOTE ADULT - SUBJECTIVE AND OBJECTIVE BOX
ANDRIA TAVAREZ 82y Male  MRN#: [id number]   Hospital Day: 27d    SUBJECTIVE  Patient is a 82y old Male who presents with a chief complaint of Diarrhea (01 Aug 2022 07:57)  Currently admitted to medicine with the primary diagnosis of Acute kidney injury superimposed on CKD      INTERVAL HPI AND OVERNIGHT EVENTS:  Patient was examined and seen at bedside. This morning he is resting comfortably in bed and reports no issues or overnight events.    REVIEW OF SYMPTOMS:  Denies all.     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    AF (atrial fibrillation)  s/p DCCV    Carotid stenosis    COPD (chronic obstructive pulmonary disease)    H/O coronary artery bypass surgery  2001    S/P inguinal hernia repair    History of appendectomy      ALLERGIES:  Ozempic (0.25 mg or 0.5 mg dose) (Hives; Rash)  streptomycin (Unknown)  Trulicity Pen (Hives; Rash)    MEDICATIONS:  STANDING MEDICATIONS  apixaban 2.5 milliGRAM(s) Oral two times a day  atorvastatin 40 milliGRAM(s) Oral at bedtime  BACItracin   Ointment 1 Application(s) Topical every 12 hours  calcitriol   Capsule 0.25 MICROGram(s) Oral <User Schedule>  darbepoetin Injectable ViaL 20 MICROGram(s) SubCutaneous every 7 days  dextrose 5%. 1000 milliLiter(s) IV Continuous <Continuous>  dextrose 5%. 1000 milliLiter(s) IV Continuous <Continuous>  dextrose 50% Injectable 25 Gram(s) IV Push once  dextrose 50% Injectable 12.5 Gram(s) IV Push once  dextrose 50% Injectable 25 Gram(s) IV Push once  furosemide    Tablet 20 milliGRAM(s) Oral daily  glucagon  Injectable 1 milliGRAM(s) IntraMuscular once  insulin lispro (ADMELOG) corrective regimen sliding scale   SubCutaneous three times a day before meals  levothyroxine 150 MICROGram(s) Oral daily  metoprolol succinate ER 50 milliGRAM(s) Oral daily  multivitamin/minerals 1 Tablet(s) Oral daily  pentoxifylline 400 milliGRAM(s) Oral two times a day  piperacillin/tazobactam IVPB.. 2.25 Gram(s) IV Intermittent every 8 hours  sodium bicarbonate 650 milliGRAM(s) Oral three times a day  tacrolimus 0.5 milliGRAM(s) Oral <User Schedule>    PRN MEDICATIONS  acetaminophen     Tablet .. 650 milliGRAM(s) Oral every 6 hours PRN  ALBUTerol    90 MICROgram(s) HFA Inhaler 1 Puff(s) Inhalation every 6 hours PRN  dextrose Oral Gel 15 Gram(s) Oral once PRN  HYDROmorphone  Injectable 0.25 milliGRAM(s) IV Push every 4 hours PRN  melatonin 5 milliGRAM(s) Oral at bedtime PRN      VITAL SIGNS: Last 24 Hours  T(C): 35.8 (01 Aug 2022 04:35), Max: 36.9 (31 Jul 2022 14:27)  T(F): 96.5 (01 Aug 2022 04:35), Max: 98.5 (31 Jul 2022 14:27)  HR: 82 (01 Aug 2022 04:35) (69 - 89)  BP: 129/60 (01 Aug 2022 04:35) (129/60 - 135/70)  BP(mean): --  RR: 18 (01 Aug 2022 04:35) (18 - 18)  SpO2: --    LABS:                        8.6    10.35 )-----------( 146      ( 31 Jul 2022 08:41 )             26.8     08-01    140  |  104  |  x   ----------------------------<  x   4.1   |  x   |  x     Ca    7.7<L>      31 Jul 2022 08:41  Mg     2.0     08-01        PHYSICAL EXAM:  CONSTITUTIONAL: No acute distress, well-developed, well-groomed, AAOx3  HEAD: Atraumatic, normocephalic  EYES: EOM intact, PERRLA, conjunctiva and sclera clear  ENT: Supple, no masses, no thyromegaly, no bruits, no JVD; moist mucous membranes  PULMONARY: Clear to auscultation bilaterally; no wheezes, rales, or rhonchi  CARDIOVASCULAR: Regular rate and rhythm; no murmurs, rubs, or gallops  GASTROINTESTINAL: Soft, non-tender, non-distended; bowel sounds present  MUSCULOSKELETAL: 2+ peripheral pulses; no clubbing, no cyanosis, no edema  NEUROLOGY: non-focal  SKIN: No rashes or lesions; warm and dry

## 2022-08-01 NOTE — PROGRESS NOTE ADULT - ASSESSMENT
82y Male with PMHx of HFrEF, Afib on eliquis, CAD s/p CABG, COPD (on rare home O2 PRN), CKD3 (Hx of C3 glomerulopathy), HTN, HLD    # BRENDA on CKD 3b / prerenal? baseline 1.8   #creat at baseline now   # please document UO   # continue low dose lasix   # pneumonia on zosyn   # C3 glomerulopathy - last tacrolimus level in the 10 range    tacrolimus  at 0.5 mg q12h and will need repeat trough today  ( 30 minutes before dose intake) [facility] - on hold following COVID19 illness  # Proteinuria 8.4 g initially responded well to Prograf with reduction of proteinuria  to<1 g/g according to outpatient records  # HFrEF 10/2021 TTE - severe LV and RV systolic dysfunction and PAH / seen by Dr. De La Fuente, EF ~45%  #decrease  sodium bicarbonate  650  to q 12   #on calcitriol / last pth at goal / check phosphorus  level   # started on SRIRAM / ferritin elevated   # overall prognosis poor   # will follow

## 2022-08-01 NOTE — CONSULT NOTE ADULT - CONSULT REQUESTED DATE/TIME
12-Jul-2022 13:54
20-Jul-2022 14:12
11-Jul-2022 15:51
12-Jul-2022 11:41
18-Jul-2022 03:45
01-Aug-2022 10:14

## 2022-08-01 NOTE — PROGRESS NOTE ADULT - SUBJECTIVE AND OBJECTIVE BOX
seen and examined  on venti mask         PAST HISTORY  --------------------------------------------------------------------------------  No significant changes to PMH, PSH, FHx, SHx, unless otherwise noted    ALLERGIES & MEDICATIONS  --------------------------------------------------------------------------------  Allergies    Ozempic (0.25 mg or 0.5 mg dose) (Hives; Rash)  streptomycin (Unknown)  Trulicity Pen (Hives; Rash)    Intolerances      Standing Inpatient Medications  apixaban 2.5 milliGRAM(s) Oral two times a day  atorvastatin 40 milliGRAM(s) Oral at bedtime  BACItracin   Ointment 1 Application(s) Topical every 12 hours  calcitriol   Capsule 0.25 MICROGram(s) Oral <User Schedule>  darbepoetin Injectable ViaL 20 MICROGram(s) SubCutaneous every 7 days  dextrose 5%. 1000 milliLiter(s) IV Continuous <Continuous>  dextrose 5%. 1000 milliLiter(s) IV Continuous <Continuous>  dextrose 50% Injectable 25 Gram(s) IV Push once  dextrose 50% Injectable 12.5 Gram(s) IV Push once  dextrose 50% Injectable 25 Gram(s) IV Push once  furosemide    Tablet 20 milliGRAM(s) Oral daily  glucagon  Injectable 1 milliGRAM(s) IntraMuscular once  insulin lispro (ADMELOG) corrective regimen sliding scale   SubCutaneous three times a day before meals  levothyroxine 150 MICROGram(s) Oral daily  metoprolol succinate ER 50 milliGRAM(s) Oral daily  multivitamin/minerals 1 Tablet(s) Oral daily  pentoxifylline 400 milliGRAM(s) Oral two times a day  piperacillin/tazobactam IVPB.. 2.25 Gram(s) IV Intermittent every 8 hours  sodium bicarbonate 650 milliGRAM(s) Oral three times a day  tacrolimus 0.5 milliGRAM(s) Oral <User Schedule>    PRN Inpatient Medications  acetaminophen     Tablet .. 650 milliGRAM(s) Oral every 6 hours PRN  ALBUTerol    90 MICROgram(s) HFA Inhaler 1 Puff(s) Inhalation every 6 hours PRN  dextrose Oral Gel 15 Gram(s) Oral once PRN  HYDROmorphone  Injectable 0.25 milliGRAM(s) IV Push every 4 hours PRN  melatonin 5 milliGRAM(s) Oral at bedtime PRN          VITALS/PHYSICAL EXAM  --------------------------------------------------------------------------------  T(C): 35.8 (08-01-22 @ 04:35), Max: 36.9 (07-31-22 @ 14:27)  HR: 82 (08-01-22 @ 04:35) (69 - 89)  BP: 129/60 (08-01-22 @ 04:35) (117/57 - 135/70)  RR: 18 (08-01-22 @ 04:35) (18 - 18)  SpO2: --  Wt(kg): --        Physical Exam:  	Gen: on venti mask    	Pulm: decrease BS B/L  	CV: S1S2; no rub  	Abd: +distended      LABS/STUDIES  --------------------------------------------------------------------------------              8.6    10.35 >-----------<  146      [07-31-22 @ 08:41]              26.8     142  |  107  |  68  ----------------------------<  216      [07-31-22 @ 08:41]  4.4   |  24  |  1.8        Ca     7.7     [07-31-22 @ 08:41]      Mg     2.1     [07-31-22 @ 08:41]        Creatinine Trend:  SCr 1.8 [07-31 @ 08:41]  SCr 1.8 [07-30 @ 07:31]  SCr 2.1 [07-29 @ 06:13]  SCr 2.1 [07-28 @ 09:18]  SCr 2.2 [07-27 @ 09:02]    Urinalysis - [07-22-22 @ 13:10]      Color Yellow / Appearance Slightly Turbid / SG 1.018 / pH 6.0      Gluc Negative / Ketone Negative  / Bili Negative / Urobili <2 mg/dL       Blood Moderate / Protein 300 mg/dL / Leuk Est Negative / Nitrite Negative      RBC 1 / WBC 9 / Hyaline 13 / Gran  / Sq Epi  / Non Sq Epi 4 / Bacteria Negative      Iron 28, TIBC 135, %sat 21      [07-26-22 @ 13:55]  Ferritin 1299      [07-26-22 @ 09:35]  PTH -- (Ca 9.0)      [07-06-22 @ 07:39]   93  Vitamin D (25OH) 27      [07-06-22 @ 07:39]  HbA1c 8.5      [10-01-19 @ 09:54]  TSH 0.39      [07-18-22 @ 17:42]  Lipid: chol 110, TG 94, HDL 31, LDL --      [07-06-22 @ 07:39]

## 2022-08-01 NOTE — CONSULT NOTE ADULT - CONSULT REASON
Immunosuppression for CKD
UTI covid
Worsening AMS (Fluctuating mental status since admission)
sob covid pneumonia
superimposed bacterial pna
Goals of care and Symptom management

## 2022-08-01 NOTE — PROGRESS NOTE ADULT - ASSESSMENT
# toxic and metab encephalopathies - improving a little, but sluggishly  I thought initial MS change at start of admit was metab: COVID 19 + PNA + hypoxia  I think the 2nd MS change was toxic encephalopathy from cefepime and dexa (doubt COVID is doing anything now; also do not believe he has stroke)  I think the 3rd MS was metab encephalopath 2/2 aspiration PNA/pneumonitis + lack of eating (malnutrition) + mild hosp delirium + debility/lack of exercise   initial CTH: mild chr micro changes; mild atrophy and ventric prom  rpt CT H NC: no acute path  EEG: triphasic waves; mild-mod gen slowing  LFTs are nl  today, he looks much better    # severe Malnutrition  c/w MVI w/ min q24   c/w oral diet (minced/moist) as renay  supplements: Glucerna 3-4x/day  pt pulled out NGT; family not interested in PEG  pt seems to be eating/drinking better today  f/u w/ Dr Elizabeth  can recall Sp/Sw to see if he can have thin liquids and/or diff diet than minced/moist?    # leukocytosis seems like it was aspiration PNA/pneumonitis  ID and Pulm noted  CT C 7/5 and 7/23 reviewed: bilateral bases, arjun Lt, are worse on current exam  CXR shows same b/l opac  rpt bld cx/UCx: neg  abx: completed zosyn (7/24-7/31) - can stop today  WBC back to nl  can follow cbc q3 days    # knee pain - prob OA and stiffness from lack of mobility  skel xrays do not show chondrocalcinosis: doubt pseudogout   uric acid lvl 9.5 (pt has no prior hx of gout)  tyl prn mild pain  ultram 50mg po q6 prn sev pain    # hypernatremia/hyponatremia  back to nl   c/w lasix 20mg po q24    # BRENDA on CKD III, presumed pre-renal; hx C3 glomerulopathy w/ proteinuria (up to 8.4 gm/day in past - responded well to immunosupp); metab acidosis  base Cr 2.1  renal eval: f/u noted  tacro decr to 0.5mg po q12 - f/u rpt tacro lvl  c/w calcitriol   c/w NaHCO3 650mg po q8 (HCO3 goal = 21-27)  U/A w/ 3+ protein -> U TP:Cr ratio: 1gm  BMP q48-72    # normo anemia of chr kidney dz; no signs of acute blood loss  keep hgb >8  aranesp 20mg sc q wk (Wed), per renal    # COVID + -> seems like pt passed thru inflam phase;   acute on chr hypoxic resp failure (pt on home O2 prn)  can stay on 2-3L NC O2 w/ humidification; bacitracin to nares q12  covid +: 7/5; 7/10; 7/11; 7/26 - no further testing needed  d-dimer 454, ESR 38, CRP 25.9, ferr 1415  pt was NOT tx'd initially for COVID - RDV relative CI at this GFR anyway  was beyond tx window for MABs  completed most of dexameth course  completed quarantine 7/5-7/15 -> d/c isolation     # COPD; severe pulm HTN; fibrotic changes LLL; PNA (see above)  cont NC O2  c/w proventil prn  incent nara  pulm f/u    # HFrEF, chronic; sev dilated CM; RV dysfxn; mild TR/AR; HTN  not on all goal directed tx - cardio eval Dr Witt - (per cardio, EF closer to 45%)  Echo: EF 45-50%; mult LV reg wall motion abnl; LAE; mild MR; mild TR; borderline pulm HTN  BB - c/w toprol xl 50mg po q24   not on ACEI/ARB 2/2 CKD/BRENDA (could consider hydral + nitrates, but only if BP good enough)  diuretics - lasix 20mg po q24    # AFib, chronic  rate controlled - on BB  c/w eliquis 2.5mg po q12    # CAD  agree w/ no asa    # DM 2  diabetic diet  FS qac/hs   make lantus 6 HS  make ademelog 2/m w/ +1 CF scale (no ademelog if NOT eating)    # PVD  c/w pentoxifylline     # DLD  on statin    # Fall; diff walking; weakness/debility/deconditioning  CTH neg  trauma w/u neg  PT eval for SNF (during this admit, previously, pt was OOBTC and did walk a little w/ walker and asst)    # Hypothyroidism  synthroid 150mg q24  TSH 0.39    # DVT ppx: on eliquis    # GI ppx: none    # Activity: need PT eval    # updated dtr at bedside    Dispo: f/u Nutr eval; cont diet; recall Sp/Sw; f/u tacro lvl; can limit labs; tx knee pain; incent nara; d/c zosyn   Eventually, pt will need STR @ SNF ([facility]) - f/u CM - can start d/c planning    Prog remains very guarded. Long term prog is likely poor.

## 2022-08-01 NOTE — CONSULT NOTE ADULT - PROBLEM SELECTOR RECOMMENDATION 9
Pt on lasix, short of breath at rest on oxygen. Pt on lasix, short of breath at rest on oxygen.  -continue treatement of CHF per primary team

## 2022-08-01 NOTE — CONSULT NOTE ADULT - ASSESSMENT
82yMale being evaluated for goals of care and symptom management. Pt appears to have very advanced illness, weak and dyspneic on oxygen. Pt denies pain and said he does not feel SOB at rest. He feels very weak. He said he is much sicker than his baseline. He is tired of being in the hospital. Does not want feeding tubes.       MEDD (morphine equivalent daily dose):      See Recs below.    Please call [phone] with questions or concerns 24/7.   We will continue to follow.    82yMale being evaluated for goals of care and symptom management. Pt appears to have very advanced illness, weak and dyspneic on oxygen. Pt denies pain and said he does not feel SOB at rest. He feels very weak. He said he is much sicker than his baseline. He is tired of being in the hospital. Does not want feeding tubes.       MEDD (morphine equivalent daily dose): NA      See Recs below.    Please call [phone] with questions or concerns 24/7.   We will continue to follow.

## 2022-08-01 NOTE — PROGRESS NOTE ADULT - ASSESSMENT
ASSESSMENT & PLAN:  82M with PMHx of HFrEF, Afib on eliquis, CAD s/p CABG, COPD (on rare home O2 PRN), CKD3, HTN, HLD who presented for 2 weeks of watery diarrhea found to have Covid-19, with hospital stay c/b altered mental status.    #Decreased PO intake  - Patient refusing meals  - May be due to elevated Tacro level  - Re-Evaluated by speech and swallow -> modified barium swallow study. Diet minced and moist w/ mildly thickened liquids.    Plan:  -Goals of care conversation was had with the palliative team. Patient and patient's family do not want aritificial feeding at this time (no NG/PEG). Patients family wishes to transition to palliative approach, and would like a  come to the room.   -Patient is DNR/DNI  - If patient/patient's family do not want artificial feeding, no real need for continued hospitalization.  - Palliative care to follow up and discuss hospice options    #Anemia  - Hgb 07/26 6.9  - Bowel movements look normal not black/ would be concerning for bleeding   - Most likely chronic 2/2 ESRD    Plan:   - Restart Aranesp 20mcg sub-q weekly (given Wednesday 07/27)  - s/p 1 Unit PRBC (07/27)  - q24 hour cbc      # Metabolic Encephalopathy - multifactorial in etiology, improving   EEG and CT head w/o contrast- no acute pathology  EEG- Consistent with diffuse cerebral electrophysiological dysfunction, Secondary to toxic metabolic cause.    Plan:  - c/w IVF, give D5w with Lasix for hypervolemic hypernatremia -  Resolved   - q24 bmp      # Pnuemonia  - Repeat chest ct over the weekend shows bilateral lower lobe consolidations with small amount consolidation also in the lingular segment of the left upper lobe suspicious for multifocal pneumonia.    Plan:  - Started on Zosyn 3.375g q8 hours     # BRENDA on CKD III,   - presumed pre-renal   - hx C3 glomerulopathy    Plan:  - continue with NHCO3 PO  - Renal - continue tacrolimus ordered  - Tacrolimus trough- repeat trough after holding tacro was elevated still at >10.   - Gloria removed  - Per nephrology restart tacrolimus at 0.5 BID and follow up with a trough in 48 hours.     # COVID +  - s/p Dexa 6mg PO q6  - completed quarantine 7/15 - d/bg precautions 7/16  - Was unnecessarily retested, patient still positive but no symptoms. NOT INFECTIOUS, COULD BE POSITIVE UP TO 90 DAYS AFTER INFECTION      # COPD; severe pulm HTN  - Nebs PRN Q4    # Fall, mechanical  - no syncope per pt  - cth neg  - trauma w/u neg    Plan:  - PT eval for SNF    # HFrEF, chronic;   - severely dilated cardio myopathy  - Right ventrical dysfunction   - Mild tricuspid valve/ aortic valve regurg    Plan:  - LASIX 40 MG PO RESTARTED 07/26         - not on ANY goal directed tx - cardio eval Dr Haley  - Patient started on metoprolol 25mg q 24 PO    Need for follow up today regarding discharge planning.  ASSESSMENT & PLAN:  82M with PMHx of HFrEF, Afib on eliquis, CAD s/p CABG, COPD (on rare home O2 PRN), CKD3, HTN, HLD who presented for 2 weeks of watery diarrhea found to have Covid-19, with hospital stay c/b altered mental status.    #Decreased PO intake  - Patient refusing meals  - May be due to elevated Tacro level  - Re-Evaluated by speech and swallow -> modified barium swallow study. Diet minced and moist w/ mildly thickened liquids.    Plan:  -Goals of care conversation was had with the palliative team. Patient and patient's family do not want aritificial feeding at this time (no NG/PEG). Patients family wishes to transition to palliative approach, and would like a  come to the room.   -Patient is DNR/DNI  - If patient/patient's family do not want artificial feeding, no real need for continued hospitalization.  - Palliative care to follow up and discuss hospice options  - Patient has more strength and is more awake todya    #Anemia  - Hgb 07/26 6.9  - Bowel movements look normal not black/ would be concerning for bleeding   - Most likely chronic 2/2 ESRD    Plan:   - Restart Aranesp 20mcg sub-q weekly (given Wednesday 07/27)  - s/p 1 Unit PRBC (07/27)  - q24 hour cbc      # Metabolic Encephalopathy - multifactorial in etiology, improving   EEG and CT head w/o contrast- no acute pathology  EEG- Consistent with diffuse cerebral electrophysiological dysfunction, Secondary to toxic metabolic cause.    Plan:  - c/w IVF, give D5w with Lasix for hypervolemic hypernatremia -  Resolved   - q24 bmp      # Pnuemonia  - Repeat chest ct over the weekend shows bilateral lower lobe consolidations with small amount consolidation also in the lingular segment of the left upper lobe suspicious for multifocal pneumonia.    Plan:  - Started on Zosyn 3.375g q8 hours     # BRENDA on CKD III,   - presumed pre-renal   - hx C3 glomerulopathy    Plan:  - continue with NHCO3 PO  - Renal - continue tacrolimus ordered  - Tacrolimus trough- repeat trough after holding tacro was elevated still at >10.   - Gloria removed  - Per nephrology restart tacrolimus at 0.5 BID and follow up with a trough in 48 hours.     # COVID +  - s/p Dexa 6mg PO q6  - completed quarantine 7/15 - d/bg precautions 7/16  - Was unnecessarily retested, patient still positive but no symptoms. NOT INFECTIOUS, COULD BE POSITIVE UP TO 90 DAYS AFTER INFECTION      # COPD; severe pulm HTN  - Nebs PRN Q4    # Fall, mechanical  - no syncope per pt  - cth neg  - trauma w/u neg    Plan:  - PT eval for SNF    # HFrEF, chronic;   - severely dilated cardio myopathy  - Right ventrical dysfunction   - Mild tricuspid valve/ aortic valve regurg    Plan:  - LASIX 40 MG PO RESTARTED 07/26         - not on ANY goal directed tx - cardio eval Dr Haley  - Patient started on metoprolol 25mg q 24 PO    Need for follow up today regarding discharge planning.  ASSESSMENT & PLAN:  82M with PMHx of HFrEF, Afib on eliquis, CAD s/p CABG, COPD (on rare home O2 PRN), CKD3, HTN, HLD who presented for 2 weeks of watery diarrhea found to have Covid-19, with hospital stay c/b altered mental status.    #Decreased PO intake  - Patient refusing meals  - May be due to elevated Tacro level  - Re-Evaluated by speech and swallow -> modified barium swallow study. Diet minced and moist w/ mildly thickened liquids.    Plan:  -Goals of care conversation was had with the palliative team. Patient and patient's family do not want aritificial feeding at this time (no NG/PEG). Patients family wishes to transition to palliative approach, and would like a  come to the room.   -Patient is DNR/DNI  - If patient/patient's family do not want artificial feeding, no real need for continued hospitalization.  - Palliative care to follow up and discuss hospice options  - Patient has more strength and is more awake today. Called speech and swallow for a re-evaluation Spoke with Polina who explained to me that he really is not a candidate for re-evaluation due to the fact that he failed the modified barium swallow study. She recommended goals of care conversation and possible needs for comfort feeds.     #Anemia  - Hgb 07/26 6.9  - Bowel movements look normal not black/ would be concerning for bleeding   - Most likely chronic 2/2 ESRD    Plan:   - Restart Aranesp 20mcg sub-q weekly (given Wednesday 07/27)  - s/p 1 Unit PRBC (07/27)  - q24 hour cbc      # Metabolic Encephalopathy - multifactorial in etiology, improving   EEG and CT head w/o contrast- no acute pathology  EEG- Consistent with diffuse cerebral electrophysiological dysfunction, Secondary to toxic metabolic cause.    Plan:  - c/w IVF, give D5w with Lasix for hypervolemic hypernatremia -  Resolved   - q24 bmp      # Pnuemonia  - Repeat chest ct over the weekend shows bilateral lower lobe consolidations with small amount consolidation also in the lingular segment of the left upper lobe suspicious for multifocal pneumonia.    Plan:  - Started on Zosyn 3.375g q8 hours     # BRENDA on CKD III,   - presumed pre-renal   - hx C3 glomerulopathy    Plan:  - continue with NHCO3 PO  - Renal - continue tacrolimus ordered  - Tacrolimus trough- repeat trough after holding tacro was elevated still at >10.   - Gloria removed  - Per nephrology restart tacrolimus at 0.5 BID and follow up with a trough in 48 hours.     # COVID +  - s/p Dexa 6mg PO q6  - completed quarantine 7/15 - d/bg precautions 7/16  - Was unnecessarily retested, patient still positive but no symptoms. NOT INFECTIOUS, COULD BE POSITIVE UP TO 90 DAYS AFTER INFECTION      # COPD; severe pulm HTN  - Nebs PRN Q4    # Fall, mechanical  - no syncope per pt  - cth neg  - trauma w/u neg    Plan:  - PT eval for SNF    # HFrEF, chronic;   - severely dilated cardio myopathy  - Right ventrical dysfunction   - Mild tricuspid valve/ aortic valve regurg    Plan:  - LASIX 40 MG PO RESTARTED 07/26         - not on ANY goal directed tx - cardio eval Dr Haley  - Patient started on metoprolol 25mg q 24 PO    Need for follow up today regarding discharge planning.

## 2022-08-01 NOTE — CONSULT NOTE ADULT - PROBLEM SELECTOR RECOMMENDATION 3
Pt with very poor po intake refusing NGT or artificial feeding. Meeting w family and patient to discuss next steps 8/2/22 11am

## 2022-08-01 NOTE — CHART NOTE - NSCHARTNOTEFT_GEN_A_CORE
PALLIATIVE MEDICINE INTERDISCIPLINARY TEAM NOTE    Provider:                                         [ X  ] Initial visit        Met with: [ X  ] Patient  [   ] Family  [   ] Other:    Primary Language: [  X ] English [   ] Other*:                      *Interpretation provided by:    SUPPORT DIAGNOSES            (Check all that apply)    [   ] EOL issues  [  X ] Advanced Illness  [   ] Cultural / spiritual concerns  [   ] Pain / suffering  [   ] Dementia / AMS  [   ] Other:  [   ] AD issues  [   ] Grief / loss / sadness  [   ] Discharge issues  [  X ] Distress / coping    PSYCHOSOCIAL ASSESSMENT OF PATIENT         (Check all that apply)    [  X ] Initial Assessment            [   ] Reassessment          [   ] Not Applicable this visit    Pain/suffering acuity:  [  X ] None to mild (0-3)           [   ] Moderate (4-6)        [   ] High (7-10)    Mental Status:  [ X  ] Alert/oriented (x3)          [   ] Confused/Altered(x2/x1)         [   ] Non-resp    Functional status:  [   ] Independent w ADLs      [ X  ] Needs Assistance             [   ] Bedbound/Full Care    Coping:  [   ] Coping well                     [ X  ] Coping w/difficulty            [   ] Poor coping    Support system:  [ X  ] Strong                              [   ] Adequate                        [   ] Inadequate      Past history and medications for:     [ ] Anxiety       [ ] Depression    [ ] Sleep disorders       SERVICE PROVIDED  [   ]Discharge support / facilitation  [   ]AD / goals of care counseling                                  [   ]EOL / death / bereavement counseling  [ X  ]Counseling / support                                                [   ] Family meeting  [   ]Prayer / sacrament / ritual                                      [   ] Referral   [   ]Other                                                                       NOTE and Plan of Care (PoC):    patient is a 81 y/o F with noted pmhx, presenting with c/o 2 weeks of watery diarrhea. palliative team met with patient at bedside earlier today. patient encountered resting comfortably in bed. patient denied any pain or discomfort. reviewed role of palliative care with patient. he noted not wanting to live this way. we f/u with patient's daughter Sasha. plan for family meeting tomorrow 11AM . [phone]

## 2022-08-01 NOTE — CONSULT NOTE ADULT - CONVERSATION DETAILS
Palliative care team spoke to Patient     He is alert and conversant but has some difficulty remembering details of things. He said he remembers saying he does not want to be on any machines. He said to us "this is no way to live" and " I just want to be home".    Call placed to spouse Oanh, daughter Casi answered and said mom has a doctors appointment today. Wants to have family meeting tomorrow. Will discuss option of hospice care and how to set that up vs Home care and rehab.

## 2022-08-01 NOTE — CONSULT NOTE ADULT - NS ATTEND AMEND GEN_ALL_CORE FT
I have personally seen and examined this patient.  I have fully participated in the care of this patient.  I have reviewed all pertinent clinical information, including history, physical exam, plan and note. Patient with fluctuating mental status for past few days. Currently lethargic with regards but does not follow commands. Has generalized asterixis. Suspect metabolic causes. Brain MRI w/o and VEEG after    I have reviewed all pertinent clinical information and reviewed all relevant imaging and diagnostic studies personally.  Recommendations as above.  Agree with above assessment except as noted.
High risk patient as requiring IV antibiotics and IV opioids with need for intensive monitoring  Plan for family meeting tomrorow at 11am.

## 2022-08-01 NOTE — PROGRESS NOTE ADULT - SUBJECTIVE AND OBJECTIVE BOX
KANGANDRIA MCELROY  82y  Male  ***My note supersedes ALL resident notes that I sign.  My corrections for their notes are in my note.***    I can be reached directly on [employer] [facility] [id number]. My office number is [phone]. My personal cell number is [phone].    INTERVAL EVENTS: Here for f/u of PNA. PT looks much better today. He was able to drink w/ a straw on his own w/out any diff swallowing or choking. The signif tremors in his arms w/ mvmt seem gone.  Pt was asking about d/c to rehab soon, which is encouraging. Pt c/o knee pain, more on right (he has had this before) and was asking for pain meds. Pt legs remain weak.    T(F): 96.5 (08-01-22 @ 04:35), Max: 98.5 (07-31-22 @ 14:27)  HR: 82 (08-01-22 @ 04:35) (69 - 89)  BP: 129/60 (08-01-22 @ 04:35) (129/60 - 135/70)  RR: 18 (08-01-22 @ 04:35) (18 - 18)  SpO2: --    Gen: no resp distress; speaking much better; + NC O2; follows commands well;  HEENT: PERRL, EOMI, mouth clr, nose clr  Neck: no nodes, no JVD, thyroid nl  lungs: clr  hrt: s1 s2 rrr no murmur  abd: soft, NT/ND, no HS megaly  ext: no edema, no c/c; has pain w/ moving legs  neuro: very awake and alert; ox3 (at baseline MS), cn intact, can move all 4 ext, but very weak, arjun both legs    LABS:                      8.2     (    92.6   9.62  )-----------( ---------      165      ( 01 Aug 2022 08:40 )             26.3    (    16.4     140   (   104   (   216      08-01-22 @ 08:40  ----------------------               4.1   (   24   (   68                             -----                        1.8  Ca  7.7   Mg  2.0    P   --     CAPILLARY BLOOD GLUCOSE  POCT Blood Glucose.: 221 (08-01-22 @ 12:05)  POCT Blood Glucose.: 248 (08-01-22 @ 08:20)  POCT Blood Glucose.: 229 (07-31-22 @ 22:13)  POCT Blood Glucose.: 190 (07-31-22 @ 17:04)  POCT Blood Glucose.: 206 (07-31-22 @ 12:20)  POCT Blood Glucose.: 244 (07-31-22 @ 08:19)  POCT Blood Glucose.: 211 (07-30-22 @ 21:11)  POCT Blood Glucose.: 137 (07-30-22 @ 17:08)    RADIOLOGY & ADDITIONAL TESTS:  < from: Xray Chest 1 View- PORTABLE-Routine (Xray Chest 1 View- PORTABLE-Routine .) (07.30.22 @ 10:01) >  IMPRESSION:    Unchanged left greater than right bibasal opacities/effusion. No pneumothorax    Stable cardiomediastinal silhouette.    Unchanged osseous structures.    < end of copied text >    < from: Xray Chest 1 View- PORTABLE-Urgent (Xray Chest 1 View- PORTABLE-Urgent .) (07.28.22 @ 16:21) >  Impression:    Patchy opacifications. Support devices as described    < end of copied text >    < from: TTE Echo Complete w/o Contrast w/ Doppler (08.01.22 @ 08:14) >  Summary:   1. Left ventricular ejection fraction, by visual estimation, is 45 to 50%.   2. Mildly decreased global left ventricular systolic function.   3. Mild left ventricular hypertrophy.   4. Multiple left ventricular regional wall motion abnormalities exist.   See wall motion findings.   5. The left ventricular diastolic function could not be assessed in this study.   6. Moderately enlarged left atrium.   7. Sclerotic aortic valve with normal opening.   8. Degenerative mitral valve.   9. Mild mitral regurgitation.  10. Mild tricuspid regurgitation.  11. Estimated pulmonary artery systolic pressure is 37.6 mmHg assuming a   right atrial pressure of 8 mmHg, which is consistent with borderline pulmonary hypertension.    < end of copied text >    MEDICATIONS:  piperacillin/tazobactam IVPB.. 2.25 Gram(s) IV Intermittent every 8 hours    acetaminophen     Tablet .. 650 milliGRAM(s) Oral every 6 hours PRN  ALBUTerol    90 MICROgram(s) HFA Inhaler 1 Puff(s) Inhalation every 6 hours PRN  apixaban 2.5 milliGRAM(s) Oral two times a day  atorvastatin 40 milliGRAM(s) Oral at bedtime  BACItracin   Ointment 1 Application(s) Topical every 12 hours  calcitriol   Capsule 0.25 MICROGram(s) Oral <User Schedule>  darbepoetin Injectable ViaL 20 MICROGram(s) SubCutaneous every 7 days  furosemide    Tablet 20 milliGRAM(s) Oral daily  HYDROmorphone  Injectable 0.25 milliGRAM(s) IV Push every 4 hours PRN  insulin lispro (ADMELOG) corrective regimen sliding scale   SubCutaneous three times a day before meals  levothyroxine 150 MICROGram(s) Oral daily  melatonin 5 milliGRAM(s) Oral at bedtime PRN  metoprolol succinate ER 50 milliGRAM(s) Oral daily  multivitamin/minerals 1 Tablet(s) Oral daily  pentoxifylline 400 milliGRAM(s) Oral two times a day  sodium bicarbonate 650 milliGRAM(s) Oral three times a day  tacrolimus 0.5 milliGRAM(s) Oral <User Schedule>

## 2022-08-01 NOTE — CONSULT NOTE ADULT - SUBJECTIVE AND OBJECTIVE BOX
HPI:  82M with PMHx of HFrEF, Afib on eliquis, CAD s/p CABG, COPD (on rare home O2 PRN), CKD3, HTN, HLD presents for 2 weeks of watery diarrhea. Pt reports that 2 weeks ago he went to a doctor's appointment with his wife, and the next day both of them started experiencing non-bloody watery diarrhea, x3-4/day, and then started having decreased PO intake and felt weak. Of note, he also had a mechanical fall 1 week ago on his right side with no injuries sustained (but he didn't visit the ED). Pt denies any other sicks contacts, recent travel, vomiting, chest pain, SOB, palpitations, light-headedness, or any  symptoms.     In the ED: /57, HR 57, Temp 97.6F, satting 96% on RA. Labs notable for Cr of 3.6 (baseline ~2.0), , HCO3 19, AG 20. Trops 0.07 (likely 2/2 BRENDA), no chest pain, EKG 1st-degree AV block. CTH -ve, CTAP + chest -ve for acute pathology.     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    AF (atrial fibrillation)    Carotid stenosis    COPD (chronic obstructive pulmonary disease)      H/O coronary artery bypass surgery    S/P inguinal hernia repair    History of appendectomy      FAMILY HISTORY:  FHx: colon cancer      ITEMS NOT CHECKED ARE NOT PRESENT    SOCIAL HISTORY:   Significant other/partner[ ]  Children[ x]  Spiritism/Spirituality:  Substance hx:  [ ]   Tobacco hx:  [ ]   Alcohol hx: [ ]   Living Situation: [ ]Home  [ ]Long term care  [ ]Rehab [ ]Other  Home Services: [ ] HHA [ ] Visting RN [ ] Hospice  Occupation:  Home Opioid hx:  [ ] Y [ ] N [ ] [employer] Reference No:    ADVANCE DIRECTIVES:    DNR/DNI X  MOLST  [ ]  Living Will  [ ]   DECISION MAKER(s):  [x ] Health Care Proxy(s)  [ x] Surrogate(s)  [ ] Guardian           Name(s): Phone Number(s):  Oanh Wolf spouse [phone]    BASELINE (I)ADL(s) (prior to admission):  [place]: [ ]Total  [x ] Moderate [ ]Dependent  Palliative Performance Status Version 2:         %    [email or web address]/files/[email or web address]_performance_scale_[email or web address].pdf    Allergies    Ozempic (0.25 mg or 0.5 mg dose) (Hives; Rash)  streptomycin (Unknown)  Trulicity Pen (Hives; Rash)    Intolerances    MEDICATIONS  (STANDING):  apixaban 2.5 milliGRAM(s) Oral two times a day  atorvastatin 40 milliGRAM(s) Oral at bedtime  BACItracin   Ointment 1 Application(s) Topical every 12 hours  calcitriol   Capsule 0.25 MICROGram(s) Oral <User Schedule>  darbepoetin Injectable ViaL 20 MICROGram(s) SubCutaneous every 7 days  dextrose 5%. 1000 milliLiter(s) (100 mL/Hr) IV Continuous <Continuous>  dextrose 5%. 1000 milliLiter(s) (50 mL/Hr) IV Continuous <Continuous>  dextrose 50% Injectable 25 Gram(s) IV Push once  dextrose 50% Injectable 12.5 Gram(s) IV Push once  dextrose 50% Injectable 25 Gram(s) IV Push once  furosemide    Tablet 20 milliGRAM(s) Oral daily  glucagon  Injectable 1 milliGRAM(s) IntraMuscular once  insulin lispro (ADMELOG) corrective regimen sliding scale   SubCutaneous three times a day before meals  levothyroxine 150 MICROGram(s) Oral daily  metoprolol succinate ER 50 milliGRAM(s) Oral daily  multivitamin/minerals 1 Tablet(s) Oral daily  pentoxifylline 400 milliGRAM(s) Oral two times a day  piperacillin/tazobactam IVPB.. 2.25 Gram(s) IV Intermittent every 8 hours  sodium bicarbonate 650 milliGRAM(s) Oral three times a day  tacrolimus 0.5 milliGRAM(s) Oral <User Schedule>    MEDICATIONS  (PRN):  acetaminophen     Tablet .. 650 milliGRAM(s) Oral every 6 hours PRN Temp greater or equal to 38C (100.4F), Mild Pain (1 - 3)  ALBUTerol    90 MICROgram(s) HFA Inhaler 1 Puff(s) Inhalation every 6 hours PRN Bronchospasm  dextrose Oral Gel 15 Gram(s) Oral once PRN Blood Glucose LESS THAN 70 milliGRAM(s)/deciliter  HYDROmorphone  Injectable 0.25 milliGRAM(s) IV Push every 4 hours PRN dyspnea  melatonin 5 milliGRAM(s) Oral at bedtime PRN Insomnia    PRESENT SYMPTOMS: [ ]Unable to obtain due to poor mentation   Source if other than patient:  [ ]Family   [ ]Team     Pain: [ ]yes [x ]no  QOL impact -   Location -                    Aggravating factors -  Quality -  Radiation -  Timing-  Severity (0-10 scale):  Minimal acceptable level (0-10 scale):     CPOT:    https://www.[facility].org/getattachment/[email or web address]/Critical-Care-Pain-Observation-Tool-(CPOT)      PAIN AD Score:     http://geriatrictoolkit.[email or web address]/cog/painad.pdf (press ctrl +  left click to view)    Dyspnea:                           [ x]Mild [ ]Moderate [ ]Severe  Anxiety:                             [ ]Mild [ ]Moderate [ ]Severe  Fatigue:                             [ ]Mild [ ]Moderate [ ]Severe  Nausea:                             [ ]Mild [ ]Moderate [ ]Severe  Loss of appetite:              [ ]Mild [ ]Moderate [ ]Severe  Constipation:                    [ ]Mild [ ]Moderate [ ]Severe    Other Symptoms:  [x ]All other review of systems negative     Palliative Performance Status Version 2:         %    http://npcrc.org/files/news/palliative_performance_scale_ppsv2.pdf  PHYSICAL EXAM:  Vital Signs Last 24 Hrs  T(C): 35.8 (01 Aug 2022 04:35), Max: 36.9 (31 Jul 2022 14:27)  T(F): 96.5 (01 Aug 2022 04:35), Max: 98.5 (31 Jul 2022 14:27)  HR: 82 (01 Aug 2022 04:35) (69 - 89)  BP: 129/60 (01 Aug 2022 04:35) (129/60 - 135/70)  BP(mean): --  RR: 18 (01 Aug 2022 04:35) (18 - 18)  SpO2: --     I&O's Summary    GENERAL:  [ ]Alert  [ x]Oriented x 2 [ ]Lethargic  [ ]Cachexia  [ ]Unarousable  [ ]Verbal  [ ]Non-Verbal  Behavioral:   [ ] Anxiety  [ ] Delirium [ ] Agitation [ ] Other  HEENT:  [ x]Normal   [ ]Dry mouth   [ ]ET Tube/Trach  [ ]Oral lesions  PULMONARY:   [ ]Clear [ x]Tachypnea  [ ]Audible excessive secretions   [ ]Rhonchi        [ ]Right [ ]Left [ ]Bilateral  [ ]Crackles        [ ]Right [ ]Left [ ]Bilateral  [ ]Wheezing     [ ]Right [ ]Left [ ]Bilateral  [ ]Diminished breath sounds [ ]right [ ]left [ ]bilateral  CARDIOVASCULAR:    [x ]Regular [ ]Irregular [ ]Tachy  [ ]Won [ ]Murmur [ ]Other  GASTROINTESTINAL:  [x ]Soft  [ ]Distended   [ ]+BS  [ ]Non tender [ ]Tender  [ ]PEG [ ]OGT/ NGT  Last BM:   GENITOURINARY:  [x ]Normal [ ] Incontinent   [ ]Oliguria/Anuria   [ ]Gloria  MUSCULOSKELETAL:   [ ]Normal   [ x]Weakness  [ ]Bed/Wheelchair bound [ ]Edema  NEUROLOGIC:   [ ]No focal deficits  [ ]Cognitive impairment  [ ]Dysphagia [ ]Dysarthria [ ]Paresis [ ]Other   SKIN:   [ x]Normal    [ ]Rash  [ ]Pressure ulcer(s)       Present on admission [ ]y [ ]n        LABS: reviewed                         8.6    10.35 )-----------( 146      ( 31 Jul 2022 08:41 )             26.8   07-31    142  |  107  |  68<HH>  ----------------------------<  216<H>  4.4   |  24  |  1.8<H>    Ca    7.7<L>      31 Jul 2022 08:41  Mg     2.1     07-31          RADIOLOGY & ADDITIONAL STUDIES:    PROTEIN CALORIE MALNUTRITION PRESENT: [ ]mild [ ]moderate [ ]severe [ ]underweight [ ]morbid obesity  https://www.andeal.org/vault/2440/web/files/ONC/Table_Clinical%20Characteristics%20to%20Document%20Malnutrition-White%20JV%20et%20al%202012.pdf    Height (cm): 193 (07-27-22 @ 14:25), 188 (10-20-21 @ 07:00)  Weight (kg): 100 (07-05-22 @ 13:03), 107.1 (10-23-21 @ 05:41)  BMI (kg/m2): 26.8 (07-27-22 @ 14:25), 28.3 (07-05-22 @ 13:03), 30.3 (10-23-21 @ 05:41)    [ ]PPSV2 < or = to 30% [ ]significant weight loss  [ x]poor nutritional intake  [ ]anasarca      [ ]Artificial Nutrition      REFERRALS:   [ ]Chaplaincy  [ ]Hospice  [ ]Child Life  [ x]Social Work  [ x]Case management [ ]Holistic Therapy     Goals of Care Document:      CC: diarrhea    HPI:  82M with PMHx of HFrEF, Afib on eliquis, CAD s/p CABG, COPD (on rare home O2 PRN), CKD3, HTN, HLD presents for 2 weeks of watery diarrhea. Pt reports that 2 weeks ago he went to a doctor's appointment with his wife, and the next day both of them started experiencing non-bloody watery diarrhea, x3-4/day, and then started having decreased PO intake and felt weak. Of note, he also had a mechanical fall 1 week ago on his right side with no injuries sustained (but he didn't visit the ED). Pt denies any other sicks contacts, recent travel, vomiting, chest pain, SOB, palpitations, light-headedness, or any  symptoms.     In the ED: /57, HR 57, Temp 97.6F, satting 96% on RA. Labs notable for Cr of 3.6 (baseline ~2.0), , HCO3 19, AG 20. Trops 0.07 (likely 2/2 BRENDA), no chest pain, EKG 1st-degree AV block. CTH -ve, CTAP + chest -ve for acute pathology.     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    AF (atrial fibrillation)    Carotid stenosis    COPD (chronic obstructive pulmonary disease)      H/O coronary artery bypass surgery    S/P inguinal hernia repair    History of appendectomy      FAMILY HISTORY:  FHx: colon cancer      ITEMS NOT CHECKED ARE NOT PRESENT    SOCIAL HISTORY:   Significant other/partner[ ]  Children[ x]  Jainism/Spirituality:  Substance hx:  [ ]   Tobacco hx:  [ ]   Alcohol hx: [ ]   Living Situation: [ ]Home  [ ]Long term care  [ ]Rehab [ ]Other  Home Services: [ ] HHA [ ] Visting RN [ ] Hospice  Occupation:  Reference #: [id number] - iSTOP - no meds listed    ADVANCE DIRECTIVES:    DNR/DNI X  MOLST  [ ]  Living Will  [ ]   DECISION MAKER(s):  [x ] Health Care Proxy(s)  [ x] Surrogate(s)  [ ] Guardian           Name(s): Phone Number(s):  Oanh Wolf spouse [phone]    BASELINE (I)ADL(s) (prior to admission):  [place]: [ ]Total  [x ] Moderate [ ]Dependent  Palliative Performance Status Version 2:        Unknown %    [email or web address]/files/news/palliative_performance_scale_[email or web address].pdf    Allergies    Ozempic (0.25 mg or 0.5 mg dose) (Hives; Rash)  streptomycin (Unknown)  Trulicity Pen (Hives; Rash)    Intolerances    MEDICATIONS  (STANDING):  apixaban 2.5 milliGRAM(s) Oral two times a day  atorvastatin 40 milliGRAM(s) Oral at bedtime  BACItracin   Ointment 1 Application(s) Topical every 12 hours  calcitriol   Capsule 0.25 MICROGram(s) Oral <User Schedule>  darbepoetin Injectable ViaL 20 MICROGram(s) SubCutaneous every 7 days  dextrose 5%. 1000 milliLiter(s) (100 mL/Hr) IV Continuous <Continuous>  dextrose 5%. 1000 milliLiter(s) (50 mL/Hr) IV Continuous <Continuous>  dextrose 50% Injectable 25 Gram(s) IV Push once  dextrose 50% Injectable 12.5 Gram(s) IV Push once  dextrose 50% Injectable 25 Gram(s) IV Push once  furosemide    Tablet 20 milliGRAM(s) Oral daily  glucagon  Injectable 1 milliGRAM(s) IntraMuscular once  insulin lispro (ADMELOG) corrective regimen sliding scale   SubCutaneous three times a day before meals  levothyroxine 150 MICROGram(s) Oral daily  metoprolol succinate ER 50 milliGRAM(s) Oral daily  multivitamin/minerals 1 Tablet(s) Oral daily  pentoxifylline 400 milliGRAM(s) Oral two times a day  piperacillin/tazobactam IVPB.. 2.25 Gram(s) IV Intermittent every 8 hours  sodium bicarbonate 650 milliGRAM(s) Oral three times a day  tacrolimus 0.5 milliGRAM(s) Oral <User Schedule>    MEDICATIONS  (PRN):  acetaminophen     Tablet .. 650 milliGRAM(s) Oral every 6 hours PRN Temp greater or equal to 38C (100.4F), Mild Pain (1 - 3)  ALBUTerol    90 MICROgram(s) HFA Inhaler 1 Puff(s) Inhalation every 6 hours PRN Bronchospasm  dextrose Oral Gel 15 Gram(s) Oral once PRN Blood Glucose LESS THAN 70 milliGRAM(s)/deciliter  HYDROmorphone  Injectable 0.25 milliGRAM(s) IV Push every 4 hours PRN dyspnea  melatonin 5 milliGRAM(s) Oral at bedtime PRN Insomnia    PRESENT SYMPTOMS: [ ]Unable to obtain due to poor mentation   Source if other than patient:  [ ]Family   [ ]Team     Pain: [ ]yes [x ]no  QOL impact -   Location -                    Aggravating factors -  Quality -  Radiation -  Timing-  Severity (0-10 scale):  Minimal acceptable level (0-10 scale):       Dyspnea:                           [ x]Mild [ ]Moderate [ ]Severe  Anxiety:                             [ ]Mild [ ]Moderate [ ]Severe  Fatigue:                             [ ]Mild [ ]Moderate [ ]Severe  Nausea:                             [ ]Mild [ ]Moderate [ ]Severe  Loss of appetite:              [ ]Mild [ ]Moderate [ ]Severe  Constipation:                    [ ]Mild [ ]Moderate [ ]Severe    Other Symptoms:  [x ]All other review of systems negative     Palliative Performance Status Version 2:         30%    http://npcrc.org/files/news/palliative_performance_scale_ppsv2.pdf    PHYSICAL EXAM:  Vital Signs Last 24 Hrs  T(C): 35.8 (01 Aug 2022 04:35), Max: 36.9 (31 Jul 2022 14:27)  T(F): 96.5 (01 Aug 2022 04:35), Max: 98.5 (31 Jul 2022 14:27)  HR: 82 (01 Aug 2022 04:35) (69 - 89)  BP: 129/60 (01 Aug 2022 04:35) (129/60 - 135/70)  BP(mean): --  RR: 18 (01 Aug 2022 04:35) (18 - 18)  SpO2: --     I&O's Summary    GENERAL:  [x]Alert  [ x]Oriented x 2 [ ]Lethargic  [ ]Cachexia  [ ]Unarousable  [ ]Verbal  [ ]Non-Verbal  Behavioral:   [ ] Anxiety  [ ] Delirium [ ] Agitation [ ] Other  HEENT:  [ x]Normal   [ ]Dry mouth   [ ]ET Tube/Trach  [ ]Oral lesions  PULMONARY:   [ ]Clear [ x]Tachypnea  [ ]Audible excessive secretions   [ ]Rhonchi        [ ]Right [ ]Left [ ]Bilateral  [ ]Crackles        [ ]Right [ ]Left [ ]Bilateral  [ ]Wheezing     [ ]Right [ ]Left [ ]Bilateral  [ ]Diminished breath sounds [ ]right [ ]left [ ]bilateral  CARDIOVASCULAR:    [x ]Regular [ ]Irregular [ ]Tachy  [ ]Won [ ]Murmur [ ]Other  GASTROINTESTINAL:  [x ]Soft  [ ]Distended   [ ]+BS  [ ]Non tender [ ]Tender  [ ]PEG [ ]OGT/ NGT  Last BM:   GENITOURINARY:  [x ]Normal [ ] Incontinent   [ ]Oliguria/Anuria   [ ]Gloria  MUSCULOSKELETAL:   [ ]Normal   [ x]Weakness  [ ]Bed/Wheelchair bound [ ]Edema  NEUROLOGIC:   [x ]No focal deficits  [ ]Cognitive impairment  [ ]Dysphagia [ ]Dysarthria [ ]Paresis [ ]Other   SKIN:   [ x]Normal    [ x]No Rash  [ ]Pressure ulcer(s)       Present on admission [ ]y [ ]n        LABS: reviewed                         8.6    10.35 )-----------( 146      ( 31 Jul 2022 08:41 )             26.8   07-31    142  |  107  |  68<HH>  ----------------------------<  216<H>  4.4   |  24  |  1.8<H>    Ca    7.7<L>      31 Jul 2022 08:41  Mg     2.1     07-31          RADIOLOGY & ADDITIONAL STUDIES:  reviewed  < from: Xray Chest 1 View- PORTABLE-Routine (Xray Chest 1 View- PORTABLE-Routine .) (07.30.22 @ 10:01) >  Unchanged left greater than right bibasal opacities/effusion. No   pneumothorax    Stable cardiomediastinal silhouette.    Unchanged osseous structures.    < end of copied text >    EKG: reviewed  < from: 12 Lead ECG (07.16.22 @ 15:08) >    Ventricular Rate 75 BPM    Atrial Rate 84 BPM    QRS Duration 124 ms    Q-T Interval 432 ms    QTC Calculation(Bazett) 482 ms    R Axis -18 degrees    T Axis 139 degrees    Diagnosis Line Atrial fibrillation with premature ventricular or aberrantlyconducted  complexes  Possible Inferior infarct , age undetermined  T wave abnormality, consider lateral ischemia  Abnormal ECG    < end of copied text >        PROTEIN CALORIE MALNUTRITION PRESENT: [ ]mild [ ]moderate [ ]severe [ ]underweight [ ]morbid obesity  https://www.andeal.org/vault/0006/web/files/ONC/Table_Clinical%20Characteristics%20to%20Document%20Malnutrition-White%20JV%20et%20al%653068.pdf    Height (cm): 193 (07-27-22 @ 14:25), 188 (10-20-21 @ 07:00)  Weight (kg): 100 (07-05-22 @ 13:03), 107.1 (10-23-21 @ 05:41)  BMI (kg/m2): 26.8 (07-27-22 @ 14:25), 28.3 (07-05-22 @ 13:03), 30.3 (10-23-21 @ 05:41)    [ ]PPSV2 < or = to 30% [ ]significant weight loss  [ x]poor nutritional intake  [ ]anasarca      [ ]Artificial Nutrition      REFERRALS:   [ ]Chaplaincy  [ ]Hospice  [ ]Child Life  [ x]Social Work  [ x]Case management [ ]Holistic Therapy     Goals of Care Document:

## 2022-08-02 NOTE — CHART NOTE - NSCHARTNOTEFT_GEN_A_CORE
Palliative Care team, and Dr Arreguin met with patient, his wife - makenzie, daughter - Sasha at bedside. on the phone were his two sons. family received medical update. reviewed role of palliative care with patient's wife and children. discussed goals of care for patient. discussed options including continuing medical management/rehab vs hospice care. family are open to hospice c/s for informational purposes. they would like for patient to be seen by PT to determine his functional status. They plan to discuss as family whether to go STR vs hospice (at facility). all questions answered. support rendered. will follow. [phone]

## 2022-08-02 NOTE — PROGRESS NOTE ADULT - ASSESSMENT
ASSESSMENT & PLAN:  82M with PMHx of HFrEF, Afib on eliquis, CAD s/p CABG, COPD (on rare home O2 PRN), CKD3, HTN, HLD who presented for 2 weeks of watery diarrhea found to have Covid-19, with hospital stay c/b altered mental status.    #Decreased PO intake  - Patient refusing meals  - May be due to elevated Tacro level  - Re-Evaluated by speech and swallow -> modified barium swallow study. Diet minced and moist w/ mildly thickened liquids.    Plan:  -Goals of care conversation was had with the palliative team. Patient and patient's family do not want aritificial feeding at this time (no NG/PEG). Patients family wishes to transition to palliative approach, and would like a  come to the room.   -Patient is DNR/DNI  - If patient/patient's family do not want artificial feeding, no real need for continued hospitalization.  - Palliative care to follow up and discuss hospice options  - Patient has more strength and is more awake today. Called speech and swallow for a re-evaluation Spoke with Polina who explained to me that he really is not a candidate for re-evaluation due to the fact that he failed the modified barium swallow study. She recommended goals of care conversation and possible needs for comfort feeds.   - Had a family meeting today with daughter and wife in person, sons on the phone and palliative care. Discussed end goals for the patient and his family. Hospice consult was called, as well as a PT eval to determine the functional status of the patient.     #Anemia  - Hgb 07/26 6.9  - Bowel movements look normal not black/ would be concerning for bleeding   - Most likely chronic 2/2 ESRD    Plan:   - Restart Aranesp 20mcg sub-q weekly (given Wednesday 07/27)  - s/p 1 Unit PRBC (07/27)  - q72 hour cbc      # Metabolic Encephalopathy - multifactorial in etiology, improving   EEG and CT head w/o contrast- no acute pathology  EEG- Consistent with diffuse cerebral electrophysiological dysfunction, Secondary to toxic metabolic cause.    Plan:  - c/w IVF, give D5w with Lasix for hypervolemic hypernatremia -  Resolved   - q72 bmp      # Pnuemonia  - Repeat chest ct over the weekend shows bilateral lower lobe consolidations with small amount consolidation also in the lingular segment of the left upper lobe suspicious for multifocal pneumonia.    Plan:  - Completed course of zosyn     # BRENDA on CKD III,   - presumed pre-renal   - hx C3 glomerulopathy    Plan:  - continue with NHCO3 PO  - Renal - continue tacrolimus ordered  - Tacrolimus trough- repeat trough after holding tacro was elevated still at >10.   - Gloria removed  - Trough ordered for tomorrow night     # COVID +  - s/p Dexa 6mg PO q6  - completed quarantine 7/15 - d/bg precautions 7/16  - Was unnecessarily retested, patient still positive but no symptoms. NOT INFECTIOUS, COULD BE POSITIVE UP TO 90 DAYS AFTER INFECTION      # COPD; severe pulm HTN  - Nebs PRN Q4    # Fall, mechanical  - no syncope per pt  - cth neg  - trauma w/u neg    Plan:  - PT eval for SNF    # HFrEF, chronic;   - severely dilated cardio myopathy  - Right ventrical dysfunction   - Mild tricuspid valve/ aortic valve regurg    Plan:  - LASIX 40 MG PO RESTARTED 07/26         - not on ANY goal directed tx - cardio eval Dr Haley  - Patient started on metoprolol 25mg q 24 PO    Had family discussion for discharge, still waiting for final decision.

## 2022-08-02 NOTE — PROGRESS NOTE ADULT - NS ATTEND AMEND GEN_ALL_CORE FT
Extensive conversation with family at bedside  Patient did not participate in conversation  Treatment options discussed, including hospice  Hospice consult to be placed for informational purposes  Will follow for GOC

## 2022-08-02 NOTE — PROGRESS NOTE ADULT - SUBJECTIVE AND OBJECTIVE BOX
KANGANDRIA MCELROY  82y  Male  ***My note supersedes ALL resident notes that I sign.  My corrections for their notes are in my note.***    I can be reached directly on [email or web address]. My office number is [phone]. My personal cell number is [phone].    INTERVAL EVENTS: Here for f/u of PNA. Pt doing OK today. He is talking. Looks tired. Not eating much food, but is drinking Glucerna. Pt wants to try rehab (legs are very weak). O2 remains the same - having on/off nosebleeds.    T(F): 97.2 (08-02-22 @ 06:18), Max: 98.3 (08-01-22 @ 14:42)  HR: 76 (08-02-22 @ 06:18) (76 - 88)  BP: 118/62 (08-02-22 @ 06:18) (115/56 - 132/65)  RR: 18 (08-02-22 @ 06:18) (18 - 18)  SpO2: 95% (08-02-22 @ 07:44) (95% - 97%)    Gen: no resp distress; speaking better; + NC O2; follows commands well;  HEENT: PERRL, EOMI, mouth clr, nose minor irritation in both nares, no active bleeding  Neck: no nodes, no JVD, thyroid nl  lungs: clr  hrt: s1 s2 rrr no murmur  abd: soft, NT/ND, no HS megaly  ext: no edema, no c/c; has pain w/ moving legs  neuro: awake and alert, slightly sleepy; ox3 (at baseline MS), cn intact, can move all 4 ext, but very weak, arjun both legs    LABS:                      8.1     (    91.5   8.08  )-----------( ---------      187      ( 02 Aug 2022 08:18 )             25.8    (    16.2     138   (   103   (   127      08-02-22 @ 08:18  ----------------------               4.1   (   27   (   67                             -----                        1.6  Ca  7.7   Mg  2.1    P   --     Creatinine:   1.6 (08-02 @ 08:18) - prob losing musc mass (not "improvement" in kidney fxn)  eGFR:  43    Creatinine:   1.8 (08-01 @ 08:40)  eGFR:  37    Creatinine:   1.8 (07-31 @ 08:41)  eGFR:  37    Creatinine:   1.8 (07-30 @ 07:31)  eGFR:  37    Creatinine:   2.1 (07-29 @ 06:13)  eGFR:  31      CAPILLARY BLOOD GLUCOSE  POCT Blood Glucose.: 81 (08-02-22 @ 11:50)  POCT Blood Glucose.: 139 (08-02-22 @ 07:50)  POCT Blood Glucose.: 174 (08-01-22 @ 22:02)  POCT Blood Glucose.: 306 (08-01-22 @ 17:02)  POCT Blood Glucose.: 221 (08-01-22 @ 12:05)  POCT Blood Glucose.: 248 (08-01-22 @ 08:20)  POCT Blood Glucose.: 229 (07-31-22 @ 22:13)  POCT Blood Glucose.: 190 (07-31-22 @ 17:04)    RADIOLOGY & ADDITIONAL TESTS:      MEDICATIONS:    acetaminophen     Tablet .. 650 milliGRAM(s) Oral every 6 hours PRN  ALBUTerol    90 MICROgram(s) HFA Inhaler 1 Puff(s) Inhalation every 6 hours PRN  apixaban 2.5 milliGRAM(s) Oral two times a day  atorvastatin 40 milliGRAM(s) Oral at bedtime  BACItracin   Ointment 1 Application(s) Topical every 12 hours  calcitriol   Capsule 0.25 MICROGram(s) Oral <User Schedule>  darbepoetin Injectable ViaL 20 MICROGram(s) SubCutaneous every 7 days  furosemide    Tablet 20 milliGRAM(s) Oral daily  insulin glargine Injectable (LANTUS) 6 Unit(s) SubCutaneous at bedtime  insulin lispro (ADMELOG) corrective regimen sliding scale   SubCutaneous three times a day before meals  insulin lispro Injectable (ADMELOG) 2 Unit(s) SubCutaneous three times a day before meals  levothyroxine 150 MICROGram(s) Oral daily  melatonin 5 milliGRAM(s) Oral at bedtime PRN  metoprolol succinate ER 50 milliGRAM(s) Oral daily  multivitamin/minerals 1 Tablet(s) Oral daily  pentoxifylline 400 milliGRAM(s) Oral two times a day  sodium bicarbonate 650 milliGRAM(s) Oral three times a day  tacrolimus 0.5 milliGRAM(s) Oral <User Schedule>  traMADol 50 milliGRAM(s) Oral every 6 hours PRN

## 2022-08-02 NOTE — PROGRESS NOTE ADULT - SUBJECTIVE AND OBJECTIVE BOX
Nephrology progress note    Patient was seen and examined, events over the last 24 h noted .    Allergies:  Ozempic (0.25 mg or 0.5 mg dose) (Hives; Rash)  streptomycin (Unknown)  Trulicity Pen (Hives; Rash)    Hospital Medications:   MEDICATIONS  (STANDING):  apixaban 2.5 milliGRAM(s) Oral two times a day  atorvastatin 40 milliGRAM(s) Oral at bedtime  BACItracin   Ointment 1 Application(s) Topical every 12 hours  calcitriol   Capsule 0.25 MICROGram(s) Oral <User Schedule>  darbepoetin Injectable ViaL 20 MICROGram(s) SubCutaneous every 7 days  dextrose 5%. 1000 milliLiter(s) (100 mL/Hr) IV Continuous <Continuous>  dextrose 5%. 1000 milliLiter(s) (50 mL/Hr) IV Continuous <Continuous>  dextrose 50% Injectable 25 Gram(s) IV Push once  dextrose 50% Injectable 12.5 Gram(s) IV Push once  dextrose 50% Injectable 25 Gram(s) IV Push once  furosemide    Tablet 20 milliGRAM(s) Oral daily  glucagon  Injectable 1 milliGRAM(s) IntraMuscular once  insulin glargine Injectable (LANTUS) 6 Unit(s) SubCutaneous at bedtime  insulin lispro (ADMELOG) corrective regimen sliding scale   SubCutaneous three times a day before meals  insulin lispro Injectable (ADMELOG) 2 Unit(s) SubCutaneous three times a day before meals  levothyroxine 150 MICROGram(s) Oral daily  metoprolol succinate ER 50 milliGRAM(s) Oral daily  multivitamin/minerals 1 Tablet(s) Oral daily  pentoxifylline 400 milliGRAM(s) Oral two times a day  sodium bicarbonate 650 milliGRAM(s) Oral three times a day  tacrolimus 0.5 milliGRAM(s) Oral <User Schedule>        VITALS:  T(F): 97.2 (08-02-22 @ 06:18), Max: 98.3 (08-01-22 @ 14:42)  HR: 76 (08-02-22 @ 06:18)  BP: 118/62 (08-02-22 @ 06:18)  RR: 18 (08-02-22 @ 06:18)  SpO2: 95% (08-02-22 @ 07:44)  Wt(kg): --    08-01 @ 07:01  -  08-02 @ 07:00  --------------------------------------------------------  IN: 240 mL / OUT: 0 mL / NET: 240 mL          PHYSICAL EXAM:  	Gen: on venti mask    	Pulm: decrease BS B/L  	CV: S1S2; no rub  	Abd: +distended    LABS:  08-01    140  |  104  |  68<HH>  ----------------------------<  216<H>  4.1   |  24  |  1.8<H>    Ca    7.7<L>      01 Aug 2022 08:40  Mg     2.1     08-02                            8.1    8.08  )-----------( 187      ( 02 Aug 2022 08:18 )             25.8       Urine Studies:      RADIOLOGY & ADDITIONAL STUDIES:

## 2022-08-02 NOTE — HOSPICE CARE NOTE - CONVESATION DETAILS
Hospice referral completed. Family seeking information regarding hospice at this time. Phone call to spouse Oanh [phone]. Reviewed hospice services in the home and nursing homes. Reviewed hospice philosophy. Hospice contact information provided.

## 2022-08-02 NOTE — PROGRESS NOTE ADULT - SUBJECTIVE AND OBJECTIVE BOX
ANDIRA TAVAREZ 82y Male  MRN#: [id number]   Hospital Day: 28d    SUBJECTIVE  Patient is a 82y old Male who presents with a chief complaint of Diarrhea (02 Aug 2022 12:28)  Currently admitted to medicine with the primary diagnosis of Acute kidney injury superimposed on CKD      INTERVAL HPI AND OVERNIGHT EVENTS:  Patient was examined and seen at bedside. This morning he is resting comfortably in bed and reports no issues or overnight events. Yesterday the patient drank more ensure, but refused to eat real food.     REVIEW OF SYMPTOMS:  Denies.     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    AF (atrial fibrillation)  s/p DCCV    Carotid stenosis    COPD (chronic obstructive pulmonary disease)    H/O coronary artery bypass surgery  2001    S/P inguinal hernia repair    History of appendectomy      ALLERGIES:  Ozempic (0.25 mg or 0.5 mg dose) (Hives; Rash)  streptomycin (Unknown)  Trulicity Pen (Hives; Rash)    MEDICATIONS:  STANDING MEDICATIONS  apixaban 2.5 milliGRAM(s) Oral two times a day  atorvastatin 40 milliGRAM(s) Oral at bedtime  BACItracin   Ointment 1 Application(s) Topical every 12 hours  calcitriol   Capsule 0.25 MICROGram(s) Oral <User Schedule>  darbepoetin Injectable ViaL 20 MICROGram(s) SubCutaneous every 7 days  dextrose 5%. 1000 milliLiter(s) IV Continuous <Continuous>  dextrose 5%. 1000 milliLiter(s) IV Continuous <Continuous>  dextrose 50% Injectable 25 Gram(s) IV Push once  dextrose 50% Injectable 12.5 Gram(s) IV Push once  dextrose 50% Injectable 25 Gram(s) IV Push once  furosemide    Tablet 20 milliGRAM(s) Oral daily  glucagon  Injectable 1 milliGRAM(s) IntraMuscular once  insulin glargine Injectable (LANTUS) 5 Unit(s) SubCutaneous at bedtime  insulin lispro (ADMELOG) corrective regimen sliding scale   SubCutaneous three times a day before meals  levothyroxine 150 MICROGram(s) Oral daily  metoprolol succinate ER 50 milliGRAM(s) Oral daily  multivitamin/minerals 1 Tablet(s) Oral daily  pentoxifylline 400 milliGRAM(s) Oral two times a day  sodium bicarbonate 650 milliGRAM(s) Oral three times a day  tacrolimus 0.5 milliGRAM(s) Oral <User Schedule>    PRN MEDICATIONS  acetaminophen     Tablet .. 650 milliGRAM(s) Oral every 6 hours PRN  ALBUTerol    90 MICROgram(s) HFA Inhaler 1 Puff(s) Inhalation every 6 hours PRN  dextrose Oral Gel 15 Gram(s) Oral once PRN  melatonin 5 milliGRAM(s) Oral at bedtime PRN  traMADol 50 milliGRAM(s) Oral every 6 hours PRN      VITAL SIGNS: Last 24 Hours  T(C): 36.2 (02 Aug 2022 06:18), Max: 36.8 (01 Aug 2022 14:42)  T(F): 97.2 (02 Aug 2022 06:18), Max: 98.3 (01 Aug 2022 14:42)  HR: 76 (02 Aug 2022 06:18) (76 - 88)  BP: 118/62 (02 Aug 2022 06:18) (115/56 - 132/65)  BP(mean): --  RR: 18 (02 Aug 2022 06:18) (18 - 18)  SpO2: 95% (02 Aug 2022 07:44) (95% - 97%)    LABS:                        8.1    8.08  )-----------( 187      ( 02 Aug 2022 08:18 )             25.8     08-02    138  |  103  |  67<HH>  ----------------------------<  127<H>  4.1   |  27  |  1.6<H>    Ca    7.7<L>      02 Aug 2022 08:18  Mg     2.1     08-02      PHYSICAL EXAM:  CONSTITUTIONAL: No acute distress, well-developed, well-groomed, AAOx3  HEAD: Atraumatic, normocephalic  EYES: EOM intact, PERRLA, conjunctiva and sclera clear  ENT: Moist mucous membranes, dried blood in the nares   PULMONARY: Clear to auscultation bilaterally  CARDIOVASCULAR: Regular rate and rhythm  GASTROINTESTINAL: Soft, non-tender, non-distended; bowel sounds present  MUSCULOSKELETAL: 2+ peripheral pulses; no clubbing, no cyanosis, no edema  SKIN: No rashes or lesions; warm and dry

## 2022-08-02 NOTE — PROGRESS NOTE ADULT - ASSESSMENT
82y Male with PMHx of HFrEF, Afib on eliquis, CAD s/p CABG, COPD (on rare home O2 PRN), CKD3 (Hx of C3 glomerulopathy), HTN, HLD    # BRENDA on CKD 3b / prerenal? baseline 1.8   #creat at baseline now   # please document UO   # continue low dose lasix   # pneumonia on zosyn   # C3 glomerulopathy - last tacrolimus level in the 10 range    tacrolimus  at 0.5 mg q12h f/u reepat rough ( ~30 minutes before dose intake) [employer] - on hold following COVID19 illness  # Proteinuria 8.4 g initially responded well to Prograf with reduction of proteinuria  to<1 g/g according to outpatient records  # HFrEF 10/2021 TTE - severe LV and RV systolic dysfunction and PAH / seen by Dr. De La Fuente, EF ~45%  #decrease  sodium bicarbonate  650  to q 12   #on calcitriol / last pth at goal / check phosphorus  level   # started on SRIRAM / ferritin elevated   # overall prognosis poor   # will follow

## 2022-08-02 NOTE — PROGRESS NOTE ADULT - PROBLEM SELECTOR PLAN 6
Pt with advanced illness, pulm following  On oxygen RTC tachypneic at rest. Periods of lethargy.  Needs total ADL assist.

## 2022-08-02 NOTE — PROGRESS NOTE ADULT - CONVERSATION DETAILS
Palliative care team and medical resident met with family    Reviewed current medical condition and treatment. Reviewed pt overall condition and function prior to hospitalization. Family noted patient was declining prior to when he was hospitalized. Patient has advanced COPD, on RTC oxygen now with low activity tolerance.     Pt met with palliative care and has told team he does not want to have a feeding tube. And he is DNR/DNI. Family and patient understand he has very advanced illness. Options for hospice vs rehab and LTC were discussed at length. All questions regarding hospice answered (Ie no rehospitalization) vs rehab and PTC placement with rehospitalization.     Agreed to hospice consult, and requested PT evaluation. Support provided.
D/w pt at bedside. He has h/o end stage COPD on home o2, here with covid; diarrhea; with BRENDA. BRENDA resolving. He has no living will; discussed resuscitation; he has considered dnr/ dni in past but is currently undecided. Encouraged to discuss with family and f/u with pmd.

## 2022-08-02 NOTE — PROGRESS NOTE ADULT - PROBLEM SELECTOR PLAN 3
Pt with very poor po intake refusing NGT or artificial feeding. Taking glucerna, speech and swallow notes read and appreciated. No NGT/ no artificial feeding.

## 2022-08-02 NOTE — PROGRESS NOTE ADULT - ASSESSMENT
# toxic and metab encephalopathies - improving a little, but sluggishly  I thought initial MS change at start of admit was metab: COVID 19 + PNA + hypoxia  I think the 2nd MS change was toxic encephalopathy from cefepime and dexa (doubt COVID is doing anything now; also do not believe he has stroke)  I think the 3rd MS was metab encephalopath 2/2 aspiration PNA/pneumonitis + lack of eating (malnutrition) + mild hosp delirium + debility/lack of exercise   initial CTH: mild chr micro changes; mild atrophy and ventric prom  rpt CT H NC: no acute path  EEG: triphasic waves; mild-mod gen slowing  LFTs are nl  today, he looks better    # severe malnutrition - this is contributing to weakness and sleepiness  c/w MVI w/ min q24   c/w oral diet (minced/moist) as renay  supplements: Glucerna 4-6x/day  pt pulled out NGT; family not interested in PEG or NGT  pt seems to be drinking better today  f/u w/ Dr Elizabeth  Sp/Sw said pt should stay w/ mildly thick liquids and minced/moist diet until much stronger    # leukocytosis seems like it was aspiration PNA/pneumonitis  ID and Pulm noted  CT C 7/5 and 7/23 reviewed: bilateral bases, arjun Lt, are worse on current exam  CXR shows same b/l opac  rpt bld cx/UCx: neg  abx: completed zosyn (7/24-7/31)  WBC back to nl  can follow cbc q3 days    # knee pain - prob OA and stiffness from lack of mobility  skel xrays do not show chondrocalcinosis: doubt pseudogout   uric acid lvl 9.5 (pt has no prior hx of gout)  tyl prn mild pain  ultram 50mg po q6 prn sev pain    # BRENDA on CKD III, presumed pre-renal; hx C3 glomerulopathy w/ proteinuria (up to 8.4 gm/day in past - responded well to immunosupp); metab acidosis  base Cr was 2.1 (now fallen to mid 1s)  decr in Cr represents loss of musc mass (not improvement in kidney fxn)  renal eval: f/u noted  tacro lvl 7/30 was 10.2  tacro decr to 0.5mg po q12 - f/u rpt tacro lvl  c/w calcitriol   c/w NaHCO3 650mg po q8 (HCO3 goal = 21-27)  U/A w/ 3+ protein -> U TP:Cr ratio: 1gm  BMP q72    # normo anemia of chr kidney dz; no signs of acute blood loss  keep hgb >8  aranesp 20mg sc q wk (Wed), per renal    # COVID + -> seems like pt passed thru inflam phase;   acute on chr hypoxic resp failure (pt on home O2 prn)  can stay on 2-3L NC O2 w/ humidification; bacitracin to nares q12  covid +: 7/5; 7/10; 7/11; 7/26 - no further testing needed  d-dimer 454, ESR 38, CRP 25.9, ferr 1415  pt was NOT tx'd initially for COVID - RDV relative CI at that GFR anyway  was beyond tx window for MABs  completed most of dexameth course  completed quarantine 7/5-7/15 -> off isolation     # COPD; severe pulm HTN; fibrotic changes LLL; PNA (see above)  cont NC O2  c/w proventil prn  incent nara    # HFrEF, chronic; sev dilated CM; RV dysfxn; mild TR/AR; HTN  not on all goal directed tx - cardio eval Dr Witt - (per cardio, EF closer to 45%)  Echo: EF 45-50%; mult LV reg wall motion abnl; LAE; mild MR; mild TR; borderline pulm HTN  BB - c/w toprol xl 50mg po q24   not on ACEI/ARB 2/2 CKD/BRENDA (could consider hydral + nitrates, but only if BP good enough)  diuretics - lasix 20mg po q24    # AFib, chronic  rate controlled - on BB  c/w eliquis 2.5mg po q12    # CAD  agree w/ no asa    # DM 2  diabetic diet  FS qac/hs   decr lantus 5 HS  d/c ademelog w/ meals  can usee SSI for coverage of high FS    # PVD  c/w pentoxifylline     # DLD  on statin    # Fall; diff walking; weakness/debility/deconditioning  CTH neg  trauma w/u neg  PT eval for SNF (during this admit, previously, pt was OOBTC and did walk a little w/ walker and asst)  at home, pt walked and drove at pre-hosp baseline    # Hypothyroidism  synthroid 150mg q24  TSH 0.39    # DVT ppx: on eliquis    # GI ppx: none    # Activity: need PT eval    # updated dtr/wife at bedside    Dispo: f/u nutr eval; cont diet; f/u tacro lvl; can limit labs; tx knee pain; incent nara;    Eventually, pt will need STR @ SNF ([facility]) - f/u CM - cont d/c planning, anticipate d/c in 24-48 hrs (family agrees)    Prog remains very guarded. Long term prog is likely poor. Family are aware.

## 2022-08-02 NOTE — PROGRESS NOTE ADULT - ASSESSMENT
82yMale being evaluated for goals of care and symptom management. See above GOC note. Pt denied pain or dyspnea today. Pt however easily fall asleep during conversation, and has difficulty with short term memory Wife is his HCP       MEDD (morphine equivalent daily dose): 0      See Recs below.    Please call [phone] with questions or concerns 24/7.   We will continue to follow.

## 2022-08-02 NOTE — PROGRESS NOTE ADULT - SUBJECTIVE AND OBJECTIVE BOX
HPI:  82M with PMHx of HFrEF, Afib on eliquis, CAD s/p CABG, COPD (on rare home O2 PRN), CKD3, HTN, HLD presents for 2 weeks of watery diarrhea. Pt reports that 2 weeks ago he went to a doctor's appointment with his wife, and the next day both of them started experiencing non-bloody watery diarrhea, x3-4/day, and then started having decreased PO intake and felt weak. Of note, he also had a mechanical fall 1 week ago on his right side with no injuries sustained (but he didn't visit the ED). Pt denies any other sicks contacts, recent travel, vomiting, chest pain, SOB, palpitations, light-headedness, or any  symptoms.     In the ED: /57, HR 57, Temp 97.6F, satting 96% on RA. Labs notable for Cr of 3.6 (baseline ~2.0), , HCO3 19, AG 20. Trops 0.07 (likely 2/2 BRENDA), no chest pain, EKG 1st-degree AV block. CTH -ve, CTAP + chest -ve for acute pathology.     Admitted to medicine (05 Jul 2022 21:23)     INTERVAL EVENTS:  8/2/22: Family  meeting today.  pt appears comfortable.     ADVANCE DIRECTIVES:    DNR/DNI  MOLST  [x ]  Living Will  [ ]   DECISION MAKER(s):  [ x] Health Care Proxy(s)  [ ] Surrogate(s)  [ ] Guardian           Name(s): Phone Number(s):  Oanh Wolf [phone] spouse/HCP   BASELINE (I)ADL(s) (prior to admission):  [place]: [ ]Total  [x ] Moderate [ ]Dependent  Palliative Performance Status Version 2:         %    http://npcrc.org/files/news/palliative_performance_scale_[email or web address].pdf    Allergies    Ozempic (0.25 mg or 0.5 mg dose) (Hives; Rash)  streptomycin (Unknown)  Trulicity Pen (Hives; Rash)    Intolerances    MEDICATIONS  (STANDING):  apixaban 2.5 milliGRAM(s) Oral two times a day  atorvastatin 40 milliGRAM(s) Oral at bedtime  BACItracin   Ointment 1 Application(s) Topical every 12 hours  calcitriol   Capsule 0.25 MICROGram(s) Oral <User Schedule>  darbepoetin Injectable ViaL 20 MICROGram(s) SubCutaneous every 7 days  dextrose 5%. 1000 milliLiter(s) (100 mL/Hr) IV Continuous <Continuous>  dextrose 5%. 1000 milliLiter(s) (50 mL/Hr) IV Continuous <Continuous>  dextrose 50% Injectable 25 Gram(s) IV Push once  dextrose 50% Injectable 12.5 Gram(s) IV Push once  dextrose 50% Injectable 25 Gram(s) IV Push once  furosemide    Tablet 20 milliGRAM(s) Oral daily  glucagon  Injectable 1 milliGRAM(s) IntraMuscular once  insulin glargine Injectable (LANTUS) 5 Unit(s) SubCutaneous at bedtime  insulin lispro (ADMELOG) corrective regimen sliding scale   SubCutaneous three times a day before meals  levothyroxine 150 MICROGram(s) Oral daily  metoprolol succinate ER 50 milliGRAM(s) Oral daily  multivitamin/minerals 1 Tablet(s) Oral daily  pentoxifylline 400 milliGRAM(s) Oral two times a day  sodium bicarbonate 650 milliGRAM(s) Oral three times a day  tacrolimus 0.5 milliGRAM(s) Oral <User Schedule>    MEDICATIONS  (PRN):  acetaminophen     Tablet .. 650 milliGRAM(s) Oral every 6 hours PRN Temp greater or equal to 38C (100.4F), Mild Pain (1 - 3)  ALBUTerol    90 MICROgram(s) HFA Inhaler 1 Puff(s) Inhalation every 6 hours PRN Bronchospasm  dextrose Oral Gel 15 Gram(s) Oral once PRN Blood Glucose LESS THAN 70 milliGRAM(s)/deciliter  melatonin 5 milliGRAM(s) Oral at bedtime PRN Insomnia  traMADol 50 milliGRAM(s) Oral every 6 hours PRN Severe Pain (7 - 10)    PRESENT SYMPTOMS: [ ]Unable to obtain due to poor mentation   Source if other than patient:  [ ]Family   [ ]Team     Pain: [ ]yes [ x]no  QOL impact -   Location -                    Aggravating factors -  Quality -  Radiation -  Timing-  Severity (0-10 scale):  Minimal acceptable level (0-10 scale):     CPOT:    https://www.[facility].org/getattachment/[email or web address]/Critical-Care-Pain-Observation-Tool-(CPOT)      PAIN AD Score:     http://geriatrictoolkit.[email or web address]/cog/painad.pdf (press ctrl +  left click to view)    Dyspnea:                           [x ]Mild [ ]Moderate [ ]Severe  Anxiety:                             [ ]Mild [ ]Moderate [ ]Severe  Fatigue:                             [ ]Mild [xx ]Moderate [ ]Severe  Nausea:                             [ ]Mild [ ]Moderate [ ]Severe  Loss of appetite:              [ ]Mild [ x]Moderate [ ]Severe  Constipation:                    [ ]Mild [ ]Moderate [ ]Severe    Other Symptoms:  [ ]All other review of systems negative     Palliative Performance Status Version 2:         %    http://[email or web address]/files/[email or web address]_performance_scale_[email or web address].pdf  PHYSICAL EXAM:  Vital Signs Last 24 Hrs  T(C): 36.2 (02 Aug 2022 06:18), Max: 36.8 (01 Aug 2022 14:42)  T(F): 97.2 (02 Aug 2022 06:18), Max: 98.3 (01 Aug 2022 14:42)  HR: 76 (02 Aug 2022 06:18) (76 - 88)  BP: 118/62 (02 Aug 2022 06:18) (115/56 - 132/65)  BP(mean): --  RR: 18 (02 Aug 2022 06:18) (18 - 18)  SpO2: 95% (02 Aug 2022 07:44) (95% - 97%)    Parameters below as of 02 Aug 2022 07:44  Patient On (Oxygen Delivery Method): nasal cannula  O2 Flow (L/min): 2   I&O's Summary    01 Aug 2022 07:01  -  02 Aug 2022 07:00  --------------------------------------------------------  IN: 240 mL / OUT: 0 mL / NET: 240 mL    GENERAL:  [x]Alert  [ x]Oriented x 2 [ ]Lethargic  [ ]Cachexia  [ ]Unarousable  [ ]Verbal  [ ]Non-Verbal  Behavioral:   [ ] Anxiety  [ ] Delirium [ ] Agitation [ ] Other  HEENT:  [ x]Normal   [ ]Dry mouth   [ ]ET Tube/Trach  [ ]Oral lesions  PULMONARY:   [ ]Clear [ x]Tachypnea  [ ]Audible excessive secretions   [ ]Rhonchi        [ ]Right [ ]Left [ ]Bilateral  [ ]Crackles        [ ]Right [ ]Left [ ]Bilateral  [ ]Wheezing     [ ]Right [ ]Left [ ]Bilateral  [ ]Diminished breath sounds [ ]right [ ]left [ ]bilateral  CARDIOVASCULAR:    [x ]Regular [ ]Irregular [ ]Tachy  [ ]Won [ ]Murmur [ ]Other  GASTROINTESTINAL:  [x ]Soft  [ ]Distended   [ ]+BS  [ ]Non tender [ ]Tender  [ ]PEG [ ]OGT/ NGT  Last BM:   GENITOURINARY:  [x ]Normal [ ] Incontinent   [ ]Oliguria/Anuria   [ ]Gloria  MUSCULOSKELETAL:   [ ]Normal   [ x]Weakness  [ ]Bed/Wheelchair bound [ ]Edema  NEUROLOGIC:   [x ]No focal deficits  [ ]Cognitive impairment  [ ]Dysphagia [ ]Dysarthria [ ]Paresis [ ]Other   SKIN:   [ x]Normal    [ x]No Rash  [ ]Pressure ulcer(s)       Present on admission [ ]y [ ]n    LABS:                        8.1    8.08  )-----------( 187      ( 02 Aug 2022 08:18 )             25.8   08-02    138  |  103  |  67<HH>  ----------------------------<  127<H>  4.1   |  27  |  1.6<H>    Ca    7.7<L>      02 Aug 2022 08:18  Mg     2.1     08-02          RADIOLOGY & ADDITIONAL STUDIES:    PROTEIN CALORIE MALNUTRITION PRESENT: [ ]mild [ ]moderate [ ]severe [ ]underweight [ ]morbid obesity  https://www.andeal.org/vault/2440/web/files/ONC/Table_Clinical%20Characteristics%20to%20Document%20Malnutrition-White%20JV%20et%20al%202012.pdf    Height (cm): 193 (07-27-22 @ 14:25), 188 (10-20-21 @ 07:00)  Weight (kg): 100 (07-05-22 @ 13:03), 107.1 (10-23-21 @ 05:41)  BMI (kg/m2): 26.8 (07-27-22 @ 14:25), 28.3 (07-05-22 @ 13:03), 30.3 (10-23-21 @ 05:41)    [ ]PPSV2 < or = to 30% [ ]significant weight loss  [ ]poor nutritional intake  [ ]anasarca      [ ]Artificial Nutrition      REFERRALS:   [ ]Chaplaincy  [x ]Hospice  [ ]Child Life  [x ]Social Work  [x ]Case management [ ]Holistic Therapy     Goals of Care Document:          HPI:  82M with PMHx of HFrEF, Afib on eliquis, CAD s/p CABG, COPD (on rare home O2 PRN), CKD3, HTN, HLD presents for 2 weeks of watery diarrhea. Pt reports that 2 weeks ago he went to a doctor's appointment with his wife, and the next day both of them started experiencing non-bloody watery diarrhea, x3-4/day, and then started having decreased PO intake and felt weak. Of note, he also had a mechanical fall 1 week ago on his right side with no injuries sustained (but he didn't visit the ED). Pt denies any other sicks contacts, recent travel, vomiting, chest pain, SOB, palpitations, light-headedness, or any  symptoms.     In the ED: /57, HR 57, Temp 97.6F, satting 96% on RA. Labs notable for Cr of 3.6 (baseline ~2.0), , HCO3 19, AG 20. Trops 0.07 (likely 2/2 BRENDA), no chest pain, EKG 1st-degree AV block. CTH -ve, CTAP + chest -ve for acute pathology.     Admitted to medicine (05 Jul 2022 21:23)     INTERVAL EVENTS:  8/2/22: Family  meeting today.  pt appears comfortable.     ADVANCE DIRECTIVES:    DNR/DNI  MOLST  [x ]  Living Will  [ ]   DECISION MAKER(s):  [ x] Health Care Proxy(s)  [ ] Surrogate(s)  [ ] Guardian           Name(s): Phone Number(s):  Oanh Wolf [phone] spouse/HCP   BASELINE (I)ADL(s) (prior to admission):  [place]: [ ]Total  [x ] Moderate [ ]Dependent    Allergies    Ozempic (0.25 mg or 0.5 mg dose) (Hives; Rash)  streptomycin (Unknown)  Trulicity Pen (Hives; Rash)    Intolerances    MEDICATIONS  (STANDING):  apixaban 2.5 milliGRAM(s) Oral two times a day  atorvastatin 40 milliGRAM(s) Oral at bedtime  BACItracin   Ointment 1 Application(s) Topical every 12 hours  calcitriol   Capsule 0.25 MICROGram(s) Oral <User Schedule>  darbepoetin Injectable ViaL 20 MICROGram(s) SubCutaneous every 7 days  dextrose 5%. 1000 milliLiter(s) (100 mL/Hr) IV Continuous <Continuous>  dextrose 5%. 1000 milliLiter(s) (50 mL/Hr) IV Continuous <Continuous>  dextrose 50% Injectable 25 Gram(s) IV Push once  dextrose 50% Injectable 12.5 Gram(s) IV Push once  dextrose 50% Injectable 25 Gram(s) IV Push once  furosemide    Tablet 20 milliGRAM(s) Oral daily  glucagon  Injectable 1 milliGRAM(s) IntraMuscular once  insulin glargine Injectable (LANTUS) 5 Unit(s) SubCutaneous at bedtime  insulin lispro (ADMELOG) corrective regimen sliding scale   SubCutaneous three times a day before meals  levothyroxine 150 MICROGram(s) Oral daily  metoprolol succinate ER 50 milliGRAM(s) Oral daily  multivitamin/minerals 1 Tablet(s) Oral daily  pentoxifylline 400 milliGRAM(s) Oral two times a day  sodium bicarbonate 650 milliGRAM(s) Oral three times a day  tacrolimus 0.5 milliGRAM(s) Oral <User Schedule>    MEDICATIONS  (PRN):  acetaminophen     Tablet .. 650 milliGRAM(s) Oral every 6 hours PRN Temp greater or equal to 38C (100.4F), Mild Pain (1 - 3)  ALBUTerol    90 MICROgram(s) HFA Inhaler 1 Puff(s) Inhalation every 6 hours PRN Bronchospasm  dextrose Oral Gel 15 Gram(s) Oral once PRN Blood Glucose LESS THAN 70 milliGRAM(s)/deciliter  melatonin 5 milliGRAM(s) Oral at bedtime PRN Insomnia  traMADol 50 milliGRAM(s) Oral every 6 hours PRN Severe Pain (7 - 10)    PRESENT SYMPTOMS: [ ]Unable to obtain due to poor mentation   Source if other than patient:  [ ]Family   [ ]Team     Pain: [ ]yes [ x]no  QOL impact -   Location -                    Aggravating factors -  Quality -  Radiation -  Timing-  Severity (0-10 scale):  Minimal acceptable level (0-10 scale):       Dyspnea:                           [x ]Mild [ ]Moderate [ ]Severe  Anxiety:                             [ ]Mild [ ]Moderate [ ]Severe  Fatigue:                             [ ]Mild [xx ]Moderate [ ]Severe  Nausea:                             [ ]Mild [ ]Moderate [ ]Severe  Loss of appetite:              [ ]Mild [ x]Moderate [ ]Severe  Constipation:                    [ ]Mild [ ]Moderate [ ]Severe    Other Symptoms:  [x ]All other review of systems negative     Palliative Performance Status Version 2:        40 %    [email or web address]/files/[email or web address]_performance_scale_[email or web address].pdf  PHYSICAL EXAM:  Vital Signs Last 24 Hrs  T(C): 36.2 (02 Aug 2022 06:18), Max: 36.8 (01 Aug 2022 14:42)  T(F): 97.2 (02 Aug 2022 06:18), Max: 98.3 (01 Aug 2022 14:42)  HR: 76 (02 Aug 2022 06:18) (76 - 88)  BP: 118/62 (02 Aug 2022 06:18) (115/56 - 132/65)  BP(mean): --  RR: 18 (02 Aug 2022 06:18) (18 - 18)  SpO2: 95% (02 Aug 2022 07:44) (95% - 97%)    Parameters below as of 02 Aug 2022 07:44  Patient On (Oxygen Delivery Method): nasal cannula  O2 Flow (L/min): 2   I&O's Summary    01 Aug 2022 07:01  -  02 Aug 2022 07:00  --------------------------------------------------------  IN: 240 mL / OUT: 0 mL / NET: 240 mL    GENERAL:  [x]Alert  [ x]Oriented x 2 [ ]Lethargic  [ ]Cachexia  [ ]Unarousable  [ ]Verbal  [ ]Non-Verbal  Behavioral:   [ ] Anxiety  [ ] Delirium [ ] Agitation [ ] Other  HEENT:  [ x]Normal   [ ]Dry mouth   [ ]ET Tube/Trach  [ ]Oral lesions  PULMONARY:   [ ]Clear [ x]Tachypnea  [ ]Audible excessive secretions   [ ]Rhonchi        [ ]Right [ ]Left [ ]Bilateral  [ ]Crackles        [ ]Right [ ]Left [ ]Bilateral  [ ]Wheezing     [ ]Right [ ]Left [ ]Bilateral  [ ]Diminished breath sounds [ ]right [ ]left [ ]bilateral  CARDIOVASCULAR:    [x ]Regular [ ]Irregular [ ]Tachy  [ ]Won [ ]Murmur [ ]Other  GASTROINTESTINAL:  [x ]Soft  [ ]Distended   [ ]+BS  [ ]Non tender [ ]Tender  [ ]PEG [ ]OGT/ NGT  Last BM:   GENITOURINARY:  [x ]Normal [ ] Incontinent   [ ]Oliguria/Anuria   [ ]Gloria  MUSCULOSKELETAL:   [ ]Normal   [ x]Weakness  [ ]Bed/Wheelchair bound [ ]Edema  NEUROLOGIC:   [x ]No focal deficits  [ ]Cognitive impairment  [ ]Dysphagia [ ]Dysarthria [ ]Paresis [ ]Other   SKIN:   [ x]Normal    [ x]No Rash  [ ]Pressure ulcer(s)       Present on admission [ ]y [ ]n    LABS:  reviewed                        8.1    8.08  )-----------( 187      ( 02 Aug 2022 08:18 )             25.8   08-02    138  |  103  |  67<HH>  ----------------------------<  127<H>  4.1   |  27  |  1.6<H>    Ca    7.7<L>      02 Aug 2022 08:18  Mg     2.1     08-02      RADIOLOGY & ADDITIONAL STUDIES:  reviewed    < from: Xray Chest 1 View- PORTABLE-Routine (Xray Chest 1 View- PORTABLE-Routine .) (07.30.22 @ 10:01) >  IMPRESSION:    Unchanged left greater than right bibasal opacities/effusion. No   pneumothorax    Stable cardiomediastinal silhouette.    Unchanged osseous structures.    < end of copied text >    < from: 12 Lead ECG (07.16.22 @ 15:08) >  Ventricular Rate 75 BPM    Atrial Rate 84 BPM    QRS Duration 124 ms    Q-T Interval 432 ms    QTC Calculation(Bazett) 482 ms    R Axis -18 degrees    T Axis 139 degrees    Diagnosis Line Atrial fibrillation with premature ventricular or aberrantlyconducted  complexes  Possible Inferior infarct , age undetermined  T wave abnormality, consider lateral ischemia  Abnormal ECG    < end of copied text >      PROTEIN CALORIE MALNUTRITION PRESENT: [ ]mild [ ]moderate [ ]severe [ ]underweight [ ]morbid obesity  https://www.andeal.org/vault/9550/web/files/ONC/Table_Clinical%20Characteristics%20to%20Document%20Malnutrition-White%20JV%20et%20al%202012.pdf    Height (cm): 193 (07-27-22 @ 14:25), 188 (10-20-21 @ 07:00)  Weight (kg): 100 (07-05-22 @ 13:03), 107.1 (10-23-21 @ 05:41)  BMI (kg/m2): 26.8 (07-27-22 @ 14:25), 28.3 (07-05-22 @ 13:03), 30.3 (10-23-21 @ 05:41)    [ ]PPSV2 < or = to 30% [ ]significant weight loss  [ ]poor nutritional intake  [ ]anasarca      [ ]Artificial Nutrition      REFERRALS:   [ ]Chaplaincy  [x ]Hospice  [ ]Child Life  [x ]Social Work  [x ]Case management [ ]Holistic Therapy     Goals of Care Document:

## 2022-08-03 NOTE — PROGRESS NOTE ADULT - ASSESSMENT
# toxic and metab encephalopathies - improved a lot today   I thought initial MS change at start of admit was metab: COVID 19 + PNA + hypoxia  I think the 2nd MS change was toxic encephalopathy from cefepime and dexa (doubt COVID is doing anything now; also do not believe he has stroke)  I think the 3rd MS was metab encephalopath 2/2 aspiration PNA/pneumonitis + lack of eating (malnutrition) + mild hosp delirium + debility/lack of exercise   initial CTH: mild chr micro changes; mild atrophy and ventric prom  rpt CT H NC: no acute path  EEG: triphasic waves; mild-mod gen slowing  LFTs are nl  today, he looks the best I've seen him    # severe malnutrition - this is contributing to weakness and sleepiness  c/w MVI w/ min q24   c/w oral diet (minced/moist) as renay  supplements: Glucerna 4-6x/day  pt pulled out NGT; family not interested in PEG or NGT  pt drinking better; eating a little  f/u w/ Dr Elizabeth  Sp/Sw said pt should stay w/ mildly thick liquids and minced/moist diet until much stronger    # leukocytosis seems like it was aspiration PNA/pneumonitis  ID and Pulm noted  CT C 7/5 and 7/23 reviewed: bilateral bases, arjun Lt, are worse on current exam  CXR shows same b/l opac  rpt bld cx/UCx: neg  abx: completed zosyn (7/24-7/31)  WBC back to nl  `  # knee pain - prob OA and stiffness from lack of mobility  skel xrays do not show chondrocalcinosis: doubt pseudogout   uric acid lvl 9.5 (pt has no prior hx of gout)  tyl prn mild pain  ultram 50mg po q6 prn sev pain    # BRENDA on CKD III, presumed pre-renal; hx C3 glomerulopathy w/ proteinuria (up to 8.4 gm/day in past - responded well to immunosupp); metab acidosis  base Cr was 2.1 (now fallen to mid 1s)  decr in Cr represents loss of musc mass (not improvement in kidney fxn)  renal eval: f/u noted  tacro lvl 7/30 was 10.2  tacro decr to 0.5mg po q12 - f/u rpt tacro lvl at SNF  c/w calcitriol   c/w NaHCO3 650mg po q8 (HCO3 goal = 21-27)  U/A w/ 3+ protein -> U TP:Cr ratio: 1gm    # normo anemia of chr kidney dz; no signs of acute blood loss  keep hgb >8  aranesp 20mg sc q wk (Wed), per renal    # COVID + -> seems like pt passed thru inflam phase;   acute on chr hypoxic resp failure (pt on home O2 prn)  can stay on 2-3L NC O2 w/ humidification; bacitracin to nares q12  covid +: 7/5; 7/10; 7/11; 7/26 - no further testing needed  d-dimer 454, ESR 38, CRP 25.9, ferr 1415  pt was NOT tx'd initially for COVID - RDV relative CI at that GFR anyway  was beyond tx window for MABs  completed most of dexameth course  completed quarantine 7/5-7/15 -> off isolation     # COPD; severe pulm HTN; fibrotic changes LLL; PNA (see above)  cont NC O2  c/w proventil prn  incent nara    # HFrEF, chronic; sev dilated CM; RV dysfxn; mild TR/AR; HTN  not on all goal directed tx - cardio eval Dr Witt - (per cardio, EF closer to 45%)  Echo: EF 45-50%; mult LV reg wall motion abnl; LAE; mild MR; mild TR; borderline pulm HTN  BB - c/w toprol xl 50mg po q24   not on ACEI/ARB 2/2 CKD/BRENDA (could consider hydral + nitrates, but only if BP good enough)  diuretics - lasix 20mg po q24    # AFib, chronic  rate controlled - on BB  c/w eliquis 2.5mg po q12    # CAD  agree w/ no asa    # DM 2  diabetic diet  FS qac/hs   c/w lantus 5 HS - would incr this slightly, if FS go up  d/c ademelog w/ meals  can use SSI for periodic coverage of high FS    # PVD  c/w pentoxifylline     # DLD  on statin    # Fall; diff walking; weakness/debility/deconditioning  CTH neg  trauma w/u neg  PT eval for SNF (during this admit, previously, pt was OOBTC and did walk a little w/ walker and asst)  at home, pt walked and drove as pre-hosp baseline    # Hypothyroidism  synthroid 150mg q24  TSH 0.39    # DVT ppx: on eliquis    # GI ppx: none    # Activity: need PT eval    Dispo: cont diet; f/u tacro lvl; can limit labs; tx knee pain; incent nara;    pt will need STR @ SNF ([facility]) - f/u CM - stable for d/c today    Prog remains very guarded. Long term prog is likely poor. Family are aware.

## 2022-08-03 NOTE — PROGRESS NOTE ADULT - REASON FOR ADMISSION
Diarrhea

## 2022-08-03 NOTE — PROGRESS NOTE ADULT - NUTRITIONAL ASSESSMENT
This patient has been assessed with a concern for Malnutrition and has been determined to have a diagnosis/diagnoses of Moderate protein-calorie malnutrition.    This patient is being managed with:   Diet Minced and Moist-  Mildly Thick Liquids (MILDTHICKLIQS)  Supplement Feeding Modality:  Oral  Glucerna Shake Cans or Servings Per Day:  2       Frequency:  Three Times a day  Entered: Aug  2 2022  4:07PM    Diet Minced and Moist-  Prosource Gelatein 20 Sugar Free     Qty per Day:  3  Supplement Feeding Modality:  Oral  Glucerna Shake Cans or Servings Per Day:  1       Frequency:  Three Times a day  Entered: Jul 27 2022  2:33PM    The following pending diet order is being considered for treatment of Moderate protein-calorie malnutrition:null
This patient has been assessed with a concern for Malnutrition and has been determined to have a diagnosis/diagnoses of Moderate protein-calorie malnutrition.    This patient is being managed with:   Diet Minced and Moist-  Mildly Thick Liquids (MILDTHICKLIQS)  Tube Feeding Modality: Nasogastric  Jevity 1.2 Constantin  Total Volume for 24 Hours (mL): 1000  Bolus  Total Volume of Bolus (mL):  250  Total # of Feeds: 4  Tube Feed Frequency: Every 6 hours   Tube Feed Start Time: 08:00  Bolus Feed Rate (mL per Hour): 250   Bolus Feed Duration (in Hours): 1  Free Water Flush Instructions:  PLEASE MIX 2 PACKETS OF THICKENER THOROUGHLY WITH GLUCERNA AND WAIT 5-10 MINUTES TO ACHIEVE MILDLY THICK CONSISTENCY*** THANK YOU  Prosource Gelatein 20 Sugar Free     Qty per Day:  3  Supplement Feeding Modality:  Oral  Glucerna Shake Cans or Servings Per Day:  1       Frequency:  Three Times a day  Entered: Jul 29 2022  7:45AM    Diet Minced and Moist-  Prosource Gelatein 20 Sugar Free     Qty per Day:  3  Supplement Feeding Modality:  Oral  Glucerna Shake Cans or Servings Per Day:  1       Frequency:  Three Times a day  Entered: Jul 27 2022  2:33PM    The following pending diet order is being considered for treatment of Moderate protein-calorie malnutrition:null
This patient has been assessed with a concern for Malnutrition and has been determined to have a diagnosis/diagnoses of Moderate protein-calorie malnutrition.    This patient is being managed with:   Diet Minced and Moist-  Mildly Thick Liquids (MILDTHICKLIQS)  Tube Feeding Modality: Nasogastric  Jevity 1.2 Constantin  Total Volume for 24 Hours (mL): 1000  Bolus  Total Volume of Bolus (mL):  250  Total # of Feeds: 4  Tube Feed Frequency: Every 6 hours   Tube Feed Start Time: 08:00  Bolus Feed Rate (mL per Hour): 250   Bolus Feed Duration (in Hours): 1  Free Water Flush Instructions:  PLEASE MIX 2 PACKETS OF THICKENER THOROUGHLY WITH GLUCERNA AND WAIT 5-10 MINUTES TO ACHIEVE MILDLY THICK CONSISTENCY*** THANK YOU  Prosource Gelatein 20 Sugar Free     Qty per Day:  3  Supplement Feeding Modality:  Oral  Glucerna Shake Cans or Servings Per Day:  1       Frequency:  Three Times a day  Entered: Jul 29 2022  7:45AM    Diet Minced and Moist-  Prosource Gelatein 20 Sugar Free     Qty per Day:  3  Supplement Feeding Modality:  Oral  Glucerna Shake Cans or Servings Per Day:  1       Frequency:  Three Times a day  Entered: Jul 27 2022  2:33PM    The following pending diet order is being considered for treatment of Moderate protein-calorie malnutrition:null
This patient has been assessed with a concern for Malnutrition and has been determined to have a diagnosis/diagnoses of Moderate protein-calorie malnutrition.    This patient is being managed with:   Diet Minced and Moist-  Mildly Thick Liquids (MILDTHICKLIQS)  Supplement Feeding Modality:  Oral  Glucerna Shake Cans or Servings Per Day:  2       Frequency:  Three Times a day  Entered: Aug  2 2022  4:07PM    Diet Minced and Moist-  Prosource Gelatein 20 Sugar Free     Qty per Day:  3  Supplement Feeding Modality:  Oral  Glucerna Shake Cans or Servings Per Day:  1       Frequency:  Three Times a day  Entered: Jul 27 2022  2:33PM    The following pending diet order is being considered for treatment of Moderate protein-calorie malnutrition:null
This patient has been assessed with a concern for Malnutrition and has been determined to have a diagnosis/diagnoses of Moderate protein-calorie malnutrition.    This patient is being managed with:   Diet Minced and Moist-  Mildly Thick Liquids (MILDTHICKLIQS)  Supplement Feeding Modality:  Oral  Glucerna Shake Cans or Servings Per Day:  2       Frequency:  Three Times a day  Entered: Aug  2 2022  8:20AM    Diet Minced and Moist-  Prosource Gelatein 20 Sugar Free     Qty per Day:  3  Supplement Feeding Modality:  Oral  Glucerna Shake Cans or Servings Per Day:  1       Frequency:  Three Times a day  Entered: Jul 27 2022  2:33PM    The following pending diet order is being considered for treatment of Moderate protein-calorie malnutrition:null
This patient has been assessed with a concern for Malnutrition and has been determined to have a diagnosis/diagnoses of Moderate protein-calorie malnutrition.    This patient is being managed with:   Diet Minced and Moist-  Mildly Thick Liquids (MILDTHICKLIQS)  Tube Feeding Modality: Nasogastric  Jevity 1.2 Constantin  Total Volume for 24 Hours (mL): 1000  Bolus  Total Volume of Bolus (mL):  250  Total # of Feeds: 4  Tube Feed Frequency: Every 6 hours   Tube Feed Start Time: 08:00  Bolus Feed Rate (mL per Hour): 250   Bolus Feed Duration (in Hours): 1  Free Water Flush Instructions:  PLEASE MIX 2 PACKETS OF THICKENER THOROUGHLY WITH GLUCERNA AND WAIT 5-10 MINUTES TO ACHIEVE MILDLY THICK CONSISTENCY*** THANK YOU  Prosource Gelatein 20 Sugar Free     Qty per Day:  3  Supplement Feeding Modality:  Oral  Glucerna Shake Cans or Servings Per Day:  1       Frequency:  Three Times a day  Entered: Jul 29 2022  7:45AM    Diet Minced and Moist-  Prosource Gelatein 20 Sugar Free     Qty per Day:  3  Supplement Feeding Modality:  Oral  Glucerna Shake Cans or Servings Per Day:  1       Frequency:  Three Times a day  Entered: Jul 27 2022  2:33PM    The following pending diet order is being considered for treatment of Moderate protein-calorie malnutrition:null
This patient has been assessed with a concern for Malnutrition and has been determined to have a diagnosis/diagnoses of Moderate protein-calorie malnutrition.    This patient is being managed with:   Diet Minced and Moist-  Mildly Thick Liquids (MILDTHICKLIQS)  Free Water Flush Instructions:  PLEASE MIX 2 PACKETS OF THICKENER THOROUGHLY WITH GLUCERNA AND WAIT 5-10 MINUTES TO ACHIEVE MILDLY THICK CONSISTENCY*** THANK YOU  Prosource Gelatein 20 Sugar Free     Qty per Day:  3  Supplement Feeding Modality:  Oral  Glucerna Shake Cans or Servings Per Day:  1       Frequency:  Three Times a day  Entered: Jul 28 2022  8:12AM    Diet Minced and Moist-  Mildly Thick Liquids (MILDTHICKLIQS)  Supplement Feeding Modality:  Oral  Glucerna Shake Cans or Servings Per Day:  1       Frequency:  Three Times a day  Entered: Jul 27 2022  3:10PM    Diet Minced and Moist-  Prosource Gelatein 20 Sugar Free     Qty per Day:  3  Supplement Feeding Modality:  Oral  Glucerna Shake Cans or Servings Per Day:  1       Frequency:  Three Times a day  Entered: Jul 27 2022  2:33PM    The following pending diet order is being considered for treatment of Moderate protein-calorie malnutrition:null
This patient has been assessed with a concern for Malnutrition and has been determined to have a diagnosis/diagnoses of Moderate protein-calorie malnutrition.    This patient is being managed with:   Diet Minced and Moist-  Mildly Thick Liquids (MILDTHICKLIQS)  Free Water Flush Instructions:  PLEASE MIX 2 PACKETS OF THICKENER THOROUGHLY WITH GLUCERNA AND WAIT 5-10 MINUTES TO ACHIEVE MILDLY THICK CONSISTENCY*** THANK YOU  Prosource Gelatein 20 Sugar Free     Qty per Day:  3  Supplement Feeding Modality:  Oral  Glucerna Shake Cans or Servings Per Day:  1       Frequency:  Three Times a day  Entered: Jul 28 2022  8:12AM    Diet Minced and Moist-  Mildly Thick Liquids (MILDTHICKLIQS)  Supplement Feeding Modality:  Oral  Glucerna Shake Cans or Servings Per Day:  1       Frequency:  Three Times a day  Entered: Jul 27 2022  3:10PM    Diet Minced and Moist-  Prosource Gelatein 20 Sugar Free     Qty per Day:  3  Supplement Feeding Modality:  Oral  Glucerna Shake Cans or Servings Per Day:  1       Frequency:  Three Times a day  Entered: Jul 27 2022  2:33PM    The following pending diet order is being considered for treatment of Moderate protein-calorie malnutrition:null
This patient has been assessed with a concern for Malnutrition and has been determined to have a diagnosis/diagnoses of Moderate protein-calorie malnutrition.    This patient is being managed with:   Diet Minced and Moist-  Mildly Thick Liquids (MILDTHICKLIQS)  Tube Feeding Modality: Nasogastric  Jevity 1.2 Constantin  Total Volume for 24 Hours (mL): 1000  Bolus  Total Volume of Bolus (mL):  250  Total # of Feeds: 4  Tube Feed Frequency: Every 6 hours   Tube Feed Start Time: 08:00  Bolus Feed Rate (mL per Hour): 250   Bolus Feed Duration (in Hours): 1  Free Water Flush Instructions:  PLEASE MIX 2 PACKETS OF THICKENER THOROUGHLY WITH GLUCERNA AND WAIT 5-10 MINUTES TO ACHIEVE MILDLY THICK CONSISTENCY*** THANK YOU  Prosource Gelatein 20 Sugar Free     Qty per Day:  3  Supplement Feeding Modality:  Oral  Glucerna Shake Cans or Servings Per Day:  1       Frequency:  Three Times a day  Entered: Jul 29 2022  7:45AM    Diet Minced and Moist-  Prosource Gelatein 20 Sugar Free     Qty per Day:  3  Supplement Feeding Modality:  Oral  Glucerna Shake Cans or Servings Per Day:  1       Frequency:  Three Times a day  Entered: Jul 27 2022  2:33PM    The following pending diet order is being considered for treatment of Moderate protein-calorie malnutrition:null
This patient has been assessed with a concern for Malnutrition and has been determined to have a diagnosis/diagnoses of Moderate protein-calorie malnutrition.    This patient is being managed with:   Diet Minced and Moist-  Mildly Thick Liquids (MILDTHICKLIQS)  Tube Feeding Modality: Nasogastric  Jevity 1.2 Constantin  Total Volume for 24 Hours (mL): 1000  Bolus  Total Volume of Bolus (mL):  250  Total # of Feeds: 4  Tube Feed Frequency: Every 6 hours   Tube Feed Start Time: 08:00  Bolus Feed Rate (mL per Hour): 250   Bolus Feed Duration (in Hours): 1  Free Water Flush Instructions:  PLEASE MIX 2 PACKETS OF THICKENER THOROUGHLY WITH GLUCERNA AND WAIT 5-10 MINUTES TO ACHIEVE MILDLY THICK CONSISTENCY*** THANK YOU  Prosource Gelatein 20 Sugar Free     Qty per Day:  3  Supplement Feeding Modality:  Oral  Glucerna Shake Cans or Servings Per Day:  1       Frequency:  Three Times a day  Entered: Jul 29 2022  7:45AM    Diet Minced and Moist-  Prosource Gelatein 20 Sugar Free     Qty per Day:  3  Supplement Feeding Modality:  Oral  Glucerna Shake Cans or Servings Per Day:  1       Frequency:  Three Times a day  Entered: Jul 27 2022  2:33PM    The following pending diet order is being considered for treatment of Moderate protein-calorie malnutrition:null
This patient has been assessed with a concern for Malnutrition and has been determined to have a diagnosis/diagnoses of Moderate protein-calorie malnutrition.    This patient is being managed with:   Diet Minced and Moist-  Mildly Thick Liquids (MILDTHICKLIQS)  Free Water Flush Instructions:  PLEASE MIX 2 PACKETS OF THICKENER THOROUGHLY WITH GLUCERNA AND WAIT 5-10 MINUTES TO ACHIEVE MILDLY THICK CONSISTENCY*** THANK YOU  Prosource Gelatein 20 Sugar Free     Qty per Day:  3  Supplement Feeding Modality:  Oral  Glucerna Shake Cans or Servings Per Day:  1       Frequency:  Three Times a day  Entered: Jul 28 2022  8:12AM    Diet Minced and Moist-  Mildly Thick Liquids (MILDTHICKLIQS)  Supplement Feeding Modality:  Oral  Glucerna Shake Cans or Servings Per Day:  1       Frequency:  Three Times a day  Entered: Jul 27 2022  3:10PM    Diet Minced and Moist-  Prosource Gelatein 20 Sugar Free     Qty per Day:  3  Supplement Feeding Modality:  Oral  Glucerna Shake Cans or Servings Per Day:  1       Frequency:  Three Times a day  Entered: Jul 27 2022  2:33PM    The following pending diet order is being considered for treatment of Moderate protein-calorie malnutrition:null
This patient has been assessed with a concern for Malnutrition and has been determined to have a diagnosis/diagnoses of Moderate protein-calorie malnutrition.    This patient is being managed with:   Diet Minced and Moist-  Mildly Thick Liquids (MILDTHICKLIQS)  Supplement Feeding Modality:  Oral  Glucerna Shake Cans or Servings Per Day:  2       Frequency:  Three Times a day  Entered: Aug  2 2022  8:20AM    Diet Minced and Moist-  Prosource Gelatein 20 Sugar Free     Qty per Day:  3  Supplement Feeding Modality:  Oral  Glucerna Shake Cans or Servings Per Day:  1       Frequency:  Three Times a day  Entered: Jul 27 2022  2:33PM    The following pending diet order is being considered for treatment of Moderate protein-calorie malnutrition:null
This patient has been assessed with a concern for Malnutrition and has been determined to have a diagnosis/diagnoses of Moderate protein-calorie malnutrition.    This patient is being managed with:   Diet Minced and Moist-  Mildly Thick Liquids (MILDTHICKLIQS)  Tube Feeding Modality: Nasogastric  Jevity 1.2 Constantin  Total Volume for 24 Hours (mL): 1000  Bolus  Total Volume of Bolus (mL):  250  Total # of Feeds: 4  Tube Feed Frequency: Every 6 hours   Tube Feed Start Time: 08:00  Bolus Feed Rate (mL per Hour): 250   Bolus Feed Duration (in Hours): 1  Free Water Flush Instructions:  PLEASE MIX 2 PACKETS OF THICKENER THOROUGHLY WITH GLUCERNA AND WAIT 5-10 MINUTES TO ACHIEVE MILDLY THICK CONSISTENCY*** THANK YOU  Prosource Gelatein 20 Sugar Free     Qty per Day:  3  Supplement Feeding Modality:  Oral  Glucerna Shake Cans or Servings Per Day:  1       Frequency:  Three Times a day  Entered: Jul 29 2022  7:45AM    Diet Minced and Moist-  Prosource Gelatein 20 Sugar Free     Qty per Day:  3  Supplement Feeding Modality:  Oral  Glucerna Shake Cans or Servings Per Day:  1       Frequency:  Three Times a day  Entered: Jul 27 2022  2:33PM    The following pending diet order is being considered for treatment of Moderate protein-calorie malnutrition:null
This patient has been assessed with a concern for Malnutrition and has been determined to have a diagnosis/diagnoses of Moderate protein-calorie malnutrition.    This patient is being managed with:   Diet Minced and Moist-  Mildly Thick Liquids (MILDTHICKLIQS)  Tube Feeding Modality: Nasogastric  Jevity 1.2 Constantin  Total Volume for 24 Hours (mL): 1000  Bolus  Total Volume of Bolus (mL):  250  Total # of Feeds: 4  Tube Feed Frequency: Every 6 hours   Tube Feed Start Time: 08:00  Bolus Feed Rate (mL per Hour): 250   Bolus Feed Duration (in Hours): 1  Free Water Flush Instructions:  PLEASE MIX 2 PACKETS OF THICKENER THOROUGHLY WITH GLUCERNA AND WAIT 5-10 MINUTES TO ACHIEVE MILDLY THICK CONSISTENCY*** THANK YOU  Prosource Gelatein 20 Sugar Free     Qty per Day:  3  Supplement Feeding Modality:  Oral  Glucerna Shake Cans or Servings Per Day:  1       Frequency:  Three Times a day  Entered: Jul 29 2022  7:45AM    Diet Minced and Moist-  Prosource Gelatein 20 Sugar Free     Qty per Day:  3  Supplement Feeding Modality:  Oral  Glucerna Shake Cans or Servings Per Day:  1       Frequency:  Three Times a day  Entered: Jul 27 2022  2:33PM    The following pending diet order is being considered for treatment of Moderate protein-calorie malnutrition:null

## 2022-08-03 NOTE — PROGRESS NOTE ADULT - ASSESSMENT
ASSESSMENT & PLAN:  82M with PMHx of HFrEF, Afib on eliquis, CAD s/p CABG, COPD (on rare home O2 PRN), CKD3, HTN, HLD who presented for 2 weeks of watery diarrhea found to have Covid-19, with hospital stay c/b altered mental status.    #Decreased PO intake  - Patient refusing meals  - May be due to elevated Tacro level  - Re-Evaluated by speech and swallow -> modified barium swallow study. Diet minced and moist w/ mildly thickened liquids.    Plan:  -Goals of care conversation was had with the palliative team. Patient and patient's family do not want aritificial feeding at this time (no NG/PEG). Patients family wishes to transition to palliative approach, and would like a  come to the room.   -Patient is DNR/DNI  - If patient/patient's family do not want artificial feeding, no real need for continued hospitalization.  - Palliative care to follow up and discuss hospice options  - Patient has more strength and is more awake today. Called speech and swallow for a re-evaluation Spoke with Polina who explained to me that he really is not a candidate for re-evaluation due to the fact that he failed the modified barium swallow study. She recommended goals of care conversation and possible needs for comfort feeds.   - Patient is to be evaluated by PT today, then possibly to SNF    #Anemia  - Hgb 07/26 6.9  - Bowel movements look normal not black/ would be concerning for bleeding   - Most likely chronic 2/2 ESRD    Plan:   - Restart Aranesp 20mcg sub-q weekly (given Wednesday 07/27)  - s/p 1 Unit PRBC (07/27)  - q72 hour cbc      # Metabolic Encephalopathy - multifactorial in etiology, improving   EEG and CT head w/o contrast- no acute pathology  EEG- Consistent with diffuse cerebral electrophysiological dysfunction, Secondary to toxic metabolic cause.    Plan:  - c/w IVF, give D5w with Lasix for hypervolemic hypernatremia -  Resolved   - q72 bmp      # Pnuemonia  - Repeat chest ct over the weekend shows bilateral lower lobe consolidations with small amount consolidation also in the lingular segment of the left upper lobe suspicious for multifocal pneumonia.    Plan:  - Completed course of zosyn     # BRENDA on CKD III,   - presumed pre-renal   - hx C3 glomerulopathy    Plan:  - continue with NHCO3 PO  - Renal - continue tacrolimus ordered  - Tacrolimus trough- repeat trough after holding tacro was elevated still at >10.   - Gloria removed  - Trough ordered for this morning     # COVID +  - s/p Dexa 6mg PO q6  - completed quarantine 7/15 - d/bg precautions 7/16  - Was unnecessarily retested, patient still positive but no symptoms. NOT INFECTIOUS, COULD BE POSITIVE UP TO 90 DAYS AFTER INFECTION      # COPD; severe pulm HTN  - Nebs PRN Q4    # Fall, mechanical  - no syncope per pt  - cth neg  - trauma w/u neg    Plan:  - PT eval for SNF    # HFrEF, chronic;   - severely dilated cardio myopathy  - Right ventrical dysfunction   - Mild tricuspid valve/ aortic valve regurg    Plan:  - LASIX 40 MG PO RESTARTED 07/26         - not on ANY goal directed tx - cardio eval Dr Haley  - Patient started on metoprolol 25mg q 24 PO

## 2022-08-03 NOTE — PROGRESS NOTE ADULT - SUBJECTIVE AND OBJECTIVE BOX
KANGANDRIA MCELROY  82y  Male  ***My note supersedes ALL resident notes that I sign.  My corrections for their notes are in my note.***    I can be reached directly on [employer]. My office number is [phone]. My personal cell number is [phone].    INTERVAL EVENTS: Here for f/u of PNA. Pt looks the best I have ever seen him on this admission. He is sitting up. Moving arms very well. Legs are still weak. MS is much, much better. He even "cheered" when I told him he could leave the hospital to go to rehab today. Pt is still just nibbling at food, but he does like the glucerna. Still coughs if swallows any ice chips or ice water (ie, thin liquids).    T(F): 97.6 (08-03-22 @ 12:08), Max: 97.7 (08-02-22 @ 20:18)  HR: 72 (08-03-22 @ 12:08) (72 - 83)  BP: 154/66 (08-03-22 @ 12:08) (124/69 - 154/66)  RR: 20 (08-03-22 @ 12:08) (18 - 20)  SpO2: 93% (08-03-22 @ 08:18) (93% - 95%)    Gen: no resp distress; speaking better; + NC O2; follows commands well;  HEENT: PERRL, EOMI, mouth clr, nose minor irritation in both nares, no active bleeding  Neck: no nodes, no JVD, thyroid nl  lungs: clr  hrt: s1 s2 rrr no murmur  abd: soft, NT/ND, no HS megaly  ext: no edema, no c/c; has pain w/ moving legs  neuro: awake and alert, ox3 (at baseline MS), cn intact, can move all 4 ext, but very weak in legs    LABS:                      8.1     (    91.1   7.57  )-----------( ---------      206      ( 03 Aug 2022 05:11 )             25.6    (    16.3     Hemoglobin: 8.1 g/dL (08-03 @ 05:11)  Hemoglobin: 8.1 g/dL (08-02 @ 08:18)  Hemoglobin: 8.2 g/dL (08-01 @ 08:40)  Hemoglobin: 8.6 g/dL (07-31 @ 08:41)  Hemoglobin: 9.2 g/dL (07-30 @ 07:31)    136   (   101   (   108      08-03-22 @ 05:11  ----------------------               4.3   (   27   (   60                             -----                        1.5  Ca  7.7   Mg  2.2    P   --     Creatinine:   1.5 (08-03 @ 05:11)  eGFR:  46    Creatinine:   1.6 (08-02 @ 08:18)  eGFR:  43    Creatinine:   1.8 (08-01 @ 08:40)  eGFR:  37    Creatinine:   1.8 (07-31 @ 08:41)  eGFR:  37    Creatinine:   1.8 (07-30 @ 07:31)  eGFR:  37      CAPILLARY BLOOD GLUCOSE  POCT Blood Glucose.: 225 (08-03-22 @ 16:38)  POCT Blood Glucose.: 155 (08-03-22 @ 11:37)  POCT Blood Glucose.: 129 (08-03-22 @ 07:56)  POCT Blood Glucose.: 116 (08-02-22 @ 21:51)  POCT Blood Glucose.: 155 (08-02-22 @ 16:33)  POCT Blood Glucose.: 81 (08-02-22 @ 11:50)  POCT Blood Glucose.: 139 (08-02-22 @ 07:50)  POCT Blood Glucose.: 174 (08-01-22 @ 22:02)    RADIOLOGY & ADDITIONAL TESTS:    MEDICATIONS:    acetaminophen     Tablet .. 650 milliGRAM(s) Oral every 6 hours PRN  ALBUTerol    90 MICROgram(s) HFA Inhaler 1 Puff(s) Inhalation every 6 hours PRN  apixaban 2.5 milliGRAM(s) Oral two times a day  atorvastatin 40 milliGRAM(s) Oral at bedtime  BACItracin   Ointment 1 Application(s) Topical every 12 hours  calcitriol   Capsule 0.25 MICROGram(s) Oral <User Schedule>  darbepoetin Injectable ViaL 20 MICROGram(s) SubCutaneous every 7 days  furosemide    Tablet 20 milliGRAM(s) Oral daily  insulin glargine Injectable (LANTUS) 5 Unit(s) SubCutaneous at bedtime  insulin lispro (ADMELOG) corrective regimen sliding scale   SubCutaneous three times a day before meals  levothyroxine 150 MICROGram(s) Oral daily  melatonin 5 milliGRAM(s) Oral at bedtime PRN  metoprolol succinate ER 50 milliGRAM(s) Oral daily  multivitamin/minerals 1 Tablet(s) Oral daily  pentoxifylline 400 milliGRAM(s) Oral two times a day  sodium bicarbonate 650 milliGRAM(s) Oral three times a day  tacrolimus 0.5 milliGRAM(s) Oral <User Schedule>  traMADol 50 milliGRAM(s) Oral every 6 hours PRN

## 2022-08-03 NOTE — PROGRESS NOTE ADULT - SUBJECTIVE AND OBJECTIVE BOX
ANDRIA TAVAREZ 82y Male  MRN#: [id number]   Hospital Day: 29d    SUBJECTIVE  Patient is a 82y old Male who presents with a chief complaint of Diarrhea (03 Aug 2022 08:51)  Currently admitted to medicine with the primary diagnosis of Acute kidney injury superimposed on CKD      INTERVAL HPI AND OVERNIGHT EVENTS:  Patient was examined and seen at bedside. This morning he is resting comfortably in bed and reports no issues or overnight events.    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    AF (atrial fibrillation)  s/p DCCV    Carotid stenosis    COPD (chronic obstructive pulmonary disease)    H/O coronary artery bypass surgery  2001    S/P inguinal hernia repair    History of appendectomy      ALLERGIES:  Ozempic (0.25 mg or 0.5 mg dose) (Hives; Rash)  streptomycin (Unknown)  Trulicity Pen (Hives; Rash)    MEDICATIONS:  STANDING MEDICATIONS  apixaban 2.5 milliGRAM(s) Oral two times a day  atorvastatin 40 milliGRAM(s) Oral at bedtime  BACItracin   Ointment 1 Application(s) Topical every 12 hours  calcitriol   Capsule 0.25 MICROGram(s) Oral <User Schedule>  darbepoetin Injectable ViaL 20 MICROGram(s) SubCutaneous every 7 days  dextrose 5%. 1000 milliLiter(s) IV Continuous <Continuous>  dextrose 5%. 1000 milliLiter(s) IV Continuous <Continuous>  dextrose 50% Injectable 25 Gram(s) IV Push once  dextrose 50% Injectable 12.5 Gram(s) IV Push once  dextrose 50% Injectable 25 Gram(s) IV Push once  furosemide    Tablet 20 milliGRAM(s) Oral daily  glucagon  Injectable 1 milliGRAM(s) IntraMuscular once  insulin glargine Injectable (LANTUS) 5 Unit(s) SubCutaneous at bedtime  insulin lispro (ADMELOG) corrective regimen sliding scale   SubCutaneous three times a day before meals  levothyroxine 150 MICROGram(s) Oral daily  metoprolol succinate ER 50 milliGRAM(s) Oral daily  multivitamin/minerals 1 Tablet(s) Oral daily  pentoxifylline 400 milliGRAM(s) Oral two times a day  sodium bicarbonate 650 milliGRAM(s) Oral three times a day  tacrolimus 0.5 milliGRAM(s) Oral <User Schedule>    PRN MEDICATIONS  acetaminophen     Tablet .. 650 milliGRAM(s) Oral every 6 hours PRN  ALBUTerol    90 MICROgram(s) HFA Inhaler 1 Puff(s) Inhalation every 6 hours PRN  dextrose Oral Gel 15 Gram(s) Oral once PRN  melatonin 5 milliGRAM(s) Oral at bedtime PRN  traMADol 50 milliGRAM(s) Oral every 6 hours PRN      VITAL SIGNS: Last 24 Hours  T(C): 36.2 (03 Aug 2022 04:29), Max: 36.5 (02 Aug 2022 20:18)  T(F): 97.1 (03 Aug 2022 04:29), Max: 97.7 (02 Aug 2022 20:18)  HR: 83 (03 Aug 2022 04:29) (75 - 83)  BP: 145/67 (03 Aug 2022 04:29) (124/69 - 145/67)  BP(mean): 90 (02 Aug 2022 20:18) (90 - 90)  RR: 18 (03 Aug 2022 04:29) (18 - 18)  SpO2: 93% (03 Aug 2022 08:18) (93% - 95%)    LABS:                        8.1    7.57  )-----------( 206      ( 03 Aug 2022 05:11 )             25.6     08-03    136  |  101  |  60<H>  ----------------------------<  108<H>  4.3   |  27  |  1.5    Ca    7.7<L>      03 Aug 2022 05:11  Mg     2.2     08-03      PHYSICAL EXAM:  CONSTITUTIONAL: No acute distress, well-developed, well-groomed, AAOx3  HEAD: Atraumatic, normocephalic  EYES: EOM intact, PERRLA, conjunctiva and sclera clear  ENT: Blood in the nares   PULMONARY: Clear to auscultation bilaterally  CARDIOVASCULAR: Regular rate and rhythm  GASTROINTESTINAL: Soft, non-tender, non-distended; bowel sounds present  MUSCULOSKELETAL: 2+ peripheral pulses; no clubbing, no cyanosis, no edema  SKIN: No rashes or lesions; warm and dry

## 2022-08-03 NOTE — PROGRESS NOTE ADULT - PROVIDER SPECIALTY LIST ADULT
Internal Medicine
Nephrology
Internal Medicine
Nephrology
Hospitalist
Internal Medicine
Nephrology
Pulmonology
Hospitalist
Hospitalist
Internal Medicine
Nephrology
Nephrology
Neurology
Pulmonology
Hospitalist
Internal Medicine
Nephrology
Hospitalist
Infectious Disease
Infectious Disease
Internal Medicine
Palliative Care

## 2022-08-03 NOTE — CHART NOTE - NSCHARTNOTEFT_GEN_A_CORE
visited patient earlier today. patient encountered resting in bed. he denied any pain or discomfort. His only complain today is that he was waiting for lunch and he doesn't want to be in hospital anymore. support rendered. [phone]

## 2022-08-03 NOTE — CHART NOTE - NSCHARTNOTESELECT_GEN_ALL_CORE
Event
Event Note
3 Day Calorie Count/Event Note
Event
Event Note
Event Note
Nutrition Support/Nutrition Services
Palliative Care - Social Work/Event Note
Palliative Care np/Event Note

## 2022-08-03 NOTE — PROGRESS NOTE ADULT - ASSESSMENT
82y Male with PMHx of HFrEF, Afib on eliquis, CAD s/p CABG, COPD (on rare home O2 PRN), CKD3 (Hx of C3 glomerulopathy), HTN, HLD    # BRENDA on CKD 3b / prerenal? baseline 1.8   #creat at baseline now though overestimating true kidney fct / malnourished patient   # please document UO   # continue low dose lasix   # pneumonia finished antibx course   # C3 glomerulopathy - last tacrolimus level in the 10 range    tacrolimus  at 0.5 mg q12h f/u reepat rough ( ~30 minutes before dose intake) Cellcept - on hold following COVID19 illness  # Proteinuria 8.4 g initially responded well to Prograf with reduction of proteinuria  to<1 g/g according to outpatient records  # HFrEF 10/2021 TTE - severe LV and RV systolic dysfunction and PAH / seen by Dr. De La Fuente, EF ~45%  #Hold sodium bicarb   #on calcitriol / last pth at goal / check phosphorus  level   # started on SRIRAM / ferritin elevated / Hb noted   # overall prognosis poor   # will follow

## 2022-08-03 NOTE — PROGRESS NOTE ADULT - SUBJECTIVE AND OBJECTIVE BOX
Nephrology progress note    THIS IS AN INCOMPLETE NOTE . FULL NOTE TO FOLLOW SHORTLY    Patient is seen and examined, events over the last 24 h noted .    Allergies:  Ozempic (0.25 mg or 0.5 mg dose) (Hives; Rash)  streptomycin (Unknown)  Trulicity Pen (Hives; Rash)    Hospital Medications:   MEDICATIONS  (STANDING):  apixaban 2.5 milliGRAM(s) Oral two times a day  atorvastatin 40 milliGRAM(s) Oral at bedtime  BACItracin   Ointment 1 Application(s) Topical every 12 hours  calcitriol   Capsule 0.25 MICROGram(s) Oral <User Schedule>  darbepoetin Injectable ViaL 20 MICROGram(s) SubCutaneous every 7 days  dextrose 5%. 1000 milliLiter(s) (100 mL/Hr) IV Continuous <Continuous>  dextrose 5%. 1000 milliLiter(s) (50 mL/Hr) IV Continuous <Continuous>  dextrose 50% Injectable 25 Gram(s) IV Push once  dextrose 50% Injectable 12.5 Gram(s) IV Push once  dextrose 50% Injectable 25 Gram(s) IV Push once  furosemide    Tablet 20 milliGRAM(s) Oral daily  glucagon  Injectable 1 milliGRAM(s) IntraMuscular once  insulin glargine Injectable (LANTUS) 5 Unit(s) SubCutaneous at bedtime  insulin lispro (ADMELOG) corrective regimen sliding scale   SubCutaneous three times a day before meals  levothyroxine 150 MICROGram(s) Oral daily  metoprolol succinate ER 50 milliGRAM(s) Oral daily  multivitamin/minerals 1 Tablet(s) Oral daily  pentoxifylline 400 milliGRAM(s) Oral two times a day  sodium bicarbonate 650 milliGRAM(s) Oral three times a day  tacrolimus 0.5 milliGRAM(s) Oral <User Schedule>        VITALS:  T(F): 97.1 (08-03-22 @ 04:29), Max: 97.7 (08-02-22 @ 20:18)  HR: 83 (08-03-22 @ 04:29)  BP: 145/67 (08-03-22 @ 04:29)  RR: 18 (08-03-22 @ 04:29)  SpO2: 93% (08-03-22 @ 08:18)  Wt(kg): --    08-01 @ 07:01  -  08-02 @ 07:00  --------------------------------------------------------  IN: 240 mL / OUT: 0 mL / NET: 240 mL          PHYSICAL EXAM:  Constitutional: NAD  HEENT: anicteric sclera, oropharynx clear, MMM  Neck: No JVD  Respiratory: CTAB, no wheezes, rales or rhonchi  Cardiovascular: S1, S2, RRR  Gastrointestinal: BS+, soft, NT/ND  Extremities: No cyanosis or clubbing. No peripheral edema  :  No davalos.   Skin: No rashes    LABS:  08-03    136  |  101  |  60<H>  ----------------------------<  108<H>  4.3   |  27  |  1.5    Ca    7.7<L>      03 Aug 2022 05:11  Mg     2.2     08-03                            8.1    7.57  )-----------( 206      ( 03 Aug 2022 05:11 )             25.6       Urine Studies:        Iron 28, TIBC 135, %sat 21      [07-26-22 @ 13:55]  Ferritin 1299      [07-26-22 @ 09:35]  PTH -- (Ca 9.0)      [07-06-22 @ 07:39]   93  Vitamin D (25OH) 27      [07-06-22 @ 07:39]  HbA1c 8.5      [10-01-19 @ 09:54]  TSH 0.39      [07-18-22 @ 17:42]  Lipid: chol 110, TG 94, HDL 31, LDL --      [07-06-22 @ 07:39]      C3 Complement 84      [10-20-21 @ 20:00]  Syphilis Screen (Treponema Pallidum Ab) Negative      [10-21-21 @ 11:25]      RADIOLOGY & ADDITIONAL STUDIES:   Nephrology progress note  Patient is seen and examined, events over the last 24 h noted .  Lying in bed lethargic   ? nasal bleeding     Allergies:  Ozempic (0.25 mg or 0.5 mg dose) (Hives; Rash)  streptomycin (Unknown)  Trulicity Pen (Hives; Rash)    Hospital Medications:   MEDICATIONS  (STANDING):  apixaban 2.5 milliGRAM(s) Oral two times a day  atorvastatin 40 milliGRAM(s) Oral at bedtime  BACItracin   Ointment 1 Application(s) Topical every 12 hours  calcitriol   Capsule 0.25 MICROGram(s) Oral <User Schedule>  darbepoetin Injectable ViaL 20 MICROGram(s) SubCutaneous every 7 days  furosemide    Tablet 20 milliGRAM(s) Oral daily  glucagon  Injectable 1 milliGRAM(s) IntraMuscular once  insulin glargine Injectable (LANTUS) 5 Unit(s) SubCutaneous at bedtime  insulin lispro (ADMELOG) corrective regimen sliding scale   SubCutaneous three times a day before meals  levothyroxine 150 MICROGram(s) Oral daily  metoprolol succinate ER 50 milliGRAM(s) Oral daily  multivitamin/minerals 1 Tablet(s) Oral daily  pentoxifylline 400 milliGRAM(s) Oral two times a day  sodium bicarbonate 650 milliGRAM(s) Oral three times a day  tacrolimus 0.5 milliGRAM(s) Oral <User Schedule>        VITALS:  T(F): 97.1 (08-03-22 @ 04:29), Max: 97.7 (08-02-22 @ 20:18)  HR: 83 (08-03-22 @ 04:29)  BP: 145/67 (08-03-22 @ 04:29)  RR: 18 (08-03-22 @ 04:29)  SpO2: 93% (08-03-22 @ 08:18)      08-01 @ 07:01  -  08-02 @ 07:00  --------------------------------------------------------  IN: 240 mL / OUT: 0 mL / NET: 240 mL          PHYSICAL EXAM:  Constitutional: lethargic   Neck: No JVD  Respiratory: CTAB,   Cardiovascular: S1, S2, RRR  Gastrointestinal: BS+, soft, NT/ND  Extremities: No cyanosis or clubbing. No peripheral edema  :  No davalos.   Skin: No rashes    LABS:  08-03    136  |  101  |  60<H>  ----------------------------<  108<H>  4.3   |  27  |  1.5    Creatinine Trend: 1.5<--, 1.6<--, 1.8<--, 1.8<--, 1.8<--, 2.1<--      Ca    7.7<L>      03 Aug 2022 05:11  Mg     2.2     08-03                            8.1    7.57  )-----------( 206      ( 03 Aug 2022 05:11 )             25.6       Urine Studies:        Iron 28, TIBC 135, %sat 21      [07-26-22 @ 13:55]  Ferritin 1299      [07-26-22 @ 09:35]  PTH -- (Ca 9.0)      [07-06-22 @ 07:39]   93  Vitamin D (25OH) 27      [07-06-22 @ 07:39]  HbA1c 8.5      [10-01-19 @ 09:54]  TSH 0.39      [07-18-22 @ 17:42]  Lipid: chol 110, TG 94, HDL 31, LDL --      [07-06-22 @ 07:39]      C3 Complement 84      [10-20-21 @ 20:00]  Syphilis Screen (Treponema Pallidum Ab) Negative      [10-21-21 @ 11:25]      RADIOLOGY & ADDITIONAL STUDIES:

## 2022-08-20 NOTE — ED PROVIDER NOTE - CLINICAL SUMMARY MEDICAL DECISION MAKING FREE TEXT BOX
Pt here with new O2 requirements, baseline on 3L home O2 but here hypoxic and required bipap, now transitioned off to 5L NC. Labs notable for hgb 7.2 (baseline hgb ~8), trop 0.27, BNP 52k, BUN/Cr 76/2.2 (previously 60/1.5 on 8/3). Ekg unchanged from prior. Cxr with new R opacity, stable L pleural effusion. Covered with abx for HCAP given recent admission 1 mo ago.

## 2022-08-20 NOTE — H&P ADULT - NSHPREVIEWOFSYSTEMS_GEN_ALL_CORE
REVIEW OF SYSTEMS:    CONSTITUTIONAL: No weakness, fevers or chills, unintentional weight loss  EYES/ENT: No visual changes;  No vertigo or throat pain   NECK: No pain or stiffness  RESPIRATORY: No shortness of breath, cough, wheezing, hemoptysis  CARDIOVASCULAR: No chest pain, palpitations, dizziness  GASTROINTESTINAL: No abdominal or epigastric pain. No nausea, vomiting, or hematemesis; No diarrhea or constipation. No melena or hematochezia.  GENITOURINARY: No dysuria, frequency, hematuria  EXTREMITIES: No edema, cyanosis  SKIN: No itching, rashes  MUSCULOSKELETAL: No injury  NEUROLOGICAL: No weakness

## 2022-08-20 NOTE — ED PROVIDER NOTE - CARE PLAN
1 Principal Discharge DX:	Pneumonia   Principal Discharge DX:	Pneumonia  Secondary Diagnosis:	BRENDA (acute kidney injury)  Secondary Diagnosis:	Elevated troponin level  Secondary Diagnosis:	Anemia   Principal Discharge DX:	Fluid overload  Secondary Diagnosis:	BRENDA (acute kidney injury)  Secondary Diagnosis:	Elevated troponin level  Secondary Diagnosis:	Anemia  Secondary Diagnosis:	Elevated brain natriuretic peptide (BNP) level  Secondary Diagnosis:	Pleural effusion

## 2022-08-20 NOTE — ED PROVIDER NOTE - NS ED ROS FT
Constitutional: (-) fever, (-) chills, (-) lethargy  Eyes: (-) eye pain, (-) visual changes, (-) discharge  ENMT: (-) nasal congestion, (-) sore throat. (-) neck pain (-) neck stiffness  Respiratory: (+) cough, (+) SOB, (-) MENDEZ, (+) respiratory distress  Cardiac: (-) chest pain, (-) palpitations  GI: (-) abdominal pain, (-) nausea, (-) vomiting, (-) diarrhea.  :  (-) dysuria, (-) hematuria, (-) frequency   MS:  (-) back pain, (-) joint pain.  Neuro:  (-) headache, (-) numbness, (-) focal weakness, (-) tingling   Skin:  (-) rash  Except as documented in the HPI,  all other systems are negative

## 2022-08-20 NOTE — H&P ADULT - HISTORY OF PRESENT ILLNESS
83 yo male with PMHx of HFrEF (45-50% on Lasix 20 PO), A-Fib on Eliquis, CAD s/p CABGx3, PVD, COPD (was on rare home O2 PRN now on 3L NC since recent July admission for COVID), CKD3, C3 glomerulonephropathy, HTN, HLD, Hypothyroid presents from NH with progressively worsening SOB x5 days and hypoxia.  Recently hospitalized here for a month discharged on 8/3/22 initially for BRENDA on CKD course c/b COVID, bilateral lower lobe pneumonia, delirium and microaspiration of thin liquids on modified barium study. Received Dexamethasone and Zosyn course with new O2 requirements of 3L NC discharged to nursing home on 3L and thick liquid diet for rehab. Due to his SOB, chest x-ray was ordered on 8/15 but results did not come back and patient became increasingly short of breath and was noted to be hypoxic and rehab facility which prompted referral to the ED. Review of symptoms is notable for orthopnea and a chronic dry cough unchanged since his covid illness. He denies paroxysmal nocturnal dyspnea, leg edema, chest pain, palpitations, dizziness, n/v, abd pain. Cardiologist is Dr. Witt and Pulmonolgist is Dr. Rory Mars.     In ED:  T(F): 96.4 (08-20-22 @ 11:27), Max: 96.4 (08-20-22 @ 11:27)  HR: 80 (08-20-22 @ 16:20) (80 - 91)  BP: 137/61 (08-20-22 @ 16:20) (121/58 - 137/61)  RR: 18 (08-20-22 @ 16:20) (18 - 24)  SpO2: 100% (08-20-22 @ 16:20) (89% - 100%) -> 89% on 4L -> placed on NRB -> BIPAP 2/2 tachypnea and hypoxia -> taken off with increased work of breathing placed back on BIPAP  Labs: WBC 12k, Hgb 7.2 (baseline 9), BNP 55k, Cr 2.2 (baseline 1.5), PCO2 47 (VBG), Troponin 0.24  EKG:   Atrial fibrillation with premature ventricular or aberrantly conducted complexes  Left posterior fascicular block   ST & T wave abnormality, consider lateral ischemia - similar to prior in July 2022  XR chest: significant bilateral pleural effusions and pulmonary congestion  Admitted to medicine for SOB/hypoxia likely 2/2 acute CHF exacerbation.

## 2022-08-20 NOTE — ED ADULT NURSE NOTE - OBJECTIVE STATEMENT
Patient presents to ED bibems from rehab facility in NAD a+ox4 co SOB and chest tightness "last few days". Pt denies n/v/d, fever. Pt noted with tachypnea. Pt with Hx of COPD. Pt placed on CCM. MD at bedside for eval. RT present for BIPAP.

## 2022-08-20 NOTE — H&P ADULT - NSHPLABSRESULTS_GEN_ALL_CORE
7.2    12.12 )-----------( 351      ( 20 Aug 2022 11:47 )             22.8       08-20    134<L>  |  94<L>  |  76<HH>  ----------------------------<  224<H>  4.2   |  26  |  2.2<H>    Ca    8.1<L>      20 Aug 2022 11:47    TPro  6.6  /  Alb  2.9<L>  /  TBili  0.7  /  DBili  x   /  AST  15  /  ALT  14  /  AlkPhos  112  08-20                      Lactate Trend  08-20 @ 15:50 Lactate:1.6       CARDIAC MARKERS ( 20 Aug 2022 11:47 )  x     / 0.24 ng/mL / x     / x     / x            CAPILLARY BLOOD GLUCOSE            Culture Results:   No Growth Final (07-22 @ 19:00)  Culture Results:   No growth (07-22 @ 13:10)

## 2022-08-20 NOTE — ED ADULT NURSE REASSESSMENT NOTE - NS ED NURSE REASSESS COMMENT FT1
Patient refusing BIPAP. Pt educated and encouraged to keep BIPAP on but continues to refuse. SpO2 96% on 4L NC and tolerating well. MD made aware.

## 2022-08-20 NOTE — H&P ADULT - ASSESSMENT
81 yo male with PMHx of HFrEF (45-50% on Lasix 20 PO), A-Fib on Eliquis, CAD s/p CABGx3, PVD, COPD (was on rare home O2 PRN now on 3L NC since recent July admission for COVID), CKD3, C3 glomerulonephropathy, HTN, HLD, Hypothyroid presents from NH with progressively worsening SOB x5 days found to be hypoxic with b/l pleural effusions.    Workup thus far:  -TTE 45-50% on 8/1/22  -BNP 52k  -CXR bilateral pleural effusions and pulmonary congestion   -EKG no ischemic change  -Trop x1 negative  -PCO2 47  -WBC 12k    #Acute hypoxic respiratory failure 2/2 acute CHF exacerbation  -doubt pneumonia, afebrile, however given mild leukocytosis and significant pleural effusions and hx recent bilateral lower lobe pneumonia will send procalcitonin   -admit to telemetry unit - low threshold for upgrade to SDU  -started on IV Lasix 80 mg daily  -->monitor lytes and replete prn  -->strict I&O and daily weight  -currently saturating 100% on BIPAP 40% -> -failed trial off BIPAP today became labored and tachypneic  (baseline 3L NC O2)   -->titrate oxygen to goal of 88-92% and no labored breathing given hx COPD  --> attempt another trial off BIPAP later today  -->NPO for now while on BIPAP and also for concurrent aspiration risk until speech swallow eval  -Cardiology consult - Dr. Witt  -A1c, lipid profile, TSH    #BRENDA on CKD 3 / C3 Glomerulonephropathy   -Cr 2.2 (baseline 1.5), Bicarb 26  -on Tacrolimus and Cellcept- continue  -hold Bicarb 650 TID  -nephro consult  -f/u Tacrolimus level    #Aspiration Risk  -modified barium swallow last admission showed microaspiration of thin liquids  -aspiration precautions  -npo for now while on BIPAP and eval by s/s     #Leukocytosis  -Reactive, less likely infectious  -trend wbc and fever curve off antibiotics, check procal    #T2DM  -A1c 8.0 last admission  -on home Lantus 5 mg qhs and ISS  -hold insulin while NPO, restart when able to tolerate feeding off BIPAP and cleared by s/s    #CAD sp CABG / PVD / HTN / HLD  -c/w Metoprolol 50, Nifedipine 30, Imdur 30, Atorvastatin 40, Eliquis 2.5    #COPD   -not in exacerbation / no wheeze  -on 3L since recent COVID  -duo-neb prn    #Chronic A-Fib  -c/w Eliquis 2.5  -c/w Metoprolol 50 - currently rate controlled    #Hypothyroid  -c/w synthroid, f/u TSH      DVT prophylaxis - Eliquis  Diet - NPO until s/s eval  Activity - IAT  Code Status - DNR / DNI

## 2022-08-20 NOTE — ED PROVIDER NOTE - ATTENDING CONTRIBUTION TO CARE
81 yo M with hx of CHF, afib on eliquis, CAD s/p CABG, DM, COPD on 3L home O2, CKD, AAA, HTN, hypothyroidism, PVD who was BIBEMS from [facility] for chronic sob. Per chart review, pt was admitted 7/5-8/3 for BRENDA and covid, was started on supplemental O2 during this admission. Pt says that his sob is chronic and does not know when it worsened. Also with chronic cough unchanged from baseline. Recently had cxr at NH showing "fluid in his lungs." Upon EMS arrival, pt was satting 70% on RA which improved on NRB. No fever, cp, abd pain, leg swelling/pain. DNR/DNI.    Does not have PMD.   Cardio: Dr. Witt  Renal: Dr. Daniel  Pulm: Dr. Rory Mars    CONSTITUTIONAL: well developed, nontoxic appearing, in mod respiratory distress, speaking in full sentences  SKIN: warm, dry, no rash, cap refill < 2 seconds  HEENT: normocephalic, atraumatic, no conjunctival erythema, moist mucous membranes, patent airway  NECK: supple  CV:  irregularly irregular, 2+ radial pulses bilaterally  RESP: decreased breath sounds R lung, increased work of breathing  ABD: soft, nontender, nondistended, no rebound, no guarding  MSK: normal ROM, no cyanosis, no edema  NEURO: alert, oriented, grossly unremarkable  PSYCH: cooperative, appropriate    Vitals wnl in ED. Plan for labs, ekg, cxr r/o pleural effusion, CHF exac, ACS, pneumonia. Will place on bipap for increased work of breathing.

## 2022-08-20 NOTE — ED PROVIDER NOTE - OBJECTIVE STATEMENT
83 yo M with PMH of HFrEF, Afib on eliquis, CAD s/p CABG ([place]), COPD (now on 3L O2 since July, follows [facility]), CKD3 ([place]), HTN, HLD, recent admission 7/5-8/3 for fall c/b COVID, now on O2 BIBEMS for hypoxia. Per EMS, patient started having increased work of breathing and became hypoxic to 70s that started this morning. Patient endorses chronic cough, unchanged, nonproductive and no hemoptysis. Denies fever, CP, NVD, dysuria, hematuria, melena, hematochezia.

## 2022-08-20 NOTE — H&P ADULT - ATTENDING COMMENTS
Patient seen and examined at bedside independently of the residents. I read the resident's note and agree with the plan with the additions and corrections as noted below.    REVIEW OF SYSTEMS:  CONSTITUTIONAL: No weakness, fevers or chills  EYES/ENT: No visual changes;  No vertigo or throat pain   NECK: No pain or stiffness  RESPIRATORY: No cough, wheezing, hemoptysis; No shortness of breath  CARDIOVASCULAR: No chest pain or palpitations  GASTROINTESTINAL: No abdominal or epigastric pain. No nausea, vomiting, or hematemesis; No diarrhea or constipation. No melena or hematochezia.  GENITOURINARY: No dysuria, frequency or hematuria  NEUROLOGICAL: No numbness or weakness  SKIN: No itching, rashes.     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 Not relevant.     Physical Exam:  GEN: No acute distress. A & O x 3.   HEENT: PEERLA. No sclera icterus. EOMI.   LUNGS: Clear to auscultation bilaterally. No wheeze/rales/crackles.   HEART: Normal. S1/S2 present. RRR. No murmur/gallops.   ABD: Soft, non-tender, non-distended. Bowel sounds present.   EXT: No pitting edema.   NEURO: CN III-XII intact. Strength: 5/5 b/l ULE. Sensory intact b/l ULE. Finger nose test wnl.     Vital Signs Last 24 Hrs  T(C): 35.8 (20 Aug 2022 11:27), Max: 35.8 (20 Aug 2022 11:27)  T(F): 96.4 (20 Aug 2022 11:27), Max: 96.4 (20 Aug 2022 11:27)  HR: 83 (20 Aug 2022 17:37) (80 - 91)  BP: 143/65 (20 Aug 2022 17:37) (121/58 - 143/65)  BP(mean): --  RR: 18 (20 Aug 2022 17:37) (18 - 24)  SpO2: 100% (20 Aug 2022 17:37) (89% - 100%)    Parameters below as of 20 Aug 2022 17:37  Patient On (Oxygen Delivery Method): BiPAP/CPAP    Please see the above notes for Labs and radiology.     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     AHRF likely 2/2 Acute HFrEF   - CXR shows b/l pleural effusions with pulmonary congestion and opacities.   - proBNP > 50K  - recent 2d Echo shows LVEF of 45-50%.   - will give IV lasix 40mg BID   - strict I & O, daily weight, Low Na diet with fluid restriction.   - BiPAP prn  - Tend troponin.   - monitor on Telemetry.  - Cardiology consulted.     Elevated Troponin likely Type II NSTEMI   - EKG shows A.fib. No significant ST/T wave changes.   - Trend troponin.   - Cardiology to follow.     BRENDA on CKD   - from Cardiorenal ?  - check renal US and urine studies   - monitor renal function and electrolytes on lasix.   - Nephrology consult.     Aspiration PNA?   - patient has mild leukocytosis but no fever. CXR shows Bilateral pleural effusions, opacities.  - will start on Unasyn for now.   - check procalcitonin  - NPO with Swallow eval.   - aspiration precaution.     CAD s/p CABG - ASA, Statin   A.fib - on metoprolol and eliquis   HTN/HLD/Hypothyroidism - c/w home med  DM II - monitor FS AC HS. Start on insulin if FS persistently > 180.   COPD - not on exacerbation. Duoneb prn.     DVT ppx: on Eliquis  GI ppx: not indicated.   Diet: NPO with Swallow eval.   Activity: as tolerated.     Date seen by the attendin22. Patient seen and examined at bedside independently of the residents. I read the resident's note and agree with the plan with the additions and corrections as noted below.    REVIEW OF SYSTEMS:  CONSTITUTIONAL: No weakness, fevers or chills  EYES/ENT: No visual changes;  No vertigo or throat pain   NECK: No pain or stiffness  RESPIRATORY: Complains of SOB.   CARDIOVASCULAR: No chest pain or palpitations  GASTROINTESTINAL: No abdominal or epigastric pain. No nausea, vomiting, or hematemesis; No diarrhea or constipation. No melena or hematochezia.  GENITOURINARY: No dysuria, frequency or hematuria  NEUROLOGICAL: No numbness or weakness  SKIN: No itching, rashes.     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 Not relevant.     Physical Exam:  GEN: No acute distress. A & O x 3.   HEENT: PEERLA. No sclera icterus. EOMI.   LUNGS: Clear to auscultation bilaterally. No wheeze/rales/crackles.   HEART: Normal. S1/S2 present. RRR. No murmur/gallops.   ABD: Soft, non-tender, non-distended. Bowel sounds present.   EXT: 2+ pitting edema on LLE and 1+ on RLE.   NEURO: CN III-XII intact. Strength: 5/5 b/l ULE. Sensory intact b/l ULE.     Vital Signs Last 24 Hrs  T(C): 35.8 (20 Aug 2022 11:27), Max: 35.8 (20 Aug 2022 11:27)  T(F): 96.4 (20 Aug 2022 11:27), Max: 96.4 (20 Aug 2022 11:27)  HR: 83 (20 Aug 2022 17:37) (80 - 91)  BP: 143/65 (20 Aug 2022 17:37) (121/58 - 143/65)  BP(mean): --  RR: 18 (20 Aug 2022 17:37) (18 - 24)  SpO2: 100% (20 Aug 2022 17:37) (89% - 100%)    Parameters below as of 20 Aug 2022 17:37  Patient On (Oxygen Delivery Method): BiPAP/CPAP    Please see the above notes for Labs and radiology.     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     AHRF likely 2/2 Acute HFrEF   - CXR shows b/l pleural effusions with pulmonary congestion and opacities.   - proBNP > 50K  - recent 2d Echo shows LVEF of 45-50%.   - will give IV lasix 40mg BID   - strict I & O, daily weight, Low Na diet with fluid restriction.   - BiPAP prn  - Tend troponin.   - monitor on Telemetry.  - Cardiology consulted.     Elevated Troponin likely Type II NSTEMI   - EKG shows A.fib. No significant ST/T wave changes.   - Trend troponin.   - Cardiology to follow.     BRENDA on CKD   - from Cardiorenal ?  - check renal US and urine studies  - strict I & O. [place].   - monitor renal function and electrolytes on lasix.   - Nephrology consult.     Aspiration PNA?   - patient has mild leukocytosis but no fever. CXR shows Bilateral pleural effusions, opacities.  - will start on Unasyn for now.   - check procalcitonin  - NPO with Swallow eval.   - aspiration precaution.     CAD s/p CABG - ASA, Statin   A.fib - on metoprolol and eliquis   HTN/HLD/Hypothyroidism - c/w home med  DM II - monitor FS AC HS. Start on insulin if FS persistently > 180.   COPD - not on exacerbation. Duoneb prn.     DVT ppx: on Eliquis  GI ppx: not indicated.   Diet: NPO with Swallow eval.   Activity: as tolerated.     Date seen by the attendin22. Patient seen and examined at bedside independently of the residents. I read the resident's note and agree with the plan with the additions and corrections as noted below.    REVIEW OF SYSTEMS:  CONSTITUTIONAL: No weakness, fevers or chills  EYES/ENT: No visual changes;  No vertigo or throat pain   NECK: No pain or stiffness  RESPIRATORY: Complains of SOB.   CARDIOVASCULAR: No chest pain or palpitations  GASTROINTESTINAL: No abdominal or epigastric pain. No nausea, vomiting, or hematemesis; No diarrhea or constipation. No melena or hematochezia.  GENITOURINARY: No dysuria, frequency or hematuria  NEUROLOGICAL: No numbness or weakness  SKIN: No itching, rashes.     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 Not relevant.     Physical Exam:  GEN: No acute distress. A & O x 3.   HEENT: PEERLA. No sclera icterus. EOMI.   LUNGS: Clear to auscultation bilaterally. No wheeze/rales/crackles.   HEART: Normal. S1/S2 present. RRR. No murmur/gallops.   ABD: Soft, non-tender, non-distended. Bowel sounds present.   EXT: 2+ pitting edema on LLE and 1+ on RLE.   NEURO: CN III-XII intact. Strength: 5/5 b/l ULE. Sensory intact b/l ULE.     Vital Signs Last 24 Hrs  T(C): 35.8 (20 Aug 2022 11:27), Max: 35.8 (20 Aug 2022 11:27)  T(F): 96.4 (20 Aug 2022 11:27), Max: 96.4 (20 Aug 2022 11:27)  HR: 83 (20 Aug 2022 17:37) (80 - 91)  BP: 143/65 (20 Aug 2022 17:37) (121/58 - 143/65)  BP(mean): --  RR: 18 (20 Aug 2022 17:37) (18 - 24)  SpO2: 100% (20 Aug 2022 17:37) (89% - 100%)    Parameters below as of 20 Aug 2022 17:37  Patient On (Oxygen Delivery Method): BiPAP/CPAP    Please see the above notes for Labs and radiology.     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     AHRF likely 2/2 Acute HFrEF   - CXR shows b/l pleural effusions with pulmonary congestion and opacities.   - proBNP > 50K  - recent 2d Echo shows LVEF of 45-50%.   - will give IV lasix 40mg BID   - strict I & O, daily weight, Low Na diet with fluid restriction.   - BiPAP prn  - Tend troponin.   - monitor on Telemetry.  - Cardiology consulted.     Elevated Troponin likely Type II NSTEMI   - EKG shows A.fib. No significant ST/T wave changes.   - Trend troponin.   - Cardiology to follow.     BRENDA on CKD   - from Cardiorenal ?  - check renal US and urine studies  - strict I & O. Gloria.   - monitor renal function and electrolytes on lasix.   - Nephrology consult.     Aspiration PNA?   - patient has mild leukocytosis but no fever. CXR shows Bilateral pleural effusions, opacities.  - will start on Unasyn for now.   - check procalcitonin  - NPO with Swallow eval.   - aspiration precaution.     CAD s/p CABG - ASA, Statin   A.fib - on metoprolol and eliquis   HTN/HLD/Hypothyroidism - c/w home med  DM II - monitor FS AC HS. Start on insulin if FS persistently > 180.   COPD - not on exacerbation. Duoneb prn.     DVT ppx: on Eliquis  GI ppx: not indicated.   Diet: NPO with Swallow eval.   Activity: as tolerated.   Code status: DNR/DNI.     Date seen by the attendin22.

## 2022-08-20 NOTE — ED PROVIDER NOTE - SECONDARY DIAGNOSIS.
BRENDA (acute kidney injury) Elevated troponin level Anemia Elevated brain natriuretic peptide (BNP) level Pleural effusion

## 2022-08-20 NOTE — ED PROVIDER NOTE - PHYSICAL EXAMINATION
CONSTITUTIONAL: chronically ill-appearing, in moderate distress  SKIN: Warm dry, normal skin turgor  HEAD: NCAT  EYES: EOMI, PERRLA, no scleral icterus, conjunctiva pink  ENT: normal pharynx with no erythema or exudates  NECK: Supple; non tender. Full ROM.  CARD: RRR, no murmurs.  RESP: no wheezing; bibasilar crackles, dimished breath sounds on R  ABD: soft, non-tender, non-distended, no rebound or guarding.  EXT: Full ROM, no bony tenderness, no pedal edema, no calf tenderness  NEURO: normal motor. normal sensory. Normal gait.  PSYCH: Cooperative, appropriate.

## 2022-08-20 NOTE — H&P ADULT - NSHPPHYSICALEXAM_GEN_ALL_CORE
General: ill appearing elderly white male on BIPAP   Head: atraumatic, normocephalic  Neck: no lymphadenopathy, no tracheal deviation  Eyes: EOMI, no icterus  Lungs/Chest: Bibasilar crackles, no wheeze, no labored breathing on BIPAP saturating 100%  Heart: regular rate, regular rhythm, S1/S2  Abdomen: BS audible, non-distended, soft, non-tender, no rigidity, no guarding  Extremities: no clubbing, no cyanosis, no edema  Skin: warm and dry  Neuro: AAOx3, speech clear and fluent, moving all extremities

## 2022-08-20 NOTE — ED ADULT TRIAGE NOTE - NURSING HOMES
Formerly [facility] and [facility], [facility]

## 2022-08-20 NOTE — ED PROVIDER NOTE - PROGRESS NOTE DETAILS
BK: Patient taken off BiPAP and placed on NRB @ 6 (has dried blood in nares), no tachypnea and saturating well.

## 2022-08-20 NOTE — ED ADULT NURSE NOTE - INTERVENTIONS DEFINITIONS
[place] to call system/Call bell, personal items and telephone within reach/Instruct patient to call for assistance/Room bathroom lighting operational/Non-slip footwear when patient is off stretcher/Physically safe environment: no spills, clutter or unnecessary equipment/Stretcher in lowest position, wheels locked, appropriate side rails in place/Monitor gait and stability/Monitor for mental status changes and reorient to person, place, and time/Review medications for side effects contributing to fall risk/Reinforce activity limits and safety measures with patient and family/Provide visual clues: red socks

## 2022-08-21 NOTE — CONSULT NOTE ADULT - ASSESSMENT
IMPRESSION:      Acute hypoxemic resp failure on NC  possible aspiration   chronic anemia  Pre-renal BRENDA on CKD III -   C3 glomerulopathy - on prograf and cellcept at home  HFrEF - chronic; Mod-severe reduced EF, sev dilated CM; RV dysfxn; severe pulm HTN  AFib, chronic - on eliquis   CAD s/p CABG  H/O DLD, HTN, DM2, Hypothyroidism     PLAN:    - s/p BIPAP overnight with improvement in symptoms  - diuresing well  - lasix iv 40 BID  - Nebs Q 4 and PRN   - NIV at night and PRN  - continue unasyn  - Aspiration precautions  - Wean O2 as tolerated  - GOC   IMPRESSION:      Acute hypoxemic resp failure on NC  possible aspiration   chronic anemia  Pre-renal BRENDA on CKD III -   C3 glomerulopathy - on prograf and cellcept at home  HFrEF - chronic; Mod-severe reduced EF, sev dilated CM; RV dysfxn; severe pulm HTN  AFib, chronic - on eliquis   CAD s/p CABG  H/O DLD, HTN, DM2, Hypothyroidism     PLAN:    - s/p BIPAP overnight with improvement in symptoms  - diuresing well  - lasix iv 40 BID  - Nebs Q 4 and PRN   - NIV at night and PRN  - continue unasyn  - check LE duplex  - Aspiration precautions  - Wean O2 as tolerated  - GOC   IMPRESSION:      Acute hypoxemic resp failure on NC  Pul edema  possible aspiration   COPD exacerbation  anemia  Pre-renal BRENDA on CKD III -   C3 glomerulopathy - on prograf and cellcept at home  HFrEF - chronic; Mod-severe reduced EF, sev dilated CM; RV dysfxn; severe pulm HTN  AFib, chronic - on eliquis   CAD s/p CABG  H/O DLD, HTN, DM2, Hypothyroidism     PLAN:    - s/p BIPAP overnight with improvement in symptoms  - lasix keep - balance  - solumedrol 40 q12  - transfuse 1 unit PRBC  - Nebs Q 4 and PRN   - NIV at night and PRN  - procal/ speech and swallow eval  - check LE duplex  - Aspiration precautions  - Wean O2 as tolerated  - GOC

## 2022-08-21 NOTE — CONSULT NOTE ADULT - SUBJECTIVE AND OBJECTIVE BOX
Patient is a 82y old  Male who presents with a chief complaint of chf exacerbation (20 Aug 2022 16:17)      HPI:  81 yo male with PMHx of HFrEF (45-50% on Lasix 20 PO), A-Fib on Eliquis, CAD s/p CABGx3, PVD, COPD (was on rare home O2 PRN now on 3L NC since recent July admission for COVID), CKD3, C3 glomerulonephropathy, HTN, HLD, Hypothyroid presents from NH with progressively worsening SOB x5 days and hypoxia.  Recently hospitalized here for a month discharged on 8/3/22 initially for BRENDA on CKD course c/b COVID, bilateral lower lobe pneumonia, delirium and microaspiration of thin liquids on modified barium study. Received Dexamethasone and Zosyn course with new O2 requirements of 3L NC discharged to nursing home on 3L and thick liquid diet for rehab. Due to his SOB, chest x-ray was ordered on 8/15 but results did not come back and patient became increasingly short of breath and was noted to be hypoxic and rehab facility which prompted referral to the ED. Review of symptoms is notable for orthopnea and a chronic dry cough unchanged since his covid illness. He denies paroxysmal nocturnal dyspnea, leg edema, chest pain, palpitations, dizziness, n/v, abd pain. Cardiologist is Dr. Witt and Pulmonolgist is Dr. Rory Mars.     In ED:  T(F): 96.4 (22 @ 11:27), Max: 96.4 (22 @ 11:27)  HR: 80 (22 @ 16:20) (80 - 91)  BP: 137/61 (22 @ 16:20) (121/58 - 137/61)  RR: 18 (22 @ 16:20) (18 - 24)  SpO2: 100% (22 @ 16:20) (89% - 100%) -> 89% on 4L -> placed on NRB -> BIPAP 2/2 tachypnea and hypoxia -> taken off with increased work of breathing placed back on BIPAP  Labs: WBC 12k, Hgb 7.2 (baseline 9), BNP 55k, Cr 2.2 (baseline 1.5), PCO2 47 (VBG), Troponin 0.24  EKG:   Atrial fibrillation with premature ventricular or aberrantly conducted complexes  Left posterior fascicular block   ST & T wave abnormality, consider lateral ischemia - similar to prior in 2022  XR chest: significant bilateral pleural effusions and pulmonary congestion  Admitted to medicine for SOB/hypoxia likely 2/2 acute CHF exacerbation.  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  No cardiovascular or pulmonary family history     REVIEW OF SYSTEMS:    All ROS are negative exept per HPI       Allergies    Ozempic (0.25 mg or 0.5 mg dose) (Hives; Rash)  streptomycin (Unknown)  Trulicity Pen (Hives; Rash)    Intolerances          PHYSICAL EXAM  Vital Signs Last 24 Hrs  T(C): 37.1 (21 Aug 2022 05:07), Max: 37.1 (21 Aug 2022 05:07)  T(F): 98.8 (21 Aug 2022 05:07), Max: 98.8 (21 Aug 2022 05:07)  HR: 76 (21 Aug 2022 05:07) (76 - 91)  BP: 119/73 (21 Aug 2022 05:07) (119/73 - 143/65)  BP(mean): --  RR: 20 (21 Aug 2022 05:07) (18 - 24)  SpO2: 98% (21 Aug 2022 05:07) (89% - 100%)    Parameters below as of 21 Aug 2022 05:07  Patient On (Oxygen Delivery Method): nasal cannula  O2 Flow (L/min): 4      CONSTITUTIONAL:  NAD    ENT:   Airway patent,   No thrush    EYES:   Clear bilaterally,   pupils equal,   round and reactive to light.    CARDIAC:   irregular, rate controlled  no murmur      RESPIRATORY:   No wheezing   Normal chest expansion  Not tachypneic,  decreased breath sound at bases    GASTROINTESTINAL:  Abdomen soft, non-tender,   No guarding,   Positive BS    MUSCULOSKELETAL:   Range of motion is not limited,  No clubbing, cyanosis    NEUROLOGICAL:   Alert and oriented   No motor deficits.    SKIN:   Skin normal color for race,       HEME LYMPH:   No cervical  lymphadenopathy.  no inguinal lymphadenopathy          LABS:                          6.9    10.75 )-----------( 299      ( 21 Aug 2022 06:03 )             21.6                                               08-21    133<L>  |  97<L>  |  75<HH>  ----------------------------<  142<H>  4.3   |  28  |  2.0<H>    Ca    8.1<L>      21 Aug 2022 06:03  Mg     2.4     08-    TPro  5.8<L>  /  Alb  2.7<L>  /  TBili  0.7  /  DBili  x   /  AST  12  /  ALT  11  /  AlkPhos  95  08-                                             Urinalysis Basic - ( 20 Aug 2022 21:09 )    Color: Yellow / Appearance: Clear / S.018 / pH: x  Gluc: x / Ketone: Negative  / Bili: Negative / Urobili: <2 mg/dL   Blood: x / Protein: 30 mg/dL / Nitrite: Negative   Leuk Esterase: Negative / RBC: 4 /HPF / WBC 2 /HPF   Sq Epi: x / Non Sq Epi: 1 /HPF / Bacteria: Negative        CARDIAC MARKERS ( 21 Aug 2022 06:03 )  x     / 0.22 ng/mL / x     / x     / x      CARDIAC MARKERS ( 21 Aug 2022 00:49 )  x     / 0.24 ng/mL / x     / x     / x      CARDIAC MARKERS ( 20 Aug 2022 20:30 )  x     / 0.24 ng/mL / x     / x     / x      CARDIAC MARKERS ( 20 Aug 2022 11:47 )  x     / 0.24 ng/mL / x     / x     / x                                                LIVER FUNCTIONS - ( 21 Aug 2022 06:03 )  Alb: 2.7 g/dL / Pro: 5.8 g/dL / ALK PHOS: 95 U/L / ALT: 11 U/L / AST: 12 U/L / GGT: x                                                                                                MEDICATIONS  (STANDING):  ampicillin/sulbactam  IVPB 3 Gram(s) IV Intermittent every 6 hours  apixaban 2.5 milliGRAM(s) Oral two times a day  atorvastatin 40 milliGRAM(s) Oral at bedtime  famotidine    Tablet 20 milliGRAM(s) Oral daily  furosemide   Injectable 80 milliGRAM(s) IV Push daily  isosorbide   mononitrate ER Tablet (IMDUR) 30 milliGRAM(s) Oral daily  levothyroxine 150 MICROGram(s) Oral daily  magnesium sulfate  IVPB 2 Gram(s) IV Intermittent once  metoprolol succinate ER 50 milliGRAM(s) Oral daily  multivitamin/minerals 1 Tablet(s) Oral daily  mycophenolate mofetil 500 milliGRAM(s) Oral four times a day  NIFEdipine XL 30 milliGRAM(s) Oral daily  pentoxifylline 400 milliGRAM(s) Oral two times a day  tacrolimus 0.5 milliGRAM(s) Oral two times a day    MEDICATIONS  (PRN):          CXR interpreted by me: bibasilar opacity bilateral effusion Patient is a 82y old  Male who presents with a chief complaint of chf exacerbation (20 Aug 2022 16:17)      HPI:  83 yo male with PMHx of HFrEF (45-50% on Lasix 20 PO), A-Fib on Eliquis, CAD s/p CABGx3, PVD, COPD (was on rare home O2 PRN now on 3L NC since recent July admission for COVID), CKD3, C3 glomerulonephropathy, HTN, HLD, Hypothyroid presents from NH with progressively worsening SOB x5 days and hypoxia.  Recently hospitalized here for a month discharged on 8/3/22 initially for BRENDA on CKD course c/b COVID, bilateral lower lobe pneumonia, delirium and microaspiration of thin liquids on modified barium study. Received Dexamethasone and Zosyn course with new O2 requirements of 3L NC discharged to nursing home on 3L and thick liquid diet for rehab. Due to his SOB, chest x-ray was ordered on 8/15 but results did not come back and patient became increasingly short of breath and was noted to be hypoxic and rehab facility which prompted referral to the ED. Review of symptoms is notable for orthopnea and a chronic dry cough unchanged since his covid illness. He denies paroxysmal nocturnal dyspnea, leg edema, chest pain, palpitations, dizziness, n/v, abd pain. Cardiologist is Dr. Witt and Pulmonolgist is Dr. Rory Mars.     In ED:  T(F): 96.4 (22 @ 11:27), Max: 96.4 (22 @ 11:27)  HR: 80 (22 @ 16:20) (80 - 91)  BP: 137/61 (22 @ 16:20) (121/58 - 137/61)  RR: 18 (22 @ 16:20) (18 - 24)  SpO2: 100% (22 @ 16:20) (89% - 100%) -> 89% on 4L -> placed on NRB -> BIPAP 2/2 tachypnea and hypoxia -> taken off with increased work of breathing placed back on BIPAP  Labs: WBC 12k, Hgb 7.2 (baseline 9), BNP 55k, Cr 2.2 (baseline 1.5), PCO2 47 (VBG), Troponin 0.24  EKG:   Atrial fibrillation with premature ventricular or aberrantly conducted complexes  Left posterior fascicular block   ST & T wave abnormality, consider lateral ischemia - similar to prior in 2022  XR chest: significant bilateral pleural effusions and pulmonary congestion  Admitted to medicine for SOB/hypoxia likely 2/2 acute CHF exacerbation. 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  No cardiovascular or pulmonary family history     REVIEW OF SYSTEMS:    All ROS are negative exept per HPI       Allergies    Ozempic (0.25 mg or 0.5 mg dose) (Hives; Rash)  streptomycin (Unknown)  Trulicity Pen (Hives; Rash)    Intolerances          PHYSICAL EXAM  Vital Signs Last 24 Hrs  T(C): 37.1 (21 Aug 2022 05:07), Max: 37.1 (21 Aug 2022 05:07)  T(F): 98.8 (21 Aug 2022 05:07), Max: 98.8 (21 Aug 2022 05:07)  HR: 76 (21 Aug 2022 05:07) (76 - 91)  BP: 119/73 (21 Aug 2022 05:07) (119/73 - 143/65)  RR: 20 (21 Aug 2022 05:07) (18 - 24)  SpO2: 98% (21 Aug 2022 05:07) (89% - 100%)    Parameters below as of 21 Aug 2022 05:07  Patient On (Oxygen Delivery Method): nasal cannula  O2 Flow (L/min): 4      CONSTITUTIONAL:  NAD    ENT:   Airway patent,   No thrush    EYES:   Clear bilaterally,   pupils equal,   round and reactive to light.    CARDIAC:   irregular, rate controlled  no murmur      RESPIRATORY:   BL Wheexing    GASTROINTESTINAL:  Abdomen soft, non-tender,   No guarding,   Positive BS    MUSCULOSKELETAL:   Range of motion is not limited,  No clubbing, cyanosis    NEUROLOGICAL:   Alert and oriented   No motor deficits.    SKIN:   Skin normal color for race,           LABS:                          6.9    10.75 )-----------( 299      ( 21 Aug 2022 06:03 )             21.6                                               08-    133<L>  |  97<L>  |  75<HH>  ----------------------------<  142<H>  4.3   |  28  |  2.0<H>    Ca    8.1<L>      21 Aug 2022 06:03  Mg     2.4     08-    TPro  5.8<L>  /  Alb  2.7<L>  /  TBili  0.7  /  DBili  x   /  AST  12  /  ALT  11  /  AlkPhos  95                                               Urinalysis Basic - ( 20 Aug 2022 21:09 )    Color: Yellow / Appearance: Clear / S.018 / pH: x  Gluc: x / Ketone: Negative  / Bili: Negative / Urobili: <2 mg/dL   Blood: x / Protein: 30 mg/dL / Nitrite: Negative   Leuk Esterase: Negative / RBC: 4 /HPF / WBC 2 /HPF   Sq Epi: x / Non Sq Epi: 1 /HPF / Bacteria: Negative        CARDIAC MARKERS ( 21 Aug 2022 06:03 )  x     / 0.22 ng/mL / x     / x     / x      CARDIAC MARKERS ( 21 Aug 2022 00:49 )  x     / 0.24 ng/mL / x     / x     / x      CARDIAC MARKERS ( 20 Aug 2022 20:30 )  x     / 0.24 ng/mL / x     / x     / x      CARDIAC MARKERS ( 20 Aug 2022 11:47 )  x     / 0.24 ng/mL / x     / x     / x                                                LIVER FUNCTIONS - ( 21 Aug 2022 06:03 )  Alb: 2.7 g/dL / Pro: 5.8 g/dL / ALK PHOS: 95 U/L / ALT: 11 U/L / AST: 12 U/L / GGT: x                                                                                                MEDICATIONS  (STANDING):  ampicillin/sulbactam  IVPB 3 Gram(s) IV Intermittent every 6 hours  apixaban 2.5 milliGRAM(s) Oral two times a day  atorvastatin 40 milliGRAM(s) Oral at bedtime  famotidine    Tablet 20 milliGRAM(s) Oral daily  furosemide   Injectable 80 milliGRAM(s) IV Push daily  isosorbide   mononitrate ER Tablet (IMDUR) 30 milliGRAM(s) Oral daily  levothyroxine 150 MICROGram(s) Oral daily  magnesium sulfate  IVPB 2 Gram(s) IV Intermittent once  metoprolol succinate ER 50 milliGRAM(s) Oral daily  multivitamin/minerals 1 Tablet(s) Oral daily  mycophenolate mofetil 500 milliGRAM(s) Oral four times a day  NIFEdipine XL 30 milliGRAM(s) Oral daily  pentoxifylline 400 milliGRAM(s) Oral two times a day  tacrolimus 0.5 milliGRAM(s) Oral two times a day    MEDICATIONS  (PRN):          CXR interpreted by me: bibasilar opacity bilateral effusion

## 2022-08-21 NOTE — CONSULT NOTE ADULT - SUBJECTIVE AND OBJECTIVE BOX
Patient is a 82y old  Male who presents with a chief complaint of chf exacerbation (21 Aug 2022 07:57)      HPI:  81 yo male with PMHx of HFrEF (45-50% on Lasix 20 PO), A-Fib on Eliquis, CAD s/p CABGx3, PVD, COPD (was on rare home O2 PRN now on 3L NC since recent July admission for COVID), CKD3, C3 glomerulonephropathy, HTN, HLD, Hypothyroid presents from NH with progressively worsening SOB x5 days and hypoxia.  Recently hospitalized here for a month discharged on 8/3/22 initially for BRENDA on CKD course c/b COVID, bilateral lower lobe pneumonia, delirium and microaspiration of thin liquids on modified barium study. Received Dexamethasone and Zosyn course with new O2 requirements of 3L NC discharged to nursing home on 3L and thick liquid diet for rehab. Due to his SOB, chest x-ray was ordered on 8/15 but results did not come back and patient became increasingly short of breath and was noted to be hypoxic and rehab facility which prompted referral to the ED. Review of symptoms is notable for orthopnea and a chronic dry cough unchanged since his covid illness. He denies paroxysmal nocturnal dyspnea, leg edema, chest pain, palpitations, dizziness, n/v, abd pain. Cardiologist is Dr. Witt and Pulmonolgist is Dr. Rory Mars.     In ED:  T(F): 96.4 (22 @ 11:27), Max: 96.4 (22 @ 11:27)  HR: 80 (22 @ 16:20) (80 - 91)  BP: 137/61 (22 @ 16:20) (121/58 - 137/61)  RR: 18 (22 @ 16:20) (18 - 24)  SpO2: 100% (22 @ 16:20) (89% - 100%) -> 89% on 4L -> placed on NRB -> BIPAP 2/2 tachypnea and hypoxia -> taken off with increased work of breathing placed back on BIPAP  Labs: WBC 12k, Hgb 7.2 (baseline 9), BNP 55k, Cr 2.2 (baseline 1.5), PCO2 47 (VBG), Troponin 0.24  EKG:   Atrial fibrillation with premature ventricular or aberrantly conducted complexes  Left posterior fascicular block   ST & T wave abnormality, consider lateral ischemia - similar to prior in 2022  XR chest: significant bilateral pleural effusions and pulmonary congestion  Admitted to medicine for SOB/hypoxia likely 2/2 acute CHF exacerbation.  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      AF (atrial fibrillation)  s/p DCCV      Carotid stenosis      COPD (chronic obstructive pulmonary disease)      H/O coronary artery bypass surgery        S/P inguinal hernia repair      History of appendectomy                          PREVIOUS DIAGNOSTIC TESTING:      ECHO  FINDINGS:    STRESS  FINDINGS:    CATHETERIZATION  FINDINGS:    MEDICATIONS  (STANDING):  ampicillin/sulbactam  IVPB 3 Gram(s) IV Intermittent every 6 hours  apixaban 2.5 milliGRAM(s) Oral two times a day  atorvastatin 40 milliGRAM(s) Oral at bedtime  famotidine    Tablet 20 milliGRAM(s) Oral daily  furosemide   Injectable 80 milliGRAM(s) IV Push daily  isosorbide   mononitrate ER Tablet (IMDUR) 30 milliGRAM(s) Oral daily  levothyroxine 150 MICROGram(s) Oral daily  magnesium sulfate  IVPB 2 Gram(s) IV Intermittent once  metoprolol succinate ER 50 milliGRAM(s) Oral daily  multivitamin/minerals 1 Tablet(s) Oral daily  mycophenolate mofetil 500 milliGRAM(s) Oral four times a day  NIFEdipine XL 30 milliGRAM(s) Oral daily  pentoxifylline 400 milliGRAM(s) Oral two times a day  tacrolimus 0.5 milliGRAM(s) Oral two times a day    MEDICATIONS  (PRN):      FAMILY HISTORY:  FHx: colon cancer        SOCIAL HISTORY:    CIGARETTES:    ALCOHOL:        Vital Signs Last 24 Hrs  T(C): 36.7 (21 Aug 2022 08:14), Max: 37.1 (21 Aug 2022 05:07)  T(F): 98 (21 Aug 2022 08:14), Max: 98.8 (21 Aug 2022 05:07)  HR: 79 (21 Aug 2022 08:14) (76 - 91)  BP: 145/65 (21 Aug 2022 08:14) (119/73 - 145/65)  BP(mean): --  RR: 20 (21 Aug 2022 08:14) (18 - 24)  SpO2: 99% (21 Aug 2022 08:14) (89% - 100%)    Parameters below as of 21 Aug 2022 08:14  Patient On (Oxygen Delivery Method): nasal cannula  O2 Flow (L/min): 4              INTERPRETATION OF TELEMETRY:    ECG:    I&O's Detail      LABS:                        6.9    10.75 )-----------( 299      ( 21 Aug 2022 06:03 )             21.6     08-21    133<L>  |  97<L>  |  75<HH>  ----------------------------<  142<H>  4.3   |  28  |  2.0<H>    Ca    8.1<L>      21 Aug 2022 06:03  Mg     2.4     08    TPro  5.8<L>  /  Alb  2.7<L>  /  TBili  0.7  /  DBili  x   /  AST  12  /  ALT  11  /  AlkPhos  95  08-21    CARDIAC MARKERS ( 21 Aug 2022 06:03 )  x     / 0.22 ng/mL / x     / x     / x      CARDIAC MARKERS ( 21 Aug 2022 00:49 )  x     / 0.24 ng/mL / x     / x     / x      CARDIAC MARKERS ( 20 Aug 2022 20:30 )  x     / 0.24 ng/mL / x     / x     / x      CARDIAC MARKERS ( 20 Aug 2022 11:47 )  x     / 0.24 ng/mL / x     / x     / x            Urinalysis Basic - ( 20 Aug 2022 21:09 )    Color: Yellow / Appearance: Clear / S.018 / pH: x  Gluc: x / Ketone: Negative  / Bili: Negative / Urobili: <2 mg/dL   Blood: x / Protein: 30 mg/dL / Nitrite: Negative   Leuk Esterase: Negative / RBC: 4 /HPF / WBC 2 /HPF   Sq Epi: x / Non Sq Epi: 1 /HPF / Bacteria: Negative      I&O's Summary    BNPSerum Pro-Brain Natriuretic Peptide: 74741 pg/mL ( @ 11:47)    RADIOLOGY & ADDITIONAL STUDIES:

## 2022-08-21 NOTE — PROGRESS NOTE ADULT - ASSESSMENT
83 yo M with hx of HFrEF, A.fib (on Eliquis), CAD s/p CABG x 3, PVD, COPD, CKD from C3 glomerunephropathy, HTN, HLD and hypothyroidism presents to ED with 5 days history of worsening SOB and hypoxia.     #Acute Hypoxic Respiratory Failure  #Acute on Chronic HFrEF  #Suspected aspiration PNA  Echo 08/01: 45-50%EF  CXR shows b/l pleural effusions with pulmonary congestion and opacities.   proBNP > 50K  s/p BIPAP in ED, now on 4L NC saturating 100%  Pt with history of moderate dysphagia per [facility] last admission with S/S  - Change diuretics to IV bumex 2mg BID  - strict I & O, daily weight, Low Na diet with fluid restriction.   - BiPAP prn  - Tend troponin.   - monitor on Telemetry  - LE Duplex  - Cont [id number] q6h, f/u procal  - S/S Eval    #BRENDA on CKD Stage 3  #C3 glomerunephropathy  Possibly cardiorenal, cr trended down to 2 today  - monitor renal function and electrolytes on lasix.   - Nephrology consult (Pt follows Dr. Daniel outpatient)  - Cont tacrolimus and cellcept    #Elevated Troponin likely Type II NSTEMI   - EKG shows A.fib. No significant ST/T wave changes.   - Trend troponin.   - Cardiology to follow.     #Acute on Chronic Anemia  Pt endorses several episodes of epistaxis last month, possibly due to dry O2 via NC. Also on Eliquis  - Humidified O2, monitor for further nosebleeds  - Will transfuse 1U PRBC today to keep >8  - Monitor CBC    #CAD s/p CABG   #HTN/HLD  - ASA, Statin   - Cont metoprolol ER 50mg qD  - Cont nifedipine XL 30mg qD  - Cont lipitor 40mg qHs  - Cont imdur 30mg qD    #Chronic A.fib   - Cont metoprolol and eliquis     #Hypothyroidism  - Cont synthroid 150mcg qD    #DM II - monitor FS AC HS. Start on insulin if FS persistently > 180.     #COPD - not on exacerbation. Duoneb prn.     DVT ppx: on Eliquis    #Progress Note Handoff  Pending (specify): Cont IV diuresis, procalcitonin, S/S eval  Family discussion: d/w pt and son by bedside  Disposition: NH

## 2022-08-21 NOTE — CONSULT NOTE ADULT - ASSESSMENT
Pt w/ sob w/ element of failure. give diuretics for neg balance. pt w/ anemia and should consider transfusion and further eval.

## 2022-08-21 NOTE — PATIENT PROFILE ADULT - FALL HARM RISK - HARM RISK INTERVENTIONS

## 2022-08-21 NOTE — PROGRESS NOTE ADULT - SUBJECTIVE AND OBJECTIVE BOX
ANDRIA TAVAREZ  82y, Male  Allergy: Ozempic (0.25 mg or 0.5 mg dose) (Hives; Rash)  streptomycin (Unknown)  Trulicity Pen (Hives; Rash)    Hospital Day: 1d    Patient seen and examined. 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      AF (atrial fibrillation)  s/p DCCV      Carotid stenosis      COPD (chronic obstructive pulmonary disease)      H/O coronary artery bypass surgery        S/P inguinal hernia repair      History of appendectomy          VITALS:  T(F): 98 (22 @ 08:14), Max: 98.8 (22 @ 05:07)  HR: 79 (22 @ 08:14)  BP: 145/65 (22 @ 08:14) (119/73 - 145/65)  RR: 20 (22 @ 08:14)  SpO2: 99% (22 @ 08:14)    TESTS & MEASUREMENTS:  Weight (Kg): 99.8 (22 @ 11:17)  BMI (kg/m2): 28.2 ()    22 @ 07:01  -  22 @ 10:53  --------------------------------------------------------  IN: 0 mL / OUT: 500 mL / NET: -500 mL                            6.9    10.75 )-----------( 299      ( 21 Aug 2022 06:03 )             21.6       08-    133<L>  |  97<L>  |  75<HH>  ----------------------------<  142<H>  4.3   |  28  |  2.0<H>    Ca    8.1<L>      21 Aug 2022 06:03  Mg     2.4         TPro  5.8<L>  /  Alb  2.7<L>  /  TBili  0.7  /  DBili  x   /  AST  12  /  ALT  11  /  AlkPhos  95      LIVER FUNCTIONS - ( 21 Aug 2022 06:03 )  Alb: 2.7 g/dL / Pro: 5.8 g/dL / ALK PHOS: 95 U/L / ALT: 11 U/L / AST: 12 U/L / GGT: x           CARDIAC MARKERS ( 21 Aug 2022 06:03 )  x     / 0.22 ng/mL / x     / x     / x      CARDIAC MARKERS ( 21 Aug 2022 00:49 )  x     / 0.24 ng/mL / x     / x     / x      CARDIAC MARKERS ( 20 Aug 2022 20:30 )  x     / 0.24 ng/mL / x     / x     / x      CARDIAC MARKERS ( 20 Aug 2022 11:47 )  x     / 0.24 ng/mL / x     / x     / x            Urinalysis Basic - ( 20 Aug 2022 21:09 )    Color: Yellow / Appearance: Clear / S.018 / pH: x  Gluc: x / Ketone: Negative  / Bili: Negative / Urobili: <2 mg/dL   Blood: x / Protein: 30 mg/dL / Nitrite: Negative   Leuk Esterase: Negative / RBC: 4 /HPF / WBC 2 /HPF   Sq Epi: x / Non Sq Epi: 1 /HPF / Bacteria: Negative    RECENT DIAGNOSTIC ORDERS:  Complete Blood Count: 11:00 (22 @ 09:14)  Type + Screen: 11:00 (22 @ 09:14)  Diet, Pureed:   Mildly Thick Liquids (MILDTHICKLIQS) (22 @ 09:12)  US Kidney and Bladder:   Exam Completed (22 @ 06:48)  Protein/Creatinine Ratio, Urine: Routine (22 @ 20:37)  Urea Nitrogen,  Random Urine: Routine (22 @ 20:37)  Procalcitonin, Serum: AM Sched. Collection: 21-Aug-2022 04:30 (22 @ 20:34)  A1C with Estimated Average Glucose: AM Sched. Collection: 21-Aug-2022 04:30 (22 @ 17:07)  Thyroid Stimulating Hormone, Serum: AM Sched. Collection: 21-Aug-2022 04:30 (22 @ 16:53)  Tacrolimus (), Serum: AM Sched. Collection: 21-Aug-2022 04:30 (22 @ 16:19)  Procalcitonin, Serum: AM Sched. Collection: 21-Aug-2022 04:30 (22 @ 16:09)  Magnesium, Serum: Repeat From: 20-Aug-2022 16:06 To: 22-Aug-2022 04:30, Every 1 day(s) (22 @ 16:09)  Comprehensive Metabolic Panel: Repeat From: 20-Aug-2022 16:06 To: 22-Aug-2022 04:30, Every 1 day(s) (22 @ 16:09)  Complete Blood Count + Automated Diff: Repeat From: 20-Aug-2022 16:05 To: 22-Aug-2022 04:30, Every 1 day(s) (22 @ 16:09)  Culture - Blood: Routine  Specimen Source: Blood-Peripheral  Addl Info: Peripheral Site 2 (22 @ 12:10)  Culture - Blood: Routine  Specimen Source: Blood-Peripheral  Addl Info: Peripheral Site 1 (22 @ 12:10)    MEDICATIONS:  MEDICATIONS  (STANDING):  ampicillin/sulbactam  IVPB 3 Gram(s) IV Intermittent every 6 hours  apixaban 2.5 milliGRAM(s) Oral two times a day  atorvastatin 40 milliGRAM(s) Oral at bedtime  buMETAnide Injectable 2 milliGRAM(s) IV Push every 12 hours  famotidine    Tablet 20 milliGRAM(s) Oral daily  isosorbide   mononitrate ER Tablet (IMDUR) 30 milliGRAM(s) Oral daily  levothyroxine 150 MICROGram(s) Oral daily  metoprolol succinate ER 50 milliGRAM(s) Oral daily  multivitamin/minerals 1 Tablet(s) Oral daily  mycophenolate mofetil 500 milliGRAM(s) Oral four times a day  NIFEdipine XL 30 milliGRAM(s) Oral daily  pentoxifylline 400 milliGRAM(s) Oral two times a day  tacrolimus 0.5 milliGRAM(s) Oral two times a day    MEDICATIONS  (PRN):      HOME MEDICATIONS:  Aranesp 100 mcg/0.5 mL injectable solution ()  atorvastatin 40 mg oral tablet ()  calcitriol 0.25 mcg oral capsule ()  d-mycophenolate ()  Eliquis 2.5 mg oral tablet ()  famotidine 20 mg oral tablet ()  furosemide 20 mg oral tablet ()  insulin glargine 100 units/mL subcutaneous solution ()  insulin lispro 100 units/mL injectable solution ()  isosorbide mononitrate 30 mg oral tablet, extended release ()  levothyroxine 150 mcg (0.15 mg) oral tablet ()  metoprolol succinate 50 mg oral tablet, extended release ()  Multiple Vitamins with Minerals oral tablet ()  NIFEdipine 30 mg oral tablet, extended release ()  pentoxifylline 400 mg oral tablet, extended release ()  sodium bicarbonate 650 mg oral tablet ()  tacrolimus 0.5 mg oral capsule ()    REVIEW OF SYSTEMS:  All other review of systems is negative unless indicated above.     PHYSICAL EXAM:  PHYSICAL EXAM:  GENERAL: NAD, well-developed  HEAD:  Atraumatic, Normocephalic  NECK: Supple, No JVD  CHEST/LUNG: bl crackles  HEART: Regular rate and rhythm; No murmurs, rubs, or gallops  ABDOMEN: Soft, Nontender, Nondistended; Bowel sounds present  EXTREMITIES:  2+ Peripheral Pulses, No clubbing, cyanosis, or edema  SKIN: No rashes or lesions

## 2022-08-21 NOTE — SWALLOW BEDSIDE ASSESSMENT ADULT - SLP PERTINENT HISTORY OF CURRENT PROBLEM
83 yo male with PMHx of HFrEF (45-50% on Lasix 20 PO), A-Fib on Eliquis, CAD s/p CABGx3, PVD, COPD (was on rare home O2 PRN now on 3L NC since recent July admission for COVID), CKD3, C3 glomerulonephropathy, HTN, HLD, Hypothyroid presents from NH with progressively worsening SOB x5 days and hypoxia.

## 2022-08-22 NOTE — SWALLOW BEDSIDE ASSESSMENT ADULT - SLP PERTINENT HISTORY OF CURRENT PROBLEM
81 yo male with PMHx of HFrEF (45-50% on Lasix 20 PO), A-Fib on Eliquis, CAD s/p CABGx3, PVD, COPD (was on rare home O2 PRN now on 3L NC since recent July admission for COVID), CKD3, C3 glomerulonephropathy, HTN, HLD, Hypothyroid presents from NH with progressively worsening SOB x5 days and hypoxia.

## 2022-08-22 NOTE — PROGRESS NOTE ADULT - SUBJECTIVE AND OBJECTIVE BOX
Over Night Events: events noted,  cp sinus congestion, sob, cough    PHYSICAL EXAM    ICU Vital Signs Last 24 Hrs  T(C): 36.3 (22 Aug 2022 00:30), Max: 36.7 (21 Aug 2022 08:14)  T(F): 97.4 (22 Aug 2022 00:30), Max: 98 (21 Aug 2022 08:14)  HR: 85 (22 Aug 2022 05:00) (74 - 86)  BP: 130/74 (22 Aug 2022 05:00) (130/64 - 145/65)  RR: 20 (22 Aug 2022 05:00) (20 - 20)  SpO2: 97% (22 Aug 2022 05:00) (95% - 99%)    O2 Parameters below as of 22 Aug 2022 05:00  Patient On (Oxygen Delivery Method): nasal cannula  O2 Flow (L/min): 4          General: ill looking  Lungs: Bl rhconhi  Cardiovascular: MEKHI 2.6  Abdomen: Soft, Positive BS  Extremities: No clubbing   Skin: Warm  Neurological: Non focal       22 @ 07:01  -  22 @ 07:00  --------------------------------------------------------  IN:    IV PiggyBack: 250 mL    Oral Fluid: 720 mL    PRBCs (Packed Red Blood Cells): 299 mL  Total IN: 1269 mL    OUT:    Voided (mL): 1800 mL  Total OUT: 1800 mL    Total NET: -531 mL          LABS:                          7.3    12.75 )-----------( 308      ( 21 Aug 2022 11:32 )             23.3                                                   133<L>  |  97<L>  |  75<HH>  ----------------------------<  142<H>  4.3   |  28  |  2.0<H>    Ca    8.1<L>      21 Aug 2022 06:03  Mg     2.4         TPro  5.8<L>  /  Alb  2.7<L>  /  TBili  0.7  /  DBili  x   /  AST  12  /  ALT  11  /  AlkPhos  95                                               Urinalysis Basic - ( 20 Aug 2022 21:09 )    Color: Yellow / Appearance: Clear / S.018 / pH: x  Gluc: x / Ketone: Negative  / Bili: Negative / Urobili: <2 mg/dL   Blood: x / Protein: 30 mg/dL / Nitrite: Negative   Leuk Esterase: Negative / RBC: 4 /HPF / WBC 2 /HPF   Sq Epi: x / Non Sq Epi: 1 /HPF / Bacteria: Negative        CARDIAC MARKERS ( 21 Aug 2022 06:03 )  x     / 0.22 ng/mL / x     / x     / x      CARDIAC MARKERS ( 21 Aug 2022 00:49 )  x     / 0.24 ng/mL / x     / x     / x      CARDIAC MARKERS ( 20 Aug 2022 20:30 )  x     / 0.24 ng/mL / x     / x     / x      CARDIAC MARKERS ( 20 Aug 2022 11:47 )  x     / 0.24 ng/mL / x     / x     / x                                                LIVER FUNCTIONS - ( 21 Aug 2022 06:03 )  Alb: 2.7 g/dL / Pro: 5.8 g/dL / ALK PHOS: 95 U/L / ALT: 11 U/L / AST: 12 U/L / GGT: x                                                  Culture - Blood (collected 20 Aug 2022 13:50)  Source: .Blood Blood-Peripheral  Preliminary Report (21 Aug 2022 22:01):    No growth to date.    Culture - Blood (collected 20 Aug 2022 13:50)  Source: .Blood Blood-Peripheral  Preliminary Report (21 Aug 2022 22:01):    No growth to date.                                                                                           MEDICATIONS  (STANDING):  ampicillin/sulbactam  IVPB 3 Gram(s) IV Intermittent every 6 hours  apixaban 2.5 milliGRAM(s) Oral two times a day  atorvastatin 40 milliGRAM(s) Oral at bedtime  buMETAnide Injectable 2 milliGRAM(s) IV Push every 12 hours  famotidine    Tablet 20 milliGRAM(s) Oral daily  isosorbide   mononitrate ER Tablet (IMDUR) 30 milliGRAM(s) Oral daily  levothyroxine 150 MICROGram(s) Oral daily  metoprolol succinate ER 50 milliGRAM(s) Oral daily  multivitamin/minerals 1 Tablet(s) Oral daily  mycophenolate mofetil 500 milliGRAM(s) Oral four times a day  NIFEdipine XL 30 milliGRAM(s) Oral daily  pentoxifylline 400 milliGRAM(s) Oral two times a day  tacrolimus 0.5 milliGRAM(s) Oral two times a day

## 2022-08-22 NOTE — PROGRESS NOTE ADULT - SUBJECTIVE AND OBJECTIVE BOX
ANDRIA TAVAREZ  82y, Male  Allergy: Ozempic (0.25 mg or 0.5 mg dose) (Hives; Rash)  streptomycin (Unknown)  Trulicity Pen (Hives; Rash)    Hospital Day: 2d    Patient seen and examined. 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      AF (atrial fibrillation)  s/p DCCV      Carotid stenosis      COPD (chronic obstructive pulmonary disease)      H/O coronary artery bypass surgery        S/P inguinal hernia repair      History of appendectomy          VITALS:  T(F): 97.5 (22 @ 08:20), Max: 97.6 (22 @ 16:29)  HR: 78 (22 @ 08:20)  BP: 124/59 (22 @ 08:20) (124/59 - 138/63)  RR: 20 (22 @ 08:20)  SpO2: 97% (22 @ 08:20)    TESTS & MEASUREMENTS:  Weight (Kg):   BMI (kg/m2): 28.2 ()    22 @ 07:01  -  22 @ 07:00  --------------------------------------------------------  IN: 1269 mL / OUT: 1800 mL / NET: -531 mL                            7.8    13.89 )-----------( 286      ( 22 Aug 2022 06:24 )             24.8     PT/INR - ( 22 Aug 2022 06:24 )   PT: 19.00 sec;   INR: 1.66 ratio         PTT - ( 22 Aug 2022 06:24 )  PTT:37.4 sec      138  |  100  |  72<HH>  ----------------------------<  275<H>  4.4   |  28  |  1.9<H>    Ca    8.1<L>      22 Aug 2022 06:24  Mg     2.6         TPro  6.0  /  Alb  2.7<L>  /  TBili  0.7  /  DBili  x   /  AST  11  /  ALT  10  /  AlkPhos  98      LIVER FUNCTIONS - ( 22 Aug 2022 06:24 )  Alb: 2.7 g/dL / Pro: 6.0 g/dL / ALK PHOS: 98 U/L / ALT: 10 U/L / AST: 11 U/L / GGT: x           CARDIAC MARKERS ( 21 Aug 2022 06:03 )  x     / 0.22 ng/mL / x     / x     / x      CARDIAC MARKERS ( 21 Aug 2022 00:49 )  x     / 0.24 ng/mL / x     / x     / x      CARDIAC MARKERS ( 20 Aug 2022 20:30 )  x     / 0.24 ng/mL / x     / x     / x            Culture - Blood (collected 22 @ 13:50)  Source: .Blood Blood-Peripheral  Preliminary Report (22 @ 22:01):    No growth to date.    Culture - Blood (collected 22 @ 13:50)  Source: .Blood Blood-Peripheral  Preliminary Report (22 @ 22:01):    No growth to date.      Urinalysis Basic - ( 20 Aug 2022 21:09 )    Color: Yellow / Appearance: Clear / S.018 / pH: x  Gluc: x / Ketone: Negative  / Bili: Negative / Urobili: <2 mg/dL   Blood: x / Protein: 30 mg/dL / Nitrite: Negative   Leuk Esterase: Negative / RBC: 4 /HPF / WBC 2 /HPF   Sq Epi: x / Non Sq Epi: 1 /HPF / Bacteria: Negative        RADIOLOGY & ADDITIONAL TESTS:    RECENT DIAGNOSTIC ORDERS:      MEDICATIONS:  MEDICATIONS  (STANDING):  ampicillin/sulbactam  IVPB 3 Gram(s) IV Intermittent every 6 hours  apixaban 2.5 milliGRAM(s) Oral two times a day  atorvastatin 40 milliGRAM(s) Oral at bedtime  buMETAnide Injectable 2 milliGRAM(s) IV Push every 12 hours  famotidine    Tablet 20 milliGRAM(s) Oral daily  isosorbide   mononitrate ER Tablet (IMDUR) 30 milliGRAM(s) Oral daily  levothyroxine 150 MICROGram(s) Oral daily  methylPREDNISolone sodium succinate Injectable 40 milliGRAM(s) IV Push every 12 hours  metoprolol succinate ER 50 milliGRAM(s) Oral daily  multivitamin/minerals 1 Tablet(s) Oral daily  mycophenolate mofetil 500 milliGRAM(s) Oral four times a day  NIFEdipine XL 30 milliGRAM(s) Oral daily  pentoxifylline 400 milliGRAM(s) Oral two times a day  tacrolimus 0.5 milliGRAM(s) Oral two times a day    MEDICATIONS  (PRN):      HOME MEDICATIONS:  Aranesp 100 mcg/0.5 mL injectable solution ()  atorvastatin 40 mg oral tablet ()  calcitriol 0.25 mcg oral capsule ()  d-mycophenolate ()  Eliquis 2.5 mg oral tablet ()  famotidine 20 mg oral tablet ()  furosemide 20 mg oral tablet ()  insulin glargine 100 units/mL subcutaneous solution ()  insulin lispro 100 units/mL injectable solution ()  isosorbide mononitrate 30 mg oral tablet, extended release ()  levothyroxine 150 mcg (0.15 mg) oral tablet ()  metoprolol succinate 50 mg oral tablet, extended release ()  Multiple Vitamins with Minerals oral tablet ()  NIFEdipine 30 mg oral tablet, extended release ()  pentoxifylline 400 mg oral tablet, extended release ()  sodium bicarbonate 650 mg oral tablet ()  tacrolimus 0.5 mg oral capsule ()      REVIEW OF SYSTEMS:  All other review of systems is negative unless indicated above.     PHYSICAL EXAM:  PHYSICAL EXAM:  GENERAL: NAD, well-developed  HEAD:  Atraumatic, Normocephalic  NECK: Supple, No JVD  CHEST/LUNG: Bibasilar crackles  HEART: Regular rate and rhythm; No murmurs, rubs, or gallops  ABDOMEN: Soft, Nontender, Nondistended; Bowel sounds present  EXTREMITIES:  2+ Peripheral Pulses, No clubbing, cyanosis, or edema  SKIN: No rashes or lesions

## 2022-08-22 NOTE — SWALLOW BEDSIDE ASSESSMENT ADULT - SWALLOW EVAL: DIAGNOSIS
+toleration of puree and mildly thick liquids w/o immediate overt s/s of penetration/aspiration +delayed coughing noted

## 2022-08-22 NOTE — PROGRESS NOTE ADULT - ASSESSMENT
83 yo M with hx of HFrEF, A.fib (on Eliquis), CAD s/p CABG x 3, PVD, COPD, CKD from C3 glomerunephropathy, HTN, HLD and hypothyroidism presents to ED with 5 days history of worsening SOB and hypoxia.     #Acute Hypoxic Respiratory Failure  #Acute on Chronic HFrEF  #Suspected aspiration PNA  Echo 08/01: 45-50%EF  CXR shows b/l pleural effusions with pulmonary congestion and opacities.   proBNP > 50K  s/p BIPAP in ED, now on 4L NC saturating 100%  Per s/s eval, cont current diet  - Cont Bumex 2mg IV BID  - Start solumedrol 40BID + Duonebs ATC q4h  - strict I & O, daily weight, Low Na diet with fluid restriction.   - BiPAP prn  - Tend troponin.   - monitor on Telemetry  - LE Duplex  - Cont spuusy1r q6h, f/u procal  - CHF Eval    #BRENDA on CKD Stage 3  #C3 glomerunephropathy  Possibly cardiorenal, cr trended down to 2 today  - monitor renal function and electrolytes on lasix.   - Nephrology consult (Pt follows Dr. Daniel outpatient)  - Cont tacrolimus and cellcept    #Elevated Troponin likely Type II NSTEMI   Troponins stable  - EKG shows A.fib. No significant ST/T wave changes.   - Cardiology to follow.     #Acute on Chronic Anemia  #Epistaxis  Pt endorses several episodes of epistaxis last month, possibly due to dry O2 via NC. Also on Eliquis  - Humidified O2, monitor for further nosebleeds  - ENT eval  - Monitor CBC    #CAD s/p CABG   #HTN/HLD  - ASA, Statin   - Cont metoprolol ER 50mg qD  - Cont nifedipine XL 30mg qD  - Cont lipitor 40mg qHs  - Cont imdur 30mg qD    #Chronic A.fib   - Cont metoprolol and eliquis     #Hypothyroidism  - Cont synthroid 150mcg qD    #DM II   - Will start lantus 10U with ISS (was only on lantus/lispro 6/2/2/2 on previous admission)  - Titrate up insulin as needed    #COPD - not on exacerbation. Duoneb prn.     DVT ppx: on Eliquis    #Progress Note Handoff  Pending (specify): Cont IV diuresis and steroids, procalcitonin,  Family discussion: d/w pt by bedside regarding IV diuresis  Disposition: NH

## 2022-08-22 NOTE — CONSULT NOTE ADULT - SUBJECTIVE AND OBJECTIVE BOX
Date of Admission: 22    CHIEF COMPLAINT: Patient is a 82y old  Male who presents with a chief complaint of chf exacerbation (22 Aug 2022 12:24)    HISTORY OF PRESENT ILLNESS: 83 yo male with PMHx of HFrEF (45-50% on Lasix 20 PO), A-Fib on Eliquis, CAD s/p CABGx3, PVD, COPD (was on rare home O2 PRN now on 3L NC since recent July admission for COVID), CKD3, C3 glomerulonephropathy, HTN, HLD, Hypothyroid presents from NH with progressively worsening SOB x5 days and hypoxia.  Recently hospitalized here for a month discharged on 8/3/22 initially for BRENDA on CKD course c/b COVID, bilateral lower lobe pneumonia, delirium and microaspiration of thin liquids on modified barium study. Received Dexamethasone and Zosyn course with new O2 requirements of 3L NC discharged to nursing home on 3L and thick liquid diet for rehab. Due to his SOB, chest x-ray was ordered on 8/15 but results did not come back and patient became increasingly short of breath and was noted to be hypoxic and rehab facility which prompted referral to the ED. Review of symptoms is notable for orthopnea and a chronic dry cough unchanged since his covid illness. He denies paroxysmal nocturnal dyspnea, leg edema, chest pain, palpitations, dizziness, n/v, abd pain. Cardiologist is Dr. Witt and Pulmonolgist is Dr. Rory Mars.     In ED:  T(F): 96.4 (22 @ 11:27), Max: 96.4 (22 @ 11:27)  HR: 80 (22 @ 16:20) (80 - 91)  BP: 137/61 (22 @ 16:20) (121/58 - 137/61)  RR: 18 (22 @ 16:20) (18 - 24)  SpO2: 100% (22 @ 16:20) (89% - 100%) -> 89% on 4L -> placed on NRB -> BIPAP 2/2 tachypnea and hypoxia -> taken off with increased work of breathing placed back on BIPAP  Labs: WBC 12k, Hgb 7.2 (baseline 9), BNP 55k, Cr 2.2 (baseline 1.5), PCO2 47 (VBG), Troponin 0.24  EKG:   Atrial fibrillation with premature ventricular or aberrantly conducted complexes  Left posterior fascicular block   ST & T wave abnormality, consider lateral ischemia - similar to prior in 2022  XR chest: significant bilateral pleural effusions and pulmonary congestion  Admitted to medicine for SOB/hypoxia likely 2/2 acute CHF exacerbation.  (20 Aug 2022 16:17)    Patient reports prior to megan COVID, patient was ambulating around with walker without issue. Since being discharged and sent to NH 1-2 months ago, he stated he has become deconditioned 2/2 the NH not allowing him to ambulate. He also used to barely use his home O2 but has since required it continuously since his last hospital admission. Denied chest pain, n/v, abdominal bloating, early satiety, LE edema or pain in legs. States he has had a loss of appetite but associates it poor tasting food given at NH.     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    SOCIAL HISTORY:  Former cigarette smoker, quit >40 years ago. Denies alcohol or drug use.     Allergies    Ozempic (0.25 mg or 0.5 mg dose) (Hives; Rash)  streptomycin (Unknown)  Trulicity Pen (Hives; Rash)    Intolerances    	    REVIEW OF SYSTEMS:  CONSTITUTIONAL: No fever, weight loss, + fatigue.  CARDIOLOGY: Patient denies chest pain, + shortness of breath on exertion, no syncopal episodes.   RESPIRATORY: + shortness of breath on exertion, no wheezing.   NEUROLOGICAL: +Generalized weakness, no focal deficits to report.  ENDOCRINOLOGICAL: no recent change in diabetic medications.   GI: no BRBPR, no n/v, diarrhea.    PSYCHIATRY: normal mood and affect  HEENT: + nasal discharge, + epistaxis, no ecchymosis  SKIN: no ecchymosis, +scratches B/L legs  MUSCULOSKELETAL: Full range of motion x4.     PHYSICAL EXAM:  T(C): 35.7 (22 @ 15:44), Max: 36.4 (22 @ 16:29)  HR: 82 (22 @ 15:44) (74 - 86)  BP: 119/63 (22 @ 15:44) (119/63 - 138/63)  RR: 20 (22 @ 15:44) (20 - 20)  SpO2: 93% (22 @ 15:44) (93% - 97%)    21 Aug 2022 07:01  -  22 Aug 2022 07:00  --------------------------------------------------------  IN: 1269 mL / OUT: 1800 mL / NET: -531 mL        General Appearance: fatigued, normal for age and gender. 	  Neck: JVP [id number], no bruit.   Eyes: Extra Ocular muscles intact.   Cardiovascular: irregular rhythm S1 S2, + JVD, +1 R LE edema +2 L LE edema  Respiratory: crackles L>R   Psychiatry: Drowsy, oriented x 3, Mood & affect appropriate  Gastrointestinal:  Soft, Non-tender  Skin/Integumen: No rashes, No ecchymoses, No cyanosis	  Neurologic: Non-focal  Musculoskeletal/ extremities: Normal range of motion, No clubbing, cyanosis, +1 R LE edema, +2 L LE edema  Vascular: Peripheral pulses palpable 2+ bilaterally    LABS:	 	                          7.8    13.89 )-----------( 286      ( 22 Aug 2022 06:24 )             24.8         138  |  100  |  72<HH>  ----------------------------<  275<H>  4.4   |  28  |  1.9<H>    Ca    8.1<L>      22 Aug 2022 06:24  Mg     2.6         TPro  6.0  /  Alb  2.7<L>  /  TBili  0.7  /  DBili  x   /  AST  11  /  ALT  10  /  AlkPhos  98      CARDIAC MARKERS ( 21 Aug 2022 06:03 )  x     / 0.22 ng/mL / x     / x     / x      CARDIAC MARKERS ( 21 Aug 2022 00:49 )  x     / 0.24 ng/mL / x     / x     / x      CARDIAC MARKERS ( 20 Aug 2022 20:30 )  x     / 0.24 ng/mL / x     / x     / x          PT/INR - ( 22 Aug 2022 06:24 )   PT: 19.00 sec;   INR: 1.66 ratio         PTT - ( 22 Aug 2022 06:24 )  PTT:37.4 sec    CARDIAC MARKERS:  Serum Pro-Brain Natriuretic Peptide: 96407 pg/mL (22 @ 11:47)    Serum Pro-Brain Natriuretic Peptide: 82373 pg/mL (10.20.21 @ 10:28)      TELEMETRY EVENTS: 	    EC22    Ventricular Rate 71 BPM  Atrial Rate 82 BPM  QRS Duration 140 ms  Q-T Interval 444 ms  QTC Calculation(Bazett) 482 ms  R Axis 108 degrees  T Axis 166 degrees    Diagnosis Line Atrial fibrillation with premature ventricular or aberrantlyconducted  complexes  Non-specific intra-ventricular conduction block  ST & T wave abnormality, consider lateral ischemia  Abnormal ECG    Confirmed by Sarah Borden MD ([id number]) on 2022 1:35:39 PM      	  PREVIOUS DIAGNOSTIC TESTING:    TTE 22      Summary:   1. Left ventricular ejection fraction, by visual estimation, is 45 to   50%.   2. Mildly decreased global left ventricular systolic function.   3. Mild left ventricular hypertrophy.   4. Multiple left ventricular regional wall motion abnormalities exist.   See wall motion findings.   5. The left ventricular diastolic function could not be assessed in this   study.   6. Moderately enlarged left atrium.   7. Sclerotic aortic valve with normal opening.   8. Degenerative mitral valve.   9. Mild mitral regurgitation.  10. Mild tricuspid regurgitation.  11. Estimated pulmonary artery systolic pressure is 37.6 mmHg assuming a   right atrial pressure of 8 mmHg, which is consistent with borderline   pulmonary hypertension.        TTE 10/22/21    Summary:   1. Moderately to severely decreased global left ventricular systolic function.   2. Severely enlarged left atrium.   3. Severely enlarged right atrium.   4. Multiple left ventricular regional wall motion abnormalities exist. See wall motion findings.   5. Mild eccentric left ventricular hypertrophy.   6. The left ventricular diastolic function could not be assessed in this study.   7. Severely enlarged right ventricle.   8. Severely reduced RV systolic function.   9. Mild to moderate mitral valve regurgitation.  10. Moderate mitral annular calcification.  11. Mild tricuspid regurgitation.  12. Mild aortic regurgitation.  13. Sclerotic aortic valve with normal opening.  14. Complex atheroma is noted in the ascending aorta.  15. Estimated pulmonary artery systolic pressure is 64.8 mmHg assuming a right atrial pressure of 15 mmHg, which is consistent with severe pulmonary hypertension.  16. Pulmonary hypertension is present.  17. LA volume Index is 71.9 ml/m² ml/m2.  18. There is moderate aortic root calcification.        Home Medications:  Aranesp 100 mcg/0.5 mL injectable solution: 1 dose(s) injectable every 3 to 4 weeks (2022 21:22)  atorvastatin 40 mg oral tablet: 1 tab(s) orally once a day (at bedtime) (2022 21:22)  calcitriol 0.25 mcg oral capsule: 1 cap(s) orally 2 times a week (2022 21:22)  d-mycophenolate: 500 milligram(s) orally 4 times a day (2022 21:22)  Eliquis 2.5 mg oral tablet: 1 tab(s) orally 2 times a day (2022 21:22)  famotidine 20 mg oral tablet: 1 tab(s) orally once a day (2022 21:22)  furosemide 20 mg oral tablet: 1 tab(s) orally once a day (03 Aug 2022 09:47)  insulin glargine 100 units/mL subcutaneous solution: 5 unit(s) subcutaneous once a day (at bedtime) (03 Aug 2022 09:47)  insulin lispro 100 units/mL injectable solution: 1 Unit(s) if Glucose 151 - 200  2 Unit(s) if Glucose 201 - 250  3 Unit(s) if Glucose 251 - 300  4 Unit(s) if Glucose 301 - 350  5 Unit(s) if Glucose 351 - 400  6 Unit(s) if Glucose Greater Than 400 (03 Aug 2022 09:57)  isosorbide mononitrate 30 mg oral tablet, extended release: 1 tab(s) orally once a day (in the morning) (2022 21:22)  levothyroxine 150 mcg (0.15 mg) oral tablet: 1 tab(s) orally once a day (2022 21:22)  metoprolol succinate 50 mg oral tablet, extended release: 1 tab(s) orally once a day (03 Aug 2022 09:47)  Multiple Vitamins with Minerals oral tablet: 1 tab(s) orally once a day (03 Aug 2022 09:47)  NIFEdipine 30 mg oral tablet, extended release: 1 tab(s) orally once a day (2022 21:22)  pentoxifylline 400 mg oral tablet, extended release: 1 tab(s) orally 2 times a day (2022 21:22)  sodium bicarbonate 650 mg oral tablet: 1 tab(s) orally 3 times a day (03 Aug 2022 09:47)  tacrolimus 0.5 mg oral capsule: 1 cap(s) orally 2 times a day (03 Aug 2022 09:57)    MEDICATIONS  (STANDING):  albuterol/ipratropium for Nebulization 3 milliLiter(s) Nebulizer every 6 hours  ampicillin/sulbactam  IVPB 3 Gram(s) IV Intermittent every 6 hours  apixaban 2.5 milliGRAM(s) Oral two times a day  atorvastatin 40 milliGRAM(s) Oral at bedtime  buMETAnide Injectable 2 milliGRAM(s) IV Push every 12 hours  dextrose 5%. 1000 milliLiter(s) (50 mL/Hr) IV Continuous <Continuous>  dextrose 5%. 1000 milliLiter(s) (100 mL/Hr) IV Continuous <Continuous>  dextrose 50% Injectable 25 Gram(s) IV Push once  dextrose 50% Injectable 12.5 Gram(s) IV Push once  dextrose 50% Injectable 25 Gram(s) IV Push once  famotidine    Tablet 20 milliGRAM(s) Oral daily  glucagon  Injectable 1 milliGRAM(s) IntraMuscular once  insulin glargine Injectable (LANTUS) 10 Unit(s) SubCutaneous every morning  insulin lispro (ADMELOG) corrective regimen sliding scale   SubCutaneous three times a day before meals  isosorbide   mononitrate ER Tablet (IMDUR) 30 milliGRAM(s) Oral daily  levothyroxine 150 MICROGram(s) Oral daily  methylPREDNISolone sodium succinate Injectable 40 milliGRAM(s) IV Push every 12 hours  metoprolol succinate ER 50 milliGRAM(s) Oral daily  multivitamin/minerals 1 Tablet(s) Oral daily  mycophenolate mofetil 500 milliGRAM(s) Oral four times a day  NIFEdipine XL 30 milliGRAM(s) Oral daily  pentoxifylline 400 milliGRAM(s) Oral two times a day  tacrolimus 0.5 milliGRAM(s) Oral two times a day    MEDICATIONS  (PRN):  dextrose Oral Gel 15 Gram(s) Oral once PRN Blood Glucose LESS THAN 70 milliGRAM(s)/deciliter

## 2022-08-22 NOTE — CONSULT NOTE ADULT - ASSESSMENT
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 Found to be hypoxic to 89%- placed on BiPAP with improvement, now on 4L NC. Found to be fluid overloaded, currently receiving IV diuretics. Suspected PNA, treating with IV antibiotics.  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 Found to be hypoxic to 89%- placed on BiPAP with improvement, now on 4L NC. Found to be fluid overloaded, currently receiving IV diuretics. Suspected PNA, treating with IV antibiotics.     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 Found to be hypoxic to 89%- placed on BiPAP with improvement, now on 4L NC. Found to be fluid overloaded, currently receiving IV diuretics. Suspected PNA, treating with IV antibiotics.     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 Found to be hypoxic to 89%- placed on BiPAP with improvement, now on 4L NC. Found to be fluid overloaded, currently receiving IV diuretics. Suspected PNA, treating with IV antibiotics.     Plan:  Please prioritize moving to 3C for strict I/Os and daily weights  Patient is fluid overloaded on exam- POCUS exam: 2.2cm, non collapsing  Continue Bumex 2mg IVP BID   Stop solumedrol IVP  Continue nifedipine 30mg daily, imdur 30mg daily, and metoprolol succinate 50mg daily  Maintain potassium >4.0, Mg >2.2  BiPAP at night - ween before discharge if able  Strict intake and output  Daily weight   Defer to Primary Team re: abx, though suspect the patient's symptoms are mostly related to volume overload  Will continue to follow

## 2022-08-22 NOTE — SWALLOW BEDSIDE ASSESSMENT ADULT - SWALLOW EVAL: RECOMMENDED FEEDING/EATING TECHNIQUES
consecutive swallows, volitional bolus hold prior to the swallow w/ liquids/oral hygiene/position upright (90 degrees)/small sips/bites

## 2022-08-22 NOTE — PROGRESS NOTE ADULT - ASSESSMENT
IMPRESSION:      Acute hypoxemic resp failure on NC  Pul edema  possible aspiration   COPD exacerbation  anemia  Pre-renal BRENDA on CKD III -   C3 glomerulopathy - on prograf and cellcept at home  HFrEF - chronic; Mod-severe reduced EF, sev dilated CM; RV dysfxn; severe pulm HTN  AFib, chronic - on eliquis   CAD s/p CABG  H/O DLD, HTN, DM2, Hypothyroidism     PLAN:  - ENT Eval  - lasix keep - balance  - solumedrol 40 q12  - Nebs Q 4 and PRN   - NIV at night and PRN  - procal/ speech and swallow eval  - check LE duplex  - Aspiration precautions  - Wean O2 as tolerated  - GOC

## 2022-08-23 NOTE — DIETITIAN INITIAL EVALUATION ADULT - NS FNS WEIGHT CHANGE REASON
UBW: 119.5kg, 263lbs   bed weight 7/27: 83.1kg  current weight: 85kg (with edema)  **28.8% weight loss within 6mo, clinically significant**

## 2022-08-23 NOTE — SWALLOW BEDSIDE ASSESSMENT ADULT - SWALLOW EVAL: DIAGNOSIS
Pt re-educated on dysphagia and last instrumental swallow study as well as the need for use of swallowing strategies. +toleration of mildly thick liquids w/ use of volitional bolus hold and multiple swallows w/o overt s/s of penetration/aspiration

## 2022-08-23 NOTE — ADVANCED PRACTICE NURSE CONSULT - ASSESSMENT
81 yo male with PMHx of HFrEF (45-50% on Lasix 20 PO), A-Fib on Eliquis DM, CAD s/p CABGx3, PVD, COPD (was on rare home O2 PRN now on 3L NC since recent July admission for COVID), CKD3, C3 glomerulonephropathy, HTN, HLD, Hypothyroid presents from NH (8/20) with progressively worsening SOB x5 days and hypoxia. Recently hospitalized here for a month discharged on 8/3/22 initially for BRENDA on CKD course c/b COVID, bilateral lower lobe pneumonia, delirium and microaspiration of thin liquids on modified barium study. Received Dexamethasone and Zosyn course with new O2 requirements of 3L NC discharged to nursing home on 3L and thick liquid diet for rehab. Due to his SOB, chest x-ray was ordered on 8/15 but results did not come back and patient became increasingly short of breath and was noted to be hypoxic and rehab facility which prompted referral to the ED. Review of symptoms is notable for orthopnea and a chronic dry cough unchanged since his covid illness. He denies paroxysmal nocturnal dyspnea, leg edema, chest pain, palpitations, dizziness, n/v, abd pain. Cardiologist is Dr. Witt and Pulmonolgist is Dr. Rory Mars.   In ED:  T(F): 96.4 (08-20-22 @ 11:27), Max: 96.4 (08-20-22 @ 11:27) HR: 80 (08-20-22 @ 16:20) (80 - 91) BP: 137/61 (08-20-22 @ 16:20) (121/58 - 137/61) RR: 18 (08-20-22 @ 16:20) (18 - 24) SpO2: 100% (08-20-22 @ 16:20) (89% - 100%) -> 89% on 4L -> placed on NRB -> BIPAP 2/2 tachypnea and hypoxia -> taken off with increased work of breathing placed back on BIPAP  Labs: WBC 12k, Hgb 7.2 (baseline 9), BNP 55k, Cr 2.2 (baseline 1.5), PCO2 47 (VBG), Troponin 0.24  EKG:  Atrial fibrillation with premature ventricular or aberrantly conducted complexes. Left posterior fascicular block . ST & T wave abnormality, consider lateral ischemia - similar to prior in July 2022  XR chest: significant bilateral pleural effusions and pulmonary congestion. Admitted to medicine for SOB/hypoxia likely 2/2 acute CHF exacerbation.   Currently admitted to medicine with primary diagnosis of CHF exacerbation, today is hospital day-3d, in ED3, being managed for Acute hypoxemic resp failure on NC; Pulmonary edema;possible aspiration; COPD ( was on prednisone in NH); anemia SP TX; Pre-renal BRENDA on CKD III; C3 glomerulopathy - on prograf and cellcept at home; HFrEF - chronic; Mod-severe reduced EF, sev dilated CM; RV dysfxn; severe pulm HTN  AFib, chronic - on eliquis; CAD s/p CABG; H/O DLD, HTN, DM2, Hypothyroidism.    Received patient in ED3 laying in bed, HOB elevated 30 degrees. Pt awake, A&Ox3, RN Fariba at bedside, both made aware of purpose of WOCN visit, agreeable to consult. With minimal assistance, patient turned  to left side for skin assessment.    Type of wound: Stage 2 pressure injury; POA, with likely moisture component  Location: right buttock  Wound measurements: 2 wounds in close proximity, measured together at 2cm x 1cm x 0.2cm  Tunneling/Undermining: none  Wound bed: pink tissue   Wound edges: attached   Periwound: slightly denuded & macerated, extending to left buttock   Wound exudate: none  Wound odor: none  Induration, erythema, warmth: none  Wound pain: denies & no s/s pain present on assessment    Scratches throughout RLE.     Patient OOB w/ assistance, able to turn/position in bed with minimal assistance, RN reports patient continent of urine and stool. Ordered for pureed diet, probably inadequate intake as per reported Miguel score.

## 2022-08-23 NOTE — DIETITIAN INITIAL EVALUATION ADULT - ADD RECOMMEND
RECOMMEND: multivitamin without minerals, zinc 220mg/daily ([id number]) for wound healing  *ascorbic acid + brandt contraindicated as pt with renal insufficiency  Suspect patient with malnutrition though need more criteria to dx, will obtain nfpe and subjective information at f/u  patient is at HIGH nutrition risk will follow up within 4days, [facility] #[id number] or via TEAMS

## 2022-08-23 NOTE — ED ADULT NURSE REASSESSMENT NOTE - NS ED NURSE REASSESS COMMENT FT1
Patient has dry blood in nostrils, with minor bleeding, MD made aware due to patient on Eliquis and not utilizing oxygen due to bleeding MD states goal of oxygen is above 90%. Detail Level: Detailed

## 2022-08-23 NOTE — ADVANCED PRACTICE NURSE CONSULT - RECOMMEDATIONS
1. Stage 2 right buttock pressure injury-Cleanse wound bed w/ normal saline, gently pat dry.  Apply thin layer of Coloplast Triad hydrophilic ointment to absorb wound exudate and provide protective layer. Cover w/ dry gauze dressing, and foam Allevyn dressing. Apply Triad & change dressing q12h and prn for strike-through drainage or soiling. If top layer of Triad soiled, gently remove w/ perineal cleanser and re-apply ointment. Do not scrub off as this may cause further skin breakdown. Do not apply foam Allevyn dressing directly over Triad (use gauze in between) as ointment gets absorbed into dressing as opposed to being in direct contact w/ wound bed.  -Assess skin/wound qshift, report changes to primary provider.     Additional recs:  -Continue to instruct/encourage & assist patient as needed w/ turning/positioning from side-to-side q2h while in bed, q1h when/if OOB chair, or in accordance w/ pt's plan of care. Utilize pillows to assist w/ turning/positioning. When/if OOB chair, utilize pillows or chair cushion to offload pressure.   -While in bed, offload heels from bed surface with soft pillow under calfs or by applying offloading boots to BLEs.   -Apply Coloplast Alcira Protect moisture barrier cream to buttock and perineal area daily and prn after any soiling/incontinent episode.    -Continue utilizing one underpad underneath patient to wick away excess moisture from skin surface & contain any incontinence episodes; change pad when saturated/soiled.   -Continue nutrition consult & tight glucose control for optimal wound healing & nutritional status.     Plan of Care: Primary RASHMI Link at bedside & made aware of above recs. Spoke w/  covering/primary MD Nicole (at [phone]) in regards to above. Signing off on patient, no further needs/recs from [facility] service at this time. Staff RN to perform routine skin/wound assessment and manage wound care. Questions or concerns or if wound worsens and reconsult needed, please contact [facility], [facility] #[phone].

## 2022-08-23 NOTE — PROGRESS NOTE ADULT - SUBJECTIVE AND OBJECTIVE BOX
Over Night Events: events noted, CXR reviewed no improvement, on NC, afebrile on 4 L NC  PHYSICAL EXAM    ICU Vital Signs Last 24 Hrs  T(C): 36.4 (22 Aug 2022 23:40), Max: 36.4 (22 Aug 2022 08:20)  T(F): 97.6 (22 Aug 2022 23:40), Max: 97.6 (22 Aug 2022 23:40)  HR: 78 (23 Aug 2022 05:30) (76 - 82)  BP: 144/77 (23 Aug 2022 05:30) (119/63 - 144/77)  RR: 18 (23 Aug 2022 05:30) (18 - 20)  SpO2: 98% (23 Aug 2022 05:30) (93% - 99%)    O2 Parameters below as of 23 Aug 2022 05:30  Patient On (Oxygen Delivery Method): nasal cannula  O2 Flow (L/min): 4          General: ill looking  Lungs: dec bs both  bases  Cardiovascular: MEKHI 2.6  Abdomen: Soft, Positive BS  Extremities: No clubbing   Neurological: Non focal       08-21-22 @ 07:01  -  08-22-22 @ 07:00  --------------------------------------------------------  IN:    IV PiggyBack: 250 mL    Oral Fluid: 720 mL    PRBCs (Packed Red Blood Cells): 299 mL  Total IN: 1269 mL    OUT:    Voided (mL): 1800 mL  Total OUT: 1800 mL    Total NET: -531 mL      08-22-22 @ 07:01  -  08-23-22 @ 06:12  --------------------------------------------------------  IN:  Total IN: 0 mL    OUT:    Voided (mL): 300 mL  Total OUT: 300 mL    Total NET: -300 mL          LABS:                          7.8    13.89 )-----------( 286      ( 22 Aug 2022 06:24 )             24.8                                               08-22    138  |  100  |  72<HH>  ----------------------------<  275<H>  4.4   |  28  |  1.9<H>    Ca    8.1<L>      22 Aug 2022 06:24  Mg     2.6     08-22    TPro  6.0  /  Alb  2.7<L>  /  TBili  0.7  /  DBili  x   /  AST  11  /  ALT  10  /  AlkPhos  98  08-22      PT/INR - ( 22 Aug 2022 06:24 )   PT: 19.00 sec;   INR: 1.66 ratio         PTT - ( 22 Aug 2022 06:24 )  PTT:37.4 sec                                                                                     LIVER FUNCTIONS - ( 22 Aug 2022 06:24 )  Alb: 2.7 g/dL / Pro: 6.0 g/dL / ALK PHOS: 98 U/L / ALT: 10 U/L / AST: 11 U/L / GGT: x                                                  Culture - Blood (collected 20 Aug 2022 13:50)  Source: .Blood Blood-Peripheral  Preliminary Report (21 Aug 2022 22:01):    No growth to date.    Culture - Blood (collected 20 Aug 2022 13:50)  Source: .Blood Blood-Peripheral  Preliminary Report (21 Aug 2022 22:01):    No growth to date.                                                                                           MEDICATIONS  (STANDING):  albuterol/ipratropium for Nebulization 3 milliLiter(s) Nebulizer every 6 hours  ampicillin/sulbactam  IVPB 3 Gram(s) IV Intermittent every 6 hours  apixaban 2.5 milliGRAM(s) Oral two times a day  atorvastatin 40 milliGRAM(s) Oral at bedtime  buMETAnide Injectable 2 milliGRAM(s) IV Push every 12 hours  dextrose 5%. 1000 milliLiter(s) (50 mL/Hr) IV Continuous <Continuous>  dextrose 5%. 1000 milliLiter(s) (100 mL/Hr) IV Continuous <Continuous>  dextrose 50% Injectable 25 Gram(s) IV Push once  dextrose 50% Injectable 12.5 Gram(s) IV Push once  dextrose 50% Injectable 25 Gram(s) IV Push once  famotidine    Tablet 20 milliGRAM(s) Oral daily  glucagon  Injectable 1 milliGRAM(s) IntraMuscular once  insulin glargine Injectable (LANTUS) 10 Unit(s) SubCutaneous every morning  insulin lispro (ADMELOG) corrective regimen sliding scale   SubCutaneous three times a day before meals  isosorbide   mononitrate ER Tablet (IMDUR) 30 milliGRAM(s) Oral daily  levothyroxine 150 MICROGram(s) Oral daily  melatonin 5 milliGRAM(s) Oral at bedtime  methylPREDNISolone sodium succinate Injectable 40 milliGRAM(s) IV Push every 12 hours  metoprolol succinate ER 50 milliGRAM(s) Oral daily  multivitamin/minerals 1 Tablet(s) Oral daily  mycophenolate mofetil 500 milliGRAM(s) Oral four times a day  NIFEdipine XL 30 milliGRAM(s) Oral daily  pentoxifylline 400 milliGRAM(s) Oral two times a day  tacrolimus 0.5 milliGRAM(s) Oral two times a day    MEDICATIONS  (PRN):  dextrose Oral Gel 15 Gram(s) Oral once PRN Blood Glucose LESS THAN 70 milliGRAM(s)/deciliter         Over Night Events: events noted, CXR reviewed no improvement, on NC, afebrile on 4 L NC, epistaxis  PHYSICAL EXAM    ICU Vital Signs Last 24 Hrs  T(C): 36.4 (22 Aug 2022 23:40), Max: 36.4 (22 Aug 2022 08:20)  T(F): 97.6 (22 Aug 2022 23:40), Max: 97.6 (22 Aug 2022 23:40)  HR: 78 (23 Aug 2022 05:30) (76 - 82)  BP: 144/77 (23 Aug 2022 05:30) (119/63 - 144/77)  RR: 18 (23 Aug 2022 05:30) (18 - 20)  SpO2: 98% (23 Aug 2022 05:30) (93% - 99%)    O2 Parameters below as of 23 Aug 2022 05:30  Patient On (Oxygen Delivery Method): nasal cannula  O2 Flow (L/min): 4          General: ill looking  Lungs: dec bs both  bases  Cardiovascular: MEKHI 2.6  Abdomen: Soft, Positive BS  Extremities: No clubbing   Neurological: Non focal       08-21-22 @ 07:01  -  08-22-22 @ 07:00  --------------------------------------------------------  IN:    IV PiggyBack: 250 mL    Oral Fluid: 720 mL    PRBCs (Packed Red Blood Cells): 299 mL  Total IN: 1269 mL    OUT:    Voided (mL): 1800 mL  Total OUT: 1800 mL    Total NET: -531 mL      08-22-22 @ 07:01  -  08-23-22 @ 06:12  --------------------------------------------------------  IN:  Total IN: 0 mL    OUT:    Voided (mL): 300 mL  Total OUT: 300 mL    Total NET: -300 mL          LABS:                          7.8    13.89 )-----------( 286      ( 22 Aug 2022 06:24 )             24.8                                               08-22    138  |  100  |  72<HH>  ----------------------------<  275<H>  4.4   |  28  |  1.9<H>    Ca    8.1<L>      22 Aug 2022 06:24  Mg     2.6     08-22    TPro  6.0  /  Alb  2.7<L>  /  TBili  0.7  /  DBili  x   /  AST  11  /  ALT  10  /  AlkPhos  98  08-22      PT/INR - ( 22 Aug 2022 06:24 )   PT: 19.00 sec;   INR: 1.66 ratio         PTT - ( 22 Aug 2022 06:24 )  PTT:37.4 sec                                                                                     LIVER FUNCTIONS - ( 22 Aug 2022 06:24 )  Alb: 2.7 g/dL / Pro: 6.0 g/dL / ALK PHOS: 98 U/L / ALT: 10 U/L / AST: 11 U/L / GGT: x                                                  Culture - Blood (collected 20 Aug 2022 13:50)  Source: .Blood Blood-Peripheral  Preliminary Report (21 Aug 2022 22:01):    No growth to date.    Culture - Blood (collected 20 Aug 2022 13:50)  Source: .Blood Blood-Peripheral  Preliminary Report (21 Aug 2022 22:01):    No growth to date.                                                                                           MEDICATIONS  (STANDING):  albuterol/ipratropium for Nebulization 3 milliLiter(s) Nebulizer every 6 hours  ampicillin/sulbactam  IVPB 3 Gram(s) IV Intermittent every 6 hours  apixaban 2.5 milliGRAM(s) Oral two times a day  atorvastatin 40 milliGRAM(s) Oral at bedtime  buMETAnide Injectable 2 milliGRAM(s) IV Push every 12 hours  dextrose 5%. 1000 milliLiter(s) (50 mL/Hr) IV Continuous <Continuous>  dextrose 5%. 1000 milliLiter(s) (100 mL/Hr) IV Continuous <Continuous>  dextrose 50% Injectable 25 Gram(s) IV Push once  dextrose 50% Injectable 12.5 Gram(s) IV Push once  dextrose 50% Injectable 25 Gram(s) IV Push once  famotidine    Tablet 20 milliGRAM(s) Oral daily  glucagon  Injectable 1 milliGRAM(s) IntraMuscular once  insulin glargine Injectable (LANTUS) 10 Unit(s) SubCutaneous every morning  insulin lispro (ADMELOG) corrective regimen sliding scale   SubCutaneous three times a day before meals  isosorbide   mononitrate ER Tablet (IMDUR) 30 milliGRAM(s) Oral daily  levothyroxine 150 MICROGram(s) Oral daily  melatonin 5 milliGRAM(s) Oral at bedtime  methylPREDNISolone sodium succinate Injectable 40 milliGRAM(s) IV Push every 12 hours  metoprolol succinate ER 50 milliGRAM(s) Oral daily  multivitamin/minerals 1 Tablet(s) Oral daily  mycophenolate mofetil 500 milliGRAM(s) Oral four times a day  NIFEdipine XL 30 milliGRAM(s) Oral daily  pentoxifylline 400 milliGRAM(s) Oral two times a day  tacrolimus 0.5 milliGRAM(s) Oral two times a day    MEDICATIONS  (PRN):  dextrose Oral Gel 15 Gram(s) Oral once PRN Blood Glucose LESS THAN 70 milliGRAM(s)/deciliter

## 2022-08-23 NOTE — PROGRESS NOTE ADULT - ASSESSMENT
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 Found to be hypoxic to 89%- placed on BiPAP with improvement, now on 3L NC. Found to be fluid overloaded, currently receiving IV diuretics. Suspected PNA, IV antibiotics given. Patient having epistaxis while on Eliquis.     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 Found to be hypoxic to 89%- placed on BiPAP with improvement, now on 3L NC. Found to be fluid overloaded, currently receiving IV diuretics. Suspected PNA, IV antibiotics given. Patient having epistaxis while on Eliquis.     Plan:  Patient remains fluid overloaded on exam- POCUS exam: IVC dilated, collapsing ~50% with respirations  Continue Bumex 2mg IVP BID  Start 3% Hypertonic Saline 150ml BID(If infused throughout a central line, run over one hour, if a peripheral line is to be used run over 3 hours) *Start infusion, after 30 minutes- give Bumex IVP, then continue rest of infusion*    Continue nifedipine 30mg daily, imdur 30mg daily, and metoprolol succinate 50mg daily  Get chest xray in AM  Pulmonology consult for possible thoracentesis   Hold Eliquis due to epistaxis / possible thoracentesis   Maintain potassium >4.0, Mg >2.2  BiPAP at night - ween before discharge if able  Strict intake and output needed for proper diuretic management   Daily weight   Plan discussed with primary team  Will continue to follow

## 2022-08-23 NOTE — PROGRESS NOTE ADULT - ASSESSMENT
IMPRESSION:      Acute hypoxemic resp failure on NC  Pul edema  possible aspiration   COPD ( was on prednisone in NH)  anemia SP TX  Pre-renal BRENDA on CKD III -   C3 glomerulopathy - on prograf and cellcept at home  HFrEF - chronic; Mod-severe reduced EF, sev dilated CM; RV dysfxn; severe pulm HTN  AFib, chronic - on eliquis   CAD s/p CABG  H/O DLD, HTN, DM2, Hypothyroidism     PLAN:    - Diuresis keep - balance  - prednisone 20 daily  - NIV at night and PRN  - dc abx procal -  - Aspiration precautions  - Wean O2 as tolerated  - GOC   IMPRESSION:      Acute hypoxemic resp failure on NC  Pul edema  possible aspiration   COPD ( was on prednisone in NH)  anemia SP TX  Pre-renal BRENDA on CKD III -   C3 glomerulopathy - on prograf and cellcept at home  HFrEF - chronic; Mod-severe reduced EF, sev dilated CM; RV dysfxn; severe pulm HTN  AFib, chronic - on eliquis   CAD s/p CABG  H/O DLD, HTN, DM2, Hypothyroidism     PLAN:      - Diuresis keep - balance  - ENT eval   - prednisone 20 daily  - NIV at night and PRN  - dc abx procal -  - Aspiration precautions  - Wean O2 as tolerated  - GOC

## 2022-08-23 NOTE — CONSULT NOTE ADULT - SUBJECTIVE AND OBJECTIVE BOX
NEPHROLOGY CONSULTATION NOTE    83 yo male with PMHx of HFrEF (45-50% on Lasix 20 PO), A-Fib on Eliquis, CAD s/p CABGx3, PVD, COPD (was on rare home O2 PRN now on 3L NC since recent July admission for COVID), CKD3, C3 glomerulonephropathy, HTN, HLD, Hypothyroid presents from NH with progressively worsening SOB x5 days and hypoxia.  Recently hospitalized here for a month discharged on 8/3/22 initially for BRENDA on CKD course c/b COVID, bilateral lower lobe pneumonia, delirium and microaspiration of thin liquids on modified barium study. Received Dexamethasone and Zosyn course with new O2 requirements of 3L NC discharged to nursing home on 3L and thick liquid diet for rehab. Due to his SOB, chest x-ray was ordered on 8/15 but results did not come back and patient became increasingly short of breath and was noted to be hypoxic and rehab facility which prompted referral to the ED. Review of symptoms is notable for orthopnea and a chronic dry cough unchanged since his covid illness. He denies paroxysmal nocturnal dyspnea, leg edema, chest pain, palpitations, dizziness, n/v, abd pain. Cardiologist is Dr. Witt and Pulmonolgist is Dr. Rory Mars.     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    AF (atrial fibrillation)  s/p DCCV    Carotid stenosis    COPD (chronic obstructive pulmonary disease)    H/O coronary artery bypass surgery      S/P inguinal hernia repair    Allergies:  Ozempic (0.25 mg or 0.5 mg dose) (Hives; Rash)  streptomycin (Unknown)  Trulicity Pen (Hives; Rash)    Home Medications Reviewed  Hospital Medications:   MEDICATIONS  (STANDING):  albuterol/ipratropium for Nebulization 3 milliLiter(s) Nebulizer every 6 hours  apixaban 2.5 milliGRAM(s) Oral two times a day  atorvastatin 40 milliGRAM(s) Oral at bedtime  buMETAnide Injectable 2 milliGRAM(s) IV Push every 12 hours  dextrose 5%. 1000 milliLiter(s) (50 mL/Hr) IV Continuous <Continuous>  dextrose 5%. 1000 milliLiter(s) (100 mL/Hr) IV Continuous <Continuous>  dextrose 50% Injectable 25 Gram(s) IV Push once  dextrose 50% Injectable 12.5 Gram(s) IV Push once  dextrose 50% Injectable 25 Gram(s) IV Push once  famotidine    Tablet 20 milliGRAM(s) Oral daily  glucagon  Injectable 1 milliGRAM(s) IntraMuscular once  insulin glargine Injectable (LANTUS) 10 Unit(s) SubCutaneous at bedtime  insulin lispro (ADMELOG) corrective regimen sliding scale   SubCutaneous three times a day before meals  insulin lispro Injectable (ADMELOG) 4 Unit(s) SubCutaneous three times a day before meals  isosorbide   mononitrate ER Tablet (IMDUR) 30 milliGRAM(s) Oral daily  levothyroxine 150 MICROGram(s) Oral daily  melatonin 5 milliGRAM(s) Oral at bedtime  metoprolol succinate ER 50 milliGRAM(s) Oral daily  multivitamin/minerals 1 Tablet(s) Oral daily  mycophenolate mofetil 500 milliGRAM(s) Oral four times a day  NIFEdipine XL 30 milliGRAM(s) Oral daily  pentoxifylline 400 milliGRAM(s) Oral two times a day  predniSONE   Tablet 20 milliGRAM(s) Oral daily  tacrolimus 0.5 milliGRAM(s) Oral two times a day      FAMILY HISTORY:  FHx: colon cancer      REVIEW OF SYSTEMS:  CONSTITUTIONAL: No weakness, fevers or chills  EYES/ENT: No visual changes;  No vertigo or throat pain   NECK: No pain or stiffness  RESPIRATORY: No cough, wheezing, hemoptysis; (+) shortness of breath  CARDIOVASCULAR: No chest pain or palpitations.  GASTROINTESTINAL: No abdominal or epigastric pain. No nausea, vomiting, or hematemesis; No diarrhea or constipation. No melena or hematochezia.  GENITOURINARY: No dysuria, frequency, foamy urine, urinary urgency, incontinence or hematuria  NEUROLOGICAL: No numbness or weakness  SKIN: No itching, burning, rashes, or lesions   VASCULAR: No bilateral lower extremity edema.   All other review of systems is negative unless indicated above.    VITALS:  Vital Signs Last 24 Hrs  T(C): 36.4 (23 Aug 2022 08:07), Max: 36.4 (22 Aug 2022 23:40)  T(F): 97.5 (23 Aug 2022 08:07), Max: 97.6 (22 Aug 2022 23:40)  HR: 73 (23 Aug 2022 08:07) (73 - 82)  BP: 142/73 (23 Aug 2022 08:07) (119/63 - 144/77)  BP(mean): --  RR: 20 (23 Aug 2022 08:07) (18 - 20)  SpO2: 97% (23 Aug 2022 08:07) (93% - 99%)    Parameters below as of 23 Aug 2022 08:07  Patient On (Oxygen Delivery Method): nasal cannula  O2 Flow (L/min): 4       @ 07:01  -   @ 07:00  --------------------------------------------------------  IN: 0 mL / OUT: 300 mL / NET: -300 mL        PHYSICAL EXAM:  Constitutional: Lethargic older adult in NAD on nasal canula   HEENT: anicteric sclera, oropharynx clear, MMM  Neck: (+)JVD ~8-10cm  Respiratory: CTAB, no wheezes, rales or rhonchi  Cardiovascular: S1, S2, RRR  Gastrointestinal: BS+, soft, NT/ND  Extremities: +1/2 lower extremity edema to shins. No cyanosis or clubbing.   Neurological: A/O x 3, no focal deficits  Psychiatric: Normal mood, normal affect  : No CVA tenderness. No davalos.         LABS:      136  |  97<L>  |  84<HH>  ----------------------------<  263<H>  4.5   |  26  |  2.0<H>    Ca    8.4<L>      23 Aug 2022 04:30  Phos  2.9       Mg     2.6         TPro  6.1  /  Alb  2.7<L>  /  TBili  0.7  /  DBili      /  AST  10  /  ALT  8   /  AlkPhos  98      Creatinine Trend: 2.0 <--, 1.9 <--, 2.0 <--, 2.2 <--, 1.5 <--, 1.6 <--, 1.8 <--, 1.8 <--, 1.8 <--, 2.1 <--, 2.1 <--, 2.2 <--, 2.2 <--, 2.1 <--                        7.9    12.21 )-----------( 291      ( 23 Aug 2022 04:30 )             24.9     Urine Studies:  Urinalysis Basic - ( 20 Aug 2022 21:09 )    Color: Yellow / Appearance: Clear / S.018 / pH:   Gluc:  / Ketone: Negative  / Bili: Negative / Urobili: <2 mg/dL   Blood:  / Protein: 30 mg/dL / Nitrite: Negative   Leuk Esterase: Negative / RBC: 4 /HPF / WBC 2 /HPF   Sq Epi:  / Non Sq Epi: 1 /HPF / Bacteria: Negative      Sodium, Random Urine: 34.0 mmoL/L ( @ 21:09)  Creatinine, Random Urine: 79 mg/dL ( @ 21:09)  Protein/Creatinine Ratio Calculation: 0.3 Ratio ( @ 21:09)      RADIOLOGY & ADDITIONAL STUDIES:    < from: Xray Chest 1 View- PORTABLE-Routine (Xray Chest 1 View- PORTABLE-Routine .) (22 @ 14:56) >  Impression:  Stable bilateral opacities/effusions.      < from: Xray Chest 1 View-PORTABLE IMMEDIATE (22 @ 12:14) >  Impression:  Bilateral pleural effusions, opacities.      X-Ray from last discharge 2022 reviewed, b/l effusions are now significantly worse arjun on R-side    TTE 22      Summary:   1. Left ventricular ejection fraction, by visual estimation, is 45 to   50%.   2. Mildly decreased global left ventricular systolic function.   3. Mild left ventricular hypertrophy.   4. Multiple left ventricular regional wall motion abnormalities exist.   See wall motion findings.   5. The left ventricular diastolic function could not be assessed in this   study.   6. Moderately enlarged left atrium.   7. Sclerotic aortic valve with normal opening.   8. Degenerative mitral valve.   9. Mild mitral regurgitation.  10. Mild tricuspid regurgitation.  11. Estimated pulmonary artery systolic pressure is 37.6 mmHg assuming a   right atrial pressure of 8 mmHg, which is consistent with borderline   pulmonary hypertension.        < from: US Kidney and Bladder (22 @ 06:48) >  ACC: [id number] EXAM:  US KIDNEYS AND BLADDER                          PROCEDURE DATE:  2022          INTERPRETATION:  CLINICAL INFORMATION: Acute kidney injury    COMPARISON: 10/21/2021    TECHNIQUE: Sonography of the kidneys and bladder.    FINDINGS:    Right kidney: 10.9 cm in length. No hydronephrosis. Approximate 7 mm   upper pole calculus. Trace perinephric fluid.    Left kidney:  10.4 cm in length. Questionable mild hydronephrosis.   Approximate 8 mm upper pole calculus    Urinary bladder: No debris. Bilateral ureteral jets are visualized.   Prevoid volume of approximately 507 cc. No post void volume obtained as   patient did not have the urge to void. Left UVJ approximate 2 cm   calculus. Nonspecific urinary bladder wall thickness 8mm        IMPRESSION:  No right hydronephrosis. Questionable left mild hydronephrosis.    Bilateral renal calculi as described above.    Trace right perinephric fluid.    Prevoid volume of approximately 507 cc. No post void volume obtained as   patient did not have the urge to void.    Left UVJ approximate 2 cm calculus.    Nonspecific urinary bladder wall thickness 8mm    < end of copied text >           NEPHROLOGY CONSULTATION NOTE    83 yo male with PMHx of HFrEF (45-50% on Lasix 20 PO), A-Fib on Eliquis, CAD s/p CABGx3, PVD, COPD (was on rare home O2 PRN now on 3L NC since recent July admission for COVID), CKD3, C3 glomerulonephropathy, HTN, HLD, Hypothyroid presents from NH with progressively worsening SOB x5 days and hypoxia.  Recently hospitalized here for a month discharged on 8/3/22 initially for BRENDA on CKD course c/b COVID, bilateral lower lobe pneumonia, delirium and microaspiration of thin liquids on modified barium study. Received Dexamethasone and Zosyn course with new O2 requirements of 3L NC discharged to nursing home on 3L and thick liquid diet for rehab. Due to his SOB, chest x-ray was ordered on 8/15 but results did not come back and patient became increasingly short of breath and was noted to be hypoxic and rehab facility which prompted referral to the ED. Review of symptoms is notable for orthopnea and a chronic dry cough unchanged since his covid illness. He denies paroxysmal nocturnal dyspnea, leg edema, chest pain, palpitations, dizziness, n/v, abd pain. Cardiologist is Dr. Witt and Pulmonolgist is Dr. Rory Mars.     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    AF (atrial fibrillation)  s/p DCCV    Carotid stenosis    COPD (chronic obstructive pulmonary disease)    H/O coronary artery bypass surgery      S/P inguinal hernia repair    Allergies:  Ozempic (0.25 mg or 0.5 mg dose) (Hives; Rash)  streptomycin (Unknown)  Trulicity Pen (Hives; Rash)    Home Medications Reviewed  Hospital Medications:   MEDICATIONS  (STANDING):  albuterol/ipratropium for Nebulization 3 milliLiter(s) Nebulizer every 6 hours  apixaban 2.5 milliGRAM(s) Oral two times a day  atorvastatin 40 milliGRAM(s) Oral at bedtime  buMETAnide Injectable 2 milliGRAM(s) IV Push every 12 hours  dextrose 5%. 1000 milliLiter(s) (50 mL/Hr) IV Continuous <Continuous>  dextrose 5%. 1000 milliLiter(s) (100 mL/Hr) IV Continuous <Continuous>  dextrose 50% Injectable 25 Gram(s) IV Push once  dextrose 50% Injectable 12.5 Gram(s) IV Push once  dextrose 50% Injectable 25 Gram(s) IV Push once  famotidine    Tablet 20 milliGRAM(s) Oral daily  glucagon  Injectable 1 milliGRAM(s) IntraMuscular once  insulin glargine Injectable (LANTUS) 10 Unit(s) SubCutaneous at bedtime  insulin lispro (ADMELOG) corrective regimen sliding scale   SubCutaneous three times a day before meals  insulin lispro Injectable (ADMELOG) 4 Unit(s) SubCutaneous three times a day before meals  isosorbide   mononitrate ER Tablet (IMDUR) 30 milliGRAM(s) Oral daily  levothyroxine 150 MICROGram(s) Oral daily  melatonin 5 milliGRAM(s) Oral at bedtime  metoprolol succinate ER 50 milliGRAM(s) Oral daily  multivitamin/minerals 1 Tablet(s) Oral daily  mycophenolate mofetil 500 milliGRAM(s) Oral four times a day  NIFEdipine XL 30 milliGRAM(s) Oral daily  pentoxifylline 400 milliGRAM(s) Oral two times a day  predniSONE   Tablet 20 milliGRAM(s) Oral daily  tacrolimus 0.5 milliGRAM(s) Oral two times a day      FAMILY HISTORY:  FHx: colon cancer      REVIEW OF SYSTEMS:  CONSTITUTIONAL: No weakness, fevers or chills  EYES/ENT: No visual changes;  No vertigo or throat pain. Epistaxis +  NECK: No pain or stiffness  RESPIRATORY: No cough, wheezing, hemoptysis; (+) shortness of breath  CARDIOVASCULAR: No chest pain or palpitations.  GASTROINTESTINAL: No abdominal or epigastric pain. No nausea, vomiting, or hematemesis; No diarrhea or constipation. No melena or hematochezia.  GENITOURINARY: No dysuria, frequency, foamy urine, urinary urgency, incontinence or hematuria  NEUROLOGICAL: No numbness or weakness  SKIN: No itching, burning   VASCULAR: No bilateral lower extremity edema.   All other review of systems is negative unless indicated above.    VITALS:  Vital Signs Last 24 Hrs  T(C): 36.4 (23 Aug 2022 08:07), Max: 36.4 (22 Aug 2022 23:40)  T(F): 97.5 (23 Aug 2022 08:07), Max: 97.6 (22 Aug 2022 23:40)  HR: 73 (23 Aug 2022 08:07) (73 - 82)  BP: 142/73 (23 Aug 2022 08:07) (119/63 - 144/77)  BP(mean): --  RR: 20 (23 Aug 2022 08:07) (18 - 20)  SpO2: 97% (23 Aug 2022 08:07) (93% - 99%)    Parameters below as of 23 Aug 2022 08:07  Patient On (Oxygen Delivery Method): nasal cannula  O2 Flow (L/min): 4       @ 07:01  -   @ 07:00  --------------------------------------------------------  IN: 0 mL / OUT: 300 mL / NET: -300 mL        PHYSICAL EXAM:  Constitutional: Lethargic older adult in NAD on nasal canula   HEENT: anicteric sclera, oropharynx clear. Clotted blood in nares. MMM  Neck: (+)JVD ~8-10cm  Respiratory: CTAB, no wheezes, rales or rhonchi  Cardiovascular: S1, S2, RRR  Gastrointestinal: BS+, soft, nontender. bladder distension on palpation  Extremities: +1/2 lower extremity edema to shins. No cyanosis or clubbing.   Neurological: A/O x 3, no focal deficits  Psychiatric: Normal mood, normal affect  : No CVA tenderness. No davalos.         LABS:      136  |  97<L>  |  84<HH>  ----------------------------<  263<H>  4.5   |  26  |  2.0<H>    Ca    8.4<L>      23 Aug 2022 04:30  Phos  2.9       Mg     2.6         TPro  6.1  /  Alb  2.7<L>  /  TBili  0.7  /  DBili      /  AST  10  /  ALT  8   /  AlkPhos  98      Creatinine Trend: 2.0 <--, 1.9 <--, 2.0 <--, 2.2 <--, 1.5 <--, 1.6 <--, 1.8 <--, 1.8 <--, 1.8 <--, 2.1 <--, 2.1 <--, 2.2 <--, 2.2 <--, 2.1 <--                        7.9    12.21 )-----------( 291      ( 23 Aug 2022 04:30 )             24.9     Urine Studies:  Urinalysis Basic - ( 20 Aug 2022 21:09 )    Color: Yellow / Appearance: Clear / S.018 / pH:   Gluc:  / Ketone: Negative  / Bili: Negative / Urobili: <2 mg/dL   Blood:  / Protein: 30 mg/dL / Nitrite: Negative   Leuk Esterase: Negative / RBC: 4 /HPF / WBC 2 /HPF   Sq Epi:  / Non Sq Epi: 1 /HPF / Bacteria: Negative      Sodium, Random Urine: 34.0 mmoL/L ( @ 21:09)  Creatinine, Random Urine: 79 mg/dL ( @ 21:09)  Protein/Creatinine Ratio Calculation: 0.3 Ratio ( @ 21:09)      RADIOLOGY & ADDITIONAL STUDIES:    < from: Xray Chest 1 View- PORTABLE-Routine (Xray Chest 1 View- PORTABLE-Routine .) (22 @ 14:56) >  Impression:  Stable bilateral opacities/effusions.      < from: Xray Chest 1 View-PORTABLE IMMEDIATE (22 @ 12:14) >  Impression:  Bilateral pleural effusions, opacities.      X-Ray from last discharge 2022 reviewed, b/l effusions are now significantly worse arjun on R-side    TTE 22      Summary:   1. Left ventricular ejection fraction, by visual estimation, is 45 to   50%.   2. Mildly decreased global left ventricular systolic function.   3. Mild left ventricular hypertrophy.   4. Multiple left ventricular regional wall motion abnormalities exist.   See wall motion findings.   5. The left ventricular diastolic function could not be assessed in this   study.   6. Moderately enlarged left atrium.   7. Sclerotic aortic valve with normal opening.   8. Degenerative mitral valve.   9. Mild mitral regurgitation.  10. Mild tricuspid regurgitation.  11. Estimated pulmonary artery systolic pressure is 37.6 mmHg assuming a   right atrial pressure of 8 mmHg, which is consistent with borderline   pulmonary hypertension.        < from: US Kidney and Bladder (22 @ 06:48) >  ACC: [id number] EXAM:  US KIDNEYS AND BLADDER                          PROCEDURE DATE:  2022          INTERPRETATION:  CLINICAL INFORMATION: Acute kidney injury    COMPARISON: 10/21/2021    TECHNIQUE: Sonography of the kidneys and bladder.    FINDINGS:    Right kidney: 10.9 cm in length. No hydronephrosis. Approximate 7 mm   upper pole calculus. Trace perinephric fluid.    Left kidney:  10.4 cm in length. Questionable mild hydronephrosis.   Approximate 8 mm upper pole calculus    Urinary bladder: No debris. Bilateral ureteral jets are visualized.   Prevoid volume of approximately 507 cc. No post void volume obtained as   patient did not have the urge to void. Left UVJ approximate 2 cm   calculus. Nonspecific urinary bladder wall thickness 8mm        IMPRESSION:  No right hydronephrosis. Questionable left mild hydronephrosis.    Bilateral renal calculi as described above.    Trace right perinephric fluid.    Prevoid volume of approximately 507 cc. No post void volume obtained as   patient did not have the urge to void.    Left UVJ approximate 2 cm calculus.    Nonspecific urinary bladder wall thickness 8mm    < end of copied text >           NEPHROLOGY CONSULTATION NOTE    83 yo male with PMHx of HFrEF (45-50% on Lasix 20 PO), A-Fib on Eliquis, CAD s/p CABGx3, PVD, COPD (was on rare home O2 PRN now on 3L NC since recent July admission for COVID), CKD3, C3 glomerulonephropathy, HTN, HLD, Hypothyroid presents from NH with progressively worsening SOB x5 days and hypoxia.  Recently hospitalized here for a month discharged on 8/3/22 initially for BRENDA on CKD course c/b COVID, bilateral lower lobe pneumonia, delirium and microaspiration of thin liquids on modified barium study. Received Dexamethasone and Zosyn course with new O2 requirements of 3L NC discharged to nursing home on 3L and thick liquid diet for rehab. Due to his SOB, chest x-ray was ordered on 8/15 but results did not come back and patient became increasingly short of breath and was noted to be hypoxic and rehab facility which prompted referral to the ED. Review of symptoms is notable for orthopnea and a chronic dry cough unchanged since his covid illness. He denies paroxysmal nocturnal dyspnea, leg edema, chest pain, palpitations, dizziness, n/v, abd pain. Cardiologist is Dr. Witt and Pulmonolgist is Dr. Rory Mars.     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  AF (atrial fibrillation)  s/p DCCV  Carotid stenosis  COPD (chronic obstructive pulmonary disease)  H/O coronary artery bypass surgery   S/P inguinal hernia repair    Allergies:  Ozempic (0.25 mg or 0.5 mg dose) (Hives; Rash)  streptomycin (Unknown)  Trulicity Pen (Hives; Rash)    Home Medications Reviewed  Hospital Medications:   MEDICATIONS  (STANDING):  albuterol/ipratropium for Nebulization 3 milliLiter(s) Nebulizer every 6 hours  apixaban 2.5 milliGRAM(s) Oral two times a day  atorvastatin 40 milliGRAM(s) Oral at bedtime  buMETAnide Injectable 2 milliGRAM(s) IV Push every 12 hours  famotidine    Tablet 20 milliGRAM(s) Oral daily  glucagon  Injectable 1 milliGRAM(s) IntraMuscular once  insulin glargine Injectable (LANTUS) 10 Unit(s) SubCutaneous at bedtime  insulin lispro (ADMELOG) corrective regimen sliding scale   SubCutaneous three times a day before meals  insulin lispro Injectable (ADMELOG) 4 Unit(s) SubCutaneous three times a day before meals  isosorbide   mononitrate ER Tablet (IMDUR) 30 milliGRAM(s) Oral daily  levothyroxine 150 MICROGram(s) Oral daily  melatonin 5 milliGRAM(s) Oral at bedtime  metoprolol succinate ER 50 milliGRAM(s) Oral daily  multivitamin/minerals 1 Tablet(s) Oral daily  mycophenolate mofetil 500 milliGRAM(s) Oral four times a day  NIFEdipine XL 30 milliGRAM(s) Oral daily  pentoxifylline 400 milliGRAM(s) Oral two times a day  predniSONE   Tablet 20 milliGRAM(s) Oral daily  tacrolimus 0.5 milliGRAM(s) Oral two times a day      FAMILY HISTORY:  FHx: colon cancer      REVIEW OF SYSTEMS:  CONSTITUTIONAL: No weakness, fevers or chills  EYES/ENT: No visual changes;  No vertigo or throat pain. Epistaxis +  NECK: No pain or stiffness  RESPIRATORY: No cough, wheezing, hemoptysis; (+) shortness of breath  CARDIOVASCULAR: No chest pain or palpitations.  GASTROINTESTINAL: No abdominal or epigastric pain. No nausea, vomiting, or hematemesis; No diarrhea or constipation. No melena or hematochezia.  GENITOURINARY: No dysuria, frequency, foamy urine, urinary urgency, incontinence or hematuria  NEUROLOGICAL: No numbness or weakness  SKIN: No itching, burning   VASCULAR: No bilateral lower extremity edema.   All other review of systems is negative unless indicated above.    VITALS:  Vital Signs Last 24 Hrs  T(C): 36.4 (23 Aug 2022 08:07), Max: 36.4 (22 Aug 2022 23:40)  T(F): 97.5 (23 Aug 2022 08:07), Max: 97.6 (22 Aug 2022 23:40)  HR: 73 (23 Aug 2022 08:07) (73 - 82)  BP: 142/73 (23 Aug 2022 08:07) (119/63 - 144/77)  BP(mean): --  RR: 20 (23 Aug 2022 08:07) (18 - 20)  SpO2: 97% (23 Aug 2022 08:07) (93% - 99%)    Parameters below as of 23 Aug 2022 08:07  Patient On (Oxygen Delivery Method): nasal cannula  O2 Flow (L/min): 4       @ 07:01  -   @ 07:00  --------------------------------------------------------  IN: 0 mL / OUT: 300 mL / NET: -300 mL        PHYSICAL EXAM:  Constitutional: Lethargic older adult in NAD on nasal canula   HEENT: anicteric sclera, oropharynx clear. Clotted blood in nares. MMM  Neck: (+)JVD ~8-10cm  Respiratory: CTAB, no wheezes, rales or rhonchi  Cardiovascular: S1, S2, RRR  Gastrointestinal: BS+, soft, nontender. bladder distension on palpation  Extremities: +1/2 lower extremity edema to shins. No cyanosis or clubbing.   Neurological: A/O x 3, no focal deficits  Psychiatric: Normal mood, normal affect  : No CVA tenderness. No davalos.         LABS:      136  |  97<L>  |  84<HH>  ----------------------------<  263<H>  4.5   |  26  |  2.0<H>    Ca    8.4<L>      23 Aug 2022 04:30  Phos  2.9       Mg     2.6         TPro  6.1  /  Alb  2.7<L>  /  TBili  0.7  /  DBili      /  AST  10  /  ALT  8   /  AlkPhos  98      Creatinine Trend: 2.0 <--, 1.9 <--, 2.0 <--, 2.2 <--, 1.5 <--, 1.6 <--, 1.8 <--, 1.8 <--, 1.8 <--, 2.1 <--, 2.1 <--, 2.2 <--, 2.2 <--, 2.1 <--                        7.9    12. )-----------( 291      ( 23 Aug 2022 04:30 )             24.9     Urine Studies:  Urinalysis Basic - ( 20 Aug 2022 21:09 )    Color: Yellow / Appearance: Clear / S.018 / pH:   Gluc:  / Ketone: Negative  / Bili: Negative / Urobili: <2 mg/dL   Blood:  / Protein: 30 mg/dL / Nitrite: Negative   Leuk Esterase: Negative / RBC: 4 /HPF / WBC 2 /HPF   Sq Epi:  / Non Sq Epi: 1 /HPF / Bacteria: Negative      Sodium, Random Urine: 34.0 mmoL/L ( @ 21:09)  Creatinine, Random Urine: 79 mg/dL ( @ 21:09)  Protein/Creatinine Ratio Calculation: 0.3 Ratio ( @ 21:09)      RADIOLOGY & ADDITIONAL STUDIES:    < from: Xray Chest 1 View- PORTABLE-Routine (Xray Chest 1 View- PORTABLE-Routine .) (22 @ 14:56) >  Impression:  Stable bilateral opacities/effusions.      < from: Xray Chest 1 View-PORTABLE IMMEDIATE (22 @ 12:14) >  Impression:  Bilateral pleural effusions, opacities.      X-Ray from last discharge 2022 reviewed, b/l effusions are now significantly worse arjun on R-side    TTE 22      Summary:   1. Left ventricular ejection fraction, by visual estimation, is 45 to   50%.   2. Mildly decreased global left ventricular systolic function.   3. Mild left ventricular hypertrophy.   4. Multiple left ventricular regional wall motion abnormalities exist.   See wall motion findings.   5. The left ventricular diastolic function could not be assessed in this   study.   6. Moderately enlarged left atrium.   7. Sclerotic aortic valve with normal opening.   8. Degenerative mitral valve.   9. Mild mitral regurgitation.  10. Mild tricuspid regurgitation.  11. Estimated pulmonary artery systolic pressure is 37.6 mmHg assuming a   right atrial pressure of 8 mmHg, which is consistent with borderline   pulmonary hypertension.        < from: US Kidney and Bladder (22 @ 06:48) >  ACC: [id number] EXAM:  US KIDNEYS AND BLADDER                          PROCEDURE DATE:  2022          INTERPRETATION:  CLINICAL INFORMATION: Acute kidney injury    COMPARISON: 10/21/2021    TECHNIQUE: Sonography of the kidneys and bladder.    FINDINGS:    Right kidney: 10.9 cm in length. No hydronephrosis. Approximate 7 mm   upper pole calculus. Trace perinephric fluid.    Left kidney:  10.4 cm in length. Questionable mild hydronephrosis.   Approximate 8 mm upper pole calculus    Urinary bladder: No debris. Bilateral ureteral jets are visualized.   Prevoid volume of approximately 507 cc. No post void volume obtained as   patient did not have the urge to void. Left UVJ approximate 2 cm   calculus. Nonspecific urinary bladder wall thickness 8mm        IMPRESSION:  No right hydronephrosis. Questionable left mild hydronephrosis.    Bilateral renal calculi as described above.    Trace right perinephric fluid.    Prevoid volume of approximately 507 cc. No post void volume obtained as   patient did not have the urge to void.    Left UVJ approximate 2 cm calculus.    Nonspecific urinary bladder wall thickness 8mm    < end of copied text >

## 2022-08-23 NOTE — DIETITIAN INITIAL EVALUATION ADULT - PERTINENT LABORATORY DATA
08-23    136  |  97<L>  |  84<HH>  ----------------------------<  263<H>  4.5   |  26  |  2.0<H>    Ca    8.4<L>      23 Aug 2022 04:30  Phos  2.9     08-23  Mg     2.6     08-23    TPro  6.1  /  Alb  2.7<L>  /  TBili  0.7  /  DBili  x   /  AST  10  /  ALT  8   /  AlkPhos  98  08-23  POCT Blood Glucose.: 342 mg/dL (08-23-22 @ 11:31)  A1C with Estimated Average Glucose Result: 7.3 % (08-21-22 @ 06:03)  A1C with Estimated Average Glucose Result: 8.0 % (07-06-22 @ 07:39)  A1C with Estimated Average Glucose Result: 7.5 % (10-21-21 @ 11:25)

## 2022-08-23 NOTE — PROGRESS NOTE ADULT - SUBJECTIVE AND OBJECTIVE BOX
Date of Admission: 22    Interval History:  Patient sitting up in bed, active bleeding noted from both nostrils. Supplemental O2 humidified, decreased to 3L NC (from 4L yesterday)- O2 sat 97%, in NAD. Patient stated his breathing has "somewhat improved" but wants to be off his Eliquis to stop the nose bleeds.   WBC improved, now off solumedrol IVP. Kidney function slightly worse, INOs not accurately documented.     CHIEF COMPLAINT: Patient is a 82y old  Male who presents with a chief complaint of chf exacerbation (22 Aug 2022 12:24)    HISTORY OF PRESENT ILLNESS: 83 yo male with PMHx of HFrEF (45-50% on Lasix 20 PO), A-Fib on Eliquis, CAD s/p CABGx3, PVD, COPD (was on rare home O2 PRN now on 3L NC since recent July admission for COVID), CKD3, C3 glomerulonephropathy, HTN, HLD, Hypothyroid presents from NH with progressively worsening SOB x5 days and hypoxia.  Recently hospitalized here for a month discharged on 8/3/22 initially for BRENDA on CKD course c/b COVID, bilateral lower lobe pneumonia, delirium and microaspiration of thin liquids on modified barium study. Received Dexamethasone and Zosyn course with new O2 requirements of 3L NC discharged to nursing home on 3L and thick liquid diet for rehab. Due to his SOB, chest x-ray was ordered on 8/15 but results did not come back and patient became increasingly short of breath and was noted to be hypoxic and rehab facility which prompted referral to the ED. Review of symptoms is notable for orthopnea and a chronic dry cough unchanged since his covid illness. He denies paroxysmal nocturnal dyspnea, leg edema, chest pain, palpitations, dizziness, n/v, abd pain. Cardiologist is Dr. Witt and Pulmonolgist is Dr. Rory Mars.   In ED:  T(F): 96.4 (22 @ 11:27), Max: 96.4 (22 @ 11:27)  HR: 80 (22 @ 16:20) (80 - 91)  BP: 137/61 (22 @ 16:20) (121/58 - 137/61)  RR: 18 (22 @ 16:20) (18 - 24)  SpO2: 100% (22 @ 16:20) (89% - 100%) -> 89% on 4L -> placed on NRB -> BIPAP 2/2 tachypnea and hypoxia -> taken off with increased work of breathing placed back on BIPAP  Labs: WBC 12k, Hgb 7.2 (baseline 9), BNP 55k, Cr 2.2 (baseline 1.5), PCO2 47 (VBG), Troponin 0.24  EKG:   Atrial fibrillation with premature ventricular or aberrantly conducted complexes  Left posterior fascicular block   ST & T wave abnormality, consider lateral ischemia - similar to prior in 2022  XR chest: significant bilateral pleural effusions and pulmonary congestion  Admitted to medicine for SOB/hypoxia likely 2/2 acute CHF exacerbation.  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    SOCIAL HISTORY:  Former cigarette smoker, quit >40 years ago. Denies alcohol or drug use.     Allergies    Ozempic (0.25 mg or 0.5 mg dose) (Hives; Rash)  streptomycin (Unknown)  Trulicity Pen (Hives; Rash)    Intolerances    	    REVIEW OF SYSTEMS:  CONSTITUTIONAL: No fever, weight loss, + fatigue.  CARDIOLOGY: Patient denies chest pain, + shortness of breath on exertion, no syncopal episodes.   RESPIRATORY: + shortness of breath on exertion, no wheezing.   NEUROLOGICAL: +Generalized weakness, no focal deficits to report.  ENDOCRINOLOGICAL: no recent change in diabetic medications.   GI: no BRBPR, no n/v, diarrhea.    PSYCHIATRY: normal mood and affect  HEENT: + nasal discharge, + epistaxis, no ecchymosis  SKIN: no ecchymosis, +scratches B/L legs  MUSCULOSKELETAL: Full range of motion x4.     PHYSICAL EXAM:  General Appearance: fatigued, normal for age and gender. 	  Neck: JVP [id number], no bruit.   Eyes: Extra Ocular muscles intact.   Cardiovascular: irregular rhythm S1 S2, + JVD, +1 R LE edema +2 L LE edema  Respiratory: crackles L>R   Psychiatry: Drowsy, oriented x 3, Mood & affect appropriate  Gastrointestinal:  Soft, Non-tender  Skin/Integumen: No rashes, No ecchymoses, No cyanosis	  Neurologic: Non-focal  Musculoskeletal/ extremities: Normal range of motion, No clubbing, cyanosis, +1 R LE edema, +2 L LE edema  Vascular: Peripheral pulses palpable 2+ bilaterally    CARDIAC MARKERS:  Serum Pro-Brain Natriuretic Peptide: 87891 pg/mL (22 @ 11:47)    Serum Pro-Brain Natriuretic Peptide: 16739 pg/mL (10.20.21 @ 10:28)      TELEMETRY EVENTS: 	    EC22    Ventricular Rate 71 BPM  Atrial Rate 82 BPM  QRS Duration 140 ms  Q-T Interval 444 ms  QTC Calculation(Bazett) 482 ms  R Axis 108 degrees  T Axis 166 degrees    Diagnosis Line Atrial fibrillation with premature ventricular or aberrantlyconducted  complexes  Non-specific intra-ventricular conduction block  ST & T wave abnormality, consider lateral ischemia  Abnormal ECG    Confirmed by Sarah Borden MD ([id number]) on 2022 1:35:39 PM      	  PREVIOUS DIAGNOSTIC TESTING:    TTE 22      Summary:   1. Left ventricular ejection fraction, by visual estimation, is 45 to   50%.   2. Mildly decreased global left ventricular systolic function.   3. Mild left ventricular hypertrophy.   4. Multiple left ventricular regional wall motion abnormalities exist.   See wall motion findings.   5. The left ventricular diastolic function could not be assessed in this   study.   6. Moderately enlarged left atrium.   7. Sclerotic aortic valve with normal opening.   8. Degenerative mitral valve.   9. Mild mitral regurgitation.  10. Mild tricuspid regurgitation.  11. Estimated pulmonary artery systolic pressure is 37.6 mmHg assuming a   right atrial pressure of 8 mmHg, which is consistent with borderline   pulmonary hypertension.        TTE 10/22/21    Summary:   1. Moderately to severely decreased global left ventricular systolic function.   2. Severely enlarged left atrium.   3. Severely enlarged right atrium.   4. Multiple left ventricular regional wall motion abnormalities exist. See wall motion findings.   5. Mild eccentric left ventricular hypertrophy.   6. The left ventricular diastolic function could not be assessed in this study.   7. Severely enlarged right ventricle.   8. Severely reduced RV systolic function.   9. Mild to moderate mitral valve regurgitation.  10. Moderate mitral annular calcification.  11. Mild tricuspid regurgitation.  12. Mild aortic regurgitation.  13. Sclerotic aortic valve with normal opening.  14. Complex atheroma is noted in the ascending aorta.  15. Estimated pulmonary artery systolic pressure is 64.8 mmHg assuming a right atrial pressure of 15 mmHg, which is consistent with severe pulmonary hypertension.  16. Pulmonary hypertension is present.  17. LA volume Index is 71.9 ml/m² ml/m2.  18. There is moderate aortic root calcification.        Home Medications:  Aranesp 100 mcg/0.5 mL injectable solution: 1 dose(s) injectable every 3 to 4 weeks (2022 21:22)  atorvastatin 40 mg oral tablet: 1 tab(s) orally once a day (at bedtime) (2022 21:22)  calcitriol 0.25 mcg oral capsule: 1 cap(s) orally 2 times a week (2022 21:22)  d-mycophenolate: 500 milligram(s) orally 4 times a day (2022 21:22)  Eliquis 2.5 mg oral tablet: 1 tab(s) orally 2 times a day (2022 21:22)  famotidine 20 mg oral tablet: 1 tab(s) orally once a day (2022 21:22)  furosemide 20 mg oral tablet: 1 tab(s) orally once a day (03 Aug 2022 09:47)  insulin glargine 100 units/mL subcutaneous solution: 5 unit(s) subcutaneous once a day (at bedtime) (03 Aug 2022 09:47)  insulin lispro 100 units/mL injectable solution: 1 Unit(s) if Glucose 151 - 200  2 Unit(s) if Glucose 201 - 250  3 Unit(s) if Glucose 251 - 300  4 Unit(s) if Glucose 301 - 350  5 Unit(s) if Glucose 351 - 400  6 Unit(s) if Glucose Greater Than 400 (03 Aug 2022 09:57)  isosorbide mononitrate 30 mg oral tablet, extended release: 1 tab(s) orally once a day (in the morning) (2022 21:22)  levothyroxine 150 mcg (0.15 mg) oral tablet: 1 tab(s) orally once a day (2022 21:22)  metoprolol succinate 50 mg oral tablet, extended release: 1 tab(s) orally once a day (03 Aug 2022 09:47)  Multiple Vitamins with Minerals oral tablet: 1 tab(s) orally once a day (03 Aug 2022 09:47)  NIFEdipine 30 mg oral tablet, extended release: 1 tab(s) orally once a day (2022 21:22)  pentoxifylline 400 mg oral tablet, extended release: 1 tab(s) orally 2 times a day (2022 21:22)  sodium bicarbonate 650 mg oral tablet: 1 tab(s) orally 3 times a day (03 Aug 2022 09:47)  tacrolimus 0.5 mg oral capsule: 1 cap(s) orally 2 times a day (03 Aug 2022 09:57)    MEDICATIONS  (STANDING):  albuterol/ipratropium for Nebulization 3 milliLiter(s) Nebulizer every 6 hours  ampicillin/sulbactam  IVPB 3 Gram(s) IV Intermittent every 6 hours  apixaban 2.5 milliGRAM(s) Oral two times a day  atorvastatin 40 milliGRAM(s) Oral at bedtime  buMETAnide Injectable 2 milliGRAM(s) IV Push every 12 hours  dextrose 5%. 1000 milliLiter(s) (50 mL/Hr) IV Continuous <Continuous>  dextrose 5%. 1000 milliLiter(s) (100 mL/Hr) IV Continuous <Continuous>  dextrose 50% Injectable 25 Gram(s) IV Push once  dextrose 50% Injectable 12.5 Gram(s) IV Push once  dextrose 50% Injectable 25 Gram(s) IV Push once  famotidine    Tablet 20 milliGRAM(s) Oral daily  glucagon  Injectable 1 milliGRAM(s) IntraMuscular once  insulin glargine Injectable (LANTUS) 10 Unit(s) SubCutaneous every morning  insulin lispro (ADMELOG) corrective regimen sliding scale   SubCutaneous three times a day before meals  isosorbide   mononitrate ER Tablet (IMDUR) 30 milliGRAM(s) Oral daily  levothyroxine 150 MICROGram(s) Oral daily  methylPREDNISolone sodium succinate Injectable 40 milliGRAM(s) IV Push every 12 hours  metoprolol succinate ER 50 milliGRAM(s) Oral daily  multivitamin/minerals 1 Tablet(s) Oral daily  mycophenolate mofetil 500 milliGRAM(s) Oral four times a day  NIFEdipine XL 30 milliGRAM(s) Oral daily  pentoxifylline 400 milliGRAM(s) Oral two times a day  tacrolimus 0.5 milliGRAM(s) Oral two times a day    MEDICATIONS  (PRN):  dextrose Oral Gel 15 Gram(s) Oral once PRN Blood Glucose LESS THAN 70 milliGRAM(s)/deciliter Date of Admission: 22    Interval History:  Patient sitting up in bed, active bleeding noted from R > L nostrils. Supplemental O2 humidified, decreased to 3L NC (from 4L yesterday)- O2 sat 97%, in NAD. Patient stated his breathing has "somewhat improved" but wants to be off his Eliquis to stop the nose bleeds.   WBC improved, now off solumedrol IVP. Kidney function slightly worse, INOs not accurately documented.   Currently receiving 1 unit PRBCs     CHIEF COMPLAINT: Patient is a 82y old  Male who presents with a chief complaint of chf exacerbation (22 Aug 2022 12:24)    HISTORY OF PRESENT ILLNESS: 81 yo male with PMHx of HFrEF (45-50% on Lasix 20 PO), A-Fib on Eliquis, CAD s/p CABGx3, PVD, COPD (was on rare home O2 PRN now on 3L NC since recent July admission for COVID), CKD3, C3 glomerulonephropathy, HTN, HLD, Hypothyroid presents from NH with progressively worsening SOB x5 days and hypoxia.  Recently hospitalized here for a month discharged on 8/3/22 initially for BRENDA on CKD course c/b COVID, bilateral lower lobe pneumonia, delirium and microaspiration of thin liquids on modified barium study. Received Dexamethasone and Zosyn course with new O2 requirements of 3L NC discharged to nursing home on 3L and thick liquid diet for rehab. Due to his SOB, chest x-ray was ordered on 8/15 but results did not come back and patient became increasingly short of breath and was noted to be hypoxic and rehab facility which prompted referral to the ED. Review of symptoms is notable for orthopnea and a chronic dry cough unchanged since his covid illness. He denies paroxysmal nocturnal dyspnea, leg edema, chest pain, palpitations, dizziness, n/v, abd pain. Cardiologist is Dr. Witt and Pulmonolgist is Dr. Rory Mars.   In ED:  T(F): 96.4 (22 @ 11:27), Max: 96.4 (22 @ 11:27)  HR: 80 (22 @ 16:20) (80 - 91)  BP: 137/61 (22 @ 16:20) (121/58 - 137/61)  RR: 18 (22 @ 16:20) (18 - 24)  SpO2: 100% (22 @ 16:20) (89% - 100%) -> 89% on 4L -> placed on NRB -> BIPAP 2/2 tachypnea and hypoxia -> taken off with increased work of breathing placed back on BIPAP  Labs: WBC 12k, Hgb 7.2 (baseline 9), BNP 55k, Cr 2.2 (baseline 1.5), PCO2 47 (VBG), Troponin 0.24  EKG:   Atrial fibrillation with premature ventricular or aberrantly conducted complexes  Left posterior fascicular block   ST & T wave abnormality, consider lateral ischemia - similar to prior in 2022  XR chest: significant bilateral pleural effusions and pulmonary congestion  Admitted to medicine for SOB/hypoxia likely 2/2 acute CHF exacerbation.  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    SOCIAL HISTORY:  Former cigarette smoker, quit >40 years ago. Denies alcohol or drug use.     Allergies    Ozempic (0.25 mg or 0.5 mg dose) (Hives; Rash)  streptomycin (Unknown)  Trulicity Pen (Hives; Rash)    Intolerances      PHYSICAL EXAM:  General Appearance: fatigued, normal for age and gender. 	  Neck: JVP [id number], no bruit.   Eyes: Extra Ocular muscles intact.   Cardiovascular: irregular rhythm S1 S2, + JVD, +1 R LE edema +2 L LE edema  Respiratory: bibasilar crackles  Psychiatry: Alert, oriented x 3, Mood & affect appropriate  Gastrointestinal:  Soft, Non-tender  Skin/Integumen: No rashes, No ecchymoses, No cyanosis	  Neurologic: Non-focal  Musculoskeletal/ extremities: Normal range of motion, No clubbing, cyanosis, +1 R LE edema, +2 L LE edema  Vascular: Peripheral pulses palpable 2+ bilaterally    CARDIAC MARKERS:  Serum Pro-Brain Natriuretic Peptide: 35737 pg/mL (22 @ 11:47)    Serum Pro-Brain Natriuretic Peptide: 74550 pg/mL (10.20.21 @ 10:28)      TELEMETRY EVENTS: 	    EC22    Ventricular Rate 71 BPM  Atrial Rate 82 BPM  QRS Duration 140 ms  Q-T Interval 444 ms  QTC Calculation(Bazett) 482 ms  R Axis 108 degrees  T Axis 166 degrees    Diagnosis Line Atrial fibrillation with premature ventricular or aberrantlyconducted  complexes  Non-specific intra-ventricular conduction block  ST & T wave abnormality, consider lateral ischemia  Abnormal ECG    Confirmed by Sarah Borden MD ([id number]) on 2022 1:35:39 PM      	  PREVIOUS DIAGNOSTIC TESTING:    TTE 22      Summary:   1. Left ventricular ejection fraction, by visual estimation, is 45 to   50%.   2. Mildly decreased global left ventricular systolic function.   3. Mild left ventricular hypertrophy.   4. Multiple left ventricular regional wall motion abnormalities exist.   See wall motion findings.   5. The left ventricular diastolic function could not be assessed in this   study.   6. Moderately enlarged left atrium.   7. Sclerotic aortic valve with normal opening.   8. Degenerative mitral valve.   9. Mild mitral regurgitation.  10. Mild tricuspid regurgitation.  11. Estimated pulmonary artery systolic pressure is 37.6 mmHg assuming a   right atrial pressure of 8 mmHg, which is consistent with borderline   pulmonary hypertension.        TTE 10/22/21    Summary:   1. Moderately to severely decreased global left ventricular systolic function.   2. Severely enlarged left atrium.   3. Severely enlarged right atrium.   4. Multiple left ventricular regional wall motion abnormalities exist. See wall motion findings.   5. Mild eccentric left ventricular hypertrophy.   6. The left ventricular diastolic function could not be assessed in this study.   7. Severely enlarged right ventricle.   8. Severely reduced RV systolic function.   9. Mild to moderate mitral valve regurgitation.  10. Moderate mitral annular calcification.  11. Mild tricuspid regurgitation.  12. Mild aortic regurgitation.  13. Sclerotic aortic valve with normal opening.  14. Complex atheroma is noted in the ascending aorta.  15. Estimated pulmonary artery systolic pressure is 64.8 mmHg assuming a right atrial pressure of 15 mmHg, which is consistent with severe pulmonary hypertension.  16. Pulmonary hypertension is present.  17. LA volume Index is 71.9 ml/m² ml/m2.  18. There is moderate aortic root calcification.        Home Medications:  Aranesp 100 mcg/0.5 mL injectable solution: 1 dose(s) injectable every 3 to 4 weeks (:22)  atorvastatin 40 mg oral tablet: 1 tab(s) orally once a day (at bedtime) (:22)  calcitriol 0.25 mcg oral capsule: 1 cap(s) orally 2 times a week (:22)  d-mycophenolate: 500 milligram(s) orally 4 times a day (:22)  Eliquis 2.5 mg oral tablet: 1 tab(s) orally 2 times a day (:22)  famotidine 20 mg oral tablet: 1 tab(s) orally once a day (:)  furosemide 20 mg oral tablet: 1 tab(s) orally once a day (03 Aug 2022 09:47)  insulin glargine 100 units/mL subcutaneous solution: 5 unit(s) subcutaneous once a day (at bedtime) (03 Aug 2022 09:47)  insulin lispro 100 units/mL injectable solution: 1 Unit(s) if Glucose 151 - 200  2 Unit(s) if Glucose 201 - 250  3 Unit(s) if Glucose 251 - 300  4 Unit(s) if Glucose 301 - 350  5 Unit(s) if Glucose 351 - 400  6 Unit(s) if Glucose Greater Than 400 (03 Aug 2022 09:57)  isosorbide mononitrate 30 mg oral tablet, extended release: 1 tab(s) orally once a day (in the morning) (:22)  levothyroxine 150 mcg (0.15 mg) oral tablet: 1 tab(s) orally once a day (:22)  metoprolol succinate 50 mg oral tablet, extended release: 1 tab(s) orally once a day (03 Aug 2022 09:47)  Multiple Vitamins with Minerals oral tablet: 1 tab(s) orally once a day (03 Aug 2022 09:47)  NIFEdipine 30 mg oral tablet, extended release: 1 tab(s) orally once a day (:22)  pentoxifylline 400 mg oral tablet, extended release: 1 tab(s) orally 2 times a day (2022 21:22)  sodium bicarbonate 650 mg oral tablet: 1 tab(s) orally 3 times a day (03 Aug 2022 09:47)  tacrolimus 0.5 mg oral capsule: 1 cap(s) orally 2 times a day (03 Aug 2022 09:57)    MEDICATIONS  (STANDING):  albuterol/ipratropium for Nebulization 3 milliLiter(s) Nebulizer every 6 hours  ampicillin/sulbactam  IVPB 3 Gram(s) IV Intermittent every 6 hours  apixaban 2.5 milliGRAM(s) Oral two times a day  atorvastatin 40 milliGRAM(s) Oral at bedtime  buMETAnide Injectable 2 milliGRAM(s) IV Push every 12 hours  dextrose 5%. 1000 milliLiter(s) (50 mL/Hr) IV Continuous <Continuous>  dextrose 5%. 1000 milliLiter(s) (100 mL/Hr) IV Continuous <Continuous>  dextrose 50% Injectable 25 Gram(s) IV Push once  dextrose 50% Injectable 12.5 Gram(s) IV Push once  dextrose 50% Injectable 25 Gram(s) IV Push once  famotidine    Tablet 20 milliGRAM(s) Oral daily  glucagon  Injectable 1 milliGRAM(s) IntraMuscular once  insulin glargine Injectable (LANTUS) 10 Unit(s) SubCutaneous every morning  insulin lispro (ADMELOG) corrective regimen sliding scale   SubCutaneous three times a day before meals  isosorbide   mononitrate ER Tablet (IMDUR) 30 milliGRAM(s) Oral daily  levothyroxine 150 MICROGram(s) Oral daily  methylPREDNISolone sodium succinate Injectable 40 milliGRAM(s) IV Push every 12 hours  metoprolol succinate ER 50 milliGRAM(s) Oral daily  multivitamin/minerals 1 Tablet(s) Oral daily  mycophenolate mofetil 500 milliGRAM(s) Oral four times a day  NIFEdipine XL 30 milliGRAM(s) Oral daily  pentoxifylline 400 milliGRAM(s) Oral two times a day  tacrolimus 0.5 milliGRAM(s) Oral two times a day    MEDICATIONS  (PRN):  dextrose Oral Gel 15 Gram(s) Oral once PRN Blood Glucose LESS THAN 70 milliGRAM(s)/deciliter

## 2022-08-23 NOTE — CONSULT NOTE ADULT - SUBJECTIVE AND OBJECTIVE BOX
ENT Consult Note: 83 yo male with PMHx of HFrEF (45-50% on Lasix 20 PO), A-Fib on Eliquis, CAD s/p CABGx3, PVD, COPD (was on rare home O2 PRN now on 3L NC since recent July admission for COVID), CKD3, C3 glomerulonephropathy, HTN, HLD, Hypothyroid sent from NH for worsening SOB, admitted for CHF exacerbation. ENT called for evaluation of epistaxis x1 day per resident. Patient's eliquis being held at this time. Patient seen and examined at bedside this morning, noted to have mild b/l blood oozing from his nares. Patient states that he has never had this happen before even while on his home oxygen. Patient also stated that he has been holding pressure to his nares intermittently which has helped the bleeding to stop. No fevers, chills, n/v, HA, dizziness, difficulty tolerating po.     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 1000 milliLiter(s) (50 mL/Hr) IV Continuous <Continuous>  dextrose 5%. 1000 milliLiter(s) (100 mL/Hr) IV Continuous <Continuous>  dextrose 50% Injectable 25 Gram(s) IV Push once  dextrose 50% Injectable 12.5 Gram(s) IV Push once  dextrose 50% Injectable 25 Gram(s) IV Push once  famotidine    Tablet 20 milliGRAM(s) Oral daily  glucagon  Injectable 1 milliGRAM(s) IntraMuscular once  insulin glargine Injectable (LANTUS) 10 Unit(s) SubCutaneous at bedtime  insulin lispro (ADMELOG) corrective regimen sliding scale   SubCutaneous three times a day before meals  insulin lispro Injectable (ADMELOG) 4 Unit(s) SubCutaneous three times a day before meals  isosorbide   mononitrate ER Tablet (IMDUR) 30 milliGRAM(s) Oral daily  levothyroxine 150 MICROGram(s) Oral daily  melatonin 5 milliGRAM(s) Oral at bedtime  metoprolol succinate ER 50 milliGRAM(s) Oral daily  multivitamin/minerals 1 Tablet(s) Oral daily  mycophenolate mofetil 500 milliGRAM(s) Oral four times a day  NIFEdipine XL 30 milliGRAM(s) Oral daily  pentoxifylline 400 milliGRAM(s) Oral two times a day  predniSONE   Tablet 20 milliGRAM(s) Oral daily  tacrolimus 0.5 milliGRAM(s) Oral two times a day    MEDICATIONS  (PRN):  dextrose Oral Gel 15 Gram(s) Oral once PRN Blood Glucose LESS THAN 70 milliGRAM(s)/deciliter    FAMILY HISTORY:  FHx: colon cancer    Social History:  Past smoker  No alcohol  No drugs (20 Aug 2022 16:17)    [ x ] Due to altered mental status/intubation, subjective information was not able to be obtained from the patient. History was obtained to the extent possible from review of the chart and collateral sources of information.     Vital Signs Last 24 Hrs  T(C): 36.4 (23 Aug 2022 08:07), Max: 36.4 (22 Aug 2022 23:40)  T(F): 97.5 (23 Aug 2022 08:07), Max: 97.6 (22 Aug 2022 23:40)  HR: 73 (23 Aug 2022 08:07) (73 - 82)  BP: 142/73 (23 Aug 2022 08:07) (119/63 - 144/77)  RR: 20 (23 Aug 2022 08:07) (18 - 20)  SpO2: 97% (23 Aug 2022 08:07) (93% - 99%)    Parameters below as of 23 Aug 2022 08:07  Patient On (Oxygen Delivery Method): nasal cannula  O2 Flow (L/min): 4    PHYSICAL EXAM:  Gen: Well-developed, well-nourished, NAD.  No drooling or pooling of secretions.  Good vocal quality, no hoarseness  Skin: Good color, non diaphoretic  Head: NC/AT.   EENT: Nares bilaterally patent, +NC in place with b/l mild blood oozing, no blood or discharge noted. Oral cavity no erythema/edema. Tongue wnl, uvula midline, no tenderness throughout FOM. Posterior oropharynx clear, no blood/discharge noted.   Neck: Trachea midline, supple  Resp: Breathing easily, no accessory muscle use, no stridor or stertor.    Cardio: +S1/S2  Abd: Soft, nontender, nondistended  Neuro: Awake and alert  Psych: Normal mood, normal affect  Ext: No peripheral edema/cyanosis, SMALL x 4    LABS:                      7.9    12.21 )-----------( 291      ( 23 Aug 2022 04:30 )             24.9     08-23    136  |  97<L>  |  84<HH>  ----------------------------<  263<H>  4.5   |  26  |  2.0<H>    Ca    8.4<L>      23 Aug 2022 04:30  Phos  2.9     08-23  Mg     2.6     08-23    TPro  6.1  /  Alb  2.7<L>  /  TBili  0.7  /  DBili  x   /  AST  10  /  ALT  8   /  AlkPhos  98  08-23    PT/INR - ( 22 Aug 2022 06:24 )   PT: 19.00 sec;   INR: 1.66 ratio    PTT - ( 22 Aug 2022 06:24 )  PTT:37.4 sec

## 2022-08-23 NOTE — DIETITIAN INITIAL EVALUATION ADULT - ENERGY INTAKE
patient transferred units RN unknown how patient was eating so far, suspect inadequate considering dislike for diet texture at previous admission suspect inadequate for >/=1month prior to admission

## 2022-08-23 NOTE — DIETITIAN INITIAL EVALUATION ADULT - NSFNSNUTRCHEWSWALLOWFT_GEN_A_CORE
Pt known to  services from 7/2022, VFSS completed which revealed moderate to severe pharyngeal dysphagia. Compensatory strategies were recommended to ensure safe eating and minimize the risks for aspiration  PTA diet puree with mild thick liquids  SLP LEANDER 8/23 recommending puree with mild thick liquids as well

## 2022-08-23 NOTE — DIETITIAN INITIAL EVALUATION ADULT - ORAL NUTRITION SUPPLEMENTS
Glucerna Shake 2x/day (220 kcal, 10 gm Protein each) + 2 thickener packets per supplement/ mix well  Ensure Max 1x/day (150kcal, 30 g protein/supplement) + 2 thickener packets per supplement/mix well

## 2022-08-23 NOTE — SWALLOW BEDSIDE ASSESSMENT ADULT - SWALLOW EVAL: RECOMMENDED FEEDING/EATING TECHNIQUES
volitional bolus hold, multiple swallows/maintain upright posture during/after eating for 30 mins/oral hygiene/position upright (90 degrees)

## 2022-08-23 NOTE — DIETITIAN INITIAL EVALUATION ADULT - PERTINENT MEDS FT
MEDICATIONS  (STANDING):  albuterol/ipratropium for Nebulization 3 milliLiter(s) Nebulizer every 6 hours  apixaban 2.5 milliGRAM(s) Oral two times a day  atorvastatin 40 milliGRAM(s) Oral at bedtime  BACItracin   Ointment 1 Application(s) Topical daily  buMETAnide Injectable 2 milliGRAM(s) IV Push every 12 hours  dextrose 5%. 1000 milliLiter(s) (50 mL/Hr) IV Continuous <Continuous>  dextrose 5%. 1000 milliLiter(s) (100 mL/Hr) IV Continuous <Continuous>  dextrose 50% Injectable 25 Gram(s) IV Push once  dextrose 50% Injectable 12.5 Gram(s) IV Push once  dextrose 50% Injectable 25 Gram(s) IV Push once  famotidine    Tablet 20 milliGRAM(s) Oral daily  glucagon  Injectable 1 milliGRAM(s) IntraMuscular once  insulin glargine Injectable (LANTUS) 10 Unit(s) SubCutaneous at bedtime  insulin lispro (ADMELOG) corrective regimen sliding scale   SubCutaneous three times a day before meals  insulin lispro Injectable (ADMELOG) 4 Unit(s) SubCutaneous three times a day before meals  isosorbide   mononitrate ER Tablet (IMDUR) 30 milliGRAM(s) Oral daily  levothyroxine 150 MICROGram(s) Oral daily  melatonin 5 milliGRAM(s) Oral at bedtime  metoprolol succinate ER 50 milliGRAM(s) Oral daily  multivitamin/minerals 1 Tablet(s) Oral daily  mycophenolate mofetil 500 milliGRAM(s) Oral four times a day  NIFEdipine XL 30 milliGRAM(s) Oral daily  pentoxifylline 400 milliGRAM(s) Oral two times a day  predniSONE   Tablet 20 milliGRAM(s) Oral daily  tacrolimus 0.5 milliGRAM(s) Oral two times a day    MEDICATIONS  (PRN):  dextrose Oral Gel 15 Gram(s) Oral once PRN Blood Glucose LESS THAN 70 milliGRAM(s)/deciliter

## 2022-08-23 NOTE — PROGRESS NOTE ADULT - ASSESSMENT
81 yo M with hx of HFrEF, A.fib (on Eliquis), CAD s/p CABG x 3, PVD, COPD, CKD from C3 glomerunephropathy, HTN, HLD and hypothyroidism presents to ED with 5 days history of worsening SOB and hypoxia.     []SOB , Acute Hypoxic Respiratory Failure likely secondary to volume overload   []Acute on Chronic HFrEF  []Suspected aspiration Pneumonia  []BRENDA on CKD Stage 3  []C3 glomerunephropathy   []NSTEMI   []Acute on chronic blood loss anemia   []Epistaxis   []Hx of CAD s/p CABG HTN/HLD  []Chronic A.fib   []Hypothyroidism  []DM II   []COPD     Echo 08/01: 45-50%EF  CXR shows b/l pleural effusions with pulmonary congestion and opacities.   proBNP > 50K  s/p BIPAP in ED, now on 4L NC saturating 100%  Per s/s eval, cont current diet  - Cont Bumex 2mg IV BID  - Duonebs ATC q4h  - strict I & O, daily weight, Low Na diet with fluid restriction.   - BiPAP prn  - troponin. trended down   - monitor on Telemetry  - LE Duplex  procal  0.18 continue monitoring abcs for now and check RVP after resolution of epistaxis   - CHF Eval  IV steroid switched ot home dose of prednisone as per pulmonary   - monitor renal function and electrolytes    - Nephrology consult (Pt follows Dr. Daniel outpatient)  - Cont tacrolimus and cellcept, monitor levels   Pt endorses several episodes of epistaxis last month, possibly due to dry O2 via NC. Also on Eliquis  - Humidified O2, monitor for further nosebleeds  - ENT eval appreciated   - Monitor CBC  Statin , metoprolol ER 50mg qD, - Cont nifedipine XL 30mg qD, - Cont imdur 30mg qD  - Cont synthroid 150mcg qD TSH 1.5   c/w insulin management and monitor blood sugar.  A1c 7.3   - Titrate up insulin as needed   nephrology consult noted   SLP input noted   Pulmonary and heart failure team on board  as per heart failure team - Pulmonology consult for possible thoracentesis , Hold Eliquis due to epistaxis / possible thoracentesis   strict I/O , repeat CXR in am   Daily weight   Transfuse one unite pRBC today and repeat CBC after transfusion, keep active type and screen  I called Dr. Boothe office and left a message with my cell phone.   dvt ppx SCD if venous duplex negative     Pending: epistaxis monitoring , fluid management          83 yo M with hx of HFrEF, A.fib (on Eliquis), CAD s/p CABG x 3, PVD, COPD, CKD from C3 glomerunephropathy, HTN, HLD and hypothyroidism presents to ED with 5 days history of worsening SOB and hypoxia.     []SOB , Acute Hypoxic Respiratory Failure likely secondary to volume overload   []Acute on Chronic HFrEF  []Suspected aspiration Pneumonia  []BRENDA on CKD Stage 3  []C3 glomerunephropathy   []NSTEMI   []Acute on chronic blood loss anemia   []Epistaxis   []Hx of CAD s/p CABG HTN/HLD  []Chronic A.fib   []Hypothyroidism  []DM II   []COPD     Echo 08/01: 45-50%EF  CXR shows b/l pleural effusions with pulmonary congestion and opacities.   proBNP > 50K  s/p BIPAP in ED, now on 4L NC saturating 100%  Per s/s eval, cont current diet  - Cont Bumex 2mg IV BID  - Duonebs ATC q4h  - strict I & O, daily weight, Low Na diet with fluid restriction.   - BiPAP prn  - troponin. trended down   - monitor on Telemetry  - LE Duplex  procal  0.18 continue monitoring abcs for now and check RVP after resolution of epistaxis   - CHF Eval  IV steroid switched ot home dose of prednisone as per pulmonary   - monitor renal function and electrolytes    - Nephrology consult (Pt follows Dr. Daniel outpatient)  - Cont tacrolimus and cellcept, monitor levels   Pt endorses several episodes of epistaxis last month, possibly due to dry O2 via NC. Also on Eliquis  - Humidified O2, monitor for further nosebleeds  - ENT eval appreciated   - Monitor CBC  Statin , metoprolol ER 50mg qD, - Cont nifedipine XL 30mg qD, - Cont imdur 30mg qD  - Cont synthroid 150mcg qD TSH 1.5   c/w insulin management and monitor blood sugar.  A1c 7.3 , today premeal insulin added   - Titrate up insulin as needed   nephrology consult noted   SLP input noted   Pulmonary and heart failure team on board  as per heart failure team - Pulmonology consult for possible thoracentesis , Hold Eliquis due to epistaxis / possible thoracentesis   strict I/O , repeat CXR in am   Daily weight   Transfuse one unite pRBC today and repeat CBC after transfusion, keep active type and screen  I called Dr. Boothe office and left a message with my cell phone.   dvt ppx SCD if venous duplex negative     Pending: epistaxis monitoring , fluid management

## 2022-08-23 NOTE — DIETITIAN INITIAL EVALUATION ADULT - ORAL INTAKE PTA/DIET HISTORY
patient was seen by clinical nutrition at previous admission end of July with poor PO during admission due to dislike for diet + lethargy. He reported prior to that admission that his appetite was good though RD suspected was not accurate report given large amount of weight loss (see wt change section for details).

## 2022-08-23 NOTE — CONSULT NOTE ADULT - ATTENDING COMMENTS
Events noted, acute hypox resp failue/ pul edema/ COPD ( CXR worsened), continue diuresis/ steroids/ speec eval, transfusion, poor prognosis
81 yo male with PMHx of 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 Found to be hypoxic to 89%- placed on BiPAP with improvement, now on 3L NC. Found to be fluid overloaded, currently receiving IV diuretics. Suspected PNA, IV antibiotics given. Patient having epistaxis while on Eliquis.     # BRENDA on CKD3b/ C3 glomerulopathy /HF rEF/ bilateral pleural effusions/ opacities   suggest continue diuresis Bumex 2mg IV q12h  may need to add metolazone   on MMF FK for glomerulopathy as OP management  suggest check FK trough ( 30 minutes before intake)  follow BMP and CBC   Not on antibx currently  follow creat trend / Creatinine Trend: 2.0<--, 1.9<--, 2.0<--, 2.2<--, 1.5<--, 1.6<--  no need for RRT    will follow

## 2022-08-23 NOTE — CONSULT NOTE ADULT - ASSESSMENT
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 Found to be hypoxic to 89%- placed on BiPAP with improvement, now on 3L NC. Found to be fluid overloaded, currently receiving IV diuretics. Suspected PNA, IV antibiotics given. Patient having epistaxis while on Eliquis.     HFrEF acute on chronic   - agree with Bumex IV  2 q12   - on Lasix and Zaroxolyn at home    BRENDA on CKD - baseline creat 1.8  - creat uptrending but pt remains fluid overloaded  - agree with continuing Bumex IV  - BMP q12h keep Mg>2 K>4  - kidney and bladder sono ? mild left hydro  - UA urin eprotein creat ratio  - C3 glomerulopathy cont prograf and Cellcept  - check tactrolimus trough level  - pt without davalos--please maintain strict Is and Os      This is an incomplete consult note. Please follow up for final nephrology attending recommendations Assessment: 81 yo male with PMHx of 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 Found to be hypoxic to 89%- placed on BiPAP with improvement, now on 3L NC. Found to be fluid overloaded, currently receiving IV diuretics. Suspected PNA, IV antibiotics given. Patient having epistaxis while on Eliquis.     HFrEF acute on chronic   - agree with Bumex IV  2 q12   - on Lasix and Zaroxolyn at home    BRENDA on CKD - baseline creat 1.8  - creat uptrending but pt remains fluid overloaded  - agree with continuing Bumex IV  - BMP q12h keep Mg>2 K>4  - kidney and bladder sono ? mild left hydro  - pt without urine since 7pm yesterday    >would bladder scan and straight cath if retaining  - UA urin eprotein creat ratio  - C3 glomerulopathy cont prograf and Cellcept  - check tactrolimus trough level  - pt without davalos--please maintain strict Is and Os      This is an incomplete consult note. Please follow up for final nephrology attending recommendations Assessment: 81 yo male with PMHx of 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 Found to be hypoxic to 89%- placed on BiPAP with improvement, now on 3L NC. Found to be fluid overloaded, currently receiving IV diuretics. Suspected PNA, IV antibiotics given. Patient having epistaxis while on Eliquis.     HFrEF acute on chronic   - agree with Bumex IV  2 q12   - on Lasix and Zaroxolyn at home    BRENDA on CKD - baseline creat 1.8  - creat uptrending but pt remains fluid overloaded  - agree with continuing Bumex IV  - BMP q12h keep Mg>2 K>4  - kidney and bladder sono ?mild left hydro  - pt without urine since 7pm yesterday    >would bladder scan and place davalos cath if retaining  - UA urine protein/ creat ratio  - C3 glomerulopathy cont prograf and Cellcept  - check tactrolimus trough level  - please maintain strict Is and Os      This is an incomplete consult note. Please follow up for final nephrology attending recommendations Assessment: 81 yo male with PMHx of 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 Found to be hypoxic to 89%- placed on BiPAP with improvement, now on 3L NC. Found to be fluid overloaded, currently receiving IV diuretics. Suspected PNA, IV antibiotics given. Patient having epistaxis while on Eliquis.     HFrEF acute on chronic   - agree with Bumex IV  2 q12   - on Lasix and Zaroxolyn at home    BRENDA on CKD - baseline creat 1.8  - creat uptrending but pt remains fluid overloaded  - agree with continuing Bumex IV  - BMP q12h keep Mg>2 K>4  - kidney and bladder sono ?mild left hydro  - pt without urine since 7pm yesterday    >would bladder scan and place davalos cath if retaining  - UA urine protein/ creat ratio  - C3 glomerulopathy cont prograf and Cellcept  - check tactrolimus trough level  - please maintain strict Is and Os

## 2022-08-23 NOTE — DIETITIAN INITIAL EVALUATION ADULT - OTHER INFO
81 yo M with hx of HFrEF, A.fib (on Eliquis), CAD s/p CABG x 3, PVD, COPD, CKD from C3 glomerunephropathy, HTN, HLD and hypothyroidism presents to ED with 5 days history of worsening SOB and hypoxia.   #Acute Hypoxic Respiratory Failure  #Acute on Chronic HFrEF  #Suspected aspiration PNA

## 2022-08-23 NOTE — DIETITIAN INITIAL EVALUATION ADULT - OTHER CALCULATIONS
using ABW 85kg *with consideration for suspected malnutrition, weight loss, wound healing, COPD*  ENERGY: 1945-2431kcal/day (MSJ x1.2-1.5)  PROTEIN: 102-128g/day (1.2-1.5g/kg)  FLUIDS: 2125mL/day (25mL/kg)

## 2022-08-23 NOTE — CONSULT NOTE ADULT - ASSESSMENT
83 yo male with PMHx of HFrEF (45-50% on Lasix 20 PO), A-Fib on Eliquis, admitted for CHF exacerbation. ENT called for evaluation of epistaxis x1 day - improved with holding pressure.     Plan:    - No surgical intervention at this time, epistaxis not severe and controlled with ice packs and holding pressure  - Placed Surgicel to b/l nares where mild bleeding was located (likely 2/2 NC in place which is humidified). Continue to hold pressure and ice packs prn for mild nasal bleeding oozing which is also to be expected as Surgicel dissolves. Patient does not require nasal packing at this time.   - Continue with humidified oxygen  - Recommend strict blood pressure control.  - Recommend bacitracin to b/l nares to keep area moist  - Continue to monitor for episodes of rebleeding  - Continue to monitor H/h, transfuse prn  - Avoid: Nasal trauma; no nose rubbing, blowing or manipulating nasal packing, bending with head blow the waist and heavy lifting  - Sneeze with mouth open and pinching nares.  - Outpatient f/u with ENT for further management and packing removal  - Will discuss with attending

## 2022-08-23 NOTE — PROGRESS NOTE ADULT - SUBJECTIVE AND OBJECTIVE BOX
T H I S   I S    N O  T   A    F I N A L I Z E D   N O T E      ANDRIA TAVAREZ  82y, Male  Allergy: Ozempic (0.25 mg or 0.5 mg dose) (Hives; Rash)  streptomycin (Unknown)  Trulicity Pen (Hives; Rash)    Hospital Day: 3d    Patient seen and examined earlier today.     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      AF (atrial fibrillation)  s/p DCCV      Carotid stenosis      COPD (chronic obstructive pulmonary disease)      H/O coronary artery bypass surgery  2001      S/P inguinal hernia repair      History of appendectomy          LAST 24-Hr EVENTS:    VITALS:  T(F): 97.8 (08-23-22 @ 14:35), Max: 97.8 (08-23-22 @ 14:35)  HR: 81 (08-23-22 @ 14:35)  BP: 134/83 (08-23-22 @ 14:35) (133/63 - 144/77)  RR: 19 (08-23-22 @ 14:35)  SpO2: 91% (08-23-22 @ 14:35)    Oxygen Delivery Therapy:   Oxygen Method: Venturi Mask   FiO2: 60   LPM: 15   Targeted SpO2 Range (%): 88-97   Indication for O2: Acute Respiratory Failure (Hypoxemia/Hypercarbia)   O2 Titration Guideline: Device O2 Percent Range (%): 24-50%, Device O2 Flow Rate Range (LPM): 3-15LPM, O2 Titration Guideline: Increase/Decrease to the next O2 entrainment level. Notify provider for any increase in oxygen therapy or inability to achieve targeted SpO2. (08-23-22 @ 10:55)        TESTS & MEASUREMENTS:  Weight/BMI  85 (08-23-22 @ 14:35)  24 (08-23-22 @ 14:35)    08-21-22 @ 07:01  -  08-22-22 @ 07:00  --------------------------------------------------------  IN: 1269 mL / OUT: 1800 mL / NET: -531 mL    08-22-22 @ 07:01  -  08-23-22 @ 07:00  --------------------------------------------------------  IN: 0 mL / OUT: 300 mL / NET: -300 mL    08-23-22 @ 07:01  -  08-23-22 @ 16:23  --------------------------------------------------------  IN: 0 mL / OUT: 300 mL / NET: -300 mL                            7.9    12.21 )-----------( 291      ( 23 Aug 2022 04:30 )             24.9     PT/INR - ( 22 Aug 2022 06:24 )   PT: 19.00 sec;   INR: 1.66 ratio         PTT - ( 22 Aug 2022 06:24 )  PTT:37.4 sec  INR: 1.66 ratio (08-22-22 @ 06:24)    08-23    136  |  97<L>  |  84<HH>  ----------------------------<  263<H>  4.5   |  26  |  2.0<H>    Ca    8.4<L>      23 Aug 2022 04:30  Phos  2.9     08-23  Mg     2.6     08-23    TPro  6.1  /  Alb  2.7<L>  /  TBili  0.7  /  DBili  x   /  AST  10  /  ALT  8   /  AlkPhos  98  08-23    LIVER FUNCTIONS - ( 23 Aug 2022 04:30 )  Alb: 2.7 g/dL / Pro: 6.1 g/dL / ALK PHOS: 98 U/L / ALT: 8 U/L / AST: 10 U/L / GGT: x                 Culture - Blood (collected 08-20-22 @ 13:50)  Source: .Blood Blood-Peripheral  Preliminary Report (08-21-22 @ 22:01):    No growth to date.    Culture - Blood (collected 08-20-22 @ 13:50)  Source: .Blood Blood-Peripheral  Preliminary Report (08-21-22 @ 22:01):    No growth to date.        Procalcitonin, Serum: 0.18 ng/mL (08-22-22 @ 16:45)  Procalcitonin, Serum: 0.21 ng/mL (08-21-22 @ 06:03)  Procalcitonin, Serum: 0.21 ng/mL (08-21-22 @ 06:03)    D-Dimer Assay, Quantitative: 628 ng/mL DDU (08-21-22 @ 06:03)      Serum Pro-Brain Natriuretic Peptide: 48215 pg/mL (08-20-22 @ 11:47)    COVID-19 PCR: NotDetec (08-20-22 @ 11:47)        A1C with Estimated Average Glucose Result: 7.3 % (08-21-22 @ 06:03)  A1C with Estimated Average Glucose Result: 8.0 % (07-06-22 @ 07:39)      Indwelling Urethral Catheter:     Connect To:  Straight Drainage/[place]    Indication:  Urinary Retention / Obstruction (08-23-22 @ 14:05) (not performed)  Indwelling Urethral Catheter:     Connect To:  Straight Drainage/[place]    Indication:  Urinary Retention / Obstruction (08-23-22 @ 13:38) (not performed)      RADIOLOGY, ECG, & ADDITIONAL TESTS:  12 Lead ECG:   Ventricular Rate 71 BPM    Atrial Rate 82 BPM    QRS Duration 140 ms    Q-T Interval 444 ms    QTC Calculation(Bazett) 482 ms    R Axis 108 degrees    T Axis 166 degrees    Diagnosis Line Atrial fibrillation with premature ventricular or aberrantlyconducted  complexes  Non-specific intra-ventricular conduction block  ST & T wave abnormality, consider lateral ischemia  Abnormal ECG    Confirmed by Sarah Borden MD ([id number]) on 8/21/2022 1:35:39 PM (08-21-22 @ 07:14)      RECENT DIAGNOSTIC ORDERS:  Diet, Pureed:   Mildly Thick Liquids (MILDTHICKLIQS)  Free Water Flush Instructions:  SUPPLEMENTS TO HAVE 2 THICKENER PACKETS MIXED IN EACH  Supplement Feeding Modality:  Oral  Glucerna Shake Cans or Servings Per Day:  2       Frequency:  Daily  Ensure Max Cans or Servings Per Day:  1       Frequency:  Daily (08-23-22 @ 16:14)  Xray Chest 1 View AP/PA: Routine   Indication: Shortness of Breath  Transport: Stretcher-Crib,  w/ O2 (08-23-22 @ 15:56)  Protein/Creatinine Ratio, Urine: STAT (08-23-22 @ 14:13)  Urinalysis: STAT (08-23-22 @ 14:13)  Basic Metabolic Panel: 16:00 (08-23-22 @ 14:07)  Complete Blood Count: 16:00 (08-23-22 @ 13:30)  Calcium, Total Serum: AM Sched. Collection: 24-Aug-2022 04:30 (08-23-22 @ 11:36)  Magnesium, Serum: AM Sched. Collection: 24-Aug-2022 04:30 (08-23-22 @ 11:36)  Phosphorus Level, Serum: AM Sched. Collection: 24-Aug-2022 04:30 (08-23-22 @ 11:36)  Comprehensive Metabolic Panel: AM Sched. Collection: 24-Aug-2022 04:30 (08-23-22 @ 11:36)  Complete Blood Count + Automated Diff: AM Sched. Collection: 24-Aug-2022 04:30 (08-23-22 @ 11:36)  Type + Screen: AM  Sched. Collection:24-Aug-2022 04:30 (08-23-22 @ 10:50)  Type + Screen: 11:00 (08-23-22 @ 10:34)  Mycophenolic Acid, Serum: 11:00 (08-23-22 @ 10:33)  Culture - Stool: Routine  Specimen Source: Feces (08-22-22 @ 22:30)      MEDICATIONS:  MEDICATIONS  (STANDING):  albuterol/ipratropium for Nebulization 3 milliLiter(s) Nebulizer every 6 hours  atorvastatin 40 milliGRAM(s) Oral at bedtime  BACItracin   Ointment 1 Application(s) Topical daily  buMETAnide Injectable 2 milliGRAM(s) IV Push every 12 hours  dextrose 5%. 1000 milliLiter(s) (50 mL/Hr) IV Continuous <Continuous>  dextrose 5%. 1000 milliLiter(s) (100 mL/Hr) IV Continuous <Continuous>  dextrose 50% Injectable 25 Gram(s) IV Push once  dextrose 50% Injectable 12.5 Gram(s) IV Push once  dextrose 50% Injectable 25 Gram(s) IV Push once  famotidine    Tablet 20 milliGRAM(s) Oral daily  glucagon  Injectable 1 milliGRAM(s) IntraMuscular once  insulin glargine Injectable (LANTUS) 10 Unit(s) SubCutaneous at bedtime  insulin lispro (ADMELOG) corrective regimen sliding scale   SubCutaneous three times a day before meals  insulin lispro Injectable (ADMELOG) 4 Unit(s) SubCutaneous three times a day before meals  isosorbide   mononitrate ER Tablet (IMDUR) 30 milliGRAM(s) Oral daily  levothyroxine 150 MICROGram(s) Oral daily  melatonin 5 milliGRAM(s) Oral at bedtime  metoprolol succinate ER 50 milliGRAM(s) Oral daily  multivitamin/minerals 1 Tablet(s) Oral daily  mycophenolate mofetil 500 milliGRAM(s) Oral four times a day  NIFEdipine XL 30 milliGRAM(s) Oral daily  pentoxifylline 400 milliGRAM(s) Oral two times a day  predniSONE   Tablet 20 milliGRAM(s) Oral daily  sodium chloride 3%. 150 milliLiter(s) (50 mL/Hr) IV Continuous <Continuous>  tacrolimus 0.5 milliGRAM(s) Oral two times a day    MEDICATIONS  (PRN):  dextrose Oral Gel 15 Gram(s) Oral once PRN Blood Glucose LESS THAN 70 milliGRAM(s)/deciliter      HOME MEDICATIONS:  Aranesp 100 mcg/0.5 mL injectable solution (07-05)  atorvastatin 40 mg oral tablet (07-05)  calcitriol 0.25 mcg oral capsule (07-05)  d-mycophenolate (07-05)  Eliquis 2.5 mg oral tablet (07-05)  famotidine 20 mg oral tablet (07-05)  furosemide 20 mg oral tablet (08-03)  insulin glargine 100 units/mL subcutaneous solution (08-03)  insulin lispro 100 units/mL injectable solution (08-03)  isosorbide mononitrate 30 mg oral tablet, extended release (07-05)  levothyroxine 150 mcg (0.15 mg) oral tablet (07-05)  metoprolol succinate 50 mg oral tablet, extended release (08-03)  Multiple Vitamins with Minerals oral tablet (08-03)  NIFEdipine 30 mg oral tablet, extended release (07-05)  pentoxifylline 400 mg oral tablet, extended release (07-05)  sodium bicarbonate 650 mg oral tablet (08-03)  tacrolimus 0.5 mg oral capsule (08-03)      PHYSICAL EXAM:  GENERAL:   CHEST/LUNG:   HEART:   ABDOMEN:   EXTREMITIES:               KANGANDRIA MCELROY  82y, Male  Allergy: Ozempic (0.25 mg or 0.5 mg dose) (Hives; Rash)  streptomycin (Unknown)  Trulicity Pen (Hives; Rash)    Hospital Day: 3d    Patient seen and examined earlier today. he still having active epistaxis , he denies chest pain, sob , he stated that he doesn't want to take Eliquis tonight , i discussed with him the risk of stroke and he stated understanding and he decided  not to take the elquis until the epistaxis resolves , I called Dr. Boothe office and left a message with my cell phone.     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      AF (atrial fibrillation)  s/p DCCV      Carotid stenosis      COPD (chronic obstructive pulmonary disease)      H/O coronary artery bypass surgery  2001      S/P inguinal hernia repair      History of appendectomy          LAST 24-Hr EVENTS:    VITALS:  T(F): 97.8 (08-23-22 @ 14:35), Max: 97.8 (08-23-22 @ 14:35)  HR: 81 (08-23-22 @ 14:35)  BP: 134/83 (08-23-22 @ 14:35) (133/63 - 144/77)  RR: 19 (08-23-22 @ 14:35)  SpO2: 91% (08-23-22 @ 14:35)    Oxygen Delivery Therapy:   Oxygen Method: Venturi Mask   FiO2: 60   LPM: 15   Targeted SpO2 Range (%): 88-97   Indication for O2: Acute Respiratory Failure (Hypoxemia/Hypercarbia)   O2 Titration Guideline: Device O2 Percent Range (%): 24-50%, Device O2 Flow Rate Range (LPM): 3-15LPM, O2 Titration Guideline: Increase/Decrease to the next O2 entrainment level. Notify provider for any increase in oxygen therapy or inability to achieve targeted SpO2. (08-23-22 @ 10:55)        TESTS & MEASUREMENTS:  Weight/BMI  85 (08-23-22 @ 14:35)  24 (08-23-22 @ 14:35)    08-21-22 @ 07:01  -  08-22-22 @ 07:00  --------------------------------------------------------  IN: 1269 mL / OUT: 1800 mL / NET: -531 mL    08-22-22 @ 07:01  -  08-23-22 @ 07:00  --------------------------------------------------------  IN: 0 mL / OUT: 300 mL / NET: -300 mL    08-23-22 @ 07:01  -  08-23-22 @ 16:23  --------------------------------------------------------  IN: 0 mL / OUT: 300 mL / NET: -300 mL                            7.9    12.21 )-----------( 291      ( 23 Aug 2022 04:30 )             24.9     PT/INR - ( 22 Aug 2022 06:24 )   PT: 19.00 sec;   INR: 1.66 ratio         PTT - ( 22 Aug 2022 06:24 )  PTT:37.4 sec  INR: 1.66 ratio (08-22-22 @ 06:24)    08-23    136  |  97<L>  |  84<HH>  ----------------------------<  263<H>  4.5   |  26  |  2.0<H>    Ca    8.4<L>      23 Aug 2022 04:30  Phos  2.9     08-23  Mg     2.6     08-23    TPro  6.1  /  Alb  2.7<L>  /  TBili  0.7  /  DBili  x   /  AST  10  /  ALT  8   /  AlkPhos  98  08-23    LIVER FUNCTIONS - ( 23 Aug 2022 04:30 )  Alb: 2.7 g/dL / Pro: 6.1 g/dL / ALK PHOS: 98 U/L / ALT: 8 U/L / AST: 10 U/L / GGT: x                 Culture - Blood (collected 08-20-22 @ 13:50)  Source: .Blood Blood-Peripheral  Preliminary Report (08-21-22 @ 22:01):    No growth to date.    Culture - Blood (collected 08-20-22 @ 13:50)  Source: .Blood Blood-Peripheral  Preliminary Report (08-21-22 @ 22:01):    No growth to date.        Procalcitonin, Serum: 0.18 ng/mL (08-22-22 @ 16:45)  Procalcitonin, Serum: 0.21 ng/mL (08-21-22 @ 06:03)  Procalcitonin, Serum: 0.21 ng/mL (08-21-22 @ 06:03)    D-Dimer Assay, Quantitative: 628 ng/mL DDU (08-21-22 @ 06:03)      Serum Pro-Brain Natriuretic Peptide: 40434 pg/mL (08-20-22 @ 11:47)    COVID-19 PCR: NotDetec (08-20-22 @ 11:47)        A1C with Estimated Average Glucose Result: 7.3 % (08-21-22 @ 06:03)  A1C with Estimated Average Glucose Result: 8.0 % (07-06-22 @ 07:39)      Indwelling Urethral Catheter:     Connect To:  Straight Drainage/[place]    Indication:  Urinary Retention / Obstruction (08-23-22 @ 14:05) (not performed)  Indwelling Urethral Catheter:     Connect To:  Straight Drainage/[place]    Indication:  Urinary Retention / Obstruction (08-23-22 @ 13:38) (not performed)      RADIOLOGY, ECG, & ADDITIONAL TESTS:  12 Lead ECG:   Ventricular Rate 71 BPM    Atrial Rate 82 BPM    QRS Duration 140 ms    Q-T Interval 444 ms    QTC Calculation(Bazett) 482 ms    R Axis 108 degrees    T Axis 166 degrees    Diagnosis Line Atrial fibrillation with premature ventricular or aberrantlyconducted  complexes  Non-specific intra-ventricular conduction block  ST & T wave abnormality, consider lateral ischemia  Abnormal ECG    Confirmed by Sarah Borden MD ([id number]) on 8/21/2022 1:35:39 PM (08-21-22 @ 07:14)      RECENT DIAGNOSTIC ORDERS:  Diet, Pureed:   Mildly Thick Liquids (MILDTHICKLIQS)  Free Water Flush Instructions:  SUPPLEMENTS TO HAVE 2 THICKENER PACKETS MIXED IN EACH  Supplement Feeding Modality:  Oral  Glucerna Shake Cans or Servings Per Day:  2       Frequency:  Daily  Ensure Max Cans or Servings Per Day:  1       Frequency:  Daily (08-23-22 @ 16:14)  Xray Chest 1 View AP/PA: Routine   Indication: Shortness of Breath  Transport: Stretcher-Crib,  w/ O2 (08-23-22 @ 15:56)  Protein/Creatinine Ratio, Urine: STAT (08-23-22 @ 14:13)  Urinalysis: STAT (08-23-22 @ 14:13)  Basic Metabolic Panel: 16:00 (08-23-22 @ 14:07)  Complete Blood Count: 16:00 (08-23-22 @ 13:30)  Calcium, Total Serum: AM Sched. Collection: 24-Aug-2022 04:30 (08-23-22 @ 11:36)  Magnesium, Serum: AM Sched. Collection: 24-Aug-2022 04:30 (08-23-22 @ 11:36)  Phosphorus Level, Serum: AM Sched. Collection: 24-Aug-2022 04:30 (08-23-22 @ 11:36)  Comprehensive Metabolic Panel: AM Sched. Collection: 24-Aug-2022 04:30 (08-23-22 @ 11:36)  Complete Blood Count + Automated Diff: AM Sched. Collection: 24-Aug-2022 04:30 (08-23-22 @ 11:36)  Type + Screen: AM  Sched. Collection:24-Aug-2022 04:30 (08-23-22 @ 10:50)  Type + Screen: 11:00 (08-23-22 @ 10:34)  Mycophenolic Acid, Serum: 11:00 (08-23-22 @ 10:33)  Culture - Stool: Routine  Specimen Source: Feces (08-22-22 @ 22:30)      MEDICATIONS:  MEDICATIONS  (STANDING):  albuterol/ipratropium for Nebulization 3 milliLiter(s) Nebulizer every 6 hours  atorvastatin 40 milliGRAM(s) Oral at bedtime  BACItracin   Ointment 1 Application(s) Topical daily  buMETAnide Injectable 2 milliGRAM(s) IV Push every 12 hours  dextrose 5%. 1000 milliLiter(s) (50 mL/Hr) IV Continuous <Continuous>  dextrose 5%. 1000 milliLiter(s) (100 mL/Hr) IV Continuous <Continuous>  dextrose 50% Injectable 25 Gram(s) IV Push once  dextrose 50% Injectable 12.5 Gram(s) IV Push once  dextrose 50% Injectable 25 Gram(s) IV Push once  famotidine    Tablet 20 milliGRAM(s) Oral daily  glucagon  Injectable 1 milliGRAM(s) IntraMuscular once  insulin glargine Injectable (LANTUS) 10 Unit(s) SubCutaneous at bedtime  insulin lispro (ADMELOG) corrective regimen sliding scale   SubCutaneous three times a day before meals  insulin lispro Injectable (ADMELOG) 4 Unit(s) SubCutaneous three times a day before meals  isosorbide   mononitrate ER Tablet (IMDUR) 30 milliGRAM(s) Oral daily  levothyroxine 150 MICROGram(s) Oral daily  melatonin 5 milliGRAM(s) Oral at bedtime  metoprolol succinate ER 50 milliGRAM(s) Oral daily  multivitamin/minerals 1 Tablet(s) Oral daily  mycophenolate mofetil 500 milliGRAM(s) Oral four times a day  NIFEdipine XL 30 milliGRAM(s) Oral daily  pentoxifylline 400 milliGRAM(s) Oral two times a day  predniSONE   Tablet 20 milliGRAM(s) Oral daily  sodium chloride 3%. 150 milliLiter(s) (50 mL/Hr) IV Continuous <Continuous>  tacrolimus 0.5 milliGRAM(s) Oral two times a day    MEDICATIONS  (PRN):  dextrose Oral Gel 15 Gram(s) Oral once PRN Blood Glucose LESS THAN 70 milliGRAM(s)/deciliter      HOME MEDICATIONS:  Aranesp 100 mcg/0.5 mL injectable solution (07-05)  atorvastatin 40 mg oral tablet (07-05)  calcitriol 0.25 mcg oral capsule (07-05)  d-mycophenolate (07-05)  Eliquis 2.5 mg oral tablet (07-05)  famotidine 20 mg oral tablet (07-05)  furosemide 20 mg oral tablet (08-03)  insulin glargine 100 units/mL subcutaneous solution (08-03)  insulin lispro 100 units/mL injectable solution (08-03)  isosorbide mononitrate 30 mg oral tablet, extended release (07-05)  levothyroxine 150 mcg (0.15 mg) oral tablet (07-05)  metoprolol succinate 50 mg oral tablet, extended release (08-03)  Multiple Vitamins with Minerals oral tablet (08-03)  NIFEdipine 30 mg oral tablet, extended release (07-05)  pentoxifylline 400 mg oral tablet, extended release (07-05)  sodium bicarbonate 650 mg oral tablet (08-03)  tacrolimus 0.5 mg oral capsule (08-03)      PHYSICAL EXAM:  GENERAL: awake, alert, oozing blood from his nose , NAD   CHEST/LUNG:  clear to ausculation ( from anterior)   HEART: regular rythm   ABDOMEN:  soft, non tender   EXTREMITIES:  +edema

## 2022-08-23 NOTE — CHART NOTE - NSCHARTNOTEFT_GEN_A_CORE
Patient transferred to 3C from ED4. Went to evaluate patient after nurse alerted on call resident that the patients nose was still bleeding. Looks at though the blood is SLOWLY dripping through the nose packing and clotting to patients facial hair. Resident on call cleaned up the face and saw no more active bleeding. Patient is actively getting blood transfusion. Will follow up H&H

## 2022-08-24 NOTE — PROGRESS NOTE ADULT - ASSESSMENT
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 Found to be hypoxic to 89%- placed on BiPAP with improvement, now on 3L NC. Found to be fluid overloaded, currently receiving IV diuretics. Suspected PNA, IV antibiotics given (now d/c'd). Patient having epistaxis while on Eliquis- now holding.     Plan:  Patient remains fluid overloaded on exam- POCUS exam: IVC 2.9cm, some collapsibility noted with respirations  Give Bumex 2 mg IV push, then start a Bumex gtt at 1 mg/hr  Continue 3% Hypertonic Saline 150ml BID (If infused throughout a central line, run over one hour, if a peripheral line is to be used run over 3 hours)   Patient appears more lethargic than yesterday- get STAT ABG and lactate  BMP twice daily with magnesium   Maintain potassium >4.0, Mg >2.2  With next lab draw- Send iron panel (serum iron, ferritin, transferrin, TIBC)  Continue nifedipine 30mg daily, imdur 30mg daily, and metoprolol succinate 50mg daily for now  Pulmonology consult for possible thoracentesis   Continue to hold Eliquis due to epistaxis / possible thoracentesis   BiPAP at night - ween before discharge if able  Strict intake and output needed for proper diuretic management   Daily weight   Please review medication reconciliation- unclear if prednisone home medication? (given in NH as per updated pulm note)  Improve glucose control as per primary team  Nephrology consult pending- family requesting own nephrologist Dr. 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 Found to be hypoxic to 89%- placed on BiPAP with improvement, now on 3L NC. Found to be fluid overloaded, currently receiving IV diuretics. Suspected PNA, IV antibiotics given (now d/c'd). Patient having epistaxis while on Eliquis- now holding.     Plan:  Patient remains fluid overloaded on exam- POCUS exam: IVC 2.9cm, some collapsibility noted with respirations  Give Bumex 2 mg IV push, then start a Bumex gtt at 1 mg/hr  Continue 3% Hypertonic Saline 150ml BID (If infused throughout a central line, run over one hour, if a peripheral line is to be used run over 3 hours)   Patient appears more lethargic than yesterday- get STAT ABG and lactate  BMP twice daily with magnesium   Maintain potassium >4.0, Mg >2.2  With next lab draw- Send iron panel (serum iron, ferritin, transferrin, TIBC)  Continue nifedipine 30mg daily, imdur 30mg daily, and metoprolol succinate 50mg daily for now  Pulmonology consult for possible thoracentesis   Continue to hold Eliquis due to epistaxis / possible thoracentesis   BiPAP at night - wean before discharge if able  Strict intake and output needed for proper diuretic management   Daily weight   Please review medication reconciliation- unclear if prednisone home medication? (given in NH as per updated pulm note)  Improve glucose control as per primary team  Nephrology consult pending- family requesting own nephrologist Dr. Daniel to be contacted as well  Plan discussed with primary team  Will continue to follow

## 2022-08-24 NOTE — PROGRESS NOTE ADULT - ASSESSMENT
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     SOB/ AHRF    []SOB , Acute Hypoxic Respiratory Failure likely secondary to volume overload   []Acute on Chronic HFrEF  []Suspected aspiration Pneumonia  []BRENDA on CKD Stage 3  []C3 glomerunephropathy   []NSTEMI   []Acute on chronic blood loss anemia   []Epistaxis   []Hx of CAD s/p CABG HTN/HLD  []Chronic A.fib   []Hypothyroidism  []DM II   []COPD     Echo 08/01: 45-50%EF  CXR shows b/l pleural effusions with pulmonary congestion and opacities.   proBNP > 50K  s/p BIPAP in ED, now on 4L NC saturating 100%  Per s/s eval, cont current diet  - Cont Bumex 2mg IV BID  - Duonebs ATC q4h  - strict I & O, daily weight, Low Na diet with fluid restriction.   - BiPAP prn  - troponin. trended down   - monitor on Telemetry  - LE Duplex  procal  0.18 continue monitoring abcs for now and check RVP after resolution of epistaxis   - CHF Eval  IV steroid switched ot home dose of prednisone as per pulmonary   - monitor renal function and electrolytes    - Nephrology consult (Pt follows Dr. Daniel outpatient)  - Cont tacrolimus and cellcept, monitor levels   Pt endorses several episodes of epistaxis last month, possibly due to dry O2 via NC. Also on Eliquis  - Humidified O2, monitor for further nosebleeds  - ENT eval appreciated   - Monitor CBC  Statin , metoprolol ER 50mg qD, - Cont nifedipine XL 30mg qD, - Cont imdur 30mg qD  - Cont synthroid 150mcg qD TSH 1.5   c/w insulin management and monitor blood sugar.  A1c 7.3 , today premeal insulin added   - Titrate up insulin as needed   nephrology consult noted   SLP input noted   Pulmonary and heart failure team on board  as per heart failure team - Pulmonology consult for possible thoracentesis , Hold Eliquis due to epistaxis / possible thoracentesis   strict I/O , repeat CXR in am   Daily weight   Transfuse one unite pRBC today and repeat CBC after transfusion, keep active type and screen  I called Dr. Boothe office and left a message with my cell phone.   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     SOB/ AHRF  Sepsis criteria met on admission but pt remains stable off abx and procal low, could be SIRS criteria due to aspiration pneumonitis  - bibasilar opacities remain on CXR from today  - likely due to aspiration pneumonia and acute on chronic HFrEF  - c/w Bumex/hypertonic saline  - speech and swallow appreciated  - pt stable off abx  - Echo 08/01: 45-50%EF  - CXR shows b/l pleural effusions with pulmonary congestion and opacities.   - proBNP > 50K  - c/w prednisone  - f/u pulmonary for possible thoracentesis, Eliquis on hold    Epistaxis  - resolved  - ENT follow up    Leukocytosis  - uptrending likely from steroids    C3 Glomerulonephropathy  - c/w tacrolimus, cellcept    CAD  - c/w statin, metoprolol, imdur    HTN  - c/w metoprolol, nifedipine    Hypothyroidism  - c/w synthroid    DM II  - FS uncontrolled  - adjust insulin    Stage 2 right buttock pressure ulcer  - present on admission  - c/w local wound care    Acute on chronic anemia  - s/p 1 U PRBC      Chronic afib  - c/w nifedipine, metoprolol    COPD  - c/w inhlaers    DVT px  from home

## 2022-08-24 NOTE — PROGRESS NOTE ADULT - SUBJECTIVE AND OBJECTIVE BOX
DAILY PROGRESS NOTE  ===========================================================    Patient Information:  ANDRIA TAVAREZ  /  82y  /  Male  /  MRN#: [id number]    Hospital Day: 4d     |:::::::::::::::::::::::::::| SUBJECTIVE |:::::::::::::::::::::::::::|  Pt was seen in the morning. He was mildly confused and feeling sleepy.  OVERNIGHT EVENTS:   TODAY: Patient was seen today at bedside. Review of systems is otherwise negative.    |:::::::::::::::::::::::::::| OBJECTIVE |:::::::::::::::::::::::::::|    VITAL SIGNS: Last 24 Hours  T(C): 36.1 (24 Aug 2022 13:46), Max: 36.1 (24 Aug 2022 13:46)  T(F): 96.9 (24 Aug 2022 13:46), Max: 96.9 (24 Aug 2022 13:46)  HR: 77 (24 Aug 2022 13:46) (77 - 89)  BP: 168/80 (24 Aug 2022 13:46) (152/72 - 168/80)  BP(mean): --  RR: 18 (24 Aug 2022 13:46) (18 - 20)  SpO2: --    08-23-22 @ 07:01  -  08-24-22 @ 07:00  --------------------------------------------------------  IN: 487 mL / OUT: 800 mL / NET: -313 mL    08-24-22 @ 07:01  -  08-24-22 @ 17:26  --------------------------------------------------------  IN: 360 mL / OUT: 1300 mL / NET: -940 mL      PHYSICAL EXAM:  GENERAL:   Awake, alert; NAD.  HEENT:  Head NC/AT; Conjunctivae pink, Sclera anicteric; Oral mucosa moist.  CARDIO:   Regular rate; Regular rhythm; S1 & S2.  RESP:   No rales or rhonchi appreciated.  GI:   Soft; NT/ND; BS; No guarding; No rebound tenderness.  EXT:   No edema in UE and LE.  NEURO:   PERRL.  SKIN:   Intact.    LAB RESULTS:                        8.6    15.00 )-----------( 266      ( 24 Aug 2022 06:52 )             27.5     08-24    134<L>  |  96<L>  |  89<HH>  ----------------------------<  186<H>  4.4   |  28  |  2.0<H>    Ca    8.5      24 Aug 2022 06:52  Phos  2.9     08-24  Mg     2.5     08-24    TPro  6.3  /  Alb  2.8<L>  /  TBili  0.7  /  DBili  x   /  AST  12  /  ALT  9   /  AlkPhos  92  08-24                MICROBIOLOGY:    Culture - Stool (collected 22 Aug 2022 22:33)  Source: .Stool Feces  Preliminary Report (24 Aug 2022 09:53):    Few Yeast like cells isolated    No enteric pathogens to date: Final culture pending      RADIOLOGY:    ALLERGIES:  Ozempic (0.25 mg or 0.5 mg dose) (Hives; Rash)  streptomycin (Unknown)  Trulicity Pen (Hives; Rash)      ===========================================================

## 2022-08-24 NOTE — PROGRESS NOTE ADULT - SUBJECTIVE AND OBJECTIVE BOX
CHIEF COMPLAINT:    Patient is a 82y old  Male who presents with a chief complaint of chf exacerbation     INTERVAL HPI/OVERNIGHT EVENTS:    Patient seen and examined at bedside. No acute overnight events occurred.    ROS: Denies SOB. All other systems are negative.    Medications:  Standing  albuterol/ipratropium for Nebulization 3 milliLiter(s) Nebulizer every 6 hours  atorvastatin 40 milliGRAM(s) Oral at bedtime  BACItracin   Ointment 1 Application(s) Topical two times a day  bacitracin/polymyxin B Ointment 1 Application(s) Topical two times a day  buMETAnide Injectable 2 milliGRAM(s) IV Push every 12 hours  dextrose 5%. 1000 milliLiter(s) IV Continuous <Continuous>  dextrose 5%. 1000 milliLiter(s) IV Continuous <Continuous>  dextrose 50% Injectable 25 Gram(s) IV Push once  dextrose 50% Injectable 12.5 Gram(s) IV Push once  dextrose 50% Injectable 25 Gram(s) IV Push once  famotidine    Tablet 20 milliGRAM(s) Oral daily  glucagon  Injectable 1 milliGRAM(s) IntraMuscular once  insulin glargine Injectable (LANTUS) 18 Unit(s) SubCutaneous at bedtime  insulin lispro (ADMELOG) corrective regimen sliding scale   SubCutaneous three times a day before meals  insulin lispro Injectable (ADMELOG) 6 Unit(s) SubCutaneous three times a day before meals  isosorbide   mononitrate ER Tablet (IMDUR) 30 milliGRAM(s) Oral daily  levothyroxine 150 MICROGram(s) Oral daily  melatonin 5 milliGRAM(s) Oral at bedtime  metoprolol succinate ER 50 milliGRAM(s) Oral daily  multivitamin/minerals 1 Tablet(s) Oral daily  mycophenolate mofetil 500 milliGRAM(s) Oral four times a day  NIFEdipine XL 30 milliGRAM(s) Oral daily  pentoxifylline 400 milliGRAM(s) Oral two times a day  predniSONE   Tablet 20 milliGRAM(s) Oral daily  sodium chloride 3%. 150 milliLiter(s) IV Continuous <Continuous>  tacrolimus 0.5 milliGRAM(s) Oral two times a day    PRN Meds  dextrose Oral Gel 15 Gram(s) Oral once PRN  dextrose Oral Gel 15 Gram(s) Oral once PRN        Vital Signs:    T(F): 96.6 (22 @ 04:17), Max: 97.9 (22 @ 16:07)  HR: 78 (22 @ 04:17) (77 - 89)  BP: 152/72 (22 @ 04:17) (133/63 - 153/67)  RR: 18 (22 @ 04:17) (18 - 20)  SpO2: 90% (22 @ 16:07) (90% - 94%)  I&O's Summary    23 Aug 2022 07:01  -  24 Aug 2022 07:00  --------------------------------------------------------  IN: 487 mL / OUT: 800 mL / NET: -313 mL    24 Aug 2022 07:01  -  24 Aug 2022 13:11  --------------------------------------------------------  IN: 240 mL / OUT: 800 mL / NET: -560 mL      Daily     Daily Weight in k.3 (24 Aug 2022 04:17)  CAPILLARY BLOOD GLUCOSE      POCT Blood Glucose.: 251 mg/dL (24 Aug 2022 11:55)  POCT Blood Glucose.: 211 mg/dL (24 Aug 2022 07:58)  POCT Blood Glucose.: 270 mg/dL (24 Aug 2022 02:55)  POCT Blood Glucose.: 444 mg/dL (23 Aug 2022 22:57)  POCT Blood Glucose.: 472 mg/dL (23 Aug 2022 21:43)  POCT Blood Glucose.: 433 mg/dL (23 Aug 2022 21:41)  POCT Blood Glucose.: 505 mg/dL (23 Aug 2022 17:01)      PHYSICAL EXAM:  GENERAL:  NAD  SKIN: No rashes or lesions  HEENT: Atraumatic. Normocephalic. Anicteric  NECK:  JVD not visualized  PULMONARY: Clear to ausculation bilaterally. No wheezing. No rales  CVS: Normal S1, S2. Regular rate and rhythm. No murmurs.  ABDOMEN/GI: Soft, Nontender, Nondistended; Bowel sounds are present  EXTREMITIES:  1+ edema B/L LE.  NEUROLOGIC:  No motor deficit.  PSYCH: Alert & oriented x 3, normal affect      LABS:                        8.6    15.00 )-----------( 266      ( 24 Aug 2022 06:52 )             27.5     08-24    134<L>  |  96<L>  |  89<HH>  ----------------------------<  186<H>  4.4   |  28  |  2.0<H>    Ca    8.5      24 Aug 2022 06:52  Phos  2.9     08-24  Mg     2.5     08-24    TPro  6.3  /  Alb  2.8<L>  /  TBili  0.7  /  DBili  x   /  AST  12  /  ALT  9   /  AlkPhos  92  08-24      Serum Pro-Brain Natriuretic Peptide: 32920 pg/mL (22 @ 11:47)        Culture - Stool (collected 22 Aug 2022 22:33)  Source: .Stool Feces  Preliminary Report (24 Aug 2022 09:53):    Few Yeast like cells isolated    No enteric pathogens to date: Final culture pending        RADIOLOGY & ADDITIONAL TESTS:  Imaging or report Personally Reviewed:  [ ] YES  [ ] NO -->no new images    Telemetry reviewed independently - NSR, no acute events  EKG reviewed independently -->no new EKGs    Consultant(s) Notes Reviewed:  [ ] YES  [ ] NO  Care Discussed with Consultants/Other Providers [ ] YES  [ ] NO    Case discussed with resident  Care discussed with pt

## 2022-08-24 NOTE — PROGRESS NOTE ADULT - ASSESSMENT
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     SOB/ AHRF  Sepsis criteria met on admission but pt remains stable off abx and procal low, could be SIRS criteria due to aspiration pneumonitis  - bibasilar opacities remain on CXR from today  - likely due to aspiration pneumonia and acute on chronic HFrEF  - c/w Bumex/hypertonic saline  - speech and swallow appreciated  - pt stable off abx  - Echo 08/01: 45-50%EF  - CXR shows b/l pleural effusions with pulmonary congestion and opacities.   - proBNP > 50K  - c/w prednisone  - f/u pulmonary for possible thoracentesis, Eliquis on hold    Epistaxis  - resolved  - ENT follow up    Leukocytosis  - uptrending likely from steroids    C3 Glomerulonephropathy  - c/w tacrolimus, cellcept    CAD  - c/w statin, metoprolol, imdur    HTN  - c/w metoprolol, nifedipine    Hypothyroidism  - c/w synthroid    DM II  - FS uncontrolled  - adjust insulin    Stage 2 right buttock pressure ulcer  - present on admission  - c/w local wound care    Acute on chronic anemia  - s/p 1 U PRBC      Chronic afib  - c/w nifedipine, metoprolol    COPD  - c/w inhlaers    DVT px  from home     83 yo M PMHx chronic HFrEF, chronic A.fib (on Eliquis), CAD s/p CABG x 3, PVD, COPD, 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     # SOB/ AHRF  Sepsis criteria met on admission but pt remains stable off abx and procal low, could be SIRS criteria due to aspiration pneumonitis  - bibasilar opacities remain on CXR from today  - likely due to aspiration pneumonia and acute on chronic HFrEF  - c/w Bumex/hypertonic saline  - speech and swallow appreciated  - pt stable off abx  - Echo 08/01: 45-50%EF  - CXR shows b/l pleural effusions with pulmonary congestion and opacities.   - proBNP > 50K  - c/w prednisone, Eliquis on hold  - Give Bumex 2 mg IV push, then start a Bumex gtt at 1 mg/hr  - Continue 3% Hypertonic Saline 150ml BID   - BMP twice daily with magnesium   - Maintain potassium >4.0, Mg >2.2  - Send iron panel (serum iron, ferritin, transferrin, TIBC)  - Continue nifedipine 30mg daily, imdur 30mg daily, and metoprolol succinate 50mg daily for now  - BiPAP at night - ween before discharge if able  - Strict intake and output needed for proper diuretic management     # Epistaxis  - resolved  - ENT follow up    # Leukocytosis  - uptrending likely from steroids    # C3 Glomerulonephropathy  - c/w tacrolimus, cellcept    # CAD  - c/w statin, metoprolol, imdur    # HTN  - c/w metoprolol, nifedipine    # Hypothyroidism  - c/w synthroid    # DM II  - FS uncontrolled  - adjust insulin    # Stage 2 right buttock pressure ulcer  - present on admission  - c/w local wound care    Acute on chronic anemia  - s/p 1 U PRBC      Chronic afib  - c/w nifedipine, metoprolol    COPD  - c/w inhlaers    DVT px  from home

## 2022-08-24 NOTE — PROGRESS NOTE ADULT - SUBJECTIVE AND OBJECTIVE BOX
Nephrology progress note    THIS IS AN INCOMPLETE NOTE . FULL NOTE TO FOLLOW SHORTLY    Patient is seen and examined, events over the last 24 h noted .    Allergies:  Ozempic (0.25 mg or 0.5 mg dose) (Hives; Rash)  streptomycin (Unknown)  Trulicity Pen (Hives; Rash)    Hospital Medications:   MEDICATIONS  (STANDING):  albuterol/ipratropium for Nebulization 3 milliLiter(s) Nebulizer every 6 hours  atorvastatin 40 milliGRAM(s) Oral at bedtime  BACItracin   Ointment 1 Application(s) Topical two times a day  bacitracin/polymyxin B Ointment 1 Application(s) Topical two times a day  buMETAnide Injectable 2 milliGRAM(s) IV Push every 12 hours  dextrose 5%. 1000 milliLiter(s) (100 mL/Hr) IV Continuous <Continuous>  dextrose 5%. 1000 milliLiter(s) (50 mL/Hr) IV Continuous <Continuous>  dextrose 50% Injectable 25 Gram(s) IV Push once  dextrose 50% Injectable 12.5 Gram(s) IV Push once  dextrose 50% Injectable 25 Gram(s) IV Push once  famotidine    Tablet 20 milliGRAM(s) Oral daily  glucagon  Injectable 1 milliGRAM(s) IntraMuscular once  insulin glargine Injectable (LANTUS) 10 Unit(s) SubCutaneous at bedtime  insulin lispro (ADMELOG) corrective regimen sliding scale   SubCutaneous three times a day before meals  insulin lispro (ADMELOG) corrective regimen sliding scale   SubCutaneous three times a day before meals  insulin lispro Injectable (ADMELOG) 4 Unit(s) SubCutaneous three times a day before meals  isosorbide   mononitrate ER Tablet (IMDUR) 30 milliGRAM(s) Oral daily  levothyroxine 150 MICROGram(s) Oral daily  melatonin 5 milliGRAM(s) Oral at bedtime  metoprolol succinate ER 50 milliGRAM(s) Oral daily  multivitamin/minerals 1 Tablet(s) Oral daily  mycophenolate mofetil 500 milliGRAM(s) Oral four times a day  NIFEdipine XL 30 milliGRAM(s) Oral daily  pentoxifylline 400 milliGRAM(s) Oral two times a day  predniSONE   Tablet 20 milliGRAM(s) Oral daily  tacrolimus 0.5 milliGRAM(s) Oral two times a day        VITALS:  T(F): 96.6 (22 @ 04:17), Max: 97.9 (22 @ 16:07)  HR: 78 (22 @ 04:17)  BP: 152/72 (22 @ 04:17)  RR: 18 (22 @ 04:17)  SpO2: 90% (22 @ 16:07)  Wt(kg): --     @ 07:01  -   07:00  --------------------------------------------------------  IN: 0 mL / OUT: 300 mL / NET: -300 mL     @ 07:01  -   @ 07:00  --------------------------------------------------------  IN: 487 mL / OUT: 800 mL / NET: -313 mL        Weight (kg): 85 ( @ 14:35)    PHYSICAL EXAM:  Constitutional: NAD  HEENT: anicteric sclera, oropharynx clear, MMM  Neck: No JVD  Respiratory: CTAB, no wheezes, rales or rhonchi  Cardiovascular: S1, S2, RRR  Gastrointestinal: BS+, soft, NT/ND  Extremities: No cyanosis or clubbing. No peripheral edema  :  No davalos.   Skin: No rashes    LABS:      134<L>  |  96<L>  |  x   ----------------------------<  186<H>  4.4   |  28  |  2.0<H>    Ca    8.5      24 Aug 2022 06:52  Phos  2.9       Mg     2.5         TPro  6.3  /  Alb  2.8<L>  /  TBili  0.7  /  DBili      /  AST  12  /  ALT  9   /  AlkPhos  92                            8.6    15.00 )-----------( 266      ( 24 Aug 2022 06:52 )             27.5       Urine Studies:  Urinalysis Basic - ( 20 Aug 2022 21:09 )    Color: Yellow / Appearance: Clear / S.018 / pH:   Gluc:  / Ketone: Negative  / Bili: Negative / Urobili: <2 mg/dL   Blood:  / Protein: 30 mg/dL / Nitrite: Negative   Leuk Esterase: Negative / RBC: 4 /HPF / WBC 2 /HPF   Sq Epi:  / Non Sq Epi: 1 /HPF / Bacteria: Negative      Sodium, Random Urine: 34.0 mmoL/L ( @ 21:09)  Creatinine, Random Urine: 79 mg/dL ( @ 21:09)  Protein/Creatinine Ratio Calculation: 0.3 Ratio ( @ :09)      Iron 28, TIBC 135, %sat 21      [22 @ 13:55]  Ferritin 1299      [22 @ 09:35]  PTH -- (Ca 9.0)      [22 @ 07:39]   93  Vitamin D (25OH) 27      [22 @ 07:39]  HbA1c 8.5      [10-01-19 @ 09:54]  TSH 1.55      [22 @ 06:03]  Lipid: chol 72, TG 65, HDL 38, LDL --      [22 @ 06:03]      C3 Complement 84      [10-20-21 @ 20:00]  Syphilis Screen (Treponema Pallidum Ab) Negative      [10-21-21 @ 11:25]      RADIOLOGY & ADDITIONAL STUDIES:   Nephrology progress note  Patient is seen and examined, events over the last 24 h noted .  lying in bed on oxygen face mask   Allergies:  Ozempic (0.25 mg or 0.5 mg dose) (Hives; Rash)  streptomycin (Unknown)  Trulicity Pen (Hives; Rash)    Hospital Medications:   MEDICATIONS  (STANDING):  albuterol/ipratropium for Nebulization 3 milliLiter(s) Nebulizer every 6 hours  atorvastatin 40 milliGRAM(s) Oral at bedtime  BACItracin   Ointment 1 Application(s) Topical two times a day  bacitracin/polymyxin B Ointment 1 Application(s) Topical two times a day  buMETAnide Injectable 2 milliGRAM(s) IV Push every 12 hours  famotidine    Tablet 20 milliGRAM(s) Oral daily  glucagon  Injectable 1 milliGRAM(s) IntraMuscular once  insulin glargine Injectable (LANTUS) 10 Unit(s) SubCutaneous at bedtime  insulin lispro (ADMELOG) corrective regimen sliding scale   SubCutaneous three times a day before meals  insulin lispro (ADMELOG) corrective regimen sliding scale   SubCutaneous three times a day before meals  insulin lispro Injectable (ADMELOG) 4 Unit(s) SubCutaneous three times a day before meals  isosorbide   mononitrate ER Tablet (IMDUR) 30 milliGRAM(s) Oral daily  levothyroxine 150 MICROGram(s) Oral daily  melatonin 5 milliGRAM(s) Oral at bedtime  metoprolol succinate ER 50 milliGRAM(s) Oral daily  multivitamin/minerals 1 Tablet(s) Oral daily  mycophenolate mofetil 500 milliGRAM(s) Oral four times a day  NIFEdipine XL 30 milliGRAM(s) Oral daily  pentoxifylline 400 milliGRAM(s) Oral two times a day  predniSONE   Tablet 20 milliGRAM(s) Oral daily  tacrolimus 0.5 milliGRAM(s) Oral two times a day        VITALS:  T(F): 96.6 (22 @ 04:17), Max: 97.9 (22 @ 16:07)  HR: 78 (22 @ 04:17)  BP: 152/72 (22 @ 04:17)  RR: 18 (22 @ 04:17)  SpO2: 90% (22 @ 16:07)       @ 07:01  -   @ 07:00  --------------------------------------------------------  IN: 0 mL / OUT: 300 mL / NET: -300 mL     @ 07:  -   @ 07:00  --------------------------------------------------------  IN: 487 mL / OUT: 800 mL / NET: -313 mL        Weight (kg): 85 ( @ 14:35)    PHYSICAL EXAM:  Constitutional: on face mask   Neck: No JVD  Respiratory: Crackles at base   Cardiovascular: S1, S2, RRR  Gastrointestinal: BS+, soft, NT/ND  Extremities: No cyanosis or clubbing. No peripheral edema  :  No davalos.   Skin: No rashes    LABS:      134<L>  |  96<L>  |  x   ----------------------------<  186<H>  4.4   |  28  |  2.0<H>  Creatinine Trend: 2.0<--, 2.1<--, 2.0<--, 1.9<--, 2.0<--, 2.2<--  Ca    8.5      24 Aug 2022 06:52  Phos  2.9       Mg     2.5         TPro  6.3  /  Alb  2.8<L>  /  TBili  0.7  /  DBili      /  AST  12  /  ALT  9   /  AlkPhos  92                            8.6    15.00 )-----------( 266      ( 24 Aug 2022 06:52 )             27.5     Tacrolimus (), Serum: 9.5: Tacrolimus testing is performed on the [employer]  by  chemiluminescent microparticle immunoassay. The therapeutic range of  tacrolimus is not clearly defined but target 12-hour trough whole blood  concentrations are 5.0 - 20.0 ng/mL early post transplant. Twenty-four  hour  trough concentrations are 33-50% less than the corresponding 12-hour  trough. ng/mL (22 @ 06:03)      Urine Studies:  Urinalysis Basic - ( 20 Aug 2022 21:09 )    Color: Yellow / Appearance: Clear / S.018 / pH:   Gluc:  / Ketone: Negative  / Bili: Negative / Urobili: <2 mg/dL   Blood:  / Protein: 30 mg/dL / Nitrite: Negative   Leuk Esterase: Negative / RBC: 4 /HPF / WBC 2 /HPF   Sq Epi:  / Non Sq Epi: 1 /HPF / Bacteria: Negative      Sodium, Random Urine: 34.0 mmoL/L (:09)  Creatinine, Random Urine: 79 mg/dL (:09)  Protein/Creatinine Ratio Calculation: 0.3 Ratio (:)      Iron 28, TIBC 135, %sat 21      [22 @ 13:55]  Ferritin 1299      [22 @ 09:35]  PTH -- (Ca 9.0)      [22 @ 07:39]   93  Vitamin D (25OH) 27      [22 @ 07:39]  HbA1c 8.5      [10-01-19 @ 09:54]  TSH 1.55      [22 @ 06:03]  Lipid: chol 72, TG 65, HDL 38, LDL --      [22 @ 06:03]      C3 Complement 84      [10-20-21 @ 20:00]  Syphilis Screen (Treponema Pallidum Ab) Negative      [10-21-21 @ 11:25]      RADIOLOGY & ADDITIONAL STUDIES:

## 2022-08-24 NOTE — CDI QUERY NOTE - NSCDIOTHERTXTBX_GEN_ALL_CORE_HH
CLINICAL INDICATORS    8/23 Hospitalist Progress Note: …presents to ED with 5 days history of worsening SOB and hypoxia… Suspected aspiration Pneumonia…    Lab Results:  • WBC: 12.12 (8/20) -> 13.89 (8/22)  • Procalcitonin: 0.21 (8/21)    Nursing Flowsheet:  • HR 91 (8/20 1117)  • RR 24 (8/20 1117)    Radiology Results:  • CXR Impression: Bilateral pleural effusions, opacities. (8/20)    Orders:  • Blood cultures x2 (8/20)  • IV Cefepime 2G x1 ind: pna (8/20)  • IV Levaquin 750 mg x1 ind: pna (8/20)  • IV Unasyn 3G Q6H ind: aspiration PNA (8/20 – 8/23)      Based on your professional judgement and the clinical indicators please clarify if the infection can be further specified as:  • Sepsis associated with aspiration pneumonia  • No Sepsis  • Other (please specify):  • Clinically unable to determine

## 2022-08-24 NOTE — SWALLOW BEDSIDE ASSESSMENT ADULT - SWALLOW EVAL: DIAGNOSIS
+toleration of mildly thick liquids, refusing puree, +nasal packing and on O2 FM which is affecting ability to use compensatory strategies and impairing resp/swallow coordination

## 2022-08-24 NOTE — PROGRESS NOTE ADULT - ASSESSMENT
81 yo male with PMHx of 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 Found to be hypoxic to 89%- placed on BiPAP with improvement, now on 3L NC. Found to be fluid overloaded, currently receiving IV diuretics. Suspected PNA, IV antibiotics given. Patient having epistaxis while on Eliquis.     HFrEF acute on chronic/ BRENDA on CHI6a-7/   - cont bumex IV may need to add metolazone   - on Lasix and Zaroxolyn at home  - creat noted today   - BMP q12h keep Mg>2 K>4  - kidney and bladder sono ?mild left hydro  - UOP noted better   - UA urine protein/ creat ratio  - C3 glomerulopathy cont prograf and Cellcept  - Fk levels noted doubt true trough / repeat   - no need for RRT    will follow

## 2022-08-24 NOTE — PROGRESS NOTE ADULT - TIME BILLING
Review of data in EMR  Bedside evaluation and exam  Discussion with patient and son Ramiro  Update of Primary Team

## 2022-08-25 NOTE — PROGRESS NOTE ADULT - SUBJECTIVE AND OBJECTIVE BOX
CHIEF COMPLAINT:    Patient is a 82y old  Male who presents with a chief complaint of chf exacerbation     INTERVAL HPI/OVERNIGHT EVENTS:    Patient seen and examined at bedside. No acute overnight events occurred.    ROS: Denies SOB. All other systems are negative.    Medications:  Standing  albuterol/ipratropium for Nebulization 3 milliLiter(s) Nebulizer every 6 hours  atorvastatin 40 milliGRAM(s) Oral at bedtime  BACItracin   Ointment 1 Application(s) Topical two times a day  bacitracin/polymyxin B Ointment 1 Application(s) Topical two times a day  buMETAnide Infusion 1 mG/Hr IV Continuous <Continuous>  dextrose 5%. 1000 milliLiter(s) IV Continuous <Continuous>  dextrose 5%. 1000 milliLiter(s) IV Continuous <Continuous>  dextrose 50% Injectable 25 Gram(s) IV Push once  dextrose 50% Injectable 12.5 Gram(s) IV Push once  dextrose 50% Injectable 25 Gram(s) IV Push once  famotidine    Tablet 20 milliGRAM(s) Oral daily  glucagon  Injectable 1 milliGRAM(s) IntraMuscular once  insulin glargine Injectable (LANTUS) 22 Unit(s) SubCutaneous at bedtime  insulin lispro (ADMELOG) corrective regimen sliding scale   SubCutaneous three times a day before meals  insulin lispro Injectable (ADMELOG) 7 Unit(s) SubCutaneous three times a day before meals  isosorbide   mononitrate ER Tablet (IMDUR) 30 milliGRAM(s) Oral daily  levothyroxine 150 MICROGram(s) Oral daily  melatonin 5 milliGRAM(s) Oral at bedtime  metoprolol succinate ER 50 milliGRAM(s) Oral daily  multivitamin/minerals 1 Tablet(s) Oral daily  mycophenolate mofetil 500 milliGRAM(s) Oral four times a day  NIFEdipine XL 30 milliGRAM(s) Oral daily  pentoxifylline 400 milliGRAM(s) Oral two times a day  predniSONE   Tablet 20 milliGRAM(s) Oral daily  sodium chloride 3%. 150 milliLiter(s) IV Continuous <Continuous>  sodium chloride 3%. 150 milliLiter(s) IV Continuous <Continuous>  tacrolimus 0.5 milliGRAM(s) Oral two times a day    PRN Meds  acetaminophen     Tablet .. 650 milliGRAM(s) Oral once PRN  dextrose Oral Gel 15 Gram(s) Oral once PRN  dextrose Oral Gel 15 Gram(s) Oral once PRN        Vital Signs:    T(F): 96.8 (22 @ 04:14), Max: 96.9 (22 @ 13:46)  HR: 81 (22 @ 04:14) (77 - 81)  BP: 144/67 (22 @ 04:14) (143/67 - 168/80)  RR: 18 (22 @ 04:14) (18 - 18)  SpO2: --  I&O's Summary    24 Aug 2022 07:01  -  25 Aug 2022 07:00  --------------------------------------------------------  IN: 854 mL / OUT: 3400 mL / NET: -2546 mL    25 Aug 2022 07:01  -  25 Aug 2022 13:27  --------------------------------------------------------  IN: 240 mL / OUT: 0 mL / NET: 240 mL      Daily     Daily Weight in k.6 (25 Aug 2022 04:14)  CAPILLARY BLOOD GLUCOSE      POCT Blood Glucose.: 360 mg/dL (25 Aug 2022 11:28)  POCT Blood Glucose.: 342 mg/dL (25 Aug 2022 07:44)  POCT Blood Glucose.: 231 mg/dL (24 Aug 2022 21:38)  POCT Blood Glucose.: 229 mg/dL (24 Aug 2022 16:51)      PHYSICAL EXAM:  GENERAL:  NAD  SKIN: No rashes or lesions  HEENT: Atraumatic. Normocephalic. Anicteric  NECK:  No JVD.   PULMONARY: Bibasilar crackles  CVS: Normal S1, S2. Regular rate and rhythm. No murmurs.  ABDOMEN/GI: Soft, Nontender, Nondistended; Bowel sounds are present  EXTREMITIES:  1+ edema B/L LE.  NEUROLOGIC:  No motor deficit.  PSYCH: Alert & oriented x 3, normal affect      LABS:                        8.5    13.99 )-----------( 249      ( 25 Aug 2022 06:04 )             27.1     08-    139  |  97<L>  |  88<HH>  ----------------------------<  318<H>  4.9   |  31  |  1.6<H>    Ca    8.5      25 Aug 2022 06:04  Phos  2.9     08-  Mg     2.2         TPro  6.3  /  Alb  2.8<L>  /  TBili  0.7  /  DBili  x   /  AST  12  /  ALT  9   /  AlkPhos  92  08-      Serum Pro-Brain Natriuretic Peptide: [id number] pg/mL (22 @ 11:47)        Culture - Stool (collected 22 Aug 2022 22:33)  Source: .Stool Feces  Preliminary Report (24 Aug 2022 09:53):    Few Yeast like cells isolated    No enteric pathogens to date: Final culture pending        RADIOLOGY & ADDITIONAL TESTS:  Imaging or report Personally Reviewed:  [ ] YES  [ ] NO -->somewhat improved b/l opacities  Telemetry reviewed independently - NSR, no acute events  EKG reviewed independently -->no new EKGs    Consultant(s) Notes Reviewed:  [ ] YES  [ ] NO  Care Discussed with Consultants/Other Providers [ ] YES  [ ] NO    Case discussed with resident  Care discussed with pt         CHIEF COMPLAINT:    Patient is a 82y old  Male who presents with a chief complaint of chf exacerbation     INTERVAL HPI/OVERNIGHT EVENTS:    Patient seen and examined at bedside. No acute overnight events occurred.    ROS: Denies SOB. All other systems are negative.    Medications:  Standing  albuterol/ipratropium for Nebulization 3 milliLiter(s) Nebulizer every 6 hours  atorvastatin 40 milliGRAM(s) Oral at bedtime  BACItracin   Ointment 1 Application(s) Topical two times a day  bacitracin/polymyxin B Ointment 1 Application(s) Topical two times a day  buMETAnide Infusion 1 mG/Hr IV Continuous <Continuous>  dextrose 5%. 1000 milliLiter(s) IV Continuous <Continuous>  dextrose 5%. 1000 milliLiter(s) IV Continuous <Continuous>  dextrose 50% Injectable 25 Gram(s) IV Push once  dextrose 50% Injectable 12.5 Gram(s) IV Push once  dextrose 50% Injectable 25 Gram(s) IV Push once  famotidine    Tablet 20 milliGRAM(s) Oral daily  glucagon  Injectable 1 milliGRAM(s) IntraMuscular once  insulin glargine Injectable (LANTUS) 22 Unit(s) SubCutaneous at bedtime  insulin lispro (ADMELOG) corrective regimen sliding scale   SubCutaneous three times a day before meals  insulin lispro Injectable (ADMELOG) 7 Unit(s) SubCutaneous three times a day before meals  isosorbide   mononitrate ER Tablet (IMDUR) 30 milliGRAM(s) Oral daily  levothyroxine 150 MICROGram(s) Oral daily  melatonin 5 milliGRAM(s) Oral at bedtime  metoprolol succinate ER 50 milliGRAM(s) Oral daily  multivitamin/minerals 1 Tablet(s) Oral daily  mycophenolate mofetil 500 milliGRAM(s) Oral four times a day  NIFEdipine XL 30 milliGRAM(s) Oral daily  pentoxifylline 400 milliGRAM(s) Oral two times a day  predniSONE   Tablet 20 milliGRAM(s) Oral daily  sodium chloride 3%. 150 milliLiter(s) IV Continuous <Continuous>  sodium chloride 3%. 150 milliLiter(s) IV Continuous <Continuous>  tacrolimus 0.5 milliGRAM(s) Oral two times a day    PRN Meds  acetaminophen     Tablet .. 650 milliGRAM(s) Oral once PRN  dextrose Oral Gel 15 Gram(s) Oral once PRN  dextrose Oral Gel 15 Gram(s) Oral once PRN        Vital Signs:    T(F): 96.8 (22 @ 04:14), Max: 96.9 (22 @ 13:46)  HR: 81 (22 @ 04:14) (77 - 81)  BP: 144/67 (22 @ 04:14) (143/67 - 168/80)  RR: 18 (22 @ 04:14) (18 - 18)  SpO2: --  I&O's Summary    24 Aug 2022 07:01  -  25 Aug 2022 07:00  --------------------------------------------------------  IN: 854 mL / OUT: 3400 mL / NET: -2546 mL    25 Aug 2022 07:01  -  25 Aug 2022 13:27  --------------------------------------------------------  IN: 240 mL / OUT: 0 mL / NET: 240 mL      Daily     Daily Weight in k.6 (25 Aug 2022 04:14)  CAPILLARY BLOOD GLUCOSE      POCT Blood Glucose.: 360 mg/dL (25 Aug 2022 11:28)  POCT Blood Glucose.: 342 mg/dL (25 Aug 2022 07:44)  POCT Blood Glucose.: 231 mg/dL (24 Aug 2022 21:38)  POCT Blood Glucose.: 229 mg/dL (24 Aug 2022 16:51)      PHYSICAL EXAM:  GENERAL:  NAD  SKIN: No rashes or lesions  HEENT: Atraumatic. Normocephalic. Anicteric  NECK:  No JVD.   PULMONARY: Bibasilar crackles  CVS: Normal S1, S2. Regular rate and rhythm. No murmurs.  ABDOMEN/GI: Soft, Nontender, Nondistended; Bowel sounds are present  EXTREMITIES:  1+ edema B/L LE.  NEUROLOGIC:  No motor deficit.  PSYCH: Alert & oriented x 3, normal affect      LABS:                        8.5    13.99 )-----------( 249      ( 25 Aug 2022 06:04 )             27.1     08-    139  |  97<L>  |  88<HH>  ----------------------------<  318<H>  4.9   |  31  |  1.6<H>    Ca    8.5      25 Aug 2022 06:04  Phos  2.9     08-  Mg     2.2         TPro  6.3  /  Alb  2.8<L>  /  TBili  0.7  /  DBili  x   /  AST  12  /  ALT  9   /  AlkPhos  92  08-      Serum Pro-Brain Natriuretic Peptide: [id number] pg/mL (22 @ 11:47)        Culture - Stool (collected 22 Aug 2022 22:33)  Source: .Stool Feces  Preliminary Report (24 Aug 2022 09:53):    Few Yeast like cells isolated    No enteric pathogens to date: Final culture pending        RADIOLOGY & ADDITIONAL TESTS:  Imaging or report Personally Reviewed:  [ ] YES  [ ] NO -->somewhat improved b/l opacities  Telemetry reviewed independently - NSR, no acute events  EKG reviewed independently -->no new EKGs    Consultant(s) Notes Reviewed:  [ ] YES  [ ] NO  Care Discussed with Consultants/Other Providers [ ] YES  [ ] NO -- Pulmonary attending    Case discussed with resident  Care discussed with pt

## 2022-08-25 NOTE — CHART NOTE - NSCHARTNOTEFT_GEN_A_CORE
Recalled by Medical resident, patient pulled out "packing". Patient seen and examined at bedside. Patient seen by ENT 8/23 for mild ooze from b/l nares, hemostasis achieved with ice packs, pressure and surgicell placed. Patient had removed b/l surgicell from nares, no active bleeding noted.     Plan:   - No further acute ENT intervention at this time   - Continue bacitracin to b/l nares to keep area moist  - Continue to monitor SpO2, recommend humidified oxygen   - Continue to monitor for episodes of rebleeding  - Continue to monitor H/h, transfuse prn  - Avoid: Nasal trauma; no nose rubbing, blowing or manipulating nasal packing, bending with head blow the waist and heavy lifting  - Sneeze with mouth open and pinching nares  - Recall prn

## 2022-08-25 NOTE — PROGRESS NOTE ADULT - SUBJECTIVE AND OBJECTIVE BOX
Patient is a 82y old  Male who presents with a chief complaint of CHF exacerbation, COPD exacerbation (24 Aug 2022 17:25)        SUBJECTIVE: Patient reports he is still SOB.      REVIEW OF SYSTEMS:  Negative except as per HPI.      PHYSICAL EXAM  Vital Signs Last 24 Hrs  T(C): 36 (25 Aug 2022 04:14), Max: 36.1 (24 Aug 2022 13:46)  T(F): 96.8 (25 Aug 2022 04:14), Max: 96.9 (24 Aug 2022 13:46)  HR: 81 (25 Aug 2022 04:14) (77 - 81)  BP: 144/67 (25 Aug 2022 04:14) (143/67 - 168/80)  BP(mean): --  RR: 18 (25 Aug 2022 04:14) (18 - 18)  SpO2: --        CONSTITUTIONAL:   Well nourished.  NAD    ENT:   Airway patent,   No thrush    EYES:   Clear bilaterally,   pupils equal, round and reactive to light.    CARDIAC:   Normal rate,   regular rhythm.    Trace b/l LE edema    CAROTID:   normal systolic impulse  no bruits    RESPIRATORY:   No wheezing  Normal chest expansion  Not tachypneic,  No use of accessory muscles    GASTROINTESTINAL:  Abdomen soft,   non-tender,   no guarding,   + BS    MUSCULOSKELETAL:   range of motion is not limited,  no clubbing, cyanosis    NEUROLOGICAL:   Alert and oriented   no motor  deficits.    SKIN:   Skin normal color for race,   No evidence of rash.    PSYCHIATRIC:   normal mood and affect.   no apparent risk to self or others.        08-24-22 @ 07:01  -  08-25-22 @ 07:00  --------------------------------------------------------  IN:    Bumetanide: 20 mL    Oral Fluid: 834 mL  Total IN: 854 mL    OUT:    Indwelling Catheter - Urethral (mL): 3400 mL  Total OUT: 3400 mL    Total NET: -2546 mL      08-25-22 @ 07:01  -  08-25-22 @ 11:23  --------------------------------------------------------  IN:    Oral Fluid: 240 mL  Total IN: 240 mL    OUT:  Total OUT: 0 mL    Total NET: 240 mL          LABS:                          8.5    13.99 )-----------( 249      ( 25 Aug 2022 06:04 )             27.1                                               08-25    139  |  97<L>  |  88<HH>  ----------------------------<  318<H>  4.9   |  31  |  1.6<H>    Ca    8.5      25 Aug 2022 06:04  Phos  2.9     08-24  Mg     2.2     08-25    TPro  6.3  /  Alb  2.8<L>  /  TBili  0.7  /  DBili  x   /  AST  12  /  ALT  9   /  AlkPhos  92  08-24                                                                                           LIVER FUNCTIONS - ( 24 Aug 2022 06:52 )  Alb: 2.8 g/dL / Pro: 6.3 g/dL / ALK PHOS: 92 U/L / ALT: 9 U/L / AST: 12 U/L / GGT: x                                                  Culture - Stool (collected 22 Aug 2022 22:33)  Source: .Stool Feces  Preliminary Report (24 Aug 2022 09:53):    Few Yeast like cells isolated    No enteric pathogens to date: Final culture pending                                                ABG - ( 24 Aug 2022 20:18 )  pH, Arterial: 7.39  pH, Blood: x     /  pCO2: 50    /  pO2: 113   / HCO3: 30    / Base Excess: 4.6   /  SaO2: 99.0                MEDICATIONS  (STANDING):  albuterol/ipratropium for Nebulization 3 milliLiter(s) Nebulizer every 6 hours  atorvastatin 40 milliGRAM(s) Oral at bedtime  BACItracin   Ointment 1 Application(s) Topical two times a day  bacitracin/polymyxin B Ointment 1 Application(s) Topical two times a day  buMETAnide Infusion 1 mG/Hr (5 mL/Hr) IV Continuous <Continuous>  dextrose 5%. 1000 milliLiter(s) (100 mL/Hr) IV Continuous <Continuous>  dextrose 5%. 1000 milliLiter(s) (50 mL/Hr) IV Continuous <Continuous>  dextrose 50% Injectable 25 Gram(s) IV Push once  dextrose 50% Injectable 12.5 Gram(s) IV Push once  dextrose 50% Injectable 25 Gram(s) IV Push once  famotidine    Tablet 20 milliGRAM(s) Oral daily  glucagon  Injectable 1 milliGRAM(s) IntraMuscular once  insulin glargine Injectable (LANTUS) 22 Unit(s) SubCutaneous at bedtime  insulin lispro (ADMELOG) corrective regimen sliding scale   SubCutaneous three times a day before meals  insulin lispro Injectable (ADMELOG) 7 Unit(s) SubCutaneous three times a day before meals  isosorbide   mononitrate ER Tablet (IMDUR) 30 milliGRAM(s) Oral daily  levothyroxine 150 MICROGram(s) Oral daily  melatonin 5 milliGRAM(s) Oral at bedtime  metoprolol succinate ER 50 milliGRAM(s) Oral daily  multivitamin/minerals 1 Tablet(s) Oral daily  mycophenolate mofetil 500 milliGRAM(s) Oral four times a day  NIFEdipine XL 30 milliGRAM(s) Oral daily  pentoxifylline 400 milliGRAM(s) Oral two times a day  predniSONE   Tablet 20 milliGRAM(s) Oral daily  sodium chloride 3%. 150 milliLiter(s) (50 mL/Hr) IV Continuous <Continuous>  sodium chloride 3%. 150 milliLiter(s) (50 mL/Hr) IV Continuous <Continuous>  tacrolimus 0.5 milliGRAM(s) Oral two times a day    MEDICATIONS  (PRN):  acetaminophen     Tablet .. 650 milliGRAM(s) Oral once PRN Moderate Pain (4 - 6)  dextrose Oral Gel 15 Gram(s) Oral once PRN Blood Glucose LESS THAN 70 milliGRAM(s)/deciliter  dextrose Oral Gel 15 Gram(s) Oral once PRN Blood Glucose LESS THAN 70 milliGRAM(s)/deciliter      X-Rays reviewed    CXR interpreted by me: Patient is a 82y old  Male who presents with a chief complaint of CHF exacerbation, COPD exacerbation (24 Aug 2022 17:25)        SUBJECTIVE: Patient reports he is still SOB. cxr reviewed    PHYSICAL EXAM  Vital Signs Last 24 Hrs  T(C): 36 (25 Aug 2022 04:14), Max: 36.1 (24 Aug 2022 13:46)  T(F): 96.8 (25 Aug 2022 04:14), Max: 96.9 (24 Aug 2022 13:46)  HR: 81 (25 Aug 2022 04:14) (77 - 81)  BP: 144/67 (25 Aug 2022 04:14) (143/67 - 168/80)  BP(mean): --  RR: 18 (25 Aug 2022 04:14) (18 - 18)  SpO2: 94%        CONSTITUTIONAL:  ill looking    ENT:   Airway patent,   No thrush    EYES:   Clear bilaterally,   pupils equal, round and reactive to light.    CARDIAC:   gemini 2.6  RESPIRATORY:   No wheezing  Normal chest expansion  Not tachypneic,  No use of accessory muscles    GASTROINTESTINAL:  Abdomen soft,   non-tender,   no guarding,   + BS    MUSCULOSKELETAL:   range of motion is not limited,  no clubbing, cyanosis    NEUROLOGICAL:   Alert and oriented   no motor  deficits.      08-24-22 @ 07:01  -  08-25-22 @ 07:00  --------------------------------------------------------  IN:    Bumetanide: 20 mL    Oral Fluid: 834 mL  Total IN: 854 mL    OUT:    Indwelling Catheter - Urethral (mL): 3400 mL  Total OUT: 3400 mL    Total NET: -2546 mL      08-25-22 @ 07:01  -  08-25-22 @ 11:23  --------------------------------------------------------  IN:    Oral Fluid: 240 mL  Total IN: 240 mL    OUT:  Total OUT: 0 mL    Total NET: 240 mL          LABS:                          8.5    13.99 )-----------( 249      ( 25 Aug 2022 06:04 )             27.1                                               08-25    139  |  97<L>  |  88<HH>  ----------------------------<  318<H>  4.9   |  31  |  1.6<H>    Ca    8.5      25 Aug 2022 06:04  Phos  2.9     08-24  Mg     2.2     08-25    TPro  6.3  /  Alb  2.8<L>  /  TBili  0.7  /  DBili  x   /  AST  12  /  ALT  9   /  AlkPhos  92  08-24                                                                                           LIVER FUNCTIONS - ( 24 Aug 2022 06:52 )  Alb: 2.8 g/dL / Pro: 6.3 g/dL / ALK PHOS: 92 U/L / ALT: 9 U/L / AST: 12 U/L / GGT: x                                                  Culture - Stool (collected 22 Aug 2022 22:33)  Source: .Stool Feces  Preliminary Report (24 Aug 2022 09:53):    Few Yeast like cells isolated    No enteric pathogens to date: Final culture pending                                                ABG - ( 24 Aug 2022 20:18 )  pH, Arterial: 7.39  pH, Blood: x     /  pCO2: 50    /  pO2: 113   / HCO3: 30    / Base Excess: 4.6   /  SaO2: 99.0                MEDICATIONS  (STANDING):  albuterol/ipratropium for Nebulization 3 milliLiter(s) Nebulizer every 6 hours  atorvastatin 40 milliGRAM(s) Oral at bedtime  BACItracin   Ointment 1 Application(s) Topical two times a day  bacitracin/polymyxin B Ointment 1 Application(s) Topical two times a day  buMETAnide Infusion 1 mG/Hr (5 mL/Hr) IV Continuous <Continuous>  dextrose 5%. 1000 milliLiter(s) (100 mL/Hr) IV Continuous <Continuous>  dextrose 5%. 1000 milliLiter(s) (50 mL/Hr) IV Continuous <Continuous>  dextrose 50% Injectable 25 Gram(s) IV Push once  dextrose 50% Injectable 12.5 Gram(s) IV Push once  dextrose 50% Injectable 25 Gram(s) IV Push once  famotidine    Tablet 20 milliGRAM(s) Oral daily  glucagon  Injectable 1 milliGRAM(s) IntraMuscular once  insulin glargine Injectable (LANTUS) 22 Unit(s) SubCutaneous at bedtime  insulin lispro (ADMELOG) corrective regimen sliding scale   SubCutaneous three times a day before meals  insulin lispro Injectable (ADMELOG) 7 Unit(s) SubCutaneous three times a day before meals  isosorbide   mononitrate ER Tablet (IMDUR) 30 milliGRAM(s) Oral daily  levothyroxine 150 MICROGram(s) Oral daily  melatonin 5 milliGRAM(s) Oral at bedtime  metoprolol succinate ER 50 milliGRAM(s) Oral daily  multivitamin/minerals 1 Tablet(s) Oral daily  mycophenolate mofetil 500 milliGRAM(s) Oral four times a day  NIFEdipine XL 30 milliGRAM(s) Oral daily  pentoxifylline 400 milliGRAM(s) Oral two times a day  predniSONE   Tablet 20 milliGRAM(s) Oral daily  sodium chloride 3%. 150 milliLiter(s) (50 mL/Hr) IV Continuous <Continuous>  sodium chloride 3%. 150 milliLiter(s) (50 mL/Hr) IV Continuous <Continuous>  tacrolimus 0.5 milliGRAM(s) Oral two times a day    MEDICATIONS  (PRN):  acetaminophen     Tablet .. 650 milliGRAM(s) Oral once PRN Moderate Pain (4 - 6)  dextrose Oral Gel 15 Gram(s) Oral once PRN Blood Glucose LESS THAN 70 milliGRAM(s)/deciliter  dextrose Oral Gel 15 Gram(s) Oral once PRN Blood Glucose LESS THAN 70 milliGRAM(s)/deciliter      X-Rays reviewed    CXR interpreted by me:

## 2022-08-25 NOTE — PROGRESS NOTE ADULT - SUBJECTIVE AND OBJECTIVE BOX
DAILY PROGRESS NOTE  ===========================================================    Patient Information:  ANDRIA TAVAERZ  /  82y  /  Male  /  MRN#: [id number]    Hospital Day: 5d     |:::::::::::::::::::::::::::| SUBJECTIVE |:::::::::::::::::::::::::::|  Patient was seen in the morning. Patient had no active complaints.  OVERNIGHT EVENTS:   TODAY: Patient was seen today at bedside. Review of systems is otherwise negative.    |:::::::::::::::::::::::::::| OBJECTIVE |:::::::::::::::::::::::::::|    VITAL SIGNS: Last 24 Hours  T(C): 36 (25 Aug 2022 04:14), Max: 36.1 (24 Aug 2022 13:46)  T(F): 96.8 (25 Aug 2022 04:14), Max: 96.9 (24 Aug 2022 13:46)  HR: 81 (25 Aug 2022 04:14) (77 - 81)  BP: 144/67 (25 Aug 2022 04:14) (143/67 - 168/80)  BP(mean): --  RR: 18 (25 Aug 2022 04:14) (18 - 18)  SpO2: --    08-24-22 @ 07:01  -  08-25-22 @ 07:00  --------------------------------------------------------  IN: 854 mL / OUT: 3400 mL / NET: -2546 mL    08-25-22 @ 07:01  - 08-25-22 @ 13:17  --------------------------------------------------------  IN: 240 mL / OUT: 0 mL / NET: 240 mL      PHYSICAL EXAM:  GENERAL:   Awake, alert; NAD.  HEENT:  Head NC/AT; Conjunctivae pink, Sclera anicteric; Oral mucosa moist.  CARDIO:   Regular rate; Regular rhythm; S1 & S2.  RESP:   No rales or rhonchi appreciated.  GI:   Soft; NT/ND; BS; No guarding; No rebound tenderness.  EXT:   Edema in UE and LE.  NEURO:   PERRL.  SKIN:   Intact.    LAB RESULTS:                        8.5    13.99 )-----------( 249      ( 25 Aug 2022 06:04 )             27.1     08-25    139  |  97<L>  |  88<HH>  ----------------------------<  318<H>  4.9   |  31  |  1.6<H>    Ca    8.5      25 Aug 2022 06:04  Phos  2.9     08-24  Mg     2.2     08-25    TPro  6.3  /  Alb  2.8<L>  /  TBili  0.7  /  DBili  x   /  AST  12  /  ALT  9   /  AlkPhos  92  08-24        ABG - ( 24 Aug 2022 20:18 )  pH, Arterial: 7.39  pH, Blood: x     /  pCO2: 50    /  pO2: 113   / HCO3: 30    / Base Excess: 4.6   /  SaO2: 99.0                    MICROBIOLOGY:    Culture - Stool (collected 22 Aug 2022 22:33)  Source: .Stool Feces  Preliminary Report (24 Aug 2022 09:53):    Few Yeast like cells isolated    No enteric pathogens to date: Final culture pending      RADIOLOGY:    ALLERGIES:  Ozempic (0.25 mg or 0.5 mg dose) (Hives; Rash)  streptomycin (Unknown)  Trulicity Pen (Hives; Rash)      ===========================================================

## 2022-08-25 NOTE — PROGRESS NOTE ADULT - ASSESSMENT
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

## 2022-08-25 NOTE — PROGRESS NOTE ADULT - ASSESSMENT
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     SOB/ AHRF  Sepsis criteria met on admission but pt remains stable off abx and procal low, could be SIRS criteria due to aspiration pneumonitis  - bibasilar opacities remain on CXR from today  - likely due to aspiration pneumonia and acute on chronic HFrEF  - c/w Bumex/hypertonic saline  - speech and swallow appreciated  - pt stable off abx  - Echo 08/01: 45-50%EF  - CXR shows b/l pleural effusions with pulmonary congestion and opacities.   - proBNP > 50K  - c/w prednisone  - f/u pulmonary for possible thoracentesis, Eliquis on hold    Epistaxis  - resolved  - ENT follow up    Leukocytosis  - uptrending likely from steroids    C3 Glomerulonephropathy  - c/w tacrolimus, cellcept    CAD  - c/w statin, metoprolol, imdur    HTN  - c/w metoprolol, nifedipine    Hypothyroidism  - c/w synthroid    DM II  - FS uncontrolled  - adjust insulin    Stage 2 right buttock pressure ulcer  - present on admission  - c/w local wound care    Acute on chronic anemia  - s/p 1 U PRBC      Chronic afib  - c/w nifedipine, metoprolol    COPD  - c/w inhlaers    DVT px  from home       83 yo M PMHx chronic HFrEF, chronic A.fib (on Eliquis), CAD s/p CABG x 3, PVD, COPD, 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     SOB/ AHRF  Sepsis criteria met on admission but pt remains stable off abx and procal low, could be SIRS criteria due to aspiration pneumonitis  - bibasilar opacities remain on CXR from today  - likely due to aspiration pneumonia and acute on chronic HFrEF  - c/w Bumex/hypertonic saline  - pt has large urine output  - speech and swallow appreciated  - pt stable off abx  - Echo 08/01: 45-50%EF  - CXR shows improved b/l pleural effusions with pulmonary congestion and opacities but awaiting final read  - proBNP > 50K  - c/w prednisone  - I spke with pulm attending today, may due thoracentesis depending on CXR results. Eliquis on hold    Epistaxis  - resolved  - ENT follow up appreciated, packing removed    Leukocytosis  - was uptrending likely from steroids    C3 Glomerulonephropathy  - c/w tacrolimus, cellcept    CAD  - c/w statin, metoprolol, imdur    HTN  - c/w metoprolol, nifedipine    Hypothyroidism  - c/w synthroid    DM II  - FS uncontrolled  - adjust insulin    Stage 2 right buttock pressure ulcer  - present on admission  - c/w local wound care    Acute on chronic anemia  - s/p 1 U PRBC      Chronic afib  - c/w nifedipine, metoprolol    COPD  - c/w inhlaers    DVT px  from home    #Progress Note Handoff:  Pending (specify):  Diuresis  Family discussion: spoke with pt regardin gpossible thoracentesis  Disposition: Home___/SNF___/Other________/Unknown at this time___x_____

## 2022-08-25 NOTE — PROGRESS NOTE ADULT - ASSESSMENT
81 yo male with PMHx of 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 Found to be hypoxic to 89%- placed on BiPAP with improvement, now on 3L NC. Found to be fluid overloaded, currently receiving IV diuretics. Suspected PNA, IV antibiotics given. Patient having epistaxis while on Eliquis.     HFrEF acute on chronic/ BRENDA on CIE1y-9/   - cont bumex IV may need to add metolazone   - on Lasix and Zaroxolyn at home  - creat stable at baseline  - BMP q12h keep Mg>2 K>4  - kidney and bladder sono ?mild left hydro  - UOP noted better   - UA urine protein/ creat ratio  - C3 glomerulopathy cont prograf and Cellcept  - Fk levels noted doubt true trough / repeat       will follow

## 2022-08-25 NOTE — PROGRESS NOTE ADULT - SUBJECTIVE AND OBJECTIVE BOX
Nephrology progress note    Patient is seen and examined earlier today, events over the last 24 h noted .    Allergies:  Ozempic (0.25 mg or 0.5 mg dose) (Hives; Rash)  streptomycin (Unknown)  Trulicity Pen (Hives; Rash)    Hospital Medications:   MEDICATIONS  (STANDING):  albuterol/ipratropium for Nebulization 3 milliLiter(s) Nebulizer every 6 hours  atorvastatin 40 milliGRAM(s) Oral at bedtime  BACItracin   Ointment 1 Application(s) Topical two times a day  bacitracin/polymyxin B Ointment 1 Application(s) Topical two times a day  buMETAnide Infusion 1 mG/Hr (5 mL/Hr) IV Continuous <Continuous>  dextrose 5%. 1000 milliLiter(s) (100 mL/Hr) IV Continuous <Continuous>  dextrose 5%. 1000 milliLiter(s) (50 mL/Hr) IV Continuous <Continuous>  dextrose 50% Injectable 25 Gram(s) IV Push once  dextrose 50% Injectable 12.5 Gram(s) IV Push once  dextrose 50% Injectable 25 Gram(s) IV Push once  famotidine    Tablet 20 milliGRAM(s) Oral daily  glucagon  Injectable 1 milliGRAM(s) IntraMuscular once  insulin glargine Injectable (LANTUS) 22 Unit(s) SubCutaneous at bedtime  insulin lispro (ADMELOG) corrective regimen sliding scale   SubCutaneous three times a day before meals  insulin lispro Injectable (ADMELOG) 7 Unit(s) SubCutaneous three times a day before meals  isosorbide   mononitrate ER Tablet (IMDUR) 30 milliGRAM(s) Oral daily  levothyroxine 150 MICROGram(s) Oral daily  melatonin 5 milliGRAM(s) Oral at bedtime  metoprolol succinate ER 50 milliGRAM(s) Oral daily  multivitamin/minerals 1 Tablet(s) Oral daily  mycophenolate mofetil 500 milliGRAM(s) Oral four times a day  NIFEdipine XL 30 milliGRAM(s) Oral daily  pentoxifylline 400 milliGRAM(s) Oral two times a day  predniSONE   Tablet 20 milliGRAM(s) Oral daily  sodium chloride 3%. 150 milliLiter(s) (50 mL/Hr) IV Continuous <Continuous>  sodium chloride 3%. 150 milliLiter(s) (50 mL/Hr) IV Continuous <Continuous>  tacrolimus 0.5 milliGRAM(s) Oral two times a day        VITALS:  T(F): 96.8 (22 @ 19:46), Max: 97 (22 @ 14:18)  HR: 81 (22 @ 19:46)  BP: 161/76 (22 @ 19:46)  RR: 18 (22 @ 14:18)  SpO2: --  Wt(kg): --     @ 07:01  -   @ 07:00  --------------------------------------------------------  IN: 487 mL / OUT: 800 mL / NET: -313 mL     @ 07:01  -   @ 07:00  --------------------------------------------------------  IN: 854 mL / OUT: 3400 mL / NET: -2546 mL     @ 07:01  -   @ 21:48  --------------------------------------------------------  IN: 940 mL / OUT: 1000 mL / NET: -60 mL          PHYSICAL EXAM:  Constitutional: NAD, on FM  HEENT: anicteric sclera, oropharynx clear, MMM  Neck: No JVD  Respiratory BS decreased at basesd  Cardiovascular: S1, S2, RRR  Gastrointestinal: BS+, soft, NT/ND  Extremities: No peripheral edema  Neurological: A/O x 3  : No CVA tenderness. + davalos.   Skin: No rashes  Vascular Access:    LABS:      139  |  97<L>  |  88<HH>  ----------------------------<  318<H>  4.9   |  31  |  1.6<H>    Ca    8.5      25 Aug 2022 06:04  Phos  2.9       Mg     2.2     -    TPro  6.3  /  Alb  2.8<L>  /  TBili  0.7  /  DBili      /  AST  12  /  ALT  9   /  AlkPhos  92  08-                          8.5    13.99 )-----------( 249      ( 25 Aug 2022 06:04 )             27.1       Urine Studies:  Urinalysis Basic - ( 20 Aug 2022 21:09 )    Color: Yellow / Appearance: Clear / S.018 / pH:   Gluc:  / Ketone: Negative  / Bili: Negative / Urobili: <2 mg/dL   Blood:  / Protein: 30 mg/dL / Nitrite: Negative   Leuk Esterase: Negative / RBC: 4 /HPF / WBC 2 /HPF   Sq Epi:  / Non Sq Epi: 1 /HPF / Bacteria: Negative      Sodium, Random Urine: 34.0 mmoL/L ( @ 21:09)  Creatinine, Random Urine: 79 mg/dL ( @ 21:09)  Protein/Creatinine Ratio Calculation: 0.3 Ratio ( @ 21:09)    RADIOLOGY & ADDITIONAL STUDIES:

## 2022-08-25 NOTE — PROGRESS NOTE ADULT - ASSESSMENT
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     # SOB/ AHRF  Sepsis criteria met on admission but pt remains stable off abx and procal low, could be SIRS criteria due to aspiration pneumonitis  - bibasilar opacities remain on CXR from today  - c/w Bumex/hypertonic saline  - Echo 08/01: 45-50%EF  - CXR shows b/l pleural effusions with pulmonary congestion and opacities.   - c/w prednisone, Eliquis on hold  - Give Bumex 2 mg IV push, then start a Bumex gtt at 1 mg/hr  - BMP twice daily with magnesium   - Maintain potassium >4.0, Mg >2.2  - Continue nifedipine 30mg daily, Imdur 30mg daily, and metoprolol succinate 50mg daily for now  - BiPAP at night - ween before discharge if able  - Strict intake and output needed for proper diuretic management     # Epistaxis  - Resolved, patient removed his packing today  - ENT PA saw the patient and gave recommendations      # Leukocytosis  - up trending likely from steroids  - WBC 13K due to prednisolone    # C3 Glomerulonephropathy  - c/w tacrolimus, cellcept    # CAD  - c/w statin, metoprolol, imdur    # HTN  - c/w metoprolol, nifedipine    # Hypothyroidism  - c/w synthroid    # DM II  - FS uncontrolled  - adjust insulin    # Stage 2 right buttock pressure ulcer  - present on admission  - c/w local wound care    Acute on chronic anemia  - s/p 1 U PRBC      Chronic afib  - c/w nifedipine, metoprolol    COPD  - c/w inhlaers    DVT px  from home

## 2022-08-25 NOTE — SWALLOW BEDSIDE ASSESSMENT ADULT - SWALLOW EVAL: DIAGNOSIS
Pt consumed and overtly tolerated minced and moist textures and mildly thick liquids using bolus hold, and multiple swallows. Pt consumed and overtly tolerated puree  and mildly thick liquids using bolus hold, and multiple swallows.

## 2022-08-26 NOTE — PROGRESS NOTE ADULT - ASSESSMENT
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     SOB/ AHRF  Sepsis criteria met on admission but pt remains stable off abx and procal low, could be SIRS criteria due to aspiration pneumonitis  - bibasilar opacities remain on CXR from today  - likely due to aspiration pneumonia and acute on chronic HFrEF  - c/w Bumex/hypertonic saline  - pt has large urine output  - speech and swallow appreciated  - pt stable off abx  - Echo 08/01: 45-50%EF  - CXR shows improved b/l pleural effusions with pulmonary congestion and opacities but awaiting final read  - proBNP > 50K  - c/w prednisone  - thoracentesis today    Epistaxis  - resolved  - ENT follow up appreciated, packing removed    Leukocytosis  - was uptrending likely from steroids    C3 Glomerulonephropathy  - c/w tacrolimus (level 9.5), cellcept    CAD  - c/w statin, metoprolol, imdur    HTN  - c/w metoprolol, nifedipine    Hypothyroidism  - c/w synthroid    DM II  - FS uncontrolled  - adjust insulin    Stage 2 right buttock pressure ulcer  - present on admission  - c/w local wound care    Acute on chronic anemia  - s/p 1 U PRBC      Chronic afib  - c/w nifedipine, metoprolol    COPD  - c/w inhlaers    DVT px  from home    #Progress Note Handoff:  Pending (specify):  Diuresis  Family discussion:   Disposition: Home___/SNF___/Other________/Unknown at this time___x_____

## 2022-08-26 NOTE — PROGRESS NOTE ADULT - SUBJECTIVE AND OBJECTIVE BOX
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  AF (atrial fibrillation) s/p DCCV  Carotid stenosis  COPD (chronic obstructive pulmonary disease)  H/O coronary artery bypass surgery  S/P inguinal hernia repair  History of appendectomy    Social History:  Social History:  Past smoker  No alcohol  No drugs (20 Aug 2022 16:17)      Allergies:  Ozempic (0.25 mg or 0.5 mg dose) (Hives; Rash)  streptomycin (Unknown)  Trulicity Pen (Hives; Rash)      Patient is a 82y old Male who presents with a chief complaint of chf exacerbation (26 Aug 2022 11:14)  Primary diagnosis of FLUID OVERLOAD; BRENDA (ACUTE KIDNEY INJURY); ELEVATED TROPONIN LEVEL; ANEMIA        Progress Note  This morning patient was seen and examined at bedside.    Today is hospital day 6d.  Mr. Tavarez is complaining from persistent shortness of breath  Reports having good night sleep. Appetite is decreased, and denies nausea or vomiting. Denies any abdominal pain, diarrhea, or constipation.   Bedridden and denies any shortness of breath, chest pain, palpitations, or light headedness.  Voiding freely and denies urinary symptoms (dysuria, urgency, frequency, intermittence).      Vital Signs in the last 24 hours   Vitals Summary T(C): 36.1 (08-26-22 @ 04:03), Max: 36.1 (08-25-22 @ 14:18)  HR: 78 (08-26-22 @ 04:03) (78 - 89)  BP: 121/93 (08-26-22 @ 04:03) (121/93 - 161/76)  RR: 18 (08-25-22 @ 14:18) (18 - 18)  SpO2: --  Vent Data   Intake/ Output   08-25-22 @ 07:01  -  08-26-22 @ 07:00  --------------------------------------------------------  IN: 1145 mL / OUT: 3600 mL / NET: -2455 mL    08-26-22 @ 07:01  -  08-26-22 @ 11:43  --------------------------------------------------------  IN: 237 mL / OUT: 0 mL / NET: 237 mL          Physical Exam  * General Appearance: Alert, cooperative, interactive, well-groomed, oriented to time, place, and person, fatigued  * Lungs: Fair bilateral air entry, decreased bibasilar breath sound. diffuse crackles  * Chest Wall: No tenderness or deformity  * Heart: Regular Rate and Rhythm, normal S1 and S2  * Abdomen: Symmetric, soft, non-tender, bowel sounds active all four quadrants  * Extremities: Extremities normal, atraumatic, no cyanosis, 2+ lower extremity pitting edema bilaterally      Investigations   Laboratory Workup  - CBC:                        8.9    12.34 )-----------( 222      ( 26 Aug 2022 04:42 )             28.3     - Chemistry:  08-26    134<L>  |  95<L>  |  83<HH>  ----------------------------<  161<H>  4.2   |  29  |  1.2    Ca    8.5      26 Aug 2022 04:42  Mg     1.8     08-26      - Coagulation Studies:    - ABG:  ABG - ( 24 Aug 2022 20:18 )  pH, Arterial: 7.39  pH, Blood: x     /  pCO2: 50    /  pO2: 113   / HCO3: 30    / Base Excess: 4.6   /  SaO2: 99.0      Current Medications  Standing Medications  albuterol/ipratropium for Nebulization 3 milliLiter(s) Nebulizer every 6 hours  atorvastatin 40 milliGRAM(s) Oral at bedtime  BACItracin   Ointment 1 Application(s) Topical two times a day  buMETAnide Infusion 1 mG/Hr (5 mL/Hr) IV Continuous <Continuous>  dextrose 5%. 1000 milliLiter(s) (50 mL/Hr) IV Continuous <Continuous>  dextrose 5%. 1000 milliLiter(s) (100 mL/Hr) IV Continuous <Continuous>  dextrose 50% Injectable 25 Gram(s) IV Push once  dextrose 50% Injectable 12.5 Gram(s) IV Push once  dextrose 50% Injectable 25 Gram(s) IV Push once  famotidine    Tablet 20 milliGRAM(s) Oral daily  glucagon  Injectable 1 milliGRAM(s) IntraMuscular once  insulin glargine Injectable (LANTUS) 22 Unit(s) SubCutaneous at bedtime  insulin lispro (ADMELOG) corrective regimen sliding scale   SubCutaneous three times a day before meals  insulin lispro Injectable (ADMELOG) 7 Unit(s) SubCutaneous three times a day before meals  isosorbide   mononitrate ER Tablet (IMDUR) 30 milliGRAM(s) Oral daily  levothyroxine 150 MICROGram(s) Oral daily  magnesium sulfate  IVPB 2 Gram(s) IV Intermittent once  melatonin 5 milliGRAM(s) Oral at bedtime  metoprolol succinate ER 50 milliGRAM(s) Oral daily  multivitamin/minerals 1 Tablet(s) Oral daily  mycophenolate mofetil 500 milliGRAM(s) Oral four times a day  NIFEdipine XL 30 milliGRAM(s) Oral daily  pentoxifylline 400 milliGRAM(s) Oral two times a day  sodium chloride 3%. 150 milliLiter(s) (50 mL/Hr) IV Continuous <Continuous>  sodium chloride 3%. 150 milliLiter(s) (50 mL/Hr) IV Continuous <Continuous>  sodium chloride 3%. 150 milliLiter(s) (50 mL/Hr) IV Continuous <Continuous>  tacrolimus 0.5 milliGRAM(s) Oral two times a day    PRN Medications  acetaminophen     Tablet .. 650 milliGRAM(s) Oral once PRN Moderate Pain (4 - 6)  dextrose Oral Gel 15 Gram(s) Oral once PRN Blood Glucose LESS THAN 70 milliGRAM(s)/deciliter  dextrose Oral Gel 15 Gram(s) Oral once PRN Blood Glucose LESS THAN 70 milliGRAM(s)/deciliter    Singles Doses Administered  (ADM OVERRIDE) 1 each &lt;see task&gt; GiveOnce  (ADM OVERRIDE) 1 each &lt;see task&gt; GiveOnce  (Floorstock) 1 each &lt;see task&gt; GiveOnce  (Floorstock) 1 each &lt;see task&gt; GiveOnce  (Floorstock) 1 each &lt;see task&gt; GiveOnce  (Floorstock) 1 each &lt;see task&gt; GiveOnce  (Floorstock) 1 each &lt;see task&gt; GiveOnce  BACItracin   Ointment 1 Application(s) Topical once  benzonatate 100 milliGRAM(s) Oral once  cefepime   IVPB 2000 milliGRAM(s) IV Intermittent once  furosemide   Injectable 80 milliGRAM(s) IV Push Once  insulin lispro Injectable (ADMELOG). 8 Unit(s) SubCutaneous once  levoFLOXacin IVPB 750 milliGRAM(s) IV Intermittent once  magnesium sulfate  IVPB 2 Gram(s) IV Intermittent once  melatonin 5 milliGRAM(s) Oral once PRN  sodium chloride 3%. 150 milliLiter(s) IV Continuous <Continuous>  sodium chloride 3%. 150 milliLiter(s) IV Continuous <Continuous>  sodium chloride 3%. 150 milliLiter(s) IV Continuous <Continuous>

## 2022-08-26 NOTE — PROGRESS NOTE ADULT - SUBJECTIVE AND OBJECTIVE BOX
CHIEF COMPLAINT:    Patient is a 82y old  Male who presents with a chief complaint of chf exacerbation     INTERVAL HPI/OVERNIGHT EVENTS:    Patient seen and examined at bedside. No acute overnight events occurred.    ROS: Denies SOB. All other systems are negative.    Medications:  Standing  albuterol/ipratropium for Nebulization 3 milliLiter(s) Nebulizer every 6 hours  apixaban 2.5 milliGRAM(s) Oral two times a day  atorvastatin 40 milliGRAM(s) Oral at bedtime  BACItracin   Ointment 1 Application(s) Topical two times a day  buMETAnide Infusion 1 mG/Hr IV Continuous <Continuous>  dextrose 5%. 1000 milliLiter(s) IV Continuous <Continuous>  dextrose 5%. 1000 milliLiter(s) IV Continuous <Continuous>  dextrose 50% Injectable 25 Gram(s) IV Push once  dextrose 50% Injectable 12.5 Gram(s) IV Push once  dextrose 50% Injectable 25 Gram(s) IV Push once  famotidine    Tablet 20 milliGRAM(s) Oral daily  glucagon  Injectable 1 milliGRAM(s) IntraMuscular once  insulin glargine Injectable (LANTUS) 22 Unit(s) SubCutaneous at bedtime  insulin lispro (ADMELOG) corrective regimen sliding scale   SubCutaneous three times a day before meals  insulin lispro Injectable (ADMELOG) 7 Unit(s) SubCutaneous three times a day before meals  isosorbide   mononitrate ER Tablet (IMDUR) 30 milliGRAM(s) Oral daily  levothyroxine 150 MICROGram(s) Oral daily  lidocaine 1% (Preservative-free) Injectable 30 milliLiter(s) Local Injection once  melatonin 5 milliGRAM(s) Oral at bedtime  metoprolol succinate ER 50 milliGRAM(s) Oral daily  multivitamin/minerals 1 Tablet(s) Oral daily  mycophenolate mofetil 500 milliGRAM(s) Oral four times a day  NIFEdipine XL 30 milliGRAM(s) Oral daily  pentoxifylline 400 milliGRAM(s) Oral two times a day  sodium chloride 3%. 150 milliLiter(s) IV Continuous <Continuous>  sodium chloride 3%. 150 milliLiter(s) IV Continuous <Continuous>  sodium chloride 3%. 150 milliLiter(s) IV Continuous <Continuous>  tacrolimus 0.5 milliGRAM(s) Oral two times a day    PRN Meds  dextrose Oral Gel 15 Gram(s) Oral once PRN  dextrose Oral Gel 15 Gram(s) Oral once PRN        Vital Signs:    T(F): 96.8 (22 @ 12:31), Max: 97 (22 @ 04:03)  HR: 98 (22 @ 12:31) (78 - 98)  BP: 145/71 (22 @ 12:31) (121/93 - 161/76)  RR: 18 (22 @ 12:31) (18 - 18)  SpO2: --  I&O's Summary    25 Aug 2022 07:01  -  26 Aug 2022 07:00  --------------------------------------------------------  IN: 1145 mL / OUT: 3600 mL / NET: -2455 mL    26 Aug 2022 07:01  -  26 Aug 2022 15:48  --------------------------------------------------------  IN: 237 mL / OUT: 1300 mL / NET: -1063 mL      Daily     Daily Weight in k (26 Aug 2022 04:03)  CAPILLARY BLOOD GLUCOSE      POCT Blood Glucose.: 178 mg/dL (26 Aug 2022 11:33)  POCT Blood Glucose.: 171 mg/dL (26 Aug 2022 07:34)  POCT Blood Glucose.: 257 mg/dL (25 Aug 2022 21:54)  POCT Blood Glucose.: 356 mg/dL (25 Aug 2022 17:39)      PHYSICAL EXAM:  GENERAL:  NAD  SKIN: No rashes or lesions  HEENT: Atraumatic. Normocephalic. Anicteric  NECK:  No JVD.   PULMONARY: Bibasilar crackles  CVS: Normal S1, S2. Regular rate and rhythm. No murmurs.  ABDOMEN/GI: Soft, Nontender, Nondistended; Bowel sounds are present  EXTREMITIES:  2+ pitting  edema B/L LE.  NEUROLOGIC:  No motor deficit.  PSYCH: Alert & oriented x 3, normal affect      LABS:                        8.9    12.34 )-----------( 222      ( 26 Aug 2022 04:42 )             28.3         134<L>  |  95<L>  |  83<HH>  ----------------------------<  161<H>  4.2   |  29  |  1.2    Ca    8.5      26 Aug 2022 04:42  Mg     1.8             Serum Pro-Brain Natriuretic Peptide: 35525 pg/mL (22 @ 11:47)          RADIOLOGY & ADDITIONAL TESTS:  Imaging or report Personally Reviewed:  [ ] YES  [ ] NO -->no new images    Telemetry reviewed independently - NSR, no acute events  EKG reviewed independently -->no new EKGs    Consultant(s) Notes Reviewed:  [ ] YES  [ ] NO  Care Discussed with Consultants/Other Providers [ ] YES  [ ] NO    Case discussed with resident  Care discussed with pt

## 2022-08-26 NOTE — PROGRESS NOTE ADULT - ASSESSMENT
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     SOB/ AHRF  Bilateral Pleural Effusion  COPD  - c/w inhlaers and - c/w prednisone  - Sepsis criteria met on admission but pt remains stable off abx and procal low, could be SIRS criteria due to aspiration pneumonitis  - bibasilar opacities remain on CXR from today  - likely due to aspiration pneumonia and acute on chronic HFrEF  - c/w Bumex/hypertonic saline. consider metolazone if no improvement  - s/p paracentesis 1.5 Liters removal   - Negative balance  - Echo 08/01: 45-50%EF  - proBNP > 50K    - Resume eliquis according to CXR    Epistaxis  - resolved  - ENT follow up appreciated, packing removed    C3 Glomerulonephropathy  - c/w tacrolimus, cellcept    Chronic afib  - c/w metoprolol  - Resume eliquis today if CXr normal    CAD, HTN  DM II  - FS uncontrolled  - adjust insulin  - c/w statin, metoprolol, imdur, nifedipine    Hypothyroidism  - c/w synthroid    Stage 2 right buttock pressure ulcer  - present on admission  - c/w local wound care    Normocytic Anemia  - Borderline iron studies  - s/p 1 U PRBC    DVT px  Pending: Diuresis, cXr to resume eliquis  Dispo: from home

## 2022-08-26 NOTE — PROGRESS NOTE ADULT - SUBJECTIVE AND OBJECTIVE BOX
Over Night Events: events noted, co SOB on FM    PHYSICAL EXAM    ICU Vital Signs Last 24 Hrs  T(C): 36.1 (26 Aug 2022 04:03), Max: 36.1 (25 Aug 2022 14:18)  T(F): 97 (26 Aug 2022 04:03), Max: 97 (25 Aug 2022 14:18)  HR: 78 (26 Aug 2022 04:03) (78 - 89)  BP: 121/93 (26 Aug 2022 04:03) (121/93 - 161/76)  BP(mean): --  RR: 18 (25 Aug 2022 14:18) (18 - 18)  SpO2:94%        General: ILL looking  Lungs: dec bs r side  Cardiovascular: MEKHI 2/6  Abdomen: Soft, Positive BS  Extremities: No clubbing   Neurological: Non focal       08-25-22 @ 07:01  -  08-26-22 @ 07:00  --------------------------------------------------------  IN:    Bumetanide: 95 mL    Oral Fluid: 600 mL    sodium chloride 3%: 150 mL    sodium chloride 3%: 300 mL  Total IN: 1145 mL    OUT:    Indwelling Catheter - Urethral (mL): 2100 mL    Voided (mL): 1500 mL  Total OUT: 3600 mL    Total NET: -2455 mL          LABS:                          8.9    12.34 )-----------( 222      ( 26 Aug 2022 04:42 )             28.3                                               08-26    134<L>  |  95<L>  |  83<HH>  ----------------------------<  161<H>  4.2   |  29  |  1.2    Ca    8.5      26 Aug 2022 04:42  Mg     1.8     08-26                                                                                                                                                                                                                       ABG - ( 24 Aug 2022 20:18 )  pH, Arterial: 7.39  pH, Blood: x     /  pCO2: 50    /  pO2: 113   / HCO3: 30    / Base Excess: 4.6   /  SaO2: 99.0                MEDICATIONS  (STANDING):  albuterol/ipratropium for Nebulization 3 milliLiter(s) Nebulizer every 6 hours  atorvastatin 40 milliGRAM(s) Oral at bedtime  BACItracin   Ointment 1 Application(s) Topical two times a day  buMETAnide Infusion 1 mG/Hr (5 mL/Hr) IV Continuous <Continuous>  dextrose 5%. 1000 milliLiter(s) (50 mL/Hr) IV Continuous <Continuous>  dextrose 5%. 1000 milliLiter(s) (100 mL/Hr) IV Continuous <Continuous>  dextrose 50% Injectable 25 Gram(s) IV Push once  dextrose 50% Injectable 12.5 Gram(s) IV Push once  dextrose 50% Injectable 25 Gram(s) IV Push once  famotidine    Tablet 20 milliGRAM(s) Oral daily  glucagon  Injectable 1 milliGRAM(s) IntraMuscular once  insulin glargine Injectable (LANTUS) 22 Unit(s) SubCutaneous at bedtime  insulin lispro (ADMELOG) corrective regimen sliding scale   SubCutaneous three times a day before meals  insulin lispro Injectable (ADMELOG) 7 Unit(s) SubCutaneous three times a day before meals  isosorbide   mononitrate ER Tablet (IMDUR) 30 milliGRAM(s) Oral daily  levothyroxine 150 MICROGram(s) Oral daily  melatonin 5 milliGRAM(s) Oral at bedtime  metoprolol succinate ER 50 milliGRAM(s) Oral daily  multivitamin/minerals 1 Tablet(s) Oral daily  mycophenolate mofetil 500 milliGRAM(s) Oral four times a day  NIFEdipine XL 30 milliGRAM(s) Oral daily  pentoxifylline 400 milliGRAM(s) Oral two times a day  predniSONE   Tablet 20 milliGRAM(s) Oral daily  sodium chloride 3%. 150 milliLiter(s) (50 mL/Hr) IV Continuous <Continuous>  sodium chloride 3%. 150 milliLiter(s) (50 mL/Hr) IV Continuous <Continuous>  sodium chloride 3%. 150 milliLiter(s) (50 mL/Hr) IV Continuous <Continuous>  tacrolimus 0.5 milliGRAM(s) Oral two times a day    MEDICATIONS  (PRN):  acetaminophen     Tablet .. 650 milliGRAM(s) Oral once PRN Moderate Pain (4 - 6)  dextrose Oral Gel 15 Gram(s) Oral once PRN Blood Glucose LESS THAN 70 milliGRAM(s)/deciliter  dextrose Oral Gel 15 Gram(s) Oral once PRN Blood Glucose LESS THAN 70 milliGRAM(s)/deciliter      Xrays:                                                                                     ECHO

## 2022-08-26 NOTE — PROGRESS NOTE ADULT - ASSESSMENT
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 Found to be hypoxic to 89%- placed on BiPAP with improvement, now on 3L NC. Found to be fluid overloaded, currently receiving IV diuretics. Suspected PNA, IV antibiotics given. Patient having epistaxis while on Eliquis.     HFrEF acute on chronic/ BRENDA on WGQ9d-3/   - cont bumex IV may need to add metolazone   - on Lasix and Zaroxolyn at home  - creat stable at baseline  - BMP q12h keep Mg>2 K>4  - kidney and bladder sono ?mild left hydro  - UOP noted better   - UA urine protein/ creat ratio  - C3 glomerulopathy cont prograf and Cellcept  - Fk levels noted doubt true trough / repeat       will follow

## 2022-08-26 NOTE — PROGRESS NOTE ADULT - SUBJECTIVE AND OBJECTIVE BOX
Nephrology progress note    Patient is seen and examined, events over the last 24 h noted .  Pt is thirsty asking for ice  Allergies:  Ozempic (0.25 mg or 0.5 mg dose) (Hives; Rash)  streptomycin (Unknown)  Trulicity Pen (Hives; Rash)    Hospital Medications:   MEDICATIONS  (STANDING):  albuterol/ipratropium for Nebulization 3 milliLiter(s) Nebulizer every 6 hours  atorvastatin 40 milliGRAM(s) Oral at bedtime  BACItracin   Ointment 1 Application(s) Topical two times a day  buMETAnide Infusion 1 mG/Hr (5 mL/Hr) IV Continuous <Continuous>  dextrose 5%. 1000 milliLiter(s) (50 mL/Hr) IV Continuous <Continuous>  dextrose 5%. 1000 milliLiter(s) (100 mL/Hr) IV Continuous <Continuous>  dextrose 50% Injectable 25 Gram(s) IV Push once  dextrose 50% Injectable 12.5 Gram(s) IV Push once  dextrose 50% Injectable 25 Gram(s) IV Push once  famotidine    Tablet 20 milliGRAM(s) Oral daily  glucagon  Injectable 1 milliGRAM(s) IntraMuscular once  insulin glargine Injectable (LANTUS) 22 Unit(s) SubCutaneous at bedtime  insulin lispro (ADMELOG) corrective regimen sliding scale   SubCutaneous three times a day before meals  insulin lispro Injectable (ADMELOG) 7 Unit(s) SubCutaneous three times a day before meals  isosorbide   mononitrate ER Tablet (IMDUR) 30 milliGRAM(s) Oral daily  levothyroxine 150 MICROGram(s) Oral daily  magnesium sulfate  IVPB 2 Gram(s) IV Intermittent once  melatonin 5 milliGRAM(s) Oral at bedtime  metoprolol succinate ER 50 milliGRAM(s) Oral daily  multivitamin/minerals 1 Tablet(s) Oral daily  mycophenolate mofetil 500 milliGRAM(s) Oral four times a day  NIFEdipine XL 30 milliGRAM(s) Oral daily  pentoxifylline 400 milliGRAM(s) Oral two times a day  sodium chloride 3%. 150 milliLiter(s) (50 mL/Hr) IV Continuous <Continuous>  sodium chloride 3%. 150 milliLiter(s) (50 mL/Hr) IV Continuous <Continuous>  sodium chloride 3%. 150 milliLiter(s) (50 mL/Hr) IV Continuous <Continuous>  tacrolimus 0.5 milliGRAM(s) Oral two times a day        VITALS:  T(F): 97 (22 @ 04:03), Max: 97 (22 @ 14:18)  HR: 78 (22 @ 04:03)  BP: 121/93 (22 @ 04:03)  RR: 18 (22 @ 14:18)  SpO2: --  Wt(kg): --     @ 07:01  -   @ 07:00  --------------------------------------------------------  IN: 854 mL / OUT: 3400 mL / NET: -2546 mL     @ 07:01  -   @ 07:00  --------------------------------------------------------  IN: 1145 mL / OUT: 3600 mL / NET: -2455 mL     @ 07:01  -   @ 11:14  --------------------------------------------------------  IN: 237 mL / OUT: 0 mL / NET: 237 mL          PHYSICAL EXAM:  Constitutional: NAD  HEENT: anicteric sclera, MMM  Neck: No JVD  Respiratory: decreased BS b/l  Cardiovascular: S1, S2, RRR  Gastrointestinal: BS+, soft, NT/ND  Extremities: No peripheral edema  Neurological: A/O x 3  : Has anoop.   Skin: No rashes  Vascular Access:    LABS:      134<L>  |  95<L>  |  83<HH>  ----------------------------<  161<H>  4.2   |  29  |  1.2    Ca    8.5      26 Aug 2022 04:42  Mg     1.8                                 8.9    12.34 )-----------( 222      ( 26 Aug 2022 04:42 )             28.3       Urine Studies:  Urinalysis Basic - ( 20 Aug 2022 21:09 )    Color: Yellow / Appearance: Clear / S.018 / pH:   Gluc:  / Ketone: Negative  / Bili: Negative / Urobili: <2 mg/dL   Blood:  / Protein: 30 mg/dL / Nitrite: Negative   Leuk Esterase: Negative / RBC: 4 /HPF / WBC 2 /HPF   Sq Epi:  / Non Sq Epi: 1 /HPF / Bacteria: Negative      Sodium, Random Urine: 34.0 mmoL/L ( @ 21:09)  Creatinine, Random Urine: 79 mg/dL ( @ 21:09)  Protein/Creatinine Ratio Calculation: 0.3 Ratio ( @ 21:09)    RADIOLOGY & ADDITIONAL STUDIES:  < from: Xray Chest 1 View- PORTABLE-Routine (Xray Chest 1 View- PORTABLE-Routine .) (22 @ 10:47) >  mpression:    Stable bilateral opacities/effusions.    < end of copied text >

## 2022-08-26 NOTE — PROGRESS NOTE ADULT - ASSESSMENT
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 Found to be hypoxic to 89%- placed on BiPAP with improvement, now on venti-mask (can not tolerate NC 2/2 epistax). Found to be fluid overloaded, currently receiving high dose IV diuretics. Suspected PNA, IV antibiotics given (now d/c'd). Patient having epistaxis while on Eliquis- now holding, epistaxis improved. Pulmonology following- s/p thoracentesis today, 1.5L removed.     Plan:  Patient remains fluid overloaded on exam- POCUS exam: IVC 2.5cm, some collapsibility noted with respirations but < 50%  Continue Bumex gtt at 1 mg/hr  Continue 3% Hypertonic Saline 150ml BID (If infused throughout a central line, run over one hour, if a peripheral line is to be used run over 3 hours)   BMP twice daily with magnesium   Mag 1.8- replete  Maintain potassium >4.0, Mg >2.2  Continue nifedipine 30mg daily, imdur 30mg daily, and metoprolol succinate 50mg daily for now  Continue to hold Eliquis due to epistaxis   BiPAP at night - wean before discharge if able  Strict intake and output needed for proper diuretic management   Daily weight   Nephrology consult pending- family requesting own nephrologist Dr. 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 Found to be hypoxic to 89%- placed on BiPAP with improvement, now on venti-mask (can not tolerate NC 2/2 epistax). Found to be fluid overloaded, currently receiving high dose IV diuretics. Suspected PNA, IV antibiotics given (now d/c'd). Patient having epistaxis while on Eliquis- now holding, epistaxis improved. Pulmonology following- s/p thoracentesis today, 1.5L removed.     Plan:  Patient remains fluid overloaded on exam- POCUS exam: IVC 2.5cm, some collapsibility noted with respirations but < 50%  Continue Bumex gtt at 1 mg/hr  Continue 3% Hypertonic Saline 150ml BID (If infused throughout a central line, run over one hour, if a peripheral line is to be used run over 3 hours)   BMP twice daily with magnesium   Mag 1.8- replete  Maintain potassium >4.0, Mg >2.2  Continue nifedipine 30mg daily and imdur 30mg daily (HF f/u next week- consider starting Entresto?)  Frequent beats of VT noted on telemetry- increase metoprolol succinate to 100mg daily (from 50mg)  s/p R thoracentesis- pulmonology f/u to eval for possible L thoracentesis?   If no further pulm intervention needed, continue to hold Eliquis until oxygen requirements improve   BiPAP at night - wean before discharge if able  Strict intake and output needed for proper diuretic management   Daily weight   Nephrology following- family requesting own nephrologist Dr. 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 Found to be hypoxic to 89%- placed on BiPAP with improvement, now on venti-mask (can not tolerate NC 2/2 epistax). Found to be fluid overloaded, currently receiving high dose IV diuretics. Suspected PNA, IV antibiotics given (now d/c'd). Patient having epistaxis while on Eliquis- now holding, epistaxis improved. Pulmonology following- s/p thoracentesis today, 1.5L removed.     Plan:  Patient remains fluid overloaded on exam- POCUS exam: IVC 2.5cm, some collapsibility noted with respirations but < 50%  Weekend coverage: if Cr > 1.8 on labs- send message to notify Dr. Shari Nunez via TEAMS   Continue Bumex gtt at 1 mg/hr until 9pm tomorrow 8/27   Continue 3% Hypertonic Saline 150ml BID (If infused throughout a central line, run over one hour, if a peripheral line is to be used run over 3 hours) until tomorrow evening (give evening dose)  Sunday 8/28- start torsemide 40mg BID   BMP twice daily with magnesium   Mag 1.8- replete  Maintain potassium >4.0, Mg >2.2  Continue nifedipine 30mg daily and imdur 30mg daily (HF f/u next week- consider starting Entresto?)  Frequent beats of VT noted on telemetry- increase metoprolol succinate to 100mg daily (from 50mg)  s/p R thoracentesis today 8/26- pulmonology f/u to eval for possible L thoracentesis?   If no further pulm intervention needed, continue to hold Eliquis until oxygen requirements improve / nasal passages heal    BiPAP at night - wean before discharge if able  Strict intake and output needed for proper diuretic management   Daily weight   Nephrology following- family requesting own nephrologist Dr. Daniel to be contacted as well  Physical therapy consult  Plan discussed with primary team  Will continue to follow

## 2022-08-26 NOTE — PROGRESS NOTE ADULT - ASSESSMENT
IMPRESSION:    Right side anechoic effusion, no left  Acute hypoxemic resp failure on NC  Pul edema  possible aspiration   COPD ( was on prednisone in NH)  anemia SP TX  Pre-renal BRENDA on CKD III -   C3 glomerulopathy - on prograf and cellcept at home  HFrEF - chronic; Mod-severe reduced EF, sev dilated CM; RV dysfxn; severe pulm HTN  AFib, chronic - on eliquis   CAD s/p CABG  H/O DLD, HTN, DM2, Hypothyroidism     PLAN:    - Diuresis keep - balance if no improvement  - REPEAT US Thora if patient / family agrreable  - dec prednisone 10 daily  - NIV at night and PRN  - Aspiration precautions  - Wean O2 as tolerated  - GOC

## 2022-08-26 NOTE — PROGRESS NOTE ADULT - SUBJECTIVE AND OBJECTIVE BOX
Date of Admission: 22    Interval History:  Patient resting in bed, alert and oriented (improved from previous assessment). O2 sat 86% on RA, patient SOB- ventimask replaced (patient had taken mask off to drink and was unable to place it back correctly). Epistaxis appears to have improved since holding Eliquis. s/p thoracentesis today, 1.5L removed.     CHIEF COMPLAINT: Patient is a 82y old  Male who presents with a chief complaint of chf exacerbation (22 Aug 2022 12:24)    HISTORY OF PRESENT ILLNESS: 81 yo male with PMHx of HFrEF (45-50% on Lasix 20 PO), A-Fib on Eliquis, CAD s/p CABGx3, PVD, COPD (was on rare home O2 PRN now on 3L NC since recent July admission for COVID), CKD3, C3 glomerulonephropathy, HTN, HLD, Hypothyroid presents from NH with progressively worsening SOB x5 days and hypoxia.  Recently hospitalized here for a month discharged on 8/3/22 initially for BRENDA on CKD course c/b COVID, bilateral lower lobe pneumonia, delirium and microaspiration of thin liquids on modified barium study. Received Dexamethasone and Zosyn course with new O2 requirements of 3L NC discharged to nursing home on 3L and thick liquid diet for rehab. Due to his SOB, chest x-ray was ordered on 8/15 but results did not come back and patient became increasingly short of breath and was noted to be hypoxic and rehab facility which prompted referral to the ED. Review of symptoms is notable for orthopnea and a chronic dry cough unchanged since his covid illness. He denies paroxysmal nocturnal dyspnea, leg edema, chest pain, palpitations, dizziness, n/v, abd pain. Cardiologist is Dr. Witt and Pulmonolgist is Dr. Rory Mars.   In ED:  T(F): 96.4 (22 @ 11:27), Max: 96.4 (22 @ 11:27)  HR: 80 (22 @ 16:20) (80 - 91)  BP: 137/61 (22 @ 16:20) (121/58 - 137/61)  RR: 18 (22 @ 16:20) (18 - 24)  SpO2: 100% (22 @ 16:20) (89% - 100%) -> 89% on 4L -> placed on NRB -> BIPAP 2/2 tachypnea and hypoxia -> taken off with increased work of breathing placed back on BIPAP  Labs: WBC 12k, Hgb 7.2 (baseline 9), BNP 55k, Cr 2.2 (baseline 1.5), PCO2 47 (VBG), Troponin 0.24  EKG:   Atrial fibrillation with premature ventricular or aberrantly conducted complexes  Left posterior fascicular block   ST & T wave abnormality, consider lateral ischemia - similar to prior in 2022  XR chest: significant bilateral pleural effusions and pulmonary congestion  Admitted to medicine for SOB/hypoxia likely 2/2 acute CHF exacerbation.  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    SOCIAL HISTORY:  Former cigarette smoker, quit >40 years ago. Denies alcohol or drug use.     Allergies    Ozempic (0.25 mg or 0.5 mg dose) (Hives; Rash)  streptomycin (Unknown)  Trulicity Pen (Hives; Rash)    Intolerances      PHYSICAL EXAM:  General Appearance: lethargic, normal for age and gender. 	  Neck: JVP [id number], no bruit.   Eyes: Extra Ocular muscles intact.   Cardiovascular: irregular rhythm S1 S2, + JVD, +2 B/L LE edema  Respiratory: decreased air movement R < L  Psychiatry: Alert, oriented x 3, Mood & affect appropriate  Gastrointestinal:  Soft, Non-tender  Skin/Integumen: No rashes, No ecchymoses, No cyanosis	  Neurologic: Non-focal  Musculoskeletal/ extremities: Normal range of motion, No clubbing, cyanosis,+2 B/L LE edema  Vascular: Peripheral pulses palpable 2+ bilaterally    CARDIAC MARKERS:  Serum Pro-Brain Natriuretic Peptide: 97777 pg/mL (22 @ 11:47)    Serum Pro-Brain Natriuretic Peptide: 84949 pg/mL (10.20.21 @ 10:28)      TELEMETRY EVENTS: 	    EC22    Ventricular Rate 71 BPM  Atrial Rate 82 BPM  QRS Duration 140 ms  Q-T Interval 444 ms  QTC Calculation(Bazett) 482 ms  R Axis 108 degrees  T Axis 166 degrees    Diagnosis Line Atrial fibrillation with premature ventricular or aberrantlyconducted  complexes  Non-specific intra-ventricular conduction block  ST & T wave abnormality, consider lateral ischemia  Abnormal ECG    Confirmed by Sarah Borden MD ([id number]) on 2022 1:35:39 PM      	  PREVIOUS DIAGNOSTIC TESTING:    TTE 22      Summary:   1. Left ventricular ejection fraction, by visual estimation, is 45 to   50%.   2. Mildly decreased global left ventricular systolic function.   3. Mild left ventricular hypertrophy.   4. Multiple left ventricular regional wall motion abnormalities exist.   See wall motion findings.   5. The left ventricular diastolic function could not be assessed in this   study.   6. Moderately enlarged left atrium.   7. Sclerotic aortic valve with normal opening.   8. Degenerative mitral valve.   9. Mild mitral regurgitation.  10. Mild tricuspid regurgitation.  11. Estimated pulmonary artery systolic pressure is 37.6 mmHg assuming a   right atrial pressure of 8 mmHg, which is consistent with borderline   pulmonary hypertension.        TTE 10/22/21    Summary:   1. Moderately to severely decreased global left ventricular systolic function.   2. Severely enlarged left atrium.   3. Severely enlarged right atrium.   4. Multiple left ventricular regional wall motion abnormalities exist. See wall motion findings.   5. Mild eccentric left ventricular hypertrophy.   6. The left ventricular diastolic function could not be assessed in this study.   7. Severely enlarged right ventricle.   8. Severely reduced RV systolic function.   9. Mild to moderate mitral valve regurgitation.  10. Moderate mitral annular calcification.  11. Mild tricuspid regurgitation.  12. Mild aortic regurgitation.  13. Sclerotic aortic valve with normal opening.  14. Complex atheroma is noted in the ascending aorta.  15. Estimated pulmonary artery systolic pressure is 64.8 mmHg assuming a right atrial pressure of 15 mmHg, which is consistent with severe pulmonary hypertension.  16. Pulmonary hypertension is present.  17. LA volume Index is 71.9 ml/m² ml/m2.  18. There is moderate aortic root calcification.        Home Medications:  Aranesp 100 mcg/0.5 mL injectable solution: 1 dose(s) injectable every 3 to 4 weeks (:22)  atorvastatin 40 mg oral tablet: 1 tab(s) orally once a day (at bedtime) (:22)  calcitriol 0.25 mcg oral capsule: 1 cap(s) orally 2 times a week (:22)  d-mycophenolate: 500 milligram(s) orally 4 times a day (:22)  Eliquis 2.5 mg oral tablet: 1 tab(s) orally 2 times a day (:22)  famotidine 20 mg oral tablet: 1 tab(s) orally once a day (:)  furosemide 20 mg oral tablet: 1 tab(s) orally once a day (03 Aug 2022 09:47)  insulin glargine 100 units/mL subcutaneous solution: 5 unit(s) subcutaneous once a day (at bedtime) (03 Aug 2022 09:47)  insulin lispro 100 units/mL injectable solution: 1 Unit(s) if Glucose 151 - 200  2 Unit(s) if Glucose 201 - 250  3 Unit(s) if Glucose 251 - 300  4 Unit(s) if Glucose 301 - 350  5 Unit(s) if Glucose 351 - 400  6 Unit(s) if Glucose Greater Than 400 (03 Aug 2022 09:57)  isosorbide mononitrate 30 mg oral tablet, extended release: 1 tab(s) orally once a day (in the morning) (:22)  levothyroxine 150 mcg (0.15 mg) oral tablet: 1 tab(s) orally once a day (:22)  metoprolol succinate 50 mg oral tablet, extended release: 1 tab(s) orally once a day (03 Aug 2022 09:47)  Multiple Vitamins with Minerals oral tablet: 1 tab(s) orally once a day (03 Aug 2022 09:47)  NIFEdipine 30 mg oral tablet, extended release: 1 tab(s) orally once a day (:22)  pentoxifylline 400 mg oral tablet, extended release: 1 tab(s) orally 2 times a day (:22)  sodium bicarbonate 650 mg oral tablet: 1 tab(s) orally 3 times a day (03 Aug 2022 09:47)  tacrolimus 0.5 mg oral capsule: 1 cap(s) orally 2 times a day (03 Aug 2022 09:57)    MEDICATIONS  (STANDING):  albuterol/ipratropium for Nebulization 3 milliLiter(s) Nebulizer every 6 hours  ampicillin/sulbactam  IVPB 3 Gram(s) IV Intermittent every 6 hours  apixaban 2.5 milliGRAM(s) Oral two times a day  atorvastatin 40 milliGRAM(s) Oral at bedtime  buMETAnide Injectable 2 milliGRAM(s) IV Push every 12 hours  dextrose 5%. 1000 milliLiter(s) (50 mL/Hr) IV Continuous <Continuous>  dextrose 5%. 1000 milliLiter(s) (100 mL/Hr) IV Continuous <Continuous>  dextrose 50% Injectable 25 Gram(s) IV Push once  dextrose 50% Injectable 12.5 Gram(s) IV Push once  dextrose 50% Injectable 25 Gram(s) IV Push once  famotidine    Tablet 20 milliGRAM(s) Oral daily  glucagon  Injectable 1 milliGRAM(s) IntraMuscular once  insulin glargine Injectable (LANTUS) 10 Unit(s) SubCutaneous every morning  insulin lispro (ADMELOG) corrective regimen sliding scale   SubCutaneous three times a day before meals  isosorbide   mononitrate ER Tablet (IMDUR) 30 milliGRAM(s) Oral daily  levothyroxine 150 MICROGram(s) Oral daily  methylPREDNISolone sodium succinate Injectable 40 milliGRAM(s) IV Push every 12 hours  metoprolol succinate ER 50 milliGRAM(s) Oral daily  multivitamin/minerals 1 Tablet(s) Oral daily  mycophenolate mofetil 500 milliGRAM(s) Oral four times a day  NIFEdipine XL 30 milliGRAM(s) Oral daily  pentoxifylline 400 milliGRAM(s) Oral two times a day  tacrolimus 0.5 milliGRAM(s) Oral two times a day    MEDICATIONS  (PRN):  dextrose Oral Gel 15 Gram(s) Oral once PRN Blood Glucose LESS THAN 70 milliGRAM(s)/deciliter Date of Admission: 22    Interval History:  Patient resting in bed, alert and oriented (improved from previous assessment). O2 sat 86% on RA, patient SOB- ventimask replaced (patient had taken mask off to drink and was unable to place it back correctly). Epistaxis appears to have improved since holding Eliquis. s/p thoracentesis today, 1.5L removed.   Frequent beats of VT noted on tele.     CHIEF COMPLAINT: Patient is a 82y old  Male who presents with a chief complaint of chf exacerbation (22 Aug 2022 12:24)    HISTORY OF PRESENT ILLNESS: 81 yo male with PMHx of HFrEF (45-50% on Lasix 20 PO), A-Fib on Eliquis, CAD s/p CABGx3, PVD, COPD (was on rare home O2 PRN now on 3L NC since recent July admission for COVID), CKD3, C3 glomerulonephropathy, HTN, HLD, Hypothyroid presents from NH with progressively worsening SOB x5 days and hypoxia.  Recently hospitalized here for a month discharged on 8/3/22 initially for BRENDA on CKD course c/b COVID, bilateral lower lobe pneumonia, delirium and microaspiration of thin liquids on modified barium study. Received Dexamethasone and Zosyn course with new O2 requirements of 3L NC discharged to nursing home on 3L and thick liquid diet for rehab. Due to his SOB, chest x-ray was ordered on 8/15 but results did not come back and patient became increasingly short of breath and was noted to be hypoxic and rehab facility which prompted referral to the ED. Review of symptoms is notable for orthopnea and a chronic dry cough unchanged since his covid illness. He denies paroxysmal nocturnal dyspnea, leg edema, chest pain, palpitations, dizziness, n/v, abd pain. Cardiologist is Dr. Witt and Pulmonolgist is Dr. Rory Mars.   In ED:  T(F): 96.4 (22 @ 11:27), Max: 96.4 (22 @ 11:27)  HR: 80 (22 @ 16:20) (80 - 91)  BP: 137/61 (22 @ 16:20) (121/58 - 137/61)  RR: 18 (22 @ 16:20) (18 - 24)  SpO2: 100% (- @ 16:20) (89% - 100%) -> 89% on 4L -> placed on NRB -> BIPAP 2/2 tachypnea and hypoxia -> taken off with increased work of breathing placed back on BIPAP  Labs: WBC 12k, Hgb 7.2 (baseline 9), BNP 55k, Cr 2.2 (baseline 1.5), PCO2 47 (VBG), Troponin 0.24  EKG:   Atrial fibrillation with premature ventricular or aberrantly conducted complexes  Left posterior fascicular block   ST & T wave abnormality, consider lateral ischemia - similar to prior in 2022  XR chest: significant bilateral pleural effusions and pulmonary congestion  Admitted to medicine for SOB/hypoxia likely 2/2 acute CHF exacerbation.  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    SOCIAL HISTORY:  Former cigarette smoker, quit >40 years ago. Denies alcohol or drug use.     Allergies    Ozempic (0.25 mg or 0.5 mg dose) (Hives; Rash)  streptomycin (Unknown)  Trulicity Pen (Hives; Rash)    Intolerances      PHYSICAL EXAM:  General Appearance: lethargic, normal for age and gender. 	  Neck: JVP [id number], no bruit.   Eyes: Extra Ocular muscles intact.   Cardiovascular: irregular rhythm S1 S2, + JVD, +2 B/L LE edema  Respiratory: decreased air movement R < L  Psychiatry: Alert, oriented x 3, Mood & affect appropriate  Gastrointestinal:  Soft, Non-tender  Skin/Integumen: No rashes, No ecchymoses, No cyanosis	  Neurologic: Non-focal  Musculoskeletal/ extremities: Normal range of motion, No clubbing, cyanosis,+2 B/L LE edema  Vascular: Peripheral pulses palpable 2+ bilaterally    CARDIAC MARKERS:  Serum Pro-Brain Natriuretic Peptide: 43076 pg/mL (08.20.22 @ 11:47)    Serum Pro-Brain Natriuretic Peptide: 28740 pg/mL (10.20.21 @ 10:28)      TELEMETRY EVENTS: 	    EC22    Ventricular Rate 71 BPM  Atrial Rate 82 BPM  QRS Duration 140 ms  Q-T Interval 444 ms  QTC Calculation(Bazett) 482 ms  R Axis 108 degrees  T Axis 166 degrees    Diagnosis Line Atrial fibrillation with premature ventricular or aberrantlyconducted  complexes  Non-specific intra-ventricular conduction block  ST & T wave abnormality, consider lateral ischemia  Abnormal ECG    Confirmed by Sarah Borden MD ([id number]) on 2022 1:35:39 PM      	  PREVIOUS DIAGNOSTIC TESTING:    TTE 22      Summary:   1. Left ventricular ejection fraction, by visual estimation, is 45 to   50%.   2. Mildly decreased global left ventricular systolic function.   3. Mild left ventricular hypertrophy.   4. Multiple left ventricular regional wall motion abnormalities exist.   See wall motion findings.   5. The left ventricular diastolic function could not be assessed in this   study.   6. Moderately enlarged left atrium.   7. Sclerotic aortic valve with normal opening.   8. Degenerative mitral valve.   9. Mild mitral regurgitation.  10. Mild tricuspid regurgitation.  11. Estimated pulmonary artery systolic pressure is 37.6 mmHg assuming a   right atrial pressure of 8 mmHg, which is consistent with borderline   pulmonary hypertension.        TTE 10/22/21    Summary:   1. Moderately to severely decreased global left ventricular systolic function.   2. Severely enlarged left atrium.   3. Severely enlarged right atrium.   4. Multiple left ventricular regional wall motion abnormalities exist. See wall motion findings.   5. Mild eccentric left ventricular hypertrophy.   6. The left ventricular diastolic function could not be assessed in this study.   7. Severely enlarged right ventricle.   8. Severely reduced RV systolic function.   9. Mild to moderate mitral valve regurgitation.  10. Moderate mitral annular calcification.  11. Mild tricuspid regurgitation.  12. Mild aortic regurgitation.  13. Sclerotic aortic valve with normal opening.  14. Complex atheroma is noted in the ascending aorta.  15. Estimated pulmonary artery systolic pressure is 64.8 mmHg assuming a right atrial pressure of 15 mmHg, which is consistent with severe pulmonary hypertension.  16. Pulmonary hypertension is present.  17. LA volume Index is 71.9 ml/m² ml/m2.  18. There is moderate aortic root calcification.        Home Medications:  Aranesp 100 mcg/0.5 mL injectable solution: 1 dose(s) injectable every 3 to 4 weeks (:22)  atorvastatin 40 mg oral tablet: 1 tab(s) orally once a day (at bedtime) (:22)  calcitriol 0.25 mcg oral capsule: 1 cap(s) orally 2 times a week (:22)  d-mycophenolate: 500 milligram(s) orally 4 times a day (:22)  Eliquis 2.5 mg oral tablet: 1 tab(s) orally 2 times a day (:22)  famotidine 20 mg oral tablet: 1 tab(s) orally once a day (:22)  furosemide 20 mg oral tablet: 1 tab(s) orally once a day (03 Aug 2022 09:47)  insulin glargine 100 units/mL subcutaneous solution: 5 unit(s) subcutaneous once a day (at bedtime) (03 Aug 2022 09:47)  insulin lispro 100 units/mL injectable solution: 1 Unit(s) if Glucose 151 - 200  2 Unit(s) if Glucose 201 - 250  3 Unit(s) if Glucose 251 - 300  4 Unit(s) if Glucose 301 - 350  5 Unit(s) if Glucose 351 - 400  6 Unit(s) if Glucose Greater Than 400 (03 Aug 2022 09:57)  isosorbide mononitrate 30 mg oral tablet, extended release: 1 tab(s) orally once a day (in the morning) (:22)  levothyroxine 150 mcg (0.15 mg) oral tablet: 1 tab(s) orally once a day (:22)  metoprolol succinate 50 mg oral tablet, extended release: 1 tab(s) orally once a day (03 Aug 2022 09:47)  Multiple Vitamins with Minerals oral tablet: 1 tab(s) orally once a day (03 Aug 2022 09:47)  NIFEdipine 30 mg oral tablet, extended release: 1 tab(s) orally once a day (:22)  pentoxifylline 400 mg oral tablet, extended release: 1 tab(s) orally 2 times a day (2022 21:22)  sodium bicarbonate 650 mg oral tablet: 1 tab(s) orally 3 times a day (03 Aug 2022 09:47)  tacrolimus 0.5 mg oral capsule: 1 cap(s) orally 2 times a day (03 Aug 2022 09:57)    MEDICATIONS  (STANDING):  albuterol/ipratropium for Nebulization 3 milliLiter(s) Nebulizer every 6 hours  ampicillin/sulbactam  IVPB 3 Gram(s) IV Intermittent every 6 hours  apixaban 2.5 milliGRAM(s) Oral two times a day  atorvastatin 40 milliGRAM(s) Oral at bedtime  buMETAnide Injectable 2 milliGRAM(s) IV Push every 12 hours  dextrose 5%. 1000 milliLiter(s) (50 mL/Hr) IV Continuous <Continuous>  dextrose 5%. 1000 milliLiter(s) (100 mL/Hr) IV Continuous <Continuous>  dextrose 50% Injectable 25 Gram(s) IV Push once  dextrose 50% Injectable 12.5 Gram(s) IV Push once  dextrose 50% Injectable 25 Gram(s) IV Push once  famotidine    Tablet 20 milliGRAM(s) Oral daily  glucagon  Injectable 1 milliGRAM(s) IntraMuscular once  insulin glargine Injectable (LANTUS) 10 Unit(s) SubCutaneous every morning  insulin lispro (ADMELOG) corrective regimen sliding scale   SubCutaneous three times a day before meals  isosorbide   mononitrate ER Tablet (IMDUR) 30 milliGRAM(s) Oral daily  levothyroxine 150 MICROGram(s) Oral daily  methylPREDNISolone sodium succinate Injectable 40 milliGRAM(s) IV Push every 12 hours  metoprolol succinate ER 50 milliGRAM(s) Oral daily  multivitamin/minerals 1 Tablet(s) Oral daily  mycophenolate mofetil 500 milliGRAM(s) Oral four times a day  NIFEdipine XL 30 milliGRAM(s) Oral daily  pentoxifylline 400 milliGRAM(s) Oral two times a day  tacrolimus 0.5 milliGRAM(s) Oral two times a day    MEDICATIONS  (PRN):  dextrose Oral Gel 15 Gram(s) Oral once PRN Blood Glucose LESS THAN 70 milliGRAM(s)/deciliter

## 2022-08-27 NOTE — PROGRESS NOTE ADULT - ASSESSMENT
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     SOB/ AHRF  Sepsis criteria met on admission but pt remains stable off abx and procal low, could be SIRS criteria due to aspiration pneumonitis  - bibasilar opacities remain on CXR from today  - likely due to aspiration pneumonia and acute on chronic HFrEF  - c/w Bumex/hypertonic saline--HF team recommended to d/c bumex tonight but pt remains overloaded therefore will continue with Bumex and reassess tomorrow  - pt has large urine output  - speech and swallow appreciated  - pt stable off abx  - Echo 08/01: 45-50%EF  - CXR shows improved b/l pleural effusions with pulmonary congestion and opacities but awaiting final read  - proBNP > 50K  - thoracentesis done 9/27--transudative effusion    Epistaxis  - resolved  - ENT follow up appreciated, packing removed    Leukocytosis  - was uptrending likely from steroids    C3 Glomerulonephropathy  - c/w tacrolimus (level 9.5), cellcept    CAD  - c/w statin, metoprolol, imdur    HTN  - c/w metoprolol, nifedipine    Hypothyroidism  - c/w synthroid    DM II  - FS uncontrolled  - adjust insulin    Stage 2 right buttock pressure ulcer  - present on admission  - c/w local wound care    Acute on chronic anemia  - s/p 1 U PRBC      Chronic afib  - c/w nifedipine, metoprolol    COPD  - c/w inhlaers    DVT px  from home    #Progress Note Handoff:  Pending (specify):  Diuresis  Family discussion:   Disposition: Home___/SNF___/Other________/Unknown at this time___x_____

## 2022-08-27 NOTE — PROGRESS NOTE ADULT - SUBJECTIVE AND OBJECTIVE BOX
CHIEF COMPLAINT:    Patient is a 82y old  Male who presents with a chief complaint of chf exacerbation     INTERVAL HPI/OVERNIGHT EVENTS:    Patient seen and examined at bedside. No acute overnight events occurred.    ROS: Reports significant decrease SOB. All other systems are negative.    Medications:  Standing  albuterol/ipratropium for Nebulization 3 milliLiter(s) Nebulizer every 6 hours  apixaban 2.5 milliGRAM(s) Oral two times a day  atorvastatin 40 milliGRAM(s) Oral at bedtime  BACItracin   Ointment 1 Application(s) Topical two times a day  buMETAnide Infusion 1 mG/Hr IV Continuous <Continuous>  dextrose 5%. 1000 milliLiter(s) IV Continuous <Continuous>  dextrose 5%. 1000 milliLiter(s) IV Continuous <Continuous>  dextrose 50% Injectable 25 Gram(s) IV Push once  dextrose 50% Injectable 12.5 Gram(s) IV Push once  dextrose 50% Injectable 25 Gram(s) IV Push once  famotidine    Tablet 20 milliGRAM(s) Oral daily  glucagon  Injectable 1 milliGRAM(s) IntraMuscular once  insulin glargine Injectable (LANTUS) 22 Unit(s) SubCutaneous at bedtime  insulin lispro (ADMELOG) corrective regimen sliding scale   SubCutaneous three times a day before meals  insulin lispro Injectable (ADMELOG) 7 Unit(s) SubCutaneous three times a day before meals  isosorbide   mononitrate ER Tablet (IMDUR) 30 milliGRAM(s) Oral daily  levothyroxine 150 MICROGram(s) Oral daily  lidocaine 1% (Preservative-free) Injectable 30 milliLiter(s) Local Injection once  melatonin 5 milliGRAM(s) Oral at bedtime  metoprolol succinate  milliGRAM(s) Oral daily  multivitamin/minerals 1 Tablet(s) Oral daily  mycophenolate mofetil 500 milliGRAM(s) Oral four times a day  NIFEdipine XL 30 milliGRAM(s) Oral daily  pentoxifylline 400 milliGRAM(s) Oral two times a day  predniSONE   Tablet 10 milliGRAM(s) Oral daily  sodium chloride 3%. 150 milliLiter(s) IV Continuous <Continuous>  sodium chloride 3%. 150 milliLiter(s) IV Continuous <Continuous>  sodium chloride 3%. 150 milliLiter(s) IV Continuous <Continuous>  tacrolimus 0.5 milliGRAM(s) Oral two times a day    PRN Meds  dextrose Oral Gel 15 Gram(s) Oral once PRN  dextrose Oral Gel 15 Gram(s) Oral once PRN        Vital Signs:    T(F): 97 (08-27-22 @ 13:00), Max: 98.1 (08-27-22 @ 05:00)  HR: 82 (08-27-22 @ 13:00) (75 - 85)  BP: 123/59 (08-27-22 @ 13:00) (110/60 - 132/81)  RR: 18 (08-27-22 @ 13:00) (18 - 18)  SpO2: 100% (08-27-22 @ 08:40) (100% - 100%)  I&O's Summary    26 Aug 2022 07:01  -  27 Aug 2022 07:00  --------------------------------------------------------  IN: 237 mL / OUT: 3325 mL / NET: -3088 mL    27 Aug 2022 07:01  -  27 Aug 2022 16:28  --------------------------------------------------------  IN: 720 mL / OUT: 1100 mL / NET: -380 mL      Daily     Daily   CAPILLARY BLOOD GLUCOSE      POCT Blood Glucose.: 89 mg/dL (27 Aug 2022 11:44)  POCT Blood Glucose.: 135 mg/dL (27 Aug 2022 07:53)  POCT Blood Glucose.: 152 mg/dL (26 Aug 2022 22:49)  POCT Blood Glucose.: 133 mg/dL (26 Aug 2022 21:43)  POCT Blood Glucose.: 123 mg/dL (26 Aug 2022 16:49)      PHYSICAL EXAM:  GENERAL:  NAD  SKIN: No rashes or lesions  HEENT: Atraumatic. Normocephalic. Anicteric  NECK:  No JVD.   PULMONARY: decreased breathsounds   CVS: Normal S1, S2. Regular rate and rhythm. No murmurs.  ABDOMEN/GI: Soft, Nontender, Nondistended; Bowel sounds are present  EXTREMITIES:  2+ edema B/L LE.  NEUROLOGIC:  No motor deficit.  PSYCH: Alert & oriented x 3, normal affect      LABS:                        8.3    12.76 )-----------( 206      ( 27 Aug 2022 05:09 )             26.6     08-27    137  |  97<L>  |  80<HH>  ----------------------------<  109<H>  4.0   |  31  |  1.1    Ca    8.3<L>      27 Aug 2022 05:09  Mg     1.9     08-27              Culture - Body Fluid with Gram Stain (collected 26 Aug 2022 13:33)  Source: Pleural Fl Pleural Fluid  Gram Stain (27 Aug 2022 03:01):    No polymorphonuclear cells seen seen    No organisms seen    by cytocentrifuge        RADIOLOGY & ADDITIONAL TESTS:  Imaging or report Personally Reviewed:  [ ] YES  [ ] NO -->no new images    Telemetry reviewed independently - NSR, no acute events  EKG reviewed independently -->no new EKGs    Consultant(s) Notes Reviewed:  [ ] YES  [ ] NO  Care Discussed with Consultants/Other Providers [ ] YES  [ ] NO    Case discussed with resident  Care discussed with pt

## 2022-08-27 NOTE — CHART NOTE - NSCHARTNOTEFT_GEN_A_CORE
Patient refusing to take his Eliquis dose, he didn't take the morning dose. He believes that Eliquis is cause of his nose bleed. Talked to the patient he agreed to take the evening dose but is reluctant to take the morning dose.

## 2022-08-28 NOTE — PROGRESS NOTE ADULT - SUBJECTIVE AND OBJECTIVE BOX
CHIEF COMPLAINT:    Patient is a 82y old  Male who presents with a chief complaint of chf exacerbation       INTERVAL HPI/OVERNIGHT EVENTS:    Patient seen and examined at bedside. No acute overnight events occurred.    ROS: Denies SOB. All other systems are negative.    Medications:  Standing  albuterol/ipratropium for Nebulization 3 milliLiter(s) Nebulizer every 6 hours  apixaban 2.5 milliGRAM(s) Oral two times a day  atorvastatin 40 milliGRAM(s) Oral at bedtime  BACItracin   Ointment 1 Application(s) Topical two times a day  buMETAnide Infusion 1 mG/Hr IV Continuous <Continuous>  dextrose 5%. 1000 milliLiter(s) IV Continuous <Continuous>  dextrose 5%. 1000 milliLiter(s) IV Continuous <Continuous>  dextrose 50% Injectable 25 Gram(s) IV Push once  dextrose 50% Injectable 12.5 Gram(s) IV Push once  dextrose 50% Injectable 25 Gram(s) IV Push once  famotidine    Tablet 20 milliGRAM(s) Oral daily  glucagon  Injectable 1 milliGRAM(s) IntraMuscular once  insulin glargine Injectable (LANTUS) 22 Unit(s) SubCutaneous at bedtime  insulin lispro (ADMELOG) corrective regimen sliding scale   SubCutaneous three times a day before meals  insulin lispro Injectable (ADMELOG) 7 Unit(s) SubCutaneous three times a day before meals  isosorbide   mononitrate ER Tablet (IMDUR) 30 milliGRAM(s) Oral daily  levothyroxine 150 MICROGram(s) Oral daily  lidocaine 1% (Preservative-free) Injectable 30 milliLiter(s) Local Injection once  melatonin 5 milliGRAM(s) Oral at bedtime  metoprolol succinate  milliGRAM(s) Oral daily  multivitamin/minerals 1 Tablet(s) Oral daily  mycophenolate mofetil 500 milliGRAM(s) Oral four times a day  NIFEdipine XL 30 milliGRAM(s) Oral daily  pentoxifylline 400 milliGRAM(s) Oral two times a day  predniSONE   Tablet 10 milliGRAM(s) Oral daily  sodium chloride 3%. 150 milliLiter(s) IV Continuous <Continuous>  sodium chloride 3%. 150 milliLiter(s) IV Continuous <Continuous>  sodium chloride 3%. 150 milliLiter(s) IV Continuous <Continuous>  tacrolimus 0.5 milliGRAM(s) Oral two times a day    PRN Meds  dextrose Oral Gel 15 Gram(s) Oral once PRN  dextrose Oral Gel 15 Gram(s) Oral once PRN        Vital Signs:    T(F): 96.8 (22 @ 04:10), Max: 97.6 (22 @ 00:24)  HR: 89 (22 @ 04:10) (82 - 89)  BP: 144/63 (22 @ 04:10) (122/58 - 144/63)  RR: 18 (22 @ 00:24) (16 - 20)  SpO2: 98% (22 @ 00:24) (95% - 98%)  I&O's Summary    27 Aug 2022 07:01  -  28 Aug 2022 07:00  --------------------------------------------------------  IN: 1053 mL / OUT: 4100 mL / NET: -3047 mL    28 Aug 2022 07:01  -  28 Aug 2022 12:55  --------------------------------------------------------  IN: 120 mL / OUT: 0 mL / NET: 120 mL      Daily     Daily Weight in k.2 (28 Aug 2022 04:10)  CAPILLARY BLOOD GLUCOSE      POCT Blood Glucose.: 177 mg/dL (28 Aug 2022 11:19)  POCT Blood Glucose.: 108 mg/dL (28 Aug 2022 08:01)  POCT Blood Glucose.: 161 mg/dL (27 Aug 2022 22:21)  POCT Blood Glucose.: 140 mg/dL (27 Aug 2022 16:51)      PHYSICAL EXAM:  GENERAL:  NAD  SKIN: No rashes or lesions  HEENT: Atraumatic. Normocephalic. Anicteric  NECK:  No JVD.   PULMONARY: Clear to ausculation bilaterally. No wheezing. No rales  CVS: Normal S1, S2. Regular rate and rhythm. No murmurs.  ABDOMEN/GI: Soft, Nontender, Nondistended; Bowel sounds are present  EXTREMITIES:  1+ edema B/L LE.  NEUROLOGIC:  No motor deficit.  PSYCH: Alert & oriented x 3, normal affect      LABS:                        9.0    13.54 )-----------( 218      ( 28 Aug 2022 06:52 )             27.9     08    140  |  98  |  71<HH>  ----------------------------<  86  3.6   |  32  |  1.1    Ca    8.3<L>      28 Aug 2022 06:52  Mg     1.7                   Culture - Body Fluid with Gram Stain (collected 26 Aug 2022 13:33)  Source: Pleural Fl Pleural Fluid  Gram Stain (27 Aug 2022 03:01):    No polymorphonuclear cells seen seen    No organisms seen    by cytocentrifuge  Preliminary Report (27 Aug 2022 21:34):    No growth        RADIOLOGY & ADDITIONAL TESTS:  Imaging or report Personally Reviewed:  [ ] YES  [ ] NO -->no new images    Telemetry reviewed independently - NSR, no acute events  EKG reviewed independently -->no new EKGs    Consultant(s) Notes Reviewed:  [ ] YES  [ ] NO  Care Discussed with Consultants/Other Providers [ ] YES  [ ] NO    Case discussed with resident  Care discussed with pt

## 2022-08-28 NOTE — PROGRESS NOTE ADULT - ASSESSMENT
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     SOB/ AHRF  Sepsis criteria met on admission but pt remains stable off abx and procal low, could be SIRS criteria due to aspiration pneumonitis  - bibasilar opacities remain on CXR from today  - likely due to aspiration pneumonia and acute on chronic HFrEF  - c/w Bumex/hypertonic saline--HF team recommended to d/c bumex tonight but pt remains overloaded therefore will continue with Bumex and reassess tomorrow  - pt remains net negative  - speech and swallow appreciated  - pt stable off abx  - Echo 08/01: 45-50%EF  - repeat CXR in AM  - proBNP > 50K  - thoracentesis done 9/27--transudative effusion    Epistaxis  - resolved  - ENT follow up appreciated, packing removed  - pt apprehensive to restart Eliquis    Leukocytosis  - was uptrending likely from steroids    C3 Glomerulonephropathy  - c/w tacrolimus (level 9.5), cellcept    CAD  - c/w statin, metoprolol, imdur    HTN  - c/w metoprolol, nifedipine  - will try to wean off nifedipine given HFmrEF    Hypothyroidism  - c/w synthroid    DM II  - FS uncontrolled  - adjust insulin    Stage 2 right buttock pressure ulcer  - present on admission  - c/w local wound care    Acute on chronic anemia  - s/p 1 U PRBC      Chronic afib  - c/w nifedipine, metoprolol, will try ot wean off nifedipine    COPD  - c/w inhlaers    DVT px  from home    #Progress Note Handoff:  Pending (specify):  Diuresis  Family discussion:   Disposition: Home___/SNF___/Other________/Unknown at this time___x_____

## 2022-08-29 NOTE — PROGRESS NOTE ADULT - ASSESSMENT
IMPRESSION:    Recurrent right anechoic pleural effusion  S/p right thoracentesis  Acute hypoxemic resp failure on NC  Pul edema  possible aspiration   COPD (was [place] prednisone in [place])  anemia SP TX  Pre-renal BRENDA on CKD III -   C3 glomerulopathy - on prograf and cellcept at home  HFrEF - chronic; Mod-severe reduced EF, sev dilated CM; RV dysfxn; severe pulm HTN  AFib, chronic - on eliquis   CAD s/p CABG  H/O DLD, HTN, DM2, Hypothyroidism     PLAN:    - May need permanent pleurex catheter if effusion keeps expanding  - Diuresis keep - balance if no improvement  - Continue prednisone 10 daily  - NIV at night and PRN  - Aspiration precautions  - Wean O2 as tolerated  - GOC IMPRESSION:    Recurrent right anechoic pleural effusion  S/p right thoracentesis/ transudate  Acute hypoxemic resp failure on NC  Pul edema  possible aspiration   COPD (was [place] prednisone in [place])  anemia SP [place]  Pre-renal BRENDA on CKD III -   C3 glomerulopathy - on prograf and cellcept at home  HFrEF - chronic; Mod-severe reduced EF, sev dilated CM; RV dysfxn; severe pulm HTN  AFib, chronic - on eliquis   CAD s/p CABG  H/O DLD, HTN, DM2, Hypothyroidism     PLAN:      - Diuresis keep - balance   - dec prednisone 5 daily  - NIV at night and PRN  - Aspiration precautions  - Wean O2 as tolerated  - GOC

## 2022-08-29 NOTE — SWALLOW BEDSIDE ASSESSMENT ADULT - SWALLOW EVAL: RECOMMENDED FEEDING/EATING TECHNIQUES
vol bolus hold w/ mildly thick, use of consecutive swallows/oral hygiene/position upright (90 degrees)

## 2022-08-29 NOTE — PROGRESS NOTE ADULT - SUBJECTIVE AND OBJECTIVE BOX
SUBJECTIVE:  Patient is a 82y old Male who presents with a chief complaint of chf exacerbation (28 Aug 2022 12:52)   Fluid overload     Today is hospital day 9d. There are no new issues or overnight events. Patient states his breathing is still labored however he feels significantly better compared to yesterday and previously.     HPI  HPI:  83 yo male with PMHx of HFrEF (45-50% on Lasix 20 PO), A-Fib on Eliquis, CAD s/p CABGx3, PVD, COPD (was on rare home O2 PRN now on 3L NC since recent July admission for COVID), CKD3, C3 glomerulonephropathy, HTN, HLD, Hypothyroid presents from NH with progressively worsening SOB x5 days and hypoxia.  Recently hospitalized here for a month discharged on 8/3/22 initially for BRENDA on CKD course c/b COVID, bilateral lower lobe pneumonia, delirium and microaspiration of thin liquids on modified barium study. Received Dexamethasone and Zosyn course with new O2 requirements of 3L NC discharged to nursing home on 3L and thick liquid diet for rehab. Due to his SOB, chest x-ray was ordered on 8/15 but results did not come back and patient became increasingly short of breath and was noted to be hypoxic and rehab facility which prompted referral to the ED. Review of symptoms is notable for orthopnea and a chronic dry cough unchanged since his covid illness. He denies paroxysmal nocturnal dyspnea, leg edema, chest pain, palpitations, dizziness, n/v, abd pain. Cardiologist is Dr. Witt and Pulmonolgist is Dr. Rory Mars.     In ED:  T(F): 96.4 (08-20-22 @ 11:27), Max: 96.4 (08-20-22 @ 11:27)  HR: 80 (08-20-22 @ 16:20) (80 - 91)  BP: 137/61 (08-20-22 @ 16:20) (121/58 - 137/61)  RR: 18 (08-20-22 @ 16:20) (18 - 24)  SpO2: 100% (08-20-22 @ 16:20) (89% - 100%) -> 89% on 4L -> placed on NRB -> BIPAP 2/2 tachypnea and hypoxia -> taken off with increased work of breathing placed back on BIPAP  Labs: WBC 12k, Hgb 7.2 (baseline 9), BNP 55k, Cr 2.2 (baseline 1.5), PCO2 47 (VBG), Troponin 0.24  EKG:   Atrial fibrillation with premature ventricular or aberrantly conducted complexes  Left posterior fascicular block   ST & T wave abnormality, consider lateral ischemia - similar to prior in July 2022  XR chest: significant bilateral pleural effusions and pulmonary congestion  Admitted to medicine for SOB/hypoxia likely 2/2 acute CHF exacerbation.  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    AF (atrial fibrillation)  s/p DCCV    Carotid stenosis    COPD (chronic obstructive pulmonary disease)    H/O coronary artery bypass surgery  2001    S/P inguinal hernia repair    History of appendectomy      ALLERGIES:  Ozempic (0.25 mg or 0.5 mg dose) (Hives; Rash)  streptomycin (Unknown)  Trulicity Pen (Hives; Rash)    MEDICATIONS:  HOME MEDICATIONS  Aranesp 100 mcg/0.5 mL injectable solution: 1 dose(s) injectable every 3 to 4 weeks  atorvastatin 40 mg oral tablet: 1 tab(s) orally once a day (at bedtime)  calcitriol 0.25 mcg oral capsule: 1 cap(s) orally 2 times a week  d-mycophenolate: 500 milligram(s) orally 4 times a day  Eliquis 2.5 mg oral tablet: 1 tab(s) orally 2 times a day  famotidine 20 mg oral tablet: 1 tab(s) orally once a day  furosemide 20 mg oral tablet: 1 tab(s) orally once a day  insulin glargine 100 units/mL subcutaneous solution: 5 unit(s) subcutaneous once a day (at bedtime)  insulin lispro 100 units/mL injectable solution: 1 Unit(s) if Glucose 151 - 200  2 Unit(s) if Glucose 201 - 250  3 Unit(s) if Glucose 251 - 300  4 Unit(s) if Glucose 301 - 350  5 Unit(s) if Glucose 351 - 400  6 Unit(s) if Glucose Greater Than 400  isosorbide mononitrate 30 mg oral tablet, extended release: 1 tab(s) orally once a day (in the morning)  levothyroxine 150 mcg (0.15 mg) oral tablet: 1 tab(s) orally once a day  metoprolol succinate 50 mg oral tablet, extended release: 1 tab(s) orally once a day  Multiple Vitamins with Minerals oral tablet: 1 tab(s) orally once a day  NIFEdipine 30 mg oral tablet, extended release: 1 tab(s) orally once a day  pentoxifylline 400 mg oral tablet, extended release: 1 tab(s) orally 2 times a day  sodium bicarbonate 650 mg oral tablet: 1 tab(s) orally 3 times a day  tacrolimus 0.5 mg oral capsule: 1 cap(s) orally 2 times a day    STANDING MEDICATIONS  albuterol/ipratropium for Nebulization 3 milliLiter(s) Nebulizer every 6 hours  apixaban 2.5 milliGRAM(s) Oral two times a day  atorvastatin 40 milliGRAM(s) Oral at bedtime  BACItracin   Ointment 1 Application(s) Topical two times a day  buMETAnide Infusion 1 mG/Hr IV Continuous <Continuous>  chlorhexidine 2% Cloths 1 Application(s) Topical <User Schedule>  dextrose 5%. 1000 milliLiter(s) IV Continuous <Continuous>  dextrose 5%. 1000 milliLiter(s) IV Continuous <Continuous>  dextrose 50% Injectable 25 Gram(s) IV Push once  dextrose 50% Injectable 12.5 Gram(s) IV Push once  dextrose 50% Injectable 25 Gram(s) IV Push once  famotidine    Tablet 20 milliGRAM(s) Oral daily  glucagon  Injectable 1 milliGRAM(s) IntraMuscular once  insulin glargine Injectable (LANTUS) 22 Unit(s) SubCutaneous at bedtime  insulin lispro (ADMELOG) corrective regimen sliding scale   SubCutaneous three times a day before meals  insulin lispro Injectable (ADMELOG) 7 Unit(s) SubCutaneous three times a day before meals  isosorbide   mononitrate ER Tablet (IMDUR) 30 milliGRAM(s) Oral daily  levothyroxine 150 MICROGram(s) Oral daily  lidocaine 1% (Preservative-free) Injectable 30 milliLiter(s) Local Injection once  melatonin 5 milliGRAM(s) Oral at bedtime  metoprolol succinate  milliGRAM(s) Oral daily  multivitamin/minerals 1 Tablet(s) Oral daily  mycophenolate mofetil 500 milliGRAM(s) Oral four times a day  NIFEdipine XL 30 milliGRAM(s) Oral daily  pentoxifylline 400 milliGRAM(s) Oral two times a day  predniSONE   Tablet 10 milliGRAM(s) Oral daily  sodium chloride 3%. 150 milliLiter(s) IV Continuous <Continuous>  sodium chloride 3%. 150 milliLiter(s) IV Continuous <Continuous>  sodium chloride 3%. 150 milliLiter(s) IV Continuous <Continuous>  tacrolimus 0.5 milliGRAM(s) Oral two times a day    PRN MEDICATIONS  dextrose Oral Gel 15 Gram(s) Oral once PRN  dextrose Oral Gel 15 Gram(s) Oral once PRN    VITALS:   T(C): 35.7 (08-29-22 @ 05:18), Max: 35.8 (08-28-22 @ 20:12)  T(F): 96.3 (08-29-22 @ 05:18), Max: 96.5 (08-28-22 @ 20:12)  HR: 71 (08-29-22 @ 05:18) (71 - 83)  BP: 136/69 (08-29-22 @ 05:18) (116/56 - 136/69)  BP(mean): --  ABP: --  ABP(mean): --  RR: 20 (08-29-22 @ 05:18) (20 - 20)  SpO2: 98% (08-29-22 @ 09:03) (98% - 100%)  LABS:                        8.5    12.87 )-----------( 203      ( 29 Aug 2022 06:14 )             27.1     08-29    137  |  95<L>  |  69<HH>  ----------------------------<  159<H>  3.5   |  30  |  1.1    Ca    8.3<L>      29 Aug 2022 06:14  Mg     1.9     08-29          I&O's Detail    28 Aug 2022 07:01  -  29 Aug 2022 07:00  --------------------------------------------------------  IN:    Bumetanide: 100 mL    IV PiggyBack: 50 mL    Oral Fluid: 1080 mL  Total IN: 1230 mL    OUT:    Indwelling Catheter - Urethral (mL): 2325 mL  Total OUT: 2325 mL    Total NET: -1095 mL      29 Aug 2022 07:01  -  29 Aug 2022 11:37  --------------------------------------------------------  IN:    Oral Fluid: 720 mL  Total IN: 720 mL    OUT:  Total OUT: 0 mL    Total NET: 720 mL                Culture - Fungal, Body Fluid (collected 26 Aug 2022 13:33)  Source: Pleural Fl Pleural Fluid  Preliminary Report (29 Aug 2022 10:50):    Testing in progress    Culture - Body Fluid with Gram Stain (collected 26 Aug 2022 13:33)  Source: Pleural Fl Pleural Fluid  Gram Stain (27 Aug 2022 03:01):    No polymorphonuclear cells seen seen    No organisms seen    by cytocentrifuge  Preliminary Report (27 Aug 2022 21:34):    No growth          RADIOLOGY:  EKG  12 Lead ECG:   Ventricular Rate 71 BPM    Atrial Rate 82 BPM    QRS Duration 140 ms    Q-T Interval 444 ms    QTC Calculation(Bazett) 482 ms    R Axis 108 degrees    T Axis 166 degrees    Diagnosis Line Atrial fibrillation with premature ventricular or aberrantlyconducted  complexes  Non-specific intra-ventricular conduction block  ST & T wave abnormality, consider lateral ischemia  Abnormal ECG    Confirmed by Sarah Borden MD [id number]) on 8/21/2022 1:35:39 PM (08-21-22 @ 07:14)  12 Lead ECG:   Ventricular Rate 91 BPM    QRS Duration 110 ms    Q-T Interval 378 ms    QTC Calculation(Bazett) 464 ms    R Axis 124 degrees    T Axis 176 degrees    Diagnosis Line Atrial fibrillation with premature ventricular or aberrantly conducted  complexes  Left posterior fascicular block  ST & T wave abnormality, consider lateral ischemia  Abnormal ECG    Confirmed by Sarah Borden MD [id number]) on 8/20/2022 3:10:13 PM (08-20-22 @ 11:29)    Xray Chest 1 View- PORTABLE-Routine:   ACC: [id number] EXAM:  XR CHEST PORTABLE ROUTINE 1V                          PROCEDURE DATE:  08/29/2022          INTERPRETATION:  Clinical History / Reason for exam: Shortness of breath    Comparison : Chest radiograph 3 days prior.    Technique/Positioning: Frontal portable, low lung volumes.    Findings:    Support devices: Telemetry leads overlie the thorax    Cardiac/mediastinum/hilum: Patient is status post median sternotomy    Lung parenchyma/Pleura: Bilateral opacities    Skeleton/soft tissues: Unremarkable.    Impression:    Bilateral opacifications worsening.        --- End of Report ---            ZAFAR TRAN MD; Attending Interventional Radiologist  This document has been electronically signed. Aug 29 2022  8:19AM (08-29-22 @ 06:31)  Xray Chest 1 View- PORTABLE-Routine:   ACC: [id number] EXAM:  XR CHEST PORTABLE ROUTINE 1V                          PROCEDURE DATE:  08/26/2022          INTERPRETATION:  Clinical History / Reason for exam: Dyspnea    Comparison : Chest radiograph 8/25/2022.    Technique/Positioning: Frontal.    Findings:    Support devices: None.    Cardiac/mediastinum/hilum: Cardiomegaly unchanged    Lung parenchyma/Pleura: Decreased vascular congestion and bilateral   pleural effusions. Left lower lobe opacity unchanged. No air leak.    Skeleton/soft tissues: Unremarkable.    Impression:    Decreased vascular congestion and bilateral pleural effusions. Left lower   lobe opacity unchanged. No air leak.        --- End of Report ---            NNAMDI WARNER MD; Attending Radiologist  This document has been electronically signed. Aug 26 2022  3:35PM (08-26-22 @ 14:01)  Xray Chest 1 View- PORTABLE-Routine:   ACC: [id number] EXAM:  XR CHEST PORTABLE ROUTINE 1V                          PROCEDURE DATE:  08/25/2022          INTERPRETATION:  Clinical History / Reason for exam: Volume overload,   shortness of breath    Comparison : Chest radiograph 8/23/2022.    Technique/Positioning: Frontal portable.    Findings:    Support devices: None.    Cardiac/mediastinum/hilum: Again status post median sternotomy, CABG.   Stable.    Lung parenchyma/Pleura: Stable bilateral opacities/effusions. No   pneumothorax.    Skeleton/soft tissues: Stable.    Impression:    Stable bilateral opacities/effusions.    --- End of Report ---          XIANG LOYOLA MD; Resident Radiologist  This document has been electronically signed.  JONNATHAN DONAHUE MD; Attending Radiologist  This document has been electronically signed. Aug 25 2022  5:06PM (08-25-22 @ 10:47)  Xray Chest 1 View- PORTABLE-Routine:   ACC: [id number] EXAM:  XR CHEST PORTABLE ROUTINE 1V                          PROCEDURE DATE:  08/22/2022          INTERPRETATION:  Clinical History / Reason for exam: Shortness of breath,   Assess pleural effusions    Comparison : Chest radiograph 8/20/2022.    Technique/Positioning: Frontal portable.    Findings:    Support devices: None.    Cardiac/mediastinum/hilum: Stable.    Lung parenchyma/Pleura: Stable bilateral opacities/effusions. No   pneumothorax.    Skeleton/soft tissues: Stable.    Impression:    Stable bilateral opacities/effusions.      --- End of Report ---          XIANG LOYOLA MD; Resident Radiologist  This document has been electronically signed.  ZAFAR TRAN MD; Attending Interventional Radiologist  This document has been electronically signed. Aug 22 2022  3:28PM (08-22-22 @ 14:56)    Physical Exam:  General: no acute distress, sitting in bed on venturi mask 7L  Head: normocephalic and atraumatic  Neck: supple  Heart: regular rate and rhythm, S1 and S2 normal, no murmurs, rubs or gallops noted on exam  Lungs: Symmetric chest expansion bilaterally, decreased lung sounds bilaterally, wheezes heard on right side, no rhonchi or crackles heard  Abdomen: Bowel sounds present, non tender on light and deep palpation  Extremities: No edema, no clubbing or cyanosis notes, positive peripheral pulses

## 2022-08-29 NOTE — PROGRESS NOTE ADULT - ASSESSMENT
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     SOB/ AHRF  Sepsis criteria met on admission but pt remains stable off abx and procal low, could be SIRS criteria due to aspiration pneumonitis  - bibasilar opacities remain on CXR from today  - likely due to aspiration pneumonia and acute on chronic HFrEF  - c/w Bumex/hypertonic saline  - pt remains net negative  - speech and swallow appreciated  - pt stable off abx  - Echo 08/01: 45-50%EF  - repeat CXR in AM  - proBNP > 50K  - thoracentesis done 9/27--transudative effusion  -  pt reaccumlated pleural effusion--may need repeat thoracentesis or pleurex catheter    Epistaxis  - resolved  - ENT follow up appreciated, packing removed  - pt apprehensive to restart Eliquis    Leukocytosis  - was uptrending likely from steroids    C3 Glomerulonephropathy  - c/w tacrolimus (level 9.5), cellcept    CAD  - c/w statin, metoprolol, imdur    HTN  - c/w metoprolol, nifedipine  - will try to wean off nifedipine given HFmrEF    Hypothyroidism  - c/w synthroid    DM II  - FS uncontrolled  - adjust insulin    Stage 2 right buttock pressure ulcer  - present on admission  - c/w local wound care    Acute on chronic anemia  - s/p 1 U PRBC      Chronic afib  - c/w nifedipine, metoprolol, will try ot wean off nifedipine    COPD  - c/w inhlaers    DVT px  from home    #Progress Note Handoff:  Pending (specify):  Diuresis  Family discussion:   Disposition: Home___/SNF___/Other________/Unknown at this time___x_____

## 2022-08-29 NOTE — SWALLOW BEDSIDE ASSESSMENT ADULT - SWALLOW EVAL: DIAGNOSIS
+toleration of mildly thick w/ use of vol bolus hold and consecutive swallows, +SOB w/ mastication of minced and moist solids

## 2022-08-29 NOTE — PROGRESS NOTE ADULT - SUBJECTIVE AND OBJECTIVE BOX
Patient is a 82y old  Male who presents with a chief complaint of chf exacerbation (29 Aug 2022 11:37)        SUBJECTIVE: Still SOB and productive cough      REVIEW OF SYSTEMS:    Negative except as per HPI    PHYSICAL EXAM  Vital Signs Last 24 Hrs  T(C): 35.7 (29 Aug 2022 05:18), Max: 35.8 (28 Aug 2022 20:12)  T(F): 96.3 (29 Aug 2022 05:18), Max: 96.5 (28 Aug 2022 20:12)  HR: 71 (29 Aug 2022 05:18) (71 - 83)  BP: 136/69 (29 Aug 2022 05:18) (116/56 - 136/69)  BP(mean): --  RR: 20 (29 Aug 2022 05:18) (20 - 20)  SpO2: 98% (29 Aug 2022 09:03) (98% - 100%)    Parameters below as of 29 Aug 2022 09:03  Patient On (Oxygen Delivery Method): mask, aerosol  O2 Flow (L/min): 7      CONSTITUTIONAL:   Well nourished.  NAD    ENT:   Airway patent,   No thrush    EYES:   Clear bilaterally,   pupils equal, round and reactive to light.    CARDIAC:   Normal rate,   regular rhythm.    B/l edema    CAROTID:   normal systolic impulse  no bruits    RESPIRATORY:   No wheezing  Normal chest expansion  Not tachypneic,  No use of accessory muscles    GASTROINTESTINAL:  Abdomen soft,   non-tender,   no guarding,   + BS    MUSCULOSKELETAL:   range of motion is not limited,  no clubbing, cyanosis    NEUROLOGICAL:   Alert and oriented   no motor  deficits.    SKIN:   Skin normal color for race,   No evidence of rash.    PSYCHIATRIC:   normal mood and affect.   no apparent risk to self or others.        08-28-22 @ 07:01  -  08-29-22 @ 07:00  --------------------------------------------------------  IN:    Bumetanide: 100 mL    IV PiggyBack: 50 mL    Oral Fluid: 1080 mL  Total IN: 1230 mL    OUT:    Indwelling Catheter - Urethral (mL): 2325 mL  Total OUT: 2325 mL    Total NET: -1095 mL      08-29-22 @ 07:01 - 08-29-22 @ 12:03  --------------------------------------------------------  IN:    Oral Fluid: 720 mL  Total IN: 720 mL    OUT:  Total OUT: 0 mL    Total NET: 720 mL          LABS:                          8.5    12.87 )-----------( 203      ( 29 Aug 2022 06:14 )             27.1                                               08-29    137  |  95<L>  |  69<HH>  ----------------------------<  159<H>  3.5   |  30  |  1.1    Ca    8.3<L>      29 Aug 2022 06:14  Mg     1.9     08-29                                                                                                                                      Culture - Fungal, Body Fluid (collected 26 Aug 2022 13:33)  Source: Pleural Fl Pleural Fluid  Preliminary Report (29 Aug 2022 10:50):    Testing in progress    Culture - Body Fluid with Gram Stain (collected 26 Aug 2022 13:33)  Source: Pleural Fl Pleural Fluid  Gram Stain (27 Aug 2022 03:01):    No polymorphonuclear cells seen seen    No organisms seen    by cytocentrifuge  Preliminary Report (27 Aug 2022 21:34):    No growth                                                    MEDICATIONS  (STANDING):  albuterol/ipratropium for Nebulization 3 milliLiter(s) Nebulizer every 6 hours  apixaban 2.5 milliGRAM(s) Oral two times a day  atorvastatin 40 milliGRAM(s) Oral at bedtime  BACItracin   Ointment 1 Application(s) Topical two times a day  buMETAnide Infusion 1 mG/Hr (5 mL/Hr) IV Continuous <Continuous>  chlorhexidine 2% Cloths 1 Application(s) Topical <User Schedule>  dextrose 5%. 1000 milliLiter(s) (50 mL/Hr) IV Continuous <Continuous>  dextrose 5%. 1000 milliLiter(s) (100 mL/Hr) IV Continuous <Continuous>  dextrose 50% Injectable 25 Gram(s) IV Push once  dextrose 50% Injectable 12.5 Gram(s) IV Push once  dextrose 50% Injectable 25 Gram(s) IV Push once  famotidine    Tablet 20 milliGRAM(s) Oral daily  glucagon  Injectable 1 milliGRAM(s) IntraMuscular once  insulin glargine Injectable (LANTUS) 22 Unit(s) SubCutaneous at bedtime  insulin lispro (ADMELOG) corrective regimen sliding scale   SubCutaneous three times a day before meals  insulin lispro Injectable (ADMELOG) 7 Unit(s) SubCutaneous three times a day before meals  isosorbide   mononitrate ER Tablet (IMDUR) 30 milliGRAM(s) Oral daily  levothyroxine 150 MICROGram(s) Oral daily  lidocaine 1% (Preservative-free) Injectable 30 milliLiter(s) Local Injection once  melatonin 5 milliGRAM(s) Oral at bedtime  metoprolol succinate  milliGRAM(s) Oral daily  multivitamin/minerals 1 Tablet(s) Oral daily  mycophenolate mofetil 500 milliGRAM(s) Oral four times a day  NIFEdipine XL 30 milliGRAM(s) Oral daily  pentoxifylline 400 milliGRAM(s) Oral two times a day  predniSONE   Tablet 10 milliGRAM(s) Oral daily  sodium chloride 3%. 150 milliLiter(s) (50 mL/Hr) IV Continuous <Continuous>  sodium chloride 3%. 150 milliLiter(s) (50 mL/Hr) IV Continuous <Continuous>  sodium chloride 3%. 150 milliLiter(s) (50 mL/Hr) IV Continuous <Continuous>  tacrolimus 0.5 milliGRAM(s) Oral two times a day    MEDICATIONS  (PRN):  dextrose Oral Gel 15 Gram(s) Oral once PRN Blood Glucose LESS THAN 70 milliGRAM(s)/deciliter  dextrose Oral Gel 15 Gram(s) Oral once PRN Blood Glucose LESS THAN 70 milliGRAM(s)/deciliter      X-Rays reviewed    CXR interpreted by me: Patient is a 82y old  Male who presents with a chief complaint of chf exacerbation (29 Aug 2022 11:37)        SUBJECTIVE: Still SOB and productive cough, better than prior    PHYSICAL EXAM  Vital Signs Last 24 Hrs  T(C): 35.7 (29 Aug 2022 05:18), Max: 35.8 (28 Aug 2022 20:12)  T(F): 96.3 (29 Aug 2022 05:18), Max: 96.5 (28 Aug 2022 20:12)  HR: 71 (29 Aug 2022 05:18) (71 - 83)  BP: 136/69 (29 Aug 2022 05:18) (116/56 - 136/69)  BP(mean): --  RR: 20 (29 Aug 2022 05:18) (20 - 20)  SpO2: 98% (29 Aug 2022 09:03) (98% - 100%)    Parameters below as of 29 Aug 2022 09:03  Patient On (Oxygen Delivery Method): mask, aerosol  O2 Flow (L/min): 7      CONSTITUTIONAL:  ill looking    ENT:   Airway patent,   No thrush      CARDIAC:   irregular.    B/l edema    RESPIRATORY:   No wheezing  Normal chest expansion  Not tachypneic,  No use of accessory muscles    GASTROINTESTINAL:  Abdomen soft,   non-tender,   no guarding,   + BS    MUSCULOSKELETAL:   range of motion is not limited,  no clubbing, cyanosis    NEUROLOGICAL:   Alert and oriented   no motor  deficits.    SKIN:   Skin normal color for race,   No evidence of rash.        08-28-22 @ 07:01  -  08-29-22 @ 07:00  --------------------------------------------------------  IN:    Bumetanide: 100 mL    IV PiggyBack: 50 mL    Oral Fluid: 1080 mL  Total IN: 1230 mL    OUT:    Indwelling Catheter - Urethral (mL): 2325 mL  Total OUT: 2325 mL    Total NET: -1095 mL      08-29-22 @ 07:01  -  08-29-22 @ 12:03  --------------------------------------------------------  IN:    Oral Fluid: 720 mL  Total IN: 720 mL    OUT:  Total OUT: 0 mL    Total NET: 720 mL          LABS:                          8.5    12.87 )-----------( 203      ( 29 Aug 2022 06:14 )             27.1                                               08-29    137  |  95<L>  |  69<HH>  ----------------------------<  159<H>  3.5   |  30  |  1.1    Ca    8.3<L>      29 Aug 2022 06:14  Mg     1.9     08-29                                                                                                                                      Culture - Fungal, Body Fluid (collected 26 Aug 2022 13:33)  Source: Pleural Fl Pleural Fluid  Preliminary Report (29 Aug 2022 10:50):    Testing in progress    Culture - Body Fluid with Gram Stain (collected 26 Aug 2022 13:33)  Source: Pleural Fl Pleural Fluid  Gram Stain (27 Aug 2022 03:01):    No polymorphonuclear cells seen seen    No organisms seen    by cytocentrifuge  Preliminary Report (27 Aug 2022 21:34):    No growth                                                    MEDICATIONS  (STANDING):  albuterol/ipratropium for Nebulization 3 milliLiter(s) Nebulizer every 6 hours  apixaban 2.5 milliGRAM(s) Oral two times a day  atorvastatin 40 milliGRAM(s) Oral at bedtime  BACItracin   Ointment 1 Application(s) Topical two times a day  buMETAnide Infusion 1 mG/Hr (5 mL/Hr) IV Continuous <Continuous>  chlorhexidine 2% Cloths 1 Application(s) Topical <User Schedule>  dextrose 5%. 1000 milliLiter(s) (50 mL/Hr) IV Continuous <Continuous>  dextrose 5%. 1000 milliLiter(s) (100 mL/Hr) IV Continuous <Continuous>  dextrose 50% Injectable 25 Gram(s) IV Push once  dextrose 50% Injectable 12.5 Gram(s) IV Push once  dextrose 50% Injectable 25 Gram(s) IV Push once  famotidine    Tablet 20 milliGRAM(s) Oral daily  glucagon  Injectable 1 milliGRAM(s) IntraMuscular once  insulin glargine Injectable (LANTUS) 22 Unit(s) SubCutaneous at bedtime  insulin lispro (ADMELOG) corrective regimen sliding scale   SubCutaneous three times a day before meals  insulin lispro Injectable (ADMELOG) 7 Unit(s) SubCutaneous three times a day before meals  isosorbide   mononitrate ER Tablet (IMDUR) 30 milliGRAM(s) Oral daily  levothyroxine 150 MICROGram(s) Oral daily  lidocaine 1% (Preservative-free) Injectable 30 milliLiter(s) Local Injection once  melatonin 5 milliGRAM(s) Oral at bedtime  metoprolol succinate  milliGRAM(s) Oral daily  multivitamin/minerals 1 Tablet(s) Oral daily  mycophenolate mofetil 500 milliGRAM(s) Oral four times a day  NIFEdipine XL 30 milliGRAM(s) Oral daily  pentoxifylline 400 milliGRAM(s) Oral two times a day  predniSONE   Tablet 10 milliGRAM(s) Oral daily  sodium chloride 3%. 150 milliLiter(s) (50 mL/Hr) IV Continuous <Continuous>  sodium chloride 3%. 150 milliLiter(s) (50 mL/Hr) IV Continuous <Continuous>  sodium chloride 3%. 150 milliLiter(s) (50 mL/Hr) IV Continuous <Continuous>  tacrolimus 0.5 milliGRAM(s) Oral two times a day    MEDICATIONS  (PRN):  dextrose Oral Gel 15 Gram(s) Oral once PRN Blood Glucose LESS THAN 70 milliGRAM(s)/deciliter  dextrose Oral Gel 15 Gram(s) Oral once PRN Blood Glucose LESS THAN 70 milliGRAM(s)/deciliter      X-Rays reviewed    CXR interpreted by me:

## 2022-08-29 NOTE — PROGRESS NOTE ADULT - SUBJECTIVE AND OBJECTIVE BOX
Date of Admission: 22    Interval History:  Patient resting in bed, in NAD. Renal function improving, ~2325cc 24hr UOP noted.     CHIEF COMPLAINT: Patient is a 82y old  Male who presents with a chief complaint of chf exacerbation (22 Aug 2022 12:24)    HISTORY OF PRESENT ILLNESS: 81 yo male with PMHx of HFrEF (45-50% on Lasix 20 PO), A-Fib on Eliquis, CAD s/p CABGx3, PVD, COPD (was on rare home O2 PRN now on 3L NC since recent July admission for COVID), CKD3, C3 glomerulonephropathy, HTN, HLD, Hypothyroid presents from NH with progressively worsening SOB x5 days and hypoxia.  Recently hospitalized here for a month discharged on 8/3/22 initially for BRENDA on CKD course c/b COVID, bilateral lower lobe pneumonia, delirium and microaspiration of thin liquids on modified barium study. Received Dexamethasone and Zosyn course with new O2 requirements of 3L NC discharged to nursing home on 3L and thick liquid diet for rehab. Due to his SOB, chest x-ray was ordered on 8/15 but results did not come back and patient became increasingly short of breath and was noted to be hypoxic and rehab facility which prompted referral to the ED. Review of symptoms is notable for orthopnea and a chronic dry cough unchanged since his covid illness. He denies paroxysmal nocturnal dyspnea, leg edema, chest pain, palpitations, dizziness, n/v, abd pain. Cardiologist is Dr. Witt and Pulmonolgist is Dr. Rory Mars.   In ED:  T(F): 96.4 (22 @ 11:27), Max: 96.4 (22 @ 11:27)  HR: 80 (22 @ 16:20) (80 - 91)  BP: 137/61 (22 @ 16:20) (121/58 - 137/61)  RR: 18 (22 @ 16:20) (18 - 24)  SpO2: 100% (22 @ 16:20) (89% - 100%) -> 89% on 4L -> placed on NRB -> BIPAP 2/2 tachypnea and hypoxia -> taken off with increased work of breathing placed back on BIPAP  Labs: WBC 12k, Hgb 7.2 (baseline 9), BNP 55k, Cr 2.2 (baseline 1.5), PCO2 47 (VBG), Troponin 0.24  EKG:   Atrial fibrillation with premature ventricular or aberrantly conducted complexes  Left posterior fascicular block   ST & T wave abnormality, consider lateral ischemia - similar to prior in 2022  XR chest: significant bilateral pleural effusions and pulmonary congestion  Admitted to medicine for SOB/hypoxia likely 2/2 acute CHF exacerbation.  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    SOCIAL HISTORY:  Former cigarette smoker, quit >40 years ago. Denies alcohol or drug use.     Allergies    Ozempic (0.25 mg or 0.5 mg dose) (Hives; Rash)  streptomycin (Unknown)  Trulicity Pen (Hives; Rash)    Intolerances      PHYSICAL EXAM:  General Appearance: lethargic, normal for age and gender. 	  Neck: JVP [id number], no bruit.   Eyes: Extra Ocular muscles intact.   Cardiovascular: irregular rhythm S1 S2, + JVD, +2 B/L LE edema  Respiratory: decreased air movement R < L  Psychiatry: Alert, oriented x 3, Mood & affect appropriate  Gastrointestinal:  Soft, Non-tender  Skin/Integumen: No rashes, No ecchymoses, No cyanosis	  Neurologic: Non-focal  Musculoskeletal/ extremities: Normal range of motion, No clubbing, cyanosis,+2 B/L LE edema  Vascular: Peripheral pulses palpable 2+ bilaterally    CARDIAC MARKERS:  Serum Pro-Brain Natriuretic Peptide: 12594 pg/mL (22 @ 11:47)    Serum Pro-Brain Natriuretic Peptide: 09888 pg/mL (10.20.21 @ 10:28)      TELEMETRY EVENTS: 	    EC22    Ventricular Rate 71 BPM  Atrial Rate 82 BPM  QRS Duration 140 ms  Q-T Interval 444 ms  QTC Calculation(Bazett) 482 ms  R Axis 108 degrees  T Axis 166 degrees    Diagnosis Line Atrial fibrillation with premature ventricular or aberrantlyconducted  complexes  Non-specific intra-ventricular conduction block  ST & T wave abnormality, consider lateral ischemia  Abnormal ECG    Confirmed by Sarah Borden MD ([id number]) on 2022 1:35:39 PM      	  PREVIOUS DIAGNOSTIC TESTING:    TTE 22      Summary:   1. Left ventricular ejection fraction, by visual estimation, is 45 to   50%.   2. Mildly decreased global left ventricular systolic function.   3. Mild left ventricular hypertrophy.   4. Multiple left ventricular regional wall motion abnormalities exist.   See wall motion findings.   5. The left ventricular diastolic function could not be assessed in this   study.   6. Moderately enlarged left atrium.   7. Sclerotic aortic valve with normal opening.   8. Degenerative mitral valve.   9. Mild mitral regurgitation.  10. Mild tricuspid regurgitation.  11. Estimated pulmonary artery systolic pressure is 37.6 mmHg assuming a   right atrial pressure of 8 mmHg, which is consistent with borderline   pulmonary hypertension.        TTE 10/22/21    Summary:   1. Moderately to severely decreased global left ventricular systolic function.   2. Severely enlarged left atrium.   3. Severely enlarged right atrium.   4. Multiple left ventricular regional wall motion abnormalities exist. See wall motion findings.   5. Mild eccentric left ventricular hypertrophy.   6. The left ventricular diastolic function could not be assessed in this study.   7. Severely enlarged right ventricle.   8. Severely reduced RV systolic function.   9. Mild to moderate mitral valve regurgitation.  10. Moderate mitral annular calcification.  11. Mild tricuspid regurgitation.  12. Mild aortic regurgitation.  13. Sclerotic aortic valve with normal opening.  14. Complex atheroma is noted in the ascending aorta.  15. Estimated pulmonary artery systolic pressure is 64.8 mmHg assuming a right atrial pressure of 15 mmHg, which is consistent with severe pulmonary hypertension.  16. Pulmonary hypertension is present.  17. LA volume Index is 71.9 ml/m² ml/m2.  18. There is moderate aortic root calcification.        Home Medications:  Aranesp 100 mcg/0.5 mL injectable solution: 1 dose(s) injectable every 3 to 4 weeks (2022 21:22)  atorvastatin 40 mg oral tablet: 1 tab(s) orally once a day (at bedtime) (2022 21:22)  calcitriol 0.25 mcg oral capsule: 1 cap(s) orally 2 times a week (2022 21:22)  d-mycophenolate: 500 milligram(s) orally 4 times a day (:22)  Eliquis 2.5 mg oral tablet: 1 tab(s) orally 2 times a day (:22)  famotidine 20 mg oral tablet: 1 tab(s) orally once a day (:22)  furosemide 20 mg oral tablet: 1 tab(s) orally once a day (03 Aug 2022 09:47)  insulin glargine 100 units/mL subcutaneous solution: 5 unit(s) subcutaneous once a day (at bedtime) (03 Aug 2022 09:47)  insulin lispro 100 units/mL injectable solution: 1 Unit(s) if Glucose 151 - 200  2 Unit(s) if Glucose 201 - 250  3 Unit(s) if Glucose 251 - 300  4 Unit(s) if Glucose 301 - 350  5 Unit(s) if Glucose 351 - 400  6 Unit(s) if Glucose Greater Than 400 (03 Aug 2022 09:57)  isosorbide mononitrate 30 mg oral tablet, extended release: 1 tab(s) orally once a day (in the morning) (:22)  levothyroxine 150 mcg (0.15 mg) oral tablet: 1 tab(s) orally once a day (:22)  metoprolol succinate 50 mg oral tablet, extended release: 1 tab(s) orally once a day (03 Aug 2022 09:47)  Multiple Vitamins with Minerals oral tablet: 1 tab(s) orally once a day (03 Aug 2022 09:47)  NIFEdipine 30 mg oral tablet, extended release: 1 tab(s) orally once a day (:22)  pentoxifylline 400 mg oral tablet, extended release: 1 tab(s) orally 2 times a day (:22)  sodium bicarbonate 650 mg oral tablet: 1 tab(s) orally 3 times a day (03 Aug 2022 09:47)  tacrolimus 0.5 mg oral capsule: 1 cap(s) orally 2 times a day (03 Aug 2022 09:57)    MEDICATIONS  (STANDING):  albuterol/ipratropium for Nebulization 3 milliLiter(s) Nebulizer every 6 hours  ampicillin/sulbactam  IVPB 3 Gram(s) IV Intermittent every 6 hours  apixaban 2.5 milliGRAM(s) Oral two times a day  atorvastatin 40 milliGRAM(s) Oral at bedtime  buMETAnide Injectable 2 milliGRAM(s) IV Push every 12 hours  dextrose 5%. 1000 milliLiter(s) (50 mL/Hr) IV Continuous <Continuous>  dextrose 5%. 1000 milliLiter(s) (100 mL/Hr) IV Continuous <Continuous>  dextrose 50% Injectable 25 Gram(s) IV Push once  dextrose 50% Injectable 12.5 Gram(s) IV Push once  dextrose 50% Injectable 25 Gram(s) IV Push once  famotidine    Tablet 20 milliGRAM(s) Oral daily  glucagon  Injectable 1 milliGRAM(s) IntraMuscular once  insulin glargine Injectable (LANTUS) 10 Unit(s) SubCutaneous every morning  insulin lispro (ADMELOG) corrective regimen sliding scale   SubCutaneous three times a day before meals  isosorbide   mononitrate ER Tablet (IMDUR) 30 milliGRAM(s) Oral daily  levothyroxine 150 MICROGram(s) Oral daily  methylPREDNISolone sodium succinate Injectable 40 milliGRAM(s) IV Push every 12 hours  metoprolol succinate ER 50 milliGRAM(s) Oral daily  multivitamin/minerals 1 Tablet(s) Oral daily  mycophenolate mofetil 500 milliGRAM(s) Oral four times a day  NIFEdipine XL 30 milliGRAM(s) Oral daily  pentoxifylline 400 milliGRAM(s) Oral two times a day  tacrolimus 0.5 milliGRAM(s) Oral two times a day    MEDICATIONS  (PRN):  dextrose Oral Gel 15 Gram(s) Oral once PRN Blood Glucose LESS THAN 70 milliGRAM(s)/deciliter

## 2022-08-29 NOTE — PROGRESS NOTE ADULT - ASSESSMENT
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     SOB/ AHRF  Sepsis criteria met on admission but pt remains stable off abx and procal low, could be SIRS criteria due to aspiration pneumonitis  - bibasilar opacities remain on CXR from today  - likely due to aspiration pneumonia and acute on chronic HFrEF  - c/w Bumex/hypertonic saline--HF team recommended to d/c bumex tonight but pt remains overloaded therefore will continue with Bumex and reassess tomorrow  - pt has large urine output  - speech and swallow appreciated  - pt stable off abx  - Echo 08/01: 45-50%EF  - CXR shows improved b/l pleural effusions with pulmonary congestion and opacities but awaiting final read  - proBNP > 50K  - thoracentesis done 9/27--transudative effusion  - continue with bumex drip and hypertonic saline  - chest x-ray 9/29 fluid reaccumulation   - follow up with heart failure  - follow up with pulm     Epistaxis  - resolved  - ENT follow up appreciated, packing removed    Leukocytosis  - was uptrending likely from steroids    C3 Glomerulonephropathy  - c/w tacrolimus (level 9.5), cellcept    CAD  - c/w statin, metoprolol, imdur    HTN  - c/w metoprolol, nifedipine    Hypothyroidism  - c/w synthroid    DM II  - FS uncontrolled  - adjust insulin    Stage 2 right buttock pressure ulcer  - present on admission  - c/w local wound care    Acute on chronic anemia  - s/p 1 U PRBC    Chronic afib  - c/w nifedipine, metoprolol    COPD  - c/w inhlaers    DVT px  from home    #Progress Note Handoff:  Pending (specify):  Diuresis  Family discussion:   Disposition: Home___/SNF___/Other________/Unknown at this time___x_____

## 2022-08-29 NOTE — PROGRESS NOTE ADULT - SUBJECTIVE AND OBJECTIVE BOX
CHIEF COMPLAINT:    Patient is a 82y old  Male who presents with a chief complaint of chf exacerbation    INTERVAL HPI/OVERNIGHT EVENTS:    Patient seen and examined at bedside. No acute overnight events occurred.    ROS: Denies SOB. All other systems are negative.    Medications:  Standing  albuterol/ipratropium for Nebulization 3 milliLiter(s) Nebulizer every 6 hours  apixaban 2.5 milliGRAM(s) Oral two times a day  atorvastatin 40 milliGRAM(s) Oral at bedtime  BACItracin   Ointment 1 Application(s) Topical two times a day  buMETAnide Infusion 1 mG/Hr IV Continuous <Continuous>  chlorhexidine 2% Cloths 1 Application(s) Topical <User Schedule>  dextrose 5%. 1000 milliLiter(s) IV Continuous <Continuous>  dextrose 5%. 1000 milliLiter(s) IV Continuous <Continuous>  dextrose 50% Injectable 25 Gram(s) IV Push once  dextrose 50% Injectable 12.5 Gram(s) IV Push once  dextrose 50% Injectable 25 Gram(s) IV Push once  famotidine    Tablet 20 milliGRAM(s) Oral daily  glucagon  Injectable 1 milliGRAM(s) IntraMuscular once  insulin glargine Injectable (LANTUS) 22 Unit(s) SubCutaneous at bedtime  insulin lispro (ADMELOG) corrective regimen sliding scale   SubCutaneous three times a day before meals  insulin lispro Injectable (ADMELOG) 7 Unit(s) SubCutaneous three times a day before meals  isosorbide   mononitrate ER Tablet (IMDUR) 30 milliGRAM(s) Oral daily  levothyroxine 150 MICROGram(s) Oral daily  lidocaine 1% (Preservative-free) Injectable 30 milliLiter(s) Local Injection once  magnesium sulfate  IVPB 2 Gram(s) IV Intermittent once  melatonin 5 milliGRAM(s) Oral at bedtime  metoprolol succinate  milliGRAM(s) Oral daily  multivitamin/minerals 1 Tablet(s) Oral daily  mycophenolate mofetil 500 milliGRAM(s) Oral four times a day  NIFEdipine XL 30 milliGRAM(s) Oral daily  pentoxifylline 400 milliGRAM(s) Oral two times a day  potassium chloride   Powder 40 milliEquivalent(s) Oral once  predniSONE   Tablet 10 milliGRAM(s) Oral daily  sodium chloride 3%. 150 milliLiter(s) IV Continuous <Continuous>  sodium chloride 3%. 150 milliLiter(s) IV Continuous <Continuous>  sodium chloride 3%. 150 milliLiter(s) IV Continuous <Continuous>  sodium chloride 3%. 150 milliLiter(s) IV Continuous <Continuous>  tacrolimus 0.5 milliGRAM(s) Oral two times a day    PRN Meds  dextrose Oral Gel 15 Gram(s) Oral once PRN  dextrose Oral Gel 15 Gram(s) Oral once PRN        Vital Signs:    T(F): 96.9 (08-29-22 @ 13:00), Max: 96.9 (08-29-22 @ 13:00)  HR: 81 (08-29-22 @ 13:00) (71 - 83)  BP: 167/76 (08-29-22 @ 13:00) (128/59 - 167/76)  RR: 20 (08-29-22 @ 13:00) (20 - 20)  SpO2: 98% (08-29-22 @ 09:03) (98% - 98%)  I&O's Summary    28 Aug 2022 07:01  -  29 Aug 2022 07:00  --------------------------------------------------------  IN: 1230 mL / OUT: 2325 mL / NET: -1095 mL    29 Aug 2022 07:01  -  29 Aug 2022 16:44  --------------------------------------------------------  IN: 840 mL / OUT: 800 mL / NET: 40 mL      Daily     Daily   CAPILLARY BLOOD GLUCOSE      POCT Blood Glucose.: 239 mg/dL (29 Aug 2022 12:32)  POCT Blood Glucose.: 262 mg/dL (29 Aug 2022 10:57)  POCT Blood Glucose.: 194 mg/dL (29 Aug 2022 08:17)  POCT Blood Glucose.: 227 mg/dL (28 Aug 2022 22:14)  POCT Blood Glucose.: 188 mg/dL (28 Aug 2022 17:12)      PHYSICAL EXAM:  GENERAL:  NAD  SKIN: No rashes or lesions  HEENT: Atraumatic. Normocephalic. Anicteric  NECK:  JVD not visualized  PULMONARY: bilateral crackles  CVS: Normal S1, S2. Regular rate and rhythm. No murmurs.  ABDOMEN/GI: Soft, Nontender, Nondistended; Bowel sounds are present  EXTREMITIES:  2+ pitting edema  NEUROLOGIC:  No motor deficit.  PSYCH: Alert & oriented x 3, normal affect      LABS:                        8.5    12.87 )-----------( 203      ( 29 Aug 2022 06:14 )             27.1     08-29    137  |  95<L>  |  69<HH>  ----------------------------<  159<H>  3.5   |  30  |  1.1    Ca    8.3<L>      29 Aug 2022 06:14  Mg     1.9     08-29                RADIOLOGY & ADDITIONAL TESTS:  Imaging or report Personally Reviewed:  [ ] YES  [ ] NO -->no new images    Telemetry reviewed independently - NSR, no acute events  EKG reviewed independently -->no new EKGs    Consultant(s) Notes Reviewed:  [ ] YES  [ ] NO  Care Discussed with Consultants/Other Providers [ ] YES  [ ] NO    Case discussed with resident  Care discussed with pt

## 2022-08-29 NOTE — PROGRESS NOTE ADULT - ASSESSMENT
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 Found to be hypoxic to 89%- placed on BiPAP with improvement, now on venti-mask (can not tolerate NC 2/2 epistax). Found to be fluid overloaded, currently receiving high dose IV diuretics. Suspected PNA, IV antibiotics given (now d/c'd). Patient having epistaxis while on Eliquis- now holding, epistaxis improved. Pulmonology following- s/p thoracentesis 8/26, 1.5L removed.     Plan:  Patient remains fluid overloaded on exam   Continue Bumex gtt at 1 mg/hr  Start 3% Hypertonic Saline 150ml BID (If infused throughout a central line, run over one hour, if a peripheral line is to be used run over 3 hours)  BMP twice daily with magnesium   Mag 1.9, K 3.5- replete  Maintain potassium >4.0, Mg >2.2  Continue nifedipine 30mg daily and imdur 30mg daily   Continue metoprolol succinate 100mg daily  BiPAP at night - wean before discharge if able  Strict intake and output needed for proper diuretic management   Daily weight   Nephrology following- family requesting own nephrologist Dr. 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 Found to be hypoxic to 89%- placed on BiPAP with improvement, now on venti-mask (can not tolerate NC 2/2 epistax). Found to be fluid overloaded, currently receiving high dose IV diuretics. Suspected PNA, IV antibiotics given (now d/c'd). Patient having epistaxis while on Eliquis- now holding, epistaxis improved. Pulmonology following- s/p thoracentesis 8/26, 1.5L removed.     Plan:    Patient remains fluid overloaded on exam   Continue Bumex gtt at 1 mg/hr  Start 3% Hypertonic Saline 150ml BID (If infused throughout a central line, run over one hour, if a peripheral line is to be used run over 3 hours)  BMP twice daily with magnesium   Mag 1.9, K 3.5- replete  Maintain potassium >4.0, Mg >2.2  Continue nifedipine 30mg daily and imdur 30mg daily   Continue metoprolol succinate 100mg daily  BiPAP at night - wean before discharge if able  Strict intake and output needed for proper diuretic management   Daily weight   Nephrology following- family requesting own nephrologist Dr. Daniel to be contacted as well  Physical therapy consult / OOB to chair at tolerated   Repeat iron profile with next blood draw   Plan discussed with primary team  Will continue to follow.

## 2022-08-30 NOTE — PROGRESS NOTE ADULT - ASSESSMENT
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 Found to be hypoxic to 89%- placed on BiPAP with improvement, now on venti-mask (can not tolerate NC 2/2 epistax). Found to be fluid overloaded, currently receiving high dose IV diuretics. Suspected PNA, IV antibiotics given (now d/c'd). Patient having epistaxis while on Eliquis- now holding, epistaxis improved. Pulmonology following- s/p thoracentesis 8/26, 1.5L removed.     Plan:  Patient remains fluid overloaded on exam- POCUS exam: IVC 2.3cm, some collapsibility but < 50% with respirations   Continue Bumex gtt at 1 mg/hr  Continue 3% Hypertonic Saline 150ml BID (If infused throughout a central line, run over one hour, if a peripheral line is to be used run over 3 hours)  BMP twice daily with magnesium   K 3.8- replete  Maintain potassium >4.0, Mg >2.2  Continue nifedipine 30mg daily and imdur 30mg daily   Continue metoprolol succinate 100mg daily  BiPAP at night - wean before discharge if able  Strict intake and output needed for proper diuretic management   Daily weight   Nephrology following- family requesting own nephrologist Dr. 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 Found to be hypoxic to 89%- placed on BiPAP with improvement, now on venti-mask (can not tolerate NC 2/2 epistax). Found to be fluid overloaded, currently receiving high dose IV diuretics. Suspected PNA, IV antibiotics given (now d/c'd). Patient having epistaxis while on Eliquis- now holding, epistaxis improved. Pulmonology following- s/p thoracentesis 8/26, 1.5L removed.     Plan:  Patient remains fluid overloaded on exam- POCUS exam: IVC 2.3cm, some collapsibility but < 50% with respirations   Continue Bumex gtt at 1 mg/hr  Continue 3% Hypertonic Saline 150ml BID (If infused throughout a central line, run over one hour, if a peripheral line is to be used run over 3 hours)  BMP twice daily with magnesium   K 3.8- replete  Maintain potassium >4.0, Mg >2.2  Continue nifedipine 30mg daily and imdur 30mg daily   Continue metoprolol succinate 100mg daily  BiPAP at night - wean before discharge if able  Strict intake and output needed for proper diuretic management   Daily weight   Nephrology following- family requesting own nephrologist Dr. Daniel to be contacted as well  Physical therapy f/u / OOB to chair as tolerated   Repeat iron profile with next blood draw  Will continue to follow.

## 2022-08-30 NOTE — PROGRESS NOTE ADULT - ASSESSMENT
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     SOB/ AHRF  Sepsis criteria met on admission but pt remains stable off abx and procal low, could be SIRS criteria due to aspiration pneumonitis  - bibasilar opacities remain on CXR from today  - likely due to aspiration pneumonia and acute on chronic HFrEF  - c/w Bumex/hypertonic saline  - pt remains net negative  - speech and swallow appreciated  - pt stable off abx  - Echo 08/01: 45-50%EF  - repeat CXR in AM  - proBNP > 50K  - thoracentesis done 9/27--transudative effusion  -  pt reaccumulated pleural effusion--may need repeat thoracentesis or pleurex catheter-->I spoke with pulmonary Dr. Dawson--recommended continued diuresis, no further thoracentesis    Epistaxis  - resolved  - ENT follow up appreciated, packing removed  - pt apprehensive to restart Eliquis    Leukocytosis  - was uptrending likely from steroids    C3 Glomerulonephropathy  - c/w tacrolimus (level 9.5), cellcept    CAD  - c/w statin, metoprolol, imdur    HTN  - c/w metoprolol, nifedipine  - will try to wean off nifedipine given HFmrEF    Hypothyroidism  - c/w synthroid    DM II  - FS uncontrolled  - adjust insulin    Stage 2 right buttock pressure ulcer  - present on admission  - c/w local wound care    Acute on chronic anemia  - s/p 1 U PRBC      Chronic afib  - c/w nifedipine, metoprolol, will try ot wean off nifedipine    COPD  - c/w inhalers    DVT px  from home    #Progress Note Handoff:  Pending (specify):  Diuresis  Family discussion: spoke with pt and son at bedside  Disposition: Home___/SNF___/Other________/Unknown at this time___x_____

## 2022-08-30 NOTE — PROGRESS NOTE ADULT - ASSESSMENT
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     SOB/ AHRF  Sepsis criteria met on admission but pt remains stable off abx and procal low, could be SIRS criteria due to aspiration pneumonitis  - bibasilar opacities remain on CXR from today  - likely due to aspiration pneumonia and acute on chronic HFrEF  - c/w Bumex/hypertonic saline--HF team recommended to d/c bumex tonight but pt remains overloaded therefore will continue with Bumex and reassess tomorrow  - pt has large urine output  - speech and swallow appreciated  - pt stable off abx  - Echo 08/01: 45-50%EF  - CXR shows improved b/l pleural effusions with pulmonary congestion and opacities but awaiting final read  - proBNP > 50K  - thoracentesis done 9/27--transudative effusion  - continue with bumex drip and hypertonic saline  - chest x-ray 9/29 fluid reaccumulation   - HF rec: bumex drip  - follow up with pulm   - repeat chest x-ray 8/31 AM    Epistaxis  - resolved  - ENT follow up appreciated, packing removed    Leukocytosis  - was uptrending likely from steroids    C3 Glomerulonephropathy  - c/w tacrolimus (level 9.5), cellcept    CAD  - c/w statin, metoprolol, imdur    HTN  - c/w metoprolol, nifedipine    Hypothyroidism  - c/w synthroid    DM II  - FS uncontrolled  - adjust insulin    Stage 2 right buttock pressure ulcer  - present on admission  - c/w local wound care    Acute on chronic anemia  - s/p 1 U PRBC    Chronic afib  - c/w nifedipine, metoprolol    COPD  - c/w inhlaers    DVT px  from home    #Progress Note Handoff:  Pending (specify):  Diuresis  Family discussion:   Disposition: Home___/SNF___/Other________/Unknown at this time___x_____, PT will see patient today

## 2022-08-30 NOTE — PROGRESS NOTE ADULT - SUBJECTIVE AND OBJECTIVE BOX
Date of Admission: 22    Interval History:  Patient resting in bed, in NAD. Remains on venti mask, no epistaxis noted. Renal function stable.  ~1775cc 24hr UOP noted.     CHIEF COMPLAINT: Patient is a 82y old  Male who presents with a chief complaint of chf exacerbation (22 Aug 2022 12:24)    HISTORY OF PRESENT ILLNESS: 83 yo male with PMHx of HFrEF (45-50% on Lasix 20 PO), A-Fib on Eliquis, CAD s/p CABGx3, PVD, COPD (was on rare home O2 PRN now on 3L NC since recent July admission for COVID), CKD3, C3 glomerulonephropathy, HTN, HLD, Hypothyroid presents from NH with progressively worsening SOB x5 days and hypoxia.  Recently hospitalized here for a month discharged on 8/3/22 initially for BRENDA on CKD course c/b COVID, bilateral lower lobe pneumonia, delirium and microaspiration of thin liquids on modified barium study. Received Dexamethasone and Zosyn course with new O2 requirements of 3L NC discharged to nursing home on 3L and thick liquid diet for rehab. Due to his SOB, chest x-ray was ordered on 8/15 but results did not come back and patient became increasingly short of breath and was noted to be hypoxic and rehab facility which prompted referral to the ED. Review of symptoms is notable for orthopnea and a chronic dry cough unchanged since his covid illness. He denies paroxysmal nocturnal dyspnea, leg edema, chest pain, palpitations, dizziness, n/v, abd pain. Cardiologist is Dr. Witt and Pulmonolgist is Dr. Rory Mars.   In ED:  T(F): 96.4 (22 @ 11:27), Max: 96.4 (22 @ 11:27)  HR: 80 (22 @ 16:20) (80 - 91)  BP: 137/61 (22 @ 16:20) (121/58 - 137/61)  RR: 18 (22 @ 16:20) (18 - 24)  SpO2: 100% (22 @ 16:20) (89% - 100%) -> 89% on 4L -> placed on NRB -> BIPAP 2/2 tachypnea and hypoxia -> taken off with increased work of breathing placed back on BIPAP  Labs: WBC 12k, Hgb 7.2 (baseline 9), BNP 55k, Cr 2.2 (baseline 1.5), PCO2 47 (VBG), Troponin 0.24  EKG:   Atrial fibrillation with premature ventricular or aberrantly conducted complexes  Left posterior fascicular block   ST & T wave abnormality, consider lateral ischemia - similar to prior in 2022  XR chest: significant bilateral pleural effusions and pulmonary congestion  Admitted to medicine for SOB/hypoxia likely 2/2 acute CHF exacerbation.  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    SOCIAL HISTORY:  Former cigarette smoker, quit >40 years ago. Denies alcohol or drug use.     Allergies    Ozempic (0.25 mg or 0.5 mg dose) (Hives; Rash)  streptomycin (Unknown)  Trulicity Pen (Hives; Rash)    Intolerances      PHYSICAL EXAM:  General Appearance: lethargic, normal for age and gender. 	  Neck: JVP [id number], no bruit.   Eyes: Extra Ocular muscles intact.   Cardiovascular: irregular rhythm S1 S2, + JVD, +2 B/L LE edema  Respiratory: decreased air movement R < L  Psychiatry: Alert, oriented x 3, Mood & affect appropriate  Gastrointestinal:  Soft, Non-tender  Skin/Integumen: No rashes, No ecchymoses, No cyanosis	  Neurologic: Non-focal  Musculoskeletal/ extremities: Normal range of motion, No clubbing, cyanosis,+2 B/L LE edema  Vascular: Peripheral pulses palpable 2+ bilaterally    CARDIAC MARKERS:  Serum Pro-Brain Natriuretic Peptide: 43068 pg/mL (22 @ 11:47)    Serum Pro-Brain Natriuretic Peptide: 37351 pg/mL (10.20.21 @ 10:28)      TELEMETRY EVENTS: 	    EC22    Ventricular Rate 71 BPM  Atrial Rate 82 BPM  QRS Duration 140 ms  Q-T Interval 444 ms  QTC Calculation(Bazett) 482 ms  R Axis 108 degrees  T Axis 166 degrees    Diagnosis Line Atrial fibrillation with premature ventricular or aberrantlyconducted  complexes  Non-specific intra-ventricular conduction block  ST & T wave abnormality, consider lateral ischemia  Abnormal ECG    Confirmed by Sarah Borden MD ([id number]) on 2022 1:35:39 PM      	  PREVIOUS DIAGNOSTIC TESTING:    TTE 22      Summary:   1. Left ventricular ejection fraction, by visual estimation, is 45 to   50%.   2. Mildly decreased global left ventricular systolic function.   3. Mild left ventricular hypertrophy.   4. Multiple left ventricular regional wall motion abnormalities exist.   See wall motion findings.   5. The left ventricular diastolic function could not be assessed in this   study.   6. Moderately enlarged left atrium.   7. Sclerotic aortic valve with normal opening.   8. Degenerative mitral valve.   9. Mild mitral regurgitation.  10. Mild tricuspid regurgitation.  11. Estimated pulmonary artery systolic pressure is 37.6 mmHg assuming a   right atrial pressure of 8 mmHg, which is consistent with borderline   pulmonary hypertension.        TTE 10/22/21    Summary:   1. Moderately to severely decreased global left ventricular systolic function.   2. Severely enlarged left atrium.   3. Severely enlarged right atrium.   4. Multiple left ventricular regional wall motion abnormalities exist. See wall motion findings.   5. Mild eccentric left ventricular hypertrophy.   6. The left ventricular diastolic function could not be assessed in this study.   7. Severely enlarged right ventricle.   8. Severely reduced RV systolic function.   9. Mild to moderate mitral valve regurgitation.  10. Moderate mitral annular calcification.  11. Mild tricuspid regurgitation.  12. Mild aortic regurgitation.  13. Sclerotic aortic valve with normal opening.  14. Complex atheroma is noted in the ascending aorta.  15. Estimated pulmonary artery systolic pressure is 64.8 mmHg assuming a right atrial pressure of 15 mmHg, which is consistent with severe pulmonary hypertension.  16. Pulmonary hypertension is present.  17. LA volume Index is 71.9 ml/m² ml/m2.  18. There is moderate aortic root calcification.        Home Medications:  Aranesp 100 mcg/0.5 mL injectable solution: 1 dose(s) injectable every 3 to 4 weeks (2022 21:22)  atorvastatin 40 mg oral tablet: 1 tab(s) orally once a day (at bedtime) (2022 21:22)  calcitriol 0.25 mcg oral capsule: 1 cap(s) orally 2 times a week (:22)  d-mycophenolate: 500 milligram(s) orally 4 times a day (:22)  Eliquis 2.5 mg oral tablet: 1 tab(s) orally 2 times a day (:22)  famotidine 20 mg oral tablet: 1 tab(s) orally once a day (:22)  furosemide 20 mg oral tablet: 1 tab(s) orally once a day (03 Aug 2022 09:47)  insulin glargine 100 units/mL subcutaneous solution: 5 unit(s) subcutaneous once a day (at bedtime) (03 Aug 2022 09:47)  insulin lispro 100 units/mL injectable solution: 1 Unit(s) if Glucose 151 - 200  2 Unit(s) if Glucose 201 - 250  3 Unit(s) if Glucose 251 - 300  4 Unit(s) if Glucose 301 - 350  5 Unit(s) if Glucose 351 - 400  6 Unit(s) if Glucose Greater Than 400 (03 Aug 2022 09:57)  isosorbide mononitrate 30 mg oral tablet, extended release: 1 tab(s) orally once a day (in the morning) (:22)  levothyroxine 150 mcg (0.15 mg) oral tablet: 1 tab(s) orally once a day (:22)  metoprolol succinate 50 mg oral tablet, extended release: 1 tab(s) orally once a day (03 Aug 2022 09:47)  Multiple Vitamins with Minerals oral tablet: 1 tab(s) orally once a day (03 Aug 2022 09:47)  NIFEdipine 30 mg oral tablet, extended release: 1 tab(s) orally once a day (:22)  pentoxifylline 400 mg oral tablet, extended release: 1 tab(s) orally 2 times a day (:22)  sodium bicarbonate 650 mg oral tablet: 1 tab(s) orally 3 times a day (03 Aug 2022 09:47)  tacrolimus 0.5 mg oral capsule: 1 cap(s) orally 2 times a day (03 Aug 2022 09:57)    MEDICATIONS  (STANDING):  albuterol/ipratropium for Nebulization 3 milliLiter(s) Nebulizer every 6 hours  ampicillin/sulbactam  IVPB 3 Gram(s) IV Intermittent every 6 hours  apixaban 2.5 milliGRAM(s) Oral two times a day  atorvastatin 40 milliGRAM(s) Oral at bedtime  buMETAnide Injectable 2 milliGRAM(s) IV Push every 12 hours  dextrose 5%. 1000 milliLiter(s) (50 mL/Hr) IV Continuous <Continuous>  dextrose 5%. 1000 milliLiter(s) (100 mL/Hr) IV Continuous <Continuous>  dextrose 50% Injectable 25 Gram(s) IV Push once  dextrose 50% Injectable 12.5 Gram(s) IV Push once  dextrose 50% Injectable 25 Gram(s) IV Push once  famotidine    Tablet 20 milliGRAM(s) Oral daily  glucagon  Injectable 1 milliGRAM(s) IntraMuscular once  insulin glargine Injectable (LANTUS) 10 Unit(s) SubCutaneous every morning  insulin lispro (ADMELOG) corrective regimen sliding scale   SubCutaneous three times a day before meals  isosorbide   mononitrate ER Tablet (IMDUR) 30 milliGRAM(s) Oral daily  levothyroxine 150 MICROGram(s) Oral daily  methylPREDNISolone sodium succinate Injectable 40 milliGRAM(s) IV Push every 12 hours  metoprolol succinate ER 50 milliGRAM(s) Oral daily  multivitamin/minerals 1 Tablet(s) Oral daily  mycophenolate mofetil 500 milliGRAM(s) Oral four times a day  NIFEdipine XL 30 milliGRAM(s) Oral daily  pentoxifylline 400 milliGRAM(s) Oral two times a day  tacrolimus 0.5 milliGRAM(s) Oral two times a day    MEDICATIONS  (PRN):  dextrose Oral Gel 15 Gram(s) Oral once PRN Blood Glucose LESS THAN 70 milliGRAM(s)/deciliter

## 2022-08-30 NOTE — PHYSICAL THERAPY INITIAL EVALUATION ADULT - GENERAL OBSERVATIONS, REHAB EVAL
14:08-14:51 43 min  pt rec in bed in NAD, pt agreeable to PT, +IV, davalos and O2 face mask on at 30%, SPO2 was 98%, decreased to 89-93% during PT

## 2022-08-30 NOTE — PROGRESS NOTE ADULT - SUBJECTIVE AND OBJECTIVE BOX
CHIEF COMPLAINT:    Patient is a 82y old  Male who presents with a chief complaint of chf exacerbation     INTERVAL HPI/OVERNIGHT EVENTS:    Patient seen and examined at bedside. No acute overnight events occurred.    ROS: Denies SOB, chest pain. All other systems are negative.    Medications:  Standing  albuterol/ipratropium for Nebulization 3 milliLiter(s) Nebulizer every 6 hours  apixaban 2.5 milliGRAM(s) Oral two times a day  atorvastatin 40 milliGRAM(s) Oral at bedtime  BACItracin   Ointment 1 Application(s) Topical two times a day  buMETAnide Infusion 1 mG/Hr IV Continuous <Continuous>  chlorhexidine 2% Cloths 1 Application(s) Topical <User Schedule>  dextrose 5%. 1000 milliLiter(s) IV Continuous <Continuous>  dextrose 5%. 1000 milliLiter(s) IV Continuous <Continuous>  dextrose 50% Injectable 25 Gram(s) IV Push once  dextrose 50% Injectable 12.5 Gram(s) IV Push once  dextrose 50% Injectable 25 Gram(s) IV Push once  famotidine    Tablet 20 milliGRAM(s) Oral daily  glucagon  Injectable 1 milliGRAM(s) IntraMuscular once  insulin glargine Injectable (LANTUS) 22 Unit(s) SubCutaneous at bedtime  insulin lispro (ADMELOG) corrective regimen sliding scale   SubCutaneous three times a day before meals  insulin lispro Injectable (ADMELOG) 7 Unit(s) SubCutaneous three times a day before meals  isosorbide   mononitrate ER Tablet (IMDUR) 30 milliGRAM(s) Oral daily  levothyroxine 150 MICROGram(s) Oral daily  lidocaine 1% (Preservative-free) Injectable 30 milliLiter(s) Local Injection once  melatonin 5 milliGRAM(s) Oral at bedtime  metoprolol succinate  milliGRAM(s) Oral daily  multivitamin/minerals 1 Tablet(s) Oral daily  mycophenolate mofetil 500 milliGRAM(s) Oral four times a day  NIFEdipine XL 30 milliGRAM(s) Oral daily  pentoxifylline 400 milliGRAM(s) Oral two times a day  potassium chloride   Powder 20 milliEquivalent(s) Oral once  predniSONE   Tablet 10 milliGRAM(s) Oral daily  sodium chloride 3%. 150 milliLiter(s) IV Continuous <Continuous>  sodium chloride 3%. 150 milliLiter(s) IV Continuous <Continuous>  sodium chloride 3%. 150 milliLiter(s) IV Continuous <Continuous>  sodium chloride 3%. 150 milliLiter(s) IV Continuous <Continuous>  sodium chloride 3%. 150 milliLiter(s) IV Continuous <Continuous>  tacrolimus 0.5 milliGRAM(s) Oral two times a day    PRN Meds  dextrose Oral Gel 15 Gram(s) Oral once PRN  dextrose Oral Gel 15 Gram(s) Oral once PRN        Vital Signs:    T(F): 96.7 (08-30-22 @ 09:00), Max: 96.9 (08-29-22 @ 13:00)  HR: 83 (08-30-22 @ 09:00) (68 - 83)  BP: 142/67 (08-30-22 @ 09:00) (128/78 - 167/76)  RR: 19 (08-30-22 @ 09:00) (18 - 21)  SpO2: 94% (08-30-22 @ 08:25) (93% - 99%)  I&O's Summary    29 Aug 2022 07:01  -  30 Aug 2022 07:00  --------------------------------------------------------  IN: 1572 mL / OUT: 1775 mL / NET: -203 mL    30 Aug 2022 07:01  -  30 Aug 2022 12:12  --------------------------------------------------------  IN: 178 mL / OUT: 0 mL / NET: 178 mL      POCT Blood Glucose.: 195 mg/dL (30 Aug 2022 11:30)  POCT Blood Glucose.: 73 mg/dL (30 Aug 2022 07:51)  POCT Blood Glucose.: 177 mg/dL (29 Aug 2022 21:41)  POCT Blood Glucose.: 238 mg/dL (29 Aug 2022 16:47)  POCT Blood Glucose.: 239 mg/dL (29 Aug 2022 12:32)      PHYSICAL EXAM:  GENERAL:  NAD  SKIN: No rashes or lesions  HEENT: Atraumatic. Normocephalic. Anicteric  NECK:  JVD not visualized  PULMONARY: Diffuse crackles, more so on left  CVS: Normal S1, S2. Regular rate and rhythm. No murmurs.  ABDOMEN/GI: Soft, Nontender, Nondistended; Bowel sounds are present  EXTREMITIES:  1+ edema B/L LE to low calf  NEUROLOGIC:  No motor deficit.  PSYCH: Alert & oriented x 3, normal affect      LABS:                        9.1    13.09 )-----------( 207      ( 30 Aug 2022 05:43 )             27.8     08-30    139  |  95<L>  |  72<HH>  ----------------------------<  64<L>  3.8   |  29  |  1.0    Ca    8.3<L>      30 Aug 2022 05:43  Mg     1.9     08-29    TPro  5.7<L>  /  Alb  3.0<L>  /  TBili  0.6  /  DBili  x   /  AST  16  /  ALT  7   /  AlkPhos  82  08-30      RADIOLOGY & ADDITIONAL TESTS:  Imaging or report Personally Reviewed:  [ ] YES  [ ] NO -->no new images    Telemetry reviewed independently - NSR, no acute events  EKG reviewed independently -->no new EKGs    Consultant(s) Notes Reviewed:  [ ] YES  [ ] NO  Care Discussed with Consultants/Other Providers [ ] YES  [ ] NO    Case discussed with resident  Care discussed with pt

## 2022-08-30 NOTE — PROGRESS NOTE ADULT - SUBJECTIVE AND OBJECTIVE BOX
SUBJECTIVE:  Patient is a 82y old Male who presents with a chief complaint of chf exacerbation (29 Aug 2022 16:44)   Fluid overload     Today is hospital day 10d. There are no new issues or overnight events. Patient states he feels well and is not short of breath.  Currently on 30% FiO2 aerosol mask and satting well.    HPI  HPI:  81 yo male with PMHx of HFrEF (45-50% on Lasix 20 PO), A-Fib on Eliquis, CAD s/p CABGx3, PVD, COPD (was on rare home O2 PRN now on 3L NC since recent July admission for COVID), CKD3, C3 glomerulonephropathy, HTN, HLD, Hypothyroid presents from NH with progressively worsening SOB x5 days and hypoxia.  Recently hospitalized here for a month discharged on 8/3/22 initially for BRENDA on CKD course c/b COVID, bilateral lower lobe pneumonia, delirium and microaspiration of thin liquids on modified barium study. Received Dexamethasone and Zosyn course with new O2 requirements of 3L NC discharged to nursing home on 3L and thick liquid diet for rehab. Due to his SOB, chest x-ray was ordered on 8/15 but results did not come back and patient became increasingly short of breath and was noted to be hypoxic and rehab facility which prompted referral to the ED. Review of symptoms is notable for orthopnea and a chronic dry cough unchanged since his covid illness. He denies paroxysmal nocturnal dyspnea, leg edema, chest pain, palpitations, dizziness, n/v, abd pain. Cardiologist is Dr. Witt and Pulmonolgist is Dr. Rory Mars.     In ED:  T(F): 96.4 (08-20-22 @ 11:27), Max: 96.4 (08-20-22 @ 11:27)  HR: 80 (08-20-22 @ 16:20) (80 - 91)  BP: 137/61 (08-20-22 @ 16:20) (121/58 - 137/61)  RR: 18 (08-20-22 @ 16:20) (18 - 24)  SpO2: 100% (08-20-22 @ 16:20) (89% - 100%) -> 89% on 4L -> placed on NRB -> BIPAP 2/2 tachypnea and hypoxia -> taken off with increased work of breathing placed back on BIPAP  Labs: WBC 12k, Hgb 7.2 (baseline 9), BNP 55k, Cr 2.2 (baseline 1.5), PCO2 47 (VBG), Troponin 0.24  EKG:   Atrial fibrillation with premature ventricular or aberrantly conducted complexes  Left posterior fascicular block   ST & T wave abnormality, consider lateral ischemia - similar to prior in July 2022  XR chest: significant bilateral pleural effusions and pulmonary congestion  Admitted to medicine for SOB/hypoxia likely 2/2 acute CHF exacerbation.  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    AF (atrial fibrillation)  s/p DCCV    Carotid stenosis    COPD (chronic obstructive pulmonary disease)    H/O coronary artery bypass surgery  2001    S/P inguinal hernia repair    History of appendectomy      ALLERGIES:  Ozempic (0.25 mg or 0.5 mg dose) (Hives; Rash)  streptomycin (Unknown)  Trulicity Pen (Hives; Rash)    MEDICATIONS:  HOME MEDICATIONS  Aranesp 100 mcg/0.5 mL injectable solution: 1 dose(s) injectable every 3 to 4 weeks  atorvastatin 40 mg oral tablet: 1 tab(s) orally once a day (at bedtime)  calcitriol 0.25 mcg oral capsule: 1 cap(s) orally 2 times a week  d-mycophenolate: 500 milligram(s) orally 4 times a day  Eliquis 2.5 mg oral tablet: 1 tab(s) orally 2 times a day  famotidine 20 mg oral tablet: 1 tab(s) orally once a day  furosemide 20 mg oral tablet: 1 tab(s) orally once a day  insulin glargine 100 units/mL subcutaneous solution: 5 unit(s) subcutaneous once a day (at bedtime)  insulin lispro 100 units/mL injectable solution: 1 Unit(s) if Glucose 151 - 200  2 Unit(s) if Glucose 201 - 250  3 Unit(s) if Glucose 251 - 300  4 Unit(s) if Glucose 301 - 350  5 Unit(s) if Glucose 351 - 400  6 Unit(s) if Glucose Greater Than 400  isosorbide mononitrate 30 mg oral tablet, extended release: 1 tab(s) orally once a day (in the morning)  levothyroxine 150 mcg (0.15 mg) oral tablet: 1 tab(s) orally once a day  metoprolol succinate 50 mg oral tablet, extended release: 1 tab(s) orally once a day  Multiple Vitamins with Minerals oral tablet: 1 tab(s) orally once a day  NIFEdipine 30 mg oral tablet, extended release: 1 tab(s) orally once a day  pentoxifylline 400 mg oral tablet, extended release: 1 tab(s) orally 2 times a day  sodium bicarbonate 650 mg oral tablet: 1 tab(s) orally 3 times a day  tacrolimus 0.5 mg oral capsule: 1 cap(s) orally 2 times a day    STANDING MEDICATIONS  albuterol/ipratropium for Nebulization 3 milliLiter(s) Nebulizer every 6 hours  apixaban 2.5 milliGRAM(s) Oral two times a day  atorvastatin 40 milliGRAM(s) Oral at bedtime  BACItracin   Ointment 1 Application(s) Topical two times a day  buMETAnide Infusion 1 mG/Hr IV Continuous <Continuous>  chlorhexidine 2% Cloths 1 Application(s) Topical <User Schedule>  dextrose 5%. 1000 milliLiter(s) IV Continuous <Continuous>  dextrose 5%. 1000 milliLiter(s) IV Continuous <Continuous>  dextrose 50% Injectable 25 Gram(s) IV Push once  dextrose 50% Injectable 12.5 Gram(s) IV Push once  dextrose 50% Injectable 25 Gram(s) IV Push once  famotidine    Tablet 20 milliGRAM(s) Oral daily  glucagon  Injectable 1 milliGRAM(s) IntraMuscular once  insulin glargine Injectable (LANTUS) 22 Unit(s) SubCutaneous at bedtime  insulin lispro (ADMELOG) corrective regimen sliding scale   SubCutaneous three times a day before meals  insulin lispro Injectable (ADMELOG) 7 Unit(s) SubCutaneous three times a day before meals  isosorbide   mononitrate ER Tablet (IMDUR) 30 milliGRAM(s) Oral daily  levothyroxine 150 MICROGram(s) Oral daily  lidocaine 1% (Preservative-free) Injectable 30 milliLiter(s) Local Injection once  melatonin 5 milliGRAM(s) Oral at bedtime  metoprolol succinate  milliGRAM(s) Oral daily  multivitamin/minerals 1 Tablet(s) Oral daily  mycophenolate mofetil 500 milliGRAM(s) Oral four times a day  NIFEdipine XL 30 milliGRAM(s) Oral daily  pentoxifylline 400 milliGRAM(s) Oral two times a day  predniSONE   Tablet 10 milliGRAM(s) Oral daily  sodium chloride 3%. 150 milliLiter(s) IV Continuous <Continuous>  sodium chloride 3%. 150 milliLiter(s) IV Continuous <Continuous>  sodium chloride 3%. 150 milliLiter(s) IV Continuous <Continuous>  sodium chloride 3%. 150 milliLiter(s) IV Continuous <Continuous>  sodium chloride 3%. 150 milliLiter(s) IV Continuous <Continuous>  tacrolimus 0.5 milliGRAM(s) Oral two times a day    PRN MEDICATIONS  dextrose Oral Gel 15 Gram(s) Oral once PRN  dextrose Oral Gel 15 Gram(s) Oral once PRN    VITALS:   T(C): 35.9 (08-30-22 @ 09:00), Max: 36.1 (08-29-22 @ 13:00)  T(F): 96.7 (08-30-22 @ 09:00), Max: 96.9 (08-29-22 @ 13:00)  HR: 83 (08-30-22 @ 09:00) (68 - 83)  BP: 142/67 (08-30-22 @ 09:00) (128/78 - 167/76)  BP(mean): --  ABP: --  ABP(mean): --  RR: 19 (08-30-22 @ 09:00) (18 - 21)  SpO2: 94% (08-30-22 @ 08:25) (93% - 99%)  LABS:                        9.1    13.09 )-----------( 207      ( 30 Aug 2022 05:43 )             27.8     08-30    139  |  95<L>  |  72<HH>  ----------------------------<  64<L>  3.8   |  29  |  1.0    Ca    8.3<L>      30 Aug 2022 05:43  Mg     1.9     08-29    TPro  5.7<L>  /  Alb  3.0<L>  /  TBili  0.6  /  DBili  x   /  AST  16  /  ALT  7   /  AlkPhos  82  08-30        I&O's Detail    29 Aug 2022 07:01  -  30 Aug 2022 07:00  --------------------------------------------------------  IN:    Bumetanide: 65 mL    IV PiggyBack: 50 mL    Oral Fluid: 1257 mL    sodium chloride 3%: 50 mL    sodium chloride 3%: 150 mL  Total IN: 1572 mL    OUT:    Indwelling Catheter - Urethral (mL): 1400 mL    Voided (mL): 375 mL  Total OUT: 1775 mL    Total NET: -203 mL      30 Aug 2022 07:01  -  30 Aug 2022 11:45  --------------------------------------------------------  IN:    Bumetanide: 10 mL    Oral Fluid: 118 mL    sodium chloride 3%: 50 mL  Total IN: 178 mL    OUT:  Total OUT: 0 mL    Total NET: 178 mL                    RADIOLOGY:  EKG  12 Lead ECG:   Ventricular Rate 71 BPM    Atrial Rate 82 BPM    QRS Duration 140 ms    Q-T Interval 444 ms    QTC Calculation(Bazett) 482 ms    R Axis 108 degrees    T Axis 166 degrees    Diagnosis Line Atrial fibrillation with premature ventricular or aberrantlyconducted  complexes  Non-specific intra-ventricular conduction block  ST & T wave abnormality, consider lateral ischemia  Abnormal ECG    Confirmed by Sarah Borden MD [id number]) on 8/21/2022 1:35:39 PM (08-21-22 @ 07:14)  12 Lead ECG:   Ventricular Rate 91 BPM    QRS Duration 110 ms    Q-T Interval 378 ms    QTC Calculation(Bazett) 464 ms    R Axis 124 degrees    T Axis 176 degrees    Diagnosis Line Atrial fibrillation with premature ventricular or aberrantly conducted  complexes  Left posterior fascicular block  ST & T wave abnormality, consider lateral ischemia  Abnormal ECG    Confirmed by Sarah Borden MD [id number]) on 8/20/2022 3:10:13 PM (08-20-22 @ 11:29)    Xray Chest 1 View- PORTABLE-Routine:   ACC: [id number] EXAM:  XR CHEST PORTABLE ROUTINE 1V                          PROCEDURE DATE:  08/29/2022          INTERPRETATION:  Clinical History / Reason for exam: Shortness of breath    Comparison : Chest radiograph 3 days prior.    Technique/Positioning: Frontal portable, low lung volumes.    Findings:    Support devices: Telemetry leads overlie the thorax    Cardiac/mediastinum/hilum: Patient is status post median sternotomy    Lung parenchyma/Pleura: Bilateral opacities    Skeleton/soft tissues: Unremarkable.    Impression:    Bilateral opacifications worsening.        --- End of Report ---            ZAFAR TRAN MD; Attending Interventional Radiologist  This document has been electronically signed. Aug 29 2022  8:19AM (08-29-22 @ 06:31)  Xray Chest 1 View- PORTABLE-Routine:   ACC: [id number] EXAM:  XR CHEST PORTABLE ROUTINE 1V                          PROCEDURE DATE:  08/26/2022          INTERPRETATION:  Clinical History / Reason for exam: Dyspnea    Comparison : Chest radiograph 8/25/2022.    Technique/Positioning: Frontal.    Findings:    Support devices: None.    Cardiac/mediastinum/hilum: Cardiomegaly unchanged    Lung parenchyma/Pleura: Decreased vascular congestion and bilateral   pleural effusions. Left lower lobe opacity unchanged. No air leak.    Skeleton/soft tissues: Unremarkable.    Impression:    Decreased vascular congestion and bilateral pleural effusions. Left lower   lobe opacity unchanged. No air leak.        --- End of Report ---            NNAMDI WARNER MD; Attending Radiologist  This document has been electronically signed. Aug 26 2022  3:35PM (08-26-22 @ 14:01)  Xray Chest 1 View- PORTABLE-Routine:   ACC: [id number] EXAM:  XR CHEST PORTABLE ROUTINE 1V                          PROCEDURE DATE:  08/25/2022          INTERPRETATION:  Clinical History / Reason for exam: Volume overload,   shortness of breath    Comparison : Chest radiograph 8/23/2022.    Technique/Positioning: Frontal portable.    Findings:    Support devices: None.    Cardiac/mediastinum/hilum: Again status post median sternotomy, CABG.   Stable.    Lung parenchyma/Pleura: Stable bilateral opacities/effusions. No   pneumothorax.    Skeleton/soft tissues: Stable.    Impression:    Stable bilateral opacities/effusions.    --- End of Report ---          XIANG LOYOLA MD; Resident Radiologist  This document has been electronically signed.  JONNATHAN DONAHUE MD; Attending Radiologist  This document has been electronically signed. Aug 25 2022  5:06PM (08-25-22 @ 10:47)    Physical Exam:  General: no acute distress, sitting in bed  Head: normocephalic and atraumatic  Neck: supple  Heart: regular rate and rhythm, S1 and S2 normal, no murmurs, rubs or gallops noted on exam  Lungs: Symmetric chest expansion bilaterally, crackles appreciate by the bases bilaterally, no wheezes or rhonchi heard  Abdomen: Bowel sounds present, non tender on light and deep palpation  Extremities: No edema, no clubbing or cyanosis notes, positive peripheral pulses

## 2022-08-30 NOTE — CHART NOTE - NSCHARTNOTEFT_GEN_A_CORE
Registered Dietitian Follow-Up     Patient Profile Reviewed                           Yes [x]   No []     Nutrition History Previously Obtained        Yes [x]  No []       Pertinent Medical Interventions: p/w CHF exacerbation. Fluid overload noted. Stage 2 right buttock pressure ulcer. Sepsis criteria met on admission; noted likely due to aspiration pneumonia and acute on chronic HFrEF.     Diet order: Pureed diet with 1200 mL fluids + Ensure Max once daily (150 kcal, 30 g protein) + Glucerna twice daily (360 kcal, 20 g protein)    Anthropometrics:  Ht: 188 cm.  Wt: 85 kg ( dosing wt)  BMI: 24.1 (using dosing wt 85 kg)  IBW: 86.4 kg    Daily Weight in k.2 (-), Weight in k (-), Weight in k.6 ()  Weight gain observed suspected to be r/t fluid related wt changes.    MEDICATIONS  (STANDING):  albuterol/ipratropium for Nebulization 3 milliLiter(s) Nebulizer every 6 hours  apixaban 2.5 milliGRAM(s) Oral two times a day  atorvastatin 40 milliGRAM(s) Oral at bedtime  BACItracin   Ointment 1 Application(s) Topical two times a day  buMETAnide Infusion 1 mG/Hr (5 mL/Hr) IV Continuous <Continuous>  chlorhexidine 2% Cloths 1 Application(s) Topical <User Schedule>  dextrose 5%. 1000 milliLiter(s) (100 mL/Hr) IV Continuous <Continuous>  dextrose 5%. 1000 milliLiter(s) (50 mL/Hr) IV Continuous <Continuous>  dextrose 50% Injectable 25 Gram(s) IV Push once  dextrose 50% Injectable 12.5 Gram(s) IV Push once  dextrose 50% Injectable 25 Gram(s) IV Push once  famotidine    Tablet 20 milliGRAM(s) Oral daily  glucagon  Injectable 1 milliGRAM(s) IntraMuscular once  insulin glargine Injectable (LANTUS) 22 Unit(s) SubCutaneous at bedtime  insulin lispro (ADMELOG) corrective regimen sliding scale   SubCutaneous three times a day before meals  insulin lispro Injectable (ADMELOG) 7 Unit(s) SubCutaneous three times a day before meals  isosorbide   mononitrate ER Tablet (IMDUR) 30 milliGRAM(s) Oral daily  levothyroxine 150 MICROGram(s) Oral daily  lidocaine 1% (Preservative-free) Injectable 30 milliLiter(s) Local Injection once  magnesium sulfate  IVPB 1 Gram(s) IV Intermittent every 1 hour  melatonin 5 milliGRAM(s) Oral at bedtime  metoprolol succinate  milliGRAM(s) Oral daily  multivitamin/minerals 1 Tablet(s) Oral daily  mycophenolate mofetil 500 milliGRAM(s) Oral four times a day  NIFEdipine XL 30 milliGRAM(s) Oral daily  pentoxifylline 400 milliGRAM(s) Oral two times a day  predniSONE   Tablet 10 milliGRAM(s) Oral daily  sodium chloride 3%. 150 milliLiter(s) (50 mL/Hr) IV Continuous <Continuous>  sodium chloride 3%. 150 milliLiter(s) (50 mL/Hr) IV Continuous <Continuous>  sodium chloride 3%. 150 milliLiter(s) (50 mL/Hr) IV Continuous <Continuous>  sodium chloride 3%. 150 milliLiter(s) (50 mL/Hr) IV Continuous <Continuous>  sodium chloride 3%. 150 milliLiter(s) (50 mL/Hr) IV Continuous <Continuous>  sodium chloride 3%. 150 milliLiter(s) (50 mL/Hr) IV Continuous <Continuous>  sodium chloride 3%. 150 milliLiter(s) (50 mL/Hr) IV Continuous <Continuous>  tacrolimus 0.5 milliGRAM(s) Oral two times a day    MEDICATIONS  (PRN):  dextrose Oral Gel 15 Gram(s) Oral once PRN Blood Glucose LESS THAN 70 milliGRAM(s)/deciliter  dextrose Oral Gel 15 Gram(s) Oral once PRN Blood Glucose LESS THAN 70 milliGRAM(s)/deciliter    Pertinent Labs:  @ 23:00: Na 131<L>, BUN 71<HH>, Cr 1.1, <H>, K+ 4.1, Phos --, Mg 1.7<L>, Alk Phos 83, ALT/SGPT 8, AST/SGOT 15   @ 05:43: Na 139, BUN 72<HH>, Cr 1.0, BG 64<L>, K+ 3.8, Phos --, Mg --, Alk Phos 82, ALT/SGPT 7, AST/SGOT 16    Finger Sticks:  POCT Blood Glucose.: 260 mg/dL ( @ 21:24)  POCT Blood Glucose.: 233 mg/dL ( @ 16:49)  POCT Blood Glucose.: 195 mg/dL ( @ 11:30)  POCT Blood Glucose.: 73 mg/dL ( @ 07:51)    Physical Findings:  - Appearance: 1+ edema (B/L leg, B/L foot); alert  - GI function: last BM ; no nausea/vomiting/diarrhea/constipation reported  - Tubes: no feeding tubes  - Oral/Mouth cavity: per  SLP eval: "+toleration of mildly thick w/ use of vol bolus hold and consecutive swallows, +SOB w/ mastication of minced and moist solids". Recommends puree w/ mildly thick liquids.  - Skin: stage II pressure ulcer (sacrum, R buttocks)     Nutrition Requirements:  Weight Used: 85 kg     Estimated Energy Needs    Continue [x]  Adjust []  1945-2431kcal/day (MSJ x1.2-1.5)        Estimated Protein Needs    Continue [x]  Adjust []  102-128g/day (1.2-1.5g/kg)        Estimated Fluid Needs        Continue [x]  Adjust []  2125mL/day (25mL/kg)     Nutrient Intake: Poor intake d/t swallowing difficulty and poor appetite at this time. 0-25% of meal trays consumed; consuming 50% of 1 Glucerna bottle daily. Encouraged consumption of at least 1 bottle Glucerna + 1 bottle Ensure Max daily. Will trial Prosource Gelatein Plus as an additional source of kcal/protein.     [x] Previous Nutrition Diagnosis: Swallowing difficulty            [x] Ongoing          [] Resolved    [x] Previous Nutrition Diagnosis: Increased nutrient needs            [x] Ongoing          [] Resolved     Nutrition Intervention: Meals & Snacks, Medical Food Supplements, Nutrition Focused Physical Findings     Goal/Expected Outcome: Pt to demonstrate tolerance to nutrition regimen, meeting >50% of estimated nutrient needs over next 4 days. Pt at high nutrition risk.     Indicator/Monitoring: Energy intake, skin, diet order, nutrition focused physical findings, swallow function, BM, labs.    Recommendation: Order MVI q24hrs. Order 220 mg Zinc Sulfate q24hrs ([id number]) for wound healing. Order Prosource Gelatein Plus once daily (180 kcal, 20 g protein).    Discuss with Dr. Rubin [phone]

## 2022-08-31 NOTE — PROGRESS NOTE ADULT - ASSESSMENT
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     SOB/ AHRF  Sepsis criteria met on admission but pt remains stable off abx and procal low, could be SIRS criteria due to aspiration pneumonitis  - bibasilar opacities remain on CXR from today  - likely due to aspiration pneumonia and acute on chronic HFrEF  - c/w Bumex/hypertonic saline--HF team recommended to d/c bumex tonight but pt remains overloaded therefore will continue with Bumex and reassess tomorrow  - pt has large urine output  - speech and swallow appreciated  - pt stable off abx  - Echo 08/01: 45-50%EF  - CXR shows improved b/l pleural effusions with pulmonary congestion and opacities but awaiting final read  - proBNP > 50K  - thoracentesis done 9/27--transudative effusion  - continue with bumex drip and hypertonic saline  - chest x-ray 9/29 fluid reaccumulation   - HF rec: bumex drip  - follow up with pulm   - repeat chest x-ray 8/31 AM    Epistaxis  - resolved  - ENT follow up appreciated, packing removed    Leukocytosis  - was uptrending likely from steroids    C3 Glomerulonephropathy  - c/w tacrolimus (level 9.5), cellcept    CAD  - c/w statin, metoprolol, imdur    HTN  - c/w metoprolol, nifedipine    Hypothyroidism  - c/w synthroid    DM II  - FS uncontrolled  - adjust insulin    Stage 2 right buttock pressure ulcer  - present on admission  - c/w local wound care    Acute on chronic anemia  - s/p 1 U PRBC    Chronic afib  - c/w nifedipine, metoprolol    COPD  - c/w inhlaers    DVT px  from home    #Progress Note Handoff:  Pending (specify):  Diuresis  Family discussion:   Disposition: subacute rehab as per PT

## 2022-08-31 NOTE — PROGRESS NOTE ADULT - SUBJECTIVE AND OBJECTIVE BOX
SUBJECTIVE:  Patient is a 82y old Male who presents with a chief complaint of chf exacerbation (30 Aug 2022 16:04)   Fluid overload     Today is hospital day 11d. There are no new issues or overnight events.     HPI  HPI:  81 yo male with PMHx of HFrEF (45-50% on Lasix 20 PO), A-Fib on Eliquis, CAD s/p CABGx3, PVD, COPD (was on rare home O2 PRN now on 3L NC since recent July admission for COVID), CKD3, C3 glomerulonephropathy, HTN, HLD, Hypothyroid presents from NH with progressively worsening SOB x5 days and hypoxia.  Recently hospitalized here for a month discharged on 8/3/22 initially for BRENDA on CKD course c/b COVID, bilateral lower lobe pneumonia, delirium and microaspiration of thin liquids on modified barium study. Received Dexamethasone and Zosyn course with new O2 requirements of 3L NC discharged to nursing home on 3L and thick liquid diet for rehab. Due to his SOB, chest x-ray was ordered on 8/15 but results did not come back and patient became increasingly short of breath and was noted to be hypoxic and rehab facility which prompted referral to the ED. Review of symptoms is notable for orthopnea and a chronic dry cough unchanged since his covid illness. He denies paroxysmal nocturnal dyspnea, leg edema, chest pain, palpitations, dizziness, n/v, abd pain. Cardiologist is Dr. Witt and Pulmonolgist is Dr. Rory Mars.     In ED:  T(F): 96.4 (08-20-22 @ 11:27), Max: 96.4 (08-20-22 @ 11:27)  HR: 80 (08-20-22 @ 16:20) (80 - 91)  BP: 137/61 (08-20-22 @ 16:20) (121/58 - 137/61)  RR: 18 (08-20-22 @ 16:20) (18 - 24)  SpO2: 100% (08-20-22 @ 16:20) (89% - 100%) -> 89% on 4L -> placed on NRB -> BIPAP 2/2 tachypnea and hypoxia -> taken off with increased work of breathing placed back on BIPAP  Labs: WBC 12k, Hgb 7.2 (baseline 9), BNP 55k, Cr 2.2 (baseline 1.5), PCO2 47 (VBG), Troponin 0.24  EKG:   Atrial fibrillation with premature ventricular or aberrantly conducted complexes  Left posterior fascicular block   ST & T wave abnormality, consider lateral ischemia - similar to prior in July 2022  XR chest: significant bilateral pleural effusions and pulmonary congestion  Admitted to medicine for SOB/hypoxia likely 2/2 acute CHF exacerbation.  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    AF (atrial fibrillation)  s/p DCCV    Carotid stenosis    COPD (chronic obstructive pulmonary disease)    H/O coronary artery bypass surgery  2001    S/P inguinal hernia repair    History of appendectomy      ALLERGIES:  Ozempic (0.25 mg or 0.5 mg dose) (Hives; Rash)  streptomycin (Unknown)  Trulicity Pen (Hives; Rash)    MEDICATIONS:  HOME MEDICATIONS  Aranesp 100 mcg/0.5 mL injectable solution: 1 dose(s) injectable every 3 to 4 weeks  atorvastatin 40 mg oral tablet: 1 tab(s) orally once a day (at bedtime)  calcitriol 0.25 mcg oral capsule: 1 cap(s) orally 2 times a week  d-mycophenolate: 500 milligram(s) orally 4 times a day  Eliquis 2.5 mg oral tablet: 1 tab(s) orally 2 times a day  famotidine 20 mg oral tablet: 1 tab(s) orally once a day  furosemide 20 mg oral tablet: 1 tab(s) orally once a day  insulin glargine 100 units/mL subcutaneous solution: 5 unit(s) subcutaneous once a day (at bedtime)  insulin lispro 100 units/mL injectable solution: 1 Unit(s) if Glucose 151 - 200  2 Unit(s) if Glucose 201 - 250  3 Unit(s) if Glucose 251 - 300  4 Unit(s) if Glucose 301 - 350  5 Unit(s) if Glucose 351 - 400  6 Unit(s) if Glucose Greater Than 400  isosorbide mononitrate 30 mg oral tablet, extended release: 1 tab(s) orally once a day (in the morning)  levothyroxine 150 mcg (0.15 mg) oral tablet: 1 tab(s) orally once a day  metoprolol succinate 50 mg oral tablet, extended release: 1 tab(s) orally once a day  Multiple Vitamins with Minerals oral tablet: 1 tab(s) orally once a day  NIFEdipine 30 mg oral tablet, extended release: 1 tab(s) orally once a day  pentoxifylline 400 mg oral tablet, extended release: 1 tab(s) orally 2 times a day  sodium bicarbonate 650 mg oral tablet: 1 tab(s) orally 3 times a day  tacrolimus 0.5 mg oral capsule: 1 cap(s) orally 2 times a day    STANDING MEDICATIONS  albuterol/ipratropium for Nebulization 3 milliLiter(s) Nebulizer every 6 hours  apixaban 2.5 milliGRAM(s) Oral two times a day  atorvastatin 40 milliGRAM(s) Oral at bedtime  BACItracin   Ointment 1 Application(s) Topical two times a day  buMETAnide Infusion 1 mG/Hr IV Continuous <Continuous>  chlorhexidine 2% Cloths 1 Application(s) Topical <User Schedule>  dextrose 5%. 1000 milliLiter(s) IV Continuous <Continuous>  dextrose 5%. 1000 milliLiter(s) IV Continuous <Continuous>  dextrose 50% Injectable 25 Gram(s) IV Push once  dextrose 50% Injectable 12.5 Gram(s) IV Push once  dextrose 50% Injectable 25 Gram(s) IV Push once  famotidine    Tablet 20 milliGRAM(s) Oral daily  glucagon  Injectable 1 milliGRAM(s) IntraMuscular once  insulin glargine Injectable (LANTUS) 22 Unit(s) SubCutaneous at bedtime  insulin lispro (ADMELOG) corrective regimen sliding scale   SubCutaneous three times a day before meals  insulin lispro Injectable (ADMELOG) 7 Unit(s) SubCutaneous three times a day before meals  isosorbide   mononitrate ER Tablet (IMDUR) 30 milliGRAM(s) Oral daily  levothyroxine 150 MICROGram(s) Oral daily  lidocaine 1% (Preservative-free) Injectable 30 milliLiter(s) Local Injection once  melatonin 5 milliGRAM(s) Oral at bedtime  metoprolol succinate  milliGRAM(s) Oral daily  multivitamin/minerals 1 Tablet(s) Oral daily  mycophenolate mofetil 500 milliGRAM(s) Oral four times a day  NIFEdipine XL 30 milliGRAM(s) Oral daily  pentoxifylline 400 milliGRAM(s) Oral two times a day  predniSONE   Tablet 10 milliGRAM(s) Oral daily  sodium chloride 3%. 150 milliLiter(s) IV Continuous <Continuous>  sodium chloride 3%. 150 milliLiter(s) IV Continuous <Continuous>  sodium chloride 3%. 150 milliLiter(s) IV Continuous <Continuous>  sodium chloride 3%. 150 milliLiter(s) IV Continuous <Continuous>  sodium chloride 3%. 150 milliLiter(s) IV Continuous <Continuous>  sodium chloride 3%. 150 milliLiter(s) IV Continuous <Continuous>  tacrolimus 0.5 milliGRAM(s) Oral two times a day    PRN MEDICATIONS  acetaminophen     Tablet .. 650 milliGRAM(s) Oral every 6 hours PRN  dextrose Oral Gel 15 Gram(s) Oral once PRN  dextrose Oral Gel 15 Gram(s) Oral once PRN    VITALS:   T(C): 35.7 (08-31-22 @ 04:00), Max: 36.6 (08-30-22 @ 20:07)  T(F): 96.2 (08-31-22 @ 04:00), Max: 97.8 (08-30-22 @ 20:07)  HR: 94 (08-31-22 @ 04:00) (68 - 94)  BP: 143/86 (08-31-22 @ 04:00) (117/65 - 148/70)  BP(mean): 72 (08-31-22 @ 01:20) (72 - 95)  ABP: --  ABP(mean): --  RR: 19 (08-31-22 @ 04:00) (18 - 20)  SpO2: 98% (08-31-22 @ 04:00) (94% - 99%)  LABS:                        8.9    17.89 )-----------( 215      ( 31 Aug 2022 05:58 )             28.0     08-31    136  |  94<L>  |  68<HH>  ----------------------------<  120<H>  3.8   |  31  |  1.0    Ca    8.5      31 Aug 2022 05:58  Mg     2.1     08-31    TPro  5.6<L>  /  Alb  3.1<L>  /  TBili  0.7  /  DBili  x   /  AST  16  /  ALT  7   /  AlkPhos  83  08-31        I&O's Detail    30 Aug 2022 07:01  -  31 Aug 2022 07:00  --------------------------------------------------------  IN:    Bumetanide: 60 mL    Oral Fluid: 1480 mL    sodium chloride 3%: 100 mL  Total IN: 1640 mL    OUT:    Indwelling Catheter - Urethral (mL): 1690 mL  Total OUT: 1690 mL    Total NET: -50 mL                    RADIOLOGY:  EKG  12 Lead ECG:   Ventricular Rate 71 BPM    Atrial Rate 82 BPM    QRS Duration 140 ms    Q-T Interval 444 ms    QTC Calculation(Bazett) 482 ms    R Axis 108 degrees    T Axis 166 degrees    Diagnosis Line Atrial fibrillation with premature ventricular or aberrantlyconducted  complexes  Non-specific intra-ventricular conduction block  ST & T wave abnormality, consider lateral ischemia  Abnormal ECG    Confirmed by Sarah Borden MD [id number]) on 8/21/2022 1:35:39 PM (08-21-22 @ 07:14)  12 Lead ECG:   Ventricular Rate 91 BPM    QRS Duration 110 ms    Q-T Interval 378 ms    QTC Calculation(Bazett) 464 ms    R Axis 124 degrees    T Axis 176 degrees    Diagnosis Line Atrial fibrillation with premature ventricular or aberrantly conducted  complexes  Left posterior fascicular block  ST & T wave abnormality, consider lateral ischemia  Abnormal ECG    Confirmed by Sarah Borden MD [id number]) on 8/20/2022 3:10:13 PM (08-20-22 @ 11:29)    Xray Chest 1 View- PORTABLE-Routine:   ACC: [id number] EXAM:  XR CHEST PORTABLE ROUTINE 1V                          PROCEDURE DATE:  08/31/2022          INTERPRETATION:  Clinical History / Reason for exam: Shortness of breath    Comparison : Chest radiograph 2 days prior.    Technique/Positioning: Frontal portable, low lung volumes.    Findings:    Support devices: Telemetry leads are seen    Cardiac/mediastinum/hilum: Heart is enlarged. The patient is status post   median sternotomy.    Lung parenchyma/Pleura: Bilateral opacifications    Skeleton/soft tissues: Unremarkable.    Impression:    Cardiomegaly with bilateral opacifications. Follow-up as needed.        --- End of Report ---            ZAFAR TRAN MD; Attending Interventional Radiologist  This document has been electronically signed. Aug 31 2022  7:31AM (08-31-22 @ 04:54)  Xray Chest 1 View- PORTABLE-Routine:   ACC: [id number] EXAM:  XR CHEST PORTABLE ROUTINE 1V                          PROCEDURE DATE:  08/29/2022          INTERPRETATION:  Clinical History / Reason for exam: Shortness of breath    Comparison : Chest radiograph 3 days prior.    Technique/Positioning: Frontal portable, low lung volumes.    Findings:    Support devices: Telemetry leads overlie the thorax    Cardiac/mediastinum/hilum: Patient is status post median sternotomy    Lung parenchyma/Pleura: Bilateral opacities    Skeleton/soft tissues: Unremarkable.    Impression:    Bilateral opacifications worsening.        --- End of Report ---            ZAFAR TRAN MD; Attending Interventional Radiologist  This document has been electronically signed. Aug 29 2022  8:19AM (08-29-22 @ 06:31)  Xray Chest 1 View- PORTABLE-Routine:   ACC: [id number] EXAM:  XR CHEST PORTABLE ROUTINE 1V                          PROCEDURE DATE:  08/26/2022          INTERPRETATION:  Clinical History / Reason for exam: Dyspnea    Comparison : Chest radiograph 8/25/2022.    Technique/Positioning: Frontal.    Findings:    Support devices: None.    Cardiac/mediastinum/hilum: Cardiomegaly unchanged    Lung parenchyma/Pleura: Decreased vascular congestion and bilateral   pleural effusions. Left lower lobe opacity unchanged. No air leak.    Skeleton/soft tissues: Unremarkable.    Impression:    Decreased vascular congestion and bilateral pleural effusions. Left lower   lobe opacity unchanged. No air leak.        --- End of Report ---            NNAMDI WARNER MD; Attending Radiologist  This document has been electronically signed. Aug 26 2022  3:35PM (08-26-22 @ 14:01)  Xray Chest 1 View- PORTABLE-Routine:   ACC: [id number] EXAM:  XR CHEST PORTABLE ROUTINE 1V                          PROCEDURE DATE:  08/25/2022          INTERPRETATION:  Clinical History / Reason for exam: Volume overload,   shortness of breath    Comparison : Chest radiograph 8/23/2022.    Technique/Positioning: Frontal portable.    Findings:    Support devices: None.    Cardiac/mediastinum/hilum: Again status post median sternotomy, CABG.   Stable.    Lung parenchyma/Pleura: Stable bilateral opacities/effusions. No   pneumothorax.    Skeleton/soft tissues: Stable.    Impression:    Stable bilateral opacities/effusions.    --- End of Report ---          XIANG LOYOLA MD; Resident Radiologist  This document has been electronically signed.  JONNATHAN DONAHUE MD; Attending Radiologist  This document has been electronically signed. Aug 25 2022  5:06PM (08-25-22 @ 10:47)    Physical Exam:  General: no acute distress, lying in bed  Head: normocephalic and atraumatic  Neck: supple  Heart: regular rate and rhythm, S1 and S2 normal, no murmurs, rubs or gallops noted on exam  Lungs: Symmetric chest expansion bilaterally, crackles at bilateral bases, no wheezes or rhonchi heard  Abdomen: Bowel sounds present, non tender on light and deep palpation  Extremities: No edema, no clubbing or cyanosis notes, positive peripheral pulses

## 2022-08-31 NOTE — CHART NOTE - NSCHARTNOTEFT_GEN_A_CORE
On call resident called by HF team about recommendations. C/w current diuresis plan however patient had jump in WBC count, and recommended ordering procalcitonin level. Resident on call ordered procalcitonin with AM labs

## 2022-08-31 NOTE — PROGRESS NOTE ADULT - ASSESSMENT
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 Found to be hypoxic to 89%- placed on BiPAP with improvement, now on venti-mask (can not tolerate NC 2/2 epistax). Found to be fluid overloaded, currently receiving high dose IV diuretics. Suspected PNA, IV antibiotics given (now d/c'd). Patient having epistaxis while on Eliquis- now holding, epistaxis improved. Pulmonology following- s/p thoracentesis 8/26, 1.5L removed.     Plan:  Patient remains fluid overloaded on exam- POCUS exam: IVC 2.3cm, some collapsibility but < 50% with respirations  Continue Bumex gtt at 1 mg/hr  Continue 3% Hypertonic Saline 150ml BID (If infused throughout a central line, run over one hour, if a peripheral line is to be used run over 3 hours)  BMP twice daily with magnesium   K 3.8, Mag 2.1- replete  Maintain potassium >4.0, Mg >2.2  Continue nifedipine 30mg daily and imdur 30mg daily   Continue metoprolol succinate 100mg daily  BiPAP at night - wean before discharge if able  Strict intake and output needed for proper diuretic management   Daily weight   Nephrology following- family requesting own nephrologist Dr. 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 Found to be hypoxic to 89%- placed on BiPAP with improvement, now on venti-mask (can not tolerate NC 2/2 epistax). Found to be fluid overloaded, currently receiving high dose IV diuretics. Suspected PNA, IV antibiotics given (now d/c'd). Patient having epistaxis while on Eliquis- now holding, epistaxis improved. Pulmonology following- s/p thoracentesis 8/26, 1.5L removed.     Plan:  Patient remains fluid overloaded on exam- POCUS exam: IVC 2.3cm, some collapsibility but < 50% with respirations  Continue Bumex gtt at 1 mg/hr  Continue 3% Hypertonic Saline 150ml BID (If infused throughout a central line, run over one hour, if a peripheral line is to be used run over 3 hours)  BMP twice daily with magnesium   K 3.8, Mag 2.1- replete  Get procal  Maintain potassium >4.0, Mg >2.2  Continue nifedipine 30mg daily and imdur 30mg daily   Continue metoprolol succinate 100mg daily  BiPAP at night - wean before discharge if able  Strict intake and output needed for proper diuretic management   Daily weight   Nephrology following- family requesting own nephrologist Dr. 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 Found to be hypoxic to 89%- placed on BiPAP with improvement, now on venti-mask (can not tolerate NC 2/2 epistax). Found to be fluid overloaded, currently receiving high dose IV diuretics. Suspected PNA, IV antibiotics given (now d/c'd). Patient having epistaxis while on Eliquis- now holding, epistaxis improved. Pulmonology following- s/p thoracentesis 8/26, 1.5L removed.     Plan:  Patient remains fluid overloaded on exam- POCUS exam: IVC 2.3cm, some collapsibility but < 50% with respirations  Continue Bumex gtt at 1 mg/hr  Continue 3% Hypertonic Saline 150ml BID (If infused throughout a central line, run over one hour, if a peripheral line is to be used run over 3 hours)  BMP twice daily with magnesium   K 3.8, Mag 2.1- replete  Get procal  Maintain potassium >4.0, Mg >2.2  Continue nifedipine 30mg daily and imdur 30mg daily   Continue metoprolol succinate 100mg daily  BiPAP at night - wean before discharge if able  Strict intake and output needed for proper diuretic management   Daily weight   Nephrology following- family requesting own nephrologist Dr. Daniel to be contacted as well  Physical therapy f/u / OOB to chair as tolerated   Will continue to follow.

## 2022-08-31 NOTE — SWALLOW BEDSIDE ASSESSMENT ADULT - SWALLOW EVAL: RECOMMENDED FEEDING/EATING TECHNIQUES
volitional bolus hold, consecutive swallows/maintain upright posture during/after eating for 30 mins/oral hygiene/position upright (90 degrees)/small sips/bites

## 2022-08-31 NOTE — PROGRESS NOTE ADULT - SUBJECTIVE AND OBJECTIVE BOX
Date of Admission: 22    Interval History:  Patient resting in bed, in NAD. Alert, reports improvement in eating. Remains on venti mask, slight bleeding from nostril noted (self inflicted as per patient). Kidney function slightly improved. ~1.6L 24hr UOP. Increased WBC noted.     CHIEF COMPLAINT: Patient is a 82y old  Male who presents with a chief complaint of chf exacerbation (22 Aug 2022 12:24)    HISTORY OF PRESENT ILLNESS: 81 yo male with PMHx of HFrEF (45-50% on Lasix 20 PO), A-Fib on Eliquis, CAD s/p CABGx3, PVD, COPD (was on rare home O2 PRN now on 3L NC since recent July admission for COVID), CKD3, C3 glomerulonephropathy, HTN, HLD, Hypothyroid presents from NH with progressively worsening SOB x5 days and hypoxia.  Recently hospitalized here for a month discharged on 8/3/22 initially for BRENDA on CKD course c/b COVID, bilateral lower lobe pneumonia, delirium and microaspiration of thin liquids on modified barium study. Received Dexamethasone and Zosyn course with new O2 requirements of 3L NC discharged to nursing home on 3L and thick liquid diet for rehab. Due to his SOB, chest x-ray was ordered on 8/15 but results did not come back and patient became increasingly short of breath and was noted to be hypoxic and rehab facility which prompted referral to the ED. Review of symptoms is notable for orthopnea and a chronic dry cough unchanged since his covid illness. He denies paroxysmal nocturnal dyspnea, leg edema, chest pain, palpitations, dizziness, n/v, abd pain. Cardiologist is Dr. Witt and Pulmonolgist is Dr. Rory Mars.   In ED:  T(F): 96.4 (22 @ 11:27), Max: 96.4 (22 @ 11:27)  HR: 80 (22 @ 16:20) (80 - 91)  BP: 137/61 (22 @ 16:20) (121/58 - 137/61)  RR: 18 (22 @ 16:20) (18 - 24)  SpO2: 100% (22 @ 16:20) (89% - 100%) -> 89% on 4L -> placed on NRB -> BIPAP 2/2 tachypnea and hypoxia -> taken off with increased work of breathing placed back on BIPAP  Labs: WBC 12k, Hgb 7.2 (baseline 9), BNP 55k, Cr 2.2 (baseline 1.5), PCO2 47 (VBG), Troponin 0.24  EKG:   Atrial fibrillation with premature ventricular or aberrantly conducted complexes  Left posterior fascicular block   ST & T wave abnormality, consider lateral ischemia - similar to prior in 2022  XR chest: significant bilateral pleural effusions and pulmonary congestion  Admitted to medicine for SOB/hypoxia likely 2/2 acute CHF exacerbation.  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    SOCIAL HISTORY:  Former cigarette smoker, quit >40 years ago. Denies alcohol or drug use.     Allergies    Ozempic (0.25 mg or 0.5 mg dose) (Hives; Rash)  streptomycin (Unknown)  Trulicity Pen (Hives; Rash)    Intolerances      PHYSICAL EXAM:  General Appearance: lethargic, normal for age and gender. 	  Neck: JVP 80vxW0S, no bruit.   Eyes: Extra Ocular muscles intact.   Cardiovascular: irregular rhythm S1 S2, + JVD, +2 B/L LE edema  Respiratory: decreased b/l  Psychiatry: Alert, oriented x 3, Mood & affect appropriate  Gastrointestinal:  Soft, Non-tender  Skin/Integumen: No rashes, No ecchymoses, No cyanosis	  Neurologic: Non-focal  Musculoskeletal/ extremities: Normal range of motion, No clubbing, cyanosis,+2 B/L LE edema  Vascular: Peripheral pulses palpable 2+ bilaterally    CARDIAC MARKERS:  Serum Pro-Brain Natriuretic Peptide: 04891 pg/mL (22 @ 11:47)    Serum Pro-Brain Natriuretic Peptide: 14105 pg/mL (10.20.21 @ 10:28)      TELEMETRY EVENTS: 	    EC22    Ventricular Rate 71 BPM  Atrial Rate 82 BPM  QRS Duration 140 ms  Q-T Interval 444 ms  QTC Calculation(Bazett) 482 ms  R Axis 108 degrees  T Axis 166 degrees    Diagnosis Line Atrial fibrillation with premature ventricular or aberrantlyconducted  complexes  Non-specific intra-ventricular conduction block  ST & T wave abnormality, consider lateral ischemia  Abnormal ECG    Confirmed by Sarah Borden MD ([id number]) on 2022 1:35:39 PM      	  PREVIOUS DIAGNOSTIC TESTING:    TTE 22      Summary:   1. Left ventricular ejection fraction, by visual estimation, is 45 to   50%.   2. Mildly decreased global left ventricular systolic function.   3. Mild left ventricular hypertrophy.   4. Multiple left ventricular regional wall motion abnormalities exist.   See wall motion findings.   5. The left ventricular diastolic function could not be assessed in this   study.   6. Moderately enlarged left atrium.   7. Sclerotic aortic valve with normal opening.   8. Degenerative mitral valve.   9. Mild mitral regurgitation.  10. Mild tricuspid regurgitation.  11. Estimated pulmonary artery systolic pressure is 37.6 mmHg assuming a   right atrial pressure of 8 mmHg, which is consistent with borderline   pulmonary hypertension.        TTE 10/22/21    Summary:   1. Moderately to severely decreased global left ventricular systolic function.   2. Severely enlarged left atrium.   3. Severely enlarged right atrium.   4. Multiple left ventricular regional wall motion abnormalities exist. See wall motion findings.   5. Mild eccentric left ventricular hypertrophy.   6. The left ventricular diastolic function could not be assessed in this study.   7. Severely enlarged right ventricle.   8. Severely reduced RV systolic function.   9. Mild to moderate mitral valve regurgitation.  10. Moderate mitral annular calcification.  11. Mild tricuspid regurgitation.  12. Mild aortic regurgitation.  13. Sclerotic aortic valve with normal opening.  14. Complex atheroma is noted in the ascending aorta.  15. Estimated pulmonary artery systolic pressure is 64.8 mmHg assuming a right atrial pressure of 15 mmHg, which is consistent with severe pulmonary hypertension.  16. Pulmonary hypertension is present.  17. LA volume Index is 71.9 ml/m² ml/m2.  18. There is moderate aortic root calcification.        Home Medications:  Aranesp 100 mcg/0.5 mL injectable solution: 1 dose(s) injectable every 3 to 4 weeks (2022 21:22)  atorvastatin 40 mg oral tablet: 1 tab(s) orally once a day (at bedtime) (2022 21:22)  calcitriol 0.25 mcg oral capsule: 1 cap(s) orally 2 times a week (:22)  d-mycophenolate: 500 milligram(s) orally 4 times a day (2022 21:22)  Eliquis 2.5 mg oral tablet: 1 tab(s) orally 2 times a day (:22)  famotidine 20 mg oral tablet: 1 tab(s) orally once a day (2022 21:22)  furosemide 20 mg oral tablet: 1 tab(s) orally once a day (03 Aug 2022 09:47)  insulin glargine 100 units/mL subcutaneous solution: 5 unit(s) subcutaneous once a day (at bedtime) (03 Aug 2022 09:47)  insulin lispro 100 units/mL injectable solution: 1 Unit(s) if Glucose 151 - 200  2 Unit(s) if Glucose 201 - 250  3 Unit(s) if Glucose 251 - 300  4 Unit(s) if Glucose 301 - 350  5 Unit(s) if Glucose 351 - 400  6 Unit(s) if Glucose Greater Than 400 (03 Aug 2022 09:57)  isosorbide mononitrate 30 mg oral tablet, extended release: 1 tab(s) orally once a day (in the morning) (2022 21:22)  levothyroxine 150 mcg (0.15 mg) oral tablet: 1 tab(s) orally once a day (:22)  metoprolol succinate 50 mg oral tablet, extended release: 1 tab(s) orally once a day (03 Aug 2022 09:47)  Multiple Vitamins with Minerals oral tablet: 1 tab(s) orally once a day (03 Aug 2022 09:47)  NIFEdipine 30 mg oral tablet, extended release: 1 tab(s) orally once a day (2022 21:22)  pentoxifylline 400 mg oral tablet, extended release: 1 tab(s) orally 2 times a day (2022 21:22)  sodium bicarbonate 650 mg oral tablet: 1 tab(s) orally 3 times a day (03 Aug 2022 09:47)  tacrolimus 0.5 mg oral capsule: 1 cap(s) orally 2 times a day (03 Aug 2022 09:57)    MEDICATIONS  (STANDING):  albuterol/ipratropium for Nebulization 3 milliLiter(s) Nebulizer every 6 hours  ampicillin/sulbactam  IVPB 3 Gram(s) IV Intermittent every 6 hours  apixaban 2.5 milliGRAM(s) Oral two times a day  atorvastatin 40 milliGRAM(s) Oral at bedtime  buMETAnide Injectable 2 milliGRAM(s) IV Push every 12 hours  dextrose 5%. 1000 milliLiter(s) (50 mL/Hr) IV Continuous <Continuous>  dextrose 5%. 1000 milliLiter(s) (100 mL/Hr) IV Continuous <Continuous>  dextrose 50% Injectable 25 Gram(s) IV Push once  dextrose 50% Injectable 12.5 Gram(s) IV Push once  dextrose 50% Injectable 25 Gram(s) IV Push once  famotidine    Tablet 20 milliGRAM(s) Oral daily  glucagon  Injectable 1 milliGRAM(s) IntraMuscular once  insulin glargine Injectable (LANTUS) 10 Unit(s) SubCutaneous every morning  insulin lispro (ADMELOG) corrective regimen sliding scale   SubCutaneous three times a day before meals  isosorbide   mononitrate ER Tablet (IMDUR) 30 milliGRAM(s) Oral daily  levothyroxine 150 MICROGram(s) Oral daily  methylPREDNISolone sodium succinate Injectable 40 milliGRAM(s) IV Push every 12 hours  metoprolol succinate ER 50 milliGRAM(s) Oral daily  multivitamin/minerals 1 Tablet(s) Oral daily  mycophenolate mofetil 500 milliGRAM(s) Oral four times a day  NIFEdipine XL 30 milliGRAM(s) Oral daily  pentoxifylline 400 milliGRAM(s) Oral two times a day  tacrolimus 0.5 milliGRAM(s) Oral two times a day    MEDICATIONS  (PRN):  dextrose Oral Gel 15 Gram(s) Oral once PRN Blood Glucose LESS THAN 70 milliGRAM(s)/deciliter

## 2022-08-31 NOTE — SWALLOW BEDSIDE ASSESSMENT ADULT - SWALLOW EVAL: DIAGNOSIS
+improved toleration of minced and moist solids w/o SOB +toleration of puree, minced and moist and mildly thick liquids w/o overt s/s of penetration/aspiration w/ use of consecutive swallows and a volitional bolus hold w/ mildly thick liquids

## 2022-09-01 NOTE — PROGRESS NOTE ADULT - SUBJECTIVE AND OBJECTIVE BOX
KANGANDRIA MCELROY  82y  Male      Patient is a 82y old  Male who presents with a chief complaint of chf exacerbation (01 Sep 2022 11:52)      INTERVAL HPI/OVERNIGHT EVENTS:  still with SOB,   Vital Signs Last 24 Hrs  T(C): 37 (01 Sep 2022 14:33), Max: 37 (01 Sep 2022 14:33)  T(F): 98.6 (01 Sep 2022 14:33), Max: 98.6 (01 Sep 2022 14:33)  HR: 83 (01 Sep 2022 14:33) (76 - 83)  BP: 131/58 (01 Sep 2022 14:33) (131/58 - 143/64)  BP(mean): --  RR: 18 (01 Sep 2022 14:33) (18 - 18)  SpO2: --          08-31-22 @ 07:01  -  09-01-22 @ 07:00  --------------------------------------------------------  IN: 1066 mL / OUT: 2550 mL / NET: -1484 mL    09-01-22 @ 07:01  -  09-01-22 @ 20:07  --------------------------------------------------------  IN: 2599 mL / OUT: 1200 mL / NET: 1399 mL            Consultant(s) Notes Reviewed:  [x ] YES  [ ] NO          MEDICATIONS  (STANDING):  albuterol/ipratropium for Nebulization 3 milliLiter(s) Nebulizer every 6 hours  apixaban 2.5 milliGRAM(s) Oral two times a day  atorvastatin 40 milliGRAM(s) Oral at bedtime  BACItracin   Ointment 1 Application(s) Topical two times a day  buMETAnide Infusion 1 mG/Hr (5 mL/Hr) IV Continuous <Continuous>  chlorhexidine 2% Cloths 1 Application(s) Topical <User Schedule>  dextrose 5%. 1000 milliLiter(s) (50 mL/Hr) IV Continuous <Continuous>  dextrose 5%. 1000 milliLiter(s) (100 mL/Hr) IV Continuous <Continuous>  dextrose 50% Injectable 25 Gram(s) IV Push once  dextrose 50% Injectable 12.5 Gram(s) IV Push once  dextrose 50% Injectable 25 Gram(s) IV Push once  famotidine    Tablet 20 milliGRAM(s) Oral daily  glucagon  Injectable 1 milliGRAM(s) IntraMuscular once  insulin glargine Injectable (LANTUS) 22 Unit(s) SubCutaneous at bedtime  insulin lispro (ADMELOG) corrective regimen sliding scale   SubCutaneous three times a day before meals  insulin lispro Injectable (ADMELOG) 7 Unit(s) SubCutaneous three times a day before meals  isosorbide   mononitrate ER Tablet (IMDUR) 30 milliGRAM(s) Oral daily  levothyroxine 150 MICROGram(s) Oral daily  lidocaine 1% (Preservative-free) Injectable 30 milliLiter(s) Local Injection once  magnesium sulfate  IVPB 1 Gram(s) IV Intermittent once  melatonin 5 milliGRAM(s) Oral at bedtime  metoprolol succinate  milliGRAM(s) Oral daily  multivitamin/minerals 1 Tablet(s) Oral daily  mycophenolate mofetil 500 milliGRAM(s) Oral four times a day  NIFEdipine XL 30 milliGRAM(s) Oral daily  pentoxifylline 400 milliGRAM(s) Oral two times a day  potassium chloride    Tablet ER 20 milliEquivalent(s) Oral once  predniSONE   Tablet 5 milliGRAM(s) Oral daily  sodium chloride 3%. 150 milliLiter(s) (50 mL/Hr) IV Continuous <Continuous>  sodium chloride 3%. 150 milliLiter(s) (50 mL/Hr) IV Continuous <Continuous>  sodium chloride 3%. 150 milliLiter(s) (50 mL/Hr) IV Continuous <Continuous>  sodium chloride 3%. 150 milliLiter(s) (50 mL/Hr) IV Continuous <Continuous>  sodium chloride 3%. 150 milliLiter(s) (50 mL/Hr) IV Continuous <Continuous>  sodium chloride 3%. 150 milliLiter(s) (50 mL/Hr) IV Continuous <Continuous>  sodium chloride 3%. 150 milliLiter(s) (50 mL/Hr) IV Continuous <Continuous>  sodium chloride 3%. 150 milliLiter(s) (50 mL/Hr) IV Continuous <Continuous>  tacrolimus 0.5 milliGRAM(s) Oral two times a day    MEDICATIONS  (PRN):  acetaminophen     Tablet .. 650 milliGRAM(s) Oral every 6 hours PRN Mild Pain (1 - 3)  dextrose Oral Gel 15 Gram(s) Oral once PRN Blood Glucose LESS THAN 70 milliGRAM(s)/deciliter  dextrose Oral Gel 15 Gram(s) Oral once PRN Blood Glucose LESS THAN 70 milliGRAM(s)/deciliter      LABS                          8.3    17.40 )-----------( 199      ( 01 Sep 2022 06:48 )             25.9     09-01    139  |  98  |  70<HH>  ----------------------------<  96  4.2   |  31  |  1.1    Ca    8.7      01 Sep 2022 17:27  Mg     1.9     09-01    TPro  5.5<L>  /  Alb  2.9<L>  /  TBili  0.7  /  DBili  x   /  AST  15  /  ALT  8   /  AlkPhos  81  09-01          Lactate Trend        CAPILLARY BLOOD GLUCOSE      POCT Blood Glucose.: 93 mg/dL (01 Sep 2022 16:23)      Culture - Fungal, Body Fluid (collected 08-26-22 @ 13:33)  Source: Pleural Fl Pleural Fluid  Preliminary Report (08-29-22 @ 10:50):    Testing in progress    Culture - Body Fluid with Gram Stain (collected 08-26-22 @ 13:33)  Source: Pleural Fl Pleural Fluid  Gram Stain (08-27-22 @ 03:01):    No polymorphonuclear cells seen seen    No organisms seen    by cytocentrifuge  Final Report (08-31-22 @ 15:25):    No growth at 5 days        RADIOLOGY & ADDITIONAL TESTS:    Imaging Personally Reviewed:  [ ] YES  [ ] NO    HEALTH ISSUES - PROBLEM Dx:          PHYSICAL EXAM:  GENERAL: NAD, well-developed.  HEAD:  Atraumatic, Normocephalic.  EYES: EOMI, PERRLA, conjunctiva and sclera clear.  NECK: Supple, No JVD.  CHEST/LUNG:  decrease breath sound on right lower lung, left lung rales.   HEART: irregular rate and rhythm; S1 S2.   ABDOMEN: Soft, Nontender, Nondistended; Bowel sounds present.  EXTREMITIES:  2+ Peripheral Pulses, No clubbing, leg edema 1+  PSYCH: AAOx3.  NEUROLOGY: non-focal.  SKIN: No rashes or lesions.

## 2022-09-01 NOTE — PROGRESS NOTE ADULT - SUBJECTIVE AND OBJECTIVE BOX
Date of Admission: 22      Interval History:      Patient resting in bed, in NAD. He reports feeling better. He remains on face mask.  Procalcitonin 0.16      CHIEF COMPLAINT: Patient is a 82y old  Male who presents with a chief complaint of chf exacerbation (22 Aug 2022 12:24)    HISTORY OF PRESENT ILLNESS: 81 yo male with PMHx of HFrEF (45-50% on Lasix 20 PO), A-Fib on Eliquis, CAD s/p CABGx3, PVD, COPD (was on rare home O2 PRN now on 3L NC since recent July admission for COVID), CKD3, C3 glomerulonephropathy, HTN, HLD, Hypothyroid presents from NH with progressively worsening SOB x5 days and hypoxia.  Recently hospitalized here for a month discharged on 8/3/22 initially for BRENDA on CKD course c/b COVID, bilateral lower lobe pneumonia, delirium and microaspiration of thin liquids on modified barium study. Received Dexamethasone and Zosyn course with new O2 requirements of 3L NC discharged to nursing home on 3L and thick liquid diet for rehab. Due to his SOB, chest x-ray was ordered on 8/15 but results did not come back and patient became increasingly short of breath and was noted to be hypoxic and rehab facility which prompted referral to the ED. Review of symptoms is notable for orthopnea and a chronic dry cough unchanged since his covid illness. He denies paroxysmal nocturnal dyspnea, leg edema, chest pain, palpitations, dizziness, n/v, abd pain. Cardiologist is Dr. Witt and Pulmonolgist is Dr. Rory Mars.   In ED:  T(F): 96.4 (22 @ 11:27), Max: 96.4 (22 @ 11:27)  HR: 80 (22 @ 16:20) (80 - 91)  BP: 137/61 (22 @ 16:20) (121/58 - 137/61)  RR: 18 (22 @ 16:20) (18 - 24)  SpO2: 100% (22 @ 16:20) (89% - 100%) -> 89% on 4L -> placed on NRB -> BIPAP 2/2 tachypnea and hypoxia -> taken off with increased work of breathing placed back on BIPAP  Labs: WBC 12k, Hgb 7.2 (baseline 9), BNP 55k, Cr 2.2 (baseline 1.5), PCO2 47 (VBG), Troponin 0.24  EKG:   Atrial fibrillation with premature ventricular or aberrantly conducted complexes  Left posterior fascicular block   ST & T wave abnormality, consider lateral ischemia - similar to prior in 2022  XR chest: significant bilateral pleural effusions and pulmonary congestion  Admitted to medicine for SOB/hypoxia likely 2/2 acute CHF exacerbation.  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    SOCIAL HISTORY:  Former cigarette smoker, quit >40 years ago. Denies alcohol or drug use.     Allergies    Ozempic (0.25 mg or 0.5 mg dose) (Hives; Rash)  streptomycin (Unknown)  Trulicity Pen (Hives; Rash)    Intolerances      PHYSICAL EXAM:  General Appearance: lethargic, normal for age and gender. 	  Neck: JVP [id number], no bruit.   Eyes: Extra Ocular muscles intact.   Cardiovascular: irregular rhythm S1 S2, + JVD, +2 B/L LE edema  Respiratory: decreased b/l  Psychiatry: Alert, oriented x 3, Mood & affect appropriate  Gastrointestinal:  Soft, Non-tender  Skin/Integumen: No rashes, No ecchymoses, No cyanosis	  Neurologic: Non-focal  Musculoskeletal/ extremities: Normal range of motion, No clubbing, cyanosis,+2 B/L LE edema  Vascular: Peripheral pulses palpable 2+ bilaterally    CARDIAC MARKERS:  Serum Pro-Brain Natriuretic Peptide: 03152 pg/mL (22 @ 11:47)    Serum Pro-Brain Natriuretic Peptide: 84903 pg/mL (10.20.21 @ 10:28)      TELEMETRY EVENTS: 	    EC22    Ventricular Rate 71 BPM  Atrial Rate 82 BPM  QRS Duration 140 ms  Q-T Interval 444 ms  QTC Calculation(Bazett) 482 ms  R Axis 108 degrees  T Axis 166 degrees    Diagnosis Line Atrial fibrillation with premature ventricular or aberrantlyconducted  complexes  Non-specific intra-ventricular conduction block  ST & T wave abnormality, consider lateral ischemia  Abnormal ECG    Confirmed by Sarah Borden MD ([id number]) on 2022 1:35:39 PM      	  PREVIOUS DIAGNOSTIC TESTING:    TTE 22      Summary:   1. Left ventricular ejection fraction, by visual estimation, is 45 to   50%.   2. Mildly decreased global left ventricular systolic function.   3. Mild left ventricular hypertrophy.   4. Multiple left ventricular regional wall motion abnormalities exist.   See wall motion findings.   5. The left ventricular diastolic function could not be assessed in this   study.   6. Moderately enlarged left atrium.   7. Sclerotic aortic valve with normal opening.   8. Degenerative mitral valve.   9. Mild mitral regurgitation.  10. Mild tricuspid regurgitation.  11. Estimated pulmonary artery systolic pressure is 37.6 mmHg assuming a   right atrial pressure of 8 mmHg, which is consistent with borderline   pulmonary hypertension.        TTE 10/22/21    Summary:   1. Moderately to severely decreased global left ventricular systolic function.   2. Severely enlarged left atrium.   3. Severely enlarged right atrium.   4. Multiple left ventricular regional wall motion abnormalities exist. See wall motion findings.   5. Mild eccentric left ventricular hypertrophy.   6. The left ventricular diastolic function could not be assessed in this study.   7. Severely enlarged right ventricle.   8. Severely reduced RV systolic function.   9. Mild to moderate mitral valve regurgitation.  10. Moderate mitral annular calcification.  11. Mild tricuspid regurgitation.  12. Mild aortic regurgitation.  13. Sclerotic aortic valve with normal opening.  14. Complex atheroma is noted in the ascending aorta.  15. Estimated pulmonary artery systolic pressure is 64.8 mmHg assuming a right atrial pressure of 15 mmHg, which is consistent with severe pulmonary hypertension.  16. Pulmonary hypertension is present.  17. LA volume Index is 71.9 ml/m² ml/m2.  18. There is moderate aortic root calcification.        Home Medications:  Aranesp 100 mcg/0.5 mL injectable solution: 1 dose(s) injectable every 3 to 4 weeks (2022 21:22)  atorvastatin 40 mg oral tablet: 1 tab(s) orally once a day (at bedtime) (2022 21:22)  calcitriol 0.25 mcg oral capsule: 1 cap(s) orally 2 times a week (:22)  d-mycophenolate: 500 milligram(s) orally 4 times a day (:22)  Eliquis 2.5 mg oral tablet: 1 tab(s) orally 2 times a day (:22)  famotidine 20 mg oral tablet: 1 tab(s) orally once a day (:22)  furosemide 20 mg oral tablet: 1 tab(s) orally once a day (03 Aug 2022 09:47)  insulin glargine 100 units/mL subcutaneous solution: 5 unit(s) subcutaneous once a day (at bedtime) (03 Aug 2022 09:47)  insulin lispro 100 units/mL injectable solution: 1 Unit(s) if Glucose 151 - 200  2 Unit(s) if Glucose 201 - 250  3 Unit(s) if Glucose 251 - 300  4 Unit(s) if Glucose 301 - 350  5 Unit(s) if Glucose 351 - 400  6 Unit(s) if Glucose Greater Than 400 (03 Aug 2022 09:57)  isosorbide mononitrate 30 mg oral tablet, extended release: 1 tab(s) orally once a day (in the morning) (:22)  levothyroxine 150 mcg (0.15 mg) oral tablet: 1 tab(s) orally once a day (:22)  metoprolol succinate 50 mg oral tablet, extended release: 1 tab(s) orally once a day (03 Aug 2022 09:47)  Multiple Vitamins with Minerals oral tablet: 1 tab(s) orally once a day (03 Aug 2022 09:47)  NIFEdipine 30 mg oral tablet, extended release: 1 tab(s) orally once a day (:22)  pentoxifylline 400 mg oral tablet, extended release: 1 tab(s) orally 2 times a day (:22)  sodium bicarbonate 650 mg oral tablet: 1 tab(s) orally 3 times a day (03 Aug 2022 09:47)  tacrolimus 0.5 mg oral capsule: 1 cap(s) orally 2 times a day (03 Aug 2022 09:57)    MEDICATIONS  (STANDING):  albuterol/ipratropium for Nebulization 3 milliLiter(s) Nebulizer every 6 hours  ampicillin/sulbactam  IVPB 3 Gram(s) IV Intermittent every 6 hours  apixaban 2.5 milliGRAM(s) Oral two times a day  atorvastatin 40 milliGRAM(s) Oral at bedtime  buMETAnide Injectable 2 milliGRAM(s) IV Push every 12 hours  dextrose 5%. 1000 milliLiter(s) (50 mL/Hr) IV Continuous <Continuous>  dextrose 5%. 1000 milliLiter(s) (100 mL/Hr) IV Continuous <Continuous>  dextrose 50% Injectable 25 Gram(s) IV Push once  dextrose 50% Injectable 12.5 Gram(s) IV Push once  dextrose 50% Injectable 25 Gram(s) IV Push once  famotidine    Tablet 20 milliGRAM(s) Oral daily  glucagon  Injectable 1 milliGRAM(s) IntraMuscular once  insulin glargine Injectable (LANTUS) 10 Unit(s) SubCutaneous every morning  insulin lispro (ADMELOG) corrective regimen sliding scale   SubCutaneous three times a day before meals  isosorbide   mononitrate ER Tablet (IMDUR) 30 milliGRAM(s) Oral daily  levothyroxine 150 MICROGram(s) Oral daily  methylPREDNISolone sodium succinate Injectable 40 milliGRAM(s) IV Push every 12 hours  metoprolol succinate ER 50 milliGRAM(s) Oral daily  multivitamin/minerals 1 Tablet(s) Oral daily  mycophenolate mofetil 500 milliGRAM(s) Oral four times a day  NIFEdipine XL 30 milliGRAM(s) Oral daily  pentoxifylline 400 milliGRAM(s) Oral two times a day  tacrolimus 0.5 milliGRAM(s) Oral two times a day    MEDICATIONS  (PRN):  dextrose Oral Gel 15 Gram(s) Oral once PRN Blood Glucose LESS THAN 70 milliGRAM(s)/deciliter

## 2022-09-01 NOTE — PROGRESS NOTE ADULT - ASSESSMENT
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     A/P :  Acute Hypoxic respirator Failure:   Acute HFmrEF:   Bilateral pleural effusion Right>Left.   No evidence of Pneumonia.   Patient is still with hypoxia and SOB. Currently on Venturi mask 30%  CXR showed worsening right pleural effusion and right lung opacity.   Echo 8/1/22 showed LVEF 45-50%, multiple LV regional wall motion abnormalities, mild MR, mild TR, borderline pulmonary HTN   s/p thoracentesis 8/2 fluid transudative.   Continue Bumex drip at 1mg/hr and hypertonic saline  Hert failure follow up, continue low sodium diet, fluid restriction  Pulmonary follow up for worsening pleural effusion.     Epistaxis  resolved  - ENT follow up appreciated, packing removed    Leukocytosis   likely from steroids    Acute on chronic anemia  - s/p 1 U PRBC    Chronic Atrial Fibrillation :  continue metoprolol and Eliquis.     C3 Glomerulonephropathy  - c/w tacrolimus (level 9.5), cellcept    CAD  - c/w statin, metoprolol, imdur    HTN  - c/w metoprolol, nifedipine  - will try to wean off nifedipine given HFmrEF    Hypothyroidism: continue synthroid    DM II  - FS uncontrolled  - adjust insulin    Stage 2 right buttock pressure ulcer  - present on admission  - c/w local wound care    COPD  - c/w inhalers    DVT px  from home    #Progress Note Handoff:  Pending (specify):  Diuresis  Family discussion: spoke with pt and son at bedside  Disposition: Home

## 2022-09-01 NOTE — PROGRESS NOTE ADULT - ASSESSMENT
IMPRESSION:    Recurrent right anechoic pleural effusion  S/p right thoracentesis/ transudate  Acute hypoxemic resp failure on NC  Pul edema  possible aspiration   COPD (was on prednisone in NH)  anemia SP TX  Pre-renal BRENDA on CKD III -   C3 glomerulopathy - on prograf and cellcept at home  HFrEF - chronic; Mod-severe reduced EF, sev dilated CM; RV dysfxn; severe pulm HTN  AFib, chronic - on eliquis   CAD s/p CABG  H/O DLD, HTN, DM2, Hypothyroidism     PLAN:      - Diuresis keep - balance   -  prednisone 5 daily  - NIV at night and PRN  - Aspiration precautions  - Wean O2 as tolerated  - GOC  - pt/ ot  spoke with son

## 2022-09-01 NOTE — PROGRESS NOTE ADULT - ASSESSMENT
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 Found to be hypoxic to 89%- placed on BiPAP with improvement, now on venti-mask (can not tolerate NC 2/2 epistax). Found to be fluid overloaded, currently receiving high dose IV diuretics. Suspected PNA, IV antibiotics given (now d/c'd). Patient having epistaxis while on Eliquis- now holding, epistaxis improved. Pulmonology following- s/p thoracentesis 8/26, 1.5L removed.     Plan:    Remains overloaded   Continue Bumex gtt at 1 mg/hr  Continue 3% Hypertonic Saline 150ml BID (If infused throughout a central line, run over one hour, if a peripheral line is to be used run over 3 hours)  BMP twice daily with magnesium   K 3.8, Mag 2.1- replete  Maintain potassium >4.0, Mg >2.2  Continue nifedipine 30mg daily and Imdur 30 mg daily   Continue metoprolol succinate 100mg daily  Start iv iron sucrose 200 mg daily x3  BiPAP at night - wean before discharge if able  Strict intake and output needed for proper diuretic management   Daily weight   Physical therapy f/u / OOB to chair as tolerated   Will continue to follow.

## 2022-09-01 NOTE — PROGRESS NOTE ADULT - SUBJECTIVE AND OBJECTIVE BOX
Over Night Events: events noted, very weak, feels better    PHYSICAL EXAM    ICU Vital Signs Last 24 Hrs  T(C): 36.4 (01 Sep 2022 07:58), Max: 36.4 (31 Aug 2022 13:10)  T(F): 97.5 (01 Sep 2022 07:58), Max: 97.6 (31 Aug 2022 13:10)  HR: 78 (01 Sep 2022 07:58) (76 - 80)  BP: 143/64 (01 Sep 2022 07:58) (126/65 - 145/64)  BP(mean): 92 (31 Aug 2022 18:20) (92 - 93)  RR: 18 (01 Sep 2022 07:58) (18 - 18)  SpO2: 96% (31 Aug 2022 18:20) (96% - 99%)    O2 Parameters below as of 31 Aug 2022 18:20  Patient On (Oxygen Delivery Method): mask, aerosol            General: chronically ill looking  Lungs: Bilateral crackles  Cardiovascular: MEKHI 2.6  Abdomen: Soft, Positive BS  Extremities: No clubbing   Neurological: Non focal       08-31-22 @ 07:01  -  09-01-22 @ 07:00  --------------------------------------------------------  IN:    Bumetanide: 55 mL    Oral Fluid: 711 mL    sodium chloride 3%: 150 mL    sodium chloride 3%: 150 mL  Total IN: 1066 mL    OUT:    Indwelling Catheter - Urethral (mL): 2550 mL  Total OUT: 2550 mL    Total NET: -1484 mL          LABS:                          8.3    17.40 )-----------( 199      ( 01 Sep 2022 06:48 )             25.9                                               09-01    136  |  95<L>  |  x   ----------------------------<  157<H>  3.7   |  31  |  1.0    Ca    8.4<L>      01 Sep 2022 06:48  Mg     2.1     09-01    TPro  5.5<L>  /  Alb  2.9<L>  /  TBili  0.7  /  DBili  x   /  AST  15  /  ALT  8   /  AlkPhos  81  09-01                                                                                           LIVER FUNCTIONS - ( 01 Sep 2022 06:48 )  Alb: 2.9 g/dL / Pro: 5.5 g/dL / ALK PHOS: 81 U/L / ALT: 8 U/L / AST: 15 U/L / GGT: x                                                                                                                                       MEDICATIONS  (STANDING):  albuterol/ipratropium for Nebulization 3 milliLiter(s) Nebulizer every 6 hours  apixaban 2.5 milliGRAM(s) Oral two times a day  atorvastatin 40 milliGRAM(s) Oral at bedtime  BACItracin   Ointment 1 Application(s) Topical two times a day  buMETAnide Infusion 1 mG/Hr (5 mL/Hr) IV Continuous <Continuous>  chlorhexidine 2% Cloths 1 Application(s) Topical <User Schedule>  dextrose 5%. 1000 milliLiter(s) (100 mL/Hr) IV Continuous <Continuous>  dextrose 5%. 1000 milliLiter(s) (50 mL/Hr) IV Continuous <Continuous>  dextrose 50% Injectable 25 Gram(s) IV Push once  dextrose 50% Injectable 12.5 Gram(s) IV Push once  dextrose 50% Injectable 25 Gram(s) IV Push once  famotidine    Tablet 20 milliGRAM(s) Oral daily  glucagon  Injectable 1 milliGRAM(s) IntraMuscular once  insulin glargine Injectable (LANTUS) 22 Unit(s) SubCutaneous at bedtime  insulin lispro (ADMELOG) corrective regimen sliding scale   SubCutaneous three times a day before meals  insulin lispro Injectable (ADMELOG) 7 Unit(s) SubCutaneous three times a day before meals  isosorbide   mononitrate ER Tablet (IMDUR) 30 milliGRAM(s) Oral daily  levothyroxine 150 MICROGram(s) Oral daily  lidocaine 1% (Preservative-free) Injectable 30 milliLiter(s) Local Injection once  melatonin 5 milliGRAM(s) Oral at bedtime  metoprolol succinate  milliGRAM(s) Oral daily  multivitamin/minerals 1 Tablet(s) Oral daily  mycophenolate mofetil 500 milliGRAM(s) Oral four times a day  NIFEdipine XL 30 milliGRAM(s) Oral daily  pentoxifylline 400 milliGRAM(s) Oral two times a day  predniSONE   Tablet 5 milliGRAM(s) Oral daily  sodium chloride 3%. 150 milliLiter(s) (50 mL/Hr) IV Continuous <Continuous>  sodium chloride 3%. 150 milliLiter(s) (50 mL/Hr) IV Continuous <Continuous>  sodium chloride 3%. 150 milliLiter(s) (50 mL/Hr) IV Continuous <Continuous>  sodium chloride 3%. 150 milliLiter(s) (50 mL/Hr) IV Continuous <Continuous>  sodium chloride 3%. 150 milliLiter(s) (50 mL/Hr) IV Continuous <Continuous>  sodium chloride 3%. 150 milliLiter(s) (50 mL/Hr) IV Continuous <Continuous>  sodium chloride 3%. 150 milliLiter(s) (50 mL/Hr) IV Continuous <Continuous>  sodium chloride 3%. 150 milliLiter(s) (50 mL/Hr) IV Continuous <Continuous>  tacrolimus 0.5 milliGRAM(s) Oral two times a day    MEDICATIONS  (PRN):  acetaminophen     Tablet .. 650 milliGRAM(s) Oral every 6 hours PRN Mild Pain (1 - 3)  dextrose Oral Gel 15 Gram(s) Oral once PRN Blood Glucose LESS THAN 70 milliGRAM(s)/deciliter  dextrose Oral Gel 15 Gram(s) Oral once PRN Blood Glucose LESS THAN 70 milliGRAM(s)/deciliter      Xrays:                                                                                     ECHO

## 2022-09-01 NOTE — PROGRESS NOTE ADULT - SUBJECTIVE AND OBJECTIVE BOX
SUBJECTIVE:  Patient is a 82y old Male who presents with a chief complaint of chf exacerbation (01 Sep 2022 09:17)   Fluid overload     Today is hospital day 12d. There are no new issues or overnight events.     HPI  HPI:  83 yo male with PMHx of HFrEF (45-50% on Lasix 20 PO), A-Fib on Eliquis, CAD s/p CABGx3, PVD, COPD (was on rare home O2 PRN now on 3L NC since recent July admission for COVID), CKD3, C3 glomerulonephropathy, HTN, HLD, Hypothyroid presents from NH with progressively worsening SOB x5 days and hypoxia.  Recently hospitalized here for a month discharged on 8/3/22 initially for BRENDA on CKD course c/b COVID, bilateral lower lobe pneumonia, delirium and microaspiration of thin liquids on modified barium study. Received Dexamethasone and Zosyn course with new O2 requirements of 3L NC discharged to nursing home on 3L and thick liquid diet for rehab. Due to his SOB, chest x-ray was ordered on 8/15 but results did not come back and patient became increasingly short of breath and was noted to be hypoxic and rehab facility which prompted referral to the ED. Review of symptoms is notable for orthopnea and a chronic dry cough unchanged since his covid illness. He denies paroxysmal nocturnal dyspnea, leg edema, chest pain, palpitations, dizziness, n/v, abd pain. Cardiologist is Dr. Witt and Pulmonolgist is Dr. Rory Mars.     In ED:  T(F): 96.4 (08-20-22 @ 11:27), Max: 96.4 (08-20-22 @ 11:27)  HR: 80 (08-20-22 @ 16:20) (80 - 91)  BP: 137/61 (08-20-22 @ 16:20) (121/58 - 137/61)  RR: 18 (08-20-22 @ 16:20) (18 - 24)  SpO2: 100% (08-20-22 @ 16:20) (89% - 100%) -> 89% on 4L -> placed on NRB -> BIPAP 2/2 tachypnea and hypoxia -> taken off with increased work of breathing placed back on BIPAP  Labs: WBC 12k, Hgb 7.2 (baseline 9), BNP 55k, Cr 2.2 (baseline 1.5), PCO2 47 (VBG), Troponin 0.24  EKG:   Atrial fibrillation with premature ventricular or aberrantly conducted complexes  Left posterior fascicular block   ST & T wave abnormality, consider lateral ischemia - similar to prior in July 2022  XR chest: significant bilateral pleural effusions and pulmonary congestion  Admitted to medicine for SOB/hypoxia likely 2/2 acute CHF exacerbation.  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    AF (atrial fibrillation)  s/p DCCV    Carotid stenosis    COPD (chronic obstructive pulmonary disease)    H/O coronary artery bypass surgery  2001    S/P inguinal hernia repair    History of appendectomy      ALLERGIES:  Ozempic (0.25 mg or 0.5 mg dose) (Hives; Rash)  streptomycin (Unknown)  Trulicity Pen (Hives; Rash)    MEDICATIONS:  HOME MEDICATIONS  Aranesp 100 mcg/0.5 mL injectable solution: 1 dose(s) injectable every 3 to 4 weeks  atorvastatin 40 mg oral tablet: 1 tab(s) orally once a day (at bedtime)  calcitriol 0.25 mcg oral capsule: 1 cap(s) orally 2 times a week  d-mycophenolate: 500 milligram(s) orally 4 times a day  Eliquis 2.5 mg oral tablet: 1 tab(s) orally 2 times a day  famotidine 20 mg oral tablet: 1 tab(s) orally once a day  furosemide 20 mg oral tablet: 1 tab(s) orally once a day  insulin glargine 100 units/mL subcutaneous solution: 5 unit(s) subcutaneous once a day (at bedtime)  insulin lispro 100 units/mL injectable solution: 1 Unit(s) if Glucose 151 - 200  2 Unit(s) if Glucose 201 - 250  3 Unit(s) if Glucose 251 - 300  4 Unit(s) if Glucose 301 - 350  5 Unit(s) if Glucose 351 - 400  6 Unit(s) if Glucose Greater Than 400  isosorbide mononitrate 30 mg oral tablet, extended release: 1 tab(s) orally once a day (in the morning)  levothyroxine 150 mcg (0.15 mg) oral tablet: 1 tab(s) orally once a day  metoprolol succinate 50 mg oral tablet, extended release: 1 tab(s) orally once a day  Multiple Vitamins with Minerals oral tablet: 1 tab(s) orally once a day  NIFEdipine 30 mg oral tablet, extended release: 1 tab(s) orally once a day  pentoxifylline 400 mg oral tablet, extended release: 1 tab(s) orally 2 times a day  sodium bicarbonate 650 mg oral tablet: 1 tab(s) orally 3 times a day  tacrolimus 0.5 mg oral capsule: 1 cap(s) orally 2 times a day    STANDING MEDICATIONS  albuterol/ipratropium for Nebulization 3 milliLiter(s) Nebulizer every 6 hours  apixaban 2.5 milliGRAM(s) Oral two times a day  atorvastatin 40 milliGRAM(s) Oral at bedtime  BACItracin   Ointment 1 Application(s) Topical two times a day  buMETAnide Infusion 1 mG/Hr IV Continuous <Continuous>  chlorhexidine 2% Cloths 1 Application(s) Topical <User Schedule>  dextrose 5%. 1000 milliLiter(s) IV Continuous <Continuous>  dextrose 5%. 1000 milliLiter(s) IV Continuous <Continuous>  dextrose 50% Injectable 25 Gram(s) IV Push once  dextrose 50% Injectable 12.5 Gram(s) IV Push once  dextrose 50% Injectable 25 Gram(s) IV Push once  famotidine    Tablet 20 milliGRAM(s) Oral daily  glucagon  Injectable 1 milliGRAM(s) IntraMuscular once  insulin glargine Injectable (LANTUS) 22 Unit(s) SubCutaneous at bedtime  insulin lispro (ADMELOG) corrective regimen sliding scale   SubCutaneous three times a day before meals  insulin lispro Injectable (ADMELOG) 7 Unit(s) SubCutaneous three times a day before meals  isosorbide   mononitrate ER Tablet (IMDUR) 30 milliGRAM(s) Oral daily  levothyroxine 150 MICROGram(s) Oral daily  lidocaine 1% (Preservative-free) Injectable 30 milliLiter(s) Local Injection once  melatonin 5 milliGRAM(s) Oral at bedtime  metoprolol succinate  milliGRAM(s) Oral daily  multivitamin/minerals 1 Tablet(s) Oral daily  mycophenolate mofetil 500 milliGRAM(s) Oral four times a day  NIFEdipine XL 30 milliGRAM(s) Oral daily  pentoxifylline 400 milliGRAM(s) Oral two times a day  predniSONE   Tablet 5 milliGRAM(s) Oral daily  sodium chloride 3%. 150 milliLiter(s) IV Continuous <Continuous>  sodium chloride 3%. 150 milliLiter(s) IV Continuous <Continuous>  sodium chloride 3%. 150 milliLiter(s) IV Continuous <Continuous>  sodium chloride 3%. 150 milliLiter(s) IV Continuous <Continuous>  sodium chloride 3%. 150 milliLiter(s) IV Continuous <Continuous>  sodium chloride 3%. 150 milliLiter(s) IV Continuous <Continuous>  sodium chloride 3%. 150 milliLiter(s) IV Continuous <Continuous>  sodium chloride 3%. 150 milliLiter(s) IV Continuous <Continuous>  tacrolimus 0.5 milliGRAM(s) Oral two times a day    PRN MEDICATIONS  acetaminophen     Tablet .. 650 milliGRAM(s) Oral every 6 hours PRN  dextrose Oral Gel 15 Gram(s) Oral once PRN  dextrose Oral Gel 15 Gram(s) Oral once PRN    VITALS:   T(C): 36.4 (09-01-22 @ 07:58), Max: 36.4 (08-31-22 @ 13:10)  T(F): 97.5 (09-01-22 @ 07:58), Max: 97.6 (08-31-22 @ 13:10)  HR: 78 (09-01-22 @ 07:58) (76 - 80)  BP: 143/64 (09-01-22 @ 07:58) (126/65 - 145/64)  BP(mean): 92 (08-31-22 @ 18:20) (92 - 93)  ABP: --  ABP(mean): --  RR: 18 (09-01-22 @ 07:58) (18 - 18)  SpO2: 96% (08-31-22 @ 18:20) (96% - 99%)  LABS:                        8.3    17.40 )-----------( 199      ( 01 Sep 2022 06:48 )             25.9     09-01    136  |  95<L>  |  70<HH>  ----------------------------<  157<H>  3.7   |  31  |  1.0    Ca    8.4<L>      01 Sep 2022 06:48  Mg     2.1     09-01    TPro  5.5<L>  /  Alb  2.9<L>  /  TBili  0.7  /  DBili  x   /  AST  15  /  ALT  8   /  AlkPhos  81  09-01        I&O's Detail    31 Aug 2022 07:01  -  01 Sep 2022 07:00  --------------------------------------------------------  IN:    Bumetanide: 55 mL    Oral Fluid: 711 mL    sodium chloride 3%: 150 mL    sodium chloride 3%: 150 mL  Total IN: 1066 mL    OUT:    Indwelling Catheter - Urethral (mL): 2550 mL  Total OUT: 2550 mL    Total NET: -1484 mL      01 Sep 2022 07:01  -  01 Sep 2022 11:52  --------------------------------------------------------  IN:    Oral Fluid: 1443 mL  Total IN: 1443 mL    OUT:  Total OUT: 0 mL    Total NET: 1443 mL                    RADIOLOGY:  EKG  12 Lead ECG:   Ventricular Rate 71 BPM    Atrial Rate 82 BPM    QRS Duration 140 ms    Q-T Interval 444 ms    QTC Calculation(Bazett) 482 ms    R Axis 108 degrees    T Axis 166 degrees    Diagnosis Line Atrial fibrillation with premature ventricular or aberrantlyconducted  complexes  Non-specific intra-ventricular conduction block  ST & T wave abnormality, consider lateral ischemia  Abnormal ECG    Confirmed by Sarah Borden MD ([id number]) on 8/21/2022 1:35:39 PM (08-21-22 @ 07:14)  12 Lead ECG:   Ventricular Rate 91 BPM    QRS Duration 110 ms    Q-T Interval 378 ms    QTC Calculation(Bazett) 464 ms    R Axis 124 degrees    T Axis 176 degrees    Diagnosis Line Atrial fibrillation with premature ventricular or aberrantly conducted  complexes  Left posterior fascicular block  ST & T wave abnormality, consider lateral ischemia  Abnormal ECG    Confirmed by Sarah Borden MD ([id number]) on 8/20/2022 3:10:13 PM (08-20-22 @ 11:29)    Xray Chest 1 View- PORTABLE-Routine:   ACC: [id number] EXAM:  XR CHEST PORTABLE ROUTINE 1V                          PROCEDURE DATE:  08/31/2022          INTERPRETATION:  Clinical History / Reason for exam: Shortness of breath    Comparison : Chest radiograph 2 days prior.    Technique/Positioning: Frontal portable, low lung volumes.    Findings:    Support devices: Telemetry leads are seen    Cardiac/mediastinum/hilum: Heart is enlarged. The patient is status post   median sternotomy.    Lung parenchyma/Pleura: Bilateral opacifications    Skeleton/soft tissues: Unremarkable.    Impression:    Cardiomegaly with bilateral opacifications. Follow-up as needed.        --- End of Report ---            ZAFAR TRAN MD; Attending Interventional Radiologist  This document has been electronically signed. Aug 31 2022  7:31AM (08-31-22 @ 04:54)  Xray Chest 1 View- PORTABLE-Routine:   ACC: [id number] EXAM:  XR CHEST PORTABLE ROUTINE 1V                          PROCEDURE DATE:  08/29/2022          INTERPRETATION:  Clinical History / Reason for exam: Shortness of breath    Comparison : Chest radiograph 3 days prior.    Technique/Positioning: Frontal portable, low lung volumes.    Findings:    Support devices: Telemetry leads overlie the thorax    Cardiac/mediastinum/hilum: Patient is status post median sternotomy    Lung parenchyma/Pleura: Bilateral opacities    Skeleton/soft tissues: Unremarkable.    Impression:    Bilateral opacifications worsening.        --- End of Report ---            ZAFAR TRAN MD; Attending Interventional Radiologist  This document has been electronically signed. Aug 29 2022  8:19AM (08-29-22 @ 06:31)  Xray Chest 1 View- PORTABLE-Routine:   ACC: [id number] EXAM:  XR CHEST PORTABLE ROUTINE 1V                          PROCEDURE DATE:  08/26/2022          INTERPRETATION:  Clinical History / Reason for exam: Dyspnea    Comparison : Chest radiograph 8/25/2022.    Technique/Positioning: Frontal.    Findings:    Support devices: None.    Cardiac/mediastinum/hilum: Cardiomegaly unchanged    Lung parenchyma/Pleura: Decreased vascular congestion and bilateral   pleural effusions. Left lower lobe opacity unchanged. No air leak.    Skeleton/soft tissues: Unremarkable.    Impression:    Decreased vascular congestion and bilateral pleural effusions. Left lower   lobe opacity unchanged. No air leak.        --- End of Report ---            NNAMDI WARNER MD; Attending Radiologist  This document has been electronically signed. Aug 26 2022  3:35PM (08-26-22 @ 14:01)    Physical Exam:  General: no acute distress, lying in bed  Head: normocephalic and atraumatic  Neck: supple  Heart: regular rate and rhythm, S1 and S2 normal, no murmurs, rubs or gallops noted on exam  Lungs: Symmetric chest expansion bilaterally, clear lungs bilaterally, no wheezes rhonchi or crackles heard  Abdomen: Bowel sounds present, non tender on light and deep palpation  Extremities: No edema, no clubbing or cyanosis notes, positive peripheral pulses

## 2022-09-02 NOTE — PROGRESS NOTE ADULT - ASSESSMENT
IMPRESSION:    Recurrent right anechoic pleural effusion  S/p right thoracentesis/ transudate  Acute hypoxemic resp failure on NC  Pul edema  possible aspiration   COPD (was on prednisone in NH)  anemia SP TX  Pre-renal BRENDA on CKD III -   C3 glomerulopathy - on prograf and cellcept at home  HFrEF - chronic; Mod-severe reduced EF, sev dilated CM; RV dysfxn; severe pulm HTN  AFib, chronic - on eliquis   CAD s/p CABG  H/O DLD, HTN, DM2, Hypothyroidism     PLAN:      - Diuresis keep - balance ( increase bumex, add metolazone)  -  prednisone 5 daily  - NIV at night and PRN  - Aspiration precautions  - Wean O2 as tolerated  - GOC  - pt/ ot  spoke with son

## 2022-09-02 NOTE — PROGRESS NOTE ADULT - SUBJECTIVE AND OBJECTIVE BOX
SUBJECTIVE:  Patient is a 82y old Male who presents with a chief complaint of chf exacerbation (01 Sep 2022 22:47)   Fluid overload     Today is hospital day 13d. There are no new issues or overnight events.     HPI  HPI:  83 yo male with PMHx of HFrEF (45-50% on Lasix 20 PO), A-Fib on Eliquis, CAD s/p CABGx3, PVD, COPD (was on rare home O2 PRN now on 3L NC since recent July admission for COVID), CKD3, C3 glomerulonephropathy, HTN, HLD, Hypothyroid presents from NH with progressively worsening SOB x5 days and hypoxia.  Recently hospitalized here for a month discharged on 8/3/22 initially for BRENDA on CKD course c/b COVID, bilateral lower lobe pneumonia, delirium and microaspiration of thin liquids on modified barium study. Received Dexamethasone and Zosyn course with new O2 requirements of 3L NC discharged to nursing home on 3L and thick liquid diet for rehab. Due to his SOB, chest x-ray was ordered on 8/15 but results did not come back and patient became increasingly short of breath and was noted to be hypoxic and rehab facility which prompted referral to the ED. Review of symptoms is notable for orthopnea and a chronic dry cough unchanged since his covid illness. He denies paroxysmal nocturnal dyspnea, leg edema, chest pain, palpitations, dizziness, n/v, abd pain. Cardiologist is Dr. Witt and Pulmonolgist is Dr. Rory Mars.     In ED:  T(F): 96.4 (08-20-22 @ 11:27), Max: 96.4 (08-20-22 @ 11:27)  HR: 80 (08-20-22 @ 16:20) (80 - 91)  BP: 137/61 (08-20-22 @ 16:20) (121/58 - 137/61)  RR: 18 (08-20-22 @ 16:20) (18 - 24)  SpO2: 100% (08-20-22 @ 16:20) (89% - 100%) -> 89% on 4L -> placed on NRB -> BIPAP 2/2 tachypnea and hypoxia -> taken off with increased work of breathing placed back on BIPAP  Labs: WBC 12k, Hgb 7.2 (baseline 9), BNP 55k, Cr 2.2 (baseline 1.5), PCO2 47 (VBG), Troponin 0.24  EKG:   Atrial fibrillation with premature ventricular or aberrantly conducted complexes  Left posterior fascicular block   ST & T wave abnormality, consider lateral ischemia - similar to prior in July 2022  XR chest: significant bilateral pleural effusions and pulmonary congestion  Admitted to medicine for SOB/hypoxia likely 2/2 acute CHF exacerbation.  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    AF (atrial fibrillation)  s/p DCCV    Carotid stenosis    COPD (chronic obstructive pulmonary disease)    H/O coronary artery bypass surgery  2001    S/P inguinal hernia repair    History of appendectomy      ALLERGIES:  Ozempic (0.25 mg or 0.5 mg dose) (Hives; Rash)  streptomycin (Unknown)  Trulicity Pen (Hives; Rash)    MEDICATIONS:  HOME MEDICATIONS  Aranesp 100 mcg/0.5 mL injectable solution: 1 dose(s) injectable every 3 to 4 weeks  atorvastatin 40 mg oral tablet: 1 tab(s) orally once a day (at bedtime)  calcitriol 0.25 mcg oral capsule: 1 cap(s) orally 2 times a week  d-mycophenolate: 500 milligram(s) orally 4 times a day  Eliquis 2.5 mg oral tablet: 1 tab(s) orally 2 times a day  famotidine 20 mg oral tablet: 1 tab(s) orally once a day  furosemide 20 mg oral tablet: 1 tab(s) orally once a day  insulin glargine 100 units/mL subcutaneous solution: 5 unit(s) subcutaneous once a day (at bedtime)  insulin lispro 100 units/mL injectable solution: 1 Unit(s) if Glucose 151 - 200  2 Unit(s) if Glucose 201 - 250  3 Unit(s) if Glucose 251 - 300  4 Unit(s) if Glucose 301 - 350  5 Unit(s) if Glucose 351 - 400  6 Unit(s) if Glucose Greater Than 400  isosorbide mononitrate 30 mg oral tablet, extended release: 1 tab(s) orally once a day (in the morning)  levothyroxine 150 mcg (0.15 mg) oral tablet: 1 tab(s) orally once a day  metoprolol succinate 50 mg oral tablet, extended release: 1 tab(s) orally once a day  Multiple Vitamins with Minerals oral tablet: 1 tab(s) orally once a day  NIFEdipine 30 mg oral tablet, extended release: 1 tab(s) orally once a day  pentoxifylline 400 mg oral tablet, extended release: 1 tab(s) orally 2 times a day  sodium bicarbonate 650 mg oral tablet: 1 tab(s) orally 3 times a day  tacrolimus 0.5 mg oral capsule: 1 cap(s) orally 2 times a day    STANDING MEDICATIONS  albuterol/ipratropium for Nebulization 3 milliLiter(s) Nebulizer every 6 hours  apixaban 2.5 milliGRAM(s) Oral two times a day  atorvastatin 40 milliGRAM(s) Oral at bedtime  BACItracin   Ointment 1 Application(s) Topical two times a day  buMETAnide Infusion 1 mG/Hr IV Continuous <Continuous>  chlorhexidine 2% Cloths 1 Application(s) Topical <User Schedule>  dextrose 5%. 1000 milliLiter(s) IV Continuous <Continuous>  dextrose 5%. 1000 milliLiter(s) IV Continuous <Continuous>  dextrose 50% Injectable 25 Gram(s) IV Push once  dextrose 50% Injectable 12.5 Gram(s) IV Push once  dextrose 50% Injectable 25 Gram(s) IV Push once  famotidine    Tablet 20 milliGRAM(s) Oral daily  glucagon  Injectable 1 milliGRAM(s) IntraMuscular once  insulin glargine Injectable (LANTUS) 22 Unit(s) SubCutaneous at bedtime  insulin lispro (ADMELOG) corrective regimen sliding scale   SubCutaneous three times a day before meals  insulin lispro Injectable (ADMELOG) 7 Unit(s) SubCutaneous three times a day before meals  iron sucrose Injectable 200 milliGRAM(s) IV Push every 24 hours  isosorbide   mononitrate ER Tablet (IMDUR) 30 milliGRAM(s) Oral daily  levothyroxine 150 MICROGram(s) Oral daily  lidocaine 1% (Preservative-free) Injectable 30 milliLiter(s) Local Injection once  melatonin 5 milliGRAM(s) Oral at bedtime  metoprolol succinate  milliGRAM(s) Oral daily  multivitamin/minerals 1 Tablet(s) Oral daily  mycophenolate mofetil 500 milliGRAM(s) Oral four times a day  NIFEdipine XL 30 milliGRAM(s) Oral daily  pentoxifylline 400 milliGRAM(s) Oral two times a day  predniSONE   Tablet 5 milliGRAM(s) Oral daily  sodium chloride 3%. 150 milliLiter(s) IV Continuous <Continuous>  sodium chloride 3%. 150 milliLiter(s) IV Continuous <Continuous>  sodium chloride 3%. 150 milliLiter(s) IV Continuous <Continuous>  sodium chloride 3%. 150 milliLiter(s) IV Continuous <Continuous>  sodium chloride 3%. 150 milliLiter(s) IV Continuous <Continuous>  sodium chloride 3%. 150 milliLiter(s) IV Continuous <Continuous>  sodium chloride 3%. 150 milliLiter(s) IV Continuous <Continuous>  sodium chloride 3%. 150 milliLiter(s) IV Continuous <Continuous>  sodium chloride 3%. 150 milliLiter(s) IV Continuous <Continuous>  sodium chloride 3%. 150 milliLiter(s) IV Continuous <Continuous>  tacrolimus 0.5 milliGRAM(s) Oral two times a day    PRN MEDICATIONS  acetaminophen     Tablet .. 650 milliGRAM(s) Oral every 6 hours PRN  dextrose Oral Gel 15 Gram(s) Oral once PRN  dextrose Oral Gel 15 Gram(s) Oral once PRN    VITALS:   T(C): 36.9 (09-02-22 @ 05:30), Max: 37 (09-01-22 @ 14:33)  T(F): 98.5 (09-02-22 @ 05:30), Max: 98.6 (09-01-22 @ 14:33)  HR: 85 (09-02-22 @ 05:30) (82 - 89)  BP: 132/63 (09-02-22 @ 05:30) (131/58 - 143/63)  BP(mean): --  ABP: --  ABP(mean): --  RR: 18 (09-02-22 @ 05:30) (18 - 18)  SpO2: --  LABS:                        8.3    17.40 )-----------( 199      ( 01 Sep 2022 06:48 )             25.9     09-01    139  |  98  |  70<HH>  ----------------------------<  96  4.2   |  31  |  1.1    Ca    8.7      01 Sep 2022 17:27  Mg     1.9     09-01    TPro  5.5<L>  /  Alb  2.9<L>  /  TBili  0.7  /  DBili  x   /  AST  15  /  ALT  8   /  AlkPhos  81  09-01        I&O's Detail    01 Sep 2022 07:01  -  02 Sep 2022 07:00  --------------------------------------------------------  IN:    Bumetanide: 70 mL    Oral Fluid: 3134 mL    sodium chloride 3%: 50 mL  Total IN: 3254 mL    OUT:    Indwelling Catheter - Urethral (mL): 2660 mL  Total OUT: 2660 mL    Total NET: 594 mL                    RADIOLOGY:  EKG  12 Lead ECG:   Ventricular Rate 71 BPM    Atrial Rate 82 BPM    QRS Duration 140 ms    Q-T Interval 444 ms    QTC Calculation(Bazett) 482 ms    R Axis 108 degrees    T Axis 166 degrees    Diagnosis Line Atrial fibrillation with premature ventricular or aberrantlyconducted  complexes  Non-specific intra-ventricular conduction block  ST & T wave abnormality, consider lateral ischemia  Abnormal ECG    Confirmed by Sarah Borden MD [id number]) on 8/21/2022 1:35:39 PM (08-21-22 @ 07:14)  12 Lead ECG:   Ventricular Rate 91 BPM    QRS Duration 110 ms    Q-T Interval 378 ms    QTC Calculation(Bazett) 464 ms    R Axis 124 degrees    T Axis 176 degrees    Diagnosis Line Atrial fibrillation with premature ventricular or aberrantly conducted  complexes  Left posterior fascicular block  ST & T wave abnormality, consider lateral ischemia  Abnormal ECG    Confirmed by Sarah Borden MD [id number]) on 8/20/2022 3:10:13 PM (08-20-22 @ 11:29)    Xray Chest 1 View- PORTABLE-Routine:   ACC: [id number] EXAM:  XR CHEST PORTABLE ROUTINE 1V                          PROCEDURE DATE:  08/31/2022          INTERPRETATION:  Clinical History / Reason for exam: Shortness of breath    Comparison : Chest radiograph 2 days prior.    Technique/Positioning: Frontal portable, low lung volumes.    Findings:    Support devices: Telemetry leads are seen    Cardiac/mediastinum/hilum: Heart is enlarged. The patient is status post   median sternotomy.    Lung parenchyma/Pleura: Bilateral opacifications    Skeleton/soft tissues: Unremarkable.    Impression:    Cardiomegaly with bilateral opacifications. Follow-up as needed.        --- End of Report ---            ZAFAR TRAN MD; Attending Interventional Radiologist  This document has been electronically signed. Aug 31 2022  7:31AM (08-31-22 @ 04:54)  Xray Chest 1 View- PORTABLE-Routine:   ACC: [id number] EXAM:  XR CHEST PORTABLE ROUTINE 1V                          PROCEDURE DATE:  08/29/2022          INTERPRETATION:  Clinical History / Reason for exam: Shortness of breath    Comparison : Chest radiograph 3 days prior.    Technique/Positioning: Frontal portable, low lung volumes.    Findings:    Support devices: Telemetry leads overlie the thorax    Cardiac/mediastinum/hilum: Patient is status post median sternotomy    Lung parenchyma/Pleura: Bilateral opacities    Skeleton/soft tissues: Unremarkable.    Impression:    Bilateral opacifications worsening.        --- End of Report ---            ZAFAR RTAN MD; Attending Interventional Radiologist  This document has been electronically signed. Aug 29 2022  8:19AM (08-29-22 @ 06:31)  Xray Chest 1 View- PORTABLE-Routine:   ACC: [id number] EXAM:  XR CHEST PORTABLE ROUTINE 1V                          PROCEDURE DATE:  08/26/2022          INTERPRETATION:  Clinical History / Reason for exam: Dyspnea    Comparison : Chest radiograph 8/25/2022.    Technique/Positioning: Frontal.    Findings:    Support devices: None.    Cardiac/mediastinum/hilum: Cardiomegaly unchanged    Lung parenchyma/Pleura: Decreased vascular congestion and bilateral   pleural effusions. Left lower lobe opacity unchanged. No air leak.    Skeleton/soft tissues: Unremarkable.    Impression:    Decreased vascular congestion and bilateral pleural effusions. Left lower   lobe opacity unchanged. No air leak.        --- End of Report ---            NNAMDI WARNER MD; Attending Radiologist  This document has been electronically signed. Aug 26 2022  3:35PM (08-26-22 @ 14:01)    Physical Exam:  General: no acute distress, lying in bed  Head: normocephalic and atraumatic  Neck: supple  Heart: regular rate and rhythm, S1 and S2 normal, no murmurs, rubs or gallops noted on exam  Lungs: Symmetric chest expansion bilaterally, decreased lung sounds bilaterally, crackles by bases, no wheezes or rhonchi heard  Abdomen: Bowel sounds present, non tender on light and deep palpation  Extremities: No edema, no clubbing or cyanosis notes, positive peripheral pulses

## 2022-09-02 NOTE — PROGRESS NOTE ADULT - SUBJECTIVE AND OBJECTIVE BOX
Over Night Events: events noted, on FM, co SOB, cardio noted, on bumex drip    PHYSICAL EXAM    ICU Vital Signs Last 24 Hrs  T(C): 36.9 (02 Sep 2022 05:30), Max: 37 (01 Sep 2022 14:33)  T(F): 98.5 (02 Sep 2022 05:30), Max: 98.6 (01 Sep 2022 14:33)  HR: 85 (02 Sep 2022 05:30) (82 - 89)  BP: 132/63 (02 Sep 2022 05:30) (131/58 - 143/63)  RR: 18 (02 Sep 2022 05:30) (18 - 18)  SpO2: 94%        General: ill looking  Lungs: Bilateral crackles  Cardiovascular: MEKHI 2.6  Abdomen: Soft, Positive BS  Extremities: No clubbing   Neurological: Non focal       09-01-22 @ 07:01  -  09-02-22 @ 07:00  --------------------------------------------------------  IN:    Bumetanide: 70 mL    Oral Fluid: 3134 mL    sodium chloride 3%: 50 mL  Total IN: 3254 mL    OUT:    Indwelling Catheter - Urethral (mL): 2660 mL  Total OUT: 2660 mL    Total NET: 594 mL          LABS:                          8.9    17.80 )-----------( 219      ( 02 Sep 2022 06:05 )             28.5                                               09-01    139  |  98  |  70<HH>  ----------------------------<  96  4.2   |  31  |  1.1    Ca    8.7      01 Sep 2022 17:27  Mg     1.9     09-01    TPro  5.5<L>  /  Alb  2.9<L>  /  TBili  0.7  /  DBili  x   /  AST  15  /  ALT  8   /  AlkPhos  81  09-01                                                                                           LIVER FUNCTIONS - ( 01 Sep 2022 06:48 )  Alb: 2.9 g/dL / Pro: 5.5 g/dL / ALK PHOS: 81 U/L / ALT: 8 U/L / AST: 15 U/L / GGT: x                                                                                                                                       MEDICATIONS  (STANDING):  albuterol/ipratropium for Nebulization 3 milliLiter(s) Nebulizer every 6 hours  apixaban 2.5 milliGRAM(s) Oral two times a day  atorvastatin 40 milliGRAM(s) Oral at bedtime  BACItracin   Ointment 1 Application(s) Topical two times a day  buMETAnide Infusion 1 mG/Hr (5 mL/Hr) IV Continuous <Continuous>  chlorhexidine 2% Cloths 1 Application(s) Topical <User Schedule>  dextrose 5%. 1000 milliLiter(s) (50 mL/Hr) IV Continuous <Continuous>  dextrose 5%. 1000 milliLiter(s) (100 mL/Hr) IV Continuous <Continuous>  dextrose 50% Injectable 25 Gram(s) IV Push once  dextrose 50% Injectable 12.5 Gram(s) IV Push once  dextrose 50% Injectable 25 Gram(s) IV Push once  famotidine    Tablet 20 milliGRAM(s) Oral daily  glucagon  Injectable 1 milliGRAM(s) IntraMuscular once  insulin glargine Injectable (LANTUS) 22 Unit(s) SubCutaneous at bedtime  insulin lispro (ADMELOG) corrective regimen sliding scale   SubCutaneous three times a day before meals  insulin lispro Injectable (ADMELOG) 7 Unit(s) SubCutaneous three times a day before meals  iron sucrose Injectable 200 milliGRAM(s) IV Push every 24 hours  isosorbide   mononitrate ER Tablet (IMDUR) 30 milliGRAM(s) Oral daily  levothyroxine 150 MICROGram(s) Oral daily  lidocaine 1% (Preservative-free) Injectable 30 milliLiter(s) Local Injection once  melatonin 5 milliGRAM(s) Oral at bedtime  metoprolol succinate  milliGRAM(s) Oral daily  multivitamin/minerals 1 Tablet(s) Oral daily  mycophenolate mofetil 500 milliGRAM(s) Oral four times a day  NIFEdipine XL 30 milliGRAM(s) Oral daily  pentoxifylline 400 milliGRAM(s) Oral two times a day  predniSONE   Tablet 5 milliGRAM(s) Oral daily  sodium chloride 3%. 150 milliLiter(s) (50 mL/Hr) IV Continuous <Continuous>  sodium chloride 3%. 150 milliLiter(s) (50 mL/Hr) IV Continuous <Continuous>  sodium chloride 3%. 150 milliLiter(s) (50 mL/Hr) IV Continuous <Continuous>  sodium chloride 3%. 150 milliLiter(s) (50 mL/Hr) IV Continuous <Continuous>  sodium chloride 3%. 150 milliLiter(s) (50 mL/Hr) IV Continuous <Continuous>  sodium chloride 3%. 150 milliLiter(s) (50 mL/Hr) IV Continuous <Continuous>  sodium chloride 3%. 150 milliLiter(s) (50 mL/Hr) IV Continuous <Continuous>  sodium chloride 3%. 150 milliLiter(s) (50 mL/Hr) IV Continuous <Continuous>  sodium chloride 3%. 150 milliLiter(s) (50 mL/Hr) IV Continuous <Continuous>  sodium chloride 3%. 150 milliLiter(s) (50 mL/Hr) IV Continuous <Continuous>  tacrolimus 0.5 milliGRAM(s) Oral two times a day    MEDICATIONS  (PRN):  acetaminophen     Tablet .. 650 milliGRAM(s) Oral every 6 hours PRN Mild Pain (1 - 3)  dextrose Oral Gel 15 Gram(s) Oral once PRN Blood Glucose LESS THAN 70 milliGRAM(s)/deciliter  dextrose Oral Gel 15 Gram(s) Oral once PRN Blood Glucose LESS THAN 70 milliGRAM(s)/deciliter      cxr reviewed

## 2022-09-02 NOTE — PROGRESS NOTE ADULT - ASSESSMENT
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     A/P :  Acute Hypoxic respirator Failure:   Acute HFmrEF:   Bilateral pleural effusion Right>Left.   No evidence of Pneumonia.   Patient is still with hypoxia and SOB. Currently on Venturi mask 30%  CXR showed worsening right pleural effusion and right lung opacity.   Echo 8/1/22 showed LVEF 45-50%, multiple LV regional wall motion abnormalities, mild MR, mild TR, borderline pulmonary HTN   s/p thoracentesis 8/2 fluid transudative.   Continue Bumex drip at 1mg/hr and hypertonic saline  Heart failure follow up, continue low sodium diet, fluid restriction 1L/24 hrs.   Pulmonary follow up for worsening pleural effusion, continue IV diuresis, no plan for thoracentesis.     Epistaxis  resolved  ENT follow up appreciated, packing removed    Leukocytosis   likely from steroids    Acute on chronic anemia  s/p 1 U PRBC  Continue IV Venofer 200mg daily for 3 days.     Chronic Atrial Fibrillation :  continue metoprolol and Eliquis.     C3 Glomerulonephropathy  Continue Prednisone 5mg daily, tacrolimus 0.5mg BID and Mycophenolate 500mg 4 times daily.     CAD  Continue Metoprolol, Imdur and Lipitor.     HTN: continue metoprolol, nifedipine  will try to wean off nifedipine given HFmrEF    Hypothyroidism: continue synthroid    DM II: A1C 7.3.   FS is better controlled, continue Lantus 22 units and Lispro 7 units before meals. Diabetic diet.     Stage 2 right buttock pressure ulcer  Present on admission  Continue local wound care, off loading.     COPD  - c/w inhalers    DVT prophylaxis:     #Progress Note Handoff:  Pending (specify):  Diuresis  Family discussion: spoke with pt and son at bedside  Disposition: Home

## 2022-09-02 NOTE — PROGRESS NOTE ADULT - ASSESSMENT
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     SOB/ AHRF  Sepsis criteria met on admission but pt remains stable off abx and procal low, could be SIRS criteria due to aspiration pneumonitis  - bibasilar opacities remain on CXR from today  - likely due to aspiration pneumonia and acute on chronic HFrEF  - c/w Bumex/hypertonic saline--HF team recommended to d/c bumex tonight but pt remains overloaded therefore will continue with Bumex and reassess tomorrow  - pt has large urine output  - speech and swallow appreciated  - pt stable off abx  - Echo 08/01: 45-50%EF  - CXR shows improved b/l pleural effusions with pulmonary congestion and opacities but awaiting final read  - proBNP > 50K  - thoracentesis done 9/27--transudative effusion  - continue with bumex drip and hypertonic saline  -thora 8/26 1.5 L removed  - chest x-ray 8/29 fluid reaccumulation   - HF rec: bumex drip  - follow up with pulm   - repeat chest x-ray 8/31 AM  - patient can be switched to nasal cannula 5L--> desat to 87%, placed back on aerosol mask 30% FiO2  - chest x-ray 9/2    Epistaxis  - resolved  - ENT follow up appreciated, packing removed    Leukocytosis  - was uptrending likely from steroids    C3 Glomerulonephropathy  - c/w tacrolimus (level 9.5), cellcept    CAD  - c/w statin, metoprolol, imdur    HTN  - c/w metoprolol, nifedipine    Hypothyroidism  - c/w synthroid    DM II  - FS uncontrolled  - adjust insulin    Stage 2 right buttock pressure ulcer  - present on admission  - c/w local wound care    Acute on chronic anemia  - s/p 1 U PRBC    Chronic afib  - c/w nifedipine, metoprolol    COPD  - c/w inhlaers    DVT px  from home    #Progress Note Handoff:  Pending (specify):  Diuresis  Family discussion:   Disposition: subacute rehab as per PT

## 2022-09-02 NOTE — PROGRESS NOTE ADULT - SUBJECTIVE AND OBJECTIVE BOX
KANGNAVIANDRIA  82y  Male      Patient is a 82y old  Male who presents with a chief complaint of chf exacerbation (02 Sep 2022 08:30)      INTERVAL HPI/OVERNIGHT EVENTS:  He is still with SOB, no chest pain.   Vital Signs Last 24 Hrs  T(C): 37 (02 Sep 2022 08:00), Max: 37 (01 Sep 2022 14:33)  T(F): 98.6 (02 Sep 2022 08:00), Max: 98.6 (01 Sep 2022 14:33)  HR: 75 (02 Sep 2022 13:17) (75 - 89)  BP: 139/60 (02 Sep 2022 13:17) (131/58 - 143/63)  BP(mean): --  RR: 18 (02 Sep 2022 08:00) (18 - 18)  SpO2: --          09-01-22 @ 07:01  -  09-02-22 @ 07:00  --------------------------------------------------------  IN: 3254 mL / OUT: 2660 mL / NET: 594 mL    09-02-22 @ 07:01  -  09-02-22 @ 14:21  --------------------------------------------------------  IN: 541 mL / OUT: 800 mL / NET: -259 mL            Consultant(s) Notes Reviewed:  [x ] YES  [ ] NO          MEDICATIONS  (STANDING):  albuterol/ipratropium for Nebulization 3 milliLiter(s) Nebulizer every 6 hours  apixaban 2.5 milliGRAM(s) Oral two times a day  atorvastatin 40 milliGRAM(s) Oral at bedtime  BACItracin   Ointment 1 Application(s) Topical two times a day  buMETAnide Infusion 1 mG/Hr (5 mL/Hr) IV Continuous <Continuous>  chlorhexidine 2% Cloths 1 Application(s) Topical <User Schedule>  dextrose 5%. 1000 milliLiter(s) (50 mL/Hr) IV Continuous <Continuous>  dextrose 5%. 1000 milliLiter(s) (100 mL/Hr) IV Continuous <Continuous>  dextrose 50% Injectable 25 Gram(s) IV Push once  dextrose 50% Injectable 12.5 Gram(s) IV Push once  dextrose 50% Injectable 25 Gram(s) IV Push once  famotidine    Tablet 20 milliGRAM(s) Oral daily  glucagon  Injectable 1 milliGRAM(s) IntraMuscular once  insulin glargine Injectable (LANTUS) 22 Unit(s) SubCutaneous at bedtime  insulin lispro (ADMELOG) corrective regimen sliding scale   SubCutaneous three times a day before meals  insulin lispro Injectable (ADMELOG) 7 Unit(s) SubCutaneous three times a day before meals  iron sucrose IVPB 200 milliGRAM(s) IV Intermittent every 24 hours  isosorbide   mononitrate ER Tablet (IMDUR) 30 milliGRAM(s) Oral daily  levothyroxine 150 MICROGram(s) Oral daily  lidocaine 1% (Preservative-free) Injectable 30 milliLiter(s) Local Injection once  melatonin 5 milliGRAM(s) Oral at bedtime  metoprolol succinate  milliGRAM(s) Oral daily  multivitamin/minerals 1 Tablet(s) Oral daily  mycophenolate mofetil 500 milliGRAM(s) Oral four times a day  NIFEdipine XL 30 milliGRAM(s) Oral daily  pentoxifylline 400 milliGRAM(s) Oral two times a day  predniSONE   Tablet 5 milliGRAM(s) Oral daily  sodium chloride 3%. 150 milliLiter(s) (50 mL/Hr) IV Continuous <Continuous>  sodium chloride 3%. 150 milliLiter(s) (50 mL/Hr) IV Continuous <Continuous>  sodium chloride 3%. 150 milliLiter(s) (50 mL/Hr) IV Continuous <Continuous>  sodium chloride 3%. 150 milliLiter(s) (50 mL/Hr) IV Continuous <Continuous>  sodium chloride 3%. 150 milliLiter(s) (50 mL/Hr) IV Continuous <Continuous>  sodium chloride 3%. 150 milliLiter(s) (50 mL/Hr) IV Continuous <Continuous>  sodium chloride 3%. 150 milliLiter(s) (50 mL/Hr) IV Continuous <Continuous>  sodium chloride 3%. 150 milliLiter(s) (50 mL/Hr) IV Continuous <Continuous>  sodium chloride 3%. 150 milliLiter(s) (50 mL/Hr) IV Continuous <Continuous>  sodium chloride 3%. 150 milliLiter(s) (50 mL/Hr) IV Continuous <Continuous>  tacrolimus 0.5 milliGRAM(s) Oral two times a day    MEDICATIONS  (PRN):  acetaminophen     Tablet .. 650 milliGRAM(s) Oral every 6 hours PRN Mild Pain (1 - 3)  dextrose Oral Gel 15 Gram(s) Oral once PRN Blood Glucose LESS THAN 70 milliGRAM(s)/deciliter  dextrose Oral Gel 15 Gram(s) Oral once PRN Blood Glucose LESS THAN 70 milliGRAM(s)/deciliter      LABS                          8.9    17.80 )-----------( 219      ( 02 Sep 2022 06:05 )             28.5     09-02    140  |  95<L>  |  69<HH>  ----------------------------<  156<H>  4.4   |  33<H>  |  1.1    Ca    8.6      02 Sep 2022 06:05  Phos  2.6     09-02  Mg     2.0     09-02    TPro  5.9<L>  /  Alb  3.3<L>  /  TBili  0.8  /  DBili  x   /  AST  14  /  ALT  7   /  AlkPhos  91  09-02        PT/INR - ( 02 Sep 2022 06:05 )   PT: 16.60 sec;   INR: 1.45 ratio           Lactate Trend        CAPILLARY BLOOD GLUCOSE      POCT Blood Glucose.: 107 mg/dL (02 Sep 2022 11:09)      Culture - Stool (collected 09-01-22 @ 07:05)  Source: .Stool Feces  Preliminary Report (09-02-22 @ 09:51):    No enteric pathogens to date: Final culture pending        RADIOLOGY & ADDITIONAL TESTS:    Imaging Personally Reviewed:  [ ] YES  [ ] NO    HEALTH ISSUES - PROBLEM Dx:          PHYSICAL EXAM:  GENERAL: mild respiratory distress, well-developed.  HEAD:  Atraumatic, Normocephalic.  EYES: EOMI, PERRLA, conjunctiva and sclera clear.  NECK: Supple, No JVD.  CHEST/LUNG:  decrease breath sound on right lower lung, left lung rales.   HEART: irregular rate and rhythm; S1 S2.   ABDOMEN: Soft, Nontender, Nondistended; Bowel sounds present.  EXTREMITIES:  2+ Peripheral Pulses, No clubbing, leg edema 1+  PSYCH: AAOx3.  NEUROLOGY: non-focal.  SKIN: sacral ulcer stage 2.

## 2022-09-02 NOTE — PROGRESS NOTE ADULT - SUBJECTIVE AND OBJECTIVE BOX
Date of Admission: 22      Interval History: Patient resting in bed, in NAD. Reports feeling better, eager to work with physical therapy. States his appetite has slightly improved. Remains fluid overloaded. Kidney function stable, ~2.6L 24hr UOP.       Patient resting in bed, in NAD. He reports feeling better. He remains on face mask.  Procalcitonin 0.16      CHIEF COMPLAINT: Patient is a 82y old  Male who presents with a chief complaint of chf exacerbation (22 Aug 2022 12:24)    HISTORY OF PRESENT ILLNESS: 81 yo male with PMHx of HFrEF (45-50% on Lasix 20 PO), A-Fib on Eliquis, CAD s/p CABGx3, PVD, COPD (was on rare home O2 PRN now on 3L NC since recent July admission for COVID), CKD3, C3 glomerulonephropathy, HTN, HLD, Hypothyroid presents from NH with progressively worsening SOB x5 days and hypoxia.  Recently hospitalized here for a month discharged on 8/3/22 initially for BRENDA on CKD course c/b COVID, bilateral lower lobe pneumonia, delirium and microaspiration of thin liquids on modified barium study. Received Dexamethasone and Zosyn course with new O2 requirements of 3L NC discharged to nursing home on 3L and thick liquid diet for rehab. Due to his SOB, chest x-ray was ordered on 8/15 but results did not come back and patient became increasingly short of breath and was noted to be hypoxic and rehab facility which prompted referral to the ED. Review of symptoms is notable for orthopnea and a chronic dry cough unchanged since his covid illness. He denies paroxysmal nocturnal dyspnea, leg edema, chest pain, palpitations, dizziness, n/v, abd pain. Cardiologist is Dr. Witt and Pulmonolgist is Dr. Rory Mars.   In ED:  T(F): 96.4 (22 @ 11:27), Max: 96.4 (22 @ 11:27)  HR: 80 (22 @ 16:20) (80 - 91)  BP: 137/61 (22 @ 16:20) (121/58 - 137/61)  RR: 18 (22 @ 16:20) (18 - 24)  SpO2: 100% (22 @ 16:20) (89% - 100%) -> 89% on 4L -> placed on NRB -> BIPAP 2/2 tachypnea and hypoxia -> taken off with increased work of breathing placed back on BIPAP  Labs: WBC 12k, Hgb 7.2 (baseline 9), BNP 55k, Cr 2.2 (baseline 1.5), PCO2 47 (VBG), Troponin 0.24  EKG:   Atrial fibrillation with premature ventricular or aberrantly conducted complexes  Left posterior fascicular block   ST & T wave abnormality, consider lateral ischemia - similar to prior in 2022  XR chest: significant bilateral pleural effusions and pulmonary congestion  Admitted to medicine for SOB/hypoxia likely 2/2 acute CHF exacerbation.  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    SOCIAL HISTORY:  Former cigarette smoker, quit >40 years ago. Denies alcohol or drug use.     Allergies    Ozempic (0.25 mg or 0.5 mg dose) (Hives; Rash)  streptomycin (Unknown)  Trulicity Pen (Hives; Rash)    Intolerances      PHYSICAL EXAM:  General Appearance: lethargic, normal for age and gender. 	  Neck: JVP 13yfV2V, no bruit.   Eyes: Extra Ocular muscles intact.   Cardiovascular: irregular rhythm S1 S2, + JVD, +2 B/L LE edema  Respiratory: decreased b/l  Psychiatry: Alert, oriented x 3, Mood & affect appropriate  Gastrointestinal:  Soft, Non-tender  Skin/Integumen: No rashes, No ecchymoses, No cyanosis	  Neurologic: Non-focal  Musculoskeletal/ extremities: Normal range of motion, No clubbing, cyanosis,+2 B/L LE edema  Vascular: Peripheral pulses palpable 2+ bilaterally    CARDIAC MARKERS:  Serum Pro-Brain Natriuretic Peptide: 36367 pg/mL (08.20.22 @ 11:47)    Serum Pro-Brain Natriuretic Peptide: 63053 pg/mL (10 @ 10:28)      TELEMETRY EVENTS: 	    EC22    Ventricular Rate 71 BPM  Atrial Rate 82 BPM  QRS Duration 140 ms  Q-T Interval 444 ms  QTC Calculation(Bazett) 482 ms  R Axis 108 degrees  T Axis 166 degrees    Diagnosis Line Atrial fibrillation with premature ventricular or aberrantlyconducted  complexes  Non-specific intra-ventricular conduction block  ST & T wave abnormality, consider lateral ischemia  Abnormal ECG    Confirmed by Sarah Borden MD ([id number]) on 2022 1:35:39 PM      	  PREVIOUS DIAGNOSTIC TESTING:    TTE 22      Summary:   1. Left ventricular ejection fraction, by visual estimation, is 45 to   50%.   2. Mildly decreased global left ventricular systolic function.   3. Mild left ventricular hypertrophy.   4. Multiple left ventricular regional wall motion abnormalities exist.   See wall motion findings.   5. The left ventricular diastolic function could not be assessed in this   study.   6. Moderately enlarged left atrium.   7. Sclerotic aortic valve with normal opening.   8. Degenerative mitral valve.   9. Mild mitral regurgitation.  10. Mild tricuspid regurgitation.  11. Estimated pulmonary artery systolic pressure is 37.6 mmHg assuming a   right atrial pressure of 8 mmHg, which is consistent with borderline   pulmonary hypertension.        TTE 10/22/21    Summary:   1. Moderately to severely decreased global left ventricular systolic function.   2. Severely enlarged left atrium.   3. Severely enlarged right atrium.   4. Multiple left ventricular regional wall motion abnormalities exist. See wall motion findings.   5. Mild eccentric left ventricular hypertrophy.   6. The left ventricular diastolic function could not be assessed in this study.   7. Severely enlarged right ventricle.   8. Severely reduced RV systolic function.   9. Mild to moderate mitral valve regurgitation.  10. Moderate mitral annular calcification.  11. Mild tricuspid regurgitation.  12. Mild aortic regurgitation.  13. Sclerotic aortic valve with normal opening.  14. Complex atheroma is noted in the ascending aorta.  15. Estimated pulmonary artery systolic pressure is 64.8 mmHg assuming a right atrial pressure of 15 mmHg, which is consistent with severe pulmonary hypertension.  16. Pulmonary hypertension is present.  17. LA volume Index is 71.9 ml/m² ml/m2.  18. There is moderate aortic root calcification.        Home Medications:  Aranesp 100 mcg/0.5 mL injectable solution: 1 dose(s) injectable every 3 to 4 weeks (:22)  atorvastatin 40 mg oral tablet: 1 tab(s) orally once a day (at bedtime) (:22)  calcitriol 0.25 mcg oral capsule: 1 cap(s) orally 2 times a week (:22)  d-mycophenolate: 500 milligram(s) orally 4 times a day (:22)  Eliquis 2.5 mg oral tablet: 1 tab(s) orally 2 times a day (:22)  famotidine 20 mg oral tablet: 1 tab(s) orally once a day (:)  furosemide 20 mg oral tablet: 1 tab(s) orally once a day (03 Aug 2022 09:47)  insulin glargine 100 units/mL subcutaneous solution: 5 unit(s) subcutaneous once a day (at bedtime) (03 Aug 2022 09:47)  insulin lispro 100 units/mL injectable solution: 1 Unit(s) if Glucose 151 - 200  2 Unit(s) if Glucose 201 - 250  3 Unit(s) if Glucose 251 - 300  4 Unit(s) if Glucose 301 - 350  5 Unit(s) if Glucose 351 - 400  6 Unit(s) if Glucose Greater Than 400 (03 Aug 2022 09:57)  isosorbide mononitrate 30 mg oral tablet, extended release: 1 tab(s) orally once a day (in the morning) (:22)  levothyroxine 150 mcg (0.15 mg) oral tablet: 1 tab(s) orally once a day (:22)  metoprolol succinate 50 mg oral tablet, extended release: 1 tab(s) orally once a day (03 Aug 2022 09:47)  Multiple Vitamins with Minerals oral tablet: 1 tab(s) orally once a day (03 Aug 2022 09:47)  NIFEdipine 30 mg oral tablet, extended release: 1 tab(s) orally once a day (:22)  pentoxifylline 400 mg oral tablet, extended release: 1 tab(s) orally 2 times a day (:22)  sodium bicarbonate 650 mg oral tablet: 1 tab(s) orally 3 times a day (03 Aug 2022 09:47)  tacrolimus 0.5 mg oral capsule: 1 cap(s) orally 2 times a day (03 Aug 2022 09:57)    MEDICATIONS  (STANDING):  albuterol/ipratropium for Nebulization 3 milliLiter(s) Nebulizer every 6 hours  ampicillin/sulbactam  IVPB 3 Gram(s) IV Intermittent every 6 hours  apixaban 2.5 milliGRAM(s) Oral two times a day  atorvastatin 40 milliGRAM(s) Oral at bedtime  buMETAnide Injectable 2 milliGRAM(s) IV Push every 12 hours  dextrose 5%. 1000 milliLiter(s) (50 mL/Hr) IV Continuous <Continuous>  dextrose 5%. 1000 milliLiter(s) (100 mL/Hr) IV Continuous <Continuous>  dextrose 50% Injectable 25 Gram(s) IV Push once  dextrose 50% Injectable 12.5 Gram(s) IV Push once  dextrose 50% Injectable 25 Gram(s) IV Push once  famotidine    Tablet 20 milliGRAM(s) Oral daily  glucagon  Injectable 1 milliGRAM(s) IntraMuscular once  insulin glargine Injectable (LANTUS) 10 Unit(s) SubCutaneous every morning  insulin lispro (ADMELOG) corrective regimen sliding scale   SubCutaneous three times a day before meals  isosorbide   mononitrate ER Tablet (IMDUR) 30 milliGRAM(s) Oral daily  levothyroxine 150 MICROGram(s) Oral daily  methylPREDNISolone sodium succinate Injectable 40 milliGRAM(s) IV Push every 12 hours  metoprolol succinate ER 50 milliGRAM(s) Oral daily  multivitamin/minerals 1 Tablet(s) Oral daily  mycophenolate mofetil 500 milliGRAM(s) Oral four times a day  NIFEdipine XL 30 milliGRAM(s) Oral daily  pentoxifylline 400 milliGRAM(s) Oral two times a day  tacrolimus 0.5 milliGRAM(s) Oral two times a day    MEDICATIONS  (PRN):  dextrose Oral Gel 15 Gram(s) Oral once PRN Blood Glucose LESS THAN 70 milliGRAM(s)/deciliter

## 2022-09-02 NOTE — PROGRESS NOTE ADULT - ASSESSMENT
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 Found to be hypoxic to 89%- placed on BiPAP with improvement, now on venti-mask (can not tolerate NC 2/2 epistax). Found to be fluid overloaded, currently receiving high dose IV diuretics. Suspected PNA, IV antibiotics given (now d/c'd). Patient having epistaxis while on Eliquis- now holding, epistaxis improved. Pulmonology following- s/p thoracentesis 8/26, 1.5L removed.     Plan:    Patient remains fluid overloaded on exam: POCUS exam: IVC 2.7cm, <50% collapsibility   Continue Bumex gtt at 1 mg/hr  Continue 3% Hypertonic Saline 150ml BID (If infused throughout a central line, run over one hour, if a peripheral line is to be used run over 3 hours)  BMP twice daily with magnesium   Maintain potassium >4.0, Mg >2.2  Continue nifedipine 30mg daily and Imdur 30 mg daily   Continue metoprolol succinate 100mg daily  Continue iv iron sucrose 200 mg daily x3  BiPAP at night - wean before discharge if able  Strict intake and output needed for proper diuretic management   Daily weight   Physical therapy f/u / OOB to daily  Will continue to follow     Assessment: 83 yo male with PMHx of 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 Found to be hypoxic to 89%- placed on BiPAP with improvement, now on venti-mask (can not tolerate NC 2/2 epistax). Found to be fluid overloaded, currently receiving high dose IV diuretics. Suspected PNA, IV antibiotics given (now d/c'd). Patient having epistaxis while on Eliquis- now holding, epistaxis improved. Pulmonology following- s/p thoracentesis 8/26, 1.5L removed.     Plan:    Patient remains fluid overloaded on exam: POCUS exam: IVC 2.7cm, <50% collapsibility   Continue Bumex gtt at 1 mg/hr  Continue 3% Hypertonic Saline 150ml BID (If infused throughout a central line, run over one hour, if a peripheral line is to be used run over 3 hours)  BMP twice daily with magnesium   Maintain potassium >4.0, Mg >2.2  Continue nifedipine 30mg daily and Imdur 30 mg daily   Continue metoprolol succinate 100mg daily  Continue iv iron sucrose 200 mg daily x3  BiPAP at night - wean before discharge if able  Strict intake and output needed for proper diuretic management   Daily weight   Physical therapy f/u / OOB to daily  Will continue to follow.

## 2022-09-03 NOTE — PROGRESS NOTE ADULT - ASSESSMENT
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     A/P :  Acute Hypoxic respirator Failure:   Acute HFmrEF:   Bilateral pleural effusion Right>Left.   No evidence of Pneumonia.   Patient is still with hypoxia and SOB. Currently on Venturi mask 30%  CXR showed worsening right pleural effusion and right lung opacity.   Echo 8/1/22 showed LVEF 45-50%, multiple LV regional wall motion abnormalities, mild MR, mild TR, borderline pulmonary HTN   s/p thoracentesis 8/2 fluid transudative.   Continue Bumex drip at 1mg/hr and hypertonic saline  Heart failure follow up, continue low sodium diet, fluid restriction 1L/24 hrs.   Pulmonary follow up for worsening pleural effusion, continue IV diuresis, no plan for thoracentesis.     Epistaxis  resolved  ENT follow up appreciated, packing removed    Leukocytosis  likely from steroids    Acute on chronic anemia  s/p 1 U PRBC  Continue IV Venofer 200mg daily for 3 days.     Chronic Atrial Fibrillation :  continue metoprolol and Eliquis.     C3 Glomerulonephropathy  Continue Prednisone 5mg daily, tacrolimus 0.5mg BID and Mycophenolate 500mg 4 times daily.     CAD  Continue Metoprolol, Imdur and Lipitor.     HTN: continue metoprolol, nifedipine  will try to wean off nifedipine given HFmrEF    Hypothyroidism: continue synthroid    DM II: A1C 7.3.   FS is better controlled, continue Lantus 22 units and Lispro 7 units before meals. Diabetic diet.     Stage 2 right buttock pressure ulcer  Present on admission  Continue local wound care, off loading.     COPD  Continue DuoNeb prn.     DVT prophylaxis:     #Progress Note Handoff:  Pending (specify):  improving hypoxia, respiratory failure and pleural effusion.   Family discussion: spoke with pt and son at bedside  Disposition: Home

## 2022-09-03 NOTE — PROGRESS NOTE ADULT - SUBJECTIVE AND OBJECTIVE BOX
KANGNAVI ANDRIA  82y  Male      Patient is a 82y old  Male who presents with a chief complaint of chf exacerbation (02 Sep 2022 14:14)      INTERVAL HPI/OVERNIGHT EVENTS:  He is still with SOB, feels weak, encourage to move out to the chair.   Vital Signs Last 24 Hrs  T(C): 36.1 (03 Sep 2022 04:53), Max: 36.1 (03 Sep 2022 04:53)  T(F): 97 (03 Sep 2022 04:53), Max: 97 (03 Sep 2022 04:53)  HR: 83 (03 Sep 2022 04:53) (77 - 84)  BP: 125/60 (03 Sep 2022 13:04) (117/59 - 129/62)  BP(mean): --  RR: 18 (03 Sep 2022 04:53) (18 - 18)  SpO2: 94% (02 Sep 2022 21:01) (94% - 94%)          09-02-22 @ 07:01  -  09-03-22 @ 07:00  --------------------------------------------------------  IN: 701 mL / OUT: 1800 mL / NET: -1099 mL            Consultant(s) Notes Reviewed:  [x ] YES  [ ] NO          MEDICATIONS  (STANDING):  albuterol/ipratropium for Nebulization 3 milliLiter(s) Nebulizer every 6 hours  apixaban 2.5 milliGRAM(s) Oral two times a day  atorvastatin 40 milliGRAM(s) Oral at bedtime  BACItracin   Ointment 1 Application(s) Topical two times a day  buMETAnide Infusion 1 mG/Hr (5 mL/Hr) IV Continuous <Continuous>  chlorhexidine 2% Cloths 1 Application(s) Topical <User Schedule>  dextrose 5%. 1000 milliLiter(s) (100 mL/Hr) IV Continuous <Continuous>  dextrose 5%. 1000 milliLiter(s) (50 mL/Hr) IV Continuous <Continuous>  dextrose 50% Injectable 25 Gram(s) IV Push once  dextrose 50% Injectable 12.5 Gram(s) IV Push once  dextrose 50% Injectable 25 Gram(s) IV Push once  famotidine    Tablet 20 milliGRAM(s) Oral daily  glucagon  Injectable 1 milliGRAM(s) IntraMuscular once  insulin glargine Injectable (LANTUS) 22 Unit(s) SubCutaneous at bedtime  insulin lispro (ADMELOG) corrective regimen sliding scale   SubCutaneous three times a day before meals  insulin lispro Injectable (ADMELOG) 7 Unit(s) SubCutaneous three times a day before meals  iron sucrose IVPB 200 milliGRAM(s) IV Intermittent every 24 hours  isosorbide   mononitrate ER Tablet (IMDUR) 30 milliGRAM(s) Oral daily  levothyroxine 150 MICROGram(s) Oral daily  lidocaine 1% (Preservative-free) Injectable 30 milliLiter(s) Local Injection once  melatonin 5 milliGRAM(s) Oral at bedtime  metoprolol succinate  milliGRAM(s) Oral daily  multivitamin/minerals 1 Tablet(s) Oral daily  mycophenolate mofetil 500 milliGRAM(s) Oral four times a day  NIFEdipine XL 30 milliGRAM(s) Oral daily  pentoxifylline 400 milliGRAM(s) Oral two times a day  predniSONE   Tablet 5 milliGRAM(s) Oral daily  sodium chloride 3%. 150 milliLiter(s) (50 mL/Hr) IV Continuous <Continuous>  sodium chloride 3%. 150 milliLiter(s) (50 mL/Hr) IV Continuous <Continuous>  sodium chloride 3%. 150 milliLiter(s) (50 mL/Hr) IV Continuous <Continuous>  sodium chloride 3%. 150 milliLiter(s) (50 mL/Hr) IV Continuous <Continuous>  sodium chloride 3%. 150 milliLiter(s) (50 mL/Hr) IV Continuous <Continuous>  sodium chloride 3%. 150 milliLiter(s) (50 mL/Hr) IV Continuous <Continuous>  sodium chloride 3%. 150 milliLiter(s) (50 mL/Hr) IV Continuous <Continuous>  sodium chloride 3%. 150 milliLiter(s) (50 mL/Hr) IV Continuous <Continuous>  sodium chloride 3%. 150 milliLiter(s) (50 mL/Hr) IV Continuous <Continuous>  sodium chloride 3%. 150 milliLiter(s) (50 mL/Hr) IV Continuous <Continuous>  sodium chloride 3%. 150 milliLiter(s) (50 mL/Hr) IV Continuous <Continuous>  sodium chloride 3%. 150 milliLiter(s) (50 mL/Hr) IV Continuous <Continuous>  tacrolimus 0.5 milliGRAM(s) Oral two times a day    MEDICATIONS  (PRN):  acetaminophen     Tablet .. 650 milliGRAM(s) Oral every 6 hours PRN Mild Pain (1 - 3)  dextrose Oral Gel 15 Gram(s) Oral once PRN Blood Glucose LESS THAN 70 milliGRAM(s)/deciliter  dextrose Oral Gel 15 Gram(s) Oral once PRN Blood Glucose LESS THAN 70 milliGRAM(s)/deciliter      LABS                          8.6    16.57 )-----------( 208      ( 03 Sep 2022 06:03 )             27.6     09-03    139  |  97<L>  |  71<HH>  ----------------------------<  57<L>  3.8   |  30  |  1.1    Ca    8.7      03 Sep 2022 06:03  Phos  3.1     09-03  Mg     2.2     09-03    TPro  5.8<L>  /  Alb  3.0<L>  /  TBili  0.9  /  DBili  x   /  AST  14  /  ALT  7   /  AlkPhos  83  09-03        PT/INR - ( 02 Sep 2022 06:05 )   PT: 16.60 sec;   INR: 1.45 ratio           Lactate Trend        CAPILLARY BLOOD GLUCOSE      POCT Blood Glucose.: 93 mg/dL (03 Sep 2022 11:42)      Culture - Stool (collected 09-01-22 @ 07:05)  Source: .Stool Feces  Final Report (09-03-22 @ 07:33):    No enteric pathogens isolated.    (Stool culture examined for Salmonella,    Shigella, Campylobacter, Aeromonas, Plesiomonas,    Vibrio, E.coli O157 and Yersinia)        RADIOLOGY & ADDITIONAL TESTS:    Imaging Personally Reviewed:  [ ] YES  [ ] NO    HEALTH ISSUES - PROBLEM Dx:          PHYSICAL EXAM:  GENERAL: mild respiratory distress, well-developed.  HEAD:  Atraumatic, Normocephalic.  EYES: EOMI, PERRLA, conjunctiva and sclera clear.  NECK: Supple, No JVD.  CHEST/LUNG:  decrease breath sound on right lower lung, left lung rales.   HEART: irregular rate and rhythm; S1 S2.   ABDOMEN: Soft, Nontender, Nondistended; Bowel sounds present.  EXTREMITIES:  2+ Peripheral Pulses, No clubbing, leg edema 1+  PSYCH: AAOx3.  NEUROLOGY: non-focal.  SKIN: sacral ulcer stage 2.

## 2022-09-04 NOTE — PROGRESS NOTE ADULT - SUBJECTIVE AND OBJECTIVE BOX
ANDRIA TAVAREZ  82y  Male      Patient is a 82y old  Male who presents with a chief complaint of chf exacerbation (04 Sep 2022 09:45)      INTERVAL HPI/OVERNIGHT EVENTS:  He is still with SOB   Vital Signs Last 24 Hrs  T(C): 36.4 (04 Sep 2022 12:55), Max: 36.4 (03 Sep 2022 20:24)  T(F): 97.5 (04 Sep 2022 12:55), Max: 97.5 (03 Sep 2022 20:24)  HR: 75 (04 Sep 2022 12:55) (75 - 82)  BP: 132/63 (04 Sep 2022 12:55) (110/60 - 132/63)  BP(mean): --  RR: 18 (04 Sep 2022 12:55) (18 - 19)  SpO2: --          09-03-22 @ 07:01  -  09-04-22 @ 07:00  --------------------------------------------------------  IN: 195 mL / OUT: 650 mL / NET: -455 mL    09-04-22 @ 07:01  -  09-04-22 @ 14:27  --------------------------------------------------------  IN: 50 mL / OUT: 200 mL / NET: -150 mL            Consultant(s) Notes Reviewed:  [x ] YES  [ ] NO          MEDICATIONS  (STANDING):  albuterol/ipratropium for Nebulization 3 milliLiter(s) Nebulizer every 6 hours  apixaban 2.5 milliGRAM(s) Oral two times a day  atorvastatin 40 milliGRAM(s) Oral at bedtime  BACItracin   Ointment 1 Application(s) Topical two times a day  buMETAnide Infusion 1 mG/Hr (5 mL/Hr) IV Continuous <Continuous>  chlorhexidine 2% Cloths 1 Application(s) Topical <User Schedule>  dextrose 5%. 1000 milliLiter(s) (100 mL/Hr) IV Continuous <Continuous>  dextrose 5%. 1000 milliLiter(s) (50 mL/Hr) IV Continuous <Continuous>  dextrose 50% Injectable 25 Gram(s) IV Push once  dextrose 50% Injectable 12.5 Gram(s) IV Push once  dextrose 50% Injectable 25 Gram(s) IV Push once  famotidine    Tablet 20 milliGRAM(s) Oral daily  glucagon  Injectable 1 milliGRAM(s) IntraMuscular once  insulin glargine Injectable (LANTUS) 22 Unit(s) SubCutaneous at bedtime  insulin lispro (ADMELOG) corrective regimen sliding scale   SubCutaneous three times a day before meals  insulin lispro Injectable (ADMELOG) 7 Unit(s) SubCutaneous three times a day before meals  iron sucrose IVPB 200 milliGRAM(s) IV Intermittent every 24 hours  isosorbide   mononitrate ER Tablet (IMDUR) 30 milliGRAM(s) Oral daily  levothyroxine 150 MICROGram(s) Oral daily  lidocaine 1% (Preservative-free) Injectable 30 milliLiter(s) Local Injection once  melatonin 5 milliGRAM(s) Oral at bedtime  metoprolol succinate  milliGRAM(s) Oral daily  multivitamin/minerals 1 Tablet(s) Oral daily  mycophenolate mofetil 500 milliGRAM(s) Oral four times a day  NIFEdipine XL 30 milliGRAM(s) Oral daily  pentoxifylline 400 milliGRAM(s) Oral two times a day  potassium chloride    Tablet ER 20 milliEquivalent(s) Oral once  predniSONE   Tablet 5 milliGRAM(s) Oral daily  sodium chloride 3%. 150 milliLiter(s) (50 mL/Hr) IV Continuous <Continuous>  sodium chloride 3%. 150 milliLiter(s) (50 mL/Hr) IV Continuous <Continuous>  sodium chloride 3%. 150 milliLiter(s) (50 mL/Hr) IV Continuous <Continuous>  sodium chloride 3%. 150 milliLiter(s) (50 mL/Hr) IV Continuous <Continuous>  sodium chloride 3%. 150 milliLiter(s) (50 mL/Hr) IV Continuous <Continuous>  sodium chloride 3%. 150 milliLiter(s) (50 mL/Hr) IV Continuous <Continuous>  sodium chloride 3%. 150 milliLiter(s) (50 mL/Hr) IV Continuous <Continuous>  sodium chloride 3%. 150 milliLiter(s) (50 mL/Hr) IV Continuous <Continuous>  sodium chloride 3%. 150 milliLiter(s) (50 mL/Hr) IV Continuous <Continuous>  sodium chloride 3%. 150 milliLiter(s) (50 mL/Hr) IV Continuous <Continuous>  sodium chloride 3%. 150 milliLiter(s) (50 mL/Hr) IV Continuous <Continuous>  sodium chloride 3%. 150 milliLiter(s) (50 mL/Hr) IV Continuous <Continuous>  sodium chloride 3%. 150 milliLiter(s) (50 mL/Hr) IV Continuous <Continuous>  sodium chloride 3%. 150 milliLiter(s) (50 mL/Hr) IV Continuous <Continuous>  tacrolimus 0.5 milliGRAM(s) Oral two times a day    MEDICATIONS  (PRN):  acetaminophen     Tablet .. 650 milliGRAM(s) Oral every 6 hours PRN Mild Pain (1 - 3)  dextrose Oral Gel 15 Gram(s) Oral once PRN Blood Glucose LESS THAN 70 milliGRAM(s)/deciliter  dextrose Oral Gel 15 Gram(s) Oral once PRN Blood Glucose LESS THAN 70 milliGRAM(s)/deciliter      LABS                          8.3    14.02 )-----------( 226      ( 04 Sep 2022 08:35 )             25.8     09-04    137  |  95<L>  |  80<HH>  ----------------------------<  27<LL>  3.8   |  27  |  1.3    Ca    8.5      04 Sep 2022 08:35  Phos  3.5     09-04  Mg     2.2     09-04    TPro  6.0  /  Alb  3.1<L>  /  TBili  0.8  /  DBili  x   /  AST  17  /  ALT  8   /  AlkPhos  89  09-04          Lactate Trend        CAPILLARY BLOOD GLUCOSE      POCT Blood Glucose.: 43 mg/dL (04 Sep 2022 07:44)      Culture - Stool (collected 09-01-22 @ 07:05)  Source: .Stool Feces  Final Report (09-03-22 @ 07:33):    No enteric pathogens isolated.    (Stool culture examined for Salmonella,    Shigella, Campylobacter, Aeromonas, Plesiomonas,    Vibrio, E.coli O157 and Yersinia)        RADIOLOGY & ADDITIONAL TESTS:    Imaging Personally Reviewed:  [ ] YES  [ ] NO    HEALTH ISSUES - PROBLEM Dx:          PHYSICAL EXAM:  GENERAL: mild respiratory distress, well-developed.  HEAD:  Atraumatic, Normocephalic.  EYES: EOMI, PERRLA, conjunctiva and sclera clear.  NECK: Supple, No JVD.  CHEST/LUNG:  decrease breath sound on right lower lung, left lung rales.   HEART: irregular rate and rhythm; S1 S2.   ABDOMEN: Soft, Nontender, Nondistended; Bowel sounds present.  EXTREMITIES:  2+ Peripheral Pulses, No clubbing, leg edema 1+  PSYCH: AAOx3.  NEUROLOGY: non-focal.  SKIN: sacral ulcer stage 2.

## 2022-09-04 NOTE — PROGRESS NOTE ADULT - ASSESSMENT
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     SOB/ AHRF  Sepsis criteria met on admission but pt remains stable off abx and procal low, could be SIRS criteria due to aspiration pneumonitis  - bibasilar opacities remain on CXR from today  - likely due to aspiration pneumonia and acute on chronic HFrEF  - c/w Bumex/hypertonic saline--HF team recommended to d/c bumex tonight but pt remains overloaded therefore will continue with Bumex and reassess tomorrow  - pt has large urine output  - speech and swallow appreciated  - pt stable off abx  - Echo 08/01: 45-50%EF  - CXR shows improved b/l pleural effusions with pulmonary congestion and opacities but awaiting final read  - proBNP > 50K  - thoracentesis done 9/27--transudative effusion  - continue with bumex drip and hypertonic saline  -thora 8/26 1.5 L removed  - chest x-ray 8/29 fluid reaccumulation   - HF rec: bumex drip  - follow up with pulm   - patient can be switched to nasal cannula 5L--> desat to 87%, placed back on aerosol mask 30% FiO2  - patient switched to high flow nasal cannula 50% FiO2 due to desat and poor po intake on aerosol mask, patient's shortness of breath improved  -will wean O2 as tolerated    Epistaxis  - resolved  - ENT follow up appreciated, packing removed    Leukocytosis  - was uptrending likely from steroids    C3 Glomerulonephropathy  - c/w tacrolimus (level 9.5), cellcept    CAD  - c/w statin, metoprolol, imdur    HTN  - c/w metoprolol, nifedipine    Hypothyroidism  - c/w synthroid    DM II  - FS uncontrolled  - adjust insulin    Stage 2 right buttock pressure ulcer  - present on admission  - c/w local wound care    Acute on chronic anemia  - s/p 1 U PRBC    Chronic afib  - c/w nifedipine, metoprolol    COPD  - c/w inhlaers    DVT px  from home    #Progress Note Handoff:  Pending (specify):  Diuresis  Family discussion: son at bedside, continue with O2 and diuresis per plan  Disposition: subacute rehab as per PT

## 2022-09-04 NOTE — PROGRESS NOTE ADULT - SUBJECTIVE AND OBJECTIVE BOX
SUBJECTIVE:  Patient is a 82y old Male who presents with a chief complaint of chf exacerbation (03 Sep 2022 14:08)   Fluid overload     Today is hospital day 15d. There are no new issues or overnight events. This morning patient's blood glucose in the 40's, he was asymptomatic and felt well, given juice, blood sugar increased to 90's.  Patient switched over to high flow nasal cannula as he is having poor po intake on the aerosol mask and is desatting to low 80's each time he takes off mask to eat.  Patient     HPI  HPI:  83 yo male with PMHx of HFrEF (45-50% on Lasix 20 PO), A-Fib on Eliquis, CAD s/p CABGx3, PVD, COPD (was on rare home O2 PRN now on 3L NC since recent July admission for COVID), CKD3, C3 glomerulonephropathy, HTN, HLD, Hypothyroid presents from NH with progressively worsening SOB x5 days and hypoxia.  Recently hospitalized here for a month discharged on 8/3/22 initially for BRENDA on CKD course c/b COVID, bilateral lower lobe pneumonia, delirium and microaspiration of thin liquids on modified barium study. Received Dexamethasone and Zosyn course with new O2 requirements of 3L NC discharged to nursing home on 3L and thick liquid diet for rehab. Due to his SOB, chest x-ray was ordered on 8/15 but results did not come back and patient became increasingly short of breath and was noted to be hypoxic and rehab facility which prompted referral to the ED. Review of symptoms is notable for orthopnea and a chronic dry cough unchanged since his covid illness. He denies paroxysmal nocturnal dyspnea, leg edema, chest pain, palpitations, dizziness, n/v, abd pain. Cardiologist is Dr. Witt and Pulmonolgist is Dr. Rory Mars.     In ED:  T(F): 96.4 (08-20-22 @ 11:27), Max: 96.4 (08-20-22 @ 11:27)  HR: 80 (08-20-22 @ 16:20) (80 - 91)  BP: 137/61 (08-20-22 @ 16:20) (121/58 - 137/61)  RR: 18 (08-20-22 @ 16:20) (18 - 24)  SpO2: 100% (08-20-22 @ 16:20) (89% - 100%) -> 89% on 4L -> placed on NRB -> BIPAP 2/2 tachypnea and hypoxia -> taken off with increased work of breathing placed back on BIPAP  Labs: WBC 12k, Hgb 7.2 (baseline 9), BNP 55k, Cr 2.2 (baseline 1.5), PCO2 47 (VBG), Troponin 0.24  EKG:   Atrial fibrillation with premature ventricular or aberrantly conducted complexes  Left posterior fascicular block   ST & T wave abnormality, consider lateral ischemia - similar to prior in July 2022  XR chest: significant bilateral pleural effusions and pulmonary congestion  Admitted to medicine for SOB/hypoxia likely 2/2 acute CHF exacerbation.  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    AF (atrial fibrillation)  s/p DCCV    Carotid stenosis    COPD (chronic obstructive pulmonary disease)    H/O coronary artery bypass surgery  2001    S/P inguinal hernia repair    History of appendectomy      ALLERGIES:  Ozempic (0.25 mg or 0.5 mg dose) (Hives; Rash)  streptomycin (Unknown)  Trulicity Pen (Hives; Rash)    MEDICATIONS:  HOME MEDICATIONS  Aranesp 100 mcg/0.5 mL injectable solution: 1 dose(s) injectable every 3 to 4 weeks  atorvastatin 40 mg oral tablet: 1 tab(s) orally once a day (at bedtime)  calcitriol 0.25 mcg oral capsule: 1 cap(s) orally 2 times a week  d-mycophenolate: 500 milligram(s) orally 4 times a day  Eliquis 2.5 mg oral tablet: 1 tab(s) orally 2 times a day  famotidine 20 mg oral tablet: 1 tab(s) orally once a day  furosemide 20 mg oral tablet: 1 tab(s) orally once a day  insulin glargine 100 units/mL subcutaneous solution: 5 unit(s) subcutaneous once a day (at bedtime)  insulin lispro 100 units/mL injectable solution: 1 Unit(s) if Glucose 151 - 200  2 Unit(s) if Glucose 201 - 250  3 Unit(s) if Glucose 251 - 300  4 Unit(s) if Glucose 301 - 350  5 Unit(s) if Glucose 351 - 400  6 Unit(s) if Glucose Greater Than 400  isosorbide mononitrate 30 mg oral tablet, extended release: 1 tab(s) orally once a day (in the morning)  levothyroxine 150 mcg (0.15 mg) oral tablet: 1 tab(s) orally once a day  metoprolol succinate 50 mg oral tablet, extended release: 1 tab(s) orally once a day  Multiple Vitamins with Minerals oral tablet: 1 tab(s) orally once a day  NIFEdipine 30 mg oral tablet, extended release: 1 tab(s) orally once a day  pentoxifylline 400 mg oral tablet, extended release: 1 tab(s) orally 2 times a day  sodium bicarbonate 650 mg oral tablet: 1 tab(s) orally 3 times a day  tacrolimus 0.5 mg oral capsule: 1 cap(s) orally 2 times a day    STANDING MEDICATIONS  albuterol/ipratropium for Nebulization 3 milliLiter(s) Nebulizer every 6 hours  apixaban 2.5 milliGRAM(s) Oral two times a day  atorvastatin 40 milliGRAM(s) Oral at bedtime  BACItracin   Ointment 1 Application(s) Topical two times a day  buMETAnide Infusion 1 mG/Hr IV Continuous <Continuous>  chlorhexidine 2% Cloths 1 Application(s) Topical <User Schedule>  dextrose 5%. 1000 milliLiter(s) IV Continuous <Continuous>  dextrose 5%. 1000 milliLiter(s) IV Continuous <Continuous>  dextrose 50% Injectable 25 Gram(s) IV Push once  dextrose 50% Injectable 12.5 Gram(s) IV Push once  dextrose 50% Injectable 25 Gram(s) IV Push once  famotidine    Tablet 20 milliGRAM(s) Oral daily  glucagon  Injectable 1 milliGRAM(s) IntraMuscular once  insulin glargine Injectable (LANTUS) 22 Unit(s) SubCutaneous at bedtime  insulin lispro (ADMELOG) corrective regimen sliding scale   SubCutaneous three times a day before meals  insulin lispro Injectable (ADMELOG) 7 Unit(s) SubCutaneous three times a day before meals  iron sucrose IVPB 200 milliGRAM(s) IV Intermittent every 24 hours  isosorbide   mononitrate ER Tablet (IMDUR) 30 milliGRAM(s) Oral daily  levothyroxine 150 MICROGram(s) Oral daily  lidocaine 1% (Preservative-free) Injectable 30 milliLiter(s) Local Injection once  melatonin 5 milliGRAM(s) Oral at bedtime  metoprolol succinate  milliGRAM(s) Oral daily  multivitamin/minerals 1 Tablet(s) Oral daily  mycophenolate mofetil 500 milliGRAM(s) Oral four times a day  NIFEdipine XL 30 milliGRAM(s) Oral daily  pentoxifylline 400 milliGRAM(s) Oral two times a day  predniSONE   Tablet 5 milliGRAM(s) Oral daily  sodium chloride 3%. 150 milliLiter(s) IV Continuous <Continuous>  sodium chloride 3%. 150 milliLiter(s) IV Continuous <Continuous>  sodium chloride 3%. 150 milliLiter(s) IV Continuous <Continuous>  sodium chloride 3%. 150 milliLiter(s) IV Continuous <Continuous>  sodium chloride 3%. 150 milliLiter(s) IV Continuous <Continuous>  sodium chloride 3%. 150 milliLiter(s) IV Continuous <Continuous>  sodium chloride 3%. 150 milliLiter(s) IV Continuous <Continuous>  sodium chloride 3%. 150 milliLiter(s) IV Continuous <Continuous>  sodium chloride 3%. 150 milliLiter(s) IV Continuous <Continuous>  sodium chloride 3%. 150 milliLiter(s) IV Continuous <Continuous>  sodium chloride 3%. 150 milliLiter(s) IV Continuous <Continuous>  sodium chloride 3%. 150 milliLiter(s) IV Continuous <Continuous>  sodium chloride 3%. 150 milliLiter(s) IV Continuous <Continuous>  sodium chloride 3%. 150 milliLiter(s) IV Continuous <Continuous>  tacrolimus 0.5 milliGRAM(s) Oral two times a day    PRN MEDICATIONS  acetaminophen     Tablet .. 650 milliGRAM(s) Oral every 6 hours PRN  dextrose Oral Gel 15 Gram(s) Oral once PRN  dextrose Oral Gel 15 Gram(s) Oral once PRN    VITALS:   T(C): 36.2 (09-04-22 @ 04:47), Max: 36.4 (09-03-22 @ 20:24)  T(F): 97.2 (09-04-22 @ 04:47), Max: 97.5 (09-03-22 @ 20:24)  HR: 80 (09-04-22 @ 04:47) (80 - 82)  BP: 117/56 (09-04-22 @ 04:47) (110/60 - 125/60)  BP(mean): --  ABP: --  ABP(mean): --  RR: 19 (09-04-22 @ 04:47) (18 - 19)  SpO2: 95% (09-03-22 @ 13:04) (95% - 95%)  LABS:                        8.3    14.02 )-----------( 226      ( 04 Sep 2022 08:35 )             25.8     09-03    139  |  97<L>  |  75<HH>  ----------------------------<  113<H>  4.2   |  28  |  1.2    Ca    8.2<L>      03 Sep 2022 16:27  Phos  3.1     09-03  Mg     2.0     09-03    TPro  5.8<L>  /  Alb  3.0<L>  /  TBili  0.9  /  DBili  x   /  AST  14  /  ALT  7   /  AlkPhos  83  09-03        I&O's Detail    03 Sep 2022 07:01  -  04 Sep 2022 07:00  --------------------------------------------------------  IN:    Bumetanide: 95 mL    sodium chloride 3%: 100 mL  Total IN: 195 mL    OUT:    Indwelling Catheter - Urethral (mL): 650 mL  Total OUT: 650 mL    Total NET: -455 mL      04 Sep 2022 07:01  -  04 Sep 2022 09:45  --------------------------------------------------------  IN:    sodium chloride 3%: 50 mL  Total IN: 50 mL    OUT:  Total OUT: 0 mL    Total NET: 50 mL                    RADIOLOGY:  EKG  12 Lead ECG:   Ventricular Rate 71 BPM    Atrial Rate 82 BPM    QRS Duration 140 ms    Q-T Interval 444 ms    QTC Calculation(Bazett) 482 ms    R Axis 108 degrees    T Axis 166 degrees    Diagnosis Line Atrial fibrillation with premature ventricular or aberrantlyconducted  complexes  Non-specific intra-ventricular conduction block  ST & T wave abnormality, consider lateral ischemia  Abnormal ECG    Confirmed by Sarah Borden MD [id number]) on 8/21/2022 1:35:39 PM (08-21-22 @ 07:14)  12 Lead ECG:   Ventricular Rate 91 BPM    QRS Duration 110 ms    Q-T Interval 378 ms    QTC Calculation(Bazett) 464 ms    R Axis 124 degrees    T Axis 176 degrees    Diagnosis Line Atrial fibrillation with premature ventricular or aberrantly conducted  complexes  Left posterior fascicular block  ST & T wave abnormality, consider lateral ischemia  Abnormal ECG    Confirmed by Sarah Borden MD [id number]) on 8/20/2022 3:10:13 PM (08-20-22 @ 11:29)    Xray Chest 1 View- PORTABLE-Routine:   ACC: [id number] EXAM:  XR CHEST PORTABLE ROUTINE 1V                          PROCEDURE DATE:  09/03/2022          INTERPRETATION:  CLINICAL HISTORY / REASON FOR EXAM: Shortness of breath.    COMPARISON: Chest radiograph from September 2, 2022.    TECHNIQUE/POSITIONING: Low lung volumes. Single image, AP portable chest   radiograph.    FINDINGS:    SUPPORT DEVICES: None.    CARDIAC/MEDIASTINUM/HILUM: Post sternotomy.    LUNG PARENCHYMA/PLEURA: Unchanged bilateral lower lobe opacities and   effusions. No pneumothorax.    SKELETON/SOFT TISSUES: Unchanged.      IMPRESSION:    Unchanged bilateral lower lobe opacities and effusions.    --- End of Report ---            HÉCTOR MINOR MD; Attending Radiologist  This document has been electronically signed. Sep  3 2022  4:40PM (09-03-22 @ 08:22)  Xray Chest 1 View- PORTABLE-Routine:   ACC: [id number] EXAM:  XR CHEST PORTABLE ROUTINE 1V                          PROCEDURE DATE:  09/02/2022          INTERPRETATION:  Clinical History / Reason for exam: Shortness of breath    Comparison : Chest radiograph 8/31/2022.    Technique/Positioning: Frontal chest radiograph.    Findings:    Support devices: Overlying telemetry leads.    Cardiac/mediastinum/hilum: Stable    Lung parenchyma/Pleura: Stable bilateral opacifications.    Skeleton/soft tissues: Stable    Impression:    Stable bilateral opacifications.    --- End of Report ---          ELLEN HANSON MD; Resident Radiologist  This document has been electronically signed.  ZAFAR TRAN MD; Attending Interventional Radiologist  This document has been electronically signed. Sep  2 2022 11:15AM (09-02-22 @ 06:15)  Xray Chest 1 View- PORTABLE-Routine:   ACC: [id number] EXAM:  XR CHEST PORTABLE ROUTINE 1V                          PROCEDURE DATE:  08/31/2022          INTERPRETATION:  Clinical History / Reason for exam: Shortness of breath    Comparison : Chest radiograph 2 days prior.    Technique/Positioning: Frontal portable, low lung volumes.    Findings:    Support devices: Telemetry leads are seen    Cardiac/mediastinum/hilum: Heart is enlarged. The patient is status post   median sternotomy.    Lung parenchyma/Pleura: Bilateral opacifications    Skeleton/soft tissues: Unremarkable.    Impression:    Cardiomegaly with bilateral opacifications. Follow-up as needed.        --- End of Report ---            ZAFAR TRAN MD; Attending Interventional Radiologist  This document has been electronically signed. Aug 31 2022  7:31AM (08-31-22 @ 04:54)  Xray Chest 1 View- PORTABLE-Routine:   ACC: [id number] EXAM:  XR CHEST PORTABLE ROUTINE 1V                          PROCEDURE DATE:  08/29/2022          INTERPRETATION:  Clinical History / Reason for exam: Shortness of breath    Comparison : Chest radiograph 3 days prior.    Technique/Positioning: Frontal portable, low lung volumes.    Findings:    Support devices: Telemetry leads overlie the thorax    Cardiac/mediastinum/hilum: Patient is status post median sternotomy    Lung parenchyma/Pleura: Bilateral opacities    Skeleton/soft tissues: Unremarkable.    Impression:    Bilateral opacifications worsening.        --- End of Report ---            ZAFAR TRAN MD; Attending Interventional Radiologist  This document has been electronically signed. Aug 29 2022  8:19AM (08-29-22 @ 06:31)    Physical Exam:  General: no acute distress, lying in bed  Head: normocephalic and atraumatic  Neck: supple  Heart: regular rate and rhythm, S1 and S2 normal, no murmurs, rubs or gallops noted on exam  Lungs: Symmetric chest expansion bilaterally, clear lungs bilaterally, no wheezes rhonchi or crackles heard  Abdomen: Bowel sounds present, non tender on light and deep palpation  Extremities: No edema, no clubbing or cyanosis notes, positive peripheral pulses SUBJECTIVE:  Patient is a 82y old Male who presents with a chief complaint of chf exacerbation (03 Sep 2022 14:08)   Fluid overload     Today is hospital day 15d. There are no new issues or overnight events. This morning patient's blood glucose in the 40's, he was asymptomatic and felt well, given juice, blood sugar increased to 90's.  Patient switched over to high flow nasal cannula as he is having poor po intake on the aerosol mask and is desatting to low 80's each time he takes off mask to eat.  Patient states his breathing is better on high flow nasal cannula and feels well.      HPI  HPI:  81 yo male with PMHx of HFrEF (45-50% on Lasix 20 PO), A-Fib on Eliquis, CAD s/p CABGx3, PVD, COPD (was on rare home O2 PRN now on 3L NC since recent July admission for COVID), CKD3, C3 glomerulonephropathy, HTN, HLD, Hypothyroid presents from NH with progressively worsening SOB x5 days and hypoxia.  Recently hospitalized here for a month discharged on 8/3/22 initially for BRENDA on CKD course c/b COVID, bilateral lower lobe pneumonia, delirium and microaspiration of thin liquids on modified barium study. Received Dexamethasone and Zosyn course with new O2 requirements of 3L NC discharged to nursing home on 3L and thick liquid diet for rehab. Due to his SOB, chest x-ray was ordered on 8/15 but results did not come back and patient became increasingly short of breath and was noted to be hypoxic and rehab facility which prompted referral to the ED. Review of symptoms is notable for orthopnea and a chronic dry cough unchanged since his covid illness. He denies paroxysmal nocturnal dyspnea, leg edema, chest pain, palpitations, dizziness, n/v, abd pain. Cardiologist is Dr. Witt and Pulmonolgist is Dr. Rory Mars.     In ED:  T(F): 96.4 (08-20-22 @ 11:27), Max: 96.4 (08-20-22 @ 11:27)  HR: 80 (08-20-22 @ 16:20) (80 - 91)  BP: 137/61 (08-20-22 @ 16:20) (121/58 - 137/61)  RR: 18 (08-20-22 @ 16:20) (18 - 24)  SpO2: 100% (08-20-22 @ 16:20) (89% - 100%) -> 89% on 4L -> placed on NRB -> BIPAP 2/2 tachypnea and hypoxia -> taken off with increased work of breathing placed back on BIPAP  Labs: WBC 12k, Hgb 7.2 (baseline 9), BNP 55k, Cr 2.2 (baseline 1.5), PCO2 47 (VBG), Troponin 0.24  EKG:   Atrial fibrillation with premature ventricular or aberrantly conducted complexes  Left posterior fascicular block   ST & T wave abnormality, consider lateral ischemia - similar to prior in July 2022  XR chest: significant bilateral pleural effusions and pulmonary congestion  Admitted to medicine for SOB/hypoxia likely 2/2 acute CHF exacerbation.  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    AF (atrial fibrillation)  s/p DCCV    Carotid stenosis    COPD (chronic obstructive pulmonary disease)    H/O coronary artery bypass surgery  2001    S/P inguinal hernia repair    History of appendectomy      ALLERGIES:  Ozempic (0.25 mg or 0.5 mg dose) (Hives; Rash)  streptomycin (Unknown)  Trulicity Pen (Hives; Rash)    MEDICATIONS:  HOME MEDICATIONS  Aranesp 100 mcg/0.5 mL injectable solution: 1 dose(s) injectable every 3 to 4 weeks  atorvastatin 40 mg oral tablet: 1 tab(s) orally once a day (at bedtime)  calcitriol 0.25 mcg oral capsule: 1 cap(s) orally 2 times a week  d-mycophenolate: 500 milligram(s) orally 4 times a day  Eliquis 2.5 mg oral tablet: 1 tab(s) orally 2 times a day  famotidine 20 mg oral tablet: 1 tab(s) orally once a day  furosemide 20 mg oral tablet: 1 tab(s) orally once a day  insulin glargine 100 units/mL subcutaneous solution: 5 unit(s) subcutaneous once a day (at bedtime)  insulin lispro 100 units/mL injectable solution: 1 Unit(s) if Glucose 151 - 200  2 Unit(s) if Glucose 201 - 250  3 Unit(s) if Glucose 251 - 300  4 Unit(s) if Glucose 301 - 350  5 Unit(s) if Glucose 351 - 400  6 Unit(s) if Glucose Greater Than 400  isosorbide mononitrate 30 mg oral tablet, extended release: 1 tab(s) orally once a day (in the morning)  levothyroxine 150 mcg (0.15 mg) oral tablet: 1 tab(s) orally once a day  metoprolol succinate 50 mg oral tablet, extended release: 1 tab(s) orally once a day  Multiple Vitamins with Minerals oral tablet: 1 tab(s) orally once a day  NIFEdipine 30 mg oral tablet, extended release: 1 tab(s) orally once a day  pentoxifylline 400 mg oral tablet, extended release: 1 tab(s) orally 2 times a day  sodium bicarbonate 650 mg oral tablet: 1 tab(s) orally 3 times a day  tacrolimus 0.5 mg oral capsule: 1 cap(s) orally 2 times a day    STANDING MEDICATIONS  albuterol/ipratropium for Nebulization 3 milliLiter(s) Nebulizer every 6 hours  apixaban 2.5 milliGRAM(s) Oral two times a day  atorvastatin 40 milliGRAM(s) Oral at bedtime  BACItracin   Ointment 1 Application(s) Topical two times a day  buMETAnide Infusion 1 mG/Hr IV Continuous <Continuous>  chlorhexidine 2% Cloths 1 Application(s) Topical <User Schedule>  dextrose 5%. 1000 milliLiter(s) IV Continuous <Continuous>  dextrose 5%. 1000 milliLiter(s) IV Continuous <Continuous>  dextrose 50% Injectable 25 Gram(s) IV Push once  dextrose 50% Injectable 12.5 Gram(s) IV Push once  dextrose 50% Injectable 25 Gram(s) IV Push once  famotidine    Tablet 20 milliGRAM(s) Oral daily  glucagon  Injectable 1 milliGRAM(s) IntraMuscular once  insulin glargine Injectable (LANTUS) 22 Unit(s) SubCutaneous at bedtime  insulin lispro (ADMELOG) corrective regimen sliding scale   SubCutaneous three times a day before meals  insulin lispro Injectable (ADMELOG) 7 Unit(s) SubCutaneous three times a day before meals  iron sucrose IVPB 200 milliGRAM(s) IV Intermittent every 24 hours  isosorbide   mononitrate ER Tablet (IMDUR) 30 milliGRAM(s) Oral daily  levothyroxine 150 MICROGram(s) Oral daily  lidocaine 1% (Preservative-free) Injectable 30 milliLiter(s) Local Injection once  melatonin 5 milliGRAM(s) Oral at bedtime  metoprolol succinate  milliGRAM(s) Oral daily  multivitamin/minerals 1 Tablet(s) Oral daily  mycophenolate mofetil 500 milliGRAM(s) Oral four times a day  NIFEdipine XL 30 milliGRAM(s) Oral daily  pentoxifylline 400 milliGRAM(s) Oral two times a day  predniSONE   Tablet 5 milliGRAM(s) Oral daily  sodium chloride 3%. 150 milliLiter(s) IV Continuous <Continuous>  sodium chloride 3%. 150 milliLiter(s) IV Continuous <Continuous>  sodium chloride 3%. 150 milliLiter(s) IV Continuous <Continuous>  sodium chloride 3%. 150 milliLiter(s) IV Continuous <Continuous>  sodium chloride 3%. 150 milliLiter(s) IV Continuous <Continuous>  sodium chloride 3%. 150 milliLiter(s) IV Continuous <Continuous>  sodium chloride 3%. 150 milliLiter(s) IV Continuous <Continuous>  sodium chloride 3%. 150 milliLiter(s) IV Continuous <Continuous>  sodium chloride 3%. 150 milliLiter(s) IV Continuous <Continuous>  sodium chloride 3%. 150 milliLiter(s) IV Continuous <Continuous>  sodium chloride 3%. 150 milliLiter(s) IV Continuous <Continuous>  sodium chloride 3%. 150 milliLiter(s) IV Continuous <Continuous>  sodium chloride 3%. 150 milliLiter(s) IV Continuous <Continuous>  sodium chloride 3%. 150 milliLiter(s) IV Continuous <Continuous>  tacrolimus 0.5 milliGRAM(s) Oral two times a day    PRN MEDICATIONS  acetaminophen     Tablet .. 650 milliGRAM(s) Oral every 6 hours PRN  dextrose Oral Gel 15 Gram(s) Oral once PRN  dextrose Oral Gel 15 Gram(s) Oral once PRN    VITALS:   T(C): 36.2 (09-04-22 @ 04:47), Max: 36.4 (09-03-22 @ 20:24)  T(F): 97.2 (09-04-22 @ 04:47), Max: 97.5 (09-03-22 @ 20:24)  HR: 80 (09-04-22 @ 04:47) (80 - 82)  BP: 117/56 (09-04-22 @ 04:47) (110/60 - 125/60)  BP(mean): --  ABP: --  ABP(mean): --  RR: 19 (09-04-22 @ 04:47) (18 - 19)  SpO2: 95% (09-03-22 @ 13:04) (95% - 95%)  LABS:                        8.3    14.02 )-----------( 226      ( 04 Sep 2022 08:35 )             25.8     09-03    139  |  97<L>  |  75<HH>  ----------------------------<  113<H>  4.2   |  28  |  1.2    Ca    8.2<L>      03 Sep 2022 16:27  Phos  3.1     09-03  Mg     2.0     09-03    TPro  5.8<L>  /  Alb  3.0<L>  /  TBili  0.9  /  DBili  x   /  AST  14  /  ALT  7   /  AlkPhos  83  09-03        I&O's Detail    03 Sep 2022 07:01  -  04 Sep 2022 07:00  --------------------------------------------------------  IN:    Bumetanide: 95 mL    sodium chloride 3%: 100 mL  Total IN: 195 mL    OUT:    Indwelling Catheter - Urethral (mL): 650 mL  Total OUT: 650 mL    Total NET: -455 mL      04 Sep 2022 07:01  -  04 Sep 2022 09:45  --------------------------------------------------------  IN:    sodium chloride 3%: 50 mL  Total IN: 50 mL    OUT:  Total OUT: 0 mL    Total NET: 50 mL                    RADIOLOGY:  EKG  12 Lead ECG:   Ventricular Rate 71 BPM    Atrial Rate 82 BPM    QRS Duration 140 ms    Q-T Interval 444 ms    QTC Calculation(Bazealisa) 482 ms    R Axis 108 degrees    T Axis 166 degrees    Diagnosis Line Atrial fibrillation with premature ventricular or aberrantlyconducted  complexes  Non-specific intra-ventricular conduction block  ST & T wave abnormality, consider lateral ischemia  Abnormal ECG    Confirmed by Sarah Borden MD [id number]) on 8/21/2022 1:35:39 PM (08-21-22 @ 07:14)  12 Lead ECG:   Ventricular Rate 91 BPM    QRS Duration 110 ms    Q-T Interval 378 ms    QTC Calculation(Bazett) 464 ms    R Axis 124 degrees    T Axis 176 degrees    Diagnosis Line Atrial fibrillation with premature ventricular or aberrantly conducted  complexes  Left posterior fascicular block  ST & T wave abnormality, consider lateral ischemia  Abnormal ECG    Confirmed by Sarah Borden MD [id number]) on 8/20/2022 3:10:13 PM (08-20-22 @ 11:29)    Xray Chest 1 View- PORTABLE-Routine:   ACC: [id number] EXAM:  XR CHEST PORTABLE ROUTINE 1V                          PROCEDURE DATE:  09/03/2022          INTERPRETATION:  CLINICAL HISTORY / REASON FOR EXAM: Shortness of breath.    COMPARISON: Chest radiograph from September 2, 2022.    TECHNIQUE/POSITIONING: Low lung volumes. Single image, AP portable chest   radiograph.    FINDINGS:    SUPPORT DEVICES: None.    CARDIAC/MEDIASTINUM/HILUM: Post sternotomy.    LUNG PARENCHYMA/PLEURA: Unchanged bilateral lower lobe opacities and   effusions. No pneumothorax.    SKELETON/SOFT TISSUES: Unchanged.      IMPRESSION:    Unchanged bilateral lower lobe opacities and effusions.    --- End of Report ---            HÉCTOR MINOR MD; Attending Radiologist  This document has been electronically signed. Sep  3 2022  4:40PM (09-03-22 @ 08:22)  Xray Chest 1 View- PORTABLE-Routine:   ACC: [id number] EXAM:  XR CHEST PORTABLE ROUTINE 1V                          PROCEDURE DATE:  09/02/2022          INTERPRETATION:  Clinical History / Reason for exam: Shortness of breath    Comparison : Chest radiograph 8/31/2022.    Technique/Positioning: Frontal chest radiograph.    Findings:    Support devices: Overlying telemetry leads.    Cardiac/mediastinum/hilum: Stable    Lung parenchyma/Pleura: Stable bilateral opacifications.    Skeleton/soft tissues: Stable    Impression:    Stable bilateral opacifications.    --- End of Report ---          ELLEN HANSON MD; Resident Radiologist  This document has been electronically signed.  ZAFAR TRAN MD; Attending Interventional Radiologist  This document has been electronically signed. Sep  2 2022 11:15AM (09-02-22 @ 06:15)  Xray Chest 1 View- PORTABLE-Routine:   ACC: [id number] EXAM:  XR CHEST PORTABLE ROUTINE 1V                          PROCEDURE DATE:  08/31/2022          INTERPRETATION:  Clinical History / Reason for exam: Shortness of breath    Comparison : Chest radiograph 2 days prior.    Technique/Positioning: Frontal portable, low lung volumes.    Findings:    Support devices: Telemetry leads are seen    Cardiac/mediastinum/hilum: Heart is enlarged. The patient is status post   median sternotomy.    Lung parenchyma/Pleura: Bilateral opacifications    Skeleton/soft tissues: Unremarkable.    Impression:    Cardiomegaly with bilateral opacifications. Follow-up as needed.        --- End of Report ---            ZAFAR TRAN MD; Attending Interventional Radiologist  This document has been electronically signed. Aug 31 2022  7:31AM (08-31-22 @ 04:54)  Xray Chest 1 View- PORTABLE-Routine:   ACC: [id number] EXAM:  XR CHEST PORTABLE ROUTINE 1V                          PROCEDURE DATE:  08/29/2022          INTERPRETATION:  Clinical History / Reason for exam: Shortness of breath    Comparison : Chest radiograph 3 days prior.    Technique/Positioning: Frontal portable, low lung volumes.    Findings:    Support devices: Telemetry leads overlie the thorax    Cardiac/mediastinum/hilum: Patient is status post median sternotomy    Lung parenchyma/Pleura: Bilateral opacities    Skeleton/soft tissues: Unremarkable.    Impression:    Bilateral opacifications worsening.        --- End of Report ---            ZAFAR TRAN MD; Attending Interventional Radiologist  This document has been electronically signed. Aug 29 2022  8:19AM (08-29-22 @ 06:31)    Physical Exam:  General: no acute distress, lying in bed  Head: normocephalic and atraumatic  Neck: supple  Heart: regular rate and rhythm, S1 and S2 normal, no murmurs, rubs or gallops noted on exam  Lungs: Symmetric chest expansion bilaterally, crackles by bases bilaterally posteriorly, no wheezes or rhonchi heard  Abdomen: Bowel sounds present, non tender on light and deep palpation  Extremities: No edema, no clubbing or cyanosis notes, positive peripheral pulses

## 2022-09-05 NOTE — PROGRESS NOTE ADULT - SUBJECTIVE AND OBJECTIVE BOX
ANDRIA TAVAREZ  82y  Male      Patient is a 82y old  Male who presents with a chief complaint of chf exacerbation (05 Sep 2022 11:04)      INTERVAL HPI/OVERNIGHT EVENTS:  He is still with SOB and hypoxia.   Vital Signs Last 24 Hrs  T(C): 36.4 (05 Sep 2022 12:58), Max: 36.4 (05 Sep 2022 12:58)  T(F): 97.6 (05 Sep 2022 12:58), Max: 97.6 (05 Sep 2022 12:58)  HR: 93 (05 Sep 2022 12:58) (69 - 93)  BP: 137/77 (05 Sep 2022 12:58) (108/54 - 137/77)  BP(mean): --  RR: 19 (05 Sep 2022 04:42) (19 - 19)  SpO2: 96% (05 Sep 2022 09:45) (96% - 96%)    Parameters below as of 05 Sep 2022 09:45  Patient On (Oxygen Delivery Method): nasal cannula, high flow  O2 Flow (L/min): 60  O2 Concentration (%): 60      09-04-22 @ 07:01  -  09-05-22 @ 07:00  --------------------------------------------------------  IN: 355 mL / OUT: 950 mL / NET: -595 mL    09-05-22 @ 07:01  -  09-05-22 @ 14:38  --------------------------------------------------------  IN: 50 mL / OUT: 0 mL / NET: 50 mL            Consultant(s) Notes Reviewed:  [x ] YES  [ ] NO          MEDICATIONS  (STANDING):  albuterol/ipratropium for Nebulization 3 milliLiter(s) Nebulizer every 6 hours  apixaban 2.5 milliGRAM(s) Oral two times a day  atorvastatin 40 milliGRAM(s) Oral at bedtime  BACItracin   Ointment 1 Application(s) Topical two times a day  buMETAnide Infusion 1 mG/Hr (5 mL/Hr) IV Continuous <Continuous>  chlorhexidine 2% Cloths 1 Application(s) Topical <User Schedule>  dextrose 5%. 1000 milliLiter(s) (100 mL/Hr) IV Continuous <Continuous>  dextrose 5%. 1000 milliLiter(s) (50 mL/Hr) IV Continuous <Continuous>  dextrose 50% Injectable 25 Gram(s) IV Push once  dextrose 50% Injectable 12.5 Gram(s) IV Push once  dextrose 50% Injectable 25 Gram(s) IV Push once  famotidine    Tablet 20 milliGRAM(s) Oral daily  glucagon  Injectable 1 milliGRAM(s) IntraMuscular once  insulin glargine Injectable (LANTUS) 22 Unit(s) SubCutaneous at bedtime  insulin lispro (ADMELOG) corrective regimen sliding scale   SubCutaneous three times a day before meals  isosorbide   mononitrate ER Tablet (IMDUR) 30 milliGRAM(s) Oral daily  levothyroxine 150 MICROGram(s) Oral daily  lidocaine 1% (Preservative-free) Injectable 30 milliLiter(s) Local Injection once  melatonin 5 milliGRAM(s) Oral at bedtime  metolazone 5 milliGRAM(s) Oral daily  metoprolol succinate  milliGRAM(s) Oral daily  multivitamin/minerals 1 Tablet(s) Oral daily  mycophenolate mofetil 500 milliGRAM(s) Oral four times a day  NIFEdipine XL 30 milliGRAM(s) Oral daily  pentoxifylline 400 milliGRAM(s) Oral two times a day  predniSONE   Tablet 5 milliGRAM(s) Oral daily  sodium chloride 3%. 150 milliLiter(s) (50 mL/Hr) IV Continuous <Continuous>  sodium chloride 3%. 150 milliLiter(s) (50 mL/Hr) IV Continuous <Continuous>  sodium chloride 3%. 150 milliLiter(s) (50 mL/Hr) IV Continuous <Continuous>  sodium chloride 3%. 150 milliLiter(s) (50 mL/Hr) IV Continuous <Continuous>  sodium chloride 3%. 150 milliLiter(s) (50 mL/Hr) IV Continuous <Continuous>  sodium chloride 3%. 150 milliLiter(s) (50 mL/Hr) IV Continuous <Continuous>  sodium chloride 3%. 150 milliLiter(s) (50 mL/Hr) IV Continuous <Continuous>  sodium chloride 3%. 150 milliLiter(s) (50 mL/Hr) IV Continuous <Continuous>  sodium chloride 3%. 150 milliLiter(s) (50 mL/Hr) IV Continuous <Continuous>  sodium chloride 3%. 150 milliLiter(s) (50 mL/Hr) IV Continuous <Continuous>  sodium chloride 3%. 150 milliLiter(s) (50 mL/Hr) IV Continuous <Continuous>  sodium chloride 3%. 150 milliLiter(s) (50 mL/Hr) IV Continuous <Continuous>  sodium chloride 3%. 150 milliLiter(s) (50 mL/Hr) IV Continuous <Continuous>  sodium chloride 3%. 150 milliLiter(s) (50 mL/Hr) IV Continuous <Continuous>  sodium chloride 3%. 150 milliLiter(s) (50 mL/Hr) IV Continuous <Continuous>  sodium chloride 3%. 150 milliLiter(s) (50 mL/Hr) IV Continuous <Continuous>  tacrolimus 0.5 milliGRAM(s) Oral two times a day    MEDICATIONS  (PRN):  acetaminophen     Tablet .. 650 milliGRAM(s) Oral every 6 hours PRN Mild Pain (1 - 3)  dextrose Oral Gel 15 Gram(s) Oral once PRN Blood Glucose LESS THAN 70 milliGRAM(s)/deciliter  dextrose Oral Gel 15 Gram(s) Oral once PRN Blood Glucose LESS THAN 70 milliGRAM(s)/deciliter      LABS                          7.6    11.06 )-----------( 229      ( 05 Sep 2022 07:30 )             24.3     09-05    137  |  99  |  80<HH>  ----------------------------<  30<LL>  3.8   |  28  |  1.4    Ca    8.3<L>      05 Sep 2022 07:30  Phos  3.5     09-05  Mg     2.0     09-05    TPro  5.5<L>  /  Alb  3.0<L>  /  TBili  0.9  /  DBili  x   /  AST  18  /  ALT  10  /  AlkPhos  82  09-05          Lactate Trend        CAPILLARY BLOOD GLUCOSE      POCT Blood Glucose.: 109 mg/dL (05 Sep 2022 11:21)      Culture - Stool (collected 09-01-22 @ 07:05)  Source: .Stool Feces  Final Report (09-03-22 @ 07:33):    No enteric pathogens isolated.    (Stool culture examined for Salmonella,    Shigella, Campylobacter, Aeromonas, Plesiomonas,    Vibrio, E.coli O157 and Yersinia)        RADIOLOGY & ADDITIONAL TESTS:    Imaging Personally Reviewed:  [ ] YES  [ ] NO    HEALTH ISSUES - PROBLEM Dx:          PHYSICAL EXAM:  GENERAL: mild respiratory distress, well-developed.  HEAD:  Atraumatic, Normocephalic.  EYES: EOMI, PERRLA, conjunctiva and sclera clear.  NECK: Supple, No JVD.  CHEST/LUNG:  decrease breath sound on right lower lung, left lung rales.   HEART: irregular rate and rhythm; S1 S2.   ABDOMEN: Soft, Nontender, Nondistended; Bowel sounds present.  EXTREMITIES:  2+ Peripheral Pulses, No clubbing, leg edema 1+  PSYCH: AAOx3.  NEUROLOGY: non-focal.  SKIN: sacral ulcer stage 2.

## 2022-09-05 NOTE — CHART NOTE - NSCHARTNOTEFT_GEN_A_CORE
MICU DOWN GRADE NOTE      Patient is a 82y old  Male who presents with a chief complaint of chf exacerbation (05 Sep 2022 11:04)      HPI:  83 yo male with PMHx of HFrEF (45-50% on Lasix 20 PO), A-Fib on Eliquis, CAD s/p CABGx3, PVD, COPD (was on rare home O2 PRN now on 3L NC since recent July admission for COVID), CKD3, C3 glomerulonephropathy, HTN, HLD, Hypothyroid presents from NH with progressively worsening SOB x5 days and hypoxia.  Recently hospitalized here for a month discharged on 8/3/22 initially for BRENDA on CKD course c/b COVID, bilateral lower lobe pneumonia, delirium and microaspiration of thin liquids on modified barium study. Received Dexamethasone and Zosyn course with new O2 requirements of 3L NC discharged to nursing home on 3L and thick liquid diet for rehab. Due to his SOB, chest x-ray was ordered on 8/15 but results did not come back and patient became increasingly short of breath and was noted to be hypoxic and rehab facility which prompted referral to the ED. Review of symptoms is notable for orthopnea and a chronic dry cough unchanged since his covid illness. He denies paroxysmal nocturnal dyspnea, leg edema, chest pain, palpitations, dizziness, n/v, abd pain. Cardiologist is Dr. Witt and Pulmonolgist is Dr. Rory Mars.     In ED:  T(F): 96.4 (08-20-22 @ 11:27), Max: 96.4 (08-20-22 @ 11:27)  HR: 80 (08-20-22 @ 16:20) (80 - 91)  BP: 137/61 (08-20-22 @ 16:20) (121/58 - 137/61)  RR: 18 (08-20-22 @ 16:20) (18 - 24)  SpO2: 100% (08-20-22 @ 16:20) (89% - 100%) -> 89% on 4L -> placed on NRB -> BIPAP 2/2 tachypnea and hypoxia -> taken off with increased work of breathing placed back on BIPAP  Labs: WBC 12k, Hgb 7.2 (baseline 9), BNP 55k, Cr 2.2 (baseline 1.5), PCO2 47 (VBG), Troponin 0.24  EKG:   Atrial fibrillation with premature ventricular or aberrantly conducted complexes  Left posterior fascicular block   ST & T wave abnormality, consider lateral ischemia - similar to prior in July 2022  XR chest: significant bilateral pleural effusions and pulmonary congestion  Admitted to medicine for SOB/hypoxia likely 2/2 acute CHF exacerbation.       INTERVAL HPI/OVERNIGHT EVENTS:  Patient was on bumex drip for heart failure exacerbation, s/p thoracentesis on 8/26 for pleural effusion on right side, 1.5 L removed.  Pleural fluid results were consistent with transudative effusion.          REVIEW OF SYSTEMS:  CONSTITUTIONAL: No fever, chills  HEENT:  No blurry vision No sinus or throat pain  NECK: No pain or stiffness  RESPIRATORY: No cough, wheezing, chills or hemoptysis; No shortness of breath  CARDIOVASCULAR: No chest pain, palpitations  GASTROINTESTINAL: No abdominal pain. No nausea, vomiting, or diarrhea  GENITOURINARY: No dysuria  NEUROLOGICAL: No HA, No focal weakness  SKIN: No itching, burning, rashes, or lesions   MUSCULOSKELETAL: No joint pain or swelling; No muscle, back, or extremity pain    MEDICATIONS:  acetaminophen     Tablet .. 650 milliGRAM(s) Oral every 6 hours PRN  albuterol/ipratropium for Nebulization 3 milliLiter(s) Nebulizer every 6 hours  apixaban 2.5 milliGRAM(s) Oral two times a day  atorvastatin 40 milliGRAM(s) Oral at bedtime  BACItracin   Ointment 1 Application(s) Topical two times a day  buMETAnide Infusion 1 mG/Hr IV Continuous <Continuous>  chlorhexidine 2% Cloths 1 Application(s) Topical <User Schedule>  dextrose 5%. 1000 milliLiter(s) IV Continuous <Continuous>  dextrose 5%. 1000 milliLiter(s) IV Continuous <Continuous>  dextrose 50% Injectable 25 Gram(s) IV Push once  dextrose 50% Injectable 12.5 Gram(s) IV Push once  dextrose 50% Injectable 25 Gram(s) IV Push once  dextrose Oral Gel 15 Gram(s) Oral once PRN  dextrose Oral Gel 15 Gram(s) Oral once PRN  famotidine    Tablet 20 milliGRAM(s) Oral daily  glucagon  Injectable 1 milliGRAM(s) IntraMuscular once  insulin glargine Injectable (LANTUS) 22 Unit(s) SubCutaneous at bedtime  insulin lispro (ADMELOG) corrective regimen sliding scale   SubCutaneous three times a day before meals  isosorbide   mononitrate ER Tablet (IMDUR) 30 milliGRAM(s) Oral daily  levothyroxine 150 MICROGram(s) Oral daily  lidocaine 1% (Preservative-free) Injectable 30 milliLiter(s) Local Injection once  melatonin 5 milliGRAM(s) Oral at bedtime  metolazone 5 milliGRAM(s) Oral daily  metoprolol succinate  milliGRAM(s) Oral daily  multivitamin/minerals 1 Tablet(s) Oral daily  mycophenolate mofetil 500 milliGRAM(s) Oral four times a day  NIFEdipine XL 30 milliGRAM(s) Oral daily  pentoxifylline 400 milliGRAM(s) Oral two times a day  predniSONE   Tablet 5 milliGRAM(s) Oral daily  sodium chloride 3%. 150 milliLiter(s) IV Continuous <Continuous>  sodium chloride 3%. 150 milliLiter(s) IV Continuous <Continuous>  sodium chloride 3%. 150 milliLiter(s) IV Continuous <Continuous>  sodium chloride 3%. 150 milliLiter(s) IV Continuous <Continuous>  sodium chloride 3%. 150 milliLiter(s) IV Continuous <Continuous>  sodium chloride 3%. 150 milliLiter(s) IV Continuous <Continuous>  sodium chloride 3%. 150 milliLiter(s) IV Continuous <Continuous>  sodium chloride 3%. 150 milliLiter(s) IV Continuous <Continuous>  sodium chloride 3%. 150 milliLiter(s) IV Continuous <Continuous>  sodium chloride 3%. 150 milliLiter(s) IV Continuous <Continuous>  sodium chloride 3%. 150 milliLiter(s) IV Continuous <Continuous>  sodium chloride 3%. 150 milliLiter(s) IV Continuous <Continuous>  sodium chloride 3%. 150 milliLiter(s) IV Continuous <Continuous>  sodium chloride 3%. 150 milliLiter(s) IV Continuous <Continuous>  sodium chloride 3%. 150 milliLiter(s) IV Continuous <Continuous>  sodium chloride 3%. 150 milliLiter(s) IV Continuous <Continuous>  tacrolimus 0.5 milliGRAM(s) Oral two times a day      T(C): 36.1 (09-05-22 @ 04:42), Max: 36.1 (09-04-22 @ 20:20)  HR: 69 (09-05-22 @ 09:45) (69 - 77)  BP: 123/60 (09-05-22 @ 09:45) (108/54 - 123/60)  RR: 19 (09-05-22 @ 04:42) (19 - 19)  SpO2: 96% (09-05-22 @ 09:45) (96% - 96%)  Wt(kg): --Vital Signs Last 24 Hrs  T(C): 36.1 (05 Sep 2022 04:42), Max: 36.1 (04 Sep 2022 20:20)  T(F): 97 (05 Sep 2022 04:42), Max: 97 (04 Sep 2022 20:20)  HR: 69 (05 Sep 2022 09:45) (69 - 77)  BP: 123/60 (05 Sep 2022 09:45) (108/54 - 123/60)  BP(mean): --  RR: 19 (05 Sep 2022 04:42) (19 - 19)  SpO2: 96% (05 Sep 2022 09:45) (96% - 96%)    Parameters below as of 05 Sep 2022 09:45  Patient On (Oxygen Delivery Method): nasal cannula, high flow  O2 Flow (L/min): 60  O2 Concentration (%): 60    PHYSICAL EXAM:  GENERAL: NAD, well-groomed, well-developed  HEAD:  Atraumatic, Normocephalic  EYES: EOMI, PERRLA, conjunctiva and sclera clear  ENMT:  Moist mucous membranes  NECK: Supple, No JVD,  CHEST/LUNG: Clear to auscultation bilaterally; No rales, rhonchi, wheezing, or rubs  HEART: Regular rate and rhythm; No murmurs, rubs, or gallops  ABDOMEN: Soft, Nontender, Nondistended; Bowel sounds present  NEURO: Alert & Oriented X3  EXTREMITIES: No LE edema, no calf tenderness  LYMPH: No lymphadenopathy noted  SKIN: No rashes or lesions    Consultant(s) Notes Reviewed:  [x ] YES  [ ] NO  Care Discussed with Consultants/Other Providers [ x] YES  [ ] NO    LABS:                        7.6    11.06 )-----------( 229      ( 05 Sep 2022 07:30 )             24.3     09-05    137  |  99  |  80<HH>  ----------------------------<  30<LL>  3.8   |  28  |  1.4    Ca    8.3<L>      05 Sep 2022 07:30  Phos  3.5     09-05  Mg     2.0     09-05    TPro  5.5<L>  /  Alb  3.0<L>  /  TBili  0.9  /  DBili  x   /  AST  18  /  ALT  10  /  AlkPhos  82  09-05        CAPILLARY BLOOD GLUCOSE      POCT Blood Glucose.: 109 mg/dL (05 Sep 2022 11:21)  POCT Blood Glucose.: 96 mg/dL (05 Sep 2022 08:53)  POCT Blood Glucose.: 44 mg/dL (05 Sep 2022 07:39)  POCT Blood Glucose.: 91 mg/dL (04 Sep 2022 22:04)  POCT Blood Glucose.: 134 mg/dL (04 Sep 2022 16:50)            RADIOLOGY & ADDITIONAL TESTS:    Imaging Personally Reviewed:  [x ] YES  [ ] NO MICU DOWN GRADE NOTE      Patient is a 82y old  Male who presents with a chief complaint of chf exacerbation (05 Sep 2022 11:04)      HPI:  83 yo male with PMHx of HFrEF (45-50% on Lasix 20 PO), A-Fib on Eliquis, CAD s/p CABGx3, PVD, COPD (was on rare home O2 PRN now on 3L NC since recent July admission for COVID), CKD3, C3 glomerulonephropathy, HTN, HLD, Hypothyroid presents from NH with progressively worsening SOB x5 days and hypoxia.  Recently hospitalized here for a month discharged on 8/3/22 initially for BRENDA on CKD course c/b COVID, bilateral lower lobe pneumonia, delirium and microaspiration of thin liquids on modified barium study. Received Dexamethasone and Zosyn course with new O2 requirements of 3L NC discharged to nursing home on 3L and thick liquid diet for rehab. Due to his SOB, chest x-ray was ordered on 8/15 but results did not come back and patient became increasingly short of breath and was noted to be hypoxic and rehab facility which prompted referral to the ED. Review of symptoms is notable for orthopnea and a chronic dry cough unchanged since his covid illness. He denies paroxysmal nocturnal dyspnea, leg edema, chest pain, palpitations, dizziness, n/v, abd pain. Cardiologist is Dr. Witt and Pulmonolgist is Dr. Rroy Mars.     In ED:  T(F): 96.4 (08-20-22 @ 11:27), Max: 96.4 (08-20-22 @ 11:27)  HR: 80 (08-20-22 @ 16:20) (80 - 91)  BP: 137/61 (08-20-22 @ 16:20) (121/58 - 137/61)  RR: 18 (08-20-22 @ 16:20) (18 - 24)  SpO2: 100% (08-20-22 @ 16:20) (89% - 100%) -> 89% on 4L -> placed on NRB -> BIPAP 2/2 tachypnea and hypoxia -> taken off with increased work of breathing placed back on BIPAP  Labs: WBC 12k, Hgb 7.2 (baseline 9), BNP 55k, Cr 2.2 (baseline 1.5), PCO2 47 (VBG), Troponin 0.24  EKG:   Atrial fibrillation with premature ventricular or aberrantly conducted complexes  Left posterior fascicular block   ST & T wave abnormality, consider lateral ischemia - similar to prior in July 2022  XR chest: significant bilateral pleural effusions and pulmonary congestion  Admitted to medicine for SOB/hypoxia likely 2/2 acute CHF exacerbation.       INTERVAL HPI/OVERNIGHT EVENTS:  Patient was on bumex drip for heart failure exacerbation, s/p thoracentesis on 8/26 for pleural effusion on right side, 1.5 L removed.  Pleural fluid results were consistent with transudative effusion.  Patient was not improving on venturi mask, was placed on high flow nasal cannula on Sunday.  Since then patient's oxygen requirements are increasing.  Patient is getting upgraded to SDU.        REVIEW OF SYSTEMS:  CONSTITUTIONAL: No fever, chills  HEENT:  No blurry vision No sinus or throat pain  NECK: No pain or stiffness  RESPIRATORY: No cough, wheezing, chills or hemoptysis; No shortness of breath  CARDIOVASCULAR: No chest pain, palpitations  GASTROINTESTINAL: No abdominal pain. No nausea, vomiting, or diarrhea  GENITOURINARY: No dysuria  NEUROLOGICAL: No HA, No focal weakness  SKIN: No itching, burning, rashes, or lesions   MUSCULOSKELETAL: No joint pain or swelling; No muscle, back, or extremity pain    MEDICATIONS:  acetaminophen     Tablet .. 650 milliGRAM(s) Oral every 6 hours PRN  albuterol/ipratropium for Nebulization 3 milliLiter(s) Nebulizer every 6 hours  apixaban 2.5 milliGRAM(s) Oral two times a day  atorvastatin 40 milliGRAM(s) Oral at bedtime  BACItracin   Ointment 1 Application(s) Topical two times a day  buMETAnide Infusion 1 mG/Hr IV Continuous <Continuous>  chlorhexidine 2% Cloths 1 Application(s) Topical <User Schedule>  dextrose 5%. 1000 milliLiter(s) IV Continuous <Continuous>  dextrose 5%. 1000 milliLiter(s) IV Continuous <Continuous>  dextrose 50% Injectable 25 Gram(s) IV Push once  dextrose 50% Injectable 12.5 Gram(s) IV Push once  dextrose 50% Injectable 25 Gram(s) IV Push once  dextrose Oral Gel 15 Gram(s) Oral once PRN  dextrose Oral Gel 15 Gram(s) Oral once PRN  famotidine    Tablet 20 milliGRAM(s) Oral daily  glucagon  Injectable 1 milliGRAM(s) IntraMuscular once  insulin glargine Injectable (LANTUS) 22 Unit(s) SubCutaneous at bedtime  insulin lispro (ADMELOG) corrective regimen sliding scale   SubCutaneous three times a day before meals  isosorbide   mononitrate ER Tablet (IMDUR) 30 milliGRAM(s) Oral daily  levothyroxine 150 MICROGram(s) Oral daily  lidocaine 1% (Preservative-free) Injectable 30 milliLiter(s) Local Injection once  melatonin 5 milliGRAM(s) Oral at bedtime  metolazone 5 milliGRAM(s) Oral daily  metoprolol succinate  milliGRAM(s) Oral daily  multivitamin/minerals 1 Tablet(s) Oral daily  mycophenolate mofetil 500 milliGRAM(s) Oral four times a day  NIFEdipine XL 30 milliGRAM(s) Oral daily  pentoxifylline 400 milliGRAM(s) Oral two times a day  predniSONE   Tablet 5 milliGRAM(s) Oral daily  sodium chloride 3%. 150 milliLiter(s) IV Continuous <Continuous>  sodium chloride 3%. 150 milliLiter(s) IV Continuous <Continuous>  sodium chloride 3%. 150 milliLiter(s) IV Continuous <Continuous>  sodium chloride 3%. 150 milliLiter(s) IV Continuous <Continuous>  sodium chloride 3%. 150 milliLiter(s) IV Continuous <Continuous>  sodium chloride 3%. 150 milliLiter(s) IV Continuous <Continuous>  sodium chloride 3%. 150 milliLiter(s) IV Continuous <Continuous>  sodium chloride 3%. 150 milliLiter(s) IV Continuous <Continuous>  sodium chloride 3%. 150 milliLiter(s) IV Continuous <Continuous>  sodium chloride 3%. 150 milliLiter(s) IV Continuous <Continuous>  sodium chloride 3%. 150 milliLiter(s) IV Continuous <Continuous>  sodium chloride 3%. 150 milliLiter(s) IV Continuous <Continuous>  sodium chloride 3%. 150 milliLiter(s) IV Continuous <Continuous>  sodium chloride 3%. 150 milliLiter(s) IV Continuous <Continuous>  sodium chloride 3%. 150 milliLiter(s) IV Continuous <Continuous>  sodium chloride 3%. 150 milliLiter(s) IV Continuous <Continuous>  tacrolimus 0.5 milliGRAM(s) Oral two times a day      T(C): 36.1 (09-05-22 @ 04:42), Max: 36.1 (09-04-22 @ 20:20)  HR: 69 (09-05-22 @ 09:45) (69 - 77)  BP: 123/60 (09-05-22 @ 09:45) (108/54 - 123/60)  RR: 19 (09-05-22 @ 04:42) (19 - 19)  SpO2: 96% (09-05-22 @ 09:45) (96% - 96%)  Wt(kg): --Vital Signs Last 24 Hrs  T(C): 36.1 (05 Sep 2022 04:42), Max: 36.1 (04 Sep 2022 20:20)  T(F): 97 (05 Sep 2022 04:42), Max: 97 (04 Sep 2022 20:20)  HR: 69 (05 Sep 2022 09:45) (69 - 77)  BP: 123/60 (05 Sep 2022 09:45) (108/54 - 123/60)  BP(mean): --  RR: 19 (05 Sep 2022 04:42) (19 - 19)  SpO2: 96% (05 Sep 2022 09:45) (96% - 96%)    Parameters below as of 05 Sep 2022 09:45  Patient On (Oxygen Delivery Method): nasal cannula, high flow  O2 Flow (L/min): 60  O2 Concentration (%): 60    PHYSICAL EXAM:  GENERAL: NAD, well-groomed, well-developed  HEAD:  Atraumatic, Normocephalic  EYES: EOMI, PERRLA, conjunctiva and sclera clear  ENMT:  Moist mucous membranes  NECK: Supple, No JVD,  CHEST/LUNG: Clear to auscultation bilaterally; No rales, rhonchi, wheezing, or rubs  HEART: Regular rate and rhythm; No murmurs, rubs, or gallops  ABDOMEN: Soft, Nontender, Nondistended; Bowel sounds present  NEURO: Alert & Oriented X3  EXTREMITIES: No LE edema, no calf tenderness  LYMPH: No lymphadenopathy noted  SKIN: No rashes or lesions    Consultant(s) Notes Reviewed:  [x ] YES  [ ] NO  Care Discussed with Consultants/Other Providers [ x] YES  [ ] NO    LABS:                        7.6    11.06 )-----------( 229      ( 05 Sep 2022 07:30 )             24.3     09-05    137  |  99  |  80<HH>  ----------------------------<  30<LL>  3.8   |  28  |  1.4    Ca    8.3<L>      05 Sep 2022 07:30  Phos  3.5     09-05  Mg     2.0     09-05    TPro  5.5<L>  /  Alb  3.0<L>  /  TBili  0.9  /  DBili  x   /  AST  18  /  ALT  10  /  AlkPhos  82  09-05        CAPILLARY BLOOD GLUCOSE      POCT Blood Glucose.: 109 mg/dL (05 Sep 2022 11:21)  POCT Blood Glucose.: 96 mg/dL (05 Sep 2022 08:53)  POCT Blood Glucose.: 44 mg/dL (05 Sep 2022 07:39)  POCT Blood Glucose.: 91 mg/dL (04 Sep 2022 22:04)  POCT Blood Glucose.: 134 mg/dL (04 Sep 2022 16:50)    RADIOLOGY & ADDITIONAL TESTS:    Imaging Personally Reviewed:  [x ] YES  [ ] NO    Assessment and Plan:    83 yo M PMHx chronic HFrEF, chronic A.fib (on Eliquis), CAD s/p CABG x 3, PVD, COPD, 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     Acute Hypoxic respirator Failure:   Acute HFmrEF:   Bilateral pleural effusion Right>Left.   No evidence of Pneumonia.   Patient is still with hypoxia and SOB. Was on Venturi mask 30%, switched to high flow nasal cannula with increasing oxygen requirements.  CXR showed worsening right pleural effusion and right lung opacity.   Echo 8/1/22 showed LVEF 45-50%, multiple LV regional wall motion abnormalities, mild MR, mild TR, borderline pulmonary HTN   s/p thoracentesis 8/26 fluid transudative.   Continue Bumex drip at 1mg/hr and hypertonic saline  Heart failure follow up, continue low sodium diet, fluid restriction 1L/24 hrs.   Pulmonary follow up for worsening pleural effusion, continue IV diuresis, possible plan for thoracentesis.     Epistaxis  resolved  ENT follow up appreciated, packing removed    Leukocytosis  likely from steroids    Acute on chronic anemia  s/p 1 U PRBC  Continue IV Venofer 200mg daily for 3 days.     Chronic Atrial Fibrillation :  continue metoprolol and Eliquis.     C3 Glomerulonephropathy  Continue Prednisone 5mg daily, tacrolimus 0.5mg BID and Mycophenolate 500mg 4 times daily.     CAD  Continue Metoprolol, Imdur and Lipitor.     HTN: continue metoprolol, nifedipine  will try to wean off nifedipine given HFmrEF    Hypothyroidism: continue synthroid    DM II: A1C 7.3.   FS is better controlled, continue Lantus 22 units and Lispro 7 units before meals. Diabetic diet.     Stage 2 right buttock pressure ulcer  Present on admission  Continue local wound care, off loading.     COPD  Continue DuoNeb prn.     #Progress Note Handoff:  Pending (specify):  hypoxia, respiratory failure and pleural effusion.   Disposition: Home

## 2022-09-05 NOTE — PROGRESS NOTE ADULT - ASSESSMENT
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     A/P :  Acute Hypoxic respirator Failure:   Acute HFmrEF:   Bilateral pleural effusion Right>Left.   No evidence of Pneumonia.   Worsening hypoxia, now on high flow nasal canula.    CXR 9/5 showed worsening right pleural effusion and right lung opacity.   Echo 8/1/22 showed LVEF 45-50%, multiple LV regional wall motion abnormalities, mild MR, mild TR, borderline pulmonary HTN   s/p thoracentesis 8/2 fluid transudative.   Continue Bumex drip at 1mg/hr and hypertonic saline  Heart failure follow up, continue low sodium diet, fluid restriction 1L/24 hrs.   Pulmonary follow up for worsening pleural effusion, continue IV diuresis, no plan for thoracentesis.   ICU consult for worsening hypoxia.     Epistaxis  resolved  ENT follow up appreciated, packing removed    Leukocytosis  likely from steroids    Acute on chronic anemia  s/p 1 U PRBC  Continue IV Venofer 200mg daily for 3 days.     Chronic Atrial Fibrillation :  continue metoprolol and Eliquis.     C3 Glomerulonephropathy  Continue Prednisone 5mg daily, tacrolimus 0.5mg BID and Mycophenolate 500mg 4 times daily.     CAD  Continue Metoprolol, Imdur and Lipitor.     HTN: continue metoprolol, nifedipine  will try to wean off nifedipine given HFmrEF    Hypothyroidism: continue synthroid    DM II: A1C 7.3.   Episode of hypoglycemia for last 24 hrs, patient is not eating well, continue Lantus 22 units,  Hold Lispro 7 units before meals for now. Diabetic diet.     Stage 2 right buttock pressure ulcer  Present on admission  Continue local wound care, off loading.     COPD  Continue DuoNeb prn.     DVT prophylaxis:     #Progress Note Handoff:  Pending (specify): transfer to step down unit.   Family discussion: spoke with pt and son at bedside  Disposition: Home

## 2022-09-05 NOTE — PROGRESS NOTE ADULT - ASSESSMENT
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     SOB/ AHRF  Sepsis criteria met on admission but pt remains stable off abx and procal low, could be SIRS criteria due to aspiration pneumonitis  - bibasilar opacities remain on CXR from today  - likely due to aspiration pneumonia and acute on chronic HFrEF  - c/w Bumex/hypertonic saline--HF team recommended to d/c bumex tonight but pt remains overloaded therefore will continue with Bumex and reassess tomorrow  - pt has large urine output  - speech and swallow appreciated  - pt stable off abx  - Echo 08/01: 45-50%EF  - CXR shows improved b/l pleural effusions with pulmonary congestion and opacities but awaiting final read  - proBNP > 50K  - thoracentesis done 9/27--transudative effusion  - continue with bumex drip and hypertonic saline  -thora 8/26 1.5 L removed  - chest x-ray 8/29 fluid reaccumulation   - HF rec: bumex drip  - follow up with pulm   - patient can be switched to nasal cannula 5L--> desat to 87%, placed back on aerosol mask 30% FiO2  - patient switched to high flow nasal cannula 50% FiO2 due to desat and poor po intake on aerosol mask, patient's shortness of breath improved  -will wean O2 as tolerated  -f/u pulm  -starting metolazone 5mg daily    Epistaxis  - resolved  - ENT follow up appreciated, packing removed    Leukocytosis  - was uptrending likely from steroids    C3 Glomerulonephropathy  - c/w tacrolimus (level 9.5), cellcept    CAD  - c/w statin, metoprolol, imdur    HTN  - c/w metoprolol, nifedipine    Hypothyroidism  - c/w synthroid    DM II  - FS uncontrolled  - adjust insulin    Stage 2 right buttock pressure ulcer  - present on admission  - c/w local wound care    Acute on chronic anemia  - s/p 1 U PRBC    Chronic afib  - c/w nifedipine, metoprolol    COPD  - c/w inhlaers    DVT px  from home    #Progress Note Handoff:  Pending (specify):  Diuresis  Family discussion: son at bedside, continue with O2 and diuresis per plan  Disposition: subacute rehab as per PT

## 2022-09-05 NOTE — PROGRESS NOTE ADULT - SUBJECTIVE AND OBJECTIVE BOX
SUBJECTIVE:  Patient is a 82y old Male who presents with a chief complaint of chf exacerbation (04 Sep 2022 14:27)   Fluid overload     Today is hospital day 16d. There are no new issues or overnight events.     HPI  HPI:  81 yo male with PMHx of HFrEF (45-50% on Lasix 20 PO), A-Fib on Eliquis, CAD s/p CABGx3, PVD, COPD (was on rare home O2 PRN now on 3L NC since recent July admission for COVID), CKD3, C3 glomerulonephropathy, HTN, HLD, Hypothyroid presents from NH with progressively worsening SOB x5 days and hypoxia.  Recently hospitalized here for a month discharged on 8/3/22 initially for BRENDA on CKD course c/b COVID, bilateral lower lobe pneumonia, delirium and microaspiration of thin liquids on modified barium study. Received Dexamethasone and Zosyn course with new O2 requirements of 3L NC discharged to nursing home on 3L and thick liquid diet for rehab. Due to his SOB, chest x-ray was ordered on 8/15 but results did not come back and patient became increasingly short of breath and was noted to be hypoxic and rehab facility which prompted referral to the ED. Review of symptoms is notable for orthopnea and a chronic dry cough unchanged since his covid illness. He denies paroxysmal nocturnal dyspnea, leg edema, chest pain, palpitations, dizziness, n/v, abd pain. Cardiologist is Dr. Witt and Pulmonolgist is Dr. Rory Mars.     In ED:  T(F): 96.4 (08-20-22 @ 11:27), Max: 96.4 (08-20-22 @ 11:27)  HR: 80 (08-20-22 @ 16:20) (80 - 91)  BP: 137/61 (08-20-22 @ 16:20) (121/58 - 137/61)  RR: 18 (08-20-22 @ 16:20) (18 - 24)  SpO2: 100% (08-20-22 @ 16:20) (89% - 100%) -> 89% on 4L -> placed on NRB -> BIPAP 2/2 tachypnea and hypoxia -> taken off with increased work of breathing placed back on BIPAP  Labs: WBC 12k, Hgb 7.2 (baseline 9), BNP 55k, Cr 2.2 (baseline 1.5), PCO2 47 (VBG), Troponin 0.24  EKG:   Atrial fibrillation with premature ventricular or aberrantly conducted complexes  Left posterior fascicular block   ST & T wave abnormality, consider lateral ischemia - similar to prior in July 2022  XR chest: significant bilateral pleural effusions and pulmonary congestion  Admitted to medicine for SOB/hypoxia likely 2/2 acute CHF exacerbation.  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    AF (atrial fibrillation)  s/p DCCV    Carotid stenosis    COPD (chronic obstructive pulmonary disease)    H/O coronary artery bypass surgery  2001    S/P inguinal hernia repair    History of appendectomy      ALLERGIES:  Ozempic (0.25 mg or 0.5 mg dose) (Hives; Rash)  streptomycin (Unknown)  Trulicity Pen (Hives; Rash)    MEDICATIONS:  HOME MEDICATIONS  Aranesp 100 mcg/0.5 mL injectable solution: 1 dose(s) injectable every 3 to 4 weeks  atorvastatin 40 mg oral tablet: 1 tab(s) orally once a day (at bedtime)  calcitriol 0.25 mcg oral capsule: 1 cap(s) orally 2 times a week  d-mycophenolate: 500 milligram(s) orally 4 times a day  Eliquis 2.5 mg oral tablet: 1 tab(s) orally 2 times a day  famotidine 20 mg oral tablet: 1 tab(s) orally once a day  furosemide 20 mg oral tablet: 1 tab(s) orally once a day  insulin glargine 100 units/mL subcutaneous solution: 5 unit(s) subcutaneous once a day (at bedtime)  insulin lispro 100 units/mL injectable solution: 1 Unit(s) if Glucose 151 - 200  2 Unit(s) if Glucose 201 - 250  3 Unit(s) if Glucose 251 - 300  4 Unit(s) if Glucose 301 - 350  5 Unit(s) if Glucose 351 - 400  6 Unit(s) if Glucose Greater Than 400  isosorbide mononitrate 30 mg oral tablet, extended release: 1 tab(s) orally once a day (in the morning)  levothyroxine 150 mcg (0.15 mg) oral tablet: 1 tab(s) orally once a day  metoprolol succinate 50 mg oral tablet, extended release: 1 tab(s) orally once a day  Multiple Vitamins with Minerals oral tablet: 1 tab(s) orally once a day  NIFEdipine 30 mg oral tablet, extended release: 1 tab(s) orally once a day  pentoxifylline 400 mg oral tablet, extended release: 1 tab(s) orally 2 times a day  sodium bicarbonate 650 mg oral tablet: 1 tab(s) orally 3 times a day  tacrolimus 0.5 mg oral capsule: 1 cap(s) orally 2 times a day    STANDING MEDICATIONS  albuterol/ipratropium for Nebulization 3 milliLiter(s) Nebulizer every 6 hours  apixaban 2.5 milliGRAM(s) Oral two times a day  atorvastatin 40 milliGRAM(s) Oral at bedtime  BACItracin   Ointment 1 Application(s) Topical two times a day  buMETAnide Infusion 1 mG/Hr IV Continuous <Continuous>  chlorhexidine 2% Cloths 1 Application(s) Topical <User Schedule>  dextrose 5%. 1000 milliLiter(s) IV Continuous <Continuous>  dextrose 5%. 1000 milliLiter(s) IV Continuous <Continuous>  dextrose 50% Injectable 25 Gram(s) IV Push once  dextrose 50% Injectable 12.5 Gram(s) IV Push once  dextrose 50% Injectable 25 Gram(s) IV Push once  famotidine    Tablet 20 milliGRAM(s) Oral daily  glucagon  Injectable 1 milliGRAM(s) IntraMuscular once  insulin glargine Injectable (LANTUS) 22 Unit(s) SubCutaneous at bedtime  insulin lispro (ADMELOG) corrective regimen sliding scale   SubCutaneous three times a day before meals  isosorbide   mononitrate ER Tablet (IMDUR) 30 milliGRAM(s) Oral daily  levothyroxine 150 MICROGram(s) Oral daily  lidocaine 1% (Preservative-free) Injectable 30 milliLiter(s) Local Injection once  melatonin 5 milliGRAM(s) Oral at bedtime  metolazone 5 milliGRAM(s) Oral daily  metoprolol succinate  milliGRAM(s) Oral daily  multivitamin/minerals 1 Tablet(s) Oral daily  mycophenolate mofetil 500 milliGRAM(s) Oral four times a day  NIFEdipine XL 30 milliGRAM(s) Oral daily  pentoxifylline 400 milliGRAM(s) Oral two times a day  predniSONE   Tablet 5 milliGRAM(s) Oral daily  sodium chloride 3%. 150 milliLiter(s) IV Continuous <Continuous>  sodium chloride 3%. 150 milliLiter(s) IV Continuous <Continuous>  sodium chloride 3%. 150 milliLiter(s) IV Continuous <Continuous>  sodium chloride 3%. 150 milliLiter(s) IV Continuous <Continuous>  sodium chloride 3%. 150 milliLiter(s) IV Continuous <Continuous>  sodium chloride 3%. 150 milliLiter(s) IV Continuous <Continuous>  sodium chloride 3%. 150 milliLiter(s) IV Continuous <Continuous>  sodium chloride 3%. 150 milliLiter(s) IV Continuous <Continuous>  sodium chloride 3%. 150 milliLiter(s) IV Continuous <Continuous>  sodium chloride 3%. 150 milliLiter(s) IV Continuous <Continuous>  sodium chloride 3%. 150 milliLiter(s) IV Continuous <Continuous>  sodium chloride 3%. 150 milliLiter(s) IV Continuous <Continuous>  sodium chloride 3%. 150 milliLiter(s) IV Continuous <Continuous>  sodium chloride 3%. 150 milliLiter(s) IV Continuous <Continuous>  sodium chloride 3%. 150 milliLiter(s) IV Continuous <Continuous>  sodium chloride 3%. 150 milliLiter(s) IV Continuous <Continuous>  tacrolimus 0.5 milliGRAM(s) Oral two times a day    PRN MEDICATIONS  acetaminophen     Tablet .. 650 milliGRAM(s) Oral every 6 hours PRN  dextrose Oral Gel 15 Gram(s) Oral once PRN  dextrose Oral Gel 15 Gram(s) Oral once PRN    VITALS:   T(C): 36.1 (09-05-22 @ 04:42), Max: 36.4 (09-04-22 @ 12:55)  T(F): 97 (09-05-22 @ 04:42), Max: 97.5 (09-04-22 @ 12:55)  HR: 69 (09-05-22 @ 09:45) (69 - 77)  BP: 123/60 (09-05-22 @ 09:45) (108/54 - 132/63)  BP(mean): --  ABP: --  ABP(mean): --  RR: 19 (09-05-22 @ 04:42) (18 - 19)  SpO2: 96% (09-05-22 @ 09:45) (96% - 96%)  LABS:                        7.6    11.06 )-----------( 229      ( 05 Sep 2022 07:30 )             24.3     09-05    137  |  99  |  80<HH>  ----------------------------<  30<LL>  3.8   |  28  |  1.4    Ca    8.3<L>      05 Sep 2022 07:30  Phos  3.5     09-05  Mg     2.0     09-05    TPro  5.5<L>  /  Alb  3.0<L>  /  TBili  0.9  /  DBili  x   /  AST  18  /  ALT  10  /  AlkPhos  82  09-05        I&O's Detail    04 Sep 2022 07:01  -  05 Sep 2022 07:00  --------------------------------------------------------  IN:    Bumetanide: 55 mL    sodium chloride 3%: 50 mL    sodium chloride 3%: 150 mL    sodium chloride 3%: 100 mL  Total IN: 355 mL    OUT:    Indwelling Catheter - Urethral (mL): 950 mL  Total OUT: 950 mL    Total NET: -595 mL      05 Sep 2022 07:01  -  05 Sep 2022 11:04  --------------------------------------------------------  IN:    sodium chloride 3%: 50 mL  Total IN: 50 mL    OUT:  Total OUT: 0 mL    Total NET: 50 mL                    RADIOLOGY:  EKG  12 Lead ECG:   Ventricular Rate 71 BPM    Atrial Rate 82 BPM    QRS Duration 140 ms    Q-T Interval 444 ms    QTC Calculation(Bazett) 482 ms    R Axis 108 degrees    T Axis 166 degrees    Diagnosis Line Atrial fibrillation with premature ventricular or aberrantlyconducted  complexes  Non-specific intra-ventricular conduction block  ST & T wave abnormality, consider lateral ischemia  Abnormal ECG    Confirmed by Sarah Borden MD [id number]) on 8/21/2022 1:35:39 PM (08-21-22 @ 07:14)  12 Lead ECG:   Ventricular Rate 91 BPM    QRS Duration 110 ms    Q-T Interval 378 ms    QTC Calculation(Bazett) 464 ms    R Axis 124 degrees    T Axis 176 degrees    Diagnosis Line Atrial fibrillation with premature ventricular or aberrantly conducted  complexes  Left posterior fascicular block  ST & T wave abnormality, consider lateral ischemia  Abnormal ECG    Confirmed by Sarah Borden MD [id number]) on 8/20/2022 3:10:13 PM (08-20-22 @ 11:29)    Xray Chest 1 View- PORTABLE-Routine:   ACC: [id number] EXAM:  XR CHEST PORTABLE ROUTINE 1V                          PROCEDURE DATE:  09/05/2022          INTERPRETATION:  Clinical History / Reason for exam: Pleural effusion    Comparison : Chest radiograph September 3, 2022.    Technique/Positioning: Single AP view of the chest.    Findings:    Support devices: None.    Cardiac/mediastinum/hilum: Poststernotomy, unchanged    Lung parenchyma/Pleura: Bilateral opacities and effusions, unchanged. No   pneumothorax.    Skeleton/soft tissues: Unchanged.    Impression:    Bilateral opacities and effusions, unchanged.        --- End of Report ---            ELLIOT LANDAU MD; Attending Radiologist  This document has been electronically signed. Sep  5 2022  9:25AM (09-05-22 @ 06:09)  Xray Chest 1 View- PORTABLE-Routine:   ACC: [id number] EXAM:  XR CHEST PORTABLE ROUTINE 1V                          PROCEDURE DATE:  09/03/2022          INTERPRETATION:  CLINICAL HISTORY / REASON FOR EXAM: Shortness of breath.    COMPARISON: Chest radiograph from September 2, 2022.    TECHNIQUE/POSITIONING: Low lung volumes. Single image, AP portable chest   radiograph.    FINDINGS:    SUPPORT DEVICES: None.    CARDIAC/MEDIASTINUM/HILUM: Post sternotomy.    LUNG PARENCHYMA/PLEURA: Unchanged bilateral lower lobe opacities and   effusions. No pneumothorax.    SKELETON/SOFT TISSUES: Unchanged.      IMPRESSION:    Unchanged bilateral lower lobe opacities and effusions.    --- End of Report ---            HÉCTOR MINOR MD; Attending Radiologist  This document has been electronically signed. Sep  3 2022  4:40PM (09-03-22 @ 08:22)  Xray Chest 1 View- PORTABLE-Routine:   ACC: [id number] EXAM:  XR CHEST PORTABLE ROUTINE 1V                          PROCEDURE DATE:  09/02/2022          INTERPRETATION:  Clinical History / Reason for exam: Shortness of breath    Comparison : Chest radiograph 8/31/2022.    Technique/Positioning: Frontal chest radiograph.    Findings:    Support devices: Overlying telemetry leads.    Cardiac/mediastinum/hilum: Stable    Lung parenchyma/Pleura: Stable bilateral opacifications.    Skeleton/soft tissues: Stable    Impression:    Stable bilateral opacifications.    --- End of Report ---          ELLEN HANSON MD; Resident Radiologist  This document has been electronically signed.  ZAFAR TRAN MD; Attending Interventional Radiologist  This document has been electronically signed. Sep  2 2022 11:15AM (09-02-22 @ 06:15)  Xray Chest 1 View- PORTABLE-Routine:   ACC: [id number] EXAM:  XR CHEST PORTABLE ROUTINE 1V                          PROCEDURE DATE:  08/31/2022          INTERPRETATION:  Clinical History / Reason for exam: Shortness of breath    Comparison : Chest radiograph 2 days prior.    Technique/Positioning: Frontal portable, low lung volumes.    Findings:    Support devices: Telemetry leads are seen    Cardiac/mediastinum/hilum: Heart is enlarged. The patient is status post   median sternotomy.    Lung parenchyma/Pleura: Bilateral opacifications    Skeleton/soft tissues: Unremarkable.    Impression:    Cardiomegaly with bilateral opacifications. Follow-up as needed.        --- End of Report ---            ZAFAR TRAN MD; Attending Interventional Radiologist  This document has been electronically signed. Aug 31 2022  7:31AM (08-31-22 @ 04:54)    Physical Exam:  General: no acute distress, lying in bed  Head: normocephalic and atraumatic  Neck: supple  Heart: regular rate and rhythm, S1 and S2 normal, no murmurs, rubs or gallops noted on exam  Lungs: Symmetric chest expansion bilaterally, no wheezes or rhonchi heard, crackles heard at bases bilaterally posteriorly  Abdomen: Bowel sounds present, non tender on light and deep palpation  Extremities: No edema, no clubbing or cyanosis notes, positive peripheral pulses

## 2022-09-05 NOTE — CHART NOTE - NSCHARTNOTEFT_GEN_A_CORE
Registered Dietitian Follow-Up     Patient Profile Reviewed                           Yes [x]   No []     Nutrition History Previously Obtained        Yes [x]  No []       Pertinent Medical Interventions: p/w CHF exacerbation. Fluid overload noted. Stage 2 right buttock pressure ulcer. Sepsis criteria met on admission; noted likely due to aspiration pneumonia and acute on chronic HFrEF. Bilateral pleural effusion Right>Left. Worsening hypoxia, now on high flow nasal canula.       Diet order: Minced & Moist + DASH/TLC diet + 1200 mL fluids with minced & moist liquids.    Anthropometrics:  Ht: 188 cm.  Wt: 85 kg ( dosing wt)  BMI: 24.1 (using dosing wt 85 kg)  IBW: 86.4 kg    Daily Weight in kG: Weight in k (), Weight in k (), Weight in k (), Weight in k.3 (), Weight in k.8 (), Weight in k.8 (); Weight in k.2 (), Weight in k (), Weight in k.6 ()  Weight gain observed suspected to be r/t fluid related wt changes. Current wt 86 kg () consistent with dosing wt.    MEDICATIONS  (STANDING):  albuterol/ipratropium for Nebulization 3 milliLiter(s) Nebulizer every 6 hours  apixaban 2.5 milliGRAM(s) Oral two times a day  atorvastatin 40 milliGRAM(s) Oral at bedtime  BACItracin   Ointment 1 Application(s) Topical two times a day  buMETAnide Infusion 1 mG/Hr (5 mL/Hr) IV Continuous <Continuous>  chlorhexidine 2% Cloths 1 Application(s) Topical <User Schedule>  dextrose 5%. 1000 milliLiter(s) (50 mL/Hr) IV Continuous <Continuous>  dextrose 5%. 1000 milliLiter(s) (100 mL/Hr) IV Continuous <Continuous>  dextrose 50% Injectable 25 Gram(s) IV Push once  dextrose 50% Injectable 12.5 Gram(s) IV Push once  dextrose 50% Injectable 25 Gram(s) IV Push once  famotidine    Tablet 20 milliGRAM(s) Oral daily  glucagon  Injectable 1 milliGRAM(s) IntraMuscular once  insulin glargine Injectable (LANTUS) 22 Unit(s) SubCutaneous at bedtime  insulin lispro (ADMELOG) corrective regimen sliding scale   SubCutaneous three times a day before meals  isosorbide   mononitrate ER Tablet (IMDUR) 30 milliGRAM(s) Oral daily  levothyroxine 150 MICROGram(s) Oral daily  lidocaine 1% (Preservative-free) Injectable 30 milliLiter(s) Local Injection once  melatonin 5 milliGRAM(s) Oral at bedtime  metolazone 5 milliGRAM(s) Oral daily  metoprolol succinate  milliGRAM(s) Oral daily  multivitamin/minerals 1 Tablet(s) Oral daily  mycophenolate mofetil 500 milliGRAM(s) Oral four times a day  NIFEdipine XL 30 milliGRAM(s) Oral daily  pentoxifylline 400 milliGRAM(s) Oral two times a day  predniSONE   Tablet 5 milliGRAM(s) Oral daily  sodium chloride 3%. 150 milliLiter(s) (50 mL/Hr) IV Continuous <Continuous>  sodium chloride 3%. 150 milliLiter(s) (50 mL/Hr) IV Continuous <Continuous>  sodium chloride 3%. 150 milliLiter(s) (50 mL/Hr) IV Continuous <Continuous>  sodium chloride 3%. 150 milliLiter(s) (50 mL/Hr) IV Continuous <Continuous>  sodium chloride 3%. 150 milliLiter(s) (50 mL/Hr) IV Continuous <Continuous>  sodium chloride 3%. 150 milliLiter(s) (50 mL/Hr) IV Continuous <Continuous>  sodium chloride 3%. 150 milliLiter(s) (50 mL/Hr) IV Continuous <Continuous>  sodium chloride 3%. 150 milliLiter(s) (50 mL/Hr) IV Continuous <Continuous>  sodium chloride 3%. 150 milliLiter(s) (50 mL/Hr) IV Continuous <Continuous>  sodium chloride 3%. 150 milliLiter(s) (50 mL/Hr) IV Continuous <Continuous>  sodium chloride 3%. 150 milliLiter(s) (50 mL/Hr) IV Continuous <Continuous>  sodium chloride 3%. 150 milliLiter(s) (50 mL/Hr) IV Continuous <Continuous>  sodium chloride 3%. 150 milliLiter(s) (50 mL/Hr) IV Continuous <Continuous>  sodium chloride 3%. 150 milliLiter(s) (50 mL/Hr) IV Continuous <Continuous>  sodium chloride 3%. 150 milliLiter(s) (50 mL/Hr) IV Continuous <Continuous>  sodium chloride 3%. 150 milliLiter(s) (50 mL/Hr) IV Continuous <Continuous>  sodium chloride 3%. 150 milliLiter(s) (50 mL/Hr) IV Continuous <Continuous>  tacrolimus 0.5 milliGRAM(s) Oral two times a day    MEDICATIONS  (PRN):  acetaminophen     Tablet .. 650 milliGRAM(s) Oral every 6 hours PRN Mild Pain (1 - 3)  dextrose Oral Gel 15 Gram(s) Oral once PRN Blood Glucose LESS THAN 70 milliGRAM(s)/deciliter  dextrose Oral Gel 15 Gram(s) Oral once PRN Blood Glucose LESS THAN 70 milliGRAM(s)/deciliter    Pertinent Labs:  @ 17:35: Na 138, BUN 78<HH>, Cr 1.3, BG 95, K+ 4.0, Phos --, Mg 1.9   @ 07:30: Na 137, BUN 80<HH>, Cr 1.4, BG 30<LL>, K+ 3.8, Phos 3.5, Mg 2.0, Alk Phos 82, ALT/SGPT 10, AST/SGOT 18    Finger Sticks:  POCT Blood Glucose.: 97 mg/dL ( @ 20:59)  POCT Blood Glucose.: 113 mg/dL ( @ 16:26)  POCT Blood Glucose.: 109 mg/dL ( @ 11:21)  POCT Blood Glucose.: 96 mg/dL ( @ 08:53)  POCT Blood Glucose.: 44 mg/dL ( @ 07:39)    Hypoglycemia observed this morning; corrected at this time.    Physical Findings:  - Appearance: 1+ edema to B/L leg per progress notes; alert  - GI function: last BM ; no nausea/vomiting/diarrhea/constipation reported  - Tubes: no feeding tubes  - Oral/Mouth cavity: per  SLP eval: "+toleration of mildly thick w/ use of vol bolus hold and consecutive swallows, +SOB w/ mastication of minced and moist solids".  SLP recommendation: minced & moist w/ mildly thick liquids.  - Skin: stage II pressure ulcer (sacrum, R buttocks)     Nutrition Requirements:  Weight Used: 85 kg     Estimated Energy Needs    Continue [x]  Adjust []  1945-2431kcal/day (MSJ x1.2-1.5)        Estimated Protein Needs    Continue [x]  Adjust []  102-128g/day (1.2-1.5g/kg)        Estimated Fluid Needs        Continue [x]  Adjust []  1700-2125mL/day (20-25mL/kg)     Nutrient Intake: Poor intake d/t swallowing difficulty and poor appetite at this time. 0-25% of meal trays consumed; consuming 50% of 1 Glucerna bottle daily. Discussed with LIP to re-order Glucerna + Ensure Max daily, alongside Prosource Gelatein Plus as an additional source of kcal/protein.     [x] Previous Nutrition Diagnosis: Swallowing difficulty            [x] Ongoing          [] Resolved    [x] Previous Nutrition Diagnosis: Increased nutrient needs            [x] Ongoing          [] Resolved     Nutrition Intervention: Meals & Snacks, Medical Food Supplements, Nutrition Focused Physical Findings     Goal/Expected Outcome: Pt to demonstrate tolerance to nutrition regimen, meeting >50% of estimated nutrient needs over next 4 days. Pt at high nutrition risk.     Indicator/Monitoring: Energy intake, skin, diet order, nutrition focused physical findings, swallow function, BM, labs.    Recommendation:   (1) Remove DASH/TLC diet modifier to optimize energy intake & liberalize diet.  (2) Order Glucerna once daily (180 kcal, 10 g protein)  (3) Order Ensure Max twice daily (300 kcal, 60 g protein)  (4) Order Prosource Gelatein 20 sugar free once daily (80 kcal, 20 g protein)  (5) Order 220 mg Zinc Sulfate q24hrs ([id number]) for wound healing.

## 2022-09-06 NOTE — PROGRESS NOTE ADULT - ASSESSMENT
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    AFib, chronic - on eliquis   CAD s/p CABG  H/O DLD, HTN, DM2, Hypothyroidism     PLAN:    CNS:  no depressants     HEENT: Oral care    PULMONARY:  HOB @ 45 degrees.  Aspiration precautions.  Wean O2.  Sats 90-95.  Continue home inhlaers     CARDIOVASCULAR:  Continue Negative balance.     GI: GI prophylaxis.  Feeding.  Bowel regimen     RENAL:  Follow up lytes.  Correct as needed    INFECTIOUS DISEASE: Follow up cultures.  ID eval.  Dexa 6 mg daily     HEMATOLOGICAL:  DVT prophylaxis.  Switch to LMWH.      ENDOCRINE:  Follow up FS.  Insulin protocol if needed.    MUSCULOSKELETAL:  OOB to chair.  PT OT     OOB to chair

## 2022-09-06 NOTE — PROGRESS NOTE ADULT - SUBJECTIVE AND OBJECTIVE BOX
Date of Admission: 22      Interval History: Patient assessed in bed, awake and alert, appears mildly tachypniec. Reports feeling better, eager to work with physical therapy. States his appetite has slightly improved. Remains fluid overloaded. Kidney function stable, ~2.6L 24hr UOP.         CHIEF COMPLAINT: Patient is a 82y old  Male who presents with a chief complaint of chf exacerbation (22 Aug 2022 12:24)    HISTORY OF PRESENT ILLNESS: 83 yo male with PMHx of HFrEF (45-50% on Lasix 20 PO), A-Fib on Eliquis, CAD s/p CABGx3, PVD, COPD (was on rare home O2 PRN now on 3L NC since recent July admission for COVID), CKD3, C3 glomerulonephropathy, HTN, HLD, Hypothyroid presents from NH with progressively worsening SOB x5 days and hypoxia.  Recently hospitalized here for a month discharged on 8/3/22 initially for BRENDA on CKD course c/b COVID, bilateral lower lobe pneumonia, delirium and microaspiration of thin liquids on modified barium study. Received Dexamethasone and Zosyn course with new O2 requirements of 3L NC discharged to nursing home on 3L and thick liquid diet for rehab. Due to his SOB, chest x-ray was ordered on 8/15 but results did not come back and patient became increasingly short of breath and was noted to be hypoxic and rehab facility which prompted referral to the ED. Review of symptoms is notable for orthopnea and a chronic dry cough unchanged since his covid illness. He denies paroxysmal nocturnal dyspnea, leg edema, chest pain, palpitations, dizziness, n/v, abd pain. Cardiologist is Dr. Witt and Pulmonolgist is Dr. Rory Mars.   In ED:  T(F): 96.4 (22 @ 11:27), Max: 96.4 (22 @ 11:27)  HR: 80 (22 @ 16:20) (80 - 91)  BP: 137/61 (22 @ 16:20) (121/58 - 137/61)  RR: 18 (22 @ 16:20) (18 - 24)  SpO2: 100% (22 @ 16:20) (89% - 100%) -> 89% on 4L -> placed on NRB -> BIPAP 2/2 tachypnea and hypoxia -> taken off with increased work of breathing placed back on BIPAP  Labs: WBC 12k, Hgb 7.2 (baseline 9), BNP 55k, Cr 2.2 (baseline 1.5), PCO2 47 (VBG), Troponin 0.24  EKG:   Atrial fibrillation with premature ventricular or aberrantly conducted complexes  Left posterior fascicular block   ST & T wave abnormality, consider lateral ischemia - similar to prior in 2022  XR chest: significant bilateral pleural effusions and pulmonary congestion  Admitted to medicine for SOB/hypoxia likely 2/2 acute CHF exacerbation.  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    SOCIAL HISTORY:  Former cigarette smoker, quit >40 years ago. Denies alcohol or drug use.     Allergies    Ozempic (0.25 mg or 0.5 mg dose) (Hives; Rash)  streptomycin (Unknown)  Trulicity Pen (Hives; Rash)    Intolerances      PHYSICAL EXAM:  General Appearance: lethargic, normal for age and gender. 	  Neck: JVP [id number], no bruit.   Eyes: Extra Ocular muscles intact.   Cardiovascular: irregular rhythm S1 S2, + JVD, +2 B/L LE edema  Respiratory: decreased b/l  Psychiatry: Alert, oriented x 3, Mood & affect appropriate  Gastrointestinal:  Soft, Non-tender  Skin/Integumen: No rashes, No ecchymoses, No cyanosis	  Neurologic: Non-focal  Musculoskeletal/ extremities: Normal range of motion, No clubbing, cyanosis,+2 B/L LE edema  Vascular: Peripheral pulses palpable 2+ bilaterally    CARDIAC MARKERS:  Serum Pro-Brain Natriuretic Peptide: 31766 pg/mL (22 @ 11:47)    Serum Pro-Brain Natriuretic Peptide: 11399 pg/mL (10.20.21 @ 10:28)      TELEMETRY EVENTS: 	    EC22    Ventricular Rate 71 BPM  Atrial Rate 82 BPM  QRS Duration 140 ms  Q-T Interval 444 ms  QTC Calculation(Bazett) 482 ms  R Axis 108 degrees  T Axis 166 degrees    Diagnosis Line Atrial fibrillation with premature ventricular or aberrantlyconducted  complexes  Non-specific intra-ventricular conduction block  ST & T wave abnormality, consider lateral ischemia  Abnormal ECG    Confirmed by Sarah Borden MD ([id number]) on 2022 1:35:39 PM      	  PREVIOUS DIAGNOSTIC TESTING:    TTE 22      Summary:   1. Left ventricular ejection fraction, by visual estimation, is 45 to   50%.   2. Mildly decreased global left ventricular systolic function.   3. Mild left ventricular hypertrophy.   4. Multiple left ventricular regional wall motion abnormalities exist.   See wall motion findings.   5. The left ventricular diastolic function could not be assessed in this   study.   6. Moderately enlarged left atrium.   7. Sclerotic aortic valve with normal opening.   8. Degenerative mitral valve.   9. Mild mitral regurgitation.  10. Mild tricuspid regurgitation.  11. Estimated pulmonary artery systolic pressure is 37.6 mmHg assuming a   right atrial pressure of 8 mmHg, which is consistent with borderline   pulmonary hypertension.        TTE 10/22/21    Summary:   1. Moderately to severely decreased global left ventricular systolic function.   2. Severely enlarged left atrium.   3. Severely enlarged right atrium.   4. Multiple left ventricular regional wall motion abnormalities exist. See wall motion findings.   5. Mild eccentric left ventricular hypertrophy.   6. The left ventricular diastolic function could not be assessed in this study.   7. Severely enlarged right ventricle.   8. Severely reduced RV systolic function.   9. Mild to moderate mitral valve regurgitation.  10. Moderate mitral annular calcification.  11. Mild tricuspid regurgitation.  12. Mild aortic regurgitation.  13. Sclerotic aortic valve with normal opening.  14. Complex atheroma is noted in the ascending aorta.  15. Estimated pulmonary artery systolic pressure is 64.8 mmHg assuming a right atrial pressure of 15 mmHg, which is consistent with severe pulmonary hypertension.  16. Pulmonary hypertension is present.  17. LA volume Index is 71.9 ml/m² ml/m2.  18. There is moderate aortic root calcification.        Home Medications:  Aranesp 100 mcg/0.5 mL injectable solution: 1 dose(s) injectable every 3 to 4 weeks (2022 21:22)  atorvastatin 40 mg oral tablet: 1 tab(s) orally once a day (at bedtime) (:22)  calcitriol 0.25 mcg oral capsule: 1 cap(s) orally 2 times a week (:22)  d-mycophenolate: 500 milligram(s) orally 4 times a day (:22)  Eliquis 2.5 mg oral tablet: 1 tab(s) orally 2 times a day (:22)  famotidine 20 mg oral tablet: 1 tab(s) orally once a day (:22)  furosemide 20 mg oral tablet: 1 tab(s) orally once a day (03 Aug 2022 09:47)  insulin glargine 100 units/mL subcutaneous solution: 5 unit(s) subcutaneous once a day (at bedtime) (03 Aug 2022 09:47)  insulin lispro 100 units/mL injectable solution: 1 Unit(s) if Glucose 151 - 200  2 Unit(s) if Glucose 201 - 250  3 Unit(s) if Glucose 251 - 300  4 Unit(s) if Glucose 301 - 350  5 Unit(s) if Glucose 351 - 400  6 Unit(s) if Glucose Greater Than 400 (03 Aug 2022 09:57)  isosorbide mononitrate 30 mg oral tablet, extended release: 1 tab(s) orally once a day (in the morning) (:22)  levothyroxine 150 mcg (0.15 mg) oral tablet: 1 tab(s) orally once a day (:22)  metoprolol succinate 50 mg oral tablet, extended release: 1 tab(s) orally once a day (03 Aug 2022 09:47)  Multiple Vitamins with Minerals oral tablet: 1 tab(s) orally once a day (03 Aug 2022 09:47)  NIFEdipine 30 mg oral tablet, extended release: 1 tab(s) orally once a day (:22)  pentoxifylline 400 mg oral tablet, extended release: 1 tab(s) orally 2 times a day (2022 21:22)  sodium bicarbonate 650 mg oral tablet: 1 tab(s) orally 3 times a day (03 Aug 2022 09:47)  tacrolimus 0.5 mg oral capsule: 1 cap(s) orally 2 times a day (03 Aug 2022 09:57)    MEDICATIONS  (STANDING):  albuterol/ipratropium for Nebulization 3 milliLiter(s) Nebulizer every 6 hours  ampicillin/sulbactam  IVPB 3 Gram(s) IV Intermittent every 6 hours  apixaban 2.5 milliGRAM(s) Oral two times a day  atorvastatin 40 milliGRAM(s) Oral at bedtime  buMETAnide Injectable 2 milliGRAM(s) IV Push every 12 hours  dextrose 5%. 1000 milliLiter(s) (50 mL/Hr) IV Continuous <Continuous>  dextrose 5%. 1000 milliLiter(s) (100 mL/Hr) IV Continuous <Continuous>  dextrose 50% Injectable 25 Gram(s) IV Push once  dextrose 50% Injectable 12.5 Gram(s) IV Push once  dextrose 50% Injectable 25 Gram(s) IV Push once  famotidine    Tablet 20 milliGRAM(s) Oral daily  glucagon  Injectable 1 milliGRAM(s) IntraMuscular once  insulin glargine Injectable (LANTUS) 10 Unit(s) SubCutaneous every morning  insulin lispro (ADMELOG) corrective regimen sliding scale   SubCutaneous three times a day before meals  isosorbide   mononitrate ER Tablet (IMDUR) 30 milliGRAM(s) Oral daily  levothyroxine 150 MICROGram(s) Oral daily  methylPREDNISolone sodium succinate Injectable 40 milliGRAM(s) IV Push every 12 hours  metoprolol succinate ER 50 milliGRAM(s) Oral daily  multivitamin/minerals 1 Tablet(s) Oral daily  mycophenolate mofetil 500 milliGRAM(s) Oral four times a day  NIFEdipine XL 30 milliGRAM(s) Oral daily  pentoxifylline 400 milliGRAM(s) Oral two times a day  tacrolimus 0.5 milliGRAM(s) Oral two times a day    MEDICATIONS  (PRN):  dextrose Oral Gel 15 Gram(s) Oral once PRN Blood Glucose LESS THAN 70 milliGRAM(s)/deciliter Date of Admission: 22      Interval History: Patient assessed in bed, awake and alert, appears mildly tachypneic, but reports feeling better. States his appetite has slightly improved. Remains fluid overloaded. Kidney function stable, decreased urinary output noted.       CHIEF COMPLAINT: Patient is a 82y old  Male who presents with a chief complaint of chf exacerbation (22 Aug 2022 12:24)    HISTORY OF PRESENT ILLNESS: 83 yo male with PMHx of HFrEF (45-50% on Lasix 20 PO), A-Fib on Eliquis, CAD s/p CABGx3, PVD, COPD (was on rare home O2 PRN now on 3L NC since recent July admission for COVID), CKD3, C3 glomerulonephropathy, HTN, HLD, Hypothyroid presents from NH with progressively worsening SOB x5 days and hypoxia.  Recently hospitalized here for a month discharged on 8/3/22 initially for BRENDA on CKD course c/b COVID, bilateral lower lobe pneumonia, delirium and microaspiration of thin liquids on modified barium study. Received Dexamethasone and Zosyn course with new O2 requirements of 3L NC discharged to nursing home on 3L and thick liquid diet for rehab. Due to his SOB, chest x-ray was ordered on 8/15 but results did not come back and patient became increasingly short of breath and was noted to be hypoxic and rehab facility which prompted referral to the ED. Review of symptoms is notable for orthopnea and a chronic dry cough unchanged since his covid illness. He denies paroxysmal nocturnal dyspnea, leg edema, chest pain, palpitations, dizziness, n/v, abd pain. Cardiologist is Dr. Witt and Pulmonolgist is Dr. Rory Mars.   In ED:  T(F): 96.4 (22 @ 11:27), Max: 96.4 (22 @ 11:27)  HR: 80 (22 @ 16:20) (80 - 91)  BP: 137/61 (22 @ 16:20) (121/58 - 137/61)  RR: 18 (22 @ 16:20) (18 - 24)  SpO2: 100% (22 @ 16:20) (89% - 100%) -> 89% on 4L -> placed on NRB -> BIPAP 2/2 tachypnea and hypoxia -> taken off with increased work of breathing placed back on BIPAP  Labs: WBC 12k, Hgb 7.2 (baseline 9), BNP 55k, Cr 2.2 (baseline 1.5), PCO2 47 (VBG), Troponin 0.24  EKG:   Atrial fibrillation with premature ventricular or aberrantly conducted complexes  Left posterior fascicular block   ST & T wave abnormality, consider lateral ischemia - similar to prior in 2022  XR chest: significant bilateral pleural effusions and pulmonary congestion  Admitted to medicine for SOB/hypoxia likely 2/2 acute CHF exacerbation.  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    SOCIAL HISTORY:  Former cigarette smoker, quit >40 years ago. Denies alcohol or drug use.     Allergies    Ozempic (0.25 mg or 0.5 mg dose) (Hives; Rash)  streptomycin (Unknown)  Trulicity Pen (Hives; Rash)    Intolerances      PHYSICAL EXAM:  General Appearance: lethargic, normal for age and gender. 	  Neck: JVP [id number], no bruit.   Eyes: Extra Ocular muscles intact.   Cardiovascular: irregular rhythm S1 S2, + JVD, +2 B/L LE edema  Respiratory: decreased b/l  Psychiatry: Alert, oriented x 3, Mood & affect appropriate  Gastrointestinal:  Soft, Non-tender  Skin/Integumen: No rashes, No ecchymoses, No cyanosis	  Neurologic: Non-focal  Musculoskeletal/ extremities: Normal range of motion, No clubbing, cyanosis,+2 B/L LE edema  Vascular: Peripheral pulses palpable 2+ bilaterally    CARDIAC MARKERS:  Serum Pro-Brain Natriuretic Peptide: 14941 pg/mL (22 @ 11:47)    Serum Pro-Brain Natriuretic Peptide: 81563 pg/mL (10.20.21 @ 10:28)      TELEMETRY EVENTS: 	    EC22    Ventricular Rate 71 BPM  Atrial Rate 82 BPM  QRS Duration 140 ms  Q-T Interval 444 ms  QTC Calculation(Bazett) 482 ms  R Axis 108 degrees  T Axis 166 degrees    Diagnosis Line Atrial fibrillation with premature ventricular or aberrantlyconducted  complexes  Non-specific intra-ventricular conduction block  ST & T wave abnormality, consider lateral ischemia  Abnormal ECG    Confirmed by Sarah Borden MD ([id number]) on 2022 1:35:39 PM      	  PREVIOUS DIAGNOSTIC TESTING:    TTE 22      Summary:   1. Left ventricular ejection fraction, by visual estimation, is 45 to   50%.   2. Mildly decreased global left ventricular systolic function.   3. Mild left ventricular hypertrophy.   4. Multiple left ventricular regional wall motion abnormalities exist.   See wall motion findings.   5. The left ventricular diastolic function could not be assessed in this   study.   6. Moderately enlarged left atrium.   7. Sclerotic aortic valve with normal opening.   8. Degenerative mitral valve.   9. Mild mitral regurgitation.  10. Mild tricuspid regurgitation.  11. Estimated pulmonary artery systolic pressure is 37.6 mmHg assuming a   right atrial pressure of 8 mmHg, which is consistent with borderline   pulmonary hypertension.        TTE 10/22/21    Summary:   1. Moderately to severely decreased global left ventricular systolic function.   2. Severely enlarged left atrium.   3. Severely enlarged right atrium.   4. Multiple left ventricular regional wall motion abnormalities exist. See wall motion findings.   5. Mild eccentric left ventricular hypertrophy.   6. The left ventricular diastolic function could not be assessed in this study.   7. Severely enlarged right ventricle.   8. Severely reduced RV systolic function.   9. Mild to moderate mitral valve regurgitation.  10. Moderate mitral annular calcification.  11. Mild tricuspid regurgitation.  12. Mild aortic regurgitation.  13. Sclerotic aortic valve with normal opening.  14. Complex atheroma is noted in the ascending aorta.  15. Estimated pulmonary artery systolic pressure is 64.8 mmHg assuming a right atrial pressure of 15 mmHg, which is consistent with severe pulmonary hypertension.  16. Pulmonary hypertension is present.  17. LA volume Index is 71.9 ml/m² ml/m2.  18. There is moderate aortic root calcification.        Home Medications:  Aranesp 100 mcg/0.5 mL injectable solution: 1 dose(s) injectable every 3 to 4 weeks (2022 21:22)  atorvastatin 40 mg oral tablet: 1 tab(s) orally once a day (at bedtime) (:22)  calcitriol 0.25 mcg oral capsule: 1 cap(s) orally 2 times a week (:22)  d-mycophenolate: 500 milligram(s) orally 4 times a day (:22)  Eliquis 2.5 mg oral tablet: 1 tab(s) orally 2 times a day (:22)  famotidine 20 mg oral tablet: 1 tab(s) orally once a day (:22)  furosemide 20 mg oral tablet: 1 tab(s) orally once a day (03 Aug 2022 09:47)  insulin glargine 100 units/mL subcutaneous solution: 5 unit(s) subcutaneous once a day (at bedtime) (03 Aug 2022 09:47)  insulin lispro 100 units/mL injectable solution: 1 Unit(s) if Glucose 151 - 200  2 Unit(s) if Glucose 201 - 250  3 Unit(s) if Glucose 251 - 300  4 Unit(s) if Glucose 301 - 350  5 Unit(s) if Glucose 351 - 400  6 Unit(s) if Glucose Greater Than 400 (03 Aug 2022 09:57)  isosorbide mononitrate 30 mg oral tablet, extended release: 1 tab(s) orally once a day (in the morning) (:22)  levothyroxine 150 mcg (0.15 mg) oral tablet: 1 tab(s) orally once a day (:22)  metoprolol succinate 50 mg oral tablet, extended release: 1 tab(s) orally once a day (03 Aug 2022 09:47)  Multiple Vitamins with Minerals oral tablet: 1 tab(s) orally once a day (03 Aug 2022 09:47)  NIFEdipine 30 mg oral tablet, extended release: 1 tab(s) orally once a day (:22)  pentoxifylline 400 mg oral tablet, extended release: 1 tab(s) orally 2 times a day (2022 21:22)  sodium bicarbonate 650 mg oral tablet: 1 tab(s) orally 3 times a day (03 Aug 2022 09:47)  tacrolimus 0.5 mg oral capsule: 1 cap(s) orally 2 times a day (03 Aug 2022 09:57)    MEDICATIONS  (STANDING):  albuterol/ipratropium for Nebulization 3 milliLiter(s) Nebulizer every 6 hours  ampicillin/sulbactam  IVPB 3 Gram(s) IV Intermittent every 6 hours  apixaban 2.5 milliGRAM(s) Oral two times a day  atorvastatin 40 milliGRAM(s) Oral at bedtime  buMETAnide Injectable 2 milliGRAM(s) IV Push every 12 hours  dextrose 5%. 1000 milliLiter(s) (50 mL/Hr) IV Continuous <Continuous>  dextrose 5%. 1000 milliLiter(s) (100 mL/Hr) IV Continuous <Continuous>  dextrose 50% Injectable 25 Gram(s) IV Push once  dextrose 50% Injectable 12.5 Gram(s) IV Push once  dextrose 50% Injectable 25 Gram(s) IV Push once  famotidine    Tablet 20 milliGRAM(s) Oral daily  glucagon  Injectable 1 milliGRAM(s) IntraMuscular once  insulin glargine Injectable (LANTUS) 10 Unit(s) SubCutaneous every morning  insulin lispro (ADMELOG) corrective regimen sliding scale   SubCutaneous three times a day before meals  isosorbide   mononitrate ER Tablet (IMDUR) 30 milliGRAM(s) Oral daily  levothyroxine 150 MICROGram(s) Oral daily  methylPREDNISolone sodium succinate Injectable 40 milliGRAM(s) IV Push every 12 hours  metoprolol succinate ER 50 milliGRAM(s) Oral daily  multivitamin/minerals 1 Tablet(s) Oral daily  mycophenolate mofetil 500 milliGRAM(s) Oral four times a day  NIFEdipine XL 30 milliGRAM(s) Oral daily  pentoxifylline 400 milliGRAM(s) Oral two times a day  tacrolimus 0.5 milliGRAM(s) Oral two times a day    MEDICATIONS  (PRN):  dextrose Oral Gel 15 Gram(s) Oral once PRN Blood Glucose LESS THAN 70 milliGRAM(s)/deciliter

## 2022-09-06 NOTE — PROGRESS NOTE ADULT - ASSESSMENT
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 Found to be hypoxic to 89%- placed on BiPAP with improvement, now on venti-mask (can not tolerate NC 2/2 epistax). Found to be fluid overloaded, currently receiving high dose IV diuretics. Suspected PNA, IV antibiotics given (now d/c'd). Patient having epistaxis while on Eliquis- now holding, epistaxis improved. Pulmonology following- s/p thoracentesis 8/26, 1.5L removed.     Plan:    Patient remains fluid overloaded on exam: POCUS exam: IVC 2.7cm, <50% collapsibility   Continue Bumex gtt at 1 mg/hr  Continue 3% Hypertonic Saline 150ml BID (If infused throughout a central line, run over one hour, if a peripheral line is to be used run over 3 hours)  BMP twice daily with magnesium   Maintain potassium >4.0, Mg >2.2  Continue nifedipine 30mg daily and Imdur 30 mg daily   Continue metoprolol succinate 100mg daily  Continue iv iron sucrose 200 mg daily x3  BiPAP at night - wean before discharge if able  Strict intake and output needed for proper diuretic management   Daily weight   Physical therapy f/u / OOB to daily  Will continue to follow.     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 Found to be hypoxic to 89%- placed on BiPAP with improvement, now on venti-mask (can not tolerate NC 2/2 epistax). Found to be fluid overloaded, currently receiving high dose IV diuretics. Suspected PNA, IV antibiotics given (now d/c'd). Patient having epistaxis while on Eliquis- now holding, epistaxis improved. Pulmonology following- s/p thoracentesis 8/26, 1.5L removed.     Plan:  Patient remains fluid overloaded on exam: POCUS exam: IVC 2.5cm, <50% collapsibility   Continue Bumex gtt at 1 mg/hr  Give 3% Hypertonic Saline 150ml BID (If infused throughout a central line, run over one hour, if a peripheral line is to be used run over 3 hours)  Give metolazone 5mg x1   BMP twice daily with magnesium   Maintain potassium >4.0, Mg >2.2  Stop nifedipine 30mg daily and Imdur 30 mg daily   Continue metoprolol succinate 100mg daily  IV iron sucrose 200 mg daily x3 completed   Strict intake and output needed for proper diuretic management   Daily weight   Physical therapy f/u / OOB to daily  Plan discussed with primary team   Will continue to follow     Assessment: 83 yo male with PMHx of 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 Found to be hypoxic to 89%- placed on BiPAP with improvement, now on venti-mask (can not tolerate NC 2/2 epistax). Found to be fluid overloaded, currently receiving high dose IV diuretics. Suspected PNA, IV antibiotics given (now d/c'd). Patient having epistaxis while on Eliquis- now holding, epistaxis improved. Pulmonology following- s/p thoracentesis 8/26, 1.5L removed.     Plan:  Patient remains fluid overloaded on exam: POCUS exam: IVC 2.5cm, <50% collapsibility   Continue Bumex gtt at 1 mg/hr  Give 3% Hypertonic Saline 150ml BID (If infused throughout a central line, run over one hour, if a peripheral line is to be used run over 3 hours)  Give metolazone 5mg x1   BMP twice daily with magnesium   Maintain potassium >4.0, Mg >2.2  Stop nifedipine 30mg daily and Imdur 30 mg daily   Start Entresto 49-51 mg  Continue metoprolol succinate 100mg daily  IV iron sucrose 200 mg daily x3 completed   Strict intake and output needed for proper diuretic management   Daily weight   Physical therapy f/u / OOB to daily   Plan discussed with primary team   Will continue to follow     Assessment: 81 yo male with PMHx of 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 Found to be hypoxic to 89%- placed on BiPAP with improvement, now on venti-mask (can not tolerate NC 2/2 epistax). Found to be fluid overloaded, currently receiving high dose IV diuretics. Suspected PNA, IV antibiotics given (now d/c'd). Patient having epistaxis while on Eliquis- now holding, epistaxis improved. Pulmonology following- s/p thoracentesis 8/26, 1.5L removed.     Plan:  Patient remains fluid overloaded on exam: POCUS exam: IVC 2.5cm, <50% collapsibility   Continue Bumex gtt at 1 mg/hr  Give 3% Hypertonic Saline 150ml BID (If infused throughout a central line, run over one hour, if a peripheral line is to be used run over 3 hours)  Give metolazone 5mg x1   BMP twice daily with magnesium   Maintain potassium >4.0, Mg >2.2  Stop nifedipine 30mg daily and Imdur 30 mg daily   Start Entresto 49-51 mg  Continue metoprolol succinate 100mg daily  IV iron sucrose 200 mg daily x3 completed   Strict intake and output needed for proper diuretic management   Daily weight   Physical therapy f/u / OOB to daily   Plan discussed with primary team   Will continue to follow.

## 2022-09-06 NOTE — PROGRESS NOTE ADULT - ASSESSMENT
83 y/o man w PMHx of HTN, HLD, DM2, CAD s/p 3vCABG known to Dr De La Fuente, HFmEF w EF 45-50% on 8/1/22, AF on Eliquis, AAA, COPD baseline rare home O2 known to Dr ALO Mars, CKD3 and C3 glomerulopathy known to Dr Daniel, hypothyroidism, PVD, presented on 8/20/22 for SOB x5days and hypoxia to 70s. Admitted to medicine for SOB/hypoxia attributed to CHF exacerbation.   Has been on Bumex drip for HF exacerbation and is s/p R thoracentesis 8/26/22 w 1.5L transudative fluid removed.       #Acute Hypoxic Respiratory Failure  #Acute HFmrEF  #COPD  HFmEF w EF 45-50% on 8/1/22, COPD baseline rare home O2, 3L O2 since last admission known to Dr ALO Mars  Echo 8/1/22 showed LVEF 45-50%, multiple LV regional wall motion abnormalities, mild MR, mild TR, borderline pulm HTN   Thoracentesis 8/26/22 w 1.5L removed, transudative fluid.   No evidence of Pneumonia.   Worsening hypoxia, now on high flow nasal canula.    CXR 9/5 showed worsening right pleural effusion and right lung opacity.   - Continue Bumex drip at 1mg/hr and hypertonic saline  - Heart failure follow up, continue low Na diet, fluid restriction 1L/24 hrs.   - Pulmonary follow up for worsening pleural effusion, continue IV diuresis, no plan for thoracentesis.   - ICU consult for worsening hypoxia.   - Continue Prednisone (for C3 glomerulopathy)  - Continue duonebs PRN     CAD  Continue Metoprolol, Imdur 30mg QD, and Atorvastatin 40mg qhs.     HTN:   continue metoprolol, Nifedipine 30mg QD   will try to wean off nifedipine given HFmrEF    DM II: A1C 7.3.   Episode of hypoglycemia for last 24 hrs, patient is not eating well, continue Lantus 22 units,  Hold Lispro 7 units before meals for now. Diabetic diet.     Chronic Atrial Fibrillation :  continue metoprolol and Eliquis.     C3 Glomerulonephropathy  Continue Prednisone 5mg daily, tacrolimus 0.5mg BID and Mycophenolate 500mg 4 times daily.     #Acute on chronic anemia  #Epistaxis - resolved   s/p 1 U PRBC 8/22/22 and 8/24/22 for Hb <8  Continue IV Venofer 200mg daily for 3 days.   ENT consulted 8/23/22 for epistaxis/ blood clots blocking nasal passage       #Leukocytosis  likely from steroids    Hypothyroidism: continue synthroid    Stage 2 right buttock pressure ulcer  Present on admission  Continue local wound care, off loading.     DVT prophylaxis:     #Progress Note Handoff:  - Hb <8 yesterday.      83 y/o man w PMHx of HTN, HLD, DM2, CAD s/p 3vCABG known to Dr De La Fuente, HFmEF w EF 45-50% on 8/1/22, AF on Eliquis, AAA, COPD baseline rare home O2 known to Dr ALO Mars, CKD3 and C3 glomerulopathy known to Dr Daniel, hypothyroidism, PVD, presented on 8/20/22 for SOB x5days and hypoxia to 70s. Admitted to medicine for SOB/hypoxia attributed to CHF exacerbation.   Has been on Bumex drip for HF exacerbation and is s/p R thoracentesis 8/26/22 w 1.5L transudative fluid removed.       #Acute Hypoxic Respiratory Failure  #Acute HFmrEF  #COPD  HFmEF w EF 45-50% on 8/1/22; multiple LV regional wall motion abnormalities, mild MR, mild TR, borderline pulm HTN   COPD baseline rare home O2, 3L O2 since last admission known to Dr ALO Mars  Thoracentesis 8/26/22 w 1.5L removed, transudative fluid.   Worsening hypoxia, now on HFNC   CXR 9/5 showed worsening right pleural effusion and right lung opacity. No evidence of PNA.   - Continue Bumex drip at 1mg/hr and d/c hypertonic saline  - Heart failure follow up, continue low Na diet, fluid restriction 1L/24 hrs.   - Pulmonary follow up for worsening pleural effusion, continue IV diuresis  - Continue Prednisone (for C3 glomerulopathy)  - Continue duonebs PRN   - Transition eliquis to Lovenox 80mg BID for possible future repeat thoracentesis.   - Start dexamethasone 6mg PO QD [id number] thru 9/16/22. Consult ID.   - Labs: Pending procal, ferritin. 9/6/22: ddimer 1035, , CRP 69.1.     #CAD  Continue Metoprolol 100mg QD, Imdur 30mg QD, and Atorvastatin 40mg qhs.     #HTN:   continue metoprolol, Nifedipine 30mg QD   will try to wean off nifedipine given HFmrEF    #DM II: A1C 7.3.   Episode of hypoglycemia for last 24 hrs, patient is not eating well  - Decrease Lantus to 17u, Off Lispro.     #Acute on chronic anemia; goal Hb 8+  #Epistaxis - resolved   s/p 1 U PRBC 8/22/22 and 8/24/22 for Hb <8  Continue IV Venofer 200mg daily for 3 days.   ENT consulted 8/23/22 for epistaxis/ blood clots blocking nasal passage   - Hb 7.7 today stable as compared to 7.6 yesterday  - Active T&S from today; ordered q3d through 10/3/22     #Chronic Atrial Fibrillation: continue metoprolol and Eliquis.   #C3 Glomerulonephropathy: Continue Pred 5mg QD, [place] 0.5mg BID, Mycophenolate 500mg 4 times daily.   #Leukocytosis: likely from steroids; monitor off abx   #Hypothyroidism: continue synthroid  #Stage 2 right buttock pressure ulcer: present on admission. Continue local wound care, off loading.                                                                                   ----------------------------------------------------  # DVT prophylaxis: Transition eliquis to Lovenox 80mg BID for possible future repeat thoracentesis.   # GI prophylaxis: Famotidine 20mg QD   # Diet: Minced and moist   # Activity: IAT   # Code status: DNR DNI   # Disposition: Keep in CEU for now                                                                            --------------------------------------------------------    # Handoff:   - Trend Hb   - Wean HFNC   - F/u ID recs  - F/u FS     81 y/o man w PMHx of HTN, HLD, DM2, CAD s/p 3vCABG known to Dr De La Fuente, HFmEF w EF 45-50% on 8/1/22, AF on Eliquis, AAA, COPD baseline rare home O2 known to Dr ALO Mars, CKD3 and C3 glomerulopathy known to Dr Daniel, hypothyroidism, PVD, presented on 8/20/22 for SOB x5days and hypoxia to 70s. Admitted to medicine for SOB/hypoxia attributed to CHF exacerbation.   Has been on Bumex drip for HF exacerbation and is s/p R thoracentesis 8/26/22 w 1.5L transudative fluid removed.     HOLDING LOVENOX FOR THORACENTESIS 9/7/22    #Acute Hypoxic Respiratory Failure  #Acute HFmrEF  #COPD  HFmEF w EF 45-50% on 8/1/22; multiple LV regional wall motion abnormalities, mild MR, mild TR, borderline pulm HTN   COPD baseline rare home O2, 3L O2 since last admission known to Dr ALO Mars  Thoracentesis 8/26/22 w 1.5L removed, transudative fluid.   Worsening hypoxia, now on HFNC   CXR 9/5 showed worsening right pleural effusion and right lung opacity. No evidence of PNA.   - Continue Bumex drip at 1mg/hr and d/c hypertonic saline  - Heart failure follow up, continue low Na diet, fluid restriction 1L/24 hrs.   - Pulmonary follow up for worsening pleural effusion, continue IV diuresis  - Continue Prednisone (for C3 glomerulopathy)  - Continue duonebs PRN   - Transition eliquis to Lovenox 80mg BID for possible future repeat thoracentesis:   HOLDING LOVENOX FOR THORACENTESIS 9/7/22  - Start dexamethasone 6mg PO QD [id number] thru 9/16/22. Consult ID.   - Labs: Pending procal, ferritin. 9/6/22: ddimer 1035, , CRP 69.1.     #CAD  Continue Metoprolol 100mg QD, Imdur 30mg QD, and Atorvastatin 40mg qhs.     #HTN:   continue metoprolol, Nifedipine 30mg QD   will try to wean off nifedipine given HFmrEF    #DM II: A1C 7.3.   Episode of hypoglycemia for last 24 hrs, patient is not eating well  - Decrease Lantus to 17u, Off Lispro.     #Acute on chronic anemia; goal Hb 8+  #Epistaxis - resolved   s/p 1 U PRBC 8/22/22 and 8/24/22 for Hb <8  Continue IV Venofer 200mg daily for 3 days.   ENT consulted 8/23/22 for epistaxis/ blood clots blocking nasal passage   - Hb 7.7 today stable as compared to 7.6 yesterday  - Active T&S from today; ordered q3d through 10/3/22     #Chronic Atrial Fibrillation: continue metoprolol and Eliquis.   #C3 Glomerulonephropathy: Continue Pred 5mg QD, [place] 0.5mg BID, Mycophenolate 500mg 4 times daily.   #Leukocytosis: likely from steroids; monitor off abx   #Hypothyroidism: continue synthroid  #Stage 2 right buttock pressure ulcer: present on admission. Continue local wound care, off loading.                                                                                   ----------------------------------------------------  # DVT prophylaxis: Transition eliquis to Lovenox 80mg BID for possible future repeat thoracentesis. HOLDING LOVENOX FOR THORACENTESIS 9/7/22  # GI prophylaxis: Famotidine 20mg QD   # Diet: Minced and moist   # Activity: IAT   # Code status: DNR DNI   # Disposition: Keep in CEU for now                                                                            --------------------------------------------------------    # Handoff:   - Trend Hb   - Wean HFNC   - F/u ID recs  - F/u FS

## 2022-09-06 NOTE — PROGRESS NOTE ADULT - SUBJECTIVE AND OBJECTIVE BOX
Patient is a 82y old  Male who presents with a chief complaint of chf exacerbation (06 Sep 2022 08:19)    HPI:  81 yo male with PMHx of HFrEF (45-50% on Lasix 20 PO), A-Fib on Eliquis, CAD s/p CABGx3, PVD, COPD (was on rare home O2 PRN now on 3L NC since recent July admission for COVID), CKD3, C3 glomerulonephropathy, HTN, HLD, Hypothyroid presents from NH with progressively worsening SOB x5 days and hypoxia.  Recently hospitalized here for a month discharged on 8/3/22 initially for BRENDA on CKD course c/b COVID, bilateral lower lobe pneumonia, delirium and microaspiration of thin liquids on modified barium study. Received Dexamethasone and Zosyn course with new O2 requirements of 3L NC discharged to nursing home on 3L and thick liquid diet for rehab. Due to his SOB, chest x-ray was ordered on 8/15 but results did not come back and patient became increasingly short of breath and was noted to be hypoxic and rehab facility which prompted referral to the ED. Review of symptoms is notable for orthopnea and a chronic dry cough unchanged since his covid illness. He denies paroxysmal nocturnal dyspnea, leg edema, chest pain, palpitations, dizziness, n/v, abd pain. Cardiologist is Dr. Witt and Pulmonolgist is Dr. Rory Mars.     In ED:  T(F): 96.4 (08-20-22 @ 11:27), Max: 96.4 (08-20-22 @ 11:27)  HR: 80 (08-20-22 @ 16:20) (80 - 91)  BP: 137/61 (08-20-22 @ 16:20) (121/58 - 137/61)  RR: 18 (08-20-22 @ 16:20) (18 - 24)  SpO2: 100% (08-20-22 @ 16:20) (89% - 100%) -> 89% on 4L -> placed on NRB -> BIPAP 2/2 tachypnea and hypoxia -> taken off with increased work of breathing placed back on BIPAP  Labs: WBC 12k, Hgb 7.2 (baseline 9), BNP 55k, Cr 2.2 (baseline 1.5), PCO2 47 (VBG), Troponin 0.24  EKG:   Atrial fibrillation with premature ventricular or aberrantly conducted complexes  Left posterior fascicular block   ST & T wave abnormality, consider lateral ischemia - similar to prior in July 2022  XR chest: significant bilateral pleural effusions and pulmonary congestion  Admitted to medicine for SOB/hypoxia likely 2/2 acute CHF exacerbation.  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      AF (atrial fibrillation)  s/p DCCV      Carotid stenosis      COPD (chronic obstructive pulmonary disease)      H/O coronary artery bypass surgery  2001      S/P inguinal hernia repair      History of appendectomy        Vital Signs Last 24 Hrs  T(C): 36.8 (06 Sep 2022 08:32), Max: 36.8 (05 Sep 2022 23:10)  T(F): 98.2 (06 Sep 2022 08:32), Max: 98.3 (05 Sep 2022 23:10)  HR: 75 (06 Sep 2022 08:32) (66 - 93)  BP: 141/65 (06 Sep 2022 08:32) (118/73 - 141/65)  BP(mean): 93 (06 Sep 2022 08:32) (91 - 93)  RR: 18 (06 Sep 2022 08:32) (18 - 18)  SpO2: 92% (06 Sep 2022 08:32) (92% - 96%)    Parameters below as of 06 Sep 2022 08:32  Patient On (Oxygen Delivery Method): nasal cannula, high flow                            7.7    11.94 )-----------( 247      ( 06 Sep 2022 08:44 )             24.9     09-06    140  |  101  |  81<HH>  ----------------------------<  112<H>  3.8   |  27  |  1.2    Ca    8.2<L>      06 Sep 2022 08:44  Phos  3.5     09-05  Mg     1.9     09-06    TPro  5.8<L>  /  Alb  3.2<L>  /  TBili  1.0  /  DBili  x   /  AST  19  /  ALT  10  /  AlkPhos  84  09-06              MEDICATIONS  (STANDING):  albuterol/ipratropium for Nebulization 3 milliLiter(s) Nebulizer every 6 hours  atorvastatin 40 milliGRAM(s) Oral at bedtime  BACItracin   Ointment 1 Application(s) Topical two times a day  buMETAnide Infusion 1 mG/Hr (5 mL/Hr) IV Continuous <Continuous>  chlorhexidine 2% Cloths 1 Application(s) Topical <User Schedule>  dexAMETHasone     Tablet 6 milliGRAM(s) Oral daily  dextrose 5%. 1000 milliLiter(s) (100 mL/Hr) IV Continuous <Continuous>  dextrose 5%. 1000 milliLiter(s) (50 mL/Hr) IV Continuous <Continuous>  dextrose 50% Injectable 25 Gram(s) IV Push once  dextrose 50% Injectable 12.5 Gram(s) IV Push once  dextrose 50% Injectable 25 Gram(s) IV Push once  enoxaparin Injectable 80 milliGRAM(s) SubCutaneous every 12 hours  famotidine    Tablet 20 milliGRAM(s) Oral daily  glucagon  Injectable 1 milliGRAM(s) IntraMuscular once  insulin glargine Injectable (LANTUS) 17 Unit(s) SubCutaneous at bedtime  insulin lispro (ADMELOG) corrective regimen sliding scale   SubCutaneous three times a day before meals  isosorbide   mononitrate ER Tablet (IMDUR) 30 milliGRAM(s) Oral daily  levothyroxine 150 MICROGram(s) Oral daily  lidocaine 1% (Preservative-free) Injectable 30 milliLiter(s) Local Injection once  melatonin 5 milliGRAM(s) Oral at bedtime  metolazone 5 milliGRAM(s) Oral daily  metoprolol succinate  milliGRAM(s) Oral daily  multivitamin/minerals 1 Tablet(s) Oral daily  mycophenolate mofetil 500 milliGRAM(s) Oral four times a day  NIFEdipine XL 30 milliGRAM(s) Oral daily  pentoxifylline 400 milliGRAM(s) Oral two times a day  tacrolimus 0.5 milliGRAM(s) Oral two times a day     Patient is a 82y old  Male who presents with a chief complaint of chf exacerbation (06 Sep 2022 08:19)    HPI:  81 yo male with PMHx of HFrEF (45-50% on Lasix 20 PO), A-Fib on Eliquis, CAD s/p CABGx3, PVD, COPD (was on rare home O2 PRN now on 3L NC since recent July admission for COVID), CKD3, C3 glomerulonephropathy, HTN, HLD, Hypothyroid presents from NH with progressively worsening SOB x5 days and hypoxia.  Recently hospitalized here for a month discharged on 8/3/22 initially for BRENDA on CKD course c/b COVID, bilateral lower lobe pneumonia, delirium and microaspiration of thin liquids on modified barium study. Received Dexamethasone and Zosyn course with new O2 requirements of 3L NC discharged to nursing home on 3L and thick liquid diet for rehab. Due to his SOB, chest x-ray was ordered on 8/15 but results did not come back and patient became increasingly short of breath and was noted to be hypoxic and rehab facility which prompted referral to the ED. Review of symptoms is notable for orthopnea and a chronic dry cough unchanged since his covid illness. He denies paroxysmal nocturnal dyspnea, leg edema, chest pain, palpitations, dizziness, n/v, abd pain. Cardiologist is Dr. Witt and Pulmonolgist is Dr. Rory Mars.   Admitted to medicine for SOB/hypoxia likely 2/2 acute CHF exacerbation.  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    hospital day #17    overnight upgraded to SDU For worsening hypoxia and acute respiratory failure    Vital Signs Last 24 Hrs  T(C): 36.8 (06 Sep 2022 08:32), Max: 36.8 (05 Sep 2022 23:10)  T(F): 98.2 (06 Sep 2022 08:32), Max: 98.3 (05 Sep 2022 23:10)  HR: 75 (06 Sep 2022 08:32) (66 - 93)  BP: 141/65 (06 Sep 2022 08:32) (118/73 - 141/65)  BP(mean): 93 (06 Sep 2022 08:32) (91 - 93)  RR: 18 (06 Sep 2022 08:32) (18 - 18)  SpO2: 92% (06 Sep 2022 08:32) (92% - 96%)    Parameters below as of 06 Sep 2022 08:32  Patient On (Oxygen Delivery Method): nasal cannula, high flow                            7.7    11.94 )-----------( 247      ( 06 Sep 2022 08:44 )             24.9     09-06    140  |  101  |  81<HH>  ----------------------------<  112<H>  3.8   |  27  |  1.2    Ca    8.2<L>      06 Sep 2022 08:44  Phos  3.5     09-05  Mg     1.9     09-06    TPro  5.8<L>  /  Alb  3.2<L>  /  TBili  1.0  /  DBili  x   /  AST  19  /  ALT  10  /  AlkPhos  84  09-06              MEDICATIONS  (STANDING):  albuterol/ipratropium for Nebulization 3 milliLiter(s) Nebulizer every 6 hours  atorvastatin 40 milliGRAM(s) Oral at bedtime  BACItracin   Ointment 1 Application(s) Topical two times a day  buMETAnide Infusion 1 mG/Hr (5 mL/Hr) IV Continuous <Continuous>  chlorhexidine 2% Cloths 1 Application(s) Topical <User Schedule>  dexAMETHasone     Tablet 6 milliGRAM(s) Oral daily  dextrose 5%. 1000 milliLiter(s) (100 mL/Hr) IV Continuous <Continuous>  dextrose 5%. 1000 milliLiter(s) (50 mL/Hr) IV Continuous <Continuous>  dextrose 50% Injectable 25 Gram(s) IV Push once  dextrose 50% Injectable 12.5 Gram(s) IV Push once  dextrose 50% Injectable 25 Gram(s) IV Push once  enoxaparin Injectable 80 milliGRAM(s) SubCutaneous every 12 hours  famotidine    Tablet 20 milliGRAM(s) Oral daily  glucagon  Injectable 1 milliGRAM(s) IntraMuscular once  insulin glargine Injectable (LANTUS) 17 Unit(s) SubCutaneous at bedtime  insulin lispro (ADMELOG) corrective regimen sliding scale   SubCutaneous three times a day before meals  isosorbide   mononitrate ER Tablet (IMDUR) 30 milliGRAM(s) Oral daily  levothyroxine 150 MICROGram(s) Oral daily  lidocaine 1% (Preservative-free) Injectable 30 milliLiter(s) Local Injection once  melatonin 5 milliGRAM(s) Oral at bedtime  metolazone 5 milliGRAM(s) Oral daily  metoprolol succinate  milliGRAM(s) Oral daily  multivitamin/minerals 1 Tablet(s) Oral daily  mycophenolate mofetil 500 milliGRAM(s) Oral four times a day  NIFEdipine XL 30 milliGRAM(s) Oral daily  pentoxifylline 400 milliGRAM(s) Oral two times a day  tacrolimus 0.5 milliGRAM(s) Oral two times a day     Patient is a 82y old  Male who presents with a chief complaint of chf exacerbation (06 Sep 2022 08:19)    HPI:  83 yo male with PMHx of HFrEF (45-50% on Lasix 20 PO), A-Fib on Eliquis, CAD s/p CABGx3, PVD, COPD (was on rare home O2 PRN now on 3L NC since recent July admission for COVID), CKD3, C3 glomerulonephropathy, HTN, HLD, Hypothyroid presents from NH with progressively worsening SOB x5 days and hypoxia.  Recently hospitalized here for a month discharged on 8/3/22 initially for BRENDA on CKD course c/b COVID, bilateral lower lobe pneumonia, delirium and microaspiration of thin liquids on modified barium study. Received Dexamethasone and Zosyn course with new O2 requirements of 3L NC discharged to nursing home on 3L and thick liquid diet for rehab. Due to his SOB, chest x-ray was ordered on 8/15 but results did not come back and patient became increasingly short of breath and was noted to be hypoxic and rehab facility which prompted referral to the ED. Review of symptoms is notable for orthopnea and a chronic dry cough unchanged since his covid illness. He denies paroxysmal nocturnal dyspnea, leg edema, chest pain, palpitations, dizziness, n/v, abd pain. Cardiologist is Dr. Witt and Pulmonolgist is Dr. Rory Mars.   Admitted to medicine for SOB/hypoxia likely 2/2 acute CHF exacerbation.  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    hospital day #17    upgraded to SDU For worsening hypoxia and acute respiratory failure    Vital Signs Last 24 Hrs  T(C): 36.8 (06 Sep 2022 08:32), Max: 36.8 (05 Sep 2022 23:10)  T(F): 98.2 (06 Sep 2022 08:32), Max: 98.3 (05 Sep 2022 23:10)  HR: 75 (06 Sep 2022 08:32) (66 - 93)  BP: 141/65 (06 Sep 2022 08:32) (118/73 - 141/65)  BP(mean): 93 (06 Sep 2022 08:32) (91 - 93)  RR: 18 (06 Sep 2022 08:32) (18 - 18)  SpO2: 92% (06 Sep 2022 08:32) (92% - 96%)    Parameters below as of 06 Sep 2022 08:32  Patient On (Oxygen Delivery Method): nasal cannula, high flow    PE:  GEN-NAD, alert and awake  PULM-decreased bs bilateral bases-  CVS- +s1/s2 RRR   GI- soft NT ND +bs, no rebound, no guarding  EXT- no edema                        7.7    11.94 )-----------( 247      ( 06 Sep 2022 08:44 )             24.9     09-06    140  |  101  |  81<HH>  ----------------------------<  112<H>  3.8   |  27  |  1.2    Ca    8.2<L>      06 Sep 2022 08:44  Phos  3.5     09-05  Mg     1.9     09-06    TPro  5.8<L>  /  Alb  3.2<L>  /  TBili  1.0  /  DBili  x   /  AST  19  /  ALT  10  /  AlkPhos  84  09-06              MEDICATIONS  (STANDING):  albuterol/ipratropium for Nebulization 3 milliLiter(s) Nebulizer every 6 hours  atorvastatin 40 milliGRAM(s) Oral at bedtime  BACItracin   Ointment 1 Application(s) Topical two times a day  buMETAnide Infusion 1 mG/Hr (5 mL/Hr) IV Continuous <Continuous>  chlorhexidine 2% Cloths 1 Application(s) Topical <User Schedule>  dexAMETHasone     Tablet 6 milliGRAM(s) Oral daily  dextrose 5%. 1000 milliLiter(s) (100 mL/Hr) IV Continuous <Continuous>  dextrose 5%. 1000 milliLiter(s) (50 mL/Hr) IV Continuous <Continuous>  dextrose 50% Injectable 25 Gram(s) IV Push once  dextrose 50% Injectable 12.5 Gram(s) IV Push once  dextrose 50% Injectable 25 Gram(s) IV Push once  enoxaparin Injectable 80 milliGRAM(s) SubCutaneous every 12 hours  famotidine    Tablet 20 milliGRAM(s) Oral daily  glucagon  Injectable 1 milliGRAM(s) IntraMuscular once  insulin glargine Injectable (LANTUS) 17 Unit(s) SubCutaneous at bedtime  insulin lispro (ADMELOG) corrective regimen sliding scale   SubCutaneous three times a day before meals  isosorbide   mononitrate ER Tablet (IMDUR) 30 milliGRAM(s) Oral daily  levothyroxine 150 MICROGram(s) Oral daily  lidocaine 1% (Preservative-free) Injectable 30 milliLiter(s) Local Injection once  melatonin 5 milliGRAM(s) Oral at bedtime  metolazone 5 milliGRAM(s) Oral daily  metoprolol succinate  milliGRAM(s) Oral daily  multivitamin/minerals 1 Tablet(s) Oral daily  mycophenolate mofetil 500 milliGRAM(s) Oral four times a day  NIFEdipine XL 30 milliGRAM(s) Oral daily  pentoxifylline 400 milliGRAM(s) Oral two times a day  tacrolimus 0.5 milliGRAM(s) Oral two times a day

## 2022-09-06 NOTE — PROGRESS NOTE ADULT - SUBJECTIVE AND OBJECTIVE BOX
ANDRIA TAVAREZ 82y Male  MRN#: [id number]   CODE STATUS:     Admission Date: 08-20-22  Hospital Day: 17d    Pt is currently admitted with the primary diagnosis of     SUBJECTIVE  Hospital Course  81 y/o man w PMHx of HTN, HLD, DM2, CAD s/p 3vCABG known to Dr De La Fuente, HFmEF w EF 45-50% on 8/1/22, AF on Eliquis, AAA, COPD baseline rare home O2 known to Dr ALO Mars, CKD3 and C3 glomerulopathy known to Dr Daniel, hypothyroidism, PVD, recent admission 7/5/22-8/3/22 for COVID and BRENDA 2/2 diarrhea, tx w dexamethasone and Zosyn, stay c/b microaspirations, PNA, was d/c to STR, has been on 3L O2, presented on 8/20/22 for SOB x5days and hypoxia to 70s. Had CXR done on 8/15/22 but results had not been back. ED vitals were notable for RR max 24, SpO2 84% on 4L O2, required BiPAP, had WBC 12, HB 7.2 from baseline 9, BNP 55k, Cr 2.2 from baseline 1.5, and trop 0.24, EKG w AF RVR and CXR showing significant b/l pleural effusions, pulm congestion, admitted to medicine for SOB/hypoxia attributed to CHF exacerbation.   Has been on Bumex drip for HF exacerbation and is s/p R thoracentesis 8/26/22 w 1.5L transudative fluid removed. Has had epistaxis during stay. Is s/p 1uPRBC, start 3 day course of IV venofer 200mg. Upgraded to CEU for increasing O2 requirements despite venti mask, HFNC.     Overnight events      Subjective complaints        T(C): 36.7 (09-06-22 @ 03:21)  T(F): 98.1 (09-06-22 @ 03:21)  HR: 84 (09-06-22 @ 03:21)  BP: 130/81 (09-06-22 @ 03:21)  RR: 18 (09-06-22 @ 03:21)  SpO2: 96% (09-06-22 @ 03:21)        Present Today:   - Gloria:  No [  ], Yes [  ] : Indication:   - Type of IV Access:       .. CVC/Piccline:  No [  ], Yes [  ] : Indication:       .. 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    AF (atrial fibrillation)  s/p DCCV    Carotid stenosis    COPD (chronic obstructive pulmonary disease)    H/O coronary artery bypass surgery  2001    S/P inguinal hernia repair    History of appendectomy                                                ALLERGIES:  Ozempic (0.25 mg or 0.5 mg dose) (Hives; Rash)  streptomycin (Unknown)  Trulicity Pen (Hives; Rash)                           HOME MEDICATIONS  Home Medications:  Aranesp 100 mcg/0.5 mL injectable solution: 1 dose(s) injectable every 3 to 4 weeks (05 Jul 2022 21:22)  atorvastatin 40 mg oral tablet: 1 tab(s) orally once a day (at bedtime) (05 Jul 2022 21:22)  calcitriol 0.25 mcg oral capsule: 1 cap(s) orally 2 times a week (05 Jul 2022 21:22)  d-mycophenolate: 500 milligram(s) orally 4 times a day (05 Jul 2022 21:22)  Eliquis 2.5 mg oral tablet: 1 tab(s) orally 2 times a day (05 Jul 2022 21:22)  famotidine 20 mg oral tablet: 1 tab(s) orally once a day (05 Jul 2022 21:22)  furosemide 20 mg oral tablet: 1 tab(s) orally once a day (03 Aug 2022 09:47)  insulin glargine 100 units/mL subcutaneous solution: 5 unit(s) subcutaneous once a day (at bedtime) (03 Aug 2022 09:47)  insulin lispro 100 units/mL injectable solution: 1 Unit(s) if Glucose 151 - 200  2 Unit(s) if Glucose 201 - 250  3 Unit(s) if Glucose 251 - 300  4 Unit(s) if Glucose 301 - 350  5 Unit(s) if Glucose 351 - 400  6 Unit(s) if Glucose Greater Than 400 (03 Aug 2022 09:57)  isosorbide mononitrate 30 mg oral tablet, extended release: 1 tab(s) orally once a day (in the morning) (05 Jul 2022 21:22)  levothyroxine 150 mcg (0.15 mg) oral tablet: 1 tab(s) orally once a day (05 Jul 2022 21:22)  metoprolol succinate 50 mg oral tablet, extended release: 1 tab(s) orally once a day (03 Aug 2022 09:47)  Multiple Vitamins with Minerals oral tablet: 1 tab(s) orally once a day (03 Aug 2022 09:47)  NIFEdipine 30 mg oral tablet, extended release: 1 tab(s) orally once a day (05 Jul 2022 21:22)  pentoxifylline 400 mg oral tablet, extended release: 1 tab(s) orally 2 times a day (05 Jul 2022 21:22)  sodium bicarbonate 650 mg oral tablet: 1 tab(s) orally 3 times a day (03 Aug 2022 09:47)  tacrolimus 0.5 mg oral capsule: 1 cap(s) orally 2 times a day (03 Aug 2022 09:57)                           MEDICATIONS:  STANDING MEDICATIONS  albuterol/ipratropium for Nebulization 3 milliLiter(s) Nebulizer every 6 hours  apixaban 2.5 milliGRAM(s) Oral two times a day  atorvastatin 40 milliGRAM(s) Oral at bedtime  BACItracin   Ointment 1 Application(s) Topical two times a day  buMETAnide Infusion 1 mG/Hr IV Continuous <Continuous>  chlorhexidine 2% Cloths 1 Application(s) Topical <User Schedule>  dextrose 5%. 1000 milliLiter(s) IV Continuous <Continuous>  dextrose 5%. 1000 milliLiter(s) IV Continuous <Continuous>  dextrose 50% Injectable 25 Gram(s) IV Push once  dextrose 50% Injectable 12.5 Gram(s) IV Push once  dextrose 50% Injectable 25 Gram(s) IV Push once  famotidine    Tablet 20 milliGRAM(s) Oral daily  glucagon  Injectable 1 milliGRAM(s) IntraMuscular once  insulin glargine Injectable (LANTUS) 22 Unit(s) SubCutaneous at bedtime  insulin lispro (ADMELOG) corrective regimen sliding scale   SubCutaneous three times a day before meals  isosorbide   mononitrate ER Tablet (IMDUR) 30 milliGRAM(s) Oral daily  levothyroxine 150 MICROGram(s) Oral daily  lidocaine 1% (Preservative-free) Injectable 30 milliLiter(s) Local Injection once  melatonin 5 milliGRAM(s) Oral at bedtime  metolazone 5 milliGRAM(s) Oral daily  metoprolol succinate  milliGRAM(s) Oral daily  multivitamin/minerals 1 Tablet(s) Oral daily  mycophenolate mofetil 500 milliGRAM(s) Oral four times a day  NIFEdipine XL 30 milliGRAM(s) Oral daily  pentoxifylline 400 milliGRAM(s) Oral two times a day  predniSONE   Tablet 5 milliGRAM(s) Oral daily  sodium chloride 3%. 150 milliLiter(s) IV Continuous <Continuous>  sodium chloride 3%. 150 milliLiter(s) IV Continuous <Continuous>  sodium chloride 3%. 150 milliLiter(s) IV Continuous <Continuous>  sodium chloride 3%. 150 milliLiter(s) IV Continuous <Continuous>  sodium chloride 3%. 150 milliLiter(s) IV Continuous <Continuous>  sodium chloride 3%. 150 milliLiter(s) IV Continuous <Continuous>  sodium chloride 3%. 150 milliLiter(s) IV Continuous <Continuous>  sodium chloride 3%. 150 milliLiter(s) IV Continuous <Continuous>  sodium chloride 3%. 150 milliLiter(s) IV Continuous <Continuous>  sodium chloride 3%. 150 milliLiter(s) IV Continuous <Continuous>  sodium chloride 3%. 150 milliLiter(s) IV Continuous <Continuous>  sodium chloride 3%. 150 milliLiter(s) IV Continuous <Continuous>  sodium chloride 3%. 150 milliLiter(s) IV Continuous <Continuous>  sodium chloride 3%. 150 milliLiter(s) IV Continuous <Continuous>  sodium chloride 3%. 150 milliLiter(s) IV Continuous <Continuous>  sodium chloride 3%. 150 milliLiter(s) IV Continuous <Continuous>  sodium chloride 3%. 150 milliLiter(s) IV Continuous <Continuous>  sodium chloride 3%. 150 milliLiter(s) IV Continuous <Continuous>  tacrolimus 0.5 milliGRAM(s) Oral two times a day    PRN MEDICATIONS  acetaminophen     Tablet .. 650 milliGRAM(s) Oral every 6 hours PRN  dextrose Oral Gel 15 Gram(s) Oral once PRN  dextrose Oral Gel 15 Gram(s) Oral once PRN                                            ------------------------------------------------------------  T(C): 36.7 (09-06-22 @ 03:21), Max: 36.8 (09-05-22 @ 23:10)  T(F): 98.1 (09-06-22 @ 03:21), Max: 98.3 (09-05-22 @ 23:10)  HR: 84 (09-06-22 @ 03:21) (66 - 93)  BP: 130/81 (09-06-22 @ 03:21) (118/73 - 137/77)  RR: 18 (09-06-22 @ 03:21) (18 - 18)  SpO2: 96% (09-06-22 @ 03:21) (95% - 96%)      09-04-22 @ 07:01  -  09-05-22 @ 07:00  --------------------------------------------------------  IN: 355 mL / OUT: 950 mL / NET: -595 mL    09-05-22 @ 07:01  -  09-06-22 @ 05:11  --------------------------------------------------------  IN: 400 mL / OUT: 700 mL / NET: -300 mL                                               LABS:                        7.6    11.06 )-----------( 229      ( 05 Sep 2022 07:30 )             24.3     09-06    140  |  100  |  81<HH>  ----------------------------<  94  4.0   |  27  |  1.3    Ca    8.1<L>      06 Sep 2022 01:12  Phos  3.5     09-05  Mg     2.0     09-06    TPro  5.5<L>  /  Alb  3.0<L>  /  TBili  0.9  /  DBili  x   /  AST  18  /  ALT  10  /  AlkPhos  82  09-05                              RADIOLOGY:    CXR 9/5/22:   B/l opacities and effusions, unchanged                                                                                   ----------------------------------------------------  # DVT prophylaxis:   # GI prophylaxis:   # Diet:   # Activity:   # Code status:   # Disposition:                                                                            --------------------------------------------------------    # Handoff:      ANDRIA TAVAREZ 82y Male  MRN#: [id number]   CODE STATUS:     Admission Date: 08-20-22  Hospital Day: 17d    Pt is currently admitted with the primary diagnosis of SOB, CHF exaerbation     SUBJECTIVE  Hospital Course  81 y/o man w PMHx of HTN, HLD, DM2, CAD s/p 3vCABG known to Dr De La Fuente, HFmEF w EF 45-50% on 8/1/22, AF on Eliquis, AAA, COPD baseline rare home O2 known to Dr ALO Mars, CKD3 and C3 glomerulopathy known to Dr Daniel, hypothyroidism, PVD, recent admission 7/5/22-8/3/22 for COVID and BRENDA 2/2 diarrhea, tx w dexamethasone and Zosyn, stay c/b microaspirations, PNA, was d/c to STR, has been on 3L O2, presented on 8/20/22 for SOB x5days and hypoxia to 70s. Had CXR done on 8/15/22 but results had not been back. ED vitals were notable for RR max 24, SpO2 84% on 4L O2, required BiPAP, had WBC 12, HB 7.2 from baseline 9, BNP 55k, Cr 2.2 from baseline 1.5, and trop 0.24, EKG w AF RVR and CXR showing significant b/l pleural effusions, pulm congestion, admitted to medicine for SOB/hypoxia attributed to CHF exacerbation.   Has been on Bumex drip for HF exacerbation and is s/p R thoracentesis 8/26/22 w 1.5L transudative fluid removed. Has had epistaxis during stay. Is s/p 1uPRBC, start 3 day course of IV venofer 200mg. Upgraded to CEU for increasing O2 requirements despite venti mask, HFNC.     Overnight events      Subjective complaints        T(C): 36.7 (09-06-22 @ 03:21)  T(F): 98.1 (09-06-22 @ 03:21)  HR: 84 (09-06-22 @ 03:21)  BP: 130/81 (09-06-22 @ 03:21)  RR: 18 (09-06-22 @ 03:21)  SpO2: 96% (09-06-22 @ 03:21)        Present Today:   - Gloria:  No [  ], Yes [  ] : Indication:   - Type of IV Access:       .. CVC/Piccline:  No [  ], Yes [  ] : Indication:       .. 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    AF (atrial fibrillation)  s/p DCCV    Carotid stenosis    COPD (chronic obstructive pulmonary disease)    H/O coronary artery bypass surgery  2001    S/P inguinal hernia repair    History of appendectomy                                                ALLERGIES:  Ozempic (0.25 mg or 0.5 mg dose) (Hives; Rash)  streptomycin (Unknown)  Trulicity Pen (Hives; Rash)                           HOME MEDICATIONS  Home Medications:  Aranesp 100 mcg/0.5 mL injectable solution: 1 dose(s) injectable every 3 to 4 weeks (05 Jul 2022 21:22)  atorvastatin 40 mg oral tablet: 1 tab(s) orally once a day (at bedtime) (05 Jul 2022 21:22)  calcitriol 0.25 mcg oral capsule: 1 cap(s) orally 2 times a week (05 Jul 2022 21:22)  d-mycophenolate: 500 milligram(s) orally 4 times a day (05 Jul 2022 21:22)  Eliquis 2.5 mg oral tablet: 1 tab(s) orally 2 times a day (05 Jul 2022 21:22)  famotidine 20 mg oral tablet: 1 tab(s) orally once a day (05 Jul 2022 21:22)  furosemide 20 mg oral tablet: 1 tab(s) orally once a day (03 Aug 2022 09:47)  insulin glargine 100 units/mL subcutaneous solution: 5 unit(s) subcutaneous once a day (at bedtime) (03 Aug 2022 09:47)  insulin lispro 100 units/mL injectable solution: 1 Unit(s) if Glucose 151 - 200  2 Unit(s) if Glucose 201 - 250  3 Unit(s) if Glucose 251 - 300  4 Unit(s) if Glucose 301 - 350  5 Unit(s) if Glucose 351 - 400  6 Unit(s) if Glucose Greater Than 400 (03 Aug 2022 09:57)  isosorbide mononitrate 30 mg oral tablet, extended release: 1 tab(s) orally once a day (in the morning) (05 Jul 2022 21:22)  levothyroxine 150 mcg (0.15 mg) oral tablet: 1 tab(s) orally once a day (05 Jul 2022 21:22)  metoprolol succinate 50 mg oral tablet, extended release: 1 tab(s) orally once a day (03 Aug 2022 09:47)  Multiple Vitamins with Minerals oral tablet: 1 tab(s) orally once a day (03 Aug 2022 09:47)  NIFEdipine 30 mg oral tablet, extended release: 1 tab(s) orally once a day (05 Jul 2022 21:22)  pentoxifylline 400 mg oral tablet, extended release: 1 tab(s) orally 2 times a day (05 Jul 2022 21:22)  sodium bicarbonate 650 mg oral tablet: 1 tab(s) orally 3 times a day (03 Aug 2022 09:47)  tacrolimus 0.5 mg oral capsule: 1 cap(s) orally 2 times a day (03 Aug 2022 09:57)                           MEDICATIONS:  STANDING MEDICATIONS  albuterol/ipratropium for Nebulization 3 milliLiter(s) Nebulizer every 6 hours  apixaban 2.5 milliGRAM(s) Oral two times a day  atorvastatin 40 milliGRAM(s) Oral at bedtime  BACItracin   Ointment 1 Application(s) Topical two times a day  buMETAnide Infusion 1 mG/Hr IV Continuous <Continuous>  chlorhexidine 2% Cloths 1 Application(s) Topical <User Schedule>  dextrose 5%. 1000 milliLiter(s) IV Continuous <Continuous>  dextrose 5%. 1000 milliLiter(s) IV Continuous <Continuous>  dextrose 50% Injectable 25 Gram(s) IV Push once  dextrose 50% Injectable 12.5 Gram(s) IV Push once  dextrose 50% Injectable 25 Gram(s) IV Push once  famotidine    Tablet 20 milliGRAM(s) Oral daily  glucagon  Injectable 1 milliGRAM(s) IntraMuscular once  insulin glargine Injectable (LANTUS) 22 Unit(s) SubCutaneous at bedtime  insulin lispro (ADMELOG) corrective regimen sliding scale   SubCutaneous three times a day before meals  isosorbide   mononitrate ER Tablet (IMDUR) 30 milliGRAM(s) Oral daily  levothyroxine 150 MICROGram(s) Oral daily  lidocaine 1% (Preservative-free) Injectable 30 milliLiter(s) Local Injection once  melatonin 5 milliGRAM(s) Oral at bedtime  metolazone 5 milliGRAM(s) Oral daily  metoprolol succinate  milliGRAM(s) Oral daily  multivitamin/minerals 1 Tablet(s) Oral daily  mycophenolate mofetil 500 milliGRAM(s) Oral four times a day  NIFEdipine XL 30 milliGRAM(s) Oral daily  pentoxifylline 400 milliGRAM(s) Oral two times a day  predniSONE   Tablet 5 milliGRAM(s) Oral daily  sodium chloride 3%. 150 milliLiter(s) IV Continuous <Continuous>  sodium chloride 3%. 150 milliLiter(s) IV Continuous <Continuous>  sodium chloride 3%. 150 milliLiter(s) IV Continuous <Continuous>  sodium chloride 3%. 150 milliLiter(s) IV Continuous <Continuous>  sodium chloride 3%. 150 milliLiter(s) IV Continuous <Continuous>  sodium chloride 3%. 150 milliLiter(s) IV Continuous <Continuous>  sodium chloride 3%. 150 milliLiter(s) IV Continuous <Continuous>  sodium chloride 3%. 150 milliLiter(s) IV Continuous <Continuous>  sodium chloride 3%. 150 milliLiter(s) IV Continuous <Continuous>  sodium chloride 3%. 150 milliLiter(s) IV Continuous <Continuous>  sodium chloride 3%. 150 milliLiter(s) IV Continuous <Continuous>  sodium chloride 3%. 150 milliLiter(s) IV Continuous <Continuous>  sodium chloride 3%. 150 milliLiter(s) IV Continuous <Continuous>  sodium chloride 3%. 150 milliLiter(s) IV Continuous <Continuous>  sodium chloride 3%. 150 milliLiter(s) IV Continuous <Continuous>  sodium chloride 3%. 150 milliLiter(s) IV Continuous <Continuous>  sodium chloride 3%. 150 milliLiter(s) IV Continuous <Continuous>  sodium chloride 3%. 150 milliLiter(s) IV Continuous <Continuous>  tacrolimus 0.5 milliGRAM(s) Oral two times a day    PRN MEDICATIONS  acetaminophen     Tablet .. 650 milliGRAM(s) Oral every 6 hours PRN  dextrose Oral Gel 15 Gram(s) Oral once PRN  dextrose Oral Gel 15 Gram(s) Oral once PRN                                            ------------------------------------------------------------  T(C): 36.7 (09-06-22 @ 03:21), Max: 36.8 (09-05-22 @ 23:10)  T(F): 98.1 (09-06-22 @ 03:21), Max: 98.3 (09-05-22 @ 23:10)  HR: 84 (09-06-22 @ 03:21) (66 - 93)  BP: 130/81 (09-06-22 @ 03:21) (118/73 - 137/77)  RR: 18 (09-06-22 @ 03:21) (18 - 18)  SpO2: 96% (09-06-22 @ 03:21) (95% - 96%)      09-04-22 @ 07:01  -  09-05-22 @ 07:00  --------------------------------------------------------  IN: 355 mL / OUT: 950 mL / NET: -595 mL    09-05-22 @ 07:01  -  09-06-22 @ 05:11  --------------------------------------------------------  IN: 400 mL / OUT: 700 mL / NET: -300 mL                                               LABS:                        7.6    11.06 )-----------( 229      ( 05 Sep 2022 07:30 )             24.3     09-06    140  |  100  |  81<HH>  ----------------------------<  94  4.0   |  27  |  1.3    Ca    8.1<L>      06 Sep 2022 01:12  Phos  3.5     09-05  Mg     2.0     09-06    TPro  5.5<L>  /  Alb  3.0<L>  /  TBili  0.9  /  DBili  x   /  AST  18  /  ALT  10  /  AlkPhos  82  09-05                              RADIOLOGY:    CXR 9/5/22:   B/l opacities and effusions, unchanged                                                                                   ----------------------------------------------------  # DVT prophylaxis:   # GI prophylaxis:   # Diet:   # Activity:   # Code status:   # Disposition:                                                                            --------------------------------------------------------    # Handoff:      ANDRIA TAVAREZ 82y Male  MRN#: [id number]   CODE STATUS: DNR DNI     Admission Date: 08-20-22  Hospital Day: 17d    Pt is currently admitted with the primary diagnosis of SOB, CHF exacerbation     SUBJECTIVE  Hospital Course  DNR DNI 83 y/o man w PMHx of HTN, HLD, DM2, CAD s/p 3vCABG known to Dr De La Fuente, HFmEF w EF 45-50% on 8/1/22, AF on Eliquis, AAA, COPD baseline rare home O2 known to Dr ALO Mars, CKD3 and C3 glomerulopathy known to Dr Daniel, hypothyroidism, PVD, recent admission 7/5/22-8/3/22 for COVID and BRENDA 2/2 diarrhea, tx w dexamethasone and Zosyn, stay c/b microaspirations, PNA, was d/c to STR, has been on 3L O2, presented on 8/20/22 for SOB x5days and hypoxia to 70s. Had CXR done on 8/15/22 but results had not been back. ED vitals were notable for RR max 24, SpO2 84% on 4L O2, required BiPAP, had WBC 12, HB 7.2 from baseline 9, BNP 55k, Cr 2.2 from baseline 1.5, and trop 0.24, EKG w AF RVR and CXR showing significant b/l pleural effusions, pulm congestion, admitted to medicine for SOB/hypoxia attributed to CHF exacerbation.   Has been on Bumex drip for HF exacerbation and is s/p R thoracentesis 8/26/22 w 1.5L transudative fluid removed. Has had epistaxis during stay. Is s/p 1uPRBC, start 3 day course of IV venofer 200mg. Upgraded to CEU for increasing O2 requirements despite venti mask, HFNC.       9/6/22 day 17: Transition eliquis to Lovenox 80mg BID for possible future repeat thoracentesis. On HFNC; wean as tolerated. Start dexamethasone 6mg PO QD [id number] thru 9/16/22. Consult ID. Decrease Lantus from 22 to 17. ddimer 1035, , CRP 69.1. Keep bumex drip. D/c 3% NS.     Overnight events  None significant     Subjective complaints  Pt reports overall feeling better since admission, but about the same as yesterday. Is thirsty and would like some diet ginger ale w ice chips. Was found to be hypoglycemic during this time but stated he felt asymptomatic aside from the SOB, no dizziness. Was given some orange juice and diet ginger ale w sugar.     T(C): 36.7 (09-06-22 @ 03:21)  T(F): 98.1 (09-06-22 @ 03:21)  HR: 84 (09-06-22 @ 03:21)  BP: 130/81 (09-06-22 @ 03:21)  RR: 18 (09-06-22 @ 03:21)  SpO2: 96% (09-06-22 @ 03:21)    PHYSICAL EXAM:  GENERAL: NAD, sitting in bed comfortably  HEAD:  Atraumatic, Normocephalic  EYES: EOMI, sclera clear  ENT: Dry mucous membranes. (+)HFNC   NECK: Supple  CHEST/LUNG: Crackles and mild rhonchi b/l   HEART: Irregularly irregular rhythm. Regular rate; Soft 1/6 systolic murmur  ABDOMEN: (+)BS; Soft, nontender, nondistended  EXTREMITIES:  2+ Peripheral Pulses, brisk capillary refill.   NERVOUS SYSTEM:  A&Ox3, no focal deficits   SKIN: No visible rashes or lesions       Present Today:   - Gloria:  No [  ], Yes [  ] : Indication:   - Type of IV Access:       .. CVC/Piccline:  No [ X ], Yes [  ] : Indication:       .. 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    AF (atrial fibrillation)  s/p DCCV    Carotid stenosis    COPD (chronic obstructive pulmonary disease)    H/O coronary artery bypass surgery  2001    S/P inguinal hernia repair    History of appendectomy                                                ALLERGIES:  Ozempic (0.25 mg or 0.5 mg dose) (Hives; Rash)  streptomycin (Unknown)  Trulicity Pen (Hives; Rash)                           HOME MEDICATIONS  Home Medications:  Aranesp 100 mcg/0.5 mL injectable solution: 1 dose(s) injectable every 3 to 4 weeks (05 Jul 2022 21:22)  atorvastatin 40 mg oral tablet: 1 tab(s) orally once a day (at bedtime) (05 Jul 2022 21:22)  calcitriol 0.25 mcg oral capsule: 1 cap(s) orally 2 times a week (05 Jul 2022 21:22)  d-mycophenolate: 500 milligram(s) orally 4 times a day (05 Jul 2022 21:22)  Eliquis 2.5 mg oral tablet: 1 tab(s) orally 2 times a day (05 Jul 2022 21:22)  famotidine 20 mg oral tablet: 1 tab(s) orally once a day (05 Jul 2022 21:22)  furosemide 20 mg oral tablet: 1 tab(s) orally once a day (03 Aug 2022 09:47)  insulin glargine 100 units/mL subcutaneous solution: 5 unit(s) subcutaneous once a day (at bedtime) (03 Aug 2022 09:47)  insulin lispro 100 units/mL injectable solution: 1 Unit(s) if Glucose 151 - 200  2 Unit(s) if Glucose 201 - 250  3 Unit(s) if Glucose 251 - 300  4 Unit(s) if Glucose 301 - 350  5 Unit(s) if Glucose 351 - 400  6 Unit(s) if Glucose Greater Than 400 (03 Aug 2022 09:57)  isosorbide mononitrate 30 mg oral tablet, extended release: 1 tab(s) orally once a day (in the morning) (05 Jul 2022 21:22)  levothyroxine 150 mcg (0.15 mg) oral tablet: 1 tab(s) orally once a day (05 Jul 2022 21:22)  metoprolol succinate 50 mg oral tablet, extended release: 1 tab(s) orally once a day (03 Aug 2022 09:47)  Multiple Vitamins with Minerals oral tablet: 1 tab(s) orally once a day (03 Aug 2022 09:47)  NIFEdipine 30 mg oral tablet, extended release: 1 tab(s) orally once a day (05 Jul 2022 21:22)  pentoxifylline 400 mg oral tablet, extended release: 1 tab(s) orally 2 times a day (05 Jul 2022 21:22)  sodium bicarbonate 650 mg oral tablet: 1 tab(s) orally 3 times a day (03 Aug 2022 09:47)  tacrolimus 0.5 mg oral capsule: 1 cap(s) orally 2 times a day (03 Aug 2022 09:57)                           MEDICATIONS:  STANDING MEDICATIONS  albuterol/ipratropium for Nebulization 3 milliLiter(s) Nebulizer every 6 hours  apixaban 2.5 milliGRAM(s) Oral two times a day  atorvastatin 40 milliGRAM(s) Oral at bedtime  BACItracin   Ointment 1 Application(s) Topical two times a day  buMETAnide Infusion 1 mG/Hr IV Continuous <Continuous>  chlorhexidine 2% Cloths 1 Application(s) Topical <User Schedule>  dextrose 5%. 1000 milliLiter(s) IV Continuous <Continuous>  dextrose 5%. 1000 milliLiter(s) IV Continuous <Continuous>  dextrose 50% Injectable 25 Gram(s) IV Push once  dextrose 50% Injectable 12.5 Gram(s) IV Push once  dextrose 50% Injectable 25 Gram(s) IV Push once  famotidine    Tablet 20 milliGRAM(s) Oral daily  glucagon  Injectable 1 milliGRAM(s) IntraMuscular once  insulin glargine Injectable (LANTUS) 22 Unit(s) SubCutaneous at bedtime  insulin lispro (ADMELOG) corrective regimen sliding scale   SubCutaneous three times a day before meals  isosorbide   mononitrate ER Tablet (IMDUR) 30 milliGRAM(s) Oral daily  levothyroxine 150 MICROGram(s) Oral daily  lidocaine 1% (Preservative-free) Injectable 30 milliLiter(s) Local Injection once  melatonin 5 milliGRAM(s) Oral at bedtime  metolazone 5 milliGRAM(s) Oral daily  metoprolol succinate  milliGRAM(s) Oral daily  multivitamin/minerals 1 Tablet(s) Oral daily  mycophenolate mofetil 500 milliGRAM(s) Oral four times a day  NIFEdipine XL 30 milliGRAM(s) Oral daily  pentoxifylline 400 milliGRAM(s) Oral two times a day  predniSONE   Tablet 5 milliGRAM(s) Oral daily  sodium chloride 3%. 150 milliLiter(s) IV Continuous <Continuous>  sodium chloride 3%. 150 milliLiter(s) IV Continuous <Continuous>  sodium chloride 3%. 150 milliLiter(s) IV Continuous <Continuous>  sodium chloride 3%. 150 milliLiter(s) IV Continuous <Continuous>  sodium chloride 3%. 150 milliLiter(s) IV Continuous <Continuous>  sodium chloride 3%. 150 milliLiter(s) IV Continuous <Continuous>  sodium chloride 3%. 150 milliLiter(s) IV Continuous <Continuous>  sodium chloride 3%. 150 milliLiter(s) IV Continuous <Continuous>  sodium chloride 3%. 150 milliLiter(s) IV Continuous <Continuous>  sodium chloride 3%. 150 milliLiter(s) IV Continuous <Continuous>  sodium chloride 3%. 150 milliLiter(s) IV Continuous <Continuous>  sodium chloride 3%. 150 milliLiter(s) IV Continuous <Continuous>  sodium chloride 3%. 150 milliLiter(s) IV Continuous <Continuous>  sodium chloride 3%. 150 milliLiter(s) IV Continuous <Continuous>  sodium chloride 3%. 150 milliLiter(s) IV Continuous <Continuous>  sodium chloride 3%. 150 milliLiter(s) IV Continuous <Continuous>  sodium chloride 3%. 150 milliLiter(s) IV Continuous <Continuous>  sodium chloride 3%. 150 milliLiter(s) IV Continuous <Continuous>  tacrolimus 0.5 milliGRAM(s) Oral two times a day    PRN MEDICATIONS  acetaminophen     Tablet .. 650 milliGRAM(s) Oral every 6 hours PRN  dextrose Oral Gel 15 Gram(s) Oral once PRN  dextrose Oral Gel 15 Gram(s) Oral once PRN                                            ------------------------------------------------------------  T(C): 36.7 (09-06-22 @ 03:21), Max: 36.8 (09-05-22 @ 23:10)  T(F): 98.1 (09-06-22 @ 03:21), Max: 98.3 (09-05-22 @ 23:10)  HR: 84 (09-06-22 @ 03:21) (66 - 93)  BP: 130/81 (09-06-22 @ 03:21) (118/73 - 137/77)  RR: 18 (09-06-22 @ 03:21) (18 - 18)  SpO2: 96% (09-06-22 @ 03:21) (95% - 96%)      09-04-22 @ 07:01 - 09-05-22 @ 07:00  --------------------------------------------------------  IN: 355 mL / OUT: 950 mL / NET: -595 mL    09-05-22 @ 07:01  -  09-06-22 @ 05:11  --------------------------------------------------------  IN: 400 mL / OUT: 700 mL / NET: -300 mL                                               LABS:                        7.6    11.06 )-----------( 229      ( 05 Sep 2022 07:30 )             24.3     09-06    140  |  100  |  81<HH>  ----------------------------<  94  4.0   |  27  |  1.3    Ca    8.1<L>      06 Sep 2022 01:12  Phos  3.5     09-05  Mg     2.0     09-06    TPro  5.5<L>  /  Alb  3.0<L>  /  TBili  0.9  /  DBili  x   /  AST  18  /  ALT  10  /  AlkPhos  82  09-05                              RADIOLOGY:    CXR 9/5/22:   B/l opacities and effusions, unchanged        ANDRIA TAVAREZ 82y Male  MRN#: [id number]   CODE STATUS: DNR DNI     Admission Date: 08-20-22  Hospital Day: 17d    Pt is currently admitted with the primary diagnosis of SOB, CHF exacerbation     SUBJECTIVE  Hospital Course  DNR DNI 81 y/o man w PMHx of HTN, HLD, DM2, CAD s/p 3vCABG known to Dr De La Fuente, HFmEF w EF 45-50% on 8/1/22, AF on Eliquis, AAA, COPD baseline rare home O2 known to Dr ALO Mars, CKD3 and C3 glomerulopathy known to Dr Daniel, hypothyroidism, PVD, recent admission 7/5/22-8/3/22 for COVID and BRENDA 2/2 diarrhea, tx w dexamethasone and Zosyn, stay c/b microaspirations, PNA, was d/c to STR, has been on 3L O2, presented on 8/20/22 for SOB x5days and hypoxia to 70s. Had CXR done on 8/15/22 but results had not been back. ED vitals were notable for RR max 24, SpO2 84% on 4L O2, required BiPAP, had WBC 12, HB 7.2 from baseline 9, BNP 55k, Cr 2.2 from baseline 1.5, and trop 0.24, EKG w AF RVR and CXR showing significant b/l pleural effusions, pulm congestion, admitted to medicine for SOB/hypoxia attributed to CHF exacerbation.   Has been on Bumex drip for HF exacerbation and is s/p R thoracentesis 8/26/22 w 1.5L transudative fluid removed. Has had epistaxis during stay. Is s/p 1uPRBC, start 3 day course of IV venofer 200mg. Upgraded to CEU for increasing O2 requirements despite venti mask, HFNC.       9/6/22 day 17: Transition eliquis to Lovenox 80mg BID for possible future repeat thoracentesis. On HFNC; wean as tolerated. Start dexamethasone 6mg PO QD [id number] thru 9/16/22. Consult ID. Decrease Lantus from 22 to 17. ddimer 1035, , CRP 69.1. Keep bumex drip. D/c 3% NS.   HOLDING LOVENOX FOR THORACENTESIS 9/7/22    Overnight events  None significant     Subjective complaints  Pt reports overall feeling better since admission, but about the same as yesterday. Is thirsty and would like some diet ginger ale w ice chips. Was found to be hypoglycemic during this time but stated he felt asymptomatic aside from the SOB, no dizziness. Was given some orange juice and diet ginger ale w sugar.     T(C): 36.7 (09-06-22 @ 03:21)  T(F): 98.1 (09-06-22 @ 03:21)  HR: 84 (09-06-22 @ 03:21)  BP: 130/81 (09-06-22 @ 03:21)  RR: 18 (09-06-22 @ 03:21)  SpO2: 96% (09-06-22 @ 03:21)    PHYSICAL EXAM:  GENERAL: NAD, sitting in bed comfortably  HEAD:  Atraumatic, Normocephalic  EYES: EOMI, sclera clear  ENT: Dry mucous membranes. (+)HFNC   NECK: Supple  CHEST/LUNG: Crackles and mild rhonchi b/l   HEART: Irregularly irregular rhythm. Regular rate; Soft 1/6 systolic murmur  ABDOMEN: (+)BS; Soft, nontender, nondistended  EXTREMITIES:  2+ Peripheral Pulses, brisk capillary refill.   NERVOUS SYSTEM:  A&Ox3, no focal deficits   SKIN: No visible rashes or lesions       Present Today:   - Gloria:  No [  ], Yes [  ] : Indication:   - Type of IV Access:       .. CVC/Piccline:  No [ X ], Yes [  ] : Indication:       .. 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    AF (atrial fibrillation)  s/p DCCV    Carotid stenosis    COPD (chronic obstructive pulmonary disease)    H/O coronary artery bypass surgery  2001    S/P inguinal hernia repair    History of appendectomy                                                ALLERGIES:  Ozempic (0.25 mg or 0.5 mg dose) (Hives; Rash)  streptomycin (Unknown)  Trulicity Pen (Hives; Rash)                           HOME MEDICATIONS  Home Medications:  Aranesp 100 mcg/0.5 mL injectable solution: 1 dose(s) injectable every 3 to 4 weeks (05 Jul 2022 21:22)  atorvastatin 40 mg oral tablet: 1 tab(s) orally once a day (at bedtime) (05 Jul 2022 21:22)  calcitriol 0.25 mcg oral capsule: 1 cap(s) orally 2 times a week (05 Jul 2022 21:22)  d-mycophenolate: 500 milligram(s) orally 4 times a day (05 Jul 2022 21:22)  Eliquis 2.5 mg oral tablet: 1 tab(s) orally 2 times a day (05 Jul 2022 21:22)  famotidine 20 mg oral tablet: 1 tab(s) orally once a day (05 Jul 2022 21:22)  furosemide 20 mg oral tablet: 1 tab(s) orally once a day (03 Aug 2022 09:47)  insulin glargine 100 units/mL subcutaneous solution: 5 unit(s) subcutaneous once a day (at bedtime) (03 Aug 2022 09:47)  insulin lispro 100 units/mL injectable solution: 1 Unit(s) if Glucose 151 - 200  2 Unit(s) if Glucose 201 - 250  3 Unit(s) if Glucose 251 - 300  4 Unit(s) if Glucose 301 - 350  5 Unit(s) if Glucose 351 - 400  6 Unit(s) if Glucose Greater Than 400 (03 Aug 2022 09:57)  isosorbide mononitrate 30 mg oral tablet, extended release: 1 tab(s) orally once a day (in the morning) (05 Jul 2022 21:22)  levothyroxine 150 mcg (0.15 mg) oral tablet: 1 tab(s) orally once a day (05 Jul 2022 21:22)  metoprolol succinate 50 mg oral tablet, extended release: 1 tab(s) orally once a day (03 Aug 2022 09:47)  Multiple Vitamins with Minerals oral tablet: 1 tab(s) orally once a day (03 Aug 2022 09:47)  NIFEdipine 30 mg oral tablet, extended release: 1 tab(s) orally once a day (05 Jul 2022 21:22)  pentoxifylline 400 mg oral tablet, extended release: 1 tab(s) orally 2 times a day (05 Jul 2022 21:22)  sodium bicarbonate 650 mg oral tablet: 1 tab(s) orally 3 times a day (03 Aug 2022 09:47)  tacrolimus 0.5 mg oral capsule: 1 cap(s) orally 2 times a day (03 Aug 2022 09:57)                           MEDICATIONS:  STANDING MEDICATIONS  albuterol/ipratropium for Nebulization 3 milliLiter(s) Nebulizer every 6 hours  apixaban 2.5 milliGRAM(s) Oral two times a day  atorvastatin 40 milliGRAM(s) Oral at bedtime  BACItracin   Ointment 1 Application(s) Topical two times a day  buMETAnide Infusion 1 mG/Hr IV Continuous <Continuous>  chlorhexidine 2% Cloths 1 Application(s) Topical <User Schedule>  dextrose 5%. 1000 milliLiter(s) IV Continuous <Continuous>  dextrose 5%. 1000 milliLiter(s) IV Continuous <Continuous>  dextrose 50% Injectable 25 Gram(s) IV Push once  dextrose 50% Injectable 12.5 Gram(s) IV Push once  dextrose 50% Injectable 25 Gram(s) IV Push once  famotidine    Tablet 20 milliGRAM(s) Oral daily  glucagon  Injectable 1 milliGRAM(s) IntraMuscular once  insulin glargine Injectable (LANTUS) 22 Unit(s) SubCutaneous at bedtime  insulin lispro (ADMELOG) corrective regimen sliding scale   SubCutaneous three times a day before meals  isosorbide   mononitrate ER Tablet (IMDUR) 30 milliGRAM(s) Oral daily  levothyroxine 150 MICROGram(s) Oral daily  lidocaine 1% (Preservative-free) Injectable 30 milliLiter(s) Local Injection once  melatonin 5 milliGRAM(s) Oral at bedtime  metolazone 5 milliGRAM(s) Oral daily  metoprolol succinate  milliGRAM(s) Oral daily  multivitamin/minerals 1 Tablet(s) Oral daily  mycophenolate mofetil 500 milliGRAM(s) Oral four times a day  NIFEdipine XL 30 milliGRAM(s) Oral daily  pentoxifylline 400 milliGRAM(s) Oral two times a day  predniSONE   Tablet 5 milliGRAM(s) Oral daily  sodium chloride 3%. 150 milliLiter(s) IV Continuous <Continuous>  sodium chloride 3%. 150 milliLiter(s) IV Continuous <Continuous>  sodium chloride 3%. 150 milliLiter(s) IV Continuous <Continuous>  sodium chloride 3%. 150 milliLiter(s) IV Continuous <Continuous>  sodium chloride 3%. 150 milliLiter(s) IV Continuous <Continuous>  sodium chloride 3%. 150 milliLiter(s) IV Continuous <Continuous>  sodium chloride 3%. 150 milliLiter(s) IV Continuous <Continuous>  sodium chloride 3%. 150 milliLiter(s) IV Continuous <Continuous>  sodium chloride 3%. 150 milliLiter(s) IV Continuous <Continuous>  sodium chloride 3%. 150 milliLiter(s) IV Continuous <Continuous>  sodium chloride 3%. 150 milliLiter(s) IV Continuous <Continuous>  sodium chloride 3%. 150 milliLiter(s) IV Continuous <Continuous>  sodium chloride 3%. 150 milliLiter(s) IV Continuous <Continuous>  sodium chloride 3%. 150 milliLiter(s) IV Continuous <Continuous>  sodium chloride 3%. 150 milliLiter(s) IV Continuous <Continuous>  sodium chloride 3%. 150 milliLiter(s) IV Continuous <Continuous>  sodium chloride 3%. 150 milliLiter(s) IV Continuous <Continuous>  sodium chloride 3%. 150 milliLiter(s) IV Continuous <Continuous>  tacrolimus 0.5 milliGRAM(s) Oral two times a day    PRN MEDICATIONS  acetaminophen     Tablet .. 650 milliGRAM(s) Oral every 6 hours PRN  dextrose Oral Gel 15 Gram(s) Oral once PRN  dextrose Oral Gel 15 Gram(s) Oral once PRN                                            ------------------------------------------------------------  T(C): 36.7 (09-06-22 @ 03:21), Max: 36.8 (09-05-22 @ 23:10)  T(F): 98.1 (09-06-22 @ 03:21), Max: 98.3 (09-05-22 @ 23:10)  HR: 84 (09-06-22 @ 03:21) (66 - 93)  BP: 130/81 (09-06-22 @ 03:21) (118/73 - 137/77)  RR: 18 (09-06-22 @ 03:21) (18 - 18)  SpO2: 96% (09-06-22 @ 03:21) (95% - 96%)      09-04-22 @ 07:01  -  09-05-22 @ 07:00  --------------------------------------------------------  IN: 355 mL / OUT: 950 mL / NET: -595 mL    09-05-22 @ 07:01  -  09-06-22 @ 05:11  --------------------------------------------------------  IN: 400 mL / OUT: 700 mL / NET: -300 mL                                               LABS:                        7.6    11.06 )-----------( 229      ( 05 Sep 2022 07:30 )             24.3     09-06    140  |  100  |  81<HH>  ----------------------------<  94  4.0   |  27  |  1.3    Ca    8.1<L>      06 Sep 2022 01:12  Phos  3.5     09-05  Mg     2.0     09-06    TPro  5.5<L>  /  Alb  3.0<L>  /  TBili  0.9  /  DBili  x   /  AST  18  /  ALT  10  /  AlkPhos  82  09-05                              RADIOLOGY:    CXR 9/5/22:   B/l opacities and effusions, unchanged

## 2022-09-06 NOTE — PROGRESS NOTE ADULT - SUBJECTIVE AND OBJECTIVE BOX
Patient is a 82y old  Male who presents with a chief complaint of chf exacerbation (06 Sep 2022 05:11)        Over Night Events:  on HFNCO2.  Off pressors.          ROS:     All ROS are negative except HPI         PHYSICAL EXAM    ICU Vital Signs Last 24 Hrs  T(C): 36.7 (06 Sep 2022 03:21), Max: 36.8 (05 Sep 2022 23:10)  T(F): 98.1 (06 Sep 2022 03:21), Max: 98.3 (05 Sep 2022 23:10)  HR: 78 (06 Sep 2022 07:20) (66 - 93)  BP: 130/81 (06 Sep 2022 03:21) (118/73 - 137/77)  BP(mean): 91 (05 Sep 2022 20:00) (91 - 91)  ABP: --  ABP(mean): --  RR: 18 (06 Sep 2022 07:20) (18 - 18)  SpO2: 96% (06 Sep 2022 07:20) (95% - 96%)    O2 Parameters below as of 06 Sep 2022 07:20  Patient On (Oxygen Delivery Method): nasal cannula, high flow  O2 Flow (L/min): 60  O2 Concentration (%): 66        CONSTITUTIONAL:  IN NAD    ENT:   Airway patent,   Mouth with normal mucosa.       EYES:   Pupils equal,   Round and reactive to light.    CARDIAC:   Normal rate,   Regular rhythm.      RESPIRATORY:   No wheezing  Bilateral crackles   Normal chest expansion  Not tachypneic,  No use of accessory muscles    GASTROINTESTINAL:  Abdomen soft,   Non-tender,   No guarding,       NEUROLOGICAL:   Alert and oriented   No motor  deficits.    SKIN:   Skin normal color for race,   No evidence of rash.    PSYCHIATRIC:   Normal mood and affect.   No apparent risk to self or others.        09-05-22 @ 07:01  -  09-06-22 @ 07:00  --------------------------------------------------------  IN:    Bumetanide: 60 mL    Oral Fluid: 220 mL    sodium chloride 3%: 150 mL    sodium chloride 3%: 150 mL  Total IN: 580 mL    OUT:    Indwelling Catheter - Urethral (mL): 1150 mL  Total OUT: 1150 mL    Total NET: -570 mL          LABS:                            7.6    11.06 )-----------( 229      ( 05 Sep 2022 07:30 )             24.3                                               09-06    140  |  100  |  81<HH>  ----------------------------<  94  4.0   |  27  |  1.3    Creatinine Trend  BUN 81, Cr 1.3, (09-06-22 @ 01:12)  Creatinine Trend  BUN 78, Cr 1.3, (09-05-22 @ 17:35)  Creatinine Trend  BUN 80, Cr 1.4, (09-05-22 @ 07:30)  Creatinine Trend  BUN 80, Cr 1.4, (09-04-22 @ 17:26)  Creatinine Trend  BUN 80, Cr 1.3, (09-04-22 @ 08:35)  Creatinine Trend  BUN 75, Cr 1.2, (09-03-22 @ 16:27)  Creatinine Trend  BUN 71, Cr 1.1, (09-03-22 @ 06:03)  Creatinine Trend  BUN 71, Cr 1.1, (09-02-22 @ 18:01)  Creatinine Trend  BUN 69, Cr 1.1, (09-02-22 @ 06:05)  Creatinine Trend  BUN 70, Cr 1.1, (09-01-22 @ 17:27)      Ca    8.1<L>      06 Sep 2022 01:12  Phos  3.5     09-05  Mg     2.0     09-06    TPro  5.5<L>  /  Alb  3.0<L>  /  TBili  0.9  /  DBili  x   /  AST  18  /  ALT  10  /  AlkPhos  82  09-05                                                                                           LIVER FUNCTIONS - ( 05 Sep 2022 07:30 )  Alb: 3.0 g/dL / Pro: 5.5 g/dL / ALK PHOS: 82 U/L / ALT: 10 U/L / AST: 18 U/L / GGT: x                                                                                                                                       MEDICATIONS  (STANDING):  albuterol/ipratropium for Nebulization 3 milliLiter(s) Nebulizer every 6 hours  apixaban 2.5 milliGRAM(s) Oral two times a day  atorvastatin 40 milliGRAM(s) Oral at bedtime  BACItracin   Ointment 1 Application(s) Topical two times a day  buMETAnide Infusion 1 mG/Hr (5 mL/Hr) IV Continuous <Continuous>  chlorhexidine 2% Cloths 1 Application(s) Topical <User Schedule>  dextrose 5%. 1000 milliLiter(s) (50 mL/Hr) IV Continuous <Continuous>  dextrose 5%. 1000 milliLiter(s) (100 mL/Hr) IV Continuous <Continuous>  dextrose 50% Injectable 25 Gram(s) IV Push once  dextrose 50% Injectable 12.5 Gram(s) IV Push once  dextrose 50% Injectable 25 Gram(s) IV Push once  famotidine    Tablet 20 milliGRAM(s) Oral daily  glucagon  Injectable 1 milliGRAM(s) IntraMuscular once  insulin glargine Injectable (LANTUS) 22 Unit(s) SubCutaneous at bedtime  insulin lispro (ADMELOG) corrective regimen sliding scale   SubCutaneous three times a day before meals  isosorbide   mononitrate ER Tablet (IMDUR) 30 milliGRAM(s) Oral daily  levothyroxine 150 MICROGram(s) Oral daily  lidocaine 1% (Preservative-free) Injectable 30 milliLiter(s) Local Injection once  melatonin 5 milliGRAM(s) Oral at bedtime  metolazone 5 milliGRAM(s) Oral daily  metoprolol succinate  milliGRAM(s) Oral daily  multivitamin/minerals 1 Tablet(s) Oral daily  mycophenolate mofetil 500 milliGRAM(s) Oral four times a day  NIFEdipine XL 30 milliGRAM(s) Oral daily  pentoxifylline 400 milliGRAM(s) Oral two times a day  predniSONE   Tablet 5 milliGRAM(s) Oral daily  sodium chloride 3%. 150 milliLiter(s) (50 mL/Hr) IV Continuous <Continuous>  sodium chloride 3%. 150 milliLiter(s) (50 mL/Hr) IV Continuous <Continuous>  sodium chloride 3%. 150 milliLiter(s) (50 mL/Hr) IV Continuous <Continuous>  sodium chloride 3%. 150 milliLiter(s) (50 mL/Hr) IV Continuous <Continuous>  sodium chloride 3%. 150 milliLiter(s) (50 mL/Hr) IV Continuous <Continuous>  sodium chloride 3%. 150 milliLiter(s) (50 mL/Hr) IV Continuous <Continuous>  sodium chloride 3%. 150 milliLiter(s) (50 mL/Hr) IV Continuous <Continuous>  sodium chloride 3%. 150 milliLiter(s) (50 mL/Hr) IV Continuous <Continuous>  sodium chloride 3%. 150 milliLiter(s) (50 mL/Hr) IV Continuous <Continuous>  sodium chloride 3%. 150 milliLiter(s) (50 mL/Hr) IV Continuous <Continuous>  sodium chloride 3%. 150 milliLiter(s) (50 mL/Hr) IV Continuous <Continuous>  sodium chloride 3%. 150 milliLiter(s) (50 mL/Hr) IV Continuous <Continuous>  sodium chloride 3%. 150 milliLiter(s) (50 mL/Hr) IV Continuous <Continuous>  sodium chloride 3%. 150 milliLiter(s) (50 mL/Hr) IV Continuous <Continuous>  sodium chloride 3%. 150 milliLiter(s) (50 mL/Hr) IV Continuous <Continuous>  sodium chloride 3%. 150 milliLiter(s) (50 mL/Hr) IV Continuous <Continuous>  sodium chloride 3%. 150 milliLiter(s) (50 mL/Hr) IV Continuous <Continuous>  tacrolimus 0.5 milliGRAM(s) Oral two times a day    MEDICATIONS  (PRN):  acetaminophen     Tablet .. 650 milliGRAM(s) Oral every 6 hours PRN Mild Pain (1 - 3)  dextrose Oral Gel 15 Gram(s) Oral once PRN Blood Glucose LESS THAN 70 milliGRAM(s)/deciliter  dextrose Oral Gel 15 Gram(s) Oral once PRN Blood Glucose LESS THAN 70 milliGRAM(s)/deciliter      New X-rays reviewed:                                                                                  ECHO

## 2022-09-06 NOTE — PROGRESS NOTE ADULT - ASSESSMENT
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     A/P :  #Acute Hypoxic respiratory Failure   #Acute HFmrEF:   #Bilateral pleural effusion Right>Left  #COVID-19 infection  #COPD  -continue monitoring in SDU  -pulm/critical care following  -cont o2 suppl--goal O2 sat 90-95%  -Covid pcr 9/2 +  -start dexamethasone 6 mg iv daily x 10 days or until dc  -contact/isolation precautions for covid  -daily cxr--worsening R pl effusion and opacity  --may need repeat Thoracentesis--f/u bedside US   - stop eliquis for now and continue lovenox   -f/u CT chest  -pt/OT--OOB to chair  -Echo 8/1/22 showed LVEF 45-50%, multiple LV regional wall motion abnormalities, mild MR, mild TR, borderline pulmonary HTN   s/p thoracentesis 8/2 fluid transudative.   -continue bumex gtt--stop hypertonic saline   -continue low sodium diet, fluid restriction and monitor i/o and daily weight  -cont nebs    #Acute on chronic anemia- stable  -s/p 1 U PRBC  -IV Venofer 200mg daily x 3 days  -monitor cbc---hb 7.7 today    #Chronic Atrial Fibrillation  -continue metoprolol and AC (switched to lovenox for possible repeat thoracentesis---holding Eliquis)    #C3 Glomerulonephropathy  -continue  tacrolimus 0.5mg BID and Mycophenolate 500mg 4 times daily  -prednisone changed to iv dexa for acute covid-19 infection    #CAD  #HTN  -stable---continue current mangment     #Hypothyroidism  -continue synthroid    #DM II  -hba1c 7.3  -hypoglycemia in am yesterday and today- fs 39 this am  -lantus decreased to 17 u   -cont to monitor fs    #Stage 2 right buttock pressure ulcer  -Present on admission  -Continue local wound care, frequent position change    DVT/GI ppx    continue monitoring in SDU    DNR    DVT prophylaxis:     #Progress Note Handoff  Pending (specify):  monitoring of fs-titration of lantus- covid 19+   Disposition: Home_x (when medically stable)

## 2022-09-07 NOTE — PROGRESS NOTE ADULT - ASSESSMENT
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    AFib, chronic - on eliquis   CAD s/p CABG  H/O DLD, HTN, DM2, Hypothyroidism     PLAN:    CNS:  no depressants     HEENT: Oral care    PULMONARY:  HOB @ 45 degrees.  Aspiration precautions.  Wean O2.  Sats 90-95.  Continue home inhlaers.  RIght thoracentesis today     CARDIOVASCULAR:  Continue Negative balance.     GI: GI prophylaxis.  Feeding.  Bowel regimen     RENAL:  Follow up lytes.  Correct as needed    INFECTIOUS DISEASE: Follow up cultures.  ID eval.  Dexa 6 mg daily D # 2     HEMATOLOGICAL:  DVT prophylaxis.  Switch to LMWH.  restart in am     ENDOCRINE:  Follow up FS.  Insulin protocol if needed.    MUSCULOSKELETAL:  OOB to chair.  PT OT     OOB to chair

## 2022-09-07 NOTE — PROGRESS NOTE ADULT - SUBJECTIVE AND OBJECTIVE BOX
ANDRIA TAVAREZ  82y Male    CHIEF COMPLAINT:    Patient is a 82y old  Male who presents with a chief complaint of chf exacerbation (07 Sep 2022 08:40)    INTERVAL HPI/OVERNIGHT EVENTS:    Patient seen and examined. No acute events overnight. on HFNC, reports feeling better overall     ROS: All other systems are negative.    Vital Signs:    T(F): 98.2 (22 @ 08:30), Max: 98.2 (22 @ 15:00)  HR: 90 (22 @ 08:30) (79 - 93)  BP: 126/64 (22 @ 08:30) (119/74 - 148/65)  RR: 20 (22 @ 08:30) (18 - 20)  SpO2: 94% (22 @ 08:30) (91% - 98%)    06 Sep 2022 07:01  -  07 Sep 2022 07:00  --------------------------------------------------------  IN: 60 mL / OUT: 890 mL / NET: -830 mL       Daily Weight in k.7 (07 Sep 2022 08:30)    POCT Blood Glucose.: 194 mg/dL (07 Sep 2022 11:06)  POCT Blood Glucose.: 171 mg/dL (07 Sep 2022 07:30)  POCT Blood Glucose.: 189 mg/dL (06 Sep 2022 21:15)  POCT Blood Glucose.: 173 mg/dL (06 Sep 2022 17:08)    PHYSICAL EXAM:    GENERAL:  NAD  SKIN: No rashes or lesions  HEENT: Atraumatic. Normocephalic.   NECK: Supple, No JVD.   PULMONARY: Crackles B/L. No wheezing. No rales  CVS: Normal S1, S2. Rate and Rhythm are regular   ABDOMEN/GI: Soft, Nontender, Nondistended   MSK:  No clubbing ro cyanosis   NEUROLOGIC: Moves all extremities  PSYCH: Awake and alert     Consultant(s) Notes Reviewed:  [x ] YES  [ ] NO  Care Discussed with Consultants/Other Providers [ x] YES  [ ] NO    LABS:                        8.0    8.80  )-----------( 224      ( 07 Sep 2022 00:20 )             25.3     140  |  100  |  77<HH>  ----------------------------<  161<H>  4.8   |  23  |  1.3    Ca    8.3<L>      07 Sep 2022 00:20  Phos  3.4     09-  Mg     1.9         TPro  5.9<L>  /  Alb  3.1<L>  /  TBili  0.9  /  DBili  x   /  AST  26  /  ALT  10  /  AlkPhos  85      RADIOLOGY & ADDITIONAL TESTS:  Imaging or report Personally Reviewed:  [x] YES  [ ] NO  EKG reviewed: [x] YES  [ ] NO    Medications:  Standing  albuterol/ipratropium for Nebulization 3 milliLiter(s) Nebulizer every 6 hours  atorvastatin 40 milliGRAM(s) Oral at bedtime  BACItracin   Ointment 1 Application(s) Topical two times a day  buMETAnide Infusion 1 mG/Hr IV Continuous <Continuous>  chlorhexidine 2% Cloths 1 Application(s) Topical <User Schedule>  dexAMETHasone     Tablet 6 milliGRAM(s) Oral daily  dextrose 5%. 1000 milliLiter(s) IV Continuous <Continuous>  dextrose 5%. 1000 milliLiter(s) IV Continuous <Continuous>  dextrose 50% Injectable 25 Gram(s) IV Push once  dextrose 50% Injectable 12.5 Gram(s) IV Push once  dextrose 50% Injectable 25 Gram(s) IV Push once  famotidine    Tablet 20 milliGRAM(s) Oral daily  glucagon  Injectable 1 milliGRAM(s) IntraMuscular once  insulin glargine Injectable (LANTUS) 17 Unit(s) SubCutaneous at bedtime  insulin lispro (ADMELOG) corrective regimen sliding scale   SubCutaneous three times a day before meals  isosorbide   mononitrate ER Tablet (IMDUR) 30 milliGRAM(s) Oral daily  levothyroxine 150 MICROGram(s) Oral daily  lidocaine 1% (Preservative-free) Injectable 30 milliLiter(s) Local Injection once  melatonin 5 milliGRAM(s) Oral at bedtime  metolazone 5 milliGRAM(s) Oral daily  metoprolol succinate  milliGRAM(s) Oral daily  multivitamin/minerals 1 Tablet(s) Oral daily  mycophenolate mofetil 500 milliGRAM(s) Oral four times a day  NIFEdipine XL 30 milliGRAM(s) Oral daily  pentoxifylline 400 milliGRAM(s) Oral two times a day  tacrolimus 0.5 milliGRAM(s) Oral two times a day    PRN Meds  acetaminophen     Tablet .. 650 milliGRAM(s) Oral every 6 hours PRN  dextrose Oral Gel 15 Gram(s) Oral once PRN  dextrose Oral Gel 15 Gram(s) Oral once PRN

## 2022-09-07 NOTE — CONSULT NOTE ADULT - ASSESSMENT
81 yo male with PMHx of HFrEF (45-50% on Lasix 20 PO), A-Fib on Eliquis, CAD s/p CABGx3, PVD, COPD (was on rare home O2 PRN now on 3L NC since recent July admission for COVID), CKD3, C3 glomerulonephropathy, HTN, HLD, Hypothyroid presents from NH with progressively worsening SOB x5 days and hypoxia.  Recently hospitalized here for a month discharged on 8/3/22 initially for BRENDA on CKD course c/b COVID, bilateral lower lobe pneumonia, delirium and microaspiration of thin liquids on modified barium study. Received Dexamethasone and Zosyn course with new O2 requirements of 3L NC discharged to nursing home on 3L and thick liquid diet for rehab. Due to his SOB, chest x-ray was ordered on 8/15 but results did not come back and patient became increasingly short of breath and was noted to be hypoxic and rehab facility which prompted referral to the ED. Review of symptoms is notable for orthopnea and a chronic dry cough unchanged since his covid illness.     IMPRESSION;   CHF with SOB with CXR with b/l pleural effusions  8/29 COVID-19 NG  9/2 COVID-19 positive  Unclear at this point whether he has a new infection but will approach it as such  If newly infected then pt is in the early viral replicative phase based on the timeline/onset of symptoms.   No cytokine storm  CXR : unchanged    RECOMMENDATIONS;  Target SpO2 92 % to 96 %  f/u ferritin, CRP, procalcitonin   Day 1 : Single  mg IV loading dose  Day 2-5 : single  mg IV once daily maintenance dose  Daily CBC,CMP.   Dexamethasone 6 mg iv q24h   Monitor for side effects: hyperglycemia, neurological ( agitation/confusion), adrenal suppression, bacterial and fungal infections  No Toci

## 2022-09-07 NOTE — PROGRESS NOTE ADULT - SUBJECTIVE AND OBJECTIVE BOX
ANDRIA TAVAREZ 82y Male  MRN#: [id number]   CODE STATUS: DNR DNI     Admission Date: 08-20-22  Hospital Day: 18d    Pt is currently admitted with the primary diagnosis of SOB, CHF exacerbation     SUBJECTIVE  Hospital Course  DNR DNI 83 y/o man w PMHx of HTN, HLD, DM2, CAD s/p 3vCABG known to Dr De La Fuente, HFmEF w EF 45-50% on 8/1/22, AF on Eliquis, AAA, COPD baseline rare home O2 known to Dr ALO Mars, CKD3 and C3 glomerulopathy known to Dr Daniel, hypothyroidism, PVD, recent admission 7/5/22-8/3/22 for COVID and BRENDA 2/2 diarrhea, tx w dexamethasone and Zosyn, stay c/b microaspirations, PNA, was d/c to STR, has been on 3L O2, presented on 8/20/22 for SOB x5days and hypoxia to 70s. Had CXR done on 8/15/22 but results had not been back. ED vitals were notable for RR max 24, SpO2 84% on 4L O2, required BiPAP, had WBC 12, HB 7.2 from baseline 9, BNP 55k, Cr 2.2 from baseline 1.5, and trop 0.24, EKG w AF RVR and CXR showing significant b/l pleural effusions, pulm congestion, admitted to medicine for SOB/hypoxia attributed to CHF exacerbation.   Has been on Bumex drip for HF exacerbation and is s/p R thoracentesis 8/26/22 w 1.5L transudative fluid removed. Has had epistaxis during stay. Is s/p 1uPRBC, start 3 day course of IV venofer 200mg. Upgraded to CEU for increasing O2 requirements despite venti mask, HFNC.       9/6/22 day 17: Transition eliquis to Lovenox 80mg BID for possible future repeat thoracentesis. On HFNC; wean as tolerated. Start dexamethasone 6mg PO QD [id number] thru 9/16/22. Consult ID. Decrease Lantus from 22 to 17. ddimer 1035, , CRP 69.1. Keep bumex drip. D/c 3% NS.   HOLDING LOVENOX FOR THORACENTESIS 9/7/22 9/7/22 day 18:     Overnight events  None significant     Subjective complaints  Pt reports overall feeling better since admission, but about the same as yesterday. Is thirsty and would like some diet ginger ale w ice chips. Was found to be hypoglycemic during this time but stated he felt asymptomatic aside from the SOB, no dizziness. Was given some orange juice and diet ginger ale w sugar.           PHYSICAL EXAM:  GENERAL: NAD, sitting in bed comfortably  HEAD:  Atraumatic, Normocephalic  EYES: EOMI, sclera clear  ENT: Dry mucous membranes. (+)HFNC   NECK: Supple  CHEST/LUNG: Crackles and mild rhonchi b/l   HEART: Irregularly irregular rhythm. Regular rate; Soft 1/6 systolic murmur  ABDOMEN: (+)BS; Soft, nontender, nondistended  EXTREMITIES:  2+ Peripheral Pulses, brisk capillary refill.   NERVOUS SYSTEM:  A&Ox3, no focal deficits   SKIN: No visible rashes or lesions       Present Today:   - Gloria:  No [  ], Yes [  ] : Indication:   - Type of IV Access:       .. CVC/Piccline:  No [ X ], Yes [  ] : Indication:       .. Midline: No [ X ], Yes [  ] : Indication:                     RADIOLOGY:    CXR 9/5/22:   B/l opacities and effusions, unchanged        ANDRIA TAVAREZ 82y Male  MRN#: [id number]   CODE STATUS: DNR DNI     Admission Date: 08-20-22  Hospital Day: 18d    Pt is currently admitted with the primary diagnosis of SOB, CHF exacerbation     SUBJECTIVE  Hospital Course  DNR DNI 81 y/o man w PMHx of HTN, HLD, DM2, CAD s/p 3vCABG known to Dr De La Fuente, HFmEF w EF 45-50% on 8/1/22, AF on Eliquis, AAA, COPD baseline rare home O2 known to Dr ALO Mars, CKD3 and C3 glomerulopathy known to Dr Daniel, hypothyroidism, PVD, recent admission 7/5/22-8/3/22 for COVID and BRENDA 2/2 diarrhea, tx w dexamethasone and Zosyn, stay c/b microaspirations, PNA, was d/c to STR, has been on 3L O2, presented on 8/20/22 for SOB x5days and hypoxia to 70s. Had CXR done on 8/15/22 but results had not been back. ED vitals were notable for RR max 24, SpO2 84% on 4L O2, required BiPAP, had WBC 12, HB 7.2 from baseline 9, BNP 55k, Cr 2.2 from baseline 1.5, and trop 0.24, EKG w AF RVR and CXR showing significant b/l pleural effusions, pulm congestion, admitted to medicine for SOB/hypoxia attributed to CHF exacerbation.   Has been on Bumex drip for HF exacerbation and is s/p R thoracentesis 8/26/22 w 1.5L transudative fluid removed. Has had epistaxis during stay. Is s/p 1uPRBC, start 3 day course of IV venofer 200mg. Upgraded to CEU for increasing O2 requirements despite venti mask, HFNC.       9/6/22 day 17: Transition eliquis to Lovenox 80mg BID for possible future repeat thoracentesis. On HFNC; wean as tolerated. Start dexamethasone 6mg PO QD [id number] thru 9/16/22. Consult ID. Decrease Lantus from 22 to 17. ddimer 1035, , CRP 69.1. Keep bumex drip. D/c 3% NS.   HOLDING LOVENOX FOR THORACENTESIS 9/7/22 9/7/22 day 18: ID recs for COVID: unclear if new infection, will treat as such w RDV x5days; no toci, Planned for thoracentesis #3 today, restart Lovenox tomorrow.     Overnight events  None significant; pt states he slept well.     Subjective complaints  States he is feeling much better today. When asked what changed, pt states probably because he got a good night of sleep. Did use BiPAP x4h last night. Asked for a cup of ice; RN provided him cup of ice.           PHYSICAL EXAM:  GENERAL: NAD, sitting in bed comfortably  HEAD:  Atraumatic, Normocephalic  EYES: EOMI, sclera clear  ENT: Dry mucous membranes. (+)HFNC   NECK: Supple  CHEST/LUNG: Decent air movement. Mild crackles throughout, mild rhonchi b/l bases   HEART: Irregularly irregular rhythm. Regular rate; Soft 1/6 systolic murmur  ABDOMEN: (+)BS; Soft, nontender, nondistended  EXTREMITIES:  2+ Radial Pulses, brisk capillary refill. BLE w 1+ pitting edema, chronic skin changes/hyperpigmentation over b/l shin from old scratches.   NERVOUS SYSTEM:  A&Ox3, no focal deficits   SKIN: No visible rashes or lesions       Present Today:   - Gloria:  No [ X ] condom cath, Yes [  ] : Indication:   - Type of IV Access:       .. CVC/Piccline:  No [  ], Yes [ X ] : Indication:       .. 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    AF (atrial fibrillation)  s/p DCCV    Carotid stenosis    COPD (chronic obstructive pulmonary disease)    H/O coronary artery bypass surgery  2001    S/P inguinal hernia repair    History of appendectomy                                                ALLERGIES:  Ozempic (0.25 mg or 0.5 mg dose) (Hives; Rash)  streptomycin (Unknown)  Trulicity Pen (Hives; Rash)                           HOME MEDICATIONS  Home Medications:  Aranesp 100 mcg/0.5 mL injectable solution: 1 dose(s) injectable every 3 to 4 weeks (05 Jul 2022 21:22)  atorvastatin 40 mg oral tablet: 1 tab(s) orally once a day (at bedtime) (05 Jul 2022 21:22)  calcitriol 0.25 mcg oral capsule: 1 cap(s) orally 2 times a week (05 Jul 2022 21:22)  d-mycophenolate: 500 milligram(s) orally 4 times a day (05 Jul 2022 21:22)  Eliquis 2.5 mg oral tablet: 1 tab(s) orally 2 times a day (05 Jul 2022 21:22)  famotidine 20 mg oral tablet: 1 tab(s) orally once a day (05 Jul 2022 21:22)  furosemide 20 mg oral tablet: 1 tab(s) orally once a day (03 Aug 2022 09:47)  insulin glargine 100 units/mL subcutaneous solution: 5 unit(s) subcutaneous once a day (at bedtime) (03 Aug 2022 09:47)  insulin lispro 100 units/mL injectable solution: 1 Unit(s) if Glucose 151 - 200  2 Unit(s) if Glucose 201 - 250  3 Unit(s) if Glucose 251 - 300  4 Unit(s) if Glucose 301 - 350  5 Unit(s) if Glucose 351 - 400  6 Unit(s) if Glucose Greater Than 400 (03 Aug 2022 09:57)  isosorbide mononitrate 30 mg oral tablet, extended release: 1 tab(s) orally once a day (in the morning) (05 Jul 2022 21:22)  levothyroxine 150 mcg (0.15 mg) oral tablet: 1 tab(s) orally once a day (05 Jul 2022 21:22)  metoprolol succinate 50 mg oral tablet, extended release: 1 tab(s) orally once a day (03 Aug 2022 09:47)  Multiple Vitamins with Minerals oral tablet: 1 tab(s) orally once a day (03 Aug 2022 09:47)  NIFEdipine 30 mg oral tablet, extended release: 1 tab(s) orally once a day (05 Jul 2022 21:22)  pentoxifylline 400 mg oral tablet, extended release: 1 tab(s) orally 2 times a day (05 Jul 2022 21:22)  sodium bicarbonate 650 mg oral tablet: 1 tab(s) orally 3 times a day (03 Aug 2022 09:47)  tacrolimus 0.5 mg oral capsule: 1 cap(s) orally 2 times a day (03 Aug 2022 09:57)                           MEDICATIONS:  STANDING MEDICATIONS  albuterol/ipratropium for Nebulization 3 milliLiter(s) Nebulizer every 6 hours  atorvastatin 40 milliGRAM(s) Oral at bedtime  BACItracin   Ointment 1 Application(s) Topical two times a day  buMETAnide Infusion 1 mG/Hr IV Continuous <Continuous>  chlorhexidine 2% Cloths 1 Application(s) Topical <User Schedule>  dexAMETHasone     Tablet 6 milliGRAM(s) Oral daily  dextrose 5%. 1000 milliLiter(s) IV Continuous <Continuous>  dextrose 5%. 1000 milliLiter(s) IV Continuous <Continuous>  dextrose 50% Injectable 25 Gram(s) IV Push once  dextrose 50% Injectable 12.5 Gram(s) IV Push once  dextrose 50% Injectable 25 Gram(s) IV Push once  famotidine    Tablet 20 milliGRAM(s) Oral daily  glucagon  Injectable 1 milliGRAM(s) IntraMuscular once  insulin glargine Injectable (LANTUS) 17 Unit(s) SubCutaneous at bedtime  insulin lispro (ADMELOG) corrective regimen sliding scale   SubCutaneous three times a day before meals  isosorbide   mononitrate ER Tablet (IMDUR) 30 milliGRAM(s) Oral daily  levothyroxine 150 MICROGram(s) Oral daily  lidocaine 1% (Preservative-free) Injectable 30 milliLiter(s) Local Injection once  melatonin 5 milliGRAM(s) Oral at bedtime  metolazone 5 milliGRAM(s) Oral daily  metoprolol succinate  milliGRAM(s) Oral daily  multivitamin/minerals 1 Tablet(s) Oral daily  mycophenolate mofetil 500 milliGRAM(s) Oral four times a day  NIFEdipine XL 30 milliGRAM(s) Oral daily  pentoxifylline 400 milliGRAM(s) Oral two times a day  tacrolimus 0.5 milliGRAM(s) Oral two times a day    PRN MEDICATIONS  acetaminophen     Tablet .. 650 milliGRAM(s) Oral every 6 hours PRN  dextrose Oral Gel 15 Gram(s) Oral once PRN  dextrose Oral Gel 15 Gram(s) Oral once PRN                                            ------------------------------------------------------------  T(C): 36.7 (09-07-22 @ 11:20), Max: 36.8 (09-06-22 @ 15:00)  T(F): 98.1 (09-07-22 @ 11:20), Max: 98.2 (09-06-22 @ 15:00)  HR: 87 (09-07-22 @ 11:20) (79 - 93)  BP: 133/62 (09-07-22 @ 11:20) (119/74 - 133/62)  RR: 20 (09-07-22 @ 11:20) (18 - 20)  SpO2: 69% (09-07-22 @ 11:20) (69% - 95%)      09-06-22 @ 07:01  -  09-07-22 @ 07:00  --------------------------------------------------------  IN: 60 mL / OUT: 890 mL / NET: -830 mL                                               LABS:                        8.0    8.80  )-----------( 224      ( 07 Sep 2022 00:20 )             25.3     09-07    140  |  100  |  77<HH>  ----------------------------<  161<H>  4.8   |  23  |  1.3    Ca    8.3<L>      07 Sep 2022 00:20  Phos  3.4     09-07  Mg     1.9     09-07    TPro  5.9<L>  /  Alb  3.1<L>  /  TBili  0.9  /  DBili  x   /  AST  26  /  ALT  10  /  AlkPhos  85  09-07                    RADIOLOGY:    CXR 9/5/22:   B/l opacities and effusions, unchanged        ANDRIA TAVAREZ 82y Male  MRN#: [id number]   CODE STATUS: DNR DNI     Admission Date: 08-20-22  Hospital Day: 18d    Pt is currently admitted with the primary diagnosis of SOB, CHF exacerbation     SUBJECTIVE  Hospital Course  DNR DNI 81 y/o man w PMHx of HTN, HLD, DM2, CAD s/p 3vCABG known to Dr De La Fuente, HFmEF w EF 45-50% on 8/1/22, AF on Eliquis, AAA, COPD baseline rare home O2 known to Dr ALO Mars, CKD3 and C3 glomerulopathy known to Dr Daniel, hypothyroidism, PVD, recent admission 7/5/22-8/3/22 for COVID and BRENDA 2/2 diarrhea, tx w dexamethasone and Zosyn, stay c/b microaspirations, PNA, was d/c to STR, has been on 3L O2, presented on 8/20/22 for SOB x5days and hypoxia to 70s. Had CXR done on 8/15/22 but results had not been back. ED vitals were notable for RR max 24, SpO2 84% on 4L O2, required BiPAP, had WBC 12, HB 7.2 from baseline 9, BNP 55k, Cr 2.2 from baseline 1.5, and trop 0.24, EKG w AF RVR and CXR showing significant b/l pleural effusions, pulm congestion, admitted to medicine for SOB/hypoxia attributed to CHF exacerbation.   Has been on Bumex drip for HF exacerbation and is s/p R thoracentesis 8/26/22 w 1.5L transudative fluid removed. Has had epistaxis during stay. Is s/p 1uPRBC, start 3 day course of IV venofer 200mg. Upgraded to CEU for increasing O2 requirements despite venti mask, HFNC.       9/6/22 day 17: Transition eliquis to Lovenox 80mg BID for possible future repeat thoracentesis. On HFNC; wean as tolerated. Start dexamethasone 6mg PO QD [id number] thru 9/16/22. Consult ID. Decrease Lantus from 22 to 17. ddimer 1035, , CRP 69.1. Keep bumex drip. D/c 3% NS.   HOLDING LOVENOX FOR THORACENTESIS 9/7/22 9/7/22 day 18: ID recs for COVID: unclear if new infection, will treat as such w RDV x5days; no toci, Planned for thoracentesis #3 today, restart Lovenox tomorrow.     Overnight events  None significant; pt states he slept well.     Subjective complaints  States he is feeling much better today. When asked what changed, pt states probably because he got a good night of sleep. Asked for a cup of ice; RN provided him cup of ice.           PHYSICAL EXAM:  GENERAL: NAD, sitting in bed comfortably  HEAD:  Atraumatic, Normocephalic  EYES: EOMI, sclera clear  ENT: Dry mucous membranes. (+)HFNC   NECK: Supple  CHEST/LUNG: Decent air movement. Mild crackles throughout, mild rhonchi b/l bases   HEART: Irregularly irregular rhythm. Regular rate; Soft 1/6 systolic murmur  ABDOMEN: (+)BS; Soft, nontender, nondistended  EXTREMITIES:  2+ Radial Pulses, brisk capillary refill. BLE w 1+ pitting edema, chronic skin changes/hyperpigmentation over b/l shin from old scratches.   NERVOUS SYSTEM:  A&Ox3, no focal deficits   SKIN: No visible rashes or lesions       Present Today:   - Gloria:  No [ X ] condom cath, Yes [  ] : Indication:   - Type of IV Access:       .. CVC/Piccline:  No [  ], Yes [ X ] : Indication:       .. 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    AF (atrial fibrillation)  s/p DCCV    Carotid stenosis    COPD (chronic obstructive pulmonary disease)    H/O coronary artery bypass surgery  2001    S/P inguinal hernia repair    History of appendectomy                                                ALLERGIES:  Ozempic (0.25 mg or 0.5 mg dose) (Hives; Rash)  streptomycin (Unknown)  Trulicity Pen (Hives; Rash)                           HOME MEDICATIONS  Home Medications:  Aranesp 100 mcg/0.5 mL injectable solution: 1 dose(s) injectable every 3 to 4 weeks (05 Jul 2022 21:22)  atorvastatin 40 mg oral tablet: 1 tab(s) orally once a day (at bedtime) (05 Jul 2022 21:22)  calcitriol 0.25 mcg oral capsule: 1 cap(s) orally 2 times a week (05 Jul 2022 21:22)  d-mycophenolate: 500 milligram(s) orally 4 times a day (05 Jul 2022 21:22)  Eliquis 2.5 mg oral tablet: 1 tab(s) orally 2 times a day (05 Jul 2022 21:22)  famotidine 20 mg oral tablet: 1 tab(s) orally once a day (05 Jul 2022 21:22)  furosemide 20 mg oral tablet: 1 tab(s) orally once a day (03 Aug 2022 09:47)  insulin glargine 100 units/mL subcutaneous solution: 5 unit(s) subcutaneous once a day (at bedtime) (03 Aug 2022 09:47)  insulin lispro 100 units/mL injectable solution: 1 Unit(s) if Glucose 151 - 200  2 Unit(s) if Glucose 201 - 250  3 Unit(s) if Glucose 251 - 300  4 Unit(s) if Glucose 301 - 350  5 Unit(s) if Glucose 351 - 400  6 Unit(s) if Glucose Greater Than 400 (03 Aug 2022 09:57)  isosorbide mononitrate 30 mg oral tablet, extended release: 1 tab(s) orally once a day (in the morning) (05 Jul 2022 21:22)  levothyroxine 150 mcg (0.15 mg) oral tablet: 1 tab(s) orally once a day (05 Jul 2022 21:22)  metoprolol succinate 50 mg oral tablet, extended release: 1 tab(s) orally once a day (03 Aug 2022 09:47)  Multiple Vitamins with Minerals oral tablet: 1 tab(s) orally once a day (03 Aug 2022 09:47)  NIFEdipine 30 mg oral tablet, extended release: 1 tab(s) orally once a day (05 Jul 2022 21:22)  pentoxifylline 400 mg oral tablet, extended release: 1 tab(s) orally 2 times a day (05 Jul 2022 21:22)  sodium bicarbonate 650 mg oral tablet: 1 tab(s) orally 3 times a day (03 Aug 2022 09:47)  tacrolimus 0.5 mg oral capsule: 1 cap(s) orally 2 times a day (03 Aug 2022 09:57)                           MEDICATIONS:  STANDING MEDICATIONS  albuterol/ipratropium for Nebulization 3 milliLiter(s) Nebulizer every 6 hours  atorvastatin 40 milliGRAM(s) Oral at bedtime  BACItracin   Ointment 1 Application(s) Topical two times a day  buMETAnide Infusion 1 mG/Hr IV Continuous <Continuous>  chlorhexidine 2% Cloths 1 Application(s) Topical <User Schedule>  dexAMETHasone     Tablet 6 milliGRAM(s) Oral daily  dextrose 5%. 1000 milliLiter(s) IV Continuous <Continuous>  dextrose 5%. 1000 milliLiter(s) IV Continuous <Continuous>  dextrose 50% Injectable 25 Gram(s) IV Push once  dextrose 50% Injectable 12.5 Gram(s) IV Push once  dextrose 50% Injectable 25 Gram(s) IV Push once  famotidine    Tablet 20 milliGRAM(s) Oral daily  glucagon  Injectable 1 milliGRAM(s) IntraMuscular once  insulin glargine Injectable (LANTUS) 17 Unit(s) SubCutaneous at bedtime  insulin lispro (ADMELOG) corrective regimen sliding scale   SubCutaneous three times a day before meals  isosorbide   mononitrate ER Tablet (IMDUR) 30 milliGRAM(s) Oral daily  levothyroxine 150 MICROGram(s) Oral daily  lidocaine 1% (Preservative-free) Injectable 30 milliLiter(s) Local Injection once  melatonin 5 milliGRAM(s) Oral at bedtime  metolazone 5 milliGRAM(s) Oral daily  metoprolol succinate  milliGRAM(s) Oral daily  multivitamin/minerals 1 Tablet(s) Oral daily  mycophenolate mofetil 500 milliGRAM(s) Oral four times a day  NIFEdipine XL 30 milliGRAM(s) Oral daily  pentoxifylline 400 milliGRAM(s) Oral two times a day  tacrolimus 0.5 milliGRAM(s) Oral two times a day    PRN MEDICATIONS  acetaminophen     Tablet .. 650 milliGRAM(s) Oral every 6 hours PRN  dextrose Oral Gel 15 Gram(s) Oral once PRN  dextrose Oral Gel 15 Gram(s) Oral once PRN                                            ------------------------------------------------------------  T(C): 36.7 (09-07-22 @ 11:20), Max: 36.8 (09-06-22 @ 15:00)  T(F): 98.1 (09-07-22 @ 11:20), Max: 98.2 (09-06-22 @ 15:00)  HR: 87 (09-07-22 @ 11:20) (79 - 93)  BP: 133/62 (09-07-22 @ 11:20) (119/74 - 133/62)  RR: 20 (09-07-22 @ 11:20) (18 - 20)  SpO2: 69% (09-07-22 @ 11:20) (69% - 95%)      09-06-22 @ 07:01  -  09-07-22 @ 07:00  --------------------------------------------------------  IN: 60 mL / OUT: 890 mL / NET: -830 mL                                               LABS:                        8.0    8.80  )-----------( 224      ( 07 Sep 2022 00:20 )             25.3     09-07    140  |  100  |  77<HH>  ----------------------------<  161<H>  4.8   |  23  |  1.3    Ca    8.3<L>      07 Sep 2022 00:20  Phos  3.4     09-07  Mg     1.9     09-07    TPro  5.9<L>  /  Alb  3.1<L>  /  TBili  0.9  /  DBili  x   /  AST  26  /  ALT  10  /  AlkPhos  85  09-07                    RADIOLOGY:    CXR 9/5/22:   B/l opacities and effusions, unchanged

## 2022-09-07 NOTE — CONSULT NOTE ADULT - TIME BILLING
Counseled patient about diagnostic testing and treatment plan. All questions answered.
Review of inpatient data  Bedside evaluation and exam  Discussion with patient and son

## 2022-09-07 NOTE — PROGRESS NOTE ADULT - ASSESSMENT
81 y/o man w PMHx of HTN, HLD, DM2, CAD s/p 3vCABG known to Dr De La Fuente, HFmEF w EF 45-50% on 8/1/22, AF on Eliquis, AAA, COPD baseline rare home O2 known to Dr ALO Mars, CKD3 and C3 glomerulopathy known to Dr Daniel, hypothyroidism, PVD, presented on 8/20/22 for SOB x5days and hypoxia to 70s. Admitted to medicine for SOB/hypoxia attributed to CHF exacerbation.   Has been on Bumex drip for HF exacerbation and is s/p R thoracentesis 8/26/22 w 1.5L transudative fluid removed.     HOLDING LOVENOX FOR THORACENTESIS 9/7/22    #Acute Hypoxic Respiratory Failure  #Acute HFmrEF  #COPD  HFmEF w EF 45-50% on 8/1/22; multiple LV regional wall motion abnormalities, mild MR, mild TR, borderline pulm HTN   COPD baseline rare home O2, 3L O2 since last admission known to Dr ALO Mars  Thoracentesis 8/26/22 w 1.5L removed, transudative fluid.   Worsening hypoxia, now on HFNC   CXR 9/5 showed worsening right pleural effusion and right lung opacity. No evidence of PNA.   - Continue Bumex drip at 1mg/hr and d/c hypertonic saline  - Heart failure follow up, continue low Na diet, fluid restriction 1L/24 hrs.   - Pulmonary follow up for worsening pleural effusion, continue IV diuresis  - Continue Prednisone (for C3 glomerulopathy)  - Continue duonebs PRN   - Transition eliquis to Lovenox 80mg BID for possible future repeat thoracentesis:   HOLDING LOVENOX FOR THORACENTESIS 9/7/22  - Start dexamethasone 6mg PO QD [id number] thru 9/16/22. Consult ID.   - Labs: Pending procal, ferritin. 9/6/22: ddimer 1035, , CRP 69.1.     #CAD  Continue Metoprolol 100mg QD, Imdur 30mg QD, and Atorvastatin 40mg qhs.     #HTN:   continue metoprolol, Nifedipine 30mg QD   will try to wean off nifedipine given HFmrEF    #DM II: A1C 7.3.   Episode of hypoglycemia for last 24 hrs, patient is not eating well  - Decrease Lantus to 17u, Off Lispro.     #Acute on chronic anemia; goal Hb 8+  #Epistaxis - resolved   s/p 1 U PRBC 8/22/22 and 8/24/22 for Hb <8  Continue IV Venofer 200mg daily for 3 days.   ENT consulted 8/23/22 for epistaxis/ blood clots blocking nasal passage   - Hb 7.7 today stable as compared to 7.6 yesterday  - Active T&S from today; ordered q3d through 10/3/22     #Chronic Atrial Fibrillation: continue metoprolol and Eliquis.   #C3 Glomerulonephropathy: Continue Pred 5mg QD, Tacro 0.5mg BID, Mycophenolate 500mg 4 times daily.   #Leukocytosis: likely from steroids; monitor off abx   #Hypothyroidism: continue synthroid  #Stage 2 right buttock pressure ulcer: present on admission. Continue local wound care, off loading.                                                                                   ----------------------------------------------------  # DVT prophylaxis: Transition eliquis to Lovenox 80mg BID for possible future repeat thoracentesis. HOLDING LOVENOX FOR THORACENTESIS 9/7/22  # GI prophylaxis: Famotidine 20mg QD   # Diet: Minced and moist   # Activity: IAT   # Code status: DNR DNI   # Disposition: Keep in CEU for now                                                                            --------------------------------------------------------    # Handoff:   - Trend Hb   - Wean HFNC   - F/u ID recs  - F/u FS     81 y/o man w PMHx of HTN, HLD, DM2, CAD s/p 3vCABG known to Dr De La Fuente, HFmEF w EF 45-50% on 8/1/22, AF on Eliquis, AAA, COPD baseline rare home O2 known to Dr ALO Mars, CKD3 and C3 glomerulopathy known to Dr Daniel, hypothyroidism, PVD, presented on 8/20/22 for SOB x5days and hypoxia to 70s. Admitted to medicine for SOB/hypoxia attributed to CHF exacerbation.   Has been on Bumex drip for HF exacerbation and is s/p R thoracentesis 8/26/22 w 1.5L transudative fluid removed.     HOLDING LOVENOX FOR THORACENTESIS 9/7/22    #Acute Hypoxic Respiratory Failure  #Acute HFmrEF  #COPD  HFmEF w EF 45-50% on 8/1/22; multiple LV regional wall motion abnormalities, mild MR, mild TR, borderline pulm HTN   COPD baseline rare home O2, 3L O2 since last admission known to Dr ALO Mars  Thoracentesis 8/26/22 w 1.5L removed, transudative fluid.   Worsening hypoxia, now on HFNC   CXR 9/5 showed worsening right pleural effusion and right lung opacity. No evidence of PNA.   - Continue Bumex drip at 1mg/hr  - Heart failure follow up, continue low Na diet, fluid restriction 1L/24 hrs.   - Pulmonary follow up for worsening pleural effusion, continue IV diuresis  - Continue Prednisone (for C3 glomerulopathy)  - Continue duonebs PRN   - Transition eliquis to Lovenox 80mg BID for possible future repeat thoracentesis:   HOLDING LOVENOX FOR THORACENTESIS 9/7/22  - Cont dexamethasone 6mg PO QD [id number] thru 9/16/22.   - ID: Tx as new COVID infection - Start RDV x5 days: day 1 w 200mg, days 2-5 w 100mg . No toci  - Labs: Pending procal, ferritin. 9/6/22: ddimer 1035, , CRP 69.1.     #CAD  Continue Metoprolol 100mg QD, Imdur 30mg QD, and Atorvastatin 40mg qhs.     #HTN:   continue metoprolol, Nifedipine 30mg QD   will try to wean off nifedipine given HFmrEF    #DM II: A1C 7.3.   Episode of hypoglycemia for last 24 hrs, patient is not eating well  - Decrease Lantus to 17u, Off Lispro.     #Acute on chronic anemia; goal Hb 8+  #Epistaxis - resolved   s/p 1 U PRBC 8/22/22 and 8/24/22 for Hb <8  Continue IV Venofer 200mg daily for 3 days.   ENT consulted 8/23/22 for epistaxis/ blood clots blocking nasal passage   - Hb 7.7 today stable as compared to 7.6 yesterday  - Active T&S from today; ordered q3d through 10/3/22     #Chronic Atrial Fibrillation: continue metoprolol and Eliquis.   #C3 Glomerulonephropathy: Continue Pred 5mg QD, Tacro 0.5mg BID, Mycophenolate 500mg 4 times daily.   #Leukocytosis: likely from steroids; monitor off abx   #Hypothyroidism: continue synthroid  #Stage 2 right buttock pressure ulcer: present on admission. Continue local wound care, off loading.                                                                                   ----------------------------------------------------  # DVT prophylaxis: Transition eliquis to Lovenox 80mg BID for possible future repeat thoracentesis. HOLDING LOVENOX FOR THORACENTESIS 9/7/22  # GI prophylaxis: Famotidine 20mg QD   # Diet: Minced and moist   # Activity: IAT   # Code status: DNR DNI   # Disposition: Keep in CEU for now                                                                            --------------------------------------------------------    # Handoff:   - Trend Hb   - Wean HFNC   - F/u ID recs  - F/u FS     83 y/o man w PMHx of HTN, HLD, DM2, CAD s/p 3vCABG known to Dr De La Fuente, HFmEF w EF 45-50% on 8/1/22, AF on Eliquis, AAA, COPD baseline rare home O2 known to Dr ALO Mars, CKD3 and C3 glomerulopathy known to Dr Daniel, hypothyroidism, PVD, presented on 8/20/22 for SOB x5days and hypoxia to 70s. Admitted to medicine for SOB/hypoxia attributed to CHF exacerbation.   Has been on Bumex drip for HF exacerbation and is s/p R thoracentesis 8/26/22 w 1.5L transudative fluid removed.     HOLDING LOVENOX FOR THORACENTESIS 9/7/22    #Acute Hypoxic Respiratory Failure  #Acute HFmrEF  #COPD  HFmEF w EF 45-50% on 8/1/22; multiple LV regional wall motion abnormalities, mild MR, mild TR, borderline pulm HTN   COPD baseline rare home O2, 3L O2 since last admission known to Dr ALO Mars  Thoracentesis 8/26/22 w 1.5L removed, transudative fluid.   Worsening hypoxia, now on HFNC   CXR 9/5 showed worsening right pleural effusion and right lung opacity. No evidence of PNA.   - Continue Bumex drip at 1mg/hr  - Heart failure follow up, continue low Na diet, fluid restriction 1L/24 hrs.   - Pulmonary follow up for worsening pleural effusion, continue IV diuresis  - Continue Prednisone (for C3 glomerulopathy)  - Continue duonebs PRN   - Transition eliquis to Lovenox 80mg BID for possible future repeat thoracentesis:   HOLDING LOVENOX FOR THORACENTESIS 9/7/22  - Cont dexamethasone 6mg PO QD [id number] thru 9/16/22.   - ID: Tx as new COVID infection - Start RDV x5 days: day 1 w 200mg, days 2-5 w 100mg . No toci- Labs: procal 0.29, ferritin 957. 9/6/22: ddimer 1035, , CRP 69.1.     #CAD  Continue Metoprolol 100mg QD, Imdur 30mg QD, and Atorvastatin 40mg qhs.     #HTN:   continue metoprolol, Nifedipine 30mg QD   will try to wean off nifedipine given HFmrEF    #DM II: A1C 7.3.   Episode of hypoglycemia for last 24 hrs, patient is not eating well  - Decrease Lantus to 17u, Off Lispro.     #Acute on chronic anemia; goal Hb 8+  #Epistaxis - resolved   s/p 1 U PRBC 8/22/22 and 8/24/22 for Hb <8  Continue IV Venofer 200mg daily for 3 days.   ENT consulted 8/23/22 for epistaxis/ blood clots blocking nasal passage   - Hb 7.7 today stable as compared to 7.6 yesterday  - Active T&S from today; ordered q3d through 10/3/22     #Chronic Atrial Fibrillation: continue metoprolol and Eliquis.   #C3 Glomerulonephropathy: Continue Pred 5mg QD, Tacro 0.5mg BID, Mycophenolate 500mg 4 times daily.   #Leukocytosis: likely from steroids; monitor off abx   #Hypothyroidism: continue synthroid  #Stage 2 right buttock pressure ulcer: present on admission. Continue local wound care, off loading.                                                                                   ----------------------------------------------------  # DVT prophylaxis: Transition eliquis to Lovenox 80mg BID for possible future repeat thoracentesis. HOLDING LOVENOX FOR THORACENTESIS 9/7/22  # GI prophylaxis: Famotidine 20mg QD   # Diet: Minced and moist   # Activity: IAT   # Code status: DNR DNI   # Disposition: Keep in CEU for now                                                                            --------------------------------------------------------    # Handoff:   - Trend Hb   - Wean HFNC   - F/u ID recs  - F/u FS

## 2022-09-07 NOTE — PROGRESS NOTE ADULT - SUBJECTIVE AND OBJECTIVE BOX
Patient is a 82y old  Male who presents with a chief complaint of chf exacerbation (07 Sep 2022 06:20)        Over Night Events:  Feels better.  Off pressors.  on HFNCO2 60/60        ROS:     All ROS are negative except HPI         PHYSICAL EXAM    ICU Vital Signs Last 24 Hrs  T(C): 36.8 (07 Sep 2022 08:30), Max: 36.8 (06 Sep 2022 15:00)  T(F): 98.2 (07 Sep 2022 08:30), Max: 98.2 (06 Sep 2022 15:00)  HR: 90 (07 Sep 2022 08:30) (79 - 93)  BP: 126/64 (07 Sep 2022 08:30) (119/74 - 148/65)  BP(mean): 84 (07 Sep 2022 08:30) (84 - 936)  ABP: --  ABP(mean): --  RR: 20 (07 Sep 2022 08:30) (18 - 20)  SpO2: 94% (07 Sep 2022 08:30) (91% - 98%)    O2 Parameters below as of 07 Sep 2022 08:30  Patient On (Oxygen Delivery Method): nasal cannula, high flow            CONSTITUTIONAL:  In NAD    ENT:   Airway patent,   Mouth with normal mucosa.       EYES:   Pupils equal,   Round and reactive to light.    CARDIAC:   Normal rate,   Regular rhythm.          RESPIRATORY:   No wheezing  Bilateral BS  Normal chest expansion  Not tachypneic,  No use of accessory muscles    GASTROINTESTINAL:  Abdomen soft,   Non-tender,   No guarding,   + BS    MUSCULOSKELETAL:   Range of motion is not limited,  No clubbing, cyanosis    NEUROLOGICAL:   Alert and oriented   No motor  deficits.    SKIN:   Skin normal color for race,   No evidence of rash.    PSYCHIATRIC:    No apparent risk to self or others.        09-06-22 @ 07:01  -  09-07-22 @ 07:00  --------------------------------------------------------  IN:    Bumetanide: 60 mL  Total IN: 60 mL    OUT:    Indwelling Catheter - Urethral (mL): 890 mL  Total OUT: 890 mL    Total NET: -830 mL          LABS:                            8.0    8.80  )-----------( 224      ( 07 Sep 2022 00:20 )             25.3                                               09-07    140  |  100  |  77<HH>  ----------------------------<  161<H>  4.8   |  23  |  1.3    Creatinine Trend  BUN 77, Cr 1.3, (09-07-22 @ 00:20)  Creatinine Trend  BUN 81, Cr 1.2, (09-06-22 @ 08:44)  Creatinine Trend  BUN 81, Cr 1.3, (09-06-22 @ 01:12)  Creatinine Trend  BUN 78, Cr 1.3, (09-05-22 @ 17:35)  Creatinine Trend  BUN 80, Cr 1.4, (09-05-22 @ 07:30)  Creatinine Trend  BUN 80, Cr 1.4, (09-04-22 @ 17:26)  Creatinine Trend  BUN 80, Cr 1.3, (09-04-22 @ 08:35)  Creatinine Trend  BUN 75, Cr 1.2, (09-03-22 @ 16:27)  Creatinine Trend  BUN 71, Cr 1.1, (09-03-22 @ 06:03)  Creatinine Trend  BUN 71, Cr 1.1, (09-02-22 @ 18:01)      Ca    8.3<L>      07 Sep 2022 00:20  Phos  3.4     09-07  Mg     1.9     09-07    TPro  5.9<L>  /  Alb  3.1<L>  /  TBili  0.9  /  DBili  x   /  AST  26  /  ALT  10  /  AlkPhos  85  09-07                                                                                           LIVER FUNCTIONS - ( 07 Sep 2022 00:20 )  Alb: 3.1 g/dL / Pro: 5.9 g/dL / ALK PHOS: 85 U/L / ALT: 10 U/L / AST: 26 U/L / GGT: x                                                                                                                                       MEDICATIONS  (STANDING):  albuterol/ipratropium for Nebulization 3 milliLiter(s) Nebulizer every 6 hours  atorvastatin 40 milliGRAM(s) Oral at bedtime  BACItracin   Ointment 1 Application(s) Topical two times a day  buMETAnide Infusion 1 mG/Hr (5 mL/Hr) IV Continuous <Continuous>  chlorhexidine 2% Cloths 1 Application(s) Topical <User Schedule>  dexAMETHasone     Tablet 6 milliGRAM(s) Oral daily  dextrose 5%. 1000 milliLiter(s) (50 mL/Hr) IV Continuous <Continuous>  dextrose 5%. 1000 milliLiter(s) (100 mL/Hr) IV Continuous <Continuous>  dextrose 50% Injectable 25 Gram(s) IV Push once  dextrose 50% Injectable 12.5 Gram(s) IV Push once  dextrose 50% Injectable 25 Gram(s) IV Push once  famotidine    Tablet 20 milliGRAM(s) Oral daily  glucagon  Injectable 1 milliGRAM(s) IntraMuscular once  insulin glargine Injectable (LANTUS) 17 Unit(s) SubCutaneous at bedtime  insulin lispro (ADMELOG) corrective regimen sliding scale   SubCutaneous three times a day before meals  isosorbide   mononitrate ER Tablet (IMDUR) 30 milliGRAM(s) Oral daily  levothyroxine 150 MICROGram(s) Oral daily  lidocaine 1% (Preservative-free) Injectable 30 milliLiter(s) Local Injection once  melatonin 5 milliGRAM(s) Oral at bedtime  metolazone 5 milliGRAM(s) Oral daily  metoprolol succinate  milliGRAM(s) Oral daily  multivitamin/minerals 1 Tablet(s) Oral daily  mycophenolate mofetil 500 milliGRAM(s) Oral four times a day  NIFEdipine XL 30 milliGRAM(s) Oral daily  pentoxifylline 400 milliGRAM(s) Oral two times a day  tacrolimus 0.5 milliGRAM(s) Oral two times a day    MEDICATIONS  (PRN):  acetaminophen     Tablet .. 650 milliGRAM(s) Oral every 6 hours PRN Mild Pain (1 - 3)  dextrose Oral Gel 15 Gram(s) Oral once PRN Blood Glucose LESS THAN 70 milliGRAM(s)/deciliter  dextrose Oral Gel 15 Gram(s) Oral once PRN Blood Glucose LESS THAN 70 milliGRAM(s)/deciliter      New X-rays reviewed:                                                                                  ECHO

## 2022-09-07 NOTE — CONSULT NOTE ADULT - SUBJECTIVE AND OBJECTIVE BOX
ANDRIA TAVAREZ  82y, Male  Allergy: Ozempic (0.25 mg or 0.5 mg dose) (Hives; Rash)  streptomycin (Unknown)  Trulicity Pen (Hives; Rash)      All historical available data reviewed.    HPI:  81 yo male with PMHx of HFrEF (45-50% on Lasix 20 PO), A-Fib on Eliquis, CAD s/p CABGx3, PVD, COPD (was on rare home O2 PRN now on 3L NC since recent July admission for COVID), CKD3, C3 glomerulonephropathy, HTN, HLD, Hypothyroid presents from NH with progressively worsening SOB x5 days and hypoxia.  Recently hospitalized here for a month discharged on 8/3/22 initially for BRENDA on CKD course c/b COVID, bilateral lower lobe pneumonia, delirium and microaspiration of thin liquids on modified barium study. Received Dexamethasone and Zosyn course with new O2 requirements of 3L NC discharged to nursing home on 3L and thick liquid diet for rehab. Due to his SOB, chest x-ray was ordered on 8/15 but results did not come back and patient became increasingly short of breath and was noted to be hypoxic and rehab facility which prompted referral to the ED. Review of symptoms is notable for orthopnea and a chronic dry cough unchanged since his covid illness. He denies paroxysmal nocturnal dyspnea, leg edema, chest pain, palpitations, dizziness, n/v, abd pain. Cardiologist is Dr. Witt and Pulmonolgist is Dr. Rory Mars.     In ED:  T(F): 96.4 (08-20-22 @ 11:27), Max: 96.4 (08-20-22 @ 11:27)  HR: 80 (08-20-22 @ 16:20) (80 - 91)  BP: 137/61 (08-20-22 @ 16:20) (121/58 - 137/61)  RR: 18 (08-20-22 @ 16:20) (18 - 24)  SpO2: 100% (08-20-22 @ 16:20) (89% - 100%) -> 89% on 4L -> placed on NRB -> BIPAP 2/2 tachypnea and hypoxia -> taken off with increased work of breathing placed back on BIPAP  Labs: WBC 12k, Hgb 7.2 (baseline 9), BNP 55k, Cr 2.2 (baseline 1.5), PCO2 47 (VBG), Troponin 0.24  EKG:   Atrial fibrillation with premature ventricular or aberrantly conducted complexes  Left posterior fascicular block   ST & T wave abnormality, consider lateral ischemia - similar to prior in July 2022  XR chest: significant bilateral pleural effusions and pulmonary congestion  Admitted to medicine for SOB/hypoxia likely 2/2 acute CHF exacerbation.  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      AF (atrial fibrillation)  s/p DCCV      Carotid stenosis      COPD (chronic obstructive pulmonary disease)      H/O coronary artery bypass surgery  2001      S/P inguinal hernia repair      History of appendectomy            VITALS:  T(F): 98.1, Max: 98.2 (09-06-22 @ 15:00)  HR: 87  BP: 133/62  RR: 20Vital Signs Last 24 Hrs  T(C): 36.7 (07 Sep 2022 11:20), Max: 36.8 (06 Sep 2022 15:00)  T(F): 98.1 (07 Sep 2022 11:20), Max: 98.2 (06 Sep 2022 15:00)  HR: 87 (07 Sep 2022 11:20) (79 - 93)  BP: 133/62 (07 Sep 2022 11:20) (119/74 - 148/65)  BP(mean): 89 (07 Sep 2022 11:20) (84 - 936)  RR: 20 (07 Sep 2022 11:20) (18 - 20)  SpO2: 69% (07 Sep 2022 11:20) (69% - 98%)    Parameters below as of 07 Sep 2022 11:20  Patient On (Oxygen Delivery Method): nasal cannula, high flow        TESTS & MEASUREMENTS:                        8.0    8.80  )-----------( 224      ( 07 Sep 2022 00:20 )             25.3     09-07    140  |  100  |  77<HH>  ----------------------------<  161<H>  4.8   |  23  |  1.3    Ca    8.3<L>      07 Sep 2022 00:20  Phos  3.4     09-07  Mg     1.9     09-07    TPro  5.9<L>  /  Alb  3.1<L>  /  TBili  0.9  /  DBili  x   /  AST  26  /  ALT  10  /  AlkPhos  85  09-07    LIVER FUNCTIONS - ( 07 Sep 2022 00:20 )  Alb: 3.1 g/dL / Pro: 5.9 g/dL / ALK PHOS: 85 U/L / ALT: 10 U/L / AST: 26 U/L / GGT: x             Culture - Stool (collected 09-01-22 @ 07:05)  Source: .Stool Feces  Final Report (09-03-22 @ 07:33):    No enteric pathogens isolated.    (Stool culture examined for Salmonella,    Shigella, Campylobacter, Aeromonas, Plesiomonas,    Vibrio, E.coli O157 and Yersinia)            RADIOLOGY & ADDITIONAL TESTS:  Personal review of radiological diagnostics performed  Echo and EKG results noted when applicable.     MEDICATIONS:  acetaminophen     Tablet .. 650 milliGRAM(s) Oral every 6 hours PRN  albuterol/ipratropium for Nebulization 3 milliLiter(s) Nebulizer every 6 hours  atorvastatin 40 milliGRAM(s) Oral at bedtime  BACItracin   Ointment 1 Application(s) Topical two times a day  buMETAnide Infusion 1 mG/Hr IV Continuous <Continuous>  chlorhexidine 2% Cloths 1 Application(s) Topical <User Schedule>  dexAMETHasone     Tablet 6 milliGRAM(s) Oral daily  dextrose 5%. 1000 milliLiter(s) IV Continuous <Continuous>  dextrose 5%. 1000 milliLiter(s) IV Continuous <Continuous>  dextrose 50% Injectable 25 Gram(s) IV Push once  dextrose 50% Injectable 12.5 Gram(s) IV Push once  dextrose 50% Injectable 25 Gram(s) IV Push once  dextrose Oral Gel 15 Gram(s) Oral once PRN  dextrose Oral Gel 15 Gram(s) Oral once PRN  famotidine    Tablet 20 milliGRAM(s) Oral daily  glucagon  Injectable 1 milliGRAM(s) IntraMuscular once  insulin glargine Injectable (LANTUS) 17 Unit(s) SubCutaneous at bedtime  insulin lispro (ADMELOG) corrective regimen sliding scale   SubCutaneous three times a day before meals  isosorbide   mononitrate ER Tablet (IMDUR) 30 milliGRAM(s) Oral daily  levothyroxine 150 MICROGram(s) Oral daily  lidocaine 1% (Preservative-free) Injectable 30 milliLiter(s) Local Injection once  melatonin 5 milliGRAM(s) Oral at bedtime  metolazone 5 milliGRAM(s) Oral daily  metoprolol succinate  milliGRAM(s) Oral daily  multivitamin/minerals 1 Tablet(s) Oral daily  mycophenolate mofetil 500 milliGRAM(s) Oral four times a day  NIFEdipine XL 30 milliGRAM(s) Oral daily  pentoxifylline 400 milliGRAM(s) Oral two times a day  tacrolimus 0.5 milliGRAM(s) Oral two times a day      ANTIBIOTICS:

## 2022-09-07 NOTE — PROGRESS NOTE ADULT - ASSESSMENT
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     Acute Hypoxic respiratory Failure   Acute HFmrEF  Bilateral pleural effusion Right>Left.  COPD  COVID 19 infection +9/2    Worsening hypoxia, now on high flow nasal canula 60/60  s/p thoracentesis 8/2 fluid transudative.   planned for repeat thoracentesis today   Continue Bumex drip at 1mg/hr  Heart failure follow up, continue low sodium diet, fluid restriction 1L/24 hrs.   Continue with dexamethasone 6mg daily, steroids started 9/6 --> change to IV  check inflammatory markers  DDimer 1000, LE duplex ordered  on therapeutic Lovenox (previous eliquis)    Epistaxis - resolved  ENT follow up appreciated, packing removed    Leukocytosis, resolved - likely from steroids    Acute on chronic anemia - s/p 1 U PRBC and 3 days of IV venofer   monitor H&H, keep active T&S    Chronic Atrial Fibrillation - continue metoprolol and lovenox. Resume Eliquis once no procedures planned.     C3 Glomerulonephropathy - Continue tacrolimus 0.5mg BID and Mycophenolate 500mg 4 times daily. On chronic prednisone 5mg at home, now on dexamethasone. Repeat Tacrolimus levels     CAD - Continue Metoprolol, Imdur and Lipitor.     HTN - continue metoprolol. Stone Nifedipine and Imdur. Start Entresto per HF    Hypothyroidism - continue synthroid    DM II: A1C 7.3 - continue with insulin, monitor FS and PO intake     Stage 2 right buttock pressure ulcer, Present on admission  Continue local wound care, off loading.     #Progress Note Handoff:  Pending (specify): tapering down supplemental oxygen, thoracentesis, labs, diuresis, HF f/u   Family discussion: spoke with pt  Disposition: Home

## 2022-09-08 NOTE — PROGRESS NOTE ADULT - SUBJECTIVE AND OBJECTIVE BOX
ANDRIA TAVAREZ  82y, Male    All available historical data reviewed    OVERNIGHT EVENTS:  no fevers  HFNC  poorly responsive    ROS:  not reliable  VITALS:  T(F): 96.6, Max: 98.8 (09-07-22 @ 20:21)  HR: 69  BP: 162/79  RR: 20Vital Signs Last 24 Hrs  T(C): 35.9 (08 Sep 2022 08:36), Max: 37.1 (07 Sep 2022 20:21)  T(F): 96.6 (08 Sep 2022 08:36), Max: 98.8 (07 Sep 2022 20:21)  HR: 69 (08 Sep 2022 08:36) (69 - 93)  BP: 162/79 (08 Sep 2022 08:36) (129/70 - 162/79)  BP(mean): 113 (08 Sep 2022 08:36) (89 - 113)  RR: 20 (08 Sep 2022 08:36) (20 - 20)  SpO2: 97% (08 Sep 2022 08:36) (69% - 98%)    Parameters below as of 08 Sep 2022 08:36  Patient On (Oxygen Delivery Method): nasal cannula, high flow        TESTS & MEASUREMENTS:                        8.0    8.40  )-----------( 271      ( 08 Sep 2022 01:29 )             25.3     09-08    133<L>  |  95<L>  |  87<HH>  ----------------------------<  249<H>  4.3   |  26  |  1.6<H>    Ca    8.6      08 Sep 2022 01:29  Phos  3.9     09-08  Mg     2.0     09-08    TPro  6.1  /  Alb  3.2<L>  /  TBili  0.7  /  DBili  0.4<H>  /  AST  14  /  ALT  9   /  AlkPhos  90  09-08    LIVER FUNCTIONS - ( 08 Sep 2022 07:56 )  Alb: 3.2 g/dL / Pro: 6.1 g/dL / ALK PHOS: 90 U/L / ALT: 9 U/L / AST: 14 U/L / GGT: x                   RADIOLOGY & ADDITIONAL TESTS:  Personal review of radiological diagnostics performed  Echo and EKG results noted when applicable.     MEDICATIONS:  acetaminophen     Tablet .. 650 milliGRAM(s) Oral every 6 hours PRN  ALBUTerol    90 MICROgram(s) HFA Inhaler 2 Puff(s) Inhalation every 6 hours  albuterol/ipratropium for Nebulization 3 milliLiter(s) Nebulizer every 6 hours  atorvastatin 40 milliGRAM(s) Oral at bedtime  BACItracin   Ointment 1 Application(s) Topical two times a day  buMETAnide Infusion 1 mG/Hr IV Continuous <Continuous>  chlorhexidine 2% Cloths 1 Application(s) Topical <User Schedule>  dexAMETHasone  Injectable 6 milliGRAM(s) IV Push daily  dextrose 5%. 1000 milliLiter(s) IV Continuous <Continuous>  dextrose 5%. 1000 milliLiter(s) IV Continuous <Continuous>  dextrose 50% Injectable 25 Gram(s) IV Push once  dextrose 50% Injectable 12.5 Gram(s) IV Push once  dextrose 50% Injectable 25 Gram(s) IV Push once  dextrose Oral Gel 15 Gram(s) Oral once PRN  dextrose Oral Gel 15 Gram(s) Oral once PRN  famotidine    Tablet 20 milliGRAM(s) Oral daily  glucagon  Injectable 1 milliGRAM(s) IntraMuscular once  insulin glargine Injectable (LANTUS) 17 Unit(s) SubCutaneous at bedtime  insulin lispro (ADMELOG) corrective regimen sliding scale   SubCutaneous three times a day before meals  isosorbide   mononitrate ER Tablet (IMDUR) 30 milliGRAM(s) Oral daily  levothyroxine 150 MICROGram(s) Oral daily  lidocaine 1% (Preservative-free) Injectable 30 milliLiter(s) Local Injection once  lidocaine 1% Injectable 10 milliLiter(s) Local Injection once  melatonin 5 milliGRAM(s) Oral at bedtime  metolazone 5 milliGRAM(s) Oral daily  metoprolol succinate  milliGRAM(s) Oral daily  multivitamin/minerals 1 Tablet(s) Oral daily  mycophenolate mofetil 500 milliGRAM(s) Oral four times a day  NIFEdipine XL 30 milliGRAM(s) Oral daily  pentoxifylline 400 milliGRAM(s) Oral two times a day  remdesivir  IVPB   IV Intermittent   remdesivir  IVPB 100 milliGRAM(s) IV Intermittent every 24 hours  tacrolimus 0.5 milliGRAM(s) Oral two times a day      ANTIBIOTICS:  remdesivir  IVPB 100 milliGRAM(s) IV Intermittent every 24 hours  remdesivir  IVPB   IV Intermittent

## 2022-09-08 NOTE — CHART NOTE - NSCHARTNOTEFT_GEN_A_CORE
Spoke with patient's son Jose on the phone. re-introduced palliative care. He related patient has had functional and medical decline since he d/c to NH. He spoke with primary team and got update. He wished to have a meeting to discuss options moving forward should patient not be able to get off HFNC. discussed briefly CMO and hospice. plan for family meeting on conference call tomorrow 11AM. updated Dr Moise on above. will follow. [phone]

## 2022-09-08 NOTE — PROGRESS NOTE ADULT - ASSESSMENT
· Assessment	  81 yo male with PMHx of HFrEF (45-50% on Lasix 20 PO), A-Fib on Eliquis, CAD s/p CABGx3, PVD, COPD (was on rare home O2 PRN now on 3L NC since recent July admission for COVID), CKD3, C3 glomerulonephropathy, HTN, HLD, Hypothyroid presents from NH with progressively worsening SOB x5 days and hypoxia.  Recently hospitalized here for a month discharged on 8/3/22 initially for BRENDA on CKD course c/b COVID, bilateral lower lobe pneumonia, delirium and microaspiration of thin liquids on modified barium study. Received Dexamethasone and Zosyn course with new O2 requirements of 3L NC discharged to nursing home on 3L and thick liquid diet for rehab. Due to his SOB, chest x-ray was ordered on 8/15 but results did not come back and patient became increasingly short of breath and was noted to be hypoxic and rehab facility which prompted referral to the ED. Review of symptoms is notable for orthopnea and a chronic dry cough unchanged since his covid illness.     IMPRESSION;   CHF with SOB with CXR with b/l pleural effusions  8/29 COVID-19 NG  9/2 COVID-19 positive  Unclear at this point whether he has a new infection but will approach it as such  If newly infected then pt is in the early viral replicative phase based on the timeline/onset of symptoms.   No cytokine storm  CXR : unchanged  Ddimers 1035    RECOMMENDATIONS;  Target SpO2 92 % to 96 %  f/u ferritin, CRP, procalcitonin   Day 1 : Single  mg IV loading dose  Day 2-5 : single  mg IV once daily maintenance dose  Daily CBC,CMP.   Dexamethasone 6 mg iv q24h   Monitor for side effects: hyperglycemia, neurological ( agitation/confusion), adrenal suppression, bacterial and fungal infections  No Toci

## 2022-09-08 NOTE — PROCEDURE NOTE - NSPROCDETAILS_GEN_ALL_CORE
location identified, draped/prepped, sterile technique used, needle inserted/introduced/catheter inserted over needle
ultrasound assessment of effusion (localization)

## 2022-09-08 NOTE — PROGRESS NOTE ADULT - SUBJECTIVE AND OBJECTIVE BOX
ANDRIA TAVAREZ 82y Male  MRN#: [id number]   CODE STATUS: DNR DNI     Admission Date: 08-20-22  Hospital Day: 19d    Pt is currently admitted with the primary diagnosis of SOB, CHF exacerbation     SUBJECTIVE  Hospital Course  DNR DNI 83 y/o man w PMHx of HTN, HLD, DM2, CAD s/p 3vCABG known to Dr De La Fuente, HFmEF w EF 45-50% on 8/1/22, AF on Eliquis, AAA, COPD baseline rare home O2 known to Dr ALO Mars, CKD3 and C3 glomerulopathy known to Dr Daniel, hypothyroidism, PVD, recent admission 7/5/22-8/3/22 for COVID and BRENDA 2/2 diarrhea, tx w dexamethasone and Zosyn, stay c/b microaspirations, PNA, was d/c to STR, has been on 3L O2, presented on 8/20/22 for SOB x5days and hypoxia to 70s. Had CXR done on 8/15/22 but results had not been back. ED vitals were notable for RR max 24, SpO2 84% on 4L O2, required BiPAP, had WBC 12, HB 7.2 from baseline 9, BNP 55k, Cr 2.2 from baseline 1.5, and trop 0.24, EKG w AF RVR and CXR showing significant b/l pleural effusions, pulm congestion, admitted to medicine for SOB/hypoxia attributed to CHF exacerbation.   Has been on Bumex drip for HF exacerbation and is s/p R thoracentesis 8/26/22 w 1.5L transudative fluid removed. Has had epistaxis during stay. Is s/p 1uPRBC, start 3 day course of IV venofer 200mg. Upgraded to CEU for increasing O2 requirements despite venti mask, HFNC.       9/6/22 day 17: Transition eliquis to Lovenox 80mg BID for possible future repeat thoracentesis. On HFNC; wean as tolerated. Start dexamethasone 6mg PO QD [id number] thru 9/16/22. Consult ID. Decrease Lantus from 22 to 17. ddimer 1035, , CRP 69.1. Keep bumex drip. D/c 3% NS.   HOLDING LOVENOX FOR THORACENTESIS 9/7/22 9/7/22 day 18: ID recs for COVID: unclear if new infection, will treat as such w RDV x5days; no toci, Planned for thoracentesis #3 today, due to excess bleeding was ?not completed. Started RDV 200mg today, 100mg for next 4 days. Cont dexamethasone 6mg [id number], thru 9/16/22.     9/8/22 day 19: Hb stable ~8. ?restart Lovenox today.     Overnight events  None significant; pt states he slept well.     Subjective complaints  States he is feeling much better today. When asked what changed, pt states probably because he got a good night of sleep. Asked for a cup of ice; RN provided him cup of ice.           PHYSICAL EXAM:  GENERAL: NAD, sitting in bed comfortably  HEAD:  Atraumatic, Normocephalic  EYES: EOMI, sclera clear  ENT: Dry mucous membranes. (+)HFNC   NECK: Supple  CHEST/LUNG: Decent air movement. Mild crackles throughout, mild rhonchi b/l bases   HEART: Irregularly irregular rhythm. Regular rate; Soft 1/6 systolic murmur  ABDOMEN: (+)BS; Soft, nontender, nondistended  EXTREMITIES:  2+ Radial Pulses, brisk capillary refill. BLE w 1+ pitting edema, chronic skin changes/hyperpigmentation over b/l shin from old scratches.   NERVOUS SYSTEM:  A&Ox3, no focal deficits   SKIN: No visible rashes or lesions       Present Today:   - Gloria:  No [ X ] condom cath, Yes [  ] : Indication:   - Type of IV Access:       .. CVC/Piccline:  No [  ], Yes [ X ] : Indication:       .. Midline: No [ X ], Yes [  ] : Indication:                       RADIOLOGY:    CXR 9/5/22:   B/l opacities and effusions, unchanged        ANDRIA TAVAREZ 82y Male  MRN#: [id number]   CODE STATUS: DNR DNI     Admission Date: 08-20-22  Hospital Day: 19d    Pt is currently admitted with the primary diagnosis of SOB, CHF exacerbation     SUBJECTIVE  Hospital Course  DNR DNI 81 y/o man w PMHx of HTN, HLD, DM2, CAD s/p 3vCABG known to Dr De La Fuente, HFmEF w EF 45-50% on 8/1/22, AF on Eliquis, AAA, COPD baseline rare home O2 known to Dr ALO Mars, CKD3 and C3 glomerulopathy known to Dr Daniel, hypothyroidism, PVD, recent admission 7/5/22-8/3/22 for COVID and BRENDA 2/2 diarrhea, tx w dexamethasone and Zosyn, stay c/b microaspirations, PNA, was d/c to STR, has been on 3L O2, presented on 8/20/22 for SOB x5days and hypoxia to 70s. Had CXR done on 8/15/22 but results had not been back. ED vitals were notable for RR max 24, SpO2 84% on 4L O2, required BiPAP, had WBC 12, HB 7.2 from baseline 9, BNP 55k, Cr 2.2 from baseline 1.5, and trop 0.24, EKG w AF RVR and CXR showing significant b/l pleural effusions, pulm congestion, admitted to medicine for SOB/hypoxia attributed to CHF exacerbation.   Has been on Bumex drip for HF exacerbation and is s/p R thoracentesis 8/26/22 w 1.5L transudative fluid removed. Has had epistaxis during stay. Is s/p 1uPRBC, start 3 day course of IV venofer 200mg. Upgraded to CEU for increasing O2 requirements despite venti mask, HFNC.       9/6/22 day 17: Transition eliquis to Lovenox 80mg BID for possible future repeat thoracentesis. On HFNC; wean as tolerated. Start dexamethasone 6mg PO QD [id number] thru 9/16/22. Consult ID. Decrease Lantus from 22 to 17. ddimer 1035, , CRP 69.1. Keep bumex drip. D/c 3% NS.   HOLDING LOVENOX FOR THORACENTESIS 9/7/22 9/7/22 day 18: ID recs for COVID: unclear if new infection, will treat as such w RDV x5days; no toci, Planned for thoracentesis #3 today, due to excess bleeding was ?not completed. Started RDV 200mg today, 100mg for next 4 days. Cont dexamethasone 6mg [id number], thru 9/16/22.     9/8/22 day 19: Hb stable ~8. Retry thoracentesis today. Seen by PT but stated too weak/unwell to participate. Palliative consulted per family request - has been seen by palliative in the past.     Overnight events  None significant; pt states he did not sleep well.     Subjective complaints  Is looking more tired today but when asked, denies any symptoms. States he feels "good."         PHYSICAL EXAM:  GENERAL: NAD, sitting in bed, Seems more tired today.   HEAD:  Atraumatic, Normocephalic  EYES: EOMI, sclera clear  ENT: Dry mucous membranes. (+)HFNC   NECK: Supple  CHEST/LUNG: Decent air movement. Mild crackles throughout, mild rhonchi b/l bases   HEART: Irregularly irregular rhythm. Regular rate; Soft 1/6 systolic murmur  ABDOMEN: (+)BS; Soft, nontender, nondistended  EXTREMITIES:  2+ Radial Pulses, brisk capillary refill. BLE w 1+ pitting edema, slightly worse than yesterday. Chronic skin changes/hyperpigmentation over b/l shin from old scratches.   NERVOUS SYSTEM:  A&Ox3, no focal deficits   SKIN: No visible rashes or lesions       Present Today:   - Gloria:  No [ X ] condom cath, Yes [  ] : Indication:   - Type of IV Access:       .. CVC/Piccline:  No [  ], Yes [ X ] : Indication:       .. 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    AF (atrial fibrillation)  s/p DCCV    Carotid stenosis    COPD (chronic obstructive pulmonary disease)    H/O coronary artery bypass surgery  2001    S/P inguinal hernia repair    History of appendectomy                                                ALLERGIES:  Ozempic (0.25 mg or 0.5 mg dose) (Hives; Rash)  streptomycin (Unknown)  Trulicity Pen (Hives; Rash)                           HOME MEDICATIONS  Home Medications:  Aranesp 100 mcg/0.5 mL injectable solution: 1 dose(s) injectable every 3 to 4 weeks (05 Jul 2022 21:22)  atorvastatin 40 mg oral tablet: 1 tab(s) orally once a day (at bedtime) (05 Jul 2022 21:22)  calcitriol 0.25 mcg oral capsule: 1 cap(s) orally 2 times a week (05 Jul 2022 21:22)  d-mycophenolate: 500 milligram(s) orally 4 times a day (05 Jul 2022 21:22)  Eliquis 2.5 mg oral tablet: 1 tab(s) orally 2 times a day (05 Jul 2022 21:22)  famotidine 20 mg oral tablet: 1 tab(s) orally once a day (05 Jul 2022 21:22)  furosemide 20 mg oral tablet: 1 tab(s) orally once a day (03 Aug 2022 09:47)  insulin glargine 100 units/mL subcutaneous solution: 5 unit(s) subcutaneous once a day (at bedtime) (03 Aug 2022 09:47)  insulin lispro 100 units/mL injectable solution: 1 Unit(s) if Glucose 151 - 200  2 Unit(s) if Glucose 201 - 250  3 Unit(s) if Glucose 251 - 300  4 Unit(s) if Glucose 301 - 350  5 Unit(s) if Glucose 351 - 400  6 Unit(s) if Glucose Greater Than 400 (03 Aug 2022 09:57)  isosorbide mononitrate 30 mg oral tablet, extended release: 1 tab(s) orally once a day (in the morning) (05 Jul 2022 21:22)  levothyroxine 150 mcg (0.15 mg) oral tablet: 1 tab(s) orally once a day (05 Jul 2022 21:22)  metoprolol succinate 50 mg oral tablet, extended release: 1 tab(s) orally once a day (03 Aug 2022 09:47)  Multiple Vitamins with Minerals oral tablet: 1 tab(s) orally once a day (03 Aug 2022 09:47)  NIFEdipine 30 mg oral tablet, extended release: 1 tab(s) orally once a day (05 Jul 2022 21:22)  pentoxifylline 400 mg oral tablet, extended release: 1 tab(s) orally 2 times a day (05 Jul 2022 21:22)  sodium bicarbonate 650 mg oral tablet: 1 tab(s) orally 3 times a day (03 Aug 2022 09:47)  tacrolimus 0.5 mg oral capsule: 1 cap(s) orally 2 times a day (03 Aug 2022 09:57)                           MEDICATIONS:  STANDING MEDICATIONS  ALBUTerol    90 MICROgram(s) HFA Inhaler 2 Puff(s) Inhalation every 6 hours  albuterol/ipratropium for Nebulization 3 milliLiter(s) Nebulizer every 6 hours  atorvastatin 40 milliGRAM(s) Oral at bedtime  BACItracin   Ointment 1 Application(s) Topical two times a day  buMETAnide Infusion 1 mG/Hr IV Continuous <Continuous>  chlorhexidine 2% Cloths 1 Application(s) Topical <User Schedule>  dexAMETHasone  Injectable 6 milliGRAM(s) IV Push daily  dextrose 5%. 1000 milliLiter(s) IV Continuous <Continuous>  dextrose 5%. 1000 milliLiter(s) IV Continuous <Continuous>  dextrose 50% Injectable 25 Gram(s) IV Push once  dextrose 50% Injectable 12.5 Gram(s) IV Push once  dextrose 50% Injectable 25 Gram(s) IV Push once  famotidine    Tablet 20 milliGRAM(s) Oral daily  glucagon  Injectable 1 milliGRAM(s) IntraMuscular once  insulin glargine Injectable (LANTUS) 17 Unit(s) SubCutaneous at bedtime  insulin lispro (ADMELOG) corrective regimen sliding scale   SubCutaneous three times a day before meals  isosorbide   mononitrate ER Tablet (IMDUR) 30 milliGRAM(s) Oral daily  levothyroxine 150 MICROGram(s) Oral daily  lidocaine 1% (Preservative-free) Injectable 30 milliLiter(s) Local Injection once  lidocaine 1% Injectable 10 milliLiter(s) Local Injection once  lidocaine 1% Injectable 10 milliLiter(s) Local Injection once  melatonin 5 milliGRAM(s) Oral at bedtime  metolazone 5 milliGRAM(s) Oral daily  metoprolol succinate  milliGRAM(s) Oral daily  multivitamin/minerals 1 Tablet(s) Oral daily  mycophenolate mofetil 500 milliGRAM(s) Oral four times a day  NIFEdipine XL 30 milliGRAM(s) Oral daily  pentoxifylline 400 milliGRAM(s) Oral two times a day  remdesivir  IVPB   IV Intermittent   remdesivir  IVPB 100 milliGRAM(s) IV Intermittent every 24 hours  tacrolimus 0.5 milliGRAM(s) Oral two times a day    PRN MEDICATIONS  acetaminophen     Tablet .. 650 milliGRAM(s) Oral every 6 hours PRN  dextrose Oral Gel 15 Gram(s) Oral once PRN  dextrose Oral Gel 15 Gram(s) Oral once PRN                                            ------------------------------------------------------------  T(C): 36.2 (09-08-22 @ 12:10), Max: 37.1 (09-07-22 @ 20:21)  T(F): 97.1 (09-08-22 @ 12:10), Max: 98.8 (09-07-22 @ 20:21)  HR: 66 (09-08-22 @ 12:10) (66 - 93)  BP: 149/774 (09-08-22 @ 12:10) (129/70 - 162/79)  RR: 20 (09-08-22 @ 12:10) (20 - 20)  SpO2: 93% (09-08-22 @ 12:10) (84% - 98%)      09-07-22 @ 07:01  -  09-08-22 @ 07:00  --------------------------------------------------------  IN: 780 mL / OUT: 600 mL / NET: 180 mL                                               LABS:                        8.0    8.40  )-----------( 271      ( 08 Sep 2022 01:29 )             25.3     09-08    133<L>  |  95<L>  |  87<HH>  ----------------------------<  249<H>  4.3   |  26  |  1.6<H>    Ca    8.6      08 Sep 2022 01:29  Phos  3.9     09-08  Mg     2.0     09-08    TPro  6.1  /  Alb  3.2<L>  /  TBili  0.7  /  DBili  0.4<H>  /  AST  14  /  ALT  9   /  AlkPhos  90  09-08                                      RADIOLOGY:    CXR 9/5/22:   B/l opacities and effusions, unchanged        ANDRIA TAVAREZ 82y Male  MRN#: [id number]   CODE STATUS: DNR DNI     Admission Date: 08-20-22  Hospital Day: 19d    Pt is currently admitted with the primary diagnosis of SOB, CHF exacerbation     SUBJECTIVE  Hospital Course  DNR DNI 81 y/o man w PMHx of HTN, HLD, DM2, CAD s/p 3vCABG known to Dr De La Fuente, HFmEF w EF 45-50% on 8/1/22, AF on Eliquis, AAA, COPD baseline rare home O2 known to Dr ALO Mars, CKD3 and C3 glomerulopathy known to Dr Daniel, hypothyroidism, PVD, recent admission 7/5/22-8/3/22 for COVID and BRENDA 2/2 diarrhea, tx w dexamethasone and Zosyn, stay c/b microaspirations, PNA, was d/c to STR, has been on 3L O2, presented on 8/20/22 for SOB x5days and hypoxia to 70s. Had CXR done on 8/15/22 but results had not been back. ED vitals were notable for RR max 24, SpO2 84% on 4L O2, required BiPAP, had WBC 12, HB 7.2 from baseline 9, BNP 55k, Cr 2.2 from baseline 1.5, and trop 0.24, EKG w AF RVR and CXR showing significant b/l pleural effusions, pulm congestion, admitted to medicine for SOB/hypoxia attributed to CHF exacerbation.   Has been on Bumex drip for HF exacerbation and is s/p R thoracentesis 8/26/22 w 1.5L transudative fluid removed. Has had epistaxis during stay. Is s/p 1uPRBC, start 3 day course of IV venofer 200mg. Upgraded to CEU for increasing O2 requirements despite venti mask, HFNC.       9/6/22 day 17: Transition eliquis to Lovenox 80mg BID for possible future repeat thoracentesis. On HFNC; wean as tolerated. Start dexamethasone 6mg PO QD [id number] thru 9/16/22. Consult ID. Decrease Lantus from 22 to 17. ddimer 1035, , CRP 69.1. Keep bumex drip. D/c 3% NS.   HOLDING LOVENOX FOR THORACENTESIS 9/7/22 9/7/22 day 18: ID recs for COVID: unclear if new infection, will treat as such w RDV x5days; no toci, Planned for thoracentesis #3 today, due to excess bleeding was ?not completed. Started RDV 200mg today, 100mg for next 4 days. Cont dexamethasone 6mg [id number], thru 9/16/22.     9/8/22 day 19: Hb stable ~8. R thoracentesis today, 2L removed. Seen by PT but stated too weak/unwell to participate. Palliative consulted per family request - has been seen by palliative in the past.     Overnight events  None significant; pt states he did not sleep well.     Subjective complaints  Is looking more tired today but when asked, denies any symptoms. States he feels "good."         PHYSICAL EXAM:  GENERAL: NAD, sitting in bed, Seems more tired today.   HEAD:  Atraumatic, Normocephalic  EYES: EOMI, sclera clear  ENT: Dry mucous membranes. (+)HFNC   NECK: Supple  CHEST/LUNG: Decent air movement. Mild crackles throughout, mild rhonchi b/l bases   HEART: Irregularly irregular rhythm. Regular rate; Soft 1/6 systolic murmur  ABDOMEN: (+)BS; Soft, nontender, nondistended  EXTREMITIES:  2+ Radial Pulses, brisk capillary refill. BLE w 1+ pitting edema, slightly worse than yesterday. Chronic skin changes/hyperpigmentation over b/l shin from old scratches.   NERVOUS SYSTEM:  A&Ox3, no focal deficits   SKIN: No visible rashes or lesions       Present Today:   - Gloria:  No [ X ] condom cath, Yes [  ] : Indication:   - Type of IV Access:       .. CVC/Piccline:  No [  ], Yes [ X ] : Indication:       .. 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    AF (atrial fibrillation)  s/p DCCV    Carotid stenosis    COPD (chronic obstructive pulmonary disease)    H/O coronary artery bypass surgery  2001    S/P inguinal hernia repair    History of appendectomy                                                ALLERGIES:  Ozempic (0.25 mg or 0.5 mg dose) (Hives; Rash)  streptomycin (Unknown)  Trulicity Pen (Hives; Rash)                           HOME MEDICATIONS  Home Medications:  Aranesp 100 mcg/0.5 mL injectable solution: 1 dose(s) injectable every 3 to 4 weeks (05 Jul 2022 21:22)  atorvastatin 40 mg oral tablet: 1 tab(s) orally once a day (at bedtime) (05 Jul 2022 21:22)  calcitriol 0.25 mcg oral capsule: 1 cap(s) orally 2 times a week (05 Jul 2022 21:22)  d-mycophenolate: 500 milligram(s) orally 4 times a day (05 Jul 2022 21:22)  Eliquis 2.5 mg oral tablet: 1 tab(s) orally 2 times a day (05 Jul 2022 21:22)  famotidine 20 mg oral tablet: 1 tab(s) orally once a day (05 Jul 2022 21:22)  furosemide 20 mg oral tablet: 1 tab(s) orally once a day (03 Aug 2022 09:47)  insulin glargine 100 units/mL subcutaneous solution: 5 unit(s) subcutaneous once a day (at bedtime) (03 Aug 2022 09:47)  insulin lispro 100 units/mL injectable solution: 1 Unit(s) if Glucose 151 - 200  2 Unit(s) if Glucose 201 - 250  3 Unit(s) if Glucose 251 - 300  4 Unit(s) if Glucose 301 - 350  5 Unit(s) if Glucose 351 - 400  6 Unit(s) if Glucose Greater Than 400 (03 Aug 2022 09:57)  isosorbide mononitrate 30 mg oral tablet, extended release: 1 tab(s) orally once a day (in the morning) (05 Jul 2022 21:22)  levothyroxine 150 mcg (0.15 mg) oral tablet: 1 tab(s) orally once a day (05 Jul 2022 21:22)  metoprolol succinate 50 mg oral tablet, extended release: 1 tab(s) orally once a day (03 Aug 2022 09:47)  Multiple Vitamins with Minerals oral tablet: 1 tab(s) orally once a day (03 Aug 2022 09:47)  NIFEdipine 30 mg oral tablet, extended release: 1 tab(s) orally once a day (05 Jul 2022 21:22)  pentoxifylline 400 mg oral tablet, extended release: 1 tab(s) orally 2 times a day (05 Jul 2022 21:22)  sodium bicarbonate 650 mg oral tablet: 1 tab(s) orally 3 times a day (03 Aug 2022 09:47)  tacrolimus 0.5 mg oral capsule: 1 cap(s) orally 2 times a day (03 Aug 2022 09:57)                           MEDICATIONS:  STANDING MEDICATIONS  ALBUTerol    90 MICROgram(s) HFA Inhaler 2 Puff(s) Inhalation every 6 hours  albuterol/ipratropium for Nebulization 3 milliLiter(s) Nebulizer every 6 hours  atorvastatin 40 milliGRAM(s) Oral at bedtime  BACItracin   Ointment 1 Application(s) Topical two times a day  buMETAnide Infusion 1 mG/Hr IV Continuous <Continuous>  chlorhexidine 2% Cloths 1 Application(s) Topical <User Schedule>  dexAMETHasone  Injectable 6 milliGRAM(s) IV Push daily  dextrose 5%. 1000 milliLiter(s) IV Continuous <Continuous>  dextrose 5%. 1000 milliLiter(s) IV Continuous <Continuous>  dextrose 50% Injectable 25 Gram(s) IV Push once  dextrose 50% Injectable 12.5 Gram(s) IV Push once  dextrose 50% Injectable 25 Gram(s) IV Push once  famotidine    Tablet 20 milliGRAM(s) Oral daily  glucagon  Injectable 1 milliGRAM(s) IntraMuscular once  insulin glargine Injectable (LANTUS) 17 Unit(s) SubCutaneous at bedtime  insulin lispro (ADMELOG) corrective regimen sliding scale   SubCutaneous three times a day before meals  isosorbide   mononitrate ER Tablet (IMDUR) 30 milliGRAM(s) Oral daily  levothyroxine 150 MICROGram(s) Oral daily  lidocaine 1% (Preservative-free) Injectable 30 milliLiter(s) Local Injection once  lidocaine 1% Injectable 10 milliLiter(s) Local Injection once  lidocaine 1% Injectable 10 milliLiter(s) Local Injection once  melatonin 5 milliGRAM(s) Oral at bedtime  metolazone 5 milliGRAM(s) Oral daily  metoprolol succinate  milliGRAM(s) Oral daily  multivitamin/minerals 1 Tablet(s) Oral daily  mycophenolate mofetil 500 milliGRAM(s) Oral four times a day  NIFEdipine XL 30 milliGRAM(s) Oral daily  pentoxifylline 400 milliGRAM(s) Oral two times a day  remdesivir  IVPB   IV Intermittent   remdesivir  IVPB 100 milliGRAM(s) IV Intermittent every 24 hours  tacrolimus 0.5 milliGRAM(s) Oral two times a day    PRN MEDICATIONS  acetaminophen     Tablet .. 650 milliGRAM(s) Oral every 6 hours PRN  dextrose Oral Gel 15 Gram(s) Oral once PRN  dextrose Oral Gel 15 Gram(s) Oral once PRN                                            ------------------------------------------------------------  T(C): 36.2 (09-08-22 @ 12:10), Max: 37.1 (09-07-22 @ 20:21)  T(F): 97.1 (09-08-22 @ 12:10), Max: 98.8 (09-07-22 @ 20:21)  HR: 66 (09-08-22 @ 12:10) (66 - 93)  BP: 149/774 (09-08-22 @ 12:10) (129/70 - 162/79)  RR: 20 (09-08-22 @ 12:10) (20 - 20)  SpO2: 93% (09-08-22 @ 12:10) (84% - 98%)      09-07-22 @ 07:01  -  09-08-22 @ 07:00  --------------------------------------------------------  IN: 780 mL / OUT: 600 mL / NET: 180 mL                                               LABS:                        8.0    8.40  )-----------( 271      ( 08 Sep 2022 01:29 )             25.3     09-08    133<L>  |  95<L>  |  87<HH>  ----------------------------<  249<H>  4.3   |  26  |  1.6<H>    Ca    8.6      08 Sep 2022 01:29  Phos  3.9     09-08  Mg     2.0     09-08    TPro  6.1  /  Alb  3.2<L>  /  TBili  0.7  /  DBili  0.4<H>  /  AST  14  /  ALT  9   /  AlkPhos  90  09-08                                      RADIOLOGY:    CXR 9/5/22:   B/l opacities and effusions, unchanged

## 2022-09-08 NOTE — PROGRESS NOTE ADULT - SUBJECTIVE AND OBJECTIVE BOX
Patient is a 82y old  Male who presents with a chief complaint of chf exacerbation (08 Sep 2022 06:31)        Over Night Events:  Remains critically ill on HFNCO2 60/60.          ROS:     All ROS are negative except HPI         PHYSICAL EXAM    ICU Vital Signs Last 24 Hrs  T(C): 35.4 (08 Sep 2022 03:00), Max: 37.1 (07 Sep 2022 20:21)  T(F): 95.7 (08 Sep 2022 03:00), Max: 98.8 (07 Sep 2022 20:21)  HR: 78 (08 Sep 2022 03:00) (78 - 93)  BP: 136/80 (08 Sep 2022 03:00) (126/64 - 151/65)  BP(mean): 89 (07 Sep 2022 11:20) (84 - 89)  ABP: --  ABP(mean): --  RR: 20 (08 Sep 2022 03:00) (20 - 20)  SpO2: 92% (08 Sep 2022 08:13) (69% - 98%)    O2 Parameters below as of 08 Sep 2022 08:13  Patient On (Oxygen Delivery Method): nasal cannula, high flow  O2 Flow (L/min): 60  O2 Concentration (%): 60        CONSTITUTIONAL:  IN  NAD    ENT:   Airway patent,   Mouth with normal mucosa.       EYES:   Pupils equal,   Round and reactive to light.    CARDIAC:   Normal rate,   Regular rhythm.        RESPIRATORY:   No wheezing  Bilateral crackles   Normal chest expansion  Not tachypneic,  No use of accessory muscles    GASTROINTESTINAL:  Abdomen soft,   Non-tender,   No guarding,   + BS    MUSCULOSKELETAL:   Range of motion is not limited,  No clubbing, cyanosis    NEUROLOGICAL:   Alert and oriented   No motor  deficits.    SKIN:   Skin normal color for race,   No evidence of rash.    PSYCHIATRIC:   No apparent risk to self or others.      09-07-22 @ 07:01  -  09-08-22 @ 07:00  --------------------------------------------------------  IN:    Bumetanide: 660 mL    Oral Fluid: 120 mL  Total IN: 780 mL    OUT:    Indwelling Catheter - Urethral (mL): 600 mL  Total OUT: 600 mL    Total NET: 180 mL          LABS:                            8.0    8.40  )-----------( 271      ( 08 Sep 2022 01:29 )             25.3                                               09-08    133<L>  |  95<L>  |  87<HH>  ----------------------------<  249<H>  4.3   |  26  |  1.6<H>    Creatinine Trend  BUN 87, Cr 1.6, (09-08-22 @ 01:29)  Creatinine Trend  BUN 77, Cr 1.3, (09-07-22 @ 00:20)  Creatinine Trend  BUN 81, Cr 1.2, (09-06-22 @ 08:44)  Creatinine Trend  BUN 81, Cr 1.3, (09-06-22 @ 01:12)  Creatinine Trend  BUN 78, Cr 1.3, (09-05-22 @ 17:35)  Creatinine Trend  BUN 80, Cr 1.4, (09-05-22 @ 07:30)  Creatinine Trend  BUN 80, Cr 1.4, (09-04-22 @ 17:26)  Creatinine Trend  BUN 80, Cr 1.3, (09-04-22 @ 08:35)  Creatinine Trend  BUN 75, Cr 1.2, (09-03-22 @ 16:27)      Ca    8.6      08 Sep 2022 01:29  Phos  3.9     09-08  Mg     2.0     09-08    TPro  6.1  /  Alb  3.3<L>  /  TBili  0.8  /  DBili  x   /  AST  15  /  ALT  10  /  AlkPhos  93  09-08                                                                                           LIVER FUNCTIONS - ( 08 Sep 2022 01:29 )  Alb: 3.3 g/dL / Pro: 6.1 g/dL / ALK PHOS: 93 U/L / ALT: 10 U/L / AST: 15 U/L / GGT: x                                                                                                                                       MEDICATIONS  (STANDING):  ALBUTerol    90 MICROgram(s) HFA Inhaler 2 Puff(s) Inhalation every 6 hours  albuterol/ipratropium for Nebulization 3 milliLiter(s) Nebulizer every 6 hours  atorvastatin 40 milliGRAM(s) Oral at bedtime  BACItracin   Ointment 1 Application(s) Topical two times a day  buMETAnide Infusion 1 mG/Hr (5 mL/Hr) IV Continuous <Continuous>  chlorhexidine 2% Cloths 1 Application(s) Topical <User Schedule>  dexAMETHasone  Injectable 6 milliGRAM(s) IV Push daily  dextrose 5%. 1000 milliLiter(s) (100 mL/Hr) IV Continuous <Continuous>  dextrose 5%. 1000 milliLiter(s) (50 mL/Hr) IV Continuous <Continuous>  dextrose 50% Injectable 25 Gram(s) IV Push once  dextrose 50% Injectable 12.5 Gram(s) IV Push once  dextrose 50% Injectable 25 Gram(s) IV Push once  famotidine    Tablet 20 milliGRAM(s) Oral daily  glucagon  Injectable 1 milliGRAM(s) IntraMuscular once  insulin glargine Injectable (LANTUS) 17 Unit(s) SubCutaneous at bedtime  insulin lispro (ADMELOG) corrective regimen sliding scale   SubCutaneous three times a day before meals  isosorbide   mononitrate ER Tablet (IMDUR) 30 milliGRAM(s) Oral daily  levothyroxine 150 MICROGram(s) Oral daily  lidocaine 1% (Preservative-free) Injectable 30 milliLiter(s) Local Injection once  lidocaine 1% Injectable 10 milliLiter(s) Local Injection once  melatonin 5 milliGRAM(s) Oral at bedtime  metolazone 5 milliGRAM(s) Oral daily  metoprolol succinate  milliGRAM(s) Oral daily  multivitamin/minerals 1 Tablet(s) Oral daily  mycophenolate mofetil 500 milliGRAM(s) Oral four times a day  NIFEdipine XL 30 milliGRAM(s) Oral daily  pentoxifylline 400 milliGRAM(s) Oral two times a day  remdesivir  IVPB   IV Intermittent   remdesivir  IVPB 100 milliGRAM(s) IV Intermittent every 24 hours  tacrolimus 0.5 milliGRAM(s) Oral two times a day    MEDICATIONS  (PRN):  acetaminophen     Tablet .. 650 milliGRAM(s) Oral every 6 hours PRN Mild Pain (1 - 3)  dextrose Oral Gel 15 Gram(s) Oral once PRN Blood Glucose LESS THAN 70 milliGRAM(s)/deciliter  dextrose Oral Gel 15 Gram(s) Oral once PRN Blood Glucose LESS THAN 70 milliGRAM(s)/deciliter      New X-rays reviewed:                                                                                  ECHO    CXR interpreted by me:

## 2022-09-08 NOTE — PROGRESS NOTE ADULT - ASSESSMENT
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    AFib, chronic - on eliquis   CAD s/p CABG  H/O DLD, HTN, DM2, Hypothyroidism     PLAN:    CNS:  no depressants     HEENT: Oral care    PULMONARY:  HOB @ 45 degrees.  Aspiration precautions.  Wean O2.  Sats 90-95.  Continue home inhlaers.  RIght thoracentesis today     CARDIOVASCULAR:  Goal directed fluid resuscitation     GI: GI prophylaxis.  Feeding.  Bowel regimen     RENAL:  Follow up lytes.  Correct as needed    INFECTIOUS DISEASE: Follow up cultures.  ID eval appreciated.  Dexa 6 mg daily D # 3     HEMATOLOGICAL:  DVT prophylaxis.  Switch to LMWH.  Restart in am     ENDOCRINE:  Follow up FS.  Insulin protocol if needed.    MUSCULOSKELETAL:  OOB to chair.  PT OT     OOB to chair

## 2022-09-08 NOTE — PROGRESS NOTE ADULT - ASSESSMENT
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     Acute Hypoxic respiratory Failure   Acute HFmrEF  Bilateral pleural effusion Right>Left.  COPD  COVID 19 infection +9/2    Worsening hypoxia, now on high flow nasal canula 60/60  s/p thoracentesis 8/2 fluid transudative.   s/p thoracentesis 9/8 with 1.9 L removed, f/u studies  Continue Bumex drip at 1mg/hr---> Heart failure follow up, continue low sodium diet, fluid restriction 1L/24 hrs.   renal fxn worsening today, may need to hold bumex gtt   Continue with dexamethasone 6mg daily, steroids started 9/6   check inflammatory markers  DDimer 1000, LE duplex 9/6 negative for DVTs  on therapeutic Lovenox (previous eliquis)    Epistaxis - resolved  ENT follow up appreciated, packing removed  monitor H&H    Leukocytosis, resolved - likely from steroids    Acute on chronic anemia - s/p 1 U PRBC and 3 days of IV venofer   monitor H&H, keep active T&S    Chronic Atrial Fibrillation - continue metoprolol and lovenox. Resume Eliquis once no procedures planned.     C3 Glomerulonephropathy - Continue tacrolimus 0.5mg BID and Mycophenolate 500mg 4 times daily. On chronic prednisone 5mg at home, now on dexamethasone. Repeat Tacrolimus levels     CAD - Continue Metoprolol, Imdur and Lipitor.     HTN - continue metoprolol. Stone Nifedipine and Imdur. Start Entresto per HF    Hypothyroidism - continue synthroid    DM II: A1C 7.3 - continue with insulin, monitor FS and PO intake     Stage 2 right buttock pressure ulcer, Present on admission  Continue local wound care, off loading.     #Progress Note Handoff:  Pending (specify): tapering down supplemental oxygen, , labs, diuresis, HF f/u   Family discussion: spoke with pt  Disposition: Home

## 2022-09-08 NOTE — PROGRESS NOTE ADULT - ASSESSMENT
81 y/o man w PMHx of HTN, HLD, DM2, CAD s/p 3vCABG known to Dr De La Fuente, HFmEF w EF 45-50% on 8/1/22, AF on Eliquis, AAA, COPD baseline rare home O2 known to Dr ALO Mars, CKD3 and C3 glomerulopathy known to Dr Daniel, hypothyroidism, PVD, presented on 8/20/22 for SOB x5days and hypoxia to 70s. Admitted to medicine for SOB/hypoxia attributed to CHF exacerbation.   Has been on Bumex drip for HF exacerbation and is s/p R thoracentesis 8/26/22 w 1.5L transudative fluid removed.     HOLDING LOVENOX FOR THORACENTESIS 9/7/22    #Acute Hypoxic Respiratory Failure  #Acute HFmrEF  #COPD  HFmEF w EF 45-50% on 8/1/22; multiple LV regional wall motion abnormalities, mild MR, mild TR, borderline pulm HTN   COPD baseline rare home O2, 3L O2 since last admission known to Dr ALO Mars  Thoracentesis 8/26/22 w 1.5L removed, transudative fluid.   Worsening hypoxia, now on HFNC   CXR 9/5 showed worsening right pleural effusion and right lung opacity. No evidence of PNA.   - Continue Bumex drip at 1mg/hr  - Heart failure follow up, continue low Na diet, fluid restriction 1L/24 hrs.   - Pulmonary follow up for worsening pleural effusion, continue IV diuresis  - Continue Prednisone (for C3 glomerulopathy)  - Continue duonebs PRN   - Transition eliquis to Lovenox 80mg BID for possible future repeat thoracentesis:   HOLDING LOVENOX FOR THORACENTESIS 9/7/22  - Cont dexamethasone 6mg PO QD [id number] thru 9/16/22.   - ID: Tx as new COVID infection - Start RDV x5 days: day 1 w 200mg, days 2-5 w 100mg . No toci- Labs: procal 0.29, ferritin 957. 9/6/22: ddimer 1035, , CRP 69.1.     #CAD  Continue Metoprolol 100mg QD, Imdur 30mg QD, and Atorvastatin 40mg qhs.     #HTN:   continue metoprolol, Nifedipine 30mg QD   will try to wean off nifedipine given HFmrEF    #DM II: A1C 7.3.   Episode of hypoglycemia for last 24 hrs, patient is not eating well  - Decrease Lantus to 17u, Off Lispro.     #Acute on chronic anemia; goal Hb 8+  #Epistaxis - resolved   s/p 1 U PRBC 8/22/22 and 8/24/22 for Hb <8  Continue IV Venofer 200mg daily for 3 days.   ENT consulted 8/23/22 for epistaxis/ blood clots blocking nasal passage   - Hb 7.7 today stable as compared to 7.6 yesterday  - Active T&S from today; ordered q3d through 10/3/22     #Chronic Atrial Fibrillation: continue metoprolol and Eliquis.   #C3 Glomerulonephropathy: Continue Pred 5mg QD, Tacro 0.5mg BID, Mycophenolate 500mg 4 times daily.   #Leukocytosis: likely from steroids; monitor off abx   #Hypothyroidism: continue synthroid  #Stage 2 right buttock pressure ulcer: present on admission. Continue local wound care, off loading.                                                                                   ----------------------------------------------------  # DVT prophylaxis: Transition eliquis to Lovenox 80mg BID for possible future repeat thoracentesis. HOLDING LOVENOX FOR THORACENTESIS 9/7/22  # GI prophylaxis: Famotidine 20mg QD   # Diet: Minced and moist   # Activity: IAT   # Code status: DNR DNI   # Disposition: Keep in CEU for now                                                                            --------------------------------------------------------    # Handoff:   - Trend Hb   - Wean HFNC   - F/u ID recs  - F/u FS     83 y/o man w PMHx of HTN, HLD, DM2, CAD s/p 3vCABG known to Dr De La Fuente, HFmEF w EF 45-50% on 8/1/22, AF on Eliquis, AAA, COPD baseline rare home O2 known to Dr ALO Mars, CKD3 and C3 glomerulopathy known to Dr Daniel, hypothyroidism, PVD, presented on 8/20/22 for SOB x5days and hypoxia to 70s. Admitted to medicine for SOB/hypoxia attributed to CHF exacerbation.   Has been on Bumex drip for HF exacerbation and is s/p R thoracentesis 8/26/22 w 1.5L transudative fluid removed, recurrent R thoracentesis 9/8/22.     HOLDING LOVENOX FOR THORACENTESIS 9/7/22      #Acute Hypoxic Respiratory Failure  #Acute HFmrEF  #COPD  HFmEF w EF 45-50% on 8/1/22; multiple LV regional wall motion abnormalities, mild MR, mild TR, borderline pulm HTN   COPD baseline rare home O2, 3L O2 since last admission known to Dr ALO Mars  Thoracentesis 8/26/22 w 1.5L removed, transudative fluid. Repeat 9/8/22.   Worsening hypoxia, now on HFNC   CXR 9/8/22 w/o significant change in b/l opacities/effusions   - Continue Bumex drip at 1mg/hr  - Heart failure follow up, continue low Na diet, fluid restriction 1L/24 hrs.   - Pulmonary following  - Continue Prednisone (for C3 glomerulopathy), duonebs PRN   - Transitioned eliquis to Lovenox 80mg BID, then held for thoracentesis:   HOLDING LOVENOX FOR R THORACENTESIS 9/8/22  - Cont dexamethasone 6mg PO QD [id number] thru 9/16/22.   - ID: Tx as new COVID infection: RDV x5 days: day 1 w 200mg, days 2-5 w 100mg, ends 9/11/22. No toci-   Labs: procal 0.29, ferritin 957. 9/6/22: ddimer 1035, , CRP 69.1.     #CAD  Continue Metoprolol 100mg QD, Imdur 30mg QD, and Atorvastatin 40mg qhs.     #HTN:   Continue metoprolol, Nifedipine 30mg QD   Will try to wean off nifedipine given HFmrEF  - Current SBP range 120s-130s    #DM II: A1C 7.3.   - Cont Lantus to 17u, Off Lispro.   - Varying fs/PO intake. Past day w 145-250 in setting of dexamethasone thru 9/16/22    #Acute on chronic anemia; goal Hb 8+  #Epistaxis - resolved   s/p 1 U PRBC 8/22/22 and 8/24/22 for Hb <8  Continue IV Venofer 200mg daily for 3 days.   ENT consulted 8/23/22 for epistaxis/ blood clots blocking nasal passage   - Hb 8.0 9/8/22  - Active T&S from today; ordered q3d through 10/3/22     #Chronic Atrial Fibrillation: continue metoprolol and Eliquis.   #C3 Glomerulonephropathy: Continue Pred 5mg QD, Tacro 0.5mg BID, Mycophenolate 500mg 4 times daily.   #Leukocytosis: likely from steroids; monitor off abx   #Hypothyroidism: continue synthroid  #Stage 2 right buttock pressure ulcer: present on admission. Continue local wound care, off loading.     #MISC  - Palliative consulted per family request - has been seen by palliative in the past.                                                                               ----------------------------------------------------  # DVT prophylaxis: Transition eliquis to Lovenox 80mg BID for possible future repeat thoracentesis. HOLDING LOVENOX FOR THORACENTESIS 9/7/22  # GI prophylaxis: Famotidine 20mg QD   # Diet: Minced and moist   # Activity: IAT - did not participate w PT 9/8/22 due to fatigue   # Code status: DNR DNI   # Disposition: Keep in CEU for now                                                                            --------------------------------------------------------    # Handoff:   - Trend Hb   - Wean HFNC as renay  - F/u FS     81 y/o man w PMHx of HTN, HLD, DM2, CAD s/p 3vCABG known to Dr De La Fuente, HFmEF w EF 45-50% on 8/1/22, AF on Eliquis, AAA, COPD baseline rare home O2 known to Dr ALO Mars, CKD3 and C3 glomerulopathy known to Dr Daniel, hypothyroidism, PVD, presented on 8/20/22 for SOB x5days and hypoxia to 70s. Admitted to medicine for SOB/hypoxia attributed to CHF exacerbation.   Has been on Bumex drip for HF exacerbation and is s/p R thoracentesis 8/26/22 w 1.5L transudative fluid removed, recurrent R thoracentesis 9/8/22.     HOLDING LOVENOX FOR THORACENTESIS 9/7/22      #Acute Hypoxic Respiratory Failure  #Acute HFmrEF  #COPD  HFmEF w EF 45-50% on 8/1/22; multiple LV regional wall motion abnormalities, mild MR, mild TR, borderline pulm HTN   COPD baseline rare home O2, 3L O2 since last admission known to Dr ALO Mars  Thoracentesis 8/26/22 w 1.5L removed, transudative fluid. Repeat 9/8/22.   Worsening hypoxia, now on HFNC   CXR 9/8/22 w/o significant change in b/l opacities/effusions   - Continue Bumex drip at 1mg/hr  - Heart failure follow up, continue low Na diet, fluid restriction 1L/24 hrs.   - Pulmonary following  - Continue Prednisone (for C3 glomerulopathy), duonebs PRN   - Transitioned eliquis to Lovenox 80mg BID, then held for thoracentesis:   HOLDING LOVENOX FOR R THORACENTESIS 9/8/22  - Cont dexamethasone 6mg PO QD [id number] thru 9/16/22.   - ID: Tx as new COVID infection: RDV x5 days: day 1 w 200mg, days 2-5 w 100mg, ends 9/11/22. No toci-   Labs: procal 0.29, ferritin 957. 9/6/22: ddimer 1035, , CRP 69.1.     #BRENDA, HypoNa   Baseline Cr 1.1  Cr up to 1.6 on 9/8/22, up from 1.3 on 9/7/22  HypoNa to 133  May be due to dehydration/poor PO intake yesterday     #CAD  Continue Metoprolol 100mg QD, Imdur 30mg QD, and Atorvastatin 40mg qhs.     #HTN:   Continue metoprolol, Nifedipine 30mg QD   Will try to wean off nifedipine given HFmrEF  - Current SBP range 120s-130s    #DM II: A1C 7.3.   - Cont Lantus to 17u, Off Lispro.   - Varying fs/PO intake. Past day w 145-250 in setting of dexamethasone thru 9/16/22    #Acute on chronic anemia; goal Hb 8+  #Epistaxis - resolved   s/p 1 U PRBC 8/22/22 and 8/24/22 for Hb <8  Continue IV Venofer 200mg daily for 3 days.   ENT consulted 8/23/22 for epistaxis/ blood clots blocking nasal passage   - Hb 8.0 9/8/22  - Active T&S from today; ordered q3d through 10/3/22     #Chronic Atrial Fibrillation: continue metoprolol and Eliquis.   #C3 Glomerulonephropathy: Continue Pred 5mg QD, Tacro 0.5mg BID, Mycophenolate 500mg 4 times daily.   #Leukocytosis: likely from steroids; monitor off abx   #Hypothyroidism: continue synthroid  #Stage 2 right buttock pressure ulcer: present on admission. Continue local wound care, off loading.     #MISC  - Palliative consulted per family request - has been seen by palliative in the past.                                                                               ----------------------------------------------------  # DVT prophylaxis: Transition eliquis to Lovenox 80mg BID for possible future repeat thoracentesis. HOLDING LOVENOX FOR THORACENTESIS 9/7/22  # GI prophylaxis: Famotidine 20mg QD   # Diet: Minced and moist   # Activity: IAT - did not participate w PT 9/8/22 due to fatigue   # Code status: DNR DNI   # Disposition: Keep in CEU for now                                                                            --------------------------------------------------------    # Handoff:   - Trend Hb   - Wean HFNC as renay  - F/u FS     81 y/o man w PMHx of HTN, HLD, DM2, CAD s/p 3vCABG known to Dr De La Fuente, HFmEF w EF 45-50% on 8/1/22, AF on Eliquis, AAA, COPD baseline rare home O2 known to Dr ALO Mars, CKD3 and C3 glomerulopathy known to Dr Daniel, hypothyroidism, PVD, presented on 8/20/22 for SOB x5days and hypoxia to 70s. Admitted to medicine for SOB/hypoxia attributed to CHF exacerbation.   Has been on Bumex drip for HF exacerbation and is s/p R thoracentesis 8/26/22 w 1.5L transudative fluid removed, recurrent R thoracentesis 9/8/22.     HOLDING LOVENOX FOR THORACENTESIS 9/7/22      #Acute Hypoxic Respiratory Failure  #Acute HFmrEF  #COPD  HFmEF w EF 45-50% on 8/1/22; multiple LV regional wall motion abnormalities, mild MR, mild TR, borderline pulm HTN   COPD baseline rare home O2, 3L O2 since last admission known to Dr ALO Mars  Thoracentesis 8/26/22 w 1.5L removed, transudative fluid. Repeat 9/8/22, 2L removed.   Worsening hypoxia, now on HFNC   CXR 9/8/22 w/o significant change in b/l opacities/effusions   - Continue Bumex drip at 1mg/hr  - Heart failure follow up, continue low Na diet, fluid restriction 1L/24 hrs.   - Pulmonary following  - Continue Prednisone (for C3 glomerulopathy), duonebs PRN   - Transitioned eliquis to Lovenox 80mg BID, then held for thoracentesis:   HOLDING LOVENOX FOR R THORACENTESIS 9/8/22  - Cont dexamethasone 6mg PO QD [id number] thru 9/16/22.   - ID: Tx as new COVID infection: RDV x5 days: day 1 w 200mg, days 2-5 w 100mg, ends 9/11/22. No toci-   Labs: procal 0.29, ferritin 957. 9/6/22: ddimer 1035, , CRP 69.1.     #BRENDA, HypoNa   Baseline Cr 1.1  Cr up to 1.6 on 9/8/22, up from 1.3 on 9/7/22  HypoNa to 133  May be due to dehydration/poor PO intake yesterday     #CAD  Continue Metoprolol 100mg QD, Imdur 30mg QD, and Atorvastatin 40mg qhs.     #HTN:   Continue metoprolol, Nifedipine 30mg QD   Will try to wean off nifedipine given HFmrEF  - Current SBP range 120s-130s    #DM II: A1C 7.3.   - Cont Lantus to 17u, Off Lispro.   - Varying fs/PO intake. Past day w 145-250 in setting of dexamethasone thru 9/16/22    #Acute on chronic anemia; goal Hb 8+  #Epistaxis - resolved   s/p 1 U PRBC 8/22/22 and 8/24/22 for Hb <8  Continue IV Venofer 200mg daily for 3 days.   ENT consulted 8/23/22 for epistaxis/ blood clots blocking nasal passage   - Hb 8.0 9/8/22  - Active T&S from today; ordered q3d through 10/3/22     #Chronic Atrial Fibrillation: continue metoprolol and Eliquis.   #C3 Glomerulonephropathy: Continue Pred 5mg QD, Tacro 0.5mg BID, Mycophenolate 500mg 4 times daily.   #Leukocytosis: likely from steroids; monitor off abx   #Hypothyroidism: continue synthroid  #Stage 2 right buttock pressure ulcer: present on admission. Continue local wound care, off loading.     #MISC  - Palliative consulted per family request - has been seen by palliative in the past.                                                                               ----------------------------------------------------  # DVT prophylaxis: Transition eliquis to Lovenox 80mg BID for possible future repeat thoracentesis. HOLDING LOVENOX FOR THORACENTESIS 9/7/22  # GI prophylaxis: Famotidine 20mg QD   # Diet: Minced and moist   # Activity: IAT - did not participate w PT 9/8/22 due to fatigue   # Code status: DNR DNI   # Disposition: Keep in CEU for now                                                                            --------------------------------------------------------    # Handoff:   - Trend Hb   - Wean HFNC as renay  - F/u FS

## 2022-09-08 NOTE — PROGRESS NOTE ADULT - SUBJECTIVE AND OBJECTIVE BOX
KANGNAVIANDRIA  82y Male    CHIEF COMPLAINT:    Patient is a 82y old  Male who presents with a chief complaint of chf exacerbation (08 Sep 2022 09:27)    INTERVAL HPI/OVERNIGHT EVENTS:    Patient seen and examined. No acute events overnight. Remains on HFNC, s/p thoracentesis today     ROS: All other systems are negative.    Vital Signs:    T(F): 97.1 (22 @ 12:10), Max: 98.8 (22 @ 20:21)  HR: 66 (22 @ 12:10) (66 - 93)  BP: 149/774 (22 @ 12:10) (129/70 - 162/79)  RR: 20 (22 @ 12:10) (20 - 20)  SpO2: 93% (22 @ 12:10) (84% - 98%)    07 Sep 2022 07:01  -  08 Sep 2022 07:00  --------------------------------------------------------  IN: 780 mL / OUT: 600 mL / NET: 180 mL    08 Sep 2022 07:01  -  08 Sep 2022 15:39  --------------------------------------------------------  IN: 35 mL / OUT: 2500 mL / NET: -2465 mL    Daily Weight in k (08 Sep 2022 08:36)    POCT Blood Glucose.: 145 mg/dL (08 Sep 2022 11:28)  POCT Blood Glucose.: 221 mg/dL (08 Sep 2022 07:58)  POCT Blood Glucose.: 267 mg/dL (07 Sep 2022 21:02)  POCT Blood Glucose.: 259 mg/dL (07 Sep 2022 17:08)    PHYSICAL EXAM:    GENERAL:  NAD  SKIN: No rashes or lesions  HEENT: Atraumatic. Normocephalic.    NECK: Supple, No JVD.   PULMONARY: decreased breath sounds B/L. No wheezing. No rales  CVS: Normal S1, S2. Rate and Rhythm are regular.    ABDOMEN/GI: Soft, Nontender, Nondistended   MSK:  No clubbing or cyanosis   NEUROLOGIC: Moves all extremities, weak   PSYCH: Alert & oriented x 3     Consultant(s) Notes Reviewed:  [x ] YES  [ ] NO  Care Discussed with Consultants/Other Providers [ x] YES  [ ] NO    LABS:                        8.0    8.40  )-----------( 271      ( 08 Sep 2022 01:29 )             25.3     133<L>  |  95<L>  |  87<HH>  ----------------------------<  249<H>  4.3   |  26  |  1.6<H>    Ca    8.6      08 Sep 2022 01:29  Phos  3.9     09-08  Mg     2.0     -    TPro  6.1  /  Alb  3.2<L>  /  TBili  0.7  /  DBili  0.4<H>  /  AST  14  /  ALT  9   /  AlkPhos  90  08    RADIOLOGY & ADDITIONAL TESTS:  Imaging or report Personally Reviewed:  [x] YES  [ ] NO  EKG reviewed: [x] YES  [ ] NO    Medications:  Standing  ALBUTerol    90 MICROgram(s) HFA Inhaler 2 Puff(s) Inhalation every 6 hours  atorvastatin 40 milliGRAM(s) Oral at bedtime  BACItracin   Ointment 1 Application(s) Topical two times a day  buMETAnide Infusion 1 mG/Hr IV Continuous <Continuous>  chlorhexidine 2% Cloths 1 Application(s) Topical <User Schedule>  dexAMETHasone  Injectable 6 milliGRAM(s) IV Push daily  dextrose 5%. 1000 milliLiter(s) IV Continuous <Continuous>  dextrose 5%. 1000 milliLiter(s) IV Continuous <Continuous>  dextrose 50% Injectable 25 Gram(s) IV Push once  dextrose 50% Injectable 12.5 Gram(s) IV Push once  dextrose 50% Injectable 25 Gram(s) IV Push once  famotidine    Tablet 20 milliGRAM(s) Oral daily  glucagon  Injectable 1 milliGRAM(s) IntraMuscular once  insulin glargine Injectable (LANTUS) 17 Unit(s) SubCutaneous at bedtime  insulin lispro (ADMELOG) corrective regimen sliding scale   SubCutaneous three times a day before meals  isosorbide   mononitrate ER Tablet (IMDUR) 30 milliGRAM(s) Oral daily  levothyroxine 150 MICROGram(s) Oral daily  lidocaine 1% (Preservative-free) Injectable 30 milliLiter(s) Local Injection once  lidocaine 1% Injectable 10 milliLiter(s) Local Injection once  lidocaine 1% Injectable 10 milliLiter(s) Local Injection once  melatonin 5 milliGRAM(s) Oral at bedtime  metolazone 5 milliGRAM(s) Oral daily  metoprolol succinate  milliGRAM(s) Oral daily  multivitamin/minerals 1 Tablet(s) Oral daily  mycophenolate mofetil 500 milliGRAM(s) Oral four times a day  NIFEdipine XL 30 milliGRAM(s) Oral daily  pentoxifylline 400 milliGRAM(s) Oral two times a day  remdesivir  IVPB   IV Intermittent   remdesivir  IVPB 100 milliGRAM(s) IV Intermittent every 24 hours  tacrolimus 0.5 milliGRAM(s) Oral two times a day    PRN Meds  acetaminophen     Tablet .. 650 milliGRAM(s) Oral every 6 hours PRN  dextrose Oral Gel 15 Gram(s) Oral once PRN  dextrose Oral Gel 15 Gram(s) Oral once PRN

## 2022-09-09 NOTE — CHART NOTE - NSCHARTNOTEFT_GEN_A_CORE
PALLIATIVE MEDICINE INTERDISCIPLINARY TEAM NOTE    Provider:                                         [  X ] Initial visit        Met with: [   ] Patient  [ X  ] Family  [   ] Other:    Primary Language: [  X ] English [   ] Other*:                      *Interpretation provided by:    SUPPORT DIAGNOSES            (Check all that apply)    [   ] EOL issues  [ X  ] Advanced Illness  [   ] Cultural / spiritual concerns  [  X ] Pain / suffering  [   ] Dementia / AMS  [   ] Other:  [   ] AD issues  [   ] Grief / loss / sadness  [   ] Discharge issues  [  X ] Distress / coping    PSYCHOSOCIAL ASSESSMENT OF PATIENT         (Check all that apply)    [ X  ] Initial Assessment            [   ] Reassessment          [   ] Not Applicable this visit    Pain/suffering acuity:  [  X ] None to mild (0-3)           [   ] Moderate (4-6)        [   ] High (7-10)    Mental Status:  [   ] Alert/oriented (x3)          [ X  ] Confused/Altered(x2)         [   ] Non-resp    Functional status:  [   ] Independent w ADLs      [  X ] Needs Assistance             [   ] Bedbound/Full Care    Coping:  [   ] Coping well                     [ X  ] Coping w/difficulty            [   ] Poor coping    Support system:  [  X ] Strong                              [   ] Adequate                        [   ] Inadequate      Past history and medications for:     [ ] Anxiety       [ ] Depression    [ ] Sleep disorders       SERVICE PROVIDED  [   ]Discharge support / facilitation  [ X  ]AD / goals of care counseling                                  [   ]EOL / death / bereavement counseling  [ X  ]Counseling / support                                                [   ] Family meeting  [   ]Prayer / sacrament / ritual                                      [   ] Referral   [   ]Other                                                                       NOTE and Plan of Care (PoC):    patient is a 81 y/o M with noted pmhx, presenting with c/o SOB and hypoxia. Palliative NP and SW spk with patient's sons Jose and zachary and dtAj. reviewed role of palliative care with patient's children. discussed patient's condition and option of CMO if patient is unable to medically come off HFNC. patient's wife is currently at a NH getting rehab. they wished to speak with MD to get update on patient's plan of care, and have some time to discuss as a family how to move forward. all questions answered. support rendered. will follow. [id number]

## 2022-09-09 NOTE — CONSULT NOTE ADULT - CONVERSATION DETAILS
Meeting held via conference call with patient's children    Reviewed current condition and treatment. Family has been getting regular medical updates and have heard patient's poor prognosis as he has not been able to come off high flow oxygen or diuretic gtt. They verbalized desire to hear options for comfort care.     Reviewed what comfort care is vs ongoing medical management. Discussed removal of high flow and how symptom management is used in end of life care. Also discussed hospice if pt could be discharged.     Agreed that they need more time to see if patient can be further medically optimized. If that is not possible team will meet with family again to address comfort care

## 2022-09-09 NOTE — CONSULT NOTE ADULT - PROBLEM SELECTOR RECOMMENDATION 9
DNR/DNI  Ongoing GOC discussion  See family meeting notes today DNR/DNI  Ongoing medical management  Ongoing GOC discussion - treatment options including comfort measures only discussed  Will continue to be available for GOC discussions as appropriate

## 2022-09-09 NOTE — CONSULT NOTE ADULT - SUBJECTIVE AND OBJECTIVE BOX
HPI:  83 yo male with PMHx of HFrEF (45-50% on Lasix 20 PO), A-Fib on Eliquis, CAD s/p CABGx3, PVD, COPD (was on rare home O2 PRN now on 3L NC since recent July admission for COVID), CKD3, C3 glomerulonephropathy, HTN, HLD, Hypothyroid presents from NH with progressively worsening SOB x5 days and hypoxia.  Recently hospitalized here for a month discharged on 8/3/22 initially for BRENDA on CKD course c/b COVID, bilateral lower lobe pneumonia, delirium and microaspiration of thin liquids on modified barium study. Received Dexamethasone and Zosyn course with new O2 requirements of 3L NC discharged to nursing home on 3L and thick liquid diet for rehab. Due to his SOB, chest x-ray was ordered on 8/15 but results did not come back and patient became increasingly short of breath and was noted to be hypoxic and rehab facility which prompted referral to the ED. Review of symptoms is notable for orthopnea and a chronic dry cough unchanged since his covid illness. He denies paroxysmal nocturnal dyspnea, leg edema, chest pain, palpitations, dizziness, n/v, abd pain. Cardiologist is Dr. Witt and Pulmonolgist is Dr. Rory Mars.     In ED:  T(F): 96.4 (08-20-22 @ 11:27), Max: 96.4 (08-20-22 @ 11:27)  HR: 80 (08-20-22 @ 16:20) (80 - 91)  BP: 137/61 (08-20-22 @ 16:20) (121/58 - 137/61)  RR: 18 (08-20-22 @ 16:20) (18 - 24)  SpO2: 100% (08-20-22 @ 16:20) (89% - 100%) -> 89% on 4L -> placed on NRB -> BIPAP 2/2 tachypnea and hypoxia -> taken off with increased work of breathing placed back on BIPAP  Labs: WBC 12k, Hgb 7.2 (baseline 9), BNP 55k, Cr 2.2 (baseline 1.5), PCO2 47 (VBG), Troponin 0.24  EKG:   Atrial fibrillation with premature ventricular or aberrantly conducted complexes  Left posterior fascicular block   ST & T wave abnormality, consider lateral ischemia - similar to prior in July 2022  XR chest: significant bilateral pleural effusions and pulmonary congestion  Admitted to medicine for SOB/hypoxia likely 2/2 acute CHF exacerbation.  (20 Aug 2022 16:17)      ADVANCE DIRECTIVES:    DNR/DNI  MOLST  [x ]  Living Will  [ ]   DECISION MAKER(s):  [ ] Health Care Proxy(s)  [ x] Surrogate(s)  [ ] Guardian           Name(s): Phone Number(s):  Jose   BASELINE (I)ADL(s) (prior to admission):  Story: [ ]Total  [ ] Moderate [ ]Dependent  Palliative Performance Status Version 2:         %    [email or web address]    Allergies    Ozempic (0.25 mg or 0.5 mg dose) (Hives; Rash)  streptomycin (Unknown)  Trulicity Pen (Hives; Rash)    Intolerances    MEDICATIONS  (STANDING):  ALBUTerol    90 MICROgram(s) HFA Inhaler 2 Puff(s) Inhalation every 6 hours  atorvastatin 40 milliGRAM(s) Oral at bedtime  BACItracin   Ointment 1 Application(s) Topical two times a day  buMETAnide Infusion 1 mG/Hr (5 mL/Hr) IV Continuous <Continuous>  chlorhexidine 2% Cloths 1 Application(s) Topical <User Schedule>  dexAMETHasone  Injectable 6 milliGRAM(s) IV Push daily  dextrose 5%. 1000 milliLiter(s) (100 mL/Hr) IV Continuous <Continuous>  dextrose 5%. 1000 milliLiter(s) (50 mL/Hr) IV Continuous <Continuous>  dextrose 50% Injectable 25 Gram(s) IV Push once  dextrose 50% Injectable 12.5 Gram(s) IV Push once  dextrose 50% Injectable 25 Gram(s) IV Push once  enoxaparin Injectable 85 milliGRAM(s) SubCutaneous every 12 hours  famotidine    Tablet 20 milliGRAM(s) Oral daily  glucagon  Injectable 1 milliGRAM(s) IntraMuscular once  insulin glargine Injectable (LANTUS) 17 Unit(s) SubCutaneous at bedtime  insulin lispro (ADMELOG) corrective regimen sliding scale   SubCutaneous three times a day before meals  isosorbide   mononitrate ER Tablet (IMDUR) 30 milliGRAM(s) Oral daily  levothyroxine 150 MICROGram(s) Oral daily  lidocaine 1% (Preservative-free) Injectable 30 milliLiter(s) Local Injection once  lidocaine 1% Injectable 10 milliLiter(s) Local Injection once  lidocaine 1% Injectable 10 milliLiter(s) Local Injection once  melatonin 5 milliGRAM(s) Oral at bedtime  metolazone 5 milliGRAM(s) Oral daily  metoprolol succinate  milliGRAM(s) Oral daily  multivitamin/minerals 1 Tablet(s) Oral daily  mycophenolate mofetil 500 milliGRAM(s) Oral four times a day  NIFEdipine XL 30 milliGRAM(s) Oral daily  pentoxifylline 400 milliGRAM(s) Oral two times a day  remdesivir  IVPB 100 milliGRAM(s) IV Intermittent every 24 hours  remdesivir  IVPB   IV Intermittent   tacrolimus 0.5 milliGRAM(s) Oral two times a day    MEDICATIONS  (PRN):  acetaminophen     Tablet .. 650 milliGRAM(s) Oral every 6 hours PRN Mild Pain (1 - 3)  dextrose Oral Gel 15 Gram(s) Oral once PRN Blood Glucose LESS THAN 70 milliGRAM(s)/deciliter  dextrose Oral Gel 15 Gram(s) Oral once PRN Blood Glucose LESS THAN 70 milliGRAM(s)/deciliter    PRESENT SYMPTOMS: [ ]Unable to obtain due to poor mentation   Source if other than patient:  [ ]Family   [ ]Team     Pain: [ ]yes [x ]no  QOL impact -   Location -                    Aggravating factors -  Quality -  Radiation -  Timing-  Severity (0-10 scale):  Minimal acceptable level (0-10 scale):     CPOT:    https://[email or web address]/Critical-Care-Pain-Observation-Tool-(CPOT)      PAIN AD Score:     [email or web address] (press ctrl +  left click to view)    Dyspnea:                           [ ]Mild [ ]Moderate [ x]Severe  Anxiety:                             [ ]Mild [ ]Moderate [ ]Severe  Fatigue:                             [ ]Mild [ ]Moderate [ ]Severe  Nausea:                             [ ]Mild [ ]Moderate [ ]Severe  Loss of appetite:              [ ]Mild [ ]Moderate [ ]Severe  Constipation:                    [ ]Mild [ ]Moderate [ ]Severe    Other Symptoms:  [x ]All other review of systems negative     Palliative Performance Status Version 2:         %    [email or web address]_[email or web address]  PHYSICAL EXAM:  Vital Signs Last 24 Hrs  T(C): 36.2 (09 Sep 2022 07:25), Max: 36.2 (09 Sep 2022 07:25)  T(F): 97.2 (09 Sep 2022 07:25), Max: 97.2 (09 Sep 2022 07:25)  HR: 88 (09 Sep 2022 07:25) (60 - 88)  BP: 137/69 (09 Sep 2022 07:25) (122/60 - 138/66)  BP(mean): --  RR: 20 (09 Sep 2022 07:25) (20 - 20)  SpO2: 93% (09 Sep 2022 09:30) (92% - 99%)    Parameters below as of 09 Sep 2022 09:30  Patient On (Oxygen Delivery Method): nasal cannula, high flow  O2 Flow (L/min): 40  O2 Concentration (%): 50 I&O's Summary    08 Sep 2022 07:01  -  09 Sep 2022 07:00  --------------------------------------------------------  IN: 937 mL / OUT: 3900 mL / NET: -2963 mL    09 Sep 2022 07:01  -  09 Sep 2022 15:52  --------------------------------------------------------  IN: 405 mL / OUT: 500 mL / NET: -95 mL    GENERAL:  [x]Alert  [ x]Oriented x 2 [ ]Lethargic  [ ]Cachexia  [ ]Unarousable  [ ]Verbal  [ ]Non-Verbal  Behavioral:   [ ] Anxiety  [ ] Delirium [ ] Agitation [ ] Other  HEENT:  [ x]Normal   [ ]Dry mouth   [ ]ET Tube/Trach  [ ]Oral lesions  PULMONARY:   [ ]Clear [ x]Tachypnea  [ ]Audible excessive secretions   [ ]Rhonchi        [ ]Right [ ]Left [ ]Bilateral  [ ]Crackles        [ ]Right [ ]Left [ ]Bilateral  [ ]Wheezing     [ ]Right [ ]Left [ ]Bilateral  [ ]Diminished breath sounds [ ]right [ ]left [ ]bilateral  CARDIOVASCULAR:    [x ]Regular [ ]Irregular [ ]Tachy  [ ]Won [ ]Murmur [ ]Other  GASTROINTESTINAL:  [x ]Soft  [ ]Distended   [ ]+BS  [ ]Non tender [ ]Tender  [ ]PEG [ ]OGT/ NGT  Last BM:   GENITOURINARY:  [x ]Normal [ ] Incontinent   [ ]Oliguria/Anuria   [ ]Gloria  MUSCULOSKELETAL:   [ ]Normal   [ x]Weakness  [ ]Bed/Wheelchair bound [ ]Edema  NEUROLOGIC:   [x ]No focal deficits  [ ]Cognitive impairment  [ ]Dysphagia [ ]Dysarthria [ ]Paresis [ ]Other   SKIN:   [ x]Normal    [ x]No Rash  [ ]Pressure ulcer(s)       Present on admission [ ]y [ ]n      LABS:                        8.0    9.53  )-----------( 252      ( 09 Sep 2022 01:16 )             25.2   09-09    133<L>  |  94<L>  |  91<HH>  ----------------------------<  76  3.7   |  26  |  1.4    Ca    8.1<L>      09 Sep 2022 01:16  Phos  3.1     09-09  Mg     1.9     09-09    TPro  5.2<L>  /  Alb  2.8<L>  /  TBili  0.7  /  DBili  0.5<H>  /  AST  14  /  ALT  9   /  AlkPhos  83  09-09        RADIOLOGY & ADDITIONAL STUDIES:    PROTEIN CALORIE MALNUTRITION PRESENT: [ ]mild [ ]moderate [ ]severe [ ]underweight [ ]morbid obesity  https://www.andeal.org/vault/6670/web/files/ONC/Table_Clinical%20Characteristics%20to%20Document%20Malnutrition-White%20JV%20et%20al%202012.pdf    Height (cm): 188 (08-20-22 @ 11:17), 193 (07-27-22 @ 14:25), 188 (10-20-21 @ 07:00)  Weight (kg): 85 (08-23-22 @ 14:35), 100 (07-05-22 @ 13:03), 107.1 (10-23-21 @ 05:41)  BMI (kg/m2): 24 (08-23-22 @ 14:35), 28.3 (08-20-22 @ 11:17), 26.8 (07-27-22 @ 14:25)    [ ]PPSV2 < or = to 30% [ ]significant weight loss  [ ]poor nutritional intake  [ ]anasarca      [ ]Artificial Nutrition      REFERRALS:   [ ]Chaplaincy  [ ]Hospice  [ ]Child Life  [x ]Social Work  [x ]Case management [ ]Holistic Therapy     Goals of Care Document:      CC: SOB    HPI:  81 yo male with PMHx of HFrEF (45-50% on Lasix 20 PO), A-Fib on Eliquis, CAD s/p CABGx3, PVD, COPD (was on rare home O2 PRN now on 3L NC since recent July admission for COVID), CKD3, C3 glomerulonephropathy, HTN, HLD, Hypothyroid presents from NH with progressively worsening SOB x5 days and hypoxia.  Recently hospitalized here for a month discharged on 8/3/22 initially for BRENDA on CKD course c/b COVID, bilateral lower lobe pneumonia, delirium and microaspiration of thin liquids on modified barium study. Received Dexamethasone and Zosyn course with new O2 requirements of 3L NC discharged to nursing home on 3L and thick liquid diet for rehab. Due to his SOB, chest x-ray was ordered on 8/15 but results did not come back and patient became increasingly short of breath and was noted to be hypoxic and rehab facility which prompted referral to the ED. Review of symptoms is notable for orthopnea and a chronic dry cough unchanged since his covid illness. He denies paroxysmal nocturnal dyspnea, leg edema, chest pain, palpitations, dizziness, n/v, abd pain. Cardiologist is Dr. Witt and Pulmonolgist is Dr. Rory Mars.     In ED:  T(F): 96.4 (08-20-22 @ 11:27), Max: 96.4 (08-20-22 @ 11:27)  HR: 80 (08-20-22 @ 16:20) (80 - 91)  BP: 137/61 (08-20-22 @ 16:20) (121/58 - 137/61)  RR: 18 (08-20-22 @ 16:20) (18 - 24)  SpO2: 100% (08-20-22 @ 16:20) (89% - 100%) -> 89% on 4L -> placed on NRB -> BIPAP 2/2 tachypnea and hypoxia -> taken off with increased work of breathing placed back on BIPAP  Labs: WBC 12k, Hgb 7.2 (baseline 9), BNP 55k, Cr 2.2 (baseline 1.5), PCO2 47 (VBG), Troponin 0.24  EKG:   Atrial fibrillation with premature ventricular or aberrantly conducted complexes  Left posterior fascicular block   ST & T wave abnormality, consider lateral ischemia - similar to prior in July 2022  XR chest: significant bilateral pleural effusions and pulmonary congestion  Admitted to medicine for SOB/hypoxia likely 2/2 acute CHF exacerbation.  (20 Aug 2022 16:17)      ADVANCE DIRECTIVES:    DNR/DNI  MOLST  [x ]  Living Will  [ ]   DECISION MAKER(s):  [ ] Health Care Proxy(s)  [ x] Surrogate(s)  [ ] Guardian           Name(s): Phone Number(s):  Jose   BASELINE (I)ADL(s) (prior to admission):  [place]: [ ]Total  [ ] Moderate [ ]Dependent  Palliative Performance Status Version 2:         40%    [email or web address]    Allergies  Ozempic (0.25 mg or 0.5 mg dose) (Hives; Rash)  streptomycin (Unknown)  Trulicity Pen (Hives; Rash)    MEDICATIONS  (STANDING):  ALBUTerol    90 MICROgram(s) HFA Inhaler 2 Puff(s) Inhalation every 6 hours  atorvastatin 40 milliGRAM(s) Oral at bedtime  BACItracin   Ointment 1 Application(s) Topical two times a day  buMETAnide Infusion 1 mG/Hr (5 mL/Hr) IV Continuous <Continuous>  chlorhexidine 2% Cloths 1 Application(s) Topical <User Schedule>  dexAMETHasone  Injectable 6 milliGRAM(s) IV Push daily  dextrose 5%. 1000 milliLiter(s) (100 mL/Hr) IV Continuous <Continuous>  dextrose 5%. 1000 milliLiter(s) (50 mL/Hr) IV Continuous <Continuous>  dextrose 50% Injectable 25 Gram(s) IV Push once  dextrose 50% Injectable 12.5 Gram(s) IV Push once  dextrose 50% Injectable 25 Gram(s) IV Push once  enoxaparin Injectable 85 milliGRAM(s) SubCutaneous every 12 hours  famotidine    Tablet 20 milliGRAM(s) Oral daily  glucagon  Injectable 1 milliGRAM(s) IntraMuscular once  insulin glargine Injectable (LANTUS) 17 Unit(s) SubCutaneous at bedtime  insulin lispro (ADMELOG) corrective regimen sliding scale   SubCutaneous three times a day before meals  isosorbide   mononitrate ER Tablet (IMDUR) 30 milliGRAM(s) Oral daily  levothyroxine 150 MICROGram(s) Oral daily  lidocaine 1% (Preservative-free) Injectable 30 milliLiter(s) Local Injection once  lidocaine 1% Injectable 10 milliLiter(s) Local Injection once  lidocaine 1% Injectable 10 milliLiter(s) Local Injection once  melatonin 5 milliGRAM(s) Oral at bedtime  metolazone 5 milliGRAM(s) Oral daily  metoprolol succinate  milliGRAM(s) Oral daily  multivitamin/minerals 1 Tablet(s) Oral daily  mycophenolate mofetil 500 milliGRAM(s) Oral four times a day  NIFEdipine XL 30 milliGRAM(s) Oral daily  pentoxifylline 400 milliGRAM(s) Oral two times a day  remdesivir  IVPB 100 milliGRAM(s) IV Intermittent every 24 hours  remdesivir  IVPB   IV Intermittent   tacrolimus 0.5 milliGRAM(s) Oral two times a day    MEDICATIONS  (PRN):  acetaminophen     Tablet .. 650 milliGRAM(s) Oral every 6 hours PRN Mild Pain (1 - 3)  dextrose Oral Gel 15 Gram(s) Oral once PRN Blood Glucose LESS THAN 70 milliGRAM(s)/deciliter  dextrose Oral Gel 15 Gram(s) Oral once PRN Blood Glucose LESS THAN 70 milliGRAM(s)/deciliter    PRESENT SYMPTOMS: [ ]Unable to obtain due to poor mentation   Source if other than patient:  [ ]Family   [ ]Team     Pain: [ ]yes [x ]no  QOL impact -   Location -                    Aggravating factors -  Quality -  Radiation -  Timing-  Severity (0-10 scale):  Minimal acceptable level (0-10 scale):       Dyspnea:                           [ ]Mild [ ]Moderate [ x]Severe  Anxiety:                             [ ]Mild [ ]Moderate [ ]Severe  Fatigue:                             [ ]Mild [ ]Moderate [ ]Severe  Nausea:                             [ ]Mild [ ]Moderate [ ]Severe  Loss of appetite:              [ ]Mild [ ]Moderate [ ]Severe  Constipation:                    [ ]Mild [ ]Moderate [ ]Severe    Other Symptoms:  [x ]All other review of systems negative     Palliative Performance Status Version 2:         30%    http://[facility]pc[facility].org/files/news/palliative_performance_scale_[email or web address].pdf    PHYSICAL EXAM:  Vital Signs Last 24 Hrs  T(C): 36.2 (09 Sep 2022 07:25), Max: 36.2 (09 Sep 2022 07:25)  T(F): 97.2 (09 Sep 2022 07:25), Max: 97.2 (09 Sep 2022 07:25)  HR: 88 (09 Sep 2022 07:25) (60 - 88)  BP: 137/69 (09 Sep 2022 07:25) (122/60 - 138/66)  BP(mean): --  RR: 20 (09 Sep 2022 07:25) (20 - 20)  SpO2: 93% (09 Sep 2022 09:30) (92% - 99%)    Parameters below as of 09 Sep 2022 09:30  Patient On (Oxygen Delivery Method): nasal cannula, high flow  O2 Flow (L/min): 40  O2 Concentration (%): 50 I&O's Summary    08 Sep 2022 07:01  -  09 Sep 2022 07:00  --------------------------------------------------------  IN: 937 mL / OUT: 3900 mL / NET: -2963 mL    09 Sep 2022 07:01  -  09 Sep 2022 15:52  --------------------------------------------------------  IN: 405 mL / OUT: 500 mL / NET: -95 mL    GENERAL:  [x]Alert  [ x]Oriented x 2 [ ]Lethargic  [ ]Cachexia  [ ]Unarousable  [ ]Verbal  [ ]Non-Verbal  Behavioral:   [ ] Anxiety  [ ] Delirium [ ] Agitation [ ] Other  HEENT:  [ x]Normal   [ ]Dry mouth   [ ]ET Tube/Trach  [ ]Oral lesions  PULMONARY:   [ ]Clear [ x]Tachypnea  [ ]Audible excessive secretions   [ ]Rhonchi        [ ]Right [ ]Left [ ]Bilateral  [ ]Crackles        [ ]Right [ ]Left [ ]Bilateral  [ ]Wheezing     [ ]Right [ ]Left [ ]Bilateral  [ ]Diminished breath sounds [ ]right [ ]left [ ]bilateral  CARDIOVASCULAR:    [x ]Regular [ ]Irregular [ ]Tachy  [ ]Won [ ]Murmur [ ]Other  GASTROINTESTINAL:  [x ]Soft  [ ]Distended   [ ]+BS  [ ]Non tender [ ]Tender  [ ]PEG [ ]OGT/ NGT  Last BM:   GENITOURINARY:  [x ]Normal [ ] Incontinent   [ ]Oliguria/Anuria   [ ]Gloria  MUSCULOSKELETAL:   [ ]Normal   [ x]Weakness  [ ]Bed/Wheelchair bound [ ]Edema  NEUROLOGIC:   [x ]No focal deficits  [x ]Cognitive impairment  [ ]Dysphagia [ ]Dysarthria [ ]Paresis [ ]Other   SKIN:   [ x]Normal    [ x]No Rash  [ ]Pressure ulcer(s)       Present on admission [ ]y [ ]n      LABS:  reviewed                        8.0    9.53  )-----------( 252      ( 09 Sep 2022 01:16 )             25.2   09-09    133<L>  |  94<L>  |  91<HH>  ----------------------------<  76  3.7   |  26  |  1.4    Ca    8.1<L>      09 Sep 2022 01:16  Phos  3.1     09-09  Mg     1.9     09-09    TPro  5.2<L>  /  Alb  2.8<L>  /  TBili  0.7  /  DBili  0.5<H>  /  AST  14  /  ALT  9   /  AlkPhos  83  09-09        RADIOLOGY & ADDITIONAL STUDIES:  reviewed    < from: Xray Chest 1 View- PORTABLE-Routine (Xray Chest 1 View- PORTABLE-Routine in AM.) (09.09.22 @ 06:11) >  Impression:    Slightly increased right opacity, unchanged left opacity. No pneumothorax.    < end of copied text >    < from: 12 Lead ECG (08.21.22 @ 07:14) >    Ventricular Rate 71 BPM    Atrial Rate 82 BPM    QRS Duration 140 ms    Q-T Interval 444 ms    QTC Calculation(Bazett) 482 ms    R Axis 108 degrees    T Axis 166 degrees    Diagnosis Line Atrial fibrillation with premature ventricular or aberrantlyconducted  complexes  Non-specific intra-ventricular conduction block  ST & T wave abnormality, consider lateral ischemia  Abnormal ECG    < end of copied text >      PROTEIN CALORIE MALNUTRITION PRESENT: [ ]mild [ ]moderate [ ]severe [ ]underweight [ ]morbid obesity  https://www.andeal.org/vault/0238/web/files/ONC/Table_Clinical%20Characteristics%20to%20Document%20Malnutrition-White%20JV%20et%20al%860374.pdf    Height (cm): 188 (08-20-22 @ 11:17), 193 (07-27-22 @ 14:25), 188 (10-20-21 @ 07:00)  Weight (kg): 85 (08-23-22 @ 14:35), 100 (07-05-22 @ 13:03), 107.1 (10-23-21 @ 05:41)  BMI (kg/m2): 24 (08-23-22 @ 14:35), 28.3 (08-20-22 @ 11:17), 26.8 (07-27-22 @ 14:25)    [ ]PPSV2 < or = to 30% [ ]significant weight loss  [ ]poor nutritional intake  [ ]anasarca      [ ]Artificial Nutrition      REFERRALS:   [ ]Chaplaincy  [ ]Hospice  [ ]Child Life  [x ]Social Work  [x ]Case management [ ]Holistic Therapy     Goals of Care Document:

## 2022-09-09 NOTE — PROGRESS NOTE ADULT - SUBJECTIVE AND OBJECTIVE BOX
ANDRIA TAVAREZ 82y Male  MRN#: [id number]   CODE STATUS: DNR DNI     Admission Date: 08-20-22  Hospital Day: 20d    Pt is currently admitted with the primary diagnosis of SOB, CHF exacerbation     SUBJECTIVE  Hospital Course  DNR DNI 81 y/o man w PMHx of HTN, HLD, DM2, CAD s/p 3vCABG known to Dr De La Fuente, HFmEF w EF 45-50% on 8/1/22, AF on Eliquis, AAA, COPD baseline rare home O2 known to Dr ALO Mars, CKD3 and C3 glomerulopathy known to Dr Daniel, hypothyroidism, PVD, recent admission 7/5/22-8/3/22 for COVID and BRENDA 2/2 diarrhea, tx w dexamethasone and Zosyn, stay c/b microaspirations, PNA, was d/c to STR, has been on 3L O2, presented on 8/20/22 for SOB x5days and hypoxia to 70s. Had CXR done on 8/15/22 but results had not been back. ED vitals were notable for RR max 24, SpO2 84% on 4L O2, required BiPAP, had WBC 12, HB 7.2 from baseline 9, BNP 55k, Cr 2.2 from baseline 1.5, and trop 0.24, EKG w AF RVR and CXR showing significant b/l pleural effusions, pulm congestion, admitted to medicine for SOB/hypoxia attributed to CHF exacerbation.   Has been on Bumex drip for HF exacerbation and is s/p R thoracentesis 8/26/22 w 1.5L transudative fluid removed. Has had epistaxis during stay. Is s/p 1uPRBC, start 3 day course of IV venofer 200mg. Upgraded to CEU for increasing O2 requirements despite venti mask, HFNC.       9/6/22 day 17: Transition eliquis to Lovenox 80mg BID for possible future repeat thoracentesis. On HFNC; wean as tolerated. Start dexamethasone 6mg PO QD [id number] thru 9/16/22. Consult ID. Decrease Lantus from 22 to 17. ddimer 1035, , CRP 69.1. Keep bumex drip. D/c 3% NS.   HOLDING LOVENOX FOR THORACENTESIS 9/7/22 9/7/22 day 18: ID recs for COVID: unclear if new infection, will treat as such w RDV x5days; no toci, Planned for thoracentesis #3 today, due to excess bleeding was ?not completed. Started RDV 200mg today, 100mg for next 4 days. Cont dexamethasone 6mg [id number], thru 9/16/22.   9/8/22 day 19: Hb stable ~8. R thoracentesis today, 2L removed. Seen by PT but stated too weak/unwell to participate. Palliative consulted per family request - has been seen by palliative in the past.     9/9/22 day 20: restart eliquis today?       Overnight events      Subjective complaints          PHYSICAL EXAM:  GENERAL: NAD, sitting in bed, Seems more tired today.   HEAD:  Atraumatic, Normocephalic  EYES: EOMI, sclera clear  ENT: Dry mucous membranes. (+)HFNC   NECK: Supple  CHEST/LUNG: Decent air movement. Mild crackles throughout, mild rhonchi b/l bases   HEART: Irregularly irregular rhythm. Regular rate; Soft 1/6 systolic murmur  ABDOMEN: (+)BS; Soft, nontender, nondistended  EXTREMITIES:  2+ Radial Pulses, brisk capillary refill. BLE w 1+ pitting edema, slightly worse than yesterday. Chronic skin changes/hyperpigmentation over b/l shin from old scratches.   NERVOUS SYSTEM:  A&Ox3, no focal deficits   SKIN: No visible rashes or lesions       Present Today:   - Gloria:  No [ X ] condom cath, Yes [  ] : Indication:   - Type of IV Access:       .. CVC/Piccline:  No [  ], Yes [ X ] : Indication:       .. Midline: No [ X ], Yes [  ] : Indication:                             RADIOLOGY:    CXR 9/5/22:   B/l opacities and effusions, unchanged        ANDRIA TAVAREZ 82y Male  MRN#: [id number]   CODE STATUS: DNR DNI     Admission Date: 08-20-22  Hospital Day: 20d    Pt is currently admitted with the primary diagnosis of SOB, CHF exacerbation     SUBJECTIVE  Hospital Course  DNR DNI 81 y/o man w PMHx of HTN, HLD, DM2, CAD s/p 3vCABG known to Dr De La Fuente, HFmEF w EF 45-50% on 8/1/22, AF on Eliquis, AAA, COPD baseline rare home O2 known to Dr ALO Mars, CKD3 and C3 glomerulopathy known to Dr Daniel, hypothyroidism, PVD, recent admission 7/5/22-8/3/22 for COVID and BRENDA 2/2 diarrhea, tx w dexamethasone and Zosyn, stay c/b microaspirations, PNA, was d/c to STR, has been on 3L O2, presented on 8/20/22 for SOB x5days and hypoxia to 70s. Had CXR done on 8/15/22 but results had not been back. ED vitals were notable for RR max 24, SpO2 84% on 4L O2, required BiPAP, had WBC 12, HB 7.2 from baseline 9, BNP 55k, Cr 2.2 from baseline 1.5, and trop 0.24, EKG w AF RVR and CXR showing significant b/l pleural effusions, pulm congestion, admitted to medicine for SOB/hypoxia attributed to CHF exacerbation.   Has been on Bumex drip for HF exacerbation and is s/p R thoracentesis 8/26/22 w 1.5L transudative fluid removed. Has had epistaxis during stay. Is s/p 1uPRBC, start 3 day course of IV venofer 200mg. Upgraded to CEU for increasing O2 requirements despite venti mask, HFNC.       9/6/22 day 17: Transition eliquis to Lovenox 80mg BID for possible future repeat thoracentesis. On HFNC; wean as tolerated. Start dexamethasone 6mg PO QD [id number] thru 9/16/22. Consult ID. Decrease Lantus from 22 to 17. ddimer 1035, , CRP 69.1. Keep bumex drip. D/c 3% NS.   HOLDING LOVENOX FOR THORACENTESIS 9/7/22 9/7/22 day 18: ID recs for COVID: unclear if new infection, will treat as such w RDV x5days; no toci, Planned for thoracentesis #3 today, due to excess bleeding was ?not completed. Started RDV 200mg today, 100mg for next 4 days. Cont dexamethasone 6mg [id number], thru 9/16/22.   9/8/22 day 19: Hb stable ~8. R thoracentesis today, 2L removed. Seen by PT but stated too weak/unwell to participate. Palliative consulted per family request - has been seen by palliative in the past.     9/9/22 day 20: Restarted LMWH today, per nephro tacro level OK. Per CHF: 150cc hypertonic saline over 3h BID, metolazone 5mg x1, BMP BID w Mag, Entresto 49-51. D/c Imdur and Nifedipine.     Overnight events  None significant    Subjective complaints  No complaints today. Had a good night of sleep.         PHYSICAL EXAM:  GENERAL: NAD, sitting in bed, Seems less tired today.   HEAD:  Atraumatic, Normocephalic  EYES: EOMI, sclera clear  ENT: Dry mucous membranes. (+)HFNC   NECK: Supple  CHEST/LUNG: Decent air movement. Mild crackles throughout, mild rhonchi b/l bases.   HEART: Irregularly irregular rhythm. Regular rate; Soft 1/6 systolic murmur  ABDOMEN: (+)BS; Soft, nontender, nondistended  EXTREMITIES:  2+ Radial Pulses, brisk capillary refill. BLE w 2+ pitting edema, worse than yesterday. Chronic skin changes/hyperpigmentation over b/l shin from old scratches.   NERVOUS SYSTEM:  A&Ox3, no focal deficits   SKIN: No visible rashes or lesions       Present Today:   - Gloria:  No [ X ] condom cath, Yes [  ] : Indication:   - Type of IV Access:       .. CVC/Piccline:  No [  ], Yes [ X ] : Indication:       .. Midline: No [ X ], Yes [  ] : Indication:                             RADIOLOGY:    CXR 9/5/22:   B/l opacities and effusions, unchanged

## 2022-09-09 NOTE — PROGRESS NOTE ADULT - ASSESSMENT
82yMale being evaluated for goals of care and symptom management. See above GOC note. Pt denied pain or dyspnea today. Pt however easily fall asleep during conversation, and has difficulty with short term memory Wife is his HCP       MEDD (morphine equivalent daily dose): 0      See Recs below.    Please call [phone] with questions or concerns 24/7.   We will continue to follow.

## 2022-09-09 NOTE — PROGRESS NOTE ADULT - ASSESSMENT
· Assessment	  81 yo male with PMHx of HFrEF (45-50% on Lasix 20 PO), A-Fib on Eliquis, CAD s/p CABGx3, PVD, COPD (was on rare home O2 PRN now on 3L NC since recent July admission for COVID), CKD3, C3 glomerulonephropathy, HTN, HLD, Hypothyroid presents from NH with progressively worsening SOB x5 days and hypoxia.  Recently hospitalized here for a month discharged on 8/3/22 initially for BRENDA on CKD course c/b COVID, bilateral lower lobe pneumonia, delirium and microaspiration of thin liquids on modified barium study. Received Dexamethasone and Zosyn course with new O2 requirements of 3L NC discharged to nursing home on 3L and thick liquid diet for rehab. Due to his SOB, chest x-ray was ordered on 8/15 but results did not come back and patient became increasingly short of breath and was noted to be hypoxic and rehab facility which prompted referral to the ED. Review of symptoms is notable for orthopnea and a chronic dry cough unchanged since his covid illness.     IMPRESSION;   CHF with SOB with CXR with b/l pleural effusions  9/8 s/p right thoracocentesis 1900 ml> no em[yema  8/29 COVID-19 NG  9/2 COVID-19 positive  Unclear at this point whether he has a new infection but will approach it as such  If newly infected then pt is in the early viral replicative phase based on the timeline/onset of symptoms.   No cytokine storm  CXR : improved post thoracocentesis  Ddimers 1035    RECOMMENDATIONS;  Target SpO2 92 % to 96 %  Day 1 : Single  mg IV loading dose  Day 2-5 : single  mg IV once daily maintenance dose  Daily CBC,CMP.   Dexamethasone 6 mg iv q24h   Monitor for side effects: hyperglycemia, neurological ( agitation/confusion), adrenal suppression, bacterial and fungal infections

## 2022-09-09 NOTE — PROGRESS NOTE ADULT - SUBJECTIVE AND OBJECTIVE BOX
HPI:  83 yo male with PMHx of HFrEF (45-50% on Lasix 20 PO), A-Fib on Eliquis, CAD s/p CABGx3, PVD, COPD (was on rare home O2 PRN now on 3L NC since recent July admission for COVID), CKD3, C3 glomerulonephropathy, HTN, HLD, Hypothyroid presents from NH with progressively worsening SOB x5 days and hypoxia.  Recently hospitalized here for a month discharged on 8/3/22 initially for BRENDA on CKD course c/b COVID, bilateral lower lobe pneumonia, delirium and microaspiration of thin liquids on modified barium study. Received Dexamethasone and Zosyn course with new O2 requirements of 3L NC discharged to nursing home on 3L and thick liquid diet for rehab. Due to his SOB, chest x-ray was ordered on 8/15 but results did not come back and patient became increasingly short of breath and was noted to be hypoxic and rehab facility which prompted referral to the ED. Review of symptoms is notable for orthopnea and a chronic dry cough unchanged since his covid illness. He denies paroxysmal nocturnal dyspnea, leg edema, chest pain, palpitations, dizziness, n/v, abd pain. Cardiologist is Dr. Witt and Pulmonolgist is Dr. Rory Mars.     In ED:  T(F): 96.4 (08-20-22 @ 11:27), Max: 96.4 (08-20-22 @ 11:27)  HR: 80 (08-20-22 @ 16:20) (80 - 91)  BP: 137/61 (08-20-22 @ 16:20) (121/58 - 137/61)  RR: 18 (08-20-22 @ 16:20) (18 - 24)  SpO2: 100% (08-20-22 @ 16:20) (89% - 100%) -> 89% on 4L -> placed on NRB -> BIPAP 2/2 tachypnea and hypoxia -> taken off with increased work of breathing placed back on BIPAP  Labs: WBC 12k, Hgb 7.2 (baseline 9), BNP 55k, Cr 2.2 (baseline 1.5), PCO2 47 (VBG), Troponin 0.24  EKG:   Atrial fibrillation with premature ventricular or aberrantly conducted complexes  Left posterior fascicular block   ST & T wave abnormality, consider lateral ischemia - similar to prior in July 2022  XR chest: significant bilateral pleural effusions and pulmonary congestion  Admitted to medicine for SOB/hypoxia likely 2/2 acute CHF exacerbation.  (20 Aug 2022 16:17)     INTERVAL EVENTS:    ADVANCE DIRECTIVES:    DNR/DNI  MOLST  [x ]  Living Will  [ ]   DECISION MAKER(s):  [ ] Health Care Proxy(s)  [ x] Surrogate(s)  [ ] Guardian           Name(s): Phone Number(s):  Jose   BASELINE (I)ADL(s) (prior to admission):  [place]: [ ]Total  [ ] Moderate [ ]Dependent  Palliative Performance Status Version 2:         %    http://[email or web address]/files/[email or web address]_performance_scale_[email or web address].pdf    Allergies    Ozempic (0.25 mg or 0.5 mg dose) (Hives; Rash)  streptomycin (Unknown)  Trulicity Pen (Hives; Rash)    Intolerances    MEDICATIONS  (STANDING):  ALBUTerol    90 MICROgram(s) HFA Inhaler 2 Puff(s) Inhalation every 6 hours  atorvastatin 40 milliGRAM(s) Oral at bedtime  BACItracin   Ointment 1 Application(s) Topical two times a day  buMETAnide Infusion 1 mG/Hr (5 mL/Hr) IV Continuous <Continuous>  chlorhexidine 2% Cloths 1 Application(s) Topical <User Schedule>  dexAMETHasone  Injectable 6 milliGRAM(s) IV Push daily  dextrose 5%. 1000 milliLiter(s) (100 mL/Hr) IV Continuous <Continuous>  dextrose 5%. 1000 milliLiter(s) (50 mL/Hr) IV Continuous <Continuous>  dextrose 50% Injectable 25 Gram(s) IV Push once  dextrose 50% Injectable 12.5 Gram(s) IV Push once  dextrose 50% Injectable 25 Gram(s) IV Push once  enoxaparin Injectable 85 milliGRAM(s) SubCutaneous every 12 hours  famotidine    Tablet 20 milliGRAM(s) Oral daily  glucagon  Injectable 1 milliGRAM(s) IntraMuscular once  insulin glargine Injectable (LANTUS) 17 Unit(s) SubCutaneous at bedtime  insulin lispro (ADMELOG) corrective regimen sliding scale   SubCutaneous three times a day before meals  isosorbide   mononitrate ER Tablet (IMDUR) 30 milliGRAM(s) Oral daily  levothyroxine 150 MICROGram(s) Oral daily  lidocaine 1% (Preservative-free) Injectable 30 milliLiter(s) Local Injection once  lidocaine 1% Injectable 10 milliLiter(s) Local Injection once  lidocaine 1% Injectable 10 milliLiter(s) Local Injection once  melatonin 5 milliGRAM(s) Oral at bedtime  metolazone 5 milliGRAM(s) Oral daily  metoprolol succinate  milliGRAM(s) Oral daily  multivitamin/minerals 1 Tablet(s) Oral daily  mycophenolate mofetil 500 milliGRAM(s) Oral four times a day  NIFEdipine XL 30 milliGRAM(s) Oral daily  pentoxifylline 400 milliGRAM(s) Oral two times a day  remdesivir  IVPB 100 milliGRAM(s) IV Intermittent every 24 hours  remdesivir  IVPB   IV Intermittent   tacrolimus 0.5 milliGRAM(s) Oral two times a day    MEDICATIONS  (PRN):  acetaminophen     Tablet .. 650 milliGRAM(s) Oral every 6 hours PRN Mild Pain (1 - 3)  dextrose Oral Gel 15 Gram(s) Oral once PRN Blood Glucose LESS THAN 70 milliGRAM(s)/deciliter  dextrose Oral Gel 15 Gram(s) Oral once PRN Blood Glucose LESS THAN 70 milliGRAM(s)/deciliter    PRESENT SYMPTOMS: [ ]Unable to obtain due to poor mentation   Source if other than patient:  [ ]Family   [ ]Team     Pain: [ ]yes [x ]no  QOL impact -   Location -                    Aggravating factors -  Quality -  Radiation -  Timing-  Severity (0-10 scale):  Minimal acceptable level (0-10 scale):     CPOT:    https://www.[facility].org/getattachment/hbz15y14-9o4d-3c0d-4k5o-7960y4130t5s/Critical-Care-Pain-Observation-Tool-(CPOT)      PAIN AD Score:     http://geriatrictoolkit.[employer].edu/cog/painad.pdf (press ctrl +  left click to view)    Dyspnea:                           [ ]Mild [ ]Moderate [ x]Severe  Anxiety:                             [ ]Mild [ ]Moderate [ ]Severe  Fatigue:                             [ ]Mild [ ]Moderate [ ]Severe  Nausea:                             [ ]Mild [ ]Moderate [ ]Severe  Loss of appetite:              [ ]Mild [ ]Moderate [ ]Severe  Constipation:                    [ ]Mild [ ]Moderate [ ]Severe    Other Symptoms:  [x ]All other review of systems negative     Palliative Performance Status Version 2:         %    http://npcrc.org/files/news/palliative_performance_scale_ppsv2.pdf  PHYSICAL EXAM:  Vital Signs Last 24 Hrs  T(C): 36.2 (09 Sep 2022 07:25), Max: 36.2 (09 Sep 2022 07:25)  T(F): 97.2 (09 Sep 2022 07:25), Max: 97.2 (09 Sep 2022 07:25)  HR: 88 (09 Sep 2022 07:25) (60 - 88)  BP: 137/69 (09 Sep 2022 07:25) (122/60 - 138/66)  BP(mean): --  RR: 20 (09 Sep 2022 07:25) (20 - 20)  SpO2: 93% (09 Sep 2022 09:30) (92% - 99%)    Parameters below as of 09 Sep 2022 09:30  Patient On (Oxygen Delivery Method): nasal cannula, high flow  O2 Flow (L/min): 40  O2 Concentration (%): 50 I&O's Summary    08 Sep 2022 07:01  -  09 Sep 2022 07:00  --------------------------------------------------------  IN: 937 mL / OUT: 3900 mL / NET: -2963 mL    09 Sep 2022 07:01  -  09 Sep 2022 15:52  --------------------------------------------------------  IN: 405 mL / OUT: 500 mL / NET: -95 mL    GENERAL:  [x]Alert  [ x]Oriented x 2 [ ]Lethargic  [ ]Cachexia  [ ]Unarousable  [ ]Verbal  [ ]Non-Verbal  Behavioral:   [ ] Anxiety  [ ] Delirium [ ] Agitation [ ] Other  HEENT:  [ x]Normal   [ ]Dry mouth   [ ]ET Tube/Trach  [ ]Oral lesions  PULMONARY:   [ ]Clear [ x]Tachypnea  [ ]Audible excessive secretions   [ ]Rhonchi        [ ]Right [ ]Left [ ]Bilateral  [ ]Crackles        [ ]Right [ ]Left [ ]Bilateral  [ ]Wheezing     [ ]Right [ ]Left [ ]Bilateral  [ ]Diminished breath sounds [ ]right [ ]left [ ]bilateral  CARDIOVASCULAR:    [x ]Regular [ ]Irregular [ ]Tachy  [ ]Won [ ]Murmur [ ]Other  GASTROINTESTINAL:  [x ]Soft  [ ]Distended   [ ]+BS  [ ]Non tender [ ]Tender  [ ]PEG [ ]OGT/ NGT  Last BM:   GENITOURINARY:  [x ]Normal [ ] Incontinent   [ ]Oliguria/Anuria   [ ]Gloria  MUSCULOSKELETAL:   [ ]Normal   [ x]Weakness  [ ]Bed/Wheelchair bound [ ]Edema  NEUROLOGIC:   [x ]No focal deficits  [ ]Cognitive impairment  [ ]Dysphagia [ ]Dysarthria [ ]Paresis [ ]Other   SKIN:   [ x]Normal    [ x]No Rash  [ ]Pressure ulcer(s)       Present on admission [ ]y [ ]n      LABS:                        8.0    9.53  )-----------( 252      ( 09 Sep 2022 01:16 )             25.2   09-09    133<L>  |  94<L>  |  91<HH>  ----------------------------<  76  3.7   |  26  |  1.4    Ca    8.1<L>      09 Sep 2022 01:16  Phos  3.1     09-09  Mg     1.9     09-09    TPro  5.2<L>  /  Alb  2.8<L>  /  TBili  0.7  /  DBili  0.5<H>  /  AST  14  /  ALT  9   /  AlkPhos  83  09-09        RADIOLOGY & ADDITIONAL STUDIES:    PROTEIN CALORIE MALNUTRITION PRESENT: [ ]mild [ ]moderate [ ]severe [ ]underweight [ ]morbid obesity  https://www.[employer].org/vault/6760/web/files/ONC/Table_Clinical%20Characteristics%20to%20Document%20Malnutrition-White%20JV%20et%20al%202012.pdf    Height (cm): 188 (08-20-22 @ 11:17), 193 (07-27-22 @ 14:25), 188 (10-20-21 @ 07:00)  Weight (kg): 85 (08-23-22 @ 14:35), 100 (07-05-22 @ 13:03), 107.1 (10-23-21 @ 05:41)  BMI (kg/m2): 24 (08-23-22 @ 14:35), 28.3 (08-20-22 @ 11:17), 26.8 (07-27-22 @ 14:25)    [ ]PPSV2 < or = to 30% [ ]significant weight loss  [ ]poor nutritional intake  [ ]anasarca      [ ]Artificial Nutrition      REFERRALS:   [ ]Chaplaincy  [ ]Hospice  [ ]Child Life  [ ]Social Work  [ ]Case management [ ]Holistic Therapy     Goals of Care Document:

## 2022-09-09 NOTE — PROGRESS NOTE ADULT - SUBJECTIVE AND OBJECTIVE BOX
Patient is a 82y old  Male who presents with a chief complaint of chf exacerbation (09 Sep 2022 06:24)        Over Night Events:  Remains critically ill on HFNCO2.  SP right thoracentesis yesterday.  Off pressors.          ROS:     All ROS are negative except HPI         PHYSICAL EXAM    ICU Vital Signs Last 24 Hrs  T(C): 36.2 (09 Sep 2022 07:25), Max: 36.2 (08 Sep 2022 12:10)  T(F): 97.2 (09 Sep 2022 07:25), Max: 97.2 (09 Sep 2022 07:25)  HR: 88 (09 Sep 2022 07:25) (60 - 88)  BP: 137/69 (09 Sep 2022 07:25) (105/67 - 149/774)  BP(mean): 103 (08 Sep 2022 12:10) (103 - 103)  ABP: --  ABP(mean): --  RR: 20 (09 Sep 2022 07:25) (20 - 20)  SpO2: 99% (09 Sep 2022 07:25) (92% - 99%)    O2 Parameters below as of 08 Sep 2022 20:06  Patient On (Oxygen Delivery Method): nasal cannula, high flow  O2 Flow (L/min): 50  O2 Concentration (%): 70        CONSTITUTIONAL:  In NAD    ENT:   Airway patent,   Mouth with normal mucosa.     EYES:   Pupils equal,   Round and reactive to light.    CARDIAC:   Normal rate,   Regular rhythm.        RESPIRATORY:   No wheezing  Bilateral crackles   Normal chest expansion  Not tachypneic,  No use of accessory muscles    GASTROINTESTINAL:  Abdomen soft,   Non-tender,   No guarding,   + BS    MUSCULOSKELETAL:   Range of motion is not limited,  No clubbing, cyanosis    NEUROLOGICAL:   Alert and oriented   No motor  deficits.    SKIN:   Skin normal color for race,   No evidence of rash.    PSYCHIATRIC:   Normal mood and affect.   No apparent risk to self or others.          09-08-22 @ 07:01  -  09-09-22 @ 07:00  --------------------------------------------------------  IN:    Bumetanide: 110 mL    IV PiggyBack: 250 mL    Oral Fluid: 577 mL  Total IN: 937 mL    OUT:    Indwelling Catheter - Urethral (mL): 1900 mL    Other (mL): 2000 mL  Total OUT: 3900 mL    Total NET: -2963 mL          LABS:                            8.0    9.53  )-----------( 252      ( 09 Sep 2022 01:16 )             25.2                                               09-09    133<L>  |  94<L>  |  91<HH>  ----------------------------<  76  3.7   |  26  |  1.4    Creatinine Trend  BUN 91, Cr 1.4, (09-09-22 @ 01:16)  Creatinine Trend  BUN 87, Cr 1.6, (09-08-22 @ 01:29)  Creatinine Trend  BUN 77, Cr 1.3, (09-07-22 @ 00:20)  Creatinine Trend  BUN 81, Cr 1.2, (09-06-22 @ 08:44)  Creatinine Trend  BUN 81, Cr 1.3, (09-06-22 @ 01:12)  Creatinine Trend  BUN 78, Cr 1.3, (09-05-22 @ 17:35)  Creatinine Trend  BUN 80, Cr 1.4, (09-05-22 @ 07:30)  Creatinine Trend  BUN 80, Cr 1.4, (09-04-22 @ 17:26)      Ca    8.1<L>      09 Sep 2022 01:16  Phos  3.1     09-09  Mg     1.9     09-09    TPro  5.4<L>  /  Alb  2.9<L>  /  TBili  0.8  /  DBili  x   /  AST  13  /  ALT  8   /  AlkPhos  79  09-09                                                                                           LIVER FUNCTIONS - ( 09 Sep 2022 01:16 )  Alb: 2.9 g/dL / Pro: 5.4 g/dL / ALK PHOS: 79 U/L / ALT: 8 U/L / AST: 13 U/L / GGT: x                                                  Culture - Fungal, Body Fluid (collected 08 Sep 2022 13:22)  Source: Pleural Fl Pleural Fluid  Preliminary Report (09 Sep 2022 07:23):    Testing in progress    Culture - Body Fluid with Gram Stain (collected 08 Sep 2022 13:22)  Source: Pleural Fl Pleural Fluid  Gram Stain (09 Sep 2022 02:32):    polymorphonuclear leukocytes seen    No organisms seen    by cytocentrifuge                                                                                           MEDICATIONS  (STANDING):  ALBUTerol    90 MICROgram(s) HFA Inhaler 2 Puff(s) Inhalation every 6 hours  atorvastatin 40 milliGRAM(s) Oral at bedtime  BACItracin   Ointment 1 Application(s) Topical two times a day  buMETAnide Infusion 1 mG/Hr (5 mL/Hr) IV Continuous <Continuous>  chlorhexidine 2% Cloths 1 Application(s) Topical <User Schedule>  dexAMETHasone  Injectable 6 milliGRAM(s) IV Push daily  dextrose 5%. 1000 milliLiter(s) (100 mL/Hr) IV Continuous <Continuous>  dextrose 5%. 1000 milliLiter(s) (50 mL/Hr) IV Continuous <Continuous>  dextrose 50% Injectable 25 Gram(s) IV Push once  dextrose 50% Injectable 12.5 Gram(s) IV Push once  dextrose 50% Injectable 25 Gram(s) IV Push once  famotidine    Tablet 20 milliGRAM(s) Oral daily  glucagon  Injectable 1 milliGRAM(s) IntraMuscular once  insulin glargine Injectable (LANTUS) 17 Unit(s) SubCutaneous at bedtime  insulin lispro (ADMELOG) corrective regimen sliding scale   SubCutaneous three times a day before meals  isosorbide   mononitrate ER Tablet (IMDUR) 30 milliGRAM(s) Oral daily  levothyroxine 150 MICROGram(s) Oral daily  lidocaine 1% (Preservative-free) Injectable 30 milliLiter(s) Local Injection once  lidocaine 1% Injectable 10 milliLiter(s) Local Injection once  lidocaine 1% Injectable 10 milliLiter(s) Local Injection once  melatonin 5 milliGRAM(s) Oral at bedtime  metolazone 5 milliGRAM(s) Oral daily  metoprolol succinate  milliGRAM(s) Oral daily  multivitamin/minerals 1 Tablet(s) Oral daily  mycophenolate mofetil 500 milliGRAM(s) Oral four times a day  NIFEdipine XL 30 milliGRAM(s) Oral daily  pentoxifylline 400 milliGRAM(s) Oral two times a day  remdesivir  IVPB   IV Intermittent   remdesivir  IVPB 100 milliGRAM(s) IV Intermittent every 24 hours  tacrolimus 0.5 milliGRAM(s) Oral two times a day    MEDICATIONS  (PRN):  acetaminophen     Tablet .. 650 milliGRAM(s) Oral every 6 hours PRN Mild Pain (1 - 3)  dextrose Oral Gel 15 Gram(s) Oral once PRN Blood Glucose LESS THAN 70 milliGRAM(s)/deciliter  dextrose Oral Gel 15 Gram(s) Oral once PRN Blood Glucose LESS THAN 70 milliGRAM(s)/deciliter      New X-rays reviewed:                                                                                  ECHO

## 2022-09-09 NOTE — CONSULT NOTE ADULT - REASON FOR ADMISSION
chf exacerbation

## 2022-09-09 NOTE — PROGRESS NOTE ADULT - ASSESSMENT
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     Acute Hypoxic respiratory Failure   Acute HFmrEF  Bilateral pleural effusion Right>Left.  COPD  COVID 19 infection +9/2    previously on HFNC 60/60, tapered down to 50/50 today   s/p thoracentesis 8/2 fluid transudative.   s/p thoracentesis 9/8 with 1.9 L removed, transudative, f/u studies  Continue Bumex drip at 1mg/hr---> Heart failure follow up, continue low sodium diet, fluid restriction 1L/24 hrs.   renal fxn stable  Continue with dexamethasone 6mg daily, steroids started 9/6   DDimer 1000, LE duplex 9/6 negative for DVTs  on therapeutic Lovenox (previous eliquis)    Epistaxis - resolved  ENT follow up appreciated, packing removed  monitor H&H    Leukocytosis, resolved - likely from steroids    Acute on chronic anemia - s/p 1 U PRBC and 3 days of IV venofer   monitor H&H, keep active T&S    Chronic Atrial Fibrillation - continue metoprolol and lovenox. Resume Eliquis once no procedures planned.     C3 Glomerulonephropathy - Continue tacrolimus 0.5mg BID and Mycophenolate 500mg 4 times daily. On chronic prednisone 5mg at home, now on dexamethasone. Repeat Tacrolimus level 9/8 5.4, f/u renal      CAD - Continue Metoprolol, Imdur and Lipitor.     HTN - continue metoprolol, per HF, stop nifedipine and imdur and start entresto     Hypothyroidism - continue synthroid    DM II: A1C 7.3 - continue with insulin, monitor FS and PO intake     Stage 2 right buttock pressure ulcer, Present on admission  Continue local wound care, off loading.     #Progress Note Handoff:  Pending (specify): tapering down supplemental oxygen, , labs, diuresis, HF f/u   Family discussion: spoke with pt  Disposition: Home

## 2022-09-09 NOTE — CONSULT NOTE ADULT - PROBLEM SELECTOR RECOMMENDATION 2
HFNC ongoing pulm follow up  S/P Thoracentesis In the setting of CHF, COVID. On HFNC. High Risk.  s/p multiple Thoracentesis  -ongoing pulm follow up  -treatment of heart failure and COVID  -f/u ID

## 2022-09-09 NOTE — CONSULT NOTE ADULT - ASSESSMENT
82yMale being evaluated for goals of care and symptom management. See above GOC note. Pt denied pain or dyspnea today. Pt however easily fall asleep during conversation, and has difficulty with short term memory. Wife is in rehab now after hospitalization. Family meeting with patient's children     Pt reports dyspnea, no pain.     MEDD (morphine equivalent daily dose): 0      See Recs below.    Please call [phone] with questions or concerns 24/7.   We will continue to follow.

## 2022-09-09 NOTE — CONSULT NOTE ADULT - NS ATTEND AMEND GEN_ALL_CORE FT
Patient seen and examined at bedside.    No active bleeding seen. Controlled with surgicel.    Continue local care.    Will follow.
Agree with above. Prioritize diuresis and discontinue treatment for COPD. Evaluate need for abx, as signs/symptoms can be explained by HFrEF, though procalcitonin is elevated.
High Risk patient requiring HFNC and bumex infusion with intensive monitoring  Extensive GOC discussion above - discussion about comfort measures only  Will be available for GOC discussions as appropriate

## 2022-09-09 NOTE — CONSULT NOTE ADULT - CONSULT REQUESTED DATE/TIME
21-Aug-2022 07:57
23-Aug-2022 13:14
22-Aug-2022 15:43
23-Aug-2022 12:55
07-Sep-2022 11:42
21-Aug-2022 09:26
09-Sep-2022 16:10

## 2022-09-09 NOTE — PROGRESS NOTE ADULT - SUBJECTIVE AND OBJECTIVE BOX
ANDRIA TAVAREZ  82y, Male    All available historical data reviewed    OVERNIGHT EVENTS:  9/8 s/p right thoracocentesis 1900 ml    ROS:  not reliable    VITALS:  T(F): 97.2, Max: 97.2 (09-09-22 @ 07:25)  HR: 88  BP: 137/69  RR: 20Vital Signs Last 24 Hrs  T(C): 36.2 (09 Sep 2022 07:25), Max: 36.2 (08 Sep 2022 12:10)  T(F): 97.2 (09 Sep 2022 07:25), Max: 97.2 (09 Sep 2022 07:25)  HR: 88 (09 Sep 2022 07:25) (60 - 88)  BP: 137/69 (09 Sep 2022 07:25) (105/67 - 149/774)  BP(mean): 103 (08 Sep 2022 12:10) (103 - 103)  RR: 20 (09 Sep 2022 07:25) (20 - 20)  SpO2: 93% (09 Sep 2022 09:30) (92% - 99%)    Parameters below as of 09 Sep 2022 09:30  Patient On (Oxygen Delivery Method): nasal cannula, high flow  O2 Flow (L/min): 40  O2 Concentration (%): 50    TESTS & MEASUREMENTS:                        8.0    9.53  )-----------( 252      ( 09 Sep 2022 01:16 )             25.2     09-09    133<L>  |  94<L>  |  91<HH>  ----------------------------<  76  3.7   |  26  |  1.4    Ca    8.1<L>      09 Sep 2022 01:16  Phos  3.1     09-09  Mg     1.9     09-09    TPro  5.2<L>  /  Alb  2.8<L>  /  TBili  0.7  /  DBili  0.5<H>  /  AST  14  /  ALT  9   /  AlkPhos  83  09-09    LIVER FUNCTIONS - ( 09 Sep 2022 07:56 )  Alb: 2.8 g/dL / Pro: 5.2 g/dL / ALK PHOS: 83 U/L / ALT: 9 U/L / AST: 14 U/L / GGT: x             Culture - Fungal, Body Fluid (collected 09-08-22 @ 13:22)  Source: Pleural Fl Pleural Fluid  Preliminary Report (09-09-22 @ 07:23):    Testing in progress    Culture - Body Fluid with Gram Stain (collected 09-08-22 @ 13:22)  Source: Pleural Fl Pleural Fluid  Gram Stain (09-09-22 @ 02:32):    polymorphonuclear leukocytes seen    No organisms seen    by cytocentrifuge            RADIOLOGY & ADDITIONAL TESTS:  Personal review of radiological diagnostics performed  Echo and EKG results noted when applicable.     MEDICATIONS:  acetaminophen     Tablet .. 650 milliGRAM(s) Oral every 6 hours PRN  ALBUTerol    90 MICROgram(s) HFA Inhaler 2 Puff(s) Inhalation every 6 hours  atorvastatin 40 milliGRAM(s) Oral at bedtime  BACItracin   Ointment 1 Application(s) Topical two times a day  buMETAnide Infusion 1 mG/Hr IV Continuous <Continuous>  chlorhexidine 2% Cloths 1 Application(s) Topical <User Schedule>  dexAMETHasone  Injectable 6 milliGRAM(s) IV Push daily  dextrose 5%. 1000 milliLiter(s) IV Continuous <Continuous>  dextrose 5%. 1000 milliLiter(s) IV Continuous <Continuous>  dextrose 50% Injectable 25 Gram(s) IV Push once  dextrose 50% Injectable 12.5 Gram(s) IV Push once  dextrose 50% Injectable 25 Gram(s) IV Push once  dextrose Oral Gel 15 Gram(s) Oral once PRN  dextrose Oral Gel 15 Gram(s) Oral once PRN  enoxaparin Injectable 85 milliGRAM(s) SubCutaneous every 12 hours  famotidine    Tablet 20 milliGRAM(s) Oral daily  glucagon  Injectable 1 milliGRAM(s) IntraMuscular once  insulin glargine Injectable (LANTUS) 17 Unit(s) SubCutaneous at bedtime  insulin lispro (ADMELOG) corrective regimen sliding scale   SubCutaneous three times a day before meals  isosorbide   mononitrate ER Tablet (IMDUR) 30 milliGRAM(s) Oral daily  levothyroxine 150 MICROGram(s) Oral daily  lidocaine 1% (Preservative-free) Injectable 30 milliLiter(s) Local Injection once  lidocaine 1% Injectable 10 milliLiter(s) Local Injection once  lidocaine 1% Injectable 10 milliLiter(s) Local Injection once  melatonin 5 milliGRAM(s) Oral at bedtime  metolazone 5 milliGRAM(s) Oral daily  metoprolol succinate  milliGRAM(s) Oral daily  multivitamin/minerals 1 Tablet(s) Oral daily  mycophenolate mofetil 500 milliGRAM(s) Oral four times a day  NIFEdipine XL 30 milliGRAM(s) Oral daily  pentoxifylline 400 milliGRAM(s) Oral two times a day  remdesivir  IVPB 100 milliGRAM(s) IV Intermittent every 24 hours  remdesivir  IVPB   IV Intermittent   tacrolimus 0.5 milliGRAM(s) Oral two times a day      ANTIBIOTICS:  remdesivir  IVPB   IV Intermittent   remdesivir  IVPB 100 milliGRAM(s) IV Intermittent every 24 hours

## 2022-09-09 NOTE — PROGRESS NOTE ADULT - SUBJECTIVE AND OBJECTIVE BOX
ANDRIA TAVAREZ  82y Male    CHIEF COMPLAINT:    Patient is a 82y old  Male who presents with a chief complaint of chf exacerbation (09 Sep 2022 09:26)    INTERVAL HPI/OVERNIGHT EVENTS:    Patient seen and examined. No acute events overnight. TOlerating HFNC     ROS: All other systems are negative.    Vital Signs:    T(F): 97.2 (22 @ 07:25), Max: 97.2 (22 @ 07:25)  HR: 88 (22 @ 07:25) (60 - 88)  BP: 137/69 (22 @ 07:25) (105/67 - 149/774)  RR: 20 (22 @ 07:25) (20 - 20)  SpO2: 93% (22 @ 09:30) (92% - 99%)    08 Sep 2022 07:01  -  09 Sep 2022 07:00  --------------------------------------------------------  IN: 937 mL / OUT: 3900 mL / NET: -2963 mL    09 Sep 2022 07:01  -  09 Sep 2022 10:53  --------------------------------------------------------  IN: 140 mL / OUT: 0 mL / NET: 140 mL    Daily Weight in k (09 Sep 2022 04:20)    POCT Blood Glucose.: 80 mg/dL (09 Sep 2022 07:33)  POCT Blood Glucose.: 92 mg/dL (08 Sep 2022 20:55)  POCT Blood Glucose.: 92 mg/dL (08 Sep 2022 17:12)  POCT Blood Glucose.: 145 mg/dL (08 Sep 2022 11:28)    PHYSICAL EXAM:    GENERAL:  NAD chronically ill appearing   SKIN: No rashes or lesions  HEENT: Atraumatic. Normocephalic.    NECK: Supple, No JVD.    PULMONARY: Crackles B/L. No wheezing.   CVS: Normal S1, S2. Rate and Rhythm are regular   ABDOMEN/GI: Soft, Nontender, Nondistended   MSK:    edema B/L LE. No clubbing or cyanosis   NEUROLOGIC: Moves all extremities   PSYCH: Awake and alert     Consultant(s) Notes Reviewed:  [x ] YES  [ ] NO  Care Discussed with Consultants/Other Providers [ x] YES  [ ] NO    LABS:                        8.0    9.53  )-----------( 252      ( 09 Sep 2022 01:16 )             25.2     133<L>  |  94<L>  |  91<HH>  ----------------------------<  76  3.7   |  26  |  1.4    Ca    8.1<L>      09 Sep 2022 01:16  Phos  3.1       Mg     1.9         TPro  5.2<L>  /  Alb  2.8<L>  /  TBili  0.7  /  DBili  0.5<H>  /  AST  14  /  ALT  9   /  AlkPhos  83      Culture - Fungal, Body Fluid (collected 08 Sep 2022 13:22)  Source: Pleural Fl Pleural Fluid  Preliminary Report (09 Sep 2022 07:23):    Testing in progress    Culture - Body Fluid with Gram Stain (collected 08 Sep 2022 13:22)  Source: Pleural Fl Pleural Fluid  Gram Stain (09 Sep 2022 02:32):    polymorphonuclear leukocytes seen    No organisms seen    by cytocentrifuge    RADIOLOGY & ADDITIONAL TESTS:  Imaging or report Personally Reviewed:  [x] YES  [ ] NO  EKG reviewed: [x] YES  [ ] NO    Medications:  Standing  ALBUTerol    90 MICROgram(s) HFA Inhaler 2 Puff(s) Inhalation every 6 hours  atorvastatin 40 milliGRAM(s) Oral at bedtime  BACItracin   Ointment 1 Application(s) Topical two times a day  buMETAnide Infusion 1 mG/Hr IV Continuous <Continuous>  chlorhexidine 2% Cloths 1 Application(s) Topical <User Schedule>  dexAMETHasone  Injectable 6 milliGRAM(s) IV Push daily  dextrose 5%. 1000 milliLiter(s) IV Continuous <Continuous>  dextrose 5%. 1000 milliLiter(s) IV Continuous <Continuous>  dextrose 50% Injectable 25 Gram(s) IV Push once  dextrose 50% Injectable 12.5 Gram(s) IV Push once  dextrose 50% Injectable 25 Gram(s) IV Push once  enoxaparin Injectable 85 milliGRAM(s) SubCutaneous every 12 hours  famotidine    Tablet 20 milliGRAM(s) Oral daily  glucagon  Injectable 1 milliGRAM(s) IntraMuscular once  insulin glargine Injectable (LANTUS) 17 Unit(s) SubCutaneous at bedtime  insulin lispro (ADMELOG) corrective regimen sliding scale   SubCutaneous three times a day before meals  isosorbide   mononitrate ER Tablet (IMDUR) 30 milliGRAM(s) Oral daily  levothyroxine 150 MICROGram(s) Oral daily  lidocaine 1% (Preservative-free) Injectable 30 milliLiter(s) Local Injection once  lidocaine 1% Injectable 10 milliLiter(s) Local Injection once  lidocaine 1% Injectable 10 milliLiter(s) Local Injection once  melatonin 5 milliGRAM(s) Oral at bedtime  metolazone 5 milliGRAM(s) Oral daily  metoprolol succinate  milliGRAM(s) Oral daily  multivitamin/minerals 1 Tablet(s) Oral daily  mycophenolate mofetil 500 milliGRAM(s) Oral four times a day  NIFEdipine XL 30 milliGRAM(s) Oral daily  pentoxifylline 400 milliGRAM(s) Oral two times a day  remdesivir  IVPB   IV Intermittent   remdesivir  IVPB 100 milliGRAM(s) IV Intermittent every 24 hours  tacrolimus 0.5 milliGRAM(s) Oral two times a day    PRN Meds  acetaminophen     Tablet .. 650 milliGRAM(s) Oral every 6 hours PRN  dextrose Oral Gel 15 Gram(s) Oral once PRN  dextrose Oral Gel 15 Gram(s) Oral once PRN

## 2022-09-09 NOTE — CHART NOTE - NSCHARTNOTEFT_GEN_A_CORE
Registered Dietitian Follow-Up     Patient Profile Reviewed                           Yes [x]   No []     Nutrition History Previously Obtained        Yes [x]  No []       Pertinent Subjective Information: Pt reports that he is only consuming 1/3 of meals and eats whatever appeals to him. Pt was having a difficult time speaking due to difficulty breathing -- could be reason for suboptimal appetite as well. Discussed adding nutrition supplements -- pt somewhat agreeable to try.      Pertinent Medical Interventions:  #Acute Hypoxic Respiratory Failure  #Acute HFmrEF  #BRENDA, HypoNa   #Stage 2 right buttock pressure ulcer:    Diet order:   Diet, Minced and Moist:   DASH/TLC {Sodium & Cholesterol Restricted}  1200mL Fluid Restriction ([id number])  Mildly Thick Liquids (MILDTHICKLIQS) (22 @ 15:01) [Active]    Anthropometrics:  Ht: 188 cm.  Wt: 85 kg ( dosing wt)  BMI: 24.1 (using dosing wt 85 kg)  IBW: 86.4 kg    Daily Weight in k (), Weight in k (), Weight in k.6 (), Weight in k.7 (), Weight in k.3 (), Weight in k.4 (), Weight in k (), Weight in k (), Weight in k (), Weight in k.3 (), Weight in k.8 (), Weight in k.8 (); Weight in k.2 (), Weight in k (), Weight in k.6 ()  Weight gain observed suspected to be r/t fluid related wt changes. Current wt 86 kg () consistent with dosing wt.    MEDICATIONS  (STANDING):  ALBUTerol    90 MICROgram(s) HFA Inhaler 2 Puff(s) Inhalation every 6 hours  atorvastatin 40 milliGRAM(s) Oral at bedtime  buMETAnide Infusion 1 mG/Hr (5 mL/Hr) IV Continuous <Continuous>  dexAMETHasone  Injectable 6 milliGRAM(s) IV Push daily  dextrose 5%. 1000 milliLiter(s) (100 mL/Hr) IV Continuous <Continuous>  dextrose 5%. 1000 milliLiter(s) (50 mL/Hr) IV Continuous <Continuous>  dextrose 50% Injectable 25 Gram(s) IV Push once  dextrose 50% Injectable 12.5 Gram(s) IV Push once  dextrose 50% Injectable 25 Gram(s) IV Push once  enoxaparin Injectable 85 milliGRAM(s) SubCutaneous every 12 hours  famotidine    Tablet 20 milliGRAM(s) Oral daily  glucagon  Injectable 1 milliGRAM(s) IntraMuscular once  insulin glargine Injectable (LANTUS) 17 Unit(s) SubCutaneous at bedtime  insulin lispro (ADMELOG) corrective regimen sliding scale   SubCutaneous three times a day before meals  levothyroxine 150 MICROGram(s) Oral daily  metoprolol succinate  milliGRAM(s) Oral daily  multivitamin/minerals 1 Tablet(s) Oral daily  mycophenolate mofetil 500 milliGRAM(s) Oral four times a day  pentoxifylline 400 milliGRAM(s) Oral two times a day  remdesivir  IVPB   IV Intermittent   remdesivir  IVPB 100 milliGRAM(s) IV Intermittent every 24 hours  sacubitril 49 mG/valsartan 51 mG 1 Tablet(s) Oral two times a day  sodium chloride 3%. 150 milliLiter(s) (50 mL/Hr) IV Continuous <Continuous>  tacrolimus 0.5 milliGRAM(s) Oral two times a day    MEDICATIONS  (PRN):  dextrose Oral Gel 15 Gram(s) Oral once PRN Blood Glucose LESS THAN 70 milliGRAM(s)/deciliter  dextrose Oral Gel 15 Gram(s) Oral once PRN Blood Glucose LESS THAN 70 milliGRAM(s)/deciliter    Pertinent Labs:  @ 07:56: Na --, BUN --, Cr --, BG --, K+ --, Phos --, Mg --, Alk Phos 83, ALT/SGPT 9, AST/SGOT 14, HbA1c --   @ 01:16: Na 133<L>, BUN 91<HH>, Cr 1.4, BG 76, K+ 3.7, Phos 3.1, Mg 1.9, Alk Phos 79, ALT/SGPT 8, AST/SGOT 13, HbA1c --    Finger Sticks:  POCT Blood Glucose.: 193 mg/dL ( @ 21:01)  POCT Blood Glucose.: 216 mg/dL ( @ 16:39)  POCT Blood Glucose.: 93 mg/dL ( @ 11:18)  POCT Blood Glucose.: 80 mg/dL ( @ 07:33)    Physical Findings:  - Appearance: WDL; no new edema  - GI function: WDL, last bowel movement   - Tubes: n/a   - Oral/Mouth cavity: diet texture per SLP reccs   - Skin: stage 2 PI to sacrum and buttocks      Nutrition Requirements: per prev RD assessment   Weight Used: 85 kg     Estimated Energy Needs    Continue [x]  Adjust []  1945-2431kcal/day (MSJ x1.2-1.5)     Estimated Protein Needs    Continue [x]  Adjust []  102-128g/day (1.2-1.5g/kg)     Estimated Fluid Needs        Continue [x]  Adjust []  1700-2125mL/day (20-25mL/kg)    Nutrient Intake: <50% per pt report, no previous supplements active      [x] Previous Nutrition Diagnosis: Swallowing difficulty            [x] Ongoing          [] Resolved    [x] Previous Nutrition Diagnosis: Increased nutrient needs            [x] Ongoing          [] Resolved     Nutrition Intervention: Meals & Snacks, Medical Food Supplements    Goal/Expected Outcome: Pt to consume and tolerate >75% of meals/snacks and PO supplements in 4 days.      Nutrition Monitoring:diet order,energy intake,body composition,NFPF      Recommendation:   1. cont w/ SLP recc diet order   2. Add Ensure Pudding TID (510kcal/12g PRO)  3. Add prosource Gelatein BID (300kcal/40g PRO)   4. d/c DASH?TLC modifier to optimize po intake   5. cont w/ multivitamins   6. encourage and assist w/ po intake    [place] via TEAMS   RD remains available: Teresa Rodriguez RDN [phone]

## 2022-09-09 NOTE — PROGRESS NOTE ADULT - ASSESSMENT
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    AFib, chronic - on eliquis   CAD s/p CABG  H/O DLD, HTN, DM2, Hypothyroidism     PLAN:    CNS:  no depressants     HEENT: Oral care    PULMONARY:  HOB @ 45 degrees.  Aspiration precautions.  Wean O2.  Sats 90-95.  Continue home inhalers  FU PFA       CARDIOVASCULAR:  Goal directed fluid resuscitation.      GI: GI prophylaxis.  Feeding.  Bowel regimen     RENAL:  Follow up lytes.  Correct as needed    INFECTIOUS DISEASE: Follow up cultures.  ID eval appreciated.  Dexa 6 mg daily D # 4     HEMATOLOGICAL:  DVT prophylaxis.  Restart LMWH     ENDOCRINE:  Follow up FS.  Insulin protocol if needed.    MUSCULOSKELETAL:  OOB to chair.  PT OT     OOB to chair     SDU

## 2022-09-10 NOTE — PROGRESS NOTE ADULT - SUBJECTIVE AND OBJECTIVE BOX
INTERVAL HPI/OVERNIGHT EVENTS:    SUBJECTIVE: Patient seen and examined at bedside.     cc: sob  no cp, abd pain, fever  +sob, no cough, orthopnea, pnd    OBJECTIVE:    VITAL SIGNS:  Vital Signs Last 24 Hrs  T(C): 36.6 (10 Sep 2022 11:16), Max: 36.6 (10 Sep 2022 11:16)  T(F): 97.8 (10 Sep 2022 11:16), Max: 97.8 (10 Sep 2022 11:16)  HR: 80 (10 Sep 2022 14:30) (63 - 80)  BP: 114/56 (10 Sep 2022 11:16) (112/68 - 133/60)  BP(mean): 81 (10 Sep 2022 11:16) (81 - 85)  RR: 20 (10 Sep 2022 11:16) (20 - 20)  SpO2: 94% (10 Sep 2022 14:30) (89% - 99%)    Parameters below as of 10 Sep 2022 14:30  Patient On (Oxygen Delivery Method): nasal cannula, high flow  O2 Flow (L/min): 5        PHYSICAL EXAM:    General: NAD  HEENT: NC/AT; PERRL, clear conjunctiva  Neck: supple  Respiratory: decreased bs at bases  Cardiovascular: +S1/S2; RRR  Abdomen: soft, NT/ND; +BS x4  Extremities: WWP, 2+ peripheral pulses b/l; no LE edema  Skin: normal color and turgor; no rash  Neurological:    MEDICATIONS:  MEDICATIONS  (STANDING):  ALBUTerol    90 MICROgram(s) HFA Inhaler 2 Puff(s) Inhalation every 6 hours  atorvastatin 40 milliGRAM(s) Oral at bedtime  BACItracin   Ointment 1 Application(s) Topical two times a day  buMETAnide Infusion 1 mG/Hr (5 mL/Hr) IV Continuous <Continuous>  chlorhexidine 2% Cloths 1 Application(s) Topical <User Schedule>  dexAMETHasone  Injectable 6 milliGRAM(s) IV Push daily  dextrose 5%. 1000 milliLiter(s) (50 mL/Hr) IV Continuous <Continuous>  dextrose 5%. 1000 milliLiter(s) (100 mL/Hr) IV Continuous <Continuous>  dextrose 50% Injectable 25 Gram(s) IV Push once  dextrose 50% Injectable 12.5 Gram(s) IV Push once  dextrose 50% Injectable 25 Gram(s) IV Push once  enoxaparin Injectable 85 milliGRAM(s) SubCutaneous every 12 hours  famotidine    Tablet 20 milliGRAM(s) Oral daily  glucagon  Injectable 1 milliGRAM(s) IntraMuscular once  insulin glargine Injectable (LANTUS) 17 Unit(s) SubCutaneous at bedtime  insulin lispro (ADMELOG) corrective regimen sliding scale   SubCutaneous three times a day before meals  levothyroxine 150 MICROGram(s) Oral daily  lidocaine 1% (Preservative-free) Injectable 30 milliLiter(s) Local Injection once  lidocaine 1% Injectable 10 milliLiter(s) Local Injection once  lidocaine 1% Injectable 10 milliLiter(s) Local Injection once  melatonin 5 milliGRAM(s) Oral at bedtime  metoprolol succinate  milliGRAM(s) Oral daily  multivitamin/minerals 1 Tablet(s) Oral daily  mycophenolate mofetil 500 milliGRAM(s) Oral four times a day  pentoxifylline 400 milliGRAM(s) Oral two times a day  remdesivir  IVPB   IV Intermittent   remdesivir  IVPB 100 milliGRAM(s) IV Intermittent every 24 hours  sacubitril 49 mG/valsartan 51 mG 1 Tablet(s) Oral two times a day  sodium chloride 3%. 150 milliLiter(s) (50 mL/Hr) IV Continuous <Continuous>  tacrolimus 0.5 milliGRAM(s) Oral two times a day    MEDICATIONS  (PRN):  acetaminophen     Tablet .. 650 milliGRAM(s) Oral every 6 hours PRN Mild Pain (1 - 3)  dextrose Oral Gel 15 Gram(s) Oral once PRN Blood Glucose LESS THAN 70 milliGRAM(s)/deciliter  dextrose Oral Gel 15 Gram(s) Oral once PRN Blood Glucose LESS THAN 70 milliGRAM(s)/deciliter      ALLERGIES:  Allergies    Ozempic (0.25 mg or 0.5 mg dose) (Hives; Rash)  streptomycin (Unknown)  Trulicity Pen (Hives; Rash)    Intolerances        LABS:                        7.9    9.70  )-----------( 233      ( 10 Sep 2022 01:39 )             24.6     Hemoglobin: 7.9 g/dL (09-10 @ 01:39)  Hemoglobin: 8.0 g/dL (09-09 @ 01:16)  Hemoglobin: 8.0 g/dL (09-08 @ 01:29)  Hemoglobin: 7.7 g/dL (09-07 @ 18:13)  Hemoglobin: 7.7 g/dL (09-07 @ 16:40)    CBC Full  -  ( 10 Sep 2022 01:39 )  WBC Count : 9.70 K/uL  RBC Count : 2.73 M/uL  Hemoglobin : 7.9 g/dL  Hematocrit : 24.6 %  Platelet Count - Automated : 233 K/uL  Mean Cell Volume : 90.1 fL  Mean Cell Hemoglobin : 28.9 pg  Mean Cell Hemoglobin Concentration : 32.1 g/dL  Auto Neutrophil # : 8.66 K/uL  Auto Lymphocyte # : 0.43 K/uL  Auto Monocyte # : 0.53 K/uL  Auto Eosinophil # : 0.04 K/uL  Auto Basophil # : 0.01 K/uL  Auto Neutrophil % : 89.3 %  Auto Lymphocyte % : 4.4 %  Auto Monocyte % : 5.5 %  Auto Eosinophil % : 0.4 %  Auto Basophil % : 0.1 %    09-10    137  |  96<L>  |  93<HH>  ----------------------------<  142<H>  3.7   |  31  |  1.4    Ca    8.1<L>      10 Sep 2022 12:07  Phos  3.1     09-10  Mg     1.8     09-10    TPro  4.8<L>  /  Alb  2.6<L>  /  TBili  0.6  /  DBili  0.4<H>  /  AST  12  /  ALT  8   /  AlkPhos  79  09-10    Creatinine Trend: 1.4<--, 1.3<--, 1.4<--, 1.6<--, 1.3<--, 1.2<--  LIVER FUNCTIONS - ( 10 Sep 2022 06:32 )  Alb: 2.6 g/dL / Pro: 4.8 g/dL / ALK PHOS: 79 U/L / ALT: 8 U/L / AST: 12 U/L / GGT: x               hs Troponin:              CSF:                      EKG:   MICROBIOLOGY:    Culture - Fungal, Body Fluid (collected 08 Sep 2022 13:22)  Source: Pleural Fl Pleural Fluid  Preliminary Report (09 Sep 2022 07:23):    Testing in progress    Culture - Body Fluid with Gram Stain (collected 08 Sep 2022 13:22)  Source: Pleural Fl Pleural Fluid  Gram Stain (09 Sep 2022 02:32):    polymorphonuclear leukocytes seen    No organisms seen    by cytocentrifuge  Preliminary Report (09 Sep 2022 20:07):    No growth      IMAGING:      Labs, imaging, EKG personally reviewed    RADIOLOGY & ADDITIONAL TESTS: Reviewed.

## 2022-09-10 NOTE — PROGRESS NOTE ADULT - SUBJECTIVE AND OBJECTIVE BOX
ANDRIA TAVAREZ 82y Male  MRN#: [id number]   CODE STATUS: DNR DNI     Admission Date: 08-20-22  Hospital Day: 20d    Pt is currently admitted with the primary diagnosis of SOB, CHF exacerbation     SUBJECTIVE  Hospital Course  DNR DNI 81 y/o man w PMHx of HTN, HLD, DM2, CAD s/p 3vCABG known to Dr De La Fuente, HFmEF w EF 45-50% on 8/1/22, AF on Eliquis, AAA, COPD baseline rare home O2 known to Dr ALO Mars, CKD3 and C3 glomerulopathy known to Dr Daniel, hypothyroidism, PVD, recent admission 7/5/22-8/3/22 for COVID and BRENDA 2/2 diarrhea, tx w dexamethasone and Zosyn, stay c/b microaspirations, PNA, was d/c to STR, has been on 3L O2, presented on 8/20/22 for SOB x5days and hypoxia to 70s. Had CXR done on 8/15/22 but results had not been back. ED vitals were notable for RR max 24, SpO2 84% on 4L O2, required BiPAP, had WBC 12, HB 7.2 from baseline 9, BNP 55k, Cr 2.2 from baseline 1.5, and trop 0.24, EKG w AF RVR and CXR showing significant b/l pleural effusions, pulm congestion, admitted to medicine for SOB/hypoxia attributed to CHF exacerbation.   Has been on Bumex drip for HF exacerbation and is s/p R thoracentesis 8/26/22 w 1.5L transudative fluid removed. Has had epistaxis during stay. Is s/p 1uPRBC, start 3 day course of IV venofer 200mg. Upgraded to CEU for increasing O2 requirements despite venti mask, HFNC.       9/6/22 day 17: Transition eliquis to Lovenox 80mg BID for possible future repeat thoracentesis. On HFNC; wean as tolerated. Start dexamethasone 6mg PO QD [id number] thru 9/16/22. Consult ID. Decrease Lantus from 22 to 17. ddimer 1035, , CRP 69.1. Keep bumex drip. D/c 3% NS.   9/7/22 day 18: ID recs for COVID: unclear if new infection, will treat as such w RDV x5days; no toci, Planned for thoracentesis #3 today, due to excess bleeding was ?not completed. Started RDV 200mg today, 100mg for next 4 days. Cont dexamethasone 6mg [id number], thru 9/16/22.   9/8/22 day 19: Hb stable ~8. R thoracentesis today, 2L removed. Seen by PT but stated too weak/unwell to participate. Palliative consulted per family request - has been seen by palliative in the past.   9/9/22 day 20: Restarted LMWH today, per nephro tacro level OK. Per CHF: 150cc hypertonic saline over 3h BID, metolazone 5mg x1, BMP BID w Mag, Entresto 49-51. D/c Imdur and Nifedipine.     9/10/22 day 21: Replete magnesium for Mag >2    Overnight events  None significant    Subjective complaints  No complaints today. Had a good night of sleep.         PHYSICAL EXAM:  GENERAL: NAD, sitting in bed, Seems less tired today.   HEAD:  Atraumatic, Normocephalic  EYES: EOMI, sclera clear  ENT: Dry mucous membranes. (+)HFNC   NECK: Supple  CHEST/LUNG: Decent air movement. Mild crackles throughout, mild rhonchi b/l bases.   HEART: Irregularly irregular rhythm. Regular rate; Soft 1/6 systolic murmur  ABDOMEN: (+)BS; Soft, nontender, nondistended  EXTREMITIES:  2+ Radial Pulses, brisk capillary refill. BLE w 2+ pitting edema, worse than yesterday. Chronic skin changes/hyperpigmentation over b/l shin from old scratches.   NERVOUS SYSTEM:  A&Ox3, no focal deficits   SKIN: No visible rashes or lesions       Present Today:   - Gloria:  No [ X ] condom cath, Yes [  ] : Indication:   - Type of IV Access:       .. CVC/Piccline:  No [  ], Yes [ X ] : Indication:       .. 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    AF (atrial fibrillation)  s/p DCCV    Carotid stenosis    COPD (chronic obstructive pulmonary disease)    H/O coronary artery bypass surgery  2001    S/P inguinal hernia repair    History of appendectomy                                                ALLERGIES:  Ozempic (0.25 mg or 0.5 mg dose) (Hives; Rash)  streptomycin (Unknown)  Trulicity Pen (Hives; Rash)                           HOME MEDICATIONS  Home Medications:  Aranesp 100 mcg/0.5 mL injectable solution: 1 dose(s) injectable every 3 to 4 weeks (05 Jul 2022 21:22)  atorvastatin 40 mg oral tablet: 1 tab(s) orally once a day (at bedtime) (05 Jul 2022 21:22)  calcitriol 0.25 mcg oral capsule: 1 cap(s) orally 2 times a week (05 Jul 2022 21:22)  d-mycophenolate: 500 milligram(s) orally 4 times a day (05 Jul 2022 21:22)  Eliquis 2.5 mg oral tablet: 1 tab(s) orally 2 times a day (05 Jul 2022 21:22)  famotidine 20 mg oral tablet: 1 tab(s) orally once a day (05 Jul 2022 21:22)  furosemide 20 mg oral tablet: 1 tab(s) orally once a day (03 Aug 2022 09:47)  insulin glargine 100 units/mL subcutaneous solution: 5 unit(s) subcutaneous once a day (at bedtime) (03 Aug 2022 09:47)  insulin lispro 100 units/mL injectable solution: 1 Unit(s) if Glucose 151 - 200  2 Unit(s) if Glucose 201 - 250  3 Unit(s) if Glucose 251 - 300  4 Unit(s) if Glucose 301 - 350  5 Unit(s) if Glucose 351 - 400  6 Unit(s) if Glucose Greater Than 400 (03 Aug 2022 09:57)  isosorbide mononitrate 30 mg oral tablet, extended release: 1 tab(s) orally once a day (in the morning) (05 Jul 2022 21:22)  levothyroxine 150 mcg (0.15 mg) oral tablet: 1 tab(s) orally once a day (05 Jul 2022 21:22)  metoprolol succinate 50 mg oral tablet, extended release: 1 tab(s) orally once a day (03 Aug 2022 09:47)  Multiple Vitamins with Minerals oral tablet: 1 tab(s) orally once a day (03 Aug 2022 09:47)  NIFEdipine 30 mg oral tablet, extended release: 1 tab(s) orally once a day (05 Jul 2022 21:22)  pentoxifylline 400 mg oral tablet, extended release: 1 tab(s) orally 2 times a day (05 Jul 2022 21:22)  sodium bicarbonate 650 mg oral tablet: 1 tab(s) orally 3 times a day (03 Aug 2022 09:47)  tacrolimus 0.5 mg oral capsule: 1 cap(s) orally 2 times a day (03 Aug 2022 09:57)                           MEDICATIONS:  STANDING MEDICATIONS  ALBUTerol    90 MICROgram(s) HFA Inhaler 2 Puff(s) Inhalation every 6 hours  atorvastatin 40 milliGRAM(s) Oral at bedtime  BACItracin   Ointment 1 Application(s) Topical two times a day  buMETAnide Infusion 1 mG/Hr IV Continuous <Continuous>  chlorhexidine 2% Cloths 1 Application(s) Topical <User Schedule>  dexAMETHasone  Injectable 6 milliGRAM(s) IV Push daily  dextrose 5%. 1000 milliLiter(s) IV Continuous <Continuous>  dextrose 5%. 1000 milliLiter(s) IV Continuous <Continuous>  dextrose 50% Injectable 25 Gram(s) IV Push once  dextrose 50% Injectable 12.5 Gram(s) IV Push once  dextrose 50% Injectable 25 Gram(s) IV Push once  enoxaparin Injectable 85 milliGRAM(s) SubCutaneous every 12 hours  famotidine    Tablet 20 milliGRAM(s) Oral daily  glucagon  Injectable 1 milliGRAM(s) IntraMuscular once  insulin glargine Injectable (LANTUS) 17 Unit(s) SubCutaneous at bedtime  insulin lispro (ADMELOG) corrective regimen sliding scale   SubCutaneous three times a day before meals  levothyroxine 150 MICROGram(s) Oral daily  lidocaine 1% (Preservative-free) Injectable 30 milliLiter(s) Local Injection once  lidocaine 1% Injectable 10 milliLiter(s) Local Injection once  lidocaine 1% Injectable 10 milliLiter(s) Local Injection once  melatonin 5 milliGRAM(s) Oral at bedtime  metoprolol succinate  milliGRAM(s) Oral daily  multivitamin/minerals 1 Tablet(s) Oral daily  mycophenolate mofetil 500 milliGRAM(s) Oral four times a day  pentoxifylline 400 milliGRAM(s) Oral two times a day  remdesivir  IVPB   IV Intermittent   remdesivir  IVPB 100 milliGRAM(s) IV Intermittent every 24 hours  sacubitril 49 mG/valsartan 51 mG 1 Tablet(s) Oral two times a day  sodium chloride 3%. 150 milliLiter(s) IV Continuous <Continuous>  tacrolimus 0.5 milliGRAM(s) Oral two times a day    PRN MEDICATIONS  acetaminophen     Tablet .. 650 milliGRAM(s) Oral every 6 hours PRN  dextrose Oral Gel 15 Gram(s) Oral once PRN  dextrose Oral Gel 15 Gram(s) Oral once PRN                                            ------------------------------------------------------------  T(C): 35.6 (09-10-22 @ 20:00), Max: 36.6 (09-10-22 @ 11:16)  T(F): 96 (09-10-22 @ 20:00), Max: 97.8 (09-10-22 @ 11:16)  HR: 90 (09-10-22 @ 20:00) (67 - 141)  BP: 143/69 (09-10-22 @ 20:00) (112/68 - 149/67)  RR: 20 (09-10-22 @ 20:00) (20 - 20)  SpO2: 96% (09-10-22 @ 20:00) (89% - 99%)      09-09-22 @ 07:01  -  09-10-22 @ 07:00  --------------------------------------------------------  IN: 485 mL / OUT: 2200 mL / NET: -1715 mL    09-10-22 @ 07:01  -  09-10-22 @ 21:45  --------------------------------------------------------  IN: 0 mL / OUT: 450 mL / NET: -450 mL                                               LABS:                        7.9    9.70  )-----------( 233      ( 10 Sep 2022 01:39 )             24.6     09-10    137  |  96<L>  |  93<HH>  ----------------------------<  142<H>  3.7   |  31  |  1.4    Ca    8.1<L>      10 Sep 2022 12:07  Phos  3.1     09-10  Mg     1.8     09-10    TPro  4.8<L>  /  Alb  2.6<L>  /  TBili  0.6  /  DBili  0.4<H>  /  AST  12  /  ALT  8   /  AlkPhos  79  09-10                Culture - Fungal, Body Fluid (collected 08 Sep 2022 13:22)  Source: Pleural Fl Pleural Fluid  Preliminary Report (09 Sep 2022 07:23):    Testing in progress    Culture - Body Fluid with Gram Stain (collected 08 Sep 2022 13:22)  Source: Pleural Fl Pleural Fluid  Gram Stain (09 Sep 2022 02:32):    polymorphonuclear leukocytes seen    No organisms seen    by cytocentrifuge  Preliminary Report (09 Sep 2022 20:07):    No growth                            RADIOLOGY:    CXR 9/5/22:   B/l opacities and effusions, unchanged

## 2022-09-10 NOTE — PROGRESS NOTE ADULT - ASSESSMENT
83 y/o man w PMHx of HTN, HLD, DM2, CAD s/p 3vCABG known to Dr De La Fuente, HFmEF w EF 45-50% on 8/1/22, AF on Eliquis, AAA, COPD baseline rare home O2 known to Dr ALO Mars, CKD3 and C3 glomerulopathy known to Dr Daniel, hypothyroidism, PVD, presented on 8/20/22 for SOB x5days and hypoxia to 70s. Admitted to medicine for SOB/hypoxia attributed to CHF exacerbation.   Has been on Bumex drip for HF exacerbation and is s/p R thoracentesis 8/26/22 w 1.5L transudative fluid removed, recurrent R thoracentesis 9/8/22.       #Acute Hypoxic Respiratory Failure  #Acute HFmrEF  #COPD  HFmEF w EF 45-50% on 8/1/22; multiple LV regional wall motion abnormalities, mild MR, mild TR, borderline pulm HTN   COPD baseline rare home O2, 3L O2 since last admission known to Dr ALO Mars  Thoracentesis 8/26/22 w 1.5L removed, transudative fluid. Repeat 9/8/22, 2L removed.   Worsening hypoxia, now on HFNC   CXR 9/8/22 w/o significant change in b/l opacities/effusions   - Continue Bumex drip at 1mg/hr  - Heart failure: 150cc hypertonic saline over 3h BID, metolazone 5mg x1, BMP BID w Mag, Entresto 49-51. D/c Imdur and Nifedipine.   - Pulmonary following  - Continue Prednisone (for C3 glomerulopathy), duonebs PRN   - Lovenox for now, in case pt needs further thoracentesis  - Cont dexamethasone 6mg PO QD [id number] thru 9/16/22.   - ID: Tx as new COVID infection: RDV x5 days: day 1 w 200mg, days 2-5 w 100mg, ends 9/11/22. No toci  Labs: procal 0.29, ferritin 957. 9/6/22: ddimer 1035, , CRP 69.1.     #BRENDA  #HypoNa - resolved.   Baseline Cr 1.1, Cr 1.3 on 9/10/22, near resolved.   HypoNa resolved - now 136  Prior HypoNa may be due to dehydration/poor PO intake     #CAD  Continue Metoprolol 100mg QD, Imdur 30mg QD, and Atorvastatin 40mg qhs.     #HTN:   Continue metoprolol, Nifedipine 30mg QD   Will try to wean off nifedipine given HFmrEF  - Current SBP range 120s-130s    #DM II: A1C 7.3.   - Cont Lantus to 17u, Off Lispro.   - Varying fs/PO intake. Past day w 145-250 in setting of dexamethasone thru 9/16/22    #Acute on chronic anemia; goal Hb 8+  #Epistaxis - resolved   s/p 1 U PRBC 8/22/22 and 8/24/22 for Hb <8  Continue IV Venofer 200mg daily for 3 days.   ENT consulted 8/23/22 for epistaxis/ blood clots blocking nasal passage   - Hb 8.0 9/8/22  - Active T&S from today; ordered q3d through 10/3/22     #Chronic Atrial Fibrillation: continue metoprolol and Eliquis.   #C3 Glomerulonephropathy: Continue Pred 5mg QD, [place]ro 0.5mg BID, Mycophenolate 500mg 4 times daily.   #Leukocytosis: likely from steroids; monitor off abx   #Hypothyroidism: continue synthroid  #Stage 2 right buttock pressure ulcer: present on admission. Continue local wound care, off loading.     #MISC  - Palliative consulted per family request - has been seen by palliative in the past.                                                                               ----------------------------------------------------  # DVT prophylaxis: Transition eliquis to Lovenox 80mg BID for repeat thoracentesis. Restarted LMWH 9/9/22.    # GI prophylaxis: Famotidine 20mg QD   # Diet: Minced and moist   # Activity: IAT - did not participate w PT 9/8/22 due to fatigue   # Code status: DNR DNI   # Disposition: Keep in CEU for now                                                                            --------------------------------------------------------    # Handoff:   - Trend Hb   - Wean HFNC as renay  - F/u FS

## 2022-09-10 NOTE — PROGRESS NOTE ADULT - SUBJECTIVE AND OBJECTIVE BOX
Patient is a 82y old  Male who presents with a chief complaint of chf exacerbation (09 Sep 2022 16:10)        Over Night Events:    continues on HFNC and bumex gtt, negative 1.7L yesterday    ROS:     All ROS are negative except HPI         PHYSICAL EXAM    ICU Vital Signs Last 24 Hrs  T(C): 36.2 (10 Sep 2022 08:19), Max: 36.2 (10 Sep 2022 08:19)  T(F): 97.2 (10 Sep 2022 08:19), Max: 97.2 (10 Sep 2022 08:19)  HR: 68 (10 Sep 2022 08:19) (63 - 68)  BP: 112/68 (10 Sep 2022 08:19) (112/68 - 133/60)  BP(mean): 85 (10 Sep 2022 08:19) (85 - 85)  ABP: --  ABP(mean): --  RR: 20 (10 Sep 2022 08:19) (20 - 20)  SpO2: 89% (10 Sep 2022 08:19) (89% - 99%)    O2 Parameters below as of 10 Sep 2022 08:19  Patient On (Oxygen Delivery Method): nasal cannula, high flow            Constitutional: no acute distress, well nourished well developed  Neuro: moving all 4 limbs spontaneously, no facial droop or dysarthria  HEENT: NCAT, anicteric  Neck: no visible lymphadenopathy or goiter  Pulm: no respiratory distress. clear to auscultation bilaterally  Cardiac: extremities appear pink and well-perfused.  regular rhythm and rate, no murmur detected  Abdomen: non-distended  Extremities: +peripheral edema      09-09-22 @ 07:01  -  09-10-22 @ 07:00  --------------------------------------------------------  IN:    Bumetanide: 75 mL    Oral Fluid: 360 mL    sodium chloride 3%: 50 mL  Total IN: 485 mL    OUT:    Indwelling Catheter - Urethral (mL): 2200 mL  Total OUT: 2200 mL    Total NET: -1715 mL          LABS:                            7.9    9.70  )-----------( 233      ( 10 Sep 2022 01:39 )             24.6                                               09-10    136  |  97<L>  |  89<HH>  ----------------------------<  140<H>  3.5   |  30  |  1.3    Ca    8.1<L>      10 Sep 2022 01:39  Phos  3.1     09-10  Mg     1.8     09-10    TPro  4.8<L>  /  Alb  2.6<L>  /  TBili  0.6  /  DBili  0.4<H>  /  AST  12  /  ALT  8   /  AlkPhos  79  09-10                                                                                           LIVER FUNCTIONS - ( 10 Sep 2022 06:32 )  Alb: 2.6 g/dL / Pro: 4.8 g/dL / ALK PHOS: 79 U/L / ALT: 8 U/L / AST: 12 U/L / GGT: x                                                  Culture - Fungal, Body Fluid (collected 08 Sep 2022 13:22)  Source: Pleural Fl Pleural Fluid  Preliminary Report (09 Sep 2022 07:23):    Testing in progress    Culture - Body Fluid with Gram Stain (collected 08 Sep 2022 13:22)  Source: Pleural Fl Pleural Fluid  Gram Stain (09 Sep 2022 02:32):    polymorphonuclear leukocytes seen    No organisms seen    by cytocentrifuge  Preliminary Report (09 Sep 2022 20:07):    No growth                                                                                           MEDICATIONS  (STANDING):  ALBUTerol    90 MICROgram(s) HFA Inhaler 2 Puff(s) Inhalation every 6 hours  atorvastatin 40 milliGRAM(s) Oral at bedtime  BACItracin   Ointment 1 Application(s) Topical two times a day  buMETAnide Infusion 1 mG/Hr (5 mL/Hr) IV Continuous <Continuous>  chlorhexidine 2% Cloths 1 Application(s) Topical <User Schedule>  dexAMETHasone  Injectable 6 milliGRAM(s) IV Push daily  dextrose 5%. 1000 milliLiter(s) (50 mL/Hr) IV Continuous <Continuous>  dextrose 5%. 1000 milliLiter(s) (100 mL/Hr) IV Continuous <Continuous>  dextrose 50% Injectable 25 Gram(s) IV Push once  dextrose 50% Injectable 12.5 Gram(s) IV Push once  dextrose 50% Injectable 25 Gram(s) IV Push once  enoxaparin Injectable 85 milliGRAM(s) SubCutaneous every 12 hours  famotidine    Tablet 20 milliGRAM(s) Oral daily  glucagon  Injectable 1 milliGRAM(s) IntraMuscular once  insulin glargine Injectable (LANTUS) 17 Unit(s) SubCutaneous at bedtime  insulin lispro (ADMELOG) corrective regimen sliding scale   SubCutaneous three times a day before meals  levothyroxine 150 MICROGram(s) Oral daily  lidocaine 1% (Preservative-free) Injectable 30 milliLiter(s) Local Injection once  lidocaine 1% Injectable 10 milliLiter(s) Local Injection once  lidocaine 1% Injectable 10 milliLiter(s) Local Injection once  melatonin 5 milliGRAM(s) Oral at bedtime  metoprolol succinate  milliGRAM(s) Oral daily  multivitamin/minerals 1 Tablet(s) Oral daily  mycophenolate mofetil 500 milliGRAM(s) Oral four times a day  pentoxifylline 400 milliGRAM(s) Oral two times a day  remdesivir  IVPB   IV Intermittent   remdesivir  IVPB 100 milliGRAM(s) IV Intermittent every 24 hours  sacubitril 49 mG/valsartan 51 mG 1 Tablet(s) Oral two times a day  sodium chloride 3%. 150 milliLiter(s) (50 mL/Hr) IV Continuous <Continuous>  tacrolimus 0.5 milliGRAM(s) Oral two times a day    MEDICATIONS  (PRN):  acetaminophen     Tablet .. 650 milliGRAM(s) Oral every 6 hours PRN Mild Pain (1 - 3)  dextrose Oral Gel 15 Gram(s) Oral once PRN Blood Glucose LESS THAN 70 milliGRAM(s)/deciliter  dextrose Oral Gel 15 Gram(s) Oral once PRN Blood Glucose LESS THAN 70 milliGRAM(s)/deciliter      New X-rays reviewed:       ECHO reviewed    CXR interpreted by me:

## 2022-09-10 NOTE — PROGRESS NOTE ADULT - TIME BILLING
d 82M PMHx HFrEF, AFib on eliquis, CAD s/p CABG 3V, PVD, DM2, COPD, HTN, hypothyroidism here with acute hypoxic resp failure.    #Acute hypoxic resp failure  presumed due to acute on chronic HFrEF +/- covid  cxr bl opacities/ effusions  s/p thora 8/26, 9/8, cx ntd  requiring hfnc at 40 lpm, 40%  bumex gtt at 1  hypertonic saline  rdv  decadron 6  appreciate id  tte  entresto 49/ 51 bid  toprol 100  #AFib, chronic  therapeutic lovenox  #DM2  lantus 17  ssi  #CAD  lipitor 40  #PVD  lipitor 40  #COPD  proventil prn  #HTN  bp meds as above  #Hypothyroidism  synthroid 150  #DVT ppx  lovenox    #Progress Note Handoff:  Pending (specify):  Consults_________, Tests________, Test Results_______, Other__hfnc_______  Family discussion: d/w pt at bedside re: treatment plan, primary dx  Disposition: Home___/SNF___/Other________/Unknown at this time___x_____

## 2022-09-10 NOTE — PROGRESS NOTE ADULT - ASSESSMENT
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    AFib, chronic - on eliquis   CAD s/p CABG  H/O DLD, HTN, DM2, Hypothyroidism     PLAN:    CNS:  no depressants     HEENT: Oral care    PULMONARY:  HOB @ 45 degrees.  Aspiration precautions.  Wean O2.  Sats 90-95.  Continue home inhalers  FU PFA       CARDIOVASCULAR:  Goal directed fluid resuscitation if needed.  Continue diuresis      GI: GI prophylaxis.  Feeding.  Bowel regimen     RENAL:  Follow up lytes.  Correct as needed    INFECTIOUS DISEASE: Follow up cultures.  ID eval appreciated.  Dexa 6 mg daily D # 5     HEMATOLOGICAL:  DVT prophylaxis.      ENDOCRINE:  Follow up FS.  Insulin protocol if needed.    MUSCULOSKELETAL:  OOB to chair.  PT OT     OOB to chair     SDU

## 2022-09-11 NOTE — PROGRESS NOTE ADULT - SUBJECTIVE AND OBJECTIVE BOX
INTERVAL HPI/OVERNIGHT EVENTS:    SUBJECTIVE: Patient seen and examined at bedside.   cc: sob  no cp, abd pain, fever  sob improving, no cough, orthopnea, pnd    OBJECTIVE:    VITAL SIGNS:  Vital Signs Last 24 Hrs  T(C): 36 (11 Sep 2022 08:00), Max: 36 (11 Sep 2022 08:00)  T(F): 96.8 (11 Sep 2022 08:00), Max: 96.8 (11 Sep 2022 08:00)  HR: 82 (11 Sep 2022 08:00) (77 - 141)  BP: 141/66 (11 Sep 2022 08:00) (141/66 - 151/74)  BP(mean): 95 (11 Sep 2022 08:00) (95 - 114)  RR: 18 (11 Sep 2022 08:00) (18 - 20)  SpO2: 100% (11 Sep 2022 08:00) (92% - 100%)    Parameters below as of 11 Sep 2022 08:00  Patient On (Oxygen Delivery Method): nasal cannula, high flow          PHYSICAL EXAM:    General: NAD  HEENT: NC/AT; PERRL, clear conjunctiva  Neck: supple  Respiratory: CTA b/l  Cardiovascular: +S1/S2; RRR  Abdomen: soft, NT/ND; +BS x4  Extremities: WWP, 2+ peripheral pulses b/l; no LE edema  Skin: normal color and turgor; no rash  Neurological:    MEDICATIONS:  MEDICATIONS  (STANDING):  ALBUTerol    90 MICROgram(s) HFA Inhaler 2 Puff(s) Inhalation every 6 hours  atorvastatin 40 milliGRAM(s) Oral at bedtime  BACItracin   Ointment 1 Application(s) Topical two times a day  buMETAnide Infusion 1 mG/Hr (5 mL/Hr) IV Continuous <Continuous>  chlorhexidine 2% Cloths 1 Application(s) Topical <User Schedule>  dexAMETHasone  Injectable 6 milliGRAM(s) IV Push daily  dextrose 5%. 1000 milliLiter(s) (100 mL/Hr) IV Continuous <Continuous>  dextrose 5%. 1000 milliLiter(s) (50 mL/Hr) IV Continuous <Continuous>  dextrose 50% Injectable 25 Gram(s) IV Push once  dextrose 50% Injectable 12.5 Gram(s) IV Push once  dextrose 50% Injectable 25 Gram(s) IV Push once  enoxaparin Injectable 85 milliGRAM(s) SubCutaneous every 12 hours  famotidine    Tablet 20 milliGRAM(s) Oral daily  glucagon  Injectable 1 milliGRAM(s) IntraMuscular once  insulin glargine Injectable (LANTUS) 20 Unit(s) SubCutaneous at bedtime  insulin lispro (ADMELOG) corrective regimen sliding scale   SubCutaneous three times a day before meals  levothyroxine 150 MICROGram(s) Oral daily  lidocaine 1% (Preservative-free) Injectable 30 milliLiter(s) Local Injection once  lidocaine 1% Injectable 10 milliLiter(s) Local Injection once  lidocaine 1% Injectable 10 milliLiter(s) Local Injection once  melatonin 5 milliGRAM(s) Oral at bedtime  metoprolol succinate  milliGRAM(s) Oral daily  multivitamin/minerals 1 Tablet(s) Oral daily  mycophenolate mofetil 500 milliGRAM(s) Oral four times a day  pentoxifylline 400 milliGRAM(s) Oral two times a day  potassium chloride    Tablet ER 40 milliEquivalent(s) Oral once  remdesivir  IVPB 100 milliGRAM(s) IV Intermittent every 24 hours  remdesivir  IVPB   IV Intermittent   sacubitril 49 mG/valsartan 51 mG 1 Tablet(s) Oral two times a day  sodium chloride 3%. 150 milliLiter(s) (50 mL/Hr) IV Continuous <Continuous>  sodium chloride 3%. 150 milliLiter(s) (50 mL/Hr) IV Continuous <Continuous>  tacrolimus 0.5 milliGRAM(s) Oral two times a day    MEDICATIONS  (PRN):  acetaminophen     Tablet .. 650 milliGRAM(s) Oral every 6 hours PRN Mild Pain (1 - 3)  dextrose Oral Gel 15 Gram(s) Oral once PRN Blood Glucose LESS THAN 70 milliGRAM(s)/deciliter  dextrose Oral Gel 15 Gram(s) Oral once PRN Blood Glucose LESS THAN 70 milliGRAM(s)/deciliter      ALLERGIES:  Allergies    Ozempic (0.25 mg or 0.5 mg dose) (Hives; Rash)  streptomycin (Unknown)  Trulicity Pen (Hives; Rash)    Intolerances        LABS:                        8.7    9.92  )-----------( 220      ( 11 Sep 2022 01:34 )             26.6     Hemoglobin: 8.7 g/dL (09-11 @ 01:34)  Hemoglobin: 7.9 g/dL (09-10 @ 01:39)  Hemoglobin: 8.0 g/dL (09-09 @ 01:16)  Hemoglobin: 8.0 g/dL (09-08 @ 01:29)  Hemoglobin: 7.7 g/dL (09-07 @ 18:13)    CBC Full  -  ( 11 Sep 2022 01:34 )  WBC Count : 9.92 K/uL  RBC Count : 2.98 M/uL  Hemoglobin : 8.7 g/dL  Hematocrit : 26.6 %  Platelet Count - Automated : 220 K/uL  Mean Cell Volume : 89.3 fL  Mean Cell Hemoglobin : 29.2 pg  Mean Cell Hemoglobin Concentration : 32.7 g/dL  Auto Neutrophil # : 8.83 K/uL  Auto Lymphocyte # : 0.40 K/uL  Auto Monocyte # : 0.49 K/uL  Auto Eosinophil # : 0.13 K/uL  Auto Basophil # : 0.00 K/uL  Auto Neutrophil % : 89.1 %  Auto Lymphocyte % : 4.0 %  Auto Monocyte % : 4.9 %  Auto Eosinophil % : 1.3 %  Auto Basophil % : 0.0 %    09-11    137  |  96<L>  |  96<HH>  ----------------------------<  153<H>  3.6   |  27  |  1.3    Ca    8.0<L>      11 Sep 2022 01:34  Phos  2.8     09-11  Mg     1.7     09-11    TPro  5.0<L>  /  Alb  2.7<L>  /  TBili  0.6  /  DBili  0.4<H>  /  AST  11  /  ALT  8   /  AlkPhos  85  09-11    Creatinine Trend: 1.3<--, 1.4<--, 1.3<--, 1.4<--, 1.6<--, 1.3<--  LIVER FUNCTIONS - ( 11 Sep 2022 07:18 )  Alb: 2.7 g/dL / Pro: 5.0 g/dL / ALK PHOS: 85 U/L / ALT: 8 U/L / AST: 11 U/L / GGT: x               hs Troponin:              CSF:                      EKG:   MICROBIOLOGY:    Culture - Fungal, Body Fluid (collected 08 Sep 2022 13:22)  Source: Pleural Fl Pleural Fluid  Preliminary Report (09 Sep 2022 07:23):    Testing in progress    Culture - Body Fluid with Gram Stain (collected 08 Sep 2022 13:22)  Source: Pleural Fl Pleural Fluid  Gram Stain (09 Sep 2022 02:32):    polymorphonuclear leukocytes seen    No organisms seen    by cytocentrifuge  Preliminary Report (09 Sep 2022 20:07):    No growth      IMAGING:      Labs, imaging, EKG personally reviewed    RADIOLOGY & ADDITIONAL TESTS: Reviewed.

## 2022-09-11 NOTE — PROGRESS NOTE ADULT - ASSESSMENT
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    AFib, chronic - on eliquis   CAD s/p CABG  H/O DLD, HTN, DM2, Hypothyroidism     PLAN:    CNS:  no depressants     HEENT: Oral care    PULMONARY:  HOB @ 45 degrees.  Aspiration precautions.  Wean O2, on low HFNC settings.  Sats 90-95.  Continue home inhalers.    CARDIOVASCULAR:  Continue diuresis      GI: GI prophylaxis.  Feeding.  Bowel regimen     RENAL:  Follow up lytes.  Correct as needed    INFECTIOUS DISEASE: Follow up cultures.  ID eval appreciated.  Dexa 6 mg daily D # 6    HEMATOLOGICAL:  DVT prophylaxis.      ENDOCRINE:  Follow up FS.  Insulin protocol if needed.    MUSCULOSKELETAL:  OOB to chair.  PT OT        SDU

## 2022-09-11 NOTE — PROGRESS NOTE ADULT - TIME BILLING
82M PMHx HFrEF, AFib on eliquis, CAD s/p CABG 3V, PVD, DM2, COPD, HTN, hypothyroidism here with acute hypoxic resp failure.    #Acute hypoxic resp failure  presumed due to acute on chronic HFrEF +/- covid  cxr bl opacities/ effusions  s/p thora 8/26, 9/8, cx ntd  tolerating 5L nc currently; previously on hfnc 40 lpm, 60%  bumex gtt at 1  hypertonic saline  rdv  decadron 6  appreciate id  tte  entresto 49/ 51 bid  toprol 100  #AFib, chronic  toprol as above  therapeutic lovenox  #DM2  lantus 20  ssi  #CAD  lipitor 40  #PVD  lipitor 40  #COPD  proventil prn  #HTN  bp meds as above  #Hypothyroidism  synthroid 150  #DVT ppx  lovenox    #Progress Note Handoff:  Pending (specify):  Consults_________, Tests________, Test Results_______, Other__bumex gtt_______  Family discussion: d/w pt at bedside re: treatment plan, primary dx  Disposition: Home___/SNF___/Other________/Unknown at this time___x_____

## 2022-09-11 NOTE — PROGRESS NOTE ADULT - SUBJECTIVE AND OBJECTIVE BOX
Patient is a 82y old  Male who presents with a chief complaint of chf exacerbation (10 Sep 2022 21:43)        Over Night Events:  deneis acute overnight events, feeling improved  still needs HFNC  -1.3L yesterday      ROS:     All ROS are negative except HPI         PHYSICAL EXAM    ICU Vital Signs Last 24 Hrs  T(C): 36 (11 Sep 2022 08:00), Max: 36 (11 Sep 2022 08:00)  T(F): 96.8 (11 Sep 2022 08:00), Max: 96.8 (11 Sep 2022 08:00)  HR: 82 (11 Sep 2022 08:00) (77 - 141)  BP: 141/66 (11 Sep 2022 08:00) (141/66 - 151/74)  BP(mean): 95 (11 Sep 2022 08:00) (95 - 114)  ABP: --  ABP(mean): --  RR: 18 (11 Sep 2022 08:00) (18 - 20)  SpO2: 100% (11 Sep 2022 08:00) (92% - 100%)    O2 Parameters below as of 11 Sep 2022 08:00  Patient On (Oxygen Delivery Method): nasal cannula, high flow            Constitutional: no acute distress, well nourished well developed  Neuro: moving all 4 limbs spontaneously, no facial droop or dysarthria  HEENT: NCAT, anicteric  Neck: no visible lymphadenopathy or goiter  Pulm: no respiratory distress. clear to auscultation bilaterally  Cardiac: extremities appear pink and well-perfused.  regular rhythm and rate, no murmur detected  Abdomen: non-distended  Extremities: +2 bilateral peripheral edema      09-10-22 @ 07:01  -  09-11-22 @ 07:00  --------------------------------------------------------  IN:    Bumetanide: 30 mL  Total IN: 30 mL    OUT:    Indwelling Catheter - Urethral (mL): 2350 mL  Total OUT: 2350 mL    Total NET: -2320 mL          LABS:                            8.7    9.92  )-----------( 220      ( 11 Sep 2022 01:34 )             26.6                                               09-11    137  |  96<L>  |  96<HH>  ----------------------------<  153<H>  3.6   |  27  |  1.3    Ca    8.0<L>      11 Sep 2022 01:34  Phos  2.8     09-11  Mg     1.7     09-11    TPro  5.0<L>  /  Alb  2.7<L>  /  TBili  0.6  /  DBili  0.4<H>  /  AST  11  /  ALT  8   /  AlkPhos  85  09-11                                                                                           LIVER FUNCTIONS - ( 11 Sep 2022 07:18 )  Alb: 2.7 g/dL / Pro: 5.0 g/dL / ALK PHOS: 85 U/L / ALT: 8 U/L / AST: 11 U/L / GGT: x                                                  Culture - Fungal, Body Fluid (collected 08 Sep 2022 13:22)  Source: Pleural Fl Pleural Fluid  Preliminary Report (09 Sep 2022 07:23):    Testing in progress    Culture - Body Fluid with Gram Stain (collected 08 Sep 2022 13:22)  Source: Pleural Fl Pleural Fluid  Gram Stain (09 Sep 2022 02:32):    polymorphonuclear leukocytes seen    No organisms seen    by cytocentrifuge  Preliminary Report (09 Sep 2022 20:07):    No growth                                                                                           MEDICATIONS  (STANDING):  ALBUTerol    90 MICROgram(s) HFA Inhaler 2 Puff(s) Inhalation every 6 hours  atorvastatin 40 milliGRAM(s) Oral at bedtime  BACItracin   Ointment 1 Application(s) Topical two times a day  buMETAnide Infusion 1 mG/Hr (5 mL/Hr) IV Continuous <Continuous>  chlorhexidine 2% Cloths 1 Application(s) Topical <User Schedule>  dexAMETHasone  Injectable 6 milliGRAM(s) IV Push daily  dextrose 5%. 1000 milliLiter(s) (100 mL/Hr) IV Continuous <Continuous>  dextrose 5%. 1000 milliLiter(s) (50 mL/Hr) IV Continuous <Continuous>  dextrose 50% Injectable 25 Gram(s) IV Push once  dextrose 50% Injectable 12.5 Gram(s) IV Push once  dextrose 50% Injectable 25 Gram(s) IV Push once  enoxaparin Injectable 85 milliGRAM(s) SubCutaneous every 12 hours  famotidine    Tablet 20 milliGRAM(s) Oral daily  glucagon  Injectable 1 milliGRAM(s) IntraMuscular once  insulin glargine Injectable (LANTUS) 17 Unit(s) SubCutaneous at bedtime  insulin lispro (ADMELOG) corrective regimen sliding scale   SubCutaneous three times a day before meals  levothyroxine 150 MICROGram(s) Oral daily  lidocaine 1% (Preservative-free) Injectable 30 milliLiter(s) Local Injection once  lidocaine 1% Injectable 10 milliLiter(s) Local Injection once  lidocaine 1% Injectable 10 milliLiter(s) Local Injection once  melatonin 5 milliGRAM(s) Oral at bedtime  metoprolol succinate  milliGRAM(s) Oral daily  multivitamin/minerals 1 Tablet(s) Oral daily  mycophenolate mofetil 500 milliGRAM(s) Oral four times a day  pentoxifylline 400 milliGRAM(s) Oral two times a day  potassium chloride    Tablet ER 40 milliEquivalent(s) Oral once  remdesivir  IVPB   IV Intermittent   remdesivir  IVPB 100 milliGRAM(s) IV Intermittent every 24 hours  sacubitril 49 mG/valsartan 51 mG 1 Tablet(s) Oral two times a day  sodium chloride 3%. 150 milliLiter(s) (50 mL/Hr) IV Continuous <Continuous>  sodium chloride 3%. 150 milliLiter(s) (50 mL/Hr) IV Continuous <Continuous>  tacrolimus 0.5 milliGRAM(s) Oral two times a day    MEDICATIONS  (PRN):  acetaminophen     Tablet .. 650 milliGRAM(s) Oral every 6 hours PRN Mild Pain (1 - 3)  dextrose Oral Gel 15 Gram(s) Oral once PRN Blood Glucose LESS THAN 70 milliGRAM(s)/deciliter  dextrose Oral Gel 15 Gram(s) Oral once PRN Blood Glucose LESS THAN 70 milliGRAM(s)/deciliter      New X-rays reviewed:       ECHO reviewed    CXR interpreted by me:

## 2022-09-12 NOTE — PROGRESS NOTE ADULT - ASSESSMENT
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 Found to be hypoxic to 89%- placed on BiPAP with improvement, now on venti-mask (can not tolerate NC 2/2 epistax). Found to be fluid overloaded, currently receiving high dose IV diuretics. Suspected PNA, IV antibiotics given (now d/c'd). Patient having epistaxis while on Eliquis- now improved. Pulmonology following- s/p 2 thoracentesis.    Plan:  Patient remains fluid overloaded on exam but is improving: POCUS exam: IVC 2.1cm, some collapsibility with respirations but less than 50%  Continue Bumex gtt at 1 mg/hr  Give 3% Hypertonic Saline 150ml BID (If infused throughout a central line, run over one hour, if a peripheral line is to be used run over 3 hours) while on Bumex gtt  BMP twice daily with magnesium   Mag 1.7, K 3.6- replete  Maintain potassium >4.0, Mg >2.2  Continue Entresto 49-51 mg  Continue metoprolol succinate 100mg daily  IV iron sucrose 200 mg daily x3 completed   Strict intake and output needed for proper diuretic management   Daily weight   Physical therapy f/u / OOB to daily  COVID treatment as per ID/primary team   Plan discussed with primary team   Will continue to follow Assessment: 83 yo male with PMHx of 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 Found to be hypoxic to 89%- placed on BiPAP with improvement, now on venti-mask (can not tolerate NC 2/2 epistax). Found to be fluid overloaded, currently receiving high dose IV diuretics. Suspected PNA, IV antibiotics given (now d/c'd). Patient having epistaxis while on Eliquis- now improved. Pulmonology following- s/p 2 thoracentesis.    Plan:  Patient remains fluid overloaded on exam but is improving: POCUS exam: IVC 2.1cm, some collapsibility with respirations but less than 50%  Continue Bumex gtt at 1 mg/hr  Give 3% Hypertonic Saline 150ml BID (If infused throughout a central line, run over one hour, if a peripheral line is to be used run over 3 hours) while on Bumex gtt  BMP twice daily with magnesium   Mag 1.7, K 3.6- replete  Maintain potassium >4.0, Mg >2.2  Continue Entresto 49-51 mg  Continue metoprolol succinate 100mg daily  IV iron sucrose 200 mg daily x3 completed   Strict intake and output needed for proper diuretic management   Daily weight   Physical therapy f/u / OOB to daily  COVID treatment as per ID/primary team   Plan discussed with primary team   Will continue to follow.

## 2022-09-12 NOTE — PROGRESS NOTE ADULT - SUBJECTIVE AND OBJECTIVE BOX
ANDRIA TAVAREZ  82y Male    CHIEF COMPLAINT:    Patient is a 82y old  Male who presents with a chief complaint of chf exacerbation (12 Sep 2022 08:20)    INTERVAL HPI/OVERNIGHT EVENTS:    Patient seen and examined. No acute events overnight. TOlerating NC     ROS: All other systems are negative.    Vital Signs:    T(F): 98.5 (22 @ 02:47), Max: 98.6 (22 @ 23:32)  HR: 75 (22 @ 07:35) (68 - 85)  BP: 119/58 (22 @ 02:47) (119/58 - 154/86)  RR: 18 (22 @ 23:32) (18 - 18)  SpO2: 92% (22 @ 07:35) (92% - 99%)  I&O's Summary    11 Sep 2022 07:01  -  12 Sep 2022 07:00  --------------------------------------------------------  IN: 545 mL / OUT: 3100 mL / NET: -2555 mL      Daily     Daily Weight in k.5 (12 Sep 2022 02:47)  CAPILLARY BLOOD GLUCOSE    POCT Blood Glucose.: 59 mg/dL (12 Sep 2022 07:57)  POCT Blood Glucose.: 170 mg/dL (11 Sep 2022 21:18)  POCT Blood Glucose.: 128 mg/dL (11 Sep 2022 18:47)  POCT Blood Glucose.: 137 mg/dL (11 Sep 2022 15:58)  POCT Blood Glucose.: 237 mg/dL (11 Sep 2022 12:26)    PHYSICAL EXAM:    GENERAL:  NAD chronically ill appearing   SKIN: No rashes or lesions  HEENT: Atraumatic. Normocephalic.    NECK: Supple, No JVD.    PULMONARY: decreased breath sounds B/L. No wheezing.  CVS: Normal S1, S2. Rate and Rhythm are regular  ABDOMEN/GI: Soft, Nontender, Nondistended  MSK:  No clubbing or cyanosis   NEUROLOGIC: Moves all extremities   PSYCH: Alert & oriented x 2    Consultant(s) Notes Reviewed:  [x ] YES  [ ] NO  Care Discussed with Consultants/Other Providers [ x] YES  [ ] NO    LABS:                        8.7    10.02 )-----------( 222      ( 12 Sep 2022 01:44 )             26.8     140  |  97<L>  |  96<HH>  ----------------------------<  112<H>  3.6   |  32  |  1.2    Ca    8.3<L>      12 Sep 2022 01:44  Phos  2.2     -  Mg     1.7         TPro  4.9<L>  /  Alb  2.7<L>  /  TBili  0.8  /  DBili  x   /  AST  12  /  ALT  7   /  AlkPhos  83      RADIOLOGY & ADDITIONAL TESTS:  Imaging or report Personally Reviewed:  [x] YES  [ ] NO  EKG reviewed: [x] YES  [ ] NO    Medications:  Standing  acetaZOLAMIDE Injectable 250 milliGRAM(s) IV Push once  ALBUTerol    90 MICROgram(s) HFA Inhaler 2 Puff(s) Inhalation every 6 hours  atorvastatin 40 milliGRAM(s) Oral at bedtime  BACItracin   Ointment 1 Application(s) Topical two times a day  buMETAnide Infusion 1 mG/Hr IV Continuous <Continuous>  chlorhexidine 2% Cloths 1 Application(s) Topical <User Schedule>  dexAMETHasone  Injectable 6 milliGRAM(s) IV Push daily  dextrose 5%. 1000 milliLiter(s) IV Continuous <Continuous>  dextrose 5%. 1000 milliLiter(s) IV Continuous <Continuous>  dextrose 50% Injectable 25 Gram(s) IV Push once  dextrose 50% Injectable 25 Gram(s) IV Push once  dextrose 50% Injectable 12.5 Gram(s) IV Push once  enoxaparin Injectable 85 milliGRAM(s) SubCutaneous every 12 hours  famotidine    Tablet 20 milliGRAM(s) Oral daily  glucagon  Injectable 1 milliGRAM(s) IntraMuscular once  insulin glargine Injectable (LANTUS) 16 Unit(s) SubCutaneous at bedtime  insulin lispro (ADMELOG) corrective regimen sliding scale   SubCutaneous three times a day before meals  levothyroxine 150 MICROGram(s) Oral daily  lidocaine 1% (Preservative-free) Injectable 30 milliLiter(s) Local Injection once  lidocaine 1% Injectable 10 milliLiter(s) Local Injection once  lidocaine 1% Injectable 10 milliLiter(s) Local Injection once  magnesium sulfate  IVPB 2 Gram(s) IV Intermittent every 2 hours  melatonin 5 milliGRAM(s) Oral at bedtime  metoprolol succinate  milliGRAM(s) Oral daily  multivitamin/minerals 1 Tablet(s) Oral daily  mycophenolate mofetil 500 milliGRAM(s) Oral four times a day  pentoxifylline 400 milliGRAM(s) Oral two times a day  potassium chloride   Powder 40 milliEquivalent(s) Oral once  sacubitril 49 mG/valsartan 51 mG 1 Tablet(s) Oral two times a day  tacrolimus 0.5 milliGRAM(s) Oral two times a day    PRN Meds  acetaminophen     Tablet .. 650 milliGRAM(s) Oral every 6 hours PRN  dextrose Oral Gel 15 Gram(s) Oral once PRN  dextrose Oral Gel 15 Gram(s) Oral once PRN

## 2022-09-12 NOTE — PROGRESS NOTE ADULT - SUBJECTIVE AND OBJECTIVE BOX
ANDRIA TAVAREZ 82y Male  MRN#: [id number]   Hospital Day: 23d    HPI:  81 yo male with PMHx of HFrEF (45-50% on Lasix 20 PO), A-Fib on Eliquis, CAD s/p CABGx3, PVD, COPD (was on rare home O2 PRN now on 3L NC since recent July admission for COVID), CKD3, C3 glomerulonephropathy, HTN, HLD, Hypothyroid presents from NH with progressively worsening SOB x5 days and hypoxia.  Recently hospitalized here for a month discharged on 8/3/22 initially for BRENDA on CKD course c/b COVID, bilateral lower lobe pneumonia, delirium and microaspiration of thin liquids on modified barium study. Received Dexamethasone and Zosyn course with new O2 requirements of 3L NC discharged to nursing home on 3L and thick liquid diet for rehab. Due to his SOB, chest x-ray was ordered on 8/15 but results did not come back and patient became increasingly short of breath and was noted to be hypoxic and rehab facility which prompted referral to the ED. Review of symptoms is notable for orthopnea and a chronic dry cough unchanged since his covid illness. He denies paroxysmal nocturnal dyspnea, leg edema, chest pain, palpitations, dizziness, n/v, abd pain. Cardiologist is Dr. Witt and Pulmonolgist is Dr. Rory Mars.     In ED:  T(F): 96.4 (08-20-22 @ 11:27), Max: 96.4 (08-20-22 @ 11:27)  HR: 80 (08-20-22 @ 16:20) (80 - 91)  BP: 137/61 (08-20-22 @ 16:20) (121/58 - 137/61)  RR: 18 (08-20-22 @ 16:20) (18 - 24)  SpO2: 100% (08-20-22 @ 16:20) (89% - 100%) -> 89% on 4L -> placed on NRB -> BIPAP 2/2 tachypnea and hypoxia -> taken off with increased work of breathing placed back on BIPAP  Labs: WBC 12k, Hgb 7.2 (baseline 9), BNP 55k, Cr 2.2 (baseline 1.5), PCO2 47 (VBG), Troponin 0.24  EKG:   Atrial fibrillation with premature ventricular or aberrantly conducted complexes  Left posterior fascicular block   ST & T wave abnormality, consider lateral ischemia - similar to prior in July 2022  XR chest: significant bilateral pleural effusions and pulmonary congestion  Admitted to medicine for SOB/hypoxia likely 2/2 acute CHF exacerbation.  (20 Aug 2022 16:17)      SUBJECTIVE  Patient is a 82y old Male who presents with a chief complaint of chf exacerbation (12 Sep 2022 16:04)  Currently admitted to medicine with the primary diagnosis of Fluid overload      INTERVAL HPI AND OVERNIGHT EVENTS:  Patient was examined and seen at bedside. This morning he is resting comfortably in bed and reports no issues or overnight events.    REVIEW OF SYMPTOMS:  CONSTITUTIONAL: No weakness, fevers or chills; No headaches  EYES: No visual changes, eye pain, or discharge  ENT: No vertigo; No ear pain or change in hearing; No sore throat or difficulty swallowing  NECK: No pain or stiffness  RESPIRATORY: No cough, wheezing, or hemoptysis; No shortness of breath  CARDIOVASCULAR: No chest pain or palpitations  GASTROINTESTINAL: No abdominal or epigastric pain; No nausea, vomiting, or hematemesis; No diarrhea or constipation; No melena or hematochezia  GENITOURINARY: No dysuria, frequency or hematuria  MUSCULOSKELETAL: No joint pain, no muscle pain, no weakness  NEUROLOGICAL: No numbness or weakness  SKIN: No itching or rashes    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    AF (atrial fibrillation)  s/p DCCV    Carotid stenosis    COPD (chronic obstructive pulmonary disease)    H/O coronary artery bypass surgery  2001    S/P inguinal hernia repair    History of appendectomy      ALLERGIES:  Ozempic (0.25 mg or 0.5 mg dose) (Hives; Rash)  streptomycin (Unknown)  Trulicity Pen (Hives; Rash)    MEDICATIONS:  STANDING MEDICATIONS  acetaZOLAMIDE  IVPB 250 milliGRAM(s) IV Intermittent once  ALBUTerol    90 MICROgram(s) HFA Inhaler 2 Puff(s) Inhalation every 6 hours  atorvastatin 40 milliGRAM(s) Oral at bedtime  BACItracin   Ointment 1 Application(s) Topical two times a day  buMETAnide Infusion 1 mG/Hr IV Continuous <Continuous>  chlorhexidine 2% Cloths 1 Application(s) Topical <User Schedule>  dexAMETHasone  Injectable 6 milliGRAM(s) IV Push daily  dextrose 5%. 1000 milliLiter(s) IV Continuous <Continuous>  dextrose 5%. 1000 milliLiter(s) IV Continuous <Continuous>  dextrose 50% Injectable 25 Gram(s) IV Push once  dextrose 50% Injectable 12.5 Gram(s) IV Push once  dextrose 50% Injectable 25 Gram(s) IV Push once  enoxaparin Injectable 85 milliGRAM(s) SubCutaneous every 12 hours  famotidine    Tablet 20 milliGRAM(s) Oral daily  glucagon  Injectable 1 milliGRAM(s) IntraMuscular once  insulin glargine Injectable (LANTUS) 16 Unit(s) SubCutaneous at bedtime  insulin lispro (ADMELOG) corrective regimen sliding scale   SubCutaneous three times a day before meals  levothyroxine 150 MICROGram(s) Oral daily  lidocaine 1% (Preservative-free) Injectable 30 milliLiter(s) Local Injection once  lidocaine 1% Injectable 10 milliLiter(s) Local Injection once  lidocaine 1% Injectable 10 milliLiter(s) Local Injection once  melatonin 5 milliGRAM(s) Oral at bedtime  metoprolol succinate  milliGRAM(s) Oral daily  multivitamin/minerals 1 Tablet(s) Oral daily  mycophenolate mofetil 500 milliGRAM(s) Oral four times a day  pentoxifylline 400 milliGRAM(s) Oral two times a day  sacubitril 49 mG/valsartan 51 mG 1 Tablet(s) Oral two times a day  tacrolimus 0.5 milliGRAM(s) Oral two times a day    PRN MEDICATIONS  acetaminophen     Tablet .. 650 milliGRAM(s) Oral every 6 hours PRN  dextrose Oral Gel 15 Gram(s) Oral once PRN  dextrose Oral Gel 15 Gram(s) Oral once PRN      VITAL SIGNS: Last 24 Hours  T(C): 36.9 (12 Sep 2022 02:47), Max: 37 (11 Sep 2022 23:32)  T(F): 98.5 (12 Sep 2022 02:47), Max: 98.6 (11 Sep 2022 23:32)  HR: 75 (12 Sep 2022 07:35) (68 - 85)  BP: 119/58 (12 Sep 2022 02:47) (119/58 - 138/60)  BP(mean): --  RR: 18 (11 Sep 2022 23:32) (18 - 18)  SpO2: 92% (12 Sep 2022 07:35) (92% - 99%)    LABS:                        8.7    10.02 )-----------( 222      ( 12 Sep 2022 01:44 )             26.8     09-12    140  |  97<L>  |  96<HH>  ----------------------------<  112<H>  3.6   |  32  |  1.2    Ca    8.3<L>      12 Sep 2022 01:44  Phos  2.2     09-12  Mg     1.7     09-12    TPro  5.0<L>  /  Alb  2.7<L>  /  TBili  0.9  /  DBili  0.6<H>  /  AST  15  /  ALT  8   /  AlkPhos  85  09-12                  RADIOLOGY:      PHYSICAL EXAM:  CONSTITUTIONAL: No acute distress, well-developed, well-groomed, AAOx3  HEAD: Atraumatic, normocephalic  EYES: EOM intact, PERRLA, conjunctiva and sclera clear  ENT: Supple, no masses, no thyromegaly, no bruits, no JVD; moist mucous membranes  PULMONARY: Clear to auscultation bilaterally; no wheezes, rales, or rhonchi  CARDIOVASCULAR: Regular rate and rhythm; no murmurs, rubs, or gallops  GASTROINTESTINAL: Soft, non-tender, non-distended; bowel sounds present  MUSCULOSKELETAL: 2+ peripheral pulses; no clubbing, no cyanosis, no edema  NEUROLOGY: non-focal  SKIN: No rashes or lesions; warm and dry    ASSESSMENT & PLAN  #    PAST MEDICAL & SURGICAL HISTORY:  HTN (hypertension)      DM (diabetes mellitus)      High cholesterol      Hypothyroid      Pneumonia      CHF (congestive heart failure)      Chronic kidney disease (CKD)  last dialysis end of 7/19      PVD (peripheral vascular disease)      AAA (abdominal aortic aneurysm)  &lt; 4 cm      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      AF (atrial fibrillation)  s/p DCCV      Carotid stenosis      COPD (chronic obstructive pulmonary disease)      H/O coronary artery bypass surgery  2001      S/P inguinal hernia repair      History of appendectomy          #Misc  - DVT Prophylaxis:  - Diet:  - GI Prophylaxis:  - Activity:  - IV Fluids:  - Code Status:    Dispo: ANDRIA TAVAREZ 82y Male  MRN#: [id number]   Hospital Day: 23d    Hospital Course:   DNR DNI 81 y/o man w PMHx of HTN, HLD, DM2, CAD s/p 3vCABG known to Dr De La Fuente, HFmEF w EF 45-50% on 8/1/22, AF on Eliquis, AAA, COPD baseline rare home O2 known to Dr ALO Mars, CKD3 and C3 glomerulopathy known to Dr Daniel, hypothyroidism, PVD, recent admission 7/5/22-8/3/22 for COVID and BRENDA 2/2 diarrhea, tx w dexamethasone and Zosyn, stay c/b microaspirations, PNA, was d/c to STR, has been on 3L O2, presented on 8/20/22 for SOB x5days and hypoxia to 70s. Had CXR done on 8/15/22 but results had not been back. ED vitals were notable for RR max 24, SpO2 84% on 4L O2, required BiPAP, had WBC 12, HB 7.2 from baseline 9, BNP 55k, Cr 2.2 from baseline 1.5, and trop 0.24, EKG w AF RVR and CXR showing significant b/l pleural effusions, pulm congestion, admitted to medicine for SOB/hypoxia attributed to CHF exacerbation.   Has been on Bumex drip for HF exacerbation and is s/p R thoracentesis 8/26/22 w 1.5L transudative fluid removed. Has had epistaxis during stay. Is s/p 1uPRBC, start 3 day course of IV venofer 200mg. Upgraded to CEU for increasing O2 requirements despite venti mask, HFNC.       9/6/22 day 17: Transition eliquis to Lovenox 80mg BID for possible future repeat thoracentesis. On HFNC; wean as tolerated. Start dexamethasone 6mg PO QD [id number] thru 9/16/22. Consult ID. Decrease Lantus from 22 to 17. ddimer 1035, , CRP 69.1. Keep bumex drip. D/c 3% NS.   9/7/22 day 18: ID recs for COVID: unclear if new infection, will treat as such w RDV x5days; no toci, Planned for thoracentesis #3 today, due to excess bleeding was ?not completed. Started RDV 200mg today, 100mg for next 4 days. Cont dexamethasone 6mg [id number], thru 9/16/22.   9/8/22 day 19: Hb stable ~8. R thoracentesis today, 2L removed. Seen by PT but stated too weak/unwell to participate. Palliative consulted per family request - has been seen by palliative in the past.   9/9/22 day 20: Restarted LMWH today, per nephro tacro level OK. Per CHF: 150cc hypertonic saline over 3h BID, metolazone 5mg x1, BMP BID w Mag, Entresto 49-51. D/c Imdur and Nifedipine.     9/10/22 day 21: Replete magnesium for Mag >2  9/11/22: c/w bumex drip, c/w hypertonic saline   9/12/22: c/w bumex drip, stop hypertonic saline. Started to have contraction alkalosis, given diamox 250 mg once. CXR showed stable bilateral opacities and right sided pleural effusion. Weaned off HFNC to 5 L NC       SUBJECTIVE  Patient is a 82y old Male who presents with a chief complaint of chf exacerbation (12 Sep 2022 16:04)  Currently admitted to medicine with the primary diagnosis of Fluid overload      INTERVAL HPI AND OVERNIGHT EVENTS:  Patient was examined and seen at bedside. This morning he is resting comfortably in bed and reports no issues or overnight events. He denied any SOB at rest. He reported feeling better.       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    AF (atrial fibrillation)  s/p DCCV    Carotid stenosis    COPD (chronic obstructive pulmonary disease)    H/O coronary artery bypass surgery  2001    S/P inguinal hernia repair    History of appendectomy      ALLERGIES:  Ozempic (0.25 mg or 0.5 mg dose) (Hives; Rash)  streptomycin (Unknown)  Trulicity Pen (Hives; Rash)    MEDICATIONS:  STANDING MEDICATIONS  acetaZOLAMIDE  IVPB 250 milliGRAM(s) IV Intermittent once  ALBUTerol    90 MICROgram(s) HFA Inhaler 2 Puff(s) Inhalation every 6 hours  atorvastatin 40 milliGRAM(s) Oral at bedtime  BACItracin   Ointment 1 Application(s) Topical two times a day  buMETAnide Infusion 1 mG/Hr IV Continuous <Continuous>  chlorhexidine 2% Cloths 1 Application(s) Topical <User Schedule>  dexAMETHasone  Injectable 6 milliGRAM(s) IV Push daily  dextrose 5%. 1000 milliLiter(s) IV Continuous <Continuous>  dextrose 5%. 1000 milliLiter(s) IV Continuous <Continuous>  dextrose 50% Injectable 25 Gram(s) IV Push once  dextrose 50% Injectable 12.5 Gram(s) IV Push once  dextrose 50% Injectable 25 Gram(s) IV Push once  enoxaparin Injectable 85 milliGRAM(s) SubCutaneous every 12 hours  famotidine    Tablet 20 milliGRAM(s) Oral daily  glucagon  Injectable 1 milliGRAM(s) IntraMuscular once  insulin glargine Injectable (LANTUS) 16 Unit(s) SubCutaneous at bedtime  insulin lispro (ADMELOG) corrective regimen sliding scale   SubCutaneous three times a day before meals  levothyroxine 150 MICROGram(s) Oral daily  lidocaine 1% (Preservative-free) Injectable 30 milliLiter(s) Local Injection once  lidocaine 1% Injectable 10 milliLiter(s) Local Injection once  lidocaine 1% Injectable 10 milliLiter(s) Local Injection once  melatonin 5 milliGRAM(s) Oral at bedtime  metoprolol succinate  milliGRAM(s) Oral daily  multivitamin/minerals 1 Tablet(s) Oral daily  mycophenolate mofetil 500 milliGRAM(s) Oral four times a day  pentoxifylline 400 milliGRAM(s) Oral two times a day  sacubitril 49 mG/valsartan 51 mG 1 Tablet(s) Oral two times a day  tacrolimus 0.5 milliGRAM(s) Oral two times a day    PRN MEDICATIONS  acetaminophen     Tablet .. 650 milliGRAM(s) Oral every 6 hours PRN  dextrose Oral Gel 15 Gram(s) Oral once PRN  dextrose Oral Gel 15 Gram(s) Oral once PRN      VITAL SIGNS: Last 24 Hours  T(C): 36.9 (12 Sep 2022 02:47), Max: 37 (11 Sep 2022 23:32)  T(F): 98.5 (12 Sep 2022 02:47), Max: 98.6 (11 Sep 2022 23:32)  HR: 75 (12 Sep 2022 07:35) (68 - 85)  BP: 119/58 (12 Sep 2022 02:47) (119/58 - 138/60)  BP(mean): --  RR: 18 (11 Sep 2022 23:32) (18 - 18)  SpO2: 92% (12 Sep 2022 07:35) (92% - 99%) on 5 L NC     LABS:                        8.7    10.02 )-----------( 222      ( 12 Sep 2022 01:44 )             26.8     09-12    140  |  97<L>  |  96<HH>  ----------------------------<  112<H>  3.6   |  32  |  1.2    Ca    8.3<L>      12 Sep 2022 01:44  Phos  2.2     09-12  Mg     1.7     09-12    TPro  5.0<L>  /  Alb  2.7<L>  /  TBili  0.9  /  DBili  0.6<H>  /  AST  15  /  ALT  8   /  AlkPhos  85  09-12        RADIOLOGY:  CXR 9/12/22: bilateral opacities, right sided pleural effusion, unchanged     PHYSICAL EXAM:  CONSTITUTIONAL: No acute distress, well-developed, AAOx2 ( not oriented to place, [place] )   HEAD: Atraumatic, normocephalic  EYES: EOM intact, PERRLA, conjunctiva and sclera clear  ENT: Supple, no masses, no thyromegaly, no bruits, no JVD; moist mucous membranes  PULMONARY: good bilateral air entry, mild bilateral basilar crackles   CARDIOVASCULAR: irregularly irregular  rhythm; no murmurs, rubs, or gallops  GASTROINTESTINAL: Soft, non-tender, non-distended; bowel sounds present  MUSCULOSKELETAL: 2+ peripheral pulses; 2+ bilateral lower limb pitting edema reaching below knees, chronic bilateral hyperpigmentation of LE   NEUROLOGY: non-focal, 5/5 motor power in upper and lower extremities, no sensory loss    SKIN: No rashes or lesions; warm and dry    ASSESSMENT & PLAN  81 y/o man w PMHx of HTN, HLD, DM2, CAD s/p 3vCABG known to Dr De La Fuente, HFmEF w EF 45-50% on 8/1/22, AF on Eliquis, AAA, COPD baseline rare home O2 known to Dr ALO Mars, CKD3 and C3 glomerulopathy known to Dr Daniel, hypothyroidism, PVD, presented on 8/20/22 for SOB x5days and hypoxia to 70s. Admitted to medicine for SOB/hypoxia attributed to CHF exacerbation.   Has been on Bumex drip for HF exacerbation and is s/p R thoracentesis 8/26/22 w 1.5L transudative fluid removed, recurrent R thoracentesis 9/8/22.       #Acute Hypoxic Respiratory Failure  #Acute HFmrEF  #COPD, covid -19   - HFmrEF w EF 45-50% on 8/1/22; multiple LV regional wall motion abnormalities, mild MR, mild TR, borderline pulm HTN   - COPD baseline rare home O2, 3L O2 since last admission known to Dr ALO Mars  - Thoracentesis 8/26/22 w 1.5L removed, transudative fluid. Repeat 9/8/22, 2L removed.   - CXR 9/12/22 w/o significant change in b/l opacities/effusions   - weaned from HFNC to 5 L NC today   - Continue Bumex drip at 1mg/hr  - Heart failure: 150cc hypertonic saline over 3h BID, metolazone 5mg x1, BMP BID w Mag, Entresto 49-51. D/c Imdur and Nifedipine.   - Pulmonary following, recommended stopping hypertonic saline   - ID: Tx as new COVID infection: s/p remdesivir for 5 days, Cont dexamethasone 6mg PO QD [id number] thru 9/16/22  - f/o pleural fluid cx     #Alkalosis   - probably contraction alkalosis in the setting of diuresis   - given diamox 250 mg once     #BRENDA  #HypoNa - resolved.   Baseline Cr 1.1, Cr 1.2 on 9/12/22, near resolved.   HypoNa resolved - now 136  Prior HypoNa may be due to dehydration/poor PO intake     #CAD  Continue Metoprolol 100mg QD  Imdur 30mg QD and nefidipine stopped   On entresto 2z daily   c/w Atorvastatin 40mg qhs.     #HTN:   Continue metoprolol  nifedipine stopped given HFmrEF  -started on entresto   - Current SBP range 120s-130s    #DM II: A1C 7.3.   - was on  Lantus to 20u, Off Lispro.   - Hypoglycemia today 59   - decreased lantus to 16 units     #Acute on chronic anemia; goal Hb 8+  #Epistaxis - resolved   s/p 1 U PRBC 8/22/22 and 8/24/22 for Hb <8  s/p IV Venofer 200mg daily for 3 days.   ENT consulted 8/23/22 for epistaxis/ blood clots blocking nasal passage   - Hb stable       #Chronic Atrial Fibrillation: continue metoprolol, on therapeutic lovenox instead of Eliquis in case thoracentesis needed   #C3 Glomerulonephropathy: on  Pred 5mg QD at home, [place] 0.5mg BID, Mycophenolate 500mg 4 times daily. c/w home meds ( on dexamethasone 6 mg IV daily, pred stopped for now )   #Leukocytosis-resolved : likely from steroids; monitor off abx   #Hypothyroidism: continue synthroid  #Stage 2 right buttock pressure ulcer: present on admission. Continue local wound care, off loading.                                                                     # DVT prophylaxis: Transition eliquis to Lovenox 85mg BID for repeat thoracentesis. Restarted LMWH 9/9/22.    # GI prophylaxis: Famotidine 20mg QD   # Diet: Minced and moist   # Activity: IAT - did not participate w PT 9/8/22 due to fatigue   # Code status: DNR DNI, palliative on board   # Disposition: Keep in CEU for now

## 2022-09-12 NOTE — PROGRESS NOTE ADULT - SUBJECTIVE AND OBJECTIVE BOX
HPI:  81 yo male with PMHx of HFrEF (45-50% on Lasix 20 PO), A-Fib on Eliquis, CAD s/p CABGx3, PVD, COPD (was on rare home O2 PRN now on 3L NC since recent July admission for COVID), CKD3, C3 glomerulonephropathy, HTN, HLD, Hypothyroid presents from NH with progressively worsening SOB x5 days and hypoxia.  Recently hospitalized here for a month discharged on 8/3/22 initially for BRENDA on CKD course c/b COVID, bilateral lower lobe pneumonia, delirium and microaspiration of thin liquids on modified barium study. Received Dexamethasone and Zosyn course with new O2 requirements of 3L NC discharged to nursing home on 3L and thick liquid diet for rehab. Due to his SOB, chest x-ray was ordered on 8/15 but results did not come back and patient became increasingly short of breath and was noted to be hypoxic and rehab facility which prompted referral to the ED. Review of symptoms is notable for orthopnea and a chronic dry cough unchanged since his covid illness. He denies paroxysmal nocturnal dyspnea, leg edema, chest pain, palpitations, dizziness, n/v, abd pain. Cardiologist is Dr. Witt and Pulmonolgist is Dr. Rory Mars.     In ED:  T(F): 96.4 (08-20-22 @ 11:27), Max: 96.4 (08-20-22 @ 11:27)  HR: 80 (08-20-22 @ 16:20) (80 - 91)  BP: 137/61 (08-20-22 @ 16:20) (121/58 - 137/61)  RR: 18 (08-20-22 @ 16:20) (18 - 24)  SpO2: 100% (08-20-22 @ 16:20) (89% - 100%) -> 89% on 4L -> placed on NRB -> BIPAP 2/2 tachypnea and hypoxia -> taken off with increased work of breathing placed back on BIPAP  Labs: WBC 12k, Hgb 7.2 (baseline 9), BNP 55k, Cr 2.2 (baseline 1.5), PCO2 47 (VBG), Troponin 0.24  EKG:   Atrial fibrillation with premature ventricular or aberrantly conducted complexes  Left posterior fascicular block   ST & T wave abnormality, consider lateral ischemia - similar to prior in July 2022  XR chest: significant bilateral pleural effusions and pulmonary congestion  Admitted to medicine for SOB/hypoxia likely 2/2 acute CHF exacerbation.  (20 Aug 2022 16:17)     INTERVAL EVENTS:    ADVANCE DIRECTIVES:    DNR  MOLST  [ ]  Living Will  [ ]   DECISION MAKER(s):  [ ] Health Care Proxy(s)  [ ] Surrogate(s)  [ ] Guardian           Name(s): Phone Number(s):    BASELINE (I)ADL(s) (prior to admission):  [place]: [ ]Total  [ ] Moderate [ ]Dependent  Palliative Performance Status Version 2:         %    [email or web address]/[email or web address]_performance_scale_[email or web address].pdf    Allergies    Ozempic (0.25 mg or 0.5 mg dose) (Hives; Rash)  streptomycin (Unknown)  Trulicity Pen (Hives; Rash)    Intolerances    MEDICATIONS  (STANDING):  acetaZOLAMIDE  IVPB 250 milliGRAM(s) IV Intermittent once  ALBUTerol    90 MICROgram(s) HFA Inhaler 2 Puff(s) Inhalation every 6 hours  atorvastatin 40 milliGRAM(s) Oral at bedtime  BACItracin   Ointment 1 Application(s) Topical two times a day  buMETAnide Infusion 1 mG/Hr (5 mL/Hr) IV Continuous <Continuous>  chlorhexidine 2% Cloths 1 Application(s) Topical <User Schedule>  dexAMETHasone  Injectable 6 milliGRAM(s) IV Push daily  dextrose 5%. 1000 milliLiter(s) (50 mL/Hr) IV Continuous <Continuous>  dextrose 5%. 1000 milliLiter(s) (100 mL/Hr) IV Continuous <Continuous>  dextrose 50% Injectable 25 Gram(s) IV Push once  dextrose 50% Injectable 25 Gram(s) IV Push once  dextrose 50% Injectable 12.5 Gram(s) IV Push once  enoxaparin Injectable 85 milliGRAM(s) SubCutaneous every 12 hours  famotidine    Tablet 20 milliGRAM(s) Oral daily  glucagon  Injectable 1 milliGRAM(s) IntraMuscular once  insulin glargine Injectable (LANTUS) 16 Unit(s) SubCutaneous at bedtime  insulin lispro (ADMELOG) corrective regimen sliding scale   SubCutaneous three times a day before meals  levothyroxine 150 MICROGram(s) Oral daily  lidocaine 1% (Preservative-free) Injectable 30 milliLiter(s) Local Injection once  lidocaine 1% Injectable 10 milliLiter(s) Local Injection once  lidocaine 1% Injectable 10 milliLiter(s) Local Injection once  melatonin 5 milliGRAM(s) Oral at bedtime  metoprolol succinate  milliGRAM(s) Oral daily  multivitamin/minerals 1 Tablet(s) Oral daily  mycophenolate mofetil 500 milliGRAM(s) Oral four times a day  pentoxifylline 400 milliGRAM(s) Oral two times a day  sacubitril 49 mG/valsartan 51 mG 1 Tablet(s) Oral two times a day  tacrolimus 0.5 milliGRAM(s) Oral two times a day    MEDICATIONS  (PRN):  acetaminophen     Tablet .. 650 milliGRAM(s) Oral every 6 hours PRN Mild Pain (1 - 3)  dextrose Oral Gel 15 Gram(s) Oral once PRN Blood Glucose LESS THAN 70 milliGRAM(s)/deciliter  dextrose Oral Gel 15 Gram(s) Oral once PRN Blood Glucose LESS THAN 70 milliGRAM(s)/deciliter    PRESENT SYMPTOMS: [ ]Unable to obtain due to poor mentation   Source if other than patient:  [ ]Family   [ ]Team     Pain: [ ]yes [ ]no  QOL impact -   Location -                    Aggravating factors -  Quality -  Radiation -  Timing-  Severity (0-10 scale):  Minimal acceptable level (0-10 scale):     CPOT:    https://[email or web address]/getattachment/[email or web address]/Critical-Care-Pain-Observation-Tool-(CPOT)      PAIN AD Score:     [email or web address] (press ctrl +  left click to view)    Dyspnea:                           [ ]Mild [ ]Moderate [ ]Severe  Anxiety:                             [ ]Mild [ ]Moderate [ ]Severe  Fatigue:                             [ ]Mild [ ]Moderate [ ]Severe  Nausea:                             [ ]Mild [ ]Moderate [ ]Severe  Loss of appetite:              [ ]Mild [ ]Moderate [ ]Severe  Constipation:                    [ ]Mild [ ]Moderate [ ]Severe    Other Symptoms:  [ ]All other review of systems negative     Palliative Performance Status Version 2:         %    http://npcrc.org/files/news/palliative_performance_scale_ppsv2.pdf  PHYSICAL EXAM:  Vital Signs Last 24 Hrs  T(C): 36.9 (12 Sep 2022 02:47), Max: 37 (11 Sep 2022 23:32)  T(F): 98.5 (12 Sep 2022 02:47), Max: 98.6 (11 Sep 2022 23:32)  HR: 75 (12 Sep 2022 07:35) (68 - 85)  BP: 119/58 (12 Sep 2022 02:47) (119/58 - 138/60)  BP(mean): --  RR: 18 (11 Sep 2022 23:32) (18 - 18)  SpO2: 92% (12 Sep 2022 07:35) (92% - 99%)    Parameters below as of 12 Sep 2022 07:35  Patient On (Oxygen Delivery Method): nasal cannula  O2 Flow (L/min): 5   I&O's Summary    11 Sep 2022 07:01  -  12 Sep 2022 07:00  --------------------------------------------------------  IN: 545 mL / OUT: 3100 mL / NET: -2555 mL    GENERAL:  [x]Alert  [ x]Oriented x 2 [ ]Lethargic  [ ]Cachexia  [ ]Unarousable  [ ]Verbal  [ ]Non-Verbal  Behavioral:   [ ] Anxiety  [ ] Delirium [ ] Agitation [ ] Other  HEENT:  [ x]Normal   [ ]Dry mouth   [ ]ET Tube/Trach  [ ]Oral lesions  PULMONARY:   [ ]Clear [ x]Tachypnea  [ ]Audible excessive secretions   [ ]Rhonchi        [ ]Right [ ]Left [ ]Bilateral  [ ]Crackles        [ ]Right [ ]Left [ ]Bilateral  [ ]Wheezing     [ ]Right [ ]Left [ ]Bilateral  [ ]Diminished breath sounds [ ]right [ ]left [ ]bilateral  CARDIOVASCULAR:    [x ]Regular [ ]Irregular [ ]Tachy  [ ]Won [ ]Murmur [ ]Other  GASTROINTESTINAL:  [x ]Soft  [ ]Distended   [ ]+BS  [ ]Non tender [ ]Tender  [ ]PEG [ ]OGT/ NGT  Last BM:   GENITOURINARY:  [x ]Normal [ ] Incontinent   [ ]Oliguria/Anuria   [ ]Gloria  MUSCULOSKELETAL:   [ ]Normal   [ x]Weakness  [ ]Bed/Wheelchair bound [ ]Edema  NEUROLOGIC:   [x ]No focal deficits  [x ]Cognitive impairment  [ ]Dysphagia [ ]Dysarthria [ ]Paresis [ ]Other   SKIN:   [ x]Normal    [ x]No Rash  [ ]Pressure ulcer(s)       Present on admission [ ]y [ ]n          LABS:                        8.7    10.02 )-----------( 222      ( 12 Sep 2022 01:44 )             26.8   09-12    140  |  97<L>  |  96<HH>  ----------------------------<  112<H>  3.6   |  32  |  1.2    Ca    8.3<L>      12 Sep 2022 01:44  Phos  2.2     09-12  Mg     1.7     09-12    TPro  5.0<L>  /  Alb  2.7<L>  /  TBili  0.9  /  DBili  0.6<H>  /  AST  15  /  ALT  8   /  AlkPhos  85  09-12        RADIOLOGY & ADDITIONAL STUDIES:    PROTEIN CALORIE MALNUTRITION PRESENT: [ ]mild [ ]moderate [ ]severe [ ]underweight [ ]morbid obesity  https://www.andeal.org/vault/2440/web/files/ONC/Table_Clinical%20Characteristics%20to%20Document%20Malnutrition-White%20JV%20et%20al%202012.pdf    Height (cm): 188 (08-20-22 @ 11:17), 193 (07-27-22 @ 14:25), 188 (10-20-21 @ 07:00)  Weight (kg): 85 (08-23-22 @ 14:35), 100 (07-05-22 @ 13:03), 107.1 (10-23-21 @ 05:41)  BMI (kg/m2): 24 (08-23-22 @ 14:35), 28.3 (08-20-22 @ 11:17), 26.8 (07-27-22 @ 14:25)    [ ]PPSV2 < or = to 30% [ ]significant weight loss  [ ]poor nutritional intake  [ ]anasarca      [ ]Artificial Nutrition      REFERRALS:   [ ]Chaplaincy  [ ]Hospice  [ ]Child Life  [ ]Social Work  [ ]Case management [ ]Holistic Therapy     Goals of Care Document:          HPI:  81 yo male with PMHx of HFrEF (45-50% on Lasix 20 PO), A-Fib on Eliquis, CAD s/p CABGx3, PVD, COPD (was on rare home O2 PRN now on 3L NC since recent July admission for COVID), CKD3, C3 glomerulonephropathy, HTN, HLD, Hypothyroid presents from NH with progressively worsening SOB x5 days and hypoxia.  Recently hospitalized here for a month discharged on 8/3/22 initially for BRENDA on CKD course c/b COVID, bilateral lower lobe pneumonia, delirium and microaspiration of thin liquids on modified barium study. Received Dexamethasone and Zosyn course with new O2 requirements of 3L NC discharged to nursing home on 3L and thick liquid diet for rehab. Due to his SOB, chest x-ray was ordered on 8/15 but results did not come back and patient became increasingly short of breath and was noted to be hypoxic and rehab facility which prompted referral to the ED. Review of symptoms is notable for orthopnea and a chronic dry cough unchanged since his covid illness. He denies paroxysmal nocturnal dyspnea, leg edema, chest pain, palpitations, dizziness, n/v, abd pain. Cardiologist is Dr. Witt and Pulmonolgist is Dr. Rory Mars.     In ED:  T(F): 96.4 (08-20-22 @ 11:27), Max: 96.4 (08-20-22 @ 11:27)  HR: 80 (08-20-22 @ 16:20) (80 - 91)  BP: 137/61 (08-20-22 @ 16:20) (121/58 - 137/61)  RR: 18 (08-20-22 @ 16:20) (18 - 24)  SpO2: 100% (08-20-22 @ 16:20) (89% - 100%) -> 89% on 4L -> placed on NRB -> BIPAP 2/2 tachypnea and hypoxia -> taken off with increased work of breathing placed back on BIPAP  Labs: WBC 12k, Hgb 7.2 (baseline 9), BNP 55k, Cr 2.2 (baseline 1.5), PCO2 47 (VBG), Troponin 0.24  EKG:   Atrial fibrillation with premature ventricular or aberrantly conducted complexes  Left posterior fascicular block   ST & T wave abnormality, consider lateral ischemia - similar to prior in July 2022  XR chest: significant bilateral pleural effusions and pulmonary congestion  Admitted to medicine for SOB/hypoxia likely 2/2 acute CHF exacerbation.  (20 Aug 2022 16:17)     INTERVAL EVENTS:    ADVANCE DIRECTIVES:    DNR  MOLST  [ ]  Living Will  [ ]   DECISION MAKER(s):  [ ] Health Care Proxy(s)  [ ] Surrogate(s)  [ ] Guardian           Name(s): Phone Number(s):    BASELINE (I)ADL(s) (prior to admission):  [place]: [ ]Total  [ ] Moderate [ ]Dependent  Palliative Performance Status Version 2:         %    [email or web address]/files/news/palliative_performance_scale_[email or web address].pdf    Allergies    Ozempic (0.25 mg or 0.5 mg dose) (Hives; Rash)  streptomycin (Unknown)  Trulicity Pen (Hives; Rash)    Intolerances    MEDICATIONS  (STANDING):  acetaZOLAMIDE  IVPB 250 milliGRAM(s) IV Intermittent once  ALBUTerol    90 MICROgram(s) HFA Inhaler 2 Puff(s) Inhalation every 6 hours  atorvastatin 40 milliGRAM(s) Oral at bedtime  BACItracin   Ointment 1 Application(s) Topical two times a day  buMETAnide Infusion 1 mG/Hr (5 mL/Hr) IV Continuous <Continuous>  chlorhexidine 2% Cloths 1 Application(s) Topical <User Schedule>  dexAMETHasone  Injectable 6 milliGRAM(s) IV Push daily  dextrose 5%. 1000 milliLiter(s) (50 mL/Hr) IV Continuous <Continuous>  dextrose 5%. 1000 milliLiter(s) (100 mL/Hr) IV Continuous <Continuous>  dextrose 50% Injectable 25 Gram(s) IV Push once  dextrose 50% Injectable 25 Gram(s) IV Push once  dextrose 50% Injectable 12.5 Gram(s) IV Push once  enoxaparin Injectable 85 milliGRAM(s) SubCutaneous every 12 hours  famotidine    Tablet 20 milliGRAM(s) Oral daily  glucagon  Injectable 1 milliGRAM(s) IntraMuscular once  insulin glargine Injectable (LANTUS) 16 Unit(s) SubCutaneous at bedtime  insulin lispro (ADMELOG) corrective regimen sliding scale   SubCutaneous three times a day before meals  levothyroxine 150 MICROGram(s) Oral daily  lidocaine 1% (Preservative-free) Injectable 30 milliLiter(s) Local Injection once  lidocaine 1% Injectable 10 milliLiter(s) Local Injection once  lidocaine 1% Injectable 10 milliLiter(s) Local Injection once  melatonin 5 milliGRAM(s) Oral at bedtime  metoprolol succinate  milliGRAM(s) Oral daily  multivitamin/minerals 1 Tablet(s) Oral daily  mycophenolate mofetil 500 milliGRAM(s) Oral four times a day  pentoxifylline 400 milliGRAM(s) Oral two times a day  sacubitril 49 mG/valsartan 51 mG 1 Tablet(s) Oral two times a day  tacrolimus 0.5 milliGRAM(s) Oral two times a day    MEDICATIONS  (PRN):  acetaminophen     Tablet .. 650 milliGRAM(s) Oral every 6 hours PRN Mild Pain (1 - 3)  dextrose Oral Gel 15 Gram(s) Oral once PRN Blood Glucose LESS THAN 70 milliGRAM(s)/deciliter  dextrose Oral Gel 15 Gram(s) Oral once PRN Blood Glucose LESS THAN 70 milliGRAM(s)/deciliter    PRESENT SYMPTOMS: [ ]Unable to obtain due to poor mentation   Source if other than patient:  [ ]Family   [ ]Team     Pain: [ ]yes [ ]no  QOL impact -   Location -                    Aggravating factors -  Quality -  Radiation -  Timing-  Severity (0-10 scale):  Minimal acceptable level (0-10 scale):     CPOT:    https://www.[email or web address]/getattachment/[email or web address]/Critical-Care-Pain-Observation-Tool-(CPOT)      PAIN AD Score:     [email or web address] (press ctrl +  left click to view)    Dyspnea:                           [ ]Mild [ ]Moderate [ ]Severe  Anxiety:                             [ ]Mild [ ]Moderate [ ]Severe  Fatigue:                             [ ]Mild [ ]Moderate [ ]Severe  Nausea:                             [ ]Mild [ ]Moderate [ ]Severe  Loss of appetite:              [ ]Mild [ ]Moderate [ ]Severe  Constipation:                    [ ]Mild [ ]Moderate [ ]Severe    Other Symptoms:  [ ]All other review of systems negative     Palliative Performance Status Version 2:         %    http://npcrc.org/files/news/palliative_performance_scale_ppsv2.pdf  PHYSICAL EXAM:  Vital Signs Last 24 Hrs  T(C): 36.9 (12 Sep 2022 02:47), Max: 37 (11 Sep 2022 23:32)  T(F): 98.5 (12 Sep 2022 02:47), Max: 98.6 (11 Sep 2022 23:32)  HR: 75 (12 Sep 2022 07:35) (68 - 85)  BP: 119/58 (12 Sep 2022 02:47) (119/58 - 138/60)  BP(mean): --  RR: 18 (11 Sep 2022 23:32) (18 - 18)  SpO2: 92% (12 Sep 2022 07:35) (92% - 99%)    Parameters below as of 12 Sep 2022 07:35  Patient On (Oxygen Delivery Method): nasal cannula  O2 Flow (L/min): 5   I&O's Summary    11 Sep 2022 07:01  -  12 Sep 2022 07:00  --------------------------------------------------------  IN: 545 mL / OUT: 3100 mL / NET: -2555 mL    GENERAL:  [x]Alert  [ x]Oriented x 2 [ ]Lethargic  [ ]Cachexia  [ ]Unarousable  [ ]Verbal  [ ]Non-Verbal  Behavioral:   [ ] Anxiety  [ ] Delirium [ ] Agitation [ ] Other  HEENT:  [ x]Normal   [ ]Dry mouth   [ ]ET Tube/Trach  [ ]Oral lesions  PULMONARY:   [ ]Clear [ x]Tachypnea  [ ]Audible excessive secretions   [ ]Rhonchi        [ ]Right [ ]Left [ ]Bilateral  [ ]Crackles        [ ]Right [ ]Left [ ]Bilateral  [ ]Wheezing     [ ]Right [ ]Left [ ]Bilateral  [ ]Diminished breath sounds [ ]right [ ]left [ ]bilateral  CARDIOVASCULAR:    [x ]Regular [ ]Irregular [ ]Tachy  [ ]Won [ ]Murmur [ ]Other  GASTROINTESTINAL:  [x ]Soft  [ ]Distended   [ ]+BS  [ ]Non tender [ ]Tender  [ ]PEG [ ]OGT/ NGT  Last BM:   GENITOURINARY:  [x ]Normal [ ] Incontinent   [ ]Oliguria/Anuria   [ ]Gloria  MUSCULOSKELETAL:   [ ]Normal   [ x]Weakness  [ ]Bed/Wheelchair bound [ ]Edema  NEUROLOGIC:   [x ]No focal deficits  [x ]Cognitive impairment  [ ]Dysphagia [ ]Dysarthria [ ]Paresis [ ]Other   SKIN:   [ x]Normal    [ x]No Rash  [ ]Pressure ulcer(s)       Present on admission [ ]y [ ]n          LABS:                        8.7    10.02 )-----------( 222      ( 12 Sep 2022 01:44 )             26.8   09-12    140  |  97<L>  |  96<HH>  ----------------------------<  112<H>  3.6   |  32  |  1.2    Ca    8.3<L>      12 Sep 2022 01:44  Phos  2.2     09-12  Mg     1.7     09-12    TPro  5.0<L>  /  Alb  2.7<L>  /  TBili  0.9  /  DBili  0.6<H>  /  AST  15  /  ALT  8   /  AlkPhos  85  09-12        RADIOLOGY & ADDITIONAL STUDIES:    PROTEIN CALORIE MALNUTRITION PRESENT: [ ]mild [ ]moderate [ ]severe [ ]underweight [ ]morbid obesity  https://www.andeal.org/vault/2440/web/files/ONC/Table_Clinical%20Characteristics%20to%20Document%20Malnutrition-White%20JV%20et%20al%202012.pdf    Height (cm): 188 (08-20-22 @ 11:17), 193 (07-27-22 @ 14:25), 188 (10-20-21 @ 07:00)  Weight (kg): 85 (08-23-22 @ 14:35), 100 (07-05-22 @ 13:03), 107.1 (10-23-21 @ 05:41)  BMI (kg/m2): 24 (08-23-22 @ 14:35), 28.3 (08-20-22 @ 11:17), 26.8 (07-27-22 @ 14:25)    [ ]PPSV2 < or = to 30% [ ]significant weight loss  [ ]poor nutritional intake  [ ]anasarca      [ ]Artificial Nutrition      REFERRALS:   [ ]Chaplaincy  [ ]Hospice  [ ]Child Life  [ x]Social Work  [ x]Case management [ ]Holistic Therapy     Goals of Care Document:

## 2022-09-12 NOTE — PROGRESS NOTE ADULT - SUBJECTIVE AND OBJECTIVE BOX
Patient is a 82y old  Male who presents with a chief complaint of chf exacerbation (11 Sep 2022 12:14)        Over Night Events: events  noted.  pt on Bumex drip , 5 L NC.         ROS:     All ROS are negative except HPI         PHYSICAL EXAM    ICU Vital Signs Last 24 Hrs  T(C): 36.9 (12 Sep 2022 02:47), Max: 37 (11 Sep 2022 23:32)  T(F): 98.5 (12 Sep 2022 02:47), Max: 98.6 (11 Sep 2022 23:32)  HR: 75 (12 Sep 2022 07:35) (68 - 85)  BP: 119/58 (12 Sep 2022 02:47) (119/58 - 154/86)  BP(mean): 95 (11 Sep 2022 11:15) (95 - 95)  RR: 18 (11 Sep 2022 23:32) (18 - 18)  SpO2: 92% (12 Sep 2022 07:35) (92% - 99%)    O2 Parameters below as of 12 Sep 2022 07:35  Patient On (Oxygen Delivery Method): nasal cannula  O2 Flow (L/min): 5          CONSTITUTIONAL:  ill appearing     ENT:   Airway patent,   Mouth with normal mucosa.   on NC       CARDIAC:   Normal rate,   Regular rhythm.    2 + edema           RESPIRATORY:   crackles basilars     GASTROINTESTINAL:  Abdomen soft,   Non-tender,   No guarding,   + BS       NEUROLOGICAL:   AAO x2     SKIN:   sacrum stage 2       09-11-22 @ 07:01  -  09-12-22 @ 07:00  --------------------------------------------------------  IN:    Bumetanide: 55 mL    IV PiggyBack: 250 mL    Oral Fluid: 240 mL  Total IN: 545 mL    OUT:    Indwelling Catheter - Urethral (mL): 3100 mL  Total OUT: 3100 mL    Total NET: -2555 mL          LABS:                            8.7    10.02 )-----------( 222      ( 12 Sep 2022 01:44 )             26.8                                               09-12    140  |  97<L>  |  96<HH>  ----------------------------<  112<H>  3.6   |  32  |  1.2    Ca    8.3<L>      12 Sep 2022 01:44  Phos  2.2     09-12  Mg     1.7     09-12    TPro  4.9<L>  /  Alb  2.7<L>  /  TBili  0.8  /  DBili  x   /  AST  12  /  ALT  7   /  AlkPhos  83  09-12                                                                                           LIVER FUNCTIONS - ( 12 Sep 2022 01:44 )  Alb: 2.7 g/dL / Pro: 4.9 g/dL / ALK PHOS: 83 U/L / ALT: 7 U/L / AST: 12 U/L / GGT: x                                                                                                                                       MEDICATIONS  (STANDING):  ALBUTerol    90 MICROgram(s) HFA Inhaler 2 Puff(s) Inhalation every 6 hours  atorvastatin 40 milliGRAM(s) Oral at bedtime  BACItracin   Ointment 1 Application(s) Topical two times a day  buMETAnide Infusion 1 mG/Hr (5 mL/Hr) IV Continuous <Continuous>  chlorhexidine 2% Cloths 1 Application(s) Topical <User Schedule>  dexAMETHasone  Injectable 6 milliGRAM(s) IV Push daily  dextrose 5%. 1000 milliLiter(s) (50 mL/Hr) IV Continuous <Continuous>  dextrose 5%. 1000 milliLiter(s) (100 mL/Hr) IV Continuous <Continuous>  dextrose 50% Injectable 25 Gram(s) IV Push once  dextrose 50% Injectable 25 Gram(s) IV Push once  dextrose 50% Injectable 12.5 Gram(s) IV Push once  enoxaparin Injectable 85 milliGRAM(s) SubCutaneous every 12 hours  famotidine    Tablet 20 milliGRAM(s) Oral daily  glucagon  Injectable 1 milliGRAM(s) IntraMuscular once  insulin glargine Injectable (LANTUS) 20 Unit(s) SubCutaneous at bedtime  insulin lispro (ADMELOG) corrective regimen sliding scale   SubCutaneous three times a day before meals  levothyroxine 150 MICROGram(s) Oral daily  lidocaine 1% (Preservative-free) Injectable 30 milliLiter(s) Local Injection once  lidocaine 1% Injectable 10 milliLiter(s) Local Injection once  lidocaine 1% Injectable 10 milliLiter(s) Local Injection once  magnesium sulfate  IVPB 2 Gram(s) IV Intermittent every 2 hours  melatonin 5 milliGRAM(s) Oral at bedtime  metoprolol succinate  milliGRAM(s) Oral daily  multivitamin/minerals 1 Tablet(s) Oral daily  mycophenolate mofetil 500 milliGRAM(s) Oral four times a day  pentoxifylline 400 milliGRAM(s) Oral two times a day  sacubitril 49 mG/valsartan 51 mG 1 Tablet(s) Oral two times a day  sodium chloride 3%. 150 milliLiter(s) (50 mL/Hr) IV Continuous <Continuous>  sodium chloride 3%. 150 milliLiter(s) (50 mL/Hr) IV Continuous <Continuous>  tacrolimus 0.5 milliGRAM(s) Oral two times a day    MEDICATIONS  (PRN):  acetaminophen     Tablet .. 650 milliGRAM(s) Oral every 6 hours PRN Mild Pain (1 - 3)  dextrose Oral Gel 15 Gram(s) Oral once PRN Blood Glucose LESS THAN 70 milliGRAM(s)/deciliter  dextrose Oral Gel 15 Gram(s) Oral once PRN Blood Glucose LESS THAN 70 milliGRAM(s)/deciliter       CXR interpreted by me:  Right sided effusion      Patient is a 82y old  Male who presents with a chief complaint of chf exacerbation (11 Sep 2022 12:14)        Over Night Events: events  noted.  pt on Bumex drip , 5 L NC.  cardio/ palliative reviewed      PHYSICAL EXAM    ICU Vital Signs Last 24 Hrs  T(C): 36.9 (12 Sep 2022 02:47), Max: 37 (11 Sep 2022 23:32)  T(F): 98.5 (12 Sep 2022 02:47), Max: 98.6 (11 Sep 2022 23:32)  HR: 75 (12 Sep 2022 07:35) (68 - 85)  BP: 119/58 (12 Sep 2022 02:47) (119/58 - 154/86)  BP(mean): 95 (11 Sep 2022 11:15) (95 - 95)  RR: 18 (11 Sep 2022 23:32) (18 - 18)  SpO2: 92% (12 Sep 2022 07:35) (92% - 99%)    O2 Parameters below as of 12 Sep 2022 07:35  Patient On (Oxygen Delivery Method): nasal cannula  O2 Flow (L/min): 5          CONSTITUTIONAL:  ill appearing     ENT:   Airway patent,   Mouth with normal mucosa.   on NC       CARDIAC:   gemini 2.6  2 + edema           RESPIRATORY:   crackles basilars     GASTROINTESTINAL:  Abdomen soft,   Non-tender,   No guarding,   + BS       NEUROLOGICAL:   AAO x2     SKIN:   sacrum stage 2       09-11-22 @ 07:01  -  09-12-22 @ 07:00  --------------------------------------------------------  IN:    Bumetanide: 55 mL    IV PiggyBack: 250 mL    Oral Fluid: 240 mL  Total IN: 545 mL    OUT:    Indwelling Catheter - Urethral (mL): 3100 mL  Total OUT: 3100 mL    Total NET: -2555 mL          LABS:                            8.7    10.02 )-----------( 222      ( 12 Sep 2022 01:44 )             26.8                                               09-12    140  |  97<L>  |  96<HH>  ----------------------------<  112<H>  3.6   |  32  |  1.2    Ca    8.3<L>      12 Sep 2022 01:44  Phos  2.2     09-12  Mg     1.7     09-12    TPro  4.9<L>  /  Alb  2.7<L>  /  TBili  0.8  /  DBili  x   /  AST  12  /  ALT  7   /  AlkPhos  83  09-12                                                                                           LIVER FUNCTIONS - ( 12 Sep 2022 01:44 )  Alb: 2.7 g/dL / Pro: 4.9 g/dL / ALK PHOS: 83 U/L / ALT: 7 U/L / AST: 12 U/L / GGT: x                                                                                                                                       MEDICATIONS  (STANDING):  ALBUTerol    90 MICROgram(s) HFA Inhaler 2 Puff(s) Inhalation every 6 hours  atorvastatin 40 milliGRAM(s) Oral at bedtime  BACItracin   Ointment 1 Application(s) Topical two times a day  buMETAnide Infusion 1 mG/Hr (5 mL/Hr) IV Continuous <Continuous>  chlorhexidine 2% Cloths 1 Application(s) Topical <User Schedule>  dexAMETHasone  Injectable 6 milliGRAM(s) IV Push daily  dextrose 5%. 1000 milliLiter(s) (50 mL/Hr) IV Continuous <Continuous>  dextrose 5%. 1000 milliLiter(s) (100 mL/Hr) IV Continuous <Continuous>  dextrose 50% Injectable 25 Gram(s) IV Push once  dextrose 50% Injectable 25 Gram(s) IV Push once  dextrose 50% Injectable 12.5 Gram(s) IV Push once  enoxaparin Injectable 85 milliGRAM(s) SubCutaneous every 12 hours  famotidine    Tablet 20 milliGRAM(s) Oral daily  glucagon  Injectable 1 milliGRAM(s) IntraMuscular once  insulin glargine Injectable (LANTUS) 20 Unit(s) SubCutaneous at bedtime  insulin lispro (ADMELOG) corrective regimen sliding scale   SubCutaneous three times a day before meals  levothyroxine 150 MICROGram(s) Oral daily  lidocaine 1% (Preservative-free) Injectable 30 milliLiter(s) Local Injection once  lidocaine 1% Injectable 10 milliLiter(s) Local Injection once  lidocaine 1% Injectable 10 milliLiter(s) Local Injection once  magnesium sulfate  IVPB 2 Gram(s) IV Intermittent every 2 hours  melatonin 5 milliGRAM(s) Oral at bedtime  metoprolol succinate  milliGRAM(s) Oral daily  multivitamin/minerals 1 Tablet(s) Oral daily  mycophenolate mofetil 500 milliGRAM(s) Oral four times a day  pentoxifylline 400 milliGRAM(s) Oral two times a day  sacubitril 49 mG/valsartan 51 mG 1 Tablet(s) Oral two times a day  sodium chloride 3%. 150 milliLiter(s) (50 mL/Hr) IV Continuous <Continuous>  sodium chloride 3%. 150 milliLiter(s) (50 mL/Hr) IV Continuous <Continuous>  tacrolimus 0.5 milliGRAM(s) Oral two times a day    MEDICATIONS  (PRN):  acetaminophen     Tablet .. 650 milliGRAM(s) Oral every 6 hours PRN Mild Pain (1 - 3)  dextrose Oral Gel 15 Gram(s) Oral once PRN Blood Glucose LESS THAN 70 milliGRAM(s)/deciliter  dextrose Oral Gel 15 Gram(s) Oral once PRN Blood Glucose LESS THAN 70 milliGRAM(s)/deciliter       CXR interpreted by me:  Right sided effusion

## 2022-09-12 NOTE — PROGRESS NOTE ADULT - ASSESSMENT
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     Acute Hypoxic respiratory Failure   Acute HFmrEF  Bilateral pleural effusion Right>Left.  COPD  COVID 19 infection +9/2    previously on HFNC, now on NC 5 L saturating low 90s  s/p thoracentesis 8/2 fluid transudative.   s/p thoracentesis 9/8 with 1.9 L removed, transudative, f/u studies  Continue Bumex drip at 1mg/hr---> dc hypertonic saline   DIamox x1, replete K to 4 and Mg 2  renal fxn stable  Continue with dexamethasone 6mg daily, steroids started 9/6   DDimer 1000, LE duplex 9/6 negative for DVTs  on therapeutic Lovenox (previous eliquis)    Epistaxis - resolved  ENT follow up appreciated, packing removed  monitor H&H    Leukocytosis, resolved - likely from steroids    Acute on chronic anemia - s/p 1 U PRBC and 3 days of IV venofer   monitor H&H, keep active T&S    Chronic Atrial Fibrillation - continue metoprolol and lovenox. Resume Eliquis once no procedures planned.     C3 Glomerulonephropathy - Continue tacrolimus 0.5mg BID and Mycophenolate 500mg 4 times daily. On chronic prednisone 5mg at home, now on dexamethasone. Repeat Tacrolimus level 9/8 5.4, f/u renal      CAD - Continue Metoprolol, Imdur and Lipitor.     HTN - continue metoprolol and entresto     Hypothyroidism - continue synthroid    DM II: A1C 7.3 - continue with insulin, monitor FS and PO intake     Stage 2 right buttock pressure ulcer, Present on admission  Continue local wound care, off loading.     #Progress Note Handoff:  Pending (specify): tapering down supplemental oxygen, , labs, diuresis, HF f/u   Family discussion: spoke with pt  Disposition: Home

## 2022-09-12 NOTE — PROGRESS NOTE ADULT - ASSESSMENT
Impression    Recurrent transudative right pleural effusion  S/p right thoracentesis/ transudate  Acute hypoxemic resp failure  Pul edema  COVID 19 infection   COPD (was on prednisone in NH)  C3 glomerulopathy - on prograf and cellcept at home  HFMrEF.    AFib, chronic - on eliquis   CAD s/p CABG  H/O DLD, HTN, DM2, Hypothyroidism     PLAN:    CNS:  no depressants     HEENT: Oral care    PULMONARY:  HOB @ 45 degrees.  Aspiration precautions.  Wean O2,  f/u thora cultures.    CARDIOVASCULAR:  Continue diuresis  . one time diamox.     GI: GI prophylaxis.  Feeding.  Bowel regimen     RENAL:  Follow up lytes.  Correct as needed    INFECTIOUS DISEASE: Follow up cultures.  ID eval appreciated.  Dexa 7 mg daily D # 6    HEMATOLOGICAL:  DVT prophylaxis.      ENDOCRINE:  Follow up FS.  Insulin protocol if needed.    MUSCULOSKELETAL:  OOB to chair.  PT OT        SDU    Impression    Recurrent transudative right pleural effusion  S/p right thoracentesis/ transudate x2  Acute hypoxemic resp failure  Pul edema  COVID 19 infection   COPD (was on prednisone in NH)  C3 glomerulopathy - on prograf and cellcept at home  HFMrEF.    AFib, chronic - on eliquis   CAD s/p CABG  H/O DLD, HTN, DM2, Hypothyroidism     PLAN:    CNS:  no depressants     HEENT: Oral care    PULMONARY:  HOB @ 45 degrees.  Aspiration precautions.  Wean O2,  f/u thora cultures.    CARDIOVASCULAR:  Continue diuresis keep - balance    GI: GI prophylaxis.  Feeding.  Bowel regimen     RENAL:  Follow up lytes.  Correct as needed    INFECTIOUS DISEASE: Follow up cultures.  ID eval appreciated.  Dexa 7 mg daily D # 6    HEMATOLOGICAL:  DVT prophylaxis.      ENDOCRINE:  Follow up FS.  Insulin protocol if needed.    MUSCULOSKELETAL:  OOB to chair.  PT OT        SDU

## 2022-09-12 NOTE — PROGRESS NOTE ADULT - SUBJECTIVE AND OBJECTIVE BOX
Date of Admission: 22    Interval History: Patient assessed in bed, drowsy but oriented. Reports feeling better. Weened off HFNC, currently on nasal cannula, sating 95%. Remains fluid overloaded but improving. 3.1L 24hr UOP.      CHIEF COMPLAINT: Patient is a 82y old  Male who presents with a chief complaint of chf exacerbation (22 Aug 2022 12:24)    HISTORY OF PRESENT ILLNESS: 83 yo male with PMHx of HFrEF (45-50% on Lasix 20 PO), A-Fib on Eliquis, CAD s/p CABGx3, PVD, COPD (was on rare home O2 PRN now on 3L NC since recent July admission for COVID), CKD3, C3 glomerulonephropathy, HTN, HLD, Hypothyroid presents from NH with progressively worsening SOB x5 days and hypoxia.  Recently hospitalized here for a month discharged on 8/3/22 initially for BRENDA on CKD course c/b COVID, bilateral lower lobe pneumonia, delirium and microaspiration of thin liquids on modified barium study. Received Dexamethasone and Zosyn course with new O2 requirements of 3L NC discharged to nursing home on 3L and thick liquid diet for rehab. Due to his SOB, chest x-ray was ordered on 8/15 but results did not come back and patient became increasingly short of breath and was noted to be hypoxic and rehab facility which prompted referral to the ED. Review of symptoms is notable for orthopnea and a chronic dry cough unchanged since his covid illness. He denies paroxysmal nocturnal dyspnea, leg edema, chest pain, palpitations, dizziness, n/v, abd pain. Cardiologist is Dr. Witt and Pulmonolgist is Dr. Rory Mars.   In ED:  T(F): 96.4 (22 @ 11:27), Max: 96.4 (22 @ 11:27)  HR: 80 (22 @ 16:20) (80 - 91)  BP: 137/61 (22 @ 16:20) (121/58 - 137/61)  RR: 18 (22 @ 16:20) (18 - 24)  SpO2: 100% (22 @ 16:20) (89% - 100%) -> 89% on 4L -> placed on NRB -> BIPAP 2/2 tachypnea and hypoxia -> taken off with increased work of breathing placed back on BIPAP  Labs: WBC 12k, Hgb 7.2 (baseline 9), BNP 55k, Cr 2.2 (baseline 1.5), PCO2 47 (VBG), Troponin 0.24  EKG:   Atrial fibrillation with premature ventricular or aberrantly conducted complexes  Left posterior fascicular block   ST & T wave abnormality, consider lateral ischemia - similar to prior in 2022  XR chest: significant bilateral pleural effusions and pulmonary congestion  Admitted to medicine for SOB/hypoxia likely 2/2 acute CHF exacerbation.  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    SOCIAL HISTORY:  Former cigarette smoker, quit >40 years ago. Denies alcohol or drug use.     Allergies    Ozempic (0.25 mg or 0.5 mg dose) (Hives; Rash)  streptomycin (Unknown)  Trulicity Pen (Hives; Rash)    Intolerances      PHYSICAL EXAM:  General Appearance: drowsy, normal for age and gender. 	  Neck: JVP [id number], no bruit.   Eyes: Extra Ocular muscles intact.   Cardiovascular: irregular rhythm S1 S2, + JVD, +2 B/L LE edema (improving)  Respiratory: decreased b/l R<L  Psychiatry: Drowsy, oriented x 3, Mood & affect appropriate  Gastrointestinal:  Soft, Non-tender  Skin/Integumen: No rashes, No ecchymoses, No cyanosis	  Neurologic: Non-focal  Musculoskeletal/ extremities: Normal range of motion, No clubbing, cyanosis,+2 B/L LE edema (improving)  Vascular: Peripheral pulses palpable 2+ bilaterally    CARDIAC MARKERS:  Serum Pro-Brain Natriuretic Peptide: 12698 pg/mL (22 @ 11:47)    Serum Pro-Brain Natriuretic Peptide: 99511 pg/mL (10.20.21 @ 10:28)      TELEMETRY EVENTS: 	    EC22    Ventricular Rate 71 BPM  Atrial Rate 82 BPM  QRS Duration 140 ms  Q-T Interval 444 ms  QTC Calculation(Bazett) 482 ms  R Axis 108 degrees  T Axis 166 degrees    Diagnosis Line Atrial fibrillation with premature ventricular or aberrantlyconducted  complexes  Non-specific intra-ventricular conduction block  ST & T wave abnormality, consider lateral ischemia  Abnormal ECG    Confirmed by Sarah Borden MD ([id number]) on 2022 1:35:39 PM      	  PREVIOUS DIAGNOSTIC TESTING:    TTE 22      Summary:   1. Left ventricular ejection fraction, by visual estimation, is 45 to   50%.   2. Mildly decreased global left ventricular systolic function.   3. Mild left ventricular hypertrophy.   4. Multiple left ventricular regional wall motion abnormalities exist.   See wall motion findings.   5. The left ventricular diastolic function could not be assessed in this   study.   6. Moderately enlarged left atrium.   7. Sclerotic aortic valve with normal opening.   8. Degenerative mitral valve.   9. Mild mitral regurgitation.  10. Mild tricuspid regurgitation.  11. Estimated pulmonary artery systolic pressure is 37.6 mmHg assuming a   right atrial pressure of 8 mmHg, which is consistent with borderline   pulmonary hypertension.        TTE 10/22/21    Summary:   1. Moderately to severely decreased global left ventricular systolic function.   2. Severely enlarged left atrium.   3. Severely enlarged right atrium.   4. Multiple left ventricular regional wall motion abnormalities exist. See wall motion findings.   5. Mild eccentric left ventricular hypertrophy.   6. The left ventricular diastolic function could not be assessed in this study.   7. Severely enlarged right ventricle.   8. Severely reduced RV systolic function.   9. Mild to moderate mitral valve regurgitation.  10. Moderate mitral annular calcification.  11. Mild tricuspid regurgitation.  12. Mild aortic regurgitation.  13. Sclerotic aortic valve with normal opening.  14. Complex atheroma is noted in the ascending aorta.  15. Estimated pulmonary artery systolic pressure is 64.8 mmHg assuming a right atrial pressure of 15 mmHg, which is consistent with severe pulmonary hypertension.  16. Pulmonary hypertension is present.  17. LA volume Index is 71.9 ml/m² ml/m2.  18. There is moderate aortic root calcification.        Home Medications:  Aranesp 100 mcg/0.5 mL injectable solution: 1 dose(s) injectable every 3 to 4 weeks (2022 21:22)  atorvastatin 40 mg oral tablet: 1 tab(s) orally once a day (at bedtime) (:22)  calcitriol 0.25 mcg oral capsule: 1 cap(s) orally 2 times a week (:22)  d-mycophenolate: 500 milligram(s) orally 4 times a day (:22)  Eliquis 2.5 mg oral tablet: 1 tab(s) orally 2 times a day (:22)  famotidine 20 mg oral tablet: 1 tab(s) orally once a day (:22)  furosemide 20 mg oral tablet: 1 tab(s) orally once a day (03 Aug 2022 09:47)  insulin glargine 100 units/mL subcutaneous solution: 5 unit(s) subcutaneous once a day (at bedtime) (03 Aug 2022 09:47)  insulin lispro 100 units/mL injectable solution: 1 Unit(s) if Glucose 151 - 200  2 Unit(s) if Glucose 201 - 250  3 Unit(s) if Glucose 251 - 300  4 Unit(s) if Glucose 301 - 350  5 Unit(s) if Glucose 351 - 400  6 Unit(s) if Glucose Greater Than 400 (03 Aug 2022 09:57)  isosorbide mononitrate 30 mg oral tablet, extended release: 1 tab(s) orally once a day (in the morning) (:22)  levothyroxine 150 mcg (0.15 mg) oral tablet: 1 tab(s) orally once a day (:22)  metoprolol succinate 50 mg oral tablet, extended release: 1 tab(s) orally once a day (03 Aug 2022 09:47)  Multiple Vitamins with Minerals oral tablet: 1 tab(s) orally once a day (03 Aug 2022 09:47)  NIFEdipine 30 mg oral tablet, extended release: 1 tab(s) orally once a day (:22)  pentoxifylline 400 mg oral tablet, extended release: 1 tab(s) orally 2 times a day (2022 21:22)  sodium bicarbonate 650 mg oral tablet: 1 tab(s) orally 3 times a day (03 Aug 2022 09:47)  tacrolimus 0.5 mg oral capsule: 1 cap(s) orally 2 times a day (03 Aug 2022 09:57)    MEDICATIONS  (STANDING):  albuterol/ipratropium for Nebulization 3 milliLiter(s) Nebulizer every 6 hours  ampicillin/sulbactam  IVPB 3 Gram(s) IV Intermittent every 6 hours  apixaban 2.5 milliGRAM(s) Oral two times a day  atorvastatin 40 milliGRAM(s) Oral at bedtime  buMETAnide Injectable 2 milliGRAM(s) IV Push every 12 hours  dextrose 5%. 1000 milliLiter(s) (50 mL/Hr) IV Continuous <Continuous>  dextrose 5%. 1000 milliLiter(s) (100 mL/Hr) IV Continuous <Continuous>  dextrose 50% Injectable 25 Gram(s) IV Push once  dextrose 50% Injectable 12.5 Gram(s) IV Push once  dextrose 50% Injectable 25 Gram(s) IV Push once  famotidine    Tablet 20 milliGRAM(s) Oral daily  glucagon  Injectable 1 milliGRAM(s) IntraMuscular once  insulin glargine Injectable (LANTUS) 10 Unit(s) SubCutaneous every morning  insulin lispro (ADMELOG) corrective regimen sliding scale   SubCutaneous three times a day before meals  isosorbide   mononitrate ER Tablet (IMDUR) 30 milliGRAM(s) Oral daily  levothyroxine 150 MICROGram(s) Oral daily  methylPREDNISolone sodium succinate Injectable 40 milliGRAM(s) IV Push every 12 hours  metoprolol succinate ER 50 milliGRAM(s) Oral daily  multivitamin/minerals 1 Tablet(s) Oral daily  mycophenolate mofetil 500 milliGRAM(s) Oral four times a day  NIFEdipine XL 30 milliGRAM(s) Oral daily  pentoxifylline 400 milliGRAM(s) Oral two times a day  tacrolimus 0.5 milliGRAM(s) Oral two times a day    MEDICATIONS  (PRN):  dextrose Oral Gel 15 Gram(s) Oral once PRN Blood Glucose LESS THAN 70 milliGRAM(s)/deciliter

## 2022-09-12 NOTE — PROGRESS NOTE ADULT - PROBLEM SELECTOR PLAN 2
In the setting of CHF, COVID. Now on 5 liters NC   High Risk.  s/p multiple Thoracentesis  -ongoing pulm follow up  -treatment of heart failure and COVID  -f/u ID.

## 2022-09-12 NOTE — PROGRESS NOTE ADULT - PROBLEM SELECTOR PLAN 1
DNR/DNI  Ongoing medical management  Ongoing GOC discussion - treatment options including comfort measures only discussed  Will continue to be available for GOC discussions as appropriate.

## 2022-09-13 NOTE — SWALLOW BEDSIDE ASSESSMENT ADULT - CONSISTENCIES ADMINISTERED
~1-2 oz. accepted/thin liquid/pureed/minced & moist ~1-2 oz. accepted/mildly thick/pureed/minced & moist

## 2022-09-13 NOTE — PROGRESS NOTE ADULT - ASSESSMENT
82yMale being evaluated for goals of care and symptom management. See above GOC note. Pt denied pain or dyspnea today. Pt however easily fall asleep during conversation, and has difficulty with short term memory. Wife is in rehab now after hospitalization.    Pt reports dyspnea, no pain. Pt wants rehab, has very poor insight. He appears very weak. Attempted to call R[facility], he was [place] [employer]. He said to call him tomorrow.     MEDD (morphine equivalent daily dose): 0      See Recs below.    Please call [phone] with questions or concerns 24/7.   We will continue to follow.

## 2022-09-13 NOTE — PROGRESS NOTE ADULT - ASSESSMENT
· Assessment	  81 yo male with PMHx of HFrEF (45-50% on Lasix 20 PO), A-Fib on Eliquis, CAD s/p CABGx3, PVD, COPD (was on rare home O2 PRN now on 3L NC since recent July admission for COVID), CKD3, C3 glomerulonephropathy, HTN, HLD, Hypothyroid presents from NH with progressively worsening SOB x5 days and hypoxia.  Recently hospitalized here for a month discharged on 8/3/22 initially for BRENDA on CKD course c/b COVID, bilateral lower lobe pneumonia, delirium and microaspiration of thin liquids on modified barium study. Received Dexamethasone and Zosyn course with new O2 requirements of 3L NC discharged to nursing home on 3L and thick liquid diet for rehab. Due to his SOB, chest x-ray was ordered on 8/15 but results did not come back and patient became increasingly short of breath and was noted to be hypoxic and rehab facility which prompted referral to the ED. Review of symptoms is notable for orthopnea and a chronic dry cough unchanged since his covid illness.     IMPRESSION;  Clinically stable with no complaints   CHF with SOB with CXR with b/l pleural effusions  9/8 s/p right thoracocentesis 1900 ml> no empyema  8/29 COVID-19 NG  9/2 COVID-19 positive  No cytokine storm  CXR : improved post thoracocentesis  Ddimers 1035    s/p RDV with good clinical response    RECOMMENDATIONS;  Dexamethasone 6 mg iv q24h   Monitor for side effects: hyperglycemia, neurological ( agitation/confusion), adrenal suppression, bacterial and fungal infections  Could finish course of steroids with po prednisone 40 mg q24 for a total of 10 days   Please do not hesitate to recall ID if any questions arise either through [employer] or through [employer]

## 2022-09-13 NOTE — PROGRESS NOTE ADULT - PROBLEM SELECTOR PLAN 1
DNR/DNI  Ongoing medical management all options discussed   Will continue to be available for GOC discussions as appropriate.  Will follow up with [facility] tomorrow

## 2022-09-13 NOTE — SWALLOW BEDSIDE ASSESSMENT ADULT - SLP PERTINENT HISTORY OF CURRENT PROBLEM
pt is an 83 y/o M w/ PMHx: chronic HFrEF, chronic Afib, CAD s/p CABG x3, PVD, COPD, recent COVID, CKD, HTN, HLD and hypothyroidism presented for evaluation of five days of worsening SOB and hypoxia. Pt is being treated for AHRF, acute HFrEF, COPD, s/p thoracentesis 9/8, epistaxis, leucocytosis, acute on chronic anemia, COVID-19+ on 9/2. pt is an 81 y/o M w/ PMHx: chronic HFrEF, chronic Afib, CAD s/p CABG x3, PVD, COPD, recent COVID, CKD, HTN, HLD and hypothyroidism presented for evaluation of five days of worsening SOB and hypoxia. Pt is being treated for AHRF, acute HFrEF, COPD, s/p thoracentesis 9/8, epistaxis, leucocytosis, acute on chronic anemia, COVID-19+ on 9/2. CXR-> B/L lung opacities and R plural effusion. pt is an 81 y/o M w/ PMHx: chronic HFrEF, chronic Afib, CAD s/p CABG x3, PVD, COPD, recent COVID, CKD, HTN, HLD and hypothyroidism presented for evaluation of five days of worsening SOB and hypoxia. Pt is being treated for AHRF, acute HFrEF, COPD, s/p thoracentesis 9/8, epistaxis, leucocytosis, acute on chronic anemia, COVID-19+ on 9/2. CXR-> B/L lung opacities and R pleural effusion.

## 2022-09-13 NOTE — PROGRESS NOTE ADULT - SUBJECTIVE AND OBJECTIVE BOX
HPI:  83 yo male with PMHx of HFrEF (45-50% on Lasix 20 PO), A-Fib on Eliquis, CAD s/p CABGx3, PVD, COPD (was on rare home O2 PRN now on 3L NC since recent July admission for COVID), CKD3, C3 glomerulonephropathy, HTN, HLD, Hypothyroid presents from NH with progressively worsening SOB x5 days and hypoxia.  Recently hospitalized here for a month discharged on 8/3/22 initially for BRENDA on CKD course c/b COVID, bilateral lower lobe pneumonia, delirium and microaspiration of thin liquids on modified barium study. Received Dexamethasone and Zosyn course with new O2 requirements of 3L NC discharged to nursing home on 3L and thick liquid diet for rehab. Due to his SOB, chest x-ray was ordered on 8/15 but results did not come back and patient became increasingly short of breath and was noted to be hypoxic and rehab facility which prompted referral to the ED. Review of symptoms is notable for orthopnea and a chronic dry cough unchanged since his covid illness. He denies paroxysmal nocturnal dyspnea, leg edema, chest pain, palpitations, dizziness, n/v, abd pain. Cardiologist is Dr. Witt and Pulmonolgist is Dr. Rory Mars.     In ED:  T(F): 96.4 (08-20-22 @ 11:27), Max: 96.4 (08-20-22 @ 11:27)  HR: 80 (08-20-22 @ 16:20) (80 - 91)  BP: 137/61 (08-20-22 @ 16:20) (121/58 - 137/61)  RR: 18 (08-20-22 @ 16:20) (18 - 24)  SpO2: 100% (08-20-22 @ 16:20) (89% - 100%) -> 89% on 4L -> placed on NRB -> BIPAP 2/2 tachypnea and hypoxia -> taken off with increased work of breathing placed back on BIPAP  Labs: WBC 12k, Hgb 7.2 (baseline 9), BNP 55k, Cr 2.2 (baseline 1.5), PCO2 47 (VBG), Troponin 0.24  EKG:   Atrial fibrillation with premature ventricular or aberrantly conducted complexes  Left posterior fascicular block   ST & T wave abnormality, consider lateral ischemia - similar to prior in July 2022  XR chest: significant bilateral pleural effusions and pulmonary congestion  Admitted to medicine for SOB/hypoxia likely 2/2 acute CHF exacerbation.  (20 Aug 2022 16:17)     INTERVAL EVENTS:    ADVANCE DIRECTIVES:    DNR  MOLST  [ ]  Living Will  [ ]   DECISION MAKER(s):  [ ] Health Care Proxy(s)  [ ] Surrogate(s)  [ ] Guardian           Name(s): Phone Number(s):    BASELINE (I)ADL(s) (prior to admission):  [place]: [ ]Total  [ ] Moderate [ ]Dependent  Palliative Performance Status Version 2:         %    http://[facility].org/files/news/palliative_performance_scale_ppsv2.pdf    Allergies    Ozempic (0.25 mg or 0.5 mg dose) (Hives; Rash)  streptomycin (Unknown)  Trulicity Pen (Hives; Rash)    Intolerances    MEDICATIONS  (STANDING):  ALBUTerol    90 MICROgram(s) HFA Inhaler 2 Puff(s) Inhalation every 6 hours  atorvastatin 40 milliGRAM(s) Oral at bedtime  BACItracin   Ointment 1 Application(s) Topical two times a day  buMETAnide Infusion 1 mG/Hr (5 mL/Hr) IV Continuous <Continuous>  chlorhexidine 2% Cloths 1 Application(s) Topical <User Schedule>  dexAMETHasone  Injectable 6 milliGRAM(s) IV Push daily  dextrose 5%. 1000 milliLiter(s) (100 mL/Hr) IV Continuous <Continuous>  dextrose 5%. 1000 milliLiter(s) (50 mL/Hr) IV Continuous <Continuous>  dextrose 50% Injectable 25 Gram(s) IV Push once  dextrose 50% Injectable 12.5 Gram(s) IV Push once  dextrose 50% Injectable 25 Gram(s) IV Push once  enoxaparin Injectable 85 milliGRAM(s) SubCutaneous every 12 hours  famotidine    Tablet 20 milliGRAM(s) Oral daily  glucagon  Injectable 1 milliGRAM(s) IntraMuscular once  insulin glargine Injectable (LANTUS) 16 Unit(s) SubCutaneous at bedtime  insulin lispro (ADMELOG) corrective regimen sliding scale   SubCutaneous three times a day before meals  insulin lispro Injectable (ADMELOG) 6 Unit(s) SubCutaneous three times a day before meals  levothyroxine 150 MICROGram(s) Oral daily  lidocaine 1% (Preservative-free) Injectable 30 milliLiter(s) Local Injection once  lidocaine 1% Injectable 10 milliLiter(s) Local Injection once  lidocaine 1% Injectable 10 milliLiter(s) Local Injection once  melatonin 5 milliGRAM(s) Oral at bedtime  metoprolol succinate  milliGRAM(s) Oral daily  multivitamin/minerals 1 Tablet(s) Oral daily  mycophenolate mofetil 500 milliGRAM(s) Oral four times a day  pentoxifylline 400 milliGRAM(s) Oral two times a day  sacubitril 49 mG/valsartan 51 mG 1 Tablet(s) Oral two times a day  tacrolimus 0.5 milliGRAM(s) Oral two times a day    MEDICATIONS  (PRN):  acetaminophen     Tablet .. 650 milliGRAM(s) Oral every 6 hours PRN Mild Pain (1 - 3)  dextrose Oral Gel 15 Gram(s) Oral once PRN Blood Glucose LESS THAN 70 milliGRAM(s)/deciliter  dextrose Oral Gel 15 Gram(s) Oral once PRN Blood Glucose LESS THAN 70 milliGRAM(s)/deciliter    PRESENT SYMPTOMS: [ ]Unable to obtain due to poor mentation   Source if other than patient:  [ ]Family   [ ]Team     Pain: [ ]yes [ ]no  QOL impact -   Location -                    Aggravating factors -  Quality -  Radiation -  Timing-  Severity (0-10 scale):  Minimal acceptable level (0-10 scale):     CPOT:    https://www.[email or web address]/getattachment/[email or web address]/Critical-Care-Pain-Observation-Tool-(CPOT)      PAIN AD Score:     [email or web address].[place].[email or web address]/painad.pdf (press ctrl +  left click to view)    Dyspnea:                           [ ]Mild [ ]Moderate [ ]Severe  Anxiety:                             [ ]Mild [ ]Moderate [ ]Severe  Fatigue:                             [ ]Mild [ ]Moderate [ ]Severe  Nausea:                             [ ]Mild [ ]Moderate [ ]Severe  Loss of appetite:              [ ]Mild [ ]Moderate [ ]Severe  Constipation:                    [ ]Mild [ ]Moderate [ ]Severe    Other Symptoms:  [ ]All other review of systems negative     Palliative Performance Status Version 2:         %    http://npcrc.org/files/news/palliative_performance_scale_ppsv2.pdf  PHYSICAL EXAM:  Vital Signs Last 24 Hrs  T(C): 36.1 (13 Sep 2022 11:30), Max: 36.2 (12 Sep 2022 20:30)  T(F): 97 (13 Sep 2022 11:30), Max: 97.2 (12 Sep 2022 20:30)  HR: 72 (13 Sep 2022 11:30) (68 - 84)  BP: 125/74 (13 Sep 2022 11:30) (111/56 - 146/67)  BP(mean): 93 (13 Sep 2022 11:30) (85 - 93)  RR: 20 (13 Sep 2022 11:30) (20 - 20)  SpO2: 96% (13 Sep 2022 11:30) (89% - 96%)    Parameters below as of 13 Sep 2022 11:30  Patient On (Oxygen Delivery Method): nasal cannula     I&O's Summary    12 Sep 2022 07:01  -  13 Sep 2022 07:00  --------------------------------------------------------  IN: 55 mL / OUT: 1600 mL / NET: -1545 mL    13 Sep 2022 07:01  -  13 Sep 2022 15:19  --------------------------------------------------------  IN: 35 mL / OUT: 600 mL / NET: -565 mL  GENERAL:  [x]Alert  [ x]Oriented x 2 [ ]Lethargic  [ ]Cachexia  [ ]Unarousable  [ ]Verbal  [ ]Non-Verbal  Behavioral:   [ ] Anxiety  [ ] Delirium [ ] Agitation [ ] Other  HEENT:  [ x]Normal   [ ]Dry mouth   [ ]ET Tube/Trach  [ ]Oral lesions  PULMONARY:   [ ]Clear [ x]Tachypnea  [ ]Audible excessive secretions   [ ]Rhonchi        [ ]Right [ ]Left [ ]Bilateral  [ ]Crackles        [ ]Right [ ]Left [ ]Bilateral  [ ]Wheezing     [ ]Right [ ]Left [ ]Bilateral  [ ]Diminished breath sounds [ ]right [ ]left [ ]bilateral  CARDIOVASCULAR:    [x ]Regular [ ]Irregular [ ]Tachy  [ ]Won [ ]Murmur [ ]Other  GASTROINTESTINAL:  [x ]Soft  [ ]Distended   [ ]+BS  [ ]Non tender [ ]Tender  [ ]PEG [ ]OGT/ NGT  Last BM:   GENITOURINARY:  [x ]Normal [ ] Incontinent   [ ]Oliguria/Anuria   [ ]Gloria  MUSCULOSKELETAL:   [ ]Normal   [ x]Weakness  [ ]Bed/Wheelchair bound [ ]Edema  NEUROLOGIC:   [x ]No focal deficits  [x ]Cognitive impairment  [ ]Dysphagia [ ]Dysarthria [ ]Paresis [ ]Other   SKIN:   [ x]Normal    [ x]No Rash  [ ]Pressure ulcer(s)       Present on admission [ ]y [ ]n             LABS: reviewed                         8.6    7.11  )-----------( 220      ( 13 Sep 2022 02:01 )             26.8   09-13    134<L>  |  93<L>  |  93<HH>  ----------------------------<  300<H>  3.5   |  30  |  1.2    Ca    8.4      13 Sep 2022 02:01  Phos  2.7     09-13  Mg     2.5     09-13    TPro  5.2<L>  /  Alb  2.8<L>  /  TBili  0.9  /  DBili  0.6<H>  /  AST  12  /  ALT  7   /  AlkPhos  84  09-13        RADIOLOGY & ADDITIONAL STUDIES:    PROTEIN CALORIE MALNUTRITION PRESENT: [ ]mild [ ]moderate [ ]severe [ ]underweight [ ]morbid obesity  https://www.andeal.org/vault/2440/web/files/ONC/Table_Clinical%20Characteristics%20to%20Document%20Malnutrition-White%20JV%20et%20al%202012.pdf    Height (cm): 188 (08-20-22 @ 11:17), 193 (07-27-22 @ 14:25), 188 (10-20-21 @ 07:00)  Weight (kg): 85 (08-23-22 @ 14:35), 100 (07-05-22 @ 13:03), 107.1 (10-23-21 @ 05:41)  BMI (kg/m2): 24 (08-23-22 @ 14:35), 28.3 (08-20-22 @ 11:17), 26.8 (07-27-22 @ 14:25)    [ ]PPSV2 < or = to 30% [ ]significant weight loss  [ ]poor nutritional intake  [ ]anasarca      [ ]Artificial Nutrition      REFERRALS:   [ ]Chaplaincy  [ ]Hospice  [ ]Child Life  [ ]Social Work  [ ]Case management [ ]Holistic Therapy     Goals of Care Document:          HPI:  83 yo male with PMHx of HFrEF (45-50% on Lasix 20 PO), A-Fib on Eliquis, CAD s/p CABGx3, PVD, COPD (was on rare home O2 PRN now on 3L NC since recent July admission for COVID), CKD3, C3 glomerulonephropathy, HTN, HLD, Hypothyroid presents from NH with progressively worsening SOB x5 days and hypoxia.  Recently hospitalized here for a month discharged on 8/3/22 initially for BRENDA on CKD course c/b COVID, bilateral lower lobe pneumonia, delirium and microaspiration of thin liquids on modified barium study. Received Dexamethasone and Zosyn course with new O2 requirements of 3L NC discharged to nursing home on 3L and thick liquid diet for rehab. Due to his SOB, chest x-ray was ordered on 8/15 but results did not come back and patient became increasingly short of breath and was noted to be hypoxic and rehab facility which prompted referral to the ED. Review of symptoms is notable for orthopnea and a chronic dry cough unchanged since his covid illness. He denies paroxysmal nocturnal dyspnea, leg edema, chest pain, palpitations, dizziness, n/v, abd pain. Cardiologist is Dr. Witt and Pulmonolgist is Dr. Rory Mars.     In ED:  T(F): 96.4 (08-20-22 @ 11:27), Max: 96.4 (08-20-22 @ 11:27)  HR: 80 (08-20-22 @ 16:20) (80 - 91)  BP: 137/61 (08-20-22 @ 16:20) (121/58 - 137/61)  RR: 18 (08-20-22 @ 16:20) (18 - 24)  SpO2: 100% (08-20-22 @ 16:20) (89% - 100%) -> 89% on 4L -> placed on NRB -> BIPAP 2/2 tachypnea and hypoxia -> taken off with increased work of breathing placed back on BIPAP  Labs: WBC 12k, Hgb 7.2 (baseline 9), BNP 55k, Cr 2.2 (baseline 1.5), PCO2 47 (VBG), Troponin 0.24  EKG:   Atrial fibrillation with premature ventricular or aberrantly conducted complexes  Left posterior fascicular block   ST & T wave abnormality, consider lateral ischemia - similar to prior in July 2022  XR chest: significant bilateral pleural effusions and pulmonary congestion  Admitted to medicine for SOB/hypoxia likely 2/2 acute CHF exacerbation.  (20 Aug 2022 16:17)     INTERVAL EVENTS:  Seen at bedside  Patient denied any pain or distress    Allergies    Ozempic (0.25 mg or 0.5 mg dose) (Hives; Rash)  streptomycin (Unknown)  Trulicity Pen (Hives; Rash)    Intolerances    MEDICATIONS  (STANDING):  ALBUTerol    90 MICROgram(s) HFA Inhaler 2 Puff(s) Inhalation every 6 hours  atorvastatin 40 milliGRAM(s) Oral at bedtime  BACItracin   Ointment 1 Application(s) Topical two times a day  buMETAnide Infusion 1 mG/Hr (5 mL/Hr) IV Continuous <Continuous>  chlorhexidine 2% Cloths 1 Application(s) Topical <User Schedule>  dexAMETHasone  Injectable 6 milliGRAM(s) IV Push daily  dextrose 5%. 1000 milliLiter(s) (100 mL/Hr) IV Continuous <Continuous>  dextrose 5%. 1000 milliLiter(s) (50 mL/Hr) IV Continuous <Continuous>  dextrose 50% Injectable 25 Gram(s) IV Push once  dextrose 50% Injectable 12.5 Gram(s) IV Push once  dextrose 50% Injectable 25 Gram(s) IV Push once  enoxaparin Injectable 85 milliGRAM(s) SubCutaneous every 12 hours  famotidine    Tablet 20 milliGRAM(s) Oral daily  glucagon  Injectable 1 milliGRAM(s) IntraMuscular once  insulin glargine Injectable (LANTUS) 16 Unit(s) SubCutaneous at bedtime  insulin lispro (ADMELOG) corrective regimen sliding scale   SubCutaneous three times a day before meals  insulin lispro Injectable (ADMELOG) 6 Unit(s) SubCutaneous three times a day before meals  levothyroxine 150 MICROGram(s) Oral daily  lidocaine 1% (Preservative-free) Injectable 30 milliLiter(s) Local Injection once  lidocaine 1% Injectable 10 milliLiter(s) Local Injection once  lidocaine 1% Injectable 10 milliLiter(s) Local Injection once  melatonin 5 milliGRAM(s) Oral at bedtime  metoprolol succinate  milliGRAM(s) Oral daily  multivitamin/minerals 1 Tablet(s) Oral daily  mycophenolate mofetil 500 milliGRAM(s) Oral four times a day  pentoxifylline 400 milliGRAM(s) Oral two times a day  sacubitril 49 mG/valsartan 51 mG 1 Tablet(s) Oral two times a day  tacrolimus 0.5 milliGRAM(s) Oral two times a day    MEDICATIONS  (PRN):  acetaminophen     Tablet .. 650 milliGRAM(s) Oral every 6 hours PRN Mild Pain (1 - 3)  dextrose Oral Gel 15 Gram(s) Oral once PRN Blood Glucose LESS THAN 70 milliGRAM(s)/deciliter  dextrose Oral Gel 15 Gram(s) Oral once PRN Blood Glucose LESS THAN 70 milliGRAM(s)/deciliter    PRESENT SYMPTOMS: [ ]Unable to obtain due to poor mentation   Source if other than patient:  [ ]Family   [ ]Team     Pain: [ ]yes [ x]no  QOL impact -   Location -                    Aggravating factors -  Quality -  Radiation -  Timing-  Severity (0-10 scale):  Minimal acceptable level (0-10 scale):       Dyspnea:                           [ ]Mild [ ]Moderate [ ]Severe  Anxiety:                             [ ]Mild [ ]Moderate [ ]Severe  Fatigue:                             [ ]Mild [ ]Moderate [ ]Severe  Nausea:                             [ ]Mild [ ]Moderate [ ]Severe  Loss of appetite:              [ ]Mild [ ]Moderate [ ]Severe  Constipation:                    [ ]Mild [ ]Moderate [ ]Severe    Other Symptoms:  [x ]All other review of systems negative     Palliative Performance Status Version 2:        30 %    http://npcrc.org/files/news/palliative_performance_scale_ppsv2.pdf    PHYSICAL EXAM:  Vital Signs Last 24 Hrs  T(C): 36.1 (13 Sep 2022 11:30), Max: 36.2 (12 Sep 2022 20:30)  T(F): 97 (13 Sep 2022 11:30), Max: 97.2 (12 Sep 2022 20:30)  HR: 72 (13 Sep 2022 11:30) (68 - 84)  BP: 125/74 (13 Sep 2022 11:30) (111/56 - 146/67)  BP(mean): 93 (13 Sep 2022 11:30) (85 - 93)  RR: 20 (13 Sep 2022 11:30) (20 - 20)  SpO2: 96% (13 Sep 2022 11:30) (89% - 96%)    Parameters below as of 13 Sep 2022 11:30  Patient On (Oxygen Delivery Method): nasal cannula     I&O's Summary    12 Sep 2022 07:01  -  13 Sep 2022 07:00  --------------------------------------------------------  IN: 55 mL / OUT: 1600 mL / NET: -1545 mL    13 Sep 2022 07:01  -  13 Sep 2022 15:19  --------------------------------------------------------  IN: 35 mL / OUT: 600 mL / NET: -565 mL  GENERAL:  [x]Alert  [ x]Oriented x 2 [ ]Lethargic  [ ]Cachexia  [ ]Unarousable  [ ]Verbal  [ ]Non-Verbal  Behavioral:   [ ] Anxiety  [ ] Delirium [ ] Agitation [ ] Other  HEENT:  [ x]Normal   [ ]Dry mouth   [ ]ET Tube/Trach  [ ]Oral lesions  PULMONARY:   [ ]Clear [ x]Tachypnea  [ ]Audible excessive secretions   [ ]Rhonchi        [ ]Right [ ]Left [ ]Bilateral  [ ]Crackles        [ ]Right [ ]Left [ ]Bilateral  [ ]Wheezing     [ ]Right [ ]Left [ ]Bilateral  [ ]Diminished breath sounds [ ]right [ ]left [ ]bilateral  CARDIOVASCULAR:    [x ]Regular [ ]Irregular [ ]Tachy  [ ]Won [ ]Murmur [ ]Other  GASTROINTESTINAL:  [x ]Soft  [ ]Distended   [ ]+BS  [ ]Non tender [ ]Tender  [ ]PEG [ ]OGT/ NGT  Last BM:   GENITOURINARY:  [x ]Normal [ ] Incontinent   [ ]Oliguria/Anuria   [ ]Gloria  MUSCULOSKELETAL:   [ ]Normal   [ x]Weakness  [ ]Bed/Wheelchair bound [ ]Edema  NEUROLOGIC:   [x ]No focal deficits  [x ]Cognitive impairment  [ ]Dysphagia [ ]Dysarthria [ ]Paresis [ ]Other   SKIN:   [ x]Normal    [ x]No Rash  [ ]Pressure ulcer(s)       Present on admission [ ]y [ ]n    LABS: reviewed                         8.6    7.11  )-----------( 220      ( 13 Sep 2022 02:01 )             26.8   09-13    134<L>  |  93<L>  |  93<HH>  ----------------------------<  300<H>  3.5   |  30  |  1.2    Ca    8.4      13 Sep 2022 02:01  Phos  2.7     09-13  Mg     2.5     09-13    TPro  5.2<L>  /  Alb  2.8<L>  /  TBili  0.9  /  DBili  0.6<H>  /  AST  12  /  ALT  7   /  AlkPhos  84  09-13      RADIOLOGY & ADDITIONAL STUDIES:  reviewed    < from: Xray Chest 1 View- PORTABLE-Routine (Xray Chest 1 View- PORTABLE-Routine in AM.) (09.12.22 @ 06:29) >  Impression:    Bilateral lung opacities and right pleural effusion, unchanged.    < end of copied text >    EKG reviewed    < from: 12 Lead ECG (08.21.22 @ 07:14) >  Ventricular Rate 71 BPM    Atrial Rate 82 BPM    QRS Duration 140 ms    Q-T Interval 444 ms    QTC Calculation(Bazett) 482 ms    R Axis 108 degrees    T Axis 166 degrees    Diagnosis Line Atrial fibrillation with premature ventricular or aberrantlyconducted  complexes  Non-specific intra-ventricular conduction block  ST & T wave abnormality, consider lateral ischemia  Abnormal ECG    < end of copied text >        PROTEIN CALORIE MALNUTRITION PRESENT: [ ]mild [ ]moderate [ ]severe [ ]underweight [ ]morbid obesity  https://www.andeal.org/vault/2440/web/files/ONC/Table_Clinical%20Characteristics%20to%20Document%20Malnutrition-White%20JV%20et%20al%202012.pdf    Height (cm): 188 (08-20-22 @ 11:17), 193 (07-27-22 @ 14:25), 188 (10-20-21 @ 07:00)  Weight (kg): 85 (08-23-22 @ 14:35), 100 (07-05-22 @ 13:03), 107.1 (10-23-21 @ 05:41)  BMI (kg/m2): 24 (08-23-22 @ 14:35), 28.3 (08-20-22 @ 11:17), 26.8 (07-27-22 @ 14:25)    [ ]PPSV2 < or = to 30% [ ]significant weight loss  [ ]poor nutritional intake  [ ]anasarca      [ ]Artificial Nutrition      REFERRALS:   [ ]Chaplaincy  [ ]Hospice  [ ]Child Life  [ x]Social Work  [ ]Case management [ ]Holistic Therapy

## 2022-09-13 NOTE — CHART NOTE - NSCHARTNOTEFT_GEN_A_CORE
Registered Dietitian Follow-Up     Patient Profile Reviewed                           Yes [x]   No []     Nutrition History Previously Obtained        Yes [x]  No []       Pertinent Subjective Information: Spoke w/ pt and his son at bedside. Report that pt always eats 100% of his breakfast, but does not his lunch at all, and at dinner time, usually consumes the dessert/apple sauce. RD observed only cheesecake eaten for lunch, 0% of entree and other items. family has also been bringing Ensure max from home, pt only likes chocolate/strawberry. Discussed other nutrition supplements as well -- agreeable to add. previously recommended diet order not active -- will discuss with MD.      Pertinent Medical Interventions:  #Acute Hypoxic respiratory Failure   #continue minced+moist, mildly thick liquids; 1:1 feed per SLP rec      Diet order:   DASH/TLC {Sodium & Cholesterol Restricted}  1200mL Fluid Restriction ([id number])  Mildly Thick Liquids (MILDTHICKLIQS) (22 @ 15:01) [Active]    Anthropometrics:  Ht: 188 cm.  Wt: 85 kg ( dosing wt)  BMI: 24.1 (using dosing wt 85 kg)  IBW: 86.4 kg    Daily Weight in k.9 (), Weight in k.5 (), Weight in k.2 (), Weight in k (09-10), Weight in k (), Weight in k (), Weight in k.6 (), Weight in k.7 (), Weight in k.3 (), Weight in k.4 (), Weight in k (), Weight in k (), Weight in k (), Weight in k.3 (), Weight in k.8 (), Weight in k.8 (); Weight in k.2 (), Weight in k (), Weight in k.6 ()  Weight gain observed suspected to be r/t fluid related wt changes.    MEDICATIONS  (STANDING):  ALBUTerol    90 MICROgram(s) HFA Inhaler 2 Puff(s) Inhalation every 6 hours  atorvastatin 40 milliGRAM(s) Oral at bedtime  buMETAnide Infusion 1 mG/Hr (5 mL/Hr) IV Continuous <Continuous>  dexAMETHasone  Injectable 6 milliGRAM(s) IV Push daily  dextrose 5%. 1000 milliLiter(s) (100 mL/Hr) IV Continuous <Continuous>  dextrose 5%. 1000 milliLiter(s) (50 mL/Hr) IV Continuous <Continuous>  dextrose 50% Injectable 25 Gram(s) IV Push once  dextrose 50% Injectable 12.5 Gram(s) IV Push once  dextrose 50% Injectable 25 Gram(s) IV Push once  enoxaparin Injectable 85 milliGRAM(s) SubCutaneous every 12 hours  famotidine    Tablet 20 milliGRAM(s) Oral daily  glucagon  Injectable 1 milliGRAM(s) IntraMuscular once  insulin glargine Injectable (LANTUS) 16 Unit(s) SubCutaneous at bedtime  insulin lispro (ADMELOG) corrective regimen sliding scale   SubCutaneous three times a day before meals  levothyroxine 150 MICROGram(s) Oral daily  metoprolol succinate  milliGRAM(s) Oral daily  multivitamin/minerals 1 Tablet(s) Oral daily  mycophenolate mofetil 500 milliGRAM(s) Oral four times a day  pentoxifylline 400 milliGRAM(s) Oral two times a day  sacubitril 49 mG/valsartan 51 mG 1 Tablet(s) Oral two times a day  tacrolimus 0.5 milliGRAM(s) Oral two times a day    MEDICATIONS  (PRN):  dextrose Oral Gel 15 Gram(s) Oral once PRN Blood Glucose LESS THAN 70 milliGRAM(s)/deciliter  dextrose Oral Gel 15 Gram(s) Oral once PRN Blood Glucose LESS THAN 70 milliGRAM(s)/deciliter    Pertinent Labs:  @ 07:49: Na --, BUN --, Cr --, BG --, K+ --, Phos --, Mg --, Alk Phos 84, ALT/SGPT 7, AST/SGOT 12, HbA1c --   @ 02:01: Na 134<L>, BUN 93<HH>, Cr 1.2, <H>, K+ 3.5, Phos 2.7, Mg 2.5<H>, Alk Phos 83, ALT/SGPT 7, AST/SGOT 12, HbA1c --    Finger Sticks:  POCT Blood Glucose.: 211 mg/dL ( @ 11:27)  POCT Blood Glucose.: 253 mg/dL ( @ 07:51)  POCT Blood Glucose.: 336 mg/dL ( @ 21:22)    Physical Findings:  - Appearance: WDL; no new edema  - GI function: WDL, last bowel movement   - Tubes: n/a   - Oral/Mouth cavity: diet texture per SLP reccs   - Skin: stage 2 PI to sacrum and buttocks      Nutrition Requirements: per prev RD assessment   Weight Used: 85 kg     Estimated Energy Needs    Continue [x]  Adjust []  1945-2431kcal/day (MSJ x1.2-1.5)     Estimated Protein Needs    Continue [x]  Adjust []  102-128g/day (1.2-1.5g/kg)     Estimated Fluid Needs        Continue [x]  Adjust []  1700-2125mL/day (20-25mL/kg)     Nutrient Intake: </~50% with meals and picking on items that he likes as wel as unknown intake of Ensure Max      [x] Previous Nutrition Diagnosis: Swallowing difficulty            [x] Ongoing          [] Resolved    [x] Previous Nutrition Diagnosis: Increased nutrient needs            [x] Ongoing          [] Resolved     Nutrition Intervention: Meals & Snacks, Medical Food Supplements    Goal/Expected Outcome: Pt to consume and tolerate >75% of meals/snacks and PO supplements in 4 days.      Nutrition Monitoring:diet order,energy intake,body composition,NFPF, electrolyte profile    Recommendation:   1. cont w/ SLP recc diet order   2. Add Ensure Pudding BID (340kcal/8g PRO)  3. Add prosource Gelatein daily (150kcal/20g PRO)   4. Add ensure Max BID (300kcal/60g PRO)   5. cont w/ multivitamins   6. encourage and assist w/ po intake    Araji via TEAMS   RD remains available: Teresa Rodriguez, FELICITAN [phone].

## 2022-09-13 NOTE — SWALLOW BEDSIDE ASSESSMENT ADULT - NS SPL SWALLOW CLINIC TRIAL FT
+improved toleration of minced and moist solids w/o SOB +toleration of puree, minced and moist and mildly thick liquids w/o overt s/s of penetration/aspiration w/ use of consecutive swallows and a volitional bolus hold w/ mildly thick liquids. Pt demonstrated use of volitional bolus hold and consecutive swallows
pt declined further po trials stating he feels full.
tolerated
Pt re-educated on dysphagia and last instrumental swallow study as well as the need for use of swallowing strategies. +toleration of mildly thick liquids w/ use of volitional bolus hold and multiple swallows w/o overt s/s of penetration/aspiration
+toleration of mildly thick w/ use of vol bolus hold and consecutive swallows, +SOB w/ mastication of minced and moist solids
+toleration of mildly thick liquids, refusing puree, +nasal packing and on O2 FM which is affecting ability to use compensatory strategies and impairing resp/swallow coordination
+toleration of puree and mildly thick liquids w/o immediate overt s/s of penetration/aspiration +delayed coughing noted
+toleration

## 2022-09-13 NOTE — PROGRESS NOTE ADULT - PROBLEM SELECTOR PLAN 4
Improved on continuous oxygen. Will assess need for symptom management. Is more dyspneic when talking.  Pulm follow up, for now off HFNC

## 2022-09-13 NOTE — PROGRESS NOTE ADULT - SUBJECTIVE AND OBJECTIVE BOX
ANDRIA TAVAREZ 82y Male  MRN#: [id number]   Hospital Day: 24d    Hospital Course:   DNR DNI 81 y/o man w PMHx of HTN, HLD, DM2, CAD s/p 3vCABG known to Dr De La Fuente, HFmEF w EF 45-50% on 8/1/22, AF on Eliquis, AAA, COPD baseline rare home O2 known to Dr ALO Mars, CKD3 and C3 glomerulopathy known to Dr Daniel, hypothyroidism, PVD, recent admission 7/5/22-8/3/22 for COVID and BRENDA 2/2 diarrhea, tx w dexamethasone and Zosyn, stay c/b microaspirations, PNA, was d/c to STR, has been on 3L O2, presented on 8/20/22 for SOB x5days and hypoxia to 70s. Had CXR done on 8/15/22 but results had not been back. ED vitals were notable for RR max 24, SpO2 84% on 4L O2, required BiPAP, had WBC 12, HB 7.2 from baseline 9, BNP 55k, Cr 2.2 from baseline 1.5, and trop 0.24, EKG w AF RVR and CXR showing significant b/l pleural effusions, pulm congestion, admitted to medicine for SOB/hypoxia attributed to CHF exacerbation.   Has been on Bumex drip for HF exacerbation and is s/p R thoracentesis 8/26/22 w 1.5L transudative fluid removed. Has had epistaxis during stay. Is s/p 1uPRBC, start 3 day course of IV venofer 200mg. Upgraded to CEU for increasing O2 requirements despite venti mask, HFNC.       9/6/22 day 17: Transition eliquis to Lovenox 80mg BID for possible future repeat thoracentesis. On HFNC; wean as tolerated. Start dexamethasone 6mg PO QD [id number] thru 9/16/22. Consult ID. Decrease Lantus from 22 to 17. ddimer 1035, , CRP 69.1. Keep bumex drip. D/c 3% NS.   9/7/22 day 18: ID recs for COVID: unclear if new infection, will treat as such w RDV x5days; no toci, Planned for thoracentesis #3 today, due to excess bleeding was ?not completed. Started RDV 200mg today, 100mg for next 4 days. Cont dexamethasone 6mg [id number], thru 9/16/22.   9/8/22 day 19: Hb stable ~8. R thoracentesis today, 2L removed. Seen by PT but stated too weak/unwell to participate. Palliative consulted per family request - has been seen by palliative in the past.   9/9/22 day 20: Restarted LMWH today, per nephro tacro level OK. Per CHF: 150cc hypertonic saline over 3h BID, metolazone 5mg x1, BMP BID w Mag, Entresto 49-51. D/c Imdur and Nifedipine.     9/10/22 day 21: Replete magnesium for Mag >2  9/11/22: c/w bumex drip, c/w hypertonic saline   9/12/22: c/w bumex drip, stop hypertonic saline. Started to have contraction alkalosis, given diamox 250 mg once. CXR showed stable bilateral opacities and right sided pleural effusion. Weaned off HFNC to 5 L NC   9/13/22: c/w bumex drip, CRXR showed bilateral opacities and right sided pleural effusion unchanged. Patient had episode of dest today morning while eating ( he removed his oxygen for a bit ) . He was placed on HFNC again for 2 hrs and then weaned off to 5 L NC again. S&S reassessed him for possible aspiration, recommended minced and moist diet with mildly thick liquids.       SUBJECTIVE  Patient is a 82y old Male who presents with a chief complaint of chf exacerbation (13 Sep 2022 15:18)  Currently admitted to medicine with the primary diagnosis of Fluid overload      INTERVAL HPI AND OVERNIGHT EVENTS:  Patient was examined and seen at bedside. This morning he is resting comfortably in bed and reports no issues or overnight events. He had episode of choking in AM which lead to desat. Placed on HFNC again.         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    AF (atrial fibrillation)  s/p DCCV    Carotid stenosis    COPD (chronic obstructive pulmonary disease)    H/O coronary artery bypass surgery  2001    S/P inguinal hernia repair    History of appendectomy      ALLERGIES:  Ozempic (0.25 mg or 0.5 mg dose) (Hives; Rash)  streptomycin (Unknown)  Trulicity Pen (Hives; Rash)    MEDICATIONS:  STANDING MEDICATIONS  ALBUTerol    90 MICROgram(s) HFA Inhaler 2 Puff(s) Inhalation every 6 hours  atorvastatin 40 milliGRAM(s) Oral at bedtime  BACItracin   Ointment 1 Application(s) Topical two times a day  buMETAnide Infusion 1 mG/Hr IV Continuous <Continuous>  chlorhexidine 2% Cloths 1 Application(s) Topical <User Schedule>  dexAMETHasone  Injectable 6 milliGRAM(s) IV Push daily  dextrose 5%. 1000 milliLiter(s) IV Continuous <Continuous>  dextrose 5%. 1000 milliLiter(s) IV Continuous <Continuous>  dextrose 50% Injectable 25 Gram(s) IV Push once  dextrose 50% Injectable 12.5 Gram(s) IV Push once  dextrose 50% Injectable 25 Gram(s) IV Push once  enoxaparin Injectable 85 milliGRAM(s) SubCutaneous every 12 hours  famotidine    Tablet 20 milliGRAM(s) Oral daily  glucagon  Injectable 1 milliGRAM(s) IntraMuscular once  insulin glargine Injectable (LANTUS) 16 Unit(s) SubCutaneous at bedtime  insulin lispro (ADMELOG) corrective regimen sliding scale   SubCutaneous three times a day before meals  insulin lispro Injectable (ADMELOG) 6 Unit(s) SubCutaneous three times a day before meals  levothyroxine 150 MICROGram(s) Oral daily  lidocaine 1% (Preservative-free) Injectable 30 milliLiter(s) Local Injection once  lidocaine 1% Injectable 10 milliLiter(s) Local Injection once  lidocaine 1% Injectable 10 milliLiter(s) Local Injection once  melatonin 5 milliGRAM(s) Oral at bedtime  metoprolol succinate  milliGRAM(s) Oral daily  multivitamin/minerals 1 Tablet(s) Oral daily  mycophenolate mofetil 500 milliGRAM(s) Oral four times a day  pentoxifylline 400 milliGRAM(s) Oral two times a day  sacubitril 49 mG/valsartan 51 mG 1 Tablet(s) Oral two times a day  tacrolimus 0.5 milliGRAM(s) Oral two times a day    PRN MEDICATIONS  acetaminophen     Tablet .. 650 milliGRAM(s) Oral every 6 hours PRN  dextrose Oral Gel 15 Gram(s) Oral once PRN  dextrose Oral Gel 15 Gram(s) Oral once PRN      VITAL SIGNS: Last 24 Hours  T(C): 36.2 (13 Sep 2022 15:30), Max: 36.2 (12 Sep 2022 20:30)  T(F): 97.2 (13 Sep 2022 15:30), Max: 97.2 (12 Sep 2022 20:30)  HR: 81 (13 Sep 2022 15:30) (68 - 84)  BP: 104/52 (13 Sep 2022 15:30) (104/52 - 146/67)  BP(mean): 93 (13 Sep 2022 11:30) (85 - 93)  RR: 20 (13 Sep 2022 15:30) (20 - 20)  SpO2: 99% (13 Sep 2022 15:30) (89% - 99%)    LABS:                        8.6    7.11  )-----------( 220      ( 13 Sep 2022 02:01 )             26.8     09-13    134<L>  |  93<L>  |  93<HH>  ----------------------------<  300<H>  3.5   |  30  |  1.2    Ca    8.4      13 Sep 2022 02:01  Phos  2.7     09-13  Mg     2.5     09-13    TPro  5.2<L>  /  Alb  2.8<L>  /  TBili  0.9  /  DBili  0.6<H>  /  AST  12  /  ALT  7   /  AlkPhos  84  09-13    RADIOLOGY:  CXR 9/13/22: bilateral opacities, right sided pleural effusion, unchanged       PHYSICAL EXAM:  CONSTITUTIONAL: No acute distress, well-developed, AAOx2 ( not oriented to place, [place] )   HEAD: Atraumatic, normocephalic  EYES: EOM intact, PERRLA, conjunctiva and sclera clear  ENT: Supple, no masses, no thyromegaly, no bruits, no JVD; moist mucous membranes  PULMONARY: good bilateral air entry, mild bilateral basilar crackles   CARDIOVASCULAR: irregularly irregular  rhythm; no murmurs, rubs, or gallops  GASTROINTESTINAL: Soft, non-tender, non-distended; bowel sounds present  MUSCULOSKELETAL: 2+ peripheral pulses; 2+ bilateral lower limb pitting edema reaching below knees, chronic bilateral hyperpigmentation of LE   NEUROLOGY: non-focal, 5/5 motor power in upper and lower extremities, no sensory loss    SKIN: No rashes or lesions; warm and dry      ASSESSMENT & PLAN  81 y/o man w PMHx of HTN, HLD, DM2, CAD s/p 3vCABG known to Dr De La Fuente, HFmEF w EF 45-50% on 8/1/22, AF on Eliquis, AAA, COPD baseline rare home O2 known to Dr ALO Mars, CKD3 and C3 glomerulopathy known to Dr Daniel, hypothyroidism, PVD, presented on 8/20/22 for SOB x5days and hypoxia to 70s. Admitted to medicine for SOB/hypoxia attributed to CHF exacerbation.   Has been on Bumex drip for HF exacerbation and is s/p R thoracentesis 8/26/22 w 1.5L transudative fluid removed, recurrent R thoracentesis 9/8/22.       #Acute Hypoxic Respiratory Failure  #Acute HFmrEF  #Recurrent pleural effusion   #COPD, covid -19   - HFmrEF w EF 45-50% on 8/1/22; multiple LV regional wall motion abnormalities, mild MR, mild TR, borderline pulm HTN   - COPD baseline rare home O2, 3L O2 since last admission known to Dr ALO Mars  - Thoracentesis 8/26/22 w 1.5L removed, transudative fluid. Repeat 9/8/22, 2L removed.   - CXR 9/13/22 w/o significant change in b/l opacities/effusions   - weaned from HFNC to 5 L NC  - Continue Bumex drip at 1mg/hr  - Heart failure: 150cc hypertonic saline over 3h BID, metolazone 5mg x1, BMP BID w Mag, Entresto 49-51. D/c Imdur and Nifedipine.   - Pulmonary following, recommended stopping hypertonic saline   - ID: Tx as new COVID infection: s/p remdesivir for 5 days, Cont dexamethasone 6mg PO QD [id number] thru 9/16/22  - f/o pleural fluid cx   - S&S eval today bcz of episode of choking: continue minced and moist diet     #Alkalosis- Improving   - probably contraction alkalosis in the setting of diuresis   - given diamox 250 mg once 9/12/22  - CO2 30 < 32     #BRENDA  #HypoNa - resolved.   Baseline Cr 1.1, Cr 1.2 on 9/12/22, near resolved.   HypoNa resolved - now 136  Prior HypoNa may be due to dehydration/poor PO intake     #CAD  Continue Metoprolol 100mg QD  Imdur 30mg QD and nefidipine stopped   On entresto 2z daily   c/w Atorvastatin 40mg qhs.     #HTN:   Continue metoprolol  nifedipine stopped given HFmrEF  -started on entresto   - Current SBP range 120s-130s    #DM II: A1C 7.3.   - was on  Lantus to 20u, Off Lispro.   - Hypoglycemia 9/12 59, decreased lantus to 16 units   - Blood glucose 253 today, added lispro 6 TID     #Chronic anemia; goal Hb 8+  #Epistaxis - resolved   - Hb stable 8.6   s/p 1 U PRBC 8/22/22 and 8/24/22 for Hb <8  s/p IV Venofer 200mg daily for 3 days.   ENT consulted 8/23/22 for epistaxis/ blood clots blocking nasal passage     #Chronic Atrial Fibrillation: continue metoprolol, on therapeutic lovenox instead of Eliquis in case thoracentesis needed   #C3 Glomerulonephropathy: on  Pred 5mg QD at home, [place] 0.5mg BID, Mycophenolate 500mg 4 times daily. c/w home meds ( on dexamethasone 6 mg IV daily, pred stopped for now )   #Leukocytosis-resolved : likely from steroids; monitor off abx   #Hypothyroidism: continue synthroid  #Stage 2 right buttock pressure ulcer: present on admission. Continue local wound care, off loading.                                                                     # DVT prophylaxis: Transition eliquis to Lovenox 85mg BID for repeat thoracentesis. Restarted LMWH 9/9/22.    # GI prophylaxis: Famotidine 20mg QD   # Diet: Minced and moist   # Activity: IAT - did not participate w PT 9/8/22 due to fatigue   # Code status: DNR DNI, palliative on board   # Disposition: Keep in CEU for now

## 2022-09-13 NOTE — SWALLOW BEDSIDE ASSESSMENT ADULT - SWALLOW EVAL: RECOMMENDED FEEDING/EATING TECHNIQUES
volitional bolus hold/allow for swallow between intakes/oral hygiene/position upright (90 degrees)/small sips/bites

## 2022-09-13 NOTE — PROGRESS NOTE ADULT - SUBJECTIVE AND OBJECTIVE BOX
KANGNAVI ANDRIA  82y Male    CHIEF COMPLAINT:    Patient is a 82y old  Male who presents with a chief complaint of chf exacerbation (13 Sep 2022 08:21)    INTERVAL HPI/OVERNIGHT EVENTS:    Patient seen and examined. No acute events overnight. Choking episode this AM while eating breakfast, resolved     ROS: All other systems are negative.    Vital Signs:    T(F): 97.2 (22 @ 08:16), Max: 97.2 (22 @ 20:30)  HR: 75 (22 @ 08:16) (68 - 84)  BP: 126/59 (22 @ 08:16) (111/56 - 146/67)  RR: 20 (22 @ 08:16) (20 - 20)  SpO2: 93% (22 @ 08:16) (89% - 94%)    12 Sep 2022 07:01  -  13 Sep 2022 07:00  --------------------------------------------------------  IN: 55 mL / OUT: 1600 mL / NET: -1545 mL     Daily Weight in k.9 (13 Sep 2022 03:34)    POCT Blood Glucose.: 253 mg/dL (13 Sep 2022 07:51)  POCT Blood Glucose.: 336 mg/dL (12 Sep 2022 21:22)  POCT Blood Glucose.: 208 mg/dL (12 Sep 2022 11:17)    PHYSICAL EXAM:    GENERAL:  NAD chronically ill appearing   SKIN: No rashes or lesions  HEENT: Atraumatic. Normocephalic.  NECK: Supple, No JVD.  PULMONARY: Crackles B/L. No wheezing.   CVS: Normal S1, S2. Rate and Rhythm are regular.    ABDOMEN/GI: Soft, Nontender, Nondistended   MSK:  No clubbing or cyanosis   NEUROLOGIC: Moves all extremities   PSYCH: Awake and alert    Consultant(s) Notes Reviewed:  [x ] YES  [ ] NO  Care Discussed with Consultants/Other Providers [ x] YES  [ ] NO    LABS:                        8.6    7.11  )-----------( 220      ( 13 Sep 2022 02:01 )             26.8     134<L>  |  93<L>  |  93<HH>  ----------------------------<  300<H>  3.5   |  30  |  1.2    Ca    8.4      13 Sep 2022 02:01  Phos  2.7       Mg     2.5         TPro  5.2<L>  /  Alb  2.8<L>  /  TBili  0.9  /  DBili  0.6<H>  /  AST  12  /  ALT  7   /  AlkPhos  84      RADIOLOGY & ADDITIONAL TESTS:  Imaging or report Personally Reviewed:  [x] YES  [ ] NO  EKG reviewed: [x] YES  [ ] NO    Medications:  Standing  ALBUTerol    90 MICROgram(s) HFA Inhaler 2 Puff(s) Inhalation every 6 hours  atorvastatin 40 milliGRAM(s) Oral at bedtime  BACItracin   Ointment 1 Application(s) Topical two times a day  buMETAnide Infusion 1 mG/Hr IV Continuous <Continuous>  chlorhexidine 2% Cloths 1 Application(s) Topical <User Schedule>  dexAMETHasone  Injectable 6 milliGRAM(s) IV Push daily  dextrose 5%. 1000 milliLiter(s) IV Continuous <Continuous>  dextrose 5%. 1000 milliLiter(s) IV Continuous <Continuous>  dextrose 50% Injectable 25 Gram(s) IV Push once  dextrose 50% Injectable 12.5 Gram(s) IV Push once  dextrose 50% Injectable 25 Gram(s) IV Push once  enoxaparin Injectable 85 milliGRAM(s) SubCutaneous every 12 hours  famotidine    Tablet 20 milliGRAM(s) Oral daily  glucagon  Injectable 1 milliGRAM(s) IntraMuscular once  insulin glargine Injectable (LANTUS) 16 Unit(s) SubCutaneous at bedtime  insulin lispro (ADMELOG) corrective regimen sliding scale   SubCutaneous three times a day before meals  levothyroxine 150 MICROGram(s) Oral daily  lidocaine 1% (Preservative-free) Injectable 30 milliLiter(s) Local Injection once  lidocaine 1% Injectable 10 milliLiter(s) Local Injection once  lidocaine 1% Injectable 10 milliLiter(s) Local Injection once  melatonin 5 milliGRAM(s) Oral at bedtime  metoprolol succinate  milliGRAM(s) Oral daily  multivitamin/minerals 1 Tablet(s) Oral daily  mycophenolate mofetil 500 milliGRAM(s) Oral four times a day  pentoxifylline 400 milliGRAM(s) Oral two times a day  sacubitril 49 mG/valsartan 51 mG 1 Tablet(s) Oral two times a day  tacrolimus 0.5 milliGRAM(s) Oral two times a day    PRN Meds  acetaminophen     Tablet .. 650 milliGRAM(s) Oral every 6 hours PRN  dextrose Oral Gel 15 Gram(s) Oral once PRN  dextrose Oral Gel 15 Gram(s) Oral once PRN

## 2022-09-13 NOTE — PROGRESS NOTE ADULT - SUBJECTIVE AND OBJECTIVE BOX
Patient is a 82y old  Male who presents with a chief complaint of chf exacerbation (12 Sep 2022 16:46)        Over Night Events: Pt had a chocking event. remains on bumex 1 mg /hr. Fluid cultures noted         ROS:     All ROS are negative except HPI         PHYSICAL EXAM    ICU Vital Signs Last 24 Hrs  T(C): 36.2 (13 Sep 2022 08:16), Max: 36.2 (12 Sep 2022 20:30)  T(F): 97.2 (13 Sep 2022 08:16), Max: 97.2 (12 Sep 2022 20:30)  HR: 75 (13 Sep 2022 08:16) (68 - 84)  BP: 126/59 (13 Sep 2022 08:16) (111/56 - 146/67)  BP(mean): 85 (13 Sep 2022 08:16) (85 - 85)  RR: 20 (13 Sep 2022 08:16) (20 - 20)  SpO2: 93% (13 Sep 2022 08:16) (89% - 94%)    O2 Parameters below as of 13 Sep 2022 08:16  Patient On (Oxygen Delivery Method): nasal cannula            CONSTITUTIONAL:  ill appearing   on HHFNC    ENT:   Airway patent,   Mouth with normal mucosa.             CARDIAC:   Normal rate,   Regular rhythm.    LE edema        RESPIRATORY:   No wheezing  Bilateral BS  Normal chest expansion  Not tachypneic,  No use of accessory muscles    GASTROINTESTINAL:  Abdomen soft,   Non-tender,   No guarding,   + BS         NEUROLOGICAL:   Alert and oriented   No motor  deficits.    SKIN:   sacral stage 2     09-12-22 @ 07:01  -  09-13-22 @ 07:00  --------------------------------------------------------  IN:    Bumetanide: 55 mL  Total IN: 55 mL    OUT:    Indwelling Catheter - Urethral (mL): 1600 mL  Total OUT: 1600 mL    Total NET: -1545 mL          LABS:                            8.6    7.11  )-----------( 220      ( 13 Sep 2022 02:01 )             26.8                                               09-13    134<L>  |  93<L>  |  93<HH>  ----------------------------<  300<H>  3.5   |  30  |  1.2    Ca    8.4      13 Sep 2022 02:01  Phos  2.7     09-13  Mg     2.5     09-13    TPro  5.2<L>  /  Alb  2.8<L>  /  TBili  0.9  /  DBili  0.6<H>  /  AST  12  /  ALT  7   /  AlkPhos  84  09-13                                                                                           LIVER FUNCTIONS - ( 13 Sep 2022 07:49 )  Alb: 2.8 g/dL / Pro: 5.2 g/dL / ALK PHOS: 84 U/L / ALT: 7 U/L / AST: 12 U/L / GGT: x                                                                                                                                       MEDICATIONS  (STANDING):  ALBUTerol    90 MICROgram(s) HFA Inhaler 2 Puff(s) Inhalation every 6 hours  atorvastatin 40 milliGRAM(s) Oral at bedtime  BACItracin   Ointment 1 Application(s) Topical two times a day  buMETAnide Infusion 1 mG/Hr (5 mL/Hr) IV Continuous <Continuous>  chlorhexidine 2% Cloths 1 Application(s) Topical <User Schedule>  dexAMETHasone  Injectable 6 milliGRAM(s) IV Push daily  dextrose 5%. 1000 milliLiter(s) (100 mL/Hr) IV Continuous <Continuous>  dextrose 5%. 1000 milliLiter(s) (50 mL/Hr) IV Continuous <Continuous>  dextrose 50% Injectable 25 Gram(s) IV Push once  dextrose 50% Injectable 25 Gram(s) IV Push once  dextrose 50% Injectable 12.5 Gram(s) IV Push once  enoxaparin Injectable 85 milliGRAM(s) SubCutaneous every 12 hours  famotidine    Tablet 20 milliGRAM(s) Oral daily  glucagon  Injectable 1 milliGRAM(s) IntraMuscular once  insulin glargine Injectable (LANTUS) 16 Unit(s) SubCutaneous at bedtime  insulin lispro (ADMELOG) corrective regimen sliding scale   SubCutaneous three times a day before meals  levothyroxine 150 MICROGram(s) Oral daily  lidocaine 1% (Preservative-free) Injectable 30 milliLiter(s) Local Injection once  lidocaine 1% Injectable 10 milliLiter(s) Local Injection once  lidocaine 1% Injectable 10 milliLiter(s) Local Injection once  melatonin 5 milliGRAM(s) Oral at bedtime  metoprolol succinate  milliGRAM(s) Oral daily  multivitamin/minerals 1 Tablet(s) Oral daily  mycophenolate mofetil 500 milliGRAM(s) Oral four times a day  pentoxifylline 400 milliGRAM(s) Oral two times a day  potassium chloride   Powder 40 milliEquivalent(s) Oral once  sacubitril 49 mG/valsartan 51 mG 1 Tablet(s) Oral two times a day  tacrolimus 0.5 milliGRAM(s) Oral two times a day    MEDICATIONS  (PRN):  acetaminophen     Tablet .. 650 milliGRAM(s) Oral every 6 hours PRN Mild Pain (1 - 3)  dextrose Oral Gel 15 Gram(s) Oral once PRN Blood Glucose LESS THAN 70 milliGRAM(s)/deciliter  dextrose Oral Gel 15 Gram(s) Oral once PRN Blood Glucose LESS THAN 70 milliGRAM(s)/deciliter         CXR interpreted by me:  Right worse than left effusion      Patient is a 82y old  Male who presents with a chief complaint of chf exacerbation (12 Sep 2022 16:46)        Over Night Events: Pt had a chocking event. remains on bumex 1 mg /hr. Fluid cultures noted feels better        PHYSICAL EXAM    ICU Vital Signs Last 24 Hrs  T(C): 36.2 (13 Sep 2022 08:16), Max: 36.2 (12 Sep 2022 20:30)  T(F): 97.2 (13 Sep 2022 08:16), Max: 97.2 (12 Sep 2022 20:30)  HR: 75 (13 Sep 2022 08:16) (68 - 84)  BP: 126/59 (13 Sep 2022 08:16) (111/56 - 146/67)  BP(mean): 85 (13 Sep 2022 08:16) (85 - 85)  RR: 20 (13 Sep 2022 08:16) (20 - 20)  SpO2: 93% (13 Sep 2022 08:16) (89% - 94%)    O2 Parameters below as of 13 Sep 2022 08:16  Patient On (Oxygen Delivery Method): nasal cannula            CONSTITUTIONAL:  ill appearing   on HHFNC    ENT:   Airway patent,   Mouth with normal mucosa.             CARDIAC:   gemini 2.6  LE edema        RESPIRATORY:   dec bs both bases    GASTROINTESTINAL:  Abdomen soft,   Non-tender,   No guarding,   + BS         NEUROLOGICAL:   Alert and oriented   No motor  deficits.    SKIN:   sacral stage 2     09-12-22 @ 07:01  -  09-13-22 @ 07:00  --------------------------------------------------------  IN:    Bumetanide: 55 mL  Total IN: 55 mL    OUT:    Indwelling Catheter - Urethral (mL): 1600 mL  Total OUT: 1600 mL    Total NET: -1545 mL          LABS:                            8.6    7.11  )-----------( 220      ( 13 Sep 2022 02:01 )             26.8                                               09-13    134<L>  |  93<L>  |  93<HH>  ----------------------------<  300<H>  3.5   |  30  |  1.2    Ca    8.4      13 Sep 2022 02:01  Phos  2.7     09-13  Mg     2.5     09-13    TPro  5.2<L>  /  Alb  2.8<L>  /  TBili  0.9  /  DBili  0.6<H>  /  AST  12  /  ALT  7   /  AlkPhos  84  09-13                                                                                           LIVER FUNCTIONS - ( 13 Sep 2022 07:49 )  Alb: 2.8 g/dL / Pro: 5.2 g/dL / ALK PHOS: 84 U/L / ALT: 7 U/L / AST: 12 U/L / GGT: x                                                                                                                                       MEDICATIONS  (STANDING):  ALBUTerol    90 MICROgram(s) HFA Inhaler 2 Puff(s) Inhalation every 6 hours  atorvastatin 40 milliGRAM(s) Oral at bedtime  BACItracin   Ointment 1 Application(s) Topical two times a day  buMETAnide Infusion 1 mG/Hr (5 mL/Hr) IV Continuous <Continuous>  chlorhexidine 2% Cloths 1 Application(s) Topical <User Schedule>  dexAMETHasone  Injectable 6 milliGRAM(s) IV Push daily  dextrose 5%. 1000 milliLiter(s) (100 mL/Hr) IV Continuous <Continuous>  dextrose 5%. 1000 milliLiter(s) (50 mL/Hr) IV Continuous <Continuous>  dextrose 50% Injectable 25 Gram(s) IV Push once  dextrose 50% Injectable 25 Gram(s) IV Push once  dextrose 50% Injectable 12.5 Gram(s) IV Push once  enoxaparin Injectable 85 milliGRAM(s) SubCutaneous every 12 hours  famotidine    Tablet 20 milliGRAM(s) Oral daily  glucagon  Injectable 1 milliGRAM(s) IntraMuscular once  insulin glargine Injectable (LANTUS) 16 Unit(s) SubCutaneous at bedtime  insulin lispro (ADMELOG) corrective regimen sliding scale   SubCutaneous three times a day before meals  levothyroxine 150 MICROGram(s) Oral daily  lidocaine 1% (Preservative-free) Injectable 30 milliLiter(s) Local Injection once  lidocaine 1% Injectable 10 milliLiter(s) Local Injection once  lidocaine 1% Injectable 10 milliLiter(s) Local Injection once  melatonin 5 milliGRAM(s) Oral at bedtime  metoprolol succinate  milliGRAM(s) Oral daily  multivitamin/minerals 1 Tablet(s) Oral daily  mycophenolate mofetil 500 milliGRAM(s) Oral four times a day  pentoxifylline 400 milliGRAM(s) Oral two times a day  potassium chloride   Powder 40 milliEquivalent(s) Oral once  sacubitril 49 mG/valsartan 51 mG 1 Tablet(s) Oral two times a day  tacrolimus 0.5 milliGRAM(s) Oral two times a day    MEDICATIONS  (PRN):  acetaminophen     Tablet .. 650 milliGRAM(s) Oral every 6 hours PRN Mild Pain (1 - 3)  dextrose Oral Gel 15 Gram(s) Oral once PRN Blood Glucose LESS THAN 70 milliGRAM(s)/deciliter  dextrose Oral Gel 15 Gram(s) Oral once PRN Blood Glucose LESS THAN 70 milliGRAM(s)/deciliter         CXR interpreted by me:  Right worse than left effusion

## 2022-09-13 NOTE — PROGRESS NOTE ADULT - SUBJECTIVE AND OBJECTIVE BOX
ANDRIA TAVAREZ  82y, Male    All available historical data reviewed    OVERNIGHT EVENTS:  no fevers  feels well and has no new complaints  93% NC 3 LIT    ROS:  General: Denies rigors, nightsweats  HEENT: Denies headache, rhinorrhea, sore throat, eye pain  CV: Denies CP, palpitations  PULM: Denies wheezing, hemoptysis  GI: Denies hematemesis, hematochezia, melena  : Denies discharge, hematuria  MSK: Denies arthralgias, myalgias  SKIN: Denies rash, lesions  NEURO: Denies paresthesias, weakness  PSYCH: Denies depression, anxiety    VITALS:  T(F): 97.2, Max: 97.2 (09-12-22 @ 20:30)  HR: 75  BP: 126/59  RR: 20Vital Signs Last 24 Hrs  T(C): 36.2 (13 Sep 2022 08:16), Max: 36.2 (12 Sep 2022 20:30)  T(F): 97.2 (13 Sep 2022 08:16), Max: 97.2 (12 Sep 2022 20:30)  HR: 75 (13 Sep 2022 08:16) (68 - 84)  BP: 126/59 (13 Sep 2022 08:16) (111/56 - 146/67)  BP(mean): 85 (13 Sep 2022 08:16) (85 - 85)  RR: 20 (13 Sep 2022 08:16) (20 - 20)  SpO2: 93% (13 Sep 2022 08:16) (89% - 94%)    Parameters below as of 13 Sep 2022 08:16  Patient On (Oxygen Delivery Method): nasal cannula        TESTS & MEASUREMENTS:                        8.6    7.11  )-----------( 220      ( 13 Sep 2022 02:01 )             26.8     09-13    134<L>  |  93<L>  |  93<HH>  ----------------------------<  300<H>  3.5   |  30  |  1.2    Ca    8.4      13 Sep 2022 02:01  Phos  2.7     09-13  Mg     2.5     09-13    TPro  5.2<L>  /  Alb  2.8<L>  /  TBili  0.9  /  DBili  0.6<H>  /  AST  12  /  ALT  7   /  AlkPhos  84  09-13    LIVER FUNCTIONS - ( 13 Sep 2022 07:49 )  Alb: 2.8 g/dL / Pro: 5.2 g/dL / ALK PHOS: 84 U/L / ALT: 7 U/L / AST: 12 U/L / GGT: x             Culture - Fungal, Body Fluid (collected 09-08-22 @ 13:22)  Source: Pleural Fl Pleural Fluid  Preliminary Report (09-09-22 @ 07:23):    Testing in progress    Culture - Body Fluid with Gram Stain (collected 09-08-22 @ 13:22)  Source: Pleural Fl Pleural Fluid  Gram Stain (09-09-22 @ 02:32):    polymorphonuclear leukocytes seen    No organisms seen    by cytocentrifuge  Preliminary Report (09-09-22 @ 20:07):    No growth            RADIOLOGY & ADDITIONAL TESTS:  Personal review of radiological diagnostics performed  Echo and EKG results noted when applicable.     MEDICATIONS:  acetaminophen     Tablet .. 650 milliGRAM(s) Oral every 6 hours PRN  ALBUTerol    90 MICROgram(s) HFA Inhaler 2 Puff(s) Inhalation every 6 hours  atorvastatin 40 milliGRAM(s) Oral at bedtime  BACItracin   Ointment 1 Application(s) Topical two times a day  buMETAnide Infusion 1 mG/Hr IV Continuous <Continuous>  chlorhexidine 2% Cloths 1 Application(s) Topical <User Schedule>  dexAMETHasone  Injectable 6 milliGRAM(s) IV Push daily  dextrose 5%. 1000 milliLiter(s) IV Continuous <Continuous>  dextrose 5%. 1000 milliLiter(s) IV Continuous <Continuous>  dextrose 50% Injectable 25 Gram(s) IV Push once  dextrose 50% Injectable 12.5 Gram(s) IV Push once  dextrose 50% Injectable 25 Gram(s) IV Push once  dextrose Oral Gel 15 Gram(s) Oral once PRN  dextrose Oral Gel 15 Gram(s) Oral once PRN  enoxaparin Injectable 85 milliGRAM(s) SubCutaneous every 12 hours  famotidine    Tablet 20 milliGRAM(s) Oral daily  glucagon  Injectable 1 milliGRAM(s) IntraMuscular once  insulin glargine Injectable (LANTUS) 16 Unit(s) SubCutaneous at bedtime  insulin lispro (ADMELOG) corrective regimen sliding scale   SubCutaneous three times a day before meals  levothyroxine 150 MICROGram(s) Oral daily  lidocaine 1% (Preservative-free) Injectable 30 milliLiter(s) Local Injection once  lidocaine 1% Injectable 10 milliLiter(s) Local Injection once  lidocaine 1% Injectable 10 milliLiter(s) Local Injection once  melatonin 5 milliGRAM(s) Oral at bedtime  metoprolol succinate  milliGRAM(s) Oral daily  multivitamin/minerals 1 Tablet(s) Oral daily  mycophenolate mofetil 500 milliGRAM(s) Oral four times a day  pentoxifylline 400 milliGRAM(s) Oral two times a day  sacubitril 49 mG/valsartan 51 mG 1 Tablet(s) Oral two times a day  tacrolimus 0.5 milliGRAM(s) Oral two times a day      ANTIBIOTICS:

## 2022-09-13 NOTE — PROGRESS NOTE ADULT - ASSESSMENT
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     Acute Hypoxic respiratory Failure   Acute HFmrEF  Bilateral pleural effusion Right>Left.  COPD  COVID 19 infection +9/2    previously on HFNC, now on NC 6 L saturating low 90s  s/p thoracentesis 8/2 fluid transudative.   s/p thoracentesis 9/8 with 1.9 L removed, transudative, f/u studies  Continue Bumex drip at 1mg/hr, hold hypertonic saline  s/p DIamox x1 9/12, replete K to 4 and Mg 2  check VBG  renal fxn stable  Continue with dexamethasone 6mg daily, steroids started 9/6   DDimer 1000, LE duplex 9/6 negative for DVTs  on therapeutic Lovenox (previous eliquis)  s/s eval given choking episode today   PT    Epistaxis - resolved  ENT follow up appreciated, packing removed  monitor H&H    Leukocytosis, resolved - likely from steroids    Acute on chronic anemia - s/p 1 U PRBC and 3 days of IV venofer   monitor H&H, keep active T&S    Chronic Atrial Fibrillation - continue metoprolol and lovenox. Resume Eliquis once no procedures planned.     C3 Glomerulonephropathy - Continue tacrolimus 0.5mg BID and Mycophenolate 500mg 4 times daily. On chronic prednisone 5mg at home, now on dexamethasone. Repeat Tacrolimus level 9/8 5.4, f/u renal      CAD /HTN/DLD- Continue Metoprolol, entresto and Lipitor.     Hypothyroidism - continue synthroid    DM II: A1C 7.3 - continue with insulin, add Lispro 5u with meals, monitor FS and PO intake     Stage 2 right buttock pressure ulcer, Present on admission  Continue local wound care, off loading.     #Progress Note Handoff:  Pending (specify): tapering down supplemental oxygen, , labs, diuresis, s/s f/u  Family discussion: spoke with pt  Disposition: Home

## 2022-09-13 NOTE — PROGRESS NOTE ADULT - ASSESSMENT
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    AFib, chronic - on eliquis   CAD s/p CABG  H/O DLD, HTN, DM2, Hypothyroidism     PLAN:    CNS:  no depressants     HEENT: Oral care    PULMONARY:  HOB @ 45 degrees.  Aspiration precautions.  Wean O2,  thora cultures reviewed     CARDIOVASCULAR:  Continue diuresis keep - balance. PH     GI: GI prophylaxis.  Feeding.  Bowel regimen . speech and swallow eval     RENAL:  Follow up lytes.  Correct as needed    INFECTIOUS DISEASE: Follow up cultures.  ID eval appreciated.  Dexa 7 mg daily D # 7     HEMATOLOGICAL:  DVT prophylaxis.      ENDOCRINE:  Follow up FS.  Insulin protocol if needed.    MUSCULOSKELETAL:  OOB to chair.  PT OT        SDU  Impression    Recurrent transudative right pleural effusion  S/p right thoracentesis/ transudate x2  Acute hypoxemic resp failure  Pul edema  COVID 19 infection   COPD (was on prednisone in [place])  C3 glomerulopathy - on prograf and cellcept at home  HFMrEF.    AFib, chronic - on eliquis   CAD s/p CABG  H/O DLD, HTN, DM2, Hypothyroidism     PLAN:    CNS:  no depressants     HEENT: Oral care    PULMONARY:  HOB @ 45 degrees.  Aspiration precautions.  Wean O2,  thora cultures reviewed , Speech and swallow eval    CARDIOVASCULAR:  Continue diuresis keep - balance. ABG    GI: GI prophylaxis.  Feeding.  Bowel regimen . speech and swallow eval     RENAL:  Follow up lytes.  Correct as needed    INFECTIOUS DISEASE: Follow up cultures.  ID eval appreciated.  Dexa 7 mg daily D # 7     HEMATOLOGICAL:  DVT prophylaxis.      ENDOCRINE:  Follow up FS.  Insulin protocol if needed.    MUSCULOSKELETAL:  OOB to chair.  PT OT        SDU

## 2022-09-14 NOTE — SWALLOW BEDSIDE ASSESSMENT ADULT - SWALLOW EVAL: FEEDING ASSISTANCE
cues to use strategies/dependent
dependent
1:1 feed/frequent cues/help required
frequent cues/help required
dependent
1:1 feed/frequent cues/help required
frequent cues/help required

## 2022-09-14 NOTE — PROGRESS NOTE ADULT - SUBJECTIVE AND OBJECTIVE BOX
T H I S   I S    N O  T   A    F I N A L I Z E D   N O T E      ANDRIA TAVAREZ  82y, Male  Allergy: Ozempic (0.25 mg or 0.5 mg dose) (Hives; Rash)  streptomycin (Unknown)  Trulicity Pen (Hives; Rash)    Hospital Day: 25d    Patient seen and examined earlier today.     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      AF (atrial fibrillation)  s/p DCCV      Carotid stenosis      COPD (chronic obstructive pulmonary disease)      H/O coronary artery bypass surgery  2001      S/P inguinal hernia repair      History of appendectomy          LAST 24-Hr EVENTS:    VITALS:  T(F): 97.4 (09-14-22 @ 11:58), Max: 97.4 (09-14-22 @ 11:58)  HR: 81 (09-14-22 @ 11:58)  BP: 111/57 (09-14-22 @ 11:58) (104/52 - 132/61)  RR: 20 (09-14-22 @ 11:58)  SpO2: 90% (09-14-22 @ 11:58)          TESTS & MEASUREMENTS:  Weight/BMI      09-12-22 @ 07:01  -  09-13-22 @ 07:00  --------------------------------------------------------  IN: 55 mL / OUT: 1600 mL / NET: -1545 mL    09-13-22 @ 07:01  -  09-14-22 @ 07:00  --------------------------------------------------------  IN: 110 mL / OUT: 2000 mL / NET: -1890 mL                            8.4    11.21 )-----------( 246      ( 14 Sep 2022 01:42 )             26.5         09-14    133<L>  |  93<L>  |  97<HH>  ----------------------------<  152<H>  3.3<L>   |  32  |  1.2    Ca    8.2<L>      14 Sep 2022 01:42  Phos  2.4     09-14  Mg     2.3     09-14    TPro  5.3<L>  /  Alb  3.0<L>  /  TBili  0.8  /  DBili  0.5<H>  /  AST  13  /  ALT  8   /  AlkPhos  80  09-14    LIVER FUNCTIONS - ( 14 Sep 2022 06:30 )  Alb: 3.0 g/dL / Pro: 5.3 g/dL / ALK PHOS: 80 U/L / ALT: 8 U/L / AST: 13 U/L / GGT: x                 Culture - Fungal, Body Fluid (collected 09-08-22 @ 13:22)  Source: Pleural Fl Pleural Fluid  Preliminary Report (09-09-22 @ 07:23):    Testing in progress    Culture - Body Fluid with Gram Stain (collected 09-08-22 @ 13:22)  Source: Pleural Fl Pleural Fluid  Gram Stain (09-09-22 @ 02:32):    polymorphonuclear leukocytes seen    No organisms seen    by cytocentrifuge  Final Report (09-13-22 @ 17:01):    No growth at 5 days        Procalcitonin, Serum: 0.29 ng/mL (09-06-22 @ 12:11)    D-Dimer Assay, Quantitative: 1035 ng/mL DDU (09-06-22 @ 12:11)    Ferritin, Serum: 957 ng/mL (09-06-22 @ 12:11)            A1C with Estimated Average Glucose Result: 7.3 % (08-21-22 @ 06:03)  A1C with Estimated Average Glucose Result: 8.0 % (07-06-22 @ 07:39)      Indwelling Urethral Catheter:     Connect To:  Leg Bag    Indication:  Urine Output Monitoring in Critically Ill (09-12-22 @ 13:53) (not performed)  Indwelling Urethral Catheter:     Connect To:  Straight Drainage/[place]    Indication:  Urinary Retention / Obstruction (09-11-22 @ 00:51) (not performed)      RADIOLOGY, ECG, & ADDITIONAL TESTS:  12 Lead ECG:   Ventricular Rate 71 BPM    Atrial Rate 82 BPM    QRS Duration 140 ms    Q-T Interval 444 ms    QTC Calculation(Bazett) 482 ms    R Axis 108 degrees    T Axis 166 degrees    Diagnosis Line Atrial fibrillation with premature ventricular or aberrantlyconducted  complexes  Non-specific intra-ventricular conduction block  ST & T wave abnormality, consider lateral ischemia  Abnormal ECG    Confirmed by Sarah Borden MD ([id number]) on 8/21/2022 1:35:39 PM (08-21-22 @ 07:14)      RECENT DIAGNOSTIC ORDERS:  Xray Chest 1 View- PORTABLE-Routine: AM   Indication: f/u pleural effusion  Transport: Portable,  w/ O2 (09-14-22 @ 13:12)  Magnesium, Serum: AM Sched. Collection: 15-Sep-2022 04:30 (09-14-22 @ 11:17)  Comprehensive Metabolic Panel: AM Sched. Collection: 15-Sep-2022 04:30 (09-14-22 @ 11:17)  Complete Blood Count + Automated Diff: AM Sched. Collection: 15-Sep-2022 04:30 (09-14-22 @ 11:17)  Type + Screen: 23:30 (09-14-22 @ 00:02)  Diet, Minced and Moist:   Consistent Carbohydrate Evening Snack  1200mL Fluid Restriction ([id number])  Mildly Thick Liquids (MILDTHICKLIQS)  Low Sodium  Prosource Gelatein 20 Sugar Free     Qty per Day:  DAILY  Supplement Feeding Modality:  Oral  Ensure Pudding Cans or Servings Per Day:  1       Frequency:  Two Times a day  Ensure Max Cans or Servings Per Day:  1       Frequency:  Two Times a day (09-13-22 @ 14:29)  Diet, Minced and Moist:   DASH/TLC Sodium & Cholesterol Restricted  1200mL Fluid Restriction ([id number])  Mildly Thick Liquids (MILDTHICKLIQS)  Prosource Gelatein 20 Sugar Free     Qty per Day:  DAILY  Supplement Feeding Modality:  Oral  Ensure Pudding Cans or Servings Per Day:  1       Frequency:  Two Times a day  Ensure Max Cans or Servings Per Day:  1       Frequency:  Two Times a day (09-13-22 @ 14:27)      MEDICATIONS:  MEDICATIONS  (STANDING):  ALBUTerol    90 MICROgram(s) HFA Inhaler 2 Puff(s) Inhalation every 6 hours  atorvastatin 40 milliGRAM(s) Oral at bedtime  BACItracin   Ointment 1 Application(s) Topical two times a day  buMETAnide Infusion 1 mG/Hr (5 mL/Hr) IV Continuous <Continuous>  chlorhexidine 2% Cloths 1 Application(s) Topical <User Schedule>  dexAMETHasone  Injectable 6 milliGRAM(s) IV Push daily  dextrose 5%. 1000 milliLiter(s) (50 mL/Hr) IV Continuous <Continuous>  dextrose 5%. 1000 milliLiter(s) (100 mL/Hr) IV Continuous <Continuous>  dextrose 50% Injectable 25 Gram(s) IV Push once  dextrose 50% Injectable 12.5 Gram(s) IV Push once  dextrose 50% Injectable 25 Gram(s) IV Push once  enoxaparin Injectable 85 milliGRAM(s) SubCutaneous every 12 hours  famotidine    Tablet 20 milliGRAM(s) Oral daily  glucagon  Injectable 1 milliGRAM(s) IntraMuscular once  insulin glargine Injectable (LANTUS) 16 Unit(s) SubCutaneous at bedtime  insulin lispro (ADMELOG) corrective regimen sliding scale   SubCutaneous three times a day before meals  insulin lispro Injectable (ADMELOG) 6 Unit(s) SubCutaneous three times a day before meals  levothyroxine 150 MICROGram(s) Oral daily  lidocaine 1% (Preservative-free) Injectable 30 milliLiter(s) Local Injection once  lidocaine 1% Injectable 10 milliLiter(s) Local Injection once  lidocaine 1% Injectable 10 milliLiter(s) Local Injection once  melatonin 5 milliGRAM(s) Oral at bedtime  metoprolol succinate  milliGRAM(s) Oral daily  multivitamin/minerals 1 Tablet(s) Oral daily  mycophenolate mofetil 500 milliGRAM(s) Oral four times a day  pentoxifylline 400 milliGRAM(s) Oral two times a day  potassium chloride   Powder 40 milliEquivalent(s) Oral once  sacubitril 49 mG/valsartan 51 mG 1 Tablet(s) Oral two times a day  tacrolimus 0.5 milliGRAM(s) Oral two times a day    MEDICATIONS  (PRN):  acetaminophen     Tablet .. 650 milliGRAM(s) Oral every 6 hours PRN Mild Pain (1 - 3)  dextrose Oral Gel 15 Gram(s) Oral once PRN Blood Glucose LESS THAN 70 milliGRAM(s)/deciliter  dextrose Oral Gel 15 Gram(s) Oral once PRN Blood Glucose LESS THAN 70 milliGRAM(s)/deciliter      HOME MEDICATIONS:  Aranesp 100 mcg/0.5 mL injectable solution (07-05)  atorvastatin 40 mg oral tablet (07-05)  calcitriol 0.25 mcg oral capsule (07-05)  d-mycophenolate (07-05)  Eliquis 2.5 mg oral tablet (07-05)  famotidine 20 mg oral tablet (07-05)  furosemide 20 mg oral tablet (08-03)  insulin glargine 100 units/mL subcutaneous solution (08-03)  insulin lispro 100 units/mL injectable solution (08-03)  isosorbide mononitrate 30 mg oral tablet, extended release (07-05)  levothyroxine 150 mcg (0.15 mg) oral tablet (07-05)  metoprolol succinate 50 mg oral tablet, extended release (08-03)  Multiple Vitamins with Minerals oral tablet (08-03)  NIFEdipine 30 mg oral tablet, extended release (07-05)  pentoxifylline 400 mg oral tablet, extended release (07-05)  sodium bicarbonate 650 mg oral tablet (08-03)  tacrolimus 0.5 mg oral capsule (08-03)      PHYSICAL EXAM:  GENERAL:   CHEST/LUNG:   HEART:   ABDOMEN:   EXTREMITIES:               DAYSI ANDRIA  82y, Male  Allergy: Ozempic (0.25 mg or 0.5 mg dose) (Hives; Rash)  streptomycin (Unknown)  Trulicity Pen (Hives; Rash)    Hospital Day: 25d    Patient seen and examined earlier today. 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      AF (atrial fibrillation)  s/p DCCV      Carotid stenosis      COPD (chronic obstructive pulmonary disease)      H/O coronary artery bypass surgery  2001      S/P inguinal hernia repair      History of appendectomy          LAST 24-Hr EVENTS:    VITALS:  T(F): 97.4 (09-14-22 @ 11:58), Max: 97.4 (09-14-22 @ 11:58)  HR: 81 (09-14-22 @ 11:58)  BP: 111/57 (09-14-22 @ 11:58) (104/52 - 132/61)  RR: 20 (09-14-22 @ 11:58)  SpO2: 90% (09-14-22 @ 11:58)          TESTS & MEASUREMENTS:  Weight/BMI      09-12-22 @ 07:01  -  09-13-22 @ 07:00  --------------------------------------------------------  IN: 55 mL / OUT: 1600 mL / NET: -1545 mL    09-13-22 @ 07:01  -  09-14-22 @ 07:00  --------------------------------------------------------  IN: 110 mL / OUT: 2000 mL / NET: -1890 mL                            8.4    11.21 )-----------( 246      ( 14 Sep 2022 01:42 )             26.5         09-14    133<L>  |  93<L>  |  97<HH>  ----------------------------<  152<H>  3.3<L>   |  32  |  1.2    Ca    8.2<L>      14 Sep 2022 01:42  Phos  2.4     09-14  Mg     2.3     09-14    TPro  5.3<L>  /  Alb  3.0<L>  /  TBili  0.8  /  DBili  0.5<H>  /  AST  13  /  ALT  8   /  AlkPhos  80  09-14    LIVER FUNCTIONS - ( 14 Sep 2022 06:30 )  Alb: 3.0 g/dL / Pro: 5.3 g/dL / ALK PHOS: 80 U/L / ALT: 8 U/L / AST: 13 U/L / GGT: x                 Culture - Fungal, Body Fluid (collected 09-08-22 @ 13:22)  Source: Pleural Fl Pleural Fluid  Preliminary Report (09-09-22 @ 07:23):    Testing in progress    Culture - Body Fluid with Gram Stain (collected 09-08-22 @ 13:22)  Source: Pleural Fl Pleural Fluid  Gram Stain (09-09-22 @ 02:32):    polymorphonuclear leukocytes seen    No organisms seen    by cytocentrifuge  Final Report (09-13-22 @ 17:01):    No growth at 5 days        Procalcitonin, Serum: 0.29 ng/mL (09-06-22 @ 12:11)    D-Dimer Assay, Quantitative: 1035 ng/mL DDU (09-06-22 @ 12:11)    Ferritin, Serum: 957 ng/mL (09-06-22 @ 12:11)            A1C with Estimated Average Glucose Result: 7.3 % (08-21-22 @ 06:03)  A1C with Estimated Average Glucose Result: 8.0 % (07-06-22 @ 07:39)      Indwelling Urethral Catheter:     Connect To:  Leg Bag    Indication:  Urine Output Monitoring in Critically Ill (09-12-22 @ 13:53) (not performed)  Indwelling Urethral Catheter:     Connect To:  Straight Drainage/[place]    Indication:  Urinary Retention / Obstruction (09-11-22 @ 00:51) (not performed)      RADIOLOGY, ECG, & ADDITIONAL TESTS:  12 Lead ECG:   Ventricular Rate 71 BPM    Atrial Rate 82 BPM    QRS Duration 140 ms    Q-T Interval 444 ms    QTC Calculation(Bazett) 482 ms    R Axis 108 degrees    T Axis 166 degrees    Diagnosis Line Atrial fibrillation with premature ventricular or aberrantlyconducted  complexes  Non-specific intra-ventricular conduction block  ST & T wave abnormality, consider lateral ischemia  Abnormal ECG    Confirmed by Sarah Borden MD ([id number]) on 8/21/2022 1:35:39 PM (08-21-22 @ 07:14)      RECENT DIAGNOSTIC ORDERS:  Xray Chest 1 View- PORTABLE-Routine: AM   Indication: f/u pleural effusion  Transport: Portable,  w/ O2 (09-14-22 @ 13:12)  Magnesium, Serum: AM Sched. Collection: 15-Sep-2022 04:30 (09-14-22 @ 11:17)  Comprehensive Metabolic Panel: AM Sched. Collection: 15-Sep-2022 04:30 (09-14-22 @ 11:17)  Complete Blood Count + Automated Diff: AM Sched. Collection: 15-Sep-2022 04:30 (09-14-22 @ 11:17)  Type + Screen: 23:30 (09-14-22 @ 00:02)  Diet, Minced and Moist:   Consistent Carbohydrate Evening Snack  1200mL Fluid Restriction ([id number])  Mildly Thick Liquids (MILDTHICKLIQS)  Low Sodium  Prosource Gelatein 20 Sugar Free     Qty per Day:  DAILY  Supplement Feeding Modality:  Oral  Ensure Pudding Cans or Servings Per Day:  1       Frequency:  Two Times a day  Ensure Max Cans or Servings Per Day:  1       Frequency:  Two Times a day (09-13-22 @ 14:29)  Diet, Minced and Moist:   DASH/TLC Sodium & Cholesterol Restricted  1200mL Fluid Restriction ([id number])  Mildly Thick Liquids (MILDTHICKLIQS)  Prosource Gelatein 20 Sugar Free     Qty per Day:  DAILY  Supplement Feeding Modality:  Oral  Ensure Pudding Cans or Servings Per Day:  1       Frequency:  Two Times a day  Ensure Max Cans or Servings Per Day:  1       Frequency:  Two Times a day (09-13-22 @ 14:27)      MEDICATIONS:  MEDICATIONS  (STANDING):  ALBUTerol    90 MICROgram(s) HFA Inhaler 2 Puff(s) Inhalation every 6 hours  atorvastatin 40 milliGRAM(s) Oral at bedtime  BACItracin   Ointment 1 Application(s) Topical two times a day  buMETAnide Infusion 1 mG/Hr (5 mL/Hr) IV Continuous <Continuous>  chlorhexidine 2% Cloths 1 Application(s) Topical <User Schedule>  dexAMETHasone  Injectable 6 milliGRAM(s) IV Push daily  dextrose 5%. 1000 milliLiter(s) (50 mL/Hr) IV Continuous <Continuous>  dextrose 5%. 1000 milliLiter(s) (100 mL/Hr) IV Continuous <Continuous>  dextrose 50% Injectable 25 Gram(s) IV Push once  dextrose 50% Injectable 12.5 Gram(s) IV Push once  dextrose 50% Injectable 25 Gram(s) IV Push once  enoxaparin Injectable 85 milliGRAM(s) SubCutaneous every 12 hours  famotidine    Tablet 20 milliGRAM(s) Oral daily  glucagon  Injectable 1 milliGRAM(s) IntraMuscular once  insulin glargine Injectable (LANTUS) 16 Unit(s) SubCutaneous at bedtime  insulin lispro (ADMELOG) corrective regimen sliding scale   SubCutaneous three times a day before meals  insulin lispro Injectable (ADMELOG) 6 Unit(s) SubCutaneous three times a day before meals  levothyroxine 150 MICROGram(s) Oral daily  lidocaine 1% (Preservative-free) Injectable 30 milliLiter(s) Local Injection once  lidocaine 1% Injectable 10 milliLiter(s) Local Injection once  lidocaine 1% Injectable 10 milliLiter(s) Local Injection once  melatonin 5 milliGRAM(s) Oral at bedtime  metoprolol succinate  milliGRAM(s) Oral daily  multivitamin/minerals 1 Tablet(s) Oral daily  mycophenolate mofetil 500 milliGRAM(s) Oral four times a day  pentoxifylline 400 milliGRAM(s) Oral two times a day  potassium chloride   Powder 40 milliEquivalent(s) Oral once  sacubitril 49 mG/valsartan 51 mG 1 Tablet(s) Oral two times a day  tacrolimus 0.5 milliGRAM(s) Oral two times a day    MEDICATIONS  (PRN):  acetaminophen     Tablet .. 650 milliGRAM(s) Oral every 6 hours PRN Mild Pain (1 - 3)  dextrose Oral Gel 15 Gram(s) Oral once PRN Blood Glucose LESS THAN 70 milliGRAM(s)/deciliter  dextrose Oral Gel 15 Gram(s) Oral once PRN Blood Glucose LESS THAN 70 milliGRAM(s)/deciliter      HOME MEDICATIONS:  Aranesp 100 mcg/0.5 mL injectable solution (07-05)  atorvastatin 40 mg oral tablet (07-05)  calcitriol 0.25 mcg oral capsule (07-05)  d-mycophenolate (07-05)  Eliquis 2.5 mg oral tablet (07-05)  famotidine 20 mg oral tablet (07-05)  furosemide 20 mg oral tablet (08-03)  insulin glargine 100 units/mL subcutaneous solution (08-03)  insulin lispro 100 units/mL injectable solution (08-03)  isosorbide mononitrate 30 mg oral tablet, extended release (07-05)  levothyroxine 150 mcg (0.15 mg) oral tablet (07-05)  metoprolol succinate 50 mg oral tablet, extended release (08-03)  Multiple Vitamins with Minerals oral tablet (08-03)  NIFEdipine 30 mg oral tablet, extended release (07-05)  pentoxifylline 400 mg oral tablet, extended release (07-05)  sodium bicarbonate 650 mg oral tablet (08-03)  tacrolimus 0.5 mg oral capsule (08-03)      PHYSICAL EXAM:  GENERAL: awake, alert, NAD, Frail   CHEST/LUNG:  b/l basal crackles,  normal resp effort, saturating 95 % on 4 L NC   HEART:  regular rhythm   ABDOMEN:  soft, non tender, non distended , + Gloria with clear urine   EXTREMITIES:  no edema

## 2022-09-14 NOTE — PROGRESS NOTE ADULT - ASSESSMENT
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    AFib, chronic - on eliquis   CAD s/p CABG  H/O DLD, HTN, DM2, Hypothyroidism     PLAN:    CNS:  no depressants     HEENT: Oral care    PULMONARY:  HOB @ 45 degrees.  Aspiration precautions.  Wean O2,  thora cultures reviewed , Speech and swallow eval appreciated     CARDIOVASCULAR:  Continue diuresis keep - balance. ABG    GI: GI prophylaxis.  Feeding.  Bowel regimen . speech and swallow eval     RENAL:  Follow up lytes.  Correct as needed    INFECTIOUS DISEASE: Follow up cultures.  ID eval appreciated.  Dexa 8 mg daily D # 7     HEMATOLOGICAL:  DVT prophylaxis.      ENDOCRINE:  Follow up FS.  Insulin protocol if needed.    MUSCULOSKELETAL:  OOB to chair.  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    AFib, chronic - on eliquis   CAD s/p CABG  H/O DLD, HTN, DM2, Hypothyroidism     PLAN:    CNS:  no depressants     HEENT: Oral care    PULMONARY:  HOB @ 45 degrees.  Aspiration precautions.  Wean O2,  thora cultures reviewed , Speech and swallow eval appreciated     CARDIOVASCULAR:  Continue diuresis keep - balance.    GI: GI prophylaxis.  Feeding.  Bowel regimen . speech and swallow reviewed    RENAL:  Follow up lytes.  Correct as needed    INFECTIOUS DISEASE: Follow up cultures.  ID fup    HEMATOLOGICAL:  DVT prophylaxis.      ENDOCRINE:  Follow up FS.  Insulin protocol if needed.    MUSCULOSKELETAL:  OOB to chair.  PT OT     tele

## 2022-09-14 NOTE — SWALLOW BEDSIDE ASSESSMENT ADULT - PHARYNGEAL PHASE
use of consecutive swallows, SOB w minced and moist trials/Within functional limits
cues to swallow 2x/Within functional limits
Within functional limits
unchanged from baseline/Delayed cough post oral intake/Delayed throat clear post oral intake
use of volitional bolus hold and multiple swallows/Delayed throat clear post oral intake
unchanged from baseline/Delayed cough post oral intake/Delayed throat clear post oral intake
Delayed cough post oral intake/Delayed throat clear post oral intake
Within functional limits
as instructed/Multiple swallows

## 2022-09-14 NOTE — CHART NOTE - NSCHARTNOTEFT_GEN_A_CORE
ANDRIA TAVAREZ 82y Male  MRN#: [id number]   Hospital Day: 25d    CEU Transfer Note     Hospital Course:     DNR DNI 83 y/o man w PMHx of HTN, HLD, DM2, CAD s/p 3vCABG known to Dr De La Fuente, HFmEF w EF 45-50% on 8/1/22, AF on Eliquis, AAA, COPD baseline rare home O2 known to Dr ALO Mars, CKD3 and C3 glomerulopathy known to Dr Daniel, hypothyroidism, PVD, recent admission 7/5/22-8/3/22 for COVID and BRENDA 2/2 diarrhea, tx w dexamethasone and Zosyn, stay c/b microaspirations, PNA, was d/c to STR, has been on 3L O2, presented on 8/20/22 for SOB x5days and hypoxia to 70s. Had CXR done on 8/15/22 but results had not been back. ED vitals were notable for RR max 24, SpO2 84% on 4L O2, required BiPAP, had WBC 12, HB 7.2 from baseline 9, BNP 55k, Cr 2.2 from baseline 1.5, and trop 0.24, EKG w AF RVR and CXR showing significant b/l pleural effusions, pulm congestion, admitted to medicine for SOB/hypoxia attributed to CHF exacerbation.   Has been on Bumex drip for HF exacerbation and is s/p R thoracentesis 8/26/22 w 1.5L transudative fluid removed. Has had epistaxis during stay. Is s/p 1uPRBC, start 3 day course of IV venofer 200mg. Upgraded to CEU for increasing O2 requirements despite venti mask, HFNC.       9/6/22 day 17: Transition eliquis to Lovenox 80mg BID for possible future repeat thoracentesis. On HFNC; wean as tolerated. Start dexamethasone 6mg PO QD [id number] thru 9/16/22. Consult ID. Decrease Lantus from 22 to 17. ddimer 1035, , CRP 69.1. Keep bumex drip. D/c 3% NS.   9/7/22 day 18: ID recs for COVID: unclear if new infection, will treat as such w RDV x5days; no toci, Planned for thoracentesis #3 today, due to excess bleeding was ?not completed. Started RDV 200mg today, 100mg for next 4 days. Cont dexamethasone 6mg [id number], thru 9/16/22.   9/8/22 day 19: Hb stable ~8. R thoracentesis today, 2L removed. Seen by PT but stated too weak/unwell to participate. Palliative consulted per family request - has been seen by palliative in the past.   9/9/22 day 20: Restarted LMWH today, per nephro tacro level OK. Per CHF: 150cc hypertonic saline over 3h BID, metolazone 5mg x1, BMP BID w Mag, Entresto 49-51. D/c Imdur and Nifedipine.     9/10/22 day 21: Replete magnesium for Mag >2  9/11/22: c/w bumex drip, c/w hypertonic saline   9/12/22: c/w bumex drip, stop hypertonic saline. Started to have contraction alkalosis, given diamox 250 mg once. CXR showed stable bilateral opacities and right sided pleural effusion. Weaned off HFNC to 5 L NC   9/13/22: c/w bumex drip, CRXR showed bilateral opacities and right sided pleural effusion unchanged. Patient had episode of dest today morning while eating ( he removed his oxygen for a bit ) . He was placed on HFNC again for 2 hrs and then weaned off to 5 L NC again. S&S reassessed him for possible aspiration, recommended minced and moist diet with mildly thick liquids.   9/14/22: pt is stable, on 5 L NC, still on bumex drip. CXR showed improvement in right sided pleural effusion. ABGs done showed pH 7.46, pO2 54, pCO2 48. Pt was given one dose acetazolamide 250 mg. K repleted.     Patient is stable and will be transferred to telemetry.       SUBJECTIVE  Patient is a 82y old Male who presents with a chief complaint of chf exacerbation (14 Sep 2022 08:14)  Currently admitted to medicine with the primary diagnosis of Fluid overload      INTERVAL HPI AND OVERNIGHT EVENTS:  Patient was examined and seen at bedside. This morning he is resting comfortably in bed and reports no issues or overnight events.      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    AF (atrial fibrillation)  s/p DCCV    Carotid stenosis    COPD (chronic obstructive pulmonary disease)    H/O coronary artery bypass surgery  2001    S/P inguinal hernia repair    History of appendectomy      ALLERGIES:  Ozempic (0.25 mg or 0.5 mg dose) (Hives; Rash)  streptomycin (Unknown)  Trulicity Pen (Hives; Rash)    MEDICATIONS:  STANDING MEDICATIONS  acetaZOLAMIDE  IVPB 250 milliGRAM(s) IV Intermittent once  ALBUTerol    90 MICROgram(s) HFA Inhaler 2 Puff(s) Inhalation every 6 hours  atorvastatin 40 milliGRAM(s) Oral at bedtime  BACItracin   Ointment 1 Application(s) Topical two times a day  buMETAnide Infusion 1 mG/Hr IV Continuous <Continuous>  chlorhexidine 2% Cloths 1 Application(s) Topical <User Schedule>  dexAMETHasone  Injectable 6 milliGRAM(s) IV Push daily  dextrose 5%. 1000 milliLiter(s) IV Continuous <Continuous>  dextrose 5%. 1000 milliLiter(s) IV Continuous <Continuous>  dextrose 50% Injectable 25 Gram(s) IV Push once  dextrose 50% Injectable 12.5 Gram(s) IV Push once  dextrose 50% Injectable 25 Gram(s) IV Push once  enoxaparin Injectable 85 milliGRAM(s) SubCutaneous every 12 hours  famotidine    Tablet 20 milliGRAM(s) Oral daily  glucagon  Injectable 1 milliGRAM(s) IntraMuscular once  insulin glargine Injectable (LANTUS) 16 Unit(s) SubCutaneous at bedtime  insulin lispro (ADMELOG) corrective regimen sliding scale   SubCutaneous three times a day before meals  insulin lispro Injectable (ADMELOG) 6 Unit(s) SubCutaneous three times a day before meals  levothyroxine 150 MICROGram(s) Oral daily  lidocaine 1% (Preservative-free) Injectable 30 milliLiter(s) Local Injection once  lidocaine 1% Injectable 10 milliLiter(s) Local Injection once  lidocaine 1% Injectable 10 milliLiter(s) Local Injection once  melatonin 5 milliGRAM(s) Oral at bedtime  metoprolol succinate  milliGRAM(s) Oral daily  multivitamin/minerals 1 Tablet(s) Oral daily  mycophenolate mofetil 500 milliGRAM(s) Oral four times a day  pentoxifylline 400 milliGRAM(s) Oral two times a day  potassium chloride   Powder 40 milliEquivalent(s) Oral once  sacubitril 49 mG/valsartan 51 mG 1 Tablet(s) Oral two times a day  tacrolimus 0.5 milliGRAM(s) Oral two times a day    PRN MEDICATIONS  acetaminophen     Tablet .. 650 milliGRAM(s) Oral every 6 hours PRN  dextrose Oral Gel 15 Gram(s) Oral once PRN  dextrose Oral Gel 15 Gram(s) Oral once PRN      VITAL SIGNS: Last 24 Hours  T(C): 36.2 (14 Sep 2022 08:22), Max: 36.2 (13 Sep 2022 15:30)  T(F): 97.1 (14 Sep 2022 08:22), Max: 97.2 (13 Sep 2022 15:30)  HR: 68 (14 Sep 2022 08:22) (67 - 81)  BP: 132/61 (14 Sep 2022 08:22) (104/52 - 132/61)  BP(mean): 88 (14 Sep 2022 08:22) (81 - 104)  RR: 20 (14 Sep 2022 08:22) (20 - 20)  SpO2: 95% (14 Sep 2022 08:22) (95% - 100%)    LABS:                        8.4    11.21 )-----------( 246      ( 14 Sep 2022 01:42 )             26.5     09-14    133<L>  |  93<L>  |  97<HH>  ----------------------------<  152<H>  3.3<L>   |  32  |  1.2    Ca    8.2<L>      14 Sep 2022 01:42  Phos  2.4     09-14  Mg     2.3     09-14    TPro  5.3<L>  /  Alb  3.0<L>  /  TBili  0.8  /  DBili  0.5<H>  /  AST  13  /  ALT  8   /  AlkPhos  80  09-14        ABG - ( 14 Sep 2022 08:14 )  pH, Arterial: 7.46  pH, Blood: x     /  pCO2: 48    /  pO2: 54    / HCO3: 34    / Base Excess: 9.1   /  SaO2: 87.8          RADIOLOGY:  CXR ( 9/134/22 ): improvement in right sided pleural effusion    PHYSICAL EXAM:  CONSTITUTIONAL: No acute distress, well-developed, AAOx2 ( not oriented to place, [place] )   HEAD: Atraumatic, normocephalic  EYES: EOM intact, PERRLA, conjunctiva and sclera clear  ENT: Supple, no masses, no thyromegaly, no bruits, no JVD; moist mucous membranes  PULMONARY: good bilateral air entry, mild bilateral basilar crackles   CARDIOVASCULAR: irregularly irregular  rhythm; no murmurs, rubs, or gallops  GASTROINTESTINAL: Soft, non-tender, non-distended; bowel sounds present  MUSCULOSKELETAL: 2+ peripheral pulses; 2+ bilateral lower limb pitting edema reaching below knees, chronic bilateral hyperpigmentation of LE   NEUROLOGY: non-focal, 5/5 motor power in upper and lower extremities, no sensory loss    SKIN: No rashes or lesions; warm and dry      Assessment and Plan:   83 y/o man w PMHx of HTN, HLD, DM2, CAD s/p 3vCABG known to Dr De La Fuente, HFmEF w EF 45-50% on 8/1/22, AF on Eliquis, AAA, COPD baseline rare home O2 known to Dr ALO Mars, CKD3 and C3 glomerulopathy known to Dr Daniel, hypothyroidism, PVD, presented on 8/20/22 for SOB x5days and hypoxia to 70s. Admitted to medicine for SOB/hypoxia attributed to CHF exacerbation.   Has been on Bumex drip for HF exacerbation and is s/p R thoracentesis 8/26/22 w 1.5L transudative fluid removed, recurrent R thoracentesis 9/8/22.       #Acute Hypoxic Respiratory Failure  #Acute HFmrEF  #Recurrent pleural effusion   #COPD, covid -19   - HFmrEF w EF 45-50% on 8/1/22; multiple LV regional wall motion abnormalities, mild MR, mild TR, borderline pulm HTN   - COPD baseline rare home O2, 3L O2 since last admission known to Dr ALO Mars  - Thoracentesis 8/26/22 w 1.5L removed, transudative fluid. Repeat 9/8/22, 2L removed.   - CXR 9/13/22 w/o significant change in b/l opacities/effusions   - weaned from HFNC to 5 L NC  - Continue Bumex drip at 1mg/hr  - Heart failure: 150cc hypertonic saline over 3h BID, metolazone 5mg x1, BMP BID w Mag, Entresto 49-51. D/c Imdur and Nifedipine.   - Pulmonary following, recommended stopping hypertonic saline   - ID: Tx as new COVID infection: s/p remdesivir for 5 days, Cont dexamethasone 6mg PO QD [id number] thru 9/16/22  - f/o pleural fluid cx   - S&S eval today bcz of episode of choking: continue minced and moist diet     #Alkalosis  - probably contraction alkalosis in the setting of diuresis   - given diamox 250 mg once 9/12/22, and another dose on 9/14/22   - CO2 32< 30 < 32     #Hypokalemia   - on bumex drip   - replete as needed     #BRENDA  #HypoNa - resolved.   Baseline Cr 1.1, Cr 1.2 on 9/12/22, near resolved.   HypoNa resolved - now 136  Prior HypoNa may be due to dehydration/poor PO intake     #CAD  Continue Metoprolol 100mg QD  Imdur 30mg QD and nefidipine stopped   On entresto 2z daily   c/w Atorvastatin 40mg qhs.     #HTN:   Continue metoprolol  nifedipine stopped given HFmrEF  -started on entresto   - Current SBP range 120s-130s    #DM II: A1C 7.3.   - was on  Lantus to 20u, Off Lispro.   - Hypoglycemia 9/12 59, decreased lantus to 16 units   - Blood glucose 253 today, added lispro 6 TID     #Chronic anemia; goal Hb 8+  #Epistaxis - resolved   - Hb stable 8.6   s/p 1 U PRBC 8/22/22 and 8/24/22 for Hb <8  s/p IV Venofer 200mg daily for 3 days.   ENT consulted 8/23/22 for epistaxis/ blood clots blocking nasal passage     #Chronic Atrial Fibrillation: continue metoprolol, on therapeutic lovenox instead of Eliquis in case thoracentesis needed   #C3 Glomerulonephropathy: on  Pred 5mg QD at home, [place] 0.5mg BID, Mycophenolate 500mg 4 times daily. c/w home meds ( on dexamethasone 6 mg IV daily, pred stopped for now )   #Leukocytosis-resolved : likely from steroids; monitor off abx   #Hypothyroidism: continue synthroid  #Stage 2 right buttock pressure ulcer: present on admission. Continue local wound care, off loading.                                                                     # DVT prophylaxis: Transition eliquis to Lovenox 85mg BID for repeat thoracentesis. Restarted LMWH 9/9/22.    # GI prophylaxis: Famotidine 20mg QD   # Diet: Minced and moist   # Activity: IAT - did not participate w PT 9/8/22 due to fatigue   # Code status: DNR DNI, palliative on board   # Disposition: Telemetry

## 2022-09-14 NOTE — SWALLOW BEDSIDE ASSESSMENT ADULT - POSITIONING
upright (90 degrees)
upright (45 degrees)
upright (90 degrees)

## 2022-09-14 NOTE — PROGRESS NOTE ADULT - SUBJECTIVE AND OBJECTIVE BOX
Patient is a 82y old  Male who presents with a chief complaint of chf exacerbation (13 Sep 2022 15:51)        Over Night Events: on bumex drip         ROS:     All ROS are negative except HPI         PHYSICAL EXAM    ICU Vital Signs Last 24 Hrs  T(C): 35.9 (14 Sep 2022 04:37), Max: 36.2 (13 Sep 2022 08:16)  T(F): 96.6 (14 Sep 2022 04:37), Max: 97.2 (13 Sep 2022 08:16)  HR: 67 (14 Sep 2022 04:37) (67 - 81)  BP: 121/56 (14 Sep 2022 04:37) (104/52 - 126/59)  BP(mean): 81 (14 Sep 2022 04:37) (81 - 104)  RR: 20 (14 Sep 2022 04:37) (20 - 20)  SpO2: 100% (14 Sep 2022 04:37) (93% - 100%)    O2 Parameters below as of 14 Sep 2022 04:37  Patient On (Oxygen Delivery Method): nasal cannula            CONSTITUTIONAL:  ill appearing      ENT:   Airway patent,   Mouth with normal mucosa.   5L         CARDIAC:   Normal rate,   Regular rhythm.    edema 1        RESPIRATORY:   crackles     GASTROINTESTINAL:  Abdomen soft,   Non-tender,   No guarding,   + BS    MUSCULOSKELETAL:   Range of motion is not limited,  No clubbing, cyanosis    NEUROLOGICAL:   Alert and oriented   No motor  deficits.    Heel Ulcers      09-13-22 @ 07:01  -  09-14-22 @ 07:00  --------------------------------------------------------  IN:    Bumetanide: 110 mL  Total IN: 110 mL    OUT:    Indwelling Catheter - Urethral (mL): 2000 mL  Total OUT: 2000 mL    Total NET: -1890 mL          LABS:                            8.4    11.21 )-----------( 246      ( 14 Sep 2022 01:42 )             26.5                                               09-14    133<L>  |  93<L>  |  97<HH>  ----------------------------<  152<H>  3.3<L>   |  32  |  1.2    Ca    8.2<L>      14 Sep 2022 01:42  Phos  2.4     09-14  Mg     2.3     09-14    TPro  5.3<L>  /  Alb  3.0<L>  /  TBili  0.8  /  DBili  0.5<H>  /  AST  13  /  ALT  8   /  AlkPhos  80  09-14                                                                                           LIVER FUNCTIONS - ( 14 Sep 2022 06:30 )  Alb: 3.0 g/dL / Pro: 5.3 g/dL / ALK PHOS: 80 U/L / ALT: 8 U/L / AST: 13 U/L / GGT: x                                                                                                                                       MEDICATIONS  (STANDING):  ALBUTerol    90 MICROgram(s) HFA Inhaler 2 Puff(s) Inhalation every 6 hours  atorvastatin 40 milliGRAM(s) Oral at bedtime  BACItracin   Ointment 1 Application(s) Topical two times a day  buMETAnide Infusion 1 mG/Hr (5 mL/Hr) IV Continuous <Continuous>  chlorhexidine 2% Cloths 1 Application(s) Topical <User Schedule>  dexAMETHasone  Injectable 6 milliGRAM(s) IV Push daily  dextrose 5%. 1000 milliLiter(s) (100 mL/Hr) IV Continuous <Continuous>  dextrose 5%. 1000 milliLiter(s) (50 mL/Hr) IV Continuous <Continuous>  dextrose 50% Injectable 25 Gram(s) IV Push once  dextrose 50% Injectable 12.5 Gram(s) IV Push once  dextrose 50% Injectable 25 Gram(s) IV Push once  enoxaparin Injectable 85 milliGRAM(s) SubCutaneous every 12 hours  famotidine    Tablet 20 milliGRAM(s) Oral daily  glucagon  Injectable 1 milliGRAM(s) IntraMuscular once  insulin glargine Injectable (LANTUS) 16 Unit(s) SubCutaneous at bedtime  insulin lispro (ADMELOG) corrective regimen sliding scale   SubCutaneous three times a day before meals  insulin lispro Injectable (ADMELOG) 6 Unit(s) SubCutaneous three times a day before meals  levothyroxine 150 MICROGram(s) Oral daily  lidocaine 1% (Preservative-free) Injectable 30 milliLiter(s) Local Injection once  lidocaine 1% Injectable 10 milliLiter(s) Local Injection once  lidocaine 1% Injectable 10 milliLiter(s) Local Injection once  melatonin 5 milliGRAM(s) Oral at bedtime  metoprolol succinate  milliGRAM(s) Oral daily  multivitamin/minerals 1 Tablet(s) Oral daily  mycophenolate mofetil 500 milliGRAM(s) Oral four times a day  pentoxifylline 400 milliGRAM(s) Oral two times a day  sacubitril 49 mG/valsartan 51 mG 1 Tablet(s) Oral two times a day  tacrolimus 0.5 milliGRAM(s) Oral two times a day    MEDICATIONS  (PRN):  acetaminophen     Tablet .. 650 milliGRAM(s) Oral every 6 hours PRN Mild Pain (1 - 3)  dextrose Oral Gel 15 Gram(s) Oral once PRN Blood Glucose LESS THAN 70 milliGRAM(s)/deciliter  dextrose Oral Gel 15 Gram(s) Oral once PRN Blood Glucose LESS THAN 70 milliGRAM(s)/deciliter       CXR interpreted by me:  Improved right sided opacity      Patient is a 82y old  Male who presents with a chief complaint of chf exacerbation (13 Sep 2022 15:51)        Over Night Events: on bumex drip, better, CXR improving        PHYSICAL EXAM    ICU Vital Signs Last 24 Hrs  T(C): 35.9 (14 Sep 2022 04:37), Max: 36.2 (13 Sep 2022 08:16)  T(F): 96.6 (14 Sep 2022 04:37), Max: 97.2 (13 Sep 2022 08:16)  HR: 67 (14 Sep 2022 04:37) (67 - 81)  BP: 121/56 (14 Sep 2022 04:37) (104/52 - 126/59)  BP(mean): 81 (14 Sep 2022 04:37) (81 - 104)  RR: 20 (14 Sep 2022 04:37) (20 - 20)  SpO2: 100% (14 Sep 2022 04:37) (93% - 100%)    O2 Parameters below as of 14 Sep 2022 04:37  Patient On (Oxygen Delivery Method): nasal cannula            CONSTITUTIONAL:  ill appearing      ENT:   Airway patent,   Mouth with normal mucosa.   5L         CARDIAC:   irregular  edema 1        RESPIRATORY:   crackles     GASTROINTESTINAL:  Abdomen soft,   Non-tender,   No guarding,   + BS    MUSCULOSKELETAL:   Range of motion is not limited,  No clubbing, cyanosis    NEUROLOGICAL:   Alert and oriented   No motor  deficits.    Heel Ulcers      09-13-22 @ 07:01  -  09-14-22 @ 07:00  --------------------------------------------------------  IN:    Bumetanide: 110 mL  Total IN: 110 mL    OUT:    Indwelling Catheter - Urethral (mL): 2000 mL  Total OUT: 2000 mL    Total NET: -1890 mL          LABS:                            8.4    11.21 )-----------( 246      ( 14 Sep 2022 01:42 )             26.5                                               09-14    133<L>  |  93<L>  |  97<HH>  ----------------------------<  152<H>  3.3<L>   |  32  |  1.2    Ca    8.2<L>      14 Sep 2022 01:42  Phos  2.4     09-14  Mg     2.3     09-14    TPro  5.3<L>  /  Alb  3.0<L>  /  TBili  0.8  /  DBili  0.5<H>  /  AST  13  /  ALT  8   /  AlkPhos  80  09-14                                                                                           LIVER FUNCTIONS - ( 14 Sep 2022 06:30 )  Alb: 3.0 g/dL / Pro: 5.3 g/dL / ALK PHOS: 80 U/L / ALT: 8 U/L / AST: 13 U/L / GGT: x                                                                                                                                       MEDICATIONS  (STANDING):  ALBUTerol    90 MICROgram(s) HFA Inhaler 2 Puff(s) Inhalation every 6 hours  atorvastatin 40 milliGRAM(s) Oral at bedtime  BACItracin   Ointment 1 Application(s) Topical two times a day  buMETAnide Infusion 1 mG/Hr (5 mL/Hr) IV Continuous <Continuous>  chlorhexidine 2% Cloths 1 Application(s) Topical <User Schedule>  dexAMETHasone  Injectable 6 milliGRAM(s) IV Push daily  dextrose 5%. 1000 milliLiter(s) (100 mL/Hr) IV Continuous <Continuous>  dextrose 5%. 1000 milliLiter(s) (50 mL/Hr) IV Continuous <Continuous>  dextrose 50% Injectable 25 Gram(s) IV Push once  dextrose 50% Injectable 12.5 Gram(s) IV Push once  dextrose 50% Injectable 25 Gram(s) IV Push once  enoxaparin Injectable 85 milliGRAM(s) SubCutaneous every 12 hours  famotidine    Tablet 20 milliGRAM(s) Oral daily  glucagon  Injectable 1 milliGRAM(s) IntraMuscular once  insulin glargine Injectable (LANTUS) 16 Unit(s) SubCutaneous at bedtime  insulin lispro (ADMELOG) corrective regimen sliding scale   SubCutaneous three times a day before meals  insulin lispro Injectable (ADMELOG) 6 Unit(s) SubCutaneous three times a day before meals  levothyroxine 150 MICROGram(s) Oral daily  lidocaine 1% (Preservative-free) Injectable 30 milliLiter(s) Local Injection once  lidocaine 1% Injectable 10 milliLiter(s) Local Injection once  lidocaine 1% Injectable 10 milliLiter(s) Local Injection once  melatonin 5 milliGRAM(s) Oral at bedtime  metoprolol succinate  milliGRAM(s) Oral daily  multivitamin/minerals 1 Tablet(s) Oral daily  mycophenolate mofetil 500 milliGRAM(s) Oral four times a day  pentoxifylline 400 milliGRAM(s) Oral two times a day  sacubitril 49 mG/valsartan 51 mG 1 Tablet(s) Oral two times a day  tacrolimus 0.5 milliGRAM(s) Oral two times a day    MEDICATIONS  (PRN):  acetaminophen     Tablet .. 650 milliGRAM(s) Oral every 6 hours PRN Mild Pain (1 - 3)  dextrose Oral Gel 15 Gram(s) Oral once PRN Blood Glucose LESS THAN 70 milliGRAM(s)/deciliter  dextrose Oral Gel 15 Gram(s) Oral once PRN Blood Glucose LESS THAN 70 milliGRAM(s)/deciliter       CXR interpreted by me:  Improved right sided opacity

## 2022-09-14 NOTE — SWALLOW BEDSIDE ASSESSMENT ADULT - SLP GENERAL OBSERVATIONS
Pt currently receiving nebulizer treatment, treatment put on hold for trials.
pt known to acute service. MBSS conducted 7/22 w/recs for puree, mildly thick.
Pt received in bed awake, O2 FM, gen weakness and fatigue, nasal packing
PT received in bed awake and alert on O2 FM w/ son at b/s. Pt feeling stronger, only taking liquids, does not like puree foods
Pt received in bed awake and alert on O2NC
pt received in bed awake alert w/o c/o pain. +4L NC +baseline cough, throat clear; RN reports +toleration of current diet
Pt received in bed awake O2FM in place, pt refusing puree, SOB w/ advanced diet 2' unable to breathe out of nose at this time
pt received in bed awake alert w/o c/o pain. +HFNC 40L/min, 43% O2 +baseline cough, throat clear
Pt received in bed awake and alert, nasal packing 2' epistaxis

## 2022-09-14 NOTE — SWALLOW BEDSIDE ASSESSMENT ADULT - SPECIFY REASON(S)
choking w/ po yesterday
dysphagia /fu, nasal packing removed, pt remains on humidified O2 FM
dysphagia f/u
dysphagia re-eval
dysphagia f/u
dysphagia f/u
NPO
dysphagia f/u
dysphagia follow up

## 2022-09-14 NOTE — SWALLOW BEDSIDE ASSESSMENT ADULT - SLP PERTINENT HISTORY OF CURRENT PROBLEM
pt is an 83 y/o M w/ PMHx: chronic HFrEF, chronic Afib, CAD s/p CABG x3, PVD, COPD, recent COVID, CKD, HTN, HLD and hypothyroidism presented for evaluation of five days of worsening SOB and hypoxia. Pt is being treated for AHRF, acute HFrEF, COPD, s/p thoracentesis 9/8, epistaxis, leucocytosis, acute on chronic anemia, COVID-19+ on 9/2. CXR-> B/L lung opacities and R plural effusion. pt is an 83 y/o M w/ PMHx: chronic HFrEF, chronic Afib, CAD s/p CABG x3, PVD, COPD, recent COVID, CKD, HTN, HLD and hypothyroidism presented for evaluation of five days of worsening SOB and hypoxia. Pt is being treated for AHRF, acute HFrEF, COPD, s/p thoracentesis 9/8, epistaxis, leucocytosis, acute on chronic anemia, COVID-19+ on 9/2. CXR-> B/L lung opacities and R pleural effusion.

## 2022-09-14 NOTE — SWALLOW BEDSIDE ASSESSMENT ADULT - NS ASR SWALLOW FINDINGS DISCUS
Physician/Nursing/Patient
MD/Physician/Nursing
Patient
Nursing/Patient
Physician/Nursing/Patient
Physician/Nursing/Patient
son at b/s/Physician/Nursing/Patient/Family

## 2022-09-14 NOTE — SWALLOW BEDSIDE ASSESSMENT ADULT - MODE OF PRESENTATION
cup/spoon/fed by clinician
cup/self fed
cup/spoon/self fed/fed by clinician
cup/self fed/fed by clinician
cup/spoon/fed by clinician
cup/spoon/fed by clinician
controlled sips/straw/fed by clinician
cup/self fed
fed by clinician

## 2022-09-14 NOTE — CHART NOTE - NSCHARTNOTEFT_GEN_A_CORE
Palliative care consult completed, family meeting held when pt was more critical. Now has improved with aggressive diuresis. Pt still is oxygen dependent at rest and has a poor prognosis overall. Family wants to see how he does with PT and want to consider STR. They are aware of hospice option.     Recommendations  DNR/DNI  Ongoing medical care  Palliative care will sign off reconsult as needed x [phone]

## 2022-09-14 NOTE — SWALLOW BEDSIDE ASSESSMENT ADULT - ORAL PREPARATORY PHASE
vol bolus hold encouraged
Pt intentionally held bolus
Within functional limits
Reduced oral grading

## 2022-09-14 NOTE — SWALLOW BEDSIDE ASSESSMENT ADULT - ORAL PHASE
Within functional limits
no SOB w/ mastication, desat w/o O2 FM, O2 breaks provided/Within functional limits
Delayed oral transit time
Within functional limits
Delayed oral transit time
Within functional limits

## 2022-09-14 NOTE — PROGRESS NOTE ADULT - ASSESSMENT
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     Acute Hypoxic respiratory Failure   Acute HFmrEF  Bilateral pleural effusion Right>Left.  COPD  COVID 19 infection +9/2    previously on HFNC, now on NC 6 L saturating low 90s  s/p thoracentesis 8/2 fluid transudative.   s/p thoracentesis 9/8 with 1.9 L removed, transudative, f/u studies  Continue Bumex drip at 1mg/hr, hold hypertonic saline  s/p Diamox x1 9/12, repeat ABG today noted , Diamox today ordered ,  replete K to 4 and Mg 2  renal fxn stable  Continue with dexamethasone 6mg daily, steroids started 9/6   D-Dimer 1000, LE duplex 9/6 negative for DVTs   on therapeutic Lovenox (previous eliquis)  s/s eval given choking episode today - SLP note appreciated -recommended VFSS/MBS;  PT    Epistaxis - resolved  ENT follow up appreciated, packing removed  monitor H&H    Leukocytosis, resolved - likely from steroids    Acute on chronic anemia - s/p 1 U PRBC and 3 days of IV venofer   monitor H&H, keep active T&S    Chronic Atrial Fibrillation - continue metoprolol and lovenox. Resume Eliquis once no procedures planned.     C3 Glomerulonephropathy - Continue tacrolimus 0.5mg BID and Mycophenolate 500mg 4 times daily. On chronic prednisone 5mg at home, now on dexamethasone. Repeat Tacrolimus level 9/8 5.4, f/u renal  requested     CAD /HTN/DLD- Continue Metoprolol, entresto and Lipitor.     Hypothyroidism - continue synthroid    DM II: A1C 7.3 - continue with insulin, add Lispro 5u with meals, monitor FS and PO intake     Stage 2 right buttock pressure ulcer, Present on admission  Continue local wound care, off loading.     #Progress Note Handoff:  Pending (specify): tapering down supplemental oxygen, , labs, diuresis, s/s f/u -VFSS/MBS;  , Nephrology follow up requested    Family discussion: spoke with pt  Disposition: Home   Pt is being downgraded to tele

## 2022-09-14 NOTE — SWALLOW BEDSIDE ASSESSMENT ADULT - COMMENTS
Pt underwent VFSS 7/27 w/ recs for minced+moist, mildly thick liquids via bolus hold, small bites/sips, consecutive swallows
pt known to acute service. MBSS conducted 7/22 w/recs for puree, mildly thick.
Pt underwent VFSS 7/27 w/ recs for minced+moist, mildly thick liquids via bolus hold, small bites/sips, consecutive swallows
Pt recalled his safe swallow strategies.
Pt known to  services from 7/2022, VFSS completed which revealed moderate to severe pharyngeal dysphagia. Compensatory strategies were recommended to ensure safe eating and minimize the risks for aspiration

## 2022-09-15 NOTE — PROGRESS NOTE ADULT - ASSESSMENT
83 y/o man w PMHx of HTN, HLD, DM2, CAD s/p 3vCABG known to Dr De La Fuente, HFmEF w EF 45-50% on 8/1/22, AF on Eliquis, AAA, COPD baseline rare home O2 known to Dr ALO Mars, CKD3 and C3 glomerulopathy known to Dr Daniel, hypothyroidism, PVD, presented on 8/20/22 for SOB x5days and hypoxia to 70s. Admitted to medicine for SOB/hypoxia attributed to CHF exacerbation.   Has been on Bumex drip for HF exacerbation and is s/p R thoracentesis 8/26/22 w 1.5L transudative fluid removed, recurrent R thoracentesis 9/8/22.       #Acute Hypoxic Respiratory Failure  #Acute HFmrEF  #Recurrent pleural effusion   #COPD, covid -19   - HFmrEF w EF 45-50% on 8/1/22; multiple LV regional wall motion abnormalities, mild MR, mild TR, borderline pulm HTN   - COPD baseline rare home O2, 3L O2 since last admission known to Dr ALO Mars  - Thoracentesis 8/26/22 w 1.5L removed, transudative fluid. Repeat 9/8/22, 2L removed.   - CXR 9/13/22 w/o significant change in b/l opacities/effusions   - weaned from HFNC to 5 L NC  - Continue Bumex drip at 1mg/hr  - Heart failure: 150cc hypertonic saline over 3h BID, metolazone 5mg x1, BMP BID w Mag, Entresto 49-51. D/c Imdur and Nifedipine.   - Pulmonary following, recommended stopping hypertonic saline   - ID: Tx as new COVID infection: s/p remdesivir for 5 days, dexa changed to o prednisone [id number] thru 9/16/22  - f/o pleural fluid cx   - S&S eval today bcz of episode of choking: continue minced and moist diet     #Dysphagia  -speech and swallow following  -Modified barium swallow done 09/15--- severe pharyngeal dysphagia for thin liquids; moderate pharyngeal dysphagia w/ mildly thick liquids; mild pharyngeal dysphagia for puree and minced+moist consistencies. continue minced+moist, mildly thick liquids. allow for swallow between intakes; crush medication (when feasible); no straws; oral hygiene; position upright (90 degrees); small sips/bites; volitional bolus hold w/ mildly thick liquids    #Alkalosis  - probably contraction alkalosis in the setting of diuresis   - given diamox 250 mg once 9/12/22, and another dose on 9/14/22   - CO2 32< 30 < 32     #Hypokalemia   - on bumex drip   - replete as needed     #BRENDA  #HypoNa - resolved.   Baseline Cr 1.1, Cr 1.2 on 9/12/22, near resolved.   HypoNa resolved - now 136  Prior HypoNa may be due to dehydration/poor PO intake     #CAD  Continue Metoprolol 100mg QD  Imdur 30mg QD and nefidipine stopped   On entresto 2z daily   c/w Atorvastatin 40mg qhs.     #HTN:   Continue metoprolol  nifedipine stopped given HFmrEF  -started on entresto   - Current SBP range 120s-130s    #DM II: A1C 7.3.   - was on  Lantus to 20u, Off Lispro.   - Hypoglycemia 9/12 59, decreased lantus to 16 units   - Blood glucose 253 today, added lispro 6 TID     #Chronic anemia; goal Hb 8+  #Epistaxis - resolved   - Hb stable 8.6   s/p 1 U PRBC 8/22/22 and 8/24/22 for Hb <8  s/p IV Venofer 200mg daily for 3 days.   ENT consulted 8/23/22 for epistaxis/ blood clots blocking nasal passage     #Chronic Atrial Fibrillation: continue metoprolol, on therapeutic lovenox instead of Eliquis in case thoracentesis needed   #C3 Glomerulonephropathy: on  Pred 5mg QD at home, [place] 0.5mg BID, Mycophenolate 500mg 4 times daily. c/w home meds ( on dexamethasone 6 mg IV daily, pred stopped for now )   #Leukocytosis-resolved : likely from steroids; monitor off abx   #Hypothyroidism: continue synthroid  #Stage 2 right buttock pressure ulcer: present on admission. Continue local wound care, off loading.                                                                     # DVT prophylaxis: Transition eliquis to Lovenox 85mg BID for repeat thoracentesis. Restarted LMWH 9/9/22.    # GI prophylaxis: Famotidine 20mg QD   # Diet: Minced and moist   # Activity: IAT - did not participate w PT 9/8/22 due to fatigue   # Code status: DNR DNI, palliative on board   # Disposition: Telemetry.

## 2022-09-15 NOTE — SWALLOW VFSS/MBS ASSESSMENT ADULT - ROSENBEK'S PENETRATION ASPIRATION SCALE
(1) no aspiration, contrast does not enter airway (8) contrast passes glottis, visible subglottic residue remains, absent patient response (aspiration) 1 instance of deep penetration w/ large bolus size/(5) contrast contacts vocal cords, visible residue remains (penetration)

## 2022-09-15 NOTE — SWALLOW VFSS/MBS ASSESSMENT ADULT - DIAGNOSTIC IMPRESSIONS
severe pharyngeal dysphagia for thin liquids; moderate pharyngeal dysphagia w/ mildly thick liquids; mild pharyngeal dysphagia for puree and minced+moist consistencies

## 2022-09-15 NOTE — SWALLOW VFSS/MBS ASSESSMENT ADULT - SLP PERTINENT HISTORY OF CURRENT PROBLEM
pt is an 81 y/o M w/ PMHx: chronic HFrEF, chronic Afib, CAD s/p CABG x3, PVD, COPD, recent COVID, CKD, HTN, HLD and hypothyroidism presented for evaluation of five days of worsening SOB and hypoxia. Pt is being treated for AHRF, acute HFrEF, COPD, s/p thoracentesis 9/8, epistaxis, leucocytosis, acute on chronic anemia, COVID-19+ on 9/2. CXR 9/14-> Right greater than left bilateral opacity/effusions, decreased on the right.

## 2022-09-15 NOTE — PROGRESS NOTE ADULT - ASSESSMENT
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     Acute Hypoxic respiratory Failure   Acute HFmrEF  Bilateral pleural effusion Right>Left.  COPD  COVID 19 infection +9/2    previously on HFNC, now on NC 3 L saturating low 93%  s/p thoracentesis 8/2 fluid transudative.   s/p thoracentesis 9/8 with 1.9 L removed, transudative, f/u studies  Continue Bumex drip at 1mg/hr, hold hypertonic saline  s/p Diamox x1 9/12, repeat ABG today noted , Diamox today ordered ,  replete K to 4 and Mg 2  renal fxn stable  dexamethasone 6mg daily, steroids started 9/6 can be changed to prednisone 20mg from 9/15  D-Dimer 1000, LE duplex 9/6 negative for DVTs   on therapeutic Lovenox (previous eliquis)  s/s eval given choking episode today - SLP note appreciated -recommended minced and moist diet;  PT    Epistaxis - resolved  ENT follow up appreciated, packing removed  monitor H&H    Leukocytosis, resolved - likely from steroids    Acute on chronic anemia - s/p 1 U PRBC and 3 days of IV venofer   monitor H&H, keep active T&S    Chronic Atrial Fibrillation - continue metoprolol and lovenox. Resume Eliquis once no procedures planned.     C3 Glomerulonephropathy - Continue tacrolimus 0.5mg BID and Mycophenolate 500mg 4 times daily. On chronic prednisone 5mg at home, on prednisone 20mg from today and change to 5mg in 2 days. Repeat Tacrolimus level 9/8 5.4--BUN is worsening--need follow up from heart failure team --likely due to patient being on prednisone.    CAD /HTN/DLD- Continue Metoprolol, entresto and Lipitor.     Hypothyroidism - continue synthroid    DM II: A1C 7.3 - continue with insulin, add Lispro 5u with meals, monitor FS and PO intake     Stage 2 right buttock pressure ulcer, Present on admission  Continue local wound care, off loading.       Pending heart failure team follow up to taper Bumex drip.

## 2022-09-15 NOTE — SWALLOW VFSS/MBS ASSESSMENT ADULT - RECOMMENDED FEEDING/EATING TECHNIQUES
volitional bolus hold w/ mildly thick liquids/allow for swallow between intakes/crush medication (when feasible)/no straws/oral hygiene/position upright (90 degrees)/small sips/bites

## 2022-09-15 NOTE — SWALLOW VFSS/MBS ASSESSMENT ADULT - NS SWALLOW VFSS REC ASPIR MON
change of breathing pattern/oral hygiene/position upright (90Y)/fever/pneumonia/upper respiratory infection

## 2022-09-15 NOTE — PROGRESS NOTE ADULT - SUBJECTIVE AND OBJECTIVE BOX
SUBJECTIVE:    Patient is a 82y old Male who presents with a chief complaint of chf exacerbation (14 Sep 2022 13:14)    Currently admitted to medicine with the primary diagnosis of Fluid overload       Today is hospital day 26d.     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    AF (atrial fibrillation)  s/p DCCV    Carotid stenosis    COPD (chronic obstructive pulmonary disease)    H/O coronary artery bypass surgery  2001    S/P inguinal hernia repair    History of appendectomy      ALLERGIES:  Ozempic (0.25 mg or 0.5 mg dose) (Hives; Rash)  streptomycin (Unknown)  Trulicity Pen (Hives; Rash)    MEDICATIONS:  STANDING MEDICATIONS  ALBUTerol    90 MICROgram(s) HFA Inhaler 2 Puff(s) Inhalation every 6 hours  atorvastatin 40 milliGRAM(s) Oral at bedtime  BACItracin   Ointment 1 Application(s) Topical two times a day  buMETAnide Infusion 1 mG/Hr IV Continuous <Continuous>  chlorhexidine 2% Cloths 1 Application(s) Topical <User Schedule>  dextrose 5%. 1000 milliLiter(s) IV Continuous <Continuous>  dextrose 5%. 1000 milliLiter(s) IV Continuous <Continuous>  dextrose 50% Injectable 25 Gram(s) IV Push once  dextrose 50% Injectable 12.5 Gram(s) IV Push once  dextrose 50% Injectable 25 Gram(s) IV Push once  enoxaparin Injectable 85 milliGRAM(s) SubCutaneous every 12 hours  famotidine    Tablet 20 milliGRAM(s) Oral daily  glucagon  Injectable 1 milliGRAM(s) IntraMuscular once  insulin glargine Injectable (LANTUS) 16 Unit(s) SubCutaneous at bedtime  insulin lispro (ADMELOG) corrective regimen sliding scale   SubCutaneous three times a day before meals  insulin lispro Injectable (ADMELOG) 6 Unit(s) SubCutaneous three times a day before meals  levothyroxine 150 MICROGram(s) Oral daily  lidocaine 1% (Preservative-free) Injectable 30 milliLiter(s) Local Injection once  lidocaine 1% Injectable 10 milliLiter(s) Local Injection once  lidocaine 1% Injectable 10 milliLiter(s) Local Injection once  melatonin 5 milliGRAM(s) Oral at bedtime  metoprolol succinate  milliGRAM(s) Oral daily  multivitamin/minerals 1 Tablet(s) Oral daily  mycophenolate mofetil 500 milliGRAM(s) Oral four times a day  pentoxifylline 400 milliGRAM(s) Oral two times a day  predniSONE   Tablet 20 milliGRAM(s) Oral every 24 hours  sacubitril 49 mG/valsartan 51 mG 1 Tablet(s) Oral two times a day  tacrolimus 0.5 milliGRAM(s) Oral two times a day    PRN MEDICATIONS  acetaminophen     Tablet .. 650 milliGRAM(s) Oral every 6 hours PRN  dextrose Oral Gel 15 Gram(s) Oral once PRN  dextrose Oral Gel 15 Gram(s) Oral once PRN    VITALS:   T(F): 96.7  HR: 78  BP: 120/55  RR: 18  SpO2: 93%    LABS:                        8.0    12.39 )-----------( 225      ( 15 Sep 2022 07:31 )             25.2     09-15    141  |  97<L>  |  109<HH>  ----------------------------<  120<H>  3.5   |  33<H>  |  1.3    Ca    8.3<L>      15 Sep 2022 07:31  Phos  2.4     09-14  Mg     2.2     09-15    TPro  5.1<L>  /  Alb  2.9<L>  /  TBili  0.7  /  DBili  0.4<H>  /  AST  12  /  ALT  10  /  AlkPhos  81  09-15        ABG - ( 14 Sep 2022 08:14 )  pH, Arterial: 7.46  pH, Blood: x     /  pCO2: 48    /  pO2: 54    / HCO3: 34    / Base Excess: 9.1   /  SaO2: 87.8                      RADIOLOGY:    PHYSICAL EXAM:  GEN: No acute distress  LUNGS: Clear to auscultation bilaterally   HEART: S1/S2 present. RRR.   ABD/ GI: Soft, non-tender, non-distended. Bowel sounds present  EXT: NC/NC/NE/2+PP/SMALL  NEURO: AAOX3

## 2022-09-15 NOTE — SWALLOW VFSS/MBS ASSESSMENT ADULT - ORAL PHASE
Reduced anterior - posterior transport/Residue in oral cavity/Incomplete tongue to palate contact/Uncontrolled bolus / spillover in taryn-pharynx/Uncontrolled bolus / spillover in hypopharynx/Laryngeal penetration before swallow - silent/Aspiration before swallow - silent Delayed oral transit time/Reduced anterior - posterior transport/Residue in oral cavity/Incomplete tongue to palate contact/Uncontrolled bolus / spillover in taryn-pharynx/Uncontrolled bolus / spillover in hypopharynx/Laryngeal penetration before swallow - silent Delayed oral transit time/Residue in oral cavity/Incomplete tongue to palate contact/Uncontrolled bolus / spillover in taryn-pharynx/Uncontrolled bolus / spillover in hypopharynx

## 2022-09-15 NOTE — PROGRESS NOTE ADULT - SUBJECTIVE AND OBJECTIVE BOX
24H events:    Patient is a 82y old Male who presents with a chief complaint of chf exacerbation (15 Sep 2022 14:05)    Primary diagnosis of Fluid overload       Today is hospital day 26d.      Patient seen and examined at bedside. Reports no active complaints.     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     ALLERGIES:  Ozempic (0.25 mg or 0.5 mg dose) (Hives; Rash)  streptomycin (Unknown)  Trulicity Pen (Hives; Rash)    MEDICATIONS:  STANDING MEDICATIONS  ALBUTerol    90 MICROgram(s) HFA Inhaler 2 Puff(s) Inhalation every 6 hours  atorvastatin 40 milliGRAM(s) Oral at bedtime  BACItracin   Ointment 1 Application(s) Topical two times a day  buMETAnide Infusion 1 mG/Hr IV Continuous <Continuous>  chlorhexidine 2% Cloths 1 Application(s) Topical <User Schedule>  dextrose 5%. 1000 milliLiter(s) IV Continuous <Continuous>  dextrose 5%. 1000 milliLiter(s) IV Continuous <Continuous>  dextrose 50% Injectable 25 Gram(s) IV Push once  dextrose 50% Injectable 12.5 Gram(s) IV Push once  dextrose 50% Injectable 25 Gram(s) IV Push once  enoxaparin Injectable 85 milliGRAM(s) SubCutaneous every 12 hours  famotidine    Tablet 20 milliGRAM(s) Oral daily  glucagon  Injectable 1 milliGRAM(s) IntraMuscular once  insulin glargine Injectable (LANTUS) 16 Unit(s) SubCutaneous at bedtime  insulin lispro (ADMELOG) corrective regimen sliding scale   SubCutaneous three times a day before meals  insulin lispro Injectable (ADMELOG) 6 Unit(s) SubCutaneous three times a day before meals  levothyroxine 150 MICROGram(s) Oral daily  lidocaine 1% (Preservative-free) Injectable 30 milliLiter(s) Local Injection once  lidocaine 1% Injectable 10 milliLiter(s) Local Injection once  lidocaine 1% Injectable 10 milliLiter(s) Local Injection once  melatonin 5 milliGRAM(s) Oral at bedtime  metoprolol succinate  milliGRAM(s) Oral daily  multivitamin/minerals 1 Tablet(s) Oral daily  mycophenolate mofetil 500 milliGRAM(s) Oral four times a day  pentoxifylline 400 milliGRAM(s) Oral two times a day  predniSONE   Tablet 20 milliGRAM(s) Oral every 24 hours  sacubitril 49 mG/valsartan 51 mG 1 Tablet(s) Oral two times a day  tacrolimus 0.5 milliGRAM(s) Oral two times a day    PRN MEDICATIONS  acetaminophen     Tablet .. 650 milliGRAM(s) Oral every 6 hours PRN  dextrose Oral Gel 15 Gram(s) Oral once PRN  dextrose Oral Gel 15 Gram(s) Oral once PRN    VITALS:   T(F): 96.7  HR: 78  BP: 120/55  RR: 18  SpO2: 93%    LABS:                        8.0    12.39 )-----------( 225      ( 15 Sep 2022 07:31 )             25.2     09-15    141  |  97<L>  |  109<HH>  ----------------------------<  120<H>  3.5   |  33<H>  |  1.3    Ca    8.3<L>      15 Sep 2022 07:31  Phos  2.4     09-14  Mg     2.2     09-15    TPro  5.1<L>  /  Alb  2.9<L>  /  TBili  0.7  /  DBili  0.4<H>  /  AST  12  /  ALT  10  /  AlkPhos  81  09-15        ABG - ( 14 Sep 2022 08:14 )  pH, Arterial: 7.46  pH, Blood: x     /  pCO2: 48    /  pO2: 54    / HCO3: 34    / Base Excess: 9.1   /  SaO2: 87.8                      RADIOLOGY:    PHYSICAL EXAM:  GEN: No acute distress  LUNGS: Clear to auscultation bilaterally   HEART: S1/S2 present. RRR.   ABD/ GI: Soft, non-tender, non-distended. Bowel sounds present  EXT: NC/NC/NE/2+PP/SMALL  NEURO:  AAOX3

## 2022-09-16 NOTE — PROGRESS NOTE ADULT - SUBJECTIVE AND OBJECTIVE BOX
24H events:    Patient is a 82y old Male who presents with a chief complaint of chf exacerbation (15 Sep 2022 15:26)    Primary diagnosis of Fluid overload.    Today is hospital day 27d.      Patient seen and examined at bedside. Reports no active complaints.     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     ALLERGIES:  Ozempic (0.25 mg or 0.5 mg dose) (Hives; Rash)  streptomycin (Unknown)  Trulicity Pen (Hives; Rash)    MEDICATIONS:  STANDING MEDICATIONS  ALBUTerol    90 MICROgram(s) HFA Inhaler 2 Puff(s) Inhalation every 6 hours  atorvastatin 40 milliGRAM(s) Oral at bedtime  BACItracin   Ointment 1 Application(s) Topical two times a day  chlorhexidine 2% Cloths 1 Application(s) Topical <User Schedule>  dextrose 5%. 1000 milliLiter(s) IV Continuous <Continuous>  dextrose 5%. 1000 milliLiter(s) IV Continuous <Continuous>  dextrose 50% Injectable 25 Gram(s) IV Push once  dextrose 50% Injectable 12.5 Gram(s) IV Push once  dextrose 50% Injectable 25 Gram(s) IV Push once  enoxaparin Injectable 85 milliGRAM(s) SubCutaneous every 12 hours  famotidine    Tablet 20 milliGRAM(s) Oral daily  glucagon  Injectable 1 milliGRAM(s) IntraMuscular once  insulin glargine Injectable (LANTUS) 16 Unit(s) SubCutaneous at bedtime  insulin lispro (ADMELOG) corrective regimen sliding scale   SubCutaneous three times a day before meals  insulin lispro Injectable (ADMELOG) 6 Unit(s) SubCutaneous three times a day before meals  levothyroxine 150 MICROGram(s) Oral daily  lidocaine 1% (Preservative-free) Injectable 30 milliLiter(s) Local Injection once  lidocaine 1% Injectable 10 milliLiter(s) Local Injection once  lidocaine 1% Injectable 10 milliLiter(s) Local Injection once  melatonin 5 milliGRAM(s) Oral at bedtime  metoprolol succinate  milliGRAM(s) Oral daily  multivitamin/minerals 1 Tablet(s) Oral daily  mycophenolate mofetil 500 milliGRAM(s) Oral four times a day  pentoxifylline 400 milliGRAM(s) Oral two times a day  predniSONE   Tablet 20 milliGRAM(s) Oral every 24 hours  sacubitril 49 mG/valsartan 51 mG 1 Tablet(s) Oral two times a day  tacrolimus 0.5 milliGRAM(s) Oral two times a day    PRN MEDICATIONS  acetaminophen     Tablet .. 650 milliGRAM(s) Oral every 6 hours PRN  dextrose Oral Gel 15 Gram(s) Oral once PRN  dextrose Oral Gel 15 Gram(s) Oral once PRN    VITALS:   T(F): 96.5  HR: 59  BP: 107/51  RR: 18  SpO2: 95%    LABS:                        8.5    8.61  )-----------( 221      ( 16 Sep 2022 07:40 )             27.0     09-16    140  |  97<L>  |  109<HH>  ----------------------------<  186<H>  3.7   |  35<H>  |  1.3    Ca    8.6      16 Sep 2022 07:40  Phos  2.4     09-16  Mg     2.1     09-16    TPro  5.2<L>  /  Alb  3.0<L>  /  TBili  0.7  /  DBili  0.5<H>  /  AST  12  /  ALT  9   /  AlkPhos  79  09-16                  RADIOLOGY:    PHYSICAL EXAM:  GEN: No acute distress  LUNGS: Clear to auscultation bilaterally   HEART: S1/S2 present. RRR.   ABD/ GI: Soft, non-tender, non-distended. Bowel sounds present  EXT: NC/NC/NE/2+PP/SMALL  NEURO:  AAOX3

## 2022-09-16 NOTE — PROGRESS NOTE ADULT - ASSESSMENT
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     Acute Hypoxic respiratory Failure   Acute HFmrEF  Bilateral pleural effusion Right>Left.  COPD  COVID 19 infection +9/2    previously on HFNC, now on NC 3 L saturating low 93%  s/p thoracentesis 8/2 fluid transudative.   s/p thoracentesis 9/8 with 1.9 L removed, transudative, f/u studies  DC Bumex drip at 1mg/hr, hold hypertonic saline  s/p Diamox x1 9/12, repeat ABG today noted , Diamox today ordered ,  replete K to 4 and Mg 2  renal fxn stable  dexamethasone 6mg daily, steroids started 9/6 can be changed to prednisone 20mg from 9/15  D-Dimer 1000, LE duplex 9/6 negative for DVTs   on therapeutic Lovenox (previous eliquis)  s/s eval given choking episode today - SLP note appreciated -recommended minced and moist diet;  PT    Epistaxis - noted again  ENT follow up appreciated, packing removed before-- patient said he picked at his nose today  monitor H&H    Leukocytosis, resolved - likely from steroids    Acute on chronic anemia - s/p 1 U PRBC and 3 days of IV venofer   monitor H&H, keep active T&S    Chronic Atrial Fibrillation - continue metoprolol and lovenox. Resume Eliquis once no procedures planned.     C3 Glomerulonephropathy - Continue tacrolimus 0.5mg BID and Mycophenolate 500mg 4 times daily. On chronic prednisone 5mg at home, on prednisone 20mg from today and change to 5mg in 2 days. Repeat Tacrolimus level 9/8 5.4--BUN is worsening--need follow up from heart failure team --likely due to patient being on prednisone.    CAD /HTN/DLD- Continue Metoprolol, entresto and Lipitor.     Hypothyroidism - continue synthroid    DM II: A1C 7.3 - continue with insulin, add Lispro 5u with meals, monitor FS and PO intake     Stage 2 right buttock pressure ulcer, Present on admission  Continue local wound care, off loading.     DC planning next  week--

## 2022-09-16 NOTE — PROGRESS NOTE ADULT - SUBJECTIVE AND OBJECTIVE BOX
SUBJECTIVE:    Patient is a 82y old Male who presents with a chief complaint of chf exacerbation (16 Sep 2022 15:43)    Currently admitted to medicine with the primary diagnosis of Fluid overload       Today is hospital day 27d.     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    AF (atrial fibrillation)  s/p DCCV    Carotid stenosis    COPD (chronic obstructive pulmonary disease)    H/O coronary artery bypass surgery  2001    S/P inguinal hernia repair    History of appendectomy      ALLERGIES:  Ozempic (0.25 mg or 0.5 mg dose) (Hives; Rash)  streptomycin (Unknown)  Trulicity Pen (Hives; Rash)    MEDICATIONS:  STANDING MEDICATIONS  ALBUTerol    90 MICROgram(s) HFA Inhaler 2 Puff(s) Inhalation every 6 hours  atorvastatin 40 milliGRAM(s) Oral at bedtime  BACItracin   Ointment 1 Application(s) Topical two times a day  chlorhexidine 2% Cloths 1 Application(s) Topical <User Schedule>  dextrose 5%. 1000 milliLiter(s) IV Continuous <Continuous>  dextrose 5%. 1000 milliLiter(s) IV Continuous <Continuous>  dextrose 50% Injectable 25 Gram(s) IV Push once  dextrose 50% Injectable 12.5 Gram(s) IV Push once  dextrose 50% Injectable 25 Gram(s) IV Push once  enoxaparin Injectable 85 milliGRAM(s) SubCutaneous every 12 hours  famotidine    Tablet 20 milliGRAM(s) Oral daily  glucagon  Injectable 1 milliGRAM(s) IntraMuscular once  insulin glargine Injectable (LANTUS) 16 Unit(s) SubCutaneous at bedtime  insulin lispro (ADMELOG) corrective regimen sliding scale   SubCutaneous three times a day before meals  insulin lispro Injectable (ADMELOG) 6 Unit(s) SubCutaneous three times a day before meals  levothyroxine 150 MICROGram(s) Oral daily  lidocaine 1% (Preservative-free) Injectable 30 milliLiter(s) Local Injection once  lidocaine 1% Injectable 10 milliLiter(s) Local Injection once  lidocaine 1% Injectable 10 milliLiter(s) Local Injection once  melatonin 5 milliGRAM(s) Oral at bedtime  metoprolol succinate  milliGRAM(s) Oral daily  multivitamin/minerals 1 Tablet(s) Oral daily  mycophenolate mofetil 500 milliGRAM(s) Oral four times a day  pentoxifylline 400 milliGRAM(s) Oral two times a day  predniSONE   Tablet 20 milliGRAM(s) Oral every 24 hours  sacubitril 49 mG/valsartan 51 mG 1 Tablet(s) Oral two times a day  tacrolimus 0.5 milliGRAM(s) Oral two times a day    PRN MEDICATIONS  acetaminophen     Tablet .. 650 milliGRAM(s) Oral every 6 hours PRN  dextrose Oral Gel 15 Gram(s) Oral once PRN  dextrose Oral Gel 15 Gram(s) Oral once PRN    VITALS:   T(F): 96.5  HR: 59  BP: 107/51  RR: 18  SpO2: 95%    LABS:                        8.5    8.61  )-----------( 221      ( 16 Sep 2022 07:40 )             27.0     09-16    140  |  97<L>  |  109<HH>  ----------------------------<  186<H>  3.7   |  35<H>  |  1.3    Ca    8.6      16 Sep 2022 07:40  Phos  2.4     09-16  Mg     2.1     09-16    TPro  5.2<L>  /  Alb  3.0<L>  /  TBili  0.7  /  DBili  0.5<H>  /  AST  12  /  ALT  9   /  AlkPhos  79  09-16                  RADIOLOGY:    PHYSICAL EXAM:  GEN: No acute distress  LUNGS: Clear to auscultation bilaterally   HEART: S1/S2 present. RRR.   ABD/ GI: Soft, non-tender, non-distended. Bowel sounds present  EXT: NC/NC/NE/2+PP/SMALL  NEURO: AAOX3

## 2022-09-16 NOTE — PROGRESS NOTE ADULT - ASSESSMENT
83 y/o man w PMHx of HTN, HLD, DM2, CAD s/p 3vCABG known to Dr De La Fuente, HFmEF w EF 45-50% on 8/1/22, AF on Eliquis, AAA, COPD baseline rare home O2 known to Dr ALO Mars, CKD3 and C3 glomerulopathy known to Dr Daniel, hypothyroidism, PVD, presented on 8/20/22 for SOB x5days and hypoxia to 70s. Admitted to medicine for SOB/hypoxia attributed to CHF exacerbation.   Has been on Bumex drip for HF exacerbation and is s/p R thoracentesis 8/26/22 w 1.5L transudative fluid removed, recurrent R thoracentesis 9/8/22.       #Acute Hypoxic Respiratory Failure  #Acute HFmrEF  #Recurrent pleural effusion   #COPD, covid -19   - HFmrEF w EF 45-50% on 8/1/22; multiple LV regional wall motion abnormalities, mild MR, mild TR, borderline pulm HTN   - COPD baseline rare home O2, 3L O2 since last admission known to Dr ALO Mars  - Thoracentesis 8/26/22 w 1.5L removed, transudative fluid. Repeat 9/8/22, 2L removed.   - CXR 9/13/22 w/o significant change in b/l opacities/effusions   - weaned from HFNC to 5 L NC  - DISCONTINUE Bumex drip at 1mg/hr as per cardio due to elevated RFT  - Heart failure: 150cc hypertonic saline over 3h BID, metolazone 5mg x1, BMP BID w Mag, Entresto 49-51. D/c Imdur and Nifedipine.   - Pulmonary following, recommended stopping hypertonic saline   - ID: Tx as new COVID infection: s/p remdesivir for 5 days, dexa changed to o prednisone [id number] thru 9/16/22  - f/o pleural fluid cx   - S&S eval today bcz of episode of choking: continue minced and moist diet     #Dysphagia  -speech and swallow following  -Modified barium swallow done 09/15--- severe pharyngeal dysphagia for thin liquids; moderate pharyngeal dysphagia w/ mildly thick liquids; mild pharyngeal dysphagia for puree and minced+moist consistencies. continue minced+moist, mildly thick liquids. allow for swallow between intakes; crush medication (when feasible); no straws; oral hygiene; position upright (90 degrees); small sips/bites; volitional bolus hold w/ mildly thick liquids    #Alkalosis  - probably contraction alkalosis in the setting of diuresis   - given diamox 250 mg once 9/12/22, and another dose on 9/14/22   - CO2 32< 30 < 32     #Hypokalemia   - on bumex drip   - replete as needed     #BRENDA  #HypoNa - resolved.   Baseline Cr 1.1, Cr 1.2 on 9/12/22, near resolved.   HypoNa resolved - now 136  Prior HypoNa may be due to dehydration/poor PO intake     #CAD  Continue Metoprolol 100mg QD  Imdur 30mg QD and nefidipine stopped   On entresto 2z daily   c/w Atorvastatin 40mg qhs.     #HTN:   Continue metoprolol  nifedipine stopped given HFmrEF  -started on entresto   - Current SBP range 120s-130s    #DM II: A1C 7.3.   - was on  Lantus to 20u, Off Lispro.   - Hypoglycemia 9/12 59, decreased lantus to 16 units   - Blood glucose 253 today, added lispro 6 TID     #Chronic anemia; goal Hb 8+  #Epistaxis - resolved   - Hb stable 8.6   s/p 1 U PRBC 8/22/22 and 8/24/22 for Hb <8  s/p IV Venofer 200mg daily for 3 days.   ENT consulted 8/23/22 for epistaxis/ blood clots blocking nasal passage     #Chronic Atrial Fibrillation: continue metoprolol, on therapeutic lovenox instead of Eliquis in case thoracentesis needed   #C3 Glomerulonephropathy: on  Pred 5mg QD at home, [place] 0.5mg BID, Mycophenolate 500mg 4 times daily. c/w home meds ( on dexamethasone 6 mg IV daily, pred stopped for now )   #Leukocytosis-resolved : likely from steroids; monitor off abx   #Hypothyroidism: continue synthroid  #Stage 2 right buttock pressure ulcer: present on admission. Continue local wound care, off loading.                                                                     # DVT prophylaxis: Transition eliquis to Lovenox 85mg BID for repeat thoracentesis. Restarted LMWH 9/9/22.    # GI prophylaxis: Famotidine 20mg QD   # Diet: Minced and moist   # Activity: IAT - did not participate w PT 9/8/22 due to fatigue   # Code status: DNR DNI, palliative on board   # Disposition: Telemetry.

## 2022-09-17 NOTE — PROGRESS NOTE ADULT - SUBJECTIVE AND OBJECTIVE BOX
SUBJECTIVE:    Patient is a 82y old Male who presents with a chief complaint of chf exacerbation (16 Sep 2022 16:22)    Currently admitted to medicine with the primary diagnosis of Fluid overload       Today is hospital day 28d.     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    AF (atrial fibrillation)  s/p DCCV    Carotid stenosis    COPD (chronic obstructive pulmonary disease)    H/O coronary artery bypass surgery  2001    S/P inguinal hernia repair    History of appendectomy      ALLERGIES:  Ozempic (0.25 mg or 0.5 mg dose) (Hives; Rash)  streptomycin (Unknown)  Trulicity Pen (Hives; Rash)    MEDICATIONS:  STANDING MEDICATIONS  ALBUTerol    90 MICROgram(s) HFA Inhaler 2 Puff(s) Inhalation every 6 hours  atorvastatin 40 milliGRAM(s) Oral at bedtime  BACItracin   Ointment 1 Application(s) Topical two times a day  chlorhexidine 2% Cloths 1 Application(s) Topical <User Schedule>  dextrose 5%. 1000 milliLiter(s) IV Continuous <Continuous>  dextrose 5%. 1000 milliLiter(s) IV Continuous <Continuous>  dextrose 50% Injectable 25 Gram(s) IV Push once  dextrose 50% Injectable 12.5 Gram(s) IV Push once  dextrose 50% Injectable 25 Gram(s) IV Push once  famotidine    Tablet 20 milliGRAM(s) Oral daily  glucagon  Injectable 1 milliGRAM(s) IntraMuscular once  insulin glargine Injectable (LANTUS) 16 Unit(s) SubCutaneous at bedtime  insulin lispro (ADMELOG) corrective regimen sliding scale   SubCutaneous three times a day before meals  insulin lispro Injectable (ADMELOG) 6 Unit(s) SubCutaneous three times a day before meals  levothyroxine 150 MICROGram(s) Oral daily  lidocaine 1% (Preservative-free) Injectable 30 milliLiter(s) Local Injection once  lidocaine 1% Injectable 10 milliLiter(s) Local Injection once  lidocaine 1% Injectable 10 milliLiter(s) Local Injection once  melatonin 5 milliGRAM(s) Oral at bedtime  metoprolol succinate  milliGRAM(s) Oral daily  multivitamin/minerals 1 Tablet(s) Oral daily  mycophenolate mofetil 500 milliGRAM(s) Oral four times a day  pentoxifylline 400 milliGRAM(s) Oral two times a day  predniSONE   Tablet 20 milliGRAM(s) Oral every 24 hours  sacubitril 49 mG/valsartan 51 mG 1 Tablet(s) Oral two times a day  tacrolimus 0.5 milliGRAM(s) Oral two times a day    PRN MEDICATIONS  acetaminophen     Tablet .. 650 milliGRAM(s) Oral every 6 hours PRN  dextrose Oral Gel 15 Gram(s) Oral once PRN  dextrose Oral Gel 15 Gram(s) Oral once PRN    VITALS:   T(F): 96.5  HR: 58  BP: 111/55  RR: 19  SpO2: 93%    LABS:                        8.5    8.61  )-----------( 221      ( 16 Sep 2022 07:40 )             27.0     09-16    140  |  97<L>  |  109<HH>  ----------------------------<  186<H>  3.7   |  35<H>  |  1.3    Ca    8.6      16 Sep 2022 07:40  Phos  2.4     09-16  Mg     2.1     09-16    TPro  5.2<L>  /  Alb  3.0<L>  /  TBili  0.7  /  DBili  0.5<H>  /  AST  12  /  ALT  9   /  AlkPhos  79  09-16                  RADIOLOGY:    PHYSICAL EXAM:  GEN: No acute distress  LUNGS: Clear to auscultation bilaterally   HEART: S1/S2 present. RRR.   ABD/ GI: Soft, non-tender, non-distended. Bowel sounds present  EXT: NC/NC/NE/2+PP/SMALL  NEURO: AAOX3

## 2022-09-17 NOTE — PROGRESS NOTE ADULT - ASSESSMENT
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     Acute Hypoxic respiratory Failure   Acute HFmrEF  Bilateral pleural effusion Right>Left.  COPD  COVID 19 infection +9/2    previously on HFNC, now on NC 3 L saturating low 93%  s/p thoracentesis 8/2 fluid transudative.   s/p thoracentesis 9/8 with 1.9 L removed, transudative, f/u studies  DC Bumex drip   s/p Diamox x1 9/12.  renal fxn stable  dexamethasone 6mg daily, steroids started 9/6 tapered dose of prednisone and change it to 5mg daily home dose  D-Dimer 1000, LE duplex 9/6 negative for DVTs   on therapeutic Lovenox (previous eliquis)  s/s thomas -recommended minced and moist diet;    Epistaxis - noted again-- worsening   ENT follow up appreciated, packing removed before-- patient said he picked at his nose yesterday  monitor H&H--hb is 8.5 from 9/16    Leukocytosis, resolved - likely from steroids    Acute on chronic anemia - s/p 1 U PRBC and 3 days of IV venofer     Chronic Atrial Fibrillation - continue metoprolol and lovenox. will hold it for now as is oozing from arm and nose.    C3 Glomerulonephropathy - Continue tacrolimus 0.5mg BID and Mycophenolate 500mg 4 times daily. On chronic prednisone 5mg at home, on  Repeat Tacrolimus level 9/8 5.4--BUN is worsening-- --likely due to patient being on prednisone.    CAD /HTN/DLD- Continue Metoprolol, entresto and Lipitor.     Hypothyroidism - continue synthroid    DM II: A1C 7.3 - continue with insulin, add Lispro 5u with meals, monitor FS and PO intake     Stage 2 right buttock pressure ulcer, Present on admission  Continue local wound care, off loading.     DC planning next  week-- stopped lovenox and monitor for bleeding and labs pending

## 2022-09-17 NOTE — PROGRESS NOTE ADULT - ASSESSMENT
83 y/o man w PMHx of HTN, HLD, DM2, CAD s/p 3vCABG known to Dr De La Fuente, HFmEF w EF 45-50% on 8/1/22, AF on Eliquis, AAA, COPD baseline rare home O2 known to Dr ALO Masr, CKD3 and C3 glomerulopathy known to Dr Daniel, hypothyroidism, PVD, presented on 8/20/22 for SOB x5days and hypoxia to 70s. Admitted to medicine for SOB/hypoxia attributed to CHF exacerbation.   Has been on Bumex drip for HF exacerbation and is s/p R thoracentesis 8/26/22 w 1.5L transudative fluid removed, recurrent R thoracentesis 9/8/22.       #Acute Hypoxic Respiratory Failure  #Acute HFmrEF  #Recurrent pleural effusion   #COPD, covid -19   - HFmrEF w EF 45-50% on 8/1/22; multiple LV regional wall motion abnormalities, mild MR, mild TR, borderline pulm HTN   - COPD baseline rare home O2, 3L O2 since last admission known to Dr ALO Mars  - Thoracentesis 8/26/22 w 1.5L removed, transudative fluid. Repeat 9/8/22, 2L removed.   - CXR 9/13/22 w/o significant change in b/l opacities/effusions   - weaned from HFNC to 5 L NC  - DISCONTINUE Bumex drip at 1mg/hr as per cardio due to elevated RFT. Follow up with Heart failure team  - Heart failure: 150cc hypertonic saline over 3h BID, metolazone 5mg x1, BMP BID w Mag, Entresto 49-51. D/c Imdur and Nifedipine.   - Pulmonary following, recommended stopping hypertonic saline   - ID: Tx as new COVID infection: s/p remdesivir for 5 days, dexa changed to o prednisone [id number] thru 9/16/22  - f/o pleural fluid cx   - S&S eval today bcz of episode of choking: continue minced and moist diet     #Dysphagia  -speech and swallow following  -Modified barium swallow done 09/15--- severe pharyngeal dysphagia for thin liquids; moderate pharyngeal dysphagia w/ mildly thick liquids; mild pharyngeal dysphagia for puree and minced+moist consistencies. continue minced+moist, mildly thick liquids. allow for swallow between intakes; crush medication (when feasible); no straws; oral hygiene; position upright (90 degrees); small sips/bites; volitional bolus hold w/ mildly thick liquids    #Alkalosis  - probably contraction alkalosis in the setting of diuresis   - given diamox 250 mg once 9/12/22, and another dose on 9/14/22   - CO2 32< 30 < 32     #Hypokalemia   - on bumex drip   - replete as needed     #BRENDA  #HypoNa - resolved.   Baseline Cr 1.1, Cr 1.2 on 9/12/22, near resolved.   HypoNa resolved - now 136  Prior HypoNa may be due to dehydration/poor PO intake     #CAD  Continue Metoprolol 100mg QD  Imdur 30mg QD and nefidipine stopped   On entresto 2z daily   c/w Atorvastatin 40mg qhs.     #HTN:   Continue metoprolol  nifedipine stopped given HFmrEF  -started on entresto   - Current SBP range 120s-130s    #DM II: A1C 7.3.   - was on  Lantus to 20u, Off Lispro.   - Hypoglycemia 9/12 59, decreased lantus to 16 units   - Blood glucose 253 today, added lispro 6 TID     #Chronic anemia; goal Hb 8+  #Epistaxis - resolved   - Hb stable 8.6   s/p 1 U PRBC 8/22/22 and 8/24/22 for Hb <8  s/p IV Venofer 200mg daily for 3 days.   ENT consulted 8/23/22 for epistaxis/ blood clots blocking nasal passage     #Chronic Atrial Fibrillation: continue metoprolol, on therapeutic lovenox instead of Eliquis in case thoracentesis needed   #C3 Glomerulonephropathy: on  Pred 5mg QD at home, [place] 0.5mg BID, Mycophenolate 500mg 4 times daily. c/w home meds ( on dexamethasone 6 mg IV daily, pred stopped for now )   #Leukocytosis-resolved : likely from steroids; monitor off abx   #Hypothyroidism: continue synthroid  #Stage 2 right buttock pressure ulcer: present on admission. Continue local wound care, off loading.                                                                     # DVT prophylaxis: Transition eliquis to Lovenox 85mg BID for repeat thoracentesis. Restarted LMWH 9/9/22.    # GI prophylaxis: Famotidine 20mg QD   # Diet: Minced and moist   # Activity: IAT - did not participate w PT 9/8/22 due to fatigue   # Code status: DNR DNI, palliative on board   # Disposition: Telemetry.

## 2022-09-17 NOTE — PROGRESS NOTE ADULT - SUBJECTIVE AND OBJECTIVE BOX
24H events:    Patient is a 82y old Male who presents with a chief complaint of chf exacerbation (17 Sep 2022 11:39)    Primary diagnosis of Fluid overload       Today is hospital day 28d.      Patient seen and examined at bedside. Reports no active complaints.     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     ALLERGIES:  Ozempic (0.25 mg or 0.5 mg dose) (Hives; Rash)  streptomycin (Unknown)  Trulicity Pen (Hives; Rash)    MEDICATIONS:  STANDING MEDICATIONS  ALBUTerol    90 MICROgram(s) HFA Inhaler 2 Puff(s) Inhalation every 6 hours  atorvastatin 40 milliGRAM(s) Oral at bedtime  BACItracin   Ointment 1 Application(s) Topical two times a day  chlorhexidine 2% Cloths 1 Application(s) Topical <User Schedule>  dextrose 5%. 1000 milliLiter(s) IV Continuous <Continuous>  dextrose 5%. 1000 milliLiter(s) IV Continuous <Continuous>  dextrose 50% Injectable 25 Gram(s) IV Push once  dextrose 50% Injectable 12.5 Gram(s) IV Push once  dextrose 50% Injectable 25 Gram(s) IV Push once  famotidine    Tablet 20 milliGRAM(s) Oral daily  glucagon  Injectable 1 milliGRAM(s) IntraMuscular once  insulin glargine Injectable (LANTUS) 16 Unit(s) SubCutaneous at bedtime  insulin lispro (ADMELOG) corrective regimen sliding scale   SubCutaneous three times a day before meals  insulin lispro Injectable (ADMELOG) 6 Unit(s) SubCutaneous three times a day before meals  levothyroxine 150 MICROGram(s) Oral daily  lidocaine 1% (Preservative-free) Injectable 30 milliLiter(s) Local Injection once  lidocaine 1% Injectable 10 milliLiter(s) Local Injection once  lidocaine 1% Injectable 10 milliLiter(s) Local Injection once  melatonin 5 milliGRAM(s) Oral at bedtime  metoprolol succinate  milliGRAM(s) Oral daily  multivitamin/minerals 1 Tablet(s) Oral daily  mycophenolate mofetil 500 milliGRAM(s) Oral four times a day  pentoxifylline 400 milliGRAM(s) Oral two times a day  predniSONE   Tablet 20 milliGRAM(s) Oral every 24 hours  sacubitril 49 mG/valsartan 51 mG 1 Tablet(s) Oral two times a day  tacrolimus 0.5 milliGRAM(s) Oral two times a day    PRN MEDICATIONS  acetaminophen     Tablet .. 650 milliGRAM(s) Oral every 6 hours PRN  dextrose Oral Gel 15 Gram(s) Oral once PRN  dextrose Oral Gel 15 Gram(s) Oral once PRN    VITALS:   T(F): 96.1  HR: 58  BP: 100/55  RR: 18  SpO2: 93%    LABS:                        8.5    8.61  )-----------( 221      ( 16 Sep 2022 07:40 )             27.0     09-16    140  |  97<L>  |  109<HH>  ----------------------------<  186<H>  3.7   |  35<H>  |  1.3    Ca    8.6      16 Sep 2022 07:40  Phos  2.4     09-16  Mg     2.1     09-16    TPro  5.2<L>  /  Alb  3.0<L>  /  TBili  0.7  /  DBili  0.5<H>  /  AST  12  /  ALT  9   /  AlkPhos  79  09-16                  RADIOLOGY:    PHYSICAL EXAM:  GEN: No acute distress  LUNGS: Clear to auscultation bilaterally   HEART: S1/S2 present. RRR.   ABD/ GI: Soft, non-tender, non-distended. Bowel sounds present  EXT: NC/NC/NE/2+PP/SMALL  NEURO:  AAOX3

## 2022-09-18 NOTE — PROGRESS NOTE ADULT - SUBJECTIVE AND OBJECTIVE BOX
SUBJECTIVE:    Patient is a 82y old Male who presents with a chief complaint of chf exacerbation (17 Sep 2022 14:58)    Currently admitted to medicine with the primary diagnosis of Fluid overload       Today is hospital day 29d.     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    AF (atrial fibrillation)  s/p DCCV    Carotid stenosis    COPD (chronic obstructive pulmonary disease)    H/O coronary artery bypass surgery  2001    S/P inguinal hernia repair    History of appendectomy      ALLERGIES:  Ozempic (0.25 mg or 0.5 mg dose) (Hives; Rash)  streptomycin (Unknown)  Trulicity Pen (Hives; Rash)    MEDICATIONS:  STANDING MEDICATIONS  ALBUTerol    90 MICROgram(s) HFA Inhaler 2 Puff(s) Inhalation every 6 hours  atorvastatin 40 milliGRAM(s) Oral at bedtime  BACItracin   Ointment 1 Application(s) Topical two times a day  chlorhexidine 2% Cloths 1 Application(s) Topical <User Schedule>  dextrose 5%. 1000 milliLiter(s) IV Continuous <Continuous>  dextrose 5%. 1000 milliLiter(s) IV Continuous <Continuous>  dextrose 50% Injectable 25 Gram(s) IV Push once  dextrose 50% Injectable 12.5 Gram(s) IV Push once  dextrose 50% Injectable 25 Gram(s) IV Push once  famotidine    Tablet 20 milliGRAM(s) Oral daily  glucagon  Injectable 1 milliGRAM(s) IntraMuscular once  insulin glargine Injectable (LANTUS) 16 Unit(s) SubCutaneous at bedtime  insulin lispro (ADMELOG) corrective regimen sliding scale   SubCutaneous three times a day before meals  insulin lispro Injectable (ADMELOG) 6 Unit(s) SubCutaneous three times a day before meals  levothyroxine 150 MICROGram(s) Oral daily  lidocaine 1% (Preservative-free) Injectable 30 milliLiter(s) Local Injection once  lidocaine 1% Injectable 10 milliLiter(s) Local Injection once  lidocaine 1% Injectable 10 milliLiter(s) Local Injection once  melatonin 5 milliGRAM(s) Oral at bedtime  metoprolol succinate  milliGRAM(s) Oral daily  multivitamin/minerals 1 Tablet(s) Oral daily  mycophenolate mofetil 500 milliGRAM(s) Oral four times a day  pentoxifylline 400 milliGRAM(s) Oral two times a day  predniSONE   Tablet 5 milliGRAM(s) Oral daily  sacubitril 49 mG/valsartan 51 mG 1 Tablet(s) Oral two times a day  tacrolimus 0.5 milliGRAM(s) Oral two times a day    PRN MEDICATIONS  acetaminophen     Tablet .. 650 milliGRAM(s) Oral every 6 hours PRN  dextrose Oral Gel 15 Gram(s) Oral once PRN  dextrose Oral Gel 15 Gram(s) Oral once PRN    VITALS:   T(F): 96.2  HR: 88  BP: 90/55  RR: 18  SpO2: --    LABS:                        7.3    13.89 )-----------( 203      ( 18 Sep 2022 12:44 )             22.6     09-18    134<L>  |  90<L>  |  155<HH>  ----------------------------<  186<H>  4.2   |  31  |  1.6<H>    Ca    8.2<L>      18 Sep 2022 12:44    TPro  4.8<L>  /  Alb  2.9<L>  /  TBili  0.6  /  DBili  x   /  AST  14  /  ALT  13  /  AlkPhos  74  09-18                  RADIOLOGY:    PHYSICAL EXAM:  GEN: No acute distress, ecchymosis on arms  LUNGS: Clear to auscultation bilaterally   HEART: S1/S2 present. RRR.   ABD/ GI: Soft, non-tender, non-distended. Bowel sounds present  EXT: NC/NC/NE/2+PP/SMALL  NEURO: AAOX3

## 2022-09-18 NOTE — PROGRESS NOTE ADULT - ASSESSMENT
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     Acute Hypoxic respiratory Failure   Acute HFmrEF  Bilateral pleural effusion Right>Left.  COPD  COVID 19 infection +9/2    previously on HFNC, now on NC 3 L saturating low 93%  s/p thoracentesis 8/2 fluid transudative.   s/p thoracentesis 9/8 with 1.9 L removed, transudative, f/u studies  DC Bumex drip   s/p Diamox x1 9/12.  renal fxn stable  dexamethasone 6mg daily, steroids started 9/6 tapered dose of prednisone and change it to 5mg daily home dose  D-Dimer 1000, LE duplex 9/6 negative for DVTs   off therapeutic Lovenox (previous eliquis)  s/s eval -recommended minced and moist diet;    Epistaxis - 9/16-- dced lovenox-- better today   ENT had done packing in the past-- not this weekend-- patient said he picked at his nose yesterday      Leukocytosis, resolved - likely from steroids    Acute on chronic anemia - s/p 1 U PRBC  before and 3 days of IV venofer  --hb dropping-- also bled from hand-- iv site-- will transfuse 1 unit of PRBC-- can give po lasix 20mg if BP is stable    Chronic Atrial Fibrillation - continue metoprolol and lovenox. will hold it for now as is oozing from arm and nose.    C3 Glomerulonephropathy - Continue tacrolimus 0.5mg BID and Mycophenolate 500mg 4 times daily. On chronic prednisone 5mg at home, on  Repeat Tacrolimus level 9/8 5.4--BUN is worsening-- --likely due to patient being on prednisone.    CAD /HTN/DLD- Continue Metoprolol, entresto and Lipitor.     Hypothyroidism - continue synthroid    DM II: A1C 7.3 - continue with insulin, add Lispro 5u with meals, monitor FS and PO intake     Stage 2 right buttock pressure ulcer, Present on admission  Continue local wound care, off loading.     spoke with family at bedside-- son informed-- made aware of his condition.

## 2022-09-19 NOTE — PROGRESS NOTE ADULT - SUBJECTIVE AND OBJECTIVE BOX
ANDRIA TAVAREZ 82y Male  MRN#: [id number]   Hospital Day: 30d    SUBJECTIVE  Patient is a 82y old Male who presents with a chief complaint of chf exacerbation (18 Sep 2022 15:20)  Currently admitted to medicine with the primary diagnosis of Fluid overload      INTERVAL HPI AND OVERNIGHT EVENTS:  Patient was examined and seen at bedside. This morning he is resting comfortably in bed and reports no issues or overnight events.    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    AF (atrial fibrillation)  s/p DCCV    Carotid stenosis    COPD (chronic obstructive pulmonary disease)    H/O coronary artery bypass surgery  2001    S/P inguinal hernia repair    History of appendectomy      ALLERGIES:  Ozempic (0.25 mg or 0.5 mg dose) (Hives; Rash)  streptomycin (Unknown)  Trulicity Pen (Hives; Rash)    MEDICATIONS:  STANDING MEDICATIONS  ALBUTerol    90 MICROgram(s) HFA Inhaler 2 Puff(s) Inhalation every 6 hours  atorvastatin 40 milliGRAM(s) Oral at bedtime  BACItracin   Ointment 1 Application(s) Topical two times a day  chlorhexidine 2% Cloths 1 Application(s) Topical <User Schedule>  dextrose 5%. 1000 milliLiter(s) IV Continuous <Continuous>  dextrose 5%. 1000 milliLiter(s) IV Continuous <Continuous>  dextrose 50% Injectable 25 Gram(s) IV Push once  dextrose 50% Injectable 12.5 Gram(s) IV Push once  dextrose 50% Injectable 25 Gram(s) IV Push once  famotidine    Tablet 20 milliGRAM(s) Oral daily  glucagon  Injectable 1 milliGRAM(s) IntraMuscular once  insulin glargine Injectable (LANTUS) 16 Unit(s) SubCutaneous at bedtime  insulin lispro (ADMELOG) corrective regimen sliding scale   SubCutaneous three times a day before meals  insulin lispro Injectable (ADMELOG) 6 Unit(s) SubCutaneous three times a day before meals  levothyroxine 150 MICROGram(s) Oral daily  lidocaine 1% (Preservative-free) Injectable 30 milliLiter(s) Local Injection once  lidocaine 1% Injectable 10 milliLiter(s) Local Injection once  lidocaine 1% Injectable 10 milliLiter(s) Local Injection once  melatonin 5 milliGRAM(s) Oral at bedtime  metoprolol succinate  milliGRAM(s) Oral daily  multivitamin/minerals 1 Tablet(s) Oral daily  mycophenolate mofetil 500 milliGRAM(s) Oral four times a day  pentoxifylline 400 milliGRAM(s) Oral two times a day  predniSONE   Tablet 5 milliGRAM(s) Oral daily  tacrolimus 0.5 milliGRAM(s) Oral two times a day    PRN MEDICATIONS  acetaminophen     Tablet .. 650 milliGRAM(s) Oral every 6 hours PRN  dextrose Oral Gel 15 Gram(s) Oral once PRN  dextrose Oral Gel 15 Gram(s) Oral once PRN      VITAL SIGNS: Last 24 Hours  T(C): 36.6 (19 Sep 2022 05:00), Max: 36.6 (19 Sep 2022 05:00)  T(F): 97.8 (19 Sep 2022 05:00), Max: 97.8 (19 Sep 2022 05:00)  HR: 102 (19 Sep 2022 05:00) (67 - 102)  BP: 97/60 (19 Sep 2022 05:00) (90/55 - 111/54)  BP(mean): --  RR: 18 (19 Sep 2022 05:00) (18 - 18)  SpO2: --    LABS:                        8.1    12.40 )-----------( 207      ( 19 Sep 2022 06:11 )             25.0     09-19    133<L>  |  90<L>  |  172<HH>  ----------------------------<  123<H>  3.9   |  30  |  1.8<H>    Ca    8.3<L>      19 Sep 2022 06:11  Mg     2.4     09-19    TPro  4.9<L>  /  Alb  2.9<L>  /  TBili  0.7  /  DBili  x   /  AST  14  /  ALT  14  /  AlkPhos  74  09-19                  RADIOLOGY:      PHYSICAL EXAM:  CONSTITUTIONAL: No acute distress, well-developed, well-groomed, AAOx3, on 5L NC  PULMONARY: Clear to auscultation bilaterally; bibasilar crackles  CARDIOVASCULAR: regular S1S2  GASTROINTESTINAL: Soft, non-tender, non-distended; bowel sounds present  MUSCULOSKELETAL: 2+ peripheral pulses; trace LLE  NEUROLOGY: non-focal

## 2022-09-19 NOTE — CHART NOTE - NSCHARTNOTEFT_GEN_A_CORE
Registered Dietitian Follow-Up     Patient Profile Reviewed                           Yes [x]   No []     Nutrition History Previously Obtained        Yes [x]  No []       Pertinent Medical Interventions: Admitted with CHF exacerbation. Currently admitted to medicine with the primary diagnosis of Fluid overload. Acute HFmrEF noted. Recurrent pleural effusion noted. COVID-19- noted resolved. Dysphagia noted s/p modified barium swallow 9/15. Alkalosis - noted probably contraction alkalosis in the setting of diuresis. BRENDA noted.     Diet order: Minced & Moist with Consistent Carbohydrate (evening snack), 1200 mL fluid restriction, mildly thick liquids & low sodium diet.  Prosource Gelatein 20 sugar free daily (80 kcal, 20 g protein)  Ensure Pudding twice daily (340 kcal, 8 g protein)  Ensure Max twice daily (300 kcal, 60 g protein)    Anthropometrics:  Ht: 188 cm.  Wt: 85 kg ( dosing wt)  BMI: 24.1 (using dosing wt 85 kg)  IBW: 86.4 kg    Daily Weight in k (), Weight in k.5 (), Weight in k.4 (09-15), Weight in k.9 (), Weight in k.5 (), Weight in k.2 (), Weight in k (09-10), Weight in k (), Weight in k (), Weight in k.6 (), Weight in k.7 (), Weight in k.3 (), Weight in k.4 (), Weight in k (), Weight in k (), Weight in k (), Weight in k.3 (), Weight in k.8 (), Weight in k.8 (); Weight in k.2 (), Weight in k (), Weight in k.6 ()  Weight gain observed suspected to be r/t fluid related wt changes.    MEDICATIONS  (STANDING):  ALBUTerol    90 MICROgram(s) HFA Inhaler 2 Puff(s) Inhalation every 6 hours  atorvastatin 40 milliGRAM(s) Oral at bedtime  BACItracin   Ointment 1 Application(s) Topical two times a day  chlorhexidine 2% Cloths 1 Application(s) Topical <User Schedule>  dextrose 5%. 1000 milliLiter(s) (100 mL/Hr) IV Continuous <Continuous>  dextrose 5%. 1000 milliLiter(s) (50 mL/Hr) IV Continuous <Continuous>  dextrose 50% Injectable 12.5 Gram(s) IV Push once  dextrose 50% Injectable 25 Gram(s) IV Push once  dextrose 50% Injectable 25 Gram(s) IV Push once  famotidine    Tablet 20 milliGRAM(s) Oral daily  glucagon  Injectable 1 milliGRAM(s) IntraMuscular once  insulin glargine Injectable (LANTUS) 16 Unit(s) SubCutaneous at bedtime  insulin lispro (ADMELOG) corrective regimen sliding scale   SubCutaneous three times a day before meals  insulin lispro Injectable (ADMELOG) 6 Unit(s) SubCutaneous three times a day before meals  levothyroxine 150 MICROGram(s) Oral daily  lidocaine 1% (Preservative-free) Injectable 30 milliLiter(s) Local Injection once  lidocaine 1% Injectable 10 milliLiter(s) Local Injection once  lidocaine 1% Injectable 10 milliLiter(s) Local Injection once  melatonin 5 milliGRAM(s) Oral at bedtime  metoprolol succinate  milliGRAM(s) Oral daily  multivitamin/minerals 1 Tablet(s) Oral daily  mycophenolate mofetil 500 milliGRAM(s) Oral four times a day  pentoxifylline 400 milliGRAM(s) Oral two times a day  predniSONE   Tablet 5 milliGRAM(s) Oral daily  tacrolimus 0.5 milliGRAM(s) Oral two times a day    MEDICATIONS  (PRN):  acetaminophen     Tablet .. 650 milliGRAM(s) Oral every 6 hours PRN Mild Pain (1 - 3)  dextrose Oral Gel 15 Gram(s) Oral once PRN Blood Glucose LESS THAN 70 milliGRAM(s)/deciliter  dextrose Oral Gel 15 Gram(s) Oral once PRN Blood Glucose LESS THAN 70 milliGRAM(s)/deciliter    Pertinent Labs: : Na-133, K-3.9 (WDL), BUN-172, creat-1.8, gluc-123, Mg-2.4 (WDL)  Finger Sticks:  POCT Blood Glucose.: 131 mg/dL ( @ 21:32)  POCT Blood Glucose.: 145 mg/dL ( @ 16:51)  POCT Blood Glucose.: 146 mg/dL ( @ 11:40)  POCT Blood Glucose.: 146 mg/dL ( @ 07:34)    Physical Findings:  - Appearance: WDL; 1+ B/L LE edema (noted improving)  - GI function: last BM ; no nausea/vomiting/diarrhea/constipation reported at this time  - Tubes: no feeding tubes  - Oral/Mouth cavity: per 9/15 SLP eval: "severe pharyngeal dysphagia for thin liquids; moderate pharyngeal dysphagia w/ mildly thick liquids; mild pharyngeal dysphagia for puree and minced+moist consistencies". Recommends continue minced+moist, mildly thick liquids.  - Skin: stage 2 pressure injuries to sacrum and buttocks      Nutrition Requirements: per prev RD assessment   Weight Used: 85 kg     Estimated Energy Needs    Continue [x]  Adjust []  1945-2431kcal/day (MSJ x1.2-1.5)     Estimated Protein Needs    Continue [x]  Adjust []  102-128g/day (1.2-1.5g/kg)     Estimated Fluid Needs        Continue [x]  Adjust []  1700-2125mL/day (20-25mL/kg)     Nutrient Intake: Po intake remains 25-50% d/t swallowing difficulty + decreased appetite. Consuming 1 bottle Ensure Max daily & one Ensure Pudding daily. Pt has not tried Prosource Gelatein 20 yet at this time; encouraged pt to try this supplement after today's dinner to optimize energy intake.     [x] Previous Nutrition Diagnosis: Swallowing difficulty            [x] Ongoing          [] Resolved    [x] Previous Nutrition Diagnosis: Increased nutrient needs            [x] Ongoing          [] Resolved     Nutrition Intervention: Meals & Snacks, Medical Food Supplements    Goal/Expected Outcome: Pt to consume and tolerate >50% of meals/snacks and PO supplements in 4 days. Pt at high nutrition risk.     Nutrition Monitoring: Diet order, energy intake, body composition, NFPF, electrolyte profile, BM, wt, labs    Recommendation: Continue current diet order as tolerated. Would add an appetite stimulant to optimize energy intake in setting of prolonged inadequate oral intake.

## 2022-09-19 NOTE — PROGRESS NOTE ADULT - SUBJECTIVE AND OBJECTIVE BOX
SUBJECTIVE:    Patient is a 82y old Male who presents with a chief complaint of chf exacerbation (19 Sep 2022 10:30)    Overnight Events: nephro recalled for worsening creatinine and BUN.  patient seen at bedside, saturating well on RA.  no signs of volume overload. no other major complaints.    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     ALLERGIES:  Ozempic (0.25 mg or 0.5 mg dose) (Hives; Rash)  streptomycin (Unknown)  Trulicity Pen (Hives; Rash)    MEDICATIONS:  STANDING MEDICATIONS  ALBUTerol    90 MICROgram(s) HFA Inhaler 2 Puff(s) Inhalation every 6 hours  atorvastatin 40 milliGRAM(s) Oral at bedtime  BACItracin   Ointment 1 Application(s) Topical two times a day  chlorhexidine 2% Cloths 1 Application(s) Topical <User Schedule>  dextrose 5%. 1000 milliLiter(s) IV Continuous <Continuous>  dextrose 5%. 1000 milliLiter(s) IV Continuous <Continuous>  dextrose 50% Injectable 25 Gram(s) IV Push once  dextrose 50% Injectable 12.5 Gram(s) IV Push once  dextrose 50% Injectable 25 Gram(s) IV Push once  famotidine    Tablet 20 milliGRAM(s) Oral daily  glucagon  Injectable 1 milliGRAM(s) IntraMuscular once  insulin glargine Injectable (LANTUS) 16 Unit(s) SubCutaneous at bedtime  insulin lispro (ADMELOG) corrective regimen sliding scale   SubCutaneous three times a day before meals  insulin lispro Injectable (ADMELOG) 6 Unit(s) SubCutaneous three times a day before meals  levothyroxine 150 MICROGram(s) Oral daily  lidocaine 1% (Preservative-free) Injectable 30 milliLiter(s) Local Injection once  lidocaine 1% Injectable 10 milliLiter(s) Local Injection once  lidocaine 1% Injectable 10 milliLiter(s) Local Injection once  melatonin 5 milliGRAM(s) Oral at bedtime  metoprolol succinate  milliGRAM(s) Oral daily  multivitamin/minerals 1 Tablet(s) Oral daily  mycophenolate mofetil 500 milliGRAM(s) Oral four times a day  pentoxifylline 400 milliGRAM(s) Oral two times a day  predniSONE   Tablet 5 milliGRAM(s) Oral daily  tacrolimus 0.5 milliGRAM(s) Oral two times a day    PRN MEDICATIONS  acetaminophen     Tablet .. 650 milliGRAM(s) Oral every 6 hours PRN  dextrose Oral Gel 15 Gram(s) Oral once PRN  dextrose Oral Gel 15 Gram(s) Oral once PRN    VITALS:   T(F): 97.6, Max: 97.8 (09-19-22 @ 05:00)  HR: 85 (67 - 102)  BP: 92/53 (90/55 - 111/54)  RR: 18 (18 - 18)  SpO2: --    LABS:                        8.1    12.40 )-----------( 207      ( 19 Sep 2022 06:11 )             25.0     09-19    133<L>  |  90<L>  |  172<HH>  ----------------------------<  123<H>  3.9   |  30  |  1.8<H>    Ca    8.3<L>      19 Sep 2022 06:11  Mg     2.4     09-19    TPro  4.9<L>  /  Alb  2.9<L>  /  TBili  0.7  /  DBili  x   /  AST  14  /  ALT  14  /  AlkPhos  74  09-19 09-18-22 @ 07:01  -  09-19-22 @ 07:00  --------------------------------------------------------  IN: 0 mL / OUT: 675 mL / NET: -675 mL    09-19-22 @ 07:01  -  09-19-22 @ 14:37  --------------------------------------------------------  IN: 0 mL / OUT: 0 mL / NET: 0 mL    PHYSICAL EXAM:  GEN: NAD, comfortable  LUNGS: CTAB, no w/r/r  HEART: RRR, s1 and s2 appreciated, no m/r/g  ABD: soft, NT/ND, +BS  EXT: no edema, PP b/l  NEURO: AAOX3 SUBJECTIVE:    Patient is a 82y old Male who presents with a chief complaint of chf exacerbation (19 Sep 2022 10:30)    Overnight Events: nephro recalled for worsening creatinine and BUN.  patient seen at bedside, saturating well on RA.  no signs of volume overload. no other major complaints.    PAST MEDICAL & SURGICAL HISTORY  HTN (hypertension)  DM (diabetes mellitus)  High cholesterol  Hypothyroid  Pneumonia  CHF (congestive heart failure)  Chronic kidney disease (CKD)  last dialysis end of 7/19  PVD (peripheral vascular disease)  AAA (abdominal aortic aneurysm)  Glomerulopathy due to complement component   AF (atrial fibrillation)  s/p DCCV  Carotid stenosis  COPD (chronic obstructive pulmonary disease)  H/O coronary artery bypass surgery  2001  S/P inguinal hernia repair  History of appendectomy      SOCIAL HISTORY:  Negative for smoking/alcohol/drug use.     ALLERGIES:  Ozempic (0.25 mg or 0.5 mg dose) (Hives; Rash)  streptomycin (Unknown)  Trulicity Pen (Hives; Rash)    MEDICATIONS:  STANDING MEDICATIONS  ALBUTerol    90 MICROgram(s) HFA Inhaler 2 Puff(s) Inhalation every 6 hours  atorvastatin 40 milliGRAM(s) Oral at bedtime  BACItracin   Ointment 1 Application(s) Topical two times a day  famotidine    Tablet 20 milliGRAM(s) Oral daily  glucagon  Injectable 1 milliGRAM(s) IntraMuscular once  insulin glargine Injectable (LANTUS) 16 Unit(s) SubCutaneous at bedtime  insulin lispro (ADMELOG) corrective regimen sliding scale   SubCutaneous three times a day before meals  insulin lispro Injectable (ADMELOG) 6 Unit(s) SubCutaneous three times a day before meals  levothyroxine 150 MICROGram(s) Oral daily  lidocaine 1% (Preservative-free) Injectable 30 milliLiter(s) Local Injection once  lidocaine 1% Injectable 10 milliLiter(s) Local Injection once  lidocaine 1% Injectable 10 milliLiter(s) Local Injection once  melatonin 5 milliGRAM(s) Oral at bedtime  metoprolol succinate  milliGRAM(s) Oral daily  multivitamin/minerals 1 Tablet(s) Oral daily  mycophenolate mofetil 500 milliGRAM(s) Oral four times a day  pentoxifylline 400 milliGRAM(s) Oral two times a day  predniSONE   Tablet 5 milliGRAM(s) Oral daily  tacrolimus 0.5 milliGRAM(s) Oral two times a day    PRN MEDICATIONS  acetaminophen     Tablet .. 650 milliGRAM(s) Oral every 6 hours PRN  dextrose Oral Gel 15 Gram(s) Oral once PRN  dextrose Oral Gel 15 Gram(s) Oral once PRN    VITALS:   T(F): 97.6, Max: 97.8 (09-19-22 @ 05:00)  HR: 85 (67 - 102)  BP: 92/53 (90/55 - 111/54)  RR: 18 (18 - 18)  SpO2: --    LABS:                        8.1    12.40 )-----------( 207      ( 19 Sep 2022 06:11 )             25.0     09-19    133<L>  |  90<L>  |  172<HH>  ----------------------------<  123<H>  3.9   |  30  |  1.8<H>    Ca    8.3<L>      19 Sep 2022 06:11  Mg     2.4     09-19    TPro  4.9<L>  /  Alb  2.9<L>  /  TBili  0.7  /  DBili  x   /  AST  14  /  ALT  14  /  AlkPhos  74  09-19            Tacrolimus (), Serum: 5.2: Tacrolimus testing is performed on the [employer]  by  chemiluminescent microparticle immunoassay. The therapeutic range of  tacrolimus is not clearly defined but target 12-hour trough whole blood  concentrations are 5.0 - 20.0 ng/mL early post transplant. Twenty-four  hour  trough concentrations are 33-50% less than the corresponding 12-hour  trough. ng/mL (09.08.22 @ 01:29)              09-18-22 @ 07:01  -  09-19-22 @ 07:00  --------------------------------------------------------  IN: 0 mL / OUT: 675 mL / NET: -675 mL    09-19-22 @ 07:01  -  09-19-22 @ 14:37  --------------------------------------------------------  IN: 0 mL / OUT: 0 mL / NET: 0 mL    PHYSICAL EXAM:  GEN: NAD, comfortable  LUNGS: CTAB, no w/r/r  HEART: RRR, s1 and s2 appreciated, no m/r/g  ABD: soft, NT/ND, +BS  EXT: no edema, PP b/l  NEURO: AAOX3

## 2022-09-19 NOTE — PROGRESS NOTE ADULT - ASSESSMENT
81 yo male with PMHx of HFmrEF (45-50% on Lasix 20 PO), A-Fib on Eliquis, CAD s/p CABGx3, PVD, COPD (was on rare home O2 PRN now on 3L NC since recent July admission for COVID), CKD3, C3 glomerulonephropathy, HTN, HLD, Hypothyroid presents from NH for SOB, admitted for HF exacerbation treated with iv diuretics. Seen initially for BRENDA secondary to CRS that improved with good diuresis. Nephro reeval for worsening BRENDA.    Assessment and plan  BRENDA on CKD 3b-4/ HF exacerbation/ c3 glomerulopathy on immunosuppression  BRENDA likely prerenal/ATN secondary to overdiuresis  BUN elevated in the setting of depleted intravascular space, po prednisone might be contributing  continue to hold iv diuretics  no need for IVF  check tacrolimus level again to r/o toxicity leading to BRENDA  continue with strict i/os  HF following  will follow

## 2022-09-19 NOTE — PROGRESS NOTE ADULT - SUBJECTIVE AND OBJECTIVE BOX
Date of Admission: 22    Interval History: Patient assessed in bed, awake and alert, in NAD. Patient reports he was unable to ambulate OOB this AM 2/2 deconditioning. Denies SOB, no other complaints at this time.    CHIEF COMPLAINT: Patient is a 82y old  Male who presents with a chief complaint of chf exacerbation (22 Aug 2022 12:24)    HISTORY OF PRESENT ILLNESS: 83 yo male with PMHx of HFrEF (45-50% on Lasix 20 PO), A-Fib on Eliquis, CAD s/p CABGx3, PVD, COPD (was on rare home O2 PRN now on 3L NC since recent July admission for COVID), CKD3, C3 glomerulonephropathy, HTN, HLD, Hypothyroid presents from NH with progressively worsening SOB x5 days and hypoxia.  Recently hospitalized here for a month discharged on 8/3/22 initially for BRENDA on CKD course c/b COVID, bilateral lower lobe pneumonia, delirium and microaspiration of thin liquids on modified barium study. Received Dexamethasone and Zosyn course with new O2 requirements of 3L NC discharged to nursing home on 3L and thick liquid diet for rehab. Due to his SOB, chest x-ray was ordered on 8/15 but results did not come back and patient became increasingly short of breath and was noted to be hypoxic and rehab facility which prompted referral to the ED. Review of symptoms is notable for orthopnea and a chronic dry cough unchanged since his covid illness. He denies paroxysmal nocturnal dyspnea, leg edema, chest pain, palpitations, dizziness, n/v, abd pain. Cardiologist is Dr. Witt and Pulmonolgist is Dr. Rory Mars.   In ED:  T(F): 96.4 (22 @ 11:27), Max: 96.4 (22 @ 11:27)  HR: 80 (22 @ 16:20) (80 - 91)  BP: 137/61 (22 @ 16:20) (121/58 - 137/61)  RR: 18 (22 @ 16:20) (18 - 24)  SpO2: 100% (22 @ 16:20) (89% - 100%) -> 89% on 4L -> placed on NRB -> BIPAP 2/2 tachypnea and hypoxia -> taken off with increased work of breathing placed back on BIPAP  Labs: WBC 12k, Hgb 7.2 (baseline 9), BNP 55k, Cr 2.2 (baseline 1.5), PCO2 47 (VBG), Troponin 0.24  EKG:   Atrial fibrillation with premature ventricular or aberrantly conducted complexes  Left posterior fascicular block   ST & T wave abnormality, consider lateral ischemia - similar to prior in 2022  XR chest: significant bilateral pleural effusions and pulmonary congestion  Admitted to medicine for SOB/hypoxia likely 2/2 acute CHF exacerbation.  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    SOCIAL HISTORY:  Former cigarette smoker, quit >40 years ago. Denies alcohol or drug use.     Allergies    Ozempic (0.25 mg or 0.5 mg dose) (Hives; Rash)  streptomycin (Unknown)  Trulicity Pen (Hives; Rash)    Intolerances      PHYSICAL EXAM:  General Appearance: alert, normal for age and gender. 	  Neck: JVP [id number], no bruit.   Eyes: Extra Ocular muscles intact.   Cardiovascular: irregular rhythm S1 S2, no JVD, +1 B/L LE edema (improving)  Respiratory: decreased b/l R<L  Psychiatry: Alert, oriented x 3, Mood & affect appropriate  Gastrointestinal:  Soft, Non-tender  Skin/Integumen: No rashes, No ecchymoses, No cyanosis	  Neurologic: Non-focal  Musculoskeletal/ extremities: Normal range of motion, No clubbing, cyanosis,+1 B/L LE edema (improving)  Vascular: Peripheral pulses palpable 2+ bilaterally    CARDIAC MARKERS:  Serum Pro-Brain Natriuretic Peptide: 18594 pg/mL (22 @ 11:47)    Serum Pro-Brain Natriuretic Peptide: 13971 pg/mL (10.20.21 @ 10:28)      TELEMETRY EVENTS: 	    EC22    Ventricular Rate 71 BPM  Atrial Rate 82 BPM  QRS Duration 140 ms  Q-T Interval 444 ms  QTC Calculation(Bazett) 482 ms  R Axis 108 degrees  T Axis 166 degrees    Diagnosis Line Atrial fibrillation with premature ventricular or aberrantlyconducted  complexes  Non-specific intra-ventricular conduction block  ST & T wave abnormality, consider lateral ischemia  Abnormal ECG    Confirmed by Sarah Borden MD ([id number]) on 2022 1:35:39 PM      	  PREVIOUS DIAGNOSTIC TESTING:    TTE 22      Summary:   1. Left ventricular ejection fraction, by visual estimation, is 45 to   50%.   2. Mildly decreased global left ventricular systolic function.   3. Mild left ventricular hypertrophy.   4. Multiple left ventricular regional wall motion abnormalities exist.   See wall motion findings.   5. The left ventricular diastolic function could not be assessed in this   study.   6. Moderately enlarged left atrium.   7. Sclerotic aortic valve with normal opening.   8. Degenerative mitral valve.   9. Mild mitral regurgitation.  10. Mild tricuspid regurgitation.  11. Estimated pulmonary artery systolic pressure is 37.6 mmHg assuming a   right atrial pressure of 8 mmHg, which is consistent with borderline   pulmonary hypertension.        TTE 10/22/21    Summary:   1. Moderately to severely decreased global left ventricular systolic function.   2. Severely enlarged left atrium.   3. Severely enlarged right atrium.   4. Multiple left ventricular regional wall motion abnormalities exist. See wall motion findings.   5. Mild eccentric left ventricular hypertrophy.   6. The left ventricular diastolic function could not be assessed in this study.   7. Severely enlarged right ventricle.   8. Severely reduced RV systolic function.   9. Mild to moderate mitral valve regurgitation.  10. Moderate mitral annular calcification.  11. Mild tricuspid regurgitation.  12. Mild aortic regurgitation.  13. Sclerotic aortic valve with normal opening.  14. Complex atheroma is noted in the ascending aorta.  15. Estimated pulmonary artery systolic pressure is 64.8 mmHg assuming a right atrial pressure of 15 mmHg, which is consistent with severe pulmonary hypertension.  16. Pulmonary hypertension is present.  17. LA volume Index is 71.9 ml/m² ml/m2.  18. There is moderate aortic root calcification.        Home Medications:  Aranesp 100 mcg/0.5 mL injectable solution: 1 dose(s) injectable every 3 to 4 weeks (2022 21:22)  atorvastatin 40 mg oral tablet: 1 tab(s) orally once a day (at bedtime) (2022 21:22)  calcitriol 0.25 mcg oral capsule: 1 cap(s) orally 2 times a week (:22)  d-mycophenolate: 500 milligram(s) orally 4 times a day (:22)  Eliquis 2.5 mg oral tablet: 1 tab(s) orally 2 times a day (:22)  famotidine 20 mg oral tablet: 1 tab(s) orally once a day (:22)  furosemide 20 mg oral tablet: 1 tab(s) orally once a day (03 Aug 2022 09:47)  insulin glargine 100 units/mL subcutaneous solution: 5 unit(s) subcutaneous once a day (at bedtime) (03 Aug 2022 09:47)  insulin lispro 100 units/mL injectable solution: 1 Unit(s) if Glucose 151 - 200  2 Unit(s) if Glucose 201 - 250  3 Unit(s) if Glucose 251 - 300  4 Unit(s) if Glucose 301 - 350  5 Unit(s) if Glucose 351 - 400  6 Unit(s) if Glucose Greater Than 400 (03 Aug 2022 09:57)  isosorbide mononitrate 30 mg oral tablet, extended release: 1 tab(s) orally once a day (in the morning) (:22)  levothyroxine 150 mcg (0.15 mg) oral tablet: 1 tab(s) orally once a day (:22)  metoprolol succinate 50 mg oral tablet, extended release: 1 tab(s) orally once a day (03 Aug 2022 09:47)  Multiple Vitamins with Minerals oral tablet: 1 tab(s) orally once a day (03 Aug 2022 09:47)  NIFEdipine 30 mg oral tablet, extended release: 1 tab(s) orally once a day (:22)  pentoxifylline 400 mg oral tablet, extended release: 1 tab(s) orally 2 times a day (2022 21:22)  sodium bicarbonate 650 mg oral tablet: 1 tab(s) orally 3 times a day (03 Aug 2022 09:47)  tacrolimus 0.5 mg oral capsule: 1 cap(s) orally 2 times a day (03 Aug 2022 09:57)    MEDICATIONS  (STANDING):  albuterol/ipratropium for Nebulization 3 milliLiter(s) Nebulizer every 6 hours  ampicillin/sulbactam  IVPB 3 Gram(s) IV Intermittent every 6 hours  apixaban 2.5 milliGRAM(s) Oral two times a day  atorvastatin 40 milliGRAM(s) Oral at bedtime  buMETAnide Injectable 2 milliGRAM(s) IV Push every 12 hours  dextrose 5%. 1000 milliLiter(s) (50 mL/Hr) IV Continuous <Continuous>  dextrose 5%. 1000 milliLiter(s) (100 mL/Hr) IV Continuous <Continuous>  dextrose 50% Injectable 25 Gram(s) IV Push once  dextrose 50% Injectable 12.5 Gram(s) IV Push once  dextrose 50% Injectable 25 Gram(s) IV Push once  famotidine    Tablet 20 milliGRAM(s) Oral daily  glucagon  Injectable 1 milliGRAM(s) IntraMuscular once  insulin glargine Injectable (LANTUS) 10 Unit(s) SubCutaneous every morning  insulin lispro (ADMELOG) corrective regimen sliding scale   SubCutaneous three times a day before meals  isosorbide   mononitrate ER Tablet (IMDUR) 30 milliGRAM(s) Oral daily  levothyroxine 150 MICROGram(s) Oral daily  methylPREDNISolone sodium succinate Injectable 40 milliGRAM(s) IV Push every 12 hours  metoprolol succinate ER 50 milliGRAM(s) Oral daily  multivitamin/minerals 1 Tablet(s) Oral daily  mycophenolate mofetil 500 milliGRAM(s) Oral four times a day  NIFEdipine XL 30 milliGRAM(s) Oral daily  pentoxifylline 400 milliGRAM(s) Oral two times a day  tacrolimus 0.5 milliGRAM(s) Oral two times a day    MEDICATIONS  (PRN):  dextrose Oral Gel 15 Gram(s) Oral once PRN Blood Glucose LESS THAN 70 milliGRAM(s)/deciliter

## 2022-09-19 NOTE — PROGRESS NOTE ADULT - ASSESSMENT
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     Acute Hypoxic respiratory Failure   Acute HFmrEF  Bilateral pleural effusion Right>Left.  COPD  COVID 19 infection +9/2    previously on HFNC, now on NC 3 L saturating low 93%  s/p thoracentesis 8/2 fluid transudative.   s/p thoracentesis 9/8 with 1.9 L removed, transudative, f/u studies  DC Bumex drip   s/p Diamox x1 9/12.  renal fxn stable  dexamethasone 6mg daily, steroids started 9/6 tapered dose of prednisone and change it to 5mg daily home dose  D-Dimer 1000, LE duplex 9/6 negative for DVTs   off therapeutic Lovenox (previous eliquis)  s/s eval -recommended minced and moist diet;    Epistaxis - 9/16-- dced lovenox-- better today   ENT had done packing in the past-- not this weekend-- patient said he picked at his nose yesterday      Leukocytosis, resolved - likely from steroids    Acute on chronic anemia - s/p 2 U PRBC  before and 3 days of IV venofer  --hb dropping-- also bled from hand-- iv site-- s/p 1 unit of PRBC-- yesterday  Chronic Atrial Fibrillation - continue metoprolol and lovenox. will hold it for now as is oozing from arm and nose.    C3 Glomerulonephropathy - Continue tacrolimus 0.5mg BID and Mycophenolate 500mg 4 times daily. On chronic prednisone 5mg at home, on  Repeat Tacrolimus level 9/8 5.4--BUN is worsening-- --likely due to patient being on prednisone. seeen by nephrology-- tacrolimus level AM    CAD /HTN/DLD- Continue Metoprolol, entresto and Lipitor. -- hold entresro from 9/19/22    Hypothyroidism - continue synthroid    DM II: A1C 7.3 - continue with insulin, add Lispro 5u with meals, monitor FS and PO intake     Stage 2 right buttock pressure ulcer, Present on admission  Continue local wound care, off loading.     spoke with family at bedside-- son informed-- extensive discussion done with him 9/18-- patient has worsening renal failure-- will follow tacrolimus level and monitor CBC-- not medically cleared for DC for next 48hrs.

## 2022-09-19 NOTE — PROGRESS NOTE ADULT - ASSESSMENT
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 Found to be hypoxic to 89%- placed on BiPAP with improvement, now on venti-mask (can not tolerate NC 2/2 epistax). Found to be fluid overloaded, currently receiving high dose IV diuretics. Suspected PNA, IV antibiotics given (now d/c'd). Patient having epistaxis while on Eliquis- now improved. Pulmonology following- s/p 2 thoracentesis. Volume status improved.     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 Found to be hypoxic to 89%- placed on BiPAP with improvement, now on venti-mask (can not tolerate NC 2/2 epistax). Found to be fluid overloaded, currently receiving high dose IV diuretics. Suspected PNA, IV antibiotics given (now d/c'd). Patient having epistaxis while on Eliquis- now improved. Pulmonology following- s/p 2 thoracentesis. Volume status improved.     Plan:    Volume status improved- IVC collapsing on POCUS exam  Tomorrow, start torsemide 10mg daily  Continue to hold Entresto for now - Resume at 24-26 mg BID once SBP >100 mmHg  BMP daily with magnesium   Maintain potassium >4.0, Mg >2.2  Continue metoprolol succinate 100mg daily  IV iron sucrose 200 mg daily x3 completed   Strict intake and output needed for proper diuretic management   Daily weight   Physical therapy f/u / OOB to chair  Plan discussed with primary team and nephrology   Will continue to follow

## 2022-09-19 NOTE — PROGRESS NOTE ADULT - ASSESSMENT
81 y/o man w PMHx of HTN, HLD, DM2, CAD s/p 3vCABG known to Dr De La Fuente, HFmEF w EF 45-50% on 8/1/22, AF on Eliquis, AAA, COPD baseline rare home O2 known to Dr ALO Mars, CKD3 and C3 glomerulopathy known to Dr Daniel, hypothyroidism, PVD, presented on 8/20/22 for SOB x5days and hypoxia to 70s. Admitted to medicine for SOB/hypoxia attributed to CHF exacerbation.   Has been on Bumex drip for HF exacerbation and is s/p R thoracentesis 8/26/22 w 1.5L transudative fluid removed, recurrent R thoracentesis 9/8/22.       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   - CXR on 09/19 significant change in b/l opacities/effusions   - Weaned from HFNC to 5 L NC  - off bumex drip due to increasing Cr although volume status is stable      #C3 Glomerulonephropathy: on  Pred 5mg QD at home, [place] 0.5mg BID, Mycophenolate 500mg 4 times daily. c/w home meds  #BRENDA  - Cr worsening  - Will touch base with nephro    #Chronic anemia; goal Hb 8+  #Epistaxis   - on and off, had epistaxis yesterday  - AC on hold  - Last PRBC was given on 09/18      #COVID-19  - Sp remd    #Dysphagia  -speech and swallow following  -Modified barium swallow done 09/15--- severe pharyngeal dysphagia for thin liquids; moderate pharyngeal dysphagia w/ mildly thick liquids; mild pharyngeal dysphagia for puree and minced+moist consistencies. continue minced+moist, mildly thick liquids. allow for swallow between intakes; crush medication (when feasible); no straws; oral hygiene; position upright (90 degrees); small sips/bites; volitional bolus hold w/ mildly thick liquids    #Alkalosis  - probably contraction alkalosis in the setting of diuresis   - given diamox 250 mg once 9/12/22, and another dose on 9/14/22       #CAD  Continue Metoprolol 100mg QD  Imdur 30mg QD and nefidipine stopped   On entresto 2z daily   c/w Atorvastatin 40mg qhs.     #HTN:   Continue metoprolol  nifedipine stopped given HFmrEF  -started on entresto   - Current SBP range 120s-130s    #DM II: A1C 7.3.   - on BB      #Chronic Atrial Fibrillation: continue metoprolol, on therapeutic lovenox instead of Eliquis in case thoracentesis needed     #Hypothyroidism: continue synthroid  #Stage 2 right buttock pressure ulcer: present on admission. Continue local wound care, off loading.                                                                    81 y/o man w PMHx of HTN, HLD, DM2, CAD s/p 3vCABG known to Dr De La Fuente, HFmEF w EF 45-50% on 8/1/22, AF on Eliquis, AAA, COPD baseline rare home O2 known to Dr ALO Mars, CKD3 and C3 glomerulopathy known to Dr Daniel, hypothyroidism, PVD, presented on 8/20/22 for SOB x5days and hypoxia to 70s. Admitted to medicine for SOB/hypoxia attributed to CHF exacerbation.   Has been on Bumex drip for HF exacerbation and is s/p R thoracentesis 8/26/22 w 1.5L transudative fluid removed, recurrent R thoracentesis 9/8/22.       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   - CXR on 09/19 significant change in b/l opacities/effusions   - Weaned from HFNC to 5 L NC  - off bumex drip due to increasing Cr although volume status is stable (O2 req, exam, and CXR)  - 09/19: entresto on hold since BP on soft side      #Chronic Atrial Fibrillation:   - Continue metoprolol  - Lovenox on hold due to epistaxis   - When to resume eliquis?    #Chronic anemia; goal Hb 8+  #Epistaxis   - on and off, had epistaxis yesterday  - AC on hold  - Last PRBC was given on 09/18    #C3 Glomerulonephropathy: on  Pred 5mg QD at home, Tacro 0.5mg BID, Mycophenolate 500mg 4 times daily. c/w home meds  #BRENDA  - Cr worsening  - Nephro following: overdiuresis vs tacrolimus toxicity      #COVID-19- resolved  - Sp remd  - sp dexa, now on prednisone home dose    #Dysphagia  -speech and swallow following  -Modified barium swallow done 09/15--- severe pharyngeal dysphagia for thin liquids; moderate pharyngeal dysphagia w/ mildly thick liquids; mild pharyngeal dysphagia for puree and minced+moist consistencies. continue minced+moist, mildly thick liquids. allow for swallow between intakes; crush medication (when feasible); no straws; oral hygiene; position upright (90 degrees); small sips/bites; volitional bolus hold w/ mildly thick liquids    #Alkalosis  - Probably contraction alkalosis in the setting of diuresis   - Diamox PRN    #CAD  - Continue Metoprolol 100mg QD  - Imdur 30mg QD and nefidipine stopped   - Entresto on hold  - c/w Atorvastatin 40mg qhs.     #HTN:   Continue metoprolol  nifedipine stopped given HFmrEF  Entresto on hold (bp on low side)    #DM II: A1C 7.3.   - on BB    #Hypothyroidism:   - continue synthroid    #Stage 2 right buttock pressure ulcer: present on admission  -Continue local wound care, off loading.

## 2022-09-19 NOTE — PROGRESS NOTE ADULT - SUBJECTIVE AND OBJECTIVE BOX
SUBJECTIVE:    Patient is a 82y old Male who presents with a chief complaint of chf exacerbation (19 Sep 2022 14:37)    Currently admitted to medicine with the primary diagnosis of Fluid overload       Today is hospital day 30d.     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    AF (atrial fibrillation)  s/p DCCV    Carotid stenosis    COPD (chronic obstructive pulmonary disease)    H/O coronary artery bypass surgery  2001    S/P inguinal hernia repair    History of appendectomy      ALLERGIES:  Ozempic (0.25 mg or 0.5 mg dose) (Hives; Rash)  streptomycin (Unknown)  Trulicity Pen (Hives; Rash)    MEDICATIONS:  STANDING MEDICATIONS  ALBUTerol    90 MICROgram(s) HFA Inhaler 2 Puff(s) Inhalation every 6 hours  atorvastatin 40 milliGRAM(s) Oral at bedtime  BACItracin   Ointment 1 Application(s) Topical two times a day  chlorhexidine 2% Cloths 1 Application(s) Topical <User Schedule>  dextrose 5%. 1000 milliLiter(s) IV Continuous <Continuous>  dextrose 5%. 1000 milliLiter(s) IV Continuous <Continuous>  dextrose 50% Injectable 25 Gram(s) IV Push once  dextrose 50% Injectable 12.5 Gram(s) IV Push once  dextrose 50% Injectable 25 Gram(s) IV Push once  famotidine    Tablet 20 milliGRAM(s) Oral daily  glucagon  Injectable 1 milliGRAM(s) IntraMuscular once  insulin glargine Injectable (LANTUS) 16 Unit(s) SubCutaneous at bedtime  insulin lispro (ADMELOG) corrective regimen sliding scale   SubCutaneous three times a day before meals  insulin lispro Injectable (ADMELOG) 6 Unit(s) SubCutaneous three times a day before meals  levothyroxine 150 MICROGram(s) Oral daily  lidocaine 1% (Preservative-free) Injectable 30 milliLiter(s) Local Injection once  lidocaine 1% Injectable 10 milliLiter(s) Local Injection once  lidocaine 1% Injectable 10 milliLiter(s) Local Injection once  melatonin 5 milliGRAM(s) Oral at bedtime  metoprolol succinate  milliGRAM(s) Oral daily  multivitamin/minerals 1 Tablet(s) Oral daily  mycophenolate mofetil 500 milliGRAM(s) Oral four times a day  pentoxifylline 400 milliGRAM(s) Oral two times a day  predniSONE   Tablet 5 milliGRAM(s) Oral daily  tacrolimus 0.5 milliGRAM(s) Oral two times a day    PRN MEDICATIONS  acetaminophen     Tablet .. 650 milliGRAM(s) Oral every 6 hours PRN  dextrose Oral Gel 15 Gram(s) Oral once PRN  dextrose Oral Gel 15 Gram(s) Oral once PRN    VITALS:   T(F): 97.6  HR: 85  BP: 92/53  RR: 18  SpO2: --    LABS:                        8.1    12.40 )-----------( 207      ( 19 Sep 2022 06:11 )             25.0     09-19    133<L>  |  90<L>  |  172<HH>  ----------------------------<  123<H>  3.9   |  30  |  1.8<H>    Ca    8.3<L>      19 Sep 2022 06:11  Mg     2.4     09-19    TPro  4.9<L>  /  Alb  2.9<L>  /  TBili  0.7  /  DBili  x   /  AST  14  /  ALT  14  /  AlkPhos  74  09-19                  RADIOLOGY:    PHYSICAL EXAM:  GEN: No acute distress  LUNGS: Clear to auscultation bilaterally   HEART: S1/S2 present. RRR.   ABD/ GI: Soft, non-tender, non-distended. Bowel sounds present  EXT: NC/NC/NE/2+PP/SMALL  NEURO: AAOX3

## 2022-09-20 NOTE — PROGRESS NOTE ADULT - SUBJECTIVE AND OBJECTIVE BOX
ANDRIA TAVAREZ 82y Male  MRN#: [id number]   Hospital Day: 31d    SUBJECTIVE  Patient is a 82y old Male who presents with a chief complaint of chf exacerbation (19 Sep 2022 17:32)  Currently admitted to medicine with the primary diagnosis of Fluid overload      INTERVAL HPI AND OVERNIGHT EVENTS:  Patient was examined and seen at bedside. This morning he is resting comfortably in bed and reports no issues or overnight events.    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    AF (atrial fibrillation)  s/p DCCV    Carotid stenosis    COPD (chronic obstructive pulmonary disease)    H/O coronary artery bypass surgery  2001    S/P inguinal hernia repair    History of appendectomy      ALLERGIES:  Ozempic (0.25 mg or 0.5 mg dose) (Hives; Rash)  streptomycin (Unknown)  Trulicity Pen (Hives; Rash)    MEDICATIONS:  STANDING MEDICATIONS  ALBUTerol    90 MICROgram(s) HFA Inhaler 2 Puff(s) Inhalation every 6 hours  atorvastatin 40 milliGRAM(s) Oral at bedtime  BACItracin   Ointment 1 Application(s) Topical two times a day  chlorhexidine 2% Cloths 1 Application(s) Topical <User Schedule>  dextrose 5%. 1000 milliLiter(s) IV Continuous <Continuous>  dextrose 5%. 1000 milliLiter(s) IV Continuous <Continuous>  dextrose 50% Injectable 25 Gram(s) IV Push once  dextrose 50% Injectable 12.5 Gram(s) IV Push once  dextrose 50% Injectable 25 Gram(s) IV Push once  famotidine    Tablet 20 milliGRAM(s) Oral daily  glucagon  Injectable 1 milliGRAM(s) IntraMuscular once  insulin glargine Injectable (LANTUS) 16 Unit(s) SubCutaneous at bedtime  insulin lispro (ADMELOG) corrective regimen sliding scale   SubCutaneous three times a day before meals  insulin lispro Injectable (ADMELOG) 6 Unit(s) SubCutaneous three times a day before meals  levothyroxine 150 MICROGram(s) Oral daily  lidocaine 1% (Preservative-free) Injectable 30 milliLiter(s) Local Injection once  lidocaine 1% Injectable 10 milliLiter(s) Local Injection once  lidocaine 1% Injectable 10 milliLiter(s) Local Injection once  melatonin 5 milliGRAM(s) Oral at bedtime  metoprolol succinate  milliGRAM(s) Oral daily  multivitamin/minerals 1 Tablet(s) Oral daily  mycophenolate mofetil 500 milliGRAM(s) Oral four times a day  pentoxifylline 400 milliGRAM(s) Oral two times a day  predniSONE   Tablet 5 milliGRAM(s) Oral daily  tacrolimus 0.5 milliGRAM(s) Oral two times a day  torsemide 10 milliGRAM(s) Oral daily    PRN MEDICATIONS  acetaminophen     Tablet .. 650 milliGRAM(s) Oral every 6 hours PRN  dextrose Oral Gel 15 Gram(s) Oral once PRN  dextrose Oral Gel 15 Gram(s) Oral once PRN      VITAL SIGNS: Last 24 Hours  T(C): 36.4 (20 Sep 2022 05:00), Max: 36.4 (19 Sep 2022 12:55)  T(F): 97.5 (20 Sep 2022 05:00), Max: 97.6 (19 Sep 2022 12:55)  HR: 85 (19 Sep 2022 12:55) (85 - 85)  BP: 92/53 (19 Sep 2022 12:55) (92/53 - 92/53)  BP(mean): --  RR: 18 (19 Sep 2022 12:55) (18 - 18)  SpO2: --    LABS:                        8.1    12.40 )-----------( 207      ( 19 Sep 2022 06:11 )             25.0     09-19    133<L>  |  90<L>  |  172<HH>  ----------------------------<  123<H>  3.9   |  30  |  1.8<H>    Ca    8.3<L>      19 Sep 2022 06:11  Mg     2.4     09-19    TPro  4.9<L>  /  Alb  2.9<L>  /  TBili  0.7  /  DBili  x   /  AST  14  /  ALT  14  /  AlkPhos  74  09-19                  RADIOLOGY:      PHYSICAL EXAM:  CONSTITUTIONAL: No acute distress, on 5L NC  PULMONARY: decreased air entry bilaterally with crackles up to mid lungs posteriorly   CARDIOVASCULAR: Regular rate and rhythm; no murmurs, rubs, or gallops  GASTROINTESTINAL: Soft, non-tender, non-distended; bowel sounds present  MUSCULOSKELETAL: 2+ peripheral pulses; trace LLE  NEUROLOGY: non-focal  SKIN: No rashes or lesions; warm and dry

## 2022-09-20 NOTE — PROGRESS NOTE ADULT - SUBJECTIVE AND OBJECTIVE BOX
Nephrology progress note    Patient was seen and examined, events over the last 24 h noted .    Allergies:  Ozempic (0.25 mg or 0.5 mg dose) (Hives; Rash)  streptomycin (Unknown)  Trulicity Pen (Hives; Rash)    Hospital Medications:   MEDICATIONS  (STANDING):  ALBUTerol    90 MICROgram(s) HFA Inhaler 2 Puff(s) Inhalation every 6 hours  atorvastatin 40 milliGRAM(s) Oral at bedtime  BACItracin   Ointment 1 Application(s) Topical two times a day  chlorhexidine 2% Cloths 1 Application(s) Topical <User Schedule>  dextrose 5%. 1000 milliLiter(s) (50 mL/Hr) IV Continuous <Continuous>  dextrose 5%. 1000 milliLiter(s) (100 mL/Hr) IV Continuous <Continuous>  dextrose 50% Injectable 25 Gram(s) IV Push once  dextrose 50% Injectable 12.5 Gram(s) IV Push once  dextrose 50% Injectable 25 Gram(s) IV Push once  famotidine    Tablet 20 milliGRAM(s) Oral daily  glucagon  Injectable 1 milliGRAM(s) IntraMuscular once  heparin   Injectable 5000 Unit(s) SubCutaneous every 12 hours  insulin glargine Injectable (LANTUS) 8 Unit(s) SubCutaneous at bedtime  insulin lispro (ADMELOG) corrective regimen sliding scale   SubCutaneous three times a day before meals  insulin lispro Injectable (ADMELOG) 3 Unit(s) SubCutaneous three times a day before meals  levothyroxine 150 MICROGram(s) Oral daily  lidocaine 1% (Preservative-free) Injectable 30 milliLiter(s) Local Injection once  lidocaine 1% Injectable 10 milliLiter(s) Local Injection once  lidocaine 1% Injectable 10 milliLiter(s) Local Injection once  melatonin 5 milliGRAM(s) Oral at bedtime  metoprolol succinate  milliGRAM(s) Oral daily  multivitamin/minerals 1 Tablet(s) Oral daily  mycophenolate mofetil 500 milliGRAM(s) Oral four times a day  pentoxifylline 400 milliGRAM(s) Oral two times a day  predniSONE   Tablet 5 milliGRAM(s) Oral daily  tacrolimus 0.5 milliGRAM(s) Oral two times a day  torsemide 10 milliGRAM(s) Oral daily        VITALS:  T(F): 95.3 (09-20-22 @ 14:13), Max: 97.5 (09-20-22 @ 05:00)  HR: 76 (09-20-22 @ 14:13)  BP: 119/57 (09-20-22 @ 14:13)  RR: 18 (09-20-22 @ 14:13)  SpO2: 99% (09-20-22 @ 08:05)  Wt(kg): --    09-18 @ 07:01  -  09-19 @ 07:00  --------------------------------------------------------  IN: 0 mL / OUT: 675 mL / NET: -675 mL    09-19 @ 07:01  -  09-20 @ 07:00  --------------------------------------------------------  IN: 236 mL / OUT: 125 mL / NET: 111 mL    09-20 @ 07:01  -  09-20 @ 20:45  --------------------------------------------------------  IN: 325 mL / OUT: 200 mL / NET: 125 mL          PHYSICAL EXAM:  Constitutional: NAD  HEENT: anicteric sclera, oropharynx clear, MMM  Neck: No JVD  Respiratory: CTAB, no wheezes, rales or rhonchi  Cardiovascular: S1, S2, RRR  Gastrointestinal: BS+, soft, NT/ND  Extremities: No cyanosis or clubbing. No peripheral edema  :  No davalos.   Skin: No rashes    LABS:  09-20    136  |  93<L>  |  170<HH>  ----------------------------<  108<H>  4.1   |  28  |  1.9<H>    Ca    8.1<L>      20 Sep 2022 10:41  Mg     2.4     09-20    TPro  4.6<L>  /  Alb  2.7<L>  /  TBili  0.7  /  DBili      /  AST  15  /  ALT  11  /  AlkPhos  72  09-20                          7.7    13.49 )-----------( 187      ( 20 Sep 2022 10:41 )             23.7       Urine Studies:      RADIOLOGY & ADDITIONAL STUDIES:

## 2022-09-20 NOTE — PROGRESS NOTE ADULT - SUBJECTIVE AND OBJECTIVE BOX
SUBJECTIVE:    Patient is a 82y old Male who presents with a chief complaint of chf exacerbation (20 Sep 2022 14:02)    Currently admitted to medicine with the primary diagnosis of Fluid overload       Today is hospital day 31d.     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    AF (atrial fibrillation)  s/p DCCV    Carotid stenosis    COPD (chronic obstructive pulmonary disease)    H/O coronary artery bypass surgery  2001    S/P inguinal hernia repair    History of appendectomy      ALLERGIES:  Ozempic (0.25 mg or 0.5 mg dose) (Hives; Rash)  streptomycin (Unknown)  Trulicity Pen (Hives; Rash)    MEDICATIONS:  STANDING MEDICATIONS  ALBUTerol    90 MICROgram(s) HFA Inhaler 2 Puff(s) Inhalation every 6 hours  atorvastatin 40 milliGRAM(s) Oral at bedtime  BACItracin   Ointment 1 Application(s) Topical two times a day  chlorhexidine 2% Cloths 1 Application(s) Topical <User Schedule>  dextrose 5%. 1000 milliLiter(s) IV Continuous <Continuous>  dextrose 5%. 1000 milliLiter(s) IV Continuous <Continuous>  dextrose 50% Injectable 25 Gram(s) IV Push once  dextrose 50% Injectable 12.5 Gram(s) IV Push once  dextrose 50% Injectable 25 Gram(s) IV Push once  famotidine    Tablet 20 milliGRAM(s) Oral daily  glucagon  Injectable 1 milliGRAM(s) IntraMuscular once  insulin glargine Injectable (LANTUS) 8 Unit(s) SubCutaneous at bedtime  insulin lispro (ADMELOG) corrective regimen sliding scale   SubCutaneous three times a day before meals  insulin lispro Injectable (ADMELOG) 3 Unit(s) SubCutaneous three times a day before meals  levothyroxine 150 MICROGram(s) Oral daily  lidocaine 1% (Preservative-free) Injectable 30 milliLiter(s) Local Injection once  lidocaine 1% Injectable 10 milliLiter(s) Local Injection once  lidocaine 1% Injectable 10 milliLiter(s) Local Injection once  melatonin 5 milliGRAM(s) Oral at bedtime  metoprolol succinate  milliGRAM(s) Oral daily  multivitamin/minerals 1 Tablet(s) Oral daily  mycophenolate mofetil 500 milliGRAM(s) Oral four times a day  pentoxifylline 400 milliGRAM(s) Oral two times a day  predniSONE   Tablet 5 milliGRAM(s) Oral daily  tacrolimus 0.5 milliGRAM(s) Oral two times a day  torsemide 10 milliGRAM(s) Oral daily    PRN MEDICATIONS  acetaminophen     Tablet .. 650 milliGRAM(s) Oral every 6 hours PRN  dextrose Oral Gel 15 Gram(s) Oral once PRN  dextrose Oral Gel 15 Gram(s) Oral once PRN    VITALS:   T(F): 95.3  HR: 76  BP: 119/57  RR: 18  SpO2: 99%    LABS:                        7.7    13.49 )-----------( 187      ( 20 Sep 2022 10:41 )             23.7     09-20    136  |  93<L>  |  170<HH>  ----------------------------<  108<H>  4.1   |  28  |  1.9<H>    Ca    8.1<L>      20 Sep 2022 10:41  Mg     2.4     09-20    TPro  4.6<L>  /  Alb  2.7<L>  /  TBili  0.7  /  DBili  x   /  AST  15  /  ALT  11  /  AlkPhos  72  09-20                  RADIOLOGY:    PHYSICAL EXAM:  GEN: No acute distress  LUNGS: Clear to auscultation bilaterally   HEART: S1/S2 present. RRR.   ABD/ GI: Soft, non-tender, non-distended. Bowel sounds present  EXT: NC/NC/NE/2+PP/SMALL  NEURO: confused

## 2022-09-20 NOTE — PROGRESS NOTE ADULT - ASSESSMENT
83 y/o man w PMHx of HTN, HLD, DM2, CAD s/p 3vCABG known to Dr De La Fuente, HFmEF w EF 45-50% on 8/1/22, AF on Eliquis, AAA, COPD baseline rare home O2 known to Dr ALO Mars, CKD3 and C3 glomerulopathy known to Dr Daniel, hypothyroidism, PVD, presented on 8/20/22 for SOB x5days and hypoxia to 70s. Admitted to medicine for SOB/hypoxia attributed to CHF exacerbation.   Has been on Bumex drip for HF exacerbation and is s/p R thoracentesis 8/26/22 w 1.5L transudative fluid removed, recurrent R thoracentesis 9/8/22.     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   - CXR on 09/19 significant change in b/l opacities/effusions   - Weaned from HFNC to 5 L NC  - off bumex drip due to increasing Cr although volume status is stable (O2 req, exam, and CXR)  - 09/19: entresto on hold since BP on soft side  - HF team recs: Torsemide 10mg OD    #Chronic Atrial Fibrillation:   - Continue metoprolol  - Lovenox on hold due to epistaxis   - When to resume eliquis?    #Chronic anemia; goal Hb 8+  #Epistaxis- resolved  - on and off, had epistaxis yesterday  - AC on hold  - Last PRBC was given on 09/18    #C3 Glomerulonephropathy: on  Pred 5mg QD at home, Tacro 0.5mg BID, Mycophenolate 500mg 4 times daily. c/w home meds  #BRENDA  - Cr worsening and increase in BUN is very high relative to the Cr  - Nephro following: overdiuresis vs tacrolimus toxicity  - Tacrolimus levels      #COVID-19- resolved  - Sp remd  - sp dexa, now on prednisone home dose    #Dysphagia  -speech and swallow following  -Modified barium swallow done 09/15--- severe pharyngeal dysphagia for thin liquids; moderate pharyngeal dysphagia w/ mildly thick liquids; mild pharyngeal dysphagia for puree and minced+moist consistencies. continue minced+moist, mildly thick liquids. allow for swallow between intakes; crush medication (when feasible); no straws; oral hygiene; position upright (90 degrees); small sips/bites; volitional bolus hold w/ mildly thick liquids    #Alkalosis  - Probably contraction alkalosis in the setting of diuresis   - Diamox PRN    #CAD  - Continue Metoprolol 100mg QD  - Imdur 30mg QD and nefidipine stopped, BP on soft side   - Entresto on hold  - c/w Atorvastatin 40mg qhs.     #HTN:   - Continue metoprolol  - nifedipine stopped given HFmrEF  - Entresto on hold (bp on low side)    #DM II: A1C 7.3.   - on BB    #Hypothyroidism:   - continue synthroid 150 OD    #Stage 2 right buttock pressure ulcer: present on admission  -Continue local wound care, off loading.  83 y/o man w PMHx of HTN, HLD, DM2, CAD s/p 3vCABG known to Dr De La Fuente, HFmEF w EF 45-50% on 8/1/22, AF on Eliquis, AAA, COPD baseline rare home O2 known to Dr ALO Mars, CKD3 and C3 glomerulopathy known to Dr Daniel, hypothyroidism, PVD, presented on 8/20/22 for SOB x5days and hypoxia to 70s. Admitted to medicine for SOB/hypoxia attributed to CHF exacerbation.   Has been on Bumex drip for HF exacerbation and is s/p R thoracentesis 8/26/22 w 1.5L transudative fluid removed, recurrent R thoracentesis 9/8/22.     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   - CXR on 09/19 significant change in b/l opacities/effusions   - Weaned from HFNC to 5 L NC  - off bumex drip due to increasing Cr although volume status is stable (O2 req, exam, and CXR)  - 09/19: entresto on hold since BP on soft side  - HF team recs: Torsemide 10mg OD, started (09/20)_    #Chronic Atrial Fibrillation:   - Continue metoprolol, increased from 100 od to 150 od as per cardiologist (09/20)  - Lovenox on hold due to epistaxis   - When to resume eliquis?    #Chronic anemia; goal Hb 8+  #Epistaxis- resolved  - on and off, had epistaxis yesterday  - AC on hold  - Last PRBC was given on 09/18    #C3 Glomerulonephropathy: on  Pred 5mg QD at home, Tacro 0.5mg BID, Mycophenolate 500mg 4 times daily. c/w home meds  #BRENDA  - Cr worsening and increase in BUN is very high relative to the Cr  - Nephro following: overdiuresis vs tacrolimus toxicity vs gi bleed vs epistaxis  - Tacrolimus levels      #COVID-19- resolved  - Sp remd  - sp dexa, now on prednisone home dose    #Dysphagia  -speech and swallow following  -Modified barium swallow done 09/15--- severe pharyngeal dysphagia for thin liquids; moderate pharyngeal dysphagia w/ mildly thick liquids; mild pharyngeal dysphagia for puree and minced+moist consistencies. continue minced+moist, mildly thick liquids. allow for swallow between intakes; crush medication (when feasible); no straws; oral hygiene; position upright (90 degrees); small sips/bites; volitional bolus hold w/ mildly thick liquids    #Alkalosis  - Probably contraction alkalosis in the setting of diuresis   - Diamox PRN    #CAD  - Continue Metoprolol 100mg QD  - Imdur 30mg QD and nefidipine stopped, BP on soft side   - Entresto on hold  - c/w Atorvastatin 40mg qhs.     #HTN:   - Continue metoprolol  - nifedipine stopped given HFmrEF  - Entresto on hold (bp on low side)    #DM II: A1C 7.3.   - on BB    #Hypothyroidism:   - continue synthroid 150 OD    #Stage 2 right buttock pressure ulcer: present on admission  -Continue local wound care, off loading.  81 y/o man w PMHx of HTN, HLD, DM2, CAD s/p 3vCABG known to Dr De La Fuente, HFmEF w EF 45-50% on 8/1/22, AF on Eliquis, AAA, COPD baseline rare home O2 known to Dr ALO Mars, CKD3 and C3 glomerulopathy known to Dr Daniel, hypothyroidism, PVD, presented on 8/20/22 for SOB x5days and hypoxia to 70s. Admitted to medicine for SOB/hypoxia attributed to CHF exacerbation.   Has been on Bumex drip for HF exacerbation and is s/p R thoracentesis 8/26/22 w 1.5L transudative fluid removed, recurrent R thoracentesis 9/8/22.     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   - CXR on 09/19 significant change in b/l opacities/effusions   - Weaned from HFNC to 5 L NC  - off bumex drip due to increasing Cr although volume status is stable (O2 req, exam, and CXR)  - 09/19: entresto on hold since BP on soft side  - HF team recs: Torsemide 10mg OD, started (09/20)_    #Chronic Atrial Fibrillation:   - Continue metoprolol, increased from 100 od to 150 od as per cardiologist (09/20)  - Lovenox on hold due to epistaxis   - When to resume eliquis?    #Chronic anemia; goal Hb 8+  #Epistaxis- resolved  - on and off, had epistaxis yesterday  - AC on hold  - Last PRBC was given on 09/18  - 09/20: contacted ENT to investigate possibility of posterior epistaxis, they said if no signs or obvious bleed or melena, then posterior bleed unlikely, will monitor Hb and assess    #C3 Glomerulonephropathy: on  Pred 5mg QD at home, Tacro 0.5mg BID, Mycophenolate 500mg 4 times daily. c/w home meds  #BRENDA  - Cr worsening and increase in BUN is very high relative to the Cr  - Nephro following: overdiuresis vs tacrolimus toxicity vs gi bleed vs epistaxis  - Tacrolimus levels      #COVID-19- resolved  - Sp remd  - sp dexa, now on prednisone home dose    #Dysphagia  -speech and swallow following  -Modified barium swallow done 09/15--- severe pharyngeal dysphagia for thin liquids; moderate pharyngeal dysphagia w/ mildly thick liquids; mild pharyngeal dysphagia for puree and minced+moist consistencies. continue minced+moist, mildly thick liquids. allow for swallow between intakes; crush medication (when feasible); no straws; oral hygiene; position upright (90 degrees); small sips/bites; volitional bolus hold w/ mildly thick liquids    #Alkalosis  - Probably contraction alkalosis in the setting of diuresis   - Diamox PRN    #CAD  - Continue Metoprolol 100mg QD  - Imdur 30mg QD and nefidipine stopped, BP on soft side   - Entresto on hold  - c/w Atorvastatin 40mg qhs.     #HTN:   - Continue metoprolol  - nifedipine stopped given HFmrEF  - Entresto on hold (bp on low side)    #DM II: A1C 7.3.   - on BB    #Hypothyroidism:   - continue synthroid 150 OD    #Stage 2 right buttock pressure ulcer: present on admission  -Continue local wound care, off loading.

## 2022-09-20 NOTE — PROGRESS NOTE ADULT - SUBJECTIVE AND OBJECTIVE BOX
SUBJ: Patient with known Hx of Afib CAD 3v cabg in 2001 with noted hx of villeda.  Last echo in office with ef 45-50% with aortic sclerosis without sig stenosis and noted nuclear in 8/21 with fixed inferior defect with small reversibility.  Has been treated medcially.  of not has had hx of anemia on anticoagulation for afib we considered having him go for watchman in the past.  He has baseline renal failure and follows with HD.       MEDICATIONS  (STANDING):  ALBUTerol    90 MICROgram(s) HFA Inhaler 2 Puff(s) Inhalation every 6 hours  atorvastatin 40 milliGRAM(s) Oral at bedtime  BACItracin   Ointment 1 Application(s) Topical two times a day  chlorhexidine 2% Cloths 1 Application(s) Topical <User Schedule>  dextrose 5%. 1000 milliLiter(s) (100 mL/Hr) IV Continuous <Continuous>  dextrose 5%. 1000 milliLiter(s) (50 mL/Hr) IV Continuous <Continuous>  dextrose 50% Injectable 25 Gram(s) IV Push once  dextrose 50% Injectable 12.5 Gram(s) IV Push once  dextrose 50% Injectable 25 Gram(s) IV Push once  famotidine    Tablet 20 milliGRAM(s) Oral daily  glucagon  Injectable 1 milliGRAM(s) IntraMuscular once  insulin glargine Injectable (LANTUS) 8 Unit(s) SubCutaneous at bedtime  insulin lispro (ADMELOG) corrective regimen sliding scale   SubCutaneous three times a day before meals  insulin lispro Injectable (ADMELOG) 3 Unit(s) SubCutaneous three times a day before meals  levothyroxine 150 MICROGram(s) Oral daily  lidocaine 1% (Preservative-free) Injectable 30 milliLiter(s) Local Injection once  lidocaine 1% Injectable 10 milliLiter(s) Local Injection once  lidocaine 1% Injectable 10 milliLiter(s) Local Injection once  melatonin 5 milliGRAM(s) Oral at bedtime  metoprolol succinate  milliGRAM(s) Oral daily  multivitamin/minerals 1 Tablet(s) Oral daily  mycophenolate mofetil 500 milliGRAM(s) Oral four times a day  pentoxifylline 400 milliGRAM(s) Oral two times a day  predniSONE   Tablet 5 milliGRAM(s) Oral daily  tacrolimus 0.5 milliGRAM(s) Oral two times a day  torsemide 10 milliGRAM(s) Oral daily    MEDICATIONS  (PRN):  acetaminophen     Tablet .. 650 milliGRAM(s) Oral every 6 hours PRN Mild Pain (1 - 3)  dextrose Oral Gel 15 Gram(s) Oral once PRN Blood Glucose LESS THAN 70 milliGRAM(s)/deciliter  dextrose Oral Gel 15 Gram(s) Oral once PRN Blood Glucose LESS THAN 70 milliGRAM(s)/deciliter            Vital Signs Last 24 Hrs  T(C): 36.4 (20 Sep 2022 05:00), Max: 36.4 (20 Sep 2022 05:00)  T(F): 97.5 (20 Sep 2022 05:00), Max: 97.5 (20 Sep 2022 05:00)  HR: 88 (20 Sep 2022 05:00) (88 - 88)  BP: 102/59 (20 Sep 2022 05:00) (102/59 - 102/59)  BP(mean): --  RR: 18 (20 Sep 2022 08:05) (18 - 18)  SpO2: 99% (20 Sep 2022 08:05) (95% - 99%)    Parameters below as of 20 Sep 2022 08:05  Patient On (Oxygen Delivery Method): nasal cannula  O2 Flow (L/min): 2       REVIEW OF SYSTEMS:  CONSTITUTIONAL: No fever, weight loss, or fatigue  CARDIOLOGY: PAtient denies chest pain, shortness of breath or syncopal episodes.   RESPIRATORY: denies shortness of breath, wheezeing.   NEUROLOGICAL: NO weakness, no focal deficits to report.  ENDOCRINOLOGICAL: no recent change in diabetic medications.   GI: no BRBPR, no N,V,diarrhea.    PSYCHIATRY: normal mood and affect  HEENT: no nasal discharge, no ecchymosis  SKIN: no ecchymosis, no breakdown  MUSCULOSKELETAL: Full range of motion x4.        PHYSICAL EXAM:  · CONSTITUTIONAL:	Well-developed, well nourished    BMI-  ·RESPIRATORY:   airway patent; breath sounds equal; good air movement; respirations non-labored; clear to auscultation bilaterally; no chest wall tenderness; no intercostal retractions; no rales,rhonchi or wheeze  · CARDIOVASCULAR	regular rate and rhythm  no rub  no murmur  normal PMI  · EXTREMITIES: No cyanosis, clubbing or edema  · VASCULAR: 	Equal and normal pulses (carotid, femoral, dorsalis pedis)  	  TELEMETRY:    CXR < from: Xray Chest 1 View- PORTABLE-Routine (Xray Chest 1 View- PORTABLE-Routine in AM.) (09.19.22 @ 08:11) >  MPRESSION:    No significant change in bibasilar opacities/effusions.    < end of copied text >        Echo    TTE:< from: TTE Echo Complete w/o Contrast w/ Doppler (09.12.22 @ 08:10) >  . Normal global left ventricular systolic function.   2. Severely enlarged left atrium.   3. LV Ejection Fraction by Sierra's Method with a biplane EF of 57 %.   4. Mildly increased LV wall thickness.   5. Normal left ventricular internal cavity size.   6. Mildly enlarged right atrium.   7. Mild thickening and calcification of the anterior and posterior   mitral valve leaflets.   8. Moderate mitral annular calcification.   9. No evidence of mitral valve regurgitation.  10. Mild tricuspid regurgitation.  11. Sclerotic aortic valve with normal opening.  12. Mild pulmonic valve regurgitation.  13. Estimated pulmonary artery systolic pressure is 63.3 mmHg assuming a   right atrial pressure of 3 mmHg, which is consistent with severe   pulmonary hypertension.  14. Pulmonary hypertension is present.  15. Increased relative wall thickness with normal mass index consistent   with left ventricular concentric remodeling.    < end of copied text >      LABS:                        7.7    13.49 )-----------( 187      ( 20 Sep 2022 10:41 )             23.7     09-20    136  |  93<L>  |  170<HH>  ----------------------------<  108<H>  4.1   |  28  |  1.9<H>    Ca    8.1<L>      20 Sep 2022 10:41  Mg     2.4     09-20    TPro  4.6<L>  /  Alb  2.7<L>  /  TBili  0.7  /  DBili  x   /  AST  15  /  ALT  11  /  AlkPhos  72  09-20            I&O's Summary    19 Sep 2022 07:01  -  20 Sep 2022 07:00  --------------------------------------------------------  IN: 236 mL / OUT: 125 mL / NET: 111 mL    20 Sep 2022 07:01  -  20 Sep 2022 14:02  --------------------------------------------------------  IN: 325 mL / OUT: 0 mL / NET: 325 mL      BNP  RADIOLOGY & ADDITIONAL STUDIES:    IMPRESSION AND PLAN:

## 2022-09-20 NOTE — PROGRESS NOTE ADULT - ASSESSMENT
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     Acute Hypoxic respiratory Failure   Acute HFmrEF  Bilateral pleural effusion Right>Left.  COPD  COVID 19 infection +9/2    previously on HFNC, now on NC 3 L saturating low 93%  s/p thoracentesis 8/2 fluid transudative.   s/p thoracentesis 9/8 with 1.9 L removed, transudative, f/u studies  DC Bumex drip   s/p Diamox x1 9/12.  renal fxn stable  dexamethasone 6mg daily, steroids started 9/6 tapered dose of prednisone and change it to 5mg daily home dose  D-Dimer 1000, LE duplex 9/6 negative for DVTs   off therapeutic Lovenox (previous eliquis)  s/s eval -recommended minced and moist diet;    Epistaxis - 9/16-- dced lovenox-- better today   ENT had done packing in the past-- -- patient said he picked at his nose 9/16-- r/o posterior nasal bleeding--spoke with ENT-- recomended throat eval      Leukocytosis, still present    Acute on chronic anemia - s/p 2 U PRBC  before and 3 days of IV venofer  --hb dropping-- also bled from hand-- iv site-- s/p 1 unit of PRBC-- 9/18  Chronic Atrial Fibrillation -on metoprolol and lovenox.--- held as was oozing  nose. drop in hb noted again--monitor closely    C3 Glomerulonephropathy - Continue tacrolimus 0.5mg BID and Mycophenolate 500mg 4 times daily. On chronic prednisone 5mg at home, on  Repeat Tacrolimus level 9/8 5.4--BUN is worsening-- --likely due to patient being on prednisone. seen by nephrology-- tacrolimus level pending. worsening renal failure    CAD /HTN/DLD- Continue Metoprolol, entresto and Lipitor. -- hold entresro from 9/19/22 due to hypotension and worsening renal failure    Hypothyroidism - continue synthroid    DM II: A1C 7.3 - continue with insulin, add Lispro 5u with meals, monitor FS and PO intake     Stage 2 right buttock pressure ulcer, Present on admission  Continue local wound care, off loading.     - patient has worsening renal failure-- will follow tacrolimus level and monitor CBC-- pending ENT eval.        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     Acute Hypoxic respiratory Failure   Acute HFmrEF  Bilateral pleural effusion Right>Left.  COPD  COVID 19 infection +9/2    previously on HFNC, now on NC 3 L saturating low 93%  s/p thoracentesis 8/2 fluid transudative.   s/p thoracentesis 9/8 with 1.9 L removed, transudative, f/u studies  DC Bumex drip   s/p Diamox x1 9/12.  renal fxn stable  dexamethasone 6mg daily, steroids started 9/6 tapered dose of prednisone and change it to 5mg daily home dose  D-Dimer 1000, LE duplex 9/6 negative for DVTs   off therapeutic Lovenox (previous eliquis)  s/s eval -recommended minced and moist diet;    Epistaxis - 9/16-- dced lovenox-- better today   ENT had done packing in the past-- -- patient said he picked at his nose 9/16-- r/o posterior nasal bleeding--spoke with ENT-- recomended throat eval--no bleeding noted in throat      Leukocytosis, still present    Acute on chronic anemia - s/p 2 U PRBC  before and 3 days of IV venofer  --hb dropping-- also bled from hand-- iv site-- s/p 1 unit of PRBC-- 9/18  Chronic Atrial Fibrillation -on metoprolol and lovenox.--- held as was oozing  nose. drop in hb noted again--monitor closely-- check stool occult blood, BUN is very high    C3 Glomerulonephropathy - Continue tacrolimus 0.5mg BID and Mycophenolate 500mg 4 times daily. On chronic prednisone 5mg at home, on  Repeat Tacrolimus level 9/8 5.4--BUN is worsening-- --likely due to patient being on prednisone. seen by nephrology-- tacrolimus level pending. worsening renal failure--started low dose torsemide 9/20    CAD /HTN/DLD- Continue Metoprolol, entresto and Lipitor. -- hold entresto from 9/19/22 due to hypotension and worsening renal failure    Hypothyroidism - continue synthroid    DM II: A1C 7.3 - continue with insulin, dose adjusted-- patient not eating at all-- can stop insulin in AM if blood sugar is still low     Stage 2 right buttock pressure ulcer, Present on admission  Continue local wound care, off loading.     - patient has worsening renal failure-- will follow tacrolimus level and monitor CBC--   son informed--prognosis is guarded-- he is made aware of it.        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     Acute Hypoxic respiratory Failure   Acute HFmrEF  Bilateral pleural effusion Right>Left.  COPD  COVID 19 infection +9/2    previously on HFNC, now on NC 3 L saturating low 93%  s/p thoracentesis 8/2 fluid transudative.   s/p thoracentesis 9/8 with 1.9 L removed, transudative, f/u studies  DC Bumex drip   s/p Diamox x1 9/12.  renal fxn stable  dexamethasone 6mg daily, steroids started 9/6 tapered dose of prednisone and change it to 5mg daily home dose  D-Dimer 1000, LE duplex 9/6 negative for DVTs   off therapeutic Lovenox (previous eliquis)--started on sc heparin [id number] for now--due to epistaxis and falling Hb  s/s eval -recommended minced and moist diet;    Epistaxis - 9/16-- dced lovenox-- better today   ENT had done packing in the past-- -- patient said he picked at his nose 9/16-- r/o posterior nasal bleeding--spoke with ENT-- recomended throat eval--no bleeding noted in throat      Leukocytosis, still present    Acute on chronic anemia - s/p 2 U PRBC  before and 3 days of IV venofer  --hb dropping-- also bled from hand-- iv site-- s/p 1 unit of PRBC-- 9/18  Chronic Atrial Fibrillation -on metoprolol and lovenox.--- held as was oozing  nose. drop in hb noted again--monitor closely-- check stool occult blood, BUN is very high    C3 Glomerulonephropathy - Continue tacrolimus 0.5mg BID and Mycophenolate 500mg 4 times daily. On chronic prednisone 5mg at home, on  Repeat Tacrolimus level 9/8 5.4--BUN is worsening-- --likely due to patient being on prednisone. seen by nephrology-- tacrolimus level pending. worsening renal failure--started low dose torsemide 9/20    CAD /HTN/DLD- Continue Metoprolol, entresto and Lipitor. -- hold entresto from 9/19/22 due to hypotension and worsening renal failure    Hypothyroidism - continue synthroid    DM II: A1C 7.3 - continue with insulin, dose adjusted-- patient not eating at all-- can stop insulin in AM if blood sugar is still low     Stage 2 right buttock pressure ulcer, Present on admission  Continue local wound care, off loading.     - patient has worsening renal failure-- will follow tacrolimus level and monitor CBC--   son informed--prognosis is guarded-- he is made aware of it.

## 2022-09-20 NOTE — PROGRESS NOTE ADULT - ASSESSMENT
Decompensated heart failure with effusions and response to diuresis.  Initail BNP 52k.   He has noted effusions as well.     we noted he is frail and has underlying anemia.  he is very fatigued.  Has been folowed by respiratory for pleural effusions with tap this is may be more related tro failure rather than underlying infection or other entity.     Appreciate heart failure follow up and evaluaiton.     We noted anemia that is quite severe and elvated BUN with stable CR ?? GI bleed as source.     will continue to follow up.

## 2022-09-20 NOTE — PROGRESS NOTE ADULT - ASSESSMENT
81 yo male with PMHx of HFmrEF (45-50% on Lasix 20 PO), A-Fib on Eliquis, CAD s/p CABGx3, PVD, COPD (was on rare home O2 PRN now on 3L NC since recent July admission for COVID), CKD3, C3 glomerulonephropathy, HTN, HLD, Hypothyroid presents from NH for SOB, admitted for HF exacerbation treated with iv diuretics. Seen initially for BRENDA secondary to CRS that improved with good diuresis. Nephro reeval for worsening BRENDA.    Assessment and plan  BRENDA on CKD 3b-4/ HF exacerbation/ c3 glomerulopathy on immunosuppression  Cr stabliizing   BRENDA likely prerenal/ATN secondary to overdiuresis  BUN elevated in the setting of depleted intravascular space, po prednisone might be contributing  continue to hold iv diuretics  no need for IVF  check tacrolimus level again to r/o toxicity leading to BRENDA  continue with strict i/os  HF following  will follow

## 2022-09-21 NOTE — PROGRESS NOTE ADULT - ASSESSMENT
81 y/o man w PMHx of HTN, HLD, DM2, CAD s/p 3vCABG known to Dr De La Fuente, HFmEF w EF 45-50% on 8/1/22, AF on Eliquis, AAA, COPD baseline rare home O2 known to Dr ALO Mars, CKD3 and C3 glomerulopathy known to Dr Daniel, hypothyroidism, PVD, presented on 8/20/22 for SOB x5days and hypoxia to 70s. Admitted to medicine for SOB/hypoxia attributed to CHF exacerbation.   Has been on Bumex drip for HF exacerbation and is s/p R thoracentesis 8/26/22 w 1.5L transudative fluid removed, recurrent R thoracentesis 9/8/22.     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   - CXR on 09/19 significant change in b/l opacities/effusions   - Weaned from HFNC to 5 L NC, now on 2-3l NC  - Was off bumex drip due to increasing Cr although volume status is stable (O2 req, exam, and CXR)  - 09/19: entresto on hold since BP on soft side  - HF team recs: Torsemide 10mg OD, started (09/20)_  - 09/20: will keep entresto on hold for now since metoprolol was recently increased    #Chronic Atrial Fibrillation:   - Continue metoprolol, increased from 100 od to 150 od as per cardiologist (09/20)  - Lovenox on hold due to epistaxis   - When to resume eliquis?    #Chronic anemia; goal Hb 8+  #Epistaxis- resolved  - on and off, had epistaxis yesterday  - AC on hold  - Last PRBC was given on 09/18  - 09/20: contacted ENT to investigate possibility of posterior epistaxis, they said if no signs or obvious bleed or melena, then posterior bleed unlikely, will monitor Hb and assess    #C3 Glomerulonephropathy: on  Pred 5mg QD at home, Tacro 0.5mg BID, Mycophenolate 500mg 4 times daily. c/w home meds  #BRENDA- resolved  - Cr back to baseline, BUN still high, will follow nephro c/s  - Nephro following: overdiuresis vs tacrolimus toxicity vs gi bleed vs epistaxis  - Tacrolimus levels <0.5      #COVID-19- resolved  - Sp remd  - sp dexa, now on prednisone home dose    #Dysphagia  -speech and swallow following  -Modified barium swallow done 09/15--- severe pharyngeal dysphagia for thin liquids; moderate pharyngeal dysphagia w/ mildly thick liquids; mild pharyngeal dysphagia for puree and minced+moist consistencies. continue minced+moist, mildly thick liquids. allow for swallow between intakes; crush medication (when feasible); no straws; oral hygiene; position upright (90 degrees); small sips/bites; volitional bolus hold w/ mildly thick liquids    #Alkalosis  - Probably contraction alkalosis in the setting of diuresis   - Diamox PRN    #CAD  - Continue Metoprolol 100mg QD, increased to 150mg OD  - Imdur 30mg QD and nefidipine stopped, BP on soft side   - Entresto on hold  - c/w Atorvastatin 40mg qhs.     #HTN:   - Continue metoprolol  - nifedipine stopped given HFmrEF  - Entresto on hold (bp on low side)    #DM II: A1C 7.3.   - on BB    #Hypothyroidism:   - continue synthroid 150 OD    #Stage 2 right buttock pressure ulcer: present on admission  -Continue local wound care, off loading.    83 y/o man w PMHx of HTN, HLD, DM2, CAD s/p 3vCABG known to Dr De La Fuente, HFmEF w EF 45-50% on 8/1/22, AF on Eliquis, AAA, COPD baseline rare home O2 known to Dr ALO Mars, CKD3 and C3 glomerulopathy known to Dr Daniel, hypothyroidism, PVD, presented on 8/20/22 for SOB x5days and hypoxia to 70s. Admitted to medicine for SOB/hypoxia attributed to CHF exacerbation.   Has been on Bumex drip for HF exacerbation and is s/p R thoracentesis 8/26/22 w 1.5L transudative fluid removed, recurrent R thoracentesis 9/8/22.     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   - CXR on 09/19 significant change in b/l opacities/effusions   - Weaned from HFNC to 5 L NC, now on 2-3l NC  - Was off bumex drip due to increasing Cr although volume status is stable (O2 req, exam, and CXR)  - 09/19: entresto on hold since BP on soft side  - HF team recs: Torsemide 10mg OD, started (09/20)_and Dc's on 09/21 as per nephro (due to his Glomerulopathy), Dr Montes infoemed  - 09/20: will keep entresto on hold for now since metoprolol was recently increased    #Chronic Atrial Fibrillation:   - Continue metoprolol, increased from 100 od to 150 od as per cardiologist (09/20)  - Lovenox on hold due to epistaxis   - When to resume eliquis?    #Chronic anemia; goal Hb 8+  #Epistaxis- resolved  - on and off, had epistaxis yesterday  - AC on hold  - Last PRBC was given on 09/18  - 09/20: contacted ENT to investigate possibility of posterior epistaxis, they said if no signs or obvious bleed or melena, then posterior bleed unlikely, will monitor Hb and assess  - Hb stable, will keep off AC    #C3 Glomerulonephropathy: on  Pred 5mg QD at home, Tacro 0.5mg BID, Mycophenolate 500mg 4 times daily. c/w home meds  #BRENDA- resolved  - Cr back to baseline, BUN still high, will follow nephro c/s  - Nephro following: overdiuresis vs tacrolimus toxicity vs gi bleed vs epistaxis  - Tacrolimus levels <0.5      #COVID-19- resolved  - Sp remd  - sp dexa, now on prednisone home dose    #Dysphagia  -speech and swallow following  -Modified barium swallow done 09/15--- severe pharyngeal dysphagia for thin liquids; moderate pharyngeal dysphagia w/ mildly thick liquids; mild pharyngeal dysphagia for puree and minced+moist consistencies. continue minced+moist, mildly thick liquids. allow for swallow between intakes; crush medication (when feasible); no straws; oral hygiene; position upright (90 degrees); small sips/bites; volitional bolus hold w/ mildly thick liquids    #Alkalosis  - Probably contraction alkalosis in the setting of diuresis   - Diamox PRN    #CAD  - Continue Metoprolol 100mg QD, increased to 150mg OD  - Imdur 30mg QD and nefidipine stopped, BP on soft side   - Entresto on hold  - c/w Atorvastatin 40mg qhs.     #HTN:   - Continue metoprolol  - nifedipine stopped given HFmrEF  - Entresto on hold (bp on low side)    #DM II: A1C 7.3.   - on BB    #Hypothyroidism:   - continue synthroid 150 OD    #Stage 2 right buttock pressure ulcer: present on admission  -Continue local wound care, off loading.    81 y/o man w PMHx of HTN, HLD, DM2, CAD s/p 3vCABG known to Dr De La Fuente, HFmEF w EF 45-50% on 8/1/22, AF on Eliquis, AAA, COPD baseline rare home O2 known to Dr ALO Mars, CKD3 and C3 glomerulopathy known to Dr Daniel, hypothyroidism, PVD, presented on 8/20/22 for SOB x5days and hypoxia to 70s. Admitted to medicine for SOB/hypoxia attributed to CHF exacerbation.   Has been on Bumex drip for HF exacerbation and is s/p R thoracentesis 8/26/22 w 1.5L transudative fluid removed, recurrent R thoracentesis 9/8/22.     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   - CXR on 09/19 significant change in b/l opacities/effusions   - Weaned from HFNC to 5 L NC, now on 2-3l NC  - Was off bumex drip due to increasing Cr although volume status is stable (O2 req, exam, and CXR)  - 09/19: entresto on hold since BP on soft side  - HF team recs: Torsemide 10mg OD, started (09/20)_and Dc's on 09/21 as per nephro (due to his Glomerulopathy), Dr Montes infoemed  - 09/20: will keep entresto on hold for now since metoprolol was recently increased  - leukocytosis with no signs of infection, will order infectious workup    #Chronic Atrial Fibrillation:   - Continue metoprolol, increased from 100 od to 150 od as per cardiologist (09/20)  - Lovenox on hold due to epistaxis   - When to resume eliquis?    #Chronic anemia; goal Hb 8+  #Epistaxis- resolved  - on and off, had epistaxis yesterday  - AC on hold  - Last PRBC was given on 09/18  - 09/20: contacted ENT to investigate possibility of posterior epistaxis, they said if no signs or obvious bleed or melena, then posterior bleed unlikely, will monitor Hb and assess  - Hb stable, will keep off AC for now     #C3 Glomerulonephropathy: on  Pred 5mg QD at home, Tacro 0.5mg BID, Mycophenolate 500mg 4 times daily. c/w home meds  #BRENDA- resolved  - Cr back to baseline, BUN still high, will follow nephro c/s  - Nephro following: overdiuresis vs tacrolimus toxicity vs gi bleed vs epistaxis  - Tacrolimus levels <0.5 => increased to 1mg BID      #COVID-19- resolved  - Sp remd  - sp dexa, now on prednisone home dose    #Dysphagia  -speech and swallow following  -Modified barium swallow done 09/15--- severe pharyngeal dysphagia for thin liquids; moderate pharyngeal dysphagia w/ mildly thick liquids; mild pharyngeal dysphagia for puree and minced+moist consistencies. continue minced+moist, mildly thick liquids. allow for swallow between intakes; crush medication (when feasible); no straws; oral hygiene; position upright (90 degrees); small sips/bites; volitional bolus hold w/ mildly thick liquids    #Alkalosis  - Probably contraction alkalosis in the setting of diuresis   - Diamox PRN    #CAD  - Continue Metoprolol 100mg QD, increased to 150mg OD  - Imdur 30mg QD and nefidipine stopped, BP on soft side   - Entresto on hold  - c/w Atorvastatin 40mg qhs.     #HTN:   - Continue metoprolol  - nifedipine stopped given HFmrEF  - Entresto on hold (bp on low side)    #DM II: A1C 7.3.   - on BB    #Hypothyroidism:   - continue synthroid 150 OD    #Stage 2 right buttock pressure ulcer: present on admission  -Continue local wound care, off loading.

## 2022-09-21 NOTE — PROGRESS NOTE ADULT - SUBJECTIVE AND OBJECTIVE BOX
Nephrology progress note    THIS IS AN INCOMPLETE NOTE . FULL NOTE TO FOLLOW SHORTLY    Patient is seen and examined, events over the last 24 h noted .    Allergies:  Ozempic (0.25 mg or 0.5 mg dose) (Hives; Rash)  streptomycin (Unknown)  Trulicity Pen (Hives; Rash)    Hospital Medications:   MEDICATIONS  (STANDING):  ALBUTerol    90 MICROgram(s) HFA Inhaler 2 Puff(s) Inhalation every 6 hours  atorvastatin 40 milliGRAM(s) Oral at bedtime  BACItracin   Ointment 1 Application(s) Topical two times a day  chlorhexidine 2% Cloths 1 Application(s) Topical <User Schedule>  dextrose 5%. 1000 milliLiter(s) (100 mL/Hr) IV Continuous <Continuous>  dextrose 5%. 1000 milliLiter(s) (50 mL/Hr) IV Continuous <Continuous>  dextrose 50% Injectable 25 Gram(s) IV Push once  dextrose 50% Injectable 12.5 Gram(s) IV Push once  dextrose 50% Injectable 25 Gram(s) IV Push once  famotidine    Tablet 20 milliGRAM(s) Oral daily  glucagon  Injectable 1 milliGRAM(s) IntraMuscular once  heparin   Injectable 5000 Unit(s) SubCutaneous every 12 hours  insulin glargine Injectable (LANTUS) 8 Unit(s) SubCutaneous at bedtime  insulin lispro (ADMELOG) corrective regimen sliding scale   SubCutaneous three times a day before meals  insulin lispro Injectable (ADMELOG) 3 Unit(s) SubCutaneous three times a day before meals  levothyroxine 150 MICROGram(s) Oral daily  lidocaine 1% (Preservative-free) Injectable 30 milliLiter(s) Local Injection once  lidocaine 1% Injectable 10 milliLiter(s) Local Injection once  lidocaine 1% Injectable 10 milliLiter(s) Local Injection once  melatonin 5 milliGRAM(s) Oral at bedtime  metoprolol succinate  milliGRAM(s) Oral daily  multivitamin/minerals 1 Tablet(s) Oral daily  mycophenolate mofetil 500 milliGRAM(s) Oral four times a day  pentoxifylline 400 milliGRAM(s) Oral two times a day  predniSONE   Tablet 5 milliGRAM(s) Oral daily  tacrolimus 0.5 milliGRAM(s) Oral two times a day  torsemide 10 milliGRAM(s) Oral daily        VITALS:  T(F): 97.3 (09-21-22 @ 05:00), Max: 97.3 (09-21-22 @ 05:00)  HR: 68 (09-21-22 @ 05:00)  BP: 102/50 (09-21-22 @ 05:00)  RR: 18 (09-21-22 @ 05:00)  SpO2: --  Wt(kg): --    09-19 @ 07:01  -  09-20 @ 07:00  --------------------------------------------------------  IN: 236 mL / OUT: 125 mL / NET: 111 mL    09-20 @ 07:01  -  09-21 @ 07:00  --------------------------------------------------------  IN: 325 mL / OUT: 200 mL / NET: 125 mL          PHYSICAL EXAM:  Constitutional: NAD  HEENT: anicteric sclera, oropharynx clear, MMM  Neck: No JVD  Respiratory: CTAB, no wheezes, rales or rhonchi  Cardiovascular: S1, S2, RRR  Gastrointestinal: BS+, soft, NT/ND  Extremities: No cyanosis or clubbing. No peripheral edema  :  No davalos.   Skin: No rashes    LABS:  09-21    130<L>  |  89<L>  |  172<HH>  ----------------------------<  136<H>  3.7   |  26  |  1.8<H>    Ca    8.4      21 Sep 2022 06:00  Mg     2.3     09-21    TPro  4.8<L>  /  Alb  2.9<L>  /  TBili  0.7  /  DBili      /  AST  14  /  ALT  12  /  AlkPhos  73  09-21                          8.4    18.24 )-----------( 201      ( 21 Sep 2022 06:00 )             25.5       Urine Studies:        Iron 35, TIBC 220, %sat 16      [08-31-22 @ 10:48]  Ferritin 957      [09-06-22 @ 12:11]  PTH -- (Ca 9.0)      [07-06-22 @ 07:39]   93  Vitamin D (25OH) 27      [07-06-22 @ 07:39]  HbA1c 8.5      [10-01-19 @ 09:54]  TSH 1.55      [08-21-22 @ 06:03]  Lipid: chol 72, TG 65, HDL 38, LDL --      [08-21-22 @ 06:03]      C3 Complement 84      [10-20-21 @ 20:00]  Syphilis Screen (Treponema Pallidum Ab) Negative      [10-21-21 @ 11:25]      RADIOLOGY & ADDITIONAL STUDIES:   Nephrology progress note  Patient is seen and examined, events over the last 24 h noted .  Lying in bed lethargic     Allergies:  Ozempic (0.25 mg or 0.5 mg dose) (Hives; Rash)  streptomycin (Unknown)  Trulicity Pen (Hives; Rash)    Hospital Medications:     MEDICATIONS  (STANDING):    ALBUTerol    90 MICROgram(s) HFA Inhaler 2 Puff(s) Inhalation every 6 hours  atorvastatin 40 milliGRAM(s) Oral at bedtime  BACItracin   Ointment 1 Application(s) Topical two times a day  famotidine    Tablet 20 milliGRAM(s) Oral daily  glucagon  Injectable 1 milliGRAM(s) IntraMuscular once  heparin   Injectable 5000 Unit(s) SubCutaneous every 12 hours  insulin glargine Injectable (LANTUS) 8 Unit(s) SubCutaneous at bedtime  insulin lispro (ADMELOG) corrective regimen sliding scale   SubCutaneous three times a day before meals  insulin lispro Injectable (ADMELOG) 3 Unit(s) SubCutaneous three times a day before meals  levothyroxine 150 MICROGram(s) Oral daily  lidocaine 1% (Preservative-free) Injectable 30 milliLiter(s) Local Injection once  lidocaine 1% Injectable 10 milliLiter(s) Local Injection once  lidocaine 1% Injectable 10 milliLiter(s) Local Injection once  melatonin 5 milliGRAM(s) Oral at bedtime  metoprolol succinate  milliGRAM(s) Oral daily  multivitamin/minerals 1 Tablet(s) Oral daily  mycophenolate mofetil 500 milliGRAM(s) Oral four times a day  pentoxifylline 400 milliGRAM(s) Oral two times a day  predniSONE   Tablet 5 milliGRAM(s) Oral daily  tacrolimus 0.5 milliGRAM(s) Oral two times a day  torsemide 10 milliGRAM(s) Oral daily        VITALS:  T(F): 97.3 (09-21-22 @ 05:00), Max: 97.3 (09-21-22 @ 05:00)  HR: 68 (09-21-22 @ 05:00)  BP: 102/50 (09-21-22 @ 05:00)  RR: 18 (09-21-22 @ 05:00)      09-19 @ 07:01  -  09-20 @ 07:00  --------------------------------------------------------  IN: 236 mL / OUT: 125 mL / NET: 111 mL    09-20 @ 07:01  -  09-21 @ 07:00  --------------------------------------------------------  IN: 325 mL / OUT: 200 mL / NET: 125 mL          PHYSICAL EXAM:  Constitutional: NAD/ lethargic  Neck: No JVD  Respiratory: CTAB,   Cardiovascular: S1, S2, RRR  Gastrointestinal: BS+, soft, NT/ND  Extremities: No cyanosis or clubbing. No peripheral edema  :  No davalos.   Skin: No rashes    LABS:  09-21    130<L>  |  89<L>  |  172<HH>  ----------------------------<  136<H>  3.7   |  26  |  1.8<H>    Creatinine Trend: 1.8<--, 1.9<--, 1.8<--, 1.6<--, 1.5<--, 1.3<--    Ca    8.4      21 Sep 2022 06:00  Mg     2.3     09-21    TPro  4.8<L>  /  Alb  2.9<L>  /  TBili  0.7  /  DBili      /  AST  14  /  ALT  12  /  AlkPhos  73  09-21                          8.4    18.24 )-----------( 201      ( 21 Sep 2022 06:00 )             25.5     Tacrolimus (), Serum: <2.0: Tacrolimus testing is performed on the [employer] Glamit by  chemiluminescent microparticle immunoassay. The therapeutic range of  tacrolimus is not clearly defined but target 12-hour trough whole blood  concentrations are 5.0 - 20.0 ng/mL early post transplant. Twenty-four  hour  trough concentrations are 33-50% less than the corresponding 12-hour  trough. ng/mL (09.20.22 @ 10:41)      Urine Studies:        Iron 35, TIBC 220, %sat 16      [08-31-22 @ 10:48]  Ferritin 957      [09-06-22 @ 12:11]  PTH -- (Ca 9.0)      [07-06-22 @ 07:39]   93  Vitamin D (25OH) 27      [07-06-22 @ 07:39]  HbA1c 8.5      [10-01-19 @ 09:54]  TSH 1.55      [08-21-22 @ 06:03]  Lipid: chol 72, TG 65, HDL 38, LDL --      [08-21-22 @ 06:03]      C3 Complement 84      [10-20-21 @ 20:00]  Syphilis Screen (Treponema Pallidum Ab) Negative      [10-21-21 @ 11:25]      RADIOLOGY & ADDITIONAL STUDIES:

## 2022-09-21 NOTE — PROGRESS NOTE ADULT - SUBJECTIVE AND OBJECTIVE BOX
ANDRIA TAVAREZ 82y Male  MRN#: [id number]   Hospital Day: 32d    SUBJECTIVE  Patient is a 82y old Male who presents with a chief complaint of chf exacerbation (21 Sep 2022 09:44)  Currently admitted to medicine with the primary diagnosis of Fluid overload      INTERVAL HPI AND OVERNIGHT EVENTS:  Patient was examined and seen at bedside. This morning he is resting comfortably in bed and reports no issues or overnight events.    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    AF (atrial fibrillation)  s/p DCCV    Carotid stenosis    COPD (chronic obstructive pulmonary disease)    H/O coronary artery bypass surgery  2001    S/P inguinal hernia repair    History of appendectomy      ALLERGIES:  Ozempic (0.25 mg or 0.5 mg dose) (Hives; Rash)  streptomycin (Unknown)  Trulicity Pen (Hives; Rash)    MEDICATIONS:  STANDING MEDICATIONS  ALBUTerol    90 MICROgram(s) HFA Inhaler 2 Puff(s) Inhalation every 6 hours  atorvastatin 40 milliGRAM(s) Oral at bedtime  BACItracin   Ointment 1 Application(s) Topical two times a day  chlorhexidine 2% Cloths 1 Application(s) Topical <User Schedule>  dextrose 5%. 1000 milliLiter(s) IV Continuous <Continuous>  dextrose 5%. 1000 milliLiter(s) IV Continuous <Continuous>  dextrose 50% Injectable 25 Gram(s) IV Push once  dextrose 50% Injectable 12.5 Gram(s) IV Push once  dextrose 50% Injectable 25 Gram(s) IV Push once  famotidine    Tablet 20 milliGRAM(s) Oral daily  glucagon  Injectable 1 milliGRAM(s) IntraMuscular once  heparin   Injectable 5000 Unit(s) SubCutaneous every 12 hours  insulin glargine Injectable (LANTUS) 8 Unit(s) SubCutaneous at bedtime  insulin lispro (ADMELOG) corrective regimen sliding scale   SubCutaneous three times a day before meals  insulin lispro Injectable (ADMELOG) 3 Unit(s) SubCutaneous three times a day before meals  levothyroxine 150 MICROGram(s) Oral daily  lidocaine 1% (Preservative-free) Injectable 30 milliLiter(s) Local Injection once  lidocaine 1% Injectable 10 milliLiter(s) Local Injection once  lidocaine 1% Injectable 10 milliLiter(s) Local Injection once  melatonin 5 milliGRAM(s) Oral at bedtime  metoprolol succinate  milliGRAM(s) Oral daily  multivitamin/minerals 1 Tablet(s) Oral daily  mycophenolate mofetil 500 milliGRAM(s) Oral four times a day  pentoxifylline 400 milliGRAM(s) Oral two times a day  predniSONE   Tablet 5 milliGRAM(s) Oral daily  tacrolimus 0.5 milliGRAM(s) Oral two times a day  torsemide 10 milliGRAM(s) Oral daily    PRN MEDICATIONS  acetaminophen     Tablet .. 650 milliGRAM(s) Oral every 6 hours PRN  dextrose Oral Gel 15 Gram(s) Oral once PRN  dextrose Oral Gel 15 Gram(s) Oral once PRN      VITAL SIGNS: Last 24 Hours  T(C): 36.3 (21 Sep 2022 05:00), Max: 36.3 (21 Sep 2022 05:00)  T(F): 97.3 (21 Sep 2022 05:00), Max: 97.3 (21 Sep 2022 05:00)  HR: 68 (21 Sep 2022 05:00) (68 - 76)  BP: 102/50 (21 Sep 2022 05:00) (102/50 - 134/60)  BP(mean): --  RR: 18 (21 Sep 2022 05:00) (18 - 18)  SpO2: --    LABS:                        8.4    18.24 )-----------( 201      ( 21 Sep 2022 06:00 )             25.5     09-21    130<L>  |  89<L>  |  172<HH>  ----------------------------<  136<H>  3.7   |  26  |  1.8<H>    Ca    8.4      21 Sep 2022 06:00  Mg     2.3     09-21    TPro  4.8<L>  /  Alb  2.9<L>  /  TBili  0.7  /  DBili  x   /  AST  14  /  ALT  12  /  AlkPhos  73  09-21                  RADIOLOGY:      PHYSICAL EXAM:  CONSTITUTIONAL: No acute distress, on 3L NC  PULMONARY: decreased air entry bilaterally, bibasilar crackles  CARDIOVASCULAR: Regular rate and rhythm; no murmurs, rubs, or gallops  GASTROINTESTINAL: Soft, non-tender, non-distended; bowel sounds present  MUSCULOSKELETAL: 2+ peripheral pulses; trace LLE  NEUROLOGY: non-focal  SKIN: No rashes or lesions; warm and dry

## 2022-09-21 NOTE — PROGRESS NOTE ADULT - ASSESSMENT
83 yo male with PMHx of HFmrEF (45-50% on Lasix 20 PO), A-Fib on Eliquis, CAD s/p CABGx3, PVD, COPD (was on rare home O2 PRN now on 3L NC since recent July admission for COVID), CKD3, C3 glomerulonephropathy, HTN, HLD, Hypothyroid presents from NH for SOB, admitted for HF exacerbation treated with iv diuretics. Seen initially for BRENDA secondary to CRS that improved with good diuresis. Nephro reeval for worsening BRENDA.    Assessment and plan    BRENDA on CKD 3b-4/ HF exacerbation/ c3 glomerulopathy on immunosuppression  Cr stabilizing   BRENDA likely prerenal/ATN secondary to overdiuresis  BUN elevated in the setting of depleted intravascular space, po prednisone might be contributing  continue to hold iv diuretics/ consider holding torsemide   no need for IVF  FK level noted increase tacrolimus to 1mg q12h / on MMF  leucocytosis worsening ? infectious process / check CXR pancultures/ check skin integrity  continue with strict i/os  HF following  will follow

## 2022-09-21 NOTE — PROGRESS NOTE ADULT - ASSESSMENT
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 Pt has had prolonged hospital course requiring critical care upgrade for acute HFmrEF as well as covid-19    Acute Hypoxic respiratory Failure   Acute HFmrEF  Bilateral pleural effusion Right>Left.  COPD  COVID 19 infection +9/2    -previously on HFNC, now on NC 3 L saturating low 93%  -s/p thoracentesis 8/2 fluid transudative.   -s/p thoracentesis 9/8 with 1.9 L removed, transudative, f/u studies  -DC Bumex drip   -s/p Diamox x1 9/12.  -renal fxn stable  -dexamethasone 6mg daily, steroids started 9/6 tapered dose of prednisone and change it to 5mg daily home dose  -D-Dimer 1000, LE duplex 9/6 negative for DVTs   -off therapeutic Lovenox (previous eliquis)--started on sc heparin [id number] for now--due to epistaxis and falling Hb  -s/s eval -recommended minced and moist diet;    Epistaxis  - occurred several times on admission  - now resolved    Leukocytosis  - still present  - likely related to steroid use    Acute on chronic anemia   - s/p 2 U PRBC  before and 3 days of IV venofer    -hb dropping-- also bled from hand-- iv site-- s/p 1 unit of PRBC-- 9/18    Chronic Atrial Fibrillation   -on metoprolol   - rate controlled      C3 Glomerulonephropathy   - Continue tacrolimus 0.5mg BID and Mycophenolate 500mg 4 times daily  - On chronic prednisone 5mg at home,   -BUN is worsening-->no evidence of bleeding--case d/w nephrology-recommend continuing to hold diuretics--agreed as pt appears euvolemic      CAD /HTN/DLD  - Continue Metoprolol, entresto and Lipitor.  - hold entresto from 9/19/22 due to hypotension and worsening renal failure    Hypothyroidism   - continue synthroid    DM II  - A1C 7.3   - continue with insulin, dose adjusted    Stage 2 right buttock pressure ulcer, Present on admission  -Continue local wound care, off loading.     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 Pt has had prolonged hospital course requiring critical care upgrade for acute HFmrEF as well as covid-19    Acute Hypoxic respiratory Failure   Acute HFmrEF  Bilateral pleural effusion Right>Left.  COPD  COVID 19 infection +9/2    -previously on HFNC, now on NC 3 L saturating low 93%  -s/p thoracentesis 8/2 fluid transudative.   -s/p thoracentesis 9/8 with 1.9 L removed, transudative, f/u studies  -DC Bumex drip   -s/p Diamox x1 9/12.  -renal fxn stable  -dexamethasone 6mg daily, steroids started 9/6 tapered dose of prednisone and change it to 5mg daily home dose  -D-Dimer 1000, LE duplex 9/6 negative for DVTs   -off therapeutic Lovenox (previous eliquis)--started on sc heparin [id number] for now--due to epistaxis and falling Hb  -s/s eval -recommended minced and moist diet;    Hyponatremia  - likely related to decreased PO intake, diuretics, BRENDA  - recheck BMP in AM, if still low check serum and urine osm, urine Na  Epistaxis  - occurred several times on admission  - now resolved    Leukocytosis  - still present  - likely related to steroid use    Acute on chronic anemia   - s/p 2 U PRBC  before and 3 days of IV venofer    -hb dropping-- also bled from hand-- iv site-- s/p 1 unit of PRBC-- 9/18    Chronic Atrial Fibrillation   -on metoprolol   - rate controlled      C3 Glomerulonephropathy   - Continue tacrolimus 0.5mg BID and Mycophenolate 500mg 4 times daily  - On chronic prednisone 5mg at home,   -BUN is worsening-->no evidence of bleeding--case d/w nephrology-recommend continuing to hold diuretics--agreed as pt appears euvolemic      CAD /HTN/DLD  - Continue Metoprolol, entresto and Lipitor.  - hold entresto from 9/19/22 due to hypotension and worsening renal failure    Hypothyroidism   - continue synthroid    DM II  - A1C 7.3   - continue with insulin, dose adjusted    Stage 2 right buttock pressure ulcer, Present on admission  -Continue local wound care, off loading.     #Progress Note Handoff:  Pending (specify):  Clinical improvmenet  Family discussion: discussed diuresis/renal function  Disposition: Home___/SNF___/Other________/Unknown at this time___x_____

## 2022-09-21 NOTE — PROGRESS NOTE ADULT - SUBJECTIVE AND OBJECTIVE BOX
CHIEF COMPLAINT:    Patient is a 82y old  Male who presents with a chief complaint of chf exacerbation     INTERVAL HPI/OVERNIGHT EVENTS:    Patient seen and examined at bedside. No acute overnight events occurred.    ROS: Denies SOB, chest pain. All other systems are negative.    Medications:  Standing  ALBUTerol    90 MICROgram(s) HFA Inhaler 2 Puff(s) Inhalation every 6 hours  atorvastatin 40 milliGRAM(s) Oral at bedtime  BACItracin   Ointment 1 Application(s) Topical two times a day  chlorhexidine 2% Cloths 1 Application(s) Topical <User Schedule>  dextrose 5%. 1000 milliLiter(s) IV Continuous <Continuous>  dextrose 5%. 1000 milliLiter(s) IV Continuous <Continuous>  dextrose 50% Injectable 25 Gram(s) IV Push once  dextrose 50% Injectable 12.5 Gram(s) IV Push once  dextrose 50% Injectable 25 Gram(s) IV Push once  famotidine    Tablet 20 milliGRAM(s) Oral daily  glucagon  Injectable 1 milliGRAM(s) IntraMuscular once  heparin   Injectable 5000 Unit(s) SubCutaneous every 12 hours  insulin glargine Injectable (LANTUS) 8 Unit(s) SubCutaneous at bedtime  insulin lispro (ADMELOG) corrective regimen sliding scale   SubCutaneous three times a day before meals  insulin lispro Injectable (ADMELOG) 3 Unit(s) SubCutaneous three times a day before meals  levothyroxine 150 MICROGram(s) Oral daily  lidocaine 1% (Preservative-free) Injectable 30 milliLiter(s) Local Injection once  lidocaine 1% Injectable 10 milliLiter(s) Local Injection once  lidocaine 1% Injectable 10 milliLiter(s) Local Injection once  melatonin 5 milliGRAM(s) Oral at bedtime  metoprolol succinate  milliGRAM(s) Oral daily  multivitamin/minerals 1 Tablet(s) Oral daily  mycophenolate mofetil 500 milliGRAM(s) Oral four times a day  pentoxifylline 400 milliGRAM(s) Oral two times a day  predniSONE   Tablet 5 milliGRAM(s) Oral daily  tacrolimus 1 milliGRAM(s) Oral two times a day    PRN Meds  acetaminophen     Tablet .. 650 milliGRAM(s) Oral every 6 hours PRN  dextrose Oral Gel 15 Gram(s) Oral once PRN  dextrose Oral Gel 15 Gram(s) Oral once PRN        Vital Signs:    T(F): 96.3 (09-21-22 @ 13:03), Max: 97.3 (22 @ 05:00)  HR: 70 (22 @ 13:03) (68 - 71)  BP: 104/52 (22 @ 13:03) (102/50 - 134/60)  RR: 18 (22 @ 13:03) (18 - 18)  SpO2: --  I&O's Summary    20 Sep 2022 07:01  -  21 Sep 2022 07:00  --------------------------------------------------------  IN: 325 mL / OUT: 200 mL / NET: 125 mL    21 Sep 2022 07:01  -  21 Sep 2022 17:38  --------------------------------------------------------  IN: 354 mL / OUT: 0 mL / NET: 354 mL      Daily     Daily Weight in k.6 (21 Sep 2022 05:00)  CAPILLARY BLOOD GLUCOSE      POCT Blood Glucose.: 173 mg/dL (21 Sep 2022 16:41)  POCT Blood Glucose.: 268 mg/dL (21 Sep 2022 11:25)  POCT Blood Glucose.: 160 mg/dL (21 Sep 2022 07:20)  POCT Blood Glucose.: 147 mg/dL (20 Sep 2022 21:41)      PHYSICAL EXAM:  GENERAL:  NAD  SKIN: No rashes or lesions  HEENT: Atraumatic. Normocephalic. Anicteric  NECK:  No JVD.   PULMONARY: Clear to ausculation bilaterally. No wheezing. No rales  CVS: Normal S1, S2. Regular rate and rhythm. No murmurs.  ABDOMEN/GI: Soft, Nontender, Nondistended; Bowel sounds are present  EXTREMITIES:  No edema B/L LE.  NEUROLOGIC:  No motor deficit.  PSYCH: Alert & oriented x 3, normal affect      LABS:                        8.4    18.24 )-----------( 201      ( 21 Sep 2022 06:00 )             25.5         130<L>  |  89<L>  |  172<HH>  ----------------------------<  136<H>  3.7   |  26  |  1.8<H>    Ca    8.4      21 Sep 2022 06:00  Mg     2.3         TPro  4.8<L>  /  Alb  2.9<L>  /  TBili  0.7  /  DBili  x   /  AST  14  /  ALT  12  /  AlkPhos  73                RADIOLOGY & ADDITIONAL TESTS:  Imaging or report Personally Reviewed:  [ ] YES  [ ] NO -->no new images    Telemetry reviewed independently - NSR, no acute events  EKG reviewed independently -->no new EKGs    Consultant(s) Notes Reviewed:  [ ] YES  [ ] NO  Care Discussed with Consultants/Other Providers [ ] YES  [ ] NO    Case discussed with resident  Care discussed with pt

## 2022-09-22 NOTE — PROGRESS NOTE ADULT - SUBJECTIVE AND OBJECTIVE BOX
ANDRIA TAVAREZ 82y Male  MRN#: [id number]   Hospital Day: 33d    SUBJECTIVE  Patient is a 82y old Male who presents with a chief complaint of chf exacerbation (22 Sep 2022 07:45)  Currently admitted to medicine with the primary diagnosis of Fluid overload      INTERVAL HPI AND OVERNIGHT EVENTS:  Patient was examined and seen at bedside. This morning he is resting comfortably in bed and reports no issues or overnight events.    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    AF (atrial fibrillation)  s/p DCCV    Carotid stenosis    COPD (chronic obstructive pulmonary disease)    H/O coronary artery bypass surgery  2001    S/P inguinal hernia repair    History of appendectomy      ALLERGIES:  Ozempic (0.25 mg or 0.5 mg dose) (Hives; Rash)  streptomycin (Unknown)  Trulicity Pen (Hives; Rash)    MEDICATIONS:  STANDING MEDICATIONS  ALBUTerol    90 MICROgram(s) HFA Inhaler 2 Puff(s) Inhalation every 6 hours  atorvastatin 40 milliGRAM(s) Oral at bedtime  BACItracin   Ointment 1 Application(s) Topical two times a day  chlorhexidine 2% Cloths 1 Application(s) Topical <User Schedule>  dextrose 5%. 1000 milliLiter(s) IV Continuous <Continuous>  dextrose 5%. 1000 milliLiter(s) IV Continuous <Continuous>  dextrose 50% Injectable 25 Gram(s) IV Push once  dextrose 50% Injectable 12.5 Gram(s) IV Push once  dextrose 50% Injectable 25 Gram(s) IV Push once  famotidine    Tablet 20 milliGRAM(s) Oral daily  glucagon  Injectable 1 milliGRAM(s) IntraMuscular once  heparin   Injectable 5000 Unit(s) SubCutaneous every 12 hours  insulin glargine Injectable (LANTUS) 8 Unit(s) SubCutaneous at bedtime  insulin lispro (ADMELOG) corrective regimen sliding scale   SubCutaneous three times a day before meals  insulin lispro Injectable (ADMELOG) 3 Unit(s) SubCutaneous three times a day before meals  levothyroxine 150 MICROGram(s) Oral daily  lidocaine 1% (Preservative-free) Injectable 30 milliLiter(s) Local Injection once  lidocaine 1% Injectable 10 milliLiter(s) Local Injection once  lidocaine 1% Injectable 10 milliLiter(s) Local Injection once  melatonin 5 milliGRAM(s) Oral at bedtime  metoprolol succinate  milliGRAM(s) Oral daily  midodrine 10 milliGRAM(s) Oral every 8 hours  multivitamin/minerals 1 Tablet(s) Oral daily  norepinephrine Infusion 0.05 MICROgram(s)/kG/Min IV Continuous <Continuous>  pentoxifylline 400 milliGRAM(s) Oral two times a day  predniSONE   Tablet 5 milliGRAM(s) Oral daily  tacrolimus 1 milliGRAM(s) Oral two times a day    PRN MEDICATIONS  acetaminophen     Tablet .. 650 milliGRAM(s) Oral every 6 hours PRN  dextrose Oral Gel 15 Gram(s) Oral once PRN  dextrose Oral Gel 15 Gram(s) Oral once PRN      VITAL SIGNS: Last 24 Hours  T(C): 36.1 (22 Sep 2022 06:03), Max: 36.1 (22 Sep 2022 06:03)  T(F): 96.9 (22 Sep 2022 06:03), Max: 96.9 (22 Sep 2022 06:03)  HR: 79 (22 Sep 2022 11:18) (63 - 79)  BP: 104/57 (22 Sep 2022 10:00) (92/47 - 104/57)  BP(mean): 64 (22 Sep 2022 10:00) (64 - 64)  RR: 24 (22 Sep 2022 11:18) (18 - 24)  SpO2: 95% (22 Sep 2022 11:18) (95% - 100%)    LABS:                        7.1    42.14 )-----------( 176      ( 22 Sep 2022 07:30 )             21.8     09-22    134<L>  |  91<L>  |  181<HH>  ----------------------------<  129<H>  3.8   |  24  |  2.5<H>    Ca    8.5      22 Sep 2022 07:30  Mg     2.5     09-22    TPro  5.0<L>  /  Alb  2.7<L>  /  TBili  0.7  /  DBili  x   /  AST  16  /  ALT  11  /  AlkPhos  74  09-22        ABG - ( 22 Sep 2022 07:55 )  pH, Arterial: 7.37  pH, Blood: x     /  pCO2: 41    /  pO2: 61    / HCO3: 24    / Base Excess: -1.5  /  SaO2: 95.6                      RADIOLOGY:      PHYSICAL EXAM:  PHYSICAL EXAM:  CONSTITUTIONAL: No acute distress, on 15L NRB  PULMONARY: decreased air entry bilaterally, crackles diffusely  CARDIOVASCULAR: Regular rate and rhythm; no murmurs, rubs, or gallops  GASTROINTESTINAL: Soft, non-tender, non-distended; bowel sounds present  MUSCULOSKELETAL: 2+ peripheral pulses; trace LLE  NEUROLOGY: non-focal  SKIN: No rashes or lesions; warm and dry

## 2022-09-22 NOTE — PROGRESS NOTE ADULT - PROBLEM SELECTOR PLAN 4
Start Dilaudid 0.5mg IV q 1 hr PRN for pain and/or dyspnea  Call palliative care team when family ready to remove NRB mask   x [phone]

## 2022-09-22 NOTE — PROGRESS NOTE ADULT - ASSESSMENT
82yMale being evaluated for goals of care and symptom management. See above GOC note. Pt denied pain or dyspnea today. Pt however easily fall asleep during conversation, and has difficulty with short term memory. Wife is in rehab now after hospitalization.    Palliative care reconsulted today patient actively dying and said he did not want further treatment. His sons familiar with palliative care at bedside, discussed CMO w palliative care team and primary team.     Pt is dyspnea/tachypneic on NRB mask, is only semi - conscious.      MEDD (morphine equivalent daily dose): 0      See Recs below.    Please call [phone] with questions or concerns 24/7.   We will continue to follow.

## 2022-09-22 NOTE — PROGRESS NOTE ADULT - SUBJECTIVE AND OBJECTIVE BOX
HPI:  83 yo male with PMHx of HFrEF (45-50% on Lasix 20 PO), A-Fib on Eliquis, CAD s/p CABGx3, PVD, COPD (was on rare home O2 PRN now on 3L NC since recent July admission for COVID), CKD3, C3 glomerulonephropathy, HTN, HLD, Hypothyroid presents from NH with progressively worsening SOB x5 days and hypoxia.  Recently hospitalized here for a month discharged on 8/3/22 initially for BRENDA on CKD course c/b COVID, bilateral lower lobe pneumonia, delirium and microaspiration of thin liquids on modified barium study. Received Dexamethasone and Zosyn course with new O2 requirements of 3L NC discharged to nursing home on 3L and thick liquid diet for rehab. Due to his SOB, chest x-ray was ordered on 8/15 but results did not come back and patient became increasingly short of breath and was noted to be hypoxic and rehab facility which prompted referral to the ED. Review of symptoms is notable for orthopnea and a chronic dry cough unchanged since his covid illness. He denies paroxysmal nocturnal dyspnea, leg edema, chest pain, palpitations, dizziness, n/v, abd pain. Cardiologist is Dr. Witt and Pulmonolgist is Dr. Rory Mars.     In ED:  T(F): 96.4 (08-20-22 @ 11:27), Max: 96.4 (08-20-22 @ 11:27)  HR: 80 (08-20-22 @ 16:20) (80 - 91)  BP: 137/61 (08-20-22 @ 16:20) (121/58 - 137/61)  RR: 18 (08-20-22 @ 16:20) (18 - 24)  SpO2: 100% (08-20-22 @ 16:20) (89% - 100%) -> 89% on 4L -> placed on NRB -> BIPAP 2/2 tachypnea and hypoxia -> taken off with increased work of breathing placed back on BIPAP  Labs: WBC 12k, Hgb 7.2 (baseline 9), BNP 55k, Cr 2.2 (baseline 1.5), PCO2 47 (VBG), Troponin 0.24  EKG:   Atrial fibrillation with premature ventricular or aberrantly conducted complexes  Left posterior fascicular block   ST & T wave abnormality, consider lateral ischemia - similar to prior in July 2022  XR chest: significant bilateral pleural effusions and pulmonary congestion  Admitted to medicine for SOB/hypoxia likely 2/2 acute CHF exacerbation.  (20 Aug 2022 16:17)     INTERVAL EVENTS:    ADVANCE DIRECTIVES:    DNR  MOLST  [ ]  Living Will  [ ]   DECISION MAKER(s):  [ ] Health Care Proxy(s)  [ ] Surrogate(s)  [ ] Guardian           Name(s): Phone Number(s):    BASELINE (I)ADL(s) (prior to admission):  [place]: [ ]Total  [ ] Moderate [ ]Dependent  Palliative Performance Status Version 2:         %    http://[facility].org/files/news/palliative_performance_scale_ppsv2.pdf    Allergies    Ozempic (0.25 mg or 0.5 mg dose) (Hives; Rash)  streptomycin (Unknown)  Trulicity Pen (Hives; Rash)    Intolerances    MEDICATIONS  (STANDING):  ALBUTerol    90 MICROgram(s) HFA Inhaler 2 Puff(s) Inhalation every 6 hours  atorvastatin 40 milliGRAM(s) Oral at bedtime  BACItracin   Ointment 1 Application(s) Topical two times a day  chlorhexidine 2% Cloths 1 Application(s) Topical <User Schedule>  dextrose 50% Injectable 25 Gram(s) IV Push once  famotidine    Tablet 20 milliGRAM(s) Oral daily  glucagon  Injectable 1 milliGRAM(s) IntraMuscular once  levothyroxine 150 MICROGram(s) Oral daily  lidocaine 1% (Preservative-free) Injectable 30 milliLiter(s) Local Injection once  lidocaine 1% Injectable 10 milliLiter(s) Local Injection once  lidocaine 1% Injectable 10 milliLiter(s) Local Injection once  melatonin 5 milliGRAM(s) Oral at bedtime  metoprolol succinate  milliGRAM(s) Oral daily  multivitamin/minerals 1 Tablet(s) Oral daily  pentoxifylline 400 milliGRAM(s) Oral two times a day  predniSONE   Tablet 5 milliGRAM(s) Oral daily  tacrolimus 1 milliGRAM(s) Oral two times a day    MEDICATIONS  (PRN):  acetaminophen     Tablet .. 650 milliGRAM(s) Oral every 6 hours PRN Mild Pain (1 - 3)  dextrose Oral Gel 15 Gram(s) Oral once PRN Blood Glucose LESS THAN 70 milliGRAM(s)/deciliter    PRESENT SYMPTOMS: [ x]Unable to obtain due to poor mentation   Source if other than patient:  [ ]Family   [ ]Team     Pain: [ ]yes [ ]no  QOL impact -   Location -                    Aggravating factors -  Quality -  Radiation -  Timing-  Severity (0-10 scale):  Minimal acceptable level (0-10 scale):     CPOT:    https://www.[facility].org/getattachment/[email or web address]/Critical-Care-Pain-Observation-Tool-(CPOT)      PAIN AD Score:     [email or web address]/painad.pdf (press ctrl +  left click to view)    Dyspnea:                           [ ]Mild [ ]Moderate [ ]Severe  Anxiety:                             [ ]Mild [ ]Moderate [ ]Severe  Fatigue:                             [ ]Mild [ ]Moderate [ ]Severe  Nausea:                             [ ]Mild [ ]Moderate [ ]Severe  Loss of appetite:              [ ]Mild [ ]Moderate [ ]Severe  Constipation:                    [ ]Mild [ ]Moderate [ ]Severe    Other Symptoms:  [ ]All other review of systems negative     Palliative Performance Status Version 2:         %    http://[facility]pcrc.org/files/news/palliative_performance_scale_ppsv2.pdf  PHYSICAL EXAM:  Vital Signs Last 24 Hrs  T(C): 36.1 (22 Sep 2022 06:03), Max: 36.1 (22 Sep 2022 06:03)  T(F): 96.9 (22 Sep 2022 06:03), Max: 96.9 (22 Sep 2022 06:03)  HR: 80 (22 Sep 2022 11:45) (63 - 80)  BP: 104/57 (22 Sep 2022 10:00) (92/47 - 104/57)  BP(mean): 64 (22 Sep 2022 10:00) (64 - 64)  RR: 23 (22 Sep 2022 11:47) (18 - 25)  SpO2: 94% (22 Sep 2022 11:47) (85% - 100%)    Parameters below as of 22 Sep 2022 11:47  Patient On (Oxygen Delivery Method): mask, nonrebreather  O2 Flow (L/min): 15  O2 Concentration (%): 100 I&O's Summary    21 Sep 2022 07:01  -  22 Sep 2022 07:00  --------------------------------------------------------  IN: 472 mL / OUT: 0 mL / NET: 472 mL    22 Sep 2022 07:01  -  22 Sep 2022 13:52  --------------------------------------------------------  IN: 266 mL / OUT: 0 mL / NET: 266 mL      GENERAL:  [ ]Alert  [ ]Oriented x   [x ]Lethargic  [ ]Cachexia  [ ]Unarousable  [ ]Verbal  [ ]Non-Verbal  Behavioral:   [ ] Anxiety  [ ] Delirium [ ] Agitation [ ] Other  HEENT:  [ ]Normal   [x ]Dry mouth   [ ]ET Tube/Trach  [ ]Oral lesions  PULMONARY:   [ ]Clear [x ]Tachypnea  [ ]Audible excessive secretions   [ ]Rhonchi        [ ]Right [ ]Left [ ]Bilateral  [ ]Crackles        [ ]Right [ ]Left [ ]Bilateral  [ ]Wheezing     [ ]Right [ ]Left [ ]Bilateral  [ ]Diminished breath sounds [ ]right [ ]left [ ]bilateral  CARDIOVASCULAR:    [x ]Regular [ ]Irregular [ ]Tachy  [ ]Won [ ]Murmur [ ]Other  GASTROINTESTINAL:  [x ]Soft  [ ]Distended   [ ]+BS  [ ]Non tender [ ]Tender  [ ]PEG [ ]OGT/ NGT  Last BM:   GENITOURINARY:  [x ]Normal [ ] Incontinent   [ ]Oliguria/Anuria   [ ]Gloria  MUSCULOSKELETAL:   [ ]Normal   [ x]Weakness  [ ]Bed/Wheelchair bound [ ]Edema  NEUROLOGIC:   [ ]No focal deficits  [x ]Cognitive impairment  [ ]Dysphagia [ ]Dysarthria [ ]Paresis [ ]Other   SKIN:   [ ]Normal    [ ]Rash  [ x]Pressure ulcer(s)       Present on admission [ ]y [ ]n    CRITICAL CARE:  [ ] Shock Present  [ ]Septic [ ]Cardiogenic [ ]Neurologic [ ]Hypovolemic  [ ]  Vasopressors [ ]  Inotropes   [x ]Respiratory failure present [ ]Mechanical ventilation [ ]Non-invasive ventilatory support [ ]High flow  [ x]Acute  [ ]Chronic [ x]Hypoxic  [ ]Hypercarbic [ ]Other  [ ]Other organ failure     LABS:                        7.1    42.14 )-----------( 176      ( 22 Sep 2022 07:30 )             21.8   09-22    134<L>  |  91<L>  |  181<HH>  ----------------------------<  129<H>  3.8   |  24  |  2.5<H>    Ca    8.5      22 Sep 2022 07:30  Mg     2.5     09-22    TPro  5.0<L>  /  Alb  2.7<L>  /  TBili  0.7  /  DBili  x   /  AST  16  /  ALT  11  /  AlkPhos  74  09-22        RADIOLOGY & ADDITIONAL STUDIES:    PROTEIN CALORIE MALNUTRITION PRESENT: [ ]mild [ ]moderate [ ]severe [ ]underweight [ ]morbid obesity  https://www.andeal.org/vault/2440/web/files/ONC/Table_Clinical%20Characteristics%20to%20Document%20Malnutrition-White%20JV%20et%20al%202012.pdf    Height (cm): 188 (08-20-22 @ 11:17), 193 (07-27-22 @ 14:25), 188 (10-20-21 @ 07:00)  Weight (kg): 85 (08-23-22 @ 14:35), 100 (07-05-22 @ 13:03), 107.1 (10-23-21 @ 05:41)  BMI (kg/m2): 24 (08-23-22 @ 14:35), 28.3 (08-20-22 @ 11:17), 26.8 (07-27-22 @ 14:25)    [ ]PPSV2 < or = to 30% [ ]significant weight loss  [ ]poor nutritional intake  [ ]anasarca      [ ]Artificial Nutrition      REFERRALS:   [ ]Chaplaincy  [ ]Hospice  [ ]Child Life  [ ]Social Work  [ ]Case management [ ]Holistic Therapy     Goals of Care Document:          HPI:  81 yo male with PMHx of HFrEF (45-50% on Lasix 20 PO), A-Fib on Eliquis, CAD s/p CABGx3, PVD, COPD (was on rare home O2 PRN now on 3L NC since recent July admission for COVID), CKD3, C3 glomerulonephropathy, HTN, HLD, Hypothyroid presents from NH with progressively worsening SOB x5 days and hypoxia.  Recently hospitalized here for a month discharged on 8/3/22 initially for BRENDA on CKD course c/b COVID, bilateral lower lobe pneumonia, delirium and microaspiration of thin liquids on modified barium study. Received Dexamethasone and Zosyn course with new O2 requirements of 3L NC discharged to nursing home on 3L and thick liquid diet for rehab. Due to his SOB, chest x-ray was ordered on 8/15 but results did not come back and patient became increasingly short of breath and was noted to be hypoxic and rehab facility which prompted referral to the ED. Review of symptoms is notable for orthopnea and a chronic dry cough unchanged since his covid illness. He denies paroxysmal nocturnal dyspnea, leg edema, chest pain, palpitations, dizziness, n/v, abd pain. Cardiologist is Dr. Witt and Pulmonolgist is Dr. Rory Mars.     In ED:  T(F): 96.4 (08-20-22 @ 11:27), Max: 96.4 (08-20-22 @ 11:27)  HR: 80 (08-20-22 @ 16:20) (80 - 91)  BP: 137/61 (08-20-22 @ 16:20) (121/58 - 137/61)  RR: 18 (08-20-22 @ 16:20) (18 - 24)  SpO2: 100% (08-20-22 @ 16:20) (89% - 100%) -> 89% on 4L -> placed on NRB -> BIPAP 2/2 tachypnea and hypoxia -> taken off with increased work of breathing placed back on BIPAP  Labs: WBC 12k, Hgb 7.2 (baseline 9), BNP 55k, Cr 2.2 (baseline 1.5), PCO2 47 (VBG), Troponin 0.24  EKG:   Atrial fibrillation with premature ventricular or aberrantly conducted complexes  Left posterior fascicular block   ST & T wave abnormality, consider lateral ischemia - similar to prior in July 2022  XR chest: significant bilateral pleural effusions and pulmonary congestion  Admitted to medicine for SOB/hypoxia likely 2/2 acute CHF exacerbation.  (20 Aug 2022 16:17)     INTERVAL EVENTS:  -reconsulted for GOC  -Patient/family indicated to primary team that they may wish to pursue comfort measures only  -Patient asleep with with no evidence of discomfort at time of visit    Allergies    Ozempic (0.25 mg or 0.5 mg dose) (Hives; Rash)  streptomycin (Unknown)  Trulicity Pen (Hives; Rash)      MEDICATIONS  (STANDING):  ALBUTerol    90 MICROgram(s) HFA Inhaler 2 Puff(s) Inhalation every 6 hours  atorvastatin 40 milliGRAM(s) Oral at bedtime  BACItracin   Ointment 1 Application(s) Topical two times a day  chlorhexidine 2% Cloths 1 Application(s) Topical <User Schedule>  dextrose 50% Injectable 25 Gram(s) IV Push once  famotidine    Tablet 20 milliGRAM(s) Oral daily  glucagon  Injectable 1 milliGRAM(s) IntraMuscular once  levothyroxine 150 MICROGram(s) Oral daily  lidocaine 1% (Preservative-free) Injectable 30 milliLiter(s) Local Injection once  lidocaine 1% Injectable 10 milliLiter(s) Local Injection once  lidocaine 1% Injectable 10 milliLiter(s) Local Injection once  melatonin 5 milliGRAM(s) Oral at bedtime  metoprolol succinate  milliGRAM(s) Oral daily  multivitamin/minerals 1 Tablet(s) Oral daily  pentoxifylline 400 milliGRAM(s) Oral two times a day  predniSONE   Tablet 5 milliGRAM(s) Oral daily  tacrolimus 1 milliGRAM(s) Oral two times a day    MEDICATIONS  (PRN):  acetaminophen     Tablet .. 650 milliGRAM(s) Oral every 6 hours PRN Mild Pain (1 - 3)  dextrose Oral Gel 15 Gram(s) Oral once PRN Blood Glucose LESS THAN 70 milliGRAM(s)/deciliter    PRESENT SYMPTOMS: [ x]Unable to obtain due to poor mentation   Source if other than patient:  [ ]Family   [ ]Team     Pain: [ ]yes [ ]no  QOL impact -   Location -                    Aggravating factors -  Quality -  Radiation -  Timing-  Severity (0-10 scale):  Minimal acceptable level (0-10 scale):     CPOT:  0  https://www.[facility].org/getattachment/[email or web address]/Critical-Care-Pain-Observation-Tool-(CPOT)    Dyspnea:                           [ ]Mild [ ]Moderate [ ]Severe  Anxiety:                             [ ]Mild [ ]Moderate [ ]Severe  Fatigue:                             [ ]Mild [ ]Moderate [ ]Severe  Nausea:                             [ ]Mild [ ]Moderate [ ]Severe  Loss of appetite:              [ ]Mild [ ]Moderate [ ]Severe  Constipation:                    [ ]Mild [ ]Moderate [ ]Severe    Other Symptoms:  [ ]All other review of systems negative     Palliative Performance Status Version 2:         20%    http://[facility].org/files/news/palliative_performance_scale_ppsv2.pdf    PHYSICAL EXAM:  Vital Signs Last 24 Hrs  T(C): 36.1 (22 Sep 2022 06:03), Max: 36.1 (22 Sep 2022 06:03)  T(F): 96.9 (22 Sep 2022 06:03), Max: 96.9 (22 Sep 2022 06:03)  HR: 80 (22 Sep 2022 11:45) (63 - 80)  BP: 104/57 (22 Sep 2022 10:00) (92/47 - 104/57)  BP(mean): 64 (22 Sep 2022 10:00) (64 - 64)  RR: 23 (22 Sep 2022 11:47) (18 - 25)  SpO2: 94% (22 Sep 2022 11:47) (85% - 100%)    Parameters below as of 22 Sep 2022 11:47  Patient On (Oxygen Delivery Method): mask, nonrebreather  O2 Flow (L/min): 15  O2 Concentration (%): 100 I&O's Summary    21 Sep 2022 07:01  -  22 Sep 2022 07:00  --------------------------------------------------------  IN: 472 mL / OUT: 0 mL / NET: 472 mL    22 Sep 2022 07:01  -  22 Sep 2022 13:52  --------------------------------------------------------  IN: 266 mL / OUT: 0 mL / NET: 266 mL      GENERAL:  [ ]Alert  [ ]Oriented x   [x ]Lethargic  [ ]Cachexia  [ ]Unarousable  [ ]Verbal  [ ]Non-Verbal  Behavioral:   [ ] Anxiety  [ ] Delirium [ ] Agitation [ ] Other  HEENT:  [ ]Normal   [x ]Dry mouth   [ ]ET Tube/Trach  [ ]Oral lesions  PULMONARY:   [ ]Clear [x ]Tachypnea  [ ]Audible excessive secretions   [ ]Rhonchi        [ ]Right [ ]Left [ ]Bilateral  [ ]Crackles        [ ]Right [ ]Left [ ]Bilateral  [ ]Wheezing     [ ]Right [ ]Left [ ]Bilateral  [ ]Diminished breath sounds [ ]right [ ]left [ ]bilateral  CARDIOVASCULAR:    [x ]Regular [ ]Irregular [ ]Tachy  [ ]Won [ ]Murmur [ ]Other  GASTROINTESTINAL:  [x ]Soft  [ ]Distended   [ ]+BS  [ ]Non tender [ ]Tender  [ ]PEG [ ]OGT/ NGT  Last BM:   GENITOURINARY:  [x ]Normal [ ] Incontinent   [ ]Oliguria/Anuria   [ ]Gloria  MUSCULOSKELETAL:   [ ]Normal   [ x]Weakness  [ ]Bed/Wheelchair bound [ ]Edema  NEUROLOGIC:   [ ]No focal deficits  [x ]Cognitive impairment  [ ]Dysphagia [ ]Dysarthria [ ]Paresis [ ]Other   SKIN:   [ ]Normal    [ ]Rash  [ x]Pressure ulcer(s)       Present on admission [ ]y [ ]n    CRITICAL CARE:  [ ] Shock Present  [ ]Septic [ ]Cardiogenic [ ]Neurologic [ ]Hypovolemic  [ ]  Vasopressors [ ]  Inotropes   [x ]Respiratory failure present [ ]Mechanical ventilation [ ]Non-invasive ventilatory support [ ]High flow  [ x]Acute  [ ]Chronic [ x]Hypoxic  [ ]Hypercarbic [ ]Other  [ ]Other organ failure     LABS:  reviewed                        7.1    42.14 )-----------( 176      ( 22 Sep 2022 07:30 )             21.8   09-22    134<L>  |  91<L>  |  181<HH>  ----------------------------<  129<H>  3.8   |  24  |  2.5<H>    Ca    8.5      22 Sep 2022 07:30  Mg     2.5     09-22    TPro  5.0<L>  /  Alb  2.7<L>  /  TBili  0.7  /  DBili  x   /  AST  16  /  ALT  11  /  AlkPhos  74  09-22    RADIOLOGY & EKG and ADDITIONAL STUDIES:  reviewed    < from: Xray Chest 1 View-PORTABLE IMMEDIATE (Xray Chest 1 View-PORTABLE IMMEDIATE .) (09.22.22 @ 08:31) >  Stable bibasilar opacity/effusions, increased on the right. No   pneumothorax.    < end of copied text >    < from: 12 Lead ECG (08.21.22 @ 07:14) >    Ventricular Rate 71 BPM    Atrial Rate 82 BPM    QRS Duration 140 ms    Q-T Interval 444 ms    QTC Calculation(Bazett) 482 ms    R Axis 108 degrees    T Axis 166 degrees    Diagnosis Line Atrial fibrillation with premature ventricular or aberrantlyconducted  complexes  Non-specific intra-ventricular conduction block  ST & T wave abnormality, consider lateral ischemia  Abnormal ECG    < end of copied text >      PROTEIN CALORIE MALNUTRITION PRESENT: [ ]mild [ ]moderate [ ]severe [ ]underweight [ ]morbid obesity  https://www.andeal.org/vault/2440/web/files/ONC/Table_Clinical%20Characteristics%20to%20Document%20Malnutrition-White%20JV%20et%20al%202012.pdf    Height (cm): 188 (08-20-22 @ 11:17), 193 (07-27-22 @ 14:25), 188 (10-20-21 @ 07:00)  Weight (kg): 85 (08-23-22 @ 14:35), 100 (07-05-22 @ 13:03), 107.1 (10-23-21 @ 05:41)  BMI (kg/m2): 24 (08-23-22 @ 14:35), 28.3 (08-20-22 @ 11:17), 26.8 (07-27-22 @ 14:25)    [ ]PPSV2 < or = to 30% [ ]significant weight loss  [ ]poor nutritional intake  [ ]anasarca      [ ]Artificial Nutrition      REFERRALS:   [x ]Chaplaincy  [ ]Hospice  [ ]Child Life  [ ]Social Work  [ ]Case management [ ]Holistic Therapy     Goals of Care Document:

## 2022-09-22 NOTE — PROGRESS NOTE ADULT - ASSESSMENT
83 yo male with PMHx of HFmrEF (45-50% on Lasix 20 PO), A-Fib on Eliquis, CAD s/p CABGx3, PVD, COPD (was on rare home O2 PRN now on 3L NC since recent July admission for COVID), CKD3, C3 glomerulonephropathy, HTN, HLD, Hypothyroid presents from NH for SOB, admitted for HF exacerbation treated with iv diuretics. Seen initially for BRENDA secondary to CRS that improved with good diuresis. Nephro reeval for worsening BRENDA.  Assessment and plan  BRENDA on CKD 3b-4/ HF exacerbation/ c3 glomerulopathy on immunosuppression  Cr stabilizing   BRENDA likely prerenal/ATN secondary to overdiuresis  BUN elevated in the setting of depleted intravascular space, po prednisone low dose   continue to hold diuretics   no need for IVF  repeat sodium level   FK level noted increase tacrolimus to 1mg q12h / on MMF/ repeat tacrolimus level   continue with strict i/os  will follow

## 2022-09-22 NOTE — PROGRESS NOTE ADULT - ASSESSMENT
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 Pt has had prolonged hospital course requiring critical care upgrade for acute HFmrEF as well as covid-19    Acute Hypoxic respiratory Failure   Acute HFmrEF  Bilateral pleural effusion Right>Left.  COPD  COVID 19 infection +9/2    Acute on chronic anemia   Chronic Atrial Fibrillation   C3 Glomerulonephropathy   CAD /HTN/DLD  Hypothyroidism   DM II  Stage 2 right buttock pressure ulcer, Present on admission  Multiple thoracentesis    Pt actively dying. Overnight developed worsening hypoxic respiratory failure. CXR showed worsening effusions. Pt made it very clear this morning that he wanted CMO only. Family informed and sons arrived at bedside. Pt started becoming more lethargic but he agreed to keep wearing NRB until the rest of his family was able to come to see him.   Overall life expectancy is extremely limited.

## 2022-09-22 NOTE — PROGRESS NOTE ADULT - SUBJECTIVE AND OBJECTIVE BOX
CHIEF COMPLAINT:    Patient is a 82y old  Male who presents with a chief complaint of chf exacerbation     INTERVAL HPI/OVERNIGHT EVENTS:    Patient seen and examined at bedside. No acute overnight events occurred.    ROS: This morning pt denied SOB. Reported that he is going to die and has made peace with it. All other systems are negative.    Medications:  Standing    PRN Meds  HYDROmorphone  Injectable 0.5 milliGRAM(s) IV Push every 1 hour PRN        Vital Signs:    T(F): 96.9 (09-22-22 @ 06:03), Max: 96.9 (09-22-22 @ 06:03)  HR: 80 (09-22-22 @ 11:45) (63 - 80)  BP: 104/57 (09-22-22 @ 10:00) (92/47 - 104/57)  RR: 23 (09-22-22 @ 11:47) (18 - 25)  SpO2: 94% (09-22-22 @ 11:47) (85% - 100%)  I&O's Summary    21 Sep 2022 07:01  -  22 Sep 2022 07:00  --------------------------------------------------------  IN: 472 mL / OUT: 0 mL / NET: 472 mL    22 Sep 2022 07:01  -  22 Sep 2022 16:59  --------------------------------------------------------  IN: 266 mL / OUT: 150 mL / NET: 116 mL        POCT Blood Glucose.: 160 mg/dL (22 Sep 2022 11:54)  POCT Blood Glucose.: 150 mg/dL (22 Sep 2022 07:52)  POCT Blood Glucose.: 118 mg/dL (22 Sep 2022 00:20)  POCT Blood Glucose.: 112 mg/dL (21 Sep 2022 21:19)      PHYSICAL EXAM:  GENERAL:  NAD  SKIN: No rashes or lesions  HEENT: Atraumatic. Normocephalic. Anicteric  NECK:  No JVD.   PULMONARY: Clear to ausculation bilaterally. No wheezing. No rales  CVS: Normal S1, S2. Regular rate and rhythm. No murmurs.  ABDOMEN/GI: Soft, Nontender, Nondistended; Bowel sounds are present  EXTREMITIES:  No edema B/L LE.  NEUROLOGIC:  No motor deficit.  PSYCH: Alert & oriented x 3, normal affect      LABS:                        7.1    42.14 )-----------( 176      ( 22 Sep 2022 07:30 )             21.8     09-22    134<L>  |  91<L>  |  181<HH>  ----------------------------<  129<H>  3.8   |  24  |  2.5<H>    Ca    8.5      22 Sep 2022 07:30  Mg     2.5     09-22    TPro  5.0<L>  /  Alb  2.7<L>  /  TBili  0.7  /  DBili  x   /  AST  16  /  ALT  11  /  AlkPhos  74  09-22              RADIOLOGY & ADDITIONAL TESTS:  Imaging or report Personally Reviewed:  [x ] YES  [ ] NO -->worsening CXR    Telemetry reviewed independently - NSR, no acute events  EKG reviewed independently -->no new EKGs    Consultant(s) Notes Reviewed:  [ ] YES  [ ] NO  Care Discussed with Consultants/Other Providers [ ] YES  [ ] NO    Case discussed with resident  Care discussed with pt

## 2022-09-22 NOTE — PROGRESS NOTE ADULT - ASSESSMENT
81 y/o man w PMHx of HTN, HLD, DM2, CAD s/p 3vCABG known to Dr De La Fuente, HFmEF w EF 45-50% on 8/1/22, AF on Eliquis, AAA, COPD baseline rare home O2 known to Dr ALO Mars, CKD3 and C3 glomerulopathy known to Dr Daniel, hypothyroidism, PVD, presented on 8/20/22 for SOB x5days and hypoxia to 70s. Admitted to medicine for SOB/hypoxia attributed to CHF exacerbation.   Has been on Bumex drip for HF exacerbation and is s/p R thoracentesis 8/26/22 w 1.5L transudative fluid removed, recurrent R thoracentesis 9/8/22.     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   - CXR on 09/19 significant change in b/l opacities/effusions   - Weaned from HFNC to 5 L NC, now on 2-3l NC  - Was off bumex drip due to increasing Cr although volume status is stable (O2 req, exam, and CXR)  - 09/19: entresto on hold since BP on soft side  - HF team recs: Torsemide 10mg OD, started (09/20)_and Dc's on 09/21 as per nephro (due to his Glomerulopathy), Dr Montes infoemed  - 09/20: will keep entresto on hold for now since metoprolol was recently increased  - leukocytosis with no signs of infection, will order infectious workup    - 09/22: patients condition worsening, high wbc, increasing O2 requirements, low BP, discussed status with patient and family, he wishes not to escalate care until all his children arrive to see him then proceed with comfort measures, he was fully oriented and understands the depth of the situation, family on bedside and agreeable to plan.    #Chronic Atrial Fibrillation:   - Continue metoprolol, increased from 100 od to 150 od as per cardiologist (09/20)  - Lovenox on hold due to epistaxis   - When to resume eliquis?    #Chronic anemia; goal Hb 8+  #Epistaxis- resolved  - on and off, had epistaxis yesterday  - AC on hold  - Last PRBC was given on 09/18  - 09/20: contacted ENT to investigate possibility of posterior epistaxis, they said if no signs or obvious bleed or melena, then posterior bleed unlikely, will monitor Hb and assess  - Hb stable, will keep off AC for now     #C3 Glomerulonephropathy: on  Pred 5mg QD at home, Tacro 0.5mg BID, Mycophenolate 500mg 4 times daily. c/w home meds  #BRENDA- resolved  - Cr back to baseline, BUN still high, will follow nephro c/s  - Nephro following: overdiuresis vs tacrolimus toxicity vs gi bleed vs epistaxis  - Tacrolimus levels <0.5 => increased to 1mg BID      #COVID-19- resolved  - Sp remd  - sp dexa, now on prednisone home dose    #Dysphagia  -speech and swallow following  -Modified barium swallow done 09/15--- severe pharyngeal dysphagia for thin liquids; moderate pharyngeal dysphagia w/ mildly thick liquids; mild pharyngeal dysphagia for puree and minced+moist consistencies. continue minced+moist, mildly thick liquids. allow for swallow between intakes; crush medication (when feasible); no straws; oral hygiene; position upright (90 degrees); small sips/bites; volitional bolus hold w/ mildly thick liquids    #Alkalosis  - Probably contraction alkalosis in the setting of diuresis   - Diamox PRN    #CAD  - Continue Metoprolol 100mg QD, increased to 150mg OD  - Imdur 30mg QD and nefidipine stopped, BP on soft side   - Entresto on hold  - c/w Atorvastatin 40mg qhs.     #HTN:   - Continue metoprolol  - nifedipine stopped given HFmrEF  - Entresto on hold (bp on low side)    #DM II: A1C 7.3.   - on BB    #Hypothyroidism:   - continue synthroid 150 OD    #Stage 2 right buttock pressure ulcer: present on admission  -Continue local wound care, off loading.

## 2022-09-22 NOTE — PROGRESS NOTE ADULT - PROBLEM SELECTOR PLAN 1
DNR/DNI/CMO now  See GOC conversation DNR/DNI/Comfort measures only now  -Wait for family to arrive prior to removing NRB  -No additional IVF, artificial nutrition, blood draws, vital signs, pressors, antibiotics, escalation of oxygen, escalation of care  -Comfort feeds OK  -Anticipate patient may die in hospital - hospice consulted, but would await NRB removal and evidence that patient is stable for 24-48 hours prior to hospice involvement

## 2022-09-22 NOTE — HOSPICE CARE NOTE - CONVESATION DETAILS
Patient reconsulted. Received phone call from Dr. Portillo who asked that hospice not call the patient's family today. Patient is on a non rebreather.

## 2022-09-22 NOTE — PROGRESS NOTE ADULT - SUBJECTIVE AND OBJECTIVE BOX
seen and examined  24 h events noted         PAST HISTORY  --------------------------------------------------------------------------------  No significant changes to PMH, PSH, FHx, SHx, unless otherwise noted    ALLERGIES & MEDICATIONS  --------------------------------------------------------------------------------  Allergies    Ozempic (0.25 mg or 0.5 mg dose) (Hives; Rash)  streptomycin (Unknown)  Trulicity Pen (Hives; Rash)    Intolerances      Standing Inpatient Medications  ALBUTerol    90 MICROgram(s) HFA Inhaler 2 Puff(s) Inhalation every 6 hours  atorvastatin 40 milliGRAM(s) Oral at bedtime  BACItracin   Ointment 1 Application(s) Topical two times a day  chlorhexidine 2% Cloths 1 Application(s) Topical <User Schedule>  dextrose 5%. 1000 milliLiter(s) IV Continuous <Continuous>  dextrose 5%. 1000 milliLiter(s) IV Continuous <Continuous>  dextrose 50% Injectable 25 Gram(s) IV Push once  dextrose 50% Injectable 12.5 Gram(s) IV Push once  dextrose 50% Injectable 25 Gram(s) IV Push once  famotidine    Tablet 20 milliGRAM(s) Oral daily  glucagon  Injectable 1 milliGRAM(s) IntraMuscular once  heparin   Injectable 5000 Unit(s) SubCutaneous every 12 hours  insulin glargine Injectable (LANTUS) 8 Unit(s) SubCutaneous at bedtime  insulin lispro (ADMELOG) corrective regimen sliding scale   SubCutaneous three times a day before meals  insulin lispro Injectable (ADMELOG) 3 Unit(s) SubCutaneous three times a day before meals  levothyroxine 150 MICROGram(s) Oral daily  lidocaine 1% (Preservative-free) Injectable 30 milliLiter(s) Local Injection once  lidocaine 1% Injectable 10 milliLiter(s) Local Injection once  lidocaine 1% Injectable 10 milliLiter(s) Local Injection once  melatonin 5 milliGRAM(s) Oral at bedtime  metoprolol succinate  milliGRAM(s) Oral daily  multivitamin/minerals 1 Tablet(s) Oral daily  mycophenolate mofetil 500 milliGRAM(s) Oral four times a day  pentoxifylline 400 milliGRAM(s) Oral two times a day  predniSONE   Tablet 5 milliGRAM(s) Oral daily  tacrolimus 1 milliGRAM(s) Oral two times a day    PRN Inpatient Medications  acetaminophen     Tablet .. 650 milliGRAM(s) Oral every 6 hours PRN  dextrose Oral Gel 15 Gram(s) Oral once PRN  dextrose Oral Gel 15 Gram(s) Oral once PRN          VITALS/PHYSICAL EXAM  --------------------------------------------------------------------------------  T(C): 36.1 (09-22-22 @ 06:03), Max: 36.1 (09-22-22 @ 06:03)  HR: 74 (09-22-22 @ 06:03) (63 - 74)  BP: 92/47 (09-22-22 @ 06:03) (92/47 - 104/52)  RR: 19 (09-22-22 @ 06:03) (18 - 19)  SpO2: --  Wt(kg): --        09-21-22 @ 07:01  -  09-22-22 @ 07:00  --------------------------------------------------------  IN: 472 mL / OUT: 0 mL / NET: 472 mL      Physical Exam:  	Gen: on venti mask   	Pulm:  B/L ronal   	CV:  S1S2; no rub  	Abd: +distended    	    LABS/STUDIES  --------------------------------------------------------------------------------              8.4    18.24 >-----------<  201      [09-21-22 @ 06:00]              25.5     130  |  89  |  172  ----------------------------<  136      [09-21-22 @ 06:00]  3.7   |  26  |  1.8        Ca     8.4     [09-21-22 @ 06:00]      Mg     2.3     [09-21-22 @ 06:00]    TPro  4.8  /  Alb  2.9  /  TBili  0.7  /  DBili  x   /  AST  14  /  ALT  12  /  AlkPhos  73  [09-21-22 @ 06:00]      Creatinine Trend:  SCr 1.8 [09-21 @ 06:00]  SCr 1.9 [09-20 @ 10:41]  SCr 1.8 [09-19 @ 06:11]  SCr 1.6 [09-18 @ 12:44]  SCr 1.5 [09-17 @ 18:11]        Iron 35, TIBC 220, %sat 16      [08-31-22 @ 10:48]  Ferritin 957      [09-06-22 @ 12:11]  PTH -- (Ca 9.0)      [07-06-22 @ 07:39]   93  Vitamin D (25OH) 27      [07-06-22 @ 07:39]  HbA1c 8.5      [10-01-19 @ 09:54]  TSH 1.55      [08-21-22 @ 06:03]  Lipid: chol 72, TG 65, HDL 38, LDL --      [08-21-22 @ 06:03]

## 2022-09-22 NOTE — PROGRESS NOTE ADULT - PROBLEM SELECTOR PLAN 2
In the setting of CHF, COVID. Now on NRB mask  High Risk.  s/p multiple Thoracentesis  Family/Patient wants CMO  Start Dilaudid 0.5mg IV q 1 hr PRN for pain and/or dyspnea In the setting of CHF, COVID. Now on NRB mask  High Risk.  s/p multiple Thoracentesis  Family/Patient wants CMO  Start Dilaudid 0.5mg IV q 1 hr PRN for pain and/or dyspnea  Remove NRB after family all visits

## 2022-09-23 NOTE — PROGRESS NOTE ADULT - ASSESSMENT
81 y/o man w PMHx of HTN, HLD, DM2, CAD s/p 3vCABG known to Dr De La Fuente, HFmEF w EF 45-50% on 8/1/22, AF on Eliquis, AAA, COPD baseline rare home O2 known to Dr ALO Mars, CKD3 and C3 glomerulopathy known to Dr Daniel, hypothyroidism, PVD, presented on 8/20/22 for SOB x5days and hypoxia to 70s. Admitted to medicine for SOB/hypoxia attributed to CHF exacerbation.   Has been on Bumex drip for HF exacerbation and is s/p R thoracentesis 8/26/22 w 1.5L transudative fluid removed, recurrent R thoracentesis 9/8/22.     - 09/22: patients condition worsening, high wbc, increasing O2 requirements, low BP, discussed status with patient and family, he wishes not to escalate care until all his children arrive to see him then proceed with comfort measures, he was fully oriented and understands the depth of the situation, family on bedside and agreeable to plan.-    09/23 AM: Pt on NRB mask, CMO      #Acute HFmrEF  #Recurrent pleural effusion   #COPD  - HFmrEF w EF 45-50% on 8/1/22; multiple LV regional wall motion abnormalities, mild MR, mild TR, borderline pulm HTN   - COPD baseline on/off home O2, 3L O2 since last admission known to Dr ALO Mars  - Thoracentesis 8/26/22 w 1.5L removed, transudative fluid & repeat 9/8/22, 2L removed.   - CXR on 09/19 significant change in b/l opacities/effusions   - Weaned from HFNC to 5 L NC, now on 2-3l NC  - Was off bumex drip due to increasing Cr although volume status is stable (O2 req, exam, and CXR)  - 09/19: entresto on hold since BP on soft side  - HF team recs: Torsemide 10mg OD, started (09/20)_and Dc's on 09/21 as per nephro (due to his Glomerulopathy), Dr Montes infoemed  - 09/20: will keep entresto on hold for now since metoprolol was recently increased  - leukocytosis with no signs of infection, will order infectious workup  - Pt CMO     #Chronic Atrial Fibrillation:   - Continue metoprolol, increased from 100 od to 150 od as per cardiologist (09/20)  - Lovenox on hold due to epistaxis   - Pt CMO     #Chronic anemia; goal Hb 8+  #Epistaxis- resolved  - on and off, had epistaxis yesterday  - AC on hold  - Last PRBC was given on 09/18  - 09/20: contacted ENT to investigate possibility of posterior epistaxis, they said if no signs or obvious bleed or melena, then posterior bleed unlikely, will monitor Hb and assess  - Pt CMO      #C3 Glomerulonephropathy: on  Pred 5mg QD at home, Tacro 0.5mg BID, Mycophenolate 500mg 4 times daily. c/w home meds  #BRENDA- resolved  - Cr back to baseline, BUN still high, will follow nephro c/s  - Nephro following: overdiuresis vs tacrolimus toxicity vs gi bleed vs epistaxis  - Tacrolimus levels <0.5 => increased to 1mg BID - all medications d/c  - Pt CMO     #COVID-19- resolved  - Sp remd  - sp dexa, now on prednisone home dose  - Pt CMO     #Dysphagia  -speech and swallow following  -Modified barium swallow done 09/15--- severe pharyngeal dysphagia for thin liquids; moderate pharyngeal dysphagia w/ mildly thick liquids; mild pharyngeal dysphagia for puree and minced+moist consistencies. continue minced+moist, mildly thick liquids. allow for swallow between intakes; crush medication (when feasible); no straws; oral hygiene; position upright (90 degrees); small sips/bites; volitional bolus hold w/ mildly thick liquids  - Pt CMO     #Alkalosis  - Probably contraction alkalosis in the setting of diuresis   - Diamox PRN  - Pt CMO     #CAD  - Continue Metoprolol 100mg QD, increased to 150mg OD  - Imdur 30mg QD and nefidipine stopped, BP on soft side   - Entresto on hold  - c/w Atorvastatin 40mg qhs.   - Pt CMO     #HTN:   - Continue metoprolol  - nifedipine stopped given HFmrEF  - Entresto on hold (bp on low side)  - Pt CMO     #DM II: A1C 7.3.   - on BB  - Pt CMO     #Hypothyroidism:   - continue synthroid 150 OD  - Pt CMO     #Stage 2 right buttock pressure ulcer: present on admission  -Continue local wound care, off loading.   - Pt CMO

## 2022-09-23 NOTE — PROGRESS NOTE ADULT - NS ATTEND AMEND GEN_ALL_CORE FT
Agree with above.     Patient with Afib and no history of CVA. Would recommend holding Eliquis in the setting of ongoing epistaxis and need for blood transfusions.     With regard to his diuresis, start hypertonic saline BID in addition to Bumex 2mg IV pushes BID in order to help avoid BRENDA. Please order AM CXR and if pleural effusions are still moderate in size, consider consulting Pulmonary for thoracentesis (earliest date possible is 8/25 due to Eliquis dose on 8/23 AM).     Will continue to follow.
Agree with above. Patient appears more lethargic today, compared to prior. I spoke to the covering house staff about the change in the patient's attention and energy level, and recommended more frequent checks as well as ABG and lactate.     With regard to his diuresis, please give hypertonic saline BID and start on Bumex gtt at outlined above. Pulmonary to evaluate patient for potential thoracentesis.     Patient will be re-evaluated on Friday 8/26.
Agree with above plan regarding medication adjustments.     Patient continues to have an oxygen requirement and with the nasal cannula, he has significant epistaxis. Would hold off on anticoagulation until his oxygen requirement has resolved or his nasal passages have further healed.
Moderate risk patient on bumex  Will follow for GOC
Comfort measures only  Symptom management as above  Hospice should be contacted again in 24-48 hours if patient stable for discharge
Patient now comfort measures only  Discontinue all non comfort meds   Awaiting all family to arrive prior to removing NRB  If patient stable for 24 hours after NRB removal, consider hospice

## 2022-09-23 NOTE — CHART NOTE - NSCHARTNOTESELECT_GEN_ALL_CORE
Event
Event Note
Nutrition/Event Note
Palliative Care - Social Work/Event Note
Palliative Care - Social Work/Event Note
Palliative Care-/Event Note
Registered Dietitian Follow-Up/Event Note
Registered Dietitian Follow-Up/Event Note
event
Event Note
Nutrition/Event Note
Pall Care Sign off note/Event Note
Registered Dietitian Follow-Up/Event Note
Transfer Note
Transfer Note

## 2022-09-23 NOTE — PROGRESS NOTE ADULT - ASSESSMENT
82yMale being evaluated for goals of care and symptom management. See above GOC note. Pt denied pain or dyspnea today. Pt however easily fall asleep during conversation, and has difficulty with short term memory. Wife is in rehab now after hospitalization.    Palliative care reconsulted today patient actively dying and said he did not want further treatment. His sons familiar with palliative care at bedside, discussed CMO w palliative care team and primary team.     Pt is dyspnea/tachypneic on NRB mask, is only semi - conscious.      MEDD (morphine equivalent daily dose): 0      See Recs below.    Please call [phone] with questions or concerns 24/7.   We will continue to follow.  82yMale being evaluated for goals of care and symptom management. See above GOC note. Pt denied pain or dyspnea today. Pt however easily fall asleep during conversation, and has difficulty with short term memory. Wife is in rehab now after hospitalization.    Palliative care followed up with patient and family. Pt comfortable on PRN Dilaudid 0.5mg IV. PT got 5 doses in 24hrs. Now off NRB mask after pt refused to wear it. Son given support.    MEDD (morphine equivalent daily dose): 50mg/24hrs       See Recs below.    Please call [phone] with questions or concerns 24/7.   We will continue to follow.

## 2022-09-23 NOTE — PROGRESS NOTE ADULT - SUBJECTIVE AND OBJECTIVE BOX
Hospital Day:  34d    Subjective:    Patient is a 82y old  Male who was admitted to medicine for a primary diagnosis of chief complaint of chf exacerbation. Pt is seen this AM sitting in bed with NRB mask on stating he is in pain, RN contacted to administer his Dilaudid Per notes pt is CMO, pending patient family to come visit, hospice is also consulted.        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    AF (atrial fibrillation)    Carotid stenosis    COPD (chronic obstructive pulmonary disease)      Past Sx:  H/O coronary artery bypass surgery    S/P inguinal hernia repair    History of appendectomy      Allergies:  Ozempic (0.25 mg or 0.5 mg dose) (Hives; Rash)  streptomycin (Unknown)  Trulicity Pen (Hives; Rash)    Current Meds:   Standng Meds:    PRN Meds:  HYDROmorphone  Injectable 0.5 milliGRAM(s) IV Push every 1 hour PRN pain or dyspnea    HOME MEDICATIONS:  Aranesp 100 mcg/0.5 mL injectable solution: 1 dose(s) injectable every 3 to 4 weeks  atorvastatin 40 mg oral tablet: 1 tab(s) orally once a day (at bedtime)  calcitriol 0.25 mcg oral capsule: 1 cap(s) orally 2 times a week  d-mycophenolate: 500 milligram(s) orally 4 times a day  Eliquis 2.5 mg oral tablet: 1 tab(s) orally 2 times a day  famotidine 20 mg oral tablet: 1 tab(s) orally once a day  furosemide 20 mg oral tablet: 1 tab(s) orally once a day  insulin glargine 100 units/mL subcutaneous solution: 5 unit(s) subcutaneous once a day (at bedtime)  insulin lispro 100 units/mL injectable solution: 1 Unit(s) if Glucose 151 - 200  2 Unit(s) if Glucose 201 - 250  3 Unit(s) if Glucose 251 - 300  4 Unit(s) if Glucose 301 - 350  5 Unit(s) if Glucose 351 - 400  6 Unit(s) if Glucose Greater Than 400  isosorbide mononitrate 30 mg oral tablet, extended release: 1 tab(s) orally once a day (in the morning)  levothyroxine 150 mcg (0.15 mg) oral tablet: 1 tab(s) orally once a day  metoprolol succinate 50 mg oral tablet, extended release: 1 tab(s) orally once a day  Multiple Vitamins with Minerals oral tablet: 1 tab(s) orally once a day  NIFEdipine 30 mg oral tablet, extended release: 1 tab(s) orally once a day  pentoxifylline 400 mg oral tablet, extended release: 1 tab(s) orally 2 times a day  sodium bicarbonate 650 mg oral tablet: 1 tab(s) orally 3 times a day  tacrolimus 0.5 mg oral capsule: 1 cap(s) orally 2 times a day      Vital Signs:   T(F): --  HR: 80 (09-22-22 @ 11:45) (77 - 80)  BP: 104/57 (09-22-22 @ 10:00) (104/57 - 104/57)  RR: 23 (09-22-22 @ 11:47) (23 - 25)  SpO2: 94% (09-22-22 @ 11:47) (85% - 100%)      09-22-22 @ 07:01  -  09-23-22 @ 07:00  --------------------------------------------------------  IN: 709 mL / OUT: 150 mL / NET: 559 mL        Physical Exam:   Not performed, pt CMO        Labs:                         7.1    42.14 )-----------( 176      ( 22 Sep 2022 07:30 )             21.8       22 Sep 2022 07:30    134    |  91     |  181    ----------------------------<  129    3.8     |  24     |  2.5      Ca    8.5        22 Sep 2022 07:30  Mg     2.5       22 Sep 2022 07:30    TPro  5.0    /  Alb  2.7    /  TBili  0.7    /  DBili  x      /  AST  16     /  ALT  11     /  AlkPhos  74     22 Sep 2022 07:30                          Culture - Blood (collected 09-22-22 @ 07:30)  Source: .Blood None  Gram Stain (09-23-22 @ 02:23):    Growth in aerobic bottle: Gram positive cocci in pairs    Growth in anaerobic bottle: Gram Positive Cocci in Clusters  Preliminary Report (09-23-22 @ 02:23):    Growth in aerobic bottle: Gram positive cocci in pairs    ***Blood Panel PCR results on this specimen are available    approximately 3 hours after the Gram stain result.***    Gram stain, PCR, and/or culture results may not always    correspond due to difference in methodologies.    ************************************************************    This PCR assay was performed by multiplex PCR. This    Assay tests for 66 bacterial and resistance gene targets.    Please refer to the [facility] test directory    at [email or web address] for details.    Growth in anaerobic bottle: Gram Positive Cocci in Clusters  Organism: Blood Culture PCR (09-23-22 @ 03:51)  Organism: Blood Culture PCR (09-23-22 @ 03:51)      -  Methicillin resistant Staphylococcus aureus (MRSA): Detec      Method Type: PCR

## 2022-09-23 NOTE — PROGRESS NOTE ADULT - PROBLEM SELECTOR PLAN 2
In the setting of CHF, COVID. Now on NRB mask  High Risk.  s/p multiple Thoracentesis  Family/Patient wants CMO  Start Dilaudid 0.5mg IV q 1 hr PRN for pain and/or dyspnea  Remove NRB after family all visits. In the setting of CHF, COVID. NRB mask now removed  CMO  Dilaudid 0.5mg IV q 1 hr PRN for pain and/or dyspnea

## 2022-09-23 NOTE — PROGRESS NOTE ADULT - ATTENDING COMMENTS
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 Pt has had prolonged hospital course requiring critical care upgrade for acute HFmrEF as well as covid-19    Acute Hypoxic respiratory Failure   Acute HFmrEF  Bilateral pleural effusion Right>Left.  COPD  COVID 19 infection +9/2    Acute on chronic anemia   Chronic Atrial Fibrillation   C3 Glomerulonephropathy   CAD /HTN/DLD  Hypothyroidism   DM II  Stage 2 right buttock pressure ulcer, Present on admission  Multiple thoracentesis    Pt actively dying. Overnight developed worsening hypoxic respiratory failure. CXR showed worsening effusions. Pt made it very clear9/22 that he wanted CMO only. Family informed and sons arrived at bedside. Pt started becoming more lethargic but he agreed to keep wearing NRB until the rest of his family was able to come to see him.   Overall life expectancy is extremely limited.    CAre per Hospice/ Palliative   Expected to pass in hospital
Events noted, on bumex/ neg balance, anechoic fluid, continue diuresis, if no imprvement in CXR thora
events noted, CXR improving, bumex, correct lytes, tele
I saw and examined the patient at bedside.   HE is still with SOB and hypoxia on venturi mask.   Acute HFmr EF on Bumex drip.  Acute hypoxic respiratory failure  Bilateral pleural effusion
BRENDA on CKD 3b-4/ HF exacerbation/ c3 glomerulopathy on immunosuppression  BUn/ CReat noted  agree with holding diuretics/ no need for IVF   last FK level noted at target- repeat   Anemia / Hb noted/ check Fe studies / will need SRIRAM   follow BMP IP  daily weights strict I and o    will follow
events noted, sp thora transudate, continue diuresis, PT/ OT
events noted, still on bumex drip, continue diuresis, repeat CXR, cardio fup, finish decadron, poor prognosis
events noted, still on bumex drip, chocking episode, repeat CXR, continue diuresis, poor prognosis

## 2022-09-23 NOTE — PROGRESS NOTE ADULT - CONVERSATION DETAILS
Meeting held via conference call with patient's children    Reviewed current condition and treatment. Family has been getting regular medical updates and have heard patient's poor prognosis as he has not been able to come off high flow oxygen or diuretic gtt. They verbalized desire to hear options for comfort care.     Reviewed what comfort care is vs ongoing medical management. Discussed removal of high flow and how symptom management is used in end of life care. Also discussed hospice if pt could be discharged.     Agreed that they need more time to see if patient can be further medically optimized. If that is not possible team will meet with family again to address comfort care
Palliative care re-introduced    Family at bedside, pt now very lethargic. Family members explained that the patient who has been in hospital for weeks was going to require another Bumex drip and he refused. He told his sons and the primary team he just wanted to be comfortable. Now despite NRB mask he is lethargic. Sons are surrogates.     They are aware of comfort care option as has been presented in the past. Palliative team clarified comfort care, and they verbalized understanding that we will stop labs, tests, IVF, artificial nutrition, FS, VS, Pulse ox monitoring, tele and no escalation of care. The family wanted to continue the NRB mask until rest of family is there.     they are in agreement with symptom management use of opioids for dyspnea and tachypnea as well as pain.
Met with son Ramiro Morton anxious about patient and the dying process saying " I've never been through this before" Support provided. All questions related to end of life care answered. Discussed symptom management instead of oxygen use to treat dyspnea. He verbalized clear understanding after we spoke about use of symptom control with medications.     He was appropriately sad and emotional. Discussed removal of NRB mask and that we will use medications moving forward for comfort. Removing NRB today.

## 2022-09-23 NOTE — PROGRESS NOTE ADULT - PROBLEM SELECTOR PLAN 1
DNR/DNI/Comfort measures only now  -Wait for family to arrive prior to removing NRB  -No additional IVF, artificial nutrition, blood draws, vital signs, pressors, antibiotics, escalation of oxygen, escalation of care  -Comfort feeds OK  -Anticipate patient may die in hospital - hospice consulted, but would await NRB removal and evidence that patient is stable for 24-48 hours prior to hospice involvement. DNR/DNI/Comfort measures only now  -No additional IVF, artificial nutrition, blood draws, vital signs, pressors, antibiotics, escalation of oxygen, escalation of care  -Comfort feeds OK  -Anticipate patient may die in hospital - hospice consulted, but would await NRB removal and evidence that patient is stable for 24-48 hours prior to hospice involvement.

## 2022-09-23 NOTE — PROGRESS NOTE ADULT - PROBLEM SELECTOR PLAN 4
Start Dilaudid 0.5mg IV q 1 hr PRN for pain and/or dyspnea  Call palliative care team when family ready to remove NRB mask   x [phone]. Dilaudid 0.5mg IV q 1 hr PRN for pain and/or dyspnea

## 2022-09-23 NOTE — CHART NOTE - NSCHARTNOTEFT_GEN_A_CORE
Team visited patient earlier today, patient was awake in bed. Patient did not complain of any pain. Patient had trouble communicating. Team visited patient earlier today, patient was awake in bed. Patient did not complain of any pain. Patient had trouble communicating; was on NRB. support rendered.

## 2022-09-23 NOTE — PROGRESS NOTE ADULT - PROBLEM SELECTOR PLAN 5
Ativan 0.5mg IV q 6 hrs PRN for anxiety/restlessness  Glycopyrrolate 0.2mg IV q 6 hrs PRN for secretions   No escalation of care, pt OFF NRB mask

## 2022-09-23 NOTE — PROGRESS NOTE ADULT - SUBJECTIVE AND OBJECTIVE BOX
HPI:  81 yo male with PMHx of HFrEF (45-50% on Lasix 20 PO), A-Fib on Eliquis, CAD s/p CABGx3, PVD, COPD (was on rare home O2 PRN now on 3L NC since recent July admission for COVID), CKD3, C3 glomerulonephropathy, HTN, HLD, Hypothyroid presents from NH with progressively worsening SOB x5 days and hypoxia.  Recently hospitalized here for a month discharged on 8/3/22 initially for BRENDA on CKD course c/b COVID, bilateral lower lobe pneumonia, delirium and microaspiration of thin liquids on modified barium study. Received Dexamethasone and Zosyn course with new O2 requirements of 3L NC discharged to nursing home on 3L and thick liquid diet for rehab. Due to his SOB, chest x-ray was ordered on 8/15 but results did not come back and patient became increasingly short of breath and was noted to be hypoxic and rehab facility which prompted referral to the ED. Review of symptoms is notable for orthopnea and a chronic dry cough unchanged since his covid illness. He denies paroxysmal nocturnal dyspnea, leg edema, chest pain, palpitations, dizziness, n/v, abd pain. Cardiologist is Dr. Witt and Pulmonolgist is Dr. Rory Mars.     In ED:  T(F): 96.4 (08-20-22 @ 11:27), Max: 96.4 (08-20-22 @ 11:27)  HR: 80 (08-20-22 @ 16:20) (80 - 91)  BP: 137/61 (08-20-22 @ 16:20) (121/58 - 137/61)  RR: 18 (08-20-22 @ 16:20) (18 - 24)  SpO2: 100% (08-20-22 @ 16:20) (89% - 100%) -> 89% on 4L -> placed on NRB -> BIPAP 2/2 tachypnea and hypoxia -> taken off with increased work of breathing placed back on BIPAP  Labs: WBC 12k, Hgb 7.2 (baseline 9), BNP 55k, Cr 2.2 (baseline 1.5), PCO2 47 (VBG), Troponin 0.24  EKG:   Atrial fibrillation with premature ventricular or aberrantly conducted complexes  Left posterior fascicular block   ST & T wave abnormality, consider lateral ischemia - similar to prior in July 2022  XR chest: significant bilateral pleural effusions and pulmonary congestion  Admitted to medicine for SOB/hypoxia likely 2/2 acute CHF exacerbation.  (20 Aug 2022 16:17)     INTERVAL EVENTS:  -reconsulted for GOC  -Patient/family indicated to primary team that they may wish to pursue comfort measures only  -Patient asleep with with no evidence of discomfort at time of visit    ADVANCE DIRECTIVES:    DNR  MOLST  [ ]  Living Will  [ ]   DECISION MAKER(s):  [ ] Health Care Proxy(s)  [ ] Surrogate(s)  [ ] Guardian           Name(s): Phone Number(s):    BASELINE (I)ADL(s) (prior to admission):  Wink: [ ]Total  [ ] Moderate [ ]Dependent  Palliative Performance Status Version 2:         %    [email or web address]_[email or web address]    Allergies    Ozempic (0.25 mg or 0.5 mg dose) (Hives; Rash)  streptomycin (Unknown)  Trulicity Pen (Hives; Rash)    Intolerances    MEDICATIONS  (STANDING):    MEDICATIONS  (PRN):  HYDROmorphone  Injectable 0.5 milliGRAM(s) IV Push every 1 hour PRN pain or dyspnea    PRESENT SYMPTOMS: [ ]Unable to obtain due to poor mentation   Source if other than patient:  [ ]Family   [ ]Team     Pain: [ ]yes [ ]no  QOL impact -   Location -                    Aggravating factors -  Quality -  Radiation -  Timing-  Severity (0-10 scale):  Minimal acceptable level (0-10 scale):     CPOT:    https://www.[facility].org/getattachment/tbb98o66-1c4l-[email or web address]-1a4h-8195j3709o7g/Critical-Care-Pain-Observation-Tool-(CPOT)      PAIN AD Score:     [email or web address].[place].[email or web address]/painad.pdf (press ctrl +  left click to view)    Dyspnea:                           [ ]Mild [ ]Moderate [ ]Severe  Anxiety:                             [ ]Mild [ ]Moderate [ ]Severe  Fatigue:                             [ ]Mild [ ]Moderate [ ]Severe  Nausea:                             [ ]Mild [ ]Moderate [ ]Severe  Loss of appetite:              [ ]Mild [ ]Moderate [ ]Severe  Constipation:                    [ ]Mild [ ]Moderate [ ]Severe    Other Symptoms:  [ ]All other review of systems negative     Palliative Performance Status Version 2:         %    [email or web address]_performance_[email or web address]  PHYSICAL EXAM:  Vital Signs Last 24 Hrs  T(C): 37.1 (23 Sep 2022 15:27), Max: 37.1 (23 Sep 2022 15:27)  T(F): 98.7 (23 Sep 2022 15:27), Max: 98.7 (23 Sep 2022 15:27)  HR: 88 (23 Sep 2022 15:27) (88 - 88)  BP: 124/73 (23 Sep 2022 15:27) (124/73 - 124/73)  BP(mean): --  RR: --  SpO2: --     I&O's Summary    22 Sep 2022 07:01  -  23 Sep 2022 07:00  --------------------------------------------------------  IN: 709 mL / OUT: 150 mL / NET: 559 mL    GENERAL:  [ ]Alert  [ ]Oriented x   [x ]Lethargic  [ ]Cachexia  [ ]Unarousable  [ ]Verbal  [ ]Non-Verbal  Behavioral:   [ ] Anxiety  [ ] Delirium [ ] Agitation [ ] Other  HEENT:  [ ]Normal   [x ]Dry mouth   [ ]ET Tube/Trach  [ ]Oral lesions  PULMONARY:   [ ]Clear [x ]Tachypnea  [ ]Audible excessive secretions   [ ]Rhonchi        [ ]Right [ ]Left [ ]Bilateral  [ ]Crackles        [ ]Right [ ]Left [ ]Bilateral  [ ]Wheezing     [ ]Right [ ]Left [ ]Bilateral  [ ]Diminished breath sounds [ ]right [ ]left [ ]bilateral  CARDIOVASCULAR:    [x ]Regular [ ]Irregular [ ]Tachy  [ ]Won [ ]Murmur [ ]Other  GASTROINTESTINAL:  [x ]Soft  [ ]Distended   [ ]+BS  [ ]Non tender [ ]Tender  [ ]PEG [ ]OGT/ NGT  Last BM:   GENITOURINARY:  [x ]Normal [ ] Incontinent   [ ]Oliguria/Anuria   [ ]Gloria  MUSCULOSKELETAL:   [ ]Normal   [ x]Weakness  [ ]Bed/Wheelchair bound [ ]Edema  NEUROLOGIC:   [ ]No focal deficits  [x ]Cognitive impairment  [ ]Dysphagia [ ]Dysarthria [ ]Paresis [ ]Other   SKIN:   [ ]Normal    [ ]Rash  [ x]Pressure ulcer(s)       Present on admission [ ]y [ ]n    CRITICAL CARE:  [ ] Shock Present  [ ]Septic [ ]Cardiogenic [ ]Neurologic [ ]Hypovolemic  [ ]  Vasopressors [ ]  Inotropes   [x ]Respiratory failure present [ ]Mechanical ventilation [ ]Non-invasive ventilatory support [ ]High flow  [ x]Acute  [ ]Chronic [ x]Hypoxic  [ ]Hypercarbic [ ]Other  [ ]Other organ failure     LABS:                        7.1    42.14 )-----------( 176      ( 22 Sep 2022 07:30 )             21.8   09-22    134<L>  |  91<L>  |  181<HH>  ----------------------------<  129<H>  3.8   |  24  |  2.5<H>    Ca    8.5      22 Sep 2022 07:30  Mg     2.5     09-22    TPro  5.0<L>  /  Alb  2.7<L>  /  TBili  0.7  /  DBili  x   /  AST  16  /  ALT  11  /  AlkPhos  74  09-22        RADIOLOGY & ADDITIONAL STUDIES:    PROTEIN CALORIE MALNUTRITION PRESENT: [ ]mild [ ]moderate [ ]severe [ ]underweight [ ]morbid obesity  https://www.andeal.org/vault/2440/web/files/ONC/Table_Clinical%20Characteristics%20to%20Document%20Malnutrition-White%20JV%20et%20al%186755.pdf    Height (cm): 188 (08-20-22 @ 11:17), 193 (07-27-22 @ 14:25), 188 (10-20-21 @ 07:00)  Weight (kg): 85 (08-23-22 @ 14:35), 100 (07-05-22 @ 13:03), 107.1 (10-23-21 @ 05:41)  BMI (kg/m2): 24 (08-23-22 @ 14:35), 28.3 (08-20-22 @ 11:17), 26.8 (07-27-22 @ 14:25)    [ ]PPSV2 < or = to 30% [ ]significant weight loss  [ ]poor nutritional intake  [ ]anasarca      [ ]Artificial Nutrition      REFERRALS:   [ ]Chaplaincy  [ ]Hospice  [ ]Child Life  [ x]Social Work  [ x]Case management [ ]Holistic Therapy     Goals of Care Document:          HPI:  81 yo male with PMHx of HFrEF (45-50% on Lasix 20 PO), A-Fib on Eliquis, CAD s/p CABGx3, PVD, COPD (was on rare home O2 PRN now on 3L NC since recent July admission for COVID), CKD3, C3 glomerulonephropathy, HTN, HLD, Hypothyroid presents from NH with progressively worsening SOB x5 days and hypoxia.  Recently hospitalized here for a month discharged on 8/3/22 initially for BRENDA on CKD course c/b COVID, bilateral lower lobe pneumonia, delirium and microaspiration of thin liquids on modified barium study. Received Dexamethasone and Zosyn course with new O2 requirements of 3L NC discharged to nursing home on 3L and thick liquid diet for rehab. Due to his SOB, chest x-ray was ordered on 8/15 but results did not come back and patient became increasingly short of breath and was noted to be hypoxic and rehab facility which prompted referral to the ED. Review of symptoms is notable for orthopnea and a chronic dry cough unchanged since his covid illness. He denies paroxysmal nocturnal dyspnea, leg edema, chest pain, palpitations, dizziness, n/v, abd pain. Cardiologist is Dr. Witt and Pulmonolgist is Dr. Rory Mars.     In ED:  T(F): 96.4 (08-20-22 @ 11:27), Max: 96.4 (08-20-22 @ 11:27)  HR: 80 (08-20-22 @ 16:20) (80 - 91)  BP: 137/61 (08-20-22 @ 16:20) (121/58 - 137/61)  RR: 18 (08-20-22 @ 16:20) (18 - 24)  SpO2: 100% (08-20-22 @ 16:20) (89% - 100%) -> 89% on 4L -> placed on NRB -> BIPAP 2/2 tachypnea and hypoxia -> taken off with increased work of breathing placed back on BIPAP  Labs: WBC 12k, Hgb 7.2 (baseline 9), BNP 55k, Cr 2.2 (baseline 1.5), PCO2 47 (VBG), Troponin 0.24  EKG:   Atrial fibrillation with premature ventricular or aberrantly conducted complexes  Left posterior fascicular block   ST & T wave abnormality, consider lateral ischemia - similar to prior in July 2022  XR chest: significant bilateral pleural effusions and pulmonary congestion  Admitted to medicine for SOB/hypoxia likely 2/2 acute CHF exacerbation.  (20 Aug 2022 16:17)     INTERVAL EVENTS:  -Patient now comfort measures only  -Family awaiting rest of family to come prior to removing NRB    ADVANCE DIRECTIVES:    DNR  MOLST  [ ]  Living Will  [ ]   DECISION MAKER(s):  [ ] Health Care Proxy(s)  [ ] Surrogate(s)  [ ] Guardian           Name(s): Phone Number(s):      Allergies    Ozempic (0.25 mg or 0.5 mg dose) (Hives; Rash)  streptomycin (Unknown)  Trulicity Pen (Hives; Rash)    Intolerances    MEDICATIONS  (STANDING):    MEDICATIONS  (PRN):  HYDROmorphone  Injectable 0.5 milliGRAM(s) IV Push every 1 hour PRN pain or dyspnea    PRESENT SYMPTOMS: [x ]Unable to obtain due to poor mentation   Source if other than patient:  [ ]Family   [ ]Team     Pain: [ ]yes [ ]no  QOL impact -   Location -                    Aggravating factors -  Quality -  Radiation -  Timing-  Severity (0-10 scale):  Minimal acceptable level (0-10 scale):       Dyspnea:                           [ ]Mild [ ]Moderate [ ]Severe  Anxiety:                             [ ]Mild [ ]Moderate [ ]Severe  Fatigue:                             [ ]Mild [ ]Moderate [ ]Severe  Nausea:                             [ ]Mild [ ]Moderate [ ]Severe  Loss of appetite:              [ ]Mild [ ]Moderate [ ]Severe  Constipation:                    [ ]Mild [ ]Moderate [ ]Severe    Other Symptoms:  [ ]All other review of systems negative     Palliative Performance Status Version 2:        20 %    http://npcrc.org/files/news/palliative_performance_scale_ppsv2.pdf  PHYSICAL EXAM:  Vital Signs Last 24 Hrs  T(C): 37.1 (23 Sep 2022 15:27), Max: 37.1 (23 Sep 2022 15:27)  T(F): 98.7 (23 Sep 2022 15:27), Max: 98.7 (23 Sep 2022 15:27)  HR: 88 (23 Sep 2022 15:27) (88 - 88)  BP: 124/73 (23 Sep 2022 15:27) (124/73 - 124/73)  BP(mean): --  RR: --  SpO2: --     I&O's Summary    22 Sep 2022 07:01  -  23 Sep 2022 07:00  --------------------------------------------------------  IN: 709 mL / OUT: 150 mL / NET: 559 mL    GENERAL:  [ ]Alert  [ ]Oriented x   [x ]Lethargic  [ ]Cachexia  [ ]Unarousable  [ ]Verbal  [ ]Non-Verbal  Behavioral:   [ ] Anxiety  [ ] Delirium [ ] Agitation [ ] Other  HEENT:  [ ]Normal   [x ]Dry mouth   [ ]ET Tube/Trach  [ ]Oral lesions  PULMONARY:   [ ]Clear [x ]Tachypnea  [ ]Audible excessive secretions   [ ]Rhonchi        [ ]Right [ ]Left [ ]Bilateral  [ ]Crackles        [ ]Right [ ]Left [ ]Bilateral  [ ]Wheezing     [ ]Right [ ]Left [ ]Bilateral  [ ]Diminished breath sounds [ ]right [ ]left [ ]bilateral  CARDIOVASCULAR:    [x ]Regular [ ]Irregular [ ]Tachy  [ ]Won [ ]Murmur [ ]Other  GASTROINTESTINAL:  [x ]Soft  [ ]Distended   [ ]+BS  [ ]Non tender [ ]Tender  [ ]PEG [ ]OGT/ NGT  Last BM:   GENITOURINARY:  [x ]Normal [ ] Incontinent   [ ]Oliguria/Anuria   [ ]Gloria  MUSCULOSKELETAL:   [ ]Normal   [ x]Weakness  [ ]Bed/Wheelchair bound [ ]Edema  NEUROLOGIC:   [ ]No focal deficits  [x ]Cognitive impairment  [ ]Dysphagia [ ]Dysarthria [ ]Paresis [ ]Other   SKIN:   [ ]Normal    [ ]Rash  [ x]Pressure ulcer(s)       Present on admission [ ]y [ ]n    CRITICAL CARE:  [ ] Shock Present  [ ]Septic [ ]Cardiogenic [ ]Neurologic [ ]Hypovolemic  [ ]  Vasopressors [ ]  Inotropes   [x ]Respiratory failure present [ ]Mechanical ventilation [ ]Non-invasive ventilatory support [ ]High flow  [ x]Acute  [ ]Chronic [ x]Hypoxic  [ ]Hypercarbic [ ]Other  [ ]Other organ failure     LABS: previous labs reviewed - no new as CMO    RADIOLOGY & ADDITIONAL STUDIES: previous imaging reviewed - no new as CMO    PROTEIN CALORIE MALNUTRITION PRESENT: [ ]mild [ ]moderate [ ]severe [ ]underweight [ ]morbid obesity  https://www.andeal.org/vault/2440/web/files/ONC/Table_Clinical%20Characteristics%20to%20Document%20Malnutrition-White%20JV%20et%20al%202012.pdf    Height (cm): 188 (08-20-22 @ 11:17), 193 (07-27-22 @ 14:25), 188 (10-20-21 @ 07:00)  Weight (kg): 85 (08-23-22 @ 14:35), 100 (07-05-22 @ 13:03), 107.1 (10-23-21 @ 05:41)  BMI (kg/m2): 24 (08-23-22 @ 14:35), 28.3 (08-20-22 @ 11:17), 26.8 (07-27-22 @ 14:25)    [ ]PPSV2 < or = to 30% [ ]significant weight loss  [ ]poor nutritional intake  [ ]anasarca      [ ]Artificial Nutrition      REFERRALS:   [ ]Chaplaincy  [ ]Hospice  [ ]Child Life  [ x]Social Work  [ x]Case management [ ]Holistic Therapy     Goals of Care Document:

## 2022-09-23 NOTE — PROGRESS NOTE ADULT - PROBLEM SELECTOR PLAN 3
-continue Bumex gtt  -Zaroxlyn daily per primary team
Pt refused further Bumex and diuresis today. CMO.
-continue Bumex gtt  -Zaroxlyn daily per primary team.
Pt refused further Bumex and diuresis today. CMO

## 2022-09-24 NOTE — PROGRESS NOTE ADULT - REASON FOR ADMISSION
CHF exacerbation
CHF exacerbation, COPD exacerbation
chf exacerbation

## 2022-09-24 NOTE — DISCHARGE NOTE FOR THE EXPIRED PATIENT - HOSPITAL COURSE
81 y/o man w PMHx of HTN, HLD, DM2, CAD s/p 3vCABG known to Dr De La Fuente, HFmEF w EF 45-50% on 8/1/22, AF on Eliquis, AAA, COPD baseline rare home O2 known to Dr ALO Mars, CKD3 and C3 glomerulopathy known to Dr Daniel, hypothyroidism, PVD, presented on 8/20/22 for SOB x5days and hypoxia to 70s. Admitted to medicine for SOB/hypoxia attributed to CHF exacerbation.   Has been on Bumex drip for HF exacerbation and is s/p R thoracentesis 8/26/22 w 1.5L transudative fluid removed, recurrent R thoracentesis 9/8/22.     - 09/22: patients condition worsening, high wbc, increasing O2 requirements, low BP, discussed status with patient and family, he wishes not to escalate care until all his children arrive to see him then proceed with comfort measures, he was fully oriented and understands the depth of the situation, family on bedside and agreeable to plan.-    09/23 AM: Pt on NRB mask, CMO      #Acute HFmrEF  #Recurrent pleural effusion   #COPD  - HFmrEF w EF 45-50% on 8/1/22; multiple LV regional wall motion abnormalities, mild MR, mild TR, borderline pulm HTN   - COPD baseline on/off home O2, 3L O2 since last admission known to Dr ALO Mars  - Thoracentesis 8/26/22 w 1.5L removed, transudative fluid & repeat 9/8/22, 2L removed.   - CXR on 09/19 significant change in b/l opacities/effusions   - Weaned from HFNC to 5 L NC, now on 2-3l NC  - Was off bumex drip due to increasing Cr although volume status is stable (O2 req, exam, and CXR)  - 09/19: entresto on hold since BP on soft side  - HF team recs: Torsemide 10mg OD, started (09/20)_and Dc's on 09/21 as per nephro (due to his Glomerulopathy), Dr Montes infoemed  - 09/20: will keep entresto on hold for now since metoprolol was recently increased  - leukocytosis with no signs of infection, will order infectious workup  - Pt CMO     #Chronic Atrial Fibrillation:   - Continue metoprolol, increased from 100 od to 150 od as per cardiologist (09/20)  - Lovenox on hold due to epistaxis   - Pt CMO     #Chronic anemia; goal Hb 8+  #Epistaxis- resolved  - on and off, had epistaxis yesterday  - AC on hold  - Last PRBC was given on 09/18  - 09/20: contacted ENT to investigate possibility of posterior epistaxis, they said if no signs or obvious bleed or melena, then posterior bleed unlikely, will monitor Hb and assess  - Pt CMO      #C3 Glomerulonephropathy: on  Pred 5mg QD at home, Tacro 0.5mg BID, Mycophenolate 500mg 4 times daily. c/w home meds  #BRENDA- resolved  - Cr back to baseline, BUN still high, will follow nephro c/s  - Nephro following: overdiuresis vs tacrolimus toxicity vs gi bleed vs epistaxis  - Tacrolimus levels <0.5 => increased to 1mg BID - all medications d/c  - Pt CMO     #COVID-19- resolved  - Sp remd  - sp dexa, now on prednisone home dose  - Pt CMO     #Dysphagia  -speech and swallow following  -Modified barium swallow done 09/15--- severe pharyngeal dysphagia for thin liquids; moderate pharyngeal dysphagia w/ mildly thick liquids; mild pharyngeal dysphagia for puree and minced+moist consistencies. continue minced+moist, mildly thick liquids. allow for swallow between intakes; crush medication (when feasible); no straws; oral hygiene; position upright (90 degrees); small sips/bites; volitional bolus hold w/ mildly thick liquids  - Pt CMO     #Alkalosis  - Probably contraction alkalosis in the setting of diuresis   - Diamox PRN  - Pt CMO     #CAD  - Continue Metoprolol 100mg QD, increased to 150mg OD  - Imdur 30mg QD and nefidipine stopped, BP on soft side   - Entresto on hold  - c/w Atorvastatin 40mg qhs.   - Pt CMO     #HTN:   - Continue metoprolol  - nifedipine stopped given HFmrEF  - Entresto on hold (bp on low side)  - Pt CMO     #DM II: A1C 7.3.   - on BB  - Pt CMO     #Hypothyroidism:   - continue synthroid 150 OD  - Pt CMO     #Stage 2 right buttock pressure ulcer: present on admission  -Continue local wound care, off loading.   - Pt CMO    83 y/o man w PMHx of HTN, HLD, DM2, CAD s/p 3vCABG known to Dr De La Fuente, HFmEF w EF 45-50% on 22, AF on Eliquis, AAA, COPD baseline rare home O2 known to Dr ALO Mars, CKD3 and C3 glomerulopathy known to Dr Daniel, hypothyroidism, PVD, presented on 22 for SOB x5days and hypoxia to 70s. Admitted to medicine for SOB/hypoxia attributed to CHF exacerbation.   Has been on Bumex drip for HF exacerbation and is s/p R thoracentesis 22 w 1.5L transudative fluid removed, recurrent R thoracentesis 22.     - : patients condition worsening, high wbc, increasing O2 requirements, low BP, discussed status with patient and family, he wishes not to escalate care until all his children arrive to see him then proceed with comfort measures, he was fully oriented and understands the depth of the situation, family on bedside and agreeable to plan.-     AM: Pt on NRB mask, CMO      #Acute HFmrEF  #Recurrent pleural effusion   #COPD  - HFmrEF w EF 45-50% on 22; multiple LV regional wall motion abnormalities, mild MR, mild TR, borderline pulm HTN   - COPD baseline on/off home O2, 3L O2 since last admission known to Dr ALO Mars  - Thoracentesis 22 w 1.5L removed, transudative fluid & repeat 22, 2L removed.   - CXR on  significant change in b/l opacities/effusions   - Weaned from HFNC to 5 L NC, now on 2-3l NC  - Was off bumex drip due to increasing Cr although volume status is stable (O2 req, exam, and CXR)  - : entresto on hold since BP on soft side  - HF team recs: Torsemide 10mg OD, started ()_and Dc's on  as per nephro (due to his Glomerulopathy), Dr Montes infoemed  - : will keep entresto on hold for now since metoprolol was recently increased  - leukocytosis with no signs of infection, will order infectious workup  - Pt CMO     #Chronic Atrial Fibrillation:   - Continue metoprolol, increased from 100 od to 150 od as per cardiologist ()  - Lovenox on hold due to epistaxis   - Pt CMO     #Chronic anemia; goal Hb 8+  #Epistaxis- resolved  - on and off, had epistaxis yesterday  - AC on hold  - Last PRBC was given on   - : contacted ENT to investigate possibility of posterior epistaxis, they said if no signs or obvious bleed or melena, then posterior bleed unlikely, will monitor Hb and assess  - Pt CMO      #C3 Glomerulonephropathy: on  Pred 5mg QD at home, Tacro 0.5mg BID, Mycophenolate 500mg 4 times daily. c/w home meds  #BRENDA- resolved  - Cr back to baseline, BUN still high, will follow nephro c/s  - Nephro following: overdiuresis vs tacrolimus toxicity vs gi bleed vs epistaxis  - Tacrolimus levels <0.5 => increased to 1mg BID - all medications d/c  - Pt CMO     #COVID-19- resolved  - Sp remd  - sp dexa, now on prednisone home dose  - Pt CMO     #Dysphagia  -speech and swallow following  -Modified barium swallow done 09/15--- severe pharyngeal dysphagia for thin liquids; moderate pharyngeal dysphagia w/ mildly thick liquids; mild pharyngeal dysphagia for puree and minced+moist consistencies. continue minced+moist, mildly thick liquids. allow for swallow between intakes; crush medication (when feasible); no straws; oral hygiene; position upright (90 degrees); small sips/bites; volitional bolus hold w/ mildly thick liquids  - Pt CMO     #Alkalosis  - Probably contraction alkalosis in the setting of diuresis   - Diamox PRN  - Pt CMO     #CAD  - Continue Metoprolol 100mg QD, increased to 150mg OD  - Imdur 30mg QD and nefidipine stopped, BP on soft side   - Entresto on hold  - c/w Atorvastatin 40mg qhs.   - Pt CMO     #HTN:   - Continue metoprolol  - nifedipine stopped given HFmrEF  - Entresto on hold (bp on low side)  - Pt CMO     #DM II: A1C 7.3.   - on BB  - Pt CMO     #Hypothyroidism:   - continue synthroid 150 OD  - Pt CMO     #Stage 2 right buttock pressure ulcer: present on admission  -Continue local wound care, off loading.   - Pt CMO   Attending NotE: patient  peacefully on . Family consoled at bedside.

## 2022-09-24 NOTE — PROGRESS NOTE ADULT - SUBJECTIVE AND OBJECTIVE BOX
Hospital Day:  35d    Subjective:    Patient is a 82y old  male who was admitted to medicine for SOB 2/2 CHF exacerbation PT was made CMO 09/22. Pt seen in bed this AM in NAD on NC.     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    AF (atrial fibrillation)    Carotid stenosis    COPD (chronic obstructive pulmonary disease)      Past Sx:  H/O coronary artery bypass surgery    S/P inguinal hernia repair    History of appendectomy      Allergies:  Ozempic (0.25 mg or 0.5 mg dose) (Hives; Rash)  streptomycin (Unknown)  Trulicity Pen (Hives; Rash)    Current Meds:   Standng Meds:  glycopyrrolate Injectable 0.2 milliGRAM(s) IV Push every 6 hours    PRN Meds:  HYDROmorphone  Injectable 0.5 milliGRAM(s) IV Push every 1 hour PRN pain or dyspnea  LORazepam   Injectable 0.5 milliGRAM(s) IV Push every 6 hours PRN anxiety or restlessness    HOME MEDICATIONS:  Aranesp 100 mcg/0.5 mL injectable solution: 1 dose(s) injectable every 3 to 4 weeks  atorvastatin 40 mg oral tablet: 1 tab(s) orally once a day (at bedtime)  calcitriol 0.25 mcg oral capsule: 1 cap(s) orally 2 times a week  d-mycophenolate: 500 milligram(s) orally 4 times a day  Eliquis 2.5 mg oral tablet: 1 tab(s) orally 2 times a day  famotidine 20 mg oral tablet: 1 tab(s) orally once a day  furosemide 20 mg oral tablet: 1 tab(s) orally once a day  insulin glargine 100 units/mL subcutaneous solution: 5 unit(s) subcutaneous once a day (at bedtime)  insulin lispro 100 units/mL injectable solution: 1 Unit(s) if Glucose 151 - 200  2 Unit(s) if Glucose 201 - 250  3 Unit(s) if Glucose 251 - 300  4 Unit(s) if Glucose 301 - 350  5 Unit(s) if Glucose 351 - 400  6 Unit(s) if Glucose Greater Than 400  isosorbide mononitrate 30 mg oral tablet, extended release: 1 tab(s) orally once a day (in the morning)  levothyroxine 150 mcg (0.15 mg) oral tablet: 1 tab(s) orally once a day  metoprolol succinate 50 mg oral tablet, extended release: 1 tab(s) orally once a day  Multiple Vitamins with Minerals oral tablet: 1 tab(s) orally once a day  NIFEdipine 30 mg oral tablet, extended release: 1 tab(s) orally once a day  pentoxifylline 400 mg oral tablet, extended release: 1 tab(s) orally 2 times a day  sodium bicarbonate 650 mg oral tablet: 1 tab(s) orally 3 times a day  tacrolimus 0.5 mg oral capsule: 1 cap(s) orally 2 times a day      Vital Signs:   T(F): 98.7 (09-23-22 @ 15:27), Max: 98.7 (09-23-22 @ 15:27)  HR: 88 (09-23-22 @ 15:27) (88 - 88)  BP: 124/73 (09-23-22 @ 15:27) (124/73 - 124/73)  RR: --  SpO2: --        Physical Exam:   Not performed, patient is CMO        Labs:   Not performed, patient is CMO

## 2022-09-24 NOTE — PROGRESS NOTE ADULT - PROVIDER SPECIALTY LIST ADULT
Heart Failure
Hospitalist
Infectious Disease
Internal Medicine
Nephrology
Nephrology
Pulmonology
Pulmonology
Critical Care
Heart Failure
Hospitalist
Internal Medicine
Nephrology
Nephrology
Palliative Care
Pulmonology
Pulmonology
Critical Care
Critical Care
Heart Failure
Hospitalist
Internal Medicine
Nephrology
Pulmonology
Pulmonology
Cardiology
Critical Care
Heart Failure
Heart Failure
Hospitalist
Internal Medicine
Nephrology
Nephrology
Pulmonology
Internal Medicine
Palliative Care
Infectious Disease
Palliative Care
Infectious Disease
Palliative Care
Palliative Care

## 2022-09-24 NOTE — PROGRESS NOTE ADULT - ASSESSMENT
83 y/o man w PMHx of HTN, HLD, DM2, CAD s/p 3vCABG known to Dr De La Fuente, HFmEF w EF 45-50% on 8/1/22, AF on Eliquis, AAA, COPD baseline rare home O2 known to Dr ALO Mars, CKD3 and C3 glomerulopathy known to Dr Daniel, hypothyroidism, PVD, presented on 8/20/22 for SOB x5days and hypoxia to 70s. Admitted to medicine for SOB/hypoxia attributed to CHF exacerbation.   Has been on Bumex drip for HF exacerbation and is s/p R thoracentesis 8/26/22 w 1.5L transudative fluid removed, recurrent R thoracentesis 9/8/22.      09/22: patients condition worsening, high wbc, increasing O2 requirements, low BP, discussed status with patient and family, he wishes not to escalate care until all his children arrive to see him then proceed with comfort measures, he was fully oriented and understands the depth of the situation, family on bedside and agreeable to plan.-    09/23 AM: Pt on NRB mask, CMO    09/24 AM: Pt was put on NC yesterday afternoon, CMO      #Acute HFmrEF  #Recurrent pleural effusion   #COPD  - HFmrEF w EF 45-50% on 8/1/22; multiple LV regional wall motion abnormalities, mild MR, mild TR, borderline pulm HTN   - COPD baseline on/off home O2, 3L O2 since last admission known to Dr ALO Mars  - Thoracentesis 8/26/22 w 1.5L removed, transudative fluid & repeat 9/8/22, 2L removed.   - CXR on 09/19 significant change in b/l opacities/effusions   - Weaned from HFNC to 5 L NC, now on 2-3l NC  - Was off bumex drip due to increasing Cr although volume status is stable (O2 req, exam, and CXR)  - 09/19: entresto on hold since BP on soft side  - HF team recs: Torsemide 10mg OD, started (09/20)_and Dc's on 09/21 as per nephro (due to his Glomerulopathy), Dr Montes infoemed  - 09/20: will keep entresto on hold for now since metoprolol was recently increased  - leukocytosis with no signs of infection, will order infectious workup  - Pt CMO     #Chronic Atrial Fibrillation:   - Continue metoprolol, increased from 100 od to 150 od as per cardiologist (09/20)  - Lovenox on hold due to epistaxis   - Pt CMO     #Chronic anemia; goal Hb 8+  #Epistaxis- resolved  - on and off, had epistaxis yesterday  - AC on hold  - Last PRBC was given on 09/18  - 09/20: contacted ENT to investigate possibility of posterior epistaxis, they said if no signs or obvious bleed or melena, then posterior bleed unlikely, will monitor Hb and assess  - Pt CMO      #C3 Glomerulonephropathy: on  Pred 5mg QD at home, Tacro 0.5mg BID, Mycophenolate 500mg 4 times daily. c/w home meds  #BRENDA- resolved  - Cr back to baseline, BUN still high, will follow nephro c/s  - Nephro following: overdiuresis vs tacrolimus toxicity vs gi bleed vs epistaxis  - Tacrolimus levels <0.5 => increased to 1mg BID - all medications d/c  - Pt CMO     #COVID-19- resolved  - Sp remd  - sp dexa, now on prednisone home dose  - Pt CMO     #Dysphagia  -speech and swallow following  -Modified barium swallow done 09/15--- severe pharyngeal dysphagia for thin liquids; moderate pharyngeal dysphagia w/ mildly thick liquids; mild pharyngeal dysphagia for puree and minced+moist consistencies. continue minced+moist, mildly thick liquids. allow for swallow between intakes; crush medication (when feasible); no straws; oral hygiene; position upright (90 degrees); small sips/bites; volitional bolus hold w/ mildly thick liquids  - Pt CMO     #Alkalosis  - Probably contraction alkalosis in the setting of diuresis   - Diamox PRN  - Pt CMO     #CAD  - Continue Metoprolol 100mg QD, increased to 150mg OD  - Imdur 30mg QD and nefidipine stopped, BP on soft side   - Entresto on hold  - c/w Atorvastatin 40mg qhs.   - Pt CMO     #HTN:   - Continue metoprolol  - nifedipine stopped given HFmrEF  - Entresto on hold (bp on low side)  - Pt CMO     #DM II: A1C 7.3.   - on BB  - Pt CMO     #Hypothyroidism:   - continue synthroid 150 OD  - Pt CMO     #Stage 2 right buttock pressure ulcer: present on admission  -Continue local wound care, off loading.   - Pt CMO

## 2022-10-05 DIAGNOSIS — I21.4 NON-ST ELEVATION (NSTEMI) MYOCARDIAL INFARCTION: ICD-10-CM

## 2022-10-05 DIAGNOSIS — J96.01 ACUTE RESPIRATORY FAILURE WITH HYPOXIA: ICD-10-CM

## 2022-10-05 DIAGNOSIS — D62 ACUTE POSTHEMORRHAGIC ANEMIA: ICD-10-CM

## 2022-10-05 DIAGNOSIS — L89.312 PRESSURE ULCER OF RIGHT BUTTOCK, STAGE 2: ICD-10-CM

## 2022-10-05 DIAGNOSIS — J44.9 CHRONIC OBSTRUCTIVE PULMONARY DISEASE, UNSPECIFIED: ICD-10-CM

## 2022-10-05 DIAGNOSIS — I47.2 VENTRICULAR TACHYCARDIA: ICD-10-CM

## 2022-10-05 DIAGNOSIS — I44.5 LEFT POSTERIOR FASCICULAR BLOCK: ICD-10-CM

## 2022-10-05 DIAGNOSIS — J90 PLEURAL EFFUSION, NOT ELSEWHERE CLASSIFIED: ICD-10-CM

## 2022-10-05 DIAGNOSIS — E78.5 HYPERLIPIDEMIA, UNSPECIFIED: ICD-10-CM

## 2022-10-05 DIAGNOSIS — L89.152 PRESSURE ULCER OF SACRAL REGION, STAGE 2: ICD-10-CM

## 2022-10-05 DIAGNOSIS — I48.20 CHRONIC ATRIAL FIBRILLATION, UNSPECIFIED: ICD-10-CM

## 2022-10-05 DIAGNOSIS — D84.9 IMMUNODEFICIENCY, UNSPECIFIED: ICD-10-CM

## 2022-10-05 DIAGNOSIS — I13.0 HYPERTENSIVE HEART AND CHRONIC KIDNEY DISEASE WITH HEART FAILURE AND STAGE 1 THROUGH STAGE 4 CHRONIC KIDNEY DISEASE, OR UNSPECIFIED CHRONIC KIDNEY DISEASE: ICD-10-CM

## 2022-10-05 DIAGNOSIS — Z66 DO NOT RESUSCITATE: ICD-10-CM

## 2022-10-05 DIAGNOSIS — I25.10 ATHEROSCLEROTIC HEART DISEASE OF NATIVE CORONARY ARTERY WITHOUT ANGINA PECTORIS: ICD-10-CM

## 2022-10-05 DIAGNOSIS — N18.4 CHRONIC KIDNEY DISEASE, STAGE 4 (SEVERE): ICD-10-CM

## 2022-10-05 DIAGNOSIS — N05.A: ICD-10-CM

## 2022-10-05 DIAGNOSIS — I50.23 ACUTE ON CHRONIC SYSTOLIC (CONGESTIVE) HEART FAILURE: ICD-10-CM

## 2022-10-05 DIAGNOSIS — E11.649 TYPE 2 DIABETES MELLITUS WITH HYPOGLYCEMIA WITHOUT COMA: ICD-10-CM

## 2022-10-05 DIAGNOSIS — I71.4 ABDOMINAL AORTIC ANEURYSM, WITHOUT RUPTURE: ICD-10-CM

## 2022-10-05 DIAGNOSIS — I65.29 OCCLUSION AND STENOSIS OF UNSPECIFIED CAROTID ARTERY: ICD-10-CM

## 2022-10-05 DIAGNOSIS — Z51.5 ENCOUNTER FOR PALLIATIVE CARE: ICD-10-CM

## 2022-10-05 DIAGNOSIS — E11.51 TYPE 2 DIABETES MELLITUS WITH DIABETIC PERIPHERAL ANGIOPATHY WITHOUT GANGRENE: ICD-10-CM

## 2022-10-05 DIAGNOSIS — N17.9 ACUTE KIDNEY FAILURE, UNSPECIFIED: ICD-10-CM

## 2022-10-05 DIAGNOSIS — U07.1 COVID-19: ICD-10-CM

## 2022-10-05 DIAGNOSIS — E87.3 ALKALOSIS: ICD-10-CM

## 2022-10-05 DIAGNOSIS — Z79.4 LONG TERM (CURRENT) USE OF INSULIN: ICD-10-CM

## 2022-10-05 DIAGNOSIS — I27.20 PULMONARY HYPERTENSION, UNSPECIFIED: ICD-10-CM

## 2022-10-05 DIAGNOSIS — E87.1 HYPO-OSMOLALITY AND HYPONATREMIA: ICD-10-CM

## 2022-10-05 DIAGNOSIS — E11.22 TYPE 2 DIABETES MELLITUS WITH DIABETIC CHRONIC KIDNEY DISEASE: ICD-10-CM

## 2022-10-08 LAB
CULTURE RESULTS: SIGNIFICANT CHANGE UP
SPECIMEN SOURCE: SIGNIFICANT CHANGE UP

## 2022-10-13 ENCOUNTER — APPOINTMENT (OUTPATIENT)
Age: 82
End: 2022-10-13

## 2023-01-03 NOTE — H&P ADULT - MOLST COMPLETED
Cedric is a 68 year old who is being evaluated via a billable telephone visit.      What phone number would you like to be contacted at? [phone]   How would you like to obtain your AVS? Fani    Distant Location (provider location):  Off-site  Phone call duration: 18 minutes  Outcome for 01/03/23 3:41 PM: Patient is not checking blood sugars  Tata Malik, VF       20-Oct-2021

## 2023-04-04 NOTE — PROGRESS NOTE ADULT - ASSESSMENT
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     SOB/ AHRF  Sepsis criteria met on admission but pt remains stable off abx and procal low, could be SIRS criteria due to aspiration pneumonitis  - bibasilar opacities remain on CXR from today  - likely due to aspiration pneumonia and acute on chronic HFrEF  - c/w Bumex/hypertonic saline--HF team recommended to d/c bumex tonight but pt remains overloaded therefore will continue with Bumex and reassess tomorrow  - pt has large urine output  - speech and swallow appreciated  - pt stable off abx  - Echo 08/01: 45-50%EF  - CXR shows improved b/l pleural effusions with pulmonary congestion and opacities but awaiting final read  - proBNP > 50K  - thoracentesis done 9/27--transudative effusion  - continue with bumex drip and hypertonic saline  - chest x-ray 9/29 fluid reaccumulation   - HF rec: bumex drip  - follow up with pulm   - repeat chest x-ray 8/31 AM  - patient can be switched to nasal cannula 5L    Epistaxis  - resolved  - ENT follow up appreciated, packing removed    Leukocytosis  - was uptrending likely from steroids    C3 Glomerulonephropathy  - c/w tacrolimus (level 9.5), cellcept    CAD  - c/w statin, metoprolol, imdur    HTN  - c/w metoprolol, nifedipine    Hypothyroidism  - c/w synthroid    DM II  - FS uncontrolled  - adjust insulin    Stage 2 right buttock pressure ulcer  - present on admission  - c/w local wound care    Acute on chronic anemia  - s/p 1 U PRBC    Chronic afib  - c/w nifedipine, metoprolol    COPD  - c/w inhlaers    DVT px  from home    #Progress Note Handoff:  Pending (specify):  Diuresis  Family discussion:   Disposition: subacute rehab as per PT   Detail Level: Generalized Include Location In Plan?: No

## 2023-06-11 NOTE — SWALLOW BEDSIDE ASSESSMENT ADULT - SWALLOW EVAL: RECOMMENDED DIET
Patient alert and oriented x4. Respirations even and unlabored. No distress noted. Skin warm and dry. Iv saline locked. No complications noted. Wound vac to right foot. Intact. No complications noted. Markings noted to scalp. Patient with history of skin cancer to scalp. Patient denies pain. Pain medication available as needed. Patient has no complaints at this time. Instructed to call for any needs. Bed in lowest position. Call bell within reach.     Problem: Adult Inpatient Plan of Care  Goal: Plan of Care Review  Outcome: Ongoing, Progressing  Flowsheets (Taken 6/11/2023 0438)  Plan of Care Reviewed With: patient  Goal: Patient-Specific Goal (Individualized)  Outcome: Ongoing, Progressing  Goal: Absence of Hospital-Acquired Illness or Injury  Outcome: Ongoing, Progressing  Intervention: Identify and Manage Fall Risk  Flowsheets (Taken 6/11/2023 0438)  Safety Promotion/Fall Prevention:   assistive device/personal item within reach   bed alarm set   high risk medications identified   Fall Risk signage in place   medications reviewed   nonskid shoes/socks when out of bed     Problem: Impaired Wound Healing  Goal: Optimal Wound Healing  Outcome: Ongoing, Progressing     Problem: Skin Injury Risk Increased  Goal: Skin Health and Integrity  Outcome: Ongoing, Progressing      puree w/ mildly thick liquids

## 2023-11-12 NOTE — SWALLOW BEDSIDE ASSESSMENT ADULT - SWALLOW EVAL: FUNCTIONAL LEVEL AT TIME OF EVAL
tachypneic, RN aware
PAST MEDICAL HISTORY:  CAD (coronary artery disease)     Cirrhosis     Diabetes mellitus     Esophageal varices in cirrhosis     Gout     H/O: GI bleed     History of portal hypertension     History of TIAs     HLD (hyperlipidemia)     HTN (hypertension)

## 2024-03-08 NOTE — ASU DISCHARGE PLAN (ADULT/PEDIATRIC) - PHYSICIAN SECTION COMPLETE
Patient called and would like to talk to Dr. Schultz regarding who he would recommend for a Hip replacement.     Please advise [phone]  
Scheduled patient w/ Dr. Ortega 3/26/24.   
Yes

## 2024-05-15 NOTE — DISCHARGE NOTE PROVIDER - DETAILS OF MALNUTRITION DIAGNOSIS/DIAGNOSES
Detail Level: Simple Add Option For Additional Mediation: No Consent: The risks of atrophy were reviewed with the patient. Ndc# (Optional): 5875-1724-28 Total Volume (Ccs): 1 Kenalog Preparation: kenalog Lot # (Optional): [id number] Expiration Date (Optional): Jan 2026 Concentration (Mg/Ml) Of Additional Medication: 2.5 Concentration (Mg/Ml): 40.0 This patient has been assessed with a concern for Malnutrition and was treated during this hospitalization for the following Nutrition diagnosis/diagnoses:     -  07/27/2022: Moderate protein-calorie malnutrition

## 2025-03-14 NOTE — ED ADULT NURSE NOTE - COVID-19 ORDERING FACILITY
Detail Level: Zone
Continue Regimen: Pimecrolimus to frontal scalp qd
Render In Strict Bullet Format?: No
[facility]

## 2025-06-30 NOTE — PROGRESS NOTE ADULT - SUBJECTIVE AND OBJECTIVE BOX
Pharmacy to Manage Warfarin Continuation Note    Warfarin therapy is being Continued.    Warfarin is being prescribed for the indication of Atrial fibrillation / Atrial flutter                  Target INR: 2 - 3      Patient's current warfarin regimen is based on INR.    Supportive Information   Lab Results   Component Value Date    INR 1.7 06/30/2025    INR 2.2 06/29/2025    INR 2.7 06/28/2025    INR 2.3 06/27/2025    INR 1.9 06/26/2025    INR 1.9 06/25/2025    INR 2.1 06/24/2025       Lab Results   Component Value Date    HGB 8.3 (L) 06/30/2025    HGB 8.5 (L) 06/29/2025    HGB 8.1 (L) 06/28/2025    HGB 7.9 (L) 06/27/2025       Lab Results   Component Value Date     06/30/2025     06/29/2025     06/28/2025     06/27/2025       Significant interactions with warfarin present: Other -Zosyn, tramadol, Lipitor    Bridge therapy, if applicable: No        Plan  Assessment: INR is currently subtherapeutic    Signs and Symptoms of Bleeding: no       Recommendation: Warfarin 2.5 mg x 1    Consult: Pharmacy will continue to monitor and adjust as clinically appropriate    Thank you for the consultation.  Yuliana Hardy, PHARMD   ANDRIA TAVAREZ 82y Male  MRN#: [id number]   Hospital Day: 13d    HPI:  82M with PMHx of HFrEF, Afib on eliquis, CAD s/p CABG, COPD (on rare home O2 PRN), CKD3, HTN, HLD presents for 2 weeks of watery diarrhea. Pt reports that 2 weeks ago he went to a doctor's appointment with his wife, and the next day both of them started experiencing non-bloody watery diarrhea, x3-4/day, and then started having decreased PO intake and felt weak. Of note, he also had a mechanical fall 1 week ago on his right side with no injuries sustained (but he didn't visit the ED). Pt denies any other sicks contacts, recent travel, vomiting, chest pain, SOB, palpitations, light-headedness, or any  symptoms.     In the ED: /57, HR 57, Temp 97.6F, satting 96% on RA. Labs notable for Cr of 3.6 (baseline ~2.0), , HCO3 19, AG 20. Trops 0.07 (likely 2/2 BRENDA), no chest pain, EKG 1st-degree AV block. CTH -ve, CTAP + chest -ve for acute pathology.     Admitted to medicine (05 Jul 2022 21:23)      SUBJECTIVE  Patient is a 82y old Male who presents with a chief complaint of Diarrhea (18 Jul 2022 03:44)  Currently admitted to medicine with the primary diagnosis of Acute kidney injury superimposed on CKD      INTERVAL HPI AND OVERNIGHT EVENTS:  Patient was examined and seen at bedside. This morning he is moving his limbs intermittently and not verbal.     REVIEW OF SYMPTOMS:  CONSTITUTIONAL: No weakness, fevers or chills; No headaches  EYES: No visual changes, eye pain, or discharge  ENT: No vertigo; No ear pain or change in hearing; No sore throat or difficulty swallowing  NECK: No pain or stiffness  RESPIRATORY: No cough, wheezing, or hemoptysis; No shortness of breath  CARDIOVASCULAR: No chest pain or palpitations  GASTROINTESTINAL: No abdominal or epigastric pain; No nausea, vomiting, or hematemesis; No diarrhea or constipation; No melena or hematochezia  GENITOURINARY: No dysuria, frequency or hematuria  MUSCULOSKELETAL: No joint pain, no muscle pain, no weakness  NEUROLOGICAL: No numbness or weakness  SKIN: No itching or rashes    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 1000 milliLiter(s) IV Continuous <Continuous>  dextrose 5%. 1000 milliLiter(s) IV Continuous <Continuous>  dextrose 50% Injectable 25 Gram(s) IV Push once  dextrose 50% Injectable 12.5 Gram(s) IV Push once  dextrose 50% Injectable 25 Gram(s) IV Push once  furosemide    Tablet 40 milliGRAM(s) Oral daily  glucagon  Injectable 1 milliGRAM(s) IntraMuscular once  insulin glargine Injectable (LANTUS) 18 Unit(s) SubCutaneous at bedtime  insulin lispro (ADMELOG) corrective regimen sliding scale   SubCutaneous three times a day before meals  insulin lispro Injectable (ADMELOG) 6 Unit(s) SubCutaneous three times a day before meals  levoFLOXacin IVPB 750 milliGRAM(s) IV Intermittent every 48 hours  levothyroxine 150 MICROGram(s) Oral daily  metoprolol succinate ER 50 milliGRAM(s) Oral daily  multivitamin/minerals 1 Tablet(s) Oral daily  pentoxifylline 400 milliGRAM(s) Oral two times a day  sodium bicarbonate 650 milliGRAM(s) Oral every 8 hours  tacrolimus 1 milliGRAM(s) Oral every 12 hours    PRN MEDICATIONS  acetaminophen     Tablet .. 650 milliGRAM(s) Oral every 6 hours PRN  ALBUTerol    90 MICROgram(s) HFA Inhaler 1 Puff(s) Inhalation every 6 hours PRN  dextrose Oral Gel 15 Gram(s) Oral once PRN  loperamide 2 milliGRAM(s) Oral every 4 hours PRN  melatonin 5 milliGRAM(s) Oral at bedtime PRN      VITAL SIGNS: Last 24 Hours  T(C): 35.9 (18 Jul 2022 05:27), Max: 36.6 (17 Jul 2022 14:33)  T(F): 96.7 (18 Jul 2022 05:27), Max: 97.8 (17 Jul 2022 14:33)  HR: 81 (18 Jul 2022 13:13) (69 - 81)  BP: 158/72 (18 Jul 2022 13:13) (116/76 - 164/67)  BP(mean): --  RR: 18 (18 Jul 2022 05:27) (18 - 18)  SpO2: 96% (18 Jul 2022 14:08) (96% - 96%)    LABS:                        9.9    15.75 )-----------( 268      ( 18 Jul 2022 09:47 )             29.6     07-17    136  |  102  |  93<HH>  ----------------------------<  146<H>  3.9   |  23  |  1.9<H>    Ca    8.7      17 Jul 2022 09:38  Mg     2.7     07-17    TPro  5.6<L>  /  Alb  3.4<L>  /  TBili  0.7  /  DBili  x   /  AST  29  /  ALT  17  /  AlkPhos  76  07-17                  RADIOLOGY:      PHYSICAL EXAM:  CONSTITUTIONAL: Seems to be in some distress, AAOx0  HEAD: Atraumatic, normocephalic  EYES: EOM intact, PERRLA, conjunctiva and sclera clear  ENT: Supple, no masses, no thyromegaly, no bruits, no JVD; moist mucous membranes  PULMONARY: Clear to auscultation bilaterally; no wheezes, rales, or rhonchi  CARDIOVASCULAR: Regular rate and rhythm; no murmurs, rubs, or gallops  GASTROINTESTINAL: Soft, non-tender, non-distended; bowel sounds present  MUSCULOSKELETAL: 2+ peripheral pulses; no clubbing, no cyanosis, no edema  NEUROLOGY: non-focal  SKIN: No rashes or lesions; warm and dry    ASSESSMENT & PLAN    # encephalopathy likely metab 2/2 COVID 19 and/or PNA  CTH: mild chr micro changes; mild atrophy and ventric prom  Patient delirious on 7/16  D/bg cefepime, dexamethasone i/v/o drug indused delirium  EEG and CT head w/o contrast done  CT head - no acute pathology  EEG- Consistent with diffuse cerebral electrophysiological dysfunction, Secondary to toxic metabolic cause.  Tacrolimus level ordered for 7/19 AM  MRI Head w/o IV contrast ordered    # BRENDA on CKD III, presumed pre-renal; hx C3 glomerulopathy  base Cr 2.1  improved with IVF  Creatinine 1.8 7/14  Pr:Cr ratio 1.0  continue with NHCO3 PO  Renal - continue tacrolimus ordered  Cr. at 1.9 7/17    # COVID +  On supportive treatment   on Dexa 6mg PO q6  d-dimer 454, ESR 38, CRP 25.9, ferr 1415 will Follow up [facility]  completed quarantine 7/15 - d/bg precautions 7/16    # hyponatremia - likely 2/2 salt depletion  Na 134 on 7/14   resolving  Yunior <20, U osm 414, Uric acid 10.3  Stop RL now  Will continue to monitor  - continue Lasix 40mg PO once daily      # COPD; severe pulm HTN; fibrotic changes LLL; + fever  Worsening LLL infiltrates compared to 7/5  Follow up procal, MRSA nares  F/u pulm recs  Started on cefepime + levoflox   Will plan d/c on oral Ab levo + vantin    # had 1 loose BM  Will continue to monitor, if recurs will send for Cdiff testing    # normo Anemia; no signs of acute blood loss  Patient on eliquis  Will Follow up renal for evaluation  current Hb 10.5(7/11)    # Fall, mechanical  no syncope per pt  cth neg  trauma w/u neg  PT eval for SNF    # Elevated cardiac enzymes  improved from prior Oct 2021 levels  ekg noted  no cp  outpt f/u cards    # HFrEF, chronic; sev dilated CM; RV dysfxn; mild TR/AR; HTN  euvolemic on exam  not on ANY goal directed tx - cardio eval Dr Haley  Patient started on metoprolol 25mg q 24 PO    # AFib, chronic  on eliquis  rate control with metoprolol 25mg q24    # CAD  agree w/ no asa    # DLD  on statin    # DM2  diabetic diet  FS qac/hs  lantus 21 HS  humalog 7 tid w/ +1 scale    # Hypothyroidism  synthroid 150  # PVD  c/w pentoxifylline   # DVT ppx:  on eliquis  # Activity: need PT eval  pending- MRI head, tacrolimus level